# Patient Record
Sex: MALE | Race: WHITE | NOT HISPANIC OR LATINO | URBAN - METROPOLITAN AREA
[De-identification: names, ages, dates, MRNs, and addresses within clinical notes are randomized per-mention and may not be internally consistent; named-entity substitution may affect disease eponyms.]

---

## 2024-09-05 ENCOUNTER — OFFICE VISIT (OUTPATIENT)
Dept: PRIMARY CARE | Facility: CLINIC | Age: 64
End: 2024-09-05
Payer: MEDICARE

## 2024-09-05 VITALS
WEIGHT: 246 LBS | BODY MASS INDEX: 32.6 KG/M2 | SYSTOLIC BLOOD PRESSURE: 132 MMHG | DIASTOLIC BLOOD PRESSURE: 68 MMHG | HEIGHT: 73 IN

## 2024-09-05 DIAGNOSIS — E78.2 MIXED HYPERLIPIDEMIA: ICD-10-CM

## 2024-09-05 DIAGNOSIS — Z12.5 ENCOUNTER FOR PROSTATE CANCER SCREENING: ICD-10-CM

## 2024-09-05 DIAGNOSIS — I25.119 CORONARY ARTERY DISEASE INVOLVING NATIVE CORONARY ARTERY OF NATIVE HEART WITH ANGINA PECTORIS (CMS-HCC): Primary | ICD-10-CM

## 2024-09-05 DIAGNOSIS — I65.29 STENOSIS OF CAROTID ARTERY, UNSPECIFIED LATERALITY: ICD-10-CM

## 2024-09-05 DIAGNOSIS — I10 PRIMARY HYPERTENSION: ICD-10-CM

## 2024-09-05 DIAGNOSIS — E03.9 HYPOTHYROIDISM, UNSPECIFIED TYPE: ICD-10-CM

## 2024-09-05 DIAGNOSIS — R73.03 PRE-DIABETES: ICD-10-CM

## 2024-09-05 PROBLEM — H90.3 SENSORINEURAL HEARING LOSS, BILATERAL: Status: ACTIVE | Noted: 2024-09-05

## 2024-09-05 PROBLEM — Z90.49 HISTORY OF CHOLECYSTECTOMY: Status: ACTIVE | Noted: 2024-09-05

## 2024-09-05 PROBLEM — K57.30 DIVERTICULAR DISEASE OF COLON: Status: ACTIVE | Noted: 2024-09-05

## 2024-09-05 PROBLEM — I87.309 CHRONIC PERIPHERAL VENOUS HYPERTENSION: Status: ACTIVE | Noted: 2021-04-27

## 2024-09-05 PROBLEM — R07.9 CHEST PAIN: Status: ACTIVE | Noted: 2021-04-27

## 2024-09-05 PROBLEM — R27.0 ATAXIA: Status: ACTIVE | Noted: 2021-04-27

## 2024-09-05 PROBLEM — J40 BRONCHITIS: Status: ACTIVE | Noted: 2024-09-05

## 2024-09-05 PROBLEM — R10.9 ABDOMINAL PAIN: Status: ACTIVE | Noted: 2024-09-05

## 2024-09-05 PROBLEM — I25.9 CHRONIC ISCHEMIC HEART DISEASE, UNSPECIFIED: Status: ACTIVE | Noted: 2024-09-05

## 2024-09-05 PROBLEM — R68.89 OTHER GENERAL SYMPTOMS AND SIGNS: Status: ACTIVE | Noted: 2024-09-05

## 2024-09-05 PROBLEM — I25.10 ATHEROSCLEROSIS OF CORONARY ARTERY WITHOUT ANGINA PECTORIS: Status: ACTIVE | Noted: 2024-09-05

## 2024-09-05 PROBLEM — R06.00 DYSPNEA: Status: ACTIVE | Noted: 2021-04-27

## 2024-09-05 PROBLEM — J01.90 ACUTE SINUSITIS: Status: ACTIVE | Noted: 2024-09-05

## 2024-09-05 PROBLEM — I25.2 HISTORY OF MYOCARDIAL INFARCTION: Status: ACTIVE | Noted: 2021-04-27

## 2024-09-05 PROBLEM — I25.10 CORONARY ARTERY DISEASE: Status: ACTIVE | Noted: 2024-09-05

## 2024-09-05 PROBLEM — I11.9 BENIGN HYPERTENSIVE HEART DISEASE: Status: ACTIVE | Noted: 2024-09-05

## 2024-09-05 PROBLEM — D36.9 TUBULAR ADENOMA: Status: ACTIVE | Noted: 2024-09-05

## 2024-09-05 PROBLEM — Z85.53 PERSONAL HISTORY OF MALIGNANT NEOPLASM OF RENAL PELVIS: Status: ACTIVE | Noted: 2024-09-05

## 2024-09-05 PROBLEM — H81.10 BENIGN PAROXYSMAL VERTIGO, UNSPECIFIED EAR: Status: ACTIVE | Noted: 2024-09-05

## 2024-09-05 PROBLEM — F17.201 TOBACCO DEPENDENCE IN REMISSION: Status: ACTIVE | Noted: 2024-09-05

## 2024-09-05 PROBLEM — D48.5 NEOPLASM OF UNCERTAIN BEHAVIOR OF SKIN: Status: ACTIVE | Noted: 2024-09-05

## 2024-09-05 PROCEDURE — 3075F SYST BP GE 130 - 139MM HG: CPT | Performed by: INTERNAL MEDICINE

## 2024-09-05 PROCEDURE — 99204 OFFICE O/P NEW MOD 45 MIN: CPT | Performed by: INTERNAL MEDICINE

## 2024-09-05 PROCEDURE — 3078F DIAST BP <80 MM HG: CPT | Performed by: INTERNAL MEDICINE

## 2024-09-05 PROCEDURE — 1124F ACP DISCUSS-NO DSCNMKR DOCD: CPT | Performed by: INTERNAL MEDICINE

## 2024-09-05 PROCEDURE — 1159F MED LIST DOCD IN RCRD: CPT | Performed by: INTERNAL MEDICINE

## 2024-09-05 PROCEDURE — 3008F BODY MASS INDEX DOCD: CPT | Performed by: INTERNAL MEDICINE

## 2024-09-05 RX ORDER — METOPROLOL SUCCINATE 50 MG/1
50 TABLET, EXTENDED RELEASE ORAL 2 TIMES DAILY
COMMUNITY

## 2024-09-05 RX ORDER — ATORVASTATIN CALCIUM 40 MG/1
1 TABLET, FILM COATED ORAL
COMMUNITY
Start: 2024-08-06

## 2024-09-05 RX ORDER — ASPIRIN 81 MG/1
1 TABLET ORAL DAILY
COMMUNITY
Start: 2011-12-15

## 2024-09-05 RX ORDER — LISINOPRIL 10 MG/1
10 TABLET ORAL DAILY
COMMUNITY
Start: 2024-07-17

## 2024-09-05 RX ORDER — TRIAMTERENE/HYDROCHLOROTHIAZID 37.5-25 MG
1 TABLET ORAL
COMMUNITY

## 2024-09-05 ASSESSMENT — ENCOUNTER SYMPTOMS
DEPRESSION: 0
LOSS OF SENSATION IN FEET: 0
OCCASIONAL FEELINGS OF UNSTEADINESS: 0

## 2024-09-06 NOTE — PROGRESS NOTES
"Subjective   Patient ID: Bijan Ibanez is a 64 y.o. male who presents for New Patient Visit.    This 64-year-old young man today came to my office first time, I welcomed him.  He was our patient a few years ago.  He moved to [place] and he came back and I welcomed him.  I thanked him to come here.  He wants to establish a new physician.  He just got his Medicare and he retired.    I have personally reviewed the patient's Past Medical History, Medications, Allergies, Social History, and Family History in the EMR.    OPERATIONS:  He had a kidney surgery done.  He had a gallbladder surgery.    IMMUNIZATION:  Tetanus within the last 10 years.    Review of Systems   All other systems reviewed and are negative.  The patient had heart attack.  No stroke.  No diabetes.  No cancer.     Objective   /68   Ht 1.854 m (6' 1\")   Wt 112 kg (246 lb)   BMI 32.46 kg/m²     Physical Exam  Vitals reviewed.   HENT:      Right Ear: Tympanic membrane, ear canal and external ear normal.      Left Ear: Tympanic membrane, ear canal and external ear normal.   Eyes:      General: No scleral icterus.     Pupils: Pupils are equal, round, and reactive to light.   Neck:      Vascular: No carotid bruit.   Cardiovascular:      Heart sounds: Normal heart sounds, S1 normal and S2 normal. No murmur heard.     No friction rub.   Pulmonary:      Effort: Pulmonary effort is normal.      Breath sounds: Normal breath sounds and air entry.   Abdominal:      Palpations: There is no hepatomegaly, splenomegaly or mass.   Genitourinary:     Prostate: Normal.   Musculoskeletal:         General: No swelling or deformity. Normal range of motion.      Cervical back: Neck supple.      Right lower leg: No edema.      Left lower leg: No edema.   Lymphadenopathy:      Cervical: No cervical adenopathy.      Upper Body:      Right upper body: No axillary adenopathy.      Left upper body: No axillary adenopathy.      Lower Body: No right inguinal adenopathy. No " left inguinal adenopathy.   Skin:     Comments: Moles and freckles.   Neurological:      Mental Status: He is oriented to person, place, and time.      Cranial Nerves: Cranial nerves 2-12 are intact. No cranial nerve deficit.      Sensory: No sensory deficit.      Motor: Motor function is intact. No weakness.      Gait: Gait is intact.      Deep Tendon Reflexes: Reflexes normal.   Psychiatric:         Mood and Affect: Mood normal. Mood is not anxious or depressed. Affect is not angry.         Behavior: Behavior is not agitated.         Thought Content: Thought content normal.         Judgment: Judgment normal.     LAB WORK:  Laboratory testing discussed.    Assessment/Plan   Problem List Items Addressed This Visit             ICD-10-CM       Cardiac and Vasculature    Carotid artery stenosis I65.29    Relevant Orders    Referral to Cardiology    Coronary artery disease involving native coronary artery of native heart with angina pectoris (CMS-[facility]) - Primary I25.119    Relevant Medications    metoprolol succinate XL (Toprol-XL) 50 mg 24 hr tablet    Hypertension I10    Relevant Orders    CBC    Urinalysis with Reflex Microscopic    HLD (hyperlipidemia) E78.5    Relevant Orders    Comprehensive Metabolic Panel    Lipid Panel       Endocrine/Metabolic    Hypothyroidism E03.9    Relevant Orders    Thyroid Stimulating Hormone     Other Visit Diagnoses         Codes    Encounter for prostate cancer screening     Z12.5    Relevant Orders    Prostate Specific Antigen, Screen    Pre-diabetes     R73.03    Relevant Orders    Hemoglobin A1C        1. Coronary artery disease, stent.  I referred to Dr. Drummond.  2. Skin.  Moles and freckles caught my eyes.  Referred to Skin team.  3. Hypertension, okay.  4. High cholesterol, okay.  5. Thyroid, okay.  6. Prostate health.  Monitor.  7. Complete blood work ordered.  8. I urged him to see me in a week after that and also we will perform welcome to Medicare at same time. He is  agreeable.  I urged him to definitely see a skin doctor.  9. Medication reviewed, it is okay.    Scribe Attestation  By signing my name below, I, Jyoti Finley attest that this documentation has been prepared under the direction and in the presence of Nehal Murphy MD.

## 2024-09-13 ENCOUNTER — APPOINTMENT (OUTPATIENT)
Dept: PRIMARY CARE | Facility: CLINIC | Age: 64
End: 2024-09-13
Payer: MEDICARE

## 2024-10-16 ENCOUNTER — LAB (OUTPATIENT)
Dept: LAB | Facility: LAB | Age: 64
End: 2024-10-16
Payer: MEDICARE

## 2024-10-16 DIAGNOSIS — R73.03 PRE-DIABETES: ICD-10-CM

## 2024-10-16 DIAGNOSIS — E78.2 MIXED HYPERLIPIDEMIA: ICD-10-CM

## 2024-10-16 DIAGNOSIS — Z12.5 ENCOUNTER FOR PROSTATE CANCER SCREENING: ICD-10-CM

## 2024-10-16 DIAGNOSIS — I10 PRIMARY HYPERTENSION: ICD-10-CM

## 2024-10-16 DIAGNOSIS — E03.9 HYPOTHYROIDISM, UNSPECIFIED TYPE: ICD-10-CM

## 2024-10-16 LAB
ALBUMIN SERPL BCP-MCNC: 3.7 G/DL (ref 3.4–5)
ALP SERPL-CCNC: 39 U/L (ref 33–136)
ALT SERPL W P-5'-P-CCNC: 19 U/L (ref 10–52)
ANION GAP SERPL CALC-SCNC: 11 MMOL/L (ref 10–20)
APPEARANCE UR: CLEAR
AST SERPL W P-5'-P-CCNC: 17 U/L (ref 9–39)
BILIRUB SERPL-MCNC: 0.9 MG/DL (ref 0–1.2)
BILIRUB UR STRIP.AUTO-MCNC: NEGATIVE MG/DL
BUN SERPL-MCNC: 24 MG/DL (ref 6–23)
CALCIUM SERPL-MCNC: 9.4 MG/DL (ref 8.6–10.6)
CHLORIDE SERPL-SCNC: 102 MMOL/L (ref 98–107)
CHOLEST SERPL-MCNC: 123 MG/DL (ref 0–199)
CHOLESTEROL/HDL RATIO: 3.1
CO2 SERPL-SCNC: 33 MMOL/L (ref 21–32)
COLOR UR: NORMAL
CREAT SERPL-MCNC: 1.07 MG/DL (ref 0.5–1.3)
EGFRCR SERPLBLD CKD-EPI 2021: 77 ML/MIN/1.73M*2
ERYTHROCYTE [DISTWIDTH] IN BLOOD BY AUTOMATED COUNT: 12.8 % (ref 11.5–14.5)
EST. AVERAGE GLUCOSE BLD GHB EST-MCNC: 105 MG/DL
GLUCOSE SERPL-MCNC: 118 MG/DL (ref 74–99)
GLUCOSE UR STRIP.AUTO-MCNC: NORMAL MG/DL
HBA1C MFR BLD: 5.3 %
HCT VFR BLD AUTO: 47.1 % (ref 41–52)
HDLC SERPL-MCNC: 39.7 MG/DL
HGB BLD-MCNC: 15.1 G/DL (ref 13.5–17.5)
KETONES UR STRIP.AUTO-MCNC: NEGATIVE MG/DL
LDLC SERPL CALC-MCNC: 56 MG/DL
LEUKOCYTE ESTERASE UR QL STRIP.AUTO: NEGATIVE
MCH RBC QN AUTO: 28.2 PG (ref 26–34)
MCHC RBC AUTO-ENTMCNC: 32.1 G/DL (ref 32–36)
MCV RBC AUTO: 88 FL (ref 80–100)
NITRITE UR QL STRIP.AUTO: NEGATIVE
NON HDL CHOLESTEROL: 83 MG/DL (ref 0–149)
NRBC BLD-RTO: 0 /100 WBCS (ref 0–0)
PH UR STRIP.AUTO: 5 [PH]
PLATELET # BLD AUTO: 203 X10*3/UL (ref 150–450)
POTASSIUM SERPL-SCNC: 4.8 MMOL/L (ref 3.5–5.3)
PROT SERPL-MCNC: 6 G/DL (ref 6.4–8.2)
PROT UR STRIP.AUTO-MCNC: NEGATIVE MG/DL
PSA SERPL-MCNC: 3.72 NG/ML
RBC # BLD AUTO: 5.35 X10*6/UL (ref 4.5–5.9)
RBC # UR STRIP.AUTO: NEGATIVE /UL
SODIUM SERPL-SCNC: 141 MMOL/L (ref 136–145)
SP GR UR STRIP.AUTO: 1.02
TRIGL SERPL-MCNC: 139 MG/DL (ref 0–149)
TSH SERPL-ACNC: 1.24 MIU/L (ref 0.44–3.98)
UROBILINOGEN UR STRIP.AUTO-MCNC: NORMAL MG/DL
VLDL: 28 MG/DL (ref 0–40)
WBC # BLD AUTO: 6.5 X10*3/UL (ref 4.4–11.3)

## 2024-10-16 PROCEDURE — 36415 COLL VENOUS BLD VENIPUNCTURE: CPT

## 2024-10-16 PROCEDURE — 81003 URINALYSIS AUTO W/O SCOPE: CPT

## 2024-10-16 PROCEDURE — 84443 ASSAY THYROID STIM HORMONE: CPT

## 2024-10-16 PROCEDURE — 80053 COMPREHEN METABOLIC PANEL: CPT

## 2024-10-16 PROCEDURE — 83036 HEMOGLOBIN GLYCOSYLATED A1C: CPT

## 2024-10-16 PROCEDURE — 85027 COMPLETE CBC AUTOMATED: CPT

## 2024-10-16 PROCEDURE — G0103 PSA SCREENING: HCPCS

## 2024-10-16 PROCEDURE — 80061 LIPID PANEL: CPT

## 2024-10-21 ENCOUNTER — APPOINTMENT (OUTPATIENT)
Dept: PRIMARY CARE | Facility: CLINIC | Age: 64
End: 2024-10-21
Payer: MEDICARE

## 2024-10-21 VITALS
WEIGHT: 245 LBS | BODY MASS INDEX: 32.47 KG/M2 | SYSTOLIC BLOOD PRESSURE: 136 MMHG | HEIGHT: 73 IN | DIASTOLIC BLOOD PRESSURE: 72 MMHG

## 2024-10-21 DIAGNOSIS — Z87.891 DISCONTINUED SMOKING: ICD-10-CM

## 2024-10-21 DIAGNOSIS — E03.9 HYPOTHYROIDISM, UNSPECIFIED TYPE: ICD-10-CM

## 2024-10-21 DIAGNOSIS — Z12.5 PROSTATE CANCER SCREENING: ICD-10-CM

## 2024-10-21 DIAGNOSIS — Z00.00 MEDICARE ANNUAL WELLNESS VISIT, INITIAL: Primary | ICD-10-CM

## 2024-10-21 DIAGNOSIS — Z13.6 SCREENING FOR AAA (ABDOMINAL AORTIC ANEURYSM): ICD-10-CM

## 2024-10-21 DIAGNOSIS — E78.2 MIXED HYPERLIPIDEMIA: ICD-10-CM

## 2024-10-21 DIAGNOSIS — I10 PRIMARY HYPERTENSION: ICD-10-CM

## 2024-10-21 PROCEDURE — G0439 PPPS, SUBSEQ VISIT: HCPCS | Performed by: INTERNAL MEDICINE

## 2024-10-21 PROCEDURE — 99213 OFFICE O/P EST LOW 20 MIN: CPT | Performed by: INTERNAL MEDICINE

## 2024-10-21 PROCEDURE — 93000 ELECTROCARDIOGRAM COMPLETE: CPT | Performed by: INTERNAL MEDICINE

## 2024-10-21 PROCEDURE — 3008F BODY MASS INDEX DOCD: CPT | Performed by: INTERNAL MEDICINE

## 2024-10-21 PROCEDURE — 1123F ACP DISCUSS/DSCN MKR DOCD: CPT | Performed by: INTERNAL MEDICINE

## 2024-10-21 PROCEDURE — 3075F SYST BP GE 130 - 139MM HG: CPT | Performed by: INTERNAL MEDICINE

## 2024-10-21 PROCEDURE — 1160F RVW MEDS BY RX/DR IN RCRD: CPT | Performed by: INTERNAL MEDICINE

## 2024-10-21 PROCEDURE — 1159F MED LIST DOCD IN RCRD: CPT | Performed by: INTERNAL MEDICINE

## 2024-10-21 PROCEDURE — 1157F ADVNC CARE PLAN IN RCRD: CPT | Performed by: INTERNAL MEDICINE

## 2024-10-21 PROCEDURE — 3078F DIAST BP <80 MM HG: CPT | Performed by: INTERNAL MEDICINE

## 2024-10-21 ASSESSMENT — PATIENT HEALTH QUESTIONNAIRE - PHQ9
SUM OF ALL RESPONSES TO PHQ9 QUESTIONS 1 AND 2: 0
2. FEELING DOWN, DEPRESSED OR HOPELESS: NOT AT ALL
1. LITTLE INTEREST OR PLEASURE IN DOING THINGS: NOT AT ALL

## 2024-10-21 ASSESSMENT — ACTIVITIES OF DAILY LIVING (ADL)
GROCERY_SHOPPING: INDEPENDENT
TAKING_MEDICATION: INDEPENDENT
MANAGING_FINANCES: INDEPENDENT
DOING_HOUSEWORK: INDEPENDENT

## 2024-10-21 ASSESSMENT — ENCOUNTER SYMPTOMS
LOSS OF SENSATION IN FEET: 0
DEPRESSION: 0
OCCASIONAL FEELINGS OF UNSTEADINESS: 0

## 2024-10-22 NOTE — PROGRESS NOTES
"Subjective   Patient ID: Bijan Ibanez is a 64 y.o. male who presents for Medicare Annual Wellness Visit Initial.    This gentleman today came here for welcome to Medicare exam and follow-up on blood work and other conditions.  He is retired from [employer] .  He had all his shots.  He is working as a , busy dulce maria.    IMMUNIZATION: In [place] , he had a complete immunization.  He does not want flu shot.  He will think about pneumonia shot.    HEALTH MAINTENANCE: [facility].  He will get me report.    I have personally reviewed the patient's Past Medical History, Medications, Allergies, Social History, and Family History in the EMR.    Review of Systems   All other systems reviewed and are negative.  The patient never had stroke.  No diabetes and no cancer.    Objective   /72   Ht 1.854 m (6' 1\")   Wt 111 kg (245 lb)   BMI 32.32 kg/m²     Physical Exam  Vitals reviewed.   HENT:      Right Ear: Tympanic membrane, ear canal and external ear normal.      Left Ear: Tympanic membrane, ear canal and external ear normal.   Eyes:      General: No scleral icterus.     Pupils: Pupils are equal, round, and reactive to light.   Neck:      Vascular: No carotid bruit.   Cardiovascular:      Heart sounds: Normal heart sounds, S1 normal and S2 normal. No murmur heard.     No friction rub.   Pulmonary:      Effort: Pulmonary effort is normal.      Breath sounds: Normal breath sounds and air entry.   Abdominal:      Palpations: There is no hepatomegaly, splenomegaly or mass.   Genitourinary:     Prostate: Normal.   Musculoskeletal:         General: No swelling or deformity. Normal range of motion.      Cervical back: Neck supple.      Right lower leg: No edema.      Left lower leg: No edema.   Lymphadenopathy:      Cervical: No cervical adenopathy.      Upper Body:      Right upper body: No axillary adenopathy.      Left upper body: No axillary adenopathy.      Lower Body: No right " inguinal adenopathy. No left inguinal adenopathy.   Neurological:      Mental Status: He is oriented to person, place, and time.      Cranial Nerves: Cranial nerves 2-12 are intact. No cranial nerve deficit.      Sensory: No sensory deficit.      Motor: Motor function is intact. No weakness.      Gait: Gait is intact.      Deep Tendon Reflexes: Reflexes normal.   Psychiatric:         Mood and Affect: Mood normal. Mood is not anxious or depressed. Affect is not angry.         Behavior: Behavior is not agitated.         Thought Content: Thought content normal.         Judgment: Judgment normal.     LAB WORK: Laboratory testing discussed.    Assessment/Plan   Problem List Items Addressed This Visit             ICD-10-CM       Cardiac and Vasculature    Hypertension I10    HLD (hyperlipidemia) E78.5    Relevant Orders    Comprehensive Metabolic Panel    Lipid Panel       Endocrine/Metabolic    Hypothyroidism E03.9     Other Visit Diagnoses         Codes    Medicare annual wellness visit, initial    -  Primary Z00.00    Screening for AAA (abdominal aortic aneurysm)     Z13.6    Relevant Orders    Vascular US abdominal aorta anuerysm AAA screening    Discontinued smoking     Z87.891    Prostate cancer screening     Z12.5        1. Welcome to Medicare done.  2. He was smoker before.  AAA screening ordered.  3. EKG done, some inferior lead changes.  He is seeing cardiologist regularly.  4. Hypertension, okay.  5. High cholesterol, stable.  6. Thyroid, okay.  7. Prostate health.  Monitor.  8. Blood work ordered.  9. I offered flu and pneumonia shot.  He will think about it.  10. Follow-up appointment with me with repeat blood work in three months, but always happy to serve anytime sooner should it be necessary.  11. Skin.  Referred to skin physician for yearly checkup.    Scribe Attestation  By signing my name below, IGuerda Scribe attest that this documentation has been prepared under the direction and in the presence  of Nehal Murphy MD.

## 2024-10-24 ENCOUNTER — HOSPITAL ENCOUNTER (OUTPATIENT)
Dept: RADIOLOGY | Facility: CLINIC | Age: 64
Discharge: HOME | End: 2024-10-24
Payer: MEDICARE

## 2024-10-24 DIAGNOSIS — Z13.6 SCREENING FOR AAA (ABDOMINAL AORTIC ANEURYSM): ICD-10-CM

## 2024-10-24 PROCEDURE — 76706 US ABDL AORTA SCREEN AAA: CPT

## 2024-10-24 PROCEDURE — 76706 US ABDL AORTA SCREEN AAA: CPT | Performed by: RADIOLOGY

## 2024-11-08 DIAGNOSIS — E78.2 MIXED HYPERLIPIDEMIA: ICD-10-CM

## 2024-11-08 DIAGNOSIS — I10 PRIMARY HYPERTENSION: ICD-10-CM

## 2024-11-08 RX ORDER — TRIAMTERENE/HYDROCHLOROTHIAZID 37.5-25 MG
1 TABLET ORAL
Qty: 90 TABLET | Refills: 0 | Status: SHIPPED | OUTPATIENT
Start: 2024-11-08

## 2024-11-08 RX ORDER — ATORVASTATIN CALCIUM 40 MG/1
40 TABLET, FILM COATED ORAL
Qty: 90 TABLET | Refills: 0 | Status: SHIPPED | OUTPATIENT
Start: 2024-11-08

## 2024-12-31 ENCOUNTER — OFFICE VISIT (OUTPATIENT)
Dept: URGENT CARE | Age: 64
End: 2024-12-31
Payer: MEDICARE

## 2024-12-31 VITALS
DIASTOLIC BLOOD PRESSURE: 70 MMHG | RESPIRATION RATE: 16 BRPM | OXYGEN SATURATION: 95 % | SYSTOLIC BLOOD PRESSURE: 118 MMHG | HEART RATE: 88 BPM | TEMPERATURE: 98.2 F

## 2024-12-31 DIAGNOSIS — R50.9 FEVER, UNSPECIFIED FEVER CAUSE: Primary | ICD-10-CM

## 2024-12-31 LAB
POC RAPID INFLUENZA A: NEGATIVE
POC RAPID INFLUENZA B: NEGATIVE

## 2024-12-31 ASSESSMENT — PAIN SCALES - GENERAL: PAINLEVEL_OUTOF10: 6

## 2025-01-03 ENCOUNTER — APPOINTMENT (OUTPATIENT)
Dept: CARDIOLOGY | Facility: HOSPITAL | Age: 65
End: 2025-01-03
Payer: MEDICARE

## 2025-01-03 ENCOUNTER — HOSPITAL ENCOUNTER (EMERGENCY)
Facility: HOSPITAL | Age: 65
Discharge: HOME | End: 2025-01-03
Attending: EMERGENCY MEDICINE
Payer: MEDICARE

## 2025-01-03 ENCOUNTER — APPOINTMENT (OUTPATIENT)
Dept: RADIOLOGY | Facility: HOSPITAL | Age: 65
End: 2025-01-03
Payer: MEDICARE

## 2025-01-03 VITALS
HEART RATE: 84 BPM | OXYGEN SATURATION: 94 % | RESPIRATION RATE: 18 BRPM | HEIGHT: 73 IN | DIASTOLIC BLOOD PRESSURE: 81 MMHG | SYSTOLIC BLOOD PRESSURE: 136 MMHG | WEIGHT: 246.03 LBS | TEMPERATURE: 98.2 F | BODY MASS INDEX: 32.61 KG/M2

## 2025-01-03 DIAGNOSIS — R03.0 ELEVATED BLOOD PRESSURE READING: Primary | ICD-10-CM

## 2025-01-03 DIAGNOSIS — R35.0 URINARY FREQUENCY: ICD-10-CM

## 2025-01-03 LAB
APPEARANCE UR: CLEAR
ATRIAL RATE: 100 BPM
BILIRUB UR STRIP.AUTO-MCNC: NEGATIVE MG/DL
COLOR UR: ABNORMAL
FLUAV RNA RESP QL NAA+PROBE: NOT DETECTED
FLUBV RNA RESP QL NAA+PROBE: NOT DETECTED
GLUCOSE UR STRIP.AUTO-MCNC: NORMAL MG/DL
HOLD SPECIMEN: NORMAL
KETONES UR STRIP.AUTO-MCNC: NEGATIVE MG/DL
LEUKOCYTE ESTERASE UR QL STRIP.AUTO: NEGATIVE
MUCOUS THREADS #/AREA URNS AUTO: NORMAL /LPF
NITRITE UR QL STRIP.AUTO: NEGATIVE
P AXIS: 53 DEGREES
P OFFSET: 178 MS
P ONSET: 122 MS
PH UR STRIP.AUTO: 5 [PH]
PR INTERVAL: 178 MS
PROT UR STRIP.AUTO-MCNC: NEGATIVE MG/DL
Q ONSET: 211 MS
QRS COUNT: 16 BEATS
QRS DURATION: 98 MS
QT INTERVAL: 352 MS
QTC CALCULATION(BAZETT): 454 MS
QTC FREDERICIA: 417 MS
R AXIS: 17 DEGREES
RBC # UR STRIP.AUTO: ABNORMAL /UL
RBC #/AREA URNS AUTO: NORMAL /HPF
RSV RNA RESP QL NAA+PROBE: NOT DETECTED
SARS-COV-2 RNA RESP QL NAA+PROBE: NOT DETECTED
SP GR UR STRIP.AUTO: 1.01
T AXIS: 41 DEGREES
T OFFSET: 387 MS
UROBILINOGEN UR STRIP.AUTO-MCNC: NORMAL MG/DL
VENTRICULAR RATE: 100 BPM
WBC #/AREA URNS AUTO: NORMAL /HPF

## 2025-01-03 PROCEDURE — 99285 EMERGENCY DEPT VISIT HI MDM: CPT | Performed by: EMERGENCY MEDICINE

## 2025-01-03 PROCEDURE — 2500000002 HC RX 250 W HCPCS SELF ADMINISTERED DRUGS (ALT 637 FOR MEDICARE OP, ALT 636 FOR OP/ED): Performed by: EMERGENCY MEDICINE

## 2025-01-03 PROCEDURE — 94640 AIRWAY INHALATION TREATMENT: CPT

## 2025-01-03 PROCEDURE — 87637 SARSCOV2&INF A&B&RSV AMP PRB: CPT | Performed by: EMERGENCY MEDICINE

## 2025-01-03 PROCEDURE — 81001 URINALYSIS AUTO W/SCOPE: CPT | Performed by: EMERGENCY MEDICINE

## 2025-01-03 PROCEDURE — 71045 X-RAY EXAM CHEST 1 VIEW: CPT

## 2025-01-03 PROCEDURE — 71045 X-RAY EXAM CHEST 1 VIEW: CPT | Performed by: RADIOLOGY

## 2025-01-03 PROCEDURE — 93005 ELECTROCARDIOGRAM TRACING: CPT

## 2025-01-03 RX ORDER — IPRATROPIUM BROMIDE AND ALBUTEROL SULFATE 2.5; .5 MG/3ML; MG/3ML
3 SOLUTION RESPIRATORY (INHALATION) ONCE
Status: COMPLETED | OUTPATIENT
Start: 2025-01-03 | End: 2025-01-03

## 2025-01-03 RX ADMIN — IPRATROPIUM BROMIDE AND ALBUTEROL SULFATE 3 ML: 2.5; .5 SOLUTION RESPIRATORY (INHALATION) at 01:39

## 2025-01-03 ASSESSMENT — COLUMBIA-SUICIDE SEVERITY RATING SCALE - C-SSRS
6. HAVE YOU EVER DONE ANYTHING, STARTED TO DO ANYTHING, OR PREPARED TO DO ANYTHING TO END YOUR LIFE?: NO
2. HAVE YOU ACTUALLY HAD ANY THOUGHTS OF KILLING YOURSELF?: NO
1. IN THE PAST MONTH, HAVE YOU WISHED YOU WERE DEAD OR WISHED YOU COULD GO TO SLEEP AND NOT WAKE UP?: NO

## 2025-01-03 ASSESSMENT — PAIN DESCRIPTION - DESCRIPTORS: DESCRIPTORS: SHOOTING

## 2025-01-03 ASSESSMENT — PAIN - FUNCTIONAL ASSESSMENT: PAIN_FUNCTIONAL_ASSESSMENT: 0-10

## 2025-01-03 ASSESSMENT — PAIN DESCRIPTION - PAIN TYPE: TYPE: ACUTE PAIN

## 2025-01-03 ASSESSMENT — PAIN DESCRIPTION - LOCATION: LOCATION: GENERALIZED
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
## 2025-01-03 NOTE — PROGRESS NOTES
Chief Complaint   Patient presents with    Fever     Friday started with fever on and off. Bodyaches. Bottom lip feels numb/tingly.       Physical Exam:     GEN: Awake and alert, No acute distress     ENT: bilateral TMs without erythema or effusion. Canals clear. Tonsils Hypopharynx not erythematous or with exudate.    Resp: lungs clear to auscultation bilaterally     GI: abdomen soft, non-tender, non-distended            POC Labs:     Office Visit on 12/31/2024   Component Date Value Ref Range Status    POC Rapid Influenza A 12/31/2024 Negative  Negative Final    POC Rapid Influenza B 12/31/2024 Negative  Negative Final        Encounter Diagnosis   Name Primary?    Fever, unspecified fever cause Yes        Medical Decision Making & Plan:     Unsure of what is causing this fever, especially given the lack of other symptoms.   Ibuprofen + tylenol for fevers  Return to clinic if other symptoms develop  Follow up with PCP for more in depth testing if not resolving over the next 4-5 days      01/03/25 at 2:20 AM - Vianney Rodriguez, DO

## 2025-01-03 NOTE — DISCHARGE INSTRUCTIONS
Thank you for choosing [facility] Emergency Department and allowing me to take care of you today.     If your symptoms are not improving, begin to worsen, new symptoms develop/additional concerns arise, please return to the Emergency Department at any time for further evaluation and  treatment.     Dr. Zana Friend Jr., D.O.     Follow-up with your primary care doctor or any emergency department in the next 2-3 days for re-evaluation and further treatment as deemed necessary

## 2025-01-06 ENCOUNTER — OFFICE VISIT (OUTPATIENT)
Dept: PRIMARY CARE | Facility: CLINIC | Age: 65
End: 2025-01-06
Payer: MEDICARE

## 2025-01-06 VITALS
DIASTOLIC BLOOD PRESSURE: 74 MMHG | WEIGHT: 236 LBS | HEIGHT: 73 IN | SYSTOLIC BLOOD PRESSURE: 134 MMHG | BODY MASS INDEX: 31.28 KG/M2

## 2025-01-06 DIAGNOSIS — J98.8 RESPIRATORY INFECTION: ICD-10-CM

## 2025-01-06 DIAGNOSIS — J45.909 ACUTE ASTHMATIC BRONCHITIS (HHS-HCC): Primary | ICD-10-CM

## 2025-01-06 DIAGNOSIS — C64.9 MALIGNANT NEOPLASM OF KIDNEY, UNSPECIFIED LATERALITY (MULTI): ICD-10-CM

## 2025-01-06 DIAGNOSIS — R05.9 COUGH, UNSPECIFIED TYPE: ICD-10-CM

## 2025-01-06 PROBLEM — R50.9 FEVER, UNSPECIFIED: Status: ACTIVE | Noted: 2025-01-06

## 2025-01-06 PROBLEM — R97.20 ELEVATED PROSTATE SPECIFIC ANTIGEN (PSA): Status: ACTIVE | Noted: 2025-01-06

## 2025-01-06 PROBLEM — F43.23 ADJUSTMENT DISORDER WITH MIXED ANXIETY AND DEPRESSED MOOD: Status: ACTIVE | Noted: 2025-01-06

## 2025-01-06 PROCEDURE — 99213 OFFICE O/P EST LOW 20 MIN: CPT | Performed by: INTERNAL MEDICINE

## 2025-01-06 PROCEDURE — 1158F ADVNC CARE PLAN TLK DOCD: CPT | Performed by: INTERNAL MEDICINE

## 2025-01-06 PROCEDURE — 3008F BODY MASS INDEX DOCD: CPT | Performed by: INTERNAL MEDICINE

## 2025-01-06 PROCEDURE — 1159F MED LIST DOCD IN RCRD: CPT | Performed by: INTERNAL MEDICINE

## 2025-01-06 PROCEDURE — 1123F ACP DISCUSS/DSCN MKR DOCD: CPT | Performed by: INTERNAL MEDICINE

## 2025-01-06 PROCEDURE — 1157F ADVNC CARE PLAN IN RCRD: CPT | Performed by: INTERNAL MEDICINE

## 2025-01-06 PROCEDURE — 3075F SYST BP GE 130 - 139MM HG: CPT | Performed by: INTERNAL MEDICINE

## 2025-01-06 PROCEDURE — 3078F DIAST BP <80 MM HG: CPT | Performed by: INTERNAL MEDICINE

## 2025-01-06 RX ORDER — LORATADINE 10 MG/1
10 TABLET ORAL DAILY
Qty: 30 TABLET | Refills: 0 | Status: ON HOLD | OUTPATIENT
Start: 2025-01-06 | End: 2025-01-12 | Stop reason: WASHOUT

## 2025-01-06 RX ORDER — BENZONATATE 200 MG/1
200 CAPSULE ORAL 3 TIMES DAILY PRN
Qty: 45 CAPSULE | Refills: 0 | Status: ON HOLD | OUTPATIENT
Start: 2025-01-06 | End: 2025-01-12 | Stop reason: WASHOUT

## 2025-01-06 RX ORDER — DEXTROMETHORPHAN HBR AND GUAIFENESIN 5; 100 MG/5ML; MG/5ML
5 LIQUID ORAL 2 TIMES DAILY
Qty: 355 ML | Refills: 0 | Status: ON HOLD | OUTPATIENT
Start: 2025-01-06 | End: 2025-01-12 | Stop reason: WASHOUT

## 2025-01-06 RX ORDER — LEVOFLOXACIN 500 MG/1
500 TABLET, FILM COATED ORAL DAILY
Qty: 10 TABLET | Refills: 0 | Status: ON HOLD | OUTPATIENT
Start: 2025-01-06 | End: 2025-01-12 | Stop reason: WASHOUT

## 2025-01-07 NOTE — PROGRESS NOTES
"Subjective   Patient ID: Bijan Ibanez is a 64 y.o. male who presents for Illness.    This gentleman, Shamar today came here for cough, yellow sputum, sinus congestion, bronchitis, headache going on for last several days.  Over-the-counter medications not helping.  He came for follow-up on various conditions.    I have personally reviewed the patient's Past Medical History, Medications, Allergies, Social History, and Family History in the EMR.    Illness    Review of Systems   All other systems reviewed and are negative.    Objective   /74   Ht 1.854 m (6' 1\")   Wt 107 kg (236 lb)   BMI 31.14 kg/m²     Physical Exam  Vitals reviewed.   HENT:      Nose: Congestion present.      Comments: Postnasal drip.     Mouth/Throat:      Comments: Throat congested.  Cardiovascular:      Heart sounds: Normal heart sounds, S1 normal and S2 normal. No murmur heard.     No friction rub.   Pulmonary:      Effort: Pulmonary effort is normal.      Breath sounds: Wheezing present.   Abdominal:      Palpations: There is no hepatomegaly, splenomegaly or mass.   Musculoskeletal:      Right lower leg: No edema.      Left lower leg: No edema.   Lymphadenopathy:      Lower Body: No right inguinal adenopathy. No left inguinal adenopathy.   Neurological:      Cranial Nerves: Cranial nerves 2-12 are intact.      Sensory: No sensory deficit.      Motor: Motor function is intact.      Deep Tendon Reflexes: Reflexes are normal and symmetric.     CHART REVIEW: The patient was in urgent care, emergency room, multiple tests done.  No improvement.    LAB WORK: Laboratory testing discussed.    Assessment/Plan   Problem List Items Addressed This Visit    None  Visit Diagnoses         Codes    Acute asthmatic bronchitis ([facility])    -  Primary J45.909    Respiratory infection     J98.8    Relevant Medications    levoFLOXacin (Levaquin) 500 mg tablet    loratadine (Claritin) 10 mg tablet    dextromethorphan-guaifenesin (Robitussin Cough-Chest " Roger DM) 5-100 mg/5 mL liquid    benzonatate (Tessalon) 200 mg capsule    Cough, unspecified type     R05.9    Relevant Medications    levoFLOXacin (Levaquin) 500 mg tablet    loratadine (Claritin) 10 mg tablet    dextromethorphan-guaifenesin (Robitussin Cough-Chest Roger DM) 5-100 mg/5 mL liquid    benzonatate (Tessalon) 200 mg capsule        1. Acute asthmatic bronchitis, cough, and congestion, systemic effects.  Levaquin, Claritin, Robitussin, Flonase, albuterol inhaler, Tessalon.  Follow-up in two weeks if not better.  Keep hydrated.  2. Continue to follow.    Scribe Attestation  By signing my name below, IGuerda Scribe attest that this documentation has been prepared under the direction and in the presence of Nehal Murphy MD.     All medical record entries made by the yessiibe were personally dictated by me I have reviewed the chart and agree the record accurately reflects my personal performance of his history physical examination and management     Detail Level: Zone Bactrim Counseling:  I discussed with the patient the risks of sulfa antibiotics including but not limited to GI upset, allergic reaction, drug rash, diarrhea, dizziness, photosensitivity, and yeast infections.  Rarely, more serious reactions can occur including but not limited to aplastic anemia, agranulocytosis, methemoglobinemia, blood dyscrasias, liver or kidney failure, lung infiltrates or desquamative/blistering drug rashes. Doxycycline Pregnancy And Lactation Text: This medication is Pregnancy Category D and not consider safe during pregnancy. It is also excreted in breast milk but is considered safe for shorter treatment courses. Minocycline Counseling: Patient advised regarding possible photosensitivity and discoloration of the teeth, skin, lips, tongue and gums.  Patient instructed to avoid sunlight, if possible.  When exposed to sunlight, patients should wear protective clothing, sunglasses, and sunscreen.  The patient was instructed to call the office immediately if the following severe adverse effects occur:  hearing changes, easy bruising/bleeding, severe headache, or vision changes.  The patient verbalized understanding of the proper use and possible adverse effects of minocycline.  All of the patient's questions and concerns were addressed. Topical Sulfur Applications Pregnancy And Lactation Text: This medication is Pregnancy Category C and has an unknown safety profile during pregnancy. It is unknown if this topical medication is excreted in breast milk. Topical Retinoid counseling:  Patient advised to apply a pea-sized amount only at bedtime and wait 30 minutes after washing their face before applying.  If too drying, patient may add a non-comedogenic moisturizer. The patient verbalized understanding of the proper use and possible adverse effects of retinoids.  All of the patient's questions and concerns were addressed. Tetracycline Pregnancy And Lactation Text: This medication is Pregnancy Category D and not consider safe during pregnancy. It is also excreted in breast milk. Birth Control Pills Counseling: Birth Control Pill Counseling: I discussed with the patient the potential side effects of OCPs including but not limited to increased risk of stroke, heart attack, thrombophlebitis, deep venous thrombosis, hepatic adenomas, breast changes, GI upset, headaches, and depression.  The patient verbalized understanding of the proper use and possible adverse effects of OCPs. All of the patient's questions and concerns were addressed. Erythromycin Pregnancy And Lactation Text: This medication is Pregnancy Category B and is considered safe during pregnancy. It is also excreted in breast milk. Sarecycline Counseling: Patient advised regarding possible photosensitivity and discoloration of the teeth, skin, lips, tongue and gums.  Patient instructed to avoid sunlight, if possible.  When exposed to sunlight, patients should wear protective clothing, sunglasses, and sunscreen.  The patient was instructed to call the office immediately if the following severe adverse effects occur:  hearing changes, easy bruising/bleeding, severe headache, or vision changes.  The patient verbalized understanding of the proper use and possible adverse effects of sarecycline.  All of the patient's questions and concerns were addressed. Tazorac Pregnancy And Lactation Text: This medication is not safe during pregnancy. It is unknown if this medication is excreted in breast milk. Azelaic Acid Counseling: Patient counseled that medicine may cause skin irritation and to avoid applying near the eyes.  In the event of skin irritation, the patient was advised to reduce the amount of the drug applied or use it less frequently.   The patient verbalized understanding of the proper use and possible adverse effects of azelaic acid.  All of the patient's questions and concerns were addressed. Birth Control Pills Pregnancy And Lactation Text: This medication should be avoided if pregnant and for the first 30 days post-partum. Isotretinoin Counseling: Patient should get monthly blood tests, not donate blood, not drive at night if vision affected, not share medication, and not undergo elective surgery for 6 months after tx completed. Side effects reviewed, pt to contact office should one occur. Benzoyl Peroxide Counseling: Patient counseled that medicine may cause skin irritation and bleach clothing.  In the event of skin irritation, the patient was advised to reduce the amount of the drug applied or use it less frequently.   The patient verbalized understanding of the proper use and possible adverse effects of benzoyl peroxide.  All of the patient's questions and concerns were addressed. Spironolactone Pregnancy And Lactation Text: This medication can cause feminization of the male fetus and should be avoided during pregnancy. The active metabolite is also found in breast milk. Azithromycin Counseling:  I discussed with the patient the risks of azithromycin including but not limited to GI upset, allergic reaction, drug rash, diarrhea, and yeast infections. Dapsone Pregnancy And Lactation Text: This medication is Pregnancy Category C and is not considered safe during pregnancy or breast feeding. High Dose Vitamin A Counseling: Side effects reviewed, pt to contact office should one occur. Topical Clindamycin Pregnancy And Lactation Text: This medication is Pregnancy Category B and is considered safe during pregnancy. It is unknown if it is excreted in breast milk. Bactrim Pregnancy And Lactation Text: This medication is Pregnancy Category D and is known to cause fetal risk.  It is also excreted in breast milk. Topical Sulfur Applications Counseling: Topical Sulfur Counseling: Patient counseled that this medication may cause skin irritation or allergic reactions.  In the event of skin irritation, the patient was advised to reduce the amount of the drug applied or use it less frequently.   The patient verbalized understanding of the proper use and possible adverse effects of topical sulfur application.  All of the patient's questions and concerns were addressed. Benzoyl Peroxide Pregnancy And Lactation Text: This medication is Pregnancy Category C. It is unknown if benzoyl peroxide is excreted in breast milk. Tetracycline Counseling: Patient counseled regarding possible photosensitivity and increased risk for sunburn.  Patient instructed to avoid sunlight, if possible.  When exposed to sunlight, patients should wear protective clothing, sunglasses, and sunscreen.  The patient was instructed to call the office immediately if the following severe adverse effects occur:  hearing changes, easy bruising/bleeding, severe headache, or vision changes.  The patient verbalized understanding of the proper use and possible adverse effects of tetracycline.  All of the patient's questions and concerns were addressed. Patient understands to avoid pregnancy while on therapy due to potential birth defects. Azithromycin Pregnancy And Lactation Text: This medication is considered safe during pregnancy and is also secreted in breast milk. Include Pregnancy/Lactation Warning?: No Doxycycline Counseling:  Patient counseled regarding possible photosensitivity and increased risk for sunburn.  Patient instructed to avoid sunlight, if possible.  When exposed to sunlight, patients should wear protective clothing, sunglasses, and sunscreen.  The patient was instructed to call the office immediately if the following severe adverse effects occur:  hearing changes, easy bruising/bleeding, severe headache, or vision changes.  The patient verbalized understanding of the proper use and possible adverse effects of doxycycline.  All of the patient's questions and concerns were addressed. High Dose Vitamin A Pregnancy And Lactation Text: High dose vitamin A therapy is contraindicated during pregnancy and breast feeding. Winlevi Counseling:  I discussed with the patient the risks of topical clascoterone including but not limited to erythema, scaling, itching, and stinging. Patient voiced their understanding. Topical Retinoid Pregnancy And Lactation Text: This medication is Pregnancy Category C. It is unknown if this medication is excreted in breast milk. Erythromycin Counseling:  I discussed with the patient the risks of erythromycin including but not limited to GI upset, allergic reaction, drug rash, diarrhea, increase in liver enzymes, and yeast infections. Aklief counseling:  Patient advised to apply a pea-sized amount only at bedtime and wait 30 minutes after washing their face before applying.  If too drying, patient may add a non-comedogenic moisturizer.  The most commonly reported side effects including irritation, redness, scaling, dryness, stinging, burning, itching, and increased risk of sunburn.  The patient verbalized understanding of the proper use and possible adverse effects of retinoids.  All of the patient's questions and concerns were addressed. Winlevi Pregnancy And Lactation Text: This medication is considered safe during pregnancy and breastfeeding. Dapsone Counseling: I discussed with the patient the risks of dapsone including but not limited to hemolytic anemia, agranulocytosis, rashes, methemoglobinemia, kidney failure, peripheral neuropathy, headaches, GI upset, and liver toxicity.  Patients who start dapsone require monitoring including baseline LFTs and weekly CBCs for the first month, then every month thereafter.  The patient verbalized understanding of the proper use and possible adverse effects of dapsone.  All of the patient's questions and concerns were addressed. Isotretinoin Pregnancy And Lactation Text: This medication is Pregnancy Category X and is considered extremely dangerous during pregnancy. It is unknown if it is excreted in breast milk. Spironolactone Counseling: Patient advised regarding risks of diarrhea, abdominal pain, hyperkalemia, birth defects (for female patients), liver toxicity and renal toxicity. The patient may need blood work to monitor liver and kidney function and potassium levels while on therapy. The patient verbalized understanding of the proper use and possible adverse effects of spironolactone.  All of the patient's questions and concerns were addressed. Aklief Pregnancy And Lactation Text: It is unknown if this medication is safe to use during pregnancy.  It is unknown if this medication is excreted in breast milk.  Breastfeeding women should use the topical cream on the smallest area of the skin for the shortest time needed while breastfeeding.  Do not apply to nipple and areola. Tazorac Counseling:  Patient advised that medication is irritating and drying.  Patient may need to apply sparingly and wash off after an hour before eventually leaving it on overnight.  The patient verbalized understanding of the proper use and possible adverse effects of tazorac.  All of the patient's questions and concerns were addressed. Topical Clindamycin Counseling: Patient counseled that this medication may cause skin irritation or allergic reactions.  In the event of skin irritation, the patient was advised to reduce the amount of the drug applied or use it less frequently.   The patient verbalized understanding of the proper use and possible adverse effects of clindamycin.  All of the patient's questions and concerns were addressed. Azelaic Acid Pregnancy And Lactation Text: This medication is considered safe during pregnancy and breast feeding.

## 2025-01-08 ENCOUNTER — OFFICE VISIT (OUTPATIENT)
Dept: PRIMARY CARE | Facility: CLINIC | Age: 65
End: 2025-01-08
Payer: MEDICARE

## 2025-01-08 ENCOUNTER — LAB (OUTPATIENT)
Dept: LAB | Facility: LAB | Age: 65
End: 2025-01-08
Payer: MEDICARE

## 2025-01-08 VITALS
WEIGHT: 236 LBS | HEIGHT: 73 IN | BODY MASS INDEX: 31.28 KG/M2 | DIASTOLIC BLOOD PRESSURE: 66 MMHG | SYSTOLIC BLOOD PRESSURE: 136 MMHG

## 2025-01-08 DIAGNOSIS — R22.0 MASS OF PREAURICULAR REGION: Primary | ICD-10-CM

## 2025-01-08 DIAGNOSIS — I10 PRIMARY HYPERTENSION: ICD-10-CM

## 2025-01-08 DIAGNOSIS — E78.2 MIXED HYPERLIPIDEMIA: ICD-10-CM

## 2025-01-08 DIAGNOSIS — R22.1 NECK MASS: ICD-10-CM

## 2025-01-08 LAB
ALBUMIN SERPL BCP-MCNC: 3.9 G/DL (ref 3.4–5)
ALP SERPL-CCNC: 97 U/L (ref 33–136)
ALT SERPL W P-5'-P-CCNC: 45 U/L (ref 10–52)
ANION GAP SERPL CALCULATED.3IONS-SCNC: <7 MMOL/L (ref 10–20)
AST SERPL W P-5'-P-CCNC: 54 U/L (ref 9–39)
BILIRUB SERPL-MCNC: 1.7 MG/DL (ref 0–1.2)
BUN SERPL-MCNC: 29 MG/DL (ref 6–23)
CALCIUM SERPL-MCNC: 9.5 MG/DL (ref 8.6–10.3)
CHLORIDE SERPL-SCNC: 89 MMOL/L (ref 98–107)
CHOLEST SERPL-MCNC: 150 MG/DL (ref 0–199)
CHOLEST/HDLC SERPL: 5.7 {RATIO}
CO2 SERPL-SCNC: 37 MMOL/L (ref 21–32)
CREAT SERPL-MCNC: 0.93 MG/DL (ref 0.5–1.3)
EGFRCR SERPLBLD CKD-EPI 2021: >90 ML/MIN/1.73M*2
GLUCOSE SERPL-MCNC: 82 MG/DL (ref 74–99)
HDLC SERPL-MCNC: 26.1 MG/DL
LDLC SERPL CALC-MCNC: 74 MG/DL
NON HDL CHOLESTEROL: 124 MG/DL (ref 0–149)
POTASSIUM SERPL-SCNC: 4.3 MMOL/L (ref 3.5–5.3)
PROT SERPL-MCNC: 6.2 G/DL (ref 6.4–8.2)
SODIUM SERPL-SCNC: 128 MMOL/L (ref 136–145)
TRIGL SERPL-MCNC: 251 MG/DL (ref 0–149)
VLDL: 50 MG/DL (ref 0–40)

## 2025-01-08 PROCEDURE — 1157F ADVNC CARE PLAN IN RCRD: CPT | Performed by: INTERNAL MEDICINE

## 2025-01-08 PROCEDURE — 99214 OFFICE O/P EST MOD 30 MIN: CPT | Performed by: INTERNAL MEDICINE

## 2025-01-08 PROCEDURE — 80053 COMPREHEN METABOLIC PANEL: CPT

## 2025-01-08 PROCEDURE — 3075F SYST BP GE 130 - 139MM HG: CPT | Performed by: INTERNAL MEDICINE

## 2025-01-08 PROCEDURE — 1124F ACP DISCUSS-NO DSCNMKR DOCD: CPT | Performed by: INTERNAL MEDICINE

## 2025-01-08 PROCEDURE — 80061 LIPID PANEL: CPT

## 2025-01-08 PROCEDURE — 1159F MED LIST DOCD IN RCRD: CPT | Performed by: INTERNAL MEDICINE

## 2025-01-08 PROCEDURE — 3078F DIAST BP <80 MM HG: CPT | Performed by: INTERNAL MEDICINE

## 2025-01-08 PROCEDURE — 3008F BODY MASS INDEX DOCD: CPT | Performed by: INTERNAL MEDICINE

## 2025-01-09 ENCOUNTER — HOSPITAL ENCOUNTER (OUTPATIENT)
Dept: RADIOLOGY | Facility: CLINIC | Age: 65
Discharge: HOME | End: 2025-01-09
Payer: MEDICARE

## 2025-01-09 DIAGNOSIS — R22.1 NECK MASS: ICD-10-CM

## 2025-01-09 PROCEDURE — 70491 CT SOFT TISSUE NECK W/DYE: CPT | Performed by: RADIOLOGY

## 2025-01-09 PROCEDURE — 70491 CT SOFT TISSUE NECK W/DYE: CPT

## 2025-01-09 PROCEDURE — 2550000001 HC RX 255 CONTRASTS: Performed by: INTERNAL MEDICINE

## 2025-01-09 RX ADMIN — IOHEXOL 50 ML: 350 INJECTION, SOLUTION INTRAVENOUS at 14:20

## 2025-01-09 NOTE — PROGRESS NOTES
"Subjective   Patient ID: Bijan Ibanez is a 64 y.o. male who presents for Illness and Mass.    This gentleman today came here for multiple medical issues.  Right side of the face for two days family noticed sudden onset of a big swelling, it is getting bigger, painful.  He is already on Levaquin, that is not helping.  Cough, congestion, bronchitis getting better.  He came for follow-up on various conditions.  A lot of headache from same side, not feeling good.    I have personally reviewed the patient's Past Medical History, Medications, Allergies, Social History, and Family History in the EMR.    Illness    Review of Systems   All other systems reviewed and are negative.    Objective   /66   Ht 1.854 m (6' 1\")   Wt 107 kg (236 lb)   BMI 31.14 kg/m²     Physical Exam  Vitals reviewed.   HENT:      Head:      Comments: Right side of the face, in front of the ear and under the eye, there was a big swelling, size of almost a golf ball, red, inflamed, tender.  I examined upper jaw, no signs of any tooth infection.  No sign of any bleed any extra ______.  Tympanic membrane slightly retracted.  Cardiovascular:      Heart sounds: Normal heart sounds, S1 normal and S2 normal. No murmur heard.     No friction rub.   Pulmonary:      Effort: Pulmonary effort is normal.      Breath sounds: Normal breath sounds and air entry.   Abdominal:      Palpations: There is no hepatomegaly, splenomegaly or mass.   Musculoskeletal:      Right lower leg: No edema.      Left lower leg: No edema.   Lymphadenopathy:      Lower Body: No right inguinal adenopathy. No left inguinal adenopathy.   Neurological:      Cranial Nerves: Cranial nerves 2-12 are intact.      Sensory: No sensory deficit.      Motor: Motor function is intact.      Deep Tendon Reflexes: Reflexes are normal and symmetric.     LAB WORK: Laboratory testing discussed.    Assessment/Plan   Problem List Items Addressed This Visit             ICD-10-CM       Cardiac and " Vasculature    Hypertension I10    HLD (hyperlipidemia) E78.5     Other Visit Diagnoses         Codes    Mass of preauricular region    -  Primary R22.0    Neck mass     R22.1    Relevant Orders    CT soft tissue neck w IV contrast        1. Pre-auricular swelling.  I am wondering whether I am dealing with a lymph node, preauricular versus parotid gland tumor, I am not sure, but it is only two-day history.  Facial nerve normal.  I immediately ordered a CT scan.  I am going to stop Levaquin.  He is allergic to penicillin.  Start Clinda and doxycycline.  Local heat.  Monitor.  I will see him immediately after CT scan.  2. Hypertension, okay.  3. Cholesterol, stable.  4. Rest of blood work, okay.  5. ______ , improving.  6. His wife has similar sickness, she is much better.  7. I shall see him back in a couple of days after CT scan.    Scribe Attestation  By signing my name below, I, Jyoti Finley attest that this documentation has been prepared under the direction and in the presence of Nehal Murphy MD.     All medical record entries made by the yessiibjonna were personally dictated by me I have reviewed the chart and agree the record accurately reflects my personal performance of his history physical examination and management

## 2025-01-10 ENCOUNTER — HOSPITAL ENCOUNTER (OUTPATIENT)
Dept: RADIOLOGY | Facility: CLINIC | Age: 65
Discharge: HOME | End: 2025-01-10
Payer: MEDICARE

## 2025-01-10 ENCOUNTER — OFFICE VISIT (OUTPATIENT)
Dept: PRIMARY CARE | Facility: CLINIC | Age: 65
End: 2025-01-10
Payer: MEDICARE

## 2025-01-10 ENCOUNTER — LAB (OUTPATIENT)
Dept: LAB | Facility: LAB | Age: 65
End: 2025-01-10
Payer: MEDICARE

## 2025-01-10 VITALS — DIASTOLIC BLOOD PRESSURE: 76 MMHG | SYSTOLIC BLOOD PRESSURE: 122 MMHG

## 2025-01-10 DIAGNOSIS — M51.9 DISC DISORDER: ICD-10-CM

## 2025-01-10 DIAGNOSIS — M54.9 BACK PAIN, UNSPECIFIED BACK LOCATION, UNSPECIFIED BACK PAIN LATERALITY, UNSPECIFIED CHRONICITY: ICD-10-CM

## 2025-01-10 DIAGNOSIS — M54.10 RADICULOPATHY, UNSPECIFIED SPINAL REGION: ICD-10-CM

## 2025-01-10 DIAGNOSIS — R60.9 EDEMA, UNSPECIFIED TYPE: ICD-10-CM

## 2025-01-10 DIAGNOSIS — E87.1 HYPONATREMIA: Primary | ICD-10-CM

## 2025-01-10 LAB
ALBUMIN SERPL BCP-MCNC: 3.8 G/DL (ref 3.4–5)
ALP SERPL-CCNC: 117 U/L (ref 33–136)
ALT SERPL W P-5'-P-CCNC: 51 U/L (ref 10–52)
ANION GAP SERPL CALC-SCNC: 10 MMOL/L (ref 10–20)
ANION GAP SERPL CALCULATED.3IONS-SCNC: <7 MMOL/L (ref 10–20)
AST SERPL W P-5'-P-CCNC: 69 U/L (ref 9–39)
BASOPHILS # BLD MANUAL: 0 X10*3/UL (ref 0–0.1)
BASOPHILS NFR BLD MANUAL: 0 %
BILIRUB SERPL-MCNC: 1.4 MG/DL (ref 0–1.2)
BUN SERPL-MCNC: 28 MG/DL (ref 6–23)
BUN SERPL-MCNC: 34 MG/DL (ref 6–23)
CALCIUM SERPL-MCNC: 9.2 MG/DL (ref 8.6–10.3)
CALCIUM SERPL-MCNC: 9.6 MG/DL (ref 8.6–10.6)
CHLORIDE SERPL-SCNC: 86 MMOL/L (ref 98–107)
CHLORIDE SERPL-SCNC: 87 MMOL/L (ref 98–107)
CO2 SERPL-SCNC: 32 MMOL/L (ref 21–32)
CO2 SERPL-SCNC: 38 MMOL/L (ref 21–32)
CREAT SERPL-MCNC: 0.91 MG/DL (ref 0.5–1.3)
CREAT SERPL-MCNC: 0.95 MG/DL (ref 0.5–1.3)
EGFRCR SERPLBLD CKD-EPI 2021: 89 ML/MIN/1.73M*2
EGFRCR SERPLBLD CKD-EPI 2021: >90 ML/MIN/1.73M*2
EOSINOPHIL # BLD MANUAL: 0.17 X10*3/UL (ref 0–0.7)
EOSINOPHIL NFR BLD MANUAL: 4.2 %
ERYTHROCYTE [DISTWIDTH] IN BLOOD BY AUTOMATED COUNT: 12.5 % (ref 11.5–14.5)
GLUCOSE SERPL-MCNC: 86 MG/DL (ref 74–99)
GLUCOSE SERPL-MCNC: 97 MG/DL (ref 74–99)
HCT VFR BLD AUTO: 34.6 % (ref 41–52)
HGB BLD-MCNC: 12.9 G/DL (ref 13.5–17.5)
IMM GRANULOCYTES # BLD AUTO: 0.46 X10*3/UL (ref 0–0.7)
IMM GRANULOCYTES NFR BLD AUTO: 11.5 % (ref 0–0.9)
LACTATE SERPL-SCNC: 2.8 MMOL/L (ref 0.4–2)
LYMPHOCYTES # BLD MANUAL: 0.75 X10*3/UL (ref 1.2–4.8)
LYMPHOCYTES NFR BLD MANUAL: 18.7 %
MAGNESIUM SERPL-MCNC: 1.93 MG/DL (ref 1.6–2.4)
MCH RBC QN AUTO: 28 PG (ref 26–34)
MCHC RBC AUTO-ENTMCNC: 37.3 G/DL (ref 32–36)
MCV RBC AUTO: 75 FL (ref 80–100)
METAMYELOCYTES # BLD MANUAL: 0.14 X10*3/UL
METAMYELOCYTES NFR BLD MANUAL: 3.4 %
MONOCYTES # BLD MANUAL: 0.17 X10*3/UL (ref 0.1–1)
MONOCYTES NFR BLD MANUAL: 4.2 %
NEUTROPHILS # BLD MANUAL: 2.51 X10*3/UL (ref 1.2–7.7)
NEUTS BAND # BLD MANUAL: 0.27 X10*3/UL (ref 0–0.7)
NEUTS BAND NFR BLD MANUAL: 6.8 %
NEUTS SEG # BLD MANUAL: 2.24 X10*3/UL (ref 1.2–7)
NEUTS SEG NFR BLD MANUAL: 55.9 %
NRBC BLD-RTO: 0.5 /100 WBCS (ref 0–0)
OVALOCYTES BLD QL SMEAR: ABNORMAL
PLATELET # BLD AUTO: 72 X10*3/UL (ref 150–450)
POTASSIUM SERPL-SCNC: 4 MMOL/L (ref 3.5–5.3)
POTASSIUM SERPL-SCNC: 4 MMOL/L (ref 3.5–5.3)
PROT SERPL-MCNC: 6.7 G/DL (ref 6.4–8.2)
RBC # BLD AUTO: 4.6 X10*6/UL (ref 4.5–5.9)
RBC MORPH BLD: ABNORMAL
SODIUM SERPL-SCNC: 124 MMOL/L (ref 136–145)
SODIUM SERPL-SCNC: 125 MMOL/L (ref 136–145)
TOTAL CELLS COUNTED BLD: 118
VARIANT LYMPHS # BLD MANUAL: 0.27 X10*3/UL (ref 0–0.5)
VARIANT LYMPHS NFR BLD: 6.8 %
WBC # BLD AUTO: 4 X10*3/UL (ref 4.4–11.3)

## 2025-01-10 PROCEDURE — 99285 EMERGENCY DEPT VISIT HI MDM: CPT | Performed by: EMERGENCY MEDICINE

## 2025-01-10 PROCEDURE — 83930 ASSAY OF BLOOD OSMOLALITY: CPT

## 2025-01-10 PROCEDURE — 99215 OFFICE O/P EST HI 40 MIN: CPT | Performed by: INTERNAL MEDICINE

## 2025-01-10 PROCEDURE — 82784 ASSAY IGA/IGD/IGG/IGM EACH: CPT

## 2025-01-10 PROCEDURE — 36415 COLL VENOUS BLD VENIPUNCTURE: CPT | Performed by: EMERGENCY MEDICINE

## 2025-01-10 PROCEDURE — 85027 COMPLETE CBC AUTOMATED: CPT | Performed by: EMERGENCY MEDICINE

## 2025-01-10 PROCEDURE — 83521 IG LIGHT CHAINS FREE EACH: CPT

## 2025-01-10 PROCEDURE — 83010 ASSAY OF HAPTOGLOBIN QUANT: CPT

## 2025-01-10 PROCEDURE — 36415 COLL VENOUS BLD VENIPUNCTURE: CPT

## 2025-01-10 PROCEDURE — 72148 MRI LUMBAR SPINE W/O DYE: CPT

## 2025-01-10 PROCEDURE — 1123F ACP DISCUSS/DSCN MKR DOCD: CPT | Performed by: INTERNAL MEDICINE

## 2025-01-10 PROCEDURE — 3078F DIAST BP <80 MM HG: CPT | Performed by: INTERNAL MEDICINE

## 2025-01-10 PROCEDURE — 86320 SERUM IMMUNOELECTROPHORESIS: CPT | Performed by: STUDENT IN AN ORGANIZED HEALTH CARE EDUCATION/TRAINING PROGRAM

## 2025-01-10 PROCEDURE — 83735 ASSAY OF MAGNESIUM: CPT

## 2025-01-10 PROCEDURE — 84165 PROTEIN E-PHORESIS SERUM: CPT | Performed by: STUDENT IN AN ORGANIZED HEALTH CARE EDUCATION/TRAINING PROGRAM

## 2025-01-10 PROCEDURE — 83605 ASSAY OF LACTIC ACID: CPT | Performed by: EMERGENCY MEDICINE

## 2025-01-10 PROCEDURE — 86334 IMMUNOFIX E-PHORESIS SERUM: CPT

## 2025-01-10 PROCEDURE — 80048 BASIC METABOLIC PNL TOTAL CA: CPT | Performed by: EMERGENCY MEDICINE

## 2025-01-10 PROCEDURE — 83540 ASSAY OF IRON: CPT

## 2025-01-10 PROCEDURE — 85007 BL SMEAR W/DIFF WBC COUNT: CPT | Performed by: EMERGENCY MEDICINE

## 2025-01-10 PROCEDURE — 3074F SYST BP LT 130 MM HG: CPT | Performed by: INTERNAL MEDICINE

## 2025-01-10 PROCEDURE — 1157F ADVNC CARE PLAN IN RCRD: CPT | Performed by: INTERNAL MEDICINE

## 2025-01-10 PROCEDURE — 86140 C-REACTIVE PROTEIN: CPT

## 2025-01-10 PROCEDURE — 93010 ELECTROCARDIOGRAM REPORT: CPT | Performed by: EMERGENCY MEDICINE

## 2025-01-10 PROCEDURE — 84443 ASSAY THYROID STIM HORMONE: CPT

## 2025-01-10 PROCEDURE — 83880 ASSAY OF NATRIURETIC PEPTIDE: CPT

## 2025-01-10 PROCEDURE — 80053 COMPREHEN METABOLIC PANEL: CPT

## 2025-01-10 PROCEDURE — 84165 PROTEIN E-PHORESIS SERUM: CPT

## 2025-01-10 PROCEDURE — 84153 ASSAY OF PSA TOTAL: CPT

## 2025-01-10 PROCEDURE — 84484 ASSAY OF TROPONIN QUANT: CPT

## 2025-01-10 RX ORDER — DOXYCYCLINE 100 MG/1
100 CAPSULE ORAL 2 TIMES DAILY
Qty: 20 CAPSULE | Refills: 0 | Status: ON HOLD | OUTPATIENT
Start: 2025-01-10 | End: 2025-01-12 | Stop reason: WASHOUT

## 2025-01-10 RX ORDER — CLINDAMYCIN HYDROCHLORIDE 300 MG/1
300 CAPSULE ORAL 2 TIMES DAILY
Qty: 20 CAPSULE | Refills: 0 | Status: ON HOLD | OUTPATIENT
Start: 2025-01-10 | End: 2025-01-12 | Stop reason: WASHOUT

## 2025-01-11 ENCOUNTER — APPOINTMENT (OUTPATIENT)
Dept: RADIOLOGY | Facility: HOSPITAL | Age: 65
DRG: 841 | End: 2025-01-11
Payer: MEDICARE

## 2025-01-11 ENCOUNTER — HOSPITAL ENCOUNTER (INPATIENT)
Facility: HOSPITAL | Age: 65
End: 2025-01-11
Attending: EMERGENCY MEDICINE | Admitting: INTERNAL MEDICINE
Payer: MEDICARE

## 2025-01-11 ENCOUNTER — CLINICAL SUPPORT (OUTPATIENT)
Dept: EMERGENCY MEDICINE | Facility: HOSPITAL | Age: 65
DRG: 841 | End: 2025-01-11
Payer: MEDICARE

## 2025-01-11 DIAGNOSIS — E87.1 HYPONATREMIA: Primary | ICD-10-CM

## 2025-01-11 DIAGNOSIS — R50.9 FEVER, UNSPECIFIED: ICD-10-CM

## 2025-01-11 DIAGNOSIS — M54.50 CHRONIC MIDLINE LOW BACK PAIN WITHOUT SCIATICA: Chronic | ICD-10-CM

## 2025-01-11 DIAGNOSIS — G89.29 CHRONIC MIDLINE LOW BACK PAIN WITHOUT SCIATICA: Chronic | ICD-10-CM

## 2025-01-11 LAB
ALBUMIN SERPL BCP-MCNC: 3.3 G/DL (ref 3.4–5)
ALBUMIN SERPL BCP-MCNC: 3.4 G/DL (ref 3.4–5)
ALBUMIN SERPL BCP-MCNC: 3.5 G/DL (ref 3.4–5)
ALP SERPL-CCNC: 98 U/L (ref 33–136)
ALT SERPL W P-5'-P-CCNC: 48 U/L (ref 10–52)
ANION GAP SERPL CALC-SCNC: 8 MMOL/L (ref 10–20)
ANION GAP SERPL CALC-SCNC: 9 MMOL/L (ref 10–20)
ANION GAP SERPL CALC-SCNC: 9 MMOL/L (ref 10–20)
APPEARANCE UR: CLEAR
APTT PPP: 26 SECONDS (ref 27–38)
AST SERPL W P-5'-P-CCNC: 64 U/L (ref 9–39)
ATRIAL RATE: 95 BPM
BASOPHILS # BLD MANUAL: 0 X10*3/UL (ref 0–0.1)
BASOPHILS NFR BLD MANUAL: 0 %
BILIRUB SERPL-MCNC: 1.1 MG/DL (ref 0–1.2)
BILIRUB UR STRIP.AUTO-MCNC: NEGATIVE MG/DL
BNP SERPL-MCNC: 5 PG/ML (ref 0–99)
BUN SERPL-MCNC: 28 MG/DL (ref 6–23)
BUN SERPL-MCNC: 29 MG/DL (ref 6–23)
BUN SERPL-MCNC: 30 MG/DL (ref 6–23)
CALCIUM SERPL-MCNC: 8.6 MG/DL (ref 8.6–10.6)
CALCIUM SERPL-MCNC: 8.8 MG/DL (ref 8.6–10.6)
CALCIUM SERPL-MCNC: 9.1 MG/DL (ref 8.6–10.6)
CARDIAC TROPONIN I PNL SERPL HS: 6 NG/L (ref 0–53)
CHLORIDE SERPL-SCNC: 89 MMOL/L (ref 98–107)
CHLORIDE SERPL-SCNC: 91 MMOL/L (ref 98–107)
CHLORIDE SERPL-SCNC: 92 MMOL/L (ref 98–107)
CHLORIDE UR-SCNC: <15 MMOL/L
CHLORIDE/CREATININE (MMOL/G) IN URINE: NORMAL
CK SERPL-CCNC: 116 U/L (ref 0–325)
CO2 SERPL-SCNC: 29 MMOL/L (ref 21–32)
CO2 SERPL-SCNC: 30 MMOL/L (ref 21–32)
CO2 SERPL-SCNC: 32 MMOL/L (ref 21–32)
COLOR UR: YELLOW
CREAT SERPL-MCNC: 0.82 MG/DL (ref 0.5–1.3)
CREAT SERPL-MCNC: 0.84 MG/DL (ref 0.5–1.3)
CREAT SERPL-MCNC: 0.92 MG/DL (ref 0.5–1.3)
CREAT UR-MCNC: 119.8 MG/DL (ref 20–370)
CRP SERPL-MCNC: 14.67 MG/DL
D DIMER PPP FEU-MCNC: ABNORMAL NG/ML FEU
EGFRCR SERPLBLD CKD-EPI 2021: >90 ML/MIN/1.73M*2
EOSINOPHIL # BLD MANUAL: 0.03 X10*3/UL (ref 0–0.7)
EOSINOPHIL NFR BLD MANUAL: 0.9 %
ERYTHROCYTE [DISTWIDTH] IN BLOOD BY AUTOMATED COUNT: 12.7 % (ref 11.5–14.5)
ERYTHROCYTE [SEDIMENTATION RATE] IN BLOOD BY WESTERGREN METHOD: 19 MM/H (ref 0–20)
FERRITIN SERPL-MCNC: >9000 NG/ML (ref 20–300)
FIBRINOGEN PPP-MCNC: 413 MG/DL (ref 200–400)
FOLATE SERPL-MCNC: 8.8 NG/ML
GLUCOSE SERPL-MCNC: 104 MG/DL (ref 74–99)
GLUCOSE SERPL-MCNC: 104 MG/DL (ref 74–99)
GLUCOSE SERPL-MCNC: 128 MG/DL (ref 74–99)
GLUCOSE UR STRIP.AUTO-MCNC: NORMAL MG/DL
HCT VFR BLD AUTO: 31 % (ref 41–52)
HGB BLD-MCNC: 11.5 G/DL (ref 13.5–17.5)
HGB RETIC QN: 33 PG (ref 28–38)
HOLD SPECIMEN: NORMAL
IMM GRANULOCYTES # BLD AUTO: 0.31 X10*3/UL (ref 0–0.7)
IMM GRANULOCYTES NFR BLD AUTO: 9.7 % (ref 0–0.9)
IMMATURE RETIC FRACTION: 13.4 %
INR PPP: 1 (ref 0.9–1.1)
IRON SATN MFR SERPL: 51 % (ref 25–45)
IRON SERPL-MCNC: 130 UG/DL (ref 35–150)
KETONES UR STRIP.AUTO-MCNC: ABNORMAL MG/DL
LACTATE SERPL-SCNC: 2.8 MMOL/L (ref 0.4–2)
LDH SERPL L TO P-CCNC: 4102 U/L (ref 84–246)
LEUKOCYTE ESTERASE UR QL STRIP.AUTO: NEGATIVE
LYMPHOCYTES # BLD MANUAL: 0.88 X10*3/UL (ref 1.2–4.8)
LYMPHOCYTES NFR BLD MANUAL: 27.6 %
MCH RBC QN AUTO: 28.1 PG (ref 26–34)
MCHC RBC AUTO-ENTMCNC: 37.1 G/DL (ref 32–36)
MCV RBC AUTO: 76 FL (ref 80–100)
MONOCYTES # BLD MANUAL: 0.14 X10*3/UL (ref 0.1–1)
MONOCYTES NFR BLD MANUAL: 4.3 %
MUCOUS THREADS #/AREA URNS AUTO: NORMAL /LPF
MYELOCYTES # BLD MANUAL: 0.14 X10*3/UL
MYELOCYTES NFR BLD MANUAL: 4.3 %
NEUTROPHILS # BLD MANUAL: 1.76 X10*3/UL (ref 1.2–7.7)
NEUTS BAND # BLD MANUAL: 0.19 X10*3/UL (ref 0–0.7)
NEUTS BAND NFR BLD MANUAL: 6 %
NEUTS SEG # BLD MANUAL: 1.57 X10*3/UL (ref 1.2–7)
NEUTS SEG NFR BLD MANUAL: 49.1 %
NITRITE UR QL STRIP.AUTO: NEGATIVE
NRBC BLD-RTO: 0.6 /100 WBCS (ref 0–0)
OSMOLALITY SERPL: 270 MOSM/KG (ref 280–300)
OSMOLALITY UR: 616 MOSM/KG (ref 200–1200)
OVALOCYTES BLD QL SMEAR: ABNORMAL
P AXIS: 61 DEGREES
P OFFSET: 180 MS
P ONSET: 117 MS
PH UR STRIP.AUTO: 5.5 [PH]
PHOSPHATE SERPL-MCNC: 4.1 MG/DL (ref 2.5–4.9)
PHOSPHATE SERPL-MCNC: 4.3 MG/DL (ref 2.5–4.9)
PLATELET # BLD AUTO: 62 X10*3/UL (ref 150–450)
POTASSIUM SERPL-SCNC: 3.9 MMOL/L (ref 3.5–5.3)
POTASSIUM SERPL-SCNC: 3.9 MMOL/L (ref 3.5–5.3)
POTASSIUM SERPL-SCNC: 4.3 MMOL/L (ref 3.5–5.3)
POTASSIUM UR-SCNC: 50 MMOL/L
POTASSIUM/CREAT UR-RTO: 42 MMOL/G CREAT
PR INTERVAL: 184 MS
PROT SERPL-MCNC: 5.8 G/DL (ref 6.4–8.2)
PROT UR STRIP.AUTO-MCNC: NEGATIVE MG/DL
PROTHROMBIN TIME: 11.8 SECONDS (ref 9.8–12.8)
PSA SERPL-MCNC: 9.73 NG/ML
Q ONSET: 209 MS
QRS COUNT: 16 BEATS
QRS DURATION: 94 MS
QT INTERVAL: 374 MS
QTC CALCULATION(BAZETT): 469 MS
QTC FREDERICIA: 435 MS
R AXIS: 40 DEGREES
RBC # BLD AUTO: 4.09 X10*6/UL (ref 4.5–5.9)
RBC # UR STRIP.AUTO: ABNORMAL /UL
RBC #/AREA URNS AUTO: NORMAL /HPF
RBC MORPH BLD: ABNORMAL
RETICS #: 0.04 X10*6/UL (ref 0.02–0.12)
RETICS/RBC NFR AUTO: 1 % (ref 0.5–2)
SODIUM SERPL-SCNC: 123 MMOL/L (ref 136–145)
SODIUM SERPL-SCNC: 127 MMOL/L (ref 136–145)
SODIUM SERPL-SCNC: 127 MMOL/L (ref 136–145)
SODIUM UR-SCNC: <10 MMOL/L
SODIUM/CREAT UR-RTO: NORMAL
SP GR UR STRIP.AUTO: 1.02
T AXIS: 54 DEGREES
T OFFSET: 396 MS
TIBC SERPL-MCNC: 253 UG/DL (ref 240–445)
TOTAL CELLS COUNTED BLD: 116
TRANSFERRIN SERPL-MCNC: 156 MG/DL (ref 200–360)
TRIGL SERPL-MCNC: 267 MG/DL (ref 0–149)
TSH SERPL-ACNC: 1.94 MIU/L (ref 0.44–3.98)
UIBC SERPL-MCNC: 123 UG/DL (ref 110–370)
UROBILINOGEN UR STRIP.AUTO-MCNC: NORMAL MG/DL
VARIANT LYMPHS # BLD MANUAL: 0.25 X10*3/UL (ref 0–0.5)
VARIANT LYMPHS NFR BLD: 7.8 %
VENTRICULAR RATE: 95 BPM
VIT B12 SERPL-MCNC: 422 PG/ML (ref 211–911)
WBC # BLD AUTO: 3.2 X10*3/UL (ref 4.4–11.3)
WBC #/AREA URNS AUTO: NORMAL /HPF

## 2025-01-11 PROCEDURE — 99221 1ST HOSP IP/OBS SF/LOW 40: CPT | Performed by: ORTHOPAEDIC SURGERY

## 2025-01-11 PROCEDURE — 74177 CT ABD & PELVIS W/CONTRAST: CPT

## 2025-01-11 PROCEDURE — 36415 COLL VENOUS BLD VENIPUNCTURE: CPT | Performed by: EMERGENCY MEDICINE

## 2025-01-11 PROCEDURE — 96374 THER/PROPH/DIAG INJ IV PUSH: CPT

## 2025-01-11 PROCEDURE — 84466 ASSAY OF TRANSFERRIN: CPT

## 2025-01-11 PROCEDURE — 84075 ASSAY ALKALINE PHOSPHATASE: CPT

## 2025-01-11 PROCEDURE — 71046 X-RAY EXAM CHEST 2 VIEWS: CPT | Performed by: STUDENT IN AN ORGANIZED HEALTH CARE EDUCATION/TRAINING PROGRAM

## 2025-01-11 PROCEDURE — 2500000004 HC RX 250 GENERAL PHARMACY W/ HCPCS (ALT 636 FOR OP/ED)

## 2025-01-11 PROCEDURE — 82746 ASSAY OF FOLIC ACID SERUM: CPT

## 2025-01-11 PROCEDURE — 82728 ASSAY OF FERRITIN: CPT

## 2025-01-11 PROCEDURE — 87632 RESP VIRUS 6-11 TARGETS: CPT

## 2025-01-11 PROCEDURE — 85379 FIBRIN DEGRADATION QUANT: CPT | Performed by: INTERNAL MEDICINE

## 2025-01-11 PROCEDURE — 71046 X-RAY EXAM CHEST 2 VIEWS: CPT

## 2025-01-11 PROCEDURE — 36415 COLL VENOUS BLD VENIPUNCTURE: CPT

## 2025-01-11 PROCEDURE — 85027 COMPLETE CBC AUTOMATED: CPT

## 2025-01-11 PROCEDURE — 80069 RENAL FUNCTION PANEL: CPT | Mod: CCI

## 2025-01-11 PROCEDURE — 84478 ASSAY OF TRIGLYCERIDES: CPT

## 2025-01-11 PROCEDURE — 85007 BL SMEAR W/DIFF WBC COUNT: CPT

## 2025-01-11 PROCEDURE — 70450 CT HEAD/BRAIN W/O DYE: CPT | Performed by: RADIOLOGY

## 2025-01-11 PROCEDURE — 82607 VITAMIN B-12: CPT

## 2025-01-11 PROCEDURE — 2500000001 HC RX 250 WO HCPCS SELF ADMINISTERED DRUGS (ALT 637 FOR MEDICARE OP)

## 2025-01-11 PROCEDURE — 70450 CT HEAD/BRAIN W/O DYE: CPT

## 2025-01-11 PROCEDURE — 85652 RBC SED RATE AUTOMATED: CPT

## 2025-01-11 PROCEDURE — 2550000001 HC RX 255 CONTRASTS: Performed by: INTERNAL MEDICINE

## 2025-01-11 PROCEDURE — 71275 CT ANGIOGRAPHY CHEST: CPT | Performed by: RADIOLOGY

## 2025-01-11 PROCEDURE — 83615 LACTATE (LD) (LDH) ENZYME: CPT | Performed by: INTERNAL MEDICINE

## 2025-01-11 PROCEDURE — 71275 CT ANGIOGRAPHY CHEST: CPT

## 2025-01-11 PROCEDURE — 85610 PROTHROMBIN TIME: CPT

## 2025-01-11 PROCEDURE — 83605 ASSAY OF LACTIC ACID: CPT | Performed by: EMERGENCY MEDICINE

## 2025-01-11 PROCEDURE — 84550 ASSAY OF BLOOD/URIC ACID: CPT

## 2025-01-11 PROCEDURE — 86747 PARVOVIRUS ANTIBODY: CPT | Performed by: INTERNAL MEDICINE

## 2025-01-11 PROCEDURE — 82436 ASSAY OF URINE CHLORIDE: CPT

## 2025-01-11 PROCEDURE — 87799 DETECT AGENT NOS DNA QUANT: CPT

## 2025-01-11 PROCEDURE — 82550 ASSAY OF CK (CPK): CPT

## 2025-01-11 PROCEDURE — 99223 1ST HOSP IP/OBS HIGH 75: CPT

## 2025-01-11 PROCEDURE — 84100 ASSAY OF PHOSPHORUS: CPT

## 2025-01-11 PROCEDURE — 85045 AUTOMATED RETICULOCYTE COUNT: CPT

## 2025-01-11 PROCEDURE — 93005 ELECTROCARDIOGRAM TRACING: CPT

## 2025-01-11 PROCEDURE — 85384 FIBRINOGEN ACTIVITY: CPT | Performed by: INTERNAL MEDICINE

## 2025-01-11 PROCEDURE — 1100000001 HC PRIVATE ROOM DAILY

## 2025-01-11 PROCEDURE — 81003 URINALYSIS AUTO W/O SCOPE: CPT

## 2025-01-11 PROCEDURE — 96375 TX/PRO/DX INJ NEW DRUG ADDON: CPT

## 2025-01-11 PROCEDURE — 83935 ASSAY OF URINE OSMOLALITY: CPT

## 2025-01-11 RX ORDER — ENOXAPARIN SODIUM 100 MG/ML
40 INJECTION SUBCUTANEOUS EVERY 24 HOURS
Status: DISPENSED | OUTPATIENT
Start: 2025-01-11

## 2025-01-11 RX ORDER — ACETAMINOPHEN 325 MG/1
650 TABLET ORAL EVERY 4 HOURS PRN
Status: DISCONTINUED | OUTPATIENT
Start: 2025-01-11 | End: 2025-01-11

## 2025-01-11 RX ORDER — ASPIRIN 81 MG/1
81 TABLET ORAL DAILY
Status: DISPENSED | OUTPATIENT
Start: 2025-01-11

## 2025-01-11 RX ORDER — METOPROLOL TARTRATE 50 MG/1
50 TABLET ORAL 2 TIMES DAILY
Status: DISPENSED | OUTPATIENT
Start: 2025-01-11

## 2025-01-11 RX ORDER — METOPROLOL SUCCINATE 50 MG/1
50 TABLET, EXTENDED RELEASE ORAL 2 TIMES DAILY
Status: DISCONTINUED | OUTPATIENT
Start: 2025-01-11 | End: 2025-01-11

## 2025-01-11 RX ORDER — POLYETHYLENE GLYCOL 3350 17 G/17G
17 POWDER, FOR SOLUTION ORAL DAILY
Status: DISPENSED | OUTPATIENT
Start: 2025-01-11

## 2025-01-11 RX ORDER — SODIUM CHLORIDE 9 MG/ML
75 INJECTION, SOLUTION INTRAVENOUS CONTINUOUS
Status: ACTIVE | OUTPATIENT
Start: 2025-01-11 | End: 2025-01-12

## 2025-01-11 RX ORDER — CYCLOBENZAPRINE HCL 10 MG
5 TABLET ORAL 3 TIMES DAILY PRN
Status: DISPENSED | OUTPATIENT
Start: 2025-01-11

## 2025-01-11 RX ORDER — ATORVASTATIN CALCIUM 40 MG/1
40 TABLET, FILM COATED ORAL
Status: DISPENSED | OUTPATIENT
Start: 2025-01-11

## 2025-01-11 RX ORDER — ACETAMINOPHEN 160 MG/5ML
650 SOLUTION ORAL EVERY 4 HOURS PRN
Status: DISCONTINUED | OUTPATIENT
Start: 2025-01-11 | End: 2025-01-11

## 2025-01-11 RX ORDER — HYDROMORPHONE HYDROCHLORIDE 1 MG/ML
0.2 INJECTION, SOLUTION INTRAMUSCULAR; INTRAVENOUS; SUBCUTANEOUS
Status: DISCONTINUED | OUTPATIENT
Start: 2025-01-11 | End: 2025-01-11

## 2025-01-11 RX ORDER — ACETAMINOPHEN 325 MG/1
975 TABLET ORAL EVERY 8 HOURS
Status: DISPENSED | OUTPATIENT
Start: 2025-01-11

## 2025-01-11 RX ORDER — LISINOPRIL 10 MG/1
10 TABLET ORAL DAILY
Status: DISPENSED | OUTPATIENT
Start: 2025-01-11

## 2025-01-11 RX ORDER — MORPHINE SULFATE 4 MG/ML
4 INJECTION INTRAVENOUS ONCE
Status: COMPLETED | OUTPATIENT
Start: 2025-01-11 | End: 2025-01-11

## 2025-01-11 RX ORDER — ONDANSETRON HYDROCHLORIDE 2 MG/ML
4 INJECTION, SOLUTION INTRAVENOUS ONCE
Status: COMPLETED | OUTPATIENT
Start: 2025-01-11 | End: 2025-01-11

## 2025-01-11 RX ORDER — POLYETHYLENE GLYCOL 3350 17 G/17G
17 POWDER, FOR SOLUTION ORAL DAILY PRN
Status: DISCONTINUED | OUTPATIENT
Start: 2025-01-11 | End: 2025-01-11

## 2025-01-11 RX ORDER — PROCHLORPERAZINE MALEATE 10 MG
10 TABLET ORAL EVERY 6 HOURS PRN
Status: DISPENSED | OUTPATIENT
Start: 2025-01-11

## 2025-01-11 RX ORDER — ENOXAPARIN SODIUM 100 MG/ML
40 INJECTION SUBCUTANEOUS EVERY 24 HOURS
Status: DISCONTINUED | OUTPATIENT
Start: 2025-01-11 | End: 2025-01-11

## 2025-01-11 RX ORDER — OXYCODONE HYDROCHLORIDE 5 MG/1
2.5 TABLET ORAL EVERY 6 HOURS PRN
Status: DISCONTINUED | OUTPATIENT
Start: 2025-01-11 | End: 2025-01-11

## 2025-01-11 RX ORDER — OXYCODONE HYDROCHLORIDE 5 MG/1
5 TABLET ORAL EVERY 6 HOURS PRN
Status: DISPENSED | OUTPATIENT
Start: 2025-01-11

## 2025-01-11 RX ORDER — TRAMADOL HYDROCHLORIDE 50 MG/1
50 TABLET ORAL EVERY 6 HOURS PRN
Status: DISPENSED | OUTPATIENT
Start: 2025-01-11

## 2025-01-11 RX ORDER — PROCHLORPERAZINE EDISYLATE 5 MG/ML
10 INJECTION INTRAMUSCULAR; INTRAVENOUS EVERY 6 HOURS PRN
Status: DISCONTINUED | OUTPATIENT
Start: 2025-01-11 | End: 2025-01-11

## 2025-01-11 RX ADMIN — SODIUM CHLORIDE 75 ML/HR: 9 INJECTION, SOLUTION INTRAVENOUS at 09:22

## 2025-01-11 RX ADMIN — IOHEXOL 100 ML: 350 INJECTION, SOLUTION INTRAVENOUS at 13:42

## 2025-01-11 RX ADMIN — METOPROLOL TARTRATE 50 MG: 50 TABLET, FILM COATED ORAL at 08:12

## 2025-01-11 RX ADMIN — ACETAMINOPHEN 975 MG: 325 TABLET ORAL at 17:32

## 2025-01-11 RX ADMIN — SODIUM CHLORIDE 1000 ML: 9 INJECTION, SOLUTION INTRAVENOUS at 00:57

## 2025-01-11 RX ADMIN — METOPROLOL TARTRATE 50 MG: 50 TABLET, FILM COATED ORAL at 20:30

## 2025-01-11 RX ADMIN — CYCLOBENZAPRINE 5 MG: 10 TABLET, FILM COATED ORAL at 13:11

## 2025-01-11 RX ADMIN — OXYCODONE HYDROCHLORIDE 2.5 MG: 5 TABLET ORAL at 08:12

## 2025-01-11 RX ADMIN — ENOXAPARIN SODIUM 40 MG: 100 INJECTION SUBCUTANEOUS at 06:37

## 2025-01-11 RX ADMIN — ONDANSETRON 4 MG: 2 INJECTION INTRAMUSCULAR; INTRAVENOUS at 00:57

## 2025-01-11 RX ADMIN — OXYCODONE HYDROCHLORIDE 5 MG: 5 TABLET ORAL at 15:20

## 2025-01-11 RX ADMIN — POLYETHYLENE GLYCOL 3350 17 G: 17 POWDER, FOR SOLUTION ORAL at 20:29

## 2025-01-11 RX ADMIN — MORPHINE SULFATE 4 MG: 4 INJECTION INTRAVENOUS at 00:57

## 2025-01-11 RX ADMIN — LISINOPRIL 10 MG: 10 TABLET ORAL at 08:15

## 2025-01-11 RX ADMIN — ACETAMINOPHEN 975 MG: 325 TABLET ORAL at 09:22

## 2025-01-11 RX ADMIN — ASPIRIN 81 MG: 81 TABLET, COATED ORAL at 08:12

## 2025-01-11 RX ADMIN — ATORVASTATIN CALCIUM 40 MG: 40 TABLET, FILM COATED ORAL at 06:37

## 2025-01-11 ASSESSMENT — PAIN SCALES - GENERAL
PAINLEVEL_OUTOF10: 0 - NO PAIN
PAINLEVEL_OUTOF10: 8
PAINLEVEL_OUTOF10: 4
PAINLEVEL_OUTOF10: 8
PAINLEVEL_OUTOF10: 3
PAINLEVEL_OUTOF10: 7
PAINLEVEL_OUTOF10: 8
PAINLEVEL_OUTOF10: 3
PAINLEVEL_OUTOF10: 4
PAINLEVEL_OUTOF10: 3
PAINLEVEL_OUTOF10: 0 - NO PAIN
PAINLEVEL_OUTOF10: 8

## 2025-01-11 ASSESSMENT — PAIN DESCRIPTION - LOCATION
LOCATION: BACK
LOCATION: BACK

## 2025-01-11 ASSESSMENT — LIFESTYLE VARIABLES
EVER HAD A DRINK FIRST THING IN THE MORNING TO STEADY YOUR NERVES TO GET RID OF A HANGOVER: NO
TOTAL SCORE: 0
EVER FELT BAD OR GUILTY ABOUT YOUR DRINKING: NO
HAVE PEOPLE ANNOYED YOU BY CRITICIZING YOUR DRINKING: NO
HAVE YOU EVER FELT YOU SHOULD CUT DOWN ON YOUR DRINKING: NO

## 2025-01-11 ASSESSMENT — ENCOUNTER SYMPTOMS
VOMITING: 0
BLOOD IN STOOL: 0
CHEST TIGHTNESS: 0
FACIAL SWELLING: 1
PALPITATIONS: 0
LIGHT-HEADEDNESS: 0
FATIGUE: 1
HEADACHES: 0
NAUSEA: 0
HEMATURIA: 0
ABDOMINAL PAIN: 0
DIZZINESS: 0

## 2025-01-11 ASSESSMENT — PAIN - FUNCTIONAL ASSESSMENT
PAIN_FUNCTIONAL_ASSESSMENT: 0-10

## 2025-01-11 ASSESSMENT — PAIN DESCRIPTION - PAIN TYPE: TYPE: ACUTE PAIN

## 2025-01-11 ASSESSMENT — PAIN DESCRIPTION - ORIENTATION: ORIENTATION: LOWER
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
## 2025-01-11 NOTE — ED TRIAGE NOTES
Pt presents to the ED for a doctors consult. Pt states that his doctor told him to come down, pts doctor said he had a tumor on his back today during his appointment.
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
## 2025-01-11 NOTE — HOSPITAL COURSE
Bijan Ibanez is a 64 yr old male with history of CAD (s/p stent 2010) on ASA, Renal cell carcinoma s/p R partial nephrectomy in 2013 @ [facility] ([facility]), HTN, HLD, GERD, BPH and arthritis who reported to the [facility] ED for back pain and abnormal imaging. MRI lumbar spine with a possible mass between T12 and L1. Initial workup revealed, pancytopenia, hyponatremia, and right-sided facial swelling. CT C/A/P 01/11 suggestive of possible local RCC reoccurrence with mets to lung VS lung primary. He underwent EBUS with biopsy and BAL on 1/13. Work up for his left hilar mass included viral, bacterial, and fungal studies, all of which have been negative so far. His hyponatremia worsened with fluid administration so he was placed on a 1.5 L fluid restriction and his triamterene-HCTZ was discontinued which improved his hyponatremia. For his pancytopenia, he received one unit of platelets during his admission and hematology was consulted. Labs sent for HLH were significantly elevated and the patient underwent bone marrow biopsy on 1/16 for further diagnostic work-up for his pancytopenia. The bone marrow biopsy results are still pending. His pancytopenia has been stable without transfusion requirement for 4 days. He will need to follow up with the heme/onc clinic and [facility] for his biopsy results and further treatment. We also tailored his pain medication regimen for his back pain.
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
## 2025-01-11 NOTE — PROGRESS NOTES
Subjective   Patient ID: Bijan Ibanez is a 64 y.o. male who presents for Back pain.    This gentleman, Shamar today walked in my office because we asked him to do so because of low sodium.  His back pain is intractable going in the legs.  His legs are feeling like jello.  He is feeling weak and lethargic.  Difficulty in walking.  Pain is 10/10, throbbing.  He is not feeling good.  He came here for follow-up on various conditions.    I have personally reviewed the patient's Past Medical History, Medications, Allergies, Social History, and Family History in the EMR.    Review of Systems   All other systems reviewed and are negative.    Objective   /76     Physical Exam  Vitals reviewed.   Cardiovascular:      Heart sounds: Normal heart sounds, S1 normal and S2 normal. No murmur heard.     No friction rub.   Pulmonary:      Effort: Pulmonary effort is normal.      Breath sounds: Normal breath sounds and air entry.   Abdominal:      Palpations: There is no hepatomegaly, splenomegaly or mass.   Musculoskeletal:      Right lower leg: No edema.      Left lower leg: No edema.      Comments: BACK:  Movements painful.  Both legs muscle power was less, plantar equivocal in both the legs.   Lymphadenopathy:      Lower Body: No right inguinal adenopathy. No left inguinal adenopathy.   Neurological:      Cranial Nerves: Cranial nerves 2-12 are intact.      Sensory: No sensory deficit.      Motor: Motor function is intact.      Deep Tendon Reflexes: Reflexes are normal and symmetric.     LAB WORK:  Laboratory testing discussed.    Assessment/Plan   Problem List Items Addressed This Visit             ICD-10-CM       Endocrine/Metabolic    Hyponatremia - Primary E87.1     Other Visit Diagnoses         Codes    Back pain, unspecified back location, unspecified back pain laterality, unspecified chronicity     M54.9    Relevant Medications    clindamycin (Cleocin) 300 mg capsule    doxycycline (Vibramycin) 100 mg capsule     Other Relevant Orders    MR lumbar spine wo IV contrast (Completed)    Magnesium (Completed)    Basic metabolic panel (Completed)    Radiculopathy, unspecified spinal region     M54.10    Edema, unspecified type     R60.9    Disc disorder     M51.9        1. Hyponatremia because of diuretic, stop.  Started Norvasc.  We will monitor.  I will do STAT BMP.  2. Back pain with radiculopathy, getting worse.  Immediate MRI ordered.  3. Edema, okay.  4. Pain.  Monitor.  5. After that the patient was sent for STAT blood work and MRI.  6. About 7 in the evening, MRI report came in.  He had a compression as well as hyponatremia was worse.  I contacted the patient’s family, the patient was taken to the ______ [place], I spoke to ER staff.  7. The patient got admitted for hyponatremia, disc compression, and possible emergency surgery.  8. I spoke the patient on phone next day morning, which is today, Saturday.  The patient is admitted, he is okay with the care.  9. Multiple phone calls, talking to the patient, tracking the MRI well over 50 minutes spent, more than half in direct patient care.  I will watch him after surgery.  I will have a close follow-up with him.  10. I will continue to follow.    Bev Attestation  By signing my name below, I, Bev Finley attest that this documentation has been prepared under the direction and in the presence of Nehal Murphy MD.     All medical record entries made by the bev were personally dictated by me I have reviewed the chart and agree the record accurately reflects my personal performance of his history physical examination and management

## 2025-01-11 NOTE — PROGRESS NOTES
Pharmacy Medication History Review    Bijan Ibanez is a 64 y.o. male admitted for Hyponatremia. Pharmacy reviewed the patient's [id number] medications and allergies for accuracy.    Medications ADDED:  Tylenol   Medications CHANGED:  N/A  Medications REMOVED:   N/A     The list below reflects the updated PTA list.   Prior to Admission Medications   Prescriptions Last Dose Informant   acetaminophen (TYLENOL ORAL) Unknown Self   Sig: Take 1-2 tablets by mouth every 4 hours if needed.   aspirin 81 mg EC tablet 1/10/2025 Morning Self   Sig: Take 1 tablet (81 mg) by mouth once daily.   atorvastatin (Lipitor) 40 mg tablet 1/9/2025 Self   Sig: Take 1 tablet (40 mg) by mouth early in the morning..   benzonatate (Tessalon) 200 mg capsule Not Taking Self   Sig: Take 1 capsule (200 mg) by mouth 3 times a day as needed for cough. Do not crush or chew.   Patient not taking: Reported on 1/11/2025   clindamycin (Cleocin) 300 mg capsule Not Taking Self   Sig: Take 1 capsule (300 mg) by mouth 2 times a day for 10 days.   Patient not taking: Reported on 1/11/2025   dextromethorphan-guaifenesin (Robitussin Cough-Chest Roger DM) 5-100 mg/5 mL liquid Not Taking Self   Sig: Take 5 mL by mouth 2 times a day.   Patient not taking: Reported on 1/11/2025   doxycycline (Vibramycin) 100 mg capsule Not Taking Self   Sig: Take 1 capsule (100 mg) by mouth 2 times a day for 10 days. Take with at least 8 ounces (large glass) of water, do not lie down for 30 minutes after   Patient not taking: Reported on 1/11/2025   levoFLOXacin (Levaquin) 500 mg tablet Not Taking Self   Sig: Take 1 tablet (500 mg) by mouth once daily for 10 days.   Patient not taking: Reported on 1/11/2025   lisinopril 10 mg tablet 1/10/2025 Self   Sig: Take 1 tablet (10 mg) by mouth once daily.   loratadine (Claritin) 10 mg tablet Not Taking Self   Sig: Take 1 tablet (10 mg) by mouth once daily.   Patient not taking: Reported on 1/11/2025   metoprolol succinate XL  "(Toprol-XL) 50 mg 24 hr tablet 1/10/2025 Evening Self   Sig: Take 1 tablet (50 mg) by mouth twice a day.   triamterene-hydrochlorothiazid (Maxzide-25) 37.5-25 mg tablet 1/9/2025 Self   Sig: Take 1 tablet by mouth once daily in the morning. Take before meals.      Facility-Administered Medications: None        The list below reflects the updated allergy list. Please review each documented allergy for additional clarification and justification.  Allergies  Reviewed by Ligia Walls on 1/11/2025        Severity Reactions Comments    Penicillins Not Specified Hives             Patient declines M2B at discharge.     Sources:   [facility]  Pharmacy dispense history  Patient interview Good historian     Additional Comments:  N/A      LIGIA WALLS  Pharmacy Technician  01/11/25     Secure Chat preferred   If no response call [id number] or [employer] \"Med Rec\"      "

## 2025-01-11 NOTE — PROGRESS NOTES
Subjective     Bijan Ibanez is a 64 y.o. male on day 0 of admission presenting with Hyponatremia.  Overnight:    This Morning:         Objective   Current Vitals  BP (!) 130/92 (BP Location: Right arm, Patient Position: Lying)   Pulse 100   Temp 37.7 °C (99.9 °F) (Temporal)   Resp 16   Ht 1.829 m (6')   Wt 107 kg (236 lb)   SpO2 96%   BMI 32.01 kg/m²      No intake/output data recorded.    Physical Exam  Vitals reviewed.   Constitutional:       General: He is not in acute distress.     Appearance: He is normal weight. He is not toxic-appearing.   HENT:      Head: Normocephalic and atraumatic.      Jaw: Swelling present.        Right Ear: External ear normal.      Left Ear: External ear normal.      Nose: Nose normal.      Mouth/Throat:      Mouth: Mucous membranes are dry.   Eyes:      General: No scleral icterus.     Extraocular Movements: Extraocular movements intact.      Conjunctiva/sclera: Conjunctivae normal.   Cardiovascular:      Rate and Rhythm: Normal rate and regular rhythm.      Heart sounds: Normal heart sounds.   Pulmonary:      Effort: Pulmonary effort is normal.      Breath sounds: Normal breath sounds. No wheezing, rhonchi or rales.   Abdominal:      General: Abdomen is flat. There is no distension.      Palpations: Abdomen is soft.      Tenderness: There is no abdominal tenderness. There is no guarding.   Musculoskeletal:         General: No swelling. Normal range of motion.      Cervical back: Normal range of motion and neck supple.      Right lower leg: No edema.      Left lower leg: No edema.   Skin:     General: Skin is warm and dry.      Findings: No bruising, lesion or rash.   Neurological:      General: No focal deficit present.      Mental Status: He is alert and oriented to person, place, and time. Mental status is at baseline.      Cranial Nerves: No cranial nerve deficit.      Motor: No weakness.      Gait: Gait normal.   Psychiatric:         Behavior: Behavior normal.          Thought Content: Thought content normal.       Relevant Results  Labs:  CBC: WBC 4.0 , HGB 12.9, PLT 72  BMP: , K 3.9, Cl 89, HCO3 29, BUN 30, CR 0.82, Glu 104  LFTS: AST 64 , ALT 48, ALKPHOS 98 , TBILI 1.1 , DBILI No results found for requested labs within last 365 days.  TROP: 6  BNP: 5  COAGS: PT 11.8 , PTT 26  , INR 1.0  UA:   Results from last 7 days   Lab Units 01/11/25  0322   COLOR U  Yellow   PH U  5.5   SPEC GRAV UR  1.022   PROTEIN U mg/dL NEGATIVE   BLOOD UR  0.03 (TRACE)*   NITRITE U  NEGATIVE   WBC UR /HPF 1-5     ABG:    CALCIUM 8.6 MAG No results found for requested labs within last 365 days. ALB 3.3 LACTATE 2.8 PHOS No results found for requested labs within last 365 days. COVIDNo results found for requested labs within last 365 days.    Micro/culture data:  No results found for the last 120 days.      Imaging:  CT head wo IV contrast  Narrative: STUDY:  CT HEAD WO IV CONTRAST;  1/11/2025 2:59 am      INDICATION:  Signs/Symptoms:eval for metastatic disease.      COMPARISON:  None.      ACCESSION NUMBER(S):  [id number]      ORDERING CLINICIAN:  JALEEL LITTLE      TECHNIQUE:  Noncontrast axial CT images of head were obtained with coronal and  sagittal reconstructed images.      FINDINGS:  BRAIN PARENCHYMA: Rounded near simple fluid density lesion in the  left subinsular white matter measures 1.3 x 1.1 x 0.9 cm (series 209,  image 18 and series 211, image 52). There is no surrounding vasogenic  edema or significant mass effect. No acute intraparenchymal  hemorrhage or parenchymal evidence of acute large territory ischemic  infarct. No mass-effect. Gray-white matter distinction is preserved.      VENTRICLES and EXTRA-AXIAL SPACES:  No acute extra-axial or  intraventricular hemorrhage. No effacement of cerebral sulci.  Ventricles and sulci are age-concordant.      PARANASAL SINUSES/MASTOIDS:  No hemorrhage or air-fluid levels within  the visualized paranasal sinuses. The mastoids are well  aerated.      CALVARIUM/ORBITS:  No skull fracture.  The orbits and globes are  intact to the extent visualized.      EXTRACRANIAL SOFT TISSUES: No discernible abnormality.      Impression: 1. Rounded near simple fluid density lesion in the left subinsular  white matter favored to represent prominent perivascular space as  opposed to malignancy. Nonemergent, MRI brain could better evaluate  if clinically indicated.  2. No acute intracranial abnormality.      I personally reviewed the images/study and I agree with the findings  as stated by Dr. Cam Byrd. This study was interpreted at  [facility], [place], [place].      MACRO:  None.          Dictation workstation:   [id number]  ECG 12 lead  Normal sinus rhythm  Nonspecific ST abnormality  Abnormal ECG  When compared with ECG of 03-JAN-2025 02:17,  Borderline criteria for Inferior infarct are no longer Present    See ED provider note for full interpretation and clinical correlation  Confirmed by Vandana Echeverria ([id number]) on 1/11/2025 3:29:14 AM           MEDS:  Scheduled medications  aspirin, 81 mg, oral, Daily  atorvastatin, 40 mg, oral, Daily  enoxaparin, 40 mg, subcutaneous, q24h  lisinopril, 10 mg, oral, Daily  metoprolol tartrate, 50 mg, oral, BID      Continuous medications     PRN medications  PRN medications: acetaminophen **OR** acetaminophen, HYDROmorphone, oxyCODONE, polyethylene glycol, prochlorperazine **OR** prochlorperazine       Assessment/Plan   Assessment/Plan:   This is a 64 yr old male with history of CAD (s/p stent 2010) on ASA, Renal cell carcinoma s/p R partial nephrectomy in 2013 @ [facility] (saw [facility]rology at [facility] in 2017), HTN, HLD, GERD, BPH and arthritis who reported to the [facility] ED today per recs of his primary physician regarding abnormal imaging. MRI lumbar spine with a possible mass between T12 and L1 measuring  39 x 17 mm that may represent nerve sheath tumor. Recent history of 16 pound unintentional weight loss  and night sweats over the past 2-3 weeks. Initial workup revealing, pancytopenia, hyponatremia, and right-sided facial swelling. New CT C/A/P  suggestive of possible local RCC reoccurrence with mets to lung VS lung primary. Currently pending further work-up with Urology and Pulmonology.        #Back pain, chronic with acute worsening  #Hx of RCC s/p R partial nephrectomy in 2013  #C/f reoccurrence of RCC with mets to lung VS lung primary  - MRI lumbar spine: T12 and L1 levels in the right anterolateral para spinous tissues is a masslike structure that measures 39 x 17 mm greatest axial dimensions, 39 mm cc and may represent a soft tissue mass  - CT head read pending (ordered in ED), negative for concerning findings  - Ortho spine consulted, recommenced:  - Tumor work-up: Consider getting MRI of abdomen per radiology. Consider CT abdomen chest and pelvis to determine primary   Plan:  -Ortho spine consulted, recommenced:  - Tumor work-up: Consider getting MRI of abdomen per radiology. Consider CT abdomen chest and pelvis to determine primary   -CT PE and CT A/P ordered and performed on  revealin.  Ill-defined hypodense infiltrative masslike foci involving the right kidney, largest measuring 3.0 x 2.9 cm in partially effacing the anterior right renal pelvis. There is an additional infiltrative lesion located at the superior aspect of the right kidney which measures 3.7 x 2 point 7 cm and closely abuts the inferior aspect of the right hepatic lobe. Primary differential considerations would include primary renal neoplasm such as renal cell carcinoma versus metastatic disease.  2. Asymmetric nodular masslike thickening of the right adrenal gland measuring 2.6 x 1.8 cm. Findings concerning for metastatic disease.  3. 4.7 x 2.1 cm soft tissue mass in the right anterolateral aspect of the paraspinous tissues at T12-L1, previously characterized on MR of the lumbar spine from 01/10/2025. There are additional  "multiple scattered abdominopelvic lymph nodes, not significantly enlarged per CT size criteria.  4. Please see dedicated CT of the chest from 01/11/2025 for further characterization of multiple scattered pulmonary nodules involving the left lung base and left pleura.  5. Mild splenomegaly.  -Pt states he was told about some \"lung spots\" in the past, upon chart review it appears as though LD CT done at [facility] from 03/14/2024 showed:  -Multiple subcentimeter noncalcified pulmonary nodules, similar to prior. No new or enlarging nodule identified.   -Consulted Pulmonology for consideration of EBUS  -Consulted Urology for evaluation of possible recurrence of RCC and further inpatient evaluation / est outpt care  -Continue pain regimen:   -scheduled tylenol 975 mg Q 8 hrs   -Tramadol 50 mg Q 6 hrs for moderate pain PRN   -Oxycodone 5 mg Q 6 hrs for severe pain PRN   -Flexeril 5 mg TID PRN      #Hyponatremia, hypoosmolality   -Na 124-125 on presentation  - S/p 1L NS bolus, decreased to 123  -Pt reports poor oral intake since feeling ill with UR symptoms around [place]   -Home Lisinopril and triamterene-HCTZ  -Serum osmolality, slightly low at 270  -Urine osmolality, 616  -Urine electrolytes, showing decreased Na output <10  Plan:  -CTM RFP daily  -Started 75 mL/hr NS IVF  -Goal 130-131 in the next 24 hrs   -PM RFP ordered  -Hold home Lisinopril and triamterene-HCTZ       #Pancytopenia   - Initial Hb 12.9, WBC 4.0, PLT 72   -Upon review of recent labs from 3 months ago in [facility] system and [facility], this is an acute change  -Initially c/f acute viral infection affecting all cell lines, which could be contributing, but per above may have new/redcurrant cancer  -Iron studies:   -Ferritin >9,000   -Iron 120   -Transferrin 156  -Retic count normal, but low in setting of acute anemia  -CRP 14.67  Plan:  -CTM with daily CBC w/diff           Chronic disease history    #CAD s/p stent 2010  - (Last Echo Feb 2016) " -->Estimated ejection fraction is 65-69%   - Home medications: ASA 81 mg daily   Plan:  - Continue ASA 81 mg daily      #HTN  - Home medications: metoprolol succinate 50 mg BID, Lisinopril 10 mg daily  Plan:  -Continue metoprolol and holding ACE, per above     #HLD  - Home medications: Atorvastatin 40 mg daily  Plan:  -Continue home medication      #BPH   - No home medications, no acute urinary symptoms at this time   -PSA 4.63 11/2024  Plan:  -CTM   -Repeat PSA pending    Fluids: Replete PRN  Electrolytes: Keep mg >2, phos >3  and K >4  Nutrition:  Adult diet Regular   Antimicrobials: none  DVT PPX:DVT: Lovenox  GI ppx: n/a  Bowel care:Miralax  Catheter:None  Lines:PIV  Oxygen:Room Air    Code Status: Full Code (confirmed on admission)   NOK:  Primary Emergency Contact: Indira Ibanez Home Phone: [phone]     Mallory Snow MD   Internal Medicine, PGY-1

## 2025-01-12 VITALS
OXYGEN SATURATION: 97 % | RESPIRATION RATE: 17 BRPM | HEIGHT: 72 IN | TEMPERATURE: 97.5 F | HEART RATE: 112 BPM | BODY MASS INDEX: 31.97 KG/M2 | DIASTOLIC BLOOD PRESSURE: 75 MMHG | WEIGHT: 236 LBS | SYSTOLIC BLOOD PRESSURE: 116 MMHG

## 2025-01-12 LAB
ABO GROUP (TYPE) IN BLOOD: NORMAL
ALBUMIN SERPL BCP-MCNC: 3 G/DL (ref 3.4–5)
ANION GAP SERPL CALC-SCNC: 9 MMOL/L (ref 10–20)
ANTIBODY SCREEN: NORMAL
BASOPHILS # BLD MANUAL: 0.05 X10*3/UL (ref 0–0.1)
BASOPHILS NFR BLD MANUAL: 1.7 %
BUN SERPL-MCNC: 27 MG/DL (ref 6–23)
BURR CELLS BLD QL SMEAR: ABNORMAL
CALCIUM SERPL-MCNC: 8.5 MG/DL (ref 8.6–10.6)
CHLORIDE SERPL-SCNC: 95 MMOL/L (ref 98–107)
CMV DNA SERPL NAA+PROBE-LOG IU: NORMAL {LOG_IU}/ML
CO2 SERPL-SCNC: 28 MMOL/L (ref 21–32)
CREAT SERPL-MCNC: 0.85 MG/DL (ref 0.5–1.3)
EGFRCR SERPLBLD CKD-EPI 2021: >90 ML/MIN/1.73M*2
EOSINOPHIL # BLD MANUAL: 0.07 X10*3/UL (ref 0–0.7)
EOSINOPHIL NFR BLD MANUAL: 2.6 %
ERYTHROCYTE [DISTWIDTH] IN BLOOD BY AUTOMATED COUNT: 13 % (ref 11.5–14.5)
GLUCOSE SERPL-MCNC: 129 MG/DL (ref 74–99)
HAPTOGLOB SERPL NEPH-MCNC: 374 MG/DL (ref 30–200)
HCT VFR BLD AUTO: 30.5 % (ref 41–52)
HGB BLD-MCNC: 10.6 G/DL (ref 13.5–17.5)
IMM GRANULOCYTES # BLD AUTO: 0.27 X10*3/UL (ref 0–0.7)
IMM GRANULOCYTES NFR BLD AUTO: 9.8 % (ref 0–0.9)
LABORATORY COMMENT REPORT: NOT DETECTED
LYMPHOCYTES # BLD MANUAL: 0.55 X10*3/UL (ref 1.2–4.8)
LYMPHOCYTES NFR BLD MANUAL: 19.8 %
MAGNESIUM SERPL-MCNC: 2.16 MG/DL (ref 1.6–2.4)
MCH RBC QN AUTO: 28.2 PG (ref 26–34)
MCHC RBC AUTO-ENTMCNC: 34.8 G/DL (ref 32–36)
MCV RBC AUTO: 81 FL (ref 80–100)
MONOCYTES # BLD MANUAL: 0.05 X10*3/UL (ref 0.1–1)
MONOCYTES NFR BLD MANUAL: 1.7 %
MYELOCYTES # BLD MANUAL: 0.12 X10*3/UL
MYELOCYTES NFR BLD MANUAL: 4.3 %
NEUTROPHILS # BLD MANUAL: 1.81 X10*3/UL (ref 1.2–7.7)
NEUTS BAND # BLD MANUAL: 0.05 X10*3/UL (ref 0–0.7)
NEUTS BAND NFR BLD MANUAL: 1.7 %
NEUTS SEG # BLD MANUAL: 1.76 X10*3/UL (ref 1.2–7)
NEUTS SEG NFR BLD MANUAL: 63 %
NRBC BLD-RTO: 0.7 /100 WBCS (ref 0–0)
OVALOCYTES BLD QL SMEAR: ABNORMAL
PHOSPHATE SERPL-MCNC: 4 MG/DL (ref 2.5–4.9)
PLATELET # BLD AUTO: 48 X10*3/UL (ref 150–450)
POTASSIUM SERPL-SCNC: 3.9 MMOL/L (ref 3.5–5.3)
PROMYELOCYTES # BLD MANUAL: 0.05 X10*3/UL
PROMYELOCYTES NFR BLD MANUAL: 1.7 %
RBC # BLD AUTO: 3.76 X10*6/UL (ref 4.5–5.9)
RBC MORPH BLD: ABNORMAL
RH FACTOR (ANTIGEN D): NORMAL
SODIUM SERPL-SCNC: 128 MMOL/L (ref 136–145)
TOTAL CELLS COUNTED BLD: 116
URATE SERPL-MCNC: 10.7 MG/DL (ref 4–7.5)
VARIANT LYMPHS # BLD MANUAL: 0.1 X10*3/UL (ref 0–0.5)
VARIANT LYMPHS NFR BLD: 3.5 %
WBC # BLD AUTO: 2.8 X10*3/UL (ref 4.4–11.3)

## 2025-01-12 PROCEDURE — 83735 ASSAY OF MAGNESIUM: CPT

## 2025-01-12 PROCEDURE — 2500000004 HC RX 250 GENERAL PHARMACY W/ HCPCS (ALT 636 FOR OP/ED)

## 2025-01-12 PROCEDURE — 86635 COCCIDIOIDES ANTIBODY: CPT

## 2025-01-12 PROCEDURE — 99223 1ST HOSP IP/OBS HIGH 75: CPT | Performed by: STUDENT IN AN ORGANIZED HEALTH CARE EDUCATION/TRAINING PROGRAM

## 2025-01-12 PROCEDURE — 1100000001 HC PRIVATE ROOM DAILY

## 2025-01-12 PROCEDURE — 83018 HEAVY METAL QUAN EACH NES: CPT

## 2025-01-12 PROCEDURE — 87449 NOS EACH ORGANISM AG IA: CPT

## 2025-01-12 PROCEDURE — 36415 COLL VENOUS BLD VENIPUNCTURE: CPT

## 2025-01-12 PROCEDURE — 2500000001 HC RX 250 WO HCPCS SELF ADMINISTERED DRUGS (ALT 637 FOR MEDICARE OP)

## 2025-01-12 PROCEDURE — 85007 BL SMEAR W/DIFF WBC COUNT: CPT

## 2025-01-12 PROCEDURE — 80069 RENAL FUNCTION PANEL: CPT

## 2025-01-12 PROCEDURE — 86901 BLOOD TYPING SEROLOGIC RH(D): CPT

## 2025-01-12 PROCEDURE — 86612 BLASTOMYCES ANTIBODY: CPT

## 2025-01-12 PROCEDURE — 99233 SBSQ HOSP IP/OBS HIGH 50: CPT

## 2025-01-12 PROCEDURE — 87305 ASPERGILLUS AG IA: CPT

## 2025-01-12 PROCEDURE — 85027 COMPLETE CBC AUTOMATED: CPT

## 2025-01-12 RX ADMIN — OXYCODONE HYDROCHLORIDE 5 MG: 5 TABLET ORAL at 19:35

## 2025-01-12 RX ADMIN — ATORVASTATIN CALCIUM 40 MG: 40 TABLET, FILM COATED ORAL at 05:12

## 2025-01-12 RX ADMIN — METOPROLOL TARTRATE 50 MG: 50 TABLET, FILM COATED ORAL at 08:18

## 2025-01-12 RX ADMIN — POLYETHYLENE GLYCOL 3350 17 G: 17 POWDER, FOR SOLUTION ORAL at 09:22

## 2025-01-12 RX ADMIN — ASPIRIN 81 MG: 81 TABLET, COATED ORAL at 08:18

## 2025-01-12 RX ADMIN — LISINOPRIL 10 MG: 10 TABLET ORAL at 08:18

## 2025-01-12 RX ADMIN — TRAMADOL HYDROCHLORIDE 50 MG: 50 TABLET, COATED ORAL at 08:18

## 2025-01-12 RX ADMIN — ACETAMINOPHEN 975 MG: 325 TABLET ORAL at 08:18

## 2025-01-12 RX ADMIN — ENOXAPARIN SODIUM 40 MG: 100 INJECTION SUBCUTANEOUS at 05:12

## 2025-01-12 RX ADMIN — METOPROLOL TARTRATE 50 MG: 50 TABLET, FILM COATED ORAL at 20:14

## 2025-01-12 RX ADMIN — TRAMADOL HYDROCHLORIDE 50 MG: 50 TABLET, COATED ORAL at 14:31

## 2025-01-12 SDOH — SOCIAL STABILITY: SOCIAL INSECURITY: WITHIN THE LAST YEAR, HAVE YOU BEEN HUMILIATED OR EMOTIONALLY ABUSED IN OTHER WAYS BY YOUR PARTNER OR EX-PARTNER?: NO

## 2025-01-12 SDOH — SOCIAL STABILITY: SOCIAL INSECURITY
WITHIN THE LAST YEAR, HAVE YOU BEEN RAPED OR FORCED TO HAVE ANY KIND OF SEXUAL ACTIVITY BY YOUR PARTNER OR EX-PARTNER?: NO

## 2025-01-12 SDOH — SOCIAL STABILITY: SOCIAL INSECURITY: ARE THERE ANY APPARENT SIGNS OF INJURIES/BEHAVIORS THAT COULD BE RELATED TO ABUSE/NEGLECT?: NO

## 2025-01-12 SDOH — SOCIAL STABILITY: SOCIAL INSECURITY: WERE YOU ABLE TO COMPLETE ALL THE BEHAVIORAL HEALTH SCREENINGS?: YES

## 2025-01-12 SDOH — ECONOMIC STABILITY: FOOD INSECURITY: HOW HARD IS IT FOR YOU TO PAY FOR THE VERY BASICS LIKE FOOD, HOUSING, MEDICAL CARE, AND HEATING?: NOT HARD AT ALL

## 2025-01-12 SDOH — ECONOMIC STABILITY: FOOD INSECURITY: WITHIN THE PAST 12 MONTHS, YOU WORRIED THAT YOUR FOOD WOULD RUN OUT BEFORE YOU GOT THE MONEY TO BUY MORE.: NEVER TRUE

## 2025-01-12 SDOH — SOCIAL STABILITY: SOCIAL INSECURITY: WITHIN THE LAST YEAR, HAVE YOU BEEN AFRAID OF YOUR PARTNER OR EX-PARTNER?: NO

## 2025-01-12 SDOH — ECONOMIC STABILITY: INCOME INSECURITY: IN THE PAST 12 MONTHS HAS THE ELECTRIC, GAS, OIL, OR WATER COMPANY THREATENED TO SHUT OFF SERVICES IN YOUR HOME?: NO

## 2025-01-12 SDOH — ECONOMIC STABILITY: HOUSING INSECURITY: IN THE LAST 12 MONTHS, WAS THERE A TIME WHEN YOU WERE NOT ABLE TO PAY THE MORTGAGE OR RENT ON TIME?: NO

## 2025-01-12 SDOH — ECONOMIC STABILITY: HOUSING INSECURITY: AT ANY TIME IN THE PAST 12 MONTHS, WERE YOU HOMELESS OR LIVING IN A SHELTER (INCLUDING NOW)?: NO

## 2025-01-12 SDOH — ECONOMIC STABILITY: HOUSING INSECURITY: IN THE PAST 12 MONTHS, HOW MANY TIMES HAVE YOU MOVED WHERE YOU WERE LIVING?: 1

## 2025-01-12 SDOH — SOCIAL STABILITY: SOCIAL INSECURITY: ARE YOU OR HAVE YOU BEEN THREATENED OR ABUSED PHYSICALLY, EMOTIONALLY, OR SEXUALLY BY ANYONE?: NO

## 2025-01-12 SDOH — SOCIAL STABILITY: SOCIAL INSECURITY: ABUSE: ADULT

## 2025-01-12 SDOH — SOCIAL STABILITY: SOCIAL INSECURITY: DOES ANYONE TRY TO KEEP YOU FROM HAVING/CONTACTING OTHER FRIENDS OR DOING THINGS OUTSIDE YOUR HOME?: NO

## 2025-01-12 SDOH — ECONOMIC STABILITY: TRANSPORTATION INSECURITY: IN THE PAST 12 MONTHS, HAS LACK OF TRANSPORTATION KEPT YOU FROM MEDICAL APPOINTMENTS OR FROM GETTING MEDICATIONS?: NO

## 2025-01-12 SDOH — SOCIAL STABILITY: SOCIAL INSECURITY
WITHIN THE LAST YEAR, HAVE YOU BEEN KICKED, HIT, SLAPPED, OR OTHERWISE PHYSICALLY HURT BY YOUR PARTNER OR EX-PARTNER?: NO

## 2025-01-12 SDOH — SOCIAL STABILITY: SOCIAL INSECURITY: HAVE YOU HAD THOUGHTS OF HARMING ANYONE ELSE?: NO

## 2025-01-12 SDOH — SOCIAL STABILITY: SOCIAL INSECURITY: DO YOU FEEL UNSAFE GOING BACK TO THE PLACE WHERE YOU ARE LIVING?: NO

## 2025-01-12 SDOH — ECONOMIC STABILITY: FOOD INSECURITY: WITHIN THE PAST 12 MONTHS, THE FOOD YOU BOUGHT JUST DIDN'T LAST AND YOU DIDN'T HAVE MONEY TO GET MORE.: NEVER TRUE

## 2025-01-12 SDOH — SOCIAL STABILITY: SOCIAL INSECURITY: DO YOU FEEL ANYONE HAS EXPLOITED OR TAKEN ADVANTAGE OF YOU FINANCIALLY OR OF YOUR PERSONAL PROPERTY?: NO

## 2025-01-12 SDOH — SOCIAL STABILITY: SOCIAL INSECURITY: HAS ANYONE EVER THREATENED TO HURT YOUR FAMILY OR YOUR PETS?: NO

## 2025-01-12 ASSESSMENT — ACTIVITIES OF DAILY LIVING (ADL)
HEARING - LEFT EAR: FUNCTIONAL
HEARING - RIGHT EAR: FUNCTIONAL
LACK_OF_TRANSPORTATION: NO
DRESSING YOURSELF: INDEPENDENT
LACK_OF_TRANSPORTATION: NO
ADEQUATE_TO_COMPLETE_ADL: YES
WALKS IN HOME: INDEPENDENT
TOILETING: INDEPENDENT
JUDGMENT_ADEQUATE_SAFELY_COMPLETE_DAILY_ACTIVITIES: YES
PATIENT'S MEMORY ADEQUATE TO SAFELY COMPLETE DAILY ACTIVITIES?: YES
BATHING: INDEPENDENT
FEEDING YOURSELF: INDEPENDENT
LACK_OF_TRANSPORTATION: NO
GROOMING: INDEPENDENT

## 2025-01-12 ASSESSMENT — COGNITIVE AND FUNCTIONAL STATUS - GENERAL
DAILY ACTIVITIY SCORE: 24
MOBILITY SCORE: 24
PATIENT BASELINE BEDBOUND: NO
MOBILITY SCORE: 24
DAILY ACTIVITIY SCORE: 24

## 2025-01-12 ASSESSMENT — LIFESTYLE VARIABLES
SKIP TO QUESTIONS 9-10: 1
HOW MANY STANDARD DRINKS CONTAINING ALCOHOL DO YOU HAVE ON A TYPICAL DAY: PATIENT DOES NOT DRINK
AUDIT-C TOTAL SCORE: 0
HOW OFTEN DO YOU HAVE A DRINK CONTAINING ALCOHOL: NEVER
AUDIT-C TOTAL SCORE: 0
HOW OFTEN DO YOU HAVE 6 OR MORE DRINKS ON ONE OCCASION: NEVER

## 2025-01-12 ASSESSMENT — PATIENT HEALTH QUESTIONNAIRE - PHQ9
SUM OF ALL RESPONSES TO PHQ9 QUESTIONS 1 & 2: 0
1. LITTLE INTEREST OR PLEASURE IN DOING THINGS: NOT AT ALL
2. FEELING DOWN, DEPRESSED OR HOPELESS: NOT AT ALL

## 2025-01-12 ASSESSMENT — PAIN - FUNCTIONAL ASSESSMENT
PAIN_FUNCTIONAL_ASSESSMENT: 0-10

## 2025-01-12 ASSESSMENT — PAIN SCALES - GENERAL
PAINLEVEL_OUTOF10: 6
PAINLEVEL_OUTOF10: 2
PAINLEVEL_OUTOF10: 4
PAINLEVEL_OUTOF10: 7
PAINLEVEL_OUTOF10: 4

## 2025-01-12 ASSESSMENT — PAIN DESCRIPTION - ORIENTATION
ORIENTATION: LOWER
ORIENTATION: LOWER

## 2025-01-12 ASSESSMENT — PAIN DESCRIPTION - LOCATION
LOCATION: BACK
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
## 2025-01-12 NOTE — CONSULTS
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 hematology    Thank you for involving pulmonary medicine in the care of Mr. Ibanez.   Communicated with primary team: Ngozi Mayes MD  Pulmonary medicine to follow  Case discussed with staff Dr. Yovany Agustin MD    "

## 2025-01-12 NOTE — CARE PLAN
Patient in today accompanied by his wife complaining of recurrent left-sided medial subconjunctival hemorrhage.  May have happened at least 3 times in the last 3 months.  No associated cough sneezing vomiting heavy lifting etc. no other eye symptoms including change in visual acuity, eye pain or pruritus.  No history of any hematologic problems and he denies any bruising or bleeding elsewhere.    Visit Vitals  /80   Pulse 70   Temp 95.6 °F (35.3 °C) (Tympanic)   Ht 6' (1.829 m)   Wt 78.9 kg (174 lb)   SpO2 98%   BMI 23.60 kg/m²       HEENT reveals the residual of a medial left subconjunctival hemorrhage on the left eye.  Pupils otherwise equally round react light and accommodation extraocular movements intact.  Funduscopic exam was unremarkable although there did appear to be some perhaps superficial dilated conjunctival blood vessels on the left side.  Difficult to know whether this is just resolving subconjunctival hemorrhage or not.    Assessment    1.  Recurrent subconjunctival hemorrhage-reassured patient that it does not appear to be any significant abnormality here although we did ask him to get a CBC to rule out primary hematologic abnormalities.  If this continues to recur he may want to get an opinion from the ophthalmologist but otherwise is completely asymptomatic   The patient's goals for the shift include      The clinical goals for the shift include      Over the shift, the patient did not make progress toward the following goals. Barriers to progression include . Recommendations to address these barriers include   Problem: Fall/Injury  Goal: Not fall by end of shift  Outcome: Progressing  Goal: Be free from injury by end of the shift  Outcome: Progressing  Goal: Verbalize understanding of personal risk factors for fall in the hospital  Outcome: Progressing  Goal: Verbalize understanding of risk factor reduction measures to prevent injury from fall in the home  Outcome: Progressing  Goal: Use assistive devices by end of the shift  Outcome: Progressing  Goal: Pace activities to prevent fatigue by end of the shift  Outcome: Progressing     Problem: Pain  Goal: Takes deep breaths with improved pain control throughout the shift  Outcome: Progressing  Goal: Turns in bed with improved pain control throughout the shift  Outcome: Progressing  Goal: Walks with improved pain control throughout the shift  Outcome: Progressing  Goal: Performs ADL's with improved pain control throughout shift  Outcome: Progressing  Goal: Participates in PT with improved pain control throughout the shift  Outcome: Progressing  Goal: Free from opioid side effects throughout the shift  Outcome: Progressing  Goal: Free from acute confusion related to pain meds throughout the shift  Outcome: Progressing   .

## 2025-01-12 NOTE — PROGRESS NOTES
Subjective     Bijan Ibanez is a 64 y.o. male on day 1 of admission presenting with Hyponatremia.    Overnight: NAEO    This Morning:  Patient resting comfortably this AM, continues to endorse back pain, althought not worse in serveity than yesterday. Denies SOB, CP, abd plain.       Objective   Current Vitals  /81 (BP Location: Right arm, Patient Position: Lying)   Pulse 106   Temp 36.3 °C (97.3 °F) (Temporal)   Resp 16   Ht 1.829 m (6')   Wt 107 kg (236 lb)   SpO2 94%   BMI 32.01 kg/m²      I/O last 3 completed shifts:  In: 1000 (9.3 mL/kg) [I.V.:1000 (9.3 mL/kg)]  Out: - (0 mL/kg)   Weight: 107 kg     Physical Exam  Vitals reviewed.   Constitutional:       General: He is not in acute distress.     Appearance: He is normal weight. He is not toxic-appearing.   HENT:      Head: Normocephalic and atraumatic.      Jaw: Swelling present.        Right Ear: External ear normal.      Left Ear: External ear normal.      Nose: Nose normal.      Mouth/Throat:      Mouth: Mucous membranes are dry.   Eyes:      General: No scleral icterus.     Extraocular Movements: Extraocular movements intact.      Conjunctiva/sclera: Conjunctivae normal.   Cardiovascular:      Rate and Rhythm: Normal rate and regular rhythm.      Heart sounds: Normal heart sounds.   Pulmonary:      Effort: Pulmonary effort is normal.      Breath sounds: Normal breath sounds. No wheezing, rhonchi or rales.   Abdominal:      General: Abdomen is flat. There is no distension.      Palpations: Abdomen is soft.      Tenderness: There is no abdominal tenderness. There is no guarding.   Musculoskeletal:         General: No swelling. Normal range of motion.      Cervical back: Normal range of motion and neck supple.      Right lower leg: No edema.      Left lower leg: No edema.   Skin:     General: Skin is warm and dry.      Findings: No bruising, lesion or rash.   Neurological:      General: No focal deficit present.      Mental Status: He is alert  and oriented to person, place, and time. Mental status is at baseline.      Cranial Nerves: No cranial nerve deficit.      Motor: No weakness.      Gait: Gait normal.   Psychiatric:         Behavior: Behavior normal.         Thought Content: Thought content normal.       Relevant Results  Labs:  CBC: WBC 3.2 , HGB 11.5, PLT 62  BMP: , K 3.9, Cl 92, HCO3 30, BUN 28, CR 0.92, Glu 128  LFTS: AST 64 , ALT 48, ALKPHOS 98 , TBILI 1.1 , DBILI No results found for requested labs within last 365 days.  TROP: 6  BNP: 5  COAGS: PT 11.8 , PTT 26  , INR 1.0  UA:   Results from last 7 days   Lab Units 01/11/25  0322   COLOR U  Yellow   PH U  5.5   SPEC GRAV UR  1.022   PROTEIN U mg/dL NEGATIVE   BLOOD UR  0.03 (TRACE)*   NITRITE U  NEGATIVE   WBC UR /HPF 1-5     ABG:    CALCIUM 8.8 MAG No results found for requested labs within last 365 days. ALB 3.4 LACTATE 2.8 PHOS No results found for requested labs within last 365 days. COVIDNo results found for requested labs within last 365 days.    Micro/culture data:  No results found for the last 120 days.      Imaging:  CT abdomen pelvis w IV contrast  Narrative: Interpreted By:  Mack Fuentes and Ritchie Brandon   STUDY:  CT ABDOMEN PELVIS W IV CONTRAST;  1/11/2025 1:57 pm      INDICATION:  Signs/Symptoms:c/f malignacy.          COMPARISON:  CT abdomen is from 02/08/2016 and 01/12/2015.      ACCESSION NUMBER(S):  [id number]      ORDERING CLINICIAN:  RIDGE ANGUIANO      TECHNIQUE:  CT of the abdomen and pelvis was performed.  Standard contiguous  axial images were obtained at 3 mm slice thickness through the  abdomen and pelvis. Coronal and sagittal reconstructions at 3 mm  slice thickness were performed.  100 ML of Omnipaque 350 was  administered intravenously without immediate complication.      FINDINGS:  LOWER CHEST:  Please see dedicated CT angio of the chest from 01/11/2025 for  further characterization of lower chest/lung findings.      ABDOMEN:      LIVER:  The  liver is normal in size without evidence of focal liver lesions.      BILE DUCTS:  The intrahepatic and extrahepatic ducts are not dilated.      GALLBLADDER:  The gallbladder is surgically absent.      PANCREAS:  The pancreas appears unremarkable without evidence of ductal  dilatation or masses.      SPLEEN:  The spleen is enlarged measuring 18 cm in craniocaudal dimension.  There is no evidence of splenic lesion.      ADRENAL GLANDS:  There is asymmetric nodular masslike thickening of the right adrenal  gland measuring 2.3 x 1.8 cm, best visualized on coronal image 66 of  113, stable as compared to CT dated 02/08/2016.. The left adrenal  gland is unremarkable in appearance.      KIDNEYS AND URETERS:  There is an ill-defined hypodense masslike infiltrative focus  involving the anterior, interpolar region of the right kidney which  measures 3.0 x 2.9 cm (series 501, image 69) which partially effaces  the anterior right renal pelvis. Additionally in the posterosuperior  aspect of the right kidney, there is a additional area of ill-defined  masslike thickening measuring 3.7 x 2.7 cm closely abutting the  inferior aspect of the right hepatic lobe (series 502, image 73-74)..  There are scattered low-density, well-circumscribed lesions involving  the left kidney, favored to represent renal cysts, the largest of  which measures 2.2 cm in the left interpolar region (series 501,  image 71).      PELVIS:      BLADDER:  The urinary bladder appears normal without abnormal wall thickening.      REPRODUCTIVE ORGANS:  The prostate is not enlarged.      BOWEL:  The stomach is unremarkable.   The small and large bowel are normal  in caliber and demonstrate no wall thickening.   The appendix is not  definitely visualized. There is however no pericecal stranding or  fluid.      VESSELS:  There is no aneurysmal dilatation of the abdominal aorta. The IVC  appears normal.      PERITONEUM/RETROPERITONEUM/LYMPH NODES:  There is  redemonstration of a 4.7 x 2.1 cm soft tissue mass in the  right anterolateral paraspinous tissues at T12-L1, previously  characterized on visualized on MR of the lumbar spine from  01/10/2025. This lesion effaces the right violeta of the diaphragm.  Otherwise  multiple scattered, nonenlarged peritoneal lymph nodes.  There is no evidence of free or loculated fluid collections. No  evidence of free intraperitoneal air.      BONES AND ABDOMINAL WALL:  No suspicious osseous lesions are identified. Degenerative discogenic  disease is noted in the lower thoracic and lumbar spine.  The  abdominal wall soft tissues appear normal.      Impression: 1.  Ill-defined hypodense infiltrative masslike foci involving the  right kidney, largest measuring 3.0 x 2.9 cm in partially effacing  the anterior right renal pelvis. There is an additional infiltrative  lesion located at the superior aspect of the right kidney which  measures 3.7 x 2 point 7 cm and closely abuts the inferior aspect of  the right hepatic lobe. Primary differential considerations would  include primary renal neoplasm such as renal cell carcinoma versus  metastatic disease. Recommend PET/CT if clinically indicated.  2. Stable asymmetric nodular masslike thickening of the right adrenal  gland measuring 2.6 x 1.8 cm. Findings concerning for adenoma.  Superimposed metastatic disease is not excluded. Recommend PET/CT if  clinically indicated.  3. 4.7 x 2.1 cm soft tissue mass in the right anterolateral aspect of  the paraspinous tissues at T12-L1, previously characterized on MR of  the lumbar spine from 01/10/2025.  Considerations include a nerve  sheath tumor,abnormal lymph node conglomerate. Recommend PET/CT if  clinically indicated.  4. Please see dedicated CT of the chest from 01/11/2025 for further  characterization of multiple scattered pulmonary nodules involving  the left lung base and left pleura.  5. Mild splenomegaly.      I personally reviewed the  images/study and I agree with the findings  as stated by resident Luis Eduardo Limon. This study was interpreted  at [facility], [place], [place].      MACRO:  None      Signed by: Mack Fuentes 1/11/2025 5:45 PM  Dictation workstation:   [id number]  CT angio chest for pulmonary embolism  Narrative: STUDY:  CT ANGIO CHEST FOR PULMONARY EMBOLISM;  1/11/2025 1:57 pm      INDICATION:  Signs/Symptoms:c/f pe.      COMPARISON:  CT of the chest 02/08/2016.      ACCESSION NUMBER(S):  [id number]      ORDERING CLINICIAN:  RIDGE ANGUIANO      TECHNIQUE:  Helical data acquisition of the chest was obtained after intravenous  administration of 100 ML Omnipaque 350, as per PE protocol. Images  were reformatted in coronal and sagittal planes. Axial and coronal  maximum intensity projection (MIP) images were created and reviewed.      FINDINGS:  POTENTIAL LIMITATIONS OF THE STUDY: None      HEART AND VESSELS:  There are no discrete filling defects within main pulmonary artery  and its branches to suggest acute pulmonary embolism. Main pulmonary  artery and its branches are normal in caliber.      The thoracic aorta normal in course and caliber.There is mild  scattered calcified atherosclerosis present.Although, the study is  not tailored for evaluation of aorta, there is no definite evidence  of acute aortic pathology. Moderate coronary artery calcifications  are seen. Please note,the study is not optimized for evaluation of  coronary arteries.      The cardiac chambers are not enlarged.There are no findings to  suggest right heart strain.      There is no pericardial effusion seen.      MEDIASTINUM AND CHIN, LOWER NECK AND AXILLA:  The visualized thyroid gland is within normal limits.  There is left-sided perihilar soft tissue mass/lymph node which  measures 1.8 x 1.7 cm (series 401, image 161). Mildly prominent left  axillary lymph node measuring 0.9 cm in short axis with preservation  of  the fatty hilum (series 401, image 33). Nonspecific, asymmetric  soft tissue thickening visualized in the upper left axilla, which  could reflect underlying musculature however bulky left axillary  lymph node is not readily excluded. Esophagus appears within normal  limits as seen.      LUNGS AND AIRWAYS:  The trachea and central airways are patent. No endobronchial lesion  is seen. In the left lung base, there are multiple well-circumscribed  pulmonary nodules, largest measuring 1.2 x 1.0 cm (series 402, image  216). Additional subcentimeter nodules are visualized on coronal  image 136 of 162 extending along the posterior left hemithorax, some  of which appear to be subpleural for reference measuring 0.9 x 0.7 cm  (series 402, image 174). There is an additional left upper lobe  pulmonary nodule measuring 0.7 cm (series 402, image 100).      No evidence of focal airspace consolidation, pleural effusion, or  pneumothorax          UPPER ABDOMEN:  Please see dedicated CT abdomen and pelvis from 01/11/2025 for  further characterization of upper abdomen findings.          CHEST WALL AND OSSEOUS STRUCTURES:  Chest wall is within normal limits.  No acute osseous pathology.There are no suspicious osseous lesions.      Impression: 1. No evidence of acute pulmonary embolism.  2. Left-sided perihilar soft tissue mass/lymph node measuring 1.8 x  1.7 cm. Findings favored to represent metastatic disease however  underlying primary lung neoplasm is not definitively excluded.  3. Multiple left-sided pulmonary nodules, the majority located in the  posterior left lower lobe with the largest measuring 1.2 x 1.0 cm as  detailed above. There are additional nodules which exhibit subpleural  distribution and an isolated left upper lobe pulmonary nodule which  measures 0.7 cm. Findings are favored to reflect underlying enlarged  intrapulmonary lymph nodes an underlying metastatic disease not  definitively excluded.      I personally  reviewed the images/study and I agree with the findings  as stated by resident Luis Eduardo Limon. This study was interpreted  at [place], [place].      MACRO:  Critical Finding:  See findings. Notification was initiated on  1/11/2025 at 3:02 pm by  Luis Eduardo Limon.  (**-YCF-**) Instructions:          Dictation workstation:   [id number]  XR chest 2 views  Narrative: Interpreted By:  Gianni Bishop and Ritchie Brandon   STUDY:  XR CHEST 2 VIEWS;  1/11/2025 9:44 am      INDICATION:  Signs/Symptoms:c/f pna.      COMPARISON:      Chest x-ray 01/03/2025. CT of the chest 02/08/2016.      ACCESSION NUMBER(S):  [id number]      ORDERING CLINICIAN:  RIDGE ANGUIANO      FINDINGS:  PA and lateral radiographs of the chest were provided.      CARDIOMEDIASTINAL SILHOUETTE:  Cardiomediastinal silhouette is normal in size and configuration.      LUNGS:  No focal airspace consolidation, pleural effusion, or pneumothorax.      ABDOMEN:  No remarkable upper abdominal findings.      BONES:  No acute osseous changes.      Impression: 1.  No radiographic evidence of acute cardiopulmonary process.      I personally reviewed the images/study and I agree with the findings  as stated by resident Luis Eduardo Limon. This study was interpreted  at [place], [place].      MACRO:  None      Signed by: Gianni Bishop 1/11/2025 10:13 AM  Dictation workstation:   [id number]  CT head wo IV contrast  Narrative: Interpreted By:  Gabriel Matias and Ohs Zachary   STUDY:  CT HEAD WO IV CONTRAST;  1/11/2025 2:59 am      INDICATION:  Signs/Symptoms:eval for metastatic disease.      COMPARISON:  None.      ACCESSION NUMBER(S):  [id number]      ORDERING CLINICIAN:  JALEEL LITTLE      TECHNIQUE:  Noncontrast axial CT images of head were obtained with coronal and  sagittal reconstructed images.      FINDINGS:  BRAIN PARENCHYMA: Rounded near simple fluid  density lesion in the  left subinsular white matter is 1.3 x 1.1 x 0.9 cm (series 209, image  18 and series 211, image 52) with no surrounding vasogenic edema or  significant mass effect.      No acute intraparenchymal hemorrhage or parenchymal evidence of acute  large territory ischemic infarct. No mass-effect. Gray-white matter  distinction is preserved.      VENTRICLES and EXTRA-AXIAL SPACES:  No acute extra-axial or  intraventricular hemorrhage. No effacement of cerebral sulci.  Ventricles and sulci are age-concordant.      PARANASAL SINUSES/MASTOIDS:  No hemorrhage or air-fluid levels within  the visualized paranasal sinuses. The mastoids are well aerated.      CALVARIUM/ORBITS:  No skull fracture.  The orbits and globes are  intact to the extent visualized.      EXTRACRANIAL SOFT TISSUES: No discernible abnormality.      Impression: 1. Rounded near simple fluid density lesion in the left subinsular  white matter favored to represent prominent perivascular space as  opposed to malignancy. Nonemergent, MRI brain could better evaluate  if clinically indicated.  2. No acute intracranial abnormality.      I personally reviewed the images/study and I agree with the findings  as stated by Dr. Cam Byrd. This study was interpreted at  [place], [place].      MACRO:  None.      Signed by: Gabriel Matias 1/11/2025 7:24 AM  Dictation workstation:   [id number]  ECG 12 lead  Normal sinus rhythm  Nonspecific ST abnormality  Abnormal ECG  When compared with ECG of 03-JAN-2025 02:17,  Borderline criteria for Inferior infarct are no longer Present    See ED provider note for full interpretation and clinical correlation  Confirmed by Vandana Echeverria ([id number]) on 1/11/2025 3:29:14 AM           MEDS:  Scheduled medications  acetaminophen, 975 mg, oral, q8h  aspirin, 81 mg, oral, Daily  atorvastatin, 40 mg, oral, Daily  enoxaparin, 40 mg, subcutaneous, q24h  lisinopril, 10 mg, oral,  Daily  metoprolol tartrate, 50 mg, oral, BID  polyethylene glycol, 17 g, oral, Daily      Continuous medications     PRN medications  PRN medications: cyclobenzaprine, oxyCODONE, prochlorperazine **OR** [DISCONTINUED] prochlorperazine, traMADol       Assessment/Plan   Assessment/Plan:   This is a 64 yr old male with history of CAD (s/p stent 2010) on ASA, Renal cell carcinoma s/p R partial nephrectomy in 2013 @ [facility] ([facility]), HTN, HLD, GERD, BPH and arthritis who reported to the [facility] ED today per recs of his primary physician regarding abnormal imaging. MRI lumbar spine with a possible mass between T12 and L1 measuring  39 x 17 mm that may represent nerve sheath tumor. Recent history of 16 pound unintentional weight loss and night sweats over the past 2-3 weeks. Initial workup revealing, pancytopenia, hyponatremia, and right-sided facial swelling. New CT C/A/P 01/11 suggestive of possible local RCC reoccurrence with mets to lung VS lung primary. Currently pending further work-up with Urology and Pulmonology.    Update 1/12:  -Per Pulm: will consider biopsy an axillary lymph node if possible vs  EBUS.    -Consulted Hematology for Pancytopenia   -Concern for possible HLA. H Score 170 (40-54 % Probability of hemophagocytic syndrome)     #Back pain, chronic with acute worsening  #Hx of RCC s/p R partial nephrectomy in 2013  #C/f reoccurrence of RCC with mets to lung VS lung primary  - MRI lumbar spine: T12 and L1 levels in the right anterolateral para spinous tissues is a masslike structure that measures 39 x 17 mm greatest axial dimensions, 39 mm cc and may represent a soft tissue mass  - CT head read pending (ordered in ED), negative for concerning findings  - Ortho spine consulted, recommenced:  - Tumor work-up: Consider getting MRI of abdomen per radiology. Consider CT abdomen chest and pelvis to determine primary   Plan:  -Ortho spine consulted, recommenced:  - Tumor work-up: Consider getting  "MRI of abdomen per radiology. Consider CT abdomen chest and pelvis to determine primary   -CT PE and CT A/P ordered and performed on  revealin.  Ill-defined hypodense infiltrative masslike foci involving the right kidney, largest measuring 3.0 x 2.9 cm in partially effacing the anterior right renal pelvis. There is an additional infiltrative lesion located at the superior aspect of the right kidney which measures 3.7 x 2 point 7 cm and closely abuts the inferior aspect of the right hepatic lobe. Primary differential considerations would include primary renal neoplasm such as renal cell carcinoma versus metastatic disease.  2. Asymmetric nodular masslike thickening of the right adrenal gland measuring 2.6 x 1.8 cm. Findings concerning for metastatic disease.  3. 4.7 x 2.1 cm soft tissue mass in the right anterolateral aspect of the paraspinous tissues at T12-L1, previously characterized on MR of the lumbar spine from 01/10/2025. There are additional multiple scattered abdominopelvic lymph nodes, not significantly enlarged per CT size criteria.  4. Please see dedicated CT of the chest from 2025 for further characterization of multiple scattered pulmonary nodules involving the left lung base and left pleura.  5. Mild splenomegaly.  -Pt states he was told about some \"lung spots\" in the past, upon chart review it appears as though LD CT done at [facility] from 2024 showed:  -Multiple subcentimeter noncalcified pulmonary nodules, similar to prior. No new or enlarging nodule identified.   -Consulted Pulmonology for consideration of EBUS  -Consulted Urology for evaluation of possible recurrence of RCC and further inpatient evaluation / est outpt care  -Continue pain regimen:   -scheduled tylenol 975 mg Q 8 hrs   -Tramadol 50 mg Q 6 hrs for moderate pain PRN   -Oxycodone 5 mg Q 6 hrs for severe pain PRN   -Flexeril 5 mg TID PRN      #Hyponatremia, hypoosmolality   -Na 124-125 on presentation  - " S/p 1L NS bolus, decreased to 123  -Pt reports poor oral intake since feeling ill with UR symptoms around [place]   -Home Lisinopril and triamterene-HCTZ  -Serum osmolality, slightly low at 270  -Urine osmolality, 616  -Urine electrolytes, showing decreased Na output <10  Plan:  -CTM RFP daily  -Started 75 mL/hr NS IVF  -Goal 130-131 in the next 24 hrs   -PM RFP ordered  -Hold home Lisinopril and triamterene-HCTZ       #Pancytopenia   - Initial Hb 12.9, WBC 4.0, PLT 72   -Upon review of recent labs from 3 months [facility] and [facility], this is an acute change  -Initially c/f acute viral infection affecting all cell lines, which could be contributing, but per above may have new/redcurrant cancer  -Iron studies:   -Ferritin >9,000   -Iron 120   -Transferrin 156  -Retic count normal, but low in setting of acute anemia  -CRP 14.67  Plan:  -CTM with daily CBC w/diff           Chronic disease history    #CAD s/p stent 2010  - (Last Echo Feb 2016) -->Estimated ejection fraction is 65-69%   - Home medications: ASA 81 mg daily   Plan:  - Continue ASA 81 mg daily      #HTN  - Home medications: metoprolol succinate 50 mg BID, Lisinopril 10 mg daily  Plan:  -Continue metoprolol and holding ACE, per above     #HLD  - Home medications: Atorvastatin 40 mg daily  Plan:  -Continue home medication      #BPH   - No home medications, no acute urinary symptoms at this time   -PSA 4.63 11/2024  Plan:  -CTM   -Repeat PSA pending    Fluids: Replete PRN  Electrolytes: Keep mg >2, phos >3  and K >4  Nutrition:  Adult diet Regular   Antimicrobials: none  DVT PPX:DVT: Lovenox  GI ppx: n/a  Bowel care:Miralax  Catheter:None  Lines:PIV  Oxygen:Room Air    Code Status: Full Code (confirmed on admission)   NOK:  Primary Emergency Contact: Indira Ibanez, Home Phone: [phone]     Mallory Snow MD   Internal Medicine, PGY-1

## 2025-01-12 NOTE — PROGRESS NOTES
01/12/25 1134   Discharge Planning   Living Arrangements Spouse/significant other   Support Systems Spouse/significant other;Children   Assistance Needed Pending PT/OT evaluations.   Type of Residence Private residence   Who is requesting discharge planning? Patient   Home or Post Acute Services   (Pending PT/OT evaluations.)   Expected Discharge Disposition   (Pending)   Does the patient need discharge transport arranged? No  (Pt's wife will provide.)   Financial Resource Strain   How hard is it for you to pay for the very basics like food, housing, medical care, and heating? Not hard   Housing Stability   In the last 12 months, was there a time when you were not able to pay the mortgage or rent on time? N   At any time in the past 12 months, were you homeless or living in a shelter (including now)? N   Transportation Needs   In the past 12 months, has lack of transportation kept you from medical appointments or from getting medications? no   In the past 12 months, has lack of transportation kept you from meetings, work, or from getting things needed for daily living? No   Intensity of Service   Intensity of Service 0-30 min     Assessment Note:  Spoke with pt's wife Indira via phone, and introduced myself as care coordinator and member of the Care Transitions team for discharge planning.   Pt was independent prior to admission (pt is retired). Pt has [employer] Medicare and [facility] benefit.  Pt was driving to drs appts.  Pt's address, phone number and contact information was verified.  PT and OT evaluations were ordered.  Pt's wife does not have any questions/concerns at this time.     Previous Home Care: None  DME: None  Pharmacy: [facility] [place].  Falls: Denies  PCP:  Dr. Nehal Murphy (last visit was on Friday).    Guillermina Casey MSN, RN-BC  Transitional Care Coordinator (TCC)  [phone]

## 2025-01-13 ENCOUNTER — ANESTHESIA EVENT (OUTPATIENT)
Dept: GASTROENTEROLOGY | Facility: HOSPITAL | Age: 65
End: 2025-01-13
Payer: MEDICARE

## 2025-01-13 ENCOUNTER — APPOINTMENT (OUTPATIENT)
Dept: GASTROENTEROLOGY | Facility: HOSPITAL | Age: 65
DRG: 841 | End: 2025-01-13
Payer: MEDICARE

## 2025-01-13 ENCOUNTER — ANESTHESIA (OUTPATIENT)
Dept: GASTROENTEROLOGY | Facility: HOSPITAL | Age: 65
End: 2025-01-13
Payer: MEDICARE

## 2025-01-13 PROBLEM — G89.29 CHRONIC MIDLINE LOW BACK PAIN WITHOUT SCIATICA: Chronic | Status: ACTIVE | Noted: 2025-01-13

## 2025-01-13 PROBLEM — M54.50 CHRONIC MIDLINE LOW BACK PAIN WITHOUT SCIATICA: Chronic | Status: ACTIVE | Noted: 2025-01-13

## 2025-01-13 PROBLEM — I21.9 MI (MYOCARDIAL INFARCTION) (MULTI): Status: ACTIVE | Noted: 2025-01-13

## 2025-01-13 LAB
ABO GROUP (TYPE) IN BLOOD: NORMAL
ALBUMIN SERPL BCP-MCNC: 3.1 G/DL (ref 3.4–5)
ALP SERPL-CCNC: 111 U/L (ref 33–136)
ALT SERPL W P-5'-P-CCNC: 40 U/L (ref 10–52)
ANION GAP SERPL CALC-SCNC: 10 MMOL/L (ref 10–20)
AST SERPL W P-5'-P-CCNC: 50 U/L (ref 9–39)
BASOPHILS # BLD MANUAL: 0 X10*3/UL (ref 0–0.1)
BASOPHILS NFR BLD MANUAL: 0 %
BILIRUB SERPL-MCNC: 1.4 MG/DL (ref 0–1.2)
BLOOD EXPIRATION DATE: NORMAL
BUN SERPL-MCNC: 21 MG/DL (ref 6–23)
BURR CELLS BLD QL SMEAR: ABNORMAL
CALCIUM SERPL-MCNC: 8.7 MG/DL (ref 8.6–10.6)
CHLORIDE SERPL-SCNC: 93 MMOL/L (ref 98–107)
CLARITY FLD: CLEAR
CO2 SERPL-SCNC: 28 MMOL/L (ref 21–32)
COLOR FLD: COLORLESS
CREAT SERPL-MCNC: 0.76 MG/DL (ref 0.5–1.3)
DISPENSE STATUS: NORMAL
EGFRCR SERPLBLD CKD-EPI 2021: >90 ML/MIN/1.73M*2
EOSINOPHIL # BLD MANUAL: 0 X10*3/UL (ref 0–0.7)
EOSINOPHIL NFR BLD MANUAL: 0 %
ERYTHROCYTE [DISTWIDTH] IN BLOOD BY AUTOMATED COUNT: 13 % (ref 11.5–14.5)
GLUCOSE SERPL-MCNC: 86 MG/DL (ref 74–99)
HCT VFR BLD AUTO: 31.1 % (ref 41–52)
HGB BLD-MCNC: 10.6 G/DL (ref 13.5–17.5)
IMM GRANULOCYTES # BLD AUTO: 0.21 X10*3/UL (ref 0–0.7)
IMM GRANULOCYTES NFR BLD AUTO: 9.1 % (ref 0–0.9)
LYMPHOCYTES # BLD MANUAL: 0.28 X10*3/UL (ref 1.2–4.8)
LYMPHOCYTES NFR BLD MANUAL: 12.1 %
MAGNESIUM SERPL-MCNC: 1.98 MG/DL (ref 1.6–2.4)
MCH RBC QN AUTO: 28 PG (ref 26–34)
MCHC RBC AUTO-ENTMCNC: 34.1 G/DL (ref 32–36)
MCV RBC AUTO: 82 FL (ref 80–100)
METAMYELOCYTES # BLD MANUAL: 0.1 X10*3/UL
METAMYELOCYTES NFR BLD MANUAL: 4.3 %
MONOCYTES # BLD MANUAL: 0.12 X10*3/UL (ref 0.1–1)
MONOCYTES NFR BLD MANUAL: 5.2 %
MYELOCYTES # BLD MANUAL: 0.12 X10*3/UL
MYELOCYTES NFR BLD MANUAL: 5.2 %
NEUTROPHILS # BLD MANUAL: 1.59 X10*3/UL (ref 1.2–7.7)
NEUTS BAND # BLD MANUAL: 0.14 X10*3/UL (ref 0–0.7)
NEUTS BAND NFR BLD MANUAL: 6 %
NEUTS SEG # BLD MANUAL: 1.45 X10*3/UL (ref 1.2–7)
NEUTS SEG NFR BLD MANUAL: 62.9 %
NRBC BLD-RTO: 0 /100 WBCS (ref 0–0)
OVALOCYTES BLD QL SMEAR: ABNORMAL
PATH REVIEW-CBC DIFFERENTIAL: NORMAL
PHOSPHATE SERPL-MCNC: 3.9 MG/DL (ref 2.5–4.9)
PLATELET # BLD AUTO: 60 X10*3/UL (ref 150–450)
POTASSIUM SERPL-SCNC: 4.3 MMOL/L (ref 3.5–5.3)
PRODUCT BLOOD TYPE: 6200
PRODUCT CODE: NORMAL
PROMYELOCYTES # BLD MANUAL: 0.04 X10*3/UL
PROMYELOCYTES NFR BLD MANUAL: 1.7 %
PROT SERPL-MCNC: 5.3 G/DL (ref 6.4–8.2)
PROT SERPL-MCNC: 6.4 G/DL (ref 6.4–8.2)
PROT UR-ACNC: 26 MG/DL (ref 5–25)
RBC # BLD AUTO: 3.79 X10*6/UL (ref 4.5–5.9)
RBC # FLD MANUAL: <1 /UL
RBC MORPH BLD: ABNORMAL
RH FACTOR (ANTIGEN D): NORMAL
SODIUM SERPL-SCNC: 127 MMOL/L (ref 136–145)
TOTAL CELLS COUNTED BLD: 116
TOTAL CELLS COUNTED FLD: <1
TRIGL SERPL-MCNC: 154 MG/DL (ref 0–149)
UNIT ABO: NORMAL
UNIT NUMBER: NORMAL
UNIT RH: NORMAL
UNIT VOLUME: 280
VARIANT LYMPHS # BLD MANUAL: 0.06 X10*3/UL (ref 0–0.5)
VARIANT LYMPHS NFR BLD: 2.6 %
WBC # BLD AUTO: 2.3 X10*3/UL (ref 4.4–11.3)
WBC # FLD MANUAL: <1 /UL

## 2025-01-13 PROCEDURE — 84478 ASSAY OF TRIGLYCERIDES: CPT

## 2025-01-13 PROCEDURE — 7100000002 HC RECOVERY ROOM TIME - EACH INCREMENTAL 1 MINUTE

## 2025-01-13 PROCEDURE — 88104 CYTOPATH FL NONGYN SMEARS: CPT | Performed by: INTERNAL MEDICINE

## 2025-01-13 PROCEDURE — 36415 COLL VENOUS BLD VENIPUNCTURE: CPT

## 2025-01-13 PROCEDURE — 36430 TRANSFUSION BLD/BLD COMPNT: CPT

## 2025-01-13 PROCEDURE — 87632 RESP VIRUS 6-11 TARGETS: CPT | Performed by: INTERNAL MEDICINE

## 2025-01-13 PROCEDURE — 07D78ZX EXTRACTION OF THORAX LYMPHATIC, VIA NATURAL OR ARTIFICIAL OPENING ENDOSCOPIC, DIAGNOSTIC: ICD-10-PCS | Performed by: INTERNAL MEDICINE

## 2025-01-13 PROCEDURE — 83735 ASSAY OF MAGNESIUM: CPT

## 2025-01-13 PROCEDURE — 3700000001 HC GENERAL ANESTHESIA TIME - INITIAL BASE CHARGE

## 2025-01-13 PROCEDURE — 2500000001 HC RX 250 WO HCPCS SELF ADMINISTERED DRUGS (ALT 637 FOR MEDICARE OP)

## 2025-01-13 PROCEDURE — 87070 CULTURE OTHR SPECIMN AEROBIC: CPT | Performed by: INTERNAL MEDICINE

## 2025-01-13 PROCEDURE — 7100000010 HC PHASE TWO TIME - EACH INCREMENTAL 1 MINUTE

## 2025-01-13 PROCEDURE — 2500000004 HC RX 250 GENERAL PHARMACY W/ HCPCS (ALT 636 FOR OP/ED)

## 2025-01-13 PROCEDURE — A31653 PR BRNCHSC EBUS GUIDED SAMPL 3/> NODE STATION/STRUX

## 2025-01-13 PROCEDURE — 87116 MYCOBACTERIA CULTURE: CPT | Performed by: ANESTHESIOLOGY

## 2025-01-13 PROCEDURE — 81261 IGH GENE REARRANGE AMP METH: CPT | Performed by: INTERNAL MEDICINE

## 2025-01-13 PROCEDURE — 83520 IMMUNOASSAY QUANT NOS NONAB: CPT | Performed by: INTERNAL MEDICINE

## 2025-01-13 PROCEDURE — 87799 DETECT AGENT NOS DNA QUANT: CPT | Performed by: INTERNAL MEDICINE

## 2025-01-13 PROCEDURE — 87801 DETECT AGNT MULT DNA AMPLI: CPT | Performed by: INTERNAL MEDICINE

## 2025-01-13 PROCEDURE — 2720000007 HC OR 272 NO HCPCS

## 2025-01-13 PROCEDURE — 88341 IMHCHEM/IMCYTCHM EA ADD ANTB: CPT | Performed by: PATHOLOGY

## 2025-01-13 PROCEDURE — 87533 HHV-6 DNA QUANT: CPT | Performed by: INTERNAL MEDICINE

## 2025-01-13 PROCEDURE — 7100000009 HC PHASE TWO TIME - INITIAL BASE CHARGE

## 2025-01-13 PROCEDURE — 1100000001 HC PRIVATE ROOM DAILY

## 2025-01-13 PROCEDURE — 87385 HISTOPLASMA CAPSUL AG IA: CPT

## 2025-01-13 PROCEDURE — 88185 FLOWCYTOMETRY/TC ADD-ON: CPT | Mod: TC | Performed by: INTERNAL MEDICINE

## 2025-01-13 PROCEDURE — 83520 IMMUNOASSAY QUANT NOS NONAB: CPT

## 2025-01-13 PROCEDURE — 88189 FLOWCYTOMETRY/READ 16 & >: CPT | Performed by: PATHOLOGY

## 2025-01-13 PROCEDURE — 85027 COMPLETE CBC AUTOMATED: CPT

## 2025-01-13 PROCEDURE — A31653 PR BRNCHSC EBUS GUIDED SAMPL 3/> NODE STATION/STRUX: Performed by: ANESTHESIOLOGY

## 2025-01-13 PROCEDURE — 88172 CYTP DX EVAL FNA 1ST EA SITE: CPT | Performed by: PATHOLOGY

## 2025-01-13 PROCEDURE — 7100000001 HC RECOVERY ROOM TIME - INITIAL BASE CHARGE

## 2025-01-13 PROCEDURE — 89050 BODY FLUID CELL COUNT: CPT | Performed by: INTERNAL MEDICINE

## 2025-01-13 PROCEDURE — 3700000002 HC GENERAL ANESTHESIA TIME - EACH INCREMENTAL 1 MINUTE

## 2025-01-13 PROCEDURE — 87449 NOS EACH ORGANISM AG IA: CPT | Performed by: INTERNAL MEDICINE

## 2025-01-13 PROCEDURE — 31652 BRONCH EBUS SAMPLNG 1/2 NODE: CPT | Performed by: INTERNAL MEDICINE

## 2025-01-13 PROCEDURE — 87305 ASPERGILLUS AG IA: CPT | Performed by: INTERNAL MEDICINE

## 2025-01-13 PROCEDURE — 84156 ASSAY OF PROTEIN URINE: CPT

## 2025-01-13 PROCEDURE — 84100 ASSAY OF PHOSPHORUS: CPT

## 2025-01-13 PROCEDURE — 87075 CULTR BACTERIA EXCEPT BLOOD: CPT | Performed by: INTERNAL MEDICINE

## 2025-01-13 PROCEDURE — 87116 MYCOBACTERIA CULTURE: CPT | Performed by: INTERNAL MEDICINE

## 2025-01-13 PROCEDURE — 88173 CYTOPATH EVAL FNA REPORT: CPT | Performed by: PATHOLOGY

## 2025-01-13 PROCEDURE — 85007 BL SMEAR W/DIFF WBC COUNT: CPT

## 2025-01-13 PROCEDURE — 2500000004 HC RX 250 GENERAL PHARMACY W/ HCPCS (ALT 636 FOR OP/ED): Performed by: ANESTHESIOLOGY

## 2025-01-13 PROCEDURE — 88173 CYTOPATH EVAL FNA REPORT: CPT | Mod: TC,MCY | Performed by: INTERNAL MEDICINE

## 2025-01-13 PROCEDURE — P9073 PLATELETS PHERESIS PATH REDU: HCPCS

## 2025-01-13 PROCEDURE — 0B9J8ZX DRAINAGE OF LEFT LOWER LUNG LOBE, VIA NATURAL OR ARTIFICIAL OPENING ENDOSCOPIC, DIAGNOSTIC: ICD-10-PCS | Performed by: INTERNAL MEDICINE

## 2025-01-13 PROCEDURE — 84166 PROTEIN E-PHORESIS/URINE/CSF: CPT

## 2025-01-13 PROCEDURE — 88305 TISSUE EXAM BY PATHOLOGIST: CPT | Performed by: PATHOLOGY

## 2025-01-13 PROCEDURE — 87102 FUNGUS ISOLATION CULTURE: CPT | Performed by: INTERNAL MEDICINE

## 2025-01-13 PROCEDURE — 31624 DX BRONCHOSCOPE/LAVAGE: CPT | Performed by: INTERNAL MEDICINE

## 2025-01-13 PROCEDURE — 99233 SBSQ HOSP IP/OBS HIGH 50: CPT | Performed by: INTERNAL MEDICINE

## 2025-01-13 PROCEDURE — G0452 MOLECULAR PATHOLOGY INTERPR: HCPCS | Performed by: INTERNAL MEDICINE

## 2025-01-13 PROCEDURE — 88363 XM ARCHIVE TISSUE MOLEC ANAL: CPT | Performed by: PATHOLOGY

## 2025-01-13 PROCEDURE — 80053 COMPREHEN METABOLIC PANEL: CPT

## 2025-01-13 PROCEDURE — 87486 CHLMYD PNEUM DNA AMP PROBE: CPT | Performed by: INTERNAL MEDICINE

## 2025-01-13 PROCEDURE — 88342 IMHCHEM/IMCYTCHM 1ST ANTB: CPT | Performed by: PATHOLOGY

## 2025-01-13 RX ORDER — ROCURONIUM BROMIDE 10 MG/ML
INJECTION, SOLUTION INTRAVENOUS AS NEEDED
Status: DISCONTINUED | OUTPATIENT
Start: 2025-01-13 | End: 2025-01-13

## 2025-01-13 RX ORDER — PHENYLEPHRINE HCL IN 0.9% NACL 0.4MG/10ML
SYRINGE (ML) INTRAVENOUS AS NEEDED
Status: DISCONTINUED | OUTPATIENT
Start: 2025-01-13 | End: 2025-01-13

## 2025-01-13 RX ORDER — PROPOFOL 10 MG/ML
INJECTION, EMULSION INTRAVENOUS CONTINUOUS PRN
Status: DISCONTINUED | OUTPATIENT
Start: 2025-01-13 | End: 2025-01-13

## 2025-01-13 RX ORDER — MIDAZOLAM HYDROCHLORIDE 1 MG/ML
INJECTION, SOLUTION INTRAMUSCULAR; INTRAVENOUS AS NEEDED
Status: DISCONTINUED | OUTPATIENT
Start: 2025-01-13 | End: 2025-01-13

## 2025-01-13 RX ORDER — SUCCINYLCHOLINE CHLORIDE 20 MG/ML
INJECTION INTRAMUSCULAR; INTRAVENOUS AS NEEDED
Status: DISCONTINUED | OUTPATIENT
Start: 2025-01-13 | End: 2025-01-13

## 2025-01-13 RX ORDER — FENTANYL CITRATE 50 UG/ML
INJECTION, SOLUTION INTRAMUSCULAR; INTRAVENOUS CONTINUOUS PRN
Status: DISCONTINUED | OUTPATIENT
Start: 2025-01-13 | End: 2025-01-13

## 2025-01-13 RX ORDER — ONDANSETRON HYDROCHLORIDE 2 MG/ML
INJECTION, SOLUTION INTRAVENOUS AS NEEDED
Status: DISCONTINUED | OUTPATIENT
Start: 2025-01-13 | End: 2025-01-13

## 2025-01-13 RX ORDER — LIDOCAINE HCL/PF 100 MG/5ML
SYRINGE (ML) INTRAVENOUS AS NEEDED
Status: DISCONTINUED | OUTPATIENT
Start: 2025-01-13 | End: 2025-01-13

## 2025-01-13 RX ADMIN — ACETAMINOPHEN 975 MG: 325 TABLET ORAL at 09:08

## 2025-01-13 RX ADMIN — ATORVASTATIN CALCIUM 40 MG: 40 TABLET, FILM COATED ORAL at 21:23

## 2025-01-13 RX ADMIN — Medication 80 MCG: at 12:05

## 2025-01-13 RX ADMIN — POLYETHYLENE GLYCOL 3350 17 G: 17 POWDER, FOR SOLUTION ORAL at 09:08

## 2025-01-13 RX ADMIN — HYDROMORPHONE HYDROCHLORIDE 0.4 MG: 1 INJECTION, SOLUTION INTRAMUSCULAR; INTRAVENOUS; SUBCUTANEOUS at 13:40

## 2025-01-13 RX ADMIN — ACETAMINOPHEN 975 MG: 325 TABLET ORAL at 17:11

## 2025-01-13 RX ADMIN — LIDOCAINE HYDROCHLORIDE 60 MG: 20 INJECTION, SOLUTION INTRAVENOUS at 12:05

## 2025-01-13 RX ADMIN — METOPROLOL TARTRATE 50 MG: 50 TABLET, FILM COATED ORAL at 21:23

## 2025-01-13 RX ADMIN — LISINOPRIL 10 MG: 10 TABLET ORAL at 09:08

## 2025-01-13 RX ADMIN — PROPOFOL 30 MG: 10 INJECTION, EMULSION INTRAVENOUS at 12:08

## 2025-01-13 RX ADMIN — METOPROLOL TARTRATE 50 MG: 50 TABLET, FILM COATED ORAL at 09:08

## 2025-01-13 RX ADMIN — SUGAMMADEX 400 MG: 100 INJECTION, SOLUTION INTRAVENOUS at 13:05

## 2025-01-13 RX ADMIN — SODIUM CHLORIDE 500 ML: 9 INJECTION, SOLUTION INTRAVENOUS at 22:57

## 2025-01-13 RX ADMIN — ROCURONIUM 40 MG: 50 INJECTION, SOLUTION INTRAVENOUS at 12:12

## 2025-01-13 RX ADMIN — ACETAMINOPHEN 975 MG: 325 TABLET ORAL at 01:52

## 2025-01-13 RX ADMIN — PROPOFOL 120 MG: 10 INJECTION, EMULSION INTRAVENOUS at 12:06

## 2025-01-13 RX ADMIN — SUCCINYLCHOLINE CHLORIDE 140 MG: 20 INJECTION, SOLUTION INTRAMUSCULAR; INTRAVENOUS at 12:06

## 2025-01-13 RX ADMIN — ASPIRIN 81 MG: 81 TABLET, COATED ORAL at 09:08

## 2025-01-13 RX ADMIN — OXYCODONE HYDROCHLORIDE 5 MG: 5 TABLET ORAL at 21:23

## 2025-01-13 RX ADMIN — MIDAZOLAM 1 MG: 1 INJECTION INTRAMUSCULAR; INTRAVENOUS at 11:58

## 2025-01-13 RX ADMIN — DEXAMETHASONE SODIUM PHOSPHATE 10 MG: 4 INJECTION INTRA-ARTICULAR; INTRALESIONAL; INTRAMUSCULAR; INTRAVENOUS; SOFT TISSUE at 12:12

## 2025-01-13 RX ADMIN — ONDANSETRON 4 MG: 2 INJECTION, SOLUTION INTRAMUSCULAR; INTRAVENOUS at 13:04

## 2025-01-13 RX ADMIN — SODIUM CHLORIDE, SODIUM LACTATE, POTASSIUM CHLORIDE, AND CALCIUM CHLORIDE: 600; 310; 30; 20 INJECTION, SOLUTION INTRAVENOUS at 11:58

## 2025-01-13 RX ADMIN — OXYCODONE HYDROCHLORIDE 5 MG: 5 TABLET ORAL at 09:30

## 2025-01-13 RX ADMIN — PROPOFOL 100 MCG/KG/MIN: 10 INJECTION, EMULSION INTRAVENOUS at 12:05

## 2025-01-13 ASSESSMENT — PAIN - FUNCTIONAL ASSESSMENT
PAIN_FUNCTIONAL_ASSESSMENT: 0-10

## 2025-01-13 ASSESSMENT — COGNITIVE AND FUNCTIONAL STATUS - GENERAL
DAILY ACTIVITIY SCORE: 24
MOBILITY SCORE: 24

## 2025-01-13 ASSESSMENT — PAIN SCALES - GENERAL
PAINLEVEL_OUTOF10: 8
PAINLEVEL_OUTOF10: 5 - MODERATE PAIN
PAINLEVEL_OUTOF10: 6
PAINLEVEL_OUTOF10: 2
PAINLEVEL_OUTOF10: 0 - NO PAIN
PAINLEVEL_OUTOF10: 0 - NO PAIN
PAINLEVEL_OUTOF10: 7
PAINLEVEL_OUTOF10: 7

## 2025-01-13 ASSESSMENT — PAIN DESCRIPTION - ORIENTATION
ORIENTATION: LOWER
ORIENTATION: UPPER;LEFT

## 2025-01-13 ASSESSMENT — PAIN DESCRIPTION - LOCATION
LOCATION: BACK
LOCATION: LEG

## 2025-01-13 NOTE — CARE PLAN
The patient's goals for the shift include      The clinical goals for the shift include patient to rate pain less than 7 by the end of this shift    Over the shift, the patient did not make progress toward the following goals. Barriers to progression include . Recommendations to address these barriers include   Problem: Fall/Injury  Goal: Not fall by end of shift  Outcome: Progressing  Goal: Be free from injury by end of the shift  Outcome: Progressing  Goal: Verbalize understanding of personal risk factors for fall in the hospital  Outcome: Progressing  Goal: Verbalize understanding of risk factor reduction measures to prevent injury from fall in the home  Outcome: Progressing  Goal: Use assistive devices by end of the shift  Outcome: Progressing  Goal: Pace activities to prevent fatigue by end of the shift  Outcome: Progressing     Problem: Pain  Goal: Takes deep breaths with improved pain control throughout the shift  Outcome: Progressing  Goal: Turns in bed with improved pain control throughout the shift  Outcome: Progressing  Goal: Walks with improved pain control throughout the shift  Outcome: Progressing  Goal: Performs ADL's with improved pain control throughout shift  Outcome: Progressing  Goal: Participates in PT with improved pain control throughout the shift  Outcome: Progressing  Goal: Free from opioid side effects throughout the shift  Outcome: Progressing  Goal: Free from acute confusion related to pain meds throughout the shift  Outcome: Progressing   .

## 2025-01-13 NOTE — ANESTHESIA POSTPROCEDURE EVALUATION
Patient: Bijan Ibanez    Procedure Summary       Date: 01/13/25 Room / Location: [facility]    Anesthesia Start: 1158 Anesthesia Stop: 1321    Procedure: BRONCHOSCOPY Diagnosis: Fever, unspecified    Scheduled Providers: Modesto Martines MD Responsible Provider: Scarlett Godinez MD    Anesthesia Type: general ASA Status: 3            Anesthesia Type: general    Vitals Value Taken Time   BP 97/73 01/13/25 1345   Temp 36.5 °C (97.7 °F) 01/13/25 1321   Pulse 92 01/13/25 1345   Resp 20 01/13/25 1345   SpO2 93 % 01/13/25 1345       Anesthesia Post Evaluation    Patient location during evaluation: PACU  Patient participation: complete - patient participated  Level of consciousness: awake  Pain management: adequate  Airway patency: patent  Cardiovascular status: acceptable  Respiratory status: acceptable  Hydration status: acceptable  Postoperative Nausea and Vomiting: none        There were no known notable events for this encounter.

## 2025-01-13 NOTE — ANESTHESIA PREPROCEDURE EVALUATION
Patient: Bijan Ibanez    Procedure Information       Date/Time: 01/13/25 1230    Scheduled providers: Modesto Martines MD    Procedure: BRONCHOSCOPY    Location: [facility]            Relevant Problems   Anesthesia (within normal limits)  No family hx MH      Cardiac   (+) Atherosclerosis of coronary artery without angina pectoris   (+) Chest pain   (+) Coronary artery disease   (+) Coronary artery disease involving native coronary artery of native heart with angina pectoris   (+) Essential hypertension   (+) HLD (hyperlipidemia)   (+) Hypertension   (+) MI (myocardial infarction) (Multi) (PCI 2010)      Pulmonary (within normal limits)      Neuro   (+) Carotid artery stenosis      GI (within normal limits)      /Renal   (+) Hyponatremia   (+) Malignant neoplasm of kidney (Multi)      Liver (within normal limits)      Endocrine (within normal limits)      Hematology (within normal limits)      Musculoskeletal   (+) Chronic midline low back pain without sciatica      HEENT   (+) Acute sinusitis   (+) Sensorineural hearing loss, bilateral      ID (within normal limits)      Skin (within normal limits)      GYN (within normal limits)     64yoM admitted [facility] for hyponatremia w/concern for malignancy. Inpatient Pulmonary Medicine consulted for thoracic lymphadenopathy   Clinical information reviewed:                   NPO Detail:  No data recorded     Physical Exam    Airway  Mallampati: III  TM distance: >3 FB  Neck ROM: full     Cardiovascular - normal exam     Dental   Comments: Teeth intact  Inflamed muscle R jaw limited opening   Pulmonary - normal exam     Abdominal            Anesthesia Plan    History of general anesthesia?: yes  History of complications of general anesthesia?: no    ASA 3     general     intravenous induction   Anesthetic plan and risks discussed with patient.  Use of blood products discussed with patient who consented to blood products.    Plan discussed with  CAA.

## 2025-01-13 NOTE — PROGRESS NOTES
Bijan Ibanez is a 64 y.o. male on day 2 of admission presenting with Hyponatremia.      Subjective   NAEO. Patient resting comfortably in bed this AM. States feeling fatigued and continues to have some lower back pain, rates 7/10. States that back pain is not worse than yesterday, denies radiation of pain. Denies SOB, CP, abdominal pain, n/v.       Objective     Last Recorded Vitals  /71   Pulse 87   Temp 36.1 °C (96.9 °F) (Temporal)   Resp 17   Wt 107 kg (236 lb)   SpO2 95%   Intake/Output last 3 Shifts:    Intake/Output Summary (Last 24 hours) at 1/13/2025 1232  Last data filed at 1/13/2025 1100  Gross per 24 hour   Intake 760 ml   Output 200 ml   Net 560 ml       Admission Weight  Weight: 107 kg (236 lb) (01/10/25 1929)    Daily Weight  01/13/25 : 107 kg (236 lb)    Image Results  CT angio chest for pulmonary embolism  Narrative: Interpreted By:  Sonu Jara and Ritchie Brandon   STUDY:  CT ANGIO CHEST FOR PULMONARY EMBOLISM;  1/11/2025 1:57 pm      INDICATION:  Signs/Symptoms:c/f pe.      COMPARISON:  CT of the chest 02/08/2016.      ACCESSION NUMBER(S):  [id number]      ORDERING CLINICIAN:  RIDGE ANGUIANO      TECHNIQUE:  Helical data acquisition of the chest was obtained after intravenous  administration of 100 ML Omnipaque 350, as per PE protocol. Images  were reformatted in coronal and sagittal planes. Axial and coronal  maximum intensity projection (MIP) images were created and reviewed.      FINDINGS:  POTENTIAL LIMITATIONS OF THE STUDY: None      HEART AND VESSELS:  There are no discrete filling defects within main pulmonary artery  and its branches to suggest acute pulmonary embolism. Main pulmonary  artery and its branches are normal in caliber.      The thoracic aorta normal in course and caliber.There is mild  scattered calcified atherosclerosis present.Although, the study is  not tailored for evaluation of aorta, there is no definite evidence  of acute aortic pathology.  Moderate coronary artery calcifications  are seen. Please note,the study is not optimized for evaluation of  coronary arteries.      The cardiac chambers are not enlarged.There are no findings to  suggest right heart strain.      There is no pericardial effusion seen.      MEDIASTINUM AND CHIN, LOWER NECK AND AXILLA:  The visualized thyroid gland is within normal limits.  There is left-sided perihilar soft tissue mass/lymph node which  measures 1.8 x 1.7 cm (series 401, image 161). Mildly prominent left  axillary lymph node measuring 0.9 cm in short axis with preservation  of the fatty hilum (series 401, image 33). Nonspecific, asymmetric  soft tissue thickening visualized in the upper left axilla, which  could reflect underlying musculature however bulky left axillary  lymph node is not readily excluded. Esophagus appears within normal  limits as seen.      LUNGS AND AIRWAYS:  The trachea and central airways are patent. No endobronchial lesion  is seen. In the left lung base, there are multiple well-circumscribed  pulmonary nodules, largest measuring 1.2 x 1.0 cm (series 402, image  216). Additional subcentimeter nodules are visualized on coronal  image 136 of 162 extending along the posterior left hemithorax, some  of which appear to be subpleural for reference measuring 0.9 x 0.7 cm  (series 402, image 174). There is an additional left upper lobe  pulmonary nodule measuring 0.7 cm (series 402, image 100).      No evidence of focal airspace consolidation, pleural effusion, or  pneumothorax          UPPER ABDOMEN:  Please see dedicated CT abdomen and pelvis from 01/11/2025 for  further characterization of upper abdomen findings.          CHEST WALL AND OSSEOUS STRUCTURES:  Chest wall is within normal limits.  No acute osseous pathology.There are no suspicious osseous lesions.      Impression: 1. No evidence of acute pulmonary embolism.  2. Left-sided perihilar soft tissue mass/lymph node measuring 1.8 x  1.7 cm.  Findings favored to represent metastatic disease however  underlying primary lung neoplasm is not definitively excluded.  3. Multiple left-sided pulmonary nodules, the majority located in the  posterior left lower lobe with the largest measuring 1.2 x 1.0 cm as  detailed above. There are additional nodules which exhibit subpleural  distribution and an isolated left upper lobe pulmonary nodule which  measures 0.7 cm. Findings are favored to reflect underlying enlarged  intrapulmonary lymph nodes an underlying metastatic disease not  definitively excluded.      I personally reviewed the images/study and I agree with the findings  as stated by resident Luis Eduardo Limon. This study was interpreted  at [facility], [place], [place].      MACRO:  Critical Finding:  See findings. Notification was initiated on  1/11/2025 at 3:02 pm by  Luis Eduardo Limon.  (**-YCF-**) Instructions:      Signed by: Sonu Jara 1/12/2025 12:36 PM  Dictation workstation:   [id number]      Physical Exam  Constitutional:       General: He is not in acute distress.     Appearance: He is ill-appearing. He is not toxic-appearing.   HENT:      Head: Normocephalic and atraumatic.   Cardiovascular:      Rate and Rhythm: Normal rate and regular rhythm.      Heart sounds: No murmur heard.     No friction rub. No gallop.   Pulmonary:      Effort: Pulmonary effort is normal.      Breath sounds: Normal breath sounds. No wheezing, rhonchi or rales.   Abdominal:      General: Bowel sounds are normal. There is no distension.      Palpations: Abdomen is soft.      Tenderness: There is no abdominal tenderness. There is no right CVA tenderness or left CVA tenderness.   Musculoskeletal:      Lumbar back: Tenderness (at lower lumbar levels) present.   Skin:     General: Skin is warm and dry.   Neurological:      Mental Status: He is alert and oriented to person, place, and time.         Relevant Results                Assessment/Plan                  Assessment & Plan  Hyponatremia    Chronic midline low back pain without sciatica    Assessment/Plan:   This is a 64 yr old male with history of CAD (s/p stent 2010) on ASA, Renal cell carcinoma s/p R partial nephrectomy in 2013 @ [facility] (saw urology at [facility] in 2017), HTN, HLD, GERD, BPH and arthritis who reported to the [facility] ED today per recs of his primary physician regarding abnormal imaging. MRI lumbar spine with a possible mass between T12 and L1 measuring  39 x 17 mm that may represent nerve sheath tumor. Recent history of 16 pound unintentional weight loss and night sweats over the past 2-3 weeks. Initial workup revealing, pancytopenia, hyponatremia, and right-sided facial swelling. New CT C/A/P 01/11 suggestive of possible local RCC reoccurrence with mets to lung VS lung primary. Currently pending further work-up with Pulmonology.     Update 1/12:  -Per Pulm: will consider biopsy an axillary lymph node if possible vs  EBUS.    -Consulted Hematology for Pancytopenia   -Concern for possible HLA. H Score 170 (40-54 % Probability of hemophagocytic syndrome)    Update 1/13:  -Per Pulm: plan for add-on EBUS-TBNA with BAL today, sending fungal/berylliosis work up  -Per Hematology: Pancytopenia likely 2/2 underlying malignancy via bone marrow involvement vs. HLH, suggest work up for HLH     #Back pain, chronic with acute worsening  #Hx of RCC s/p R partial nephrectomy in 2013  #C/f reoccurrence of RCC with mets to lung VS lung primary  - MRI lumbar spine: T12 and L1 levels in the right anterolateral para spinous tissues is a masslike structure that measures 39 x 17 mm greatest axial dimensions, 39 mm cc and may represent a soft tissue mass  - CT head read pending (ordered in ED), negative for concerning findings  - Ortho spine consulted, recommended:  - Tumor work-up: Consider getting MRI of abdomen per radiology. Consider CT abdomen chest and pelvis to determine primary  "  Plan:  -Ortho spine consulted , recommended:  - Tumor work-up: Consider getting MRI of abdomen per radiology. Consider CT abdomen chest and pelvis to determine primary   -CT PE and CT A/P ordered and performed on  revealin.  Ill-defined hypodense infiltrative masslike foci involving the right kidney, largest measuring 3.0 x 2.9 cm in partially effacing the anterior right renal pelvis. There is an additional infiltrative lesion located at the superior aspect of the right kidney which measures 3.7 x 2 point 7 cm and closely abuts the inferior aspect of the right hepatic lobe. Primary differential considerations would include primary renal neoplasm such as renal cell carcinoma versus metastatic disease.  2. Asymmetric nodular masslike thickening of the right adrenal gland measuring 2.6 x 1.8 cm. Findings concerning for metastatic disease.  3. 4.7 x 2.1 cm soft tissue mass in the right anterolateral aspect of the paraspinous tissues at T12-L1, previously characterized on MR of the lumbar spine from 01/10/2025. There are additional multiple scattered abdominopelvic lymph nodes, not significantly enlarged per CT size criteria.  4. Please see dedicated CT of the chest from 2025 for further characterization of multiple scattered pulmonary nodules involving the left lung base and left pleura.  5. Mild splenomegaly.  -Pt states he was told about some \"lung spots\" in the past, upon chart review it appears as though LD CT done at [facility] from 2024 showed:  -Multiple subcentimeter noncalcified pulmonary nodules, similar to prior. No new or enlarging nodule identified.   -Consulted Pulmonology for consideration of EBUS:   -Plan for add-on EBUS-TBNA and BAL    -Hold prophylactic enoxaparin for procedure   -Serum beryllium screen ordered   -Fungal studies ordered (fungitell, galactomannan, urinary histo, serum blastomyces, serum coccidio)  -Consulted Urology for evaluation of possible " recurrence of RCC and further inpatient evaluation / est outpt care   -recommend med onc consult following tissue diagnosis  -Continue pain regimen and clarify additional potential pain management options with ortho              -scheduled tylenol 975 mg Q 8 hrs              -Tramadol 50 mg Q 6 hrs for moderate pain PRN              -Oxycodone 5 mg Q 6 hrs for severe pain PRN              -Flexeril 5 mg TID PRN        #Hyponatremia, hypoosmolality   -Na 124-125 on presentation  - S/p 1L NS bolus, decreased to 123  -Pt reports poor oral intake since feeling ill with UR symptoms around [place]              -Home Lisinopril and triamterene-HCTZ  -Serum osmolality, slightly low at 270  -Urine osmolality, 616  -Urine electrolytes, showing decreased Na output <10  Plan:  -CTM RFP daily  -Consider IVF administration pending repeat CMP  -Goal 130-131 in the next 24 hrs  -Hold home Lisinopril and triamterene-HCTZ        #Pancytopenia   - Initial Hb 12.9, WBC 4.0, PLT 72              -Upon review of recent labs from 3 months ago in [facility] system and [facility], this is an acute change  -Initially c/f acute viral infection affecting all cell lines, which could be contributing, but per above may have new/redcurrant cancer  -Iron studies:              -Ferritin >9,000              -Iron 120              -Transferrin 156  -Retic count normal, but low in setting of acute anemia  -CRP 14.67  -s/p 1 unit platelets administered 1/13 for platelets <50 (48) prior to procedure  -Concern for HLH, H score 170 (elevated ferritin, pancytopenia, splenomegaly)  Plan:  -CTM with daily CBC w/diff  -Hematology consulted for evaluation of pancytopenia and potential HLH   -Repeat fasting triglyceride level   -SPEP, UPEP, serum free light chains, and quant immunoglobulins ordered   -IL2 receptor and CXCL9 levels ordered           Chronic disease history     #CAD s/p stent 2010  - (Last Echo Feb 2016) -->Estimated ejection fraction is 65-69%   -  Home medications: ASA 81 mg daily   Plan:  - Continue ASA 81 mg daily      #HTN  - Home medications: metoprolol succinate 50 mg BID, Lisinopril 10 mg daily  Plan:  -Continue metoprolol and holding ACE, per above     #HLD  - Home medications: Atorvastatin 40 mg daily  Plan:  -Continue home medication      #BPH   - No home medications, no acute urinary symptoms at this time   -PSA 4.63 11/2024, repeat 9.73 on 1/10  -Per urology, plan to f/u at [facility]  Plan:  -CTM   -Repeat PSA pending     Fluids: Replete PRN  Electrolytes: Keep mg >2, phos >3  and K >4  Nutrition: NPO pending EBUS  Antimicrobials: none  DVT PPX:DVT: Holding lovenox pending EBUS, plan for SCDs post-procedure  GI ppx: n/a  Bowel care:Miralax  Catheter:None  Lines:PIV  Oxygen:Room Air              JIGNA LACEY

## 2025-01-13 NOTE — CARE PLAN
The patient's goals for the shift include  to have his bronchoscopy procedure done.      The clinical goals for the shift include Pt to have adequate control of pain will rate pain less then 7 on a scale of 0-10 on the 3806-5008 shift.    Over the shift, the patient did make progress toward the following goals. Pt did have some pain today. Pt was medicated for his pain see MAR. Pt had his bronchoscopy procedure today as well.     Problem: Pain  Goal: Takes deep breaths with improved pain control throughout the shift  Outcome: Progressing  Goal: Turns in bed with improved pain control throughout the shift  Outcome: Progressing  Goal: Walks with improved pain control throughout the shift  Outcome: Progressing  Goal: Performs ADL's with improved pain control throughout shift  Outcome: Progressing  Goal: Participates in PT with improved pain control throughout the shift  Outcome: Progressing  Goal: Free from opioid side effects throughout the shift  Outcome: Progressing  Goal: Free from acute confusion related to pain meds throughout the shift  Outcome: Progressing
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
## 2025-01-13 NOTE — ANESTHESIA PROCEDURE NOTES
Airway  Date/Time: 1/13/2025 12:08 PM  Urgency: elective    Airway not difficult    Staffing  Performed: [facility] and [facility]   Authorized by: Scarlett Godinez MD    Performed by: MILDRED Chao  Patient location during procedure: OR    Indications and Patient Condition  Indications for airway management: anesthesia and airway protection  Sedation level: deep  Preoxygenated: yes  Patient position: sniffing  MILS not maintained throughout  Mask difficulty assessment: 1 - vent by mask    Final Airway Details  Final airway type: endotracheal airway      Successful airway: ETT  Cuffed: yes   Successful intubation technique: video laryngoscopy  Facilitating devices/methods: intubating stylet  Endotracheal tube insertion site: oral  Blade: [place]  Blade size: #4  ETT size (mm): 8.5  Cormack-Lehane Classification: grade I - full view of glottis  Placement verified by: chest auscultation and capnometry   Measured from: lips  ETT to lips (cm): 23  Number of attempts at approach: 1

## 2025-01-13 NOTE — PROGRESS NOTES
Physical Therapy                 Therapy Communication Note    Patient Name: Bijan Ibanez  MRN: [id number]  Department:   Room: 2021/2021-A  Today's Date: 1/13/2025     Discipline: Physical Therapy    PT Missed Visit: Yes     Missed Visit Reason: Missed Visit Reason:  (@1204 pt currently off the floor at )    Missed Time: Attempt      01/13/25 at 12:04 PM - Adri Sullivan, PT

## 2025-01-13 NOTE — CONSULTS
"Nutrition {Nutrition Note Type:42965}:   Nutrition Assessment         Patient is a 64 y.o. male presenting with ***      Nutrition History:     Food Allergies/Intolerances:  {Food allergies/intolerances:74600}  GI Symptoms: {GI symptoms:42034}  Oral Problems: {Oral issues:89464}       Anthropometrics:  Height: 185.4 cm (6' 1\")   Weight: 107 kg (236 lb)   BMI (Calculated): 31.14             Weight History:       Weight Change %:       Nutrition Focused Physical Exam Findings:  {deferNFPE (Optional):02512}  Subcutaneous Fat Loss:      Muscle Wasting:     Edema:     Physical Findings:       Nutrition Significant Labs:  [facility]}    Nutrition Specific Medications:  ***    I/O:    ;      Dietary Orders (From admission, onward)       Start     Ordered    01/13/25 1414  Adult diet Regular; 1500 mL fluid  Diet effective now        Question Answer Comment   Diet type Regular    Dietary fluid restriction / 24h: 1500 mL fluid        01/13/25 1413    01/12/25 0045  May Participate in Room Service  ( ROOM SERVICE MAY PARTICIPATE)  Once        Question:  .  Answer:  Yes    01/12/25 0044                     Estimated Needs:                           Nutrition Diagnosis                Nutrition Interventions/Recommendations         Nutrition Prescription:           Nutrition Interventions:             Nutrition Education:          Nutrition Monitoring and Evaluation                               "

## 2025-01-13 NOTE — PROGRESS NOTES
Occupational Therapy                 Therapy Communication Note    Patient Name: Bijan Ibanez  MRN: [id number]  Department: [facility]  Room: 2021/2021-A  Today's Date: 1/13/2025     Discipline: Occupational Therapy    OT Missed Visit: Yes     Missed Visit Reason: Missed Visit Reason: Patient in a medical procedure (pt off floor at GI Lab. Will reattempt when medically appropriate.)    Missed Time: Attempt 11:24am

## 2025-01-14 PROBLEM — D61.818 PANCYTOPENIA: Status: ACTIVE | Noted: 2025-01-14

## 2025-01-14 LAB
ALBUMIN MFR UR ELPH: 24 %
ALBUMIN SERPL BCP-MCNC: 3.1 G/DL (ref 3.4–5)
ALP SERPL-CCNC: 123 U/L (ref 33–136)
ALPHA1 GLOB MFR UR ELPH: 21.2 %
ALPHA2 GLOB MFR UR ELPH: 11.8 %
ALT SERPL W P-5'-P-CCNC: 38 U/L (ref 10–52)
ANION GAP SERPL CALC-SCNC: 12 MMOL/L (ref 10–20)
ASPERGILLUS GALACTOMANNAN EIA (NON-BLOOD SPECIMEN): 0.04
ASPERGILLUS GALACTOMANNAN EIA,SERUM: 1.58
AST SERPL W P-5'-P-CCNC: 43 U/L (ref 9–39)
B-GLOBULIN MFR UR ELPH: 20.7 %
BASOPHILS # BLD MANUAL: 0 X10*3/UL (ref 0–0.1)
BASOPHILS NFR BLD MANUAL: 0 %
BILIRUB SERPL-MCNC: 0.8 MG/DL (ref 0–1.2)
BUN SERPL-MCNC: 30 MG/DL (ref 6–23)
CALCIUM SERPL-MCNC: 9.1 MG/DL (ref 8.6–10.6)
CHLORIDE SERPL-SCNC: 98 MMOL/L (ref 98–107)
CO2 SERPL-SCNC: 28 MMOL/L (ref 21–32)
CREAT SERPL-MCNC: 0.87 MG/DL (ref 0.5–1.3)
EBV DNA SPEC NAA+PROBE-LOG#: NORMAL {LOG_COPIES}/ML
EGFRCR SERPLBLD CKD-EPI 2021: >90 ML/MIN/1.73M*2
EOSINOPHIL # BLD MANUAL: 0 X10*3/UL (ref 0–0.7)
EOSINOPHIL NFR BLD MANUAL: 0 %
ERYTHROCYTE [DISTWIDTH] IN BLOOD BY AUTOMATED COUNT: 13.1 % (ref 11.5–14.5)
FUNGITELL BETA-D GLUCAN PCR, QUANTITATIVE (NON-BLOOD SPECIMEN): <45 PG/ML
FUNGITELL BETA-D GLUCAN,SERUM: <31 PG/ML
GAMMA GLOB MFR UR ELPH: 22.3 %
GLUCOSE SERPL-MCNC: 125 MG/DL (ref 74–99)
HCT VFR BLD AUTO: 28.1 % (ref 41–52)
HGB BLD-MCNC: 10 G/DL (ref 13.5–17.5)
IGA SERPL-MCNC: 84 MG/DL (ref 70–400)
IGG SERPL-MCNC: 666 MG/DL (ref 700–1600)
IGM SERPL-MCNC: 86 MG/DL (ref 40–230)
IMM GRANULOCYTES # BLD AUTO: 0.25 X10*3/UL (ref 0–0.7)
IMM GRANULOCYTES NFR BLD AUTO: 8.3 % (ref 0–0.9)
KAPPA LC SERPL-MCNC: 2.57 MG/DL (ref 0.33–1.94)
KAPPA LC/LAMBDA SER: 1.49 {RATIO} (ref 0.26–1.65)
LABORATORY COMMENT REPORT: NOT DETECTED
LAMBDA LC SERPL-MCNC: 1.73 MG/DL (ref 0.57–2.63)
LYMPHOCYTES # BLD MANUAL: 0.48 X10*3/UL (ref 1.2–4.8)
LYMPHOCYTES NFR BLD MANUAL: 16 %
MAGNESIUM SERPL-MCNC: 2.48 MG/DL (ref 1.6–2.4)
MCH RBC QN AUTO: 28.1 PG (ref 26–34)
MCHC RBC AUTO-ENTMCNC: 35.6 G/DL (ref 32–36)
MCV RBC AUTO: 79 FL (ref 80–100)
MONOCYTES # BLD MANUAL: 0.1 X10*3/UL (ref 0.1–1)
MONOCYTES NFR BLD MANUAL: 3.3 %
MYELOCYTES # BLD MANUAL: 0.15 X10*3/UL
MYELOCYTES NFR BLD MANUAL: 5 %
NEUTS SEG # BLD MANUAL: 2.12 X10*3/UL (ref 1.2–7)
NEUTS SEG NFR BLD MANUAL: 70.6 %
NRBC BLD-RTO: 1 /100 WBCS (ref 0–0)
PATH REVIEW-URINE PROTEIN ELECTROPHORESIS: NORMAL
PHOSPHATE SERPL-MCNC: 4 MG/DL (ref 2.5–4.9)
PLATELET # BLD AUTO: 57 X10*3/UL (ref 150–450)
POTASSIUM SERPL-SCNC: 4.6 MMOL/L (ref 3.5–5.3)
PROMYELOCYTES # BLD MANUAL: 0.05 X10*3/UL
PROMYELOCYTES NFR BLD MANUAL: 1.7 %
PROT SERPL-MCNC: 5.4 G/DL (ref 6.4–8.2)
RBC # BLD AUTO: 3.56 X10*6/UL (ref 4.5–5.9)
RBC MORPH BLD: ABNORMAL
SODIUM SERPL-SCNC: 133 MMOL/L (ref 136–145)
TOTAL CELLS COUNTED BLD: 119
URINE ELECTROPHORESIS COMMENT: NORMAL
VARIANT LYMPHS # BLD MANUAL: 0.1 X10*3/UL (ref 0–0.5)
VARIANT LYMPHS NFR BLD: 3.4 %
WBC # BLD AUTO: 3 X10*3/UL (ref 4.4–11.3)

## 2025-01-14 PROCEDURE — 80053 COMPREHEN METABOLIC PANEL: CPT

## 2025-01-14 PROCEDURE — 85007 BL SMEAR W/DIFF WBC COUNT: CPT

## 2025-01-14 PROCEDURE — 99233 SBSQ HOSP IP/OBS HIGH 50: CPT | Performed by: INTERNAL MEDICINE

## 2025-01-14 PROCEDURE — 83735 ASSAY OF MAGNESIUM: CPT

## 2025-01-14 PROCEDURE — 2500000004 HC RX 250 GENERAL PHARMACY W/ HCPCS (ALT 636 FOR OP/ED)

## 2025-01-14 PROCEDURE — 1100000001 HC PRIVATE ROOM DAILY

## 2025-01-14 PROCEDURE — 2500000001 HC RX 250 WO HCPCS SELF ADMINISTERED DRUGS (ALT 637 FOR MEDICARE OP)

## 2025-01-14 PROCEDURE — 36415 COLL VENOUS BLD VENIPUNCTURE: CPT

## 2025-01-14 PROCEDURE — 84100 ASSAY OF PHOSPHORUS: CPT

## 2025-01-14 PROCEDURE — 85027 COMPLETE CBC AUTOMATED: CPT

## 2025-01-14 RX ORDER — LIDOCAINE 560 MG/1
1 PATCH PERCUTANEOUS; TOPICAL; TRANSDERMAL DAILY
Status: DISCONTINUED | OUTPATIENT
Start: 2025-01-14 | End: 2025-01-17 | Stop reason: HOSPADM

## 2025-01-14 RX ADMIN — LISINOPRIL 10 MG: 10 TABLET ORAL at 08:35

## 2025-01-14 RX ADMIN — ACETAMINOPHEN 975 MG: 325 TABLET ORAL at 08:35

## 2025-01-14 RX ADMIN — ATORVASTATIN CALCIUM 40 MG: 40 TABLET, FILM COATED ORAL at 21:15

## 2025-01-14 RX ADMIN — ACETAMINOPHEN 975 MG: 325 TABLET ORAL at 18:25

## 2025-01-14 RX ADMIN — TRAMADOL HYDROCHLORIDE 50 MG: 50 TABLET, COATED ORAL at 12:09

## 2025-01-14 RX ADMIN — METOPROLOL TARTRATE 50 MG: 50 TABLET, FILM COATED ORAL at 08:35

## 2025-01-14 RX ADMIN — ASPIRIN 81 MG: 81 TABLET, COATED ORAL at 08:35

## 2025-01-14 RX ADMIN — METOPROLOL TARTRATE 50 MG: 50 TABLET, FILM COATED ORAL at 21:15

## 2025-01-14 RX ADMIN — POLYETHYLENE GLYCOL 3350 17 G: 17 POWDER, FOR SOLUTION ORAL at 08:35

## 2025-01-14 ASSESSMENT — PAIN SCALES - GENERAL
PAINLEVEL_OUTOF10: 0 - NO PAIN
PAINLEVEL_OUTOF10: 0 - NO PAIN
PAINLEVEL_OUTOF10: 4

## 2025-01-14 ASSESSMENT — PAIN - FUNCTIONAL ASSESSMENT
PAIN_FUNCTIONAL_ASSESSMENT: 0-10
PAIN_FUNCTIONAL_ASSESSMENT: 0-10

## 2025-01-14 NOTE — PROGRESS NOTES
Bijan Ibanez is a 64 y.o. male on day 3 of admission presenting with Hyponatremia.      Subjective   Patient seen an examined at bedside this AM. NAEO. Sitting up in bed, reports improved pain in lower back with current pain regimen, rates as 2/10 this AM. States his only concern is what his home pain medication regimen will be, does not want to take lots of pills. Denies SOB, CP, HA, n/v, abdominal pain.       Objective     Last Recorded Vitals  /74   Pulse 85   Temp 36.3 °C (97.3 °F) (Temporal)   Resp 18   Wt 107 kg (236 lb)   SpO2 93%   Intake/Output last 3 Shifts:    Intake/Output Summary (Last 24 hours) at 1/14/2025 1120  Last data filed at 1/14/2025 0851  Gross per 24 hour   Intake 1480 ml   Output 300 ml   Net 1180 ml       Admission Weight  Weight: 107 kg (236 lb) (01/10/25 1929)    Daily Weight  01/13/25 : 107 kg (236 lb)    Image Results  Bronchoscopy Diagnostic, Navigational, Therapeutic, w BAL, w EBUS  Addendum: Bronchoscopy Operative Report   [facility]     Date of procedure: 01/13/25   Patient: Bijan Ibanez   MRN: [id number]    YOB: 1960   Gender: male   Referring provider/physician: Dr. MARIA ELENA Rodriguez (inpatient procedure,  Pulmonary Medicine Consult)     PRE-PROCEDURE DIAGNOSIS:   Intra-thoracic lymphadenopathy, small pulmonary nodules         PRE-PROCEDURE EVALUATION: A history and physical has been performed,  and patient medication allergies have been reviewed. The patient's  tolerance of previous anesthesia has been reviewed. The risks and benefits  of the procedure and the sedation options and risks were discussed with  the patient or their designee. All questions were answered and informed  consent obtained.      INDICATION: Obtain diagnosis and Staging     BRONCHOSCOPIST:                 Modesto Martines MD   First Assistant: None   Second Assistant: None     PROCEDURE SUMMARY:   Event Event Time    ENDO SCOPE IN TIME   ENDO  SCOPE OUT TIME 01/13/2025 12:11 PM   1/13/2025  1:02 PM                     Fluoroscopy time: Not applicable     POST-PROCEDURE DIAGNOSIS:   Same as pre-operative diagnoses     ANESTHESIA:   TIVA. See separate anesthesia provider documentation. This procedure was  performed using standard monitoring procedures in [facility]'s  [facility].     FINDINGS:   After adequate local and intravenous anesthesia, bronchoscopy was  performed via an endotracheal tube placed by anesthesia. The distal  trachea appeared normal. Inspection of RIGHT bronchial tree to the  segmental level appeared normal. Inspection of LEFT bronchial tree to the  segmental level appeared normal.      PROCEDURES:   The bronchoscope was wedged into the LLL.  160 ml of normal saline was  instilled, 27 ml was recovered. Please see Specimen section for ordered  laboratory tests. Due to poor return (expected for LLL BAL), cytology was  not ordered from the BAL.     After the airway examination was completed, the scope was then replaced by  EBUS scope. Lymph node sizing was performed via endobronchial ultrasound.  Sampling by transbronchial needle    aspiration was also performed using an Olympus ViziShot 22 gauge needle  and sent for routine cytology.      Rapid On-Site Evaluation (JOSE CARLOS) was available           - The 11Ri (inferior interlobar) node was not evaluated. Sampling was  not done as the node was not evaluated.        - The 11Rs (superior interlobar) node was not evaluated. Sampling was  not done as the node was not evaluated.        - The 10R (hilar) node was not evaluated. Sampling was not done as the  node was not evaluated.        - The 4R (lower paratracheal) node was 3.9 mm in size. Sampling was  not done as it was not clinically indicated.        - The 2R (upper paratracheal) node was not visualized. Sampling was  not done as the node was not visualized.        - The 3P (retrotracheal) node was not evaluated. Sampling was  not done  as the node was not evaluated.    -  The 7 (subcarinal) node was 3.4 mm in size. Sampling was not done as it  was not clinically indicated.        - The 2L (upper paratracheal) node was not visualized. Sampling was  not done as the node was not visualized.        - The 4L (lower paratracheal) node was 2.9 mm in size. Sampling was  not done as it was not clinically indicated.        - The 10L (hilar) node was not visualized. Sampling was not done as  the node was not visualized.        - The 11L (interlobar) node was 8.6 mm in size. Sampling was done, 4  samples with the needle were obtained.        - The 12L node was 11.2 mm in size. Sampling was done, 8 samples with  the needle were obtained, including 2 for RPMI (stand by) and one for  cultures.     After diagnostic/therapeutic maneuvers, the airway was examined for  evidence of bleeding. None was noted. The bronchoscope was removed from  the patient's airway and the airway was handed back over to my colleagues  from anesthesiology.      SPECIMENS:    ID Type Source Tests Collected by Time    1 : BAL LEFT LOWER LOBE Respiratory BAL BAL VIRAL PANEL PCR, FUNGITELL  BETA-D GLUCAN PCR QUANT (NON-BLOOD SPECIMEN) Modesto Martines MD  1/13/2025 1209    2 : BAL LEFT LOWER LOBE Fluid BRONCHO-ALVEOLAR LAVAGE OF LEFT LOWER LOBE  AFB CULTURE/SMEAR, FUNGAL CULTURE/SMEAR, STERILE FLUID CULTURE/SMEAR  Modesto Martines MD 1/13/2025 1210    3 : BAL LEFT LOWER LOBE Bronchoalveolar Lavage BRONCHO-ALVEOLAR LAVAGE OF  LEFT LOWER LOBE BODY FLUID CELL COUNT WITH DIFFERENTIAL Modesto Martines MD 1/13/2025 1210    4 : LN 12L FOR CULTURE Tissue LYMPH NODE BIOPSY AFB CULTURE/SMEAR, FUNGAL  CULTURE/SMEAR, TISSUE/WOUND CULTURE/SMEAR Modesto Martines MD 1/13/2025  1301    A :  Non-Gynecologic Cytology LYMPH NODE 11 L PULMONARY FINE NEEDLE  ASPIRATION CYTOLOGY CONSULTATION (NON-GYNECOLOGIC) Modesto Martines MD  1/13/2025 1230    B :  Non-Gynecologic Cytology LYMPH NODE  12 L PULMONARY FINE NEEDLE  ASPIRATION CYTOLOGY CONSULTATION (NON-GYNECOLOGIC) Modesto Martines MD  1/13/2025 1232      RAPID ONSITE EVALUATION (R.O.S.E.):   - Stations 11L and 12L TBNA : lymphoid      COMPLICATIONS: None.        EBL: Minimal, <5 ml        POST PROCEDURE CHEST RADIOGRAPH: Not needed        SUMMARY:   - Normal airway examination.   - A BAL was performed in the LLL, see Specimen section for ordered  laboratory tests.   - A diagnostic and staging EBUS was performed. Stations 11L and 12L were  sampled. Please see above for rapid on-site evaluation results, final  results are pending. Samples from 12L were placed into RPMI (for stand by)  and cultures.         RECOMMENDATIONS:   Follow up with referring physician    Await BAL and cytology results      The patient was transported to the recovery area/PACU in stable  condition.     I, Modesto Martines MD, was personally present throughout this  procedure, including all key and non-key portions.     Date: 01/13/25 Time: 1:23 PM     (Images obtained during this procedure, including ultrasonographic images  if performed, can be found in within the electronic medical record and/or  PACS.)  Narrative: Table formatting from the original result was not included.  Images from the original result were not included.    Bronchoscopy Operative Report  [facility]    Date of procedure: 01/13/25  Patient: Bijan Ibanez  MRN: [id number]   YOB: 1960  Gender: male  Referring provider/physician: Dr. MARIA ELENA Rodriguez (inpatient procedure,   Pulmonary Medicine Consult)    PRE-PROCEDURE DIAGNOSIS:  Intra-thoracic lymphadenopathy, small pulmonary nodules        PRE-PROCEDURE EVALUATION: A history and physical has been performed,   and patient medication allergies have been reviewed. The patient's   tolerance of previous anesthesia has been reviewed. The risks and benefits   of the procedure and the sedation options and risks were  discussed with   the patient or their designee. All questions were answered and informed   consent obtained.     INDICATION: Obtain diagnosis and Staging    BRONCHOSCOPIST:                Modesto Martines MD  First Assistant: None  Second Assistant: None    PROCEDURE SUMMARY:  Event Event Time   ENDO SCOPE IN TIME  ENDO SCOPE OUT TIME 01/13/2025 12:11 PM  1/13/2025  1:02 PM                   Fluoroscopy time: Not applicable    POST-PROCEDURE DIAGNOSIS:  Same as pre-operative diagnoses    ANESTHESIA:  TIVA. See separate anesthesia provider documentation. This procedure was   performed using standard monitoring procedures in [facility]'s   [facility].    FINDINGS:  After adequate local and intravenous anesthesia, bronchoscopy was   performed via an endotracheal tube placed by anesthesia. The distal   trachea appeared normal. Inspection of RIGHT bronchial tree to the   segmental level appeared normal. Inspection of LEFT bronchial tree to the   segmental level appeared normal.     PROCEDURES:  The bronchoscope was wedged into the LLL.  160 ml of normal saline was   instilled, 27 ml was recovered. Please see Specimen section for ordered   laboratory tests. Due to poor return (expected for LLL BAL), cytology was   not ordered from the BAL.    After the airway examination was completed, the scope was then replaced by   EBUS scope. Lymph node sizing was performed via endobronchial ultrasound.   Sampling by transbronchial needle   aspiration was also performed using an Olympus ViziShot 22 gauge needle   and sent for routine cytology.     Rapid On-Site Evaluation (JOSE CARLOS) was available         - The 11Ri (inferior interlobar) node was not evaluated. Sampling was   not done as the node was not evaluated.       - The 11Rs (superior interlobar) node was not evaluated. Sampling was   not done as the node was not evaluated.       - The 10R (hilar) node was not evaluated. Sampling was not done as the   node was  not evaluated.       - The 4R (lower paratracheal) node was  3.9 mm in size. Sampling was   not done as it was not clinically indicated.       - The 2R (upper paratracheal) node was not visualized. Sampling was   not done as the node was not visualized.       - The 3P (retrotracheal) node was not evaluated. Sampling was not done   as the node was not evaluated.   -  The 7 (subcarinal) node was  3.4 mm in size. Sampling was not done as   it was not clinically indicated.       - The 2L (upper paratracheal) node was not visualized. Sampling was   not done as the node was not visualized.       - The 4L (lower paratracheal) node was  2.9 mm in size. Sampling was   not done as it was not clinically indicated.       - The 10L (hilar) node was not visualized. Sampling was not done as   the node was not visualized.       - The 11L (interlobar) node was  8.6 mm in size. Sampling was done, 4   samples with the needle were obtained.       - The 12L node was  11.2 mm in size. Sampling was done, 8 samples with   the needle were obtained, including 2 for RPMI (stand by) and one for   cultures.    After diagnostic/therapeutic maneuvers, the airway was examined for   evidence of bleeding. None was noted. The bronchoscope was removed from   the patient's airway and the airway was handed back over to my colleagues   from anesthesiology.     SPECIMENS:   ID Type Source Tests Collected by Time   1 : BAL LEFT LOWER LOBE Respiratory BAL BAL VIRAL PANEL PCR, FUNGITELL   BETA-D GLUCAN PCR QUANT (NON-BLOOD SPECIMEN) Modesto Martines MD   1/13/2025 1209   2 : BAL LEFT LOWER LOBE Fluid BRONCHO-ALVEOLAR LAVAGE OF LEFT LOWER LOBE   AFB CULTURE/SMEAR, FUNGAL CULTURE/SMEAR, STERILE FLUID CULTURE/SMEAR   Modesto Martines MD 1/13/2025 1210   3 : BAL LEFT LOWER LOBE Bronchoalveolar Lavage BRONCHO-ALVEOLAR LAVAGE OF   LEFT LOWER LOBE BODY FLUID CELL COUNT WITH DIFFERENTIAL Modesto Martines MD 1/13/2025 1210   4 : LN 12L FOR CULTURE Tissue  LYMPH NODE BIOPSY AFB CULTURE/SMEAR, FUNGAL   CULTURE/SMEAR, TISSUE/WOUND CULTURE/SMEAR Modesto Martines MD 1/13/2025   1301   A :  Non-Gynecologic Cytology LYMPH NODE 11 L PULMONARY FINE NEEDLE   ASPIRATION CYTOLOGY CONSULTATION (NON-GYNECOLOGIC) Modesto Martines MD   1/13/2025 1230   B :  Non-Gynecologic Cytology LYMPH NODE 12 L PULMONARY FINE NEEDLE   ASPIRATION CYTOLOGY CONSULTATION (NON-GYNECOLOGIC) Modesto Martines MD   1/13/2025 1232     RAPID ONSITE EVALUATION (R.O.S.E.):  - Stations 11L and 12L TBNA  : lymphoid     COMPLICATIONS: None.       EBL: Minimal, <5 ml       POST PROCEDURE CHEST RADIOGRAPH: Not needed       SUMMARY:  - Normal airway examination.  - A BAL was performed in the LLL, see Specimen section for ordered   laboratory tests.  - A diagnostic and staging EBUS was performed. Stations 11L and 12L were   sampled. Please see above for rapid on-site evaluation results, final   results are pending. Samples from 12L were placed into RPMI (for stand by)   and cultures.        RECOMMENDATIONS:  Follow up with referring physician   Await BAL and cytology results     The patient was transported to the recovery area/PACU in stable condition.    I, Modesto Martines MD, was personally present throughout this   procedure, including all key and non-key portions.    Date: 01/13/25 Time: 1:23 PM    (Images obtained during this procedure, including ultrasonographic images   if performed, can be found in within the electronic medical record and/or   PACS.)      Physical Exam  Constitutional:       General: He is not in acute distress.     Appearance: Normal appearance. He is not ill-appearing.   HENT:      Head: Normocephalic and atraumatic.      Jaw: Swelling (over R jaw/parotid area) present.   Cardiovascular:      Rate and Rhythm: Normal rate and regular rhythm.      Heart sounds: No murmur heard.     No friction rub. No gallop.   Pulmonary:      Effort: Pulmonary effort is normal.      Breath  sounds: Normal breath sounds. No wheezing, rhonchi or rales.   Abdominal:      General: Abdomen is flat. Bowel sounds are normal.      Palpations: Abdomen is soft.      Tenderness: There is no abdominal tenderness.   Musculoskeletal:      Lumbar back: Tenderness (over L4-L5 region, improved from yesterday) present.   Skin:     General: Skin is warm and dry.   Neurological:      Mental Status: He is alert.         Relevant Results               Assessment/Plan                  Assessment & Plan  Hyponatremia    Chronic midline low back pain without sciatica    Assessment/Plan:   This is a 64 yr old male with history of CAD (s/p stent 2010) on ASA, Renal cell carcinoma s/p R partial nephrectomy in 2013 @ [facility] (saw urology at [facility] in 2017), HTN, HLD, GERD, BPH and arthritis who reported to the [facility] ED today per recs of his primary physician regarding abnormal imaging. MRI lumbar spine with a possible mass between T12 and L1 measuring  39 x 17 mm that may represent nerve sheath tumor. Recent history of 16 pound unintentional weight loss and night sweats over the past 2-3 weeks. Initial workup revealing pancytopenia, hyponatremia, and right-sided facial swelling. CT C/A/P 01/11 suggestive of possible local RCC reoccurrence with mets to lung VS lung primary.     Update 1/12:  -Per Pulm: will consider biopsy an axillary lymph node if possible vs  EBUS.    -Consulted Hematology for Pancytopenia   -Concern for possible HLA. H Score 170 (40-54 % Probability of hemophagocytic syndrome)     Update 1/13:  -Per Pulm: plan for add-on EBUS-TBNA with BAL today, sending fungal/berylliosis work up  -Per Hematology: Pancytopenia likely 2/2 underlying malignancy via bone marrow involvement vs. HLH, suggest work up for HLH    Update 1/14:  -s/p EBUS with biopsy/BAL on 1/13, preliminary bedside pathology showed lymphoid tissue.    #Hx of RCC s/p R partial nephrectomy in 2013  #L hilar mass s/p EBUS-TBNA  #R renal mass  #C/f reoccurrence of  "RCC with mets to lung VS lung primary  -Pt with history of RCC s/p resection (2013 [facility]) with clean margins  -CT PE and CT A/P ordered and performed on  revealin.  Ill-defined hypodense infiltrative masslike foci involving the right kidney, largest measuring 3.0 x 2.9 cm in partially effacing the anterior right renal pelvis. There is an additional infiltrative lesion located at the superior aspect of the right kidney which measures 3.7 x 2 point 7 cm and closely abuts the inferior aspect of the right hepatic lobe. Primary differential considerations would include primary renal neoplasm such as renal cell carcinoma versus metastatic disease.  2. Asymmetric nodular masslike thickening of the right adrenal gland measuring 2.6 x 1.8 cm. Findings concerning for metastatic disease.  3. 4.7 x 2.1 cm soft tissue mass in the right anterolateral aspect of the paraspinous tissues at T12-L1, previously characterized on MR of the lumbar spine from 01/10/2025. There are additional multiple scattered abdominopelvic lymph nodes, not significantly enlarged per CT size criteria.  4. Please see dedicated CT of the chest from 2025 for further characterization of multiple scattered pulmonary nodules involving the left lung base and left pleura.  5. Mild splenomegaly.  -Pt states he was told about some \"lung spots\" in the past, upon chart review it appears as though LD CT done at [facility] from 2024 showed:  -Multiple subcentimeter noncalcified pulmonary nodules, similar to prior. No new or enlarging nodule identified.   -s/p EBUS with pulmonology  with biopsy and BAL    Plan:  -Pulmonology consulted, appreciate recommendations:              -s/p EBUS-TBNA and BAL    -Official biopsy and BAL results pending              -Serum beryllium screen pending              -Fungal studies pending (fungitell, galactomannan, urinary histo, serum blastomyces, serum coccidio)  -Consulted Urology for evaluation of " possible recurrence of RCC and further inpatient evaluation / est outpt care              -recommend med onc consult following tissue diagnosis  -Contacted [facility] for full LD CT images  -Referral placed for diagnostic clinic    #Hyponatremia, hypoosmolality, improving  -Na 124-125 on presentation  - S/p 1L NS bolus, decreased to 123  -Pt reports poor oral intake since feeling ill with UR symptoms around [place]              -Home Lisinopril and triamterene-HCTZ  -Serum osmolality, slightly low at 270  -Urine osmolality, 616  -Urine electrolytes, showing decreased Na output <10  -Total input 1.2 L 1/13    Plan:  -CTM RFP daily  -Continue fluid restriction to 1.5L  -Hold home triamterene        #Pancytopenia  - Initial Hb 12.9, WBC 4.0, PLT 72              -Upon review of recent labs from 3 months ago in our system and [facility] last month, this is an acute change  -Initially c/f acute viral infection affecting all cell lines, which could be contributing, but per above may have new/recurrent cancer  -EBV titer negative  -Iron studies:              -Ferritin >9,000              -Iron 120              -Transferrin 156  -Retic count normal, but low in setting of acute anemia  -CRP 14.67  -s/p 1 unit platelets administered 1/13 for platelets <50 (48) prior to procedure  -Concern for HLH, H score 170 (elevated ferritin, pancytopenia, splenomegaly) vs. Bone marrow involvement of potential malignancy  -Repeat triglyceride level 154 (down from 267)    Plan:  -CTM with daily CBC w/diff  -Hematology consulted for evaluation of pancytopenia and potential HLH              -SPEP, UPEP, serum free light chains, and quant immunoglobulins pending              -IL2 receptor and CXCL9 levels pending   -may need bone marrow biopsy while inpatient     #Back pain, chronic with acute worsening  #c/f thoracolumbar soft tissue mass, new  #Degenerative lumbar spondylosis  - MRI lumbar spine: T12 and L1 levels in the right anterolateral  para spinous tissues is a masslike structure that measures 39 x 17 mm greatest axial dimensions, 39 mm cc and may represent a soft tissue mass  - CT head read pending (ordered in ED), negative for concerning findings  - Ortho spine consulted, recommended:  - Tumor work-up: Consider getting MRI of abdomen per radiology. Consider CT abdomen chest and pelvis to determine primary     Plan  -Ortho spine consulted 1/11, recommended:  - Tumor work-up: Consider getting MRI of abdomen per radiology. Consider CT abdomen chest and pelvis to determine primary  -Continue pain regimen and clarify additional potential pain management options with ortho              -scheduled tylenol 975 mg Q 8 hrs              -Tramadol 50 mg Q 6 hrs for moderate pain PRN              -Oxycodone 5 mg Q 6 hrs for severe pain PRN              -Flexeril 5 mg TID PRN   -Lidocaine patch 4%   -Consider gabapentin addition if radicular symptoms develop  -OP PT referral placed     Chronic disease history     #CAD s/p stent 2010  - (Last Echo Feb 2016) -->Estimated ejection fraction is 65-69%   - Home medications: ASA 81 mg daily   Plan:  - Continue ASA 81 mg daily      #HTN  - Home medications: metoprolol succinate 50 mg BID, Lisinopril 10 mg daily  Plan:  -Continue metoprolol and lisinopril  -Continue holding triamterene     #HLD  - Home medications: Atorvastatin 40 mg daily  Plan:  -Continue home medication      #BPH   - No home medications, no acute urinary symptoms at this time   -PSA 4.63 11/2024, repeat 9.73 on 1/10  -Per urology, plan to f/u [facility]  Plan:  -CTM   -F/U outpatient     Fluids: Replete PRN  Electrolytes: Keep mg >2, phos >3  and K >4  Nutrition: Adult regular with fluid restriction (1.5L daily)  Antimicrobials: none  DVT PPX:DVT: SCDs and ambulate as tolerated  GI ppx: n/a  Bowel care:Miralax  Catheter:None  Lines:PIV  Oxygen:Room Air        JIGNA LACEY, MS4

## 2025-01-14 NOTE — SIGNIFICANT EVENT
Pulmonary sign off note:    Status post bronchoscopy with BAL and EBUS on 01/13/2025 as below:    - Normal airway examination.  - A BAL was performed in the LLL, see Specimen section for ordered laboratory tests.  - A diagnostic and staging EBUS was performed. Stations 11L and 12L were sampled. Please see above for rapid on-site evaluation results, final results are pending. Samples from 12L were placed into RPMI (for stand by) and cultures    Rapid on site evaluation of the lymph nodes- lymphoid tissue. BAL and final cytology results to be followed by primary team.     Pulmonary will sign off.     Sherron Zamora  Fellow  Pulmonary and Critical Care

## 2025-01-14 NOTE — NURSING NOTE
Geriatric Nursing rounds summary  Mr Ibanez is a [age] year old admitted cad renal ca pancytopenia  Age friendly  What matters full code  needs biopsy  Mentation cam neg   Mobility ambulating

## 2025-01-14 NOTE — CARE PLAN
The patient's goals for the shift include      The clinical goals for the shift include pain control    Over the shift, the patient did make progress toward the following goals.     Problem: Fall/Injury  Goal: Not fall by end of shift  Outcome: Progressing  Goal: Be free from injury by end of the shift  Outcome: Progressing  Goal: Verbalize understanding of personal risk factors for fall in the hospital  Outcome: Progressing  Goal: Verbalize understanding of risk factor reduction measures to prevent injury from fall in the home  Outcome: Progressing  Goal: Use assistive devices by end of the shift  Outcome: Progressing  Goal: Pace activities to prevent fatigue by end of the shift  Outcome: Progressing     Problem: Pain  Goal: Takes deep breaths with improved pain control throughout the shift  Outcome: Progressing  Goal: Turns in bed with improved pain control throughout the shift  Outcome: Progressing  Goal: Walks with improved pain control throughout the shift  Outcome: Progressing  Goal: Performs ADL's with improved pain control throughout shift  Outcome: Progressing  Goal: Participates in PT with improved pain control throughout the shift  Outcome: Progressing  Goal: Free from opioid side effects throughout the shift  Outcome: Progressing  Goal: Free from acute confusion related to pain meds throughout the shift  Outcome: Progressing

## 2025-01-14 NOTE — CARE PLAN
The patient's goals for the shift include having decreased pain.     The clinical goals for the shift include Pt will report decreased pain by the end of this shift.    Over the shift, the patient did make progress toward the following goals. Pt reported decreased pain after receiving his PRN pain medication. Awaiting results of testing.       Problem: Fall/Injury  Goal: Not fall by end of shift  Outcome: Progressing  Goal: Be free from injury by end of the shift  Outcome: Progressing  Goal: Verbalize understanding of personal risk factors for fall in the hospital  Outcome: Progressing  Goal: Verbalize understanding of risk factor reduction measures to prevent injury from fall in the home  Outcome: Progressing  Goal: Use assistive devices by end of the shift  Outcome: Progressing  Goal: Pace activities to prevent fatigue by end of the shift  Outcome: Progressing     Problem: Pain  Goal: Takes deep breaths with improved pain control throughout the shift  Outcome: Progressing  Goal: Turns in bed with improved pain control throughout the shift  Outcome: Progressing  Goal: Walks with improved pain control throughout the shift  Outcome: Progressing  Goal: Performs ADL's with improved pain control throughout shift  Outcome: Progressing  Goal: Participates in PT with improved pain control throughout the shift  Outcome: Progressing  Goal: Free from opioid side effects throughout the shift  Outcome: Progressing  Goal: Free from acute confusion related to pain meds throughout the shift  Outcome: Progressing

## 2025-01-15 LAB
ADENOVIRUS QPCR, VIRC: NOT DETECTED COPIES/ML
ADENOVIRUS RVP, VIRC: NOT DETECTED
ADENOVIRUS RVP, VIRC: NOT DETECTED
ALBUMIN SERPL BCP-MCNC: 3.1 G/DL (ref 3.4–5)
ALBUMIN: 3.7 G/DL (ref 3.4–5)
ALP SERPL-CCNC: 116 U/L (ref 33–136)
ALPHA 1 GLOBULIN: 0.6 G/DL (ref 0.2–0.6)
ALPHA 2 GLOBULIN: 1.1 G/DL (ref 0.4–1.1)
ALT SERPL W P-5'-P-CCNC: 49 U/L (ref 10–52)
ANION GAP SERPL CALC-SCNC: 12 MMOL/L (ref 10–20)
AST SERPL W P-5'-P-CCNC: 47 U/L (ref 9–39)
BACTERIA SPEC CULT: NORMAL
BASOPHILS # BLD MANUAL: 0.06 X10*3/UL (ref 0–0.1)
BASOPHILS NFR BLD MANUAL: 2.7 %
BETA GLOBULIN: 0.7 G/DL (ref 0.5–1.2)
BILIRUB SERPL-MCNC: 0.8 MG/DL (ref 0–1.2)
BUN SERPL-MCNC: 34 MG/DL (ref 6–23)
CALCIUM SERPL-MCNC: 8.8 MG/DL (ref 8.6–10.6)
CHLAMYDIA.PNEUMONIAE PCR, VIRC: NOT DETECTED
CHLORIDE SERPL-SCNC: 101 MMOL/L (ref 98–107)
CO2 SERPL-SCNC: 28 MMOL/L (ref 21–32)
COCCIDIOIDES AB TITR SER CF: NORMAL {TITER}
CREAT SERPL-MCNC: 0.73 MG/DL (ref 0.5–1.3)
CYTOMEGALOVIRUS DNA PCR, (NON-BLOOD SPECI: NOT DETECTED IU/ML
EGFRCR SERPLBLD CKD-EPI 2021: >90 ML/MIN/1.73M*2
ENTEROVIRUS/RHINOVIRUS RVP, VIRC: NOT DETECTED
ENTEROVIRUS/RHINOVIRUS RVP, VIRC: NOT DETECTED
EOSINOPHIL # BLD MANUAL: 0.04 X10*3/UL (ref 0–0.7)
EOSINOPHIL NFR BLD MANUAL: 1.8 %
ERYTHROCYTE [DISTWIDTH] IN BLOOD BY AUTOMATED COUNT: 13.2 % (ref 11.5–14.5)
GAMMA GLOBULIN: 0.6 G/DL (ref 0.5–1.4)
GLUCOSE SERPL-MCNC: 83 MG/DL (ref 74–99)
GRAM STN SPEC: NORMAL
GRAM STN SPEC: NORMAL
H CAPSUL AG UR QL: NOT DETECTED
HCT VFR BLD AUTO: 29 % (ref 41–52)
HGB BLD-MCNC: 10.2 G/DL (ref 13.5–17.5)
HUMAN BOCAVIRUS RVP, VIRC: NOT DETECTED
HUMAN BOCAVIRUS RVP, VIRC: NOT DETECTED
HUMAN CORONAVIRUS RVP, VIRC: NOT DETECTED
HUMAN CORONAVIRUS RVP, VIRC: NOT DETECTED
HUMAN HERPESVIRUS-6 DNA PCR, QUANTITATIVE (NON-BLOOD SPECIMEN): NOT DETECTED COPIES/ML
IMM GRANULOCYTES # BLD AUTO: 0.24 X10*3/UL (ref 0–0.7)
IMM GRANULOCYTES NFR BLD AUTO: 11.1 % (ref 0–0.9)
IMMUNOFIXATION COMMENT: NORMAL
INFLUENZA A , VIRC: NOT DETECTED
INFLUENZA A , VIRC: NOT DETECTED
INFLUENZA A H1N1-09 , VIRC: NOT DETECTED
INFLUENZA A H1N1-09 , VIRC: NOT DETECTED
INFLUENZA B PCR, VIRC: NOT DETECTED
INFLUENZA B PCR, VIRC: NOT DETECTED
LEGIONELLA PNEUMO PCR, VIRC: NOT DETECTED
LYMPHOCYTES # BLD MANUAL: 0.41 X10*3/UL (ref 1.2–4.8)
LYMPHOCYTES NFR BLD MANUAL: 18.7 %
MAGNESIUM SERPL-MCNC: 2.17 MG/DL (ref 1.6–2.4)
MCH RBC QN AUTO: 28.3 PG (ref 26–34)
MCHC RBC AUTO-ENTMCNC: 35.2 G/DL (ref 32–36)
MCV RBC AUTO: 81 FL (ref 80–100)
METAPNEUMOVIRUS , VIRC: NOT DETECTED
METAPNEUMOVIRUS , VIRC: NOT DETECTED
MONOCYTES # BLD MANUAL: 0.1 X10*3/UL (ref 0.1–1)
MONOCYTES NFR BLD MANUAL: 4.4 %
MYCOPLASMA.PNEUMONIAE PCR, VIRC: NOT DETECTED
NEUTROPHILS # BLD MANUAL: 1.49 X10*3/UL (ref 1.2–7.7)
NEUTS BAND # BLD MANUAL: 0.06 X10*3/UL (ref 0–0.7)
NEUTS BAND NFR BLD MANUAL: 2.7 %
NEUTS SEG # BLD MANUAL: 1.43 X10*3/UL (ref 1.2–7)
NEUTS SEG NFR BLD MANUAL: 65.2 %
NRBC BLD-RTO: 2.3 /100 WBCS (ref 0–0)
OVALOCYTES BLD QL SMEAR: ABNORMAL
PAN.LEGIONELLA PCR, VIRC: NOT DETECTED
PARAINFLUENZA PCR, VIRC: NOT DETECTED
PARAINFLUENZA PCR, VIRC: NOT DETECTED
PATH REVIEW - SERUM IMMUNOFIXATION: NORMAL
PATH REVIEW-SERUM PROTEIN ELECTROPHORESIS: NORMAL
PHOSPHATE SERPL-MCNC: 3.3 MG/DL (ref 2.5–4.9)
PLATELET # BLD AUTO: 64 X10*3/UL (ref 150–450)
PNEUMOCYSTIS PCR,QUANTITATIVE (NON-BLOOD SPECIMEN): NOT DETECTED COPIES/ML
POTASSIUM SERPL-SCNC: 4.5 MMOL/L (ref 3.5–5.3)
PROMYELOCYTES # BLD MANUAL: 0.06 X10*3/UL
PROMYELOCYTES NFR BLD MANUAL: 2.7 %
PROT SERPL-MCNC: 5.1 G/DL (ref 6.4–8.2)
PROTEIN ELECTROPHORESIS COMMENT: NORMAL
RBC # BLD AUTO: 3.6 X10*6/UL (ref 4.5–5.9)
RBC MORPH BLD: ABNORMAL
RSV PCR, RVP, VIRC: NOT DETECTED
RSV PCR, RVP, VIRC: NOT DETECTED
SCAN RESULT: NORMAL
SODIUM SERPL-SCNC: 136 MMOL/L (ref 136–145)
SOL IL2 RECEP SERPL-MCNC: 7724.4 PG/ML (ref 175.3–858.2)
TOTAL CELLS COUNTED BLD: 112
VARIANT LYMPHS # BLD MANUAL: 0.04 X10*3/UL (ref 0–0.5)
VARIANT LYMPHS NFR BLD: 1.8 %
WBC # BLD AUTO: 2.2 X10*3/UL (ref 4.4–11.3)

## 2025-01-15 PROCEDURE — 2500000001 HC RX 250 WO HCPCS SELF ADMINISTERED DRUGS (ALT 637 FOR MEDICARE OP)

## 2025-01-15 PROCEDURE — 85027 COMPLETE CBC AUTOMATED: CPT

## 2025-01-15 PROCEDURE — 2500000004 HC RX 250 GENERAL PHARMACY W/ HCPCS (ALT 636 FOR OP/ED)

## 2025-01-15 PROCEDURE — 99233 SBSQ HOSP IP/OBS HIGH 50: CPT | Performed by: INTERNAL MEDICINE

## 2025-01-15 PROCEDURE — 84075 ASSAY ALKALINE PHOSPHATASE: CPT

## 2025-01-15 PROCEDURE — 2500000005 HC RX 250 GENERAL PHARMACY W/O HCPCS

## 2025-01-15 PROCEDURE — 83735 ASSAY OF MAGNESIUM: CPT

## 2025-01-15 PROCEDURE — 85007 BL SMEAR W/DIFF WBC COUNT: CPT

## 2025-01-15 PROCEDURE — 36415 COLL VENOUS BLD VENIPUNCTURE: CPT

## 2025-01-15 PROCEDURE — 84100 ASSAY OF PHOSPHORUS: CPT

## 2025-01-15 PROCEDURE — 1100000001 HC PRIVATE ROOM DAILY

## 2025-01-15 RX ADMIN — CYCLOBENZAPRINE 5 MG: 10 TABLET, FILM COATED ORAL at 22:03

## 2025-01-15 RX ADMIN — OXYCODONE HYDROCHLORIDE 5 MG: 5 TABLET ORAL at 16:58

## 2025-01-15 RX ADMIN — TRAMADOL HYDROCHLORIDE 50 MG: 50 TABLET, COATED ORAL at 10:34

## 2025-01-15 RX ADMIN — POLYETHYLENE GLYCOL 3350 17 G: 17 POWDER, FOR SOLUTION ORAL at 08:13

## 2025-01-15 RX ADMIN — CYCLOBENZAPRINE 5 MG: 10 TABLET, FILM COATED ORAL at 15:40

## 2025-01-15 RX ADMIN — METOPROLOL TARTRATE 50 MG: 50 TABLET, FILM COATED ORAL at 08:14

## 2025-01-15 RX ADMIN — LISINOPRIL 10 MG: 10 TABLET ORAL at 08:14

## 2025-01-15 RX ADMIN — METOPROLOL TARTRATE 50 MG: 50 TABLET, FILM COATED ORAL at 20:39

## 2025-01-15 RX ADMIN — ATORVASTATIN CALCIUM 40 MG: 40 TABLET, FILM COATED ORAL at 20:39

## 2025-01-15 RX ADMIN — ACETAMINOPHEN 975 MG: 325 TABLET ORAL at 08:14

## 2025-01-15 RX ADMIN — TRAMADOL HYDROCHLORIDE 50 MG: 50 TABLET, COATED ORAL at 20:40

## 2025-01-15 RX ADMIN — ACETAMINOPHEN 975 MG: 325 TABLET ORAL at 16:59

## 2025-01-15 RX ADMIN — LIDOCAINE 4% 1 PATCH: 40 PATCH TOPICAL at 08:13

## 2025-01-15 RX ADMIN — CYCLOBENZAPRINE 5 MG: 10 TABLET, FILM COATED ORAL at 07:19

## 2025-01-15 RX ADMIN — OXYCODONE HYDROCHLORIDE 5 MG: 5 TABLET ORAL at 09:35

## 2025-01-15 RX ADMIN — ASPIRIN 81 MG: 81 TABLET, COATED ORAL at 08:14

## 2025-01-15 RX ADMIN — TRAMADOL HYDROCHLORIDE 50 MG: 50 TABLET, COATED ORAL at 01:36

## 2025-01-15 ASSESSMENT — COGNITIVE AND FUNCTIONAL STATUS - GENERAL
DAILY ACTIVITIY SCORE: 24
MOBILITY SCORE: 24

## 2025-01-15 ASSESSMENT — PAIN DESCRIPTION - ORIENTATION
ORIENTATION: LOWER
ORIENTATION: LOWER

## 2025-01-15 ASSESSMENT — PAIN - FUNCTIONAL ASSESSMENT
PAIN_FUNCTIONAL_ASSESSMENT: 0-10

## 2025-01-15 ASSESSMENT — PAIN SCALES - GENERAL
PAINLEVEL_OUTOF10: 6
PAINLEVEL_OUTOF10: 4
PAINLEVEL_OUTOF10: 8
PAINLEVEL_OUTOF10: 2
PAINLEVEL_OUTOF10: 4
PAINLEVEL_OUTOF10: 8
PAINLEVEL_OUTOF10: 4
PAINLEVEL_OUTOF10: 5 - MODERATE PAIN
PAINLEVEL_OUTOF10: 7
PAINLEVEL_OUTOF10: 7
PAINLEVEL_OUTOF10: 8
PAINLEVEL_OUTOF10: 4

## 2025-01-15 ASSESSMENT — PAIN DESCRIPTION - LOCATION
LOCATION: BACK
LOCATION: BACK

## 2025-01-15 NOTE — PROGRESS NOTES
Bijan Ibanez is a 64 y.o. male on day 4 of admission presenting with Hyponatremia.      Subjective   Patient seen and examined at the bedside this AM. NAEO, reports that he did not get much sleep 2/2 a broken light in his room. Otherwise feeling okay, states that back pain is a bit worse than previous days, however he did not want to use the lidocaine patch. Denies SOB, CP, abdominal pain, n/v.       Objective     Last Recorded Vitals  /75 (BP Location: Left arm, Patient Position: Lying)   Pulse 78   Temp 36.5 °C (97.7 °F) (Tympanic)   Resp 18   Wt 107 kg (236 lb)   SpO2 97%   Intake/Output last 3 Shifts:    Intake/Output Summary (Last 24 hours) at 1/15/2025 1407  Last data filed at 1/15/2025 1335  Gross per 24 hour   Intake 480 ml   Output --   Net 480 ml       Admission Weight  Weight: 107 kg (236 lb) (01/10/25 1929)    Daily Weight  01/13/25 : 107 kg (236 lb)    Image Results  Bronchoscopy Diagnostic, Navigational, Therapeutic, w BAL, w EBUS  Addendum: Bronchoscopy Operative Report   [facility]     Date of procedure: 01/13/25   Patient: Bijan Ibanez   MRN: [id number]    YOB: 1960   Gender: male   Referring provider/physician: Dr. MARIA ELENA Rodriguez (inpatient procedure,  Pulmonary Medicine Consult)     PRE-PROCEDURE DIAGNOSIS:   Intra-thoracic lymphadenopathy, small pulmonary nodules         PRE-PROCEDURE EVALUATION: A history and physical has been performed,  and patient medication allergies have been reviewed. The patient's  tolerance of previous anesthesia has been reviewed. The risks and benefits  of the procedure and the sedation options and risks were discussed with  the patient or their designee. All questions were answered and informed  consent obtained.      INDICATION: Obtain diagnosis and Staging     BRONCHOSCOPIST:                 Modesto Martines MD   First Assistant: None   Second Assistant: None     PROCEDURE SUMMARY:   Event Event Time     ENDO SCOPE IN TIME   ENDO SCOPE OUT TIME 01/13/2025 12:11 PM   1/13/2025  1:02 PM                     Fluoroscopy time: Not applicable     POST-PROCEDURE DIAGNOSIS:   Same as pre-operative diagnoses     ANESTHESIA:   TIVA. See separate anesthesia provider documentation. This procedure was  performed using standard monitoring procedures in [facility]  [facility].     FINDINGS:   After adequate local and intravenous anesthesia, bronchoscopy was  performed via an endotracheal tube placed by anesthesia. The distal  trachea appeared normal. Inspection of RIGHT bronchial tree to the  segmental level appeared normal. Inspection of LEFT bronchial tree to the  segmental level appeared normal.      PROCEDURES:   The bronchoscope was wedged into the LLL.  160 ml of normal saline was  instilled, 27 ml was recovered. Please see Specimen section for ordered  laboratory tests. Due to poor return (expected for LLL BAL), cytology was  not ordered from the BAL.     After the airway examination was completed, the scope was then replaced by  EBUS scope. Lymph node sizing was performed via endobronchial ultrasound.  Sampling by transbronchial needle    aspiration was also performed using an Olympus ViziShot 22 gauge needle  and sent for routine cytology.      Rapid On-Site Evaluation (JOSE CARLOS) was available           - The 11Ri (inferior interlobar) node was not evaluated. Sampling was  not done as the node was not evaluated.        - The 11Rs (superior interlobar) node was not evaluated. Sampling was  not done as the node was not evaluated.        - The 10R (hilar) node was not evaluated. Sampling was not done as the  node was not evaluated.        - The 4R (lower paratracheal) node was 3.9 mm in size. Sampling was  not done as it was not clinically indicated.        - The 2R (upper paratracheal) node was not visualized. Sampling was  not done as the node was not visualized.        - The 3P (retrotracheal) node was  not evaluated. Sampling was not done  as the node was not evaluated.    -  The 7 (subcarinal) node was 3.4 mm in size. Sampling was not done as it  was not clinically indicated.        - The 2L (upper paratracheal) node was not visualized. Sampling was  not done as the node was not visualized.        - The 4L (lower paratracheal) node was 2.9 mm in size. Sampling was  not done as it was not clinically indicated.        - The 10L (hilar) node was not visualized. Sampling was not done as  the node was not visualized.        - The 11L (interlobar) node was 8.6 mm in size. Sampling was done, 4  samples with the needle were obtained.        - The 12L node was 11.2 mm in size. Sampling was done, 8 samples with  the needle were obtained, including 2 for RPMI (stand by) and one for  cultures.     After diagnostic/therapeutic maneuvers, the airway was examined for  evidence of bleeding. None was noted. The bronchoscope was removed from  the patient's airway and the airway was handed back over to my colleagues  from anesthesiology.      SPECIMENS:    ID Type Source Tests Collected by Time    1 : BAL LEFT LOWER LOBE Respiratory BAL BAL VIRAL PANEL PCR, FUNGITELL  BETA-D GLUCAN PCR QUANT (NON-BLOOD SPECIMEN) Modesto Martines MD  1/13/2025 1209    2 : BAL LEFT LOWER LOBE Fluid BRONCHO-ALVEOLAR LAVAGE OF LEFT LOWER LOBE  AFB CULTURE/SMEAR, FUNGAL CULTURE/SMEAR, STERILE FLUID CULTURE/SMEAR  Modesto Martines MD 1/13/2025 1210    3 : BAL LEFT LOWER LOBE Bronchoalveolar Lavage BRONCHO-ALVEOLAR LAVAGE OF  LEFT LOWER LOBE BODY FLUID CELL COUNT WITH DIFFERENTIAL Modesto Martines MD 1/13/2025 1210    4 : LN 12L FOR CULTURE Tissue LYMPH NODE BIOPSY AFB CULTURE/SMEAR, FUNGAL  CULTURE/SMEAR, TISSUE/WOUND CULTURE/SMEAR Modesto Martines MD 1/13/2025  1301    A :  Non-Gynecologic Cytology LYMPH NODE 11 L PULMONARY FINE NEEDLE  ASPIRATION CYTOLOGY CONSULTATION (NON-GYNECOLOGIC) Modesto Martines MD  1/13/2025 1230    B :   Non-Gynecologic Cytology LYMPH NODE 12 L PULMONARY FINE NEEDLE  ASPIRATION CYTOLOGY CONSULTATION (NON-GYNECOLOGIC) Modesto Martines MD  1/13/2025 1232      RAPID ONSITE EVALUATION (R.O.S.E.):   - Stations 11L and 12L TBNA : lymphoid      COMPLICATIONS: None.        EBL: Minimal, <5 ml        POST PROCEDURE CHEST RADIOGRAPH: Not needed        SUMMARY:   - Normal airway examination.   - A BAL was performed in the LLL, see Specimen section for ordered  laboratory tests.   - A diagnostic and staging EBUS was performed. Stations 11L and 12L were  sampled. Please see above for rapid on-site evaluation results, final  results are pending. Samples from 12L were placed into RPMI (for stand by)  and cultures.         RECOMMENDATIONS:   Follow up with referring physician    Await BAL and cytology results      The patient was transported to the recovery area/PACU in stable  condition.     I, Modesto Martines MD, was personally present throughout this  procedure, including all key and non-key portions.     Date: 01/13/25 Time: 1:23 PM     (Images obtained during this procedure, including ultrasonographic images  if performed, can be found in within the electronic medical record and/or  PACS.)  Narrative: Table formatting from the original result was not included.  Images from the original result were not included.    Bronchoscopy Operative Report  [facility]    Date of procedure: 01/13/25  Patient: Bijan Ibanez  MRN: [id number]   YOB: 1960  Gender: male  Referring provider/physician: Dr. MARIA ELENA Rodriguez (inpatient procedure,   Pulmonary Medicine Consult)    PRE-PROCEDURE DIAGNOSIS:  Intra-thoracic lymphadenopathy, small pulmonary nodules        PRE-PROCEDURE EVALUATION: A history and physical has been performed,   and patient medication allergies have been reviewed. The patient's   tolerance of previous anesthesia has been reviewed. The risks and benefits   of the procedure and  the sedation options and risks were discussed with   the patient or their designee. All questions were answered and informed   consent obtained.     INDICATION: Obtain diagnosis and Staging    BRONCHOSCOPIST:                Modesto Martines MD  First Assistant: None  Second Assistant: None    PROCEDURE SUMMARY:  Event Event Time   ENDO SCOPE IN TIME  ENDO SCOPE OUT TIME 01/13/2025 12:11 PM  1/13/2025  1:02 PM                   Fluoroscopy time: Not applicable    POST-PROCEDURE DIAGNOSIS:  Same as pre-operative diagnoses    ANESTHESIA:  TIVA. See separate anesthesia provider documentation. This procedure was   performed using standard monitoring procedures in [facility]   [facility].    FINDINGS:  After adequate local and intravenous anesthesia, bronchoscopy was   performed via an endotracheal tube placed by anesthesia. The distal   trachea appeared normal. Inspection of RIGHT bronchial tree to the   segmental level appeared normal. Inspection of LEFT bronchial tree to the   segmental level appeared normal.     PROCEDURES:  The bronchoscope was wedged into the LLL.  160 ml of normal saline was   instilled, 27 ml was recovered. Please see Specimen section for ordered   laboratory tests. Due to poor return (expected for LLL BAL), cytology was   not ordered from the BAL.    After the airway examination was completed, the scope was then replaced by   EBUS scope. Lymph node sizing was performed via endobronchial ultrasound.   Sampling by transbronchial needle   aspiration was also performed using an Olympus ViziShot 22 gauge needle   and sent for routine cytology.     Rapid On-Site Evaluation (JOSE CARLOS) was available         - The 11Ri (inferior interlobar) node was not evaluated. Sampling was   not done as the node was not evaluated.       - The 11Rs (superior interlobar) node was not evaluated. Sampling was   not done as the node was not evaluated.       - The 10R (hilar) node was not evaluated.  Sampling was not done as the   node was not evaluated.       - The 4R (lower paratracheal) node was  3.9 mm in size. Sampling was   not done as it was not clinically indicated.       - The 2R (upper paratracheal) node was not visualized. Sampling was   not done as the node was not visualized.       - The 3P (retrotracheal) node was not evaluated. Sampling was not done   as the node was not evaluated.   -  The 7 (subcarinal) node was  3.4 mm in size. Sampling was not done as   it was not clinically indicated.       - The 2L (upper paratracheal) node was not visualized. Sampling was   not done as the node was not visualized.       - The 4L (lower paratracheal) node was  2.9 mm in size. Sampling was   not done as it was not clinically indicated.       - The 10L (hilar) node was not visualized. Sampling was not done as   the node was not visualized.       - The 11L (interlobar) node was  8.6 mm in size. Sampling was done, 4   samples with the needle were obtained.       - The 12L node was  11.2 mm in size. Sampling was done, 8 samples with   the needle were obtained, including 2 for RPMI (stand by) and one for   cultures.    After diagnostic/therapeutic maneuvers, the airway was examined for   evidence of bleeding. None was noted. The bronchoscope was removed from   the patient's airway and the airway was handed back over to my colleagues   from anesthesiology.     SPECIMENS:   ID Type Source Tests Collected by Time   1 : BAL LEFT LOWER LOBE Respiratory BAL BAL VIRAL PANEL PCR, FUNGITELL   BETA-D GLUCAN PCR QUANT (NON-BLOOD SPECIMEN) Modesto Martines MD   1/13/2025 1209   2 : BAL LEFT LOWER LOBE Fluid BRONCHO-ALVEOLAR LAVAGE OF LEFT LOWER LOBE   AFB CULTURE/SMEAR, FUNGAL CULTURE/SMEAR, STERILE FLUID CULTURE/SMEAR   Modesto Martines MD 1/13/2025 1210   3 : BAL LEFT LOWER LOBE Bronchoalveolar Lavage BRONCHO-ALVEOLAR LAVAGE OF   LEFT LOWER LOBE BODY FLUID CELL COUNT WITH DIFFERENTIAL Modesto Martines MD  1/13/2025 1210   4 : LN 12L FOR CULTURE Tissue LYMPH NODE BIOPSY AFB CULTURE/SMEAR, FUNGAL   CULTURE/SMEAR, TISSUE/WOUND CULTURE/SMEAR Modesto Martines MD 1/13/2025   1301   A :  Non-Gynecologic Cytology LYMPH NODE 11 L PULMONARY FINE NEEDLE   ASPIRATION CYTOLOGY CONSULTATION (NON-GYNECOLOGIC) Modesto Martines MD   1/13/2025 1230   B :  Non-Gynecologic Cytology LYMPH NODE 12 L PULMONARY FINE NEEDLE   ASPIRATION CYTOLOGY CONSULTATION (NON-GYNECOLOGIC) Modesto Martines MD   1/13/2025 1232     RAPID ONSITE EVALUATION (R.O.S.E.):  - Stations 11L and 12L TBNA  : lymphoid     COMPLICATIONS: None.       EBL: Minimal, <5 ml       POST PROCEDURE CHEST RADIOGRAPH: Not needed       SUMMARY:  - Normal airway examination.  - A BAL was performed in the LLL, see Specimen section for ordered   laboratory tests.  - A diagnostic and staging EBUS was performed. Stations 11L and 12L were   sampled. Please see above for rapid on-site evaluation results, final   results are pending. Samples from 12L were placed into RPMI (for stand by)   and cultures.        RECOMMENDATIONS:  Follow up with referring physician   Await BAL and cytology results     The patient was transported to the recovery area/PACU in stable condition.    I, Modesto Martines MD, was personally present throughout this   procedure, including all key and non-key portions.    Date: 01/13/25 Time: 1:23 PM    (Images obtained during this procedure, including ultrasonographic images   if performed, can be found in within the electronic medical record and/or   PACS.)      Physical Exam  Constitutional:       General: He is not in acute distress.     Appearance: Normal appearance. He is not ill-appearing.   HENT:      Head: Normocephalic and atraumatic.      Jaw: Swelling (over R jaw/parotid region) present.   Cardiovascular:      Rate and Rhythm: Normal rate and regular rhythm.      Heart sounds: No murmur heard.     No friction rub. No gallop.   Pulmonary:       Effort: Pulmonary effort is normal. No respiratory distress.      Breath sounds: Normal breath sounds. No wheezing, rhonchi or rales.   Abdominal:      General: Abdomen is flat. Bowel sounds are normal. There is no distension.      Tenderness: There is no abdominal tenderness.   Musculoskeletal:      Lumbar back: Tenderness (similar to previous days) present.      Right lower leg: Edema (1+) present.      Left lower leg: Edema (1+) present.   Neurological:      Mental Status: He is alert.         Relevant Results               Assessment/Plan      Assessment & Plan  Hyponatremia    Chronic midline low back pain without sciatica    Pancytopenia    This is a 64 yr old male with history of CAD (s/p stent 2010) on ASA, Renal cell carcinoma s/p R partial nephrectomy in 2013 @ [facility] (saw [facility] in 2017), HTN, HLD, GERD, BPH and arthritis who reported to the [facility] ED today per recs of his primary physician regarding abnormal imaging. MRI lumbar spine with a possible mass between T12 and L1 measuring  39 x 17 mm that may represent nerve sheath tumor. Recent history of 16 pound unintentional weight loss and night sweats over the past 2-3 weeks. Initial workup revealing pancytopenia, hyponatremia, and right-sided facial swelling. CT C/A/P 01/11 suggestive of possible local RCC reoccurrence with mets to lung VS lung primary, now s/p EBUS with biopsy.     Update 1/12:  -Per Pulm: will consider biopsy an axillary lymph node if possible vs  EBUS.    -Consulted Hematology for Pancytopenia   -Concern for possible HLA. H Score 170 (40-54 % Probability of hemophagocytic syndrome)     Update 1/13:  -Per Pulm: plan for add-on EBUS-TBNA with BAL today, sending fungal/berylliosis work up  -Per Hematology: Pancytopenia likely 2/2 underlying malignancy via bone marrow involvement vs. HLH, suggest work up for HLH     Update 1/14:  -s/p EBUS with biopsy/BAL on 1/13, preliminary bedside pathology showed lymphoid tissue.    Update  "1/15:  -pancytopenia stable, hematology considering bone marrow biopsy in AM   -Viral/fungal/bacterial BAL studies negative so far     #Hx of RCC s/p R partial nephrectomy in 2013  #L hilar mass s/p EBUS-TBNA  #R renal mass  #C/f reoccurrence of RCC with mets to lung VS lung primary  -Pt with history of RCC s/p resection (2013 [facility]) with clean margins  -CT PE and CT A/P ordered and performed on  revealin.  Ill-defined hypodense infiltrative masslike foci involving the right kidney, largest measuring 3.0 x 2.9 cm in partially effacing the anterior right renal pelvis. There is an additional infiltrative lesion located at the superior aspect of the right kidney which measures 3.7 x 2 point 7 cm and closely abuts the inferior aspect of the right hepatic lobe. Primary differential considerations would include primary renal neoplasm such as renal cell carcinoma versus metastatic disease.  2. Asymmetric nodular masslike thickening of the right adrenal gland measuring 2.6 x 1.8 cm. Findings concerning for metastatic disease.  3. 4.7 x 2.1 cm soft tissue mass in the right anterolateral aspect of the paraspinous tissues at T12-L1, previously characterized on MR of the lumbar spine from 01/10/2025. There are additional multiple scattered abdominopelvic lymph nodes, not significantly enlarged per CT size criteria.  4. Please see dedicated CT of the chest from 2025 for further characterization of multiple scattered pulmonary nodules involving the left lung base and left pleura.  5. Mild splenomegaly.  -Pt states he was told about some \"lung spots\" in the past, upon chart review it appears as though LD CT done at [facility] from 2024 showed:  -Multiple subcentimeter noncalcified pulmonary nodules, similar to prior. No new or enlarging nodule identified.   -s/p EBUS with pulmonology  with biopsy and BAL, pathology pending  -Viral work up, histoplasma, and aspergillus work-up unremarkable   "   Plan:  -Pulmonology consulted, appreciate recommendations:              -s/p EBUS-TBNA and BAL 1/13              -Official biopsy and BAL results pending              -Serum beryllium screen pending              -Fungal cultures and coccidio/blasto antibodies pending  -Consulted Urology for evaluation of possible recurrence of RCC and further inpatient evaluation / est outpt care              -recommend med onc consult following tissue diagnosis  -Contacted [facility] for full LD CT images  -Referral placed for diagnostic clinic  -Heme following for pancytopenia, considering bone marrow biopsy 1/16     #Hyponatremia, hypoosmolality, resolved  -Na 124-125 on presentation  - S/p 1L NS bolus, decreased to 123  -Pt reports poor oral intake since feeling ill with UR symptoms around [place]              -Home Lisinopril and triamterene-HCTZ  -Serum osmolality, slightly low at 270  -Urine osmolality, 616  -Urine electrolytes, showing decreased Na output <10  -Total input 1.2 L 1/13     Plan:  -CTM RFP daily  -Continue fluid restriction to 1.5L  -Hold home triamterene        #Pancytopenia  #c/f HLH  - Initial Hb 12.9, WBC 4.0, PLT 72              -Upon review of recent labs from 3 months ago in [facility] system and [facility], this is an acute change  -Initially c/f acute viral infection affecting all cell lines, which could be contributing, but per above may have new/recurrent cancer  -URI Viral studies, fungal studies negative so far  -Iron studies:              -Ferritin >9,000              -Iron 120              -Transferrin 156  -Retic count normal, but low in setting of acute anemia  -CRP 14.67  -s/p 1 unit platelets administered 1/13 for platelets <50 (48) prior to procedure  -Concern for HLH, H score 170 (elevated ferritin, pancytopenia, splenomegaly) vs. Bone marrow involvement of potential malignancy  -Repeat triglyceride level 154 (down from 267)  -UPEP positive for protein, no monoclonal spikes  -Christina-2  receptor elevated at 7724.4     Plan:  -CTM with daily CBC w/diff  -Hematology consulted for evaluation of pancytopenia and potential HLH              -SPEP, serum free light chains, and quant immunoglobulins pending              -CXCL9 levels pending              -Hematology considering bone marrow biopsy AM 1/16     #Back pain, chronic with acute worsening  #c/f thoracolumbar soft tissue mass, new  #Degenerative lumbar spondylosis  - MRI lumbar spine: T12 and L1 levels in the right anterolateral para spinous tissues is a masslike structure that measures 39 x 17 mm greatest axial dimensions, 39 mm cc and may represent a soft tissue mass  - CT head read pending (ordered in ED), negative for concerning findings  - Ortho spine consulted, recommended:  - Tumor work-up: Consider getting MRI of abdomen per radiology. Consider CT abdomen chest and pelvis to determine primary      Plan  -Ortho spine consulted 1/11, recommended:  - Tumor work-up: Consider getting MRI of abdomen per radiology. Consider CT abdomen chest and pelvis to determine primary  -Continue pain regimen, encourage use of PRN medications              -scheduled tylenol 975 mg Q 8 hrs              -Tramadol 50 mg Q 6 hrs for moderate pain PRN              -Oxycodone 5 mg Q 6 hrs for severe pain PRN              -Flexeril 5 mg TID PRN              -Lidocaine patch 4%              -Consider gabapentin addition if radicular symptoms develop  -OP PT referral placed     Chronic disease history     #CAD s/p stent 2010  - (Last Echo Feb 2016) -->Estimated ejection fraction is 65-69%   - Home medications: ASA 81 mg daily   Plan:  - Continue ASA 81 mg daily      #HTN  - Home medications: metoprolol succinate 50 mg BID, Lisinopril 10 mg daily  Plan:  -Continue metoprolol and lisinopril  -Continue holding triamterene     #HLD  - Home medications: Atorvastatin 40 mg daily  Plan:  -Continue home medication      #BPH   - No home medications, no acute urinary symptoms at  this time   -PSA 4.63 11/2024, repeat 9.73 on 1/10  -Per urology, plan to f/u [facility]  Plan:  -CTM   -F/U outpatient     Fluids: Replete PRN  Electrolytes: Keep mg >2, phos >3  and K >4  Nutrition: Adult regular with fluid restriction (1.5L daily)  Antimicrobials: none  DVT PPX:DVT: SCDs and ambulate as tolerated  GI ppx: n/a  Bowel care:Miralax  Catheter:None  Lines:PIV  Oxygen:Room Air        JIGNA LACEY, MS4

## 2025-01-15 NOTE — CARE PLAN
The patient's goals for the shift include  to have adequate comfort on the 1678-8341 shift.     The clinical goals for the shift include Pt to have adequate control of pain using pain mechanical (repositioning, deep breathing) and pharmacological intervention on the 0700-1900 shift    Over the shift, the patient did not make progress toward the following goals. Pt received pain medications throughout the 0700-1900 shift. Pt used other pain intervention such as repositioning, ambulating and deep breathing.       Problem: Pain  Goal: Takes deep breaths with improved pain control throughout the shift  Outcome: Progressing  Goal: Turns in bed with improved pain control throughout the shift  Outcome: Progressing  Goal: Walks with improved pain control throughout the shift  Outcome: Progressing  Goal: Performs ADL's with improved pain control throughout shift  Outcome: Progressing  Goal: Participates in PT with improved pain control throughout the shift  Outcome: Progressing  Goal: Free from opioid side effects throughout the shift  Outcome: Progressing  Goal: Free from acute confusion related to pain meds throughout the shift  Outcome: Progressing

## 2025-01-16 LAB
ALBUMIN SERPL BCP-MCNC: 3 G/DL (ref 3.4–5)
ANION GAP SERPL CALC-SCNC: 13 MMOL/L (ref 10–20)
B19V IGG SER IA-ACNC: 1.4 IV
B19V IGM SER IA-ACNC: 0.16 IV
BACTERIA FLD CULT: NORMAL
BASOPHILS # BLD MANUAL: 0.03 X10*3/UL (ref 0–0.1)
BASOPHILS NFR BLD MANUAL: 1 %
BUN SERPL-MCNC: 32 MG/DL (ref 6–23)
CALCIUM SERPL-MCNC: 9.2 MG/DL (ref 8.6–10.6)
CELL COUNT (BLOOD): 1.61 X10*3/UL
CELL POPULATIONS: NORMAL
CHLORIDE SERPL-SCNC: 97 MMOL/L (ref 98–107)
CO2 SERPL-SCNC: 29 MMOL/L (ref 21–32)
CREAT SERPL-MCNC: 1 MG/DL (ref 0.5–1.3)
DIAGNOSIS: NORMAL
EGFRCR SERPLBLD CKD-EPI 2021: 84 ML/MIN/1.73M*2
EOSINOPHIL # BLD MANUAL: 0.07 X10*3/UL (ref 0–0.7)
EOSINOPHIL NFR BLD MANUAL: 2 %
ERYTHROCYTE [DISTWIDTH] IN BLOOD BY AUTOMATED COUNT: 13.2 % (ref 11.5–14.5)
FLOW DIFFERENTIAL: NORMAL
FLOW TEST ORDERED: NORMAL
GLUCOSE SERPL-MCNC: 79 MG/DL (ref 74–99)
GRAM STN SPEC: NORMAL
GRAM STN SPEC: NORMAL
HCT VFR BLD AUTO: 32.5 % (ref 41–52)
HGB BLD-MCNC: 11.1 G/DL (ref 13.5–17.5)
IMM GRANULOCYTES # BLD AUTO: 0.33 X10*3/UL (ref 0–0.7)
IMM GRANULOCYTES NFR BLD AUTO: 10.1 % (ref 0–0.9)
LAB TEST METHOD: NORMAL
LYMPHOCYTES # BLD MANUAL: 0.59 X10*3/UL (ref 1.2–4.8)
LYMPHOCYTES NFR BLD MANUAL: 18 %
MAGNESIUM SERPL-MCNC: 2.02 MG/DL (ref 1.6–2.4)
MCH RBC QN AUTO: 27.8 PG (ref 26–34)
MCHC RBC AUTO-ENTMCNC: 34.2 G/DL (ref 32–36)
MCV RBC AUTO: 82 FL (ref 80–100)
MONOCYTES # BLD MANUAL: 0.46 X10*3/UL (ref 0.1–1)
MONOCYTES NFR BLD MANUAL: 14 %
MYELOCYTES # BLD MANUAL: 0.03 X10*3/UL
MYELOCYTES NFR BLD MANUAL: 1 %
NEUTROPHILS # BLD MANUAL: 1.79 X10*3/UL (ref 1.2–7.7)
NEUTS BAND # BLD MANUAL: 0.73 X10*3/UL (ref 0–0.7)
NEUTS BAND NFR BLD MANUAL: 22 %
NEUTS SEG # BLD MANUAL: 1.06 X10*3/UL (ref 1.2–7)
NEUTS SEG NFR BLD MANUAL: 32 %
NRBC BLD-RTO: 3.4 /100 WBCS (ref 0–0)
NUMBER OF CELLS COLLECTED: NORMAL
PATH REPORT.TOTAL CANCER: NORMAL
PHOSPHATE SERPL-MCNC: 5.6 MG/DL (ref 2.5–4.9)
PLATELET # BLD AUTO: 64 X10*3/UL (ref 150–450)
POTASSIUM SERPL-SCNC: 4.7 MMOL/L (ref 3.5–5.3)
PROMYELOCYTES # BLD MANUAL: 0.03 X10*3/UL
PROMYELOCYTES NFR BLD MANUAL: 1 %
RBC # BLD AUTO: 3.99 X10*6/UL (ref 4.5–5.9)
RBC MORPH BLD: ABNORMAL
SCAN RESULT: ABNORMAL
SIGNATURE COMMENT: NORMAL
SODIUM SERPL-SCNC: 134 MMOL/L (ref 136–145)
SPECIMEN VIABILITY: NORMAL
TOTAL CELLS COUNTED BLD: 100
VARIANT LYMPHS # BLD MANUAL: 0.3 X10*3/UL (ref 0–0.5)
VARIANT LYMPHS NFR BLD: 9 %
WBC # BLD AUTO: 3.3 X10*3/UL (ref 4.4–11.3)

## 2025-01-16 PROCEDURE — 2500000001 HC RX 250 WO HCPCS SELF ADMINISTERED DRUGS (ALT 637 FOR MEDICARE OP)

## 2025-01-16 PROCEDURE — 88305 TISSUE EXAM BY PATHOLOGIST: CPT | Mod: TC,SUR | Performed by: STUDENT IN AN ORGANIZED HEALTH CARE EDUCATION/TRAINING PROGRAM

## 2025-01-16 PROCEDURE — 85097 BONE MARROW INTERPRETATION: CPT | Performed by: STUDENT IN AN ORGANIZED HEALTH CARE EDUCATION/TRAINING PROGRAM

## 2025-01-16 PROCEDURE — 36415 COLL VENOUS BLD VENIPUNCTURE: CPT

## 2025-01-16 PROCEDURE — 99232 SBSQ HOSP IP/OBS MODERATE 35: CPT | Performed by: STUDENT IN AN ORGANIZED HEALTH CARE EDUCATION/TRAINING PROGRAM

## 2025-01-16 PROCEDURE — 88185 FLOWCYTOMETRY/TC ADD-ON: CPT | Mod: TC | Performed by: STUDENT IN AN ORGANIZED HEALTH CARE EDUCATION/TRAINING PROGRAM

## 2025-01-16 PROCEDURE — 81450 HL NEO GSAP 5-50DNA/DNA&RNA: CPT | Performed by: STUDENT IN AN ORGANIZED HEALTH CARE EDUCATION/TRAINING PROGRAM

## 2025-01-16 PROCEDURE — 83735 ASSAY OF MAGNESIUM: CPT

## 2025-01-16 PROCEDURE — 80069 RENAL FUNCTION PANEL: CPT

## 2025-01-16 PROCEDURE — 2500000004 HC RX 250 GENERAL PHARMACY W/ HCPCS (ALT 636 FOR OP/ED)

## 2025-01-16 PROCEDURE — 2500000004 HC RX 250 GENERAL PHARMACY W/ HCPCS (ALT 636 FOR OP/ED): Performed by: ANESTHESIOLOGY

## 2025-01-16 PROCEDURE — 1100000001 HC PRIVATE ROOM DAILY

## 2025-01-16 PROCEDURE — 88237 TISSUE CULTURE BONE MARROW: CPT | Performed by: STUDENT IN AN ORGANIZED HEALTH CARE EDUCATION/TRAINING PROGRAM

## 2025-01-16 PROCEDURE — 85007 BL SMEAR W/DIFF WBC COUNT: CPT

## 2025-01-16 PROCEDURE — 88271 CYTOGENETICS DNA PROBE: CPT | Performed by: STUDENT IN AN ORGANIZED HEALTH CARE EDUCATION/TRAINING PROGRAM

## 2025-01-16 PROCEDURE — 079T3ZX DRAINAGE OF BONE MARROW, PERCUTANEOUS APPROACH, DIAGNOSTIC: ICD-10-PCS | Performed by: STUDENT IN AN ORGANIZED HEALTH CARE EDUCATION/TRAINING PROGRAM

## 2025-01-16 PROCEDURE — 85027 COMPLETE CBC AUTOMATED: CPT

## 2025-01-16 PROCEDURE — 36415 COLL VENOUS BLD VENIPUNCTURE: CPT | Performed by: STUDENT IN AN ORGANIZED HEALTH CARE EDUCATION/TRAINING PROGRAM

## 2025-01-16 PROCEDURE — 99233 SBSQ HOSP IP/OBS HIGH 50: CPT | Performed by: INTERNAL MEDICINE

## 2025-01-16 RX ORDER — GABAPENTIN 300 MG/1
300 CAPSULE ORAL NIGHTLY
Status: DISCONTINUED | OUTPATIENT
Start: 2025-01-16 | End: 2025-01-17 | Stop reason: HOSPADM

## 2025-01-16 RX ORDER — CYCLOBENZAPRINE HCL 10 MG
10 TABLET ORAL 3 TIMES DAILY PRN
Status: DISCONTINUED | OUTPATIENT
Start: 2025-01-16 | End: 2025-01-17 | Stop reason: HOSPADM

## 2025-01-16 RX ADMIN — METOPROLOL TARTRATE 50 MG: 50 TABLET, FILM COATED ORAL at 21:25

## 2025-01-16 RX ADMIN — TRAMADOL HYDROCHLORIDE 50 MG: 50 TABLET, COATED ORAL at 07:44

## 2025-01-16 RX ADMIN — ACETAMINOPHEN 975 MG: 325 TABLET ORAL at 07:45

## 2025-01-16 RX ADMIN — ASPIRIN 81 MG: 81 TABLET, COATED ORAL at 07:45

## 2025-01-16 RX ADMIN — ATORVASTATIN CALCIUM 40 MG: 40 TABLET, FILM COATED ORAL at 21:25

## 2025-01-16 RX ADMIN — POLYETHYLENE GLYCOL 3350 17 G: 17 POWDER, FOR SOLUTION ORAL at 07:45

## 2025-01-16 RX ADMIN — HYDROMORPHONE HYDROCHLORIDE 0.4 MG: 1 INJECTION, SOLUTION INTRAMUSCULAR; INTRAVENOUS; SUBCUTANEOUS at 14:30

## 2025-01-16 RX ADMIN — OXYCODONE HYDROCHLORIDE 5 MG: 5 TABLET ORAL at 13:09

## 2025-01-16 RX ADMIN — METOPROLOL TARTRATE 50 MG: 50 TABLET, FILM COATED ORAL at 08:08

## 2025-01-16 RX ADMIN — OXYCODONE HYDROCHLORIDE 5 MG: 5 TABLET ORAL at 05:12

## 2025-01-16 RX ADMIN — ACETAMINOPHEN 975 MG: 325 TABLET ORAL at 17:29

## 2025-01-16 RX ADMIN — LISINOPRIL 10 MG: 10 TABLET ORAL at 08:08

## 2025-01-16 RX ADMIN — GABAPENTIN 300 MG: 300 CAPSULE ORAL at 21:25

## 2025-01-16 RX ADMIN — OXYCODONE HYDROCHLORIDE 5 MG: 5 TABLET ORAL at 21:25

## 2025-01-16 ASSESSMENT — COGNITIVE AND FUNCTIONAL STATUS - GENERAL
DAILY ACTIVITIY SCORE: 24
MOBILITY SCORE: 24

## 2025-01-16 ASSESSMENT — PAIN SCALES - GENERAL
PAINLEVEL_OUTOF10: 7
PAINLEVEL_OUTOF10: 7
PAINLEVEL_OUTOF10: 8
PAINLEVEL_OUTOF10: 2
PAINLEVEL_OUTOF10: 5 - MODERATE PAIN
PAINLEVEL_OUTOF10: 0 - NO PAIN
PAINLEVEL_OUTOF10: 7
PAINLEVEL_OUTOF10: 2
PAINLEVEL_OUTOF10: 8

## 2025-01-16 ASSESSMENT — PAIN DESCRIPTION - ORIENTATION
ORIENTATION: LOWER

## 2025-01-16 ASSESSMENT — PAIN - FUNCTIONAL ASSESSMENT
PAIN_FUNCTIONAL_ASSESSMENT: 0-10

## 2025-01-16 ASSESSMENT — PAIN DESCRIPTION - LOCATION
LOCATION: BACK

## 2025-01-16 NOTE — NURSING NOTE
Geriatric Nursing Rounds summary follow up  Bone marrow today  Age friendly  Mentation cam neg  Mobility up walking  Meds tramadol for pain

## 2025-01-16 NOTE — PROGRESS NOTES
Bijan Ibanez is a 64 y.o. male on day 5 of admission presenting with Hyponatremia.      Subjective   NAEO. Patient seen and examined at the bedside this AM. Reports worsening pain today, especially in thighs, describes as sore. States that current pain regimen isn't helping as much as it has been. Otherwise denies CP, SOB, abdominal pain, dizziness, n/v, dysuria, diarrhea/constipation.       Objective     Last Recorded Vitals  /75   Pulse (!) 114   Temp 36.1 °C (97 °F) (Temporal)   Resp 18   Wt 107 kg (236 lb)   SpO2 94%   Intake/Output last 3 Shifts:    Intake/Output Summary (Last 24 hours) at 1/16/2025 1416  Last data filed at 1/16/2025 0900  Gross per 24 hour   Intake 600 ml   Output 0 ml   Net 600 ml       Admission Weight  Weight: 107 kg (236 lb) (01/10/25 1929)    Daily Weight  01/13/25 : 107 kg (236 lb)    Image Results  Bronchoscopy Diagnostic, Navigational, Therapeutic, w BAL, w EBUS  Addendum: Bronchoscopy Operative Report   [facility]     Date of procedure: 01/13/25   Patient: Bijan Ibanez   MRN: [id number]    YOB: 1960   Gender: male   Referring provider/physician: Dr. MARIA ELENA Rodriguez (inpatient procedure,  Pulmonary Medicine Consult)     PRE-PROCEDURE DIAGNOSIS:   Intra-thoracic lymphadenopathy, small pulmonary nodules         PRE-PROCEDURE EVALUATION: A history and physical has been performed,  and patient medication allergies have been reviewed. The patient's  tolerance of previous anesthesia has been reviewed. The risks and benefits  of the procedure and the sedation options and risks were discussed with  the patient or their designee. All questions were answered and informed  consent obtained.      INDICATION: Obtain diagnosis and Staging     BRONCHOSCOPIST:                 Modesto Martines MD   First Assistant: None   Second Assistant: None     PROCEDURE SUMMARY:   Event Event Time    ENDO SCOPE IN TIME   ENDO SCOPE OUT TIME 01/13/2025  12:11 PM   1/13/2025  1:02 PM                     Fluoroscopy time: Not applicable     POST-PROCEDURE DIAGNOSIS:   Same as pre-operative diagnoses     ANESTHESIA:   TIVA. See separate anesthesia provider documentation. This procedure was  performed using standard monitoring procedures in [facility]  [facility].     FINDINGS:   After adequate local and intravenous anesthesia, bronchoscopy was  performed via an endotracheal tube placed by anesthesia. The distal  trachea appeared normal. Inspection of RIGHT bronchial tree to the  segmental level appeared normal. Inspection of LEFT bronchial tree to the  segmental level appeared normal.      PROCEDURES:   The bronchoscope was wedged into the LLL.  160 ml of normal saline was  instilled, 27 ml was recovered. Please see Specimen section for ordered  laboratory tests. Due to poor return (expected for LLL BAL), cytology was  not ordered from the BAL.     After the airway examination was completed, the scope was then replaced by  EBUS scope. Lymph node sizing was performed via endobronchial ultrasound.  Sampling by transbronchial needle    aspiration was also performed using an Olympus ViziShot 22 gauge needle  and sent for routine cytology.      Rapid On-Site Evaluation (JOSE CARLOS) was available           - The 11Ri (inferior interlobar) node was not evaluated. Sampling was  not done as the node was not evaluated.        - The 11Rs (superior interlobar) node was not evaluated. Sampling was  not done as the node was not evaluated.        - The 10R (hilar) node was not evaluated. Sampling was not done as the  node was not evaluated.        - The 4R (lower paratracheal) node was 3.9 mm in size. Sampling was  not done as it was not clinically indicated.        - The 2R (upper paratracheal) node was not visualized. Sampling was  not done as the node was not visualized.        - The 3P (retrotracheal) node was not evaluated. Sampling was not done  as the node was  not evaluated.    -  The 7 (subcarinal) node was 3.4 mm in size. Sampling was not done as it  was not clinically indicated.        - The 2L (upper paratracheal) node was not visualized. Sampling was  not done as the node was not visualized.        - The 4L (lower paratracheal) node was 2.9 mm in size. Sampling was  not done as it was not clinically indicated.        - The 10L (hilar) node was not visualized. Sampling was not done as  the node was not visualized.        - The 11L (interlobar) node was 8.6 mm in size. Sampling was done, 4  samples with the needle were obtained.        - The 12L node was 11.2 mm in size. Sampling was done, 8 samples with  the needle were obtained, including 2 for RPMI (stand by) and one for  cultures.     After diagnostic/therapeutic maneuvers, the airway was examined for  evidence of bleeding. None was noted. The bronchoscope was removed from  the patient's airway and the airway was handed back over to my colleagues  from anesthesiology.      SPECIMENS:    ID Type Source Tests Collected by Time    1 : BAL LEFT LOWER LOBE Respiratory BAL BAL VIRAL PANEL PCR, FUNGITELL  BETA-D GLUCAN PCR QUANT (NON-BLOOD SPECIMEN) Modesto Martines MD  1/13/2025 1209    2 : BAL LEFT LOWER LOBE Fluid BRONCHO-ALVEOLAR LAVAGE OF LEFT LOWER LOBE  AFB CULTURE/SMEAR, FUNGAL CULTURE/SMEAR, STERILE FLUID CULTURE/SMEAR  Modesto Martines MD 1/13/2025 1210    3 : BAL LEFT LOWER LOBE Bronchoalveolar Lavage BRONCHO-ALVEOLAR LAVAGE OF  LEFT LOWER LOBE BODY FLUID CELL COUNT WITH DIFFERENTIAL Modesto Martines MD 1/13/2025 1210    4 : LN 12L FOR CULTURE Tissue LYMPH NODE BIOPSY AFB CULTURE/SMEAR, FUNGAL  CULTURE/SMEAR, TISSUE/WOUND CULTURE/SMEAR Modesto Martines MD 1/13/2025  1301    A :  Non-Gynecologic Cytology LYMPH NODE 11 L PULMONARY FINE NEEDLE  ASPIRATION CYTOLOGY CONSULTATION (NON-GYNECOLOGIC) Modesto Martines MD  1/13/2025 1230    B :  Non-Gynecologic Cytology LYMPH NODE 12 L PULMONARY FINE  NEEDLE  ASPIRATION CYTOLOGY CONSULTATION (NON-GYNECOLOGIC) Modesto Martines MD  1/13/2025 1232      RAPID ONSITE EVALUATION (R.O.S.E.):   - Stations 11L and 12L TBNA : lymphoid      COMPLICATIONS: None.        EBL: Minimal, <5 ml        POST PROCEDURE CHEST RADIOGRAPH: Not needed        SUMMARY:   - Normal airway examination.   - A BAL was performed in the LLL, see Specimen section for ordered  laboratory tests.   - A diagnostic and staging EBUS was performed. Stations 11L and 12L were  sampled. Please see above for rapid on-site evaluation results, final  results are pending. Samples from 12L were placed into RPMI (for stand by)  and cultures.         RECOMMENDATIONS:   Follow up with referring physician    Await BAL and cytology results      The patient was transported to the recovery area/PACU in stable  condition.     I, Modesto Martines MD, was personally present throughout this  procedure, including all key and non-key portions.     Date: 01/13/25 Time: 1:23 PM     (Images obtained during this procedure, including ultrasonographic images  if performed, can be found in within the electronic medical record and/or  PACS.)  Narrative: Table formatting from the original result was not included.  Images from the original result were not included.    Bronchoscopy Operative Report  [facility]    Date of procedure: 01/13/25  Patient: Bijan Ibanez  MRN: [id number]   YOB: 1960  Gender: male  Referring provider/physician: Dr. MARIA ELENA Rodriguez (inpatient procedure,   Pulmonary Medicine Consult)    PRE-PROCEDURE DIAGNOSIS:  Intra-thoracic lymphadenopathy, small pulmonary nodules        PRE-PROCEDURE EVALUATION: A history and physical has been performed,   and patient medication allergies have been reviewed. The patient's   tolerance of previous anesthesia has been reviewed. The risks and benefits   of the procedure and the sedation options and risks were discussed with    the patient or their designee. All questions were answered and informed   consent obtained.     INDICATION: Obtain diagnosis and Staging    BRONCHOSCOPIST:                Modesto Martines MD  First Assistant: None  Second Assistant: None    PROCEDURE SUMMARY:  Event Event Time   ENDO SCOPE IN TIME  ENDO SCOPE OUT TIME 01/13/2025 12:11 PM  1/13/2025  1:02 PM                   Fluoroscopy time: Not applicable    POST-PROCEDURE DIAGNOSIS:  Same as pre-operative diagnoses    ANESTHESIA:  TIVA. See separate anesthesia provider documentation. This procedure was   performed using standard monitoring procedures in [facility]   [facility].    FINDINGS:  After adequate local and intravenous anesthesia, bronchoscopy was   performed via an endotracheal tube placed by anesthesia. The distal   trachea appeared normal. Inspection of RIGHT bronchial tree to the   segmental level appeared normal. Inspection of LEFT bronchial tree to the   segmental level appeared normal.     PROCEDURES:  The bronchoscope was wedged into the LLL.  160 ml of normal saline was   instilled, 27 ml was recovered. Please see Specimen section for ordered   laboratory tests. Due to poor return (expected for LLL BAL), cytology was   not ordered from the BAL.    After the airway examination was completed, the scope was then replaced by   EBUS scope. Lymph node sizing was performed via endobronchial ultrasound.   Sampling by transbronchial needle   aspiration was also performed using an Olympus ViziShot 22 gauge needle   and sent for routine cytology.     Rapid On-Site Evaluation (JOSE CARLOS) was available         - The 11Ri (inferior interlobar) node was not evaluated. Sampling was   not done as the node was not evaluated.       - The 11Rs (superior interlobar) node was not evaluated. Sampling was   not done as the node was not evaluated.       - The 10R (hilar) node was not evaluated. Sampling was not done as the   node was not evaluated.        - The 4R (lower paratracheal) node was  3.9 mm in size. Sampling was   not done as it was not clinically indicated.       - The 2R (upper paratracheal) node was not visualized. Sampling was   not done as the node was not visualized.       - The 3P (retrotracheal) node was not evaluated. Sampling was not done   as the node was not evaluated.   -  The 7 (subcarinal) node was  3.4 mm in size. Sampling was not done as   it was not clinically indicated.       - The 2L (upper paratracheal) node was not visualized. Sampling was   not done as the node was not visualized.       - The 4L (lower paratracheal) node was  2.9 mm in size. Sampling was   not done as it was not clinically indicated.       - The 10L (hilar) node was not visualized. Sampling was not done as   the node was not visualized.       - The 11L (interlobar) node was  8.6 mm in size. Sampling was done, 4   samples with the needle were obtained.       - The 12L node was  11.2 mm in size. Sampling was done, 8 samples with   the needle were obtained, including 2 for RPMI (stand by) and one for   cultures.    After diagnostic/therapeutic maneuvers, the airway was examined for   evidence of bleeding. None was noted. The bronchoscope was removed from   the patient's airway and the airway was handed back over to my colleagues   from anesthesiology.     SPECIMENS:   ID Type Source Tests Collected by Time   1 : BAL LEFT LOWER LOBE Respiratory BAL BAL VIRAL PANEL PCR, FUNGITELL   BETA-D GLUCAN PCR QUANT (NON-BLOOD SPECIMEN) Modesto Martines MD   1/13/2025 1209   2 : BAL LEFT LOWER LOBE Fluid BRONCHO-ALVEOLAR LAVAGE OF LEFT LOWER LOBE   AFB CULTURE/SMEAR, FUNGAL CULTURE/SMEAR, STERILE FLUID CULTURE/SMEAR   Modesto Martines MD 1/13/2025 1210   3 : BAL LEFT LOWER LOBE Bronchoalveolar Lavage BRONCHO-ALVEOLAR LAVAGE OF   LEFT LOWER LOBE BODY FLUID CELL COUNT WITH DIFFERENTIAL Modesto Martines MD 1/13/2025 1210   4 : LN 12L FOR CULTURE Tissue LYMPH NODE  BIOPSY AFB CULTURE/SMEAR, FUNGAL   CULTURE/SMEAR, TISSUE/WOUND CULTURE/SMEAR Modesto Martines MD 1/13/2025   1301   A :  Non-Gynecologic Cytology LYMPH NODE 11 L PULMONARY FINE NEEDLE   ASPIRATION CYTOLOGY CONSULTATION (NON-GYNECOLOGIC) Modesto Martines MD   1/13/2025 1230   B :  Non-Gynecologic Cytology LYMPH NODE 12 L PULMONARY FINE NEEDLE   ASPIRATION CYTOLOGY CONSULTATION (NON-GYNECOLOGIC) Modesto Martines MD   1/13/2025 1232     RAPID ONSITE EVALUATION (R.O.S.E.):  - Stations 11L and 12L TBNA  : lymphoid     COMPLICATIONS: None.       EBL: Minimal, <5 ml       POST PROCEDURE CHEST RADIOGRAPH: Not needed       SUMMARY:  - Normal airway examination.  - A BAL was performed in the LLL, see Specimen section for ordered   laboratory tests.  - A diagnostic and staging EBUS was performed. Stations 11L and 12L were   sampled. Please see above for rapid on-site evaluation results, final   results are pending. Samples from 12L were placed into RPMI (for stand by)   and cultures.        RECOMMENDATIONS:  Follow up with referring physician   Await BAL and cytology results     The patient was transported to the recovery area/PACU in stable condition.    I, Modesto Martines MD, was personally present throughout this   procedure, including all key and non-key portions.    Date: 01/13/25 Time: 1:23 PM    (Images obtained during this procedure, including ultrasonographic images   if performed, can be found in within the electronic medical record and/or   PACS.)      Physical Exam  Constitutional:       Appearance: Normal appearance. He is not ill-appearing.   HENT:      Head: Normocephalic and atraumatic.   Cardiovascular:      Rate and Rhythm: Regular rhythm. Tachycardia present.      Heart sounds: No murmur heard.     No friction rub. No gallop.   Pulmonary:      Effort: Pulmonary effort is normal. No respiratory distress.      Breath sounds: No wheezing, rhonchi or rales.   Abdominal:      General: Abdomen is  flat. There is no distension.      Palpations: Abdomen is soft.      Tenderness: There is no abdominal tenderness.   Musculoskeletal:      Lumbar back: Tenderness present.      Right lower leg: No edema.      Left lower leg: No edema.   Neurological:      Mental Status: He is alert.         Relevant Results               Assessment/Plan      Assessment & Plan  Hyponatremia    Chronic midline low back pain without sciatica    Pancytopenia    This is a 64 yr old male with history of CAD (s/p stent 2010) on ASA, Renal cell carcinoma s/p R partial nephrectomy in 2013 @ [facility] (saw [facility]rology at [facility] in 2017), HTN, HLD, GERD, BPH and arthritis who reported to the [facility] ED today per recs of his primary physician regarding abnormal imaging. MRI lumbar spine with a possible mass between T12 and L1 measuring  39 x 17 mm that may represent nerve sheath tumor. Recent history of 16 pound unintentional weight loss and night sweats over the past 2-3 weeks. Initial workup revealing pancytopenia, hyponatremia, and right-sided facial swelling. CT C/A/P 01/11 suggestive of possible local RCC reoccurrence with mets to lung VS lung primary, now s/p EBUS with biopsy.     Update 1/12:  -Per Pulm: will consider biopsy an axillary lymph node if possible vs  EBUS.    -Consulted Hematology for Pancytopenia   -Concern for possible HLA. H Score 170 (40-54 % Probability of hemophagocytic syndrome)     Update 1/13:  -Per Pulm: plan for add-on EBUS-TBNA with BAL today, sending fungal/berylliosis work up  -Per Hematology: Pancytopenia likely 2/2 underlying malignancy via bone marrow involvement vs. HLH, suggest work up for HLH     Update 1/14:  -s/p EBUS with biopsy/BAL on 1/13, preliminary bedside pathology showed lymphoid tissue.     Update 1/15:  -pancytopenia stable, hematology considering bone marrow biopsy in AM 1/16  -Viral/fungal/bacterial BAL studies negative so far    Update 1/16:  -s/p bone marrow biopsy 1/16  -CXCL9 elevated, now meets at  "least 5/9 criteria for HLH      #Hx of RCC s/p R partial nephrectomy in 2013  #L hilar mass s/p EBUS-TBNA  #R renal mass  #C/f reoccurrence of RCC with mets to lung VS lung primary  -Pt with history of RCC s/p resection (2013 [facility]) with clean margins  -CT PE and CT A/P ordered and performed on  revealin.  Ill-defined hypodense infiltrative masslike foci involving the right kidney, largest measuring 3.0 x 2.9 cm in partially effacing the anterior right renal pelvis. There is an additional infiltrative lesion located at the superior aspect of the right kidney which measures 3.7 x 2 point 7 cm and closely abuts the inferior aspect of the right hepatic lobe. Primary differential considerations would include primary renal neoplasm such as renal cell carcinoma versus metastatic disease.  2. Asymmetric nodular masslike thickening of the right adrenal gland measuring 2.6 x 1.8 cm. Findings concerning for metastatic disease.  3. 4.7 x 2.1 cm soft tissue mass in the right anterolateral aspect of the paraspinous tissues at T12-L1, previously characterized on MR of the lumbar spine from 01/10/2025. There are additional multiple scattered abdominopelvic lymph nodes, not significantly enlarged per CT size criteria.  4. Please see dedicated CT of the chest from 2025 for further characterization of multiple scattered pulmonary nodules involving the left lung base and left pleura.  5. Mild splenomegaly.  -Pt states he was told about some \"lung spots\" in the past, upon chart review it appears as though LD CT done at [facility] from 2024 showed:  -Multiple subcentimeter noncalcified pulmonary nodules, similar to prior. No new or enlarging nodule identified.   -s/p EBUS with pulmonology  with biopsy and BAL, pathology pending  -Viral work up, histoplasma, and aspergillus BAL unremarkable     Plan:  -Pulmonology consulted, appreciate recommendations:              -s/p EBUS-TBNA and BAL               " -Official biopsy and BAL results pending              -Serum beryllium screen pending              -Fungal cultures pending  -Consulted Urology for evaluation of possible recurrence of RCC and further inpatient evaluation / est outpt care              -recommend med onc consult following tissue diagnosis  -Contacted [facility] for full LD CT images  -Referral placed for diagnostic clinic  -Heme following for pancytopenia, s/p bone marrow biopsy 1/16     #Hyponatremia, hypoosmolality, improved  -Na 124-125 on presentation  - S/p 1L NS bolus, decreased to 123  -Pt reports poor oral intake since feeling ill with UR symptoms around Val              -Home Lisinopril and triamterene-HCTZ  -Serum osmolality, slightly low at 270  -Urine osmolality, 616  -Urine electrolytes, showing decreased Na output <10  -Total input 1.2 L 1/13     Plan:  -CTM RFP daily  -Continue fluid restriction to 1.5L  -Hold home triamterene        #Pancytopenia  #HLH  - Initial Hb 12.9, WBC 4.0, PLT 72              -Upon review of recent labs from 3 months ago in [facility] system and [facility] month, this is an acute change  -Initially c/f acute viral infection affecting all cell lines, which could be contributing, but per above may have new/recurrent cancer  -URI Viral studies, fungal studies negative so far  -Iron studies:              -Ferritin >9,000              -Iron 120              -Transferrin 156  -Retic count normal, but low in setting of acute anemia  -CRP 14.67  -s/p 1 unit platelets administered 1/13 for platelets <50 (48) prior to procedure  -Concern for HLH, H score 170 (elevated ferritin, pancytopenia, splenomegaly) vs. Bone marrow involvement of potential malignancy  -Repeat triglyceride level 154 (down from 267)  -UPEP positive for protein, no monoclonal spikes  -Christina-2 receptor elevated at 7724.4  -CXCL9 elevated to 59,363  -SPEP without monoclonal spike     Plan:  -CTM with daily CBC w/diff  -Hematology consulted for  evaluation of pancytopenia and potential HLH              -serum free light chains, and quant immunoglobulins pending   -s/p bone marrow biopsy 1/16   -pending bone marrow/EBUS biopsy results     #Back pain, chronic with acute worsening  #c/f thoracolumbar soft tissue mass, new  #Degenerative lumbar spondylosis  - MRI lumbar spine: T12 and L1 levels in the right anterolateral para spinous tissues is a masslike structure that measures 39 x 17 mm greatest axial dimensions, 39 mm cc and may represent a soft tissue mass  - CT head read pending (ordered in ED), negative for concerning findings  - Ortho spine consulted, recommended:  - Tumor work-up: Consider getting MRI of abdomen per radiology. Consider CT abdomen chest and pelvis to determine primary   -tachycardia in AM 1/16 likely 2/2 concurrent pain     Plan  -Ortho spine consulted 1/11, recommended:  - Tumor work-up: Consider getting MRI of abdomen per radiology  -Continue pain regimen, encourage use of PRN medications              -scheduled tylenol 975 mg Q 8 hrs              -Tramadol 50 mg Q 6 hrs for moderate pain PRN              -Oxycodone 5 mg Q 6 hrs for severe pain PRN              -Increase Flexeril to 10 mg TID PRN   -Add gabapentin 300 mg PRN in evening, increase as needed/tolerated  -IP/OP PT referral placed  -Continue to monitor tachycardia     Chronic disease history     #CAD s/p stent 2010  - (Last Echo Feb 2016) -->Estimated ejection fraction is 65-69%   - Home medications: ASA 81 mg daily   Plan:  - Continue ASA 81 mg daily      #HTN  - Home medications: metoprolol succinate 50 mg BID, Lisinopril 10 mg daily  Plan:  -Continue metoprolol and lisinopril  -Continue holding triamterene     #HLD  - Home medications: Atorvastatin 40 mg daily  Plan:  -Continue home medication      #BPH   - No home medications, no acute urinary symptoms at this time   -PSA 4.63 11/2024, repeat 9.73 on 1/10  -Per urology, plan to f/u [facility]  Plan:  -CTM   -F/U outpatient      Fluids: Replete PRN  Electrolytes: Keep mg >2, phos >3  and K >4  Nutrition: Adult regular with fluid restriction (1.5L daily)  Antimicrobials: none  DVT PPX:DVT: SCDs and ambulate as tolerated  GI ppx: n/a  Bowel care:Miralax  Catheter:None  Lines:PIV  Oxygen:Room Air        JIGNA LACEY, MS4

## 2025-01-16 NOTE — CARE PLAN
The patient's goals for the shift include      The clinical goals for the shift include pain control    Over the shift, the patient did some make progress toward the following goals. Barriers to progression include . Recommendations to address these barriers remind pt when to seek and other ways to relieve/manage pain.

## 2025-01-17 ENCOUNTER — TELEPHONE (OUTPATIENT)
Dept: HEMATOLOGY/ONCOLOGY | Facility: HOSPITAL | Age: 65
End: 2025-01-17
Payer: MEDICARE

## 2025-01-17 ENCOUNTER — PHARMACY VISIT (OUTPATIENT)
Dept: PHARMACY | Facility: CLINIC | Age: 65
End: 2025-01-17
Payer: COMMERCIAL

## 2025-01-17 VITALS
TEMPERATURE: 97.5 F | BODY MASS INDEX: 31.28 KG/M2 | SYSTOLIC BLOOD PRESSURE: 107 MMHG | WEIGHT: 236 LBS | HEIGHT: 73 IN | HEART RATE: 97 BPM | DIASTOLIC BLOOD PRESSURE: 72 MMHG | OXYGEN SATURATION: 94 % | RESPIRATION RATE: 17 BRPM

## 2025-01-17 LAB
ACID FAST STN SPEC: NORMAL
ALBUMIN SERPL BCP-MCNC: 3 G/DL (ref 3.4–5)
ALP SERPL-CCNC: 124 U/L (ref 33–136)
ALT SERPL W P-5'-P-CCNC: 37 U/L (ref 10–52)
ANION GAP SERPL CALC-SCNC: 15 MMOL/L (ref 10–20)
AST SERPL W P-5'-P-CCNC: 51 U/L (ref 9–39)
B DERMAT AB TITR SER: NEGATIVE {TITER}
BASOPHILS # BLD MANUAL: 0.07 X10*3/UL (ref 0–0.1)
BASOPHILS NFR BLD MANUAL: 2.5 %
BILIRUB SERPL-MCNC: 1.6 MG/DL (ref 0–1.2)
BUN SERPL-MCNC: 31 MG/DL (ref 6–23)
CALCIUM SERPL-MCNC: 9 MG/DL (ref 8.6–10.6)
CHLORIDE SERPL-SCNC: 96 MMOL/L (ref 98–107)
CO2 SERPL-SCNC: 27 MMOL/L (ref 21–32)
CREAT SERPL-MCNC: 0.84 MG/DL (ref 0.5–1.3)
EGFRCR SERPLBLD CKD-EPI 2021: >90 ML/MIN/1.73M*2
EOSINOPHIL # BLD MANUAL: 0 X10*3/UL (ref 0–0.7)
EOSINOPHIL NFR BLD MANUAL: 0 %
ERYTHROCYTE [DISTWIDTH] IN BLOOD BY AUTOMATED COUNT: 13.3 % (ref 11.5–14.5)
FUNGUS SPEC CULT: NORMAL
FUNGUS SPEC CULT: NORMAL
FUNGUS SPEC FUNGUS STN: NORMAL
FUNGUS SPEC FUNGUS STN: NORMAL
GLUCOSE SERPL-MCNC: 81 MG/DL (ref 74–99)
HCT VFR BLD AUTO: 32.1 % (ref 41–52)
HGB BLD-MCNC: 10.6 G/DL (ref 13.5–17.5)
IMM GRANULOCYTES # BLD AUTO: 0.35 X10*3/UL (ref 0–0.7)
IMM GRANULOCYTES NFR BLD AUTO: 13 % (ref 0–0.9)
LAB AP ASR DISCLAIMER: NORMAL
LABORATORY COMMENT REPORT: NORMAL
LABORATORY COMMENT REPORT: NORMAL
LYMPHOCYTES # BLD MANUAL: 0.43 X10*3/UL (ref 1.2–4.8)
LYMPHOCYTES NFR BLD MANUAL: 16 %
MAGNESIUM SERPL-MCNC: 2.06 MG/DL (ref 1.6–2.4)
MCH RBC QN AUTO: 27 PG (ref 26–34)
MCHC RBC AUTO-ENTMCNC: 33 G/DL (ref 32–36)
MCV RBC AUTO: 82 FL (ref 80–100)
METAMYELOCYTES # BLD MANUAL: 0.07 X10*3/UL
METAMYELOCYTES NFR BLD MANUAL: 2.5 %
MONOCYTES # BLD MANUAL: 0.18 X10*3/UL (ref 0.1–1)
MONOCYTES NFR BLD MANUAL: 6.7 %
MYCOBACTERIUM SPEC CULT: NORMAL
MYELOCYTES # BLD MANUAL: 0.09 X10*3/UL
MYELOCYTES NFR BLD MANUAL: 3.4 %
NEUTROPHILS # BLD MANUAL: 1.65 X10*3/UL (ref 1.2–7.7)
NEUTS BAND # BLD MANUAL: 0.34 X10*3/UL (ref 0–0.7)
NEUTS BAND NFR BLD MANUAL: 12.6 %
NEUTS SEG # BLD MANUAL: 1.31 X10*3/UL (ref 1.2–7)
NEUTS SEG NFR BLD MANUAL: 48.7 %
NRBC BLD-RTO: 4.4 /100 WBCS (ref 0–0)
PATH REPORT.FINAL DX SPEC: NORMAL
PATH REPORT.GROSS SPEC: NORMAL
PATH REPORT.INTRAOP OBS SPEC DOC: NORMAL
PATH REPORT.TOTAL CANCER: NORMAL
PHOSPHATE SERPL-MCNC: 6 MG/DL (ref 2.5–4.9)
PLATELET # BLD AUTO: 57 X10*3/UL (ref 150–450)
POTASSIUM SERPL-SCNC: 5 MMOL/L (ref 3.5–5.3)
PROMYELOCYTES # BLD MANUAL: 0.02 X10*3/UL
PROMYELOCYTES NFR BLD MANUAL: 0.9 %
PROT SERPL-MCNC: 5.2 G/DL (ref 6.4–8.2)
RBC # BLD AUTO: 3.92 X10*6/UL (ref 4.5–5.9)
RBC MORPH BLD: ABNORMAL
RESIDENT REVIEW: NORMAL
SCAN RESULT: NORMAL
SODIUM SERPL-SCNC: 133 MMOL/L (ref 136–145)
TOTAL CELLS COUNTED BLD: 119
VARIANT LYMPHS # BLD MANUAL: 0.18 X10*3/UL (ref 0–0.5)
VARIANT LYMPHS NFR BLD: 6.7 %
WBC # BLD AUTO: 2.7 X10*3/UL (ref 4.4–11.3)

## 2025-01-17 PROCEDURE — RXMED WILLOW AMBULATORY MEDICATION CHARGE

## 2025-01-17 PROCEDURE — 2500000001 HC RX 250 WO HCPCS SELF ADMINISTERED DRUGS (ALT 637 FOR MEDICARE OP)

## 2025-01-17 PROCEDURE — 97165 OT EVAL LOW COMPLEX 30 MIN: CPT | Mod: GO

## 2025-01-17 PROCEDURE — 36415 COLL VENOUS BLD VENIPUNCTURE: CPT | Performed by: STUDENT IN AN ORGANIZED HEALTH CARE EDUCATION/TRAINING PROGRAM

## 2025-01-17 PROCEDURE — 2500000004 HC RX 250 GENERAL PHARMACY W/ HCPCS (ALT 636 FOR OP/ED)

## 2025-01-17 PROCEDURE — 97535 SELF CARE MNGMENT TRAINING: CPT | Mod: GO

## 2025-01-17 PROCEDURE — 85007 BL SMEAR W/DIFF WBC COUNT: CPT | Performed by: STUDENT IN AN ORGANIZED HEALTH CARE EDUCATION/TRAINING PROGRAM

## 2025-01-17 PROCEDURE — 85027 COMPLETE CBC AUTOMATED: CPT | Performed by: STUDENT IN AN ORGANIZED HEALTH CARE EDUCATION/TRAINING PROGRAM

## 2025-01-17 PROCEDURE — 84075 ASSAY ALKALINE PHOSPHATASE: CPT

## 2025-01-17 PROCEDURE — 84100 ASSAY OF PHOSPHORUS: CPT

## 2025-01-17 PROCEDURE — 83735 ASSAY OF MAGNESIUM: CPT

## 2025-01-17 PROCEDURE — 99239 HOSP IP/OBS DSCHRG MGMT >30: CPT | Performed by: INTERNAL MEDICINE

## 2025-01-17 RX ORDER — LIDOCAINE 560 MG/1
1 PATCH PERCUTANEOUS; TOPICAL; TRANSDERMAL DAILY
Qty: 5 PATCH | Refills: 0 | Status: ON HOLD | OUTPATIENT
Start: 2025-01-18 | End: 2025-01-28 | Stop reason: ALTCHOICE

## 2025-01-17 RX ORDER — CYCLOBENZAPRINE HCL 10 MG
10 TABLET ORAL 3 TIMES DAILY PRN
Qty: 30 TABLET | Refills: 0 | Status: CANCELLED | OUTPATIENT
Start: 2025-01-17

## 2025-01-17 RX ORDER — OXYCODONE HYDROCHLORIDE 5 MG/1
5 TABLET ORAL EVERY 6 HOURS PRN
Qty: 15 TABLET | Refills: 0 | Status: ON HOLD | OUTPATIENT
Start: 2025-01-17

## 2025-01-17 RX ORDER — GABAPENTIN 300 MG/1
300 CAPSULE ORAL NIGHTLY
Qty: 30 CAPSULE | Refills: 0 | Status: ON HOLD | OUTPATIENT
Start: 2025-01-17

## 2025-01-17 RX ORDER — TIZANIDINE 2 MG/1
2 TABLET ORAL EVERY 6 HOURS PRN
Qty: 30 TABLET | Refills: 0 | Status: SHIPPED | OUTPATIENT
Start: 2025-01-17 | End: 2025-01-26 | Stop reason: HOSPADM

## 2025-01-17 RX ADMIN — POLYETHYLENE GLYCOL 3350 17 G: 17 POWDER, FOR SOLUTION ORAL at 08:54

## 2025-01-17 RX ADMIN — ACETAMINOPHEN 975 MG: 325 TABLET ORAL at 08:54

## 2025-01-17 RX ADMIN — OXYCODONE HYDROCHLORIDE 5 MG: 5 TABLET ORAL at 11:16

## 2025-01-17 RX ADMIN — ASPIRIN 81 MG: 81 TABLET, COATED ORAL at 08:54

## 2025-01-17 RX ADMIN — CYCLOBENZAPRINE 10 MG: 10 TABLET, FILM COATED ORAL at 10:22

## 2025-01-17 RX ADMIN — ACETAMINOPHEN 975 MG: 325 TABLET ORAL at 01:31

## 2025-01-17 RX ADMIN — METOPROLOL TARTRATE 50 MG: 50 TABLET, FILM COATED ORAL at 08:54

## 2025-01-17 RX ADMIN — LISINOPRIL 10 MG: 10 TABLET ORAL at 08:54

## 2025-01-17 RX ADMIN — OXYCODONE HYDROCHLORIDE 5 MG: 5 TABLET ORAL at 05:22

## 2025-01-17 ASSESSMENT — COGNITIVE AND FUNCTIONAL STATUS - GENERAL
HELP NEEDED FOR BATHING: A LITTLE
PERSONAL GROOMING: A LITTLE
DAILY ACTIVITIY SCORE: 24
DAILY ACTIVITIY SCORE: 20
TOILETING: A LITTLE
MOBILITY SCORE: 24
DRESSING REGULAR LOWER BODY CLOTHING: A LITTLE

## 2025-01-17 ASSESSMENT — PAIN SCALES - GENERAL
PAINLEVEL_OUTOF10: 8
PAINLEVEL_OUTOF10: 7
PAINLEVEL_OUTOF10: 3
PAINLEVEL_OUTOF10: 3

## 2025-01-17 ASSESSMENT — PAIN - FUNCTIONAL ASSESSMENT
PAIN_FUNCTIONAL_ASSESSMENT: 0-10

## 2025-01-17 ASSESSMENT — ACTIVITIES OF DAILY LIVING (ADL)
BATHING_ASSISTANCE: STAND BY
HOME_MANAGEMENT_TIME_ENTRY: 9

## 2025-01-17 NOTE — DISCHARGE INSTRUCTIONS
Mr. Ibanez,    You were admitted to [facility] on January 11th with concerns for lower back pain and a potential mass on your spine. During your stay you were found to have low blood counts, a new mass in your left lung, and a new mass in your right kidney. While you were admitted, you had a lung scope and biopsy performed to help diagnose your lung mass and we are still waiting on the official results from the test. You also had a bone marrow biopsy to help diagnose why your blood counts are low and we are still waiting on the results from that test. You were treated with one transfusion of platelets for your low blood count. You were also treated with a muscle relaxer, a nerve pain medication, a lidocaine patch, and two different narcotic medications for your pain. We also treated your electrolyte deficiency by stopping your home triamterene (water pill) and by restricting your fluid intake. You are being discharged in satisfactory condition. You will stop taking your triamterene-hydrochlorothiazide (Maxzide) pill. You will continue taking your home baby aspirin, Lipitor, lisinopril, Toprol, and Tylenol. You will also be sent home with prescriptions for gabapentin (nerve medication), oxycodone, tizanidine (muscle relaxer), and lidocaine patches for your back pain. You will follow up with the hematology doctors in their clinic for your bone marrow biopsy results. You will also go to the [facility] diagnostic clinic to coordinate your care. You will also need to follow up with Dr. Murphy (your primary care physician) after discharge.

## 2025-01-17 NOTE — POST-PROCEDURE NOTE
Bone Marrow Biopsy    The patient was placed in lateral decubitus position. The left posterior superior iliac spine was palpated and marked. The skin overlying the site was marked and prepped in sterile fashion using chlorhexidine and sterile drape. Anesthesia was obtained using 1% lidocaine solution. An incision was made into the dermis and Jamshidi needle was advanced to the bone cortex. Approximately 1cc blood was aspirated to assess for spicules, then additional aspiration samples were collected. The Jamshidi was further advanced into the bone cavity, and a core biopsy obtained. Pressure was maintained to the biopsy site, and no bleeding was noted.

## 2025-01-17 NOTE — CARE PLAN
The patient's goals for the shift include being discharged home today.      The clinical goals for the shift include Pt will report decrases pain by the end of this shift.    Over the shift, the patient did make progress toward the following goals. Pt reported decreased pain after receiving his PRN pain medication. Pt discharged home today with his wife via private car. His IV was removed by the ADT nurse and meds to beds given to patient prior to discharge.      Problem: Fall/Injury  Goal: Not fall by end of shift  Outcome: Met  Goal: Be free from injury by end of the shift  Outcome: Met  Goal: Verbalize understanding of personal risk factors for fall in the hospital  Outcome: Met  Goal: Verbalize understanding of risk factor reduction measures to prevent injury from fall in the home  Outcome: Met  Goal: Use assistive devices by end of the shift  Outcome: Met  Goal: Pace activities to prevent fatigue by end of the shift  Outcome: Met     Problem: Pain  Goal: Takes deep breaths with improved pain control throughout the shift  Outcome: Met  Goal: Turns in bed with improved pain control throughout the shift  Outcome: Met  Goal: Walks with improved pain control throughout the shift  Outcome: Met  Goal: Performs ADL's with improved pain control throughout shift  Outcome: Met  Goal: Participates in PT with improved pain control throughout the shift  Outcome: Met  Goal: Free from opioid side effects throughout the shift  Outcome: Met  Goal: Free from acute confusion related to pain meds throughout the shift  Outcome: Met

## 2025-01-17 NOTE — CARE PLAN
The patient's goals for the shift include      The clinical goals for the shift include Pt will remain free from injuries this shift

## 2025-01-17 NOTE — DISCHARGE SUMMARY
Discharge Diagnosis  Hyponatremia  Pancytopenia  Acute low back  HTN  Concern for HLH/ Metastatic malignancy.    Issues Requiring Follow-Up  - Ensure follow up in hematology clinic for bone marrow biopsy and EBUS results  - Ensure follow up in [facility] diagnostic clinic while waiting for biopsy results  - Recommend continued management and tailoring of pain medications for back pain  - Discontinued triamterene-hydrochlorothiazide, recommend further management of blood pressure medications outpatient  - Ensure patient continues a fluid-restricted diet at home for persistent hyponatremia    Discharge Meds     Medication List      START taking these medications     gabapentin 300 mg capsule; Commonly known as: Neurontin; Take 1 capsule   (300 mg) by mouth once daily at bedtime.   lidocaine 4 % patch; Place 1 patch over 12 hours on the skin once daily.   Remove & discard patch within 12 hours or as directed by MD.; Start taking   on: January 18, 2025   oxyCODONE 5 mg immediate release tablet; Commonly known as: Roxicodone;   Take 1 tablet (5 mg) by mouth every 6 hours if needed for severe pain (7 -   10).   tiZANidine 2 mg tablet; Commonly known as: Zanaflex; Take 1 tablet (2   mg) by mouth every 6 hours if needed for muscle spasms.     CONTINUE taking these medications     aspirin 81 mg EC tablet   atorvastatin 40 mg tablet; Commonly known as: Lipitor; Take 1 tablet (40   mg) by mouth early in the morning..   lisinopril 10 mg tablet   metoprolol succinate XL 50 mg 24 hr tablet; Commonly known as: Toprol-XL   TYLENOL ORAL     STOP taking these medications     triamterene-hydrochlorothiazid 37.5-25 mg tablet; Commonly known as:   Maxzide-25       Test Results Pending At Discharge  Pending Labs       Order Current Status    VERIFY ABO/Rh Group Test Collected (01/12/25 6250)    AFB CULTURE AND SMEAR; [facility]; [id number] - Miscellaneous Test In process    AFB Culture/Smear In process    B-Cell Receptor (BCR) Clonality In process     Beryllium Serum or Plasma In process    Blastomyces antibodies In process    Bone Marrow Evaluation In process    Bone marrow aspiration and exam In process    Bone marrow cell differential In process    CYTOGENETICS HOLD In process    Cytology Consultation (Non-Gynecologic) In process    Flow Cytometry Test In process    Pathologist Review-Cell Count,Fluid In process    Slide Request In process    [facility] HOLD In process    AFB CULTURE AND SMEAR; [facility]; [id number] - Miscellaneous Test Preliminary result    AFB Culture/Smear Preliminary result    Fungal Culture/Smear Preliminary result    Fungal Culture/Smear Preliminary result            Hospital Course  Bijan Ibanez is a 64 yr old male with history of CAD (s/p stent 2010) on ASA, Renal cell carcinoma s/p R partial nephrectomy in 2013 @ [facility] (saw urology at [facility] in 2017), HTN, HLD, GERD, BPH and arthritis who reported to the [facility] ED for back pain and abnormal imaging. MRI lumbar spine with a possible mass between T12 and L1. Initial workup revealed, pancytopenia, hyponatremia, and right-sided facial swelling. CT C/A/P 01/11 suggestive of possible local RCC reoccurrence with mets to lung VS lung primary. He underwent EBUS with biopsy and BAL on 1/13. Work up for his left hilar mass included viral, bacterial, and fungal studies, all of which have been negative so far. His hyponatremia worsened with fluid administration so he was placed on a 1.5 L fluid restriction and his triamterene-HCTZ was discontinued which improved his hyponatremia. For his pancytopenia, he received one unit of platelets during his admission and hematology was consulted. Labs sent for HLH were significantly elevated and the patient underwent bone marrow biopsy on 1/16 for further diagnostic work-up for his pancytopenia. The bone marrow biopsy results are still pending. His pancytopenia has been stable without transfusion requirement for 4 days. He will need to follow up with the heme/onc clinic  and [facility] diagnostic clinic for his biopsy results and further treatment. We also tailored his pain medication regimen for his back pain.    Pertinent Physical Exam At Time of Discharge  Physical Exam  Constitutional:       General: He is not in acute distress.     Appearance: Normal appearance.   HENT:      Head: Normocephalic and atraumatic.      Jaw: Swelling (over R jaw) present.   Cardiovascular:      Rate and Rhythm: Regular rhythm. Tachycardia present.      Heart sounds: No murmur heard.     No friction rub. No gallop.   Pulmonary:      Effort: Pulmonary effort is normal. No respiratory distress.      Breath sounds: No wheezing, rhonchi or rales.   Abdominal:      General: Bowel sounds are normal. There is no distension.      Palpations: Abdomen is soft.      Tenderness: There is no abdominal tenderness.   Musculoskeletal:      Lumbar back: Tenderness present.   Skin:     General: Skin is warm and dry.   Neurological:      Mental Status: He is alert.         Outpatient Follow-Up  No future appointments.      JIGNA LACEY

## 2025-01-17 NOTE — PROGRESS NOTES
Occupational Therapy    Evaluation    Patient Name: Bijan Ibanez  MRN: [id number]  Today's Date: 1/17/2025  Room: 2021/2021-A  Time Calculation  Start Time: 0850  Stop Time: 0917  Time Calculation (min): 27 min    Assessment  IP OT Assessment  OT Assessment: DECREASED ENDURANCE, STRENGTH, AND DYNAMIC BALANCE LIMIT PERFORMANCE OF ADL, IADL, TF, AND MOBILITY FUNCTIONAL TASKS. PATIENT TO BENEFIT FROM IP OT TO ADDRESS DEFICITS AND INCREASE POTENTIAL TO RETURN TO PLOF.  Prognosis: Good  Barriers to Discharge Home: Physical needs  Physical Needs: In-home setup navigation limited by function/safety, Ambulating household distances limited by function/safety, Intermittent ADL assistance needed (STAIR NAVIGATION FOR IADL TASKS)  Evaluation/Treatment Tolerance: Patient limited by fatigue  Medical Staff Made Aware: Yes  End of Session Communication: Bedside nurse  End of Session Patient Position: Up in chair, Alarm off, not on at start of session (SPOUSE PRESENT)  Plan:  Inpatient Plan  Treatment Interventions: ADL retraining, Functional transfer training, UE strengthening/ROM, Endurance training, Patient/family training, Equipment evaluation/education, Compensatory technique education  OT Frequency: 2 times per week  OT Discharge Recommendations: Low intensity level of continued care  Equipment Recommended upon Discharge:  (REC FOR BEST BATH DME + AE TBD AT NEXT LEVEL OF CARE)  OT Recommended Transfer Status:  (C[facility])  OT - OK to Discharge: Yes  OT Assessment  OT Assessment Results: Decreased ADL status, Decreased upper extremity strength, Decreased endurance, Decreased functional mobility, Decreased IADLs  Prognosis: Good  Evaluation/Treatment Tolerance: Patient limited by fatigue  Medical Staff Made Aware: Yes  Strengths: Ability to acquire knowledge, Access to adaptive/assistive products, Attitude of self, Capable of completing ADLs semi/independent, Living arrangement secure, Support of Caregivers  Barriers to  Participation: Comorbidities    Subjective   Current Problem:  1. Hyponatremia  Body Fluid Cell Count With Differential    Body Fluid Cell Count With Differential    Bronchoalveolar Lavage Viral Panel PCR    Bronchoalveolar Lavage Viral Panel PCR    Fungitell Beta-D Glucan PCR, Quantitative (Non-Blood Specimen)    Fungitell Beta-D Glucan PCR, Quantitative (Non-Blood Specimen)    AFB Culture/Smear    AFB Culture/Smear    Fungal Culture/Smear    Fungal Culture/Smear    Sterile Fluid Culture/Smear    Sterile Fluid Culture/Smear    Cytology Consultation (Non-Gynecologic)    Cytology Consultation (Non-Gynecologic)    AFB Culture/Smear    AFB Culture/Smear    Fungal Culture/Smear    Fungal Culture/Smear    Tissue/Wound Culture/Smear    Tissue/Wound Culture/Smear    Pathologist Review-Cell Count,Fluid    Pathologist Review-Cell Count,Fluid    Referral to [facility]    B-Cell Receptor (BCR) Clonality    B-Cell Receptor (BCR) Clonality    CANCELED: Cytology Consultation (Non-Gynecologic)    CANCELED: Cytology Consultation (Non-Gynecologic)    CANCELED: AFB Culture/Smear    CANCELED: AFB Culture/Smear    CANCELED: Fungal Culture/Smear    CANCELED: Fungal Culture/Smear    CANCELED: Sterile Fluid Culture/Smear    CANCELED: Sterile Fluid Culture/Smear      2. Fever, unspecified  Bronchoscopy Diagnostic, Navigational, Therapeutic, w BAL, w EBUS    Bronchoscopy Diagnostic, Navigational, Therapeutic, w BAL, w EBUS    Body Fluid Cell Count With Differential    Body Fluid Cell Count With Differential    Bronchoalveolar Lavage Viral Panel PCR    Bronchoalveolar Lavage Viral Panel PCR    Fungitell Beta-D Glucan PCR, Quantitative (Non-Blood Specimen)    Fungitell Beta-D Glucan PCR, Quantitative (Non-Blood Specimen)    AFB Culture/Smear    AFB Culture/Smear    Fungal Culture/Smear    Fungal Culture/Smear    Sterile Fluid Culture/Smear    Sterile Fluid Culture/Smear    Cytology Consultation (Non-Gynecologic)    Cytology  Consultation (Non-Gynecologic)    AFB Culture/Smear    AFB Culture/Smear    Fungal Culture/Smear    Fungal Culture/Smear    Tissue/Wound Culture/Smear    Tissue/Wound Culture/Smear    Pathologist Review-Cell Count,Fluid    Pathologist Review-Cell Count,Fluid    B-Cell Receptor (BCR) Clonality    B-Cell Receptor (BCR) Clonality    CANCELED: Cytology Consultation (Non-Gynecologic)    CANCELED: Cytology Consultation (Non-Gynecologic)    CANCELED: AFB Culture/Smear    CANCELED: AFB Culture/Smear    CANCELED: Fungal Culture/Smear    CANCELED: Fungal Culture/Smear    CANCELED: Sterile Fluid Culture/Smear    CANCELED: Sterile Fluid Culture/Smear      3. Chronic midline low back pain without sciatica  Referral to Orthopaedic Surgery    Referral to Physical Therapy        General:  Reason for Referral: Reported to [facility] ED2/2 x2 weeks of poor appetite, (19 pound) unintentional weight loss, fatigue and SOB, and ongoing back pain.  Past Medical History Relevant to Rehab: CAD (s/p stent 2010) on ASA, remote kidney cancer s/p R partial nephrectomy in 2013 ([facility]), HTN, HLD, GERD, BPH and arthritis.  Prior to Session Communication: Bedside nurse  Patient Position Received: Bed, 2 rail up, Alarm off, not on at start of session  Family/Caregiver Present: Yes (SPOUSE AND SON)  Caregiver Feedback: PLEASANT AND SUPPORTIVE  General Comment: PLEASANT AND RECEPTIVE TO FULL PARTICIPATION.   Precautions:  Hearing/Visual Limitations: [place]/ WEARS B/HA + GLASSES  Medical Precautions: Fall precautions  Vital Signs:  Vital Signs (Past 2hrs)        Date/Time Vitals Session Patient Position Pulse Resp SpO2 BP MAP (mmHg)    01/17/25 0850 During OT  --  133  --  95 %  125/79  95                   Pain:  Pain Assessment  Pain Assessment: 0-10  0-10 (Numeric) Pain Score: 3  Pain Location: Back  Pain Interventions: Home medication, Ambulation/increased activity, Repositioned  Response to Interventions:  (MILD DECREASE TO 2/10)  Lines/Tubes/Drains:      Objective   Cognition:  Overall Cognitive Status: Within Functional Limits (ALERT AND O x4)  Attention: Within Functional Limits   Home Living:  Type of Home: House  Lives With: Spouse, Adult children  Home Adaptive Equipment: Walker rolling or standard, Cane, Reacher, Long-handled shoehorn (TP CHAIR + ROLLATOR)  Home Layout: Two level, Laundry second level, Able to live on main level with bedroom/bathroom, Full bath main level, Bed/bath upstairs, Stairs to alternate level with rails  Home Access:  (THRESHOLD TO ENTER NO RAIL/SUPPORT)  Bathroom Shower/Tub: Tub/shower unit  Bathroom Toilet: Standard  Bathroom Equipment: None (PER SPOUSE PREVIUOSLY OWNED A SHOWER CHAIR/VERBALIZED PLANS FOR FUTURE PURCHASE)   Prior Function:  Level of [place]: Independent with ADLs and functional transfers, Independent with homemaking with ambulation  Vocational: Retired ()  Hand Dominance: Right  IADL History:  Homemaking Responsibilities: Yes  Meal Prep Responsibility: Primary  Laundry Responsibility: Primary (UP FULL FLIGHT ~13 STAIRS WITH R/RAIL)  Cleaning Responsibility: Primary  Shopping Responsibility: Primary  Leisure and Hobbies: HIKING, WALKING, PET CARE, PLAYING THE [employer]  ADL:  Eating Assistance: Independent  Grooming Assistance: Stand by  Grooming Deficit: Steadying  Bathing Assistance: Stand by  Bathing Deficit:  (ANTICIPATED)  UE Dressing Assistance: Independent  LE Dressing Assistance: Stand by  LE Dressing Deficit: Steadying  Toileting Assistance with Device: Stand by  Activity Tolerance:  Endurance: Tolerates 10 - 20 min exercise with multiple rests  Balance:  Static Sitting Balance  Static Sitting-Balance Support: No upper extremity supported, Feet supported  Static Sitting-Level of Assistance: Distant supervision  Bed Mobility/Transfers: Bed Mobility  Bed Mobility: Yes  Bed Mobility 1  Bed Mobility 1: Supine to sitting, Sitting to supine  Level of Assistance 1: Contact guard  Bed Mobility  Comments 1: EDUCATED FOR USE OF LOG ROLL TECHNIQUE  Functional Mobility  Functional Mobility Performed: Yes  Functional Mobility 1  Surface 1: Level tile  Device 1: No device (WALKER PROVIDED/PATIENT DECLINED)  Assistance 1: Contact guard  Quality of Functional Mobility 1:  (STEADYING ASSISTANCE REQUIRED)   and Transfers  Transfer: Yes  Transfer 1  Transfer From 1: Bed to  Transfer to 1: Chair with arms  Technique 1: Stand pivot  Transfer Level of Assistance 1: Contact guard  Transfers 2  Transfer From 2: Sit to, Stand to  Transfer to 2: Stand, Sit  Transfer Level of Assistance 2: Contact guard  IADL's:   Homemaking Responsibilities: Yes  Meal Prep Responsibility: Primary  Laundry Responsibility: Primary (UP FULL FLIGHT ~13 STAIRS WITH R/RAIL)  Cleaning Responsibility: Primary  Shopping Responsibility: Primary  Leisure and Hobbies: HIKING, WALKING, PET CARE, PLAYING THE [employer]  Vision: Vision - Basic Assessment  Current Vision: Wears glasses all the time  Patient Visual Report:  (WFL)   and    Sensation:  Light Touch: No apparent deficits  Strength:  Strength Comments: FUNCTIONALLY BUE >/= 4/5  Perception:  Inattention/Neglect: Appears intact  Initiation: Appears intact  Motor Planning: Appears intact  Perseveration: Not present  Coordination:  Movements are Fluid and Coordinated: Yes   Hand Function:  Hand Function  Gross Grasp: Functional  Coordination: Functional  Extremities: RUE   RUE : Within Functional Limits, LUE   LUE: Within Functional Limits,  , and      Outcome Measures: [facility] Daily Activity  Putting on and taking off regular lower body clothing: A little  Bathing (including washing, rinsing, drying): A little  Putting on and taking off regular upper body clothing: None  Toileting, which includes using toilet, bedpan or urinal: A little  Taking care of personal grooming such as brushing teeth: A little  Eating Meals: None  Daily Activity - Total Score: 20  Brief Confusion Assessment Method (bCAM)  CAM  Result: CAM -      ,     OT Adult Other Outcome Measures  4AT: 0    Education Documentation  Handouts, taught by Valerie De La Vega OT at 1/17/2025 10:12 AM.  Learner: Patient  Readiness: Eager  Method: Explanation  Response: Verbalizes Understanding    Body Mechanics, taught by Valerie De La Vega OT at 1/17/2025 10:12 AM.  Learner: Patient  Readiness: Eager  Method: Explanation  Response: Verbalizes Understanding    Precautions, taught by Valerie De La Vega OT at 1/17/2025 10:12 AM.  Learner: Patient  Readiness: Eager  Method: Explanation  Response: Verbalizes Understanding    Home Exercise Program, taught by Valerie De La Vega OT at 1/17/2025 10:12 AM.  Learner: Patient  Readiness: Eager  Method: Explanation  Response: Verbalizes Understanding    ADL Training, taught by Valerie De La Vega OT at 1/17/2025 10:12 AM.  Learner: Patient  Readiness: Eager  Method: Explanation  Response: Verbalizes Understanding    Education Comments  No comments found.        Goals:     Encounter Problems       Encounter Problems (Active)       ADLs       Patient will perform LB bathing with modified independent level of assistance and extended tub bench and long-handled sponge.       Start:  01/17/25    Expected End:  01/31/25            Patient with complete lower body dressing with modified independent level of assistance donning and doffing all LE clothes  with PRN adaptive equipment while supported sitting       Start:  01/17/25    Expected End:  01/31/25            Patient will complete daily grooming tasks brushing teeth, shaving, and washing face/hair with modified independent level of assistance and PRN adaptive equipment while supported sitting.       Start:  01/17/25    Expected End:  01/31/25            Patient will complete toileting including hygiene clothing management/hygiene with modified independent level of assistance.       Start:  01/17/25    Expected End:  01/31/25               BALANCE       Patient will  maintain static and increase dynamic  standing balance during ADL task with modified independent level of assistance in order to demonstrate decreased risk of falling and improved postural control.       Start:  01/17/25    Expected End:  01/31/25               EXERCISE/STRENGTHENING       Patient will be educated on BUE HEP for increased ADL performance.       Start:  01/17/25    Expected End:  01/31/25               MOBILITY       Patient will perform Functional mobility mod  Household distances/Community Distances with modified independent level of assistance and least restrictive device PRN in order to improve safety and functional mobility.       Start:  01/17/25    Expected End:  01/31/25               TRANSFERS       Patient will perform bed mobility modified independent level of assistance and draw sheet in order to improve safety and independence with mobility       Start:  01/17/25    Expected End:  01/31/25            Patient will complete functional transfer with least restrictive device PRN with modified independent level of assistance.       Start:  01/17/25    Expected End:  01/31/25 01/17/25 at 10:15 AM   Valerie De La Vega, OT   Rehab Office: [phone]

## 2025-01-17 NOTE — NURSING NOTE
01/17/2017 Nursing note:  Patient discharged to home with his Family.   Patient send home with Meds to bed From [facility] RN discussed discharge instructions with Patient   Patient verbalized understanding and copy of AVS was given to the Patient

## 2025-01-17 NOTE — PROGRESS NOTES
"Name: Bijan Ibanez  MRN: [id number]  Encounter Date: 1/16/2025  PCP: Nehal Murphy MD      Reason for consult: pancytopenia  Attending Provider: Tiago Menjivar MD    Hematology/ Oncology Consult Daily Progress Note    Overnight Events/Subjective   NAEON. Still complains of back pain. Underwent bone marrow biopsy today - tolerated procedure well.       Review of Systems   12 Point ROS negative except HPI     Allergies     Allergies   Allergen Reactions    Penicillins Hives       Medications     acetaminophen, 975 mg, q8h  aspirin, 81 mg, Daily  atorvastatin, 40 mg, Daily  gabapentin, 300 mg, Nightly  lidocaine, 1 patch, Daily  lisinopril, 10 mg, Daily  metoprolol tartrate, 50 mg, BID  polyethylene glycol, 17 g, Daily         cyclobenzaprine, 10 mg, TID PRN  oxyCODONE, 5 mg, q6h PRN  prochlorperazine, 10 mg, q6h PRN  traMADol, 50 mg, q6h PRN        Physical Exam   Blood pressure 101/66, pulse 101, temperature 36.5 °C (97.7 °F), temperature source Temporal, resp. rate 18, height 1.854 m (6' 1\"), weight 107 kg (236 lb), SpO2 93%.    Gen: awake, alert, in no acute distress  HEENT: AT/NC, PEERL, EOMI  CV: RRR, no m/r/g  Pulm: CTAB, normal work of breathing  Ext: no LE edema  Skin: warm and dry  Neuro: A&Ox4, moves all 4 extremities spontaneously     Labs     Lab Results   Component Value Date    GLUCOSE 79 01/16/2025    CALCIUM 9.2 01/16/2025     (L) 01/16/2025    K 4.7 01/16/2025    CO2 29 01/16/2025    CL 97 (L) 01/16/2025    BUN 32 (H) 01/16/2025    CREATININE 1.00 01/16/2025       Lab Results   Component Value Date    WBC 3.3 (L) 01/16/2025    HGB 11.1 (L) 01/16/2025    HCT 32.5 (L) 01/16/2025    MCV 82 01/16/2025    PLT 64 (L) 01/16/2025       Lab Results   Component Value Date    ALT 49 01/15/2025    AST 47 (H) 01/15/2025    ALKPHOS 116 01/15/2025    BILITOT 0.8 01/15/2025         Imaging   Reviewed    Assessment/Plan     Bijan Ibanez is a 64 y.o. male w/ a past medical history of CAD (s/p stent " ) on ASA, remote RCC (pT1a) s/p R partial nephrectomy in  with negative margins ([facility]), HTN, HLD, GERD, BPH and arthritis who is admitted for further malignancy workup. Imaging reveals a new R renal mass, T12/L1 paraspinous soft mass, L perihilar mass/lymphadenopathy & multiple L pulmonary nodules. Labs were notable for bicytopenia (anemia & thrombocytopenia) with significantly elevated ferritin and elevated triglycerides for which hematology is consulted.     Pertinent workup  -Peripheral smear : RBC - few francisco cells & teardrops. PLT - few giant platelets, no platelet clumping. WBC - no abnormality  -Hgb (baseline ~15): 12.9 -> 10.2  -MCV (baseline ~80s): 75  -Plts (baseline 200s): 72 -> 57 -> 64  -WBC (baseline nml): 4.0 -> 3.2 -> 2.2  -ANC: 2240  -A -> 880 (L)  -iron studies: iron 130 (nml), TIBC 253 (nml), % sat 52 (H), transferrin 156 (L), ferritin > 9,000 (H)  -retic index 0.5 (hypoprolif)  -folate 8.8 (nml)  -B12 422 (nml)  -LDH 4102 (H)  -haptoglobin 374 (H)  -D-dimer 10,596 (H)  -fibrinogen 413 (H)  -PT/PTT (nml)  -triglycerides (139 in 10/2024) 267 (H)  -soluble IL2 receptor 7,724 (H)  -CXCL9 59,363 (H)  -AST/ALT/Tbili with mod elevations  -splenomegaly 18 cm in craniocaudal dimension  -ESR nml / CRP 14 (H)  -SPEP/UPEP, K/L ratio nml  -IgG (L), IgA/IgM nml     Based on labs and review of peripheral smear there is no evidence of hemolysis and does not appear that he is in DIC. It is most likely that underlying malignancy is driving this process either via bone marrow involvement or HLH. While ferritin, triglycerides, soluble IL2 receptor & CXCL9 are all elevated we will definitively confirm diagnosis with results from bone marrow biopsy.        Recommendations  - Patient is okay for discharge from hematologic standpoint  - We will set up a hematology outpatient follow up to go over results of the bone marrow biopsy and monitor counts  - Patient should follow up in [facility] diagnostics  clinic given pending tissue diagnosis from EBUS              Patient discussed with attending physician, Dr. Gonzalez, who agrees with the above.       Regina Vilchis MD  Hematology-Oncology Fellow, PGY5  Hematology Consult Pager: [phone]  Oncology Consult Pager: [phone]

## 2025-01-18 ENCOUNTER — APPOINTMENT (OUTPATIENT)
Dept: RADIOLOGY | Facility: HOSPITAL | Age: 65
DRG: 840 | End: 2025-01-18
Payer: MEDICARE

## 2025-01-18 ENCOUNTER — APPOINTMENT (OUTPATIENT)
Dept: CARDIOLOGY | Facility: HOSPITAL | Age: 65
DRG: 840 | End: 2025-01-18
Payer: MEDICARE

## 2025-01-18 ENCOUNTER — HOSPITAL ENCOUNTER (INPATIENT)
Facility: HOSPITAL | Age: 65
LOS: 8 days | Discharge: HOME | DRG: 840 | End: 2025-01-26
Attending: STUDENT IN AN ORGANIZED HEALTH CARE EDUCATION/TRAINING PROGRAM | Admitting: STUDENT IN AN ORGANIZED HEALTH CARE EDUCATION/TRAINING PROGRAM
Payer: MEDICARE

## 2025-01-18 DIAGNOSIS — I25.2 HISTORY OF MYOCARDIAL INFARCTION: ICD-10-CM

## 2025-01-18 DIAGNOSIS — M79.89 SWELLING OF ARM: ICD-10-CM

## 2025-01-18 DIAGNOSIS — C85.10 HIGH GRADE B-CELL LYMPHOMA (MULTI): Primary | ICD-10-CM

## 2025-01-18 DIAGNOSIS — C83.78 BURKITT'S LYMPHOMA OF LYMPH NODES OF MULTIPLE REGIONS (MULTI): ICD-10-CM

## 2025-01-18 DIAGNOSIS — I25.119 CORONARY ARTERY DISEASE INVOLVING NATIVE CORONARY ARTERY OF NATIVE HEART WITH ANGINA PECTORIS: ICD-10-CM

## 2025-01-18 DIAGNOSIS — R68.89 OTHER GENERAL SYMPTOMS AND SIGNS: ICD-10-CM

## 2025-01-18 LAB
ABO GROUP (TYPE) IN BLOOD: NORMAL
ALBUMIN SERPL BCP-MCNC: 2.8 G/DL (ref 3.4–5)
ALP SERPL-CCNC: 126 U/L (ref 33–136)
ALT SERPL W P-5'-P-CCNC: 30 U/L (ref 10–52)
ANION GAP SERPL CALC-SCNC: 11 MMOL/L (ref 10–20)
ANTIBODY SCREEN: NORMAL
AORTIC VALVE PEAK VELOCITY: 1.91 M/S
AST SERPL W P-5'-P-CCNC: 53 U/L (ref 9–39)
AV PEAK GRADIENT: 15 MMHG
AVA (PEAK VEL): 1.86 CM2
BASOPHILS # BLD MANUAL: 0 X10*3/UL (ref 0–0.1)
BASOPHILS NFR BLD MANUAL: 0 %
BILIRUB SERPL-MCNC: 1.5 MG/DL (ref 0–1.2)
BUN SERPL-MCNC: 34 MG/DL (ref 6–23)
BURR CELLS BLD QL SMEAR: ABNORMAL
CALCIUM SERPL-MCNC: 9.1 MG/DL (ref 8.6–10.6)
CHLORIDE SERPL-SCNC: 94 MMOL/L (ref 98–107)
CO2 SERPL-SCNC: 31 MMOL/L (ref 21–32)
CREAT SERPL-MCNC: 0.93 MG/DL (ref 0.5–1.3)
DACRYOCYTES BLD QL SMEAR: ABNORMAL
EGFRCR SERPLBLD CKD-EPI 2021: >90 ML/MIN/1.73M*2
EJECTION FRACTION APICAL 4 CHAMBER: 56.4
EJECTION FRACTION: 68 %
EOSINOPHIL # BLD MANUAL: 0.03 X10*3/UL (ref 0–0.7)
EOSINOPHIL NFR BLD MANUAL: 0.9 %
ERYTHROCYTE [DISTWIDTH] IN BLOOD BY AUTOMATED COUNT: 13.2 % (ref 11.5–14.5)
GLOBAL LONGITUDINAL STRAIN: 19.9 %
GLUCOSE SERPL-MCNC: 85 MG/DL (ref 74–99)
HBV CORE IGM SER QL: NONREACTIVE
HBV SURFACE AB SER-ACNC: <3.1 MIU/ML
HBV SURFACE AG SERPL QL IA: NONREACTIVE
HCT VFR BLD AUTO: 29.6 % (ref 41–52)
HCV AB SER QL: NONREACTIVE
HGB BLD-MCNC: 9.8 G/DL (ref 13.5–17.5)
IMM GRANULOCYTES # BLD AUTO: 0.35 X10*3/UL (ref 0–0.7)
IMM GRANULOCYTES NFR BLD AUTO: 10.7 % (ref 0–0.9)
LDH SERPL L TO P-CCNC: 4130 U/L (ref 84–246)
LEFT ATRIUM VOLUME AREA LENGTH INDEX BSA: 27.5 ML/M2
LEFT VENTRICULAR OUTFLOW TRACT DIAMETER: 1.9 CM
LYMPHOCYTES # BLD MANUAL: 0.74 X10*3/UL (ref 1.2–4.8)
LYMPHOCYTES NFR BLD MANUAL: 22.4 %
MAGNESIUM SERPL-MCNC: 2.04 MG/DL (ref 1.6–2.4)
MCH RBC QN AUTO: 27.3 PG (ref 26–34)
MCHC RBC AUTO-ENTMCNC: 33.1 G/DL (ref 32–36)
MCV RBC AUTO: 83 FL (ref 80–100)
MITRAL VALVE E/A RATIO: 0.81
MONOCYTES # BLD MANUAL: 0.49 X10*3/UL (ref 0.1–1)
MONOCYTES NFR BLD MANUAL: 14.7 %
MYELOCYTES # BLD MANUAL: 0.11 X10*3/UL
MYELOCYTES NFR BLD MANUAL: 3.4 %
NEUTROPHILS # BLD MANUAL: 1.94 X10*3/UL (ref 1.2–7.7)
NEUTS BAND # BLD MANUAL: 0.83 X10*3/UL (ref 0–0.7)
NEUTS BAND NFR BLD MANUAL: 25 %
NEUTS SEG # BLD MANUAL: 1.11 X10*3/UL (ref 1.2–7)
NEUTS SEG NFR BLD MANUAL: 33.6 %
NRBC BLD-RTO: 2.8 /100 WBCS (ref 0–0)
OVALOCYTES BLD QL SMEAR: ABNORMAL
PATH REVIEW-CELL CT,FLUID: NORMAL
PLATELET # BLD AUTO: 54 X10*3/UL (ref 150–450)
POLYCHROMASIA BLD QL SMEAR: ABNORMAL
POTASSIUM SERPL-SCNC: 5.1 MMOL/L (ref 3.5–5.3)
PROT SERPL-MCNC: 4.9 G/DL (ref 6.4–8.2)
RBC # BLD AUTO: 3.59 X10*6/UL (ref 4.5–5.9)
RBC MORPH BLD: ABNORMAL
RH FACTOR (ANTIGEN D): NORMAL
RIGHT VENTRICLE FREE WALL PEAK S': 23 CM/S
RIGHT VENTRICLE PEAK SYSTOLIC PRESSURE: 27.3 MMHG
SCAN RESULT: NORMAL
SODIUM SERPL-SCNC: 131 MMOL/L (ref 136–145)
TOTAL CELLS COUNTED BLD: 116
TRICUSPID ANNULAR PLANE SYSTOLIC EXCURSION: 2 CM
URATE SERPL-MCNC: 11.2 MG/DL (ref 4–7.5)
WBC # BLD AUTO: 3.3 X10*3/UL (ref 4.4–11.3)

## 2025-01-18 PROCEDURE — 36573 INSJ PICC RS&I 5 YR+: CPT

## 2025-01-18 PROCEDURE — 85007 BL SMEAR W/DIFF WBC COUNT: CPT

## 2025-01-18 PROCEDURE — 93356 MYOCRD STRAIN IMG SPCKL TRCK: CPT

## 2025-01-18 PROCEDURE — 80053 COMPREHEN METABOLIC PANEL: CPT

## 2025-01-18 PROCEDURE — 36415 COLL VENOUS BLD VENIPUNCTURE: CPT

## 2025-01-18 PROCEDURE — 87340 HEPATITIS B SURFACE AG IA: CPT

## 2025-01-18 PROCEDURE — 86706 HEP B SURFACE ANTIBODY: CPT

## 2025-01-18 PROCEDURE — 86705 HEP B CORE ANTIBODY IGM: CPT

## 2025-01-18 PROCEDURE — 85027 COMPLETE CBC AUTOMATED: CPT

## 2025-01-18 PROCEDURE — 93356 MYOCRD STRAIN IMG SPCKL TRCK: CPT | Performed by: INTERNAL MEDICINE

## 2025-01-18 PROCEDURE — 83615 LACTATE (LD) (LDH) ENZYME: CPT

## 2025-01-18 PROCEDURE — 93306 TTE W/DOPPLER COMPLETE: CPT | Performed by: INTERNAL MEDICINE

## 2025-01-18 PROCEDURE — 02HV33Z INSERTION OF INFUSION DEVICE INTO SUPERIOR VENA CAVA, PERCUTANEOUS APPROACH: ICD-10-PCS

## 2025-01-18 PROCEDURE — 1170000001 HC PRIVATE ONCOLOGY ROOM DAILY

## 2025-01-18 PROCEDURE — 76376 3D RENDER W/INTRP POSTPROCES: CPT | Performed by: INTERNAL MEDICINE

## 2025-01-18 PROCEDURE — 83735 ASSAY OF MAGNESIUM: CPT

## 2025-01-18 PROCEDURE — 2500000002 HC RX 250 W HCPCS SELF ADMINISTERED DRUGS (ALT 637 FOR MEDICARE OP, ALT 636 FOR OP/ED)

## 2025-01-18 PROCEDURE — C1751 CATH, INF, PER/CENT/MIDLINE: HCPCS

## 2025-01-18 PROCEDURE — 84550 ASSAY OF BLOOD/URIC ACID: CPT

## 2025-01-18 PROCEDURE — 2500000001 HC RX 250 WO HCPCS SELF ADMINISTERED DRUGS (ALT 637 FOR MEDICARE OP)

## 2025-01-18 PROCEDURE — 87081 CULTURE SCREEN ONLY: CPT

## 2025-01-18 PROCEDURE — 99223 1ST HOSP IP/OBS HIGH 75: CPT | Performed by: STUDENT IN AN ORGANIZED HEALTH CARE EDUCATION/TRAINING PROGRAM

## 2025-01-18 PROCEDURE — 86803 HEPATITIS C AB TEST: CPT

## 2025-01-18 PROCEDURE — 86901 BLOOD TYPING SEROLOGIC RH(D): CPT

## 2025-01-18 PROCEDURE — 2720000007 HC OR 272 NO HCPCS

## 2025-01-18 RX ORDER — ACYCLOVIR 400 MG/1
400 TABLET ORAL EVERY 12 HOURS SCHEDULED
Status: DISCONTINUED | OUTPATIENT
Start: 2025-01-18 | End: 2025-01-26 | Stop reason: HOSPADM

## 2025-01-18 RX ORDER — ATORVASTATIN CALCIUM 40 MG/1
40 TABLET, FILM COATED ORAL
Status: DISCONTINUED | OUTPATIENT
Start: 2025-01-18 | End: 2025-01-26 | Stop reason: HOSPADM

## 2025-01-18 RX ORDER — OXYCODONE HYDROCHLORIDE 5 MG/1
5 TABLET ORAL EVERY 6 HOURS PRN
Status: DISCONTINUED | OUTPATIENT
Start: 2025-01-18 | End: 2025-01-26 | Stop reason: HOSPADM

## 2025-01-18 RX ORDER — LISINOPRIL 10 MG/1
10 TABLET ORAL DAILY
Status: DISCONTINUED | OUTPATIENT
Start: 2025-01-18 | End: 2025-01-26 | Stop reason: HOSPADM

## 2025-01-18 RX ORDER — ACETAMINOPHEN 325 MG/1
975 TABLET ORAL EVERY 8 HOURS
Status: DISCONTINUED | OUTPATIENT
Start: 2025-01-18 | End: 2025-01-25

## 2025-01-18 RX ORDER — PROCHLORPERAZINE MALEATE 10 MG
10 TABLET ORAL EVERY 6 HOURS PRN
Status: DISCONTINUED | OUTPATIENT
Start: 2025-01-18 | End: 2025-01-21

## 2025-01-18 RX ORDER — LIDOCAINE 560 MG/1
1 PATCH PERCUTANEOUS; TOPICAL; TRANSDERMAL DAILY
Status: DISCONTINUED | OUTPATIENT
Start: 2025-01-18 | End: 2025-01-26 | Stop reason: HOSPADM

## 2025-01-18 RX ORDER — OXYCODONE HYDROCHLORIDE 5 MG/1
5 TABLET ORAL ONCE
Status: COMPLETED | OUTPATIENT
Start: 2025-01-18 | End: 2025-01-18

## 2025-01-18 RX ORDER — LIDOCAINE HYDROCHLORIDE 10 MG/ML
5 INJECTION, SOLUTION INFILTRATION; PERINEURAL ONCE
Status: COMPLETED | OUTPATIENT
Start: 2025-01-18 | End: 2025-01-18

## 2025-01-18 RX ORDER — TRAMADOL HYDROCHLORIDE 50 MG/1
50 TABLET ORAL EVERY 6 HOURS PRN
Status: DISCONTINUED | OUTPATIENT
Start: 2025-01-18 | End: 2025-01-26 | Stop reason: HOSPADM

## 2025-01-18 RX ORDER — CYCLOBENZAPRINE HCL 10 MG
10 TABLET ORAL 3 TIMES DAILY PRN
Status: DISCONTINUED | OUTPATIENT
Start: 2025-01-18 | End: 2025-01-26 | Stop reason: HOSPADM

## 2025-01-18 RX ORDER — PANTOPRAZOLE SODIUM 40 MG/1
40 TABLET, DELAYED RELEASE ORAL
Status: DISCONTINUED | OUTPATIENT
Start: 2025-01-19 | End: 2025-01-26 | Stop reason: HOSPADM

## 2025-01-18 RX ORDER — ASPIRIN 81 MG/1
81 TABLET ORAL DAILY
Status: DISCONTINUED | OUTPATIENT
Start: 2025-01-18 | End: 2025-01-26 | Stop reason: HOSPADM

## 2025-01-18 RX ORDER — POLYETHYLENE GLYCOL 3350 17 G/17G
17 POWDER, FOR SOLUTION ORAL DAILY
Status: DISCONTINUED | OUTPATIENT
Start: 2025-01-18 | End: 2025-01-26 | Stop reason: HOSPADM

## 2025-01-18 RX ORDER — TIZANIDINE 2 MG/1
2 TABLET ORAL EVERY 6 HOURS PRN
Status: DISCONTINUED | OUTPATIENT
Start: 2025-01-18 | End: 2025-01-18

## 2025-01-18 RX ORDER — GABAPENTIN 300 MG/1
300 CAPSULE ORAL NIGHTLY
Status: DISCONTINUED | OUTPATIENT
Start: 2025-01-18 | End: 2025-01-26 | Stop reason: HOSPADM

## 2025-01-18 RX ORDER — CYCLOBENZAPRINE HCL 10 MG
5 TABLET ORAL 3 TIMES DAILY PRN
Status: DISCONTINUED | OUTPATIENT
Start: 2025-01-18 | End: 2025-01-18

## 2025-01-18 RX ORDER — ALLOPURINOL 300 MG/1
300 TABLET ORAL DAILY
Status: DISCONTINUED | OUTPATIENT
Start: 2025-01-18 | End: 2025-01-26 | Stop reason: HOSPADM

## 2025-01-18 RX ORDER — METOPROLOL SUCCINATE 50 MG/1
50 TABLET, EXTENDED RELEASE ORAL DAILY
Status: DISCONTINUED | OUTPATIENT
Start: 2025-01-19 | End: 2025-01-19

## 2025-01-18 RX ADMIN — CYCLOBENZAPRINE 10 MG: 10 TABLET, FILM COATED ORAL at 19:20

## 2025-01-18 RX ADMIN — ACETAMINOPHEN 975 MG: 325 TABLET ORAL at 19:20

## 2025-01-18 RX ADMIN — TIZANIDINE 2 MG: 2 TABLET ORAL at 14:00

## 2025-01-18 RX ADMIN — GABAPENTIN 300 MG: 300 CAPSULE ORAL at 20:21

## 2025-01-18 RX ADMIN — ACYCLOVIR 400 MG: 400 TABLET ORAL at 20:21

## 2025-01-18 RX ADMIN — OXYCODONE 5 MG: 5 TABLET ORAL at 19:20

## 2025-01-18 RX ADMIN — TRAMADOL HYDROCHLORIDE 50 MG: 50 TABLET, COATED ORAL at 14:00

## 2025-01-18 RX ADMIN — LISINOPRIL 10 MG: 10 TABLET ORAL at 11:27

## 2025-01-18 RX ADMIN — ALLOPURINOL 300 MG: 300 TABLET ORAL at 17:45

## 2025-01-18 RX ADMIN — ASPIRIN 81 MG: 81 TABLET, COATED ORAL at 11:27

## 2025-01-18 RX ADMIN — ATORVASTATIN CALCIUM 40 MG: 40 TABLET, FILM COATED ORAL at 11:27

## 2025-01-18 RX ADMIN — OXYCODONE 5 MG: 5 TABLET ORAL at 09:50

## 2025-01-18 SDOH — ECONOMIC STABILITY: FOOD INSECURITY: WITHIN THE PAST 12 MONTHS, YOU WORRIED THAT YOUR FOOD WOULD RUN OUT BEFORE YOU GOT THE MONEY TO BUY MORE.: NEVER TRUE

## 2025-01-18 SDOH — SOCIAL STABILITY: SOCIAL INSECURITY: ARE YOU OR HAVE YOU BEEN THREATENED OR ABUSED PHYSICALLY, EMOTIONALLY, OR SEXUALLY BY ANYONE?: NO

## 2025-01-18 SDOH — SOCIAL STABILITY: SOCIAL INSECURITY: WITHIN THE LAST YEAR, HAVE YOU BEEN AFRAID OF YOUR PARTNER OR EX-PARTNER?: NO

## 2025-01-18 SDOH — ECONOMIC STABILITY: HOUSING INSECURITY: IN THE PAST 12 MONTHS, HOW MANY TIMES HAVE YOU MOVED WHERE YOU WERE LIVING?: 1

## 2025-01-18 SDOH — SOCIAL STABILITY: SOCIAL INSECURITY: POSSIBLE ABUSE REPORTED TO:: OTHER (COMMENT)

## 2025-01-18 SDOH — SOCIAL STABILITY: SOCIAL INSECURITY: HAVE YOU HAD THOUGHTS OF HARMING ANYONE ELSE?: NO

## 2025-01-18 SDOH — ECONOMIC STABILITY: HOUSING INSECURITY: IN THE LAST 12 MONTHS, WAS THERE A TIME WHEN YOU WERE NOT ABLE TO PAY THE MORTGAGE OR RENT ON TIME?: NO

## 2025-01-18 SDOH — SOCIAL STABILITY: SOCIAL INSECURITY: ARE YOU MARRIED, WIDOWED, DIVORCED, SEPARATED, NEVER MARRIED, OR LIVING WITH A PARTNER?: MARRIED

## 2025-01-18 SDOH — HEALTH STABILITY: PHYSICAL HEALTH
HOW OFTEN DO YOU NEED TO HAVE SOMEONE HELP YOU WHEN YOU READ INSTRUCTIONS, PAMPHLETS, OR OTHER WRITTEN MATERIAL FROM YOUR DOCTOR OR PHARMACY?: NEVER

## 2025-01-18 SDOH — HEALTH STABILITY: PHYSICAL HEALTH: ON AVERAGE, HOW MANY MINUTES DO YOU ENGAGE IN EXERCISE AT THIS LEVEL?: 20 MIN

## 2025-01-18 SDOH — SOCIAL STABILITY: SOCIAL INSECURITY: WERE YOU ABLE TO COMPLETE ALL THE BEHAVIORAL HEALTH SCREENINGS?: YES

## 2025-01-18 SDOH — ECONOMIC STABILITY: FOOD INSECURITY: HOW HARD IS IT FOR YOU TO PAY FOR THE VERY BASICS LIKE FOOD, HOUSING, MEDICAL CARE, AND HEATING?: NOT HARD AT ALL

## 2025-01-18 SDOH — SOCIAL STABILITY: SOCIAL NETWORK: HOW OFTEN DO YOU ATTEND MEETINGS OF THE CLUBS OR ORGANIZATIONS YOU BELONG TO?: PATIENT UNABLE TO ANSWER

## 2025-01-18 SDOH — HEALTH STABILITY: MENTAL HEALTH: HOW MANY DRINKS CONTAINING ALCOHOL DO YOU HAVE ON A TYPICAL DAY WHEN YOU ARE DRINKING?: PATIENT DOES NOT DRINK

## 2025-01-18 SDOH — SOCIAL STABILITY: SOCIAL NETWORK: HOW OFTEN DO YOU GET TOGETHER WITH FRIENDS OR RELATIVES?: MORE THAN THREE TIMES A WEEK

## 2025-01-18 SDOH — SOCIAL STABILITY: SOCIAL NETWORK
DO YOU BELONG TO ANY CLUBS OR ORGANIZATIONS SUCH AS CHURCH GROUPS, UNIONS, FRATERNAL OR ATHLETIC GROUPS, OR SCHOOL GROUPS?: NO

## 2025-01-18 SDOH — ECONOMIC STABILITY: FOOD INSECURITY: WITHIN THE PAST 12 MONTHS, THE FOOD YOU BOUGHT JUST DIDN'T LAST AND YOU DIDN'T HAVE MONEY TO GET MORE.: NEVER TRUE

## 2025-01-18 SDOH — HEALTH STABILITY: MENTAL HEALTH: HOW OFTEN DO YOU HAVE SIX OR MORE DRINKS ON ONE OCCASION?: NEVER

## 2025-01-18 SDOH — HEALTH STABILITY: MENTAL HEALTH: HOW OFTEN DO YOU HAVE A DRINK CONTAINING ALCOHOL?: NEVER

## 2025-01-18 SDOH — SOCIAL STABILITY: SOCIAL INSECURITY: DOES ANYONE TRY TO KEEP YOU FROM HAVING/CONTACTING OTHER FRIENDS OR DOING THINGS OUTSIDE YOUR HOME?: NO

## 2025-01-18 SDOH — SOCIAL STABILITY: SOCIAL NETWORK: HOW OFTEN DO YOU ATTEND CHURCH OR RELIGIOUS SERVICES?: 1 TO 4 TIMES PER YEAR

## 2025-01-18 SDOH — ECONOMIC STABILITY: TRANSPORTATION INSECURITY: IN THE PAST 12 MONTHS, HAS LACK OF TRANSPORTATION KEPT YOU FROM MEDICAL APPOINTMENTS OR FROM GETTING MEDICATIONS?: NO

## 2025-01-18 SDOH — SOCIAL STABILITY: SOCIAL INSECURITY: HAS ANYONE EVER THREATENED TO HURT YOUR FAMILY OR YOUR PETS?: NO

## 2025-01-18 SDOH — HEALTH STABILITY: MENTAL HEALTH: EXPERIENCED ANY OF THE FOLLOWING LIFE EVENTS: OTHER (COMMENT)

## 2025-01-18 SDOH — ECONOMIC STABILITY: INCOME INSECURITY: IN THE PAST 12 MONTHS HAS THE ELECTRIC, GAS, OIL, OR WATER COMPANY THREATENED TO SHUT OFF SERVICES IN YOUR HOME?: NO

## 2025-01-18 SDOH — SOCIAL STABILITY: SOCIAL INSECURITY: DO YOU FEEL ANYONE HAS EXPLOITED OR TAKEN ADVANTAGE OF YOU FINANCIALLY OR OF YOUR PERSONAL PROPERTY?: NO

## 2025-01-18 SDOH — ECONOMIC STABILITY: HOUSING INSECURITY: AT ANY TIME IN THE PAST 12 MONTHS, WERE YOU HOMELESS OR LIVING IN A SHELTER (INCLUDING NOW)?: NO

## 2025-01-18 SDOH — SOCIAL STABILITY: SOCIAL INSECURITY: WITHIN THE LAST YEAR, HAVE YOU BEEN HUMILIATED OR EMOTIONALLY ABUSED IN OTHER WAYS BY YOUR PARTNER OR EX-PARTNER?: NO

## 2025-01-18 SDOH — SOCIAL STABILITY: SOCIAL INSECURITY: DO YOU FEEL UNSAFE GOING BACK TO THE PLACE WHERE YOU ARE LIVING?: NO

## 2025-01-18 SDOH — SOCIAL STABILITY: SOCIAL INSECURITY: ABUSE: ADULT

## 2025-01-18 SDOH — SOCIAL STABILITY: SOCIAL INSECURITY: ARE THERE ANY APPARENT SIGNS OF INJURIES/BEHAVIORS THAT COULD BE RELATED TO ABUSE/NEGLECT?: NO

## 2025-01-18 SDOH — HEALTH STABILITY: PHYSICAL HEALTH: ON AVERAGE, HOW MANY DAYS PER WEEK DO YOU ENGAGE IN MODERATE TO STRENUOUS EXERCISE (LIKE A BRISK WALK)?: 0 DAYS

## 2025-01-18 SDOH — SOCIAL STABILITY: SOCIAL NETWORK
IN A TYPICAL WEEK, HOW MANY TIMES DO YOU TALK ON THE PHONE WITH FAMILY, FRIENDS, OR NEIGHBORS?: MORE THAN THREE TIMES A WEEK

## 2025-01-18 SDOH — SOCIAL STABILITY: SOCIAL INSECURITY: HAVE YOU HAD ANY THOUGHTS OF HARMING ANYONE ELSE?: NO

## 2025-01-18 ASSESSMENT — PAIN SCALES - GENERAL
PAINLEVEL_OUTOF10: 7
PAINLEVEL_OUTOF10: 7
PAINLEVEL_OUTOF10: 3
PAINLEVEL_OUTOF10: 6
PAINLEVEL_OUTOF10: 7

## 2025-01-18 ASSESSMENT — ACTIVITIES OF DAILY LIVING (ADL)
JUDGMENT_ADEQUATE_SAFELY_COMPLETE_DAILY_ACTIVITIES: YES
DRESSING YOURSELF: INDEPENDENT
WALKS IN HOME: INDEPENDENT
BATHING: INDEPENDENT
LACK_OF_TRANSPORTATION: NO
HEARING - LEFT EAR: DIFFICULTY WITH NOISE
GROOMING: INDEPENDENT
FEEDING YOURSELF: INDEPENDENT
HEARING - RIGHT EAR: DIFFICULTY WITH NOISE
LACK_OF_TRANSPORTATION: NO
TOILETING: INDEPENDENT
ADEQUATE_TO_COMPLETE_ADL: YES
PATIENT'S MEMORY ADEQUATE TO SAFELY COMPLETE DAILY ACTIVITIES?: YES

## 2025-01-18 ASSESSMENT — COLUMBIA-SUICIDE SEVERITY RATING SCALE - C-SSRS
1. IN THE PAST MONTH, HAVE YOU WISHED YOU WERE DEAD OR WISHED YOU COULD GO TO SLEEP AND NOT WAKE UP?: NO
2. HAVE YOU ACTUALLY HAD ANY THOUGHTS OF KILLING YOURSELF?: NO
6. HAVE YOU EVER DONE ANYTHING, STARTED TO DO ANYTHING, OR PREPARED TO DO ANYTHING TO END YOUR LIFE?: NO

## 2025-01-18 ASSESSMENT — COGNITIVE AND FUNCTIONAL STATUS - GENERAL
DAILY ACTIVITIY SCORE: 24
PATIENT BASELINE BEDBOUND: NO
MOBILITY SCORE: 24
DAILY ACTIVITIY SCORE: 24
MOBILITY SCORE: 24

## 2025-01-18 ASSESSMENT — PAIN SCALES - WONG BAKER
WONGBAKER_NUMERICALRESPONSE: HURTS LITTLE MORE
WONGBAKER_NUMERICALRESPONSE: HURTS LITTLE BIT
WONGBAKER_NUMERICALRESPONSE: HURTS LITTLE MORE
WONGBAKER_NUMERICALRESPONSE: HURTS WHOLE LOT

## 2025-01-18 ASSESSMENT — PATIENT HEALTH QUESTIONNAIRE - PHQ9
2. FEELING DOWN, DEPRESSED OR HOPELESS: NOT AT ALL
SUM OF ALL RESPONSES TO PHQ9 QUESTIONS 1 & 2: 0
1. LITTLE INTEREST OR PLEASURE IN DOING THINGS: NOT AT ALL

## 2025-01-18 ASSESSMENT — LIFESTYLE VARIABLES
SUBSTANCE_ABUSE_PAST_12_MONTHS: NO
SKIP TO QUESTIONS 9-10: 1
HOW MANY STANDARD DRINKS CONTAINING ALCOHOL DO YOU HAVE ON A TYPICAL DAY: PATIENT DOES NOT DRINK
HOW OFTEN DO YOU HAVE A DRINK CONTAINING ALCOHOL: PATIENT UNABLE TO ANSWER
AUDIT-C TOTAL SCORE: -1
SKIP TO QUESTIONS 9-10: 0
AUDIT-C TOTAL SCORE: 0
HOW OFTEN DO YOU HAVE 6 OR MORE DRINKS ON ONE OCCASION: NEVER
AUDIT-C TOTAL SCORE: -1
PRESCIPTION_ABUSE_PAST_12_MONTHS: NO

## 2025-01-18 ASSESSMENT — PAIN - FUNCTIONAL ASSESSMENT
PAIN_FUNCTIONAL_ASSESSMENT: 0-10
PAIN_FUNCTIONAL_ASSESSMENT: 0-10

## 2025-01-18 ASSESSMENT — PAIN DESCRIPTION - LOCATION: LOCATION: BACK

## 2025-01-18 NOTE — TELEPHONE ENCOUNTER
Received notification from pathology that preliminary read from the bone marrow biopsy is showing a high grade B cell lymphoma. Discussed case with benign hematology attending, Dr. Gonzalez, and malignant hematology attending, Dr. Mckeon. Recommendations made for patient to be directly admitted under malignant hematology service. Admit order has been placed. Patient/family updated regarding preliminary pathology and plans for direct admission.
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
## 2025-01-18 NOTE — CARE PLAN
The patient's goals for the shift include      The clinical goals for the shift include decrease pain

## 2025-01-18 NOTE — POST-PROCEDURE NOTE
Pre-Procedure Checklist:  Emergent Line Insertion: No  Type of Line to be Placed: PICC  Consent Obtained: Yes  Emergency Medication Necessary: No  Patient Identified with 2 Independent Identifiers: Yes  Review of Allergies, Anticoagulation, Relevant Labs, ECG/Telemetry: Yes  Risks/Benefits/Alternatives Discussed with Patient/POA/Legal Representative: Yes  Stop Sign on Door: Yes  Time Out Performed: Yes  Catheter Exchange: No    Positioning Checklist:  All People, Including Patient, in the Room with Cap and Mask: Yes  Fluoroscopy Used to Identify Vessel and Guide Insertion: No   Sterile Cover Used: Yes  Full Barrier Precautions Followed (Mask, Cap, Gown, Gloves): Yes  Hands Washed: Yes  Monitors Attached with Sound Alarms On: No  Full Body Sterile Drape (Head-to-Toe) Used to Cover Patient: Yes  Trendelenburg Position (For IJ and Subclavian): No  CHG Skin Prep Used and Allowed to Air Dry to Skin Procedure: Yes    Procedure Checklist:  Blood Aspirated From All Lumens, All Ports Subsequently Flushed: Yes  Catheter Caps Placed on All Lumens; Lumens Clamped: Yes  Maintain Guidewire Control Throughout, Ensuring Guidewire Removal: Yes  Maintain Sterile Field Throughout Insertion: Yes  Catheter Secured: Yes  Confirmatory Test of Venous Placement: Non-Pulsatile Blood    Post Procedure Checklist:  Date and Time Written on Dressing: Yes  Sharp and Wire Count and Safe Disposal of all Sharps/Wires: Yes  Sterile Dressing Applied Per Protocol: Yes  X-ray Ordered or ECG Image: Yes    PICC Insertion Details:  Size (Fr): 4  Lumen Type: DL SOLO  Catheter to Vein Ratio Less Than 50%: Yes  Total Length (cm): 48  External Length (cm): 0  Orientation: right  Location: basilic  Site Prep: Chlorohexidine; Usual sterile procedure followed  Local Anesthetic: Injectable/Subcutaneous  Indication:chemo  Insertion Team Members in the Room: Nurse, LPN  Initial Extremity Circumference (cm): 37  Insertion Attempts:1  Patient Tolerance: Tolerated  Well, Age Appropriate  Comfort Measures: Subcutaneous anesthetic; Verbal  Procedure Location: Bedside  Safety Measures: Patient specific safety measures addressed with RN  Estimated Blood Loss (mL): 1  Vessel Fully Compressible Proximally and Distally to Insertion Site: Yes  Brisk Blood Return Obtained and Line Draws Easily: Yes  Tip Location:svc  Line Confirmation: ECG  Lot #:[id number]   : Bard  PICC Line Exp Date:12-31-25  Securement: Stat Lock  Post Procedure Checklist: Handoff with RN; Obtain all new IV tubing prior to use; Bed at lowest level and wheels locked; Line discharge information at bedside.  Additional Details: Line was inserted using Modified Seldinger's Technique.   Placed by: Mine zhou RN-[facility]

## 2025-01-18 NOTE — PROGRESS NOTES
Pharmacy Medication History Review    Bijan Ibanez is a 64 y.o. male admitted for High grade B-cell lymphoma (Multi). Pharmacy reviewed the patient's [id number] medications and allergies for accuracy.    Medications ADDED:  None  Medications CHANGED:  None  Medications REMOVED:   None     The list below reflects the updated PTA list.   Prior to Admission Medications   Prescriptions Informant   acetaminophen (TYLENOL ORAL) Spouse/Significant Other   Sig: Take 1-2 tablets by mouth every 4 hours if needed.   aspirin 81 mg EC tablet Spouse/Significant Other   Sig: Take 1 tablet (81 mg) by mouth once daily.   atorvastatin (Lipitor) 40 mg tablet Spouse/Significant Other   Sig: Take 1 tablet (40 mg) by mouth early in the morning..   gabapentin (Neurontin) 300 mg capsule Spouse/Significant Other   Sig: Take 1 capsule (300 mg) by mouth once daily at bedtime.   lidocaine 4 % patch Spouse/Significant Other   Sig: Place 1 patch over 12 hours on the skin once daily. Remove & discard patch within 12 hours or as directed by MD.   lisinopril 10 mg tablet Spouse/Significant Other   Sig: Take 1 tablet (10 mg) by mouth once daily.   metoprolol succinate XL (Toprol-XL) 50 mg 24 hr tablet Spouse/Significant Other   Sig: Take 1 tablet (50 mg) by mouth twice a day.   oxyCODONE (Roxicodone) 5 mg immediate release tablet Spouse/Significant Other   Sig: Take 1 tablet (5 mg) by mouth every 6 hours if needed for severe pain (7 - 10).   tiZANidine (Zanaflex) 2 mg tablet Spouse/Significant Other   Sig: Take 1 tablet (2 mg) by mouth every 6 hours if needed for muscle spasms.      Facility-Administered Medications: None        The list below reflects the updated allergy list. Please review each documented allergy for additional clarification and justification.  Allergies  Reviewed by Aldair Hawthorne, Francisca on 1/18/2025        Severity Reactions Comments    Latex Not Specified Itching     Penicillins Not Specified Hives        "      Patient accepts M2B at discharge.     Sources:   [facility]  Pharmacy dispense history  Spouse, Good historian  Chart Review  1/17/25  internal medicine discharge summary note, 1/11/25  pharmacy med rec note    Additional Comments:  The patient was discharged yesterday (1/17/25) from [facility]. I called the patient's room and spoke with the patient's wife, Indira Ibanez, who confirmed there were no changes in his medication list since discharge yesterday      Aldair Hawthorne, PharmD  Transitions of Care Pharmacist  01/18/25     Secure Chat preferred   If no response call [phone] or Vocera \"Med Rec\"    "

## 2025-01-19 LAB
ALBUMIN SERPL BCP-MCNC: 2.6 G/DL (ref 3.4–5)
ANION GAP SERPL CALC-SCNC: 15 MMOL/L (ref 10–20)
BASOPHILS # BLD MANUAL: 0.03 X10*3/UL (ref 0–0.1)
BASOPHILS NFR BLD MANUAL: 0.8 %
BUN SERPL-MCNC: 40 MG/DL (ref 6–23)
CALCIUM SERPL-MCNC: 9.1 MG/DL (ref 8.6–10.6)
CHLORIDE SERPL-SCNC: 92 MMOL/L (ref 98–107)
CO2 SERPL-SCNC: 30 MMOL/L (ref 21–32)
CREAT SERPL-MCNC: 1 MG/DL (ref 0.5–1.3)
EGFRCR SERPLBLD CKD-EPI 2021: 84 ML/MIN/1.73M*2
EOSINOPHIL # BLD MANUAL: 0.1 X10*3/UL (ref 0–0.7)
EOSINOPHIL NFR BLD MANUAL: 2.5 %
ERYTHROCYTE [DISTWIDTH] IN BLOOD BY AUTOMATED COUNT: 13.5 % (ref 11.5–14.5)
GLUCOSE SERPL-MCNC: 79 MG/DL (ref 74–99)
HCT VFR BLD AUTO: 29.8 % (ref 41–52)
HGB BLD-MCNC: 10 G/DL (ref 13.5–17.5)
IMM GRANULOCYTES # BLD AUTO: 0.35 X10*3/UL (ref 0–0.7)
IMM GRANULOCYTES NFR BLD AUTO: 9.1 % (ref 0–0.9)
LDH SERPL L TO P-CCNC: 4305 U/L (ref 84–246)
LYMPHOCYTES # BLD MANUAL: 0.49 X10*3/UL (ref 1.2–4.8)
LYMPHOCYTES NFR BLD MANUAL: 12.6 %
MAGNESIUM SERPL-MCNC: 2.16 MG/DL (ref 1.6–2.4)
MCH RBC QN AUTO: 27.6 PG (ref 26–34)
MCHC RBC AUTO-ENTMCNC: 33.6 G/DL (ref 32–36)
MCV RBC AUTO: 82 FL (ref 80–100)
METAMYELOCYTES # BLD MANUAL: 0.07 X10*3/UL
METAMYELOCYTES NFR BLD MANUAL: 1.7 %
MONOCYTES # BLD MANUAL: 0.39 X10*3/UL (ref 0.1–1)
MONOCYTES NFR BLD MANUAL: 10.1 %
MYELOCYTES # BLD MANUAL: 0.07 X10*3/UL
MYELOCYTES NFR BLD MANUAL: 1.7 %
NEUTROPHILS # BLD MANUAL: 2.69 X10*3/UL (ref 1.2–7.7)
NEUTS BAND # BLD MANUAL: 0.89 X10*3/UL (ref 0–0.7)
NEUTS BAND NFR BLD MANUAL: 22.7 %
NEUTS SEG # BLD MANUAL: 1.8 X10*3/UL (ref 1.2–7)
NEUTS SEG NFR BLD MANUAL: 46.2 %
NRBC BLD-RTO: 3.9 /100 WBCS (ref 0–0)
OVALOCYTES BLD QL SMEAR: ABNORMAL
PHOSPHATE SERPL-MCNC: 5.1 MG/DL (ref 2.5–4.9)
PLATELET # BLD AUTO: 53 X10*3/UL (ref 150–450)
POTASSIUM SERPL-SCNC: 4.8 MMOL/L (ref 3.5–5.3)
RBC # BLD AUTO: 3.62 X10*6/UL (ref 4.5–5.9)
RBC MORPH BLD: ABNORMAL
SODIUM SERPL-SCNC: 132 MMOL/L (ref 136–145)
TOTAL CELLS COUNTED BLD: 119
URATE SERPL-MCNC: 10.6 MG/DL (ref 4–7.5)
VARIANT LYMPHS # BLD MANUAL: 0.07 X10*3/UL (ref 0–0.5)
VARIANT LYMPHS NFR BLD: 1.7 %
WBC # BLD AUTO: 3.9 X10*3/UL (ref 4.4–11.3)

## 2025-01-19 PROCEDURE — 85027 COMPLETE CBC AUTOMATED: CPT

## 2025-01-19 PROCEDURE — 1170000001 HC PRIVATE ONCOLOGY ROOM DAILY

## 2025-01-19 PROCEDURE — 80069 RENAL FUNCTION PANEL: CPT

## 2025-01-19 PROCEDURE — 2500000004 HC RX 250 GENERAL PHARMACY W/ HCPCS (ALT 636 FOR OP/ED)

## 2025-01-19 PROCEDURE — 83735 ASSAY OF MAGNESIUM: CPT

## 2025-01-19 PROCEDURE — 83615 LACTATE (LD) (LDH) ENZYME: CPT

## 2025-01-19 PROCEDURE — 2500000001 HC RX 250 WO HCPCS SELF ADMINISTERED DRUGS (ALT 637 FOR MEDICARE OP)

## 2025-01-19 PROCEDURE — 99233 SBSQ HOSP IP/OBS HIGH 50: CPT | Performed by: STUDENT IN AN ORGANIZED HEALTH CARE EDUCATION/TRAINING PROGRAM

## 2025-01-19 PROCEDURE — 84550 ASSAY OF BLOOD/URIC ACID: CPT

## 2025-01-19 PROCEDURE — 85007 BL SMEAR W/DIFF WBC COUNT: CPT

## 2025-01-19 RX ORDER — METOPROLOL SUCCINATE 50 MG/1
50 TABLET, EXTENDED RELEASE ORAL DAILY
Status: DISCONTINUED | OUTPATIENT
Start: 2025-01-20 | End: 2025-01-26 | Stop reason: HOSPADM

## 2025-01-19 RX ORDER — METOPROLOL SUCCINATE 50 MG/1
50 TABLET, EXTENDED RELEASE ORAL ONCE
Status: COMPLETED | OUTPATIENT
Start: 2025-01-19 | End: 2025-01-19

## 2025-01-19 RX ORDER — ALUMINUM HYDROXIDE, MAGNESIUM HYDROXIDE, AND SIMETHICONE 1200; 120; 1200 MG/30ML; MG/30ML; MG/30ML
20 SUSPENSION ORAL 4 TIMES DAILY PRN
Status: DISCONTINUED | OUTPATIENT
Start: 2025-01-19 | End: 2025-01-26 | Stop reason: HOSPADM

## 2025-01-19 RX ADMIN — TRAMADOL HYDROCHLORIDE 50 MG: 50 TABLET, COATED ORAL at 09:34

## 2025-01-19 RX ADMIN — ACETAMINOPHEN 975 MG: 325 TABLET ORAL at 04:09

## 2025-01-19 RX ADMIN — CYCLOBENZAPRINE 10 MG: 10 TABLET, FILM COATED ORAL at 12:13

## 2025-01-19 RX ADMIN — ACYCLOVIR 400 MG: 400 TABLET ORAL at 09:29

## 2025-01-19 RX ADMIN — PANTOPRAZOLE SODIUM 40 MG: 40 TABLET, DELAYED RELEASE ORAL at 06:04

## 2025-01-19 RX ADMIN — METOPROLOL SUCCINATE 50 MG: 50 TABLET, EXTENDED RELEASE ORAL at 04:45

## 2025-01-19 RX ADMIN — ACETAMINOPHEN 975 MG: 325 TABLET ORAL at 20:15

## 2025-01-19 RX ADMIN — CYCLOBENZAPRINE 10 MG: 10 TABLET, FILM COATED ORAL at 19:42

## 2025-01-19 RX ADMIN — GABAPENTIN 300 MG: 300 CAPSULE ORAL at 20:15

## 2025-01-19 RX ADMIN — ATORVASTATIN CALCIUM 40 MG: 40 TABLET, FILM COATED ORAL at 06:04

## 2025-01-19 RX ADMIN — ALLOPURINOL 300 MG: 300 TABLET ORAL at 09:29

## 2025-01-19 RX ADMIN — OXYCODONE 5 MG: 5 TABLET ORAL at 19:42

## 2025-01-19 RX ADMIN — ACETAMINOPHEN 975 MG: 325 TABLET ORAL at 12:13

## 2025-01-19 RX ADMIN — ACYCLOVIR 400 MG: 400 TABLET ORAL at 20:15

## 2025-01-19 RX ADMIN — ALUMINUM HYDROXIDE, MAGNESIUM HYDROXIDE, AND SIMETHICONE 20 ML: 200; 200; 20 SUSPENSION ORAL at 17:57

## 2025-01-19 RX ADMIN — POLYETHYLENE GLYCOL 3350 17 G: 17 POWDER, FOR SOLUTION ORAL at 09:29

## 2025-01-19 RX ADMIN — ASPIRIN 81 MG: 81 TABLET, COATED ORAL at 09:29

## 2025-01-19 ASSESSMENT — COGNITIVE AND FUNCTIONAL STATUS - GENERAL
DAILY ACTIVITIY SCORE: 24
MOBILITY SCORE: 24
DAILY ACTIVITIY SCORE: 24
MOBILITY SCORE: 24

## 2025-01-19 ASSESSMENT — PAIN DESCRIPTION - ORIENTATION: ORIENTATION: LOWER

## 2025-01-19 ASSESSMENT — PAIN SCALES - GENERAL
PAINLEVEL_OUTOF10: 6
PAINLEVEL_OUTOF10: 4
PAINLEVEL_OUTOF10: 4

## 2025-01-19 ASSESSMENT — PAIN DESCRIPTION - LOCATION: LOCATION: BACK

## 2025-01-19 ASSESSMENT — PAIN SCALES - WONG BAKER
WONGBAKER_NUMERICALRESPONSE: HURTS LITTLE MORE
WONGBAKER_NUMERICALRESPONSE: HURTS LITTLE BIT

## 2025-01-19 NOTE — PROGRESS NOTES
Discharge Planning Note:    Bijan Ibanez is a 64 y.o. male on day 1 of admission presenting with High grade B-cell lymphoma (Multi).    Called and spoke with patient his wife answered the telephone confirmed all information on the demographics page. Lives in a single family home with no steps inside or outside. No DME used but has all equipment needed at home. No  homecare or oxygen prior to hospital. Agreeable to [facility] for homecare if needed and [facility] if SNF is needed.       Sherry Norwood RN

## 2025-01-19 NOTE — CARE PLAN
Problem: Pain  Goal: LTG - Patient will manage pain with the appropriate technique/Intervention  Outcome: Progressing  Goal: LTG - Patient will demonstrate intervention for managing pain  Outcome: Progressing  Goal: STG - Patient will reduce or eliminate use of analgesics  Outcome: Progressing  Goal: STG - Pain is manageable through therapies  Outcome: Progressing  Goal: STG - Patient will verbalize an acceptable level of pain  Outcome: Progressing  Goal: STG - Patients pain is managed to allow active participation in daily activities  Outcome: Progressing  Goal: STG - Patient will increase activity level  Outcome: Progressing  Goal: STG - Patient verbalizes a reduction in pain level  Outcome: Progressing   The patient's goals for the shift include      The clinical goals for the shift include pt to remain hds

## 2025-01-19 NOTE — PROGRESS NOTES
"Bijan Ibanez is a 64 y.o. male on day 1 of admission presenting with High grade B-cell lymphoma (Multi).    Subjective   Tired. LBP continues, controlled with pain meds. Endorses some OSORIO but denies SOB. Endorses slight constipation, is trying miralax. Wife and family at bedside. Denies CP, abd pain, N/V.    Objective   Physical Exam  Constitutional:       General: He is not in acute distress.  HENT:      Head: Normocephalic and atraumatic.      Comments: Bilateral parotid swelling/mass, R>L, R somewhat tender to palpation     Mouth/Throat:      Mouth: Mucous membranes are moist.   Eyes:      Extraocular Movements: Extraocular movements intact.      Pupils: Pupils are equal, round, and reactive to light.   Cardiovascular:      Rate and Rhythm: Normal rate and regular rhythm.   Pulmonary:      Effort: Pulmonary effort is normal. No respiratory distress.      Breath sounds: Normal breath sounds.   Abdominal:      General: Bowel sounds are normal.      Palpations: Abdomen is soft.      Tenderness: There is no abdominal tenderness.   Musculoskeletal:         General: Normal range of motion.      Right lower leg: No edema.      Left lower leg: No edema.      Comments: Soft tissue mass/swelling right upper back, nontender   Lymphadenopathy:      Cervical: Cervical adenopathy present.      Left cervical: Posterior cervical adenopathy present.   Skin:     General: Skin is warm and dry.   Neurological:      Mental Status: He is alert and oriented to person, place, and time.   Psychiatric:         Mood and Affect: Mood normal.         Behavior: Behavior normal.       Last Recorded Vitals  Blood pressure 127/79, pulse 107, temperature 36 °C (96.8 °F), temperature source Temporal, resp. rate 18, height 1.854 m (6' 1\"), weight 109 kg (239 lb 6.7 oz), SpO2 93%.  Intake/Output last 3 Shifts:  No intake/output data recorded.    This patient has a central line   Reason for the central line remaining today? Parenteral " medication    Assessment/Plan     Bijan Ibanez is a 64 y.o. male with PMH of HTN, HLD, CAD (s/p stent 2010, on ASA), hx renal cell carcinoma (s/p R partial nephrectomy in 2013 @ [facility]), GERD, BPH, arthritis who is admitted for further evaluation and management of newly diagnosed high grade B-cell lymphoma.      ONC:  # remote RCC (pT1a) s/p R partial nephrectomy in 2013 with negative margins ([facility])    # new high grade B-cell lymphoma  - CT neck (1/9/25): fusiform thickening of the right-sided muscles of mastication including masseter, temporalis, and pterygoid muscles. No associated abnormal enhancement or discrete mass identified. Findings most likely to represent asymmetric benign hypertrophy of the muscles of mastication.  - CT Head (1/11/25): Rounded near simple fluid density lesion in the left subinsular white matter favored to represent prominent perivascular space as opposed to malignancy. Nonemergent, MRI brain could better evaluate if clinically indicated. No acute intracranial abnormality.  - CT C/A/P (1/11/25): Left-sided perihilar soft tissue mass/lymph node c/f metastatic disease. Multiple left-sided pulmonary nodules, the majority located in the posterior LLL. There are additional nodules which exhibit subpleural distribution and an isolated JT nodule, favored to reflect underlying enlarged intrapulmonary lymph nodes and underlying metastatic disease not definitively excluded. Ill-defined hypodense infiltrative masslike foci involving the right kidney, and an additional infiltrative lesion located at the superior aspect of the right kidney and closely abuts the inferior aspect of the right hepatic lobe, c/f primary renal neoplasm such as renal cell carcinoma versus metastatic disease. Stable asymmetric nodular masslike thickening of the right adrenal gland c/f adenoma, but superimposed metastatic disease not excluded. Soft tissue mass in the right anterolateral aspect of the paraspinous tissues at  T12-L1, previously characterized on MRI lumbar spine 1/10/25, c/f nerve sheath tumor or abnormal lymph node conglomerate.   - pulm consulted, underwent EBUS with LN biopsy and BAL (1/13/25): majority of small lymphoid cells are positive for CD20 and LCA, with few background CD3 positive T cells. TTF-1, INSM1, S100, synaotpophysin and SOX10 were negative. Cytokeratin AE1/AE3 and Cam5.2 highlight rare benign-appearing epithelial cells. The overall findings raise suspicion for a B-cell lymphoproliferative process.   - BMBx (1/16) prelim read high grade B-cell lymphoma  - plan PET-CT for staging in AM, made NPO at midnight  - plan to start chemo pending final bmbx path report     HEME:  # pancytopenia 2/2 disease   - monitor counts daily  - transfuse to keep hgb>7, plt>10  # c/f HLH  - HLH labs significantly elevated, see heme consult note for details  # VTE Prophylaxis  - Ambulation only with thrombocytopenia      ID:  Allergies: PCN (from childhood, unsure of reaction)  # PPX: acyclovir  - plan Levaquin when neutropenic  - plan zosyn for neutropenic fever  # infectious workup for left hilar mass  - viral, bacterial, and fungal studies all negative so far      FEN/GI:  - Admit weight 109 kg.   # monitor electrolytes daily, replace prn  # regular diet  # hyponatremia, suspect SIADH  - continue 1.5L fluid restriction  - home triamterene-HCTZ was discontinued previous admission  # hx GERD, # PPI ppx  - protonix 40mg daily   # hyperuricemia   - allopurinol 300mg daily     CARDIO/PULM:  - ECHO (1/18/25): LVSF normal, EF 65-70%.   # HTN, HLD, CAD, MI s/p stent 2010  - continue home atorvastatin, metoprolol, baby ASA  - HOLD home lisinopril in setting of low BP's     URO:  # BPH  - no home meds  - Per urology, plan to f[facility]     ORTHO:  # low back pain  # Multilevel degenerative lumbar spondylosis   - MRI lumbar spine (1/10/25): Multilevel degenerative lumbar spondylosis. Nodular structure along the posterior margin of the  left L2-3 neural foramen which may represent extruded migrated disc fragment, facet synovial cyst, nerve root sleeve diverticulum, or other and appears to exert mass effect upon the exiting left L2 nerve root. Correlate clinically for left-sided L2 radiculopathy. At the T12 and L1 levels in the right anterolateral paraspinous tissues is a masslike structure that measures 39 x 17 mm greatest axial dimensions and may represent a soft tissue mass though this is unclear. Considerations include a nerve sheath tumor, abnormal lymph node conglomerate, fluid collection and others. Consider further imaging evaluation MRI of the abdomen without and with contrast. There is some heterogeneity of the marrow of the bilateral sacral ala in the posterior iliac bones. This may represent simple age-related marrow heterogeneity. Insufficiency fracture or fractures not excluded, though considered less likely. If indicated consider further initial imaging with plain films or CT.  - ortho spine consulted, no surgical intervention, patient can follow up outpatient as needed w Dr. Kuhn if any issues arise from a spine perspective.   - continue scheduled tylenol 975 mg Q8 hrs  - continue PRN tramadol 50mg Q6h moderate pain, PRN oxy 5mg Q6h severe pain  - continue home nightly gabapentin, increase as needed/tolerated  - continue flexeril 10mg TID PRN      ACCESS/SUPPORT/DISPO:  - FULL CODE  - ACCESS: Rt DL PICC SOLO (placed 1/18/25)  - PRIMARY ONC: needs assigned     Patient seen, examined, discussed with Dr. Danielle Gilliam PA-C

## 2025-01-19 NOTE — SIGNIFICANT EVENT
Rapid Response Nurse Note: RADAR alert: 6    Pager time:   Arrival time:   Event end time:   Location: [place] [address]  [x] Triage by phone or secure messaging    Rapid response initiated by:  [] Rapid response RN [] Family [] Nursing Supervisor [] Physician   [x] RADAR auto page [] Sepsis auto-page [] RN [] RT   [] NP/PA [] Other:     Primary reason for call:   [] BAT [] New CPAP/BiPAP [] Bleeding [] Change in mental status   [] Chest pain [] Code blue [] FiO2 >/= 50% [] HR </= 40 bpm   [] HR >/= 130 bpm [] Hyperglycemia [] Hypoglycemia [x] RADAR    [] RR </= 8 bpm [] RR >/= 30 bpm [] SBP </= 90 mmHg [] SpO2 < 90%   [] Seizure [] Sepsis [] Shortness of breath  [] Staff concern: see comments     Initial VS and/or RADAR VS: T 36 °C; ; RR 16; BP 96/65; SPO2 95%.    Providers present at bedside (if applicable):     Name of ICU Provider contacted (if applicable):     Interventions:  [x] None [] ABG/VBG [] Assist w/ICU transfer [] BAT paged    [] Bag mask [] Blood [] Cardioversion [] Code Blue   [] Code blue for intubation [] Code status changed [] Chest x-ray [] EKG   [] IV fluid/bolus [] KUB x-ray [] Labs/cultures [] Medication   [] Nebulizer treatment [] NIPPV (CPAP/BiPAP) [] Oxygen [] Oral airway   [] Peripheral IV [] Palliative care consult [] CT/MRI [] Sepsis protocol    [] Suctioned [] Other:     Outcome:  [] Coded and  [] Code blue for intubation [] Coded and transferred to ICU []  on division   [x] Remained on division (no change) [] Remained on division + additional monitoring [] Remained in ED [] Transferred to ED   [] Transferred to ICU [] Transferred to inpatient status [] Transferred for interventions (procedure) [] Transferred to ICU stepdown    [] Transferred to surgery [] Transferred to telemetry [] Sepsis protocol [] STEMI protocol   [] Stroke protocol [x] Bedside nurse instructed to page rapid response for any concerns or acute change in condition/VS     Additional Comments:  Reviewed above RADAR VS with bedside RN.  VS within patient's current trends.  No interventions by rapid response team indicated at this time.  Staff to page rapid response for any concerns or acute change in condition/VS.

## 2025-01-19 NOTE — SIGNIFICANT EVENT
01/18/25 1618   Vital Signs   Temp 36.7 °C (98.1 °F)   Temp Source Temporal   Heart Rate 93   Resp 16   BP 83/56  (RN notified)   MAP (mmHg) 65   BP Location Left arm   BP Method Automatic   Patient Position Lying   Medical Gas Therapy   SpO2 95 %     Miles notified at 1623, patient asymptomatic resting in bed. Patient denied decreased oral intake, Provider held lisinopril at this time

## 2025-01-19 NOTE — CARE PLAN
Problem: Pain  Goal: LTG - Patient will manage pain with the appropriate technique/Intervention  Outcome: Progressing  Goal: LTG - Patient will demonstrate intervention for managing pain  Outcome: Progressing  Goal: STG - Patient will reduce or eliminate use of analgesics  Outcome: Progressing  Goal: STG - Pain is manageable through therapies  Outcome: Progressing  Goal: STG - Patient will verbalize an acceptable level of pain  Outcome: Progressing  Goal: STG - Patients pain is managed to allow active participation in daily activities  Outcome: Progressing  Goal: STG - Patient will increase activity level  Outcome: Progressing  Goal: STG - Patient verbalizes a reduction in pain level  Outcome: Progressing   The patient's goals for the shift include      The clinical goals for the shift include decrease pain

## 2025-01-19 NOTE — SIGNIFICANT EVENT
Rapid Response Nurse Note:  [x] RADAR alert/Score 6    Pager time: [phone]  Arrival time: 420  Event end time: 422  Location: [facility]  [x] Phone triage     Rapid response initiated by:  [] Rapid Response RN [] Family [] Nursing Supervisor [] Physician   [x] RADAR auto-page [] Sepsis auto-page [] RN [] RT   [] NP/PA [] Other:     Primary reason for call:   [] BAT [] New CPAP/BiPAP [] Bleeding [] Change in mental status   [] Chest pain [] Code blue [] FiO2 >/= 50% [] HR </= 40 bpm   [] HR >/= 130 bpm [] Hyperglycemia [] Hypoglycemia [x] RADAR    [] RR </= 8 bpm [] RR >/= 30 bpm [] SBP </= 90 mmHg [] SpO2 < 90%   [] Seizure [] Sepsis [] Staff concern:     Initial VS and/or RADAR VS:  radar vitals below in bold    Vitals:    25 1618 25 0003 25 0408   BP: 83/56 96/65 94/63 106/74   BP Location: Left arm Left arm Left arm    Patient Position: Lying Lying Lying    Pulse: 93 (!) 111 (!) 116 (!) 120   Resp: 16 16 16 18   Temp: 36.7 °C (98.1 °F) 36 °C (96.8 °F) 36.5 °C (97.7 °F) 36 °C (96.8 °F)   TempSrc: Temporal Temporal Temporal Temporal   SpO2: 95% 95% 94% 93%   Weight:       Height:            Interventions:  [x] None [] ABG [] Assist w/ICU transfer [] BAT paged    [] Bag mask [] Blood [] Cardioversion [] Code Blue   [] Code blue for intubation [] Code status changed [] Chest x-ray [] EKG   [] IV fluid/bolus [] KUB x-ray [] Labs/cultures [] Medication   [] Nebulizer treatment [] NIPPV (CPAP/BiPAP) [] Oxygen [] Oral airway   [] Peripheral IV [] Palliative care consult [] CT/MRI [] Sepsis protocol    [] Suctioned [] Other:       Outcome:  [] Coded and  [] Code blue for intubation [] Coded and transferred to ICU []  on division   [x] Remained on division (no change) [] Remained on division + additional monitoring [] Remained in ED [] Transferred to ED   [] Transferred to ICU [] Transferred to inpatient status [] Transferred for interventions (procedure) [] Transferred to ICU  stepdown    [] Transferred to surgery [] Transferred to telemetry [] Sepsis protocol [] STEMI protocol   [] Stroke protocol [x] Bedside nurse instructed to page rapid response for any concerns or acute change in condition/VS     Additional Comments: Spoke to RN regarding vital signs.  No concerns from RN at this time.

## 2025-01-19 NOTE — HOSPITAL COURSE
Bijan Ibanez is a 64 y.o. male with PMH of HTN, HLD, CAD (s/p stent 2010, on ASA), hx renal cell carcinoma (s/p R partial nephrectomy in 2013 @ [facility]), GERD, BPH, arthritis who is admitted for further evaluation and management of newly diagnosed high grade B-cell lymphoma.     Hospital course notable for:  - XXXX      ACCESS: Rt DL PICC SOLO (placed 1/18/25)   PRIMARY ONC: Dr. Mckeon  PPX: acyclovir  FOLLOW UP:   - XXXX

## 2025-01-20 ENCOUNTER — APPOINTMENT (OUTPATIENT)
Dept: RADIOLOGY | Facility: HOSPITAL | Age: 65
DRG: 840 | End: 2025-01-20
Payer: MEDICARE

## 2025-01-20 LAB
ACID FAST STN SPEC: NORMAL
ALBUMIN SERPL BCP-MCNC: 2.8 G/DL (ref 3.4–5)
ALP SERPL-CCNC: 138 U/L (ref 33–136)
ALT SERPL W P-5'-P-CCNC: 26 U/L (ref 10–52)
ANION GAP SERPL CALC-SCNC: 16 MMOL/L (ref 10–20)
ANION GAP SERPL CALC-SCNC: 16 MMOL/L (ref 10–20)
AST SERPL W P-5'-P-CCNC: 51 U/L (ref 9–39)
BASOPHILS # BLD MANUAL: 0.07 X10*3/UL (ref 0–0.1)
BASOPHILS NFR BLD MANUAL: 1.7 %
BILIRUB DIRECT SERPL-MCNC: 0.4 MG/DL (ref 0–0.3)
BILIRUB SERPL-MCNC: 1.5 MG/DL (ref 0–1.2)
BUN SERPL-MCNC: 41 MG/DL (ref 6–23)
BUN SERPL-MCNC: 47 MG/DL (ref 6–23)
BURR CELLS BLD QL SMEAR: ABNORMAL
CALCIUM SERPL-MCNC: 8.9 MG/DL (ref 8.6–10.6)
CALCIUM SERPL-MCNC: 9.1 MG/DL (ref 8.6–10.6)
CHLORIDE SERPL-SCNC: 93 MMOL/L (ref 98–107)
CHLORIDE SERPL-SCNC: 93 MMOL/L (ref 98–107)
CO2 SERPL-SCNC: 28 MMOL/L (ref 21–32)
CO2 SERPL-SCNC: 28 MMOL/L (ref 21–32)
CREAT SERPL-MCNC: 1.31 MG/DL (ref 0.5–1.3)
CREAT SERPL-MCNC: 1.5 MG/DL (ref 0.5–1.3)
DACRYOCYTES BLD QL SMEAR: ABNORMAL
EGFRCR SERPLBLD CKD-EPI 2021: 51 ML/MIN/1.73M*2
EGFRCR SERPLBLD CKD-EPI 2021: 60 ML/MIN/1.73M*2
EOSINOPHIL # BLD MANUAL: 0.18 X10*3/UL (ref 0–0.7)
EOSINOPHIL NFR BLD MANUAL: 4.3 %
ERYTHROCYTE [DISTWIDTH] IN BLOOD BY AUTOMATED COUNT: 13.6 % (ref 11.5–14.5)
FUNGUS SPEC CULT: NORMAL
FUNGUS SPEC CULT: NORMAL
FUNGUS SPEC FUNGUS STN: NORMAL
FUNGUS SPEC FUNGUS STN: NORMAL
GLUCOSE BLD MANUAL STRIP-MCNC: 73 MG/DL (ref 74–99)
GLUCOSE SERPL-MCNC: 123 MG/DL (ref 74–99)
GLUCOSE SERPL-MCNC: 73 MG/DL (ref 74–99)
HCT VFR BLD AUTO: 28.8 % (ref 41–52)
HGB BLD-MCNC: 9.8 G/DL (ref 13.5–17.5)
IMM GRANULOCYTES # BLD AUTO: 0.47 X10*3/UL (ref 0–0.7)
IMM GRANULOCYTES NFR BLD AUTO: 11.3 % (ref 0–0.9)
LDH SERPL L TO P-CCNC: 3575 U/L (ref 84–246)
LDH SERPL L TO P-CCNC: 3934 U/L (ref 84–246)
LYMPHOCYTES # BLD MANUAL: 0.5 X10*3/UL (ref 1.2–4.8)
LYMPHOCYTES NFR BLD MANUAL: 12 %
MAGNESIUM SERPL-MCNC: 2.07 MG/DL (ref 1.6–2.4)
MCH RBC QN AUTO: 27.8 PG (ref 26–34)
MCHC RBC AUTO-ENTMCNC: 34 G/DL (ref 32–36)
MCV RBC AUTO: 82 FL (ref 80–100)
METAMYELOCYTES # BLD MANUAL: 0.11 X10*3/UL
METAMYELOCYTES NFR BLD MANUAL: 2.6 %
MONOCYTES # BLD MANUAL: 0.36 X10*3/UL (ref 0.1–1)
MONOCYTES NFR BLD MANUAL: 8.5 %
MYCOBACTERIUM SPEC CULT: NORMAL
MYELOCYTES # BLD MANUAL: 0.07 X10*3/UL
MYELOCYTES NFR BLD MANUAL: 1.7 %
NEUTROPHILS # BLD MANUAL: 2.76 X10*3/UL (ref 1.2–7.7)
NEUTS BAND # BLD MANUAL: 0.32 X10*3/UL (ref 0–0.7)
NEUTS BAND NFR BLD MANUAL: 7.7 %
NEUTS SEG # BLD MANUAL: 2.44 X10*3/UL (ref 1.2–7)
NEUTS SEG NFR BLD MANUAL: 58.1 %
NRBC BLD-RTO: 2.2 /100 WBCS (ref 0–0)
OVALOCYTES BLD QL SMEAR: ABNORMAL
PHOSPHATE SERPL-MCNC: 4.8 MG/DL (ref 2.5–4.9)
PHOSPHATE SERPL-MCNC: 5.1 MG/DL (ref 2.5–4.9)
PLATELET # BLD AUTO: 43 X10*3/UL (ref 150–450)
POTASSIUM SERPL-SCNC: 4.6 MMOL/L (ref 3.5–5.3)
POTASSIUM SERPL-SCNC: 5 MMOL/L (ref 3.5–5.3)
PROMYELOCYTES # BLD MANUAL: 0.07 X10*3/UL
PROMYELOCYTES NFR BLD MANUAL: 1.7 %
PROT SERPL-MCNC: 4.7 G/DL (ref 6.4–8.2)
RBC # BLD AUTO: 3.52 X10*6/UL (ref 4.5–5.9)
RBC MORPH BLD: ABNORMAL
SODIUM SERPL-SCNC: 132 MMOL/L (ref 136–145)
SODIUM SERPL-SCNC: 132 MMOL/L (ref 136–145)
STAPHYLOCOCCUS SPEC CULT: ABNORMAL
TOTAL CELLS COUNTED BLD: 117
URATE SERPL-MCNC: 7.5 MG/DL (ref 4–7.5)
URATE SERPL-MCNC: 8.4 MG/DL (ref 4–7.5)
VARIANT LYMPHS # BLD MANUAL: 0.07 X10*3/UL (ref 0–0.5)
VARIANT LYMPHS NFR BLD: 1.7 %
WBC # BLD AUTO: 4.2 X10*3/UL (ref 4.4–11.3)

## 2025-01-20 PROCEDURE — 2500000001 HC RX 250 WO HCPCS SELF ADMINISTERED DRUGS (ALT 637 FOR MEDICARE OP)

## 2025-01-20 PROCEDURE — 84550 ASSAY OF BLOOD/URIC ACID: CPT

## 2025-01-20 PROCEDURE — 82040 ASSAY OF SERUM ALBUMIN: CPT

## 2025-01-20 PROCEDURE — 2500000005 HC RX 250 GENERAL PHARMACY W/O HCPCS

## 2025-01-20 PROCEDURE — 83735 ASSAY OF MAGNESIUM: CPT

## 2025-01-20 PROCEDURE — 82947 ASSAY GLUCOSE BLOOD QUANT: CPT

## 2025-01-20 PROCEDURE — 85007 BL SMEAR W/DIFF WBC COUNT: CPT

## 2025-01-20 PROCEDURE — 80069 RENAL FUNCTION PANEL: CPT | Mod: CCI

## 2025-01-20 PROCEDURE — 3430000001 HC RX 343 DIAGNOSTIC RADIOPHARMACEUTICALS: Performed by: STUDENT IN AN ORGANIZED HEALTH CARE EDUCATION/TRAINING PROGRAM

## 2025-01-20 PROCEDURE — 85027 COMPLETE CBC AUTOMATED: CPT

## 2025-01-20 PROCEDURE — 78815 PET IMAGE W/CT SKULL-THIGH: CPT | Mod: PET TUMOR INIT TX STRAT | Performed by: NUCLEAR MEDICINE

## 2025-01-20 PROCEDURE — 2500000004 HC RX 250 GENERAL PHARMACY W/ HCPCS (ALT 636 FOR OP/ED)

## 2025-01-20 PROCEDURE — 83615 LACTATE (LD) (LDH) ENZYME: CPT

## 2025-01-20 PROCEDURE — 82960 TEST FOR G6PD ENZYME: CPT | Performed by: STUDENT IN AN ORGANIZED HEALTH CARE EDUCATION/TRAINING PROGRAM

## 2025-01-20 PROCEDURE — 78815 PET IMAGE W/CT SKULL-THIGH: CPT | Mod: PI

## 2025-01-20 PROCEDURE — A9552 F18 FDG: HCPCS | Performed by: STUDENT IN AN ORGANIZED HEALTH CARE EDUCATION/TRAINING PROGRAM

## 2025-01-20 PROCEDURE — 99233 SBSQ HOSP IP/OBS HIGH 50: CPT | Performed by: STUDENT IN AN ORGANIZED HEALTH CARE EDUCATION/TRAINING PROGRAM

## 2025-01-20 PROCEDURE — 80069 RENAL FUNCTION PANEL: CPT

## 2025-01-20 PROCEDURE — 1170000001 HC PRIVATE ONCOLOGY ROOM DAILY

## 2025-01-20 RX ORDER — SODIUM CHLORIDE, SODIUM LACTATE, POTASSIUM CHLORIDE, CALCIUM CHLORIDE 600; 310; 30; 20 MG/100ML; MG/100ML; MG/100ML; MG/100ML
100 INJECTION, SOLUTION INTRAVENOUS CONTINUOUS
Status: DISCONTINUED | OUTPATIENT
Start: 2025-01-20 | End: 2025-01-20

## 2025-01-20 RX ORDER — SODIUM CHLORIDE 9 MG/ML
50 INJECTION, SOLUTION INTRAVENOUS CONTINUOUS
Status: DISCONTINUED | OUTPATIENT
Start: 2025-01-20 | End: 2025-01-25

## 2025-01-20 RX ORDER — SODIUM CHLORIDE, SODIUM LACTATE, POTASSIUM CHLORIDE, CALCIUM CHLORIDE 600; 310; 30; 20 MG/100ML; MG/100ML; MG/100ML; MG/100ML
100 INJECTION, SOLUTION INTRAVENOUS CONTINUOUS
Status: ACTIVE | OUTPATIENT
Start: 2025-01-20 | End: 2025-01-20

## 2025-01-20 RX ORDER — FLUDEOXYGLUCOSE F 18 200 MCI/ML
9.65 INJECTION, SOLUTION INTRAVENOUS
Status: COMPLETED | OUTPATIENT
Start: 2025-01-20 | End: 2025-01-20

## 2025-01-20 RX ORDER — DEXAMETHASONE 4 MG/1
30 TABLET ORAL ONCE
Status: COMPLETED | OUTPATIENT
Start: 2025-01-20 | End: 2025-01-20

## 2025-01-20 RX ORDER — DEXAMETHASONE 4 MG/1
40 TABLET ORAL DAILY
Status: DISCONTINUED | OUTPATIENT
Start: 2025-01-21 | End: 2025-01-22

## 2025-01-20 RX ORDER — DEXAMETHASONE 4 MG/1
10 TABLET ORAL DAILY
Status: DISCONTINUED | OUTPATIENT
Start: 2025-01-20 | End: 2025-01-20

## 2025-01-20 RX ORDER — MUPIROCIN 20 MG/G
OINTMENT TOPICAL 2 TIMES DAILY
Status: DISPENSED | OUTPATIENT
Start: 2025-01-20 | End: 2025-01-25

## 2025-01-20 RX ADMIN — OXYCODONE 5 MG: 5 TABLET ORAL at 11:11

## 2025-01-20 RX ADMIN — GABAPENTIN 300 MG: 300 CAPSULE ORAL at 20:53

## 2025-01-20 RX ADMIN — POLYETHYLENE GLYCOL 3350 17 G: 17 POWDER, FOR SOLUTION ORAL at 13:39

## 2025-01-20 RX ADMIN — ACYCLOVIR 400 MG: 400 TABLET ORAL at 20:53

## 2025-01-20 RX ADMIN — SODIUM CHLORIDE, POTASSIUM CHLORIDE, SODIUM LACTATE AND CALCIUM CHLORIDE 100 ML/HR: 600; 310; 30; 20 INJECTION, SOLUTION INTRAVENOUS at 10:00

## 2025-01-20 RX ADMIN — DEXAMETHASONE 30 MG: 4 TABLET ORAL at 15:44

## 2025-01-20 RX ADMIN — MUPIROCIN: 20 OINTMENT TOPICAL at 10:43

## 2025-01-20 RX ADMIN — ASPIRIN 81 MG: 81 TABLET, COATED ORAL at 09:02

## 2025-01-20 RX ADMIN — ACETAMINOPHEN 975 MG: 325 TABLET ORAL at 04:01

## 2025-01-20 RX ADMIN — ACETAMINOPHEN 975 MG: 325 TABLET ORAL at 20:53

## 2025-01-20 RX ADMIN — ALLOPURINOL 300 MG: 300 TABLET ORAL at 09:02

## 2025-01-20 RX ADMIN — SODIUM CHLORIDE 100 ML/HR: 9 INJECTION, SOLUTION INTRAVENOUS at 20:53

## 2025-01-20 RX ADMIN — PANTOPRAZOLE SODIUM 40 MG: 40 TABLET, DELAYED RELEASE ORAL at 06:25

## 2025-01-20 RX ADMIN — ATORVASTATIN CALCIUM 40 MG: 40 TABLET, FILM COATED ORAL at 06:25

## 2025-01-20 RX ADMIN — FLUDEOXYGLUCOSE F 18 9.65 MILLICURIE: 200 INJECTION, SOLUTION INTRAVENOUS at 11:50

## 2025-01-20 RX ADMIN — DEXAMETHASONE 10 MG: 4 TABLET ORAL at 13:38

## 2025-01-20 RX ADMIN — MUPIROCIN: 20 OINTMENT TOPICAL at 20:55

## 2025-01-20 RX ADMIN — ACETAMINOPHEN 975 MG: 325 TABLET ORAL at 11:11

## 2025-01-20 RX ADMIN — METOPROLOL SUCCINATE 50 MG: 50 TABLET, EXTENDED RELEASE ORAL at 09:02

## 2025-01-20 RX ADMIN — ACYCLOVIR 400 MG: 400 TABLET ORAL at 09:02

## 2025-01-20 ASSESSMENT — COGNITIVE AND FUNCTIONAL STATUS - GENERAL
MOBILITY SCORE: 24
DAILY ACTIVITIY SCORE: 24

## 2025-01-20 ASSESSMENT — PAIN SCALES - GENERAL
PAINLEVEL_OUTOF10: 6
PAINLEVEL_OUTOF10: 8
PAINLEVEL_OUTOF10: 2

## 2025-01-20 ASSESSMENT — PAIN DESCRIPTION - LOCATION: LOCATION: BACK

## 2025-01-20 NOTE — PROGRESS NOTES
"Bijan Ibanez is a 64 y.o. male on day 2 of admission presenting with High grade B-cell lymphoma (Multi).    Subjective   Tired. LBP continues, jaw/face/chewing pain continues. Reports some bilateral hand swelling. Endorses some OSORIO but denies SOB. Wife, kids, grandkids at bedside. Denies CP, abd pain, N/V.    Objective   Physical Exam  Constitutional:       General: He is not in acute distress.  HENT:      Head: Normocephalic and atraumatic.      Comments: Bilateral parotid swelling/mass, R>L, R somewhat tender to palpation     Mouth/Throat:      Mouth: Mucous membranes are moist.   Eyes:      Extraocular Movements: Extraocular movements intact.      Pupils: Pupils are equal, round, and reactive to light.   Cardiovascular:      Rate and Rhythm: Normal rate and regular rhythm.   Pulmonary:      Effort: Pulmonary effort is normal. No respiratory distress.      Breath sounds: Normal breath sounds.   Abdominal:      General: Bowel sounds are normal.      Palpations: Abdomen is soft.      Tenderness: There is no abdominal tenderness.   Musculoskeletal:         General: Normal range of motion.      Right lower leg: No edema.      Left lower leg: No edema.      Comments: Soft tissue mass/swelling right upper back, nontender   Lymphadenopathy:      Cervical: Cervical adenopathy present.      Left cervical: Posterior cervical adenopathy present.   Skin:     General: Skin is warm and dry.   Neurological:      Mental Status: He is alert and oriented to person, place, and time.   Psychiatric:         Mood and Affect: Mood normal.         Behavior: Behavior normal.       Last Recorded Vitals  Blood pressure 120/81, pulse (!) 123, temperature 36.7 °C (98.1 °F), temperature source Temporal, resp. rate 18, height 1.854 m (6' 1\"), weight 109 kg (239 lb 6.7 oz), SpO2 96%.  Intake/Output last 3 Shifts:  No intake/output data recorded.    This patient has a central line   Reason for the central line remaining today? Parenteral " medication    Assessment/Plan     Bijan Ibanez is a 64 y.o. male with PMH of HTN, HLD, CAD (s/p stent 2010, on ASA), hx renal cell carcinoma (s/p R partial nephrectomy in 2013 @ [facility]), GERD, BPH, arthritis who is admitted for further evaluation and management of newly diagnosed high grade B-cell lymphoma.      ONC:  # remote RCC (pT1a) s/p R partial nephrectomy in 2013 with negative margins ([facility])    # new high grade B-cell lymphoma  - CT neck (1/9/25): fusiform thickening of the right-sided muscles of mastication including masseter, temporalis, and pterygoid muscles. No associated abnormal enhancement or discrete mass identified. Findings most likely to represent asymmetric benign hypertrophy of the muscles of mastication.  - CT Head (1/11/25): Rounded near simple fluid density lesion in the left subinsular white matter favored to represent prominent perivascular space as opposed to malignancy. Nonemergent, MRI brain could better evaluate if clinically indicated. No acute intracranial abnormality.  - CT C/A/P (1/11/25): Left-sided perihilar soft tissue mass/lymph node c/f metastatic disease. Multiple left-sided pulmonary nodules, the majority located in the posterior LLL. There are additional nodules which exhibit subpleural distribution and an isolated JT nodule, favored to reflect underlying enlarged intrapulmonary lymph nodes and underlying metastatic disease not definitively excluded. Ill-defined hypodense infiltrative masslike foci involving the right kidney, and an additional infiltrative lesion located at the superior aspect of the right kidney and closely abuts the inferior aspect of the right hepatic lobe, c/f primary renal neoplasm such as renal cell carcinoma versus metastatic disease. Stable asymmetric nodular masslike thickening of the right adrenal gland c/f adenoma, but superimposed metastatic disease not excluded. Soft tissue mass in the right anterolateral aspect of the paraspinous tissues at  T12-L1, previously characterized on MRI lumbar spine 1/10/25, c/f nerve sheath tumor or abnormal lymph node conglomerate.   - pulm consulted, underwent EBUS with LN biopsy and BAL (1/13/25): majority of small lymphoid cells are positive for CD20 and LCA, with few background CD3 positive T cells. TTF-1, INSM1, S100, synaotpophysin and SOX10 were negative. Cytokeratin AE1/AE3 and Cam5.2 highlight rare benign-appearing epithelial cells. The overall findings raise suspicion for a B-cell lymphoproliferative process.   - BMBx (1/16) prelim read high grade B-cell lymphoma, final path pending  - PET-CT (1/20): Widespread hermann as well as extranodal lymphomatous involvement. Multiple hypermetabolic supra and infra diaphragmatic lymphadenopathy. Intensely hypermetabolic activity within bilateral enlarged submandibular and parotid glands. Hypermetabolic mass-like infiltration within bilateral kidneys (R>L), corresponding to lesion seen on CT 1/11/25. Hypermetabolic hepatic lesions without any underlying CT correlate. Splenomegaly with nonspecific hypermetabolic activity suggestive of extranodal involvement. Multiple hypermetabolic soft tissue mesenteric deposits as well as anterior abdominal wall nodular deposits, with few of the lesions without any underlying CT correlate. Metabolic activity within the stomach, bilateral arms, anterior chest wall and gluteal muscles without underlying CT correlate, concerning for lymphomatous involvement.  - plan to start chemo pending final bmbx path report  - start dexamethasone 40mg daily (1/20-present) while awaiting final path  - plan LP tomorrow 1/21 with ppx IT MTX  - request placed to be added to Tumor Board for Thursday 1/23     HEME:  # pancytopenia 2/2 disease   - monitor counts daily  - transfuse to keep hgb>7, plt>10  # c/f HLH  - HLH labs significantly elevated, see heme consult note for details  # VTE Prophylaxis  - Ambulation only with thrombocytopenia      ID:  Allergies: PCN  (from childhood, unsure of reaction)  # PPX: acyclovir  - plan Levaquin when neutropenic  - plan zosyn for neutropenic fever  # infectious workup for left hilar mass  - viral, bacterial, and fungal studies all negative so far      FEN/GI:  - Admit weight 109 kg. Most current wt: 107 kg (1/20)  # monitor electrolytes daily, replace prn  # regular diet  - consulted RDN (1/20), appreciate recs  - nutritonal supplement drinks with all meals  # hyponatremia  - suspect SIADH per previous admission workup  - continue 1.5L fluid restriction  - home triamterene-HCTZ was discontinued during previous admission  # hx GERD, # PPI ppx  - protonix 40mg daily   # hyperuricemia   - allopurinol 300mg daily  # TLS monitoring/ppx  - TLS labs q8 hours with initiation of dex (1/20-present) given significant tumor burden  - s/p 1L LR run at 100ml/h x 10h (1/20)  - NS @ 100ml/hr (1/20-present)     CARDIO/PULM:  - ECHO (1/18/25): LVSF normal, EF 65-70%.   # HTN, HLD, CAD, MI s/p stent 2010  - continue home atorvastatin, metoprolol, baby ASA (hold ASA for LP 1/21)  - HOLD home lisinopril in setting of low BP's     URO:  # BPH  - no home meds  - Per urology, plan to f[facility]     ORTHO:  # low back pain  # Multilevel degenerative lumbar spondylosis   - MRI lumbar spine (1/10/25): Multilevel degenerative lumbar spondylosis. Nodular structure along the posterior margin of the left L2-3 neural foramen which may represent extruded migrated disc fragment, facet synovial cyst, nerve root sleeve diverticulum, or other and appears to exert mass effect upon the exiting left L2 nerve root. Correlate clinically for left-sided L2 radiculopathy. At the T12 and L1 levels in the right anterolateral paraspinous tissues is a masslike structure that measures 39 x 17 mm greatest axial dimensions and may represent a soft tissue mass though this is unclear. Considerations include a nerve sheath tumor, abnormal lymph node conglomerate, fluid collection and others.  Consider further imaging evaluation MRI of the abdomen without and with contrast. There is some heterogeneity of the marrow of the bilateral sacral ala in the posterior iliac bones. This may represent simple age-related marrow heterogeneity. Insufficiency fracture or fractures not excluded, though considered less likely. If indicated consider further initial imaging with plain films or CT.  - ortho spine consulted, no surgical intervention, patient can follow up outpatient as needed w Dr. Kuhn if any issues arise from a spine perspective.   - continue scheduled tylenol 975 mg Q8 hrs  - continue PRN tramadol 50mg Q6h moderate pain, PRN oxy 5mg Q6h severe pain  - continue home nightly gabapentin, increase as needed/tolerated  - continue flexeril 10mg TID PRN      ACCESS/SUPPORT/DISPO:  - FULL CODE  - ACCESS: Rt DL PICC SOLO (placed 1/18/25)  - PRIMARY ONC: Dr. Mckeon     Patient seen, examined, discussed with Dr. Mike Gilliam PA-C

## 2025-01-21 ENCOUNTER — APPOINTMENT (OUTPATIENT)
Dept: RADIOLOGY | Facility: HOSPITAL | Age: 65
DRG: 840 | End: 2025-01-21
Payer: MEDICARE

## 2025-01-21 LAB
ABO GROUP (TYPE) IN BLOOD: NORMAL
ALBUMIN SERPL BCP-MCNC: 2.6 G/DL (ref 3.4–5)
ALBUMIN SERPL BCP-MCNC: 2.8 G/DL (ref 3.4–5)
ALBUMIN SERPL BCP-MCNC: 2.8 G/DL (ref 3.4–5)
ANION GAP SERPL CALC-SCNC: 12 MMOL/L (ref 10–20)
ANION GAP SERPL CALC-SCNC: 14 MMOL/L (ref 10–20)
ANION GAP SERPL CALC-SCNC: 15 MMOL/L (ref 10–20)
ANTIBODY SCREEN: NORMAL
BASOPHILS # BLD MANUAL: 0.04 X10*3/UL (ref 0–0.1)
BASOPHILS NFR BLD MANUAL: 0.9 %
BUN SERPL-MCNC: 48 MG/DL (ref 6–23)
BUN SERPL-MCNC: 51 MG/DL (ref 6–23)
BUN SERPL-MCNC: 56 MG/DL (ref 6–23)
CALCIUM SERPL-MCNC: 8.5 MG/DL (ref 8.6–10.6)
CALCIUM SERPL-MCNC: 8.8 MG/DL (ref 8.6–10.6)
CALCIUM SERPL-MCNC: 8.8 MG/DL (ref 8.6–10.6)
CELL COUNT (BLOOD): 53.51 X10*3/UL
CELL POPULATIONS: NORMAL
CHLORIDE SERPL-SCNC: 95 MMOL/L (ref 98–107)
CHLORIDE SERPL-SCNC: 95 MMOL/L (ref 98–107)
CHLORIDE SERPL-SCNC: 97 MMOL/L (ref 98–107)
CO2 SERPL-SCNC: 28 MMOL/L (ref 21–32)
CO2 SERPL-SCNC: 29 MMOL/L (ref 21–32)
CO2 SERPL-SCNC: 30 MMOL/L (ref 21–32)
CREAT SERPL-MCNC: 1.07 MG/DL (ref 0.5–1.3)
CREAT SERPL-MCNC: 1.15 MG/DL (ref 0.5–1.3)
CREAT SERPL-MCNC: 1.24 MG/DL (ref 0.5–1.3)
DIAGNOSIS: NORMAL
EGFRCR SERPLBLD CKD-EPI 2021: 65 ML/MIN/1.73M*2
EGFRCR SERPLBLD CKD-EPI 2021: 71 ML/MIN/1.73M*2
EGFRCR SERPLBLD CKD-EPI 2021: 77 ML/MIN/1.73M*2
EOSINOPHIL # BLD MANUAL: 0 X10*3/UL (ref 0–0.7)
EOSINOPHIL NFR BLD MANUAL: 0 %
ERYTHROCYTE [DISTWIDTH] IN BLOOD BY AUTOMATED COUNT: 13.2 % (ref 11.5–14.5)
FLOW DIFFERENTIAL: NORMAL
FLOW TEST ORDERED: NORMAL
G6PD RBC QL: NORMAL
GLUCOSE SERPL-MCNC: 152 MG/DL (ref 74–99)
GLUCOSE SERPL-MCNC: 192 MG/DL (ref 74–99)
GLUCOSE SERPL-MCNC: 207 MG/DL (ref 74–99)
HCT VFR BLD AUTO: 24.9 % (ref 41–52)
HGB BLD-MCNC: 8.5 G/DL (ref 13.5–17.5)
IMM GRANULOCYTES # BLD AUTO: 0.43 X10*3/UL (ref 0–0.7)
IMM GRANULOCYTES NFR BLD AUTO: 10.9 % (ref 0–0.9)
LAB TEST METHOD: NORMAL
LDH SERPL L TO P-CCNC: 3504 U/L (ref 84–246)
LDH SERPL L TO P-CCNC: 3528 U/L (ref 84–246)
LDH SERPL L TO P-CCNC: 3575 U/L (ref 84–246)
LYMPHOCYTES # BLD MANUAL: 0.45 X10*3/UL (ref 1.2–4.8)
LYMPHOCYTES NFR BLD MANUAL: 11.3 %
MAGNESIUM SERPL-MCNC: 2.32 MG/DL (ref 1.6–2.4)
MCH RBC QN AUTO: 27.6 PG (ref 26–34)
MCHC RBC AUTO-ENTMCNC: 34.1 G/DL (ref 32–36)
MCV RBC AUTO: 81 FL (ref 80–100)
METAMYELOCYTES # BLD MANUAL: 0.17 X10*3/UL
METAMYELOCYTES NFR BLD MANUAL: 4.3 %
MONOCYTES # BLD MANUAL: 0.18 X10*3/UL (ref 0.1–1)
MONOCYTES NFR BLD MANUAL: 4.4 %
MYELOCYTES # BLD MANUAL: 0.04 X10*3/UL
MYELOCYTES NFR BLD MANUAL: 0.9 %
NEUTROPHILS # BLD MANUAL: 2.92 X10*3/UL (ref 1.2–7.7)
NEUTS BAND # BLD MANUAL: 0.1 X10*3/UL (ref 0–0.7)
NEUTS BAND NFR BLD MANUAL: 2.6 %
NEUTS SEG # BLD MANUAL: 2.82 X10*3/UL (ref 1.2–7)
NEUTS SEG NFR BLD MANUAL: 70.4 %
NRBC BLD-RTO: 1.3 /100 WBCS (ref 0–0)
NUMBER OF CELLS COLLECTED: NORMAL
OVALOCYTES BLD QL SMEAR: ABNORMAL
PATH REPORT.COMMENTS IMP SPEC: NORMAL
PATH REPORT.FINAL DX SPEC: NORMAL
PATH REPORT.GROSS SPEC: NORMAL
PATH REPORT.MICROSCOPIC SPEC OTHER STN: NORMAL
PATH REPORT.TOTAL CANCER: NORMAL
PATH REPORT.TOTAL CANCER: NORMAL
PHOSPHATE SERPL-MCNC: 4.2 MG/DL (ref 2.5–4.9)
PHOSPHATE SERPL-MCNC: 4.9 MG/DL (ref 2.5–4.9)
PHOSPHATE SERPL-MCNC: 5 MG/DL (ref 2.5–4.9)
PLATELET # BLD AUTO: 36 X10*3/UL (ref 150–450)
POTASSIUM SERPL-SCNC: 4.5 MMOL/L (ref 3.5–5.3)
POTASSIUM SERPL-SCNC: 4.6 MMOL/L (ref 3.5–5.3)
POTASSIUM SERPL-SCNC: 5.1 MMOL/L (ref 3.5–5.3)
PROMYELOCYTES # BLD MANUAL: 0.1 X10*3/UL
PROMYELOCYTES NFR BLD MANUAL: 2.6 %
RBC # BLD AUTO: 3.08 X10*6/UL (ref 4.5–5.9)
RBC MORPH BLD: ABNORMAL
RH FACTOR (ANTIGEN D): NORMAL
SIGNATURE COMMENT: NORMAL
SODIUM SERPL-SCNC: 133 MMOL/L (ref 136–145)
SODIUM SERPL-SCNC: 133 MMOL/L (ref 136–145)
SODIUM SERPL-SCNC: 134 MMOL/L (ref 136–145)
SPECIMEN VIABILITY: NORMAL
TOTAL CELLS COUNTED BLD: 115
URATE SERPL-MCNC: 7.1 MG/DL (ref 4–7.5)
URATE SERPL-MCNC: 7.3 MG/DL (ref 4–7.5)
URATE SERPL-MCNC: 7.7 MG/DL (ref 4–7.5)
VARIANT LYMPHS # BLD MANUAL: 0.1 X10*3/UL (ref 0–0.5)
VARIANT LYMPHS NFR BLD: 2.6 %
WBC # BLD AUTO: 4 X10*3/UL (ref 4.4–11.3)

## 2025-01-21 PROCEDURE — 80069 RENAL FUNCTION PANEL: CPT

## 2025-01-21 PROCEDURE — 2500000001 HC RX 250 WO HCPCS SELF ADMINISTERED DRUGS (ALT 637 FOR MEDICARE OP)

## 2025-01-21 PROCEDURE — 71045 X-RAY EXAM CHEST 1 VIEW: CPT | Performed by: RADIOLOGY

## 2025-01-21 PROCEDURE — 85007 BL SMEAR W/DIFF WBC COUNT: CPT

## 2025-01-21 PROCEDURE — 92610 EVALUATE SWALLOWING FUNCTION: CPT | Mod: GN

## 2025-01-21 PROCEDURE — 70553 MRI BRAIN STEM W/O & W/DYE: CPT

## 2025-01-21 PROCEDURE — 84550 ASSAY OF BLOOD/URIC ACID: CPT

## 2025-01-21 PROCEDURE — 2500000004 HC RX 250 GENERAL PHARMACY W/ HCPCS (ALT 636 FOR OP/ED)

## 2025-01-21 PROCEDURE — 86901 BLOOD TYPING SEROLOGIC RH(D): CPT

## 2025-01-21 PROCEDURE — 83615 LACTATE (LD) (LDH) ENZYME: CPT

## 2025-01-21 PROCEDURE — 99233 SBSQ HOSP IP/OBS HIGH 50: CPT | Performed by: STUDENT IN AN ORGANIZED HEALTH CARE EDUCATION/TRAINING PROGRAM

## 2025-01-21 PROCEDURE — A9575 INJ GADOTERATE MEGLUMI 0.1ML: HCPCS | Performed by: STUDENT IN AN ORGANIZED HEALTH CARE EDUCATION/TRAINING PROGRAM

## 2025-01-21 PROCEDURE — 2500000002 HC RX 250 W HCPCS SELF ADMINISTERED DRUGS (ALT 637 FOR MEDICARE OP, ALT 636 FOR OP/ED): Performed by: STUDENT IN AN ORGANIZED HEALTH CARE EDUCATION/TRAINING PROGRAM

## 2025-01-21 PROCEDURE — 2500000005 HC RX 250 GENERAL PHARMACY W/O HCPCS

## 2025-01-21 PROCEDURE — 1170000001 HC PRIVATE ONCOLOGY ROOM DAILY

## 2025-01-21 PROCEDURE — 83735 ASSAY OF MAGNESIUM: CPT

## 2025-01-21 PROCEDURE — 2550000001 HC RX 255 CONTRASTS: Performed by: STUDENT IN AN ORGANIZED HEALTH CARE EDUCATION/TRAINING PROGRAM

## 2025-01-21 PROCEDURE — 71045 X-RAY EXAM CHEST 1 VIEW: CPT

## 2025-01-21 PROCEDURE — 85027 COMPLETE CBC AUTOMATED: CPT

## 2025-01-21 PROCEDURE — 2500000004 HC RX 250 GENERAL PHARMACY W/ HCPCS (ALT 636 FOR OP/ED): Performed by: STUDENT IN AN ORGANIZED HEALTH CARE EDUCATION/TRAINING PROGRAM

## 2025-01-21 PROCEDURE — 70553 MRI BRAIN STEM W/O & W/DYE: CPT | Performed by: RADIOLOGY

## 2025-01-21 RX ORDER — GADOTERATE MEGLUMINE 376.9 MG/ML
20 INJECTION INTRAVENOUS
Status: COMPLETED | OUTPATIENT
Start: 2025-01-21 | End: 2025-01-21

## 2025-01-21 RX ORDER — ONDANSETRON HYDROCHLORIDE 8 MG/1
16 TABLET, FILM COATED ORAL ONCE
Status: COMPLETED | OUTPATIENT
Start: 2025-01-21 | End: 2025-01-21

## 2025-01-21 RX ORDER — DIPHENHYDRAMINE HCL 50 MG
50 CAPSULE ORAL ONCE
OUTPATIENT
Start: 2025-01-26

## 2025-01-21 RX ORDER — PROCHLORPERAZINE MALEATE 10 MG
10 TABLET ORAL EVERY 6 HOURS PRN
OUTPATIENT
Start: 2025-01-26

## 2025-01-21 RX ORDER — PROCHLORPERAZINE EDISYLATE 5 MG/ML
10 INJECTION INTRAMUSCULAR; INTRAVENOUS EVERY 6 HOURS PRN
Status: DISCONTINUED | OUTPATIENT
Start: 2025-01-21 | End: 2025-01-26 | Stop reason: HOSPADM

## 2025-01-21 RX ORDER — FAMOTIDINE 10 MG/ML
20 INJECTION INTRAVENOUS AS NEEDED
Status: DISCONTINUED | OUTPATIENT
Start: 2025-01-21 | End: 2025-01-26 | Stop reason: HOSPADM

## 2025-01-21 RX ORDER — PROCHLORPERAZINE EDISYLATE 5 MG/ML
10 INJECTION INTRAMUSCULAR; INTRAVENOUS EVERY 6 HOURS PRN
Status: CANCELLED | OUTPATIENT
Start: 2025-01-21

## 2025-01-21 RX ORDER — PROCHLORPERAZINE MALEATE 10 MG
10 TABLET ORAL EVERY 6 HOURS PRN
Status: DISCONTINUED | OUTPATIENT
Start: 2025-01-21 | End: 2025-01-26 | Stop reason: HOSPADM

## 2025-01-21 RX ORDER — PROCHLORPERAZINE MALEATE 10 MG
10 TABLET ORAL EVERY 6 HOURS PRN
Status: CANCELLED | OUTPATIENT
Start: 2025-01-21

## 2025-01-21 RX ORDER — PROCHLORPERAZINE EDISYLATE 5 MG/ML
10 INJECTION INTRAMUSCULAR; INTRAVENOUS EVERY 6 HOURS PRN
OUTPATIENT
Start: 2025-01-26

## 2025-01-21 RX ORDER — ALBUTEROL SULFATE 0.83 MG/ML
3 SOLUTION RESPIRATORY (INHALATION) AS NEEDED
Status: CANCELLED | OUTPATIENT
Start: 2025-01-21

## 2025-01-21 RX ORDER — ALBUTEROL SULFATE 0.83 MG/ML
3 SOLUTION RESPIRATORY (INHALATION) AS NEEDED
Status: COMPLETED | OUTPATIENT
Start: 2025-01-21 | End: 2025-01-26

## 2025-01-21 RX ORDER — ACETAMINOPHEN 325 MG/1
650 TABLET ORAL ONCE
OUTPATIENT
Start: 2025-01-26

## 2025-01-21 RX ORDER — DIPHENHYDRAMINE HYDROCHLORIDE 50 MG/ML
50 INJECTION INTRAMUSCULAR; INTRAVENOUS AS NEEDED
OUTPATIENT
Start: 2025-01-26

## 2025-01-21 RX ORDER — EPINEPHRINE 1 MG/ML
0.3 INJECTION, SOLUTION, CONCENTRATE INTRAVENOUS EVERY 5 MIN PRN
Status: DISCONTINUED | OUTPATIENT
Start: 2025-01-21 | End: 2025-01-26 | Stop reason: HOSPADM

## 2025-01-21 RX ORDER — OLANZAPINE 5 MG/1
5 TABLET ORAL NIGHTLY
Status: DISCONTINUED | OUTPATIENT
Start: 2025-01-21 | End: 2025-01-26 | Stop reason: HOSPADM

## 2025-01-21 RX ORDER — EPINEPHRINE 1 MG/ML
0.3 INJECTION, SOLUTION, CONCENTRATE INTRAVENOUS EVERY 5 MIN PRN
OUTPATIENT
Start: 2025-01-26

## 2025-01-21 RX ORDER — DIPHENHYDRAMINE HYDROCHLORIDE 50 MG/ML
50 INJECTION INTRAMUSCULAR; INTRAVENOUS AS NEEDED
Status: DISCONTINUED | OUTPATIENT
Start: 2025-01-21 | End: 2025-01-26 | Stop reason: HOSPADM

## 2025-01-21 RX ORDER — DIPHENHYDRAMINE HYDROCHLORIDE 50 MG/ML
50 INJECTION INTRAMUSCULAR; INTRAVENOUS AS NEEDED
Status: CANCELLED | OUTPATIENT
Start: 2025-01-21

## 2025-01-21 RX ORDER — EPINEPHRINE 1 MG/ML
0.3 INJECTION, SOLUTION, CONCENTRATE INTRAVENOUS EVERY 5 MIN PRN
Status: CANCELLED | OUTPATIENT
Start: 2025-01-21

## 2025-01-21 RX ORDER — FAMOTIDINE 10 MG/ML
20 INJECTION INTRAVENOUS ONCE AS NEEDED
OUTPATIENT
Start: 2025-01-26

## 2025-01-21 RX ORDER — ALBUTEROL SULFATE 0.83 MG/ML
3 SOLUTION RESPIRATORY (INHALATION) AS NEEDED
OUTPATIENT
Start: 2025-01-26

## 2025-01-21 RX ORDER — GUAIFENESIN 100 MG/5ML
200 SOLUTION ORAL EVERY 4 HOURS PRN
Status: DISCONTINUED | OUTPATIENT
Start: 2025-01-21 | End: 2025-01-26 | Stop reason: HOSPADM

## 2025-01-21 RX ORDER — FAMOTIDINE 10 MG/ML
20 INJECTION INTRAVENOUS ONCE AS NEEDED
Status: CANCELLED | OUTPATIENT
Start: 2025-01-21

## 2025-01-21 RX ADMIN — METOPROLOL SUCCINATE 50 MG: 50 TABLET, EXTENDED RELEASE ORAL at 09:59

## 2025-01-21 RX ADMIN — FOSAPREPITANT 150 MG: 150 INJECTION, POWDER, LYOPHILIZED, FOR SOLUTION INTRAVENOUS at 18:36

## 2025-01-21 RX ADMIN — ACETAMINOPHEN 975 MG: 325 TABLET ORAL at 20:18

## 2025-01-21 RX ADMIN — VINCRISTINE SULFATE 23.5 MG: 1 INJECTION, SOLUTION INTRAVENOUS at 19:25

## 2025-01-21 RX ADMIN — OLANZAPINE 5 MG: 5 TABLET, FILM COATED ORAL at 20:18

## 2025-01-21 RX ADMIN — SODIUM CHLORIDE 100 ML/HR: 9 INJECTION, SOLUTION INTRAVENOUS at 07:30

## 2025-01-21 RX ADMIN — DEXAMETHASONE 40 MG: 4 TABLET ORAL at 09:58

## 2025-01-21 RX ADMIN — ACETAMINOPHEN 975 MG: 325 TABLET ORAL at 03:03

## 2025-01-21 RX ADMIN — ATORVASTATIN CALCIUM 40 MG: 40 TABLET, FILM COATED ORAL at 07:05

## 2025-01-21 RX ADMIN — PANTOPRAZOLE SODIUM 40 MG: 40 TABLET, DELAYED RELEASE ORAL at 07:05

## 2025-01-21 RX ADMIN — ALLOPURINOL 300 MG: 300 TABLET ORAL at 09:59

## 2025-01-21 RX ADMIN — GADOTERATE MEGLUMINE 20 ML: 376.9 INJECTION INTRAVENOUS at 13:47

## 2025-01-21 RX ADMIN — ONDANSETRON HYDROCHLORIDE 16 MG: 8 TABLET, FILM COATED ORAL at 18:36

## 2025-01-21 RX ADMIN — MUPIROCIN: 20 OINTMENT TOPICAL at 20:25

## 2025-01-21 RX ADMIN — ACYCLOVIR 400 MG: 400 TABLET ORAL at 20:18

## 2025-01-21 RX ADMIN — GABAPENTIN 300 MG: 300 CAPSULE ORAL at 20:18

## 2025-01-21 RX ADMIN — ACETAMINOPHEN 975 MG: 325 TABLET ORAL at 13:13

## 2025-01-21 RX ADMIN — PREDNISONE 140 MG: 20 TABLET ORAL at 18:36

## 2025-01-21 RX ADMIN — GUAIFENESIN 200 MG: 200 SOLUTION ORAL at 23:18

## 2025-01-21 RX ADMIN — ACYCLOVIR 400 MG: 400 TABLET ORAL at 09:58

## 2025-01-21 RX ADMIN — LIDOCAINE 4% 1 PATCH: 40 PATCH TOPICAL at 10:07

## 2025-01-21 ASSESSMENT — COGNITIVE AND FUNCTIONAL STATUS - GENERAL
DAILY ACTIVITIY SCORE: 24
MOBILITY SCORE: 24

## 2025-01-21 ASSESSMENT — PAIN SCALES - GENERAL
PAINLEVEL_OUTOF10: 0 - NO PAIN
PAINLEVEL_OUTOF10: 3

## 2025-01-21 ASSESSMENT — PAIN - FUNCTIONAL ASSESSMENT: PAIN_FUNCTIONAL_ASSESSMENT: FLACC (FACE, LEGS, ACTIVITY, CRY, CONSOLABILITY)

## 2025-01-21 ASSESSMENT — PAIN DESCRIPTION - ORIENTATION: ORIENTATION: LOWER

## 2025-01-21 ASSESSMENT — PAIN DESCRIPTION - LOCATION: LOCATION: BACK

## 2025-01-21 NOTE — PROGRESS NOTES
"Bijan Ibanez is a 64 y.o. male on day 3 of admission presenting with High grade B-cell lymphoma (Multi).    Subjective   Patient is bed resting with wife and family is at bed side.   He states his back pain  has improved since starting the steroids.  Facial swelling  is still  present  and he feels it is unchanged but it feels less tense. Spoke with patient  and consented him for IT methotrexate.        Later in after noon patient was consented for EPOCH .    Objective   Physical Exam  Constitutional:       General: He is not in acute distress.  HENT:      Head: Normocephalic and atraumatic.      Comments: Bilateral parotid swelling/mass, R>L, R somewhat tender to palpation     Mouth/Throat:      Mouth: Mucous membranes are moist.   Eyes:      Extraocular Movements: Extraocular movements intact.      Pupils: Pupils are equal, round, and reactive to light.   Cardiovascular:      Rate and Rhythm: Normal rate and regular rhythm.   Pulmonary:      Effort: Pulmonary effort is normal. No respiratory distress.      Breath sounds: Normal breath sounds.   Abdominal:      General: Bowel sounds are normal.      Palpations: Abdomen is soft.      Tenderness: There is no abdominal tenderness.   Musculoskeletal:         General: Normal range of motion.      Right lower leg: No edema.      Left lower leg: No edema.      Comments: Soft tissue mass/swelling right upper back, nontender   Lymphadenopathy:      Cervical: Cervical adenopathy present.      Left cervical: Posterior cervical adenopathy present.   Skin:     General: Skin is warm and dry.   Neurological:      Mental Status: He is alert and oriented to person, place, and time.   Psychiatric:         Mood and Affect: Mood normal.         Behavior: Behavior normal.       Last Recorded Vitals  Blood pressure (!) 153/92, pulse 106, temperature 36.2 °C (97.2 °F), temperature source Temporal, resp. rate 18, height 1.854 m (6' 1\"), weight 108 kg (238 lb 1.6 oz), SpO2 " 95%.  Intake/Output last 3 Shifts:  I/O last 3 completed shifts:  In: 1083.3 (10.1 mL/kg) [I.V.:1083.3 (10.1 mL/kg)]  Out: - (0 mL/kg)   Weight: 107.3 kg     This patient has a central line   Reason for the central line remaining today? Parenteral medication    Assessment/Plan     Bijan Ibanez is a 64 y.o. male with PMH of HTN, HLD, CAD (s/p stent 2010, on ASA), hx renal cell carcinoma (s/p R partial nephrectomy in 2013 @ [facility]), GERD, BPH, arthritis who is admitted for further evaluation and management of newly diagnosed high grade B-cell lymphoma.      ONC:  # remote RCC (pT1a) s/p R partial nephrectomy in 2013 with negative margins ([facility])    # new high grade B-cell lymphoma  - CT neck (1/9/25): fusiform thickening of the right-sided muscles of mastication including masseter, temporalis, and pterygoid muscles. No associated abnormal enhancement or discrete mass identified. Findings most likely to represent asymmetric benign hypertrophy of the muscles of mastication.  - CT Head (1/11/25): Rounded near simple fluid density lesion in the left subinsular white matter favored to represent prominent perivascular space as opposed to malignancy. Nonemergent, MRI brain could better evaluate if clinically indicated. No acute intracranial abnormality.  - CT C/A/P (1/11/25): Left-sided perihilar soft tissue mass/lymph node c/f metastatic disease. Multiple left-sided pulmonary nodules, the majority located in the posterior LLL. There are additional nodules which exhibit subpleural distribution and an isolated JT nodule, favored to reflect underlying enlarged intrapulmonary lymph nodes and underlying metastatic disease not definitively excluded. Ill-defined hypodense infiltrative masslike foci involving the right kidney, and an additional infiltrative lesion located at the superior aspect of the right kidney and closely abuts the inferior aspect of the right hepatic lobe, c/f primary renal neoplasm such as renal cell  carcinoma versus metastatic disease. Stable asymmetric nodular masslike thickening of the right adrenal gland c/f adenoma, but superimposed metastatic disease not excluded. Soft tissue mass in the right anterolateral aspect of the paraspinous tissues at T12-L1, previously characterized on MRI lumbar spine 1/10/25, c/f nerve sheath tumor or abnormal lymph node conglomerate.   - pulm consulted, underwent EBUS with LN biopsy and BAL (1/13/25): majority of small lymphoid cells are positive for CD20 and LCA, with few background CD3 positive T cells. TTF-1, INSM1, S100, synaotpophysin and SOX10 were negative. Cytokeratin AE1/AE3 and Cam5.2 highlight rare benign-appearing epithelial cells. The overall findings raise suspicion for a B-cell lymphoproliferative process.   - BMBx (1/16) prelim read high grade B-cell lymphoma, final path pending  - PET-CT (1/20): Widespread hermann as well as extranodal lymphomatous involvement. Multiple hypermetabolic supra and infra diaphragmatic lymphadenopathy. Intensely hypermetabolic activity within bilateral enlarged submandibular and parotid glands. Hypermetabolic mass-like infiltration within bilateral kidneys (R>L), corresponding to lesion seen on CT 1/11/25. Hypermetabolic hepatic lesions without any underlying CT correlate. Splenomegaly with nonspecific hypermetabolic activity suggestive of extranodal involvement. Multiple hypermetabolic soft tissue mesenteric deposits as well as anterior abdominal wall nodular deposits, with few of the lesions without any underlying CT correlate. Metabolic activity within the stomach, bilateral arms, anterior chest wall and gluteal muscles without underlying CT correlate, concerning for lymphomatous involvement.  - MRI Brain (1/21) no evidence of intracranial lymphoma , scattered foci of abnormal enhancement and diffusion restriction involving the clvarium suspicious for osseous metastatic involvement .  Small focus of encephalomalcia , suspected old  lacunar infarctions.    - BMBX (1/16) High grade B-Cell Lymphoma , awaiting FISH   and molecular studies   - start dexamethasone 40mg daily (1/20-present) while awaiting final path  - plan LP tomorrow 1/22 with ppx IT methotrexate , if unable to obtain at bedside is on tentative schedule  for Thursday in IR   - request placed to be added to Tumor Board for Thursday 1/23   - Consented for DA R-EPOCH  (1/21)     HEME:  # pancytopenia 2/2 disease   - monitor counts daily  - transfuse to keep hgb>7, plt>10  # c/f HLH  - HLH labs significantly elevated, see heme consult note for details  # VTE Prophylaxis  - Ambulation only with thrombocytopenia      ID:  Allergies: PCN (from childhood, unsure of reaction)  # PPX: acyclovir  - plan Levaquin when neutropenic  - plan zosyn for neutropenic fever  # infectious workup for left hilar mass  - viral, bacterial, and fungal studies all negative so far      FEN/GI:  - Admit weight 109 kg. Most current wt: 109kg (1/21)  # monitor electrolytes daily, replace prn  # regular diet  - consulted RDN (1/20), appreciate recs  - nutritonal supplement drinks with all meals  # hyponatremia  - suspect SIADH per previous admission workup  - continue 1.5L fluid restriction  - home triamterene-HCTZ was discontinued during previous admission  # hx GERD, # PPI ppx  - protonix 40mg daily   # hyperuricemia   - allopurinol 300mg daily  # TLS monitoring/ppx  - TLS labs q8 hours with initiation of dex (1/20-present) given significant tumor burden  - s/p 1L LR run at 100ml/h x 10h (1/20)  - NS @ 100ml/hr (1/20-present)     CARDIO/PULM:  - ECHO (1/18/25): LVSF normal, EF 65-70%.   # HTN, HLD, CAD, MI s/p stent 2010  - continue home atorvastatin, metoprolol, baby ASA (hold ASA for LP 1/21)  - HOLD home lisinopril in setting of low BP's     URO:  # BPH  - no home meds  - Per urology, plan [facility]     ORTHO:  # low back pain  # Multilevel degenerative lumbar spondylosis   - MRI lumbar spine (1/10/25):  Multilevel degenerative lumbar spondylosis. Nodular structure along the posterior margin of the left L2-3 neural foramen which may represent extruded migrated disc fragment, facet synovial cyst, nerve root sleeve diverticulum, or other and appears to exert mass effect upon the exiting left L2 nerve root. Correlate clinically for left-sided L2 radiculopathy. At the T12 and L1 levels in the right anterolateral paraspinous tissues is a masslike structure that measures 39 x 17 mm greatest axial dimensions and may represent a soft tissue mass though this is unclear. Considerations include a nerve sheath tumor, abnormal lymph node conglomerate, fluid collection and others. Consider further imaging evaluation MRI of the abdomen without and with contrast. There is some heterogeneity of the marrow of the bilateral sacral ala in the posterior iliac bones. This may represent simple age-related marrow heterogeneity. Insufficiency fracture or fractures not excluded, though considered less likely. If indicated consider further initial imaging with plain films or CT.  - ortho spine consulted, no surgical intervention, patient can follow up outpatient as needed w Dr. Kuhn if any issues arise from a spine perspective.   - continue scheduled tylenol 975 mg Q8 hrs  - continue PRN tramadol 50mg Q6h moderate pain, PRN oxy 5mg Q6h severe pain  - continue home nightly gabapentin, increase as needed/tolerated  - continue flexeril 10mg TID PRN      ACCESS/SUPPORT/DISPO:  - FULL CODE  - ACCESS: Rt DL PICC SOLO (placed 1/18/25)  - PRIMARY ONC: Dr. Mckeon     Patient seen, examined, discussed with Dr. Mike Workman, APRN-CNP

## 2025-01-21 NOTE — SIGNIFICANT EVENT
01/21/25 1606   Prechemo Checklist   Has the patient been in the hospital, ED, or urgent care since last date of service No   Chemo/Immuno Consent Completed and Signed Yes   Protocol/Indications Verified Yes   Confirmed to previous date/time of medication N/A   Compared to previous dose N/A   All medications are dated accurately Yes   Pregnancy Test Negative Not applicable   Parameters Met (!) No   Provider Notified Yes   Provider Name Torey Mckeon, DO   Is Patient Proceeding With Treatment? Yes   BSA/Weight-Height Verified Yes   Dose Calculations Verified (current, total, cumulative) Yes

## 2025-01-21 NOTE — CARE PLAN
The clinical goals for the shift include Patient will remain safe and free from falls/injury throughout the shift.    Over the shift, the patient made progress toward the following goals:    Problem: Pain  Goal: LTG - Patient will manage pain with the appropriate technique/Intervention  Outcome: Progressing  Goal: LTG - Patient will demonstrate intervention for managing pain  Outcome: Progressing  Goal: STG - Patient will reduce or eliminate use of analgesics  Outcome: Progressing  Goal: STG - Pain is manageable through therapies  Outcome: Progressing  Goal: STG - Patient will verbalize an acceptable level of pain  Outcome: Progressing  Goal: STG - Patients pain is managed to allow active participation in daily activities  Outcome: Progressing  Goal: STG - Patient will increase activity level  Outcome: Progressing  Goal: STG - Patient verbalizes a reduction in pain level  Outcome: Progressing

## 2025-01-21 NOTE — PROGRESS NOTES
Speech-Language Pathology  Adult Inpatient Clinical Bedside Swallow Evaluation    Patient Name: Bijan Ibanez  MRN: [id number]  Today's Date: 1/21/2025   Start Time: 1415  Stop Time: 1440  Time Calculation (min): 25 min    History of Present Illness:   Bijan Ibanez is a 64 y.o. male with PMH of HTN, HLD, CAD, MI (s/p stent 2010, on ASA), hx renal cell carcinoma (s/p R partial nephrectomy in 2013 @ [facility]), GERD, BPH, arthritis who is admitted for further evaluation and management of newly diagnosed high grade B-cell lymphoma.      Patient recently admitted 1/11-1/17 after concerning imaging outpatient leading to c/f relapsed RCC vs new other malignancy.      Patient endorses recent history of constitutional symptoms, including night sweats, weight loss, decreased appetite, fatigue, OSORIO. Patient and wife both developed URI symptoms after [facility], however, the wife's symptoms resolved while the patient's did not. On admit, continues to have low back pain, that improves with oxycodone, and is worse with movement. Reports an episode of right flank pain yesterday that was very painful but resolved after pain meds and has not yet recurred. Reporting chin and lip numbness that has been present since before Val. Also reports right parotid swelling, that has sometimes appeared to have improved but will then return to same size, but has not continued to enlarge; sometimes tender to touch, sometimes not. Had a left toothache at some point that resolved and has not recurred. Patient denies saddle anesthesia, loss of lower extremity function or loss of bladder/bowel function. Denies CP, SOB, abd pain, N/V/D/C.     Assessment:   Clinical bedside swallow evaluation completed. Pt seen sitting on bedside with lunch tray, A&O x4 pleasant and participative. family members present in the room. Pt reports difficulty with numbness in his jaw and lips while eating. No reported coughing or choking. Pt has right-sided facial  swelling, otherwise a normal oral motor exam. Pt has strong volitional cough. Pt reports food gets stuck on upper hard palate and around teeth and he uses his tongue to clear it.     Pt given straw sips water, 3 oz water protocol, grilled cheese, tomato soup and crackers. Pt with no overt s/s of aspiration upon timely completion of 3 oz protocol. Pt with slightly prolonged mastication of grilled cheese, able to clear all residues with tongue.     SLP recommends thin liquid and easy to chew diet. See additional safe PO intake guidelines below. SLP will follow up for good diet tolerance. MD made aware.      Recommendations:  Thin liquid and easy to chew diet   Upright for all PO intake  Small bites/sips  Check for pocketing on the right  Check for pocketing on the left  Alternate food and liquids    Goal:   Pt will tolerate least restrictive diet with no overt clinical s/s aspiration 100% of the time.   Start Date: 1/21/25  End Date: 2/21/25  Status: Goal Initiated this date       Plan:  SLP Services Indicated: Yes  Frequency: 2x week  Discussed POC with patient and spouse  SLP - OK to Discharge    Pain:   0-10  0 = No pain.     Inpatient Education:  Extensive education provided to patient and spouse regarding current swallow function, recommendations/results, and POC.      Consultations/Referrals/Coordination of Services:   N/A

## 2025-01-21 NOTE — PROGRESS NOTES
Speech-Language Pathology                 Therapy Communication Note    Patient Name: Bijan Ibanez  MRN: [id number]  Department: [facility]  Room: 3017/3017-A  Today's Date: 1/21/2025     Discipline: Speech Language Pathology    Missed Time: Attempt    Comment: Pt in MRI, will re-attempt as schedule allows
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
## 2025-01-22 LAB
ALBUMIN SERPL BCP-MCNC: 2.5 G/DL (ref 3.4–5)
ALBUMIN SERPL BCP-MCNC: 2.6 G/DL (ref 3.4–5)
ALP SERPL-CCNC: 105 U/L (ref 33–136)
ALT SERPL W P-5'-P-CCNC: 32 U/L (ref 10–52)
ANION GAP SERPL CALC-SCNC: 10 MMOL/L (ref 10–20)
ANION GAP SERPL CALC-SCNC: 11 MMOL/L (ref 10–20)
ANION GAP SERPL CALC-SCNC: 12 MMOL/L (ref 10–20)
APPEARANCE CSF: CLEAR
APPEARANCE CSF: CLEAR
APTT PPP: 21 SECONDS (ref 27–38)
AST SERPL W P-5'-P-CCNC: 47 U/L (ref 9–39)
BASOPHILS # BLD MANUAL: 0 X10*3/UL (ref 0–0.1)
BASOPHILS NFR BLD MANUAL: 0 %
BASOPHILS NFR CSF MANUAL: 0 %
BASOPHILS NFR CSF MANUAL: 0 %
BILIRUB DIRECT SERPL-MCNC: 0.2 MG/DL (ref 0–0.3)
BILIRUB SERPL-MCNC: 0.5 MG/DL (ref 0–1.2)
BLASTS CSF MANUAL: 0 %
BLASTS CSF MANUAL: 0 %
BLOOD EXPIRATION DATE: NORMAL
BLOOD EXPIRATION DATE: NORMAL
BUN SERPL-MCNC: 48 MG/DL (ref 6–23)
BUN SERPL-MCNC: 52 MG/DL (ref 6–23)
BUN SERPL-MCNC: 52 MG/DL (ref 6–23)
CALCIUM SERPL-MCNC: 7.9 MG/DL (ref 8.6–10.6)
CALCIUM SERPL-MCNC: 8.1 MG/DL (ref 8.6–10.6)
CALCIUM SERPL-MCNC: 8.2 MG/DL (ref 8.6–10.6)
CHLORIDE SERPL-SCNC: 100 MMOL/L (ref 98–107)
CHLORIDE SERPL-SCNC: 103 MMOL/L (ref 98–107)
CHLORIDE SERPL-SCNC: 98 MMOL/L (ref 98–107)
CHROM ANALY OVERALL INTERP-IMP: NORMAL
CO2 SERPL-SCNC: 27 MMOL/L (ref 21–32)
CO2 SERPL-SCNC: 28 MMOL/L (ref 21–32)
CO2 SERPL-SCNC: 31 MMOL/L (ref 21–32)
COLOR CSF: ABNORMAL
COLOR CSF: COLORLESS
COLOR SPUN CSF: COLORLESS
CREAT SERPL-MCNC: 0.86 MG/DL (ref 0.5–1.3)
CREAT SERPL-MCNC: 0.93 MG/DL (ref 0.5–1.3)
CREAT SERPL-MCNC: 0.96 MG/DL (ref 0.5–1.3)
DISPENSE STATUS: NORMAL
DISPENSE STATUS: NORMAL
EGFRCR SERPLBLD CKD-EPI 2021: 88 ML/MIN/1.73M*2
EGFRCR SERPLBLD CKD-EPI 2021: >90 ML/MIN/1.73M*2
EGFRCR SERPLBLD CKD-EPI 2021: >90 ML/MIN/1.73M*2
ELECTRONICALLY SIGNED BY CYTOGENETICS: NORMAL
EOSINOPHIL # BLD MANUAL: 0.03 X10*3/UL (ref 0–0.7)
EOSINOPHIL NFR BLD MANUAL: 0.9 %
EOSINOPHIL NFR CSF MANUAL: 0 %
EOSINOPHIL NFR CSF MANUAL: 0 %
ERYTHROCYTE [DISTWIDTH] IN BLOOD BY AUTOMATED COUNT: 13.2 % (ref 11.5–14.5)
GLUCOSE CSF-MCNC: 97 MG/DL (ref 40–70)
GLUCOSE SERPL-MCNC: 140 MG/DL (ref 74–99)
GLUCOSE SERPL-MCNC: 159 MG/DL (ref 74–99)
GLUCOSE SERPL-MCNC: 205 MG/DL (ref 74–99)
HCT VFR BLD AUTO: 24.1 % (ref 41–52)
HGB BLD-MCNC: 7.9 G/DL (ref 13.5–17.5)
IMM GRANULOCYTES # BLD AUTO: 0.24 X10*3/UL (ref 0–0.7)
IMM GRANULOCYTES NFR BLD AUTO: 7.9 % (ref 0–0.9)
IMM GRANULOCYTES NFR CSF: 0 %
IMM GRANULOCYTES NFR CSF: 6 %
INR PPP: 1 (ref 0.9–1.1)
LDH SERPL L TO P-CCNC: 3119 U/L (ref 84–246)
LDH SERPL L TO P-CCNC: 3311 U/L (ref 84–246)
LDH SERPL L TO P-CCNC: 3331 U/L (ref 84–246)
LYMPHOCYTES # BLD MANUAL: 0.45 X10*3/UL (ref 1.2–4.8)
LYMPHOCYTES NFR BLD MANUAL: 14.9 %
LYMPHOCYTES NFR CSF MANUAL: 16 % (ref 28–96)
LYMPHOCYTES NFR CSF MANUAL: 6 % (ref 28–96)
MAGNESIUM SERPL-MCNC: 2.39 MG/DL (ref 1.6–2.4)
MCH RBC QN AUTO: 27.3 PG (ref 26–34)
MCHC RBC AUTO-ENTMCNC: 32.8 G/DL (ref 32–36)
MCV RBC AUTO: 83 FL (ref 80–100)
METAMYELOCYTES # BLD MANUAL: 0.03 X10*3/UL
METAMYELOCYTES NFR BLD MANUAL: 0.9 %
MONOCYTES # BLD MANUAL: 0.08 X10*3/UL (ref 0.1–1)
MONOCYTES NFR BLD MANUAL: 2.6 %
MONOS+MACROS NFR CSF MANUAL: 26 % (ref 16–56)
MONOS+MACROS NFR CSF MANUAL: 78 % (ref 16–56)
MYELOCYTES # BLD MANUAL: 0.05 X10*3/UL
MYELOCYTES NFR BLD MANUAL: 1.7 %
NEUTROPHILS # BLD MANUAL: 2.31 X10*3/UL (ref 1.2–7.7)
NEUTS BAND # BLD MANUAL: 0.26 X10*3/UL (ref 0–0.7)
NEUTS BAND NFR BLD MANUAL: 8.8 %
NEUTS SEG # BLD MANUAL: 2.05 X10*3/UL (ref 1.2–7)
NEUTS SEG NFR BLD MANUAL: 68.4 %
NEUTS SEG NFR CSF MANUAL: 1 % (ref 0–5)
NEUTS SEG NFR CSF MANUAL: 43 % (ref 0–5)
NRBC BLD-RTO: 2 /100 WBCS (ref 0–0)
OTHER CELLS NFR CSF MANUAL: 19 %
OTHER CELLS NFR CSF MANUAL: 5 %
PHOSPHATE SERPL-MCNC: 4.2 MG/DL (ref 2.5–4.9)
PHOSPHATE SERPL-MCNC: 4.8 MG/DL (ref 2.5–4.9)
PHOSPHATE SERPL-MCNC: 4.9 MG/DL (ref 2.5–4.9)
PLASMA CELLS NFR CSF MICRO: 0 %
PLASMA CELLS NFR CSF MICRO: 0 %
PLATELET # BLD AUTO: 26 X10*3/UL (ref 150–450)
PLATELET # BLD AUTO: 40 X10*3/UL (ref 150–450)
POTASSIUM SERPL-SCNC: 3.9 MMOL/L (ref 3.5–5.3)
POTASSIUM SERPL-SCNC: 4.1 MMOL/L (ref 3.5–5.3)
POTASSIUM SERPL-SCNC: 4.7 MMOL/L (ref 3.5–5.3)
PRODUCT BLOOD TYPE: 5100
PRODUCT BLOOD TYPE: 6200
PRODUCT CODE: NORMAL
PRODUCT CODE: NORMAL
PROMYELOCYTES # BLD MANUAL: 0.03 X10*3/UL
PROMYELOCYTES NFR BLD MANUAL: 0.9 %
PROT CSF-MCNC: 41 MG/DL (ref 15–45)
PROT SERPL-MCNC: 4.2 G/DL (ref 6.4–8.2)
PROTHROMBIN TIME: 11.8 SECONDS (ref 9.8–12.8)
RBC # BLD AUTO: 2.89 X10*6/UL (ref 4.5–5.9)
RBC # CSF AUTO: 2000 /UL (ref 0–5)
RBC # CSF AUTO: 4 /UL (ref 0–5)
RBC MORPH BLD: ABNORMAL
SODIUM SERPL-SCNC: 134 MMOL/L (ref 136–145)
SODIUM SERPL-SCNC: 135 MMOL/L (ref 136–145)
SODIUM SERPL-SCNC: 138 MMOL/L (ref 136–145)
TOTAL CELLS COUNTED BLD: 114
TOTAL CELLS COUNTED CSF: 100
TOTAL CELLS COUNTED CSF: 47
TUBE # CSF: ABNORMAL
TUBE # CSF: NORMAL
UNIT ABO: NORMAL
UNIT ABO: NORMAL
UNIT NUMBER: NORMAL
UNIT NUMBER: NORMAL
UNIT RH: NORMAL
UNIT RH: NORMAL
UNIT VOLUME: 250
UNIT VOLUME: 257
URATE SERPL-MCNC: 6.8 MG/DL (ref 4–7.5)
URATE SERPL-MCNC: 7.3 MG/DL (ref 4–7.5)
URATE SERPL-MCNC: 7.3 MG/DL (ref 4–7.5)
VARIANT LYMPHS # BLD MANUAL: 0.03 X10*3/UL (ref 0–0.5)
VARIANT LYMPHS NFR BLD: 0.9 %
WBC # BLD AUTO: 3 X10*3/UL (ref 4.4–11.3)
WBC # CSF AUTO: 2 /UL (ref 1–5)
WBC # CSF AUTO: 2 /UL (ref 1–5)

## 2025-01-22 PROCEDURE — 88185 FLOWCYTOMETRY/TC ADD-ON: CPT | Mod: TC | Performed by: STUDENT IN AN ORGANIZED HEALTH CARE EDUCATION/TRAINING PROGRAM

## 2025-01-22 PROCEDURE — 83615 LACTATE (LD) (LDH) ENZYME: CPT

## 2025-01-22 PROCEDURE — 85007 BL SMEAR W/DIFF WBC COUNT: CPT

## 2025-01-22 PROCEDURE — 84157 ASSAY OF PROTEIN OTHER: CPT | Performed by: NURSE PRACTITIONER

## 2025-01-22 PROCEDURE — 2500000005 HC RX 250 GENERAL PHARMACY W/O HCPCS: Performed by: STUDENT IN AN ORGANIZED HEALTH CARE EDUCATION/TRAINING PROGRAM

## 2025-01-22 PROCEDURE — 85049 AUTOMATED PLATELET COUNT: CPT | Performed by: NURSE PRACTITIONER

## 2025-01-22 PROCEDURE — 2500000004 HC RX 250 GENERAL PHARMACY W/ HCPCS (ALT 636 FOR OP/ED)

## 2025-01-22 PROCEDURE — 83735 ASSAY OF MAGNESIUM: CPT

## 2025-01-22 PROCEDURE — 62270 DX LMBR SPI PNXR: CPT | Performed by: NURSE PRACTITIONER

## 2025-01-22 PROCEDURE — P9073 PLATELETS PHERESIS PATH REDU: HCPCS

## 2025-01-22 PROCEDURE — 84550 ASSAY OF BLOOD/URIC ACID: CPT

## 2025-01-22 PROCEDURE — 97161 PT EVAL LOW COMPLEX 20 MIN: CPT | Mod: GP

## 2025-01-22 PROCEDURE — 36430 TRANSFUSION BLD/BLD COMPNT: CPT

## 2025-01-22 PROCEDURE — 85027 COMPLETE CBC AUTOMATED: CPT

## 2025-01-22 PROCEDURE — 009U3ZX DRAINAGE OF SPINAL CANAL, PERCUTANEOUS APPROACH, DIAGNOSTIC: ICD-10-PCS

## 2025-01-22 PROCEDURE — 2500000001 HC RX 250 WO HCPCS SELF ADMINISTERED DRUGS (ALT 637 FOR MEDICARE OP)

## 2025-01-22 PROCEDURE — 89051 BODY FLUID CELL COUNT: CPT | Performed by: NURSE PRACTITIONER

## 2025-01-22 PROCEDURE — 89051 BODY FLUID CELL COUNT: CPT | Performed by: STUDENT IN AN ORGANIZED HEALTH CARE EDUCATION/TRAINING PROGRAM

## 2025-01-22 PROCEDURE — 2500000002 HC RX 250 W HCPCS SELF ADMINISTERED DRUGS (ALT 637 FOR MEDICARE OP, ALT 636 FOR OP/ED): Performed by: STUDENT IN AN ORGANIZED HEALTH CARE EDUCATION/TRAINING PROGRAM

## 2025-01-22 PROCEDURE — 2500000004 HC RX 250 GENERAL PHARMACY W/ HCPCS (ALT 636 FOR OP/ED): Performed by: STUDENT IN AN ORGANIZED HEALTH CARE EDUCATION/TRAINING PROGRAM

## 2025-01-22 PROCEDURE — 1170000001 HC PRIVATE ONCOLOGY ROOM DAILY

## 2025-01-22 PROCEDURE — 82945 GLUCOSE OTHER FLUID: CPT | Performed by: NURSE PRACTITIONER

## 2025-01-22 PROCEDURE — 82040 ASSAY OF SERUM ALBUMIN: CPT

## 2025-01-22 PROCEDURE — 85610 PROTHROMBIN TIME: CPT | Performed by: NURSE PRACTITIONER

## 2025-01-22 PROCEDURE — 84075 ASSAY ALKALINE PHOSPHATASE: CPT

## 2025-01-22 PROCEDURE — 80069 RENAL FUNCTION PANEL: CPT

## 2025-01-22 RX ORDER — ONDANSETRON HYDROCHLORIDE 8 MG/1
16 TABLET, FILM COATED ORAL ONCE
Status: COMPLETED | OUTPATIENT
Start: 2025-01-22 | End: 2025-01-22

## 2025-01-22 RX ADMIN — ACETAMINOPHEN 975 MG: 325 TABLET ORAL at 20:14

## 2025-01-22 RX ADMIN — METOPROLOL SUCCINATE 50 MG: 50 TABLET, EXTENDED RELEASE ORAL at 08:53

## 2025-01-22 RX ADMIN — GABAPENTIN 300 MG: 300 CAPSULE ORAL at 20:14

## 2025-01-22 RX ADMIN — PREDNISONE 140 MG: 20 TABLET ORAL at 08:54

## 2025-01-22 RX ADMIN — MUPIROCIN: 20 OINTMENT TOPICAL at 08:54

## 2025-01-22 RX ADMIN — DOXORUBICIN HYDROCHLORIDE 0.94 MG: 2 INJECTION, SOLUTION INTRAVENOUS at 20:21

## 2025-01-22 RX ADMIN — OLANZAPINE 5 MG: 5 TABLET, FILM COATED ORAL at 20:14

## 2025-01-22 RX ADMIN — ACETAMINOPHEN 975 MG: 325 TABLET ORAL at 03:31

## 2025-01-22 RX ADMIN — SODIUM CHLORIDE 12 MG: 9 INJECTION INTRAMUSCULAR; INTRAVENOUS; SUBCUTANEOUS at 16:00

## 2025-01-22 RX ADMIN — SODIUM CHLORIDE 100 ML/HR: 9 INJECTION, SOLUTION INTRAVENOUS at 06:27

## 2025-01-22 RX ADMIN — ATORVASTATIN CALCIUM 40 MG: 40 TABLET, FILM COATED ORAL at 06:26

## 2025-01-22 RX ADMIN — ACETAMINOPHEN 975 MG: 325 TABLET ORAL at 12:16

## 2025-01-22 RX ADMIN — ACYCLOVIR 400 MG: 400 TABLET ORAL at 20:14

## 2025-01-22 RX ADMIN — ALLOPURINOL 300 MG: 300 TABLET ORAL at 08:55

## 2025-01-22 RX ADMIN — PANTOPRAZOLE SODIUM 40 MG: 40 TABLET, DELAYED RELEASE ORAL at 06:26

## 2025-01-22 RX ADMIN — ACYCLOVIR 400 MG: 400 TABLET ORAL at 08:54

## 2025-01-22 RX ADMIN — MUPIROCIN: 20 OINTMENT TOPICAL at 20:33

## 2025-01-22 RX ADMIN — SODIUM CHLORIDE 100 ML/HR: 9 INJECTION, SOLUTION INTRAVENOUS at 18:29

## 2025-01-22 RX ADMIN — ONDANSETRON HYDROCHLORIDE 16 MG: 8 TABLET, FILM COATED ORAL at 20:14

## 2025-01-22 RX ADMIN — PREDNISONE 140 MG: 20 TABLET ORAL at 20:14

## 2025-01-22 ASSESSMENT — PAIN - FUNCTIONAL ASSESSMENT
PAIN_FUNCTIONAL_ASSESSMENT: 0-10

## 2025-01-22 ASSESSMENT — COGNITIVE AND FUNCTIONAL STATUS - GENERAL
MOBILITY SCORE: 24
MOBILITY SCORE: 23
DAILY ACTIVITIY SCORE: 24
CLIMB 3 TO 5 STEPS WITH RAILING: A LITTLE

## 2025-01-22 ASSESSMENT — PAIN SCALES - GENERAL
PAINLEVEL_OUTOF10: 0 - NO PAIN

## 2025-01-22 ASSESSMENT — ACTIVITIES OF DAILY LIVING (ADL): ADL_ASSISTANCE: INDEPENDENT

## 2025-01-22 NOTE — NURSING NOTE
Nursing Note  Patient remains afebrile, continue to receive PO antibiotics. Vital signs stable. Denies any nausea, vomiting and diarrhea.PO intake good. Continue to receive IV fluid 0.9 % NaCl at 100 ML/HR. Voids roxanna urine.Patient transfused with 2 units of platelets.Tolerated platelets transfusion well. No signs and symptoms of allergic  reaction present.  Lower spine s/p  LP site  with band aid on dry and intact. No bleeding and no ecchymosis. Ambulating in  room and wilson . Family at bed side during the entire shift. Will continue to monitor patient and will notify MD of acute changes.

## 2025-01-22 NOTE — CARE PLAN
Problem: Safety - Adult  Goal: Free from fall injury  Outcome: Progressing     Problem: Discharge Planning  Goal: Discharge to home or other facility with appropriate resources  Outcome: Progressing     Problem: Chronic Conditions and Co-morbidities  Goal: Patient's chronic conditions and co-morbidity symptoms are monitored and maintained or improved  Outcome: Progressing     Problem: Nutrition  Goal: Less than 5 days NPO/clear liquids  Outcome: Progressing  Goal: Oral intake greater than 50%  Outcome: Progressing  Goal: Oral intake greater 75%  Outcome: Progressing  Goal: Consume prescribed supplement  Outcome: Progressing  Goal: Adequate PO fluid intake  Outcome: Progressing  Goal: Nutrition support goals are met within 48 hrs  Outcome: Progressing  Goal: Nutrition support is meeting 75% of nutrient needs  Outcome: Progressing  Goal: Tube feed tolerance  Outcome: Progressing  Goal: BG  mg/dL  Outcome: Progressing  Goal: Lab values WNL  Outcome: Progressing  Goal: Electrolytes WNL  Outcome: Progressing  Goal: Promote healing  Outcome: Progressing  Goal: Maintain stable weight  Outcome: Progressing  Goal: Reduce weight from edema/fluid  Outcome: Progressing  Goal: Gradual weight gain  Outcome: Progressing  Goal: Improve ostomy output  Outcome: Progressing     Problem: Fall/Injury  Goal: Not fall by end of shift  Outcome: Progressing  Goal: Be free from injury by end of the shift  Outcome: Progressing  Goal: Verbalize understanding of personal risk factors for fall in the hospital  Outcome: Progressing  Goal: Verbalize understanding of risk factor reduction measures to prevent injury from fall in the home  Outcome: Progressing  Goal: Use assistive devices by end of the shift  Outcome: Progressing  Goal: Pace activities to prevent fatigue by end of the shift  Outcome: Progressing   The patient's goals for the shift include      The clinical goals for the shift include Patient will be hemodynamically stable

## 2025-01-22 NOTE — CARE PLAN
Problem: Pain  Goal: STG - Patients pain is managed to allow active participation in daily activities  Outcome: Progressing  Goal: STG - Patient verbalizes a reduction in pain level  Outcome: Progressing     Problem: Fall/Injury  Goal: Not fall by end of shift  Outcome: Progressing   The patient's goals for the shift include      The clinical goals for the shift include patient will be free from falls

## 2025-01-22 NOTE — NURSING NOTE
CHEMO NOTE    Patient receiving dose 1/4 of EPOCH over 24 hours. BR and patency verified before infusion. Chemo infusing through purple lumen of R arm PICC. Patient premedicated with prednisone, zofran, and emend. No complications to report.

## 2025-01-22 NOTE — PROGRESS NOTES
"Bijan Ibanez is a 64 y.o. male on day 4 of admission presenting with High grade B-cell lymphoma (Multi).    Subjective   Patient is doing well. Denies any reaction to the chemotherapy. Denies pain, fevers/chills, nausea/vomiting, constipation, diarrhea, rash, sores, dizziness, headache, muscle aches. Parotid mass seems to be smaller than yesterday.    Objective   Physical Exam  Constitutional:       General: He is not in acute distress.  HENT:      Head: Normocephalic and atraumatic.      Comments: Bilateral parotid swelling/mass, R>L, R somewhat tender to palpation     Mouth/Throat:      Mouth: Mucous membranes are moist.   Eyes:      Extraocular Movements: Extraocular movements intact.      Pupils: Pupils are equal, round, and reactive to light.   Cardiovascular:      Rate and Rhythm: Normal rate and regular rhythm.   Pulmonary:      Effort: Pulmonary effort is normal. No respiratory distress.      Breath sounds: Normal breath sounds.   Abdominal:      General: Bowel sounds are normal.      Palpations: Abdomen is soft.      Tenderness: There is no abdominal tenderness.   Musculoskeletal:         General: Swelling (Bilateral hand edema, R>L) present. Normal range of motion.      Right lower leg: No edema.      Left lower leg: No edema.      Comments: Soft tissue mass/swelling right upper back, nontender   Lymphadenopathy:      Cervical: Cervical adenopathy present.      Left cervical: Posterior cervical adenopathy present.   Skin:     General: Skin is warm and dry.   Neurological:      Mental Status: He is alert and oriented to person, place, and time.   Psychiatric:         Mood and Affect: Mood normal.         Behavior: Behavior normal.       Last Recorded Vitals  Blood pressure 122/75, pulse 94, temperature 36.8 °C (98.2 °F), temperature source Temporal, resp. rate 18, height (S) 1.821 m (5' 11.69\"), weight (S) 109 kg (240 lb 15.4 oz), SpO2 98%.  Intake/Output last 3 Shifts:  I/O last 3 completed " shifts:  In: 3083.3 (28.2 mL/kg) [I.V.:3083.3 (28.2 mL/kg)]  Out: - (0 mL/kg)   Weight: 109.3 kg     This patient has a central line   Reason for the central line remaining today? Parenteral medication    Assessment/Plan     Bijan Ibanez is a 64 y.o. male with PMH of HTN, HLD, CAD (s/p stent 2010, on ASA), hx renal cell carcinoma (s/p R partial nephrectomy in 2013 @ [facility]), GERD, BPH, arthritis who is admitted for further evaluation and management of newly diagnosed high grade B-cell lymphoma, pending FISH and cytogenetic studies. Currently undergoing cycle 1 of R-EPOCH, monitoring for TLS.    Updates 1/22:  - Plan for LP with IT methotrexate, with plt transfusion prior  - Pending upper extremity US due to swelling     ONC:  # remote RCC (pT1a) s/p R partial nephrectomy in 2013 with negative margins ([facility])    # new high grade B-cell lymphoma  - CT neck (1/9/25): fusiform thickening of the right-sided muscles of mastication including masseter, temporalis, and pterygoid muscles. No associated abnormal enhancement or discrete mass identified. Findings most likely to represent asymmetric benign hypertrophy of the muscles of mastication.  - CT Head (1/11/25): Rounded near simple fluid density lesion in the left subinsular white matter favored to represent prominent perivascular space as opposed to malignancy. Nonemergent, MRI brain could better evaluate if clinically indicated. No acute intracranial abnormality.  - CT C/A/P (1/11/25): Left-sided perihilar soft tissue mass/lymph node c/f metastatic disease. Multiple left-sided pulmonary nodules, the majority located in the posterior LLL. There are additional nodules which exhibit subpleural distribution and an isolated JT nodule, favored to reflect underlying enlarged intrapulmonary lymph nodes and underlying metastatic disease not definitively excluded. Ill-defined hypodense infiltrative masslike foci involving the right kidney, and an additional infiltrative lesion  located at the superior aspect of the right kidney and closely abuts the inferior aspect of the right hepatic lobe, c/f primary renal neoplasm such as renal cell carcinoma versus metastatic disease. Stable asymmetric nodular masslike thickening of the right adrenal gland c/f adenoma, but superimposed metastatic disease not excluded. Soft tissue mass in the right anterolateral aspect of the paraspinous tissues at T12-L1, previously characterized on MRI lumbar spine 1/10/25, c/f nerve sheath tumor or abnormal lymph node conglomerate.   - Pulm consulted, underwent EBUS with LN biopsy and BAL (1/13/25): majority of small lymphoid cells are positive for CD20 and LCA, with few background CD3 positive T cells. TTF-1, INSM1, S100, synaotpophysin and SOX10 were negative. Cytokeratin AE1/AE3 and Cam5.2 highlight rare benign-appearing epithelial cells. The overall findings raise suspicion for a B-cell lymphoproliferative process.   - BMBX (1/16) High grade B-Cell Lymphoma, Ki-67 90%, awaiting FISH and cytogenetics  - PET-CT (1/20): Widespread hermann as well as extranodal lymphomatous involvement. Multiple hypermetabolic supra and infra diaphragmatic lymphadenopathy. Intensely hypermetabolic activity within bilateral enlarged submandibular and parotid glands. Hypermetabolic mass-like infiltration within bilateral kidneys (R>L), corresponding to lesion seen on CT 1/11/25. Hypermetabolic hepatic lesions without any underlying CT correlate. Splenomegaly with nonspecific hypermetabolic activity suggestive of extranodal involvement. Multiple hypermetabolic soft tissue mesenteric deposits as well as anterior abdominal wall nodular deposits, with few of the lesions without any underlying CT correlate. Metabolic activity within the stomach, bilateral arms, anterior chest wall and gluteal muscles without underlying CT correlate, concerning for lymphomatous involvement.  - MRI Brain (1/21) no evidence of intracranial lymphoma, scattered  foci of abnormal enhancement and diffusion restriction involving the clvarium suspicious for osseous metastatic involvement. Small focus of encephalomalcia, suspected old lacunar infarctions.    - S/p dexamethasone 40mg daily (1/20-1/21)   - Plan LP 1/22 with ppx IT methotrexate, if unable to obtain at bedside is on tentative schedule for Thursday in IR   - Request placed to be added to Tumor Board for Thursday 1/23   - Consented for DA R-EPOCH (1/21): Started cycle 1 on 1/21 with plans to start Rituxan outpatient    HEME:  # Pancytopenia 2/2 disease   - Monitor counts daily  - Transfuse to keep hgb>7, plt>10  # C/f HLH  - HLH labs significantly elevated, see heme consult note for details  # VTE Prophylaxis  - Ambulation only, d/t thrombocytopenia      ID:  Allergies: PCN (from childhood, unsure of reaction)  # PPX: acyclovir  - Plan Levaquin when neutropenic  - Plan zosyn for neutropenic fever  # Infectious workup for left hilar mass  - Viral, bacterial, and fungal studies all negative so far      FEN/GI:  - Admit weight 109 kg. Most current wt: 109kg (1/21)  # Monitor electrolytes daily, replace PRN  # Regular diet  - Consulted RDN (1/20), appreciate recs  - Nutritional supplement drinks with all meals  - SLP recommends liquids and soft/easy to chew diet  # Hyponatremia  - Suspect SIADH per previous admission workup  - Continue 1.5L fluid restriction  - Home triamterene-HCTZ was discontinued during previous admission  # Hx GERD, # PPI ppx  - Protonix 40mg daily   # Hyperuricemia   - Allopurinol 300mg daily  # TLS monitoring/ppx  - TLS labs q8 hours given significant tumor burden  - NS @ 100ml/hr (1/20-present)     CARDIO/PULM:  - ECHO (1/18/25): LVSF normal, EF 65-70%.   # HTN, HLD, CAD, MI s/p stent 2010  - Continue home atorvastatin, metoprolol, baby ASA (hold ASA for LP 1/21)  - HOLD home lisinopril in setting of low BP's     URO:  # BPH  - No home meds  - Per urology, plan to f[facility]     ORTHO:  # Low back  pain  # Multilevel degenerative lumbar spondylosis   - MRI lumbar spine (1/10/25): Multilevel degenerative lumbar spondylosis. Nodular structure along the posterior margin of the left L2-3 neural foramen which may represent extruded migrated disc fragment, facet synovial cyst, nerve root sleeve diverticulum, or other and appears to exert mass effect upon the exiting left L2 nerve root. Correlate clinically for left-sided L2 radiculopathy. At the T12 and L1 levels in the right anterolateral paraspinous tissues is a masslike structure that measures 39 x 17 mm greatest axial dimensions and may represent a soft tissue mass though this is unclear. Considerations include a nerve sheath tumor, abnormal lymph node conglomerate, fluid collection and others. Consider further imaging evaluation MRI of the abdomen without and with contrast. There is some heterogeneity of the marrow of the bilateral sacral ala in the posterior iliac bones. This may represent simple age-related marrow heterogeneity. Insufficiency fracture or fractures not excluded, though considered less likely. If indicated consider further initial imaging with plain films or CT.  - Ortho spine consulted, no surgical intervention, patient can follow up outpatient as needed w Dr. Kuhn if any issues arise from a spine perspective.   - Continue scheduled tylenol 975 mg Q8 hrs  - Continue PRN tramadol 50mg Q6h moderate pain, PRN oxy 5mg Q6h severe pain  - Continue home nightly gabapentin, increase as needed/tolerated  - Continue flexeril 10mg TID PRN      ACCESS/SUPPORT/DISPO:  - FULL CODE  - ACCESS: Rt DL PICC SOLO (placed 1/18/25)  - PRIMARY ONC: Dr. Mckeon     Patient seen, examined, discussed with Dr. Mike Griffith, MS4

## 2025-01-22 NOTE — NURSING NOTE
Nursing Note (Chemotherapy  Note)  Patient continue to receive dose #1/4 of Doxorubicin 23.5 MG ,Etoposide 118 MG ,Vincristine 0.94 MG in Sodium Chloride 0.9 %  565.59 ML via red lumen of right upper arm PICC line over 24 hours. Medications, doses and diluent  and rate checked and verified with MD order at 0840. Also, positive blood return obtained via syringe aspiration  from red lumen of right upper arm PICC line at 0840. Patient denies any nausea and vomiting. Will continue to monitor patient and chemotherapy infusion.

## 2025-01-22 NOTE — PROCEDURES
Patient  was explained the procedure  and risk and benefits  were also discussed  with patient and his wife who was at bedside.   Patient was set up at the side of the bed  sitting upright position .   He was assisted to lean over bedside table.    Spinal column was palpated  and lumbar space  between  L3 and L4  was marked.    The area was cleansed with  iodine   and sterile dressing was placed .    Area was   anesthetized with  3ml of 1% lidocaine.    A 22g Lumbar needle  was introduced without difficulty   and 10cc of  blood tinged to clear CSF fluid.       Once all fluid  was collected  12mg of Methothrexate  was instilled for   5 minutes.    Inner stylet  was replaced and spinal needle was removed with ease.  Pressure was applied for 10  minutes and  clean band aid  was place over insertion site.          Patient was positioned back into bed  and instructed to lay flat for 60 minutes.

## 2025-01-22 NOTE — PROGRESS NOTES
Physical Therapy    Physical Therapy Evaluation    Patient Name: Bijan Ibanez  MRN: [id number]  Department: [facility]  Room: [address] [place] [address]-  Today's Date: 1/22/2025   Time Calculation  Start Time: 1000  Stop Time: 1013  Time Calculation (min): 13 min    Assessment/Plan   PT Assessment  PT Assessment Results: Decreased strength, Decreased endurance, Decreased mobility, Impaired balance  Rehab Prognosis: Good  Barriers to Discharge Home: No anticipated barriers  Evaluation/Treatment Tolerance: Patient tolerated treatment well  Medical Staff Made Aware: Yes  Strengths: Attitude of self, Ability to acquire knowledge  Barriers to Participation: Comorbidities  End of Session Communication: Bedside nurse  Assessment Comment: Pt is a 64 year old male presenting further evaluation and management of newly diagnosed high grade B-cell lymphoma. Pt displays defecits in strength and endurance leading to functional mobility impairments. Pt is appropriate for PT to prevent further physical complications during hospital stay.  End of Session Patient Position: Bed, 2 rail up (HOB elevated, pt still receiving chemo therapy, family in room)  IP OR SWING BED PT PLAN  Inpatient or Swing Bed: Inpatient  PT Plan  Treatment/Interventions: Bed mobility, Transfer training, Stair training, Gait training, Balance training, Neuromuscular re-education, Strengthening, Endurance training, Range of motion, Therapeutic exercise, Therapeutic activity, Home exercise program, Positioning  PT Plan: Ongoing PT  PT Frequency: 2 times per week  PT Discharge Recommendations: No PT needed after discharge  Equipment Recommended upon Discharge:  (pt has cane, FWW, rollator, transport w/c at home if needed)  PT Recommended Transfer Status: Stand by assist    Subjective   General Visit Information:  General  Reason for Referral: further evaluation and management of newly diagnosed high grade B-cell lymphoma.  Past Medical History Relevant to Rehab: HTN, HLD, CAD, MI  (s/p stent 2010, on ASA), hx renal cell carcinoma (s/p R partial nephrectomy in 2013 @ [facility]), GERD, BPH, arthritis  Family/Caregiver Present: Yes  Caregiver Feedback: wife, daughter, son-in-law, grandchildren present, pleasant and supportive  Prior to Session Communication: Bedside nurse  Patient Position Received: Bed, 2 rail up (HOB, pt recieving chemo therapy)  Preferred Learning Style: verbal, visual  General Comment: pt pleasant and cooperative during session on this date  Home Living:  Home Living  Type of Home: House  Lives With: Spouse, Adult children  Home Adaptive Equipment: Walker rolling or standard, Cane, Reacher, Long-handled shoehorn  Home Layout: Two level, Laundry second level, Able to live on main level with bedroom/bathroom, Full bath main level, Bed/bath upstairs, Stairs to alternate level with rails  Alternate Level Stairs-Rails: Right  Alternate Level Stairs-Number of Steps: 13  Home Access: Stairs to enter without rails  Entrance Stairs-Rails: None  Entrance Stairs-Number of Steps: 1  Bathroom Shower/Tub: Tub/shower unit  Bathroom Toilet: Standard  Bathroom Equipment: Built-in shower seat  Prior Level of Function:  Prior Function Per Pt/Caregiver Report  Level of [place]: Independent with ADLs and functional transfers, Independent with homemaking with ambulation  Receives Help From: Family  ADL Assistance: Independent  Homemaking Assistance: Independent  Ambulatory Assistance: Independent  Vocational: Retired  Leisure: pt enjoys playing [employer] and nature walks with family  Hand Dominance: Right  Prior Function Comments: no hx of falls since 7/24, pt drives  Precautions:  Precautions  Hearing/Visual Limitations: [place]/ WEARS B/HA + GLASSES  Medical Precautions: Fall precautions  Precautions Comment: protective precautions      Date/Time Vitals Session Patient Position Pulse Resp SpO2 BP MAP (mmHg)    01/22/25 1511 --  --  96  18  97 %  114/67  --     01/22/25 1526 --  --  97  18  97 %  120/76   --           Vital Signs Comment: 5 min post pt ambulation     Objective   Pain:  Pain Assessment  Pain Assessment: 0-10  0-10 (Numeric) Pain Score: 0 - No pain  Cognition:  Cognition  Overall Cognitive Status: Within Functional Limits  Insight: Within function limits  Impulsive: Within functional limits    General Assessments:     Activity Tolerance  Endurance: Tolerates less than 10 min exercise, no significant change in vital signs    Sensation  Sensation Comment: N/T in mandible for last 3 weeks       Coordination  Movements are Fluid and Coordinated: Yes    Static Sitting Balance  Static Sitting-Balance Support: Feet supported  Static Sitting-Level of Assistance: Independent  Dynamic Sitting Balance  Dynamic Sitting-Balance Support: Feet supported  Dynamic Sitting-Level of Assistance: Independent    Static Standing Balance  Static Standing-Balance Support: No upper extremity supported  Static Standing-Level of Assistance: Close supervision  Dynamic Standing Balance  Dynamic Standing-Balance Support: No upper extremity supported  Dynamic Standing-Level of Assistance: Close supervision (SBA)  Dynamic Standing-Comments: VCs for fatigue and symptom monitoring  Functional Assessments:       Bed Mobility  Bed Mobility: Yes  Bed Mobility 1  Bed Mobility 1: Supine to sitting, Sitting to supine  Level of Assistance 1: Independent  Bed Mobility Comments 1: HOB elevated    Transfers  Transfer: Yes  Transfer 1  Transfer From 1: Sit to, Stand to  Transfer to 1: Stand, Sit  Transfer Level of Assistance 1: Close supervision (VCs for UE/LE placement)    Ambulation/Gait Training  Ambulation/Gait Training Performed: Yes  Ambulation/Gait Training 1  Surface 1: Level tile  Device 1: No device  Assistance 1: Close supervision (SBA)  Quality of Gait 1: Decreased step length, Narrow base of support  Comments/Distance (ft) 1: 1x160 ft , pt reported mild SOB, VCs for fatigue and symptom monitoring, RN aware    Stairs  Stairs:  No  Extremity/Trunk Assessments:  RLE   RLE : Within Functional Limits (>/= 3/5 observed via function)  LLE   LLE : Within Functional Limits (>/= 3/5 observed via function)  Outcome Measures:  [facility] Basic Mobility  Turning from your back to your side while in a flat bed without using bedrails: None  Moving from lying on your back to sitting on the side of a flat bed without using bedrails: None  Moving to and from bed to chair (including a wheelchair): None  Standing up from a chair using your arms (e.g. wheelchair or bedside chair): None  To walk in hospital room: None  Climbing 3-5 steps with railing: A little  Basic Mobility - Total Score: 23    Encounter Problems       Encounter Problems (Active)       Balance       Patient will independently maintain balance to allow for safe mobility       Start:  01/22/25    Expected End:  02/05/25               Mobility       Patient will independently navigate 4-6 steps with rails/device without LOB       Start:  01/22/25    Expected End:  02/05/25            Patient will independently ambulate >/= 500 ft w/o SOB or LOB. Progress as able and appropriate        Start:  01/22/25    Expected End:  02/05/25               PT Transfers       Patient will independently perform bed mobility       Start:  01/22/25    Expected End:  02/05/25             Patient will independently transfer sit to and from stand       Start:  01/22/25    Expected End:  02/05/25                   Education Documentation  Handouts, taught by CARLINE Brambila at 1/22/2025  3:33 PM.  Learner: Patient  Readiness: Acceptance  Method: Explanation  Response: Verbalizes Understanding  Comment: fatigue and symptom monitoring, spinal precautions for previous LBP    Precautions, taught by CARLINE Brambila at 1/22/2025  3:33 PM.  Learner: Patient  Readiness: Acceptance  Method: Explanation  Response: Verbalizes Understanding  Comment: fatigue and symptom monitoring, spinal precautions for previous LBP    Body  Mechanics, taught by CRALINE Brambila at 1/22/2025  3:33 PM.  Learner: Patient  Readiness: Acceptance  Method: Explanation  Response: Verbalizes Understanding  Comment: fatigue and symptom monitoring, spinal precautions for previous LBP    Home Exercise Program, taught by CARLINE Brambila at 1/22/2025  3:33 PM.  Learner: Patient  Readiness: Acceptance  Method: Explanation  Response: Verbalizes Understanding  Comment: fatigue and symptom monitoring, spinal precautions for previous LBP    Mobility Training, taught by CARLINE Brambila at 1/22/2025  3:33 PM.  Learner: Patient  Readiness: Acceptance  Method: Explanation  Response: Verbalizes Understanding  Comment: fatigue and symptom monitoring, spinal precautions for previous LBP      01/22/25 at 3:34 PM - CARLINE BRAMBILA

## 2025-01-23 ENCOUNTER — TUMOR BOARD CONFERENCE (OUTPATIENT)
Dept: HEMATOLOGY/ONCOLOGY | Facility: HOSPITAL | Age: 65
End: 2025-01-23
Payer: MEDICARE

## 2025-01-23 ENCOUNTER — APPOINTMENT (OUTPATIENT)
Dept: RADIOLOGY | Facility: HOSPITAL | Age: 65
DRG: 840 | End: 2025-01-23
Payer: MEDICARE

## 2025-01-23 LAB
ALBUMIN SERPL BCP-MCNC: 2.5 G/DL (ref 3.4–5)
ANION GAP SERPL CALC-SCNC: 12 MMOL/L (ref 10–20)
BASOPHILS # BLD MANUAL: 0 X10*3/UL (ref 0–0.1)
BASOPHILS NFR BLD MANUAL: 0 %
BERYLLIUM [MASS/VOLUME] IN SERUM OR PLASMA: NORMAL MCG/L
BUN SERPL-MCNC: 45 MG/DL (ref 6–23)
CALCIUM SERPL-MCNC: 8 MG/DL (ref 8.6–10.6)
CELL POPULATIONS: NORMAL
CHLORIDE SERPL-SCNC: 104 MMOL/L (ref 98–107)
CHROM ANALY OVERALL INTERP-IMP: NORMAL
CO2 SERPL-SCNC: 27 MMOL/L (ref 21–32)
CREAT SERPL-MCNC: 0.79 MG/DL (ref 0.5–1.3)
DIAGNOSIS: NORMAL
EGFRCR SERPLBLD CKD-EPI 2021: >90 ML/MIN/1.73M*2
ELECTRONICALLY COSIGNED BY CYTOGENETICS: NORMAL
ELECTRONICALLY SIGNED BY CYTOGENETICS: NORMAL
EOSINOPHIL # BLD MANUAL: 0.04 X10*3/UL (ref 0–0.7)
EOSINOPHIL NFR BLD MANUAL: 1.7 %
ERYTHROCYTE [DISTWIDTH] IN BLOOD BY AUTOMATED COUNT: 12.9 % (ref 11.5–14.5)
FISH ISCN RESULTS: NORMAL
FLOW DIFFERENTIAL: NORMAL
FLOW TEST ORDERED: NORMAL
FLUID CELL COUNT: 2 /UL
GLUCOSE SERPL-MCNC: 122 MG/DL (ref 74–99)
HCT VFR BLD AUTO: 22.6 % (ref 41–52)
HGB BLD-MCNC: 7.6 G/DL (ref 13.5–17.5)
IMM GRANULOCYTES # BLD AUTO: 0.22 X10*3/UL (ref 0–0.7)
IMM GRANULOCYTES NFR BLD AUTO: 8.4 % (ref 0–0.9)
LAB TEST METHOD: NORMAL
LDH SERPL L TO P-CCNC: 3042 U/L (ref 84–246)
LYMPHOCYTES # BLD MANUAL: 0.36 X10*3/UL (ref 1.2–4.8)
LYMPHOCYTES NFR BLD MANUAL: 13.7 %
MAGNESIUM SERPL-MCNC: 2.33 MG/DL (ref 1.6–2.4)
MCH RBC QN AUTO: 27.9 PG (ref 26–34)
MCHC RBC AUTO-ENTMCNC: 33.6 G/DL (ref 32–36)
MCV RBC AUTO: 83 FL (ref 80–100)
METAMYELOCYTES # BLD MANUAL: 0.04 X10*3/UL
METAMYELOCYTES NFR BLD MANUAL: 1.7 %
MONOCYTES # BLD MANUAL: 0.09 X10*3/UL (ref 0.1–1)
MONOCYTES NFR BLD MANUAL: 3.4 %
NEUTROPHILS # BLD MANUAL: 2 X10*3/UL (ref 1.2–7.7)
NEUTS BAND # BLD MANUAL: 0.27 X10*3/UL (ref 0–0.7)
NEUTS BAND NFR BLD MANUAL: 10.2 %
NEUTS SEG # BLD MANUAL: 1.73 X10*3/UL (ref 1.2–7)
NEUTS SEG NFR BLD MANUAL: 66.7 %
NRBC BLD-RTO: 2.3 /100 WBCS (ref 0–0)
NUMBER OF CELLS COLLECTED: NORMAL
OVALOCYTES BLD QL SMEAR: ABNORMAL
PATH REPORT.TOTAL CANCER: NORMAL
PATH REVIEW-CELL CT,CSF: NORMAL
PATH REVIEW-CELL CT,CSF: NORMAL
PHOSPHATE SERPL-MCNC: 4.6 MG/DL (ref 2.5–4.9)
PLATELET # BLD AUTO: 34 X10*3/UL (ref 150–450)
POTASSIUM SERPL-SCNC: 4.7 MMOL/L (ref 3.5–5.3)
RBC # BLD AUTO: 2.72 X10*6/UL (ref 4.5–5.9)
RBC MORPH BLD: ABNORMAL
SIGNATURE COMMENT: NORMAL
SODIUM SERPL-SCNC: 138 MMOL/L (ref 136–145)
SPECIMEN VIABILITY: NORMAL
TOTAL CELLS COUNTED BLD: 117
URATE SERPL-MCNC: 7.1 MG/DL (ref 4–7.5)
VARIANT LYMPHS # BLD MANUAL: 0.07 X10*3/UL (ref 0–0.5)
VARIANT LYMPHS NFR BLD: 2.6 %
WBC # BLD AUTO: 2.6 X10*3/UL (ref 4.4–11.3)

## 2025-01-23 PROCEDURE — 84550 ASSAY OF BLOOD/URIC ACID: CPT

## 2025-01-23 PROCEDURE — 99233 SBSQ HOSP IP/OBS HIGH 50: CPT | Performed by: STUDENT IN AN ORGANIZED HEALTH CARE EDUCATION/TRAINING PROGRAM

## 2025-01-23 PROCEDURE — 83615 LACTATE (LD) (LDH) ENZYME: CPT

## 2025-01-23 PROCEDURE — 2500000004 HC RX 250 GENERAL PHARMACY W/ HCPCS (ALT 636 FOR OP/ED)

## 2025-01-23 PROCEDURE — 2500000004 HC RX 250 GENERAL PHARMACY W/ HCPCS (ALT 636 FOR OP/ED): Performed by: STUDENT IN AN ORGANIZED HEALTH CARE EDUCATION/TRAINING PROGRAM

## 2025-01-23 PROCEDURE — 2500000001 HC RX 250 WO HCPCS SELF ADMINISTERED DRUGS (ALT 637 FOR MEDICARE OP)

## 2025-01-23 PROCEDURE — 1170000001 HC PRIVATE ONCOLOGY ROOM DAILY

## 2025-01-23 PROCEDURE — 80069 RENAL FUNCTION PANEL: CPT

## 2025-01-23 PROCEDURE — 2500000002 HC RX 250 W HCPCS SELF ADMINISTERED DRUGS (ALT 637 FOR MEDICARE OP, ALT 636 FOR OP/ED): Performed by: STUDENT IN AN ORGANIZED HEALTH CARE EDUCATION/TRAINING PROGRAM

## 2025-01-23 PROCEDURE — 2500000001 HC RX 250 WO HCPCS SELF ADMINISTERED DRUGS (ALT 637 FOR MEDICARE OP): Performed by: PHYSICIAN ASSISTANT

## 2025-01-23 PROCEDURE — 85007 BL SMEAR W/DIFF WBC COUNT: CPT

## 2025-01-23 PROCEDURE — 85027 COMPLETE CBC AUTOMATED: CPT

## 2025-01-23 PROCEDURE — 2500000004 HC RX 250 GENERAL PHARMACY W/ HCPCS (ALT 636 FOR OP/ED): Performed by: PHYSICIAN ASSISTANT

## 2025-01-23 PROCEDURE — 83735 ASSAY OF MAGNESIUM: CPT

## 2025-01-23 RX ORDER — BACLOFEN 10 MG/1
10 TABLET ORAL 3 TIMES DAILY
Status: DISCONTINUED | OUTPATIENT
Start: 2025-01-23 | End: 2025-01-26 | Stop reason: HOSPADM

## 2025-01-23 RX ORDER — DOCUSATE SODIUM 100 MG/1
100 CAPSULE, LIQUID FILLED ORAL 2 TIMES DAILY
Status: DISCONTINUED | OUTPATIENT
Start: 2025-01-23 | End: 2025-01-26 | Stop reason: HOSPADM

## 2025-01-23 RX ORDER — ONDANSETRON HYDROCHLORIDE 8 MG/1
16 TABLET, FILM COATED ORAL ONCE
Status: COMPLETED | OUTPATIENT
Start: 2025-01-23 | End: 2025-01-23

## 2025-01-23 RX ORDER — FLUCONAZOLE 200 MG/1
100 TABLET ORAL DAILY
Status: DISCONTINUED | OUTPATIENT
Start: 2025-01-23 | End: 2025-01-23

## 2025-01-23 RX ORDER — NYSTATIN 100000 [USP'U]/ML
5 SUSPENSION ORAL 4 TIMES DAILY
Status: DISCONTINUED | OUTPATIENT
Start: 2025-01-23 | End: 2025-01-26 | Stop reason: HOSPADM

## 2025-01-23 RX ORDER — FUROSEMIDE 10 MG/ML
40 INJECTION INTRAMUSCULAR; INTRAVENOUS ONCE
Status: COMPLETED | OUTPATIENT
Start: 2025-01-23 | End: 2025-01-23

## 2025-01-23 RX ORDER — FLUCONAZOLE 200 MG/1
400 TABLET ORAL DAILY
Status: DISCONTINUED | OUTPATIENT
Start: 2025-01-24 | End: 2025-01-26 | Stop reason: HOSPADM

## 2025-01-23 RX ADMIN — SODIUM CHLORIDE 100 ML/HR: 9 INJECTION, SOLUTION INTRAVENOUS at 03:54

## 2025-01-23 RX ADMIN — OLANZAPINE 5 MG: 5 TABLET, FILM COATED ORAL at 20:27

## 2025-01-23 RX ADMIN — METOPROLOL SUCCINATE 50 MG: 50 TABLET, EXTENDED RELEASE ORAL at 08:40

## 2025-01-23 RX ADMIN — BACLOFEN 10 MG: 10 TABLET ORAL at 21:06

## 2025-01-23 RX ADMIN — FLUCONAZOLE 100 MG: 200 TABLET ORAL at 12:20

## 2025-01-23 RX ADMIN — PREDNISONE 140 MG: 20 TABLET ORAL at 20:26

## 2025-01-23 RX ADMIN — MUPIROCIN: 20 OINTMENT TOPICAL at 08:40

## 2025-01-23 RX ADMIN — ATORVASTATIN CALCIUM 40 MG: 40 TABLET, FILM COATED ORAL at 06:12

## 2025-01-23 RX ADMIN — NYSTATIN 500000 UNITS: 100000 SUSPENSION ORAL at 17:57

## 2025-01-23 RX ADMIN — ACYCLOVIR 400 MG: 400 TABLET ORAL at 08:40

## 2025-01-23 RX ADMIN — POLYETHYLENE GLYCOL 3350 17 G: 17 POWDER, FOR SOLUTION ORAL at 08:40

## 2025-01-23 RX ADMIN — ACETAMINOPHEN 975 MG: 325 TABLET ORAL at 12:14

## 2025-01-23 RX ADMIN — ACETAMINOPHEN 975 MG: 325 TABLET ORAL at 03:54

## 2025-01-23 RX ADMIN — DOCUSATE SODIUM 100 MG: 100 CAPSULE, LIQUID FILLED ORAL at 20:27

## 2025-01-23 RX ADMIN — MUPIROCIN: 20 OINTMENT TOPICAL at 20:31

## 2025-01-23 RX ADMIN — SODIUM CHLORIDE 0.94 MG: 9 INJECTION, SOLUTION INTRAVENOUS at 20:59

## 2025-01-23 RX ADMIN — PREDNISONE 140 MG: 20 TABLET ORAL at 08:40

## 2025-01-23 RX ADMIN — FUROSEMIDE 40 MG: 10 INJECTION, SOLUTION INTRAMUSCULAR; INTRAVENOUS at 14:56

## 2025-01-23 RX ADMIN — ONDANSETRON HYDROCHLORIDE 16 MG: 8 TABLET, FILM COATED ORAL at 20:59

## 2025-01-23 RX ADMIN — ACETAMINOPHEN 975 MG: 325 TABLET ORAL at 20:27

## 2025-01-23 RX ADMIN — GABAPENTIN 300 MG: 300 CAPSULE ORAL at 20:27

## 2025-01-23 RX ADMIN — PANTOPRAZOLE SODIUM 40 MG: 40 TABLET, DELAYED RELEASE ORAL at 06:12

## 2025-01-23 RX ADMIN — NYSTATIN 500000 UNITS: 100000 SUSPENSION ORAL at 20:26

## 2025-01-23 RX ADMIN — DOCUSATE SODIUM 100 MG: 100 CAPSULE, LIQUID FILLED ORAL at 12:19

## 2025-01-23 RX ADMIN — NYSTATIN 500000 UNITS: 100000 SUSPENSION ORAL at 12:15

## 2025-01-23 RX ADMIN — SODIUM CHLORIDE 100 ML/HR: 9 INJECTION, SOLUTION INTRAVENOUS at 14:58

## 2025-01-23 RX ADMIN — ALLOPURINOL 300 MG: 300 TABLET ORAL at 08:40

## 2025-01-23 RX ADMIN — ACYCLOVIR 400 MG: 400 TABLET ORAL at 20:27

## 2025-01-23 ASSESSMENT — PAIN SCALES - GENERAL
PAINLEVEL_OUTOF10: 0 - NO PAIN

## 2025-01-23 ASSESSMENT — COGNITIVE AND FUNCTIONAL STATUS - GENERAL
DAILY ACTIVITIY SCORE: 24
MOBILITY SCORE: 24

## 2025-01-23 ASSESSMENT — PAIN - FUNCTIONAL ASSESSMENT
PAIN_FUNCTIONAL_ASSESSMENT: 0-10
PAIN_FUNCTIONAL_ASSESSMENT: 0-10

## 2025-01-23 NOTE — CARE PLAN
Problem: Safety - Adult  Goal: Free from fall injury  Outcome: Progressing     Problem: Discharge Planning  Goal: Discharge to home or other facility with appropriate resources  Outcome: Progressing     Problem: Chronic Conditions and Co-morbidities  Goal: Patient's chronic conditions and co-morbidity symptoms are monitored and maintained or improved  Outcome: Progressing     Problem: Chronic Conditions and Co-morbidities  Goal: Patient's chronic conditions and co-morbidity symptoms are monitored and maintained or improved  Outcome: Progressing     Problem: Nutrition  Goal: Less than 5 days NPO/clear liquids  Outcome: Progressing  Goal: Oral intake greater than 50%  Outcome: Progressing  Goal: Oral intake greater 75%  Outcome: Progressing  Goal: Consume prescribed supplement  Outcome: Progressing  Goal: Adequate PO fluid intake  Outcome: Progressing  Goal: Nutrition support goals are met within 48 hrs  Outcome: Progressing  Goal: Nutrition support is meeting 75% of nutrient needs  Outcome: Progressing  Goal: Tube feed tolerance  Outcome: Progressing  Goal: BG  mg/dL  Outcome: Progressing  Goal: Lab values WNL  Outcome: Progressing  Goal: Electrolytes WNL  Outcome: Progressing  Goal: Promote healing  Outcome: Progressing  Goal: Maintain stable weight  Outcome: Progressing  Goal: Reduce weight from edema/fluid  Outcome: Progressing  Goal: Gradual weight gain  Outcome: Progressing  Goal: Improve ostomy output  Outcome: Progressing     Problem: Fall/Injury  Goal: Not fall by end of shift  Outcome: Progressing  Goal: Be free from injury by end of the shift  Outcome: Progressing  Goal: Verbalize understanding of personal risk factors for fall in the hospital  Outcome: Progressing  Goal: Verbalize understanding of risk factor reduction measures to prevent injury from fall in the home  Outcome: Progressing  Goal: Use assistive devices by end of the shift  Outcome: Progressing  Goal: Pace activities to prevent  fatigue by end of the shift  Outcome: Progressing   The patient's goals for the shift include      The clinical goals for the shift include Patient will be hemodynamically stable

## 2025-01-23 NOTE — DOCUMENTATION CLARIFICATION NOTE
PATIENT:               DENNIS CAGLE  ACCT #:                  [id number]  MRN:                       [id number]  :                       1960  ADMIT DATE:       2025 12:25 AM  DISCH DATE:        2025 3:45 PM  RESPONDING PROVIDER #:        [id number]          PROVIDER RESPONSE TEXT:    I concur with the pathology report findings and they are clinically significant    CDI QUERY TEXT:    Clarification        Instruction:    Based on your assessment of the patient and the clinical information, please provide the requested documentation by clicking on the appropriate radio button and enter any additional information if prompted.    Question: Please document whether you concur or do not concur with the pathology report findings    When answering this query, please exercise your independent professional judgment. The fact that a question is being asked, does not imply that any particular answer is desired or expected.    The patient's clinical indicators include:  Clinical Information:  64-year-old male with PMH of smoking pipe tobacco, CAD, HTN, malignant neoplasm of kidney presenting due to whole body pain with MRI of his lumbar spine showing tissue like mass structure that may represent tumor versus abnormal lymph node or fluid collection.  Admitted for malignancy workup in setting of poor appetite, unintentional weight loss, fatigue and dyspnea    Clinical Indicators:   Bone marrow biopsy, left iliac crest   Final Diagnosis  -- with involvement by high grade b-cell lymphoma, 70-80% of marrow cellular elements  -- hypercellular bone marrow (90% cellular) with maturing trilineage hematopoiesis  Note: The differential diagnosis includes Burkitt lymphoma and high grade B-cell lymphoma    Treatment:  TBD  Risk Factors:  smoking pipe tobacco, hx malignant neoplasm of kidney  Options provided:  -- I concur with the pathology report findings and they are clinically significant  -- I do not concur with  the pathology report findings  -- Other - I will add my own diagnosis  -- Refer to Clinical Documentation Reviewer    Query created by: Marlene Ramos on 1/23/2025 10:34 AM      Electronically signed by:  RIDGE ANGUIANO MD MPH 1/23/2025 12:11 PM

## 2025-01-23 NOTE — PROGRESS NOTES
Bijan Ibanez is a 64 y.o. male on day 5 of admission presenting with High grade B-cell lymphoma (Multi).    Subjective   Patient is feeling okay today. He has swelling in his arms and legs. The right arm is worse than the left arm. No pain reported. He has numbness on his chin and lower lip which has been there for several days. He has some difficulty swallowing. No CP, SOB. No urinary complaints. No bleeding. He has some loose and hard stools. He has hemorrhoids that are irritated. No f/c/n/v/d. Patient tolerating a diet. No difficulties ambulating. Some mild discomfort in his throat with swallowing.        Objective     Physical Exam  Constitutional:       General: He is not in acute distress.  HENT:      Head: Normocephalic and atraumatic.      Mouth/Throat:      Mouth: Mucous membranes are moist.      Comments: White lesions on hard and soft palate  Eyes:      Extraocular Movements: Extraocular movements intact.      Pupils: Pupils are equal, round, and reactive to light.   Cardiovascular:      Rate and Rhythm: Regular rhythm.      Heart sounds: No murmur heard.     No gallop.   Pulmonary:      Effort: No respiratory distress.      Breath sounds: No wheezing or rhonchi.   Abdominal:      General: Bowel sounds are normal. There is no distension.      Palpations: Abdomen is soft.      Tenderness: There is no abdominal tenderness.   Musculoskeletal:         General: Swelling present. Normal range of motion.      Cervical back: Normal range of motion.      Right lower leg: Edema present.      Left lower leg: Edema present.   Skin:     General: Skin is warm and dry.   Neurological:      Mental Status: He is alert and oriented to person, place, and time.      Motor: No weakness.   Psychiatric:         Mood and Affect: Mood normal.         Behavior: Behavior normal.       Last Recorded Vitals  Blood pressure 132/83, pulse 80, temperature 37.2 °C (99 °F), temperature source Temporal, resp. rate 18, height (S) 1.821  "m (5' 11.69\"), weight 114 kg (250 lb 10.6 oz), SpO2 97%.  Intake/Output last 3 Shifts:  I/O last 3 completed shifts:  In: 3552.6 (32.5 mL/kg) [I.V.:2299 (21 mL/kg); Blood:507; IV Piggyback:746.6]  Out: - (0 mL/kg)   Weight: 109.3 kg     Relevant Results  Scheduled medications  acetaminophen, 975 mg, oral, q8h  acyclovir, 400 mg, oral, q12h SONAL  allopurinol, 300 mg, oral, Daily  [Held by provider] aspirin, 81 mg, oral, Daily  atorvastatin, 40 mg, oral, Daily  docusate sodium, 100 mg, oral, BID  DOXOrubicin (Adriamycin) 23.5 mg, etoposide (Toposar) 118 mg, vinCRIStine (VINCASAR) 0.94 mg in sodium chloride 0.9% 565.59 mL IV, , intravenous, Once  DOXOrubicin (Adriamycin) 23.5 mg, etoposide (Toposar) 118 mg, vinCRIStine (VINCASAR) 0.94 mg in sodium chloride 0.9% 565.59 mL IV, , intravenous, Once  fluconazole, 100 mg, oral, Daily  gabapentin, 300 mg, oral, Nightly  lidocaine, 1 patch, transdermal, Daily  [Held by provider] lisinopril, 10 mg, oral, Daily  metoprolol succinate XL, 50 mg, oral, Daily  mupirocin, , Topical, BID  nystatin, 5 mL, Swish & Swallow, 4x daily  OLANZapine, 5 mg, oral, Nightly  ondansetron, 16 mg, intravenous, Once  pantoprazole, 40 mg, oral, Daily before breakfast  polyethylene glycol, 17 g, oral, Daily  predniSONE, 140 mg, oral, BID      Continuous medications  sodium chloride 0.9%, 100 mL/hr, Last Rate: 100 mL/hr (01/23/25 1458)      PRN medications  PRN medications: albuterol, alteplase, alteplase, alum-mag hydroxide-simeth, cyclobenzaprine, dextrose, diphenhydrAMINE, EPINEPHrine HCl, famotidine, guaiFENesin, methylPREDNISolone sodium succinate (PF), oxyCODONE, prochlorperazine, prochlorperazine, sodium chloride, traMADol, witch hazel    Results for orders placed or performed during the hospital encounter of 01/18/25 (from the past 24 hours)   Renal Function Panel   Result Value Ref Range    Glucose 159 (H) 74 - 99 mg/dL    Sodium 138 136 - 145 mmol/L    Potassium 3.9 3.5 - 5.3 mmol/L    Chloride " 103 98 - 107 mmol/L    Bicarbonate 28 21 - 32 mmol/L    Anion Gap 11 10 - 20 mmol/L    Urea Nitrogen 48 (H) 6 - 23 mg/dL    Creatinine 0.86 0.50 - 1.30 mg/dL    eGFR >90 >60 mL/min/1.73m*2    Calcium 7.9 (L) 8.6 - 10.6 mg/dL    Phosphorus 4.2 2.5 - 4.9 mg/dL    Albumin 2.5 (L) 3.4 - 5.0 g/dL   Uric Acid   Result Value Ref Range    Uric Acid 6.8 4.0 - 7.5 mg/dL   Lactate Dehydrogenase   Result Value Ref Range    LDH 3,119 (H) 84 - 246 U/L   CBC and Auto Differential   Result Value Ref Range    WBC 2.6 (L) 4.4 - 11.3 x10*3/uL    nRBC 2.3 (H) 0.0 - 0.0 /100 WBCs    RBC 2.72 (L) 4.50 - 5.90 x10*6/uL    Hemoglobin 7.6 (L) 13.5 - 17.5 g/dL    Hematocrit 22.6 (L) 41.0 - 52.0 %    MCV 83 80 - 100 fL    MCH 27.9 26.0 - 34.0 pg    MCHC 33.6 32.0 - 36.0 g/dL    RDW 12.9 11.5 - 14.5 %    Platelets 34 (LL) 150 - 450 x10*3/uL    Immature Granulocytes %, Automated 8.4 (H) 0.0 - 0.9 %    Immature Granulocytes Absolute, Automated 0.22 0.00 - 0.70 x10*3/uL   Magnesium   Result Value Ref Range    Magnesium 2.33 1.60 - 2.40 mg/dL   Renal Function Panel   Result Value Ref Range    Glucose 122 (H) 74 - 99 mg/dL    Sodium 138 136 - 145 mmol/L    Potassium 4.7 3.5 - 5.3 mmol/L    Chloride 104 98 - 107 mmol/L    Bicarbonate 27 21 - 32 mmol/L    Anion Gap 12 10 - 20 mmol/L    Urea Nitrogen 45 (H) 6 - 23 mg/dL    Creatinine 0.79 0.50 - 1.30 mg/dL    eGFR >90 >60 mL/min/1.73m*2    Calcium 8.0 (L) 8.6 - 10.6 mg/dL    Phosphorus 4.6 2.5 - 4.9 mg/dL    Albumin 2.5 (L) 3.4 - 5.0 g/dL   Uric Acid   Result Value Ref Range    Uric Acid 7.1 4.0 - 7.5 mg/dL   Lactate Dehydrogenase   Result Value Ref Range    LDH 3,042 (H) 84 - 246 U/L   Manual Differential   Result Value Ref Range    Neutrophils %, Manual 66.7 40.0 - 80.0 %    Bands %, Manual 10.2 0.0 - 5.0 %    Lymphocytes %, Manual 13.7 13.0 - 44.0 %    Monocytes %, Manual 3.4 2.0 - 10.0 %    Eosinophils %, Manual 1.7 0.0 - 6.0 %    Basophils %, Manual 0.0 0.0 - 2.0 %    Atypical Lymphocytes %,  Manual 2.6 0.0 - 2.0 %    Metamyelocytes %, Manual 1.7 0.0 - 0.0 %    Seg Neutrophils Absolute, Manual 1.73 1.20 - 7.00 x10*3/uL    Bands Absolute, Manual 0.27 0.00 - 0.70 x10*3/uL    Lymphocytes Absolute, Manual 0.36 (L) 1.20 - 4.80 x10*3/uL    Monocytes Absolute, Manual 0.09 (L) 0.10 - 1.00 x10*3/uL    Eosinophils Absolute, Manual 0.04 0.00 - 0.70 x10*3/uL    Basophils Absolute, Manual 0.00 0.00 - 0.10 x10*3/uL    Atypical Lymphs Absolute, Manual 0.07 0.00 - 0.50 x10*3/uL    Metamyelocytes Absolute, Manual 0.04 0.00 - 0.00 x10*3/uL    Total Cells Counted 117     Neutrophils Absolute, Manual 2.00 1.20 - 7.70 x10*3/uL    RBC Morphology See Below     Ovalocytes Few                             Assessment/Plan   Assessment & Plan  High grade B-cell lymphoma (Multi)    Bijan Ibanez is a 64 y.o. male with PMH of HTN, HLD, CAD (s/p stent 2010, on ASA), hx renal cell carcinoma (s/p R partial nephrectomy in 2013 @ [facility]), GERD, BPH, arthritis who is admitted for further evaluation and management of newly diagnosed high grade B-cell lymphoma, pending FISH and cytogenetic studies. Currently undergoing cycle 1 of R-EPOCH, monitoring for TLS.     Day 3 of DA R-EPOCH     ONC:  # remote RCC (pT1a) s/p R partial nephrectomy in 2013 with negative margins ([facility])    # new high grade B-cell lymphoma  - CT neck (1/9/25): fusiform thickening of the right-sided muscles of mastication including masseter, temporalis, and pterygoid muscles. No associated abnormal enhancement or discrete mass identified. Findings most likely to represent asymmetric benign hypertrophy of the muscles of mastication.  - CT Head (1/11/25): Rounded near simple fluid density lesion in the left subinsular white matter favored to represent prominent perivascular space as opposed to malignancy. Nonemergent, MRI brain could better evaluate if clinically indicated. No acute intracranial abnormality.  - CT C/A/P (1/11/25): Left-sided perihilar soft tissue mass/lymph  node c/f metastatic disease. Multiple left-sided pulmonary nodules, the majority located in the posterior LLL. There are additional nodules which exhibit subpleural distribution and an isolated JT nodule, favored to reflect underlying enlarged intrapulmonary lymph nodes and underlying metastatic disease not definitively excluded. Ill-defined hypodense infiltrative masslike foci involving the right kidney, and an additional infiltrative lesion located at the superior aspect of the right kidney and closely abuts the inferior aspect of the right hepatic lobe, c/f primary renal neoplasm such as renal cell carcinoma versus metastatic disease. Stable asymmetric nodular masslike thickening of the right adrenal gland c/f adenoma, but superimposed metastatic disease not excluded. Soft tissue mass in the right anterolateral aspect of the paraspinous tissues at T12-L1, previously characterized on MRI lumbar spine 1/10/25, c/f nerve sheath tumor or abnormal lymph node conglomerate.   - Pulm consulted, underwent EBUS with LN biopsy and BAL (1/13/25): majority of small lymphoid cells are positive for CD20 and LCA, with few background CD3 positive T cells. TTF-1, INSM1, S100, synaotpophysin and SOX10 were negative. Cytokeratin AE1/AE3 and Cam5.2 highlight rare benign-appearing epithelial cells. The overall findings raise suspicion for a B-cell lymphoproliferative process.   - BMBX (1/16) High grade B-Cell Lymphoma, Ki-67 90%, awaiting FISH and cytogenetics  - PET-CT (1/20): Widespread hermann as well as extranodal lymphomatous involvement. Multiple hypermetabolic supra and infra diaphragmatic lymphadenopathy. Intensely hypermetabolic activity within bilateral enlarged submandibular and parotid glands. Hypermetabolic mass-like infiltration within bilateral kidneys (R>L), corresponding to lesion seen on CT 1/11/25. Hypermetabolic hepatic lesions without any underlying CT correlate. Splenomegaly with nonspecific hypermetabolic  activity suggestive of extranodal involvement. Multiple hypermetabolic soft tissue mesenteric deposits as well as anterior abdominal wall nodular deposits, with few of the lesions without any underlying CT correlate. Metabolic activity within the stomach, bilateral arms, anterior chest wall and gluteal muscles without underlying CT correlate, concerning for lymphomatous involvement.  - MRI Brain (1/21) no evidence of intracranial lymphoma, scattered foci of abnormal enhancement and diffusion restriction involving the clvarium suspicious for osseous metastatic involvement. Small focus of encephalomalcia, suspected old lacunar infarctions.    - S/p dexamethasone 40mg daily (1/20-1/21)   - LP 1/22 with ppx IT methotrexate. Flow pending  - Request placed to be added to Tumor Board for Thursday 1/23   -decreaesed tumor lysis labs to daily  - Consented for DA R-EPOCH (1/21): Started cycle 1 on 1/21 with plans to start Rituxan outpatient     HEME:  # Pancytopenia 2/2 disease   - Monitor counts daily  - Transfuse to keep hgb>7, plt>10  # C/f HLH  - HLH labs significantly elevated, see heme consult note for details  # VTE Prophylaxis  - Ambulation only, d/t thrombocytopenia      ID:  Allergies: PCN (from childhood, unsure of reaction)  # PPX: acyclovir, fluconazole   - Plan Levaquin when neutropenic  - Plan zosyn for neutropenic fever  # Infectious workup for left hilar mass  - Viral, bacterial, and fungal studies all negative so far   #thrush  -start nystatin 4 times a day  -started fluconazole prophylaxis today.      FEN/GI:  - Admit weight 109 kg. Most current wt: 114 kg (1/23)  -lasix 40mg IV for one time dose 1/23  # Monitor electrolytes daily, replace PRN  # Regular diet  - Consulted RDN (1/20), appreciate recs  - Nutritional supplement drinks with all meals  - SLP recommends liquids and soft/easy to chew diet  # Hyponatremia  - Suspect SIADH per previous admission workup  - previously was on 1.5L fluid restriction  -  Home triamterene-HCTZ was discontinued during previous admission  # Hx GERD, # PPI ppx  - Protonix 40mg daily   # Hyperuricemia   - Allopurinol 300mg daily  # TLS monitoring/ppx  - TLS decreased to daily  - NS @ 100ml/hr (1/20-present)  -tucks prn for hemorrhoids  -colace 100mg BID for hard stools     CARDIO/PULM:  - ECHO (1/18/25): LVSF normal, EF 65-70%.   # HTN, HLD, CAD, MI s/p stent 2010  - Continue home atorvastatin, metoprolol, baby ASA (hold ASA for LP 1/21)  - HOLD home lisinopril in setting of low BP's     URO:  # BPH  - No home meds  - Per urology, plan to f/u at [facility]     ORTHO:  # Low back pain  # Multilevel degenerative lumbar spondylosis   - MRI lumbar spine (1/10/25): Multilevel degenerative lumbar spondylosis. Nodular structure along the posterior margin of the left L2-3 neural foramen which may represent extruded migrated disc fragment, facet synovial cyst, nerve root sleeve diverticulum, or other and appears to exert mass effect upon the exiting left L2 nerve root. Correlate clinically for left-sided L2 radiculopathy. At the T12 and L1 levels in the right anterolateral paraspinous tissues is a masslike structure that measures 39 x 17 mm greatest axial dimensions and may represent a soft tissue mass though this is unclear. Considerations include a nerve sheath tumor, abnormal lymph node conglomerate, fluid collection and others. Consider further imaging evaluation MRI of the abdomen without and with contrast. There is some heterogeneity of the marrow of the bilateral sacral ala in the posterior iliac bones. This may represent simple age-related marrow heterogeneity. Insufficiency fracture or fractures not excluded, though considered less likely. If indicated consider further initial imaging with plain films or CT.  - Ortho spine consulted, no surgical intervention, patient can follow up outpatient as needed w Dr. Kuhn if any issues arise from a spine perspective.   - Continue scheduled tylenol 975 mg  Q8 hrs  - Continue PRN tramadol 50mg Q6h moderate pain, PRN oxy 5mg Q6h severe pain  - Continue home nightly gabapentin, increase as needed/tolerated  - Continue flexeril 10mg TID PRN      ACCESS/SUPPORT/DISPO:  - FULL CODE  - ACCESS: Rt DL PICC SOLO (placed 1/18/25)  - PRIMARY ONC: Dr. Mckeon     Patient seen, examined, discussed with KAYLEN BullockC

## 2025-01-23 NOTE — NURSING NOTE
Patient receiving dose 2/4 Epoch over 24 hours.Blood return and patency verified prior to infusion. Chemo verified with second chemo privileged RN. Epoch infusing through purple port of right arm PICC. Patient premedicated with prednisone and Zofran. Patient infused with no complications.

## 2025-01-24 ENCOUNTER — APPOINTMENT (OUTPATIENT)
Dept: RADIOLOGY | Facility: HOSPITAL | Age: 65
DRG: 840 | End: 2025-01-24
Payer: MEDICARE

## 2025-01-24 LAB
ALBUMIN SERPL BCP-MCNC: 2.4 G/DL (ref 3.4–5)
ALP SERPL-CCNC: 88 U/L (ref 33–136)
ALT SERPL W P-5'-P-CCNC: 43 U/L (ref 10–52)
ANION GAP SERPL CALC-SCNC: 10 MMOL/L (ref 10–20)
AST SERPL W P-5'-P-CCNC: 46 U/L (ref 9–39)
BASOPHILS # BLD MANUAL: 0 X10*3/UL (ref 0–0.1)
BASOPHILS NFR BLD MANUAL: 0 %
BILIRUB DIRECT SERPL-MCNC: 0.2 MG/DL (ref 0–0.3)
BILIRUB SERPL-MCNC: 0.7 MG/DL (ref 0–1.2)
BUN SERPL-MCNC: 47 MG/DL (ref 6–23)
CALCIUM SERPL-MCNC: 7.7 MG/DL (ref 8.6–10.6)
CHLORIDE SERPL-SCNC: 105 MMOL/L (ref 98–107)
CO2 SERPL-SCNC: 29 MMOL/L (ref 21–32)
CREAT SERPL-MCNC: 0.78 MG/DL (ref 0.5–1.3)
EGFRCR SERPLBLD CKD-EPI 2021: >90 ML/MIN/1.73M*2
EOSINOPHIL # BLD MANUAL: 0 X10*3/UL (ref 0–0.7)
EOSINOPHIL NFR BLD MANUAL: 0 %
ERYTHROCYTE [DISTWIDTH] IN BLOOD BY AUTOMATED COUNT: 13.2 % (ref 11.5–14.5)
GLUCOSE SERPL-MCNC: 133 MG/DL (ref 74–99)
HCT VFR BLD AUTO: 21.7 % (ref 41–52)
HGB BLD-MCNC: 7.1 G/DL (ref 13.5–17.5)
IMM GRANULOCYTES # BLD AUTO: 0.1 X10*3/UL (ref 0–0.7)
IMM GRANULOCYTES NFR BLD AUTO: 5 % (ref 0–0.9)
LDH SERPL L TO P-CCNC: 2648 U/L (ref 84–246)
LYMPHOCYTES # BLD MANUAL: 0.05 X10*3/UL (ref 1.2–4.8)
LYMPHOCYTES NFR BLD MANUAL: 2.6 %
MAGNESIUM SERPL-MCNC: 2.37 MG/DL (ref 1.6–2.4)
MCH RBC QN AUTO: 27.7 PG (ref 26–34)
MCHC RBC AUTO-ENTMCNC: 32.7 G/DL (ref 32–36)
MCV RBC AUTO: 85 FL (ref 80–100)
MONOCYTES # BLD MANUAL: 0 X10*3/UL (ref 0.1–1)
MONOCYTES NFR BLD MANUAL: 0 %
NEUTROPHILS # BLD MANUAL: 1.94 X10*3/UL (ref 1.2–7.7)
NEUTS BAND # BLD MANUAL: 0.03 X10*3/UL (ref 0–0.7)
NEUTS BAND NFR BLD MANUAL: 1.7 %
NEUTS SEG # BLD MANUAL: 1.91 X10*3/UL (ref 1.2–7)
NEUTS SEG NFR BLD MANUAL: 95.7 %
NRBC BLD-RTO: 1 /100 WBCS (ref 0–0)
PHOSPHATE SERPL-MCNC: 5.1 MG/DL (ref 2.5–4.9)
PLATELET # BLD AUTO: 27 X10*3/UL (ref 150–450)
POTASSIUM SERPL-SCNC: 4.7 MMOL/L (ref 3.5–5.3)
PROT SERPL-MCNC: 3.8 G/DL (ref 6.4–8.2)
RBC # BLD AUTO: 2.56 X10*6/UL (ref 4.5–5.9)
RBC MORPH BLD: ABNORMAL
SODIUM SERPL-SCNC: 139 MMOL/L (ref 136–145)
TOTAL CELLS COUNTED BLD: 115
URATE SERPL-MCNC: 7.5 MG/DL (ref 4–7.5)
WBC # BLD AUTO: 2 X10*3/UL (ref 4.4–11.3)

## 2025-01-24 PROCEDURE — 93971 EXTREMITY STUDY: CPT

## 2025-01-24 PROCEDURE — 2500000001 HC RX 250 WO HCPCS SELF ADMINISTERED DRUGS (ALT 637 FOR MEDICARE OP)

## 2025-01-24 PROCEDURE — 83735 ASSAY OF MAGNESIUM: CPT

## 2025-01-24 PROCEDURE — 82248 BILIRUBIN DIRECT: CPT

## 2025-01-24 PROCEDURE — 83615 LACTATE (LD) (LDH) ENZYME: CPT | Performed by: PHYSICIAN ASSISTANT

## 2025-01-24 PROCEDURE — 2500000004 HC RX 250 GENERAL PHARMACY W/ HCPCS (ALT 636 FOR OP/ED): Performed by: STUDENT IN AN ORGANIZED HEALTH CARE EDUCATION/TRAINING PROGRAM

## 2025-01-24 PROCEDURE — 2500000004 HC RX 250 GENERAL PHARMACY W/ HCPCS (ALT 636 FOR OP/ED)

## 2025-01-24 PROCEDURE — 99233 SBSQ HOSP IP/OBS HIGH 50: CPT | Performed by: STUDENT IN AN ORGANIZED HEALTH CARE EDUCATION/TRAINING PROGRAM

## 2025-01-24 PROCEDURE — 2500000004 HC RX 250 GENERAL PHARMACY W/ HCPCS (ALT 636 FOR OP/ED): Performed by: PHYSICIAN ASSISTANT

## 2025-01-24 PROCEDURE — 85027 COMPLETE CBC AUTOMATED: CPT

## 2025-01-24 PROCEDURE — 84100 ASSAY OF PHOSPHORUS: CPT | Performed by: PHYSICIAN ASSISTANT

## 2025-01-24 PROCEDURE — RXMED WILLOW AMBULATORY MEDICATION CHARGE

## 2025-01-24 PROCEDURE — 1170000001 HC PRIVATE ONCOLOGY ROOM DAILY

## 2025-01-24 PROCEDURE — 84550 ASSAY OF BLOOD/URIC ACID: CPT | Performed by: PHYSICIAN ASSISTANT

## 2025-01-24 PROCEDURE — 2500000001 HC RX 250 WO HCPCS SELF ADMINISTERED DRUGS (ALT 637 FOR MEDICARE OP): Performed by: PHYSICIAN ASSISTANT

## 2025-01-24 PROCEDURE — 93971 EXTREMITY STUDY: CPT | Performed by: RADIOLOGY

## 2025-01-24 PROCEDURE — 2500000002 HC RX 250 W HCPCS SELF ADMINISTERED DRUGS (ALT 637 FOR MEDICARE OP, ALT 636 FOR OP/ED): Performed by: STUDENT IN AN ORGANIZED HEALTH CARE EDUCATION/TRAINING PROGRAM

## 2025-01-24 PROCEDURE — 85007 BL SMEAR W/DIFF WBC COUNT: CPT

## 2025-01-24 RX ORDER — PROCHLORPERAZINE MALEATE 10 MG
10 TABLET ORAL EVERY 6 HOURS PRN
Qty: 30 TABLET | Refills: 1 | Status: ON HOLD | OUTPATIENT
Start: 2025-01-24 | End: 2025-02-03

## 2025-01-24 RX ORDER — ONDANSETRON HYDROCHLORIDE 8 MG/1
16 TABLET, FILM COATED ORAL ONCE
Status: COMPLETED | OUTPATIENT
Start: 2025-01-24 | End: 2025-01-24

## 2025-01-24 RX ORDER — ALLOPURINOL 300 MG/1
300 TABLET ORAL DAILY
Qty: 30 TABLET | Refills: 0 | Status: ON HOLD | OUTPATIENT
Start: 2025-01-25 | End: 2025-02-25

## 2025-01-24 RX ORDER — FUROSEMIDE 10 MG/ML
40 INJECTION INTRAMUSCULAR; INTRAVENOUS ONCE
Status: COMPLETED | OUTPATIENT
Start: 2025-01-24 | End: 2025-01-24

## 2025-01-24 RX ORDER — BACLOFEN 10 MG/1
10 TABLET ORAL 3 TIMES DAILY
Qty: 90 TABLET | Refills: 1 | Status: ON HOLD | OUTPATIENT
Start: 2025-01-24 | End: 2025-02-25

## 2025-01-24 RX ORDER — FLUCONAZOLE 200 MG/1
400 TABLET ORAL DAILY
Qty: 60 TABLET | Refills: 5 | Status: ON HOLD | OUTPATIENT
Start: 2025-01-25 | End: 2025-02-25

## 2025-01-24 RX ORDER — PANTOPRAZOLE SODIUM 40 MG/1
40 TABLET, DELAYED RELEASE ORAL
Qty: 30 TABLET | Refills: 11 | Status: ON HOLD | OUTPATIENT
Start: 2025-01-25 | End: 2026-01-25

## 2025-01-24 RX ORDER — METOPROLOL SUCCINATE 50 MG/1
50 TABLET, EXTENDED RELEASE ORAL DAILY
Qty: 30 TABLET | Refills: 11 | Status: ON HOLD | OUTPATIENT
Start: 2025-01-25 | End: 2026-01-25

## 2025-01-24 RX ORDER — ACYCLOVIR 400 MG/1
400 TABLET ORAL EVERY 12 HOURS SCHEDULED
Qty: 60 TABLET | Refills: 5 | Status: ON HOLD | OUTPATIENT
Start: 2025-01-24 | End: 2025-02-25

## 2025-01-24 RX ADMIN — ALLOPURINOL 300 MG: 300 TABLET ORAL at 08:28

## 2025-01-24 RX ADMIN — ACETAMINOPHEN 975 MG: 325 TABLET ORAL at 05:08

## 2025-01-24 RX ADMIN — NYSTATIN 500000 UNITS: 100000 SUSPENSION ORAL at 16:39

## 2025-01-24 RX ADMIN — MUPIROCIN: 20 OINTMENT TOPICAL at 20:37

## 2025-01-24 RX ADMIN — NYSTATIN 500000 UNITS: 100000 SUSPENSION ORAL at 12:26

## 2025-01-24 RX ADMIN — OLANZAPINE 5 MG: 5 TABLET, FILM COATED ORAL at 20:33

## 2025-01-24 RX ADMIN — PREDNISONE 140 MG: 20 TABLET ORAL at 20:32

## 2025-01-24 RX ADMIN — BACLOFEN 10 MG: 10 TABLET ORAL at 20:33

## 2025-01-24 RX ADMIN — NYSTATIN 500000 UNITS: 100000 SUSPENSION ORAL at 20:32

## 2025-01-24 RX ADMIN — SODIUM CHLORIDE 0.9 MG: 9 INJECTION, SOLUTION INTRAVENOUS at 20:55

## 2025-01-24 RX ADMIN — POLYETHYLENE GLYCOL 3350 17 G: 17 POWDER, FOR SOLUTION ORAL at 08:29

## 2025-01-24 RX ADMIN — BACLOFEN 10 MG: 10 TABLET ORAL at 14:49

## 2025-01-24 RX ADMIN — ACYCLOVIR 400 MG: 400 TABLET ORAL at 08:28

## 2025-01-24 RX ADMIN — ONDANSETRON HYDROCHLORIDE 16 MG: 8 TABLET, FILM COATED ORAL at 20:33

## 2025-01-24 RX ADMIN — ACETAMINOPHEN 975 MG: 325 TABLET ORAL at 12:26

## 2025-01-24 RX ADMIN — DOCUSATE SODIUM 100 MG: 100 CAPSULE, LIQUID FILLED ORAL at 08:28

## 2025-01-24 RX ADMIN — ACYCLOVIR 400 MG: 400 TABLET ORAL at 20:33

## 2025-01-24 RX ADMIN — ATORVASTATIN CALCIUM 40 MG: 40 TABLET, FILM COATED ORAL at 06:36

## 2025-01-24 RX ADMIN — PANTOPRAZOLE SODIUM 40 MG: 40 TABLET, DELAYED RELEASE ORAL at 06:36

## 2025-01-24 RX ADMIN — GABAPENTIN 300 MG: 300 CAPSULE ORAL at 20:33

## 2025-01-24 RX ADMIN — BACLOFEN 10 MG: 10 TABLET ORAL at 08:28

## 2025-01-24 RX ADMIN — DOCUSATE SODIUM 100 MG: 100 CAPSULE, LIQUID FILLED ORAL at 20:32

## 2025-01-24 RX ADMIN — NYSTATIN 500000 UNITS: 100000 SUSPENSION ORAL at 06:36

## 2025-01-24 RX ADMIN — METOPROLOL SUCCINATE 50 MG: 50 TABLET, EXTENDED RELEASE ORAL at 08:28

## 2025-01-24 RX ADMIN — FUROSEMIDE 40 MG: 10 INJECTION, SOLUTION INTRAVENOUS at 10:45

## 2025-01-24 RX ADMIN — PREDNISONE 140 MG: 20 TABLET ORAL at 08:28

## 2025-01-24 RX ADMIN — ACETAMINOPHEN 975 MG: 325 TABLET ORAL at 20:32

## 2025-01-24 RX ADMIN — FLUCONAZOLE 400 MG: 200 TABLET ORAL at 08:28

## 2025-01-24 RX ADMIN — MUPIROCIN: 20 OINTMENT TOPICAL at 08:29

## 2025-01-24 ASSESSMENT — COGNITIVE AND FUNCTIONAL STATUS - GENERAL
DAILY ACTIVITIY SCORE: 24
MOBILITY SCORE: 24

## 2025-01-24 ASSESSMENT — PAIN SCALES - GENERAL: PAINLEVEL_OUTOF10: 0 - NO PAIN

## 2025-01-24 ASSESSMENT — ACTIVITIES OF DAILY LIVING (ADL): LACK_OF_TRANSPORTATION: NO

## 2025-01-24 NOTE — NURSING NOTE
Dose #3 of 4 EPOCH (doxorubicin 23.5mg, etoposide 118mg, vincristine 0.94mg) in 565.59mL given over 24 hours via R DL PICC.  Dose up at 2059.  Pre-medicated with 16mg PO Zofran.  Patient, dose and rate verified with second RN, Pamella Taveras.  + BBR obtained via syringe aspiration before administration.  Patient reports no N/V/D. NS infusing at 100mL/hr.

## 2025-01-24 NOTE — CARE PLAN
The patient's goals for the shift include    Problem: Safety - Adult  Goal: Free from fall injury  Outcome: Progressing     Problem: Discharge Planning  Goal: Discharge to home or other facility with appropriate resources  Outcome: Progressing     Problem: Chronic Conditions and Co-morbidities  Goal: Patient's chronic conditions and co-morbidity symptoms are monitored and maintained or improved  Outcome: Progressing     Problem: Nutrition  Goal: Less than 5 days NPO/clear liquids  Outcome: Progressing  Goal: Oral intake greater than 50%  Outcome: Progressing  Goal: Oral intake greater 75%  Outcome: Progressing  Goal: Consume prescribed supplement  Outcome: Progressing  Goal: Adequate PO fluid intake  Outcome: Progressing  Goal: Nutrition support goals are met within 48 hrs  Outcome: Progressing  Goal: Nutrition support is meeting 75% of nutrient needs  Outcome: Progressing  Goal: Tube feed tolerance  Outcome: Progressing  Goal: BG  mg/dL  Outcome: Progressing  Goal: Lab values WNL  Outcome: Progressing  Goal: Electrolytes WNL  Outcome: Progressing  Goal: Promote healing  Outcome: Progressing  Goal: Maintain stable weight  Outcome: Progressing  Goal: Reduce weight from edema/fluid  Outcome: Progressing  Goal: Gradual weight gain  Outcome: Progressing  Goal: Improve ostomy output  Outcome: Progressing     Problem: Fall/Injury  Goal: Not fall by end of shift  Outcome: Progressing  Goal: Be free from injury by end of the shift  Outcome: Progressing  Goal: Verbalize understanding of personal risk factors for fall in the hospital  Outcome: Progressing  Goal: Verbalize understanding of risk factor reduction measures to prevent injury from fall in the home  Outcome: Progressing  Goal: Use assistive devices by end of the shift  Outcome: Progressing  Goal: Pace activities to prevent fatigue by end of the shift  Outcome: Progressing       The clinical goals for the shift include pt will remain HDS

## 2025-01-24 NOTE — NURSING NOTE
Nursing Note (Chemotherapy  Note)  Patient continue to receive dose #2/4 of Doxorubicin 23.5 MG ,Etoposide 118 MG ,Vincristine 0.94 MG in Sodium Chloride 0.9 %  565.59 ML via red lumen of right upper arm PICC line over 24 hours. Medications, doses and diluent  and rate checked and verified with MD order at 0840. Also, positive blood return obtained via syringe aspiration  from red lumen of right upper arm PICC line at 0840. Patient denies any nausea and vomiting. Will continue to monitor patient and chemotherapy infusion.

## 2025-01-24 NOTE — PROGRESS NOTES
"Bijan Ibanez is a 64 y.o. male on day 6 of admission presenting with High grade B-cell lymphoma (Multi).    Subjective   Afebrile. No acute issues overnight. Cont to report numbness in his chin as well as swelling in his legs. Denies CP, SOB, abd pain. ROS otherwise unremarkable.        Objective     Physical Exam  Constitutional:       General: He is not in acute distress.  HENT:      Head: Normocephalic and atraumatic.      Mouth/Throat:      Mouth: Mucous membranes are moist.      Comments: White lesions on hard and soft palate  Eyes:      Extraocular Movements: Extraocular movements intact.      Pupils: Pupils are equal, round, and reactive to light.   Cardiovascular:      Rate and Rhythm: Normal rate and regular rhythm.      Heart sounds: No murmur heard.     No gallop.   Pulmonary:      Effort: No respiratory distress.      Breath sounds: No wheezing or rhonchi.   Abdominal:      General: Bowel sounds are normal. There is no distension.      Palpations: Abdomen is soft.      Tenderness: There is no abdominal tenderness.   Musculoskeletal:         General: Swelling present. Normal range of motion.      Cervical back: Normal range of motion.      Right lower leg: Edema present.      Left lower leg: Edema present.   Skin:     General: Skin is warm and dry.   Neurological:      General: No focal deficit present.      Mental Status: He is alert and oriented to person, place, and time.      Motor: No weakness.   Psychiatric:         Mood and Affect: Mood normal.         Behavior: Behavior normal.         Last Recorded Vitals  Blood pressure 120/77, pulse 86, temperature 36.6 °C (97.9 °F), temperature source Temporal, resp. rate 18, height (S) 1.821 m (5' 11.69\"), weight 114 kg (250 lb 10.6 oz), SpO2 98%.  Intake/Output last 3 Shifts:  I/O last 3 completed shifts:  In: 4985.6 (43.8 mL/kg) [I.V.:3790.7 (33.3 mL/kg); IV Piggyback:1195]  Out: 1975 (17.4 mL/kg) [Urine:1975 (0.5 mL/kg/hr)]  Weight: 113.7 kg "     Relevant Results  Scheduled medications  acetaminophen, 975 mg, oral, q8h  acyclovir, 400 mg, oral, q12h SONAL  allopurinol, 300 mg, oral, Daily  [Held by provider] aspirin, 81 mg, oral, Daily  atorvastatin, 40 mg, oral, Daily  baclofen, 10 mg, oral, TID  docusate sodium, 100 mg, oral, BID  DOXOrubicin (Adriamycin) 23.5 mg, etoposide (Toposar) 118 mg, vinCRIStine (VINCASAR) 0.94 mg in sodium chloride 0.9% 565.59 mL IV, , intravenous, Once  DOXOrubicin (Adriamycin) 23.6 mg, etoposide (Toposar) 118 mg, vinCRIStine (Oncovin) 0.9 mg in sodium chloride 0.9% 565.6 mL IV, , intravenous, Once  fluconazole, 400 mg, oral, Daily  gabapentin, 300 mg, oral, Nightly  lidocaine, 1 patch, transdermal, Daily  [Held by provider] lisinopril, 10 mg, oral, Daily  metoprolol succinate XL, 50 mg, oral, Daily  mupirocin, , Topical, BID  nystatin, 5 mL, Swish & Swallow, 4x daily  OLANZapine, 5 mg, oral, Nightly  ondansetron, 16 mg, oral, Once  pantoprazole, 40 mg, oral, Daily before breakfast  polyethylene glycol, 17 g, oral, Daily  predniSONE, 140 mg, oral, BID      Continuous medications  sodium chloride 0.9%, 50 mL/hr, Last Rate: 50 mL/hr (01/24/25 1045)      PRN medications  PRN medications: albuterol, alteplase, alteplase, alum-mag hydroxide-simeth, cyclobenzaprine, dextrose, diphenhydrAMINE, EPINEPHrine HCl, famotidine, guaiFENesin, methylPREDNISolone sodium succinate (PF), oxyCODONE, prochlorperazine, prochlorperazine, sodium chloride, traMADol, witch hazel        Assessment/Plan   Assessment & Plan  High grade B-cell lymphoma (Multi)    Bijan Ibanez is a 64 y.o. male with PMH of HTN, HLD, CAD (s/p stent 2010, on ASA), hx renal cell carcinoma (s/p R partial nephrectomy in 2013 @ [facility]), GERD, BPH, arthritis who is admitted for further evaluation and management of newly diagnosed high grade B-cell lymphoma, pending FISH and cytogenetic studies. Currently undergoing cycle 1 of R-EPOCH, monitoring for TLS.     Day 4 of DA R-EPOCH      ONC:  # remote RCC (pT1a) s/p R partial nephrectomy in 2013 with negative margins ([facility])    # new high grade B-cell lymphoma  - CT neck (1/9/25): fusiform thickening of the right-sided muscles of mastication including masseter, temporalis, and pterygoid muscles. No associated abnormal enhancement or discrete mass identified. Findings most likely to represent asymmetric benign hypertrophy of the muscles of mastication.  - CT Head (1/11/25): Rounded near simple fluid density lesion in the left subinsular white matter favored to represent prominent perivascular space as opposed to malignancy. Nonemergent, MRI brain could better evaluate if clinically indicated. No acute intracranial abnormality.  - CT C/A/P (1/11/25): Left-sided perihilar soft tissue mass/lymph node c/f metastatic disease. Multiple left-sided pulmonary nodules, the majority located in the posterior LLL. There are additional nodules which exhibit subpleural distribution and an isolated JT nodule, favored to reflect underlying enlarged intrapulmonary lymph nodes and underlying metastatic disease not definitively excluded. Ill-defined hypodense infiltrative masslike foci involving the right kidney, and an additional infiltrative lesion located at the superior aspect of the right kidney and closely abuts the inferior aspect of the right hepatic lobe, c/f primary renal neoplasm such as renal cell carcinoma versus metastatic disease. Stable asymmetric nodular masslike thickening of the right adrenal gland c/f adenoma, but superimposed metastatic disease not excluded. Soft tissue mass in the right anterolateral aspect of the paraspinous tissues at T12-L1, previously characterized on MRI lumbar spine 1/10/25, c/f nerve sheath tumor or abnormal lymph node conglomerate.   - Pulm consulted, underwent EBUS with LN biopsy and BAL (1/13/25): majority of small lymphoid cells are positive for CD20 and LCA, with few background CD3 positive T cells. TTF-1, INSM1,  S100, synaotpophysin and SOX10 were negative. Cytokeratin AE1/AE3 and Cam5.2 highlight rare benign-appearing epithelial cells. The overall findings raise suspicion for a B-cell lymphoproliferative process.   - BMBX (1/16) High grade B-Cell Lymphoma, Ki-67 90%--> cytogenetics c/w Burkitt's lymphoma  - PET-CT (1/20): Widespread hermann as well as extranodal lymphomatous involvement. Multiple hypermetabolic supra and infra diaphragmatic lymphadenopathy. Intensely hypermetabolic activity within bilateral enlarged submandibular and parotid glands. Hypermetabolic mass-like infiltration within bilateral kidneys (R>L), corresponding to lesion seen on CT 1/11/25. Hypermetabolic hepatic lesions without any underlying CT correlate. Splenomegaly with nonspecific hypermetabolic activity suggestive of extranodal involvement. Multiple hypermetabolic soft tissue mesenteric deposits as well as anterior abdominal wall nodular deposits, with few of the lesions without any underlying CT correlate. Metabolic activity within the stomach, bilateral arms, anterior chest wall and gluteal muscles without underlying CT correlate, concerning for lymphomatous involvement.  - MRI Brain (1/21) no evidence of intracranial lymphoma, scattered foci of abnormal enhancement and diffusion restriction involving the clvarium suspicious for osseous metastatic involvement. Small focus of encephalomalcia, suspected old lacunar infarctions.    - S/p dexamethasone 40mg daily (1/20-1/21)   - LP 1/22 with ppx IT methotrexate. Flow negative for lymphoma.  - Consented for DA R-EPOCH (1/21): Started cycle 1 on 1/21 with plans to start Rituxan outpatient     HEME:  # Pancytopenia 2/2 disease   - Monitor counts daily  - Transfuse to keep hgb>7, plt>10  # C/f HLH  - HLH labs significantly elevated, see heme consult note for details  # VTE Prophylaxis  - Ambulation only, d/t thrombocytopenia      ID:  Allergies: PCN (from childhood, unsure of reaction)  # PPX:  acyclovir, fluconazole   - Plan Levaquin when neutropenic  - Plan zosyn for neutropenic fever  # Infectious workup for left hilar mass  - Viral, bacterial, and fungal studies all negative so far   #thrush  -start nystatin 4 times a day     FEN/GI:  - Admit weight 109 kg. Most current wt: 114 kg (1/23)  # Monitor electrolytes daily, replace PRN  # Regular diet  - Consulted RDN (1/20), appreciate recs  - Nutritional supplement drinks with all meals  - SLP recommends liquids and soft/easy to chew diet  # Hyponatremia  - Suspect SIADH per previous admission workup  - previously was on 1.5L fluid restriction  - Home triamterene-HCTZ was discontinued during previous admission  # Hx GERD, # PPI ppx  - Protonix 40mg daily   # Hyperuricemia   - Allopurinol 300mg daily  # TLS monitoring/ppx  - TLS decreased to daily  -tucks prn for hemorrhoids  -colace 100mg BID for hard stools  # Fluid overload  - decreased IVF to 50ml/hr  - Lasix prn     CARDIO/PULM:  - ECHO (1/18/25): LVSF normal, EF 65-70%.   # HTN, HLD, CAD, MI s/p stent 2010  - Continue home atorvastatin, metoprolol, baby ASA  - HOLD home lisinopril in setting of low BP's     URO:  # BPH  - No home meds  - Per urology, plan to f[facility]     ORTHO:  # Low back pain  # Multilevel degenerative lumbar spondylosis   - MRI lumbar spine (1/10/25): Multilevel degenerative lumbar spondylosis. Nodular structure along the posterior margin of the left L2-3 neural foramen which may represent extruded migrated disc fragment, facet synovial cyst, nerve root sleeve diverticulum, or other and appears to exert mass effect upon the exiting left L2 nerve root. Correlate clinically for left-sided L2 radiculopathy. At the T12 and L1 levels in the right anterolateral paraspinous tissues is a masslike structure that measures 39 x 17 mm greatest axial dimensions and may represent a soft tissue mass though this is unclear. Considerations include a nerve sheath tumor, abnormal lymph node  conglomerate, fluid collection and others. Consider further imaging evaluation MRI of the abdomen without and with contrast. There is some heterogeneity of the marrow of the bilateral sacral ala in the posterior iliac bones. This may represent simple age-related marrow heterogeneity. Insufficiency fracture or fractures not excluded, though considered less likely. If indicated consider further initial imaging with plain films or CT.  - Ortho spine consulted, no surgical intervention, patient can follow up outpatient as needed w Dr. Kuhn if any issues arise from a spine perspective.   - Continue scheduled tylenol 975 mg Q8 hrs  - Continue PRN tramadol 50mg Q6h moderate pain, PRN oxy 5mg Q6h severe pain  - Continue home nightly gabapentin, increase as needed/tolerated  - Continue flexeril 10mg TID PRN      ACCESS/SUPPORT/DISPO:  - FULL CODE  - ACCESS: Rt DL PICC SOLO (placed 1/18/25)  - PRIMARY ONC: Dr. Mckeon     Patient seen, examined, discussed with Dr. Mike Hawthorne PA-C

## 2025-01-24 NOTE — CARE PLAN
The patient's goals for the shift include      The clinical goals for the shift include Patient will remain HDS thruogh end of shift    Over the shift, the patient did make progress toward the following goals. Barriers to progression include chemotherpay administration. Recommendations to address these barriers include continue monitoring.

## 2025-01-24 NOTE — PROGRESS NOTES
01/24/25 1600   Discharge Planning   Living Arrangements Spouse/significant other   Support Systems Spouse/significant other   Type of Residence Private residence   Number of Stairs to Enter Residence 0   Number of Stairs Within Residence 13   Do you have animals or pets at home? No   Who is requesting discharge planning? Provider   Home or Post Acute Services None   Expected Discharge Disposition Home   Does the patient need discharge transport arranged? No   Financial Resource Strain   How hard is it for you to pay for the very basics like food, housing, medical care, and heating? Not hard   Housing Stability   In the last 12 months, was there a time when you were not able to pay the mortgage or rent on time? N   In the past 12 months, how many times have you moved where you were living? 1   At any time in the past 12 months, were you homeless or living in a shelter (including now)? N   Transportation Needs   In the past 12 months, has lack of transportation kept you from medical appointments or from getting medications? no   In the past 12 months, has lack of transportation kept you from meetings, work, or from getting things needed for daily living? No     Pt with Burkitt's Lymphoma is receiving C1 D4 of DA-EPOCH.  He will be ready for discharge on 1/26/25.  Met with the pt and his wife to provide discharge teaching.  They were given the [facility] discharge packet, chemo bag, PICC shower gloves and tubifast sleeves.  Reviewed the discharge plan and information with them.  The pt has a DL SOLO PICC which will be cared for in the infusion center.  No home care or DME needed.  Pt will follow up with Dr. Mckeon.  Guerda Carranza RN, [facility]

## 2025-01-25 LAB
ABO GROUP (TYPE) IN BLOOD: NORMAL
ALBUMIN SERPL BCP-MCNC: 2.4 G/DL (ref 3.4–5)
ANION GAP SERPL CALC-SCNC: 9 MMOL/L (ref 10–20)
ANTIBODY SCREEN: NORMAL
BASOPHILS # BLD MANUAL: 0 X10*3/UL (ref 0–0.1)
BASOPHILS NFR BLD MANUAL: 0 %
BLOOD EXPIRATION DATE: NORMAL
BUN SERPL-MCNC: 46 MG/DL (ref 6–23)
CALCIUM SERPL-MCNC: 7.6 MG/DL (ref 8.6–10.6)
CHLORIDE SERPL-SCNC: 106 MMOL/L (ref 98–107)
CO2 SERPL-SCNC: 31 MMOL/L (ref 21–32)
CREAT SERPL-MCNC: 0.83 MG/DL (ref 0.5–1.3)
DACRYOCYTES BLD QL SMEAR: ABNORMAL
DISPENSE STATUS: NORMAL
EGFRCR SERPLBLD CKD-EPI 2021: >90 ML/MIN/1.73M*2
EOSINOPHIL # BLD MANUAL: 0 X10*3/UL (ref 0–0.7)
EOSINOPHIL NFR BLD MANUAL: 0 %
ERYTHROCYTE [DISTWIDTH] IN BLOOD BY AUTOMATED COUNT: 13.2 % (ref 11.5–14.5)
GLUCOSE SERPL-MCNC: 159 MG/DL (ref 74–99)
HCT VFR BLD AUTO: 19.7 % (ref 41–52)
HGB BLD-MCNC: 6.6 G/DL (ref 13.5–17.5)
IMM GRANULOCYTES # BLD AUTO: 0.09 X10*3/UL (ref 0–0.7)
IMM GRANULOCYTES NFR BLD AUTO: 6.8 % (ref 0–0.9)
LDH SERPL L TO P-CCNC: 2594 U/L (ref 84–246)
LYMPHOCYTES # BLD MANUAL: 0.06 X10*3/UL (ref 1.2–4.8)
LYMPHOCYTES NFR BLD MANUAL: 4.3 %
MAGNESIUM SERPL-MCNC: 2.62 MG/DL (ref 1.6–2.4)
MCH RBC QN AUTO: 27.2 PG (ref 26–34)
MCHC RBC AUTO-ENTMCNC: 33.5 G/DL (ref 32–36)
MCV RBC AUTO: 81 FL (ref 80–100)
METAMYELOCYTES # BLD MANUAL: 0.01 X10*3/UL
METAMYELOCYTES NFR BLD MANUAL: 0.9 %
MONOCYTES # BLD MANUAL: 0.02 X10*3/UL (ref 0.1–1)
MONOCYTES NFR BLD MANUAL: 1.7 %
NEUTROPHILS # BLD MANUAL: 1.21 X10*3/UL (ref 1.2–7.7)
NEUTS BAND # BLD MANUAL: 0.04 X10*3/UL (ref 0–0.7)
NEUTS BAND NFR BLD MANUAL: 3.4 %
NEUTS SEG # BLD MANUAL: 1.17 X10*3/UL (ref 1.2–7)
NEUTS SEG NFR BLD MANUAL: 89.7 %
NRBC BLD-RTO: 0 /100 WBCS (ref 0–0)
OVALOCYTES BLD QL SMEAR: ABNORMAL
PHOSPHATE SERPL-MCNC: 5.7 MG/DL (ref 2.5–4.9)
PLATELET # BLD AUTO: 21 X10*3/UL (ref 150–450)
POTASSIUM SERPL-SCNC: 4.5 MMOL/L (ref 3.5–5.3)
PRODUCT BLOOD TYPE: 6200
PRODUCT CODE: NORMAL
RBC # BLD AUTO: 2.43 X10*6/UL (ref 4.5–5.9)
RBC MORPH BLD: ABNORMAL
RH FACTOR (ANTIGEN D): NORMAL
SODIUM SERPL-SCNC: 141 MMOL/L (ref 136–145)
TOTAL CELLS COUNTED BLD: 116
UNIT ABO: NORMAL
UNIT NUMBER: NORMAL
UNIT RH: NORMAL
UNIT VOLUME: 350
URATE SERPL-MCNC: 7.5 MG/DL (ref 4–7.5)
WBC # BLD AUTO: 1.3 X10*3/UL (ref 4.4–11.3)
XM INTEP: NORMAL

## 2025-01-25 PROCEDURE — 86923 COMPATIBILITY TEST ELECTRIC: CPT

## 2025-01-25 PROCEDURE — 2500000002 HC RX 250 W HCPCS SELF ADMINISTERED DRUGS (ALT 637 FOR MEDICARE OP, ALT 636 FOR OP/ED): Performed by: STUDENT IN AN ORGANIZED HEALTH CARE EDUCATION/TRAINING PROGRAM

## 2025-01-25 PROCEDURE — 2500000004 HC RX 250 GENERAL PHARMACY W/ HCPCS (ALT 636 FOR OP/ED): Performed by: STUDENT IN AN ORGANIZED HEALTH CARE EDUCATION/TRAINING PROGRAM

## 2025-01-25 PROCEDURE — 85007 BL SMEAR W/DIFF WBC COUNT: CPT

## 2025-01-25 PROCEDURE — 86901 BLOOD TYPING SEROLOGIC RH(D): CPT

## 2025-01-25 PROCEDURE — 2500000001 HC RX 250 WO HCPCS SELF ADMINISTERED DRUGS (ALT 637 FOR MEDICARE OP): Performed by: PHYSICIAN ASSISTANT

## 2025-01-25 PROCEDURE — 80069 RENAL FUNCTION PANEL: CPT | Performed by: PHYSICIAN ASSISTANT

## 2025-01-25 PROCEDURE — 2500000004 HC RX 250 GENERAL PHARMACY W/ HCPCS (ALT 636 FOR OP/ED)

## 2025-01-25 PROCEDURE — P9040 RBC LEUKOREDUCED IRRADIATED: HCPCS

## 2025-01-25 PROCEDURE — 2500000001 HC RX 250 WO HCPCS SELF ADMINISTERED DRUGS (ALT 637 FOR MEDICARE OP)

## 2025-01-25 PROCEDURE — 83735 ASSAY OF MAGNESIUM: CPT

## 2025-01-25 PROCEDURE — 83615 LACTATE (LD) (LDH) ENZYME: CPT | Performed by: PHYSICIAN ASSISTANT

## 2025-01-25 PROCEDURE — 99233 SBSQ HOSP IP/OBS HIGH 50: CPT | Performed by: STUDENT IN AN ORGANIZED HEALTH CARE EDUCATION/TRAINING PROGRAM

## 2025-01-25 PROCEDURE — 85027 COMPLETE CBC AUTOMATED: CPT

## 2025-01-25 PROCEDURE — 84550 ASSAY OF BLOOD/URIC ACID: CPT | Performed by: PHYSICIAN ASSISTANT

## 2025-01-25 PROCEDURE — 36430 TRANSFUSION BLD/BLD COMPNT: CPT

## 2025-01-25 PROCEDURE — 1170000001 HC PRIVATE ONCOLOGY ROOM DAILY

## 2025-01-25 RX ORDER — ONDANSETRON HYDROCHLORIDE 8 MG/1
16 TABLET, FILM COATED ORAL ONCE
Status: COMPLETED | OUTPATIENT
Start: 2025-01-25 | End: 2025-01-25

## 2025-01-25 RX ADMIN — DOCUSATE SODIUM 100 MG: 100 CAPSULE, LIQUID FILLED ORAL at 21:29

## 2025-01-25 RX ADMIN — PANTOPRAZOLE SODIUM 40 MG: 40 TABLET, DELAYED RELEASE ORAL at 06:31

## 2025-01-25 RX ADMIN — BACLOFEN 10 MG: 10 TABLET ORAL at 14:13

## 2025-01-25 RX ADMIN — PREDNISONE 140 MG: 20 TABLET ORAL at 21:29

## 2025-01-25 RX ADMIN — ALLOPURINOL 300 MG: 300 TABLET ORAL at 08:27

## 2025-01-25 RX ADMIN — NYSTATIN 500000 UNITS: 100000 SUSPENSION ORAL at 06:31

## 2025-01-25 RX ADMIN — PREDNISONE 140 MG: 20 TABLET ORAL at 08:26

## 2025-01-25 RX ADMIN — FLUCONAZOLE 400 MG: 200 TABLET ORAL at 08:27

## 2025-01-25 RX ADMIN — ACYCLOVIR 400 MG: 400 TABLET ORAL at 21:29

## 2025-01-25 RX ADMIN — DOCUSATE SODIUM 100 MG: 100 CAPSULE, LIQUID FILLED ORAL at 08:27

## 2025-01-25 RX ADMIN — NYSTATIN 500000 UNITS: 100000 SUSPENSION ORAL at 14:00

## 2025-01-25 RX ADMIN — CYCLOPHOSPHAMIDE 1770 MG: 1 INJECTION, POWDER, FOR SOLUTION INTRAVENOUS; ORAL at 22:15

## 2025-01-25 RX ADMIN — METOPROLOL SUCCINATE 50 MG: 50 TABLET, EXTENDED RELEASE ORAL at 08:27

## 2025-01-25 RX ADMIN — ONDANSETRON HYDROCHLORIDE 16 MG: 8 TABLET, FILM COATED ORAL at 21:29

## 2025-01-25 RX ADMIN — BACLOFEN 10 MG: 10 TABLET ORAL at 08:27

## 2025-01-25 RX ADMIN — BACLOFEN 10 MG: 10 TABLET ORAL at 21:29

## 2025-01-25 RX ADMIN — OLANZAPINE 5 MG: 5 TABLET, FILM COATED ORAL at 21:29

## 2025-01-25 RX ADMIN — ACYCLOVIR 400 MG: 400 TABLET ORAL at 08:27

## 2025-01-25 RX ADMIN — ACETAMINOPHEN 975 MG: 325 TABLET ORAL at 05:04

## 2025-01-25 RX ADMIN — GABAPENTIN 300 MG: 300 CAPSULE ORAL at 21:29

## 2025-01-25 RX ADMIN — ATORVASTATIN CALCIUM 40 MG: 40 TABLET, FILM COATED ORAL at 05:04

## 2025-01-25 RX ADMIN — NYSTATIN 500000 UNITS: 100000 SUSPENSION ORAL at 17:54

## 2025-01-25 RX ADMIN — POLYETHYLENE GLYCOL 3350 17 G: 17 POWDER, FOR SOLUTION ORAL at 08:27

## 2025-01-25 ASSESSMENT — PAIN - FUNCTIONAL ASSESSMENT
PAIN_FUNCTIONAL_ASSESSMENT: 0-10
PAIN_FUNCTIONAL_ASSESSMENT: 0-10

## 2025-01-25 ASSESSMENT — COGNITIVE AND FUNCTIONAL STATUS - GENERAL
MOBILITY SCORE: 24
DAILY ACTIVITIY SCORE: 24
MOBILITY SCORE: 24
DAILY ACTIVITIY SCORE: 24

## 2025-01-25 ASSESSMENT — PAIN SCALES - GENERAL
PAINLEVEL_OUTOF10: 0 - NO PAIN

## 2025-01-25 NOTE — CARE PLAN
Problem: Safety - Adult  Goal: Free from fall injury  Outcome: Progressing     Problem: Discharge Planning  Goal: Discharge to home or other facility with appropriate resources  Outcome: Progressing     Problem: Chronic Conditions and Co-morbidities  Goal: Patient's chronic conditions and co-morbidity symptoms are monitored and maintained or improved  Outcome: Progressing   The patient's goals for the shift include      The clinical goals for the shift include patient will be free from falls

## 2025-01-25 NOTE — PROGRESS NOTES
"Bijan Ibanez is a 64 y.o. male on day 7 of admission presenting with High grade B-cell lymphoma (Multi).    Subjective   No acute events overnight. Afebrile. Reports that he has numbness in his chin, but feels as though there is some tingling now. Improved swelling in his legs. Tolerating chemo well. Denies CP, SOB, Abd pain. ROS otherwise unremarkable.        Objective     Physical Exam  Constitutional:       General: He is not in acute distress.  HENT:      Head: Normocephalic and atraumatic.      Mouth/Throat:      Mouth: Mucous membranes are moist.      Comments: White lesions on hard and soft palate  Eyes:      Extraocular Movements: Extraocular movements intact.      Pupils: Pupils are equal, round, and reactive to light.   Cardiovascular:      Rate and Rhythm: Normal rate and regular rhythm.      Heart sounds: No murmur heard.     No gallop.   Pulmonary:      Effort: No respiratory distress.      Breath sounds: No wheezing or rhonchi.   Abdominal:      General: Bowel sounds are normal. There is no distension.      Palpations: Abdomen is soft.      Tenderness: There is no abdominal tenderness.   Musculoskeletal:         General: Swelling present. Normal range of motion.      Cervical back: Normal range of motion.      Right lower leg: Edema present.      Left lower leg: Edema present.   Skin:     General: Skin is warm and dry.   Neurological:      General: No focal deficit present.      Mental Status: He is alert and oriented to person, place, and time.      Motor: No weakness.   Psychiatric:         Mood and Affect: Mood normal.         Behavior: Behavior normal.         Last Recorded Vitals  Blood pressure (!) 152/91, pulse 75, temperature 36.4 °C (97.5 °F), temperature source Temporal, resp. rate 18, height (S) 1.821 m (5' 11.69\"), weight 115 kg (253 lb 12 oz), SpO2 99%.  Intake/Output last 3 Shifts:  I/O last 3 completed shifts:  In: 1259.4 (11.1 mL/kg) [I.V.:891.7 (7.8 mL/kg); IV " Piggyback:367.8]  Out: - (0 mL/kg)   Weight: 113.7 kg     Relevant Results  Scheduled medications  acyclovir, 400 mg, oral, q12h SONAL  allopurinol, 300 mg, oral, Daily  [Held by provider] aspirin, 81 mg, oral, Daily  atorvastatin, 40 mg, oral, Daily  baclofen, 10 mg, oral, TID  cyclophosphamide, 750 mg/m2 (Treatment Plan Recorded), intravenous, Once  docusate sodium, 100 mg, oral, BID  DOXOrubicin (Adriamycin) 23.6 mg, etoposide (Toposar) 118 mg, vinCRIStine (Oncovin) 0.9 mg in sodium chloride 0.9% 565.6 mL IV, , intravenous, Once  fluconazole, 400 mg, oral, Daily  gabapentin, 300 mg, oral, Nightly  lidocaine, 1 patch, transdermal, Daily  [Held by provider] lisinopril, 10 mg, oral, Daily  metoprolol succinate XL, 50 mg, oral, Daily  nystatin, 5 mL, Swish & Swallow, 4x daily  OLANZapine, 5 mg, oral, Nightly  ondansetron, 16 mg, oral, Once  pantoprazole, 40 mg, oral, Daily before breakfast  polyethylene glycol, 17 g, oral, Daily  predniSONE, 140 mg, oral, BID      Continuous medications       PRN medications  PRN medications: albuterol, alteplase, alteplase, alum-mag hydroxide-simeth, cyclobenzaprine, dextrose, diphenhydrAMINE, EPINEPHrine HCl, famotidine, guaiFENesin, methylPREDNISolone sodium succinate (PF), oxyCODONE, prochlorperazine, prochlorperazine, sodium chloride, traMADol, witch hazel        Assessment/Plan   Assessment & Plan  High grade B-cell lymphoma (Multi)    Bijan Ibanez is a 64 y.o. male with PMH of HTN, HLD, CAD (s/p stent 2010, on ASA), hx renal cell carcinoma (s/p R partial nephrectomy in 2013 @ [facility]), GERD, BPH, arthritis who is admitted for further evaluation and management of newly diagnosed high grade B-cell lymphoma, pending FISH and cytogenetic studies. Currently undergoing cycle 1 of R-EPOCH, monitoring for TLS.     Day 5 of DA R-EPOCH     Update 1/25:   - Tolerating chemo, walking in the halls   - 1u PRBC today   - Plan to discharge tomorrow if counts remain stable and he doesn't require  any more transfusions     ONC:  # remote RCC (pT1a) s/p R partial nephrectomy in 2013 with negative margins ([facility])    # new high grade B-cell lymphoma  - CT neck (1/9/25): fusiform thickening of the right-sided muscles of mastication including masseter, temporalis, and pterygoid muscles. No associated abnormal enhancement or discrete mass identified. Findings most likely to represent asymmetric benign hypertrophy of the muscles of mastication.  - CT Head (1/11/25): Rounded near simple fluid density lesion in the left subinsular white matter favored to represent prominent perivascular space as opposed to malignancy. Nonemergent, MRI brain could better evaluate if clinically indicated. No acute intracranial abnormality.  - CT C/A/P (1/11/25): Left-sided perihilar soft tissue mass/lymph node c/f metastatic disease. Multiple left-sided pulmonary nodules, the majority located in the posterior LLL. There are additional nodules which exhibit subpleural distribution and an isolated JT nodule, favored to reflect underlying enlarged intrapulmonary lymph nodes and underlying metastatic disease not definitively excluded. Ill-defined hypodense infiltrative masslike foci involving the right kidney, and an additional infiltrative lesion located at the superior aspect of the right kidney and closely abuts the inferior aspect of the right hepatic lobe, c/f primary renal neoplasm such as renal cell carcinoma versus metastatic disease. Stable asymmetric nodular masslike thickening of the right adrenal gland c/f adenoma, but superimposed metastatic disease not excluded. Soft tissue mass in the right anterolateral aspect of the paraspinous tissues at T12-L1, previously characterized on MRI lumbar spine 1/10/25, c/f nerve sheath tumor or abnormal lymph node conglomerate.   - Pulm consulted, underwent EBUS with LN biopsy and BAL (1/13/25): majority of small lymphoid cells are positive for CD20 and LCA, with few background CD3 positive T  cells. TTF-1, INSM1, S100, synaotpophysin and SOX10 were negative. Cytokeratin AE1/AE3 and Cam5.2 highlight rare benign-appearing epithelial cells. The overall findings raise suspicion for a B-cell lymphoproliferative process.   - BMBX (1/16) High grade B-Cell Lymphoma, Ki-67 90%--> cytogenetics c/w Burkitt's lymphoma  - PET-CT (1/20): Widespread hermann as well as extranodal lymphomatous involvement. Multiple hypermetabolic supra and infra diaphragmatic lymphadenopathy. Intensely hypermetabolic activity within bilateral enlarged submandibular and parotid glands. Hypermetabolic mass-like infiltration within bilateral kidneys (R>L), corresponding to lesion seen on CT 1/11/25. Hypermetabolic hepatic lesions without any underlying CT correlate. Splenomegaly with nonspecific hypermetabolic activity suggestive of extranodal involvement. Multiple hypermetabolic soft tissue mesenteric deposits as well as anterior abdominal wall nodular deposits, with few of the lesions without any underlying CT correlate. Metabolic activity within the stomach, bilateral arms, anterior chest wall and gluteal muscles without underlying CT correlate, concerning for lymphomatous involvement.  - MRI Brain (1/21) no evidence of intracranial lymphoma, scattered foci of abnormal enhancement and diffusion restriction involving the clvarium suspicious for osseous metastatic involvement. Small focus of encephalomalcia, suspected old lacunar infarctions.    - S/p dexamethasone 40mg daily (1/20-1/21)   - LP 1/22 with ppx IT methotrexate. Flow negative for lymphoma.  - Consented for DA R-EPOCH (1/21): Started cycle 1 on 1/21 with plans to start Rituxan outpatient     HEME:  # Pancytopenia 2/2 disease   - Monitor counts daily  - Transfuse to keep hgb>7, plt>10  # C/f HLH  - HLH labs significantly elevated, see heme consult note for details  # VTE Prophylaxis  - Ambulation only, d/t thrombocytopenia      ID:  Allergies: PCN (from childhood, unsure of  reaction)  # PPX: acyclovir, fluconazole   - Plan Levaquin when neutropenic  - Plan zosyn for neutropenic fever  # Infectious workup for left hilar mass  - Viral, bacterial, and fungal studies all negative so far   #thrush  -start nystatin 4 times a day     FEN/GI:  - Admit weight 109 kg. Most current wt: 115 kg (1/24)  # Monitor electrolytes daily, replace PRN  # Regular diet  - Consulted RDN (1/20), appreciate recs  - Nutritional supplement drinks with all meals  - SLP recommends liquids and soft/easy to chew diet  # Hyponatremia  - Suspect SIADH per previous admission workup  - previously was on 1.5L fluid restriction  - Home triamterene-HCTZ was discontinued during previous admission  # Hx GERD, # PPI ppx  - Protonix 40mg daily   # Hyperuricemia   - Allopurinol 300mg daily  # TLS monitoring/ppx  - TLS decreased to daily  - tucks prn for hemorrhoids  - colace 100mg BID for hard stools  # Fluid overload  - Stopped IVF 1/25  - Lasix prn     CARDIO/PULM:  - ECHO (1/18/25): LVSF normal, EF 65-70%.   # HTN, HLD, CAD, MI s/p stent 2010  - Continue home atorvastatin, metoprolol, baby ASA  - HOLD home lisinopril in setting of low BP's     URO:  # BPH  - No home meds  - Per urology, plan to f[facility]     ORTHO:  # Low back pain  # Multilevel degenerative lumbar spondylosis   - MRI lumbar spine (1/10/25): Multilevel degenerative lumbar spondylosis. Nodular structure along the posterior margin of the left L2-3 neural foramen which may represent extruded migrated disc fragment, facet synovial cyst, nerve root sleeve diverticulum, or other and appears to exert mass effect upon the exiting left L2 nerve root. Correlate clinically for left-sided L2 radiculopathy. At the T12 and L1 levels in the right anterolateral paraspinous tissues is a masslike structure that measures 39 x 17 mm greatest axial dimensions and may represent a soft tissue mass though this is unclear. Considerations include a nerve sheath tumor, abnormal lymph  node conglomerate, fluid collection and others. Consider further imaging evaluation MRI of the abdomen without and with contrast. There is some heterogeneity of the marrow of the bilateral sacral ala in the posterior iliac bones. This may represent simple age-related marrow heterogeneity. Insufficiency fracture or fractures not excluded, though considered less likely. If indicated consider further initial imaging with plain films or CT.  - Ortho spine consulted, no surgical intervention, patient can follow up outpatient as needed w Dr. Kuhn if any issues arise from a spine perspective.   - Continue scheduled tylenol 975 mg Q8 hrs  - Continue PRN tramadol 50mg Q6h moderate pain, PRN oxy 5mg Q6h severe pain  - Continue home nightly gabapentin, increase as needed/tolerated  - Continue flexeril 10mg TID PRN      ACCESS/SUPPORT/DISPO:  - FULL CODE  - ACCESS: Rt DL PICC SOLO (placed 1/18/25)  - PRIMARY ONC: Dr. Mckeon     Patient seen, examined, discussed with Dr. Mike Adame, APRN-CNP

## 2025-01-26 ENCOUNTER — PHARMACY VISIT (OUTPATIENT)
Dept: PHARMACY | Facility: CLINIC | Age: 65
End: 2025-01-26
Payer: MEDICARE

## 2025-01-26 VITALS
DIASTOLIC BLOOD PRESSURE: 81 MMHG | SYSTOLIC BLOOD PRESSURE: 131 MMHG | TEMPERATURE: 97.2 F | HEIGHT: 72 IN | BODY MASS INDEX: 34.4 KG/M2 | HEART RATE: 94 BPM | WEIGHT: 253.97 LBS | OXYGEN SATURATION: 98 % | RESPIRATION RATE: 16 BRPM

## 2025-01-26 LAB
ALBUMIN SERPL BCP-MCNC: 2.3 G/DL (ref 3.4–5)
ANION GAP SERPL CALC-SCNC: 9 MMOL/L (ref 10–20)
BASOPHILS # BLD MANUAL: 0 X10*3/UL (ref 0–0.1)
BASOPHILS NFR BLD MANUAL: 0 %
BLOOD EXPIRATION DATE: NORMAL
BUN SERPL-MCNC: 40 MG/DL (ref 6–23)
CALCIUM SERPL-MCNC: 7.5 MG/DL (ref 8.6–10.6)
CHLORIDE SERPL-SCNC: 107 MMOL/L (ref 98–107)
CO2 SERPL-SCNC: 30 MMOL/L (ref 21–32)
CREAT SERPL-MCNC: 0.65 MG/DL (ref 0.5–1.3)
DISPENSE STATUS: NORMAL
EGFRCR SERPLBLD CKD-EPI 2021: >90 ML/MIN/1.73M*2
EOSINOPHIL # BLD MANUAL: 0 X10*3/UL (ref 0–0.7)
EOSINOPHIL NFR BLD MANUAL: 0 %
ERYTHROCYTE [DISTWIDTH] IN BLOOD BY AUTOMATED COUNT: 13.2 % (ref 11.5–14.5)
GLUCOSE SERPL-MCNC: 171 MG/DL (ref 74–99)
HCT VFR BLD AUTO: 21.1 % (ref 41–52)
HGB BLD-MCNC: 7.1 G/DL (ref 13.5–17.5)
IMM GRANULOCYTES # BLD AUTO: 0.15 X10*3/UL (ref 0–0.7)
IMM GRANULOCYTES NFR BLD AUTO: 15 % (ref 0–0.9)
LDH SERPL L TO P-CCNC: 2235 U/L (ref 84–246)
LYMPHOCYTES # BLD MANUAL: 0.17 X10*3/UL (ref 1.2–4.8)
LYMPHOCYTES NFR BLD MANUAL: 16.8 %
MAGNESIUM SERPL-MCNC: 2.58 MG/DL (ref 1.6–2.4)
MCH RBC QN AUTO: 27.7 PG (ref 26–34)
MCHC RBC AUTO-ENTMCNC: 33.6 G/DL (ref 32–36)
MCV RBC AUTO: 82 FL (ref 80–100)
MONOCYTES # BLD MANUAL: 0 X10*3/UL (ref 0.1–1)
MONOCYTES NFR BLD MANUAL: 0 %
NEUTROPHILS # BLD MANUAL: 0.83 X10*3/UL (ref 1.2–7.7)
NEUTS BAND # BLD MANUAL: 0.08 X10*3/UL (ref 0–0.7)
NEUTS BAND NFR BLD MANUAL: 7.9 %
NEUTS SEG # BLD MANUAL: 0.75 X10*3/UL (ref 1.2–7)
NEUTS SEG NFR BLD MANUAL: 75.3 %
NRBC BLD-RTO: 0 /100 WBCS (ref 0–0)
PHOSPHATE SERPL-MCNC: 5.1 MG/DL (ref 2.5–4.9)
PLATELET # BLD AUTO: 19 X10*3/UL (ref 150–450)
POTASSIUM SERPL-SCNC: 4.4 MMOL/L (ref 3.5–5.3)
PRODUCT BLOOD TYPE: 6200
PRODUCT CODE: NORMAL
RBC # BLD AUTO: 2.56 X10*6/UL (ref 4.5–5.9)
RBC MORPH BLD: ABNORMAL
SODIUM SERPL-SCNC: 142 MMOL/L (ref 136–145)
TOTAL CELLS COUNTED BLD: 89
UNIT ABO: NORMAL
UNIT NUMBER: NORMAL
UNIT RH: NORMAL
UNIT VOLUME: 350
URATE SERPL-MCNC: 7.2 MG/DL (ref 4–7.5)
WBC # BLD AUTO: 1 X10*3/UL (ref 4.4–11.3)
XM INTEP: NORMAL

## 2025-01-26 PROCEDURE — 2500000004 HC RX 250 GENERAL PHARMACY W/ HCPCS (ALT 636 FOR OP/ED)

## 2025-01-26 PROCEDURE — 85027 COMPLETE CBC AUTOMATED: CPT

## 2025-01-26 PROCEDURE — 83615 LACTATE (LD) (LDH) ENZYME: CPT | Performed by: PHYSICIAN ASSISTANT

## 2025-01-26 PROCEDURE — 2500000004 HC RX 250 GENERAL PHARMACY W/ HCPCS (ALT 636 FOR OP/ED): Performed by: STUDENT IN AN ORGANIZED HEALTH CARE EDUCATION/TRAINING PROGRAM

## 2025-01-26 PROCEDURE — 2500000001 HC RX 250 WO HCPCS SELF ADMINISTERED DRUGS (ALT 637 FOR MEDICARE OP)

## 2025-01-26 PROCEDURE — 85007 BL SMEAR W/DIFF WBC COUNT: CPT

## 2025-01-26 PROCEDURE — 36430 TRANSFUSION BLD/BLD COMPNT: CPT

## 2025-01-26 PROCEDURE — 84100 ASSAY OF PHOSPHORUS: CPT | Performed by: PHYSICIAN ASSISTANT

## 2025-01-26 PROCEDURE — P9040 RBC LEUKOREDUCED IRRADIATED: HCPCS

## 2025-01-26 PROCEDURE — 2500000001 HC RX 250 WO HCPCS SELF ADMINISTERED DRUGS (ALT 637 FOR MEDICARE OP): Performed by: PHYSICIAN ASSISTANT

## 2025-01-26 PROCEDURE — 84550 ASSAY OF BLOOD/URIC ACID: CPT | Performed by: PHYSICIAN ASSISTANT

## 2025-01-26 PROCEDURE — 99238 HOSP IP/OBS DSCHRG MGMT 30/<: CPT | Performed by: STUDENT IN AN ORGANIZED HEALTH CARE EDUCATION/TRAINING PROGRAM

## 2025-01-26 PROCEDURE — 83735 ASSAY OF MAGNESIUM: CPT

## 2025-01-26 PROCEDURE — 2500000002 HC RX 250 W HCPCS SELF ADMINISTERED DRUGS (ALT 637 FOR MEDICARE OP, ALT 636 FOR OP/ED): Performed by: STUDENT IN AN ORGANIZED HEALTH CARE EDUCATION/TRAINING PROGRAM

## 2025-01-26 RX ORDER — FUROSEMIDE 20 MG/1
20 TABLET ORAL DAILY PRN
Qty: 30 TABLET | Refills: 0 | Status: ON HOLD | OUTPATIENT
Start: 2025-01-26

## 2025-01-26 RX ORDER — LEVOFLOXACIN 500 MG/1
500 TABLET, FILM COATED ORAL DAILY
Qty: 30 TABLET | Refills: 0 | Status: ON HOLD | OUTPATIENT
Start: 2025-01-26 | End: 2025-02-25

## 2025-01-26 RX ADMIN — ALLOPURINOL 300 MG: 300 TABLET ORAL at 08:29

## 2025-01-26 RX ADMIN — METOPROLOL SUCCINATE 50 MG: 50 TABLET, EXTENDED RELEASE ORAL at 08:29

## 2025-01-26 RX ADMIN — POLYETHYLENE GLYCOL 3350 17 G: 17 POWDER, FOR SOLUTION ORAL at 08:29

## 2025-01-26 RX ADMIN — FLUCONAZOLE 400 MG: 200 TABLET ORAL at 08:29

## 2025-01-26 RX ADMIN — ALBUTEROL SULFATE 3 ML: 2.5 SOLUTION RESPIRATORY (INHALATION) at 13:56

## 2025-01-26 RX ADMIN — DOCUSATE SODIUM 100 MG: 100 CAPSULE, LIQUID FILLED ORAL at 08:29

## 2025-01-26 RX ADMIN — PANTOPRAZOLE SODIUM 40 MG: 40 TABLET, DELAYED RELEASE ORAL at 06:41

## 2025-01-26 RX ADMIN — ACYCLOVIR 400 MG: 400 TABLET ORAL at 08:29

## 2025-01-26 RX ADMIN — PREDNISONE 140 MG: 20 TABLET ORAL at 08:28

## 2025-01-26 RX ADMIN — BACLOFEN 10 MG: 10 TABLET ORAL at 08:28

## 2025-01-26 RX ADMIN — NYSTATIN 500000 UNITS: 100000 SUSPENSION ORAL at 13:59

## 2025-01-26 RX ADMIN — BACLOFEN 10 MG: 10 TABLET ORAL at 14:00

## 2025-01-26 RX ADMIN — ATORVASTATIN CALCIUM 40 MG: 40 TABLET, FILM COATED ORAL at 06:41

## 2025-01-26 ASSESSMENT — PAIN SCALES - GENERAL: PAINLEVEL_OUTOF10: 0 - NO PAIN

## 2025-01-26 NOTE — DISCHARGE SUMMARY
Discharge Diagnosis  High grade B-cell lymphoma (Multi)    Issues Requiring Follow-Up  None    Test Results Pending At Discharge  Pending Labs       Order Current Status    CSF Cell Count Collected (01/22/25 1531)    CSF Cell Count With Differential (Tube #1) Collected (01/22/25 1531)    CSF Differential Collected (01/22/25 1531)    Flow Cytometry Test In process    Flow Cytometry Test In process            Hospital Course  Bijan Ibanez is a 64 y.o. male with PMH of HTN, HLD, CAD (s/p stent 2010, on ASA), hx renal cell carcinoma (s/p R partial nephrectomy in 2013 @ [facility]), GERD, BPH, arthritis who is admitted for further evaluation and management of newly diagnosed high grade B-cell lymphoma.     Hospital course notable for:  - Burkitt Lymphoma - BMBx signed out HGL, FISH with t(8;14), ki67 90%.   - DA-R-EPOCH 1/21  - LP 1/22 with ppx methotrexate, flow negative  - No notable TLS  - Volume overload managed with lasix    - Thrush, fluconazole   - PILAR, resolved with fluids     ACCESS: Rt DL PICC SOLO (placed 1/18/25)   PRIMARY ONC: Dr. Mckeon  PPX: acyclovir  FOLLOW UP:   - 1.28 Count Check/ [facility] (requested)   - 1.31 Count Check   - 2.3   Count Check  - 2.4   Dr Mckeon   - 2.6   Count Check      Pertinent Physical Exam At Time of Discharge  Physical Exam  Constitutional:       General: He is not in acute distress.  HENT:      Head: Normocephalic and atraumatic.      Mouth/Throat:      Mouth: Mucous membranes are moist.      Comments: White lesions on hard and soft palate  Eyes:      Extraocular Movements: Extraocular movements intact.      Pupils: Pupils are equal, round, and reactive to light.   Cardiovascular:      Rate and Rhythm: Normal rate and regular rhythm.      Heart sounds: No murmur heard.     No gallop.   Pulmonary:      Effort: No respiratory distress.      Breath sounds: No wheezing or rhonchi.   Abdominal:      General: Bowel sounds are normal. There is no distension.      Palpations:  Abdomen is soft.      Tenderness: There is no abdominal tenderness.   Musculoskeletal:         General: Swelling present. Normal range of motion.      Cervical back: Normal range of motion.      Right lower leg: Edema present.      Left lower leg: Edema present.   Skin:     General: Skin is warm and dry.   Neurological:      General: No focal deficit present.      Mental Status: He is alert and oriented to person, place, and time.      Motor: No weakness.   Psychiatric:         Mood and Affect: Mood normal.         Behavior: Behavior normal.         Home Medications     Medication List      START taking these medications     acyclovir 400 mg tablet; Commonly known as: Zovirax; Take 1 tablet (400   mg) by mouth every 12 hours.   allopurinol 300 mg tablet; Commonly known as: Zyloprim; Take 1 tablet   (300 mg) by mouth once daily.   baclofen 10 mg tablet; Commonly known as: Lioresal; Take 1 tablet (10   mg) by mouth 3 times a day.   fluconazole 200 mg tablet; Commonly known as: Diflucan; Take 2 tablets   (400 mg) by mouth once daily.   furosemide 20 mg tablet; Commonly known as: Lasix; Take 1 tablet (20 mg)   by mouth once daily as needed (for swelling).   levoFLOXacin 500 mg tablet; Commonly known as: Levaquin; Take 1 tablet   (500 mg) by mouth once daily. Do not start until instructed to by your Dr   pantoprazole 40 mg EC tablet; Commonly known as: ProtoNix; Take 1 tablet   (40 mg) by mouth once daily in the morning. Take before meals. Do not   crush, chew, or split.   prochlorperazine 10 mg tablet; Commonly known as: Compazine; Take 1   tablet (10 mg) by mouth every 6 hours if needed for nausea for up to 7   days.     CHANGE how you take these medications     metoprolol succinate XL 50 mg 24 hr tablet; Commonly known as:   Toprol-XL; Take 1 tablet (50 mg) by mouth once daily. Do not crush or   chew.; What changed: when to take this, additional instructions     CONTINUE taking these medications     atorvastatin 40 mg  tablet; Commonly known as: Lipitor; Take 1 tablet (40   mg) by mouth early in the morning..   gabapentin 300 mg capsule; Commonly known as: Neurontin; Take 1 capsule   (300 mg) by mouth once daily at bedtime.   lidocaine 4 % patch; Place 1 patch over 12 hours on the skin once daily.   Remove & discard patch within 12 hours or as directed by MD.   lisinopril 10 mg tablet   oxyCODONE 5 mg immediate release tablet; Commonly known as: Roxicodone;   Take 1 tablet (5 mg) by mouth every 6 hours if needed for severe pain (7 -   10).     STOP taking these medications     aspirin 81 mg EC tablet   tiZANidine 2 mg tablet; Commonly known as: Zanaflex   TYLENOL ORAL       Outpatient Follow-Up  Future Appointments   Date Time Provider Department Center   1/28/2025  8:30 AM INF 15 [facility]   1/31/2025  9:45 AM INF 24 [facility]   2/3/2025  2:15 PM INF 35 [facility]   2/4/2025  1:40 PM Torey Mckeon DO [id number]   2/6/2025 10:00 AM INF 26 [facility]       Frida Adame, APRN-CNP

## 2025-01-26 NOTE — PROGRESS NOTES
Bijan Ibanez is a 64 y.o. male on day 8 of admission presenting with High grade B-cell lymphoma (Multi).    Patient cleared for discharge, patient to return home with spouse providing discharge transportation.     Orville Mckeon

## 2025-01-27 ENCOUNTER — APPOINTMENT (OUTPATIENT)
Dept: RADIOLOGY | Facility: HOSPITAL | Age: 65
End: 2025-01-27
Payer: MEDICARE

## 2025-01-27 ENCOUNTER — APPOINTMENT (OUTPATIENT)
Dept: RESEARCH | Facility: HOSPITAL | Age: 65
DRG: 840 | End: 2025-01-27
Payer: MEDICARE

## 2025-01-27 ENCOUNTER — HOSPITAL ENCOUNTER (EMERGENCY)
Facility: HOSPITAL | Age: 65
Discharge: LEFT BEFORE DISCHARGE | End: 2025-01-27
Attending: STUDENT IN AN ORGANIZED HEALTH CARE EDUCATION/TRAINING PROGRAM
Payer: MEDICARE

## 2025-01-27 VITALS
SYSTOLIC BLOOD PRESSURE: 138 MMHG | TEMPERATURE: 99.5 F | BODY MASS INDEX: 35.56 KG/M2 | RESPIRATION RATE: 18 BRPM | HEART RATE: 92 BPM | HEIGHT: 71 IN | OXYGEN SATURATION: 97 % | WEIGHT: 253.97 LBS | DIASTOLIC BLOOD PRESSURE: 78 MMHG

## 2025-01-27 DIAGNOSIS — M54.2 ANTERIOR NECK PAIN: ICD-10-CM

## 2025-01-27 DIAGNOSIS — T45.1X5A CHEMOTHERAPY-INDUCED NEUTROPENIA (CMS-HCC): Primary | ICD-10-CM

## 2025-01-27 DIAGNOSIS — D70.1 CHEMOTHERAPY-INDUCED NEUTROPENIA (CMS-HCC): Primary | ICD-10-CM

## 2025-01-27 DIAGNOSIS — R50.9 FEVER, UNSPECIFIED FEVER CAUSE: ICD-10-CM

## 2025-01-27 LAB
ALBUMIN SERPL BCP-MCNC: 2.5 G/DL (ref 3.4–5)
ALP SERPL-CCNC: 66 U/L (ref 33–136)
ALT SERPL W P-5'-P-CCNC: 51 U/L (ref 10–52)
ANION GAP SERPL CALCULATED.3IONS-SCNC: 8 MMOL/L (ref 10–20)
AST SERPL W P-5'-P-CCNC: 24 U/L (ref 9–39)
BASOPHILS # BLD AUTO: 0 X10*3/UL (ref 0–0.1)
BASOPHILS NFR BLD AUTO: 0 %
BILIRUB SERPL-MCNC: 0.9 MG/DL (ref 0–1.2)
BUN SERPL-MCNC: 26 MG/DL (ref 6–23)
CALCIUM SERPL-MCNC: 7.6 MG/DL (ref 8.6–10.3)
CHLORIDE SERPL-SCNC: 103 MMOL/L (ref 98–107)
CO2 SERPL-SCNC: 29 MMOL/L (ref 21–32)
CREAT SERPL-MCNC: 0.63 MG/DL (ref 0.5–1.3)
EGFRCR SERPLBLD CKD-EPI 2021: >90 ML/MIN/1.73M*2
EOSINOPHIL # BLD AUTO: 0 X10*3/UL (ref 0–0.7)
EOSINOPHIL NFR BLD AUTO: 0 %
ERYTHROCYTE [DISTWIDTH] IN BLOOD BY AUTOMATED COUNT: 13.5 % (ref 11.5–14.5)
FLUAV RNA RESP QL NAA+PROBE: NOT DETECTED
FLUBV RNA RESP QL NAA+PROBE: NOT DETECTED
FUNGUS SPEC CULT: NORMAL
FUNGUS SPEC CULT: NORMAL
FUNGUS SPEC FUNGUS STN: NORMAL
FUNGUS SPEC FUNGUS STN: NORMAL
GLUCOSE SERPL-MCNC: 124 MG/DL (ref 74–99)
HCT VFR BLD AUTO: 30.6 % (ref 41–52)
HGB BLD-MCNC: 10.3 G/DL (ref 13.5–17.5)
IMM GRANULOCYTES # BLD AUTO: 0.03 X10*3/UL (ref 0–0.7)
IMM GRANULOCYTES NFR BLD AUTO: 13.6 % (ref 0–0.9)
LYMPHOCYTES # BLD AUTO: 0.13 X10*3/UL (ref 1.2–4.8)
LYMPHOCYTES NFR BLD AUTO: 59.1 %
MAGNESIUM SERPL-MCNC: 2.13 MG/DL (ref 1.6–2.4)
MCH RBC QN AUTO: 27.5 PG (ref 26–34)
MCHC RBC AUTO-ENTMCNC: 33.7 G/DL (ref 32–36)
MCV RBC AUTO: 82 FL (ref 80–100)
MONOCYTES # BLD AUTO: 0 X10*3/UL (ref 0.1–1)
MONOCYTES NFR BLD AUTO: 0 %
NEUTROPHILS # BLD AUTO: 0.06 X10*3/UL (ref 1.2–7.7)
NEUTROPHILS NFR BLD AUTO: 27.3 %
NRBC BLD-RTO: 0 /100 WBCS (ref 0–0)
PLATELET # BLD AUTO: 15 X10*3/UL (ref 150–450)
POTASSIUM SERPL-SCNC: 4.1 MMOL/L (ref 3.5–5.3)
PROT SERPL-MCNC: 4 G/DL (ref 6.4–8.2)
RBC # BLD AUTO: 3.74 X10*6/UL (ref 4.5–5.9)
RSV RNA RESP QL NAA+PROBE: NOT DETECTED
SARS-COV-2 RNA RESP QL NAA+PROBE: NOT DETECTED
SODIUM SERPL-SCNC: 136 MMOL/L (ref 136–145)
WBC # BLD AUTO: 0.2 X10*3/UL (ref 4.4–11.3)

## 2025-01-27 PROCEDURE — 80053 COMPREHEN METABOLIC PANEL: CPT | Performed by: STUDENT IN AN ORGANIZED HEALTH CARE EDUCATION/TRAINING PROGRAM

## 2025-01-27 PROCEDURE — 2550000001 HC RX 255 CONTRASTS: Performed by: STUDENT IN AN ORGANIZED HEALTH CARE EDUCATION/TRAINING PROGRAM

## 2025-01-27 PROCEDURE — 71045 X-RAY EXAM CHEST 1 VIEW: CPT

## 2025-01-27 PROCEDURE — 87631 RESP VIRUS 3-5 TARGETS: CPT | Mod: TRILAB | Performed by: PHYSICIAN ASSISTANT

## 2025-01-27 PROCEDURE — 85025 COMPLETE CBC W/AUTO DIFF WBC: CPT | Performed by: STUDENT IN AN ORGANIZED HEALTH CARE EDUCATION/TRAINING PROGRAM

## 2025-01-27 PROCEDURE — 70491 CT SOFT TISSUE NECK W/DYE: CPT

## 2025-01-27 PROCEDURE — 70491 CT SOFT TISSUE NECK W/DYE: CPT | Performed by: STUDENT IN AN ORGANIZED HEALTH CARE EDUCATION/TRAINING PROGRAM

## 2025-01-27 PROCEDURE — 71045 X-RAY EXAM CHEST 1 VIEW: CPT | Performed by: RADIOLOGY

## 2025-01-27 PROCEDURE — 99285 EMERGENCY DEPT VISIT HI MDM: CPT | Mod: 25 | Performed by: STUDENT IN AN ORGANIZED HEALTH CARE EDUCATION/TRAINING PROGRAM

## 2025-01-27 PROCEDURE — 87637 SARSCOV2&INF A&B&RSV AMP PRB: CPT | Performed by: STUDENT IN AN ORGANIZED HEALTH CARE EDUCATION/TRAINING PROGRAM

## 2025-01-27 PROCEDURE — 83735 ASSAY OF MAGNESIUM: CPT | Performed by: STUDENT IN AN ORGANIZED HEALTH CARE EDUCATION/TRAINING PROGRAM

## 2025-01-27 RX ADMIN — IOHEXOL 75 ML: 350 INJECTION, SOLUTION INTRAVENOUS at 21:09

## 2025-01-27 ASSESSMENT — PAIN DESCRIPTION - PAIN TYPE: TYPE: ACUTE PAIN

## 2025-01-27 ASSESSMENT — PAIN DESCRIPTION - PROGRESSION: CLINICAL_PROGRESSION: NOT CHANGED

## 2025-01-27 ASSESSMENT — PAIN DESCRIPTION - ORIENTATION: ORIENTATION: LEFT

## 2025-01-27 ASSESSMENT — PAIN SCALES - GENERAL: PAINLEVEL_OUTOF10: 9

## 2025-01-27 ASSESSMENT — PAIN - FUNCTIONAL ASSESSMENT: PAIN_FUNCTIONAL_ASSESSMENT: 0-10

## 2025-01-27 ASSESSMENT — COLUMBIA-SUICIDE SEVERITY RATING SCALE - C-SSRS
2. HAVE YOU ACTUALLY HAD ANY THOUGHTS OF KILLING YOURSELF?: NO
1. IN THE PAST MONTH, HAVE YOU WISHED YOU WERE DEAD OR WISHED YOU COULD GO TO SLEEP AND NOT WAKE UP?: NO
6. HAVE YOU EVER DONE ANYTHING, STARTED TO DO ANYTHING, OR PREPARED TO DO ANYTHING TO END YOUR LIFE?: NO

## 2025-01-27 ASSESSMENT — PAIN DESCRIPTION - DESCRIPTORS: DESCRIPTORS: SHARP

## 2025-01-27 ASSESSMENT — PAIN DESCRIPTION - LOCATION: LOCATION: NECK

## 2025-01-27 ASSESSMENT — PAIN DESCRIPTION - ONSET: ONSET: SUDDEN

## 2025-01-27 ASSESSMENT — PAIN DESCRIPTION - FREQUENCY: FREQUENCY: INTERMITTENT

## 2025-01-28 ENCOUNTER — OFFICE VISIT (OUTPATIENT)
Dept: HEMATOLOGY/ONCOLOGY | Facility: HOSPITAL | Age: 65
DRG: 840 | End: 2025-01-28
Payer: MEDICARE

## 2025-01-28 ENCOUNTER — HOSPITAL ENCOUNTER (INPATIENT)
Facility: HOSPITAL | Age: 65
End: 2025-01-28
Attending: INTERNAL MEDICINE | Admitting: STUDENT IN AN ORGANIZED HEALTH CARE EDUCATION/TRAINING PROGRAM
Payer: MEDICARE

## 2025-01-28 VITALS
RESPIRATION RATE: 18 BRPM | SYSTOLIC BLOOD PRESSURE: 102 MMHG | WEIGHT: 252.43 LBS | HEART RATE: 112 BPM | OXYGEN SATURATION: 95 % | TEMPERATURE: 99.3 F | BODY MASS INDEX: 35.34 KG/M2 | DIASTOLIC BLOOD PRESSURE: 65 MMHG | HEIGHT: 71 IN

## 2025-01-28 DIAGNOSIS — R00.0 TACHYCARDIA: ICD-10-CM

## 2025-01-28 DIAGNOSIS — M79.606 PAIN OF LOWER EXTREMITY, UNSPECIFIED LATERALITY: ICD-10-CM

## 2025-01-28 DIAGNOSIS — D70.9 NEUTROPENIC FEVER (CMS-HCC): ICD-10-CM

## 2025-01-28 DIAGNOSIS — B95.62 BACTEREMIA DUE TO METHICILLIN RESISTANT STAPHYLOCOCCUS AUREUS: ICD-10-CM

## 2025-01-28 DIAGNOSIS — D70.9 FEBRILE NEUTROPENIA (CMS-HCC): Primary | ICD-10-CM

## 2025-01-28 DIAGNOSIS — C85.10 HIGH GRADE B-CELL LYMPHOMA (MULTI): ICD-10-CM

## 2025-01-28 DIAGNOSIS — Z79.899 ENCOUNTER FOR MONITORING CARDIOTOXIC DRUG THERAPY: ICD-10-CM

## 2025-01-28 DIAGNOSIS — M79.89 LEG SWELLING: ICD-10-CM

## 2025-01-28 DIAGNOSIS — M79.89 ARM SWELLING: ICD-10-CM

## 2025-01-28 DIAGNOSIS — R50.81 NEUTROPENIC FEVER (CMS-HCC): ICD-10-CM

## 2025-01-28 DIAGNOSIS — R07.89 CHEST PRESSURE: Primary | ICD-10-CM

## 2025-01-28 DIAGNOSIS — Z51.81 ENCOUNTER FOR MONITORING CARDIOTOXIC DRUG THERAPY: ICD-10-CM

## 2025-01-28 DIAGNOSIS — R10.84 GENERALIZED ABDOMINAL PAIN: ICD-10-CM

## 2025-01-28 DIAGNOSIS — C83.78 BURKITT'S LYMPHOMA OF LYMPH NODES OF MULTIPLE REGIONS (MULTI): ICD-10-CM

## 2025-01-28 DIAGNOSIS — I48.91 NEW ONSET A-FIB (MULTI): ICD-10-CM

## 2025-01-28 DIAGNOSIS — R60.0 LOCALIZED EDEMA: ICD-10-CM

## 2025-01-28 DIAGNOSIS — R78.81 BACTEREMIA DUE TO METHICILLIN RESISTANT STAPHYLOCOCCUS AUREUS: ICD-10-CM

## 2025-01-28 DIAGNOSIS — R50.81 FEBRILE NEUTROPENIA (CMS-HCC): Primary | ICD-10-CM

## 2025-01-28 DIAGNOSIS — R50.9 FEVER, UNSPECIFIED: ICD-10-CM

## 2025-01-28 LAB
ALBUMIN SERPL BCP-MCNC: 2.2 G/DL (ref 3.4–5)
ALP SERPL-CCNC: 58 U/L (ref 33–136)
ALT SERPL W P-5'-P-CCNC: 42 U/L (ref 10–52)
ANION GAP SERPL CALC-SCNC: 8 MMOL/L (ref 10–20)
APPEARANCE UR: CLEAR
AST SERPL W P-5'-P-CCNC: 15 U/L (ref 9–39)
ATRIAL RATE: 99 BPM
BASOPHILS # BLD AUTO: 0 X10*3/UL (ref 0–0.1)
BASOPHILS NFR BLD AUTO: 0 %
BCR CLONALITY RESULTS: NORMAL
BILIRUB SERPL-MCNC: 0.9 MG/DL (ref 0–1.2)
BILIRUB UR STRIP.AUTO-MCNC: NEGATIVE MG/DL
BUN SERPL-MCNC: 21 MG/DL (ref 6–23)
CALCIUM SERPL-MCNC: 7.7 MG/DL (ref 8.6–10.6)
CARDIAC TROPONIN I PNL SERPL HS: 5 NG/L (ref 0–53)
CHLORIDE SERPL-SCNC: 101 MMOL/L (ref 98–107)
CO2 SERPL-SCNC: 31 MMOL/L (ref 21–32)
COLOR UR: YELLOW
CREAT SERPL-MCNC: 0.58 MG/DL (ref 0.5–1.3)
EGFRCR SERPLBLD CKD-EPI 2021: >90 ML/MIN/1.73M*2
ELECTRONICALLY SIGNED BY: NORMAL
ELECTRONICALLY SIGNED BY: NORMAL
EOSINOPHIL # BLD AUTO: 0 X10*3/UL (ref 0–0.7)
EOSINOPHIL NFR BLD AUTO: 0 %
ERYTHROCYTE [DISTWIDTH] IN BLOOD BY AUTOMATED COUNT: 13.8 % (ref 11.5–14.5)
GLUCOSE SERPL-MCNC: 109 MG/DL (ref 74–99)
GLUCOSE UR STRIP.AUTO-MCNC: NORMAL MG/DL
HCT VFR BLD AUTO: 23.2 % (ref 41–52)
HGB BLD-MCNC: 7.9 G/DL (ref 13.5–17.5)
HOLD SPECIMEN: NORMAL
IMM GRANULOCYTES # BLD AUTO: 0.02 X10*3/UL (ref 0–0.7)
IMM GRANULOCYTES NFR BLD AUTO: 12.5 % (ref 0–0.9)
KETONES UR STRIP.AUTO-MCNC: NEGATIVE MG/DL
LEUKOCYTE ESTERASE UR QL STRIP.AUTO: NEGATIVE
LYMPHOCYTES # BLD AUTO: 0.11 X10*3/UL (ref 1.2–4.8)
LYMPHOCYTES NFR BLD AUTO: 68.8 %
LYMPHOID NGS RESULTS: NORMAL
MAGNESIUM SERPL-MCNC: 2.01 MG/DL (ref 1.6–2.4)
MCH RBC QN AUTO: 28.1 PG (ref 26–34)
MCHC RBC AUTO-ENTMCNC: 34.1 G/DL (ref 32–36)
MCV RBC AUTO: 83 FL (ref 80–100)
MONOCYTES # BLD AUTO: 0 X10*3/UL (ref 0.1–1)
MONOCYTES NFR BLD AUTO: 0 %
MUCOUS THREADS #/AREA URNS AUTO: NORMAL /LPF
NEUTROPHILS # BLD AUTO: 0.03 X10*3/UL (ref 1.2–7.7)
NEUTROPHILS NFR BLD AUTO: 18.7 %
NITRITE UR QL STRIP.AUTO: NEGATIVE
NRBC BLD-RTO: 0 /100 WBCS (ref 0–0)
P AXIS: 61 DEGREES
P OFFSET: 200 MS
P ONSET: 148 MS
PH UR STRIP.AUTO: 5.5 [PH]
PLATELET # BLD AUTO: 14 X10*3/UL (ref 150–450)
POTASSIUM SERPL-SCNC: 3.8 MMOL/L (ref 3.5–5.3)
PR INTERVAL: 158 MS
PROT SERPL-MCNC: 3.9 G/DL (ref 6.4–8.2)
PROT UR STRIP.AUTO-MCNC: ABNORMAL MG/DL
Q ONSET: 227 MS
QRS COUNT: 16 BEATS
QRS DURATION: 92 MS
QT INTERVAL: 338 MS
QTC CALCULATION(BAZETT): 433 MS
QTC FREDERICIA: 399 MS
R AXIS: 24 DEGREES
RBC # BLD AUTO: 2.81 X10*6/UL (ref 4.5–5.9)
RBC # UR STRIP.AUTO: ABNORMAL /UL
RBC #/AREA URNS AUTO: NORMAL /HPF
SODIUM SERPL-SCNC: 136 MMOL/L (ref 136–145)
SP GR UR STRIP.AUTO: 1.03
T AXIS: 37 DEGREES
T OFFSET: 396 MS
URATE SERPL-MCNC: 4.4 MG/DL (ref 4–7.5)
UROBILINOGEN UR STRIP.AUTO-MCNC: NORMAL MG/DL
VENTRICULAR RATE: 99 BPM
WBC # BLD AUTO: 0.2 X10*3/UL (ref 4.4–11.3)
WBC #/AREA URNS AUTO: NORMAL /HPF

## 2025-01-28 PROCEDURE — 99223 1ST HOSP IP/OBS HIGH 75: CPT | Performed by: PHYSICIAN ASSISTANT

## 2025-01-28 PROCEDURE — 2500000001 HC RX 250 WO HCPCS SELF ADMINISTERED DRUGS (ALT 637 FOR MEDICARE OP): Performed by: NURSE PRACTITIONER

## 2025-01-28 PROCEDURE — 2500000004 HC RX 250 GENERAL PHARMACY W/ HCPCS (ALT 636 FOR OP/ED): Performed by: NURSE PRACTITIONER

## 2025-01-28 PROCEDURE — 81001 URINALYSIS AUTO W/SCOPE: CPT | Performed by: NURSE PRACTITIONER

## 2025-01-28 PROCEDURE — 84484 ASSAY OF TROPONIN QUANT: CPT | Performed by: NURSE PRACTITIONER

## 2025-01-28 PROCEDURE — 99215 OFFICE O/P EST HI 40 MIN: CPT | Mod: 25 | Performed by: NURSE PRACTITIONER

## 2025-01-28 PROCEDURE — 85025 COMPLETE CBC W/AUTO DIFF WBC: CPT | Performed by: NURSE PRACTITIONER

## 2025-01-28 PROCEDURE — 2500000001 HC RX 250 WO HCPCS SELF ADMINISTERED DRUGS (ALT 637 FOR MEDICARE OP): Performed by: PHYSICIAN ASSISTANT

## 2025-01-28 PROCEDURE — 80053 COMPREHEN METABOLIC PANEL: CPT | Performed by: NURSE PRACTITIONER

## 2025-01-28 PROCEDURE — 1111F DSCHRG MED/CURRENT MED MERGE: CPT | Performed by: NURSE PRACTITIONER

## 2025-01-28 PROCEDURE — 2500000004 HC RX 250 GENERAL PHARMACY W/ HCPCS (ALT 636 FOR OP/ED): Performed by: PHYSICIAN ASSISTANT

## 2025-01-28 PROCEDURE — 2500000002 HC RX 250 W HCPCS SELF ADMINISTERED DRUGS (ALT 637 FOR MEDICARE OP, ALT 636 FOR OP/ED): Performed by: PHYSICIAN ASSISTANT

## 2025-01-28 PROCEDURE — 83735 ASSAY OF MAGNESIUM: CPT | Performed by: NURSE PRACTITIONER

## 2025-01-28 PROCEDURE — 2500000004 HC RX 250 GENERAL PHARMACY W/ HCPCS (ALT 636 FOR OP/ED): Mod: JZ,TB | Performed by: STUDENT IN AN ORGANIZED HEALTH CARE EDUCATION/TRAINING PROGRAM

## 2025-01-28 PROCEDURE — 96372 THER/PROPH/DIAG INJ SC/IM: CPT

## 2025-01-28 PROCEDURE — 84550 ASSAY OF BLOOD/URIC ACID: CPT | Performed by: PHYSICIAN ASSISTANT

## 2025-01-28 PROCEDURE — 1123F ACP DISCUSS/DSCN MKR DOCD: CPT | Performed by: NURSE PRACTITIONER

## 2025-01-28 PROCEDURE — 87040 BLOOD CULTURE FOR BACTERIA: CPT | Performed by: NURSE PRACTITIONER

## 2025-01-28 PROCEDURE — 99215 OFFICE O/P EST HI 40 MIN: CPT | Performed by: NURSE PRACTITIONER

## 2025-01-28 PROCEDURE — 96360 HYDRATION IV INFUSION INIT: CPT | Mod: INF

## 2025-01-28 PROCEDURE — 1170000001 HC PRIVATE ONCOLOGY ROOM DAILY

## 2025-01-28 PROCEDURE — 1157F ADVNC CARE PLAN IN RCRD: CPT | Performed by: NURSE PRACTITIONER

## 2025-01-28 PROCEDURE — 93005 ELECTROCARDIOGRAM TRACING: CPT | Performed by: NURSE PRACTITIONER

## 2025-01-28 PROCEDURE — 2500000005 HC RX 250 GENERAL PHARMACY W/O HCPCS: Performed by: PHYSICIAN ASSISTANT

## 2025-01-28 RX ORDER — EPINEPHRINE 0.3 MG/.3ML
0.3 INJECTION SUBCUTANEOUS EVERY 5 MIN PRN
OUTPATIENT
Start: 2025-01-28

## 2025-01-28 RX ORDER — ACETAMINOPHEN 325 MG/1
650 TABLET ORAL ONCE
Status: COMPLETED | OUTPATIENT
Start: 2025-01-28 | End: 2025-01-28

## 2025-01-28 RX ORDER — OXYCODONE HCL 5 MG/5 ML
5 SOLUTION, ORAL ORAL EVERY 4 HOURS PRN
Status: DISCONTINUED | OUTPATIENT
Start: 2025-01-28 | End: 2025-01-31

## 2025-01-28 RX ORDER — SODIUM CHLORIDE 9 MG/ML
50 INJECTION, SOLUTION INTRAVENOUS CONTINUOUS
Status: ACTIVE | OUTPATIENT
Start: 2025-01-28 | End: 2025-01-30

## 2025-01-28 RX ORDER — ALLOPURINOL 300 MG/1
300 TABLET ORAL DAILY
Status: DISCONTINUED | OUTPATIENT
Start: 2025-01-29 | End: 2025-01-31

## 2025-01-28 RX ORDER — OXYCODONE HYDROCHLORIDE 5 MG/1
5 TABLET ORAL EVERY 6 HOURS PRN
Status: DISCONTINUED | OUTPATIENT
Start: 2025-01-28 | End: 2025-01-31

## 2025-01-28 RX ORDER — LIDOCAINE HYDROCHLORIDE 20 MG/ML
1.25 SOLUTION OROPHARYNGEAL AS NEEDED
Status: DISCONTINUED | OUTPATIENT
Start: 2025-01-28 | End: 2025-02-08 | Stop reason: HOSPADM

## 2025-01-28 RX ORDER — FAMOTIDINE 10 MG/ML
20 INJECTION INTRAVENOUS ONCE AS NEEDED
OUTPATIENT
Start: 2025-01-28

## 2025-01-28 RX ORDER — GABAPENTIN 300 MG/1
300 CAPSULE ORAL NIGHTLY
Status: DISCONTINUED | OUTPATIENT
Start: 2025-01-28 | End: 2025-01-31

## 2025-01-28 RX ORDER — DIPHENHYDRAMINE HYDROCHLORIDE 50 MG/ML
50 INJECTION INTRAMUSCULAR; INTRAVENOUS AS NEEDED
OUTPATIENT
Start: 2025-01-28

## 2025-01-28 RX ORDER — ALBUTEROL SULFATE 90 UG/1
2 INHALANT RESPIRATORY (INHALATION) EVERY 6 HOURS PRN
Status: DISCONTINUED | OUTPATIENT
Start: 2025-01-28 | End: 2025-02-08 | Stop reason: HOSPADM

## 2025-01-28 RX ORDER — PANTOPRAZOLE SODIUM 40 MG/1
40 TABLET, DELAYED RELEASE ORAL
Status: DISCONTINUED | OUTPATIENT
Start: 2025-01-29 | End: 2025-01-31

## 2025-01-28 RX ORDER — FLUCONAZOLE 2 MG/ML
400 INJECTION, SOLUTION INTRAVENOUS EVERY 24 HOURS
Status: DISCONTINUED | OUTPATIENT
Start: 2025-01-29 | End: 2025-02-06

## 2025-01-28 RX ORDER — PROCHLORPERAZINE MALEATE 10 MG
10 TABLET ORAL EVERY 6 HOURS PRN
Status: DISCONTINUED | OUTPATIENT
Start: 2025-01-28 | End: 2025-02-08 | Stop reason: HOSPADM

## 2025-01-28 RX ORDER — METOPROLOL SUCCINATE 50 MG/1
50 TABLET, EXTENDED RELEASE ORAL DAILY
Status: DISCONTINUED | OUTPATIENT
Start: 2025-01-29 | End: 2025-02-08 | Stop reason: HOSPADM

## 2025-01-28 RX ORDER — ALBUTEROL SULFATE 0.83 MG/ML
3 SOLUTION RESPIRATORY (INHALATION) AS NEEDED
OUTPATIENT
Start: 2025-01-28

## 2025-01-28 RX ORDER — BACLOFEN 10 MG/1
10 TABLET ORAL 3 TIMES DAILY
Status: DISCONTINUED | OUTPATIENT
Start: 2025-01-28 | End: 2025-02-08 | Stop reason: HOSPADM

## 2025-01-28 RX ORDER — ACYCLOVIR 400 MG/1
400 TABLET ORAL EVERY 12 HOURS SCHEDULED
Status: DISCONTINUED | OUTPATIENT
Start: 2025-01-28 | End: 2025-01-31

## 2025-01-28 RX ORDER — ATORVASTATIN CALCIUM 40 MG/1
40 TABLET, FILM COATED ORAL
Status: DISCONTINUED | OUTPATIENT
Start: 2025-01-29 | End: 2025-02-08 | Stop reason: HOSPADM

## 2025-01-28 RX ORDER — SUCRALFATE 1 G/10ML
1 SUSPENSION ORAL EVERY 6 HOURS PRN
Status: DISCONTINUED | OUTPATIENT
Start: 2025-01-28 | End: 2025-02-08 | Stop reason: HOSPADM

## 2025-01-28 RX ORDER — PANTOPRAZOLE SODIUM 40 MG/10ML
40 INJECTION, POWDER, LYOPHILIZED, FOR SOLUTION INTRAVENOUS DAILY
Status: DISCONTINUED | OUTPATIENT
Start: 2025-01-29 | End: 2025-02-08 | Stop reason: HOSPADM

## 2025-01-28 RX ORDER — FLUCONAZOLE 200 MG/1
400 TABLET ORAL DAILY
Status: DISCONTINUED | OUTPATIENT
Start: 2025-01-29 | End: 2025-01-31

## 2025-01-28 RX ORDER — ACETAMINOPHEN 325 MG/1
650 TABLET ORAL ONCE
Status: CANCELLED | OUTPATIENT
Start: 2025-01-28

## 2025-01-28 RX ORDER — ALBUTEROL SULFATE 90 UG/1
2 INHALANT RESPIRATORY (INHALATION) EVERY 6 HOURS PRN
COMMUNITY

## 2025-01-28 RX ADMIN — SODIUM CHLORIDE 50 ML/HR: 9 INJECTION, SOLUTION INTRAVENOUS at 17:20

## 2025-01-28 RX ADMIN — GABAPENTIN 300 MG: 300 CAPSULE ORAL at 21:01

## 2025-01-28 RX ADMIN — PIPERACILLIN SODIUM AND TAZOBACTAM SODIUM 3.38 G: 3; .375 INJECTION, SOLUTION INTRAVENOUS at 15:12

## 2025-01-28 RX ADMIN — ACETAMINOPHEN 650 MG: 325 TABLET ORAL at 10:59

## 2025-01-28 RX ADMIN — HYDROMORPHONE HYDROCHLORIDE 0.3 MG: 0.5 INJECTION, SOLUTION INTRAMUSCULAR; INTRAVENOUS; SUBCUTANEOUS at 21:33

## 2025-01-28 RX ADMIN — BACLOFEN 10 MG: 10 TABLET ORAL at 15:13

## 2025-01-28 RX ADMIN — ALUMINUM HYDROXIDE, MAGNESIUM HYDROXIDE, DIMETHICONE 10 ML: 200; 20; 200 SUSPENSION ORAL at 20:38

## 2025-01-28 RX ADMIN — BACLOFEN 10 MG: 10 TABLET ORAL at 21:01

## 2025-01-28 RX ADMIN — PEGFILGRASTIM 6 MG: 6 INJECTION SUBCUTANEOUS at 11:40

## 2025-01-28 RX ADMIN — OXYCODONE HYDROCHLORIDE 5 MG: 5 SOLUTION ORAL at 17:03

## 2025-01-28 RX ADMIN — LIDOCAINE HYDROCHLORIDE 1.25 ML: 20 SOLUTION ORAL at 20:36

## 2025-01-28 RX ADMIN — PIPERACILLIN SODIUM AND TAZOBACTAM SODIUM 3.38 G: 3; .375 INJECTION, SOLUTION INTRAVENOUS at 21:01

## 2025-01-28 RX ADMIN — ACYCLOVIR SODIUM 250 MG: 500 INJECTION, SOLUTION INTRAVENOUS at 21:01

## 2025-01-28 RX ADMIN — SUCRALFATE 1 G: 1 SUSPENSION ORAL at 15:12

## 2025-01-28 RX ADMIN — SODIUM CHLORIDE 1000 ML: 9 INJECTION, SOLUTION INTRAVENOUS at 09:48

## 2025-01-28 RX ADMIN — OXYCODONE HYDROCHLORIDE 5 MG: 5 SOLUTION ORAL at 20:36

## 2025-01-28 SDOH — SOCIAL STABILITY: SOCIAL INSECURITY: ABUSE: ADULT

## 2025-01-28 SDOH — SOCIAL STABILITY: SOCIAL INSECURITY: WITHIN THE LAST YEAR, HAVE YOU BEEN AFRAID OF YOUR PARTNER OR EX-PARTNER?: NO

## 2025-01-28 SDOH — SOCIAL STABILITY: SOCIAL INSECURITY: ARE YOU OR HAVE YOU BEEN THREATENED OR ABUSED PHYSICALLY, EMOTIONALLY, OR SEXUALLY BY ANYONE?: NO

## 2025-01-28 SDOH — SOCIAL STABILITY: SOCIAL INSECURITY: WITHIN THE LAST YEAR, HAVE YOU BEEN HUMILIATED OR EMOTIONALLY ABUSED IN OTHER WAYS BY YOUR PARTNER OR EX-PARTNER?: NO

## 2025-01-28 SDOH — SOCIAL STABILITY: SOCIAL INSECURITY: HAVE YOU HAD THOUGHTS OF HARMING ANYONE ELSE?: NO

## 2025-01-28 SDOH — ECONOMIC STABILITY: HOUSING INSECURITY: IN THE LAST 12 MONTHS, WAS THERE A TIME WHEN YOU WERE NOT ABLE TO PAY THE MORTGAGE OR RENT ON TIME?: NO

## 2025-01-28 SDOH — SOCIAL STABILITY: SOCIAL INSECURITY: HAS ANYONE EVER THREATENED TO HURT YOUR FAMILY OR YOUR PETS?: NO

## 2025-01-28 SDOH — ECONOMIC STABILITY: HOUSING INSECURITY: AT ANY TIME IN THE PAST 12 MONTHS, WERE YOU HOMELESS OR LIVING IN A SHELTER (INCLUDING NOW)?: NO

## 2025-01-28 SDOH — SOCIAL STABILITY: SOCIAL INSECURITY: DO YOU FEEL ANYONE HAS EXPLOITED OR TAKEN ADVANTAGE OF YOU FINANCIALLY OR OF YOUR PERSONAL PROPERTY?: NO

## 2025-01-28 SDOH — SOCIAL STABILITY: SOCIAL INSECURITY: DOES ANYONE TRY TO KEEP YOU FROM HAVING/CONTACTING OTHER FRIENDS OR DOING THINGS OUTSIDE YOUR HOME?: NO

## 2025-01-28 SDOH — ECONOMIC STABILITY: FOOD INSECURITY: HOW HARD IS IT FOR YOU TO PAY FOR THE VERY BASICS LIKE FOOD, HOUSING, MEDICAL CARE, AND HEATING?: NOT HARD AT ALL

## 2025-01-28 SDOH — ECONOMIC STABILITY: INCOME INSECURITY: IN THE PAST 12 MONTHS HAS THE ELECTRIC, GAS, OIL, OR WATER COMPANY THREATENED TO SHUT OFF SERVICES IN YOUR HOME?: NO

## 2025-01-28 SDOH — SOCIAL STABILITY: SOCIAL INSECURITY: DO YOU FEEL UNSAFE GOING BACK TO THE PLACE WHERE YOU ARE LIVING?: NO

## 2025-01-28 SDOH — ECONOMIC STABILITY: FOOD INSECURITY: WITHIN THE PAST 12 MONTHS, THE FOOD YOU BOUGHT JUST DIDN'T LAST AND YOU DIDN'T HAVE MONEY TO GET MORE.: NEVER TRUE

## 2025-01-28 SDOH — ECONOMIC STABILITY: HOUSING INSECURITY: IN THE PAST 12 MONTHS, HOW MANY TIMES HAVE YOU MOVED WHERE YOU WERE LIVING?: 0

## 2025-01-28 SDOH — SOCIAL STABILITY: SOCIAL INSECURITY: ARE THERE ANY APPARENT SIGNS OF INJURIES/BEHAVIORS THAT COULD BE RELATED TO ABUSE/NEGLECT?: NO

## 2025-01-28 SDOH — ECONOMIC STABILITY: FOOD INSECURITY: WITHIN THE PAST 12 MONTHS, YOU WORRIED THAT YOUR FOOD WOULD RUN OUT BEFORE YOU GOT THE MONEY TO BUY MORE.: NEVER TRUE

## 2025-01-28 SDOH — SOCIAL STABILITY: SOCIAL INSECURITY: HAVE YOU HAD ANY THOUGHTS OF HARMING ANYONE ELSE?: NO

## 2025-01-28 SDOH — ECONOMIC STABILITY: TRANSPORTATION INSECURITY: IN THE PAST 12 MONTHS, HAS LACK OF TRANSPORTATION KEPT YOU FROM MEDICAL APPOINTMENTS OR FROM GETTING MEDICATIONS?: NO

## 2025-01-28 SDOH — SOCIAL STABILITY: SOCIAL INSECURITY: WERE YOU ABLE TO COMPLETE ALL THE BEHAVIORAL HEALTH SCREENINGS?: YES

## 2025-01-28 ASSESSMENT — ACTIVITIES OF DAILY LIVING (ADL)
HEARING - RIGHT EAR: HEARING AID
BATHING: NEEDS ASSISTANCE
TOILETING: NEEDS ASSISTANCE
JUDGMENT_ADEQUATE_SAFELY_COMPLETE_DAILY_ACTIVITIES: YES
PATIENT'S MEMORY ADEQUATE TO SAFELY COMPLETE DAILY ACTIVITIES?: YES
PATIENT'S MEMORY ADEQUATE TO SAFELY COMPLETE DAILY ACTIVITIES?: YES
FEEDING YOURSELF: NEEDS ASSISTANCE
DRESSING YOURSELF: NEEDS ASSISTANCE
GROOMING: NEEDS ASSISTANCE
HEARING - RIGHT EAR: HEARING AID
DRESSING YOURSELF: NEEDS ASSISTANCE
TOILETING: NEEDS ASSISTANCE
HEARING - LEFT EAR: HEARING AID
LACK_OF_TRANSPORTATION: NO
WALKS IN HOME: NEEDS ASSISTANCE
ADEQUATE_TO_COMPLETE_ADL: YES
HEARING - LEFT EAR: HEARING AID
GROOMING: NEEDS ASSISTANCE
BATHING: NEEDS ASSISTANCE
JUDGMENT_ADEQUATE_SAFELY_COMPLETE_DAILY_ACTIVITIES: YES
FEEDING YOURSELF: INDEPENDENT
WALKS IN HOME: NEEDS ASSISTANCE
LACK_OF_TRANSPORTATION: NO
ADEQUATE_TO_COMPLETE_ADL: YES

## 2025-01-28 ASSESSMENT — ENCOUNTER SYMPTOMS
APPETITE CHANGE: 1
ABDOMINAL PAIN: 0
HEMATURIA: 0
COUGH: 1
SINUS PRESSURE: 0
SORE THROAT: 1
WEAKNESS: 0
LIGHT-HEADEDNESS: 0
FREQUENCY: 0
TREMORS: 0
STRIDOR: 0
CHOKING: 0
ABDOMINAL DISTENTION: 0
SHORTNESS OF BREATH: 1
TROUBLE SWALLOWING: 1
EYE DISCHARGE: 0
ACTIVITY CHANGE: 1
NAUSEA: 0
FATIGUE: 1
CHEST TIGHTNESS: 1
SINUS PAIN: 0
RHINORRHEA: 0
ADENOPATHY: 0
FACIAL SWELLING: 0
FEVER: 1
FATIGUE: 1
APNEA: 0
PALPITATIONS: 0
DIARRHEA: 0
NECK PAIN: 1
WHEEZING: 0
DIFFICULTY URINATING: 0
CHEST TIGHTNESS: 1
DIAPHORESIS: 0
CONSTIPATION: 0
CHILLS: 0
COUGH: 1
FACIAL ASYMMETRY: 0
HEADACHES: 1
BRUISES/BLEEDS EASILY: 0
NUMBNESS: 0
FEVER: 1
VOMITING: 0
SORE THROAT: 1
BLOOD IN STOOL: 0
HEADACHES: 0
DIZZINESS: 1
BACK PAIN: 1
EYE ITCHING: 0
DYSURIA: 0

## 2025-01-28 ASSESSMENT — COGNITIVE AND FUNCTIONAL STATUS - GENERAL
MOBILITY SCORE: 18
DRESSING REGULAR LOWER BODY CLOTHING: A LITTLE
DAILY ACTIVITIY SCORE: 20
HELP NEEDED FOR BATHING: A LITTLE
HELP NEEDED FOR BATHING: A LITTLE
DRESSING REGULAR LOWER BODY CLOTHING: A LITTLE
DRESSING REGULAR UPPER BODY CLOTHING: A LITTLE
CLIMB 3 TO 5 STEPS WITH RAILING: A LITTLE
TOILETING: A LOT
PATIENT BASELINE BEDBOUND: NO
MOVING TO AND FROM BED TO CHAIR: A LITTLE
DRESSING REGULAR UPPER BODY CLOTHING: A LITTLE
PATIENT BASELINE BEDBOUND: NO
MOBILITY SCORE: 23
STANDING UP FROM CHAIR USING ARMS: A LITTLE
MOVING FROM LYING ON BACK TO SITTING ON SIDE OF FLAT BED WITH BEDRAILS: A LITTLE
DAILY ACTIVITIY SCORE: 19
WALKING IN HOSPITAL ROOM: A LITTLE
TOILETING: A LITTLE
TURNING FROM BACK TO SIDE WHILE IN FLAT BAD: A LITTLE
CLIMB 3 TO 5 STEPS WITH RAILING: A LITTLE

## 2025-01-28 ASSESSMENT — COLUMBIA-SUICIDE SEVERITY RATING SCALE - C-SSRS
6. HAVE YOU EVER DONE ANYTHING, STARTED TO DO ANYTHING, OR PREPARED TO DO ANYTHING TO END YOUR LIFE?: NO
1. IN THE PAST MONTH, HAVE YOU WISHED YOU WERE DEAD OR WISHED YOU COULD GO TO SLEEP AND NOT WAKE UP?: NO
2. HAVE YOU ACTUALLY HAD ANY THOUGHTS OF KILLING YOURSELF?: NO

## 2025-01-28 ASSESSMENT — PATIENT HEALTH QUESTIONNAIRE - PHQ9
SUM OF ALL RESPONSES TO PHQ9 QUESTIONS 1 & 2: 2
1. LITTLE INTEREST OR PLEASURE IN DOING THINGS: SEVERAL DAYS
2. FEELING DOWN, DEPRESSED OR HOPELESS: SEVERAL DAYS

## 2025-01-28 ASSESSMENT — LIFESTYLE VARIABLES
PRESCIPTION_ABUSE_PAST_12_MONTHS: NO
HOW MANY STANDARD DRINKS CONTAINING ALCOHOL DO YOU HAVE ON A TYPICAL DAY: PATIENT DOES NOT DRINK
SUBSTANCE_ABUSE_PAST_12_MONTHS: NO
HOW OFTEN DO YOU HAVE A DRINK CONTAINING ALCOHOL: NEVER
SKIP TO QUESTIONS 9-10: 1
AUDIT-C TOTAL SCORE: 0
HOW OFTEN DO YOU HAVE 6 OR MORE DRINKS ON ONE OCCASION: NEVER
AUDIT-C TOTAL SCORE: 0

## 2025-01-28 ASSESSMENT — PAIN SCALES - GENERAL
PAINLEVEL_OUTOF10: 7
PAINLEVEL_OUTOF10: 9
PAINLEVEL_OUTOF10: 8
PAINLEVEL_OUTOF10: 9

## 2025-01-28 ASSESSMENT — PAIN - FUNCTIONAL ASSESSMENT
PAIN_FUNCTIONAL_ASSESSMENT: 0-10

## 2025-01-28 ASSESSMENT — PAIN DESCRIPTION - ORIENTATION: ORIENTATION: INNER

## 2025-01-28 ASSESSMENT — PAIN DESCRIPTION - LOCATION
LOCATION: MOUTH
LOCATION: MOUTH

## 2025-01-28 NOTE — ED PROVIDER NOTES
History of Present Illness     History provided by: Patient  Limitations to History: None  External Records Reviewed with Brief Summary:  Prior hospital notes    HPI:  Bijan Ibanez is a 64 y.o. male who presents with fever and neck pain.  Patient was recently admitted to [facility] and was diagnosed with B-cell lymphoma.  He was then started on chemotherapy and was discharged yesterday.  Per wife and patient, the patient has had significantly abnormal blood counts and is presenting tomorrow for a routine transfusion.  Patient states that after being discharged, he developed left-sided neck discomfort and appears to be coughing up discolored sputum.  Also notes a fever at home with Tmax 100.5.  No current chest pain or difficulty breathing.    Physical Exam   Triage vitals:  T 37.4 °C (99.3 °F)  HR (!) 104  /73  RR 18  O2 (!) 93 % None (Room air)    Physical Exam  Vitals and nursing note reviewed.   Constitutional:       General: He is not in acute distress.     Appearance: He is not ill-appearing.   HENT:      Head: Normocephalic and atraumatic.      Mouth/Throat:      Mouth: Mucous membranes are moist.      Pharynx: Oropharynx is clear.      Comments: Small area of erythema and petechiae noted to soft palate on the left side  Eyes:      Extraocular Movements: Extraocular movements intact.      Conjunctiva/sclera: Conjunctivae normal.      Pupils: Pupils are equal, round, and reactive to light.   Neck:      Comments: Palpable lymphadenopathy with tenderness on palpation over the left side of his neck, no overlying erythema.  Cardiovascular:      Rate and Rhythm: Regular rhythm. Tachycardia present.   Pulmonary:      Effort: Pulmonary effort is normal. No respiratory distress.      Breath sounds: Normal breath sounds.   Abdominal:      General: There is no distension.      Palpations: Abdomen is soft.      Tenderness: There is no abdominal tenderness.   Musculoskeletal:         General: No swelling  or deformity. Normal range of motion.      Cervical back: Normal range of motion and neck supple.   Lymphadenopathy:      Cervical: Cervical adenopathy present.   Skin:     General: Skin is warm and dry.      Capillary Refill: Capillary refill takes less than 2 seconds.   Neurological:      General: No focal deficit present.      Mental Status: He is alert and oriented to person, place, and time. Mental status is at baseline.   Psychiatric:         Mood and Affect: Mood normal.         Behavior: Behavior normal.          Medical Decision Making & ED Course   Medical Decision Makin y.o. male presents with fever.  I have considered the following conditions during my assessment of this patient's condition: pneumonia, uti, pyelonephritis, SIRS, sepsis, septic shock, soft tissue infection, intraabdominal infection, meningitis, encephalitis, pharyngitis, viral syndrome, gastroenteritis.  Patient with known history of lymphoma, recently received chemotherapy.  Patient WBC count noted to be 0.2 with platelets of 15 and absolute neutrophil count of 0.06.  By definition, patient is currently neutropenic but afebrile here in the emergency department.  Renal function is otherwise at baseline.  Viral swabs negative.  CT soft tissue neck obtained, appears to be stable with known enlarged lymph nodes but no new masses.  Chest x-ray clear.  Consultation request placed for hematology oncology but prior to speaking with specialist on-call patient has elected to leave AGAINST MEDICAL ADVICE.  He states that he wants to be seen on an outpatient basis tomorrow and receive his scheduled transfusion.  Patient was strongly advised against leaving as medical advice and advised to be very cautious given his neutropenia.  Patient and wife vocalized understanding and all questions were answered to the best of my abilities.  Patient signed out AGAINST MEDICAL ADVICE.    ----      Social Determinants of Health which Significantly Impact  Care: None identified     EKG Independent Interpretation: EKG not obtained    Independent Result Review and Interpretation: Relevant laboratory and radiographic results were reviewed and independently interpreted by myself.  As necessary, they are commented on in the ED Course.    Chronic conditions affecting the patient's care: As documented above in [facility]    The patient was discussed with the following consultants/services: None    Care Considerations: As documented above in [facility]    ED Course:  ED Course as of 01/27/25 2249 Mon Jan 27, 2025 2208 This patient has the appropriate insight and judgement to their condition, is free from distracting injury or cognitive impairment, and in my medical judgment has the capacity to make their own decisions. They presented with fever, and I am concerned their symptoms may indicate neutropenic fever. The patient vocalized understanding of this.  They are declining further care at this time, and have elected to sign out against medical advice. I am unable to convince the patient to stay in the hospital. I have asked them to return to the hospital as soon as possible to complete their treatment/evaluation and have informed them that they will be welcome to do so.  Prior to signing out against medical advice, the patient had the opportunity to ask questions and these were answered.   [JM]      ED Course User Index  [JM] Catalina Stringer MD         Diagnoses as of 01/27/25 2249   Chemotherapy-induced neutropenia (CMS-HCC)   Fever, unspecified fever cause   Anterior neck pain       Procedures   Procedures    Catalina Stringer MD  Emergency Medicine     Catalina Stringer MD  01/27/25 2249

## 2025-01-28 NOTE — ASSESSMENT & PLAN NOTE
- Recent admissionnewly diagnosed Burkitt Lymphoma, [place] Stage IV, BL-IPI High-Risk (score 4/5); FISH with t(8;14), ki67 90%.  - Presents for C1D6 DA-REPOCH Rituximab and Neulasta. Neulasta given but Rituximab held for now since he is acutely ill. Will need to address when to give Rituximab once infectious work up completed.  - Admit to Inpatient Malignant Hematology for infectious work up,

## 2025-01-28 NOTE — PROGRESS NOTES
Pharmacy Medication History Review    Bijan Ibanez is a 64 y.o. male admitted for Febrile neutropenia (CMS-HCC). Pharmacy reviewed the patient's bcuee-mk-fqninceab medications and allergies for accuracy.    Medications ADDED:  Albuterol inhaler  Medications CHANGED:  none  Medications REMOVED:   none     The list below reflects the updated PTA list.   Prior to Admission Medications   Prescriptions Last Dose Informant   acyclovir (Zovirax) 400 mg tablet 1/28/2025 Spouse/Significant Other   Sig: Take 1 tablet (400 mg) by mouth every 12 hours.   albuterol 90 mcg/actuation inhaler  Spouse/Significant Other   Sig: Inhale 2 puffs every 6 hours if needed for shortness of breath.  No fill history for this inhaler; patient had it with him; reported doctor states he can use it as needed for shortness of breath.   allopurinol (Zyloprim) 300 mg tablet 1/28/2025 Spouse/Significant Other   Sig: Take 1 tablet (300 mg) by mouth once daily.   atorvastatin (Lipitor) 40 mg tablet 1/28/2025 Spouse/Significant Other   Sig: Take 1 tablet (40 mg) by mouth early in the morning..   baclofen (Lioresal) 10 mg tablet 1/28/2025 Spouse/Significant Other   Sig: Take 1 tablet (10 mg) by mouth 3 times a day.   fluconazole (Diflucan) 200 mg tablet 1/28/2025 Spouse/Significant Other   Sig: Take 2 tablets (400 mg) by mouth once daily.   furosemide (Lasix) 20 mg tablet  Spouse/Significant Other   Sig: Take 1 tablet (20 mg) by mouth once daily as needed (for swelling).   gabapentin (Neurontin) 300 mg capsule  Spouse/Significant Other   Sig: Take 1 capsule (300 mg) by mouth once daily at bedtime.   levoFLOXacin (Levaquin) 500 mg tablet 1/28/2025 Spouse/Significant Other   Sig: Take 1 tablet (500 mg) by mouth once daily. Do not start until instructed to by your    lidocaine 4 % patch Past Month at 12:00 PM Spouse/Significant Other   Sig: Place 1 patch over 12 hours on the skin once daily. Remove & discard patch within 12 hours or as directed by MD.  "  lisinopril 10 mg tablet 1/28/2025 Spouse/Significant Other   Sig: Take 1 tablet (10 mg) by mouth once daily.   metoprolol succinate XL (Toprol-XL) 50 mg 24 hr tablet 1/28/2025 Spouse/Significant Other   Sig: Take 1 tablet (50 mg) by mouth once daily. Do not crush or chew.   oxyCODONE (Roxicodone) 5 mg immediate release tablet Past Week Spouse/Significant Other   Sig: Take 1 tablet (5 mg) by mouth every 6 hours if needed for severe pain (7 - 10).   pantoprazole (ProtoNix) 40 mg EC tablet 1/28/2025 Spouse/Significant Other   Sig: Take 1 tablet (40 mg) by mouth once daily in the morning. Take before meals. Do not crush, chew, or split.   prochlorperazine (Compazine) 10 mg tablet Past Week Spouse/Significant Other   Sig: Take 1 tablet (10 mg) by mouth every 6 hours if needed for nausea for up to 7 days.      Facility-Administered Medications: None        The list below reflects the updated allergy list. Please review each documented allergy for additional clarification and justification.  Allergies  Reviewed by Karely Campbell on 1/28/2025        Severity Reactions Comments    Latex Not Specified Itching     Penicillins Not Specified Unknown Patient reports from childhood, unsure of reaction            Patient accepts M2B at discharge.     Sources:   [facility]  Pharmacy dispense history  Spouse Good historian  Chart Review  Office visit 1/28/25, Zohra, López hem/onc . Med Rec 1/18/25     Additional Comments:  none      KARELY CAMPBELL  Pharmacy Technician  01/28/25     Secure Chat preferred   If no response call [phone] or Data [facility] \"Med Rec\"    "
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
## 2025-01-28 NOTE — PROGRESS NOTES
"Pt arrived to infusion for treatment of rituximab. Pt c/o productive cough, dizziness, HA, weakness, overall feeling \"awful\". Elvira العلي NP at chairside. Decision to hold rituxumab, still to receive pegfilgrastim per Elvira and Dr Mckeon. Labs and blood cultures drawn, 1L NS administered. Pt complaining of chest pressure while in infusion. EKG obtained. Pt to be admitted per Dr Mckeon.    Report given to Joelle BHANDARI. Pt transferred to [id number]    "

## 2025-01-28 NOTE — HOSPITAL COURSE
Bijan Ibanez is a 64 y.o. male with a past medical history of hypertension, hyperlipidemia, coronary artery disease (s/p stent 2010, on ASA), renal cell carcinoma (s/p R partial nephrectomy in 2013 @ [facility]), GERD, BPH, arthritis, and Burkitt lymphoma being treated with DA-R-EPOCH (since 1/21/25) who is admitted today (01/28/25) with febrile neutropenia and mucositis.     Hospital course was complicated by MRSA bacteremia, atrial fibrillation, severe mucositis, brachial DVT    With regard to his esophageal mucositis and dysphagia following chemotherapy, a CT neck on 1/27/25 showied no signs of abscess or fluid collection. Supportive care included 2L of NS from 1/28-1/30 to supplement poor PO intake, along with BMX, viscous lidocaine, and sucralfate as needed. On 1/31, Dilaudid PCA was initiated with no basal rate, 0.2 mg boluses every 10 minutes, and continuous pulse oximetry while on PCA; SLP evaluation was also ordered.    The patient developed febrile neutropenia and was found to have MRSA bacteremia, with blood cultures on 1/31/25 growing MRSA in all 4 bottles. Imaging, including CXR on 1/27/25, showed no cardiopulmonary process, and UA on 1/28/25 was not suspicious for infection. The patient was started on piperacillin-tazobactam on 1/28, despite a reported childhood reaction to penicillin, with approval from the Clinical Oncology Specialist PharmD to trial the antibiotic; vancomycin was initiated on 2/1/25. A TTE to rule out vegetations I***, and an ID consult *** 2/3/25 to assess the need for line removal, ***    He also developed atrial fibrillation with RVR on 2/2/25, likely due to infection, electrolyte imbalances, and anemia, with heart rates ranging from 130-150 depending on rest/activity. Home metoprolol was adjusted to 5 mg IV Q6H, and two extra doses of 5 mg were given. The patient’s potassium, magnesium, and hemoglobin levels are being managed, and rate control of 110-130 is acceptable for now, with  ***consideration for an EP consult on 2/3/25 if rate control is not achieved.    The patient was diagnosed with a brachial DVT, with an ultrasound on 1/24 showing a partially occlusive thrombus in the mid to distal brachial and basilic veins of the right upper extremity. A repeat ultrasound was ordered on 1/31, but anticoagulation was withheld due to thrombocytopenia. The patient also experienced leg swelling with pain, and an ultrasound of the legs was ordered on 1/31, with ***.    PROPHYLAXIS: Acyclovir (VZV), fluconazole (fungal), *** (neutropenia), *** (PJP)  ACCESS: [facility]  MD: Mike    Follow-Up:  - ***

## 2025-01-29 ENCOUNTER — APPOINTMENT (OUTPATIENT)
Dept: HEMATOLOGY/ONCOLOGY | Facility: HOSPITAL | Age: 65
End: 2025-01-29
Payer: MEDICARE

## 2025-01-29 LAB
ABO GROUP (TYPE) IN BLOOD: NORMAL
ALBUMIN SERPL BCP-MCNC: 2.1 G/DL (ref 3.4–5)
ALP SERPL-CCNC: 58 U/L (ref 33–136)
ALT SERPL W P-5'-P-CCNC: 36 U/L (ref 10–52)
ANION GAP SERPL CALC-SCNC: 10 MMOL/L (ref 10–20)
ANTIBODY SCREEN: NORMAL
AST SERPL W P-5'-P-CCNC: 14 U/L (ref 9–39)
BASOPHILS # BLD AUTO: 0 X10*3/UL (ref 0–0.1)
BASOPHILS NFR BLD AUTO: 0 %
BILIRUB DIRECT SERPL-MCNC: 0.4 MG/DL (ref 0–0.3)
BILIRUB SERPL-MCNC: 1.3 MG/DL (ref 0–1.2)
BLOOD EXPIRATION DATE: NORMAL
BLOOD EXPIRATION DATE: NORMAL
BUN SERPL-MCNC: 17 MG/DL (ref 6–23)
CALCIUM SERPL-MCNC: 7.3 MG/DL (ref 8.6–10.6)
CHLORIDE SERPL-SCNC: 101 MMOL/L (ref 98–107)
CO2 SERPL-SCNC: 30 MMOL/L (ref 21–32)
CREAT SERPL-MCNC: 0.54 MG/DL (ref 0.5–1.3)
DISPENSE STATUS: NORMAL
DISPENSE STATUS: NORMAL
EGFRCR SERPLBLD CKD-EPI 2021: >90 ML/MIN/1.73M*2
EOSINOPHIL # BLD AUTO: 0 X10*3/UL (ref 0–0.7)
EOSINOPHIL NFR BLD AUTO: 0 %
ERYTHROCYTE [DISTWIDTH] IN BLOOD BY AUTOMATED COUNT: 13.4 % (ref 11.5–14.5)
GLUCOSE SERPL-MCNC: 124 MG/DL (ref 74–99)
HADV DNA SPEC QL NAA+PROBE: NOT DETECTED
HCT VFR BLD AUTO: 21.5 % (ref 41–52)
HGB BLD-MCNC: 7 G/DL (ref 13.5–17.5)
HMPV RNA SPEC QL NAA+PROBE: NOT DETECTED
HPIV1 RNA SPEC QL NAA+PROBE: NOT DETECTED
HPIV2 RNA SPEC QL NAA+PROBE: NOT DETECTED
HPIV3 RNA SPEC QL NAA+PROBE: NOT DETECTED
HPIV4 RNA SPEC QL NAA+PROBE: NOT DETECTED
IMM GRANULOCYTES # BLD AUTO: 0 X10*3/UL (ref 0–0.7)
IMM GRANULOCYTES NFR BLD AUTO: 0 % (ref 0–0.9)
LDH SERPL L TO P-CCNC: 744 U/L (ref 84–246)
LYMPHOCYTES # BLD AUTO: 0.07 X10*3/UL (ref 1.2–4.8)
LYMPHOCYTES NFR BLD AUTO: 70 %
MAGNESIUM SERPL-MCNC: 1.83 MG/DL (ref 1.6–2.4)
MCH RBC QN AUTO: 27.7 PG (ref 26–34)
MCHC RBC AUTO-ENTMCNC: 32.6 G/DL (ref 32–36)
MCV RBC AUTO: 85 FL (ref 80–100)
MONOCYTES # BLD AUTO: 0 X10*3/UL (ref 0.1–1)
MONOCYTES NFR BLD AUTO: 0 %
NEUTROPHILS # BLD AUTO: 0.03 X10*3/UL (ref 1.2–7.7)
NEUTROPHILS NFR BLD AUTO: 30 %
NRBC BLD-RTO: 0 /100 WBCS (ref 0–0)
PHOSPHATE SERPL-MCNC: 3 MG/DL (ref 2.5–4.9)
PLATELET # BLD AUTO: 9 X10*3/UL (ref 150–450)
POTASSIUM SERPL-SCNC: 3.6 MMOL/L (ref 3.5–5.3)
PRODUCT BLOOD TYPE: 6200
PRODUCT BLOOD TYPE: 6200
PRODUCT CODE: NORMAL
PRODUCT CODE: NORMAL
PROT SERPL-MCNC: 3.6 G/DL (ref 6.4–8.2)
RBC # BLD AUTO: 2.53 X10*6/UL (ref 4.5–5.9)
RH FACTOR (ANTIGEN D): NORMAL
RHINOVIRUS RNA UPPER RESP QL NAA+PROBE: NOT DETECTED
SODIUM SERPL-SCNC: 137 MMOL/L (ref 136–145)
UNIT ABO: NORMAL
UNIT ABO: NORMAL
UNIT NUMBER: NORMAL
UNIT NUMBER: NORMAL
UNIT RH: NORMAL
UNIT RH: NORMAL
UNIT VOLUME: 283
UNIT VOLUME: 350
URATE SERPL-MCNC: 2.8 MG/DL (ref 4–7.5)
WBC # BLD AUTO: 0.1 X10*3/UL (ref 4.4–11.3)
XM INTEP: NORMAL

## 2025-01-29 PROCEDURE — 36430 TRANSFUSION BLD/BLD COMPNT: CPT

## 2025-01-29 PROCEDURE — 84550 ASSAY OF BLOOD/URIC ACID: CPT | Performed by: PHYSICIAN ASSISTANT

## 2025-01-29 PROCEDURE — 2500000001 HC RX 250 WO HCPCS SELF ADMINISTERED DRUGS (ALT 637 FOR MEDICARE OP): Performed by: PHYSICIAN ASSISTANT

## 2025-01-29 PROCEDURE — 82435 ASSAY OF BLOOD CHLORIDE: CPT | Performed by: PHYSICIAN ASSISTANT

## 2025-01-29 PROCEDURE — 83615 LACTATE (LD) (LDH) ENZYME: CPT | Performed by: PHYSICIAN ASSISTANT

## 2025-01-29 PROCEDURE — 2500000005 HC RX 250 GENERAL PHARMACY W/O HCPCS: Performed by: PHYSICIAN ASSISTANT

## 2025-01-29 PROCEDURE — 99223 1ST HOSP IP/OBS HIGH 75: CPT | Performed by: INTERNAL MEDICINE

## 2025-01-29 PROCEDURE — 83735 ASSAY OF MAGNESIUM: CPT | Performed by: PHYSICIAN ASSISTANT

## 2025-01-29 PROCEDURE — P9040 RBC LEUKOREDUCED IRRADIATED: HCPCS

## 2025-01-29 PROCEDURE — 86901 BLOOD TYPING SEROLOGIC RH(D): CPT | Performed by: NURSE PRACTITIONER

## 2025-01-29 PROCEDURE — 1170000001 HC PRIVATE ONCOLOGY ROOM DAILY

## 2025-01-29 PROCEDURE — 2500000002 HC RX 250 W HCPCS SELF ADMINISTERED DRUGS (ALT 637 FOR MEDICARE OP, ALT 636 FOR OP/ED): Performed by: PHYSICIAN ASSISTANT

## 2025-01-29 PROCEDURE — 86923 COMPATIBILITY TEST ELECTRIC: CPT

## 2025-01-29 PROCEDURE — 85025 COMPLETE CBC W/AUTO DIFF WBC: CPT | Performed by: PHYSICIAN ASSISTANT

## 2025-01-29 PROCEDURE — 80076 HEPATIC FUNCTION PANEL: CPT | Performed by: PHYSICIAN ASSISTANT

## 2025-01-29 PROCEDURE — 2500000004 HC RX 250 GENERAL PHARMACY W/ HCPCS (ALT 636 FOR OP/ED): Performed by: PHYSICIAN ASSISTANT

## 2025-01-29 PROCEDURE — 93010 ELECTROCARDIOGRAM REPORT: CPT | Performed by: INTERNAL MEDICINE

## 2025-01-29 PROCEDURE — 93005 ELECTROCARDIOGRAM TRACING: CPT

## 2025-01-29 PROCEDURE — 84100 ASSAY OF PHOSPHORUS: CPT | Performed by: PHYSICIAN ASSISTANT

## 2025-01-29 PROCEDURE — P9073 PLATELETS PHERESIS PATH REDU: HCPCS

## 2025-01-29 RX ADMIN — ALUMINUM HYDROXIDE, MAGNESIUM HYDROXIDE, DIMETHICONE 10 ML: 200; 20; 200 SUSPENSION ORAL at 09:35

## 2025-01-29 RX ADMIN — HYDROMORPHONE HYDROCHLORIDE 0.3 MG: 0.5 INJECTION, SOLUTION INTRAMUSCULAR; INTRAVENOUS; SUBCUTANEOUS at 18:04

## 2025-01-29 RX ADMIN — OXYCODONE HYDROCHLORIDE 5 MG: 5 SOLUTION ORAL at 05:58

## 2025-01-29 RX ADMIN — BACLOFEN 10 MG: 10 TABLET ORAL at 21:20

## 2025-01-29 RX ADMIN — PIPERACILLIN SODIUM AND TAZOBACTAM SODIUM 3.38 G: 3; .375 INJECTION, SOLUTION INTRAVENOUS at 21:20

## 2025-01-29 RX ADMIN — OXYCODONE HYDROCHLORIDE 5 MG: 5 SOLUTION ORAL at 16:02

## 2025-01-29 RX ADMIN — BACLOFEN 10 MG: 10 TABLET ORAL at 14:36

## 2025-01-29 RX ADMIN — ATORVASTATIN CALCIUM 40 MG: 40 TABLET, FILM COATED ORAL at 05:58

## 2025-01-29 RX ADMIN — OXYCODONE HYDROCHLORIDE 5 MG: 5 SOLUTION ORAL at 21:20

## 2025-01-29 RX ADMIN — BACLOFEN 10 MG: 10 TABLET ORAL at 09:18

## 2025-01-29 RX ADMIN — PANTOPRAZOLE SODIUM 40 MG: 40 INJECTION, POWDER, FOR SOLUTION INTRAVENOUS at 09:18

## 2025-01-29 RX ADMIN — PIPERACILLIN SODIUM AND TAZOBACTAM SODIUM 3.38 G: 3; .375 INJECTION, SOLUTION INTRAVENOUS at 14:36

## 2025-01-29 RX ADMIN — ACYCLOVIR SODIUM 250 MG: 500 INJECTION, SOLUTION INTRAVENOUS at 09:18

## 2025-01-29 RX ADMIN — METOPROLOL SUCCINATE 50 MG: 50 TABLET, EXTENDED RELEASE ORAL at 09:18

## 2025-01-29 RX ADMIN — HYDROMORPHONE HYDROCHLORIDE 0.3 MG: 0.5 INJECTION, SOLUTION INTRAMUSCULAR; INTRAVENOUS; SUBCUTANEOUS at 22:55

## 2025-01-29 RX ADMIN — HYDROMORPHONE HYDROCHLORIDE 0.3 MG: 0.5 INJECTION, SOLUTION INTRAMUSCULAR; INTRAVENOUS; SUBCUTANEOUS at 12:55

## 2025-01-29 RX ADMIN — SODIUM CHLORIDE 50 ML/HR: 9 INJECTION, SOLUTION INTRAVENOUS at 16:03

## 2025-01-29 RX ADMIN — GABAPENTIN 300 MG: 300 CAPSULE ORAL at 21:20

## 2025-01-29 RX ADMIN — PIPERACILLIN SODIUM AND TAZOBACTAM SODIUM 3.38 G: 3; .375 INJECTION, SOLUTION INTRAVENOUS at 02:15

## 2025-01-29 RX ADMIN — OXYCODONE HYDROCHLORIDE 5 MG: 5 SOLUTION ORAL at 10:45

## 2025-01-29 RX ADMIN — ACYCLOVIR SODIUM 250 MG: 500 INJECTION, SOLUTION INTRAVENOUS at 21:20

## 2025-01-29 RX ADMIN — PIPERACILLIN SODIUM AND TAZOBACTAM SODIUM 3.38 G: 3; .375 INJECTION, SOLUTION INTRAVENOUS at 09:18

## 2025-01-29 RX ADMIN — FLUCONAZOLE 400 MG: 2 INJECTION, SOLUTION INTRAVENOUS at 10:41

## 2025-01-29 ASSESSMENT — COGNITIVE AND FUNCTIONAL STATUS - GENERAL
MOBILITY SCORE: 23
TOILETING: A LITTLE
DRESSING REGULAR LOWER BODY CLOTHING: A LITTLE
HELP NEEDED FOR BATHING: A LITTLE
DRESSING REGULAR LOWER BODY CLOTHING: A LITTLE
DAILY ACTIVITIY SCORE: 21
CLIMB 3 TO 5 STEPS WITH RAILING: A LITTLE
DAILY ACTIVITIY SCORE: 20
MOBILITY SCORE: 23
DRESSING REGULAR UPPER BODY CLOTHING: A LITTLE
CLIMB 3 TO 5 STEPS WITH RAILING: A LITTLE
HELP NEEDED FOR BATHING: A LITTLE
DRESSING REGULAR UPPER BODY CLOTHING: A LITTLE

## 2025-01-29 ASSESSMENT — PAIN - FUNCTIONAL ASSESSMENT
PAIN_FUNCTIONAL_ASSESSMENT: 0-10

## 2025-01-29 ASSESSMENT — PAIN SCALES - GENERAL
PAINLEVEL_OUTOF10: 9
PAINLEVEL_OUTOF10: 6
PAINLEVEL_OUTOF10: 5 - MODERATE PAIN
PAINLEVEL_OUTOF10: 9
PAINLEVEL_OUTOF10: 8
PAINLEVEL_OUTOF10: 1
PAINLEVEL_OUTOF10: 8
PAINLEVEL_OUTOF10: 6

## 2025-01-29 ASSESSMENT — PAIN DESCRIPTION - LOCATION: LOCATION: MOUTH

## 2025-01-29 ASSESSMENT — ACTIVITIES OF DAILY LIVING (ADL): LACK_OF_TRANSPORTATION: NO

## 2025-01-29 NOTE — CARE PLAN
Problem: Pain  Goal: Takes deep breaths with improved pain control throughout the shift  Outcome: Progressing  Goal: Turns in bed with improved pain control throughout the shift  Outcome: Progressing  Goal: Walks with improved pain control throughout the shift  Outcome: Progressing  Goal: Performs ADL's with improved pain control throughout shift  Outcome: Progressing  Goal: Participates in PT with improved pain control throughout the shift  Outcome: Progressing  Goal: Free from opioid side effects throughout the shift  Outcome: Progressing  Goal: Free from acute confusion related to pain meds throughout the shift  Outcome: Progressing     Problem: Pain - Adult  Goal: Verbalizes/displays adequate comfort level or baseline comfort level  Outcome: Progressing     Problem: Safety - Adult  Goal: Free from fall injury  Outcome: Progressing     Problem: Discharge Planning  Goal: Discharge to home or other facility with appropriate resources  Outcome: Progressing     Problem: Chronic Conditions and Co-morbidities  Goal: Patient's chronic conditions and co-morbidity symptoms are monitored and maintained or improved  Outcome: Progressing     Problem: Nutrition  Goal: Nutrient intake appropriate for maintaining nutritional needs  Outcome: Progressing     Problem: Fall/Injury  Goal: Not fall by end of shift  Outcome: Progressing  Goal: Be free from injury by end of the shift  Outcome: Progressing  Goal: Verbalize understanding of personal risk factors for fall in the hospital  Outcome: Progressing  Goal: Verbalize understanding of risk factor reduction measures to prevent injury from fall in the home  Outcome: Progressing  Goal: Use assistive devices by end of the shift  Outcome: Progressing  Goal: Pace activities to prevent fatigue by end of the shift  Outcome: Progressing

## 2025-01-29 NOTE — CARE PLAN
"The clinical goals for the shift include Patient will remain safe and free from falls/injury with improved mucositis pain throughout the night.    Over the shift, the patient made progress toward the clinical goals below. Barriers to progression include inconsistent stories between the patient and his spouse. His spouse stated that she \"does everything\" for him, but the patient appears capable of performing ADLs on his own. Recommendations to address these barriers include encouraging the patient to speak for himself and independently state his preferences without input from the spouse.    Problem: Pain  Goal: Takes deep breaths with improved pain control throughout the shift  Outcome: Progressing  Goal: Turns in bed with improved pain control throughout the shift  Outcome: Progressing  Goal: Walks with improved pain control throughout the shift  Outcome: Progressing  Goal: Performs ADL's with improved pain control throughout shift  Outcome: Progressing  Goal: Participates in PT with improved pain control throughout the shift  Outcome: Progressing  Goal: Free from opioid side effects throughout the shift  Outcome: Progressing  Goal: Free from acute confusion related to pain meds throughout the shift  Outcome: Progressing     Problem: Pain - Adult  Goal: Verbalizes/displays adequate comfort level or baseline comfort level  Outcome: Progressing     Problem: Safety - Adult  Goal: Free from fall injury  Outcome: Progressing     Problem: Discharge Planning  Goal: Discharge to home or other facility with appropriate resources  Outcome: Progressing     Problem: Chronic Conditions and Co-morbidities  Goal: Patient's chronic conditions and co-morbidity symptoms are monitored and maintained or improved  Outcome: Progressing     Problem: Nutrition  Goal: Nutrient intake appropriate for maintaining nutritional needs  Outcome: Progressing     Problem: Fall/Injury  Goal: Not fall by end of shift  Outcome: Progressing  Goal: Be " free from injury by end of the shift  Outcome: Progressing  Goal: Verbalize understanding of personal risk factors for fall in the hospital  Outcome: Progressing  Goal: Verbalize understanding of risk factor reduction measures to prevent injury from fall in the home  Outcome: Progressing  Goal: Use assistive devices by end of the shift  Outcome: Progressing  Goal: Pace activities to prevent fatigue by end of the shift  Outcome: Progressing

## 2025-01-29 NOTE — PROGRESS NOTES
"Bijan Ibanez is a 64 y.o. male on day 1 of admission presenting with Febrile neutropenia (CMS-[facility]).    Subjective   No acute events over night. Afebrile today. Reports that the left side of his neck hurts today. The right side swelling has significantly decreased. He reports that it is difficult and painful to swallow. He reports a productive cough. Denies any CP, SOB, HA, N/V/D/C.        Objective     Physical Exam  Constitutional:       Appearance: Normal appearance.   HENT:      Head: Normocephalic.      Mouth/Throat:      Mouth: Mucous membranes are dry.      Pharynx: Posterior oropharyngeal erythema present.      Comments: Soft palate with redness and ulceration   Eyes:      Extraocular Movements: Extraocular movements intact.      Pupils: Pupils are equal, round, and reactive to light.   Cardiovascular:      Rate and Rhythm: Normal rate and regular rhythm.      Pulses: Normal pulses.   Pulmonary:      Breath sounds: Normal breath sounds.   Abdominal:      General: Bowel sounds are normal.      Palpations: Abdomen is soft.   Musculoskeletal:         General: Swelling present. Normal range of motion.      Cervical back: Normal range of motion.      Right lower leg: Edema present.      Left lower leg: Edema present.   Skin:     General: Skin is warm.   Neurological:      Mental Status: He is oriented to person, place, and time.         Last Recorded Vitals  Blood pressure 123/84, pulse (!) 111, temperature 37.5 °C (99.5 °F), resp. rate 18, height 1.803 m (5' 10.98\"), weight 115 kg (252 lb 6.8 oz), SpO2 97%.  Intake/Output last 3 Shifts:  I/O last 3 completed shifts:  In: 661.7 (5.8 mL/kg) [I.V.:661.7 (5.8 mL/kg)]  Out: - (0 mL/kg)   Weight: 114.5 kg     Relevant Results  Scheduled medications  acyclovir, 250 mg, intravenous, BID  [Held by provider] acyclovir, 400 mg, oral, q12h SONAL  [Held by provider] allopurinol, 300 mg, oral, Daily  atorvastatin, 40 mg, oral, Daily  baclofen, 10 mg, oral, TID  fluconazole, " 400 mg, intravenous, q24h  [Held by provider] fluconazole, 400 mg, oral, Daily  gabapentin, 300 mg, oral, Nightly  metoprolol succinate XL, 50 mg, oral, Daily  [Held by provider] pantoprazole, 40 mg, oral, Daily before breakfast  pantoprazole, 40 mg, intravenous, Daily  piperacillin-tazobactam, 3.375 g, intravenous, q6h      Continuous medications  sodium chloride 0.9%, 50 mL/hr, Last Rate: 50 mL/hr (01/29/25 0634)      PRN medications  PRN medications: albuterol, alteplase, HYDROmorphone, lidocaine, lidocaine-diphenhydraMINE-Maalox 1:1:1, [Held by provider] oxyCODONE, oxyCODONE, prochlorperazine, sucralfate    Results for orders placed or performed during the hospital encounter of 01/28/25 (from the past 24 hours)   CBC and Auto Differential   Result Value Ref Range    WBC 0.1 (LL) 4.4 - 11.3 x10*3/uL    nRBC 0.0 0.0 - 0.0 /100 WBCs    RBC 2.53 (L) 4.50 - 5.90 x10*6/uL    Hemoglobin 7.0 (L) 13.5 - 17.5 g/dL    Hematocrit 21.5 (L) 41.0 - 52.0 %    MCV 85 80 - 100 fL    MCH 27.7 26.0 - 34.0 pg    MCHC 32.6 32.0 - 36.0 g/dL    RDW 13.4 11.5 - 14.5 %    Platelets 9 (LL) 150 - 450 x10*3/uL    Neutrophils % 30.0 40.0 - 80.0 %    Immature Granulocytes %, Automated 0.0 0.0 - 0.9 %    Lymphocytes % 70.0 13.0 - 44.0 %    Monocytes % 0.0 2.0 - 10.0 %    Eosinophils % 0.0 0.0 - 6.0 %    Basophils % 0.0 0.0 - 2.0 %    Neutrophils Absolute 0.03 (L) 1.20 - 7.70 x10*3/uL    Immature Granulocytes Absolute, Automated 0.00 0.00 - 0.70 x10*3/uL    Lymphocytes Absolute 0.07 (L) 1.20 - 4.80 x10*3/uL    Monocytes Absolute 0.00 (L) 0.10 - 1.00 x10*3/uL    Eosinophils Absolute 0.00 0.00 - 0.70 x10*3/uL    Basophils Absolute 0.00 0.00 - 0.10 x10*3/uL   Hepatic Function Panel   Result Value Ref Range    Albumin 2.1 (L) 3.4 - 5.0 g/dL    Bilirubin, Total 1.3 (H) 0.0 - 1.2 mg/dL    Bilirubin, Direct 0.4 (H) 0.0 - 0.3 mg/dL    Alkaline Phosphatase 58 33 - 136 U/L    ALT 36 10 - 52 U/L    AST 14 9 - 39 U/L    Total Protein 3.6 (L) 6.4 - 8.2 g/dL    Magnesium   Result Value Ref Range    Magnesium 1.83 1.60 - 2.40 mg/dL   Uric Acid   Result Value Ref Range    Uric Acid 2.8 (L) 4.0 - 7.5 mg/dL   Lactate dehydrogenase   Result Value Ref Range     (H) 84 - 246 U/L   Phosphorus   Result Value Ref Range    Phosphorus 3.0 2.5 - 4.9 mg/dL   Basic Metabolic Panel   Result Value Ref Range    Glucose 124 (H) 74 - 99 mg/dL    Sodium 137 136 - 145 mmol/L    Potassium 3.6 3.5 - 5.3 mmol/L    Chloride 101 98 - 107 mmol/L    Bicarbonate 30 21 - 32 mmol/L    Anion Gap 10 10 - 20 mmol/L    Urea Nitrogen 17 6 - 23 mg/dL    Creatinine 0.54 0.50 - 1.30 mg/dL    eGFR >90 >60 mL/min/1.73m*2    Calcium 7.3 (L) 8.6 - 10.6 mg/dL     ECG 12 lead (Clinic Performed)    Result Date: 1/28/2025  Normal sinus rhythm Normal ECG When compared with ECG of 11-JAN-2025 01:22, No significant change was found    CT soft tissue neck w IV contrast    Result Date: 1/27/2025  Interpreted By:  Pete Gutierrez, STUDY: CT SOFT TISSUE NECK W IV CONTRAST;  1/27/2025 9:18 pm   INDICATION: Signs/Symptoms:neck pain, difficulty swallowing, hx of lymphoma.     COMPARISON: CT of the neck dated 01/09/2025; PET-CT dated 01/20/2025;   ACCESSION NUMBER(S): [id number]   ORDERING CLINICIAN: KALPANA BALDWIN   TECHNIQUE: Axial CT images of the neck were obtained.  The patient received 75 mL of Omnipaque 350 intravenous contrast agent. The images were reformatted in angled axial, coronal and sagittal planes.   FINDINGS: There is redemonstration of the somewhat asymmetric enlargement of the right-sided muscles of mastication, predominantly involving the right masseter compared to the contralateral left side, somewhat less conspicuous in appearance compared to prior imaging on 01/09/2025.   No new abnormality is identified in the soft tissues of the face and neck. No new fluid collections or soft tissue gas is present.   Although evaluation of the oral cavity is somewhat degraded by beam hardening  artifact from patient's dental hardware, no new abnormality is identified. Temporomandibular joints are intact.   Pharyngeal and hypopharyngeal structures do not demonstrate any acute abnormality.   Atherosclerotic changes are again present at the carotid bifurcations, similar in appearance to prior exam without evidence of new occlusion or high-grade stenosis. Jugular veins do not demonstrate any acute abnormality.   There is redemonstration of the slightly indistinct and thickened appearance of the prevertebral soft tissues, without evidence of new fluid collections. No new soft tissue gas is identified.   Thyroid is unremarkable in appearance.   Included lung apices are clear.   Multilevel degenerative changes are present in the cervical spine without evidence of acute abnormality. No new compression fracture or high-grade stenosis is identified.       Redemonstration of the mild asymmetric enlargement of the right-sided muscles of mastication, predominantly involving the right masseter, likely due to underlying lymphomatous involvement, somewhat less conspicuous in appearance compared to prior exam on 01/09/2025. No new mass or infectious/inflammatory process is identified within the aero digestive tract or the soft tissues of the neck. No new lymphadenopathy is present.     MACRO: None   Signed by: Pete Gutierrez 1/27/2025 9:46 PM Dictation workstation:   [id number]    XR chest 1 view    Result Date: 1/27/2025  Interpreted By:  Linda Ortiz, STUDY: XR CHEST 1 VIEW;  1/27/2025 9:07 pm   INDICATION: Signs/Symptoms:Hx of lymphoma, fevers.   COMPARISON: Chest x-ray 01/21/2025   ACCESSION NUMBER(S): [id number]   ORDERING CLINICIAN: KALPANA BALDWIN   FINDINGS: A right-sided PICC line terminates in the cavoatrial junction.   CARDIOMEDIASTINAL SILHOUETTE: Cardiomediastinal silhouette is normal in size and configuration. Atherosclerotic calcification of the aorta.   LUNGS: No consolidation, pleural  effusion or pneumothorax.   ABDOMEN: No remarkable upper abdominal findings.   BONES: Multilevel degenerative changes of the spine.       No acute cardiopulmonary process.   MACRO: None   Signed by: Linda Ortiz 1/27/2025 9:29 PM Dictation workstation:   [id number]    Vascular US upper extremity venous duplex right    Result Date: 1/24/2025  Interpreted By:  Francisco Betancur and Hofer Lindsay STUDY: VASC US UPPER EXTREMITY VENOUS DUPLEX RIGHT;  1/24/2025 3:45 pm   INDICATION: Signs/Symptoms:swelling to NAEEM.   ,M79.89 Other specified soft tissue disorders   COMPARISON: None.   ACCESSION NUMBER(S): [id number]   ORDERING CLINICIAN: FRANCISCO NAYAK   TECHNIQUE: Vascular ultrasound of the right upper extremity was performed. Evaluation was performed with grayscale, color, and spectral Doppler. When possible, compression views of the evaluated veins was also performed.   FINDINGS: RIGHT UPPER EXTREMITY: Evaluation of the visualized portions of the right internal jugular, innominate, subclavian, axillary, brachial, cephalic, and basilic veins was performed.   Echogenic material within the mid to distal brachial vein with decreased compressibility concerning for thrombus. Echogenic material within the mid and distal portions of the right basilic vein with decreased compressibility concerning for thrombus.     There is normal respiratory variation, normal compressibility, as well as normal color doppler signal in the other visualized vessels.       Findings consistent with partially occlusive thrombus within the mid to distal brachial vein and mid to distal basilic vein within the right upper extremity.   I personally reviewed the images/study and resident's interpretation and I agree with the findings as stated by Roxanna Herndon MD (resident radiologist). This study was analyzed and interpreted at [facility], [place], [place].   MACRO: None   Signed by: Francisco Betancur 1/24/2025 4:45  PM Dictation workstation:   [id number]    XR chest 1 view    Result Date: 1/22/2025  Interpreted By:  Beryl Ko and Kamau Nyokabi STUDY: XR CHEST 1 VIEW;  1/21/2025 11:12 pm   INDICATION: Signs/Symptoms:new onset cough.   COMPARISON: Chest x-ray 01/11/2025, CT chest abdomen pelvis 01/11/2025   ACCESSION NUMBER(S): [id number]   ORDERING CLINICIAN: FRANCISCO NAYAK   FINDINGS:   AP radiograph of the chest. Right upper extremity PICC line catheter tip terminates at the cavoatrial junction.     CARDIOMEDIASTINAL SILHOUETTE: Cardiomediastinal silhouette is normal in size and configuration.   LUNGS: Bilateral lungs appear clear. No pneumothorax, pleural effusion, or focal consolidation.   ABDOMEN: No remarkable upper abdominal findings.   BONES: No acute osseous changes.       1.  No focal consolidation or evidence of acute cardiopulmonary process.   I personally reviewed the images/study and I agree with Dr. Luisito Saldivar findings as stated. This study was interpreted at [place], [place]   MACRO: None   Signed by: Beryl Ko 1/22/2025 10:15 AM Dictation workstation:   [id number]    MR brain w and wo IV contrast    Result Date: 1/21/2025  Interpreted By:  Terrence Gipson, STUDY: MR BRAIN W AND WO IV CONTRAST;  1/21/2025 2:06 pm   INDICATION: Signs/Symptoms:evalute for disease Lymphoma.     COMPARISON: PET-CT January 20, 2025   ACCESSION NUMBER(S): [id number]   ORDERING CLINICIAN: KRISTIAN SIMON   TECHNIQUE: T2, FLAIR, DWI, ADC, gradient echo T2, and T1 weighted images of brain were acquired without intravenous contrast administration. Multi planar T1 weighted imaging was acquired after administration of 20 ML Dotarem gadolinium based intravenous contrast.   FINDINGS: Small focus of encephalomalacia with peripheral gliosis inferior left lentiform nucleus and similar but smaller focus inferior right lentiform nucleus, suspected old lacunar infarctions.   No acute  intracranial hemorrhage. No extra-axial fluid collection. No abnormally restricted diffusion to suggest recent infarction.  Major vascular flow voids are present.   Mild paranasal sinus/ethmoid mucosal inflammatory changes. Bilateral nonspecific mastoid fluid. Orbital contents unremarkable.   Scattered foci of abnormal enhancement and diffusion restriction involving the calvarium suspicious for osseous metastatic involvement.   There is thickening of the bilateral muscles of mastication, right-greater-than-left, as well as the right posterolateral nasopharyngeal structures.       1. No evidence of intracranial lymphoma. 2. Small focus of encephalomalacia with peripheral gliosis inferior left lentiform nucleus and similar but smaller focus inferior right lentiform nucleus, suspected old lacunar infarctions. 3. There is thickening of the bilateral muscles of mastication, right-greater-than-left, as well as the right posterolateral nasopharyngeal structures. These areas demonstrated substantial abnormal uptake on the recent PET-CT and are suspicious for lymphomatous involvement. 4. Scattered foci of abnormal enhancement and diffusion restriction involving the calvarium suspicious for osseous metastatic involvement.   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 64-year-old male with newly diagnosed lymphoma.   COMPARISON: None.   ACCESSION NUMBER(S): [id number]   ORDERING CLINICIAN: NYDIA MAYA   TECHNIQUE: DIVISION OF NUCLEAR MEDICINE POSITRON EMISSION TOMOGRAPHY (PET-CT)   The patient received an intravenous dose of 9.6 mCi of Fluorine-18 fluorodeoxyglucose (FDG).  Positron  emission tomographic (PET) images from mid thigh to skull base were then acquired after a one hour delay. Also acquired was a contemporaneous low dose non-contrast CT scan performed for attenuation correction of PET images and anatomic localization.  The PET and CT images were digitally fused for display.  All images were acquired on a combined PET-CT scanner unit. Some areas of FDG accumulation may be described in standardized uptake value (SUV) units.   CODING: Initial Treatment Strategy (PI)   CALIBRATION: Dose Injection-to-Scan Interval (mins): 53 min Mediastinal bloodpool SUV (normal 1.5-2.5): 1.3 Blood glucose: 73 mg/dL   FINDINGS: NECK: *Intensely hypermetabolic enlarged bilateral submandibular parotid glands with a SUV max 16.8 within right and 13.3 within left submandibular gland. *Multiple hypermetabolic bilateral cervical and supraclavicular lymph nodes. For example: Left level 3 (SUV max 7.3), right level 5 (SUV max 69.5), left level 5 (SUV max 11), right supraclavicular (SUV max 11.2) left supraclavicular (SUV max 2.1).       CHEST: *No focal hypermetabolic lesion is seen in the lung parenchyma. *Hypermetabolic bilateral hilar and right axillary lymph nodes.. Are seen. For example: Right hilar (SUV max 7.1), left hilar (SUV max 9.4), right axillary (SUV max 4.6). *Hypermetabolic soft tissue density with a SUV max 15.3 is seen in right retrocrural region. *Focal hypermetabolic activity with a SUV max 13.9 within the right retropectoral region without any underlying CT correlate.     ABDOMEN AND PELVIS: *Multiple hypermetabolic lesions are seen within the bilateral hepatic lobes without any underlying CT correlate, for example: Segment 7 (SUV max 14.2 ), segment 8 (SUV max 14.2). *Spleen is enlarged measuring 13.8 cm in craniocaudal dimension. There is intensely hypermetabolic perisplenic soft tissue density with a SUV max 8.3 along posterolateral aspect. *Intense hypermetabolic activity seen within the  greater curvature of the stomach in the region of the body with a SUV max 12.4. *Multiple hypermetabolic retroperitoneal, mesenteric, iliac and inguinal lymph nodes.. For example: Hardik hepatis (SUV max 20), perigastric (SUV max 13.4. Right internal iliac (SUV max 17.0), retrocaval (SUV max 10.7). *There is intense hypermetabolic activity within the masslike infiltrative lesion within the right kidney as seen on CT dated 01/11/2025 with a SUV max 15.4. Hypermetabolic activity is also seen along posteroinferior aspect of the left kidney with SUV max 8.1. *Multiple focal hypermetabolic mesenteric deposits are seen, few of the without any underlying CT correlate. For example: Right lower lumbar (SUV max 12.7, left lower lobar (SUV max 8.1).     MUSCULOSKELETAL: *Diffuse intense metabolic activity is seen throughout the axial and appendicular skeleton. For example: Cervical spine (SUV max 9.2), left scapula (SUV max 10.1), left clavicle (SUV max 10.4), bilateral ribs (SUV max 7.2), pelvis (SUV max 8.9 within right iliac bone, sacrum (SUV max 12.1), bilateral acetabulum femoral (SUV max 12.0) sternal (SUV max 7.9). *Diffuse intense hypermetabolic activity is seen within bilateral, chest wall gluteal muscles, without underlying CT correlate. For example: Right gluteal muscle (SUV max 5), right posterior scapular region (SUV max 13.0), right arm (SUV max 13.8). *Multiple focal hypermetabolic lesions are seen within the anterior abdominal wall musculature with a SUV max 12.7 on left hand 11.4 on right         Widespread hermann as well as extranodal lymphomatous involvement. 1. Multiple hypermetabolic supra and infra diaphragmatic lymphadenopathy as described, consistent with lymphomatous involvement. 2. Intensely hypermetabolic activity within bilateral enlarged submandibular and parotid glands as described, concerning for lymphomatous involvement. 3. Hypermetabolic mass like infiltration within bilateral kidneys(right>  left), corresponding to lesion seen on CT dated 01/01/2025, compatible with lymphomatous involvement. 4. Hypermetabolic hepatic lesions without any underlying CT correlate, consistent with lymphomatous involvement. 5. Splenomegaly with nonspecific hypermetabolic activity suggestive of extranodal involvement. 6. Multiple hypermetabolic soft tissue mesenteric deposits as well as anterior abdominal wall nodular deposits, with few of the lesions without any underlying CT correlate, concerning for additional lymphomatous involvement. 7. Metabolic activity within the stomach, bilateral arms, anterior chest wall and gluteal muscles without underlying CT correlate, concerning for lymphomatous involvement.       I personally reviewed the images/study and I agree with the findings as stated by Lisha Hernandez MD.  This study was interpreted at [facility], [place], [place].   Signed by: John Diaz 1/20/2025 3:33 PM Dictation workstation:   [id number]    Onco-Echo Complete (Strain & 3D)    Result Date: 1/18/2025   [facility], [address] [place], [place] [address]                Tel [phone] and Fax [phone] TRANSTHORACIC ECHOCARDIOGRAM REPORT  Patient Name:       DENNIS Callahan Physician:    [id number] Earl Reyes MD Study Date:         1/18/2025           Ordering Provider:    [id number] NYDIA MAYA MRN/PID:            [id number]            Fellow: Accession#:         [id number]        Nurse: Date of Birth/Age:  1960 / 64      Sonographer:          Rosibel Newton RDCS                     years Gender assigned at                     Additional Staff: Birth: Height:             185.42 cm           Admit Date:           1/18/2025 Weight:             108.41 kg           Admission Status:     Inpatient -                                                                Routine BSA / BMI:          2.32 m2 / 31.53     Encounter#:           [id number]                     kg/m2 Blood Pressure:     96/66 mmHg          Department Location:  [facility]                                                               Non Invasive Study Type:    ONCO-ECHO COMPLETE (STRAIN AND 3D) Diagnosis/ICD: Old myocardial infarction-I25.2 Indication:    history of myocardial infarction CPT Code:      Echo Complete w Full Doppler-96321; 3D Rendering w/o independent                workstation-[id number]; Myocardial Strain Imaging-38094 Patient History: Pertinent History: Chest Pain, HTN, Hyperlipidemia and CAD. Ischemic heart                    disease, Peripheral venous hypertension, Obesity, High grade                    B cell lymphoma, Kidney cancer (2013). Study Detail: The following Echo studies were performed: 2D, M-Mode, Doppler,               color flow, 3D and Strain.  PHYSICIAN INTERPRETATION: Left Ventricle: The left ventricular systolic function is normal, with a visually estimated ejection fraction of 65-70%. There are no regional left ventricular wall motion abnormalities. The left ventricular cavity size is normal. Left Ventricular Global Longitudinal Strain - 19.9 %. Spectral Doppler shows a normal pattern of left ventricular diastolic filling. Strain values are normal, which imply normal myocardial function. Left Atrium: The left atrium is normal in size. Right Ventricle: The right ventricle is normal in size. There is normal right ventricular global systolic function. Right Atrium: The right atrium is normal in size. Aortic Valve: The aortic valve is trileaflet. There is no evidence of aortic valve regurgitation. The peak instantaneous gradient of the aortic valve is 15 mmHg. Mitral Valve: The mitral valve is normal in structure. There is no evidence of mitral valve regurgitation. Tricuspid Valve: The tricuspid valve is structurally normal. No evidence of  tricuspid regurgitation. The Doppler estimated RVSP is within normal limits at 27.3 mmHg. Pulmonic Valve: The pulmonic valve is not well visualized. There is physiologic pulmonic valve regurgitation. Pericardium: There is no pericardial effusion noted. There is a pericardial fat pad present. Aorta: The aortic root is normal. The aortic root appears normal in size and measures 3.10 cm. There is upper limits of normal dilatation of the ascending aorta. Systemic Veins: The inferior vena cava appears normal in size, with IVC inspiratory collapse greater than 50%. In comparison to the previous echocardiogram(s): There are no prior studies on this patient for comparison purposes.  ONCO-CARDIOLOGY: Machine: This study was performed on the [employer]. Comments: No previous, 3D may or may not be accurate due to image quality.  Onco-Cardiology Measurements: Current Measurements 2D EF (Biplane)                     64%% 3D EF                               51%% Global Longitudinal Strain (GLS) -19.9%% GLS Tracking Quality: Fair  CONCLUSIONS:  1. The left ventricular systolic function is normal, with a visually estimated ejection fraction of 65-70%.  2. There is normal right ventricular global systolic function.  3. Right ventricular systolic pressure is within normal limits.  4. Normal aortic root.  5. Left Ventricular Global Longitudinal Strain - 19.9 %.  6. Strain values are normal, which imply normal myocardial function. QUANTITATIVE DATA SUMMARY:  2D MEASUREMENTS:          Normal Ranges: Ao Root d:       3.10 cm  (2.0-3.7cm) LVEDV Index:     37 ml/m2  LA VOLUME:                    Normal Ranges: LA Vol A4C:        54.5 ml    (22+/-6mL/m2) LA Vol A2C:        66.9 ml LA Vol BP:         63.9 ml LA Vol Index A4C:  23.5ml/m2 LA Vol Index A2C:  28.8 ml/m2 LA Vol Index BP:   27.5 ml/m2 LA Area A4C:       17.9 cm2 LA Area A2C:       21.0 cm2 LA Major Axis A4C: 5.0 cm LA Major Axis A2C: 5.6 cm  RA VOLUME BY A/L METHOD:            Normal  Ranges: RA Vol A4C:              45.6 ml    (8.3-19.5ml) RA Vol Index A4C:        19.6 ml/m2 RA Area A4C:             17.3 cm2 RA Major Axis A4C:       5.6 cm  AORTA MEASUREMENTS:         Normal Ranges: Asc Ao, d:          3.44 cm (2.1-3.4cm)  LV SYSTOLIC FUNCTION BY 2D PLANIMETRY (MOD):                                        Normal Ranges: EF-A4C View:                      56 % (>=55%) EF-A2C View:                      71 % EF-Biplane:                       64 % EF-Visual:                        68 % LV EF Reported:                   68 % Global Longitudinal Strain (GLS): 20 %  LV DIASTOLIC FUNCTION:             Normal Ranges: MV Peak E:             0.66 m/s    (0.7-1.2 m/s) MV Peak A:             0.82 m/s    (0.42-0.7 m/s) E/A Ratio:             0.81        (1.0-2.2) MV e'                  0.096 m/s   (>8.0) MV lateral e'          0.10 m/s MV medial e'           0.09 m/s MV A Dur:              127.50 msec E/e' Ratio:            6.84        (<8.0) MV DT:                 92 msec     (150-240 msec) PulmV Sys Yousif:         39.36 cm/s PulmV De La Garza Yousif:        23.79 cm/s PulmV S/D Yousif:         1.65 PulmV A Revs Yousif:      40.86 cm/s PulmV A Revs Dur:      97.05 msec  MITRAL VALVE:         Normal Ranges: MV DT:        92 msec (150-240msec)  AORTIC VALVE:            Normal Ranges: AoV Vmax:      1.91 m/s  (<=1.7m/s) AoV Peak P.6 mmHg (<20mmHg) LVOT Max Yousif:  1.25 m/s  (<=1.1m/s) LVOT VTI:      25.31 cm LVOT Diameter: 1.90 cm   (1.8-2.4cm) AoV Area,Vmax: 1.86 cm2  (2.5-4.5cm2)  RIGHT VENTRICLE: RV Basal 4.35 cm RV Mid   3.27 cm RV Major 6.7 cm TAPSE:   20.0 mm RV s'    0.23 m/s  TRICUSPID VALVE/RVSP:          Normal Ranges: Peak TR Velocity:     2.46 m/s RV Syst Pressure:     27 mmHg  (< 30mmHg) IVC Diam:             1.50 cm  PULMONIC VALVE:          Normal Ranges: PV Accel Time:  69 msec  (>120ms) PV Max Yousif:     1.3 m/s  (0.6-0.9m/s) PV Max P.8 mmHg  Pulmonary Veins: PulmV A Revs Dur: 97.05 msec PulmV A Revs  Yousif: 40.86 cm/s PulmV De La Garza Yousif:   23.79 cm/s PulmV S/D Yousif:    1.65 PulmV Sys Yousif:    39.36 cm/s  AORTA: Asc Ao Diam 3.58 cm  [id number] Earl Reyes MD Electronically signed on 1/18/2025 at 3:09:55 PM  ** Final **     Bedside PICC Imaging    Result Date: 1/18/2025  These images are not reportable by radiology and will not be interpreted by  Radiologists.    Bronchoscopy Diagnostic, Navigational, Therapeutic, w BAL, w EBUS    Addendum Date: 1/13/2025    Bronchoscopy Operative Report [facility] Date of procedure: 01/13/25 Patient: Bijan Ibanez MRN: [id number] YOB: 1960 Gender: male Referring provider/physician: Dr. MARIA ELENA Rodriguez (inpatient procedure, Pulmonary Medicine Consult) PRE-PROCEDURE DIAGNOSIS: Intra-thoracic lymphadenopathy, small pulmonary nodules    PRE-PROCEDURE EVALUATION: A history and physical has been performed, and patient medication allergies have been reviewed. The patient's tolerance of previous anesthesia has been reviewed. The risks and benefits of the procedure and the sedation options and risks were discussed with the patient or their designee. All questions were answered and informed consent obtained. INDICATION: Obtain diagnosis and Staging BRONCHOSCOPIST:              Modesto Martines MD First Assistant: None Second Assistant: None PROCEDURE SUMMARY: Event Event Time ENDO SCOPE IN TIME ENDO SCOPE OUT TIME 01/13/2025 12:11 PM 1/13/2025  1:02 PM             Fluoroscopy time: Not applicable POST-PROCEDURE DIAGNOSIS: Same as pre-operative diagnoses ANESTHESIA: TIVA. See separate anesthesia provider documentation. This procedure was performed using standard monitoring procedures in [facility]. FINDINGS: After adequate local and intravenous anesthesia, bronchoscopy was performed via an endotracheal tube placed by anesthesia. The distal trachea appeared normal. Inspection of RIGHT bronchial tree to the segmental level  appeared normal. Inspection of LEFT bronchial tree to the segmental level appeared normal. PROCEDURES: The bronchoscope was wedged into the LLL.  160 ml of normal saline was instilled, 27 ml was recovered. Please see Specimen section for ordered laboratory tests. Due to poor return (expected for LLL BAL), cytology was not ordered from the BAL. After the airway examination was completed, the scope was then replaced by EBUS scope. Lymph node sizing was performed via endobronchial ultrasound. Sampling by transbronchial needle aspiration was also performed using an Olympus CibiemiShot 22 gauge needle and sent for routine cytology. Rapid On-Site Evaluation (JOSE CARLOS) was available      - The 11Ri (inferior interlobar) node was not evaluated. Sampling was not done as the node was not evaluated.     - The 11Rs (superior interlobar) node was not evaluated. Sampling was not done as the node was not evaluated.     - The 10R (hilar) node was not evaluated. Sampling was not done as the node was not evaluated.     - The 4R (lower paratracheal) node was 3.9 mm in size. Sampling was not done as it was not clinically indicated.     - The 2R (upper paratracheal) node was not visualized. Sampling was not done as the node was not visualized.     - The 3P (retrotracheal) node was not evaluated. Sampling was not done as the node was not evaluated. -  The 7 (subcarinal) node was 3.4 mm in size. Sampling was not done as it was not clinically indicated.     - The 2L (upper paratracheal) node was not visualized. Sampling was not done as the node was not visualized.     - The 4L (lower paratracheal) node was 2.9 mm in size. Sampling was not done as it was not clinically indicated.     - The 10L (hilar) node was not visualized. Sampling was not done as the node was not visualized.     - The 11L (interlobar) node was 8.6 mm in size. Sampling was done, 4 samples with the needle were obtained.     - The 12L node was 11.2 mm in size. Sampling was done,  8 samples with the needle were obtained, including 2 for RPMI (stand by) and one for cultures. After diagnostic/therapeutic maneuvers, the airway was examined for evidence of bleeding. None was noted. The bronchoscope was removed from the patient's airway and the airway was handed back over to my colleagues from anesthesiology. SPECIMENS: ID Type Source Tests Collected by Time 1 : BAL LEFT LOWER LOBE Respiratory BAL BAL VIRAL PANEL PCR, FUNGITELL BETA-D GLUCAN PCR QUANT (NON-BLOOD SPECIMEN) Modesto Martines MD 1/13/2025 1209 2 : BAL LEFT LOWER LOBE Fluid BRONCHO-ALVEOLAR LAVAGE OF LEFT LOWER LOBE AFB CULTURE/SMEAR, FUNGAL CULTURE/SMEAR, STERILE FLUID CULTURE/SMEAR Modesto Martines MD 1/13/2025 1210 3 : BAL LEFT LOWER LOBE Bronchoalveolar Lavage BRONCHO-ALVEOLAR LAVAGE OF LEFT LOWER LOBE BODY FLUID CELL COUNT WITH DIFFERENTIAL Modesto Martines MD 1/13/2025 1210 4 : LN 12L FOR CULTURE Tissue LYMPH NODE BIOPSY AFB CULTURE/SMEAR, FUNGAL CULTURE/SMEAR, TISSUE/WOUND CULTURE/SMEAR Modesto Martines MD 1/13/2025 1301 A :  Non-Gynecologic Cytology LYMPH NODE 11 L PULMONARY FINE NEEDLE ASPIRATION CYTOLOGY CONSULTATION (NON-GYNECOLOGIC) Modesto Martines MD 1/13/2025 1230 B :  Non-Gynecologic Cytology LYMPH NODE 12 L PULMONARY FINE NEEDLE ASPIRATION CYTOLOGY CONSULTATION (NON-GYNECOLOGIC) Modesto Martines MD 1/13/2025 1232 RAPID ONSITE EVALUATION (R.O.S.E.): - Stations 11L and 12L TBNA : lymphoid COMPLICATIONS: None.    EBL: Minimal, <5 ml    POST PROCEDURE CHEST RADIOGRAPH: Not needed    SUMMARY: - Normal airway examination. - A BAL was performed in the LLL, see Specimen section for ordered laboratory tests. - A diagnostic and staging EBUS was performed. Stations 11L and 12L were sampled. Please see above for rapid on-site evaluation results, final results are pending. Samples from 12L were placed into RPMI (for stand by) and cultures.    RECOMMENDATIONS: Follow up with referring physician Await BAL and  cytology results The patient was transported to the recovery area/PACU in stable condition. I, Modesto Martines MD, was personally present throughout this procedure, including all key and non-key portions. Date: 01/13/25 Time: 1:23 PM (Images obtained during this procedure, including ultrasonographic images if performed, can be found in within the electronic medical record and/or PACS.)     Result Date: 1/13/2025  Table formatting from the original result was not included. Images from the original result were not included. Bronchoscopy Operative Report [facility] Date of procedure: 01/13/25 Patient: Bijan Ibanez MRN: [id number] YOB: 1960 Gender: male Referring provider/physician: Dr. MARIA ELENA Rodriguez (inpatient procedure, Pulmonary Medicine Consult) PRE-PROCEDURE DIAGNOSIS: Intra-thoracic lymphadenopathy, small pulmonary nodules    PRE-PROCEDURE EVALUATION: A history and physical has been performed, and patient medication allergies have been reviewed. The patient's tolerance of previous anesthesia has been reviewed. The risks and benefits of the procedure and the sedation options and risks were discussed with the patient or their designee. All questions were answered and informed consent obtained. INDICATION: Obtain diagnosis and Staging BRONCHOSCOPIST:              Modesto Martines MD First Assistant: None Second Assistant: None PROCEDURE SUMMARY: Event Event Time ENDO SCOPE IN TIME ENDO SCOPE OUT TIME 01/13/2025 12:11 PM 1/13/2025  1:02 PM             Fluoroscopy time: Not applicable POST-PROCEDURE DIAGNOSIS: Same as pre-operative diagnoses ANESTHESIA: TIVA. See separate anesthesia provider documentation. This procedure was performed using standard monitoring procedures in [facility]. FINDINGS: After adequate local and intravenous anesthesia, bronchoscopy was performed via an endotracheal tube placed by anesthesia. The distal trachea  appeared normal. Inspection of RIGHT bronchial tree to the segmental level appeared normal. Inspection of LEFT bronchial tree to the segmental level appeared normal. PROCEDURES: The bronchoscope was wedged into the LLL.  160 ml of normal saline was instilled, 27 ml was recovered. Please see Specimen section for ordered laboratory tests. Due to poor return (expected for LLL BAL), cytology was not ordered from the BAL. After the airway examination was completed, the scope was then replaced by EBUS scope. Lymph node sizing was performed via endobronchial ultrasound. Sampling by transbronchial needle aspiration was also performed using an Olympus ViziShot 22 gauge needle and sent for routine cytology. Rapid On-Site Evaluation (JOSE CARLOS) was available      - The 11Ri (inferior interlobar) node was not evaluated. Sampling was not done as the node was not evaluated.     - The 11Rs (superior interlobar) node was not evaluated. Sampling was not done as the node was not evaluated.     - The 10R (hilar) node was not evaluated. Sampling was not done as the node was not evaluated.     - The 4R (lower paratracheal) node was  3.9 mm in size. Sampling was not done as it was not clinically indicated.     - The 2R (upper paratracheal) node was not visualized. Sampling was not done as the node was not visualized.     - The 3P (retrotracheal) node was not evaluated. Sampling was not done as the node was not evaluated. -  The 7 (subcarinal) node was  3.4 mm in size. Sampling was not done as it was not clinically indicated.     - The 2L (upper paratracheal) node was not visualized. Sampling was not done as the node was not visualized.     - The 4L (lower paratracheal) node was  2.9 mm in size. Sampling was not done as it was not clinically indicated.     - The 10L (hilar) node was not visualized. Sampling was not done as the node was not visualized.     - The 11L (interlobar) node was  8.6 mm in size. Sampling was done, 4 samples with the  needle were obtained.     - The 12L node was  11.2 mm in size. Sampling was done, 8 samples with the needle were obtained, including 2 for RPMI (stand by) and one for cultures. After diagnostic/therapeutic maneuvers, the airway was examined for evidence of bleeding. None was noted. The bronchoscope was removed from the patient's airway and the airway was handed back over to my colleagues from anesthesiology. SPECIMENS: ID Type Source Tests Collected by Time 1 : BAL LEFT LOWER LOBE Respiratory BAL BAL VIRAL PANEL PCR, FUNGITELL BETA-D GLUCAN PCR QUANT (NON-BLOOD SPECIMEN) Modesto Martines MD 1/13/2025 1209 2 : BAL LEFT LOWER LOBE Fluid BRONCHO-ALVEOLAR LAVAGE OF LEFT LOWER LOBE AFB CULTURE/SMEAR, FUNGAL CULTURE/SMEAR, STERILE FLUID CULTURE/SMEAR Modesto Martines MD 1/13/2025 1210 3 : BAL LEFT LOWER LOBE Bronchoalveolar Lavage BRONCHO-ALVEOLAR LAVAGE OF LEFT LOWER LOBE BODY FLUID CELL COUNT WITH DIFFERENTIAL Modesto Martines MD 1/13/2025 1210 4 : LN 12L FOR CULTURE Tissue LYMPH NODE BIOPSY AFB CULTURE/SMEAR, FUNGAL CULTURE/SMEAR, TISSUE/WOUND CULTURE/SMEAR Modesto Martines MD 1/13/2025 1301 A :  Non-Gynecologic Cytology LYMPH NODE 11 L PULMONARY FINE NEEDLE ASPIRATION CYTOLOGY CONSULTATION (NON-GYNECOLOGIC) Modesto Martines MD 1/13/2025 1230 B :  Non-Gynecologic Cytology LYMPH NODE 12 L PULMONARY FINE NEEDLE ASPIRATION CYTOLOGY CONSULTATION (NON-GYNECOLOGIC) Modesto Martines MD 1/13/2025 1232 RAPID ONSITE EVALUATION (R.O.S.E.): - Stations 11L and 12L TBNA  : lymphoid COMPLICATIONS: None.    EBL: Minimal, <5 ml    POST PROCEDURE CHEST RADIOGRAPH: Not needed    SUMMARY: - Normal airway examination. - A BAL was performed in the LLL, see Specimen section for ordered laboratory tests. - A diagnostic and staging EBUS was performed. Stations 11L and 12L were sampled. Please see above for rapid on-site evaluation results, final results are pending. Samples from 12L were placed into RPMI (for stand by) and  cultures.    RECOMMENDATIONS: Follow up with referring physician Await BAL and cytology results The patient was transported to the recovery area/PACU in stable condition. Modesto BATEMAN MD, was personally present throughout this procedure, including all key and non-key portions. Date: 01/13/25 Time: 1:23 PM (Images obtained during this procedure, including ultrasonographic images if performed, can be found in within the electronic medical record and/or PACS.)    CT angio chest for pulmonary embolism    Result Date: 1/12/2025  Interpreted By:  Sonu Jara and Ritchie Brandon STUDY: CT ANGIO CHEST FOR PULMONARY EMBOLISM;  1/11/2025 1:57 pm   INDICATION: Signs/Symptoms:c/f pe.   COMPARISON: CT of the chest 02/08/2016.   ACCESSION NUMBER(S): [id number]   ORDERING CLINICIAN: RIDGE ANGUIANO   TECHNIQUE: Helical data acquisition of the chest was obtained after intravenous administration of 100 ML Omnipaque 350, as per PE protocol. Images were reformatted in coronal and sagittal planes. Axial and coronal maximum intensity projection (MIP) images were created and reviewed.   FINDINGS: POTENTIAL LIMITATIONS OF THE STUDY: None   HEART AND VESSELS: There are no discrete filling defects within main pulmonary artery and its branches to suggest acute pulmonary embolism. Main pulmonary artery and its branches are normal in caliber.   The thoracic aorta normal in course and caliber.There is mild scattered calcified atherosclerosis present.Although, the study is not tailored for evaluation of aorta, there is no definite evidence of acute aortic pathology. Moderate coronary artery calcifications are seen. Please note,the study is not optimized for evaluation of coronary arteries.   The cardiac chambers are not enlarged.There are no findings to suggest right heart strain.   There is no pericardial effusion seen.   MEDIASTINUM AND CHIN, LOWER NECK AND AXILLA: The visualized thyroid gland is within normal limits.  There is left-sided perihilar soft tissue mass/lymph node which measures 1.8 x 1.7 cm (series 401, image 161). Mildly prominent left axillary lymph node measuring 0.9 cm in short axis with preservation of the fatty hilum (series 401, image 33). Nonspecific, asymmetric soft tissue thickening visualized in the upper left axilla, which could reflect underlying musculature however bulky left axillary lymph node is not readily excluded. Esophagus appears within normal limits as seen.   LUNGS AND AIRWAYS: The trachea and central airways are patent. No endobronchial lesion is seen. In the left lung base, there are multiple well-circumscribed pulmonary nodules, largest measuring 1.2 x 1.0 cm (series 402, image 216). Additional subcentimeter nodules are visualized on coronal image 136 of 162 extending along the posterior left hemithorax, some of which appear to be subpleural for reference measuring 0.9 x 0.7 cm (series 402, image 174). There is an additional left upper lobe pulmonary nodule measuring 0.7 cm (series 402, image 100).   No evidence of focal airspace consolidation, pleural effusion, or pneumothorax     UPPER ABDOMEN: Please see dedicated CT abdomen and pelvis from 01/11/2025 for further characterization of upper abdomen findings.     CHEST WALL AND OSSEOUS STRUCTURES: Chest wall is within normal limits. No acute osseous pathology.There are no suspicious osseous lesions.       1. No evidence of acute pulmonary embolism. 2. Left-sided perihilar soft tissue mass/lymph node measuring 1.8 x 1.7 cm. Findings favored to represent metastatic disease however underlying primary lung neoplasm is not definitively excluded. 3. Multiple left-sided pulmonary nodules, the majority located in the posterior left lower lobe with the largest measuring 1.2 x 1.0 cm as detailed above. There are additional nodules which exhibit subpleural distribution and an isolated left upper lobe pulmonary nodule which measures 0.7 cm. Findings are  favored to reflect underlying enlarged intrapulmonary lymph nodes an underlying metastatic disease not definitively excluded.   I personally reviewed the images/study and I agree with the findings as stated by resident Luis Eduardo Limon. This study was interpreted at [facility], [place], [place].   MACRO: Critical Finding:  See findings. Notification was initiated on 1/11/2025 at 3:02 pm by  Luis Eduardo Limon.  (**-YCF-**) Instructions:   Signed by: Sonu Jara 1/12/2025 12:36 PM Dictation workstation:   [id number]    CT abdomen pelvis w IV contrast    Result Date: 1/11/2025  Interpreted By:  Mack Fuentes  and Braxton Hoff STUDY: CT ABDOMEN PELVIS W IV CONTRAST;  1/11/2025 1:57 pm   INDICATION: Signs/Symptoms:c/f malignacy.     COMPARISON: CT abdomen is from 02/08/2016 and 01/12/2015.   ACCESSION NUMBER(S): [id number]   ORDERING CLINICIAN: RIDGE ANGUIANO   TECHNIQUE: CT of the abdomen and pelvis was performed.  Standard contiguous axial images were obtained at 3 mm slice thickness through the abdomen and pelvis. Coronal and sagittal reconstructions at 3 mm slice thickness were performed.  100 ML of Omnipaque 350 was administered intravenously without immediate complication.   FINDINGS: LOWER CHEST: Please see dedicated CT angio of the chest from 01/11/2025 for further characterization of lower chest/lung findings.   ABDOMEN:   LIVER: The liver is normal in size without evidence of focal liver lesions.   BILE DUCTS: The intrahepatic and extrahepatic ducts are not dilated.   GALLBLADDER: The gallbladder is surgically absent.   PANCREAS: The pancreas appears unremarkable without evidence of ductal dilatation or masses.   SPLEEN: The spleen is enlarged measuring 18 cm in craniocaudal dimension. There is no evidence of splenic lesion.   ADRENAL GLANDS: There is asymmetric nodular masslike thickening of the right adrenal gland measuring 2.3 x 1.8 cm, best visualized on  coronal image 66 of 113, stable as compared to CT dated 02/08/2016.. The left adrenal gland is unremarkable in appearance.   KIDNEYS AND URETERS: There is an ill-defined hypodense masslike infiltrative focus involving the anterior, interpolar region of the right kidney which measures 3.0 x 2.9 cm (series 501, image 69) which partially effaces the anterior right renal pelvis. Additionally in the posterosuperior aspect of the right kidney, there is a additional area of ill-defined masslike thickening measuring 3.7 x 2.7 cm closely abutting the inferior aspect of the right hepatic lobe (series 502, image 73-74).. There are scattered low-density, well-circumscribed lesions involving the left kidney, favored to represent renal cysts, the largest of which measures 2.2 cm in the left interpolar region (series 501, image 71).   PELVIS:   BLADDER: The urinary bladder appears normal without abnormal wall thickening.   REPRODUCTIVE ORGANS: The prostate is not enlarged.   BOWEL: The stomach is unremarkable.   The small and large bowel are normal in caliber and demonstrate no wall thickening.   The appendix is not definitely visualized. There is however no pericecal stranding or fluid.   VESSELS: There is no aneurysmal dilatation of the abdominal aorta. The IVC appears normal.   PERITONEUM/RETROPERITONEUM/LYMPH NODES: There is redemonstration of a 4.7 x 2.1 cm soft tissue mass in the right anterolateral paraspinous tissues at T12-L1, previously characterized on visualized on MR of the lumbar spine from 01/10/2025. This lesion effaces the right violeta of the diaphragm. Otherwise  multiple scattered, nonenlarged peritoneal lymph nodes. There is no evidence of free or loculated fluid collections. No evidence of free intraperitoneal air.   BONES AND ABDOMINAL WALL: No suspicious osseous lesions are identified. Degenerative discogenic disease is noted in the lower thoracic and lumbar spine.  The abdominal wall soft tissues appear  normal.       1.  Ill-defined hypodense infiltrative masslike foci involving the right kidney, largest measuring 3.0 x 2.9 cm in partially effacing the anterior right renal pelvis. There is an additional infiltrative lesion located at the superior aspect of the right kidney which measures 3.7 x 2 point 7 cm and closely abuts the inferior aspect of the right hepatic lobe. Primary differential considerations would include primary renal neoplasm such as renal cell carcinoma versus metastatic disease. Recommend PET/CT if clinically indicated. 2. Stable asymmetric nodular masslike thickening of the right adrenal gland measuring 2.6 x 1.8 cm. Findings concerning for adenoma. Superimposed metastatic disease is not excluded. Recommend PET/CT if clinically indicated. 3. 4.7 x 2.1 cm soft tissue mass in the right anterolateral aspect of the paraspinous tissues at T12-L1, previously characterized on MR of the lumbar spine from 01/10/2025.  Considerations include a nerve sheath tumor,abnormal lymph node conglomerate. Recommend PET/CT if clinically indicated. 4. Please see dedicated CT of the chest from 01/11/2025 for further characterization of multiple scattered pulmonary nodules involving the left lung base and left pleura. 5. Mild splenomegaly.   I personally reviewed the images/study and I agree with the findings as stated by resident Luis Eduardo Limon. This study was interpreted at [facility], [place], [place].   MACRO: None   Signed by: Mack Fuentes 1/11/2025 5:45 PM Dictation workstation:   [id number]    XR chest 2 views    Result Date: 1/11/2025  Interpreted By:  Gianni Bishop and Ritchie Brandon STUDY: XR CHEST 2 VIEWS;  1/11/2025 9:44 am   INDICATION: Signs/Symptoms:c/f pna.   COMPARISON:   Chest x-ray 01/03/2025. CT of the chest 02/08/2016.   ACCESSION NUMBER(S): [id number]   ORDERING CLINICIAN: RIDGE ANGUIANO   FINDINGS: PA and lateral radiographs of the chest were  provided.   CARDIOMEDIASTINAL SILHOUETTE: Cardiomediastinal silhouette is normal in size and configuration.   LUNGS: No focal airspace consolidation, pleural effusion, or pneumothorax.   ABDOMEN: No remarkable upper abdominal findings.   BONES: No acute osseous changes.       1.  No radiographic evidence of acute cardiopulmonary process.   I personally reviewed the images/study and I agree with the findings as stated by resident Luis Eduardo Limon. This study was interpreted at [facility], [place], [place].   MACRO: None   Signed by: Gianni Bishop 1/11/2025 10:13 AM Dictation workstation:   [id number]    CT head wo IV contrast    Result Date: 1/11/2025  Interpreted By:  Gabriel Matias and Ohs Zachary STUDY: CT HEAD WO IV CONTRAST;  1/11/2025 2:59 am   INDICATION: Signs/Symptoms:eval for metastatic disease.   COMPARISON: None.   ACCESSION NUMBER(S): [id number]   ORDERING CLINICIAN: JALEEL LITTLE   TECHNIQUE: Noncontrast axial CT images of head were obtained with coronal and sagittal reconstructed images.   FINDINGS: BRAIN PARENCHYMA: Rounded near simple fluid density lesion in the left subinsular white matter is 1.3 x 1.1 x 0.9 cm (series 209, image 18 and series 211, image 52) with no surrounding vasogenic edema or significant mass effect.   No acute intraparenchymal hemorrhage or parenchymal evidence of acute large territory ischemic infarct. No mass-effect. Gray-white matter distinction is preserved.   VENTRICLES and EXTRA-AXIAL SPACES:  No acute extra-axial or intraventricular hemorrhage. No effacement of cerebral sulci. Ventricles and sulci are age-concordant.   PARANASAL SINUSES/MASTOIDS:  No hemorrhage or air-fluid levels within the visualized paranasal sinuses. The mastoids are well aerated.   CALVARIUM/ORBITS:  No skull fracture.  The orbits and globes are intact to the extent visualized.   EXTRACRANIAL SOFT TISSUES: No discernible abnormality.       1. Rounded near  simple fluid density lesion in the left subinsular white matter favored to represent prominent perivascular space as opposed to malignancy. Nonemergent, MRI brain could better evaluate if clinically indicated. 2. No acute intracranial abnormality.   I personally reviewed the images/study and I agree with the findings as stated by Dr. Cam Byrd. This study was interpreted at [place], [place].   MACRO: None.   Signed by: Gabriel Matias 1/11/2025 7:24 AM Dictation workstation:   [id number]    ECG 12 lead    Result Date: 1/11/2025  Normal sinus rhythm Nonspecific ST abnormality Abnormal ECG When compared with ECG of 03-JAN-2025 02:17, Borderline criteria for Inferior infarct are no longer Present See ED provider note for full interpretation and clinical correlation Confirmed by Vandana Echeverria ([id number]) on 1/11/2025 3:29:14 AM    MR lumbar spine wo IV contrast    Result Date: 1/10/2025  Interpreted By:  Terrence Gipson, STUDY: MRI of the lumbar spine without IV contrast;  1/10/2025 1:00 pm   INDICATION: Signs/Symptoms:back pain.   ,M54.9 Dorsalgia, unspecified   COMPARISON: None.   ACCESSION NUMBER(S): [id number]   ORDERING CLINICIAN: PATRICE SCHNEIDER   TECHNIQUE: Sagittal STIR, T1- and T2-weighted as well as axial T1- and T2- weighted MRI images of the lumbar spine were acquired using a spondylolysis protocol.  No contrast was administered.   FINDINGS: No significant scoliosis. No significant spondylolisthesis.   No compression deformity no destructive osseous lesion.   The lower thoracic cord appears unremarkable. Normal conus termination.   Moderately severe loss of disc height L5-S1 with predominant Modic type 2 endplate degenerative changes at that level. Disc desiccated to varying degrees throughout the lumbar spine.   There is some heterogeneity of the marrow of the bilateral sacral ala in the posterior iliac bones. This may represent simple age-related marrow  heterogeneity. Insufficiency fracture or fractures not excluded though considered less likely.   At the T12 and L1 levels in the right anterolateral paraspinous tissues is a masslike structure that measures 39 x 17 mm greatest axial dimensions, 39 mm cc and may represent a soft tissue mass though this is unclear. Considerations include a nerve sheath tumor, abnormal lymph node conglomerate, fluid collection and others.   LEVELS: L5-S1: Disc osteophytic bulge lateralized to both sides. Moderate facet arthrosis. No significant central canal or lateral recess stenosis. Mild-to-moderate bilateral foraminal stenosis. L4-L5: Mild disc bulge mildly lateralized into both sides. Moderate facet arthrosis. Mild central canal stenosis. No significant lateral recess stenosis. Mild-to-moderate bilateral foraminal stenosis. L3-L4: Mild disc bulge minimally lateralized into both sides. Moderate facet arthrosis. No significant central canal or lateral recess stenosis. Mild bilateral foraminal stenosis. L2-L3: Minimal disc bulge. Mild-to-moderate facet arthrosis. No significant central canal stenosis. There is nodular structure along the posterior margin of the left neural foramen which may represent extruded migrated disc fragment, facet synovial cyst, nerve root sleeve diverticulum, or other and appears to exert mass effect upon the exiting left L2 nerve root. L1-L2: Mild degenerative changes. No significant stenosis.         Multilevel degenerative lumbar spondylosis as described.  There is nodular structure along the posterior margin of the left L2-3 neural foramen which may represent extruded migrated disc fragment, facet synovial cyst, nerve root sleeve diverticulum, or other and appears to exert mass effect upon the exiting left L2 nerve root. Correlate clinically for left-sided L2 radiculopathy.   At the T12 and L1 levels in the right anterolateral paraspinous tissues is a masslike structure that measures 39 x 17 mm greatest  axial dimensions, 39 mm cc and may represent a soft tissue mass though this is unclear. Considerations include a nerve sheath tumor, abnormal lymph node conglomerate, fluid collection and others. Consider further imaging evaluation MRI of the abdomen without and with contrast.   There is some heterogeneity of the marrow of the bilateral sacral ala in the posterior iliac bones. This may represent simple age-related marrow heterogeneity. Insufficiency fracture or fractures not excluded, though considered less likely. If indicated consider further initial imaging with plain films or CT.   MACRO: None   Signed by: Terrence Gipson 1/10/2025 5:31 PM Dictation workstation:   [id number]    CT soft tissue neck w IV contrast    Result Date: 1/9/2025  Interpreted By:  Terrence Gipson, STUDY: CT SOFT TISSUE NECK W IV CONTRAST;  1/9/2025 2:32 pm   INDICATION: Signs/Symptoms:neck mass.   ,R22.1 Localized swelling, mass and lump, neck   COMPARISON: None.   ACCESSION NUMBER(S): [id number]   ORDERING CLINICIAN: PATRICE SCHNEIDER   TECHNIQUE: Axial CT images of the neck were obtained.  The patient received 50 ML of Omnipaque 350 intravenous contrast agent. The images were reformatted in angled axial, coronal and sagittal planes.   FINDINGS: Fusiform thickening of the right masseter muscle. There is some induration of the adjacent parotid gland there is also asymmetric thickening of the right temporalis and pterygoid musculature. No associated abnormal enhancement. No associated bony remodeling or destruction. No evident odontogenic inflammation.   An 11 mm low-density subcutaneous nodule posterior left neck, suspected sebaceous cyst.   Partially visualized 10 mm low-density focus inferior left basal ganglia possible age-indeterminate lacunar infarction versus dilated perivascular space.   Normal-sized thyroid without suspicious mass.   Paranasal sinuses and mastoid air cells are well aerated.   No destructive osseous lesions or  acute osseous abnormalities.   No flow-limiting stenosis throughout the arterial structures of the neck.       Fusiform thickening of the right-sided muscles of mastication including masseter, temporalis, and pterygoid muscles. No associated abnormal enhancement or discrete mass identified. Findings most likely to represent asymmetric benign hypertrophy of the muscles of mastication.   Partially visualized 10 mm low-density focus inferior left basal ganglia possible age-indeterminate lacunar infarction versus dilated perivascular space.   MACRO: None   Signed by: Terrence Gipson 1/9/2025 5:00 PM Dictation workstation:   [id number]    ECG 12 lead    Result Date: 1/3/2025  Normal sinus rhythm Possible Inferior infarct , age undetermined Abnormal ECG No previous ECGs available Confirmed by Francisco Vazquez ([id number]) on 1/3/2025 11:32:10 AM    XR chest 1 view    Result Date: 1/3/2025  Interpreted By:  Caitie Chanel, STUDY: XR CHEST 1 VIEW;  1/3/2025 1:44 am   INDICATION: Signs/Symptoms:cough.     COMPARISON: 03/19/2015   ACCESSION NUMBER(S): [id number]   ORDERING CLINICIAN: ANTWON MAI   FINDINGS: AP radiograph of the chest was provided.       CARDIOMEDIASTINAL SILHOUETTE: Cardiomediastinal silhouette is normal in size and configuration.   LUNGS: Bilateral interstitial prominence noted. No focal consolidation, pleural effusion or sizable pneumothorax seen.   ABDOMEN: No remarkable upper abdominal findings.   BONES: No acute osseous changes.       1.  No definite focal consolidation or pleural effusion.       MACRO: None   Signed by: Caitie Chanel 1/3/2025 2:10 AM Dictation workstation:   [id number]              Assessment/Plan   Assessment & Plan  Febrile neutropenia (CMS-HCC)    Burkitt's lymphoma of lymph nodes of multiple regions (Multi)      Bijan Ibanez is a 64 y.o. male with a past medical history of hypertension, hyperlipidemia, coronary artery disease (s/p stent 2010, on ASA), renal cell carcinoma  (s/p R partial nephrectomy in 2013 @ [facility]), GERD, BPH, arthritis, and Burkitt lymphoma being treated with DA-R-EPOCH (since 1/21/25) who is admitted today (01/28/25) with febrile neutropenia and mucositis.      Active issues (01/29/25):  #Febrile neutropenia. #Productive cough. Continue piperacillin-tazobactam. Infectious workup thus far negative   #Esophageal mucositis.  S/p EPOCH. Medications > IV. Supportive care.      ONC  #Burkitt Lymphoma  :: Workup and treatment as per Onc History  :: Received C1 DA-R-EPOCH (1/21/25), supported with pegfilgrastim 1/28/25  :: CT neck (1/27/25): Mild asymmetric enlargement of the right muscles of mastication, predominantly the masseter, likely due to lymphomatous involvement, less conspicuous compared to 1/9/2025, with no new mass or infectious/inflammatory process in the neck or aerodigestive tract  - Due for C2 ~ 2/11/25  # TLS monitoring and prophylaxis  :: Uric acid 4.4 mg/dL (1/28/25) on admit, 2.8 today (1/29/25)  - HOLD allopurinol with severe mucositis, will monitor UA levels daily     HEME  #Pancytopenia  - Likely related to chemotherapy vs disease   # VTE Prophylaxis  - SCDs and ambulation only with thrombocytopenia     ID  #Febrile neutropenia  :: Cultures and serology  = Blood cultures (1/28/25): NGTD  = UA (1/28/25): not suspicious for infection  = Influenza A/B, COVID, RSV (1/27/25): negative  = Parainfluenza, Metapneumovirus, Rhinovirus, Adenovirus PCR (1/27/25): negative   = Hold off on sputum culture (1/28/25) -- cough seems mostly from inability to clear oral secretions  :: Imaging  = CXR (1/27/25): No cardiopulmonary process  :: Antibiotics  - Piperacillin-tazobactam 3.375 g IV Q6H (1/28- )  - Patient reports unknown reaction as a child to penicillin -- discussed with Clinical Oncology Specialist PharmD, Ok to trial piperacillin-tazobactam, remove allergy if no reaction, tolerating well so far   # Prophylaxis  - VZV: Continue acyclovir 400 mg PO BID  -  Candidiasis: Continue flucaonzole 400 mg PO daily   - PJP: None at this time     FEN/RENAL  - Admission weight 115 kg (01/28/25)  F 50 mL/hr x 40 hours  E PRN  N Low-pathogen       GI  # Esophageal mucositis  - Chemotherapy  :: CT neck (1/27/25) as above: no signs of abscess or fluid collection  - NS @ 50 mL/hr x 40 hours (2L) to supplement poor PO intake  - Supportive care: BMX, viscous lidocaine, sucralfate PRN  - Oxycodone 5 mg PO PRN Q4H, hydromorphone 0.3 IV PRN Q4H     CARDIO/PULM  #Hypertension  #Hyperlipidemia  #CAD  #MI s/p stent 2010  - Continue home atorvastatin, metoprolol  - HOLD home aspirin 81 mg daily with thrombocytopenia  - HOLD home lisinopril in setting of low BP's  #Functional monitoring  - ECHO (1/18/25): LVSF normal, EF 65-70%.      ACCESS/SUPPORT/DISPO  - FULL CODE  - MD: Mike  - ACCESS: KRISHAN PICC  - PT/RD/AI      Pt seen, examined and discussed with MD Frida Diop, APRN-CNP

## 2025-01-29 NOTE — CONSULTS
"Nutrition Initial Assessment:   Nutrition Assessment    --->Reason for Assessment: Provider consult order    Patient is a 64 y.o. male with Burkitt's Lymphoma, admitted d/t fevers and mucositis.     Nutrition History:  This writer met with pt this afternoon, was sitting up on edge of bed at time of visit with him.    Tells appetite has been decreased from his baseline since ~12/2024 (when he found out he had Lymphoma). Has had issues with mucositis and swollen LN/parotid gland now for the last couple of weeks. Mucositis has worsened over the last several days. PO has been even more difficult than normal d/t pain with swallowing and chewing. Tries to eat a few times/day, though focuses on softer foods. Able to eat some oatmeal for breakfast meal today then had some soup for lunch meal.    Denied any issues with n/v/d during visit with him this afternoon.    --Vitamin/Herbal Supplement Use: none  --Food Allergy: none       Anthropometrics:  Height: 180.3 cm (5' 10.98\")   Weight: 115 kg (252 lb 6.8 oz)   BMI (Calculated): 35.22  IBW/kg (Dietitian Calculated): 78.2 kg  Percent of IBW: 147 %       Weight History:   --Pt thinks he has possibly been losing some weight recently, though tells he is having issues with \"swollen legs\" so his weight is currently up d/t fluids.    Wt Readings per review of EMR:   01/28/25 115 kg (252 lb 6.8 oz) (current wt)   01/28/25 115 kg (252 lb 6.8 oz)   01/27/25 115 kg (253 lb 15.5 oz)   01/26/25 115 kg (253 lb 15.5 oz)   01/13/25 107 kg (236 lb)   01/10/25 107 kg (236 lb)   01/08/25 107 kg (236 lb)   01/06/25 107 kg (236 lb)   01/03/25 112 kg (246 lb 0.5 oz)   10/21/24 111 kg (245 lb)--->3.6% wt loss from this date to 01/13/25 (not significant)   09/05/24 112 kg (246 lb)     Nutrition Focused Physical Exam Findings:  Subcutaneous Fat Loss:   Orbital Fat Pads: Mild-Moderate (slight dark circles and slight hollowing)  Buccal Fat Pads: Mild-Moderate (flat cheeks, minimal bounce)  Triceps: " Mild-Moderate (less than ample fat tissue)  Ribs: Defer  Muscle Wasting:  Temporalis: Mild-Moderate (slight depression)  Pectoralis (Clavicular Region): Mild-Moderate (some protrusion of clavicle)  Deltoid/Trapezius: Mild-Moderate (slight protrusion of acromion process)  Interosseous: Well nourished (muscle bulges)  Trapezius/Infraspinatus/Supraspinatus (Scapular Region): Mild-Moderate (slight protrusion of scapula)  Quadriceps: Defer  Gastrocnemius: Defer  Edema:  Edema: +1 trace  Edema Location: BLE  Physical Findings:  Hair: Negative  Eyes: Negative  Nails: Negative  Skin: Negative    Nutrition Significant Labs:  CBC Trend:   Results from last 7 days   Lab Units 01/29/25 0606 01/28/25  0916 01/27/25 2025 01/26/25  0512   WBC AUTO x10*3/uL 0.1* 0.2* 0.2* 1.0*   RBC AUTO x10*6/uL 2.53* 2.81* 3.74* 2.56*   HEMOGLOBIN g/dL 7.0* 7.9* 10.3* 7.1*   HEMATOCRIT % 21.5* 23.2* 30.6* 21.1*   MCV fL 85 83 82 82   PLATELETS AUTO x10*3/uL 9* 14* 15* 19*   BMP Trend:   Results from last 7 days   Lab Units 01/29/25 0606 01/28/25 0916 01/27/25 2025 01/26/25  0512   GLUCOSE mg/dL 124* 109* 124* 171*   CALCIUM mg/dL 7.3* 7.7* 7.6* 7.5*   SODIUM mmol/L 137 136 136 142   POTASSIUM mmol/L 3.6 3.8 4.1 4.4   CO2 mmol/L 30 31 29 30   CHLORIDE mmol/L 101 101 103 107   BUN mg/dL 17 21 26* 40*   CREATININE mg/dL 0.54 0.58 0.63 0.65   A1C:  Lab Results   Component Value Date    HGBA1C 5.3 10/16/2024   Liver Function Trend:   Results from last 7 days   Lab Units 01/29/25  0606 01/28/25  0916 01/27/25 2025 01/24/25  0512   ALK PHOS U/L 58 58 66 88   AST U/L 14 15 24 46*   ALT U/L 36 42 51 43   BILIRUBIN TOTAL mg/dL 1.3* 0.9 0.9 0.7   Renal Lab Trend:   Results from last 7 days   Lab Units 01/29/25  0606 01/28/25  0916 01/27/25 2025 01/26/25  0512   POTASSIUM mmol/L 3.6 3.8 4.1 4.4   PHOSPHORUS mg/dL 3.0  --   --  5.1*   SODIUM mmol/L 137 136 136 142   MAGNESIUM mg/dL 1.83 2.01 2.13 2.58*   EGFR mL/min/1.73m*2 >90 >90 >90 >90   BUN mg/dL  17 21 26* 40*   CREATININE mg/dL 0.54 0.58 0.63 0.65   Vit B12:   Lab Results   Component Value Date    [id number] 422 01/11/2025   Folate:   Lab Results   Component Value Date    FOLATE 8.8 01/11/2025      Medications:  Scheduled medications  acyclovir, 250 mg, intravenous, BID  [Held by provider] acyclovir, 400 mg, oral, q12h SONAL  [Held by provider] allopurinol, 300 mg, oral, Daily  atorvastatin, 40 mg, oral, Daily  baclofen, 10 mg, oral, TID  fluconazole, 400 mg, intravenous, q24h  [Held by provider] fluconazole, 400 mg, oral, Daily  gabapentin, 300 mg, oral, Nightly  metoprolol succinate XL, 50 mg, oral, Daily  [Held by provider] pantoprazole, 40 mg, oral, Daily before breakfast  pantoprazole, 40 mg, intravenous, Daily  piperacillin-tazobactam, 3.375 g, intravenous, q6h    Continuous medications  sodium chloride 0.9%, 50 mL/hr, Last Rate: 50 mL/hr (01/29/25 1603)    PRN medications  PRN medications: albuterol, alteplase, HYDROmorphone, lidocaine, lidocaine-diphenhydraMINE-Maalox 1:1:1, [Held by provider] oxyCODONE, oxyCODONE, prochlorperazine, sucralfate    I/O:   --no documented BM yet, since admit    Dietary Orders (From admission, onward)       Start     Ordered    01/29/25 1638  Oral nutritional supplements  Until discontinued        Question Answer Comment   Deliver with All meals    Select supplement: Ensure Plus        01/29/25 1637    01/28/25 1410  Adult diet Low microbial  Diet effective now        Question:  Diet type  Answer:  Low microbial    01/28/25 1409    01/28/25 1357  May Participate in Room Service  ( ROOM SERVICE MAY PARTICIPATE)  Once        Question:  .  Answer:  Yes    01/28/25 1356                Estimated Needs:   Total Energy Estimated Needs in 24 hours (kCal): 5676-9193  Method for Estimating Needs: 78 (IBW) x ~28-30  Total Protein Estimated Needs in 24 Hours (g): 95+  Method for Estimating 24 Hour Protein Needs: 78 (IBW) x ~1.2g/kg+  Total Fluid Estimated Needs in 24 Hours (mL):  1ml/kcal or per MD/team        Nutrition Diagnosis   Malnutrition Diagnosis  Patient has Malnutrition Diagnosis: Yes  Diagnosis Status: New  Malnutrition Diagnosis: Moderate malnutrition related to chronic disease or condition (acute-on-chronic)  Related to: CA, worsening issues with mucositis  As Evidenced by: pt estimated to be consuming <75% estimated energy needs x >1 month with further decline in PO x the last several days, areas of mild/moderate muscle and fat losses noted during nutrition exam    Additional Assessment Information: Muscle and fat losses likely a combination of age and disease-related losses, though considering decreased PO, do feel pt presenting with malnutrition at this time.     Considering malnutrition, offered to provide oral nutrition supplements and pt agreeable to Ensure Plus.       Nutrition Interventions/Recommendations   Nutrition prescription for oral nutrition    Nutrition Recommendations:  1. Continue Low Microbial Diet, only as tolerated. Pt able to choose foods he feels he can tolerate better than others d/t issues with oral pain from mucositis.  --RDN placed order for Ensure Plus TID for added nutrition.    2. Check 25(OH)-D level.    3. Daily, standing weights.    Nutrition Interventions/Goals:   Interventions: Meals and snacks, Medical food supplement  Meals and Snacks: General healthful diet  Goal: Low Microbial Diet  Medical Food Supplement: Commercial beverage medical food supplement therapy  Goal: Ensure Plus TID (350 kcals, 13g pro each)    Education: Encouraged ongoing PO, as tolerated with use of softer foods + oral nutrition supplements. Pt agreeable. Denied any questions for RDN at this time.        Nutrition Monitoring and Evaluation   Food/Nutrient Related History Monitoring  Monitoring and Evaluation Plan: Intake / amount of food  Intake / Amount of food: Consumes at least 50% or more of meals/snacks/supplements    Anthropometric Measurements  Monitoring and  Evaluation Plan: Body weight  Body Weight: Body weight - Weight reduction from fluids, as needed    Biochemical Data, Medical Tests and Procedures  Monitoring and Evaluation Plan: Electrolyte/renal panel, Glucose/endocrine profile  Electrolyte and Renal Panel: Electrolytes within normal limits  Glucose/Endocrine Profile: Glucose within normal limits ( mg/dL)    Goal Status: New goal(s) identified          Time Spent (min): 45 minutes

## 2025-01-29 NOTE — PROGRESS NOTES
01/29/25 1800   Discharge Planning   Living Arrangements Spouse/significant other   Support Systems Spouse/significant other;Children   Assistance Needed none   Type of Residence Private residence   Number of Stairs to Enter Residence 1   Number of Stairs Within Residence 13   Who is requesting discharge planning? Patient   Home or Post Acute Services None   Expected Discharge Disposition Home   Does the patient need discharge transport arranged? No   Financial Resource Strain   How hard is it for you to pay for the very basics like food, housing, medical care, and heating? Not hard   Housing Stability   In the last 12 months, was there a time when you were not able to pay the mortgage or rent on time? N   In the past 12 months, how many times have you moved where you were living? 0   At any time in the past 12 months, were you homeless or living in a shelter (including now)? N   Transportation Needs   In the past 12 months, has lack of transportation kept you from medical appointments or from getting medications? no   In the past 12 months, has lack of transportation kept you from meetings, work, or from getting things needed for daily living? No     LSW met with pt at bedside to complete assessment. Pt admitted for neutropenic fever and mucositis s/p C1 EPOCH. Pt follows with Dr. Mckeon for dx Burkitt's Lymphoma and has a SOLO PICC. Pt plans to return home with wife, no discharge needs anticipated. Pt declines any DME/HHC use or need at this time. Provider completed and faxed in requested FMLA paerwork for pt today. LSW to cont to follow.

## 2025-01-30 LAB
ALBUMIN SERPL BCP-MCNC: 2 G/DL (ref 3.4–5)
ANION GAP SERPL CALC-SCNC: 10 MMOL/L (ref 10–20)
ATRIAL RATE: 117 BPM
BASOPHILS # BLD AUTO: 0 X10*3/UL (ref 0–0.1)
BASOPHILS NFR BLD AUTO: 0 %
BLOOD EXPIRATION DATE: NORMAL
BLOOD EXPIRATION DATE: NORMAL
BUN SERPL-MCNC: 16 MG/DL (ref 6–23)
CALCIUM SERPL-MCNC: 7.1 MG/DL (ref 8.6–10.6)
CHLORIDE SERPL-SCNC: 101 MMOL/L (ref 98–107)
CO2 SERPL-SCNC: 29 MMOL/L (ref 21–32)
CREAT SERPL-MCNC: 0.49 MG/DL (ref 0.5–1.3)
DISPENSE STATUS: NORMAL
DISPENSE STATUS: NORMAL
EGFRCR SERPLBLD CKD-EPI 2021: >90 ML/MIN/1.73M*2
EOSINOPHIL # BLD AUTO: 0 X10*3/UL (ref 0–0.7)
EOSINOPHIL NFR BLD AUTO: 0 %
ERYTHROCYTE [DISTWIDTH] IN BLOOD BY AUTOMATED COUNT: 13.4 % (ref 11.5–14.5)
GLUCOSE SERPL-MCNC: 128 MG/DL (ref 74–99)
HCT VFR BLD AUTO: 21.6 % (ref 41–52)
HGB BLD-MCNC: 6.9 G/DL (ref 13.5–17.5)
IMM GRANULOCYTES # BLD AUTO: 0 X10*3/UL (ref 0–0.7)
IMM GRANULOCYTES NFR BLD AUTO: 0 % (ref 0–0.9)
LDH SERPL L TO P-CCNC: 589 U/L (ref 84–246)
LYMPHOCYTES # BLD AUTO: 0.09 X10*3/UL (ref 1.2–4.8)
LYMPHOCYTES NFR BLD AUTO: 90 %
MAGNESIUM SERPL-MCNC: 1.76 MG/DL (ref 1.6–2.4)
MCH RBC QN AUTO: 27.3 PG (ref 26–34)
MCHC RBC AUTO-ENTMCNC: 31.9 G/DL (ref 32–36)
MCV RBC AUTO: 85 FL (ref 80–100)
MONOCYTES # BLD AUTO: 0.01 X10*3/UL (ref 0.1–1)
MONOCYTES NFR BLD AUTO: 10 %
NEUTROPHILS # BLD AUTO: 0 X10*3/UL (ref 1.2–7.7)
NEUTROPHILS NFR BLD AUTO: 0 %
NRBC BLD-RTO: 0 /100 WBCS (ref 0–0)
P AXIS: 93 DEGREES
P OFFSET: 184 MS
P ONSET: 126 MS
PHOSPHATE SERPL-MCNC: 3 MG/DL (ref 2.5–4.9)
PLATELET # BLD AUTO: 6 X10*3/UL (ref 150–450)
POTASSIUM SERPL-SCNC: 3.5 MMOL/L (ref 3.5–5.3)
PR INTERVAL: 184 MS
PRODUCT BLOOD TYPE: 600
PRODUCT BLOOD TYPE: 6200
PRODUCT CODE: NORMAL
PRODUCT CODE: NORMAL
Q ONSET: 218 MS
QRS COUNT: 19 BEATS
QRS DURATION: 92 MS
QT INTERVAL: 302 MS
QTC CALCULATION(BAZETT): 421 MS
QTC FREDERICIA: 377 MS
R AXIS: 15 DEGREES
RBC # BLD AUTO: 2.53 X10*6/UL (ref 4.5–5.9)
SODIUM SERPL-SCNC: 136 MMOL/L (ref 136–145)
T AXIS: 92 DEGREES
T OFFSET: 369 MS
UNIT ABO: NORMAL
UNIT ABO: NORMAL
UNIT NUMBER: NORMAL
UNIT NUMBER: NORMAL
UNIT RH: NORMAL
UNIT RH: NORMAL
UNIT VOLUME: 290
UNIT VOLUME: 350
URATE SERPL-MCNC: 2 MG/DL (ref 4–7.5)
VENTRICULAR RATE: 117 BPM
WBC # BLD AUTO: 0.1 X10*3/UL (ref 4.4–11.3)
XM INTEP: NORMAL

## 2025-01-30 PROCEDURE — 2500000001 HC RX 250 WO HCPCS SELF ADMINISTERED DRUGS (ALT 637 FOR MEDICARE OP): Performed by: PHYSICIAN ASSISTANT

## 2025-01-30 PROCEDURE — 2500000005 HC RX 250 GENERAL PHARMACY W/O HCPCS: Performed by: PHYSICIAN ASSISTANT

## 2025-01-30 PROCEDURE — 84550 ASSAY OF BLOOD/URIC ACID: CPT | Performed by: PHYSICIAN ASSISTANT

## 2025-01-30 PROCEDURE — 83735 ASSAY OF MAGNESIUM: CPT | Performed by: PHYSICIAN ASSISTANT

## 2025-01-30 PROCEDURE — 36430 TRANSFUSION BLD/BLD COMPNT: CPT

## 2025-01-30 PROCEDURE — P9037 PLATE PHERES LEUKOREDU IRRAD: HCPCS

## 2025-01-30 PROCEDURE — 2500000002 HC RX 250 W HCPCS SELF ADMINISTERED DRUGS (ALT 637 FOR MEDICARE OP, ALT 636 FOR OP/ED): Performed by: PHYSICIAN ASSISTANT

## 2025-01-30 PROCEDURE — 1170000001 HC PRIVATE ONCOLOGY ROOM DAILY

## 2025-01-30 PROCEDURE — 85025 COMPLETE CBC W/AUTO DIFF WBC: CPT | Performed by: PHYSICIAN ASSISTANT

## 2025-01-30 PROCEDURE — 80069 RENAL FUNCTION PANEL: CPT | Performed by: PHYSICIAN ASSISTANT

## 2025-01-30 PROCEDURE — P9040 RBC LEUKOREDUCED IRRADIATED: HCPCS

## 2025-01-30 PROCEDURE — 2500000004 HC RX 250 GENERAL PHARMACY W/ HCPCS (ALT 636 FOR OP/ED): Performed by: NURSE PRACTITIONER

## 2025-01-30 PROCEDURE — 99232 SBSQ HOSP IP/OBS MODERATE 35: CPT | Performed by: INTERNAL MEDICINE

## 2025-01-30 PROCEDURE — 2500000004 HC RX 250 GENERAL PHARMACY W/ HCPCS (ALT 636 FOR OP/ED): Performed by: PHYSICIAN ASSISTANT

## 2025-01-30 PROCEDURE — 83615 LACTATE (LD) (LDH) ENZYME: CPT | Performed by: PHYSICIAN ASSISTANT

## 2025-01-30 RX ORDER — MAGNESIUM SULFATE HEPTAHYDRATE 40 MG/ML
2 INJECTION, SOLUTION INTRAVENOUS ONCE
Status: COMPLETED | OUTPATIENT
Start: 2025-01-30 | End: 2025-01-30

## 2025-01-30 RX ORDER — POTASSIUM CHLORIDE 14.9 MG/ML
20 INJECTION INTRAVENOUS ONCE
Status: COMPLETED | OUTPATIENT
Start: 2025-01-30 | End: 2025-01-30

## 2025-01-30 RX ORDER — FUROSEMIDE 10 MG/ML
20 INJECTION INTRAMUSCULAR; INTRAVENOUS ONCE
Status: COMPLETED | OUTPATIENT
Start: 2025-01-30 | End: 2025-01-30

## 2025-01-30 RX ADMIN — HYDROMORPHONE HYDROCHLORIDE 0.3 MG: 0.5 INJECTION, SOLUTION INTRAMUSCULAR; INTRAVENOUS; SUBCUTANEOUS at 08:55

## 2025-01-30 RX ADMIN — MAGNESIUM SULFATE HEPTAHYDRATE 2 G: 40 INJECTION, SOLUTION INTRAVENOUS at 08:57

## 2025-01-30 RX ADMIN — METOPROLOL SUCCINATE 50 MG: 50 TABLET, EXTENDED RELEASE ORAL at 08:37

## 2025-01-30 RX ADMIN — FUROSEMIDE 20 MG: 10 INJECTION, SOLUTION INTRAVENOUS at 13:54

## 2025-01-30 RX ADMIN — GABAPENTIN 300 MG: 300 CAPSULE ORAL at 20:11

## 2025-01-30 RX ADMIN — PIPERACILLIN SODIUM AND TAZOBACTAM SODIUM 3.38 G: 3; .375 INJECTION, SOLUTION INTRAVENOUS at 02:21

## 2025-01-30 RX ADMIN — OXYCODONE HYDROCHLORIDE 5 MG: 5 SOLUTION ORAL at 00:08

## 2025-01-30 RX ADMIN — ACYCLOVIR SODIUM 250 MG: 500 INJECTION, SOLUTION INTRAVENOUS at 21:11

## 2025-01-30 RX ADMIN — ATORVASTATIN CALCIUM 40 MG: 40 TABLET, FILM COATED ORAL at 05:53

## 2025-01-30 RX ADMIN — PIPERACILLIN SODIUM AND TAZOBACTAM SODIUM 3.38 G: 3; .375 INJECTION, SOLUTION INTRAVENOUS at 20:11

## 2025-01-30 RX ADMIN — HYDROMORPHONE HYDROCHLORIDE 0.3 MG: 0.5 INJECTION, SOLUTION INTRAMUSCULAR; INTRAVENOUS; SUBCUTANEOUS at 13:52

## 2025-01-30 RX ADMIN — HYDROMORPHONE HYDROCHLORIDE 0.3 MG: 0.5 INJECTION, SOLUTION INTRAMUSCULAR; INTRAVENOUS; SUBCUTANEOUS at 18:17

## 2025-01-30 RX ADMIN — PIPERACILLIN SODIUM AND TAZOBACTAM SODIUM 3.38 G: 3; .375 INJECTION, SOLUTION INTRAVENOUS at 14:01

## 2025-01-30 RX ADMIN — ALUMINUM HYDROXIDE, MAGNESIUM HYDROXIDE, DIMETHICONE 10 ML: 200; 20; 200 SUSPENSION ORAL at 00:08

## 2025-01-30 RX ADMIN — ALUMINUM HYDROXIDE, MAGNESIUM HYDROXIDE, DIMETHICONE 10 ML: 200; 20; 200 SUSPENSION ORAL at 13:54

## 2025-01-30 RX ADMIN — PANTOPRAZOLE SODIUM 40 MG: 40 INJECTION, POWDER, FOR SOLUTION INTRAVENOUS at 08:36

## 2025-01-30 RX ADMIN — PIPERACILLIN SODIUM AND TAZOBACTAM SODIUM 3.38 G: 3; .375 INJECTION, SOLUTION INTRAVENOUS at 08:36

## 2025-01-30 RX ADMIN — POTASSIUM CHLORIDE 20 MEQ: 14.9 INJECTION, SOLUTION INTRAVENOUS at 10:10

## 2025-01-30 RX ADMIN — OXYCODONE HYDROCHLORIDE 5 MG: 5 SOLUTION ORAL at 05:53

## 2025-01-30 RX ADMIN — FLUCONAZOLE 400 MG: 2 INJECTION, SOLUTION INTRAVENOUS at 11:39

## 2025-01-30 RX ADMIN — ACYCLOVIR SODIUM 250 MG: 500 INJECTION, SOLUTION INTRAVENOUS at 08:36

## 2025-01-30 RX ADMIN — HYDROMORPHONE HYDROCHLORIDE 0.3 MG: 0.5 INJECTION, SOLUTION INTRAMUSCULAR; INTRAVENOUS; SUBCUTANEOUS at 23:20

## 2025-01-30 RX ADMIN — BACLOFEN 10 MG: 10 TABLET ORAL at 08:37

## 2025-01-30 ASSESSMENT — PAIN SCALES - GENERAL
PAINLEVEL_OUTOF10: 6
PAINLEVEL_OUTOF10: 10 - WORST POSSIBLE PAIN
PAINLEVEL_OUTOF10: 10 - WORST POSSIBLE PAIN
PAINLEVEL_OUTOF10: 8
PAINLEVEL_OUTOF10: 10 - WORST POSSIBLE PAIN
PAINLEVEL_OUTOF10: 5 - MODERATE PAIN
PAINLEVEL_OUTOF10: 7

## 2025-01-30 ASSESSMENT — COGNITIVE AND FUNCTIONAL STATUS - GENERAL
MOBILITY SCORE: 23
CLIMB 3 TO 5 STEPS WITH RAILING: A LITTLE
DAILY ACTIVITIY SCORE: 24

## 2025-01-30 ASSESSMENT — PAIN DESCRIPTION - ORIENTATION
ORIENTATION: INNER
ORIENTATION: INNER

## 2025-01-30 ASSESSMENT — PAIN DESCRIPTION - LOCATION
LOCATION: MOUTH
LOCATION: MOUTH

## 2025-01-30 ASSESSMENT — PAIN - FUNCTIONAL ASSESSMENT
PAIN_FUNCTIONAL_ASSESSMENT: 0-10

## 2025-01-30 NOTE — CARE PLAN
RN Note: Please call wife Indira during team rounds at [phone].    The clinical goals for the shift include Patient will remain safe and free from falls/injury with well-controlled mucositis pain throughout the night.    Over the shift, the patient made progress toward the following goals:    Problem: Pain  Goal: Takes deep breaths with improved pain control throughout the shift  Outcome: Progressing  Goal: Turns in bed with improved pain control throughout the shift  Outcome: Progressing  Goal: Walks with improved pain control throughout the shift  Outcome: Progressing  Goal: Performs ADL's with improved pain control throughout shift  Outcome: Progressing  Goal: Participates in PT with improved pain control throughout the shift  Outcome: Progressing  Goal: Free from opioid side effects throughout the shift  Outcome: Progressing  Goal: Free from acute confusion related to pain meds throughout the shift  Outcome: Progressing     Problem: Pain - Adult  Goal: Verbalizes/displays adequate comfort level or baseline comfort level  Outcome: Progressing     Problem: Safety - Adult  Goal: Free from fall injury  Outcome: Progressing     Problem: Discharge Planning  Goal: Discharge to home or other facility with appropriate resources  Outcome: Progressing     Problem: Chronic Conditions and Co-morbidities  Goal: Patient's chronic conditions and co-morbidity symptoms are monitored and maintained or improved  Outcome: Progressing     Problem: Nutrition  Goal: Nutrient intake appropriate for maintaining nutritional needs  Outcome: Progressing     Problem: Fall/Injury  Goal: Not fall by end of shift  Outcome: Progressing  Goal: Be free from injury by end of the shift  Outcome: Progressing  Goal: Verbalize understanding of personal risk factors for fall in the hospital  Outcome: Progressing  Goal: Verbalize understanding of risk factor reduction measures to prevent injury from fall in the home  Outcome: Progressing  Goal: Use  assistive devices by end of the shift  Outcome: Progressing  Goal: Pace activities to prevent fatigue by end of the shift  Outcome: Progressing

## 2025-01-30 NOTE — PROGRESS NOTES
"Bijan Ibanez is a 64 y.o. male on day 2 of admission presenting with Febrile neutropenia (CMS-HCC).    Subjective   No acute events over night. Remains afebrile. Today, he reports that his neck pain is better but it really hurts to swallow. He reports that it feels like glass when he tries to swallow. He is still drinking. He reports that the IV pain meds are helping. He tried to eat oatmeal this morning and it was a struggle. He reports that he feels very swollen today. He denies HA, SOB, CP, Abd pain.          Objective     Physical Exam  Constitutional:       Appearance: Normal appearance.   HENT:      Head: Normocephalic.      Mouth/Throat:      Mouth: Mucous membranes are dry.      Pharynx: Posterior oropharyngeal erythema present.      Comments: Soft palate with redness and ulceration   Eyes:      Extraocular Movements: Extraocular movements intact.      Pupils: Pupils are equal, round, and reactive to light.   Cardiovascular:      Rate and Rhythm: Normal rate and regular rhythm.      Pulses: Normal pulses.   Pulmonary:      Breath sounds: Normal breath sounds.   Abdominal:      General: Bowel sounds are normal.      Palpations: Abdomen is soft.   Musculoskeletal:         General: Swelling present. Normal range of motion.      Cervical back: Normal range of motion.      Right lower leg: Edema present.      Left lower leg: Edema present.   Skin:     General: Skin is warm.   Neurological:      Mental Status: He is oriented to person, place, and time.         Last Recorded Vitals  Blood pressure 128/86, pulse 107, temperature 37.1 °C (98.8 °F), temperature source Temporal, resp. rate 18, height 1.803 m (5' 10.98\"), weight 115 kg (252 lb 6.8 oz), SpO2 93%.  Intake/Output last 3 Shifts:  I/O last 3 completed shifts:  In: 2870.4 (25.1 mL/kg) [I.V.:1770.8 (15.5 mL/kg); Blood:1099.6]  Out: - (0 mL/kg)   Weight: 114.5 kg     Relevant Results  Scheduled medications  acyclovir, 250 mg, intravenous, BID  [Held by " provider] acyclovir, 400 mg, oral, q12h SONAL  [Held by provider] allopurinol, 300 mg, oral, Daily  atorvastatin, 40 mg, oral, Daily  baclofen, 10 mg, oral, TID  fluconazole, 400 mg, intravenous, q24h  [Held by provider] fluconazole, 400 mg, oral, Daily  gabapentin, 300 mg, oral, Nightly  magnesium sulfate, 2 g, intravenous, Once  metoprolol succinate XL, 50 mg, oral, Daily  [Held by provider] pantoprazole, 40 mg, oral, Daily before breakfast  pantoprazole, 40 mg, intravenous, Daily  piperacillin-tazobactam, 3.375 g, intravenous, q6h  potassium chloride, 20 mEq, intravenous, Once      Continuous medications  sodium chloride 0.9%, 50 mL/hr, Last Rate: 50 mL/hr (01/30/25 0445)      PRN medications  PRN medications: albuterol, alteplase, HYDROmorphone, lidocaine, lidocaine-diphenhydraMINE-Maalox 1:1:1, [Held by provider] oxyCODONE, oxyCODONE, prochlorperazine, sucralfate    Results for orders placed or performed during the hospital encounter of 01/28/25 (from the past 24 hours)   Electrocardiogram, 12-lead PRN ACS symptoms   Result Value Ref Range    Ventricular Rate 117 BPM    Atrial Rate 117 BPM    ME Interval 184 ms    QRS Duration 92 ms    QT Interval 302 ms    QTC Calculation(Bazett) 421 ms    P Axis 93 degrees    R Axis 15 degrees    T Axis 92 degrees    QRS Count 19 beats    Q Onset 218 ms    P Onset 126 ms    P Offset 184 ms    T Offset 369 ms    QTC Fredericia 377 ms   Prepare RBC: 1 Units, Irradiated, Leukocytes Reduced (CMV reduced risk)   Result Value Ref Range    PRODUCT CODE U2193Q29     Unit Number [id number]     Unit ABO A     Unit RH POS     XM INTEP COMP     Dispense Status TR     Blood Expiration Date 2/20/2025 11:59:00 PM EST     PRODUCT BLOOD TYPE 6200     UNIT VOLUME 350    Type and screen   Result Value Ref Range    ABO TYPE A     Rh TYPE POS     ANTIBODY SCREEN NEG    CBC and Auto Differential   Result Value Ref Range    WBC 0.1 (LL) 4.4 - 11.3 x10*3/uL    nRBC 0.0 0.0 - 0.0 /100 WBCs    RBC  2.53 (L) 4.50 - 5.90 x10*6/uL    Hemoglobin 6.9 (L) 13.5 - 17.5 g/dL    Hematocrit 21.6 (L) 41.0 - 52.0 %    MCV 85 80 - 100 fL    MCH 27.3 26.0 - 34.0 pg    MCHC 31.9 (L) 32.0 - 36.0 g/dL    RDW 13.4 11.5 - 14.5 %    Platelets 6 (LL) 150 - 450 x10*3/uL    Neutrophils % 0.0 40.0 - 80.0 %    Immature Granulocytes %, Automated 0.0 0.0 - 0.9 %    Lymphocytes % 90.0 13.0 - 44.0 %    Monocytes % 10.0 2.0 - 10.0 %    Eosinophils % 0.0 0.0 - 6.0 %    Basophils % 0.0 0.0 - 2.0 %    Neutrophils Absolute 0.00 (L) 1.20 - 7.70 x10*3/uL    Immature Granulocytes Absolute, Automated 0.00 0.00 - 0.70 x10*3/uL    Lymphocytes Absolute 0.09 (L) 1.20 - 4.80 x10*3/uL    Monocytes Absolute 0.01 (L) 0.10 - 1.00 x10*3/uL    Eosinophils Absolute 0.00 0.00 - 0.70 x10*3/uL    Basophils Absolute 0.00 0.00 - 0.10 x10*3/uL   Magnesium   Result Value Ref Range    Magnesium 1.76 1.60 - 2.40 mg/dL   Renal Function Panel   Result Value Ref Range    Glucose 128 (H) 74 - 99 mg/dL    Sodium 136 136 - 145 mmol/L    Potassium 3.5 3.5 - 5.3 mmol/L    Chloride 101 98 - 107 mmol/L    Bicarbonate 29 21 - 32 mmol/L    Anion Gap 10 10 - 20 mmol/L    Urea Nitrogen 16 6 - 23 mg/dL    Creatinine 0.49 (L) 0.50 - 1.30 mg/dL    eGFR >90 >60 mL/min/1.73m*2    Calcium 7.1 (L) 8.6 - 10.6 mg/dL    Phosphorus 3.0 2.5 - 4.9 mg/dL    Albumin 2.0 (L) 3.4 - 5.0 g/dL   Uric Acid   Result Value Ref Range    Uric Acid 2.0 (L) 4.0 - 7.5 mg/dL   Lactate dehydrogenase   Result Value Ref Range     (H) 84 - 246 U/L     ECG 12 lead (Clinic Performed)    Result Date: 1/28/2025  Normal sinus rhythm Normal ECG When compared with ECG of 11-JAN-2025 01:22, No significant change was found    CT soft tissue neck w IV contrast    Result Date: 1/27/2025  Interpreted By:  Pete Gutierrez, STUDY: CT SOFT TISSUE NECK W IV CONTRAST;  1/27/2025 9:18 pm   INDICATION: Signs/Symptoms:neck pain, difficulty swallowing, hx of lymphoma.     COMPARISON: CT of the neck dated 01/09/2025; PET-CT  dated 01/20/2025;   ACCESSION NUMBER(S): [id number]   ORDERING CLINICIAN: KALPANA BALDWIN   TECHNIQUE: Axial CT images of the neck were obtained.  The patient received 75 mL of Omnipaque 350 intravenous contrast agent. The images were reformatted in angled axial, coronal and sagittal planes.   FINDINGS: There is redemonstration of the somewhat asymmetric enlargement of the right-sided muscles of mastication, predominantly involving the right masseter compared to the contralateral left side, somewhat less conspicuous in appearance compared to prior imaging on 01/09/2025.   No new abnormality is identified in the soft tissues of the face and neck. No new fluid collections or soft tissue gas is present.   Although evaluation of the oral cavity is somewhat degraded by beam hardening artifact from patient's dental hardware, no new abnormality is identified. Temporomandibular joints are intact.   Pharyngeal and hypopharyngeal structures do not demonstrate any acute abnormality.   Atherosclerotic changes are again present at the carotid bifurcations, similar in appearance to prior exam without evidence of new occlusion or high-grade stenosis. Jugular veins do not demonstrate any acute abnormality.   There is redemonstration of the slightly indistinct and thickened appearance of the prevertebral soft tissues, without evidence of new fluid collections. No new soft tissue gas is identified.   Thyroid is unremarkable in appearance.   Included lung apices are clear.   Multilevel degenerative changes are present in the cervical spine without evidence of acute abnormality. No new compression fracture or high-grade stenosis is identified.       Redemonstration of the mild asymmetric enlargement of the right-sided muscles of mastication, predominantly involving the right masseter, likely due to underlying lymphomatous involvement, somewhat less conspicuous in appearance compared to prior exam on 01/09/2025. No new mass or  infectious/inflammatory process is identified within the aero digestive tract or the soft tissues of the neck. No new lymphadenopathy is present.     MACRO: None   Signed by: Pete Gutierrez 1/27/2025 9:46 PM Dictation workstation:   [id number]    XR chest 1 view    Result Date: 1/27/2025  Interpreted By:  Linda Ortiz, STUDY: XR CHEST 1 VIEW;  1/27/2025 9:07 pm   INDICATION: Signs/Symptoms:Hx of lymphoma, fevers.   COMPARISON: Chest x-ray 01/21/2025   ACCESSION NUMBER(S): [id number]   ORDERING CLINICIAN: KALPANA BALDWIN   FINDINGS: A right-sided PICC line terminates in the cavoatrial junction.   CARDIOMEDIASTINAL SILHOUETTE: Cardiomediastinal silhouette is normal in size and configuration. Atherosclerotic calcification of the aorta.   LUNGS: No consolidation, pleural effusion or pneumothorax.   ABDOMEN: No remarkable upper abdominal findings.   BONES: Multilevel degenerative changes of the spine.       No acute cardiopulmonary process.   MACRO: None   Signed by: Linda Ortiz 1/27/2025 9:29 PM Dictation workstation:   [id number]    Vascular US upper extremity venous duplex right    Result Date: 1/24/2025  Interpreted By:  Francisco Betancur and Hofer Lindsay STUDY: VASC US UPPER EXTREMITY VENOUS DUPLEX RIGHT;  1/24/2025 3:45 pm   INDICATION: Signs/Symptoms:swelling to NAEEM.   ,M79.89 Other specified soft tissue disorders   COMPARISON: None.   ACCESSION NUMBER(S): [id number]   ORDERING CLINICIAN: FRANCISCO NAYAK   TECHNIQUE: Vascular ultrasound of the right upper extremity was performed. Evaluation was performed with grayscale, color, and spectral Doppler. When possible, compression views of the evaluated veins was also performed.   FINDINGS: RIGHT UPPER EXTREMITY: Evaluation of the visualized portions of the right internal jugular, innominate, subclavian, axillary, brachial, cephalic, and basilic veins was performed.   Echogenic material within the mid to distal brachial vein with decreased  compressibility concerning for thrombus. Echogenic material within the mid and distal portions of the right basilic vein with decreased compressibility concerning for thrombus.     There is normal respiratory variation, normal compressibility, as well as normal color doppler signal in the other visualized vessels.       Findings consistent with partially occlusive thrombus within the mid to distal brachial vein and mid to distal basilic vein within the right upper extremity.   I personally reviewed the images/study and resident's interpretation and I agree with the findings as stated by Roxanna Herndon MD (resident radiologist). This study was analyzed and interpreted at [place], [place].   MACRO: None   Signed by: Francisco Betancur 1/24/2025 4:45 PM Dictation workstation:   [id number]    XR chest 1 view    Result Date: 1/22/2025  Interpreted By:  Beryl Ko and Kamau Nyokabi STUDY: XR CHEST 1 VIEW;  1/21/2025 11:12 pm   INDICATION: Signs/Symptoms:new onset cough.   COMPARISON: Chest x-ray 01/11/2025, CT chest abdomen pelvis 01/11/2025   ACCESSION NUMBER(S): [id number]   ORDERING CLINICIAN: FRANCISCO NAYAK   FINDINGS:   AP radiograph of the chest. Right upper extremity PICC line catheter tip terminates at the cavoatrial junction.     CARDIOMEDIASTINAL SILHOUETTE: Cardiomediastinal silhouette is normal in size and configuration.   LUNGS: Bilateral lungs appear clear. No pneumothorax, pleural effusion, or focal consolidation.   ABDOMEN: No remarkable upper abdominal findings.   BONES: No acute osseous changes.       1.  No focal consolidation or evidence of acute cardiopulmonary process.   I personally reviewed the images/study and I agree with Dr. Luisito Saldivar findings as stated. This study was interpreted at [place], [place]   MACRO: None   Signed by: Beryl Ko 1/22/2025 10:15 AM Dictation workstation:    [id number]    MR brain w and wo IV contrast    Result Date: 1/21/2025  Interpreted By:  Terrence Gipson, STUDY: MR BRAIN W AND WO IV CONTRAST;  1/21/2025 2:06 pm   INDICATION: Signs/Symptoms:evalute for disease Lymphoma.     COMPARISON: PET-CT January 20, 2025   ACCESSION NUMBER(S): [id number]   ORDERING CLINICIAN: KRISTIAN SIMON   TECHNIQUE: T2, FLAIR, DWI, ADC, gradient echo T2, and T1 weighted images of brain were acquired without intravenous contrast administration. Multi planar T1 weighted imaging was acquired after administration of 20 ML Dotarem gadolinium based intravenous contrast.   FINDINGS: Small focus of encephalomalacia with peripheral gliosis inferior left lentiform nucleus and similar but smaller focus inferior right lentiform nucleus, suspected old lacunar infarctions.   No acute intracranial hemorrhage. No extra-axial fluid collection. No abnormally restricted diffusion to suggest recent infarction.  Major vascular flow voids are present.   Mild paranasal sinus/ethmoid mucosal inflammatory changes. Bilateral nonspecific mastoid fluid. Orbital contents unremarkable.   Scattered foci of abnormal enhancement and diffusion restriction involving the calvarium suspicious for osseous metastatic involvement.   There is thickening of the bilateral muscles of mastication, right-greater-than-left, as well as the right posterolateral nasopharyngeal structures.       1. No evidence of intracranial lymphoma. 2. Small focus of encephalomalacia with peripheral gliosis inferior left lentiform nucleus and similar but smaller focus inferior right lentiform nucleus, suspected old lacunar infarctions. 3. There is thickening of the bilateral muscles of mastication, right-greater-than-left, as well as the right posterolateral nasopharyngeal structures. These areas demonstrated substantial abnormal uptake on the recent PET-CT and are suspicious for lymphomatous involvement. 4. Scattered foci of abnormal enhancement and  diffusion restriction involving the calvarium suspicious for osseous metastatic involvement.   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 64-year-old male with newly diagnosed lymphoma.   COMPARISON: None.   ACCESSION NUMBER(S): [id number]   ORDERING CLINICIAN: NYDIA MAYA   TECHNIQUE: DIVISION OF NUCLEAR MEDICINE POSITRON EMISSION TOMOGRAPHY (PET-CT)   The patient received an intravenous dose of 9.6 mCi of Fluorine-18 fluorodeoxyglucose (FDG).  Positron emission tomographic (PET) images from mid thigh to skull base were then acquired after a one hour delay. Also acquired was a contemporaneous low dose non-contrast CT scan performed for attenuation correction of PET images and anatomic localization.  The PET and CT images were digitally fused for display.  All images were acquired on a combined PET-CT scanner unit. Some areas of FDG accumulation may be described in standardized uptake value (SUV) units.   CODING: Initial Treatment Strategy (PI)   CALIBRATION: Dose Injection-to-Scan Interval (mins): 53 min Mediastinal bloodpool SUV (normal 1.5-2.5): 1.3 Blood glucose: 73 mg/dL   FINDINGS: NECK: *Intensely hypermetabolic enlarged bilateral submandibular parotid glands with a SUV max 16.8 within right and 13.3 within left submandibular gland. *Multiple hypermetabolic bilateral cervical and supraclavicular lymph nodes. For example: Left level 3 (SUV max 7.3), right level 5 (SUV max 69.5), left level 5 (SUV max 11), right supraclavicular (SUV max 11.2) left supraclavicular (SUV max 2.1).       CHEST: *No focal hypermetabolic lesion is seen in the lung parenchyma. *Hypermetabolic  bilateral hilar and right axillary lymph nodes.. Are seen. For example: Right hilar (SUV max 7.1), left hilar (SUV max 9.4), right axillary (SUV max 4.6). *Hypermetabolic soft tissue density with a SUV max 15.3 is seen in right retrocrural region. *Focal hypermetabolic activity with a SUV max 13.9 within the right retropectoral region without any underlying CT correlate.     ABDOMEN AND PELVIS: *Multiple hypermetabolic lesions are seen within the bilateral hepatic lobes without any underlying CT correlate, for example: Segment 7 (SUV max 14.2 ), segment 8 (SUV max 14.2). *Spleen is enlarged measuring 13.8 cm in craniocaudal dimension. There is intensely hypermetabolic perisplenic soft tissue density with a SUV max 8.3 along posterolateral aspect. *Intense hypermetabolic activity seen within the greater curvature of the stomach in the region of the body with a SUV max 12.4. *Multiple hypermetabolic retroperitoneal, mesenteric, iliac and inguinal lymph nodes.. For example: Hardik hepatis (SUV max 20), perigastric (SUV max 13.4. Right internal iliac (SUV max 17.0), retrocaval (SUV max 10.7). *There is intense hypermetabolic activity within the masslike infiltrative lesion within the right kidney as seen on CT dated 01/11/2025 with a SUV max 15.4. Hypermetabolic activity is also seen along posteroinferior aspect of the left kidney with SUV max 8.1. *Multiple focal hypermetabolic mesenteric deposits are seen, few of the without any underlying CT correlate. For example: Right lower lumbar (SUV max 12.7, left lower lobar (SUV max 8.1).     MUSCULOSKELETAL: *Diffuse intense metabolic activity is seen throughout the axial and appendicular skeleton. For example: Cervical spine (SUV max 9.2), left scapula (SUV max 10.1), left clavicle (SUV max 10.4), bilateral ribs (SUV max 7.2), pelvis (SUV max 8.9 within right iliac bone, sacrum (SUV max 12.1), bilateral acetabulum femoral (SUV max 12.0) sternal (SUV max 7.9). *Diffuse  intense hypermetabolic activity is seen within bilateral, chest wall gluteal muscles, without underlying CT correlate. For example: Right gluteal muscle (SUV max 5), right posterior scapular region (SUV max 13.0), right arm (SUV max 13.8). *Multiple focal hypermetabolic lesions are seen within the anterior abdominal wall musculature with a SUV max 12.7 on left hand 11.4 on right         Widespread hermann as well as extranodal lymphomatous involvement. 1. Multiple hypermetabolic supra and infra diaphragmatic lymphadenopathy as described, consistent with lymphomatous involvement. 2. Intensely hypermetabolic activity within bilateral enlarged submandibular and parotid glands as described, concerning for lymphomatous involvement. 3. Hypermetabolic mass like infiltration within bilateral kidneys(right> left), corresponding to lesion seen on CT dated 01/01/2025, compatible with lymphomatous involvement. 4. Hypermetabolic hepatic lesions without any underlying CT correlate, consistent with lymphomatous involvement. 5. Splenomegaly with nonspecific hypermetabolic activity suggestive of extranodal involvement. 6. Multiple hypermetabolic soft tissue mesenteric deposits as well as anterior abdominal wall nodular deposits, with few of the lesions without any underlying CT correlate, concerning for additional lymphomatous involvement. 7. Metabolic activity within the stomach, bilateral arms, anterior chest wall and gluteal muscles without underlying CT correlate, concerning for lymphomatous involvement.       I personally reviewed the images/study and I agree with the findings as stated by Lisha Hernandez MD.  This study was interpreted at [facility], [place], [place].   Signed by: John Diaz 1/20/2025 3:33 PM Dictation workstation:   [id number]    Onco-Echo Complete (Strain & 3D)    Result Date: 1/18/2025   [facility], [address] [place] [address]                 Tel [phone] and Fax [phone] TRANSTHORACIC ECHOCARDIOGRAM REPORT  Patient Name:       DENNIS CAGLE    Reading Physician:    [id number] Earl Reyes MD Study Date:         1/18/2025           Ordering Provider:    [id number] NYDIA MAYA MRN/PID:            [id number]            Fellow: Accession#:         [id number]        Nurse: Date of Birth/Age:  1960 / 64      Sonographer:          Rosibel Newton RDCS                     years Gender assigned at                     Additional Staff: Birth: Height:             185.42 cm           Admit Date:           1/18/2025 Weight:             108.41 kg           Admission Status:     Inpatient -                                                               Routine BSA / BMI:          2.32 m2 / 31.53     Encounter#:           [id number]                     kg/m2 Blood Pressure:     96/66 mmHg          Department Location:  [facility]                                                               Non Invasive Study Type:    ONCO-ECHO COMPLETE (STRAIN AND 3D) Diagnosis/ICD: Old myocardial infarction-I25.2 Indication:    history of myocardial infarction CPT Code:      Echo Complete w Full Doppler-53948; 3D Rendering w/o independent                workstation-[id number]; Myocardial Strain Imaging-21779 Patient History: Pertinent History: Chest Pain, HTN, Hyperlipidemia and CAD. Ischemic heart                    disease, Peripheral venous hypertension, Obesity, High grade                    B cell lymphoma, Kidney cancer (2013). Study Detail: The following Echo studies were performed: 2D, M-Mode, Doppler,               color flow, 3D and Strain.  PHYSICIAN INTERPRETATION: Left Ventricle: The left ventricular systolic function is normal, with a visually estimated ejection fraction of 65-70%. There are no regional left ventricular wall motion  abnormalities. The left ventricular cavity size is normal. Left Ventricular Global Longitudinal Strain - 19.9 %. Spectral Doppler shows a normal pattern of left ventricular diastolic filling. Strain values are normal, which imply normal myocardial function. Left Atrium: The left atrium is normal in size. Right Ventricle: The right ventricle is normal in size. There is normal right ventricular global systolic function. Right Atrium: The right atrium is normal in size. Aortic Valve: The aortic valve is trileaflet. There is no evidence of aortic valve regurgitation. The peak instantaneous gradient of the aortic valve is 15 mmHg. Mitral Valve: The mitral valve is normal in structure. There is no evidence of mitral valve regurgitation. Tricuspid Valve: The tricuspid valve is structurally normal. No evidence of tricuspid regurgitation. The Doppler estimated RVSP is within normal limits at 27.3 mmHg. Pulmonic Valve: The pulmonic valve is not well visualized. There is physiologic pulmonic valve regurgitation. Pericardium: There is no pericardial effusion noted. There is a pericardial fat pad present. Aorta: The aortic root is normal. The aortic root appears normal in size and measures 3.10 cm. There is upper limits of normal dilatation of the ascending aorta. Systemic Veins: The inferior vena cava appears normal in size, with IVC inspiratory collapse greater than 50%. In comparison to the previous echocardiogram(s): There are no prior studies on this patient for comparison purposes.  ONCO-CARDIOLOGY: Machine: This study was performed on the [employer]. Comments: No previous, 3D may or may not be accurate due to image quality.  Onco-Cardiology Measurements: Current Measurements 2D EF (Biplane)                     64%% 3D EF                               51%% Global Longitudinal Strain (GLS) -19.9%% GLS Tracking Quality: Fair  CONCLUSIONS:  1. The left ventricular systolic function is normal, with a visually estimated ejection  fraction of 65-70%.  2. There is normal right ventricular global systolic function.  3. Right ventricular systolic pressure is within normal limits.  4. Normal aortic root.  5. Left Ventricular Global Longitudinal Strain - 19.9 %.  6. Strain values are normal, which imply normal myocardial function. QUANTITATIVE DATA SUMMARY:  2D MEASUREMENTS:          Normal Ranges: Ao Root d:       3.10 cm  (2.0-3.7cm) LVEDV Index:     37 ml/m2  LA VOLUME:                    Normal Ranges: LA Vol A4C:        54.5 ml    (22+/-6mL/m2) LA Vol A2C:        66.9 ml LA Vol BP:         63.9 ml LA Vol Index A4C:  23.5ml/m2 LA Vol Index A2C:  28.8 ml/m2 LA Vol Index BP:   27.5 ml/m2 LA Area A4C:       17.9 cm2 LA Area A2C:       21.0 cm2 LA Major Axis A4C: 5.0 cm LA Major Axis A2C: 5.6 cm  RA VOLUME BY A/L METHOD:            Normal Ranges: RA Vol A4C:              45.6 ml    (8.3-19.5ml) RA Vol Index A4C:        19.6 ml/m2 RA Area A4C:             17.3 cm2 RA Major Axis A4C:       5.6 cm  AORTA MEASUREMENTS:         Normal Ranges: Asc Ao, d:          3.44 cm (2.1-3.4cm)  LV SYSTOLIC FUNCTION BY 2D PLANIMETRY (MOD):                                        Normal Ranges: EF-A4C View:                      56 % (>=55%) EF-A2C View:                      71 % EF-Biplane:                       64 % EF-Visual:                        68 % LV EF Reported:                   68 % Global Longitudinal Strain (GLS): 20 %  LV DIASTOLIC FUNCTION:             Normal Ranges: MV Peak E:             0.66 m/s    (0.7-1.2 m/s) MV Peak A:             0.82 m/s    (0.42-0.7 m/s) E/A Ratio:             0.81        (1.0-2.2) MV e'                  0.096 m/s   (>8.0) MV lateral e'          0.10 m/s MV medial e'           0.09 m/s MV A Dur:              127.50 msec E/e' Ratio:            6.84        (<8.0) MV DT:                 92 msec     (150-240 msec) PulmV Sys Yousif:         39.36 cm/s PulmV De La Garza Yousif:        23.79 cm/s PulmV S/D Yousif:         1.65 PulmV A Revs Yousif:       40.86 cm/s PulmV A Revs Dur:      97.05 msec  MITRAL VALVE:         Normal Ranges: MV DT:        92 msec (150-240msec)  AORTIC VALVE:            Normal Ranges: AoV Vmax:      1.91 m/s  (<=1.7m/s) AoV Peak P.6 mmHg (<20mmHg) LVOT Max Yousif:  1.25 m/s  (<=1.1m/s) LVOT VTI:      25.31 cm LVOT Diameter: 1.90 cm   (1.8-2.4cm) AoV Area,Vmax: 1.86 cm2  (2.5-4.5cm2)  RIGHT VENTRICLE: RV Basal 4.35 cm RV Mid   3.27 cm RV Major 6.7 cm TAPSE:   20.0 mm RV s'    0.23 m/s  TRICUSPID VALVE/RVSP:          Normal Ranges: Peak TR Velocity:     2.46 m/s RV Syst Pressure:     27 mmHg  (< 30mmHg) IVC Diam:             1.50 cm  PULMONIC VALVE:          Normal Ranges: PV Accel Time:  69 msec  (>120ms) PV Max Yousif:     1.3 m/s  (0.6-0.9m/s) PV Max P.8 mmHg  Pulmonary Veins: PulmV A Revs Dur: 97.05 msec PulmV A Revs Yousif: 40.86 cm/s PulmV De La Garza Yousif:   23.79 cm/s PulmV S/D Yousif:    1.65 PulmV Sys Yousif:    39.36 cm/s  AORTA: Asc Ao Diam 3.58 cm  [id number] Earl Reyes MD Electronically signed on 2025 at 3:09:55 PM  ** Final **     Bedside PICC Imaging    Result Date: 2025  These images are not reportable by radiology and will not be interpreted by  Radiologists.    Bronchoscopy Diagnostic, Navigational, Therapeutic, w BAL, w EBUS    Addendum Date: 2025    Bronchoscopy Operative Report [facility] Date of procedure: 25 Patient: Bijan Ibanez MRN: [id number] YOB: 1960 Gender: male Referring provider/physician: Dr. MARIA ELENA Rodriguez (inpatient procedure, Pulmonary Medicine Consult) PRE-PROCEDURE DIAGNOSIS: Intra-thoracic lymphadenopathy, small pulmonary nodules    PRE-PROCEDURE EVALUATION: A history and physical has been performed, and patient medication allergies have been reviewed. The patient's tolerance of previous anesthesia has been reviewed. The risks and benefits of the procedure and the sedation options and risks were discussed with the patient or their designee.  All questions were answered and informed consent obtained. INDICATION: Obtain diagnosis and Staging BRONCHOSCOPIST:              Modesto Martines MD First Assistant: None Second Assistant: None PROCEDURE SUMMARY: Event Event Time ENDO SCOPE IN TIME ENDO SCOPE OUT TIME 01/13/2025 12:11 PM 1/13/2025  1:02 PM             Fluoroscopy time: Not applicable POST-PROCEDURE DIAGNOSIS: Same as pre-operative diagnoses ANESTHESIA: TIVA. See separate anesthesia provider documentation. This procedure was performed using standard monitoring procedures in [facility]. FINDINGS: After adequate local and intravenous anesthesia, bronchoscopy was performed via an endotracheal tube placed by anesthesia. The distal trachea appeared normal. Inspection of RIGHT bronchial tree to the segmental level appeared normal. Inspection of LEFT bronchial tree to the segmental level appeared normal. PROCEDURES: The bronchoscope was wedged into the LLL.  160 ml of normal saline was instilled, 27 ml was recovered. Please see Specimen section for ordered laboratory tests. Due to poor return (expected for LLL BAL), cytology was not ordered from the BAL. After the airway examination was completed, the scope was then replaced by EBUS scope. Lymph node sizing was performed via endobronchial ultrasound. Sampling by transbronchial needle aspiration was also performed using an Olympus ViziShot 22 gauge needle and sent for routine cytology. Rapid On-Site Evaluation (OJSE CARLOS) was available      - The 11Ri (inferior interlobar) node was not evaluated. Sampling was not done as the node was not evaluated.     - The 11Rs (superior interlobar) node was not evaluated. Sampling was not done as the node was not evaluated.     - The 10R (hilar) node was not evaluated. Sampling was not done as the node was not evaluated.     - The 4R (lower paratracheal) node was 3.9 mm in size. Sampling was not done as it was not clinically indicated.     -  The 2R (upper paratracheal) node was not visualized. Sampling was not done as the node was not visualized.     - The 3P (retrotracheal) node was not evaluated. Sampling was not done as the node was not evaluated. -  The 7 (subcarinal) node was 3.4 mm in size. Sampling was not done as it was not clinically indicated.     - The 2L (upper paratracheal) node was not visualized. Sampling was not done as the node was not visualized.     - The 4L (lower paratracheal) node was 2.9 mm in size. Sampling was not done as it was not clinically indicated.     - The 10L (hilar) node was not visualized. Sampling was not done as the node was not visualized.     - The 11L (interlobar) node was 8.6 mm in size. Sampling was done, 4 samples with the needle were obtained.     - The 12L node was 11.2 mm in size. Sampling was done, 8 samples with the needle were obtained, including 2 for RPMI (stand by) and one for cultures. After diagnostic/therapeutic maneuvers, the airway was examined for evidence of bleeding. None was noted. The bronchoscope was removed from the patient's airway and the airway was handed back over to my colleagues from anesthesiology. SPECIMENS: ID Type Source Tests Collected by Time 1 : BAL LEFT LOWER LOBE Respiratory BAL BAL VIRAL PANEL PCR, FUNGITELL BETA-D GLUCAN PCR QUANT (NON-BLOOD SPECIMEN) Modesto Martines MD 1/13/2025 1209 2 : BAL LEFT LOWER LOBE Fluid BRONCHO-ALVEOLAR LAVAGE OF LEFT LOWER LOBE AFB CULTURE/SMEAR, FUNGAL CULTURE/SMEAR, STERILE FLUID CULTURE/SMEAR Modesto Martines MD 1/13/2025 1210 3 : BAL LEFT LOWER LOBE Bronchoalveolar Lavage BRONCHO-ALVEOLAR LAVAGE OF LEFT LOWER LOBE BODY FLUID CELL COUNT WITH DIFFERENTIAL Modesto Martines MD 1/13/2025 1210 4 : LN 12L FOR CULTURE Tissue LYMPH NODE BIOPSY AFB CULTURE/SMEAR, FUNGAL CULTURE/SMEAR, TISSUE/WOUND CULTURE/SMEAR Modetso Martines MD 1/13/2025 1301 A :  Non-Gynecologic Cytology LYMPH NODE 11 L PULMONARY FINE NEEDLE ASPIRATION CYTOLOGY  CONSULTATION (NON-GYNECOLOGIC) Modesto Martines MD 1/13/2025 1230 B :  Non-Gynecologic Cytology LYMPH NODE 12 L PULMONARY FINE NEEDLE ASPIRATION CYTOLOGY CONSULTATION (NON-GYNECOLOGIC) Modesto Martines MD 1/13/2025 1232 RAPID ONSITE EVALUATION (R.O.S.E.): - Stations 11L and 12L TBNA : lymphoid COMPLICATIONS: None.    EBL: Minimal, <5 ml    POST PROCEDURE CHEST RADIOGRAPH: Not needed    SUMMARY: - Normal airway examination. - A BAL was performed in the LLL, see Specimen section for ordered laboratory tests. - A diagnostic and staging EBUS was performed. Stations 11L and 12L were sampled. Please see above for rapid on-site evaluation results, final results are pending. Samples from 12L were placed into RPMI (for stand by) and cultures.    RECOMMENDATIONS: Follow up with referring physician Await BAL and cytology results The patient was transported to the recovery area/PACU in stable condition. I, Modesto Martines MD, was personally present throughout this procedure, including all key and non-key portions. Date: 01/13/25 Time: 1:23 PM (Images obtained during this procedure, including ultrasonographic images if performed, can be found in within the electronic medical record and/or PACS.)     Result Date: 1/13/2025  Table formatting from the original result was not included. Images from the original result were not included. Bronchoscopy Operative Report [facility] Date of procedure: 01/13/25 Patient: Bijan Ibanez MRN: [id number] YOB: 1960 Gender: male Referring provider/physician: Dr. MARIA ELENA Rodriguez (inpatient procedure, Pulmonary Medicine Consult) PRE-PROCEDURE DIAGNOSIS: Intra-thoracic lymphadenopathy, small pulmonary nodules    PRE-PROCEDURE EVALUATION: A history and physical has been performed, and patient medication allergies have been reviewed. The patient's tolerance of previous anesthesia has been reviewed. The risks and benefits of the procedure and the  sedation options and risks were discussed with the patient or their designee. All questions were answered and informed consent obtained. INDICATION: Obtain diagnosis and Staging BRONCHOSCOPIST:              Modesto Martines MD First Assistant: None Second Assistant: None PROCEDURE SUMMARY: Event Event Time ENDO SCOPE IN TIME ENDO SCOPE OUT TIME 01/13/2025 12:11 PM 1/13/2025  1:02 PM             Fluoroscopy time: Not applicable POST-PROCEDURE DIAGNOSIS: Same as pre-operative diagnoses ANESTHESIA: TIVA. See separate anesthesia provider documentation. This procedure was performed using standard monitoring procedures in [facility]. FINDINGS: After adequate local and intravenous anesthesia, bronchoscopy was performed via an endotracheal tube placed by anesthesia. The distal trachea appeared normal. Inspection of RIGHT bronchial tree to the segmental level appeared normal. Inspection of LEFT bronchial tree to the segmental level appeared normal. PROCEDURES: The bronchoscope was wedged into the LLL.  160 ml of normal saline was instilled, 27 ml was recovered. Please see Specimen section for ordered laboratory tests. Due to poor return (expected for LLL BAL), cytology was not ordered from the BAL. After the airway examination was completed, the scope was then replaced by EBUS scope. Lymph node sizing was performed via endobronchial ultrasound. Sampling by transbronchial needle aspiration was also performed using an Olympus ViziShot 22 gauge needle and sent for routine cytology. Rapid On-Site Evaluation (JOSE CARLOS) was available      - The 11Ri (inferior interlobar) node was not evaluated. Sampling was not done as the node was not evaluated.     - The 11Rs (superior interlobar) node was not evaluated. Sampling was not done as the node was not evaluated.     - The 10R (hilar) node was not evaluated. Sampling was not done as the node was not evaluated.     - The 4R (lower paratracheal) node was   3.9 mm in size. Sampling was not done as it was not clinically indicated.     - The 2R (upper paratracheal) node was not visualized. Sampling was not done as the node was not visualized.     - The 3P (retrotracheal) node was not evaluated. Sampling was not done as the node was not evaluated. -  The 7 (subcarinal) node was  3.4 mm in size. Sampling was not done as it was not clinically indicated.     - The 2L (upper paratracheal) node was not visualized. Sampling was not done as the node was not visualized.     - The 4L (lower paratracheal) node was  2.9 mm in size. Sampling was not done as it was not clinically indicated.     - The 10L (hilar) node was not visualized. Sampling was not done as the node was not visualized.     - The 11L (interlobar) node was  8.6 mm in size. Sampling was done, 4 samples with the needle were obtained.     - The 12L node was  11.2 mm in size. Sampling was done, 8 samples with the needle were obtained, including 2 for RPMI (stand by) and one for cultures. After diagnostic/therapeutic maneuvers, the airway was examined for evidence of bleeding. None was noted. The bronchoscope was removed from the patient's airway and the airway was handed back over to my colleagues from anesthesiology. SPECIMENS: ID Type Source Tests Collected by Time 1 : BAL LEFT LOWER LOBE Respiratory BAL BAL VIRAL PANEL PCR, FUNGITELL BETA-D GLUCAN PCR QUANT (NON-BLOOD SPECIMEN) Modesto Martines MD 1/13/2025 1209 2 : BAL LEFT LOWER LOBE Fluid BRONCHO-ALVEOLAR LAVAGE OF LEFT LOWER LOBE AFB CULTURE/SMEAR, FUNGAL CULTURE/SMEAR, STERILE FLUID CULTURE/SMEAR Modesto Martines MD 1/13/2025 1210 3 : BAL LEFT LOWER LOBE Bronchoalveolar Lavage BRONCHO-ALVEOLAR LAVAGE OF LEFT LOWER LOBE BODY FLUID CELL COUNT WITH DIFFERENTIAL Modesto Martines MD 1/13/2025 1210 4 : LN 12L FOR CULTURE Tissue LYMPH NODE BIOPSY AFB CULTURE/SMEAR, FUNGAL CULTURE/SMEAR, TISSUE/WOUND CULTURE/SMEAR Modesto Martines MD 1/13/2025 1301 A :   Non-Gynecologic Cytology LYMPH NODE 11 L PULMONARY FINE NEEDLE ASPIRATION CYTOLOGY CONSULTATION (NON-GYNECOLOGIC) Modesto Martines MD 1/13/2025 1230 B :  Non-Gynecologic Cytology LYMPH NODE 12 L PULMONARY FINE NEEDLE ASPIRATION CYTOLOGY CONSULTATION (NON-GYNECOLOGIC) Modesto Martines MD 1/13/2025 1232 RAPID ONSITE EVALUATION (R.O.S.E.): - Stations 11L and 12L TBNA  : lymphoid COMPLICATIONS: None.    EBL: Minimal, <5 ml    POST PROCEDURE CHEST RADIOGRAPH: Not needed    SUMMARY: - Normal airway examination. - A BAL was performed in the LLL, see Specimen section for ordered laboratory tests. - A diagnostic and staging EBUS was performed. Stations 11L and 12L were sampled. Please see above for rapid on-site evaluation results, final results are pending. Samples from 12L were placed into RPMI (for stand by) and cultures.    RECOMMENDATIONS: Follow up with referring physician Await BAL and cytology results The patient was transported to the recovery area/PACU in stable condition. I, Modesto Martines MD, was personally present throughout this procedure, including all key and non-key portions. Date: 01/13/25 Time: 1:23 PM (Images obtained during this procedure, including ultrasonographic images if performed, can be found in within the electronic medical record and/or PACS.)    CT angio chest for pulmonary embolism    Result Date: 1/12/2025  Interpreted By:  Sonu Jara and Ritchie Brandon STUDY: CT ANGIO CHEST FOR PULMONARY EMBOLISM;  1/11/2025 1:57 pm   INDICATION: Signs/Symptoms:c/f pe.   COMPARISON: CT of the chest 02/08/2016.   ACCESSION NUMBER(S): [id number]   ORDERING CLINICIAN: RIDGE ANGUIANO   TECHNIQUE: Helical data acquisition of the chest was obtained after intravenous administration of 100 ML Omnipaque 350, as per PE protocol. Images were reformatted in coronal and sagittal planes. Axial and coronal maximum intensity projection (MIP) images were created and reviewed.   FINDINGS: POTENTIAL  LIMITATIONS OF THE STUDY: None   HEART AND VESSELS: There are no discrete filling defects within main pulmonary artery and its branches to suggest acute pulmonary embolism. Main pulmonary artery and its branches are normal in caliber.   The thoracic aorta normal in course and caliber.There is mild scattered calcified atherosclerosis present.Although, the study is not tailored for evaluation of aorta, there is no definite evidence of acute aortic pathology. Moderate coronary artery calcifications are seen. Please note,the study is not optimized for evaluation of coronary arteries.   The cardiac chambers are not enlarged.There are no findings to suggest right heart strain.   There is no pericardial effusion seen.   MEDIASTINUM AND CHIN, LOWER NECK AND AXILLA: The visualized thyroid gland is within normal limits. There is left-sided perihilar soft tissue mass/lymph node which measures 1.8 x 1.7 cm (series 401, image 161). Mildly prominent left axillary lymph node measuring 0.9 cm in short axis with preservation of the fatty hilum (series 401, image 33). Nonspecific, asymmetric soft tissue thickening visualized in the upper left axilla, which could reflect underlying musculature however bulky left axillary lymph node is not readily excluded. Esophagus appears within normal limits as seen.   LUNGS AND AIRWAYS: The trachea and central airways are patent. No endobronchial lesion is seen. In the left lung base, there are multiple well-circumscribed pulmonary nodules, largest measuring 1.2 x 1.0 cm (series 402, image 216). Additional subcentimeter nodules are visualized on coronal image 136 of 162 extending along the posterior left hemithorax, some of which appear to be subpleural for reference measuring 0.9 x 0.7 cm (series 402, image 174). There is an additional left upper lobe pulmonary nodule measuring 0.7 cm (series 402, image 100).   No evidence of focal airspace consolidation, pleural effusion, or pneumothorax      UPPER ABDOMEN: Please see dedicated CT abdomen and pelvis from 01/11/2025 for further characterization of upper abdomen findings.     CHEST WALL AND OSSEOUS STRUCTURES: Chest wall is within normal limits. No acute osseous pathology.There are no suspicious osseous lesions.       1. No evidence of acute pulmonary embolism. 2. Left-sided perihilar soft tissue mass/lymph node measuring 1.8 x 1.7 cm. Findings favored to represent metastatic disease however underlying primary lung neoplasm is not definitively excluded. 3. Multiple left-sided pulmonary nodules, the majority located in the posterior left lower lobe with the largest measuring 1.2 x 1.0 cm as detailed above. There are additional nodules which exhibit subpleural distribution and an isolated left upper lobe pulmonary nodule which measures 0.7 cm. Findings are favored to reflect underlying enlarged intrapulmonary lymph nodes an underlying metastatic disease not definitively excluded.   I personally reviewed the images/study and I agree with the findings as stated by resident Luis Eduardo Limon. This study was interpreted at [place], [place].   MACRO: Critical Finding:  See findings. Notification was initiated on 1/11/2025 at 3:02 pm by  Luis Eduardo Limon.  (**-YCF-**) Instructions:   Signed by: Sonu Jara 1/12/2025 12:36 PM Dictation workstation:   [id number]    CT abdomen pelvis w IV contrast    Result Date: 1/11/2025  Interpreted By:  Mack Fuentes and Ritchie Brandon STUDY: CT ABDOMEN PELVIS W IV CONTRAST;  1/11/2025 1:57 pm   INDICATION: Signs/Symptoms:c/f malignacy.     COMPARISON: CT abdomen is from 02/08/2016 and 01/12/2015.   ACCESSION NUMBER(S): [id number]   ORDERING CLINICIAN: RIDGE ANGUIANO   TECHNIQUE: CT of the abdomen and pelvis was performed.  Standard contiguous axial images were obtained at 3 mm slice thickness through the abdomen and pelvis. Coronal and sagittal reconstructions at 3 mm  slice thickness were performed.  100 ML of Omnipaque 350 was administered intravenously without immediate complication.   FINDINGS: LOWER CHEST: Please see dedicated CT angio of the chest from 01/11/2025 for further characterization of lower chest/lung findings.   ABDOMEN:   LIVER: The liver is normal in size without evidence of focal liver lesions.   BILE DUCTS: The intrahepatic and extrahepatic ducts are not dilated.   GALLBLADDER: The gallbladder is surgically absent.   PANCREAS: The pancreas appears unremarkable without evidence of ductal dilatation or masses.   SPLEEN: The spleen is enlarged measuring 18 cm in craniocaudal dimension. There is no evidence of splenic lesion.   ADRENAL GLANDS: There is asymmetric nodular masslike thickening of the right adrenal gland measuring 2.3 x 1.8 cm, best visualized on coronal image 66 of 113, stable as compared to CT dated 02/08/2016.. The left adrenal gland is unremarkable in appearance.   KIDNEYS AND URETERS: There is an ill-defined hypodense masslike infiltrative focus involving the anterior, interpolar region of the right kidney which measures 3.0 x 2.9 cm (series 501, image 69) which partially effaces the anterior right renal pelvis. Additionally in the posterosuperior aspect of the right kidney, there is a additional area of ill-defined masslike thickening measuring 3.7 x 2.7 cm closely abutting the inferior aspect of the right hepatic lobe (series 502, image 73-74).. There are scattered low-density, well-circumscribed lesions involving the left kidney, favored to represent renal cysts, the largest of which measures 2.2 cm in the left interpolar region (series 501, image 71).   PELVIS:   BLADDER: The urinary bladder appears normal without abnormal wall thickening.   REPRODUCTIVE ORGANS: The prostate is not enlarged.   BOWEL: The stomach is unremarkable.   The small and large bowel are normal in caliber and demonstrate no wall thickening.   The appendix is not  definitely visualized. There is however no pericecal stranding or fluid.   VESSELS: There is no aneurysmal dilatation of the abdominal aorta. The IVC appears normal.   PERITONEUM/RETROPERITONEUM/LYMPH NODES: There is redemonstration of a 4.7 x 2.1 cm soft tissue mass in the right anterolateral paraspinous tissues at T12-L1, previously characterized on visualized on MR of the lumbar spine from 01/10/2025. This lesion effaces the right violeta of the diaphragm. Otherwise  multiple scattered, nonenlarged peritoneal lymph nodes. There is no evidence of free or loculated fluid collections. No evidence of free intraperitoneal air.   BONES AND ABDOMINAL WALL: No suspicious osseous lesions are identified. Degenerative discogenic disease is noted in the lower thoracic and lumbar spine.  The abdominal wall soft tissues appear normal.       1.  Ill-defined hypodense infiltrative masslike foci involving the right kidney, largest measuring 3.0 x 2.9 cm in partially effacing the anterior right renal pelvis. There is an additional infiltrative lesion located at the superior aspect of the right kidney which measures 3.7 x 2 point 7 cm and closely abuts the inferior aspect of the right hepatic lobe. Primary differential considerations would include primary renal neoplasm such as renal cell carcinoma versus metastatic disease. Recommend PET/CT if clinically indicated. 2. Stable asymmetric nodular masslike thickening of the right adrenal gland measuring 2.6 x 1.8 cm. Findings concerning for adenoma. Superimposed metastatic disease is not excluded. Recommend PET/CT if clinically indicated. 3. 4.7 x 2.1 cm soft tissue mass in the right anterolateral aspect of the paraspinous tissues at T12-L1, previously characterized on MR of the lumbar spine from 01/10/2025.  Considerations include a nerve sheath tumor,abnormal lymph node conglomerate. Recommend PET/CT if clinically indicated. 4. Please see dedicated CT of the chest from 01/11/2025 for  further characterization of multiple scattered pulmonary nodules involving the left lung base and left pleura. 5. Mild splenomegaly.   I personally reviewed the images/study and I agree with the findings as stated by resident Luis Eduardo Limon. This study was interpreted at [place], [place].   MACRO: None   Signed by: Mack Fuentes 1/11/2025 5:45 PM Dictation workstation:   [id number]    XR chest 2 views    Result Date: 1/11/2025  Interpreted By:  Gianni Bishop and Ritchie Brandon STUDY: XR CHEST 2 VIEWS;  1/11/2025 9:44 am   INDICATION: Signs/Symptoms:c/f pna.   COMPARISON:   Chest x-ray 01/03/2025. CT of the chest 02/08/2016.   ACCESSION NUMBER(S): [id number]   ORDERING CLINICIAN: RIDGE ANGUIANO   FINDINGS: PA and lateral radiographs of the chest were provided.   CARDIOMEDIASTINAL SILHOUETTE: Cardiomediastinal silhouette is normal in size and configuration.   LUNGS: No focal airspace consolidation, pleural effusion, or pneumothorax.   ABDOMEN: No remarkable upper abdominal findings.   BONES: No acute osseous changes.       1.  No radiographic evidence of acute cardiopulmonary process.   I personally reviewed the images/study and I agree with the findings as stated by resident Luis Eduardo Limon. This study was interpreted at [place], [place].   MACRO: None   Signed by: Gianni Bishop 1/11/2025 10:13 AM Dictation workstation:   [id number]    CT head wo IV contrast    Result Date: 1/11/2025  Interpreted By:  Gabriel Matias and Ohs Zachary STUDY: CT HEAD WO IV CONTRAST;  1/11/2025 2:59 am   INDICATION: Signs/Symptoms:eval for metastatic disease.   COMPARISON: None.   ACCESSION NUMBER(S): [id number]   ORDERING CLINICIAN: JALEEL LITTLE   TECHNIQUE: Noncontrast axial CT images of head were obtained with coronal and sagittal reconstructed images.   FINDINGS: BRAIN PARENCHYMA: Rounded near simple fluid density lesion  in the left subinsular white matter is 1.3 x 1.1 x 0.9 cm (series 209, image 18 and series 211, image 52) with no surrounding vasogenic edema or significant mass effect.   No acute intraparenchymal hemorrhage or parenchymal evidence of acute large territory ischemic infarct. No mass-effect. Gray-white matter distinction is preserved.   VENTRICLES and EXTRA-AXIAL SPACES:  No acute extra-axial or intraventricular hemorrhage. No effacement of cerebral sulci. Ventricles and sulci are age-concordant.   PARANASAL SINUSES/MASTOIDS:  No hemorrhage or air-fluid levels within the visualized paranasal sinuses. The mastoids are well aerated.   CALVARIUM/ORBITS:  No skull fracture.  The orbits and globes are intact to the extent visualized.   EXTRACRANIAL SOFT TISSUES: No discernible abnormality.       1. Rounded near simple fluid density lesion in the left subinsular white matter favored to represent prominent perivascular space as opposed to malignancy. Nonemergent, MRI brain could better evaluate if clinically indicated. 2. No acute intracranial abnormality.   I personally reviewed the images/study and I agree with the findings as stated by Dr. Cam Byrd. This study was interpreted at [place], [place].   MACRO: None.   Signed by: Gabriel Matias 1/11/2025 7:24 AM Dictation workstation:   [id number]    ECG 12 lead    Result Date: 1/11/2025  Normal sinus rhythm Nonspecific ST abnormality Abnormal ECG When compared with ECG of 03-JAN-2025 02:17, Borderline criteria for Inferior infarct are no longer Present See ED provider note for full interpretation and clinical correlation Confirmed by Vandana Echeverria ([id number]) on 1/11/2025 3:29:14 AM    MR lumbar spine wo IV contrast    Result Date: 1/10/2025  Interpreted By:  Terrence Gipson, STUDY: MRI of the lumbar spine without IV contrast;  1/10/2025 1:00 pm   INDICATION: Signs/Symptoms:back pain.   ,M54.9 Dorsalgia, unspecified   COMPARISON:  None.   ACCESSION NUMBER(S): [id number]   ORDERING CLINICIAN: PATRICE SCHNEIDER   TECHNIQUE: Sagittal STIR, T1- and T2-weighted as well as axial T1- and T2- weighted MRI images of the lumbar spine were acquired using a spondylolysis protocol.  No contrast was administered.   FINDINGS: No significant scoliosis. No significant spondylolisthesis.   No compression deformity no destructive osseous lesion.   The lower thoracic cord appears unremarkable. Normal conus termination.   Moderately severe loss of disc height L5-S1 with predominant Modic type 2 endplate degenerative changes at that level. Disc desiccated to varying degrees throughout the lumbar spine.   There is some heterogeneity of the marrow of the bilateral sacral ala in the posterior iliac bones. This may represent simple age-related marrow heterogeneity. Insufficiency fracture or fractures not excluded though considered less likely.   At the T12 and L1 levels in the right anterolateral paraspinous tissues is a masslike structure that measures 39 x 17 mm greatest axial dimensions, 39 mm cc and may represent a soft tissue mass though this is unclear. Considerations include a nerve sheath tumor, abnormal lymph node conglomerate, fluid collection and others.   LEVELS: L5-S1: Disc osteophytic bulge lateralized to both sides. Moderate facet arthrosis. No significant central canal or lateral recess stenosis. Mild-to-moderate bilateral foraminal stenosis. L4-L5: Mild disc bulge mildly lateralized into both sides. Moderate facet arthrosis. Mild central canal stenosis. No significant lateral recess stenosis. Mild-to-moderate bilateral foraminal stenosis. L3-L4: Mild disc bulge minimally lateralized into both sides. Moderate facet arthrosis. No significant central canal or lateral recess stenosis. Mild bilateral foraminal stenosis. L2-L3: Minimal disc bulge. Mild-to-moderate facet arthrosis. No significant central canal stenosis. There is nodular structure along the  posterior margin of the left neural foramen which may represent extruded migrated disc fragment, facet synovial cyst, nerve root sleeve diverticulum, or other and appears to exert mass effect upon the exiting left L2 nerve root. L1-L2: Mild degenerative changes. No significant stenosis.         Multilevel degenerative lumbar spondylosis as described.  There is nodular structure along the posterior margin of the left L2-3 neural foramen which may represent extruded migrated disc fragment, facet synovial cyst, nerve root sleeve diverticulum, or other and appears to exert mass effect upon the exiting left L2 nerve root. Correlate clinically for left-sided L2 radiculopathy.   At the T12 and L1 levels in the right anterolateral paraspinous tissues is a masslike structure that measures 39 x 17 mm greatest axial dimensions, 39 mm cc and may represent a soft tissue mass though this is unclear. Considerations include a nerve sheath tumor, abnormal lymph node conglomerate, fluid collection and others. Consider further imaging evaluation MRI of the abdomen without and with contrast.   There is some heterogeneity of the marrow of the bilateral sacral ala in the posterior iliac bones. This may represent simple age-related marrow heterogeneity. Insufficiency fracture or fractures not excluded, though considered less likely. If indicated consider further initial imaging with plain films or CT.   MACRO: None   Signed by: Terrence Gipson 1/10/2025 5:31 PM Dictation workstation:   [id number]    CT soft tissue neck w IV contrast    Result Date: 1/9/2025  Interpreted By:  Terrence Gipson, STUDY: CT SOFT TISSUE NECK W IV CONTRAST;  1/9/2025 2:32 pm   INDICATION: Signs/Symptoms:neck mass.   ,R22.1 Localized swelling, mass and lump, neck   COMPARISON: None.   ACCESSION NUMBER(S): [id number]   ORDERING CLINICIAN: PATRICE SCHNEIDER   TECHNIQUE: Axial CT images of the neck were obtained.  The patient received 50 ML of Omnipaque 350  intravenous contrast agent. The images were reformatted in angled axial, coronal and sagittal planes.   FINDINGS: Fusiform thickening of the right masseter muscle. There is some induration of the adjacent parotid gland there is also asymmetric thickening of the right temporalis and pterygoid musculature. No associated abnormal enhancement. No associated bony remodeling or destruction. No evident odontogenic inflammation.   An 11 mm low-density subcutaneous nodule posterior left neck, suspected sebaceous cyst.   Partially visualized 10 mm low-density focus inferior left basal ganglia possible age-indeterminate lacunar infarction versus dilated perivascular space.   Normal-sized thyroid without suspicious mass.   Paranasal sinuses and mastoid air cells are well aerated.   No destructive osseous lesions or acute osseous abnormalities.   No flow-limiting stenosis throughout the arterial structures of the neck.       Fusiform thickening of the right-sided muscles of mastication including masseter, temporalis, and pterygoid muscles. No associated abnormal enhancement or discrete mass identified. Findings most likely to represent asymmetric benign hypertrophy of the muscles of mastication.   Partially visualized 10 mm low-density focus inferior left basal ganglia possible age-indeterminate lacunar infarction versus dilated perivascular space.   MACRO: None   Signed by: Terrence Gipson 1/9/2025 5:00 PM Dictation workstation:   [id number]    ECG 12 lead    Result Date: 1/3/2025  Normal sinus rhythm Possible Inferior infarct , age undetermined Abnormal ECG No previous ECGs available Confirmed by Francisco Vazquez ([id number]) on 1/3/2025 11:32:10 AM    XR chest 1 view    Result Date: 1/3/2025  Interpreted By:  Caitie Chanel, STUDY: XR CHEST 1 VIEW;  1/3/2025 1:44 am   INDICATION: Signs/Symptoms:cough.     COMPARISON: 03/19/2015   ACCESSION NUMBER(S): [id number]   ORDERING CLINICIAN: ANTWON MAI   FINDINGS: AP radiograph of  the chest was provided.       CARDIOMEDIASTINAL SILHOUETTE: Cardiomediastinal silhouette is normal in size and configuration.   LUNGS: Bilateral interstitial prominence noted. No focal consolidation, pleural effusion or sizable pneumothorax seen.   ABDOMEN: No remarkable upper abdominal findings.   BONES: No acute osseous changes.       1.  No definite focal consolidation or pleural effusion.       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 male with a past medical history of hypertension, hyperlipidemia, coronary artery disease (s/p stent 2010, on ASA), renal cell carcinoma (s/p R partial nephrectomy in 2013 @ [facility]), GERD, BPH, arthritis, and Burkitt lymphoma being treated with DA-R-EPOCH (since 1/21/25) who is admitted today (01/28/25) with febrile neutropenia and mucositis.      Active issues (01/30/25):  #Febrile neutropenia. #Productive cough. Continue piperacillin-tazobactam. Infectious workup thus far negative   #Esophageal mucositis.  S/p EPOCH. Medications > IV. Supportive care.      ONC  #Burkitt Lymphoma  :: Workup and treatment as per Onc History  :: Received C1 DA-R-EPOCH (1/21/25), supported with pegfilgrastim 1/28/25  :: CT neck (1/27/25): Mild asymmetric enlargement of the right muscles of mastication, predominantly the masseter, likely due to lymphomatous involvement, less conspicuous compared to 1/9/2025, with no new mass or infectious/inflammatory process in the neck or aerodigestive tract  - Due for C2 ~ 2/11/25  # TLS monitoring and prophylaxis  :: Uric acid 4.4 mg/dL (1/28/25) on admit, 2.0 today (1/30/25)  - HOLD allopurinol with severe mucositis, will monitor UA levels daily     HEME  #Pancytopenia  - Likely related to chemotherapy vs disease   - PRBC 1/30, Plt  1/30  # VTE Prophylaxis  - SCDs and ambulation only with thrombocytopenia     ID  #Febrile neutropenia  :: Cultures and serology  = Blood cultures (1/28/25): NGTD  = UA (1/28/25): not suspicious for infection  = Influenza A/B, COVID, RSV (1/27/25): negative  = Parainfluenza, Metapneumovirus, Rhinovirus, Adenovirus PCR (1/27/25): negative   = Hold off on sputum culture (1/28/25) -- cough seems mostly from inability to clear oral secretions  :: Imaging  = CXR (1/27/25): No cardiopulmonary process  :: Antibiotics  - Piperacillin-tazobactam 3.375 g IV Q6H (1/28- )  - Patient reports unknown reaction as a child to penicillin -- discussed with Clinical Oncology Specialist Francisca, Ok to trial piperacillin-tazobactam, remove allergy if no reaction, tolerating well so far   # Prophylaxis  - VZV: Continue acyclovir 400 mg PO BID  - Candidiasis: Continue flucaonzole 400 mg PO daily   - PJP: None at this time     FEN/RENAL  - Admission weight 115 kg (01/28/25)  F PO  E PRN  N Low-pathogen    #hypomagnesia  - 2g Mg 1/30  #hypokalemia  - 20meq KCL 1/30   #fluid overload   - Lasix 20mg X 1 1/30    GI  # Esophageal mucositis  - Chemotherapy  :: CT neck (1/27/25) as above: no signs of abscess or fluid collection  - NS @ 50 mL/hr x 40 hours (2L) to supplement poor PO intake  - Supportive care: BMX, viscous lidocaine, sucralfate PRN  - Oxycodone 5 mg PO PRN Q4H, hydromorphone 0.3 IV PRN Q4H     CARDIO/PULM  #Hypertension  #Hyperlipidemia  #CAD  #MI s/p stent 2010  - Continue home atorvastatin, metoprolol  - HOLD home aspirin 81 mg daily with thrombocytopenia  - HOLD home lisinopril in setting of low BP's  #Functional monitoring  - ECHO (1/18/25): LVSF normal, EF 65-70%.      ACCESS/SUPPORT/DISPO  - FULL CODE  - MICHAEL Mckeon  - ACCESS: KRISHAN PICC  - PT/RD/AI      Pt seen, examined and discussed with MD Frida Diop APRN-CNP

## 2025-01-31 ENCOUNTER — APPOINTMENT (OUTPATIENT)
Dept: HEMATOLOGY/ONCOLOGY | Facility: HOSPITAL | Age: 65
End: 2025-01-31
Payer: MEDICARE

## 2025-01-31 LAB
ALBUMIN SERPL BCP-MCNC: 2.2 G/DL (ref 3.4–5)
ALP SERPL-CCNC: 88 U/L (ref 33–136)
ALT SERPL W P-5'-P-CCNC: 46 U/L (ref 10–52)
ANION GAP SERPL CALC-SCNC: 11 MMOL/L (ref 10–20)
APPEARANCE UR: CLEAR
AST SERPL W P-5'-P-CCNC: 19 U/L (ref 9–39)
BASOPHILS # BLD AUTO: 0 X10*3/UL (ref 0–0.1)
BASOPHILS NFR BLD AUTO: 0 %
BILIRUB DIRECT SERPL-MCNC: 0.6 MG/DL (ref 0–0.3)
BILIRUB SERPL-MCNC: 1.5 MG/DL (ref 0–1.2)
BILIRUB UR STRIP.AUTO-MCNC: NEGATIVE MG/DL
BLOOD EXPIRATION DATE: NORMAL
BUN SERPL-MCNC: 14 MG/DL (ref 6–23)
BURR CELLS BLD QL SMEAR: NORMAL
CALCIUM SERPL-MCNC: 6.9 MG/DL (ref 8.6–10.6)
CHLORIDE SERPL-SCNC: 99 MMOL/L (ref 98–107)
CO2 SERPL-SCNC: 28 MMOL/L (ref 21–32)
COLOR UR: YELLOW
CREAT SERPL-MCNC: 0.46 MG/DL (ref 0.5–1.3)
DISPENSE STATUS: NORMAL
EGFRCR SERPLBLD CKD-EPI 2021: >90 ML/MIN/1.73M*2
EOSINOPHIL # BLD AUTO: 0 X10*3/UL (ref 0–0.7)
EOSINOPHIL NFR BLD AUTO: 0 %
ERYTHROCYTE [DISTWIDTH] IN BLOOD BY AUTOMATED COUNT: 13.2 % (ref 11.5–14.5)
GLUCOSE SERPL-MCNC: 112 MG/DL (ref 74–99)
GLUCOSE UR STRIP.AUTO-MCNC: NORMAL MG/DL
HCT VFR BLD AUTO: 21.6 % (ref 41–52)
HGB BLD-MCNC: 7.3 G/DL (ref 13.5–17.5)
IMM GRANULOCYTES # BLD AUTO: 0 X10*3/UL (ref 0–0.7)
IMM GRANULOCYTES NFR BLD AUTO: 0 % (ref 0–0.9)
KETONES UR STRIP.AUTO-MCNC: ABNORMAL MG/DL
LDH SERPL L TO P-CCNC: 471 U/L (ref 84–246)
LEUKOCYTE ESTERASE UR QL STRIP.AUTO: NEGATIVE
LYMPHOCYTES # BLD AUTO: 0.09 X10*3/UL (ref 1.2–4.8)
LYMPHOCYTES NFR BLD AUTO: 90 %
MAGNESIUM SERPL-MCNC: 1.88 MG/DL (ref 1.6–2.4)
MCH RBC QN AUTO: 28 PG (ref 26–34)
MCHC RBC AUTO-ENTMCNC: 33.8 G/DL (ref 32–36)
MCV RBC AUTO: 83 FL (ref 80–100)
MONOCYTES # BLD AUTO: 0.01 X10*3/UL (ref 0.1–1)
MONOCYTES NFR BLD AUTO: 10 %
MUCOUS THREADS #/AREA URNS AUTO: ABNORMAL /LPF
NEUTROPHILS # BLD AUTO: 0 X10*3/UL (ref 1.2–7.7)
NEUTROPHILS NFR BLD AUTO: 0 %
NITRITE UR QL STRIP.AUTO: NEGATIVE
NRBC BLD-RTO: 0 /100 WBCS (ref 0–0)
PH UR STRIP.AUTO: 7 [PH]
PHOSPHATE SERPL-MCNC: 2.3 MG/DL (ref 2.5–4.9)
PLATELET # BLD AUTO: 10 X10*3/UL (ref 150–450)
POTASSIUM SERPL-SCNC: 3.1 MMOL/L (ref 3.5–5.3)
PRODUCT BLOOD TYPE: 6200
PRODUCT CODE: NORMAL
PROT SERPL-MCNC: 3.7 G/DL (ref 6.4–8.2)
PROT UR STRIP.AUTO-MCNC: ABNORMAL MG/DL
RBC # BLD AUTO: 2.61 X10*6/UL (ref 4.5–5.9)
RBC # UR STRIP.AUTO: ABNORMAL /UL
RBC #/AREA URNS AUTO: ABNORMAL /HPF
RBC MORPH BLD: NORMAL
SODIUM SERPL-SCNC: 135 MMOL/L (ref 136–145)
SP GR UR STRIP.AUTO: 1.02
UNIT ABO: NORMAL
UNIT NUMBER: NORMAL
UNIT RH: NORMAL
UNIT VOLUME: 275
URATE SERPL-MCNC: 1.6 MG/DL (ref 4–7.5)
UROBILINOGEN UR STRIP.AUTO-MCNC: NORMAL MG/DL
WBC # BLD AUTO: 0.1 X10*3/UL (ref 4.4–11.3)
WBC #/AREA URNS AUTO: ABNORMAL /HPF

## 2025-01-31 PROCEDURE — 1170000001 HC PRIVATE ONCOLOGY ROOM DAILY

## 2025-01-31 PROCEDURE — 87077 CULTURE AEROBIC IDENTIFY: CPT | Performed by: PHYSICIAN ASSISTANT

## 2025-01-31 PROCEDURE — 2500000004 HC RX 250 GENERAL PHARMACY W/ HCPCS (ALT 636 FOR OP/ED): Performed by: PHYSICIAN ASSISTANT

## 2025-01-31 PROCEDURE — 84550 ASSAY OF BLOOD/URIC ACID: CPT | Performed by: PHYSICIAN ASSISTANT

## 2025-01-31 PROCEDURE — 82248 BILIRUBIN DIRECT: CPT | Performed by: PHYSICIAN ASSISTANT

## 2025-01-31 PROCEDURE — 99232 SBSQ HOSP IP/OBS MODERATE 35: CPT | Performed by: INTERNAL MEDICINE

## 2025-01-31 PROCEDURE — 85025 COMPLETE CBC W/AUTO DIFF WBC: CPT | Performed by: PHYSICIAN ASSISTANT

## 2025-01-31 PROCEDURE — P9073 PLATELETS PHERESIS PATH REDU: HCPCS

## 2025-01-31 PROCEDURE — 83735 ASSAY OF MAGNESIUM: CPT | Performed by: PHYSICIAN ASSISTANT

## 2025-01-31 PROCEDURE — 81001 URINALYSIS AUTO W/SCOPE: CPT | Performed by: PHYSICIAN ASSISTANT

## 2025-01-31 PROCEDURE — 84100 ASSAY OF PHOSPHORUS: CPT | Performed by: PHYSICIAN ASSISTANT

## 2025-01-31 PROCEDURE — 36415 COLL VENOUS BLD VENIPUNCTURE: CPT | Performed by: PHYSICIAN ASSISTANT

## 2025-01-31 PROCEDURE — 2500000001 HC RX 250 WO HCPCS SELF ADMINISTERED DRUGS (ALT 637 FOR MEDICARE OP): Performed by: PHYSICIAN ASSISTANT

## 2025-01-31 PROCEDURE — 87086 URINE CULTURE/COLONY COUNT: CPT | Performed by: PHYSICIAN ASSISTANT

## 2025-01-31 PROCEDURE — 36430 TRANSFUSION BLD/BLD COMPNT: CPT

## 2025-01-31 PROCEDURE — 83615 LACTATE (LD) (LDH) ENZYME: CPT | Performed by: PHYSICIAN ASSISTANT

## 2025-01-31 PROCEDURE — 2500000005 HC RX 250 GENERAL PHARMACY W/O HCPCS: Performed by: PHYSICIAN ASSISTANT

## 2025-01-31 RX ORDER — HYDROMORPHONE HCL/0.9% NACL/PF 15 MG/30ML
PATIENT CONTROLLED ANALGESIA SYRINGE INTRAVENOUS CONTINUOUS
Status: DISCONTINUED | OUTPATIENT
Start: 2025-01-31 | End: 2025-02-02

## 2025-01-31 RX ORDER — NALOXONE HYDROCHLORIDE 0.4 MG/ML
0.2 INJECTION, SOLUTION INTRAMUSCULAR; INTRAVENOUS; SUBCUTANEOUS AS NEEDED
Status: DISCONTINUED | OUTPATIENT
Start: 2025-01-31 | End: 2025-02-08 | Stop reason: HOSPADM

## 2025-01-31 RX ORDER — POTASSIUM CHLORIDE 29.8 MG/ML
40 INJECTION INTRAVENOUS ONCE
Status: COMPLETED | OUTPATIENT
Start: 2025-01-31 | End: 2025-01-31

## 2025-01-31 RX ADMIN — FLUCONAZOLE 400 MG: 2 INJECTION, SOLUTION INTRAVENOUS at 08:52

## 2025-01-31 RX ADMIN — PIPERACILLIN SODIUM AND TAZOBACTAM SODIUM 3.38 G: 3; .375 INJECTION, SOLUTION INTRAVENOUS at 20:21

## 2025-01-31 RX ADMIN — HYDROMORPHONE HYDROCHLORIDE 0.3 MG: 0.5 INJECTION, SOLUTION INTRAMUSCULAR; INTRAVENOUS; SUBCUTANEOUS at 03:56

## 2025-01-31 RX ADMIN — HYDROMORPHONE HYDROCHLORIDE 0.3 MG: 0.5 INJECTION, SOLUTION INTRAMUSCULAR; INTRAVENOUS; SUBCUTANEOUS at 08:53

## 2025-01-31 RX ADMIN — LIDOCAINE HYDROCHLORIDE 1.25 ML: 20 SOLUTION ORAL at 06:15

## 2025-01-31 RX ADMIN — PIPERACILLIN SODIUM AND TAZOBACTAM SODIUM 3.38 G: 3; .375 INJECTION, SOLUTION INTRAVENOUS at 01:41

## 2025-01-31 RX ADMIN — PIPERACILLIN SODIUM AND TAZOBACTAM SODIUM 3.38 G: 3; .375 INJECTION, SOLUTION INTRAVENOUS at 08:52

## 2025-01-31 RX ADMIN — PIPERACILLIN SODIUM AND TAZOBACTAM SODIUM 3.38 G: 3; .375 INJECTION, SOLUTION INTRAVENOUS at 14:11

## 2025-01-31 RX ADMIN — METOPROLOL SUCCINATE 50 MG: 50 TABLET, EXTENDED RELEASE ORAL at 08:56

## 2025-01-31 RX ADMIN — Medication: at 14:07

## 2025-01-31 RX ADMIN — ACYCLOVIR SODIUM 250 MG: 500 INJECTION, SOLUTION INTRAVENOUS at 08:52

## 2025-01-31 RX ADMIN — ACYCLOVIR SODIUM 400 MG: 50 INJECTION, SOLUTION INTRAVENOUS at 20:28

## 2025-01-31 RX ADMIN — POTASSIUM CHLORIDE 40 MEQ: 29.8 INJECTION, SOLUTION INTRAVENOUS at 14:07

## 2025-01-31 RX ADMIN — PANTOPRAZOLE SODIUM 40 MG: 40 INJECTION, POWDER, FOR SOLUTION INTRAVENOUS at 08:53

## 2025-01-31 ASSESSMENT — COGNITIVE AND FUNCTIONAL STATUS - GENERAL
MOBILITY SCORE: 24
DAILY ACTIVITIY SCORE: 24

## 2025-01-31 ASSESSMENT — PAIN DESCRIPTION - ORIENTATION
ORIENTATION: INNER
ORIENTATION: INNER

## 2025-01-31 ASSESSMENT — PAIN SCALES - GENERAL
PAINLEVEL_OUTOF10: 6
PAINLEVEL_OUTOF10: 10 - WORST POSSIBLE PAIN
PAINLEVEL_OUTOF10: 0 - NO PAIN
PAINLEVEL_OUTOF10: 10 - WORST POSSIBLE PAIN

## 2025-01-31 ASSESSMENT — PAIN DESCRIPTION - LOCATION
LOCATION: MOUTH
LOCATION: THROAT
LOCATION: MOUTH

## 2025-01-31 ASSESSMENT — PAIN - FUNCTIONAL ASSESSMENT: PAIN_FUNCTIONAL_ASSESSMENT: 0-10

## 2025-01-31 NOTE — PROGRESS NOTES
"Bijan Ibanez is a 64 y.o. male on day 3 of admission presenting with Febrile neutropenia (CMS-[facility]).    Subjective   Patient with increased mouth and throat pain. He is unable to swallow even water this morning. Some left leg pain that started. No f/c/n/v/d. No bowel movements last 2 days. No abdominal pain. Increased mucous. Some blood in the urine and and with sputum. No other bleeding reported. Still with leg swelling. Patient with fever this afternoon.        Objective     Physical Exam  Constitutional:       General: He is not in acute distress.  HENT:      Head: Normocephalic and atraumatic.      Mouth/Throat:      Mouth: Mucous membranes are moist.      Pharynx: Posterior oropharyngeal erythema present.      Comments: Ulcers, erythema noted on soft palate.   Eyes:      Extraocular Movements: Extraocular movements intact.      Pupils: Pupils are equal, round, and reactive to light.   Cardiovascular:      Rate and Rhythm: Regular rhythm.      Heart sounds: No murmur heard.     No gallop.   Pulmonary:      Effort: No respiratory distress.      Breath sounds: No wheezing or rhonchi.   Abdominal:      General: Bowel sounds are normal.      Palpations: Abdomen is soft.      Tenderness: There is no abdominal tenderness.   Musculoskeletal:         General: Swelling present.      Cervical back: Normal range of motion.      Right lower leg: Edema present.      Left lower leg: Edema present.      Comments: Bilat UE swelling. NESBITT   Skin:     General: Skin is warm and dry.   Neurological:      Mental Status: He is alert and oriented to person, place, and time.      Motor: No weakness.   Psychiatric:         Mood and Affect: Mood normal.         Behavior: Behavior normal.         Last Recorded Vitals  Blood pressure 132/85, pulse 109, temperature 37.1 °C (98.8 °F), temperature source Temporal, resp. rate 18, height 1.803 m (5' 10.98\"), weight 115 kg (252 lb 6.8 oz), SpO2 95%.  Intake/Output last 3 Shifts:  I/O last 3 " completed shifts:  In: 2459.2 (21.5 mL/kg) [I.V.:1159.2 (10.1 mL/kg); Blood:1300]  Out: - (0 mL/kg)   Weight: 114.5 kg     Relevant Results  Scheduled medications  acyclovir, 250 mg, intravenous, BID  [Held by provider] acyclovir, 400 mg, oral, q12h SONAL  [Held by provider] allopurinol, 300 mg, oral, Daily  [Held by provider] atorvastatin, 40 mg, oral, Daily  [Held by provider] baclofen, 10 mg, oral, TID  fluconazole, 400 mg, intravenous, q24h  [Held by provider] fluconazole, 400 mg, oral, Daily  gabapentin, 300 mg, oral, Nightly  metoprolol succinate XL, 50 mg, oral, Daily  [Held by provider] pantoprazole, 40 mg, oral, Daily before breakfast  pantoprazole, 40 mg, intravenous, Daily  piperacillin-tazobactam, 3.375 g, intravenous, q6h      Continuous medications     PRN medications  PRN medications: albuterol, alteplase, HYDROmorphone, lidocaine, lidocaine-diphenhydraMINE-Maalox 1:1:1, [Held by provider] oxyCODONE, oxyCODONE, prochlorperazine, sucralfate    Results for orders placed or performed during the hospital encounter of 01/28/25 (from the past 24 hours)   CBC and Auto Differential   Result Value Ref Range    WBC 0.1 (LL) 4.4 - 11.3 x10*3/uL    nRBC 0.0 0.0 - 0.0 /100 WBCs    RBC 2.61 (L) 4.50 - 5.90 x10*6/uL    Hemoglobin 7.3 (L) 13.5 - 17.5 g/dL    Hematocrit 21.6 (L) 41.0 - 52.0 %    MCV 83 80 - 100 fL    MCH 28.0 26.0 - 34.0 pg    MCHC 33.8 32.0 - 36.0 g/dL    RDW 13.2 11.5 - 14.5 %    Platelets 10 (LL) 150 - 450 x10*3/uL    Neutrophils % 0.0 40.0 - 80.0 %    Immature Granulocytes %, Automated 0.0 0.0 - 0.9 %    Lymphocytes % 90.0 13.0 - 44.0 %    Monocytes % 10.0 2.0 - 10.0 %    Eosinophils % 0.0 0.0 - 6.0 %    Basophils % 0.0 0.0 - 2.0 %    Neutrophils Absolute 0.00 (L) 1.20 - 7.70 x10*3/uL    Immature Granulocytes Absolute, Automated 0.00 0.00 - 0.70 x10*3/uL    Lymphocytes Absolute 0.09 (L) 1.20 - 4.80 x10*3/uL    Monocytes Absolute 0.01 (L) 0.10 - 1.00 x10*3/uL    Eosinophils Absolute 0.00 0.00 - 0.70  x10*3/uL    Basophils Absolute 0.00 0.00 - 0.10 x10*3/uL   Hepatic Function Panel   Result Value Ref Range    Albumin 2.2 (L) 3.4 - 5.0 g/dL    Bilirubin, Total 1.5 (H) 0.0 - 1.2 mg/dL    Bilirubin, Direct 0.6 (H) 0.0 - 0.3 mg/dL    Alkaline Phosphatase 88 33 - 136 U/L    ALT 46 10 - 52 U/L    AST 19 9 - 39 U/L    Total Protein 3.7 (L) 6.4 - 8.2 g/dL   Magnesium   Result Value Ref Range    Magnesium 1.88 1.60 - 2.40 mg/dL   Uric Acid   Result Value Ref Range    Uric Acid 1.6 (L) 4.0 - 7.5 mg/dL   Lactate dehydrogenase   Result Value Ref Range     (H) 84 - 246 U/L   Phosphorus   Result Value Ref Range    Phosphorus 2.3 (L) 2.5 - 4.9 mg/dL   Basic Metabolic Panel   Result Value Ref Range    Glucose 112 (H) 74 - 99 mg/dL    Sodium 135 (L) 136 - 145 mmol/L    Potassium 3.1 (L) 3.5 - 5.3 mmol/L    Chloride 99 98 - 107 mmol/L    Bicarbonate 28 21 - 32 mmol/L    Anion Gap 11 10 - 20 mmol/L    Urea Nitrogen 14 6 - 23 mg/dL    Creatinine 0.46 (L) 0.50 - 1.30 mg/dL    eGFR >90 >60 mL/min/1.73m*2    Calcium 6.9 (L) 8.6 - 10.6 mg/dL   Morphology   Result Value Ref Range    RBC Morphology See Below     [place] Cells Few              This patient has a central line   Reason for the central line remaining today? Parenteral medication            Malnutrition Diagnosis Status: New  Malnutrition Diagnosis: Moderate malnutrition related to chronic disease or condition (acute-on-chronic)  Related to: CA, worsening issues with mucositis  As Evidenced by: pt estimated to be consuming <75% estimated energy needs x >1 month with further decline in PO x the last several days, areas of mild/moderate muscle and fat losses noted during nutrition exam  I agree with the dietitian's malnutrition diagnosis.      Assessment/Plan   Assessment & Plan  Febrile neutropenia (CMS-HCC)    Burkitt's lymphoma of lymph nodes of multiple regions (Multi)    Bijan Ibanez is a 64 y.o. male with a past medical history of hypertension, hyperlipidemia,  coronary artery disease (s/p stent 2010, on ASA), renal cell carcinoma (s/p R partial nephrectomy in 2013 @ [facility]), GERD, BPH, arthritis, and Burkitt lymphoma being treated with DA-R-EPOCH (since 1/21/25) who is admitted today (01/28/25) with febrile neutropenia and mucositis.      Day 11 C1 DA-R-EPOCH       ONC  #Burkitt Lymphoma  :: Workup and treatment as per Onc History  :: Received C1 DA-R-EPOCH (1/21/25), supported with pegfilgrastim 1/28/25  :: CT neck (1/27/25): Mild asymmetric enlargement of the right muscles of mastication, predominantly the masseter, likely due to lymphomatous involvement, less conspicuous compared to 1/9/2025, with no new mass or infectious/inflammatory process in the neck or aerodigestive tract  - Due for C2 ~ 2/11/25  # TLS monitoring and prophylaxis  :: Uric acid 4.4 mg/dL (1/28/25) on admit, 1.6 today (1/30/25)  - HOLD allopurinol with severe mucositis, will monitor UA levels daily     HEME  #Pancytopenia  - Likely related to chemotherapy vs disease   - transfuse for hgb<7 and plts<20 due to severe mucositis  # VTE Prophylaxis  - SCDs and ambulation only with thrombocytopenia     ID  #Febrile neutropenia  :: Cultures and serology  -Fever 38.0 (1/31)- Blood cultures, UA/UC  = Blood cultures (1/28/25): NGTD  = UA (1/28/25): not suspicious for infection  = Influenza A/B, COVID, RSV (1/27/25): negative  = Parainfluenza, Metapneumovirus, Rhinovirus, Adenovirus PCR (1/27/25): negative   = Hold off on sputum culture (1/28/25) -- cough seems mostly from inability to clear oral secretions  :: Imaging  = CXR (1/27/25): No cardiopulmonary process  :: Antibiotics  - Piperacillin-tazobactam 3.375 g IV Q6H (1/28- )  - Patient reports unknown reaction as a child to penicillin -- discussed with Clinical Oncology Specialist PharmD, Ok to trial piperacillin-tazobactam, remove allergy if no reaction, tolerating well so far   # Prophylaxis  - VZV: switch acyclovir to treatment dose due to concern for HSV  in the mouth  - Candidiasis: Continue flucaonzole 400 mg  daily   - PJP: None at this time  #mouth ulcer  -likely mucositis but will send HSV swab and increased acyclovir to 5mg/kg IV every 8 hours     FEN/RENAL  - Admission weight 115 kg (01/28/25). Current wt 111 kg(1/31)  F PO  E PRN  N Low-pathogen    #hypomagnesia  -Monitor and replace as needed  #hypokalemia  - 40meq KCL 1/30   #fluid overload   - Lasix 20mg X 1 1/30     GI  # Esophageal mucositis  - Chemotherapy  :: CT neck (1/27/25) as above: no signs of abscess or fluid collection  - NS @ 50 mL/hr x 40 hours (2L) to supplement poor PO intake(1/28-1/30)  - Supportive care: BMX, viscous lidocaine, sucralfate PRN  - Stop oxycodone, gabapentin, and prn IV Dilaudid. Start Dilaudid PCA no basal, bolus 0.2 mg every 10 minutes with one hour dose limit of 1.2 mg on 1/31  -continuous pulse oximetry while on PCA     CARDIO/PULM  #Hypertension  #Hyperlipidemia  #CAD  #MI s/p stent 2010  - Continue home  metoprolol succinate   -hold atorvastatin due to mucositis   - HOLD home aspirin 81 mg daily with thrombocytopenia  - HOLD home lisinopril in setting of low BP's  #Functional monitoring  - ECHO (1/18/25): LVSF normal, EF 65-70%.     VASCULAR  -US arms 1/24-Findings consistent with partially occlusive thrombus within the mid  to distal brachial vein and mid to distal basilic vein within the  right upper extremity.  -repeat US arms ordered 1/31. Unable to anticoagulate due to thrombocytopenia  -Leg swelling with pain. US legs ordered 1/31     ACCESS/SUPPORT/DISPO  - FULL CODE  - MD: Dr. Mckeon  - ACCESS: KRISHAN PICC  - PT/RD/AI        Pt seen, examined and discussed with MD Angle Diop PA-C

## 2025-01-31 NOTE — CARE PLAN
The patient's goals for the shift include      The clinical goals for the shift include patient will remain HDS throughout shift    Problem: Pain  Goal: Takes deep breaths with improved pain control throughout the shift  Outcome: Progressing  Goal: Turns in bed with improved pain control throughout the shift  Outcome: Progressing  Goal: Walks with improved pain control throughout the shift  Outcome: Progressing  Goal: Performs ADL's with improved pain control throughout shift  Outcome: Progressing  Goal: Participates in PT with improved pain control throughout the shift  Outcome: Progressing  Goal: Free from opioid side effects throughout the shift  Outcome: Progressing  Goal: Free from acute confusion related to pain meds throughout the shift  Outcome: Progressing     Problem: Pain - Adult  Goal: Verbalizes/displays adequate comfort level or baseline comfort level  Outcome: Progressing     Problem: Nutrition  Goal: Nutrient intake appropriate for maintaining nutritional needs  Outcome: Progressing

## 2025-01-31 NOTE — CARE PLAN
The patient's goals for the shift include      The clinical goals for the shift include pt will remain safe and HDS throughout shift    Over the shift, the patient did  make progress toward the following goals. Barriers to progression include continued pain. Recommendations to address these barriers include begin patient on prescribed PCA pump.

## 2025-01-31 NOTE — PROGRESS NOTES
Physical Therapy                 Therapy Communication Note    Patient Name: Bijan Ibanez  MRN: [id number]  Department: [facility] 3  Room: [address] [place], [place] [address]  Today's Date: 1/31/2025     Discipline: Physical Therapy    PT Missed Visit: Yes     Missed Visit Reason: Missed Visit Reason: Patient refused (Pt declined due to pain. Will re-attempt as able and appropriate)    Missed Time: Attempt

## 2025-02-01 ENCOUNTER — APPOINTMENT (OUTPATIENT)
Dept: HEMATOLOGY/ONCOLOGY | Facility: HOSPITAL | Age: 65
End: 2025-02-01
Payer: MEDICARE

## 2025-02-01 ENCOUNTER — APPOINTMENT (OUTPATIENT)
Dept: RADIOLOGY | Facility: HOSPITAL | Age: 65
End: 2025-02-01
Payer: MEDICARE

## 2025-02-01 PROBLEM — R78.81 GRAM-POSITIVE COCCI BACTEREMIA: Status: ACTIVE | Noted: 2025-02-01

## 2025-02-01 LAB
ALBUMIN SERPL BCP-MCNC: 2.1 G/DL (ref 3.4–5)
ANION GAP SERPL CALC-SCNC: 11 MMOL/L (ref 10–20)
BACTERIA BLD CULT: NORMAL
BACTERIA BLD CULT: NORMAL
BACTERIA UR CULT: NO GROWTH
BASOPHILS # BLD AUTO: 0 X10*3/UL (ref 0–0.1)
BASOPHILS NFR BLD AUTO: 0 %
BLOOD EXPIRATION DATE: NORMAL
BLOOD EXPIRATION DATE: NORMAL
BUN SERPL-MCNC: 14 MG/DL (ref 6–23)
CALCIUM SERPL-MCNC: 7.1 MG/DL (ref 8.6–10.6)
CHLORIDE SERPL-SCNC: 99 MMOL/L (ref 98–107)
CO2 SERPL-SCNC: 28 MMOL/L (ref 21–32)
CREAT SERPL-MCNC: 0.44 MG/DL (ref 0.5–1.3)
DISPENSE STATUS: NORMAL
DISPENSE STATUS: NORMAL
EGFRCR SERPLBLD CKD-EPI 2021: >90 ML/MIN/1.73M*2
EOSINOPHIL # BLD AUTO: 0 X10*3/UL (ref 0–0.7)
EOSINOPHIL NFR BLD AUTO: 0 %
ERYTHROCYTE [DISTWIDTH] IN BLOOD BY AUTOMATED COUNT: 13.2 % (ref 11.5–14.5)
GLUCOSE SERPL-MCNC: 123 MG/DL (ref 74–99)
HCT VFR BLD AUTO: 20.4 % (ref 41–52)
HGB BLD-MCNC: 6.8 G/DL (ref 13.5–17.5)
IMM GRANULOCYTES # BLD AUTO: 0.01 X10*3/UL (ref 0–0.7)
IMM GRANULOCYTES NFR BLD AUTO: 11.1 % (ref 0–0.9)
LDH SERPL L TO P-CCNC: 388 U/L (ref 84–246)
LYMPHOCYTES # BLD AUTO: 0.05 X10*3/UL (ref 1.2–4.8)
LYMPHOCYTES NFR BLD AUTO: 55.6 %
MAGNESIUM SERPL-MCNC: 1.77 MG/DL (ref 1.6–2.4)
MCH RBC QN AUTO: 28 PG (ref 26–34)
MCHC RBC AUTO-ENTMCNC: 33.3 G/DL (ref 32–36)
MCV RBC AUTO: 84 FL (ref 80–100)
MONOCYTES # BLD AUTO: 0.03 X10*3/UL (ref 0.1–1)
MONOCYTES NFR BLD AUTO: 33.3 %
NEUTROPHILS # BLD AUTO: 0 X10*3/UL (ref 1.2–7.7)
NEUTROPHILS NFR BLD AUTO: 0 %
NRBC BLD-RTO: 0 /100 WBCS (ref 0–0)
PHOSPHATE SERPL-MCNC: 2 MG/DL (ref 2.5–4.9)
PLATELET # BLD AUTO: 11 X10*3/UL (ref 150–450)
POTASSIUM SERPL-SCNC: 3.4 MMOL/L (ref 3.5–5.3)
PRODUCT BLOOD TYPE: 6200
PRODUCT BLOOD TYPE: 7300
PRODUCT CODE: NORMAL
PRODUCT CODE: NORMAL
RBC # BLD AUTO: 2.43 X10*6/UL (ref 4.5–5.9)
SODIUM SERPL-SCNC: 135 MMOL/L (ref 136–145)
UNIT ABO: NORMAL
UNIT ABO: NORMAL
UNIT NUMBER: NORMAL
UNIT NUMBER: NORMAL
UNIT RH: NORMAL
UNIT RH: NORMAL
UNIT VOLUME: 278
UNIT VOLUME: 350
URATE SERPL-MCNC: 1.5 MG/DL (ref 4–7.5)
WBC # BLD AUTO: 0.1 X10*3/UL (ref 4.4–11.3)
XM INTEP: NORMAL

## 2025-02-01 PROCEDURE — 83615 LACTATE (LD) (LDH) ENZYME: CPT | Performed by: PHYSICIAN ASSISTANT

## 2025-02-01 PROCEDURE — 74018 RADEX ABDOMEN 1 VIEW: CPT

## 2025-02-01 PROCEDURE — 84550 ASSAY OF BLOOD/URIC ACID: CPT | Performed by: PHYSICIAN ASSISTANT

## 2025-02-01 PROCEDURE — 80069 RENAL FUNCTION PANEL: CPT | Performed by: PHYSICIAN ASSISTANT

## 2025-02-01 PROCEDURE — 2500000004 HC RX 250 GENERAL PHARMACY W/ HCPCS (ALT 636 FOR OP/ED): Performed by: PHYSICIAN ASSISTANT

## 2025-02-01 PROCEDURE — P9040 RBC LEUKOREDUCED IRRADIATED: HCPCS

## 2025-02-01 PROCEDURE — 1170000001 HC PRIVATE ONCOLOGY ROOM DAILY

## 2025-02-01 PROCEDURE — 93005 ELECTROCARDIOGRAM TRACING: CPT

## 2025-02-01 PROCEDURE — 2500000004 HC RX 250 GENERAL PHARMACY W/ HCPCS (ALT 636 FOR OP/ED)

## 2025-02-01 PROCEDURE — 83735 ASSAY OF MAGNESIUM: CPT | Performed by: PHYSICIAN ASSISTANT

## 2025-02-01 PROCEDURE — 2500000001 HC RX 250 WO HCPCS SELF ADMINISTERED DRUGS (ALT 637 FOR MEDICARE OP): Performed by: PHYSICIAN ASSISTANT

## 2025-02-01 PROCEDURE — P9073 PLATELETS PHERESIS PATH REDU: HCPCS

## 2025-02-01 PROCEDURE — 36430 TRANSFUSION BLD/BLD COMPNT: CPT

## 2025-02-01 PROCEDURE — 74018 RADEX ABDOMEN 1 VIEW: CPT | Performed by: STUDENT IN AN ORGANIZED HEALTH CARE EDUCATION/TRAINING PROGRAM

## 2025-02-01 PROCEDURE — 2500000001 HC RX 250 WO HCPCS SELF ADMINISTERED DRUGS (ALT 637 FOR MEDICARE OP)

## 2025-02-01 PROCEDURE — 2500000005 HC RX 250 GENERAL PHARMACY W/O HCPCS

## 2025-02-01 PROCEDURE — 99232 SBSQ HOSP IP/OBS MODERATE 35: CPT | Performed by: INTERNAL MEDICINE

## 2025-02-01 PROCEDURE — 85025 COMPLETE CBC W/AUTO DIFF WBC: CPT | Performed by: PHYSICIAN ASSISTANT

## 2025-02-01 PROCEDURE — 93010 ELECTROCARDIOGRAM REPORT: CPT | Performed by: INTERNAL MEDICINE

## 2025-02-01 RX ORDER — MAGNESIUM SULFATE HEPTAHYDRATE 40 MG/ML
2 INJECTION, SOLUTION INTRAVENOUS ONCE
Status: COMPLETED | OUTPATIENT
Start: 2025-02-01 | End: 2025-02-01

## 2025-02-01 RX ORDER — SENNOSIDES 8.8 MG/5ML
10 LIQUID ORAL NIGHTLY
Status: DISCONTINUED | OUTPATIENT
Start: 2025-02-01 | End: 2025-02-02

## 2025-02-01 RX ORDER — VANCOMYCIN HYDROCHLORIDE 1 G/20ML
INJECTION, POWDER, LYOPHILIZED, FOR SOLUTION INTRAVENOUS DAILY PRN
Status: DISCONTINUED | OUTPATIENT
Start: 2025-02-01 | End: 2025-02-08 | Stop reason: HOSPADM

## 2025-02-01 RX ORDER — AMOXICILLIN 250 MG
2 CAPSULE ORAL NIGHTLY
Status: DISCONTINUED | OUTPATIENT
Start: 2025-02-01 | End: 2025-02-01

## 2025-02-01 RX ORDER — POLYETHYLENE GLYCOL 3350 17 G/17G
17 POWDER, FOR SOLUTION ORAL DAILY
Status: DISCONTINUED | OUTPATIENT
Start: 2025-02-01 | End: 2025-02-02

## 2025-02-01 RX ORDER — DOCUSATE SODIUM 50 MG/5ML
100 LIQUID ORAL NIGHTLY
Status: DISCONTINUED | OUTPATIENT
Start: 2025-02-01 | End: 2025-02-03

## 2025-02-01 RX ORDER — POTASSIUM CHLORIDE 29.8 MG/ML
40 INJECTION INTRAVENOUS ONCE
Status: COMPLETED | OUTPATIENT
Start: 2025-02-01 | End: 2025-02-01

## 2025-02-01 RX ADMIN — Medication: at 13:25

## 2025-02-01 RX ADMIN — ACYCLOVIR SODIUM 400 MG: 50 INJECTION, SOLUTION INTRAVENOUS at 20:47

## 2025-02-01 RX ADMIN — PANTOPRAZOLE SODIUM 40 MG: 40 INJECTION, POWDER, FOR SOLUTION INTRAVENOUS at 09:14

## 2025-02-01 RX ADMIN — MAGNESIUM SULFATE HEPTAHYDRATE 2 G: 40 INJECTION, SOLUTION INTRAVENOUS at 16:33

## 2025-02-01 RX ADMIN — PIPERACILLIN SODIUM AND TAZOBACTAM SODIUM 3.38 G: 3; .375 INJECTION, SOLUTION INTRAVENOUS at 20:47

## 2025-02-01 RX ADMIN — PIPERACILLIN SODIUM AND TAZOBACTAM SODIUM 3.38 G: 3; .375 INJECTION, SOLUTION INTRAVENOUS at 15:32

## 2025-02-01 RX ADMIN — POLYETHYLENE GLYCOL 3350 17 G: 17 POWDER, FOR SOLUTION ORAL at 12:57

## 2025-02-01 RX ADMIN — VANCOMYCIN HYDROCHLORIDE 1500 MG: 5 INJECTION, POWDER, LYOPHILIZED, FOR SOLUTION INTRAVENOUS at 10:34

## 2025-02-01 RX ADMIN — POTASSIUM CHLORIDE 40 MEQ: 29.8 INJECTION, SOLUTION INTRAVENOUS at 16:32

## 2025-02-01 RX ADMIN — ACYCLOVIR SODIUM 400 MG: 50 INJECTION, SOLUTION INTRAVENOUS at 12:56

## 2025-02-01 RX ADMIN — PIPERACILLIN SODIUM AND TAZOBACTAM SODIUM 3.38 G: 3; .375 INJECTION, SOLUTION INTRAVENOUS at 09:14

## 2025-02-01 RX ADMIN — PIPERACILLIN SODIUM AND TAZOBACTAM SODIUM 3.38 G: 3; .375 INJECTION, SOLUTION INTRAVENOUS at 02:11

## 2025-02-01 RX ADMIN — DOCUSATE SODIUM LIQUID 100 MG: 100 LIQUID ORAL at 22:08

## 2025-02-01 RX ADMIN — METOPROLOL SUCCINATE 50 MG: 50 TABLET, EXTENDED RELEASE ORAL at 09:14

## 2025-02-01 RX ADMIN — FLUCONAZOLE 400 MG: 2 INJECTION, SOLUTION INTRAVENOUS at 09:14

## 2025-02-01 RX ADMIN — VANCOMYCIN HYDROCHLORIDE 1500 MG: 5 INJECTION, POWDER, LYOPHILIZED, FOR SOLUTION INTRAVENOUS at 22:08

## 2025-02-01 RX ADMIN — ACYCLOVIR SODIUM 400 MG: 50 INJECTION, SOLUTION INTRAVENOUS at 03:30

## 2025-02-01 ASSESSMENT — COGNITIVE AND FUNCTIONAL STATUS - GENERAL
TOILETING: A LITTLE
DAILY ACTIVITIY SCORE: 19
DRESSING REGULAR UPPER BODY CLOTHING: A LITTLE
DRESSING REGULAR LOWER BODY CLOTHING: A LITTLE
CLIMB 3 TO 5 STEPS WITH RAILING: A LOT
DAILY ACTIVITIY SCORE: 19
PERSONAL GROOMING: A LITTLE
DRESSING REGULAR LOWER BODY CLOTHING: A LITTLE
MOBILITY SCORE: 21
WALKING IN HOSPITAL ROOM: A LITTLE
PERSONAL GROOMING: A LITTLE
MOBILITY SCORE: 21
CLIMB 3 TO 5 STEPS WITH RAILING: A LOT
DRESSING REGULAR UPPER BODY CLOTHING: A LITTLE
HELP NEEDED FOR BATHING: A LITTLE
WALKING IN HOSPITAL ROOM: A LITTLE
HELP NEEDED FOR BATHING: A LITTLE
TOILETING: A LITTLE

## 2025-02-01 ASSESSMENT — PAIN - FUNCTIONAL ASSESSMENT: PAIN_FUNCTIONAL_ASSESSMENT: 0-10

## 2025-02-01 ASSESSMENT — PAIN SCALES - GENERAL
PAINLEVEL_OUTOF10: 7
PAINLEVEL_OUTOF10: 8

## 2025-02-01 NOTE — SIGNIFICANT EVENT
Rapid Response Nurse Note: RADAR alert: 6    Pager time: [phone]  Arrival time: 930  Event end time:   Location: [facility] 3021  [] Triage by phone or secure messaging    Rapid response initiated by:  [] Rapid response RN [] Family [] Nursing Supervisor [] Physician   [x] RADAR auto page [] Sepsis auto-page [] RN [] RT   [] NP/PA [] Other:     Primary reason for call:   [] BAT [] New CPAP/BiPAP [] Bleeding [] Change in mental status   [] Chest pain [] Code blue [] FiO2 >/= 50% [] HR </= 40 bpm   [] HR >/= 130 bpm [] Hyperglycemia [] Hypoglycemia [x] RADAR    [] RR </= 8 bpm [] RR >/= 30 bpm [] SBP </= 90 mmHg [] SpO2 < 90%   [] Seizure [] Sepsis [] Shortness of breath  [] Staff concern: see comments     Initial VS and/or RADAR VS: T 37.2 °C; ; RR 20; BP 99/69; SPO2 94%.    Providers present at bedside (if applicable): Rapid Response RN, Primary RN    Name of ICU Provider contacted (if applicable):     Interventions:  [x] None [] ABG/VBG [] Assist w/ICU transfer [] BAT paged    [] Bag mask [] Blood [] Cardioversion [] Code Blue   [] Code blue for intubation [] Code status changed [] Chest x-ray [] EKG   [] IV fluid/bolus [] KUB x-ray [] Labs/cultures [] Medication   [] Nebulizer treatment [] NIPPV (CPAP/BiPAP) [] Oxygen [] Oral airway   [] Peripheral IV [] Palliative care consult [] CT/MRI [] Sepsis protocol    [] Suctioned [] Other:     Outcome:  [] Coded and  [] Code blue for intubation [] Coded and transferred to ICU []  on division   [x] Remained on division (no change) [] Remained on division + additional monitoring [] Remained in ED [] Transferred to ED   [] Transferred to ICU [] Transferred to inpatient status [] Transferred for interventions (procedure) [] Transferred to ICU stepdown    [] Transferred to surgery [] Transferred to telemetry [] Sepsis protocol [] STEMI protocol   [] Stroke protocol [x] Bedside nurse instructed to page rapid response for any concerns or acute change in  condition/VS     Additional Comments:     Radar auto-page received for a radar score of 6 with the above listed vital signs.  Vital signs were confirmed and reviewed with primary RN.  Upon examination he is sitting at side of bed eating breakfast and in no distress.  Per report, plan has been to diurese over the past few days.  pRBCs and platelets ordered.  Vancomycin and Zosyn ordered for positive blood cultures.  EKG showed Sinus Tach with no ST changes.  Plan to reevaluate VS following blood product administration.  RN to contact Rapid Response with any future concerns or signs of clinical decompensation.

## 2025-02-01 NOTE — CARE PLAN
The patient's goals for the shift include    Problem: Pain  Goal: Takes deep breaths with improved pain control throughout the shift  Outcome: Progressing  Goal: Turns in bed with improved pain control throughout the shift  Outcome: Progressing  Goal: Walks with improved pain control throughout the shift  Outcome: Progressing  Goal: Performs ADL's with improved pain control throughout shift  Outcome: Progressing  Goal: Participates in PT with improved pain control throughout the shift  Outcome: Progressing  Goal: Free from opioid side effects throughout the shift  Outcome: Progressing  Goal: Free from acute confusion related to pain meds throughout the shift  Outcome: Progressing     Problem: Pain - Adult  Goal: Verbalizes/displays adequate comfort level or baseline comfort level  Outcome: Progressing     Problem: Safety - Adult  Goal: Free from fall injury  Outcome: Progressing     Problem: Discharge Planning  Goal: Discharge to home or other facility with appropriate resources  Outcome: Progressing     Problem: Chronic Conditions and Co-morbidities  Goal: Patient's chronic conditions and co-morbidity symptoms are monitored and maintained or improved  Outcome: Progressing     Problem: Nutrition  Goal: Nutrient intake appropriate for maintaining nutritional needs  Outcome: Progressing     Problem: Fall/Injury  Goal: Not fall by end of shift  Outcome: Progressing  Goal: Be free from injury by end of the shift  Outcome: Progressing  Goal: Verbalize understanding of personal risk factors for fall in the hospital  Outcome: Progressing  Goal: Verbalize understanding of risk factor reduction measures to prevent injury from fall in the home  Outcome: Progressing  Goal: Use assistive devices by end of the shift  Outcome: Progressing  Goal: Pace activities to prevent fatigue by end of the shift  Outcome: Progressing       The clinical goals for the shift include pt will have pain controlled through shift

## 2025-02-01 NOTE — CONSULTS
Vancomycin Dosing by Pharmacy- INITIAL    Bijan Ibanez is a 64 y.o. year old male who Pharmacy has been consulted for vancomycin dosing for bacteremia (Gram positive cocci in clusters). Based on the patient's indication and renal status this patient will be dosed based on a goal AUC of 500-600.     Renal function is currently stable.    Visit Vitals  /72 (BP Location: Left arm, Patient Position: Lying)   Pulse (!) 115   Temp 36.8 °C (98.2 °F) (Temporal)   Resp 20        Lab Results   Component Value Date    CREATININE 0.44 (L) 2025    CREATININE 0.46 (L) 2025    CREATININE 0.49 (L) 2025    CREATININE 0.54 2025        Patient weight is as follows:   Vitals:    25 0900   Weight: 111 kg (245 lb 9.5 oz)       Cultures:  No results found for the encounter in last 14 days.        I/O last 3 completed shifts:  In: 516.3 (4.6 mL/kg) [I.V.:50 (0.4 mL/kg); Blood:366.3; IV Piggyback:100]  Out: 300 (2.7 mL/kg) [Urine:300 (0.1 mL/kg/hr)]  Weight: 111.4 kg   I/O during current shift:  No intake/output data recorded.    Temp (24hrs), Av.4 °C (99.3 °F), Min:36.5 °C (97.7 °F), Max:38 °C (100.4 °F)         Assessment/Plan     Patient will not be given a loading dose.  Will initiate vancomycin maintenance,  1500 mg every 12 hours.    This dosing regimen is predicted by [employer] to result in the following pharmacokinetic parameters:  Regimen: 1500 mg IV every 12 hours.  Start time: 07:58 on 2025  Exposure target: AUC24 (range)400-600 mg/L.hr   [id number]: 449 mg/L.hr  AUC24,ss: 544 mg/L.hr  Probability of AUC24 > 400: 81 %  Ctrough,ss: 17.1 mg/L  Probability of Ctrough,ss > 20: 37 %    Follow-up level will be ordered on  with AM labs, unless clinically indicated sooner.  Will continue to monitor renal function daily while on vancomycin and order serum creatinine at least every 48 hours if not already ordered.  Follow for continued vancomycin needs, clinical response, and  signs/symptoms of toxicity.       Zeb Flynn, PharmD, BCPS

## 2025-02-01 NOTE — PROGRESS NOTES
Bijan Ibanez is a 64 y.o. male on day 4 of admission presenting with Febrile neutropenia (CMS-[facility]).    Subjective     Afebrile since 1/31 @14:58, no overnight events.     This morning (02/01/25), he has persistent tachycardia, with HR in the 140s. EKG was acquired, showing sinus tachycardia. Blood cultures (1/28/25) resulted positive for S. Aureus and he was started on vancomycin.     Noted to have quiet bowel sounds and no recent BM. Denies abdominal pain. X-ray of the abdomen showed prominent but nondilated small bowel loops and gas-filled nondilated colon suggest a nonobstructive bowel gas pattern, with no gross evidence of pneumoperitoneum.     He notes that he is having increasing difficulty swallowing his secretions.        Objective     Physical Exam  Constitutional:       General: He is not in acute distress.  HENT:      Head: Normocephalic and atraumatic.      Mouth/Throat:      Mouth: Mucous membranes are moist.      Pharynx: Posterior oropharyngeal erythema present.      Comments: Ulcers, erythema noted on soft palate.   Eyes:      Extraocular Movements: Extraocular movements intact.      Pupils: Pupils are equal, round, and reactive to light.   Cardiovascular:      Rate and Rhythm: Regular rhythm. Tachycardia present.      Heart sounds: No murmur heard.     No gallop.   Pulmonary:      Effort: No respiratory distress.      Breath sounds: No wheezing or rhonchi.   Abdominal:      General: Bowel sounds are normal.      Palpations: Abdomen is soft.      Tenderness: There is no abdominal tenderness.   Musculoskeletal:         General: Swelling present.      Cervical back: Normal range of motion.      Right lower leg: Edema present.      Left lower leg: Edema present.   Skin:     General: Skin is warm and dry.   Neurological:      Mental Status: He is alert and oriented to person, place, and time.      Motor: No weakness.   Psychiatric:         Mood and Affect: Mood normal.         Behavior: Behavior  normal.         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 male with a past medical history of hypertension, hyperlipidemia, coronary artery disease (s/p stent 2010, on ASA), renal cell carcinoma (s/p R partial nephrectomy in 2013 @ [facility]), GERD, BPH, arthritis, and Burkitt lymphoma being treated with DA-R-EPOCH (since 1/21/25) who is admitted today (01/28/25) with febrile neutropenia and mucositis.      Day 12 (02/01/25) C1 DA-R-EPOCH    ONC  #Burkitt Lymphoma  :: Workup and treatment as per Onc History  :: Received C1 DA-R-EPOCH (1/21/25), supported with pegfilgrastim 1/28/25  :: CT neck (1/27/25): Mild asymmetric enlargement of the right muscles of mastication, predominantly the masseter, likely due to lymphomatous involvement, less conspicuous compared to 1/9/2025, with no new mass or infectious/inflammatory process in the neck or aerodigestive tract  - Due for C2 ~ 2/11/25  # TLS monitoring and prophylaxis  :: Uric acid 4.4 mg/dL (1/28/25) on admit, 1.6 today (1/30/25)  - HOLD allopurinol with severe mucositis, will monitor UA levels daily     HEME  #Pancytopenia  - Likely related to chemotherapy vs disease   - transfuse for hgb<7 and plts<20 due to severe mucositis  # VTE Prophylaxis  - SCDs and ambulation only with thrombocytopenia     ID  #Febrile neutropenia  #Gram-Positive Bacteremia  :: Cultures and serology  -Fever 38.0 (1/31)- Blood cultures, UA/UC  = Blood cultures (1/28/25): NGTD  = UA (1/28/25): not suspicious for infection  = Influenza A/B, COVID, RSV (1/27/25): negative  = Parainfluenza, Metapneumovirus, Rhinovirus, Adenovirus PCR (1/27/25): negative   = Hold off on sputum culture (1/28/25) -- cough seems mostly from inability to clear oral secretions  :: Imaging  = CXR (1/27/25): No cardiopulmonary process  :: Antibiotics  -  Piperacillin-tazobactam 3.375 g IV Q6H (1/28- )  - Patient reports unknown reaction as a child to penicillin -- discussed with Clinical Oncology Specialist Feliberto Espinosa to trial piperacillin-tazobactam, remove allergy if no reaction, tolerating well so far   - Vancomcyin (2/1/25- )  # Prophylaxis  - VZV: switch acyclovir to treatment dose due to concern for HSV in the mouth  - Candidiasis: Continue flucaonzole 400 mg  daily   - PJP: None at this time  #Mouth ulcer  - Likely mucositis but HSV swab pending  - Continue and increased acyclovir to 5mg/kg IV every 8 hours     FEN/RENAL  - Admission weight 115 kg (01/28/25)  Most recent weight 111 kg (245 lb 9.5 oz) (1/31/2025  9:00 AM)   F PO  E PRN  N Low-pathogen    #hypomagnesia  - MgSo4 2 g IV x 1 (02/01/25)  #hypokalemia  - 40meq KCL (02/01/25)    GI  # Esophageal mucositis  - Chemotherapy  :: CT neck (1/27/25) as above: no signs of abscess or fluid collection  - NS @ 50 mL/hr x 40 hours (2L) to supplement poor PO intake(1/28-1/30)  - Supportive care: BMX, viscous lidocaine, sucralfate PRN  - Stop oxycodone, gabapentin, and prn IV Dilaudid. Start Dilaudid PCA no basal, bolus 0.2 mg every 10 minutes with one hour dose limit of 1.2 mg on 1/31  -continuous pulse oximetry while on PCA     CARDIO/PULM  #Hypertension  #Hyperlipidemia  #CAD  #MI s/p stent 2010  - Continue home  metoprolol succinate   -hold atorvastatin due to mucositis   - HOLD home aspirin 81 mg daily with thrombocytopenia  - HOLD home lisinopril in setting of low BP's  #Functional monitoring  - ECHO (1/18/25): LVSF normal, EF 65-70%.     VASCULAR  -US arms 1/24-Findings consistent with partially occlusive thrombus within the mid  to distal brachial vein and mid to distal basilic vein within the  right upper extremity.  -repeat US arms ordered 1/31. Unable to anticoagulate due to thrombocytopenia  -Leg swelling with pain. US legs ordered 1/31 pending     ACCESS/SUPPORT/DISPO  - FULL CODE  - MD:   Mike  - ACCESS: KRISHAN PICC  - PT/RD/SW        Pt seen, examined and discussed with MD Antoni Diop PA-C  Malignant Hematology (Lymphoma/Myeloma/Autologous SCT) Team

## 2025-02-02 ENCOUNTER — APPOINTMENT (OUTPATIENT)
Dept: RADIOLOGY | Facility: HOSPITAL | Age: 65
End: 2025-02-02
Payer: MEDICARE

## 2025-02-02 ENCOUNTER — APPOINTMENT (OUTPATIENT)
Dept: HEMATOLOGY/ONCOLOGY | Facility: HOSPITAL | Age: 65
End: 2025-02-02
Payer: MEDICARE

## 2025-02-02 VITALS
HEIGHT: 71 IN | WEIGHT: 245.59 LBS | BODY MASS INDEX: 34.38 KG/M2 | DIASTOLIC BLOOD PRESSURE: 66 MMHG | SYSTOLIC BLOOD PRESSURE: 96 MMHG | TEMPERATURE: 98.6 F | OXYGEN SATURATION: 96 % | RESPIRATION RATE: 20 BRPM | HEART RATE: 122 BPM

## 2025-02-02 PROBLEM — I48.91 NEW ONSET A-FIB (MULTI): Status: ACTIVE | Noted: 2025-02-02

## 2025-02-02 PROBLEM — B95.62 BACTEREMIA DUE TO METHICILLIN RESISTANT STAPHYLOCOCCUS AUREUS: Status: ACTIVE | Noted: 2025-02-01

## 2025-02-02 PROBLEM — R00.0 TACHYCARDIA: Status: ACTIVE | Noted: 2025-02-02

## 2025-02-02 LAB
ABO GROUP (TYPE) IN BLOOD: NORMAL
ALBUMIN SERPL BCP-MCNC: 2 G/DL (ref 3.4–5)
ALBUMIN SERPL BCP-MCNC: 2.1 G/DL (ref 3.4–5)
ANION GAP SERPL CALC-SCNC: 12 MMOL/L (ref 10–20)
ANION GAP SERPL CALC-SCNC: 13 MMOL/L (ref 10–20)
ANTIBODY SCREEN: NORMAL
BACTERIA BLD AEROBE CULT: ABNORMAL
BACTERIA BLD AEROBE CULT: ABNORMAL
BACTERIA BLD CULT: ABNORMAL
BACTERIA BLD CULT: ABNORMAL
BASOPHILS # BLD AUTO: 0.01 X10*3/UL (ref 0–0.1)
BASOPHILS NFR BLD AUTO: 5.6 %
BLOOD EXPIRATION DATE: NORMAL
BUN SERPL-MCNC: 13 MG/DL (ref 6–23)
BUN SERPL-MCNC: 13 MG/DL (ref 6–23)
C DIF TOX TCDA+TCDB STL QL NAA+PROBE: NOT DETECTED
CALCIUM SERPL-MCNC: 7 MG/DL (ref 8.6–10.6)
CALCIUM SERPL-MCNC: 7.1 MG/DL (ref 8.6–10.6)
CHLORIDE SERPL-SCNC: 100 MMOL/L (ref 98–107)
CHLORIDE SERPL-SCNC: 101 MMOL/L (ref 98–107)
CO2 SERPL-SCNC: 26 MMOL/L (ref 21–32)
CO2 SERPL-SCNC: 26 MMOL/L (ref 21–32)
CREAT SERPL-MCNC: 0.56 MG/DL (ref 0.5–1.3)
CREAT SERPL-MCNC: 0.56 MG/DL (ref 0.5–1.3)
DISPENSE STATUS: NORMAL
EGFRCR SERPLBLD CKD-EPI 2021: >90 ML/MIN/1.73M*2
EGFRCR SERPLBLD CKD-EPI 2021: >90 ML/MIN/1.73M*2
EOSINOPHIL # BLD AUTO: 0 X10*3/UL (ref 0–0.7)
EOSINOPHIL NFR BLD AUTO: 0 %
ERYTHROCYTE [DISTWIDTH] IN BLOOD BY AUTOMATED COUNT: 13.2 % (ref 11.5–14.5)
ERYTHROCYTE [DISTWIDTH] IN BLOOD BY AUTOMATED COUNT: 13.4 % (ref 11.5–14.5)
GLUCOSE SERPL-MCNC: 114 MG/DL (ref 74–99)
GLUCOSE SERPL-MCNC: 96 MG/DL (ref 74–99)
GRAM STN SPEC: ABNORMAL
HCT VFR BLD AUTO: 21.4 % (ref 41–52)
HCT VFR BLD AUTO: 26.6 % (ref 41–52)
HGB BLD-MCNC: 6.9 G/DL (ref 13.5–17.5)
HGB BLD-MCNC: 8.9 G/DL (ref 13.5–17.5)
HSV1 DNA SKIN QL NAA+PROBE: NOT DETECTED
HSV2 DNA SKIN QL NAA+PROBE: NOT DETECTED
IMM GRANULOCYTES # BLD AUTO: 0 X10*3/UL (ref 0–0.7)
IMM GRANULOCYTES NFR BLD AUTO: 0 % (ref 0–0.9)
LDH SERPL L TO P-CCNC: 309 U/L (ref 84–246)
LYMPHOCYTES # BLD AUTO: 0.08 X10*3/UL (ref 1.2–4.8)
LYMPHOCYTES NFR BLD AUTO: 44.4 %
MAGNESIUM SERPL-MCNC: 1.94 MG/DL (ref 1.6–2.4)
MAGNESIUM SERPL-MCNC: 2.15 MG/DL (ref 1.6–2.4)
MCH RBC QN AUTO: 28.6 PG (ref 26–34)
MCH RBC QN AUTO: 29.1 PG (ref 26–34)
MCHC RBC AUTO-ENTMCNC: 32.2 G/DL (ref 32–36)
MCHC RBC AUTO-ENTMCNC: 33.5 G/DL (ref 32–36)
MCV RBC AUTO: 87 FL (ref 80–100)
MCV RBC AUTO: 89 FL (ref 80–100)
MONOCYTES # BLD AUTO: 0.05 X10*3/UL (ref 0.1–1)
MONOCYTES NFR BLD AUTO: 27.8 %
NEUTROPHILS # BLD AUTO: 0.04 X10*3/UL (ref 1.2–7.7)
NEUTROPHILS NFR BLD AUTO: 22.2 %
NRBC BLD-RTO: 0 /100 WBCS (ref 0–0)
NRBC BLD-RTO: 12.5 /100 WBCS (ref 0–0)
PHOSPHATE SERPL-MCNC: 1.9 MG/DL (ref 2.5–4.9)
PHOSPHATE SERPL-MCNC: 2.1 MG/DL (ref 2.5–4.9)
PLATELET # BLD AUTO: 14 X10*3/UL (ref 150–450)
PLATELET # BLD AUTO: 17 X10*3/UL (ref 150–450)
POTASSIUM SERPL-SCNC: 3.1 MMOL/L (ref 3.5–5.3)
POTASSIUM SERPL-SCNC: 3.3 MMOL/L (ref 3.5–5.3)
PRODUCT BLOOD TYPE: 6200
PRODUCT BLOOD TYPE: 6200
PRODUCT BLOOD TYPE: 7300
PRODUCT CODE: NORMAL
RBC # BLD AUTO: 2.41 X10*6/UL (ref 4.5–5.9)
RBC # BLD AUTO: 3.06 X10*6/UL (ref 4.5–5.9)
RH FACTOR (ANTIGEN D): NORMAL
SODIUM SERPL-SCNC: 136 MMOL/L (ref 136–145)
SODIUM SERPL-SCNC: 136 MMOL/L (ref 136–145)
UNIT ABO: NORMAL
UNIT NUMBER: NORMAL
UNIT RH: NORMAL
UNIT VOLUME: 266
UNIT VOLUME: 345
UNIT VOLUME: 350
URATE SERPL-MCNC: 1.5 MG/DL (ref 4–7.5)
VANCOMYCIN SERPL-MCNC: 8.7 UG/ML (ref 5–20)
WBC # BLD AUTO: 0.2 X10*3/UL (ref 4.4–11.3)
WBC # BLD AUTO: 0.4 X10*3/UL (ref 4.4–11.3)
XM INTEP: NORMAL

## 2025-02-02 PROCEDURE — 80202 ASSAY OF VANCOMYCIN: CPT

## 2025-02-02 PROCEDURE — 83735 ASSAY OF MAGNESIUM: CPT | Performed by: PHYSICIAN ASSISTANT

## 2025-02-02 PROCEDURE — 2500000004 HC RX 250 GENERAL PHARMACY W/ HCPCS (ALT 636 FOR OP/ED)

## 2025-02-02 PROCEDURE — 83615 LACTATE (LD) (LDH) ENZYME: CPT | Performed by: HOME HEALTH AIDE

## 2025-02-02 PROCEDURE — 87529 HSV DNA AMP PROBE: CPT | Performed by: PHYSICIAN ASSISTANT

## 2025-02-02 PROCEDURE — 84550 ASSAY OF BLOOD/URIC ACID: CPT | Performed by: PHYSICIAN ASSISTANT

## 2025-02-02 PROCEDURE — 2500000005 HC RX 250 GENERAL PHARMACY W/O HCPCS

## 2025-02-02 PROCEDURE — 2500000001 HC RX 250 WO HCPCS SELF ADMINISTERED DRUGS (ALT 637 FOR MEDICARE OP)

## 2025-02-02 PROCEDURE — 2500000004 HC RX 250 GENERAL PHARMACY W/ HCPCS (ALT 636 FOR OP/ED): Performed by: PHYSICIAN ASSISTANT

## 2025-02-02 PROCEDURE — 85027 COMPLETE CBC AUTOMATED: CPT | Performed by: PHYSICIAN ASSISTANT

## 2025-02-02 PROCEDURE — 86923 COMPATIBILITY TEST ELECTRIC: CPT

## 2025-02-02 PROCEDURE — 2550000001 HC RX 255 CONTRASTS: Performed by: INTERNAL MEDICINE

## 2025-02-02 PROCEDURE — 80069 RENAL FUNCTION PANEL: CPT | Performed by: PHYSICIAN ASSISTANT

## 2025-02-02 PROCEDURE — P9040 RBC LEUKOREDUCED IRRADIATED: HCPCS

## 2025-02-02 PROCEDURE — 71045 X-RAY EXAM CHEST 1 VIEW: CPT | Performed by: STUDENT IN AN ORGANIZED HEALTH CARE EDUCATION/TRAINING PROGRAM

## 2025-02-02 PROCEDURE — 99232 SBSQ HOSP IP/OBS MODERATE 35: CPT | Performed by: INTERNAL MEDICINE

## 2025-02-02 PROCEDURE — 93005 ELECTROCARDIOGRAM TRACING: CPT

## 2025-02-02 PROCEDURE — 71275 CT ANGIOGRAPHY CHEST: CPT

## 2025-02-02 PROCEDURE — 83615 LACTATE (LD) (LDH) ENZYME: CPT | Performed by: PHYSICIAN ASSISTANT

## 2025-02-02 PROCEDURE — 1200000003 HC ONCOLOGY  ROOM WITH TELEMETRY DAILY

## 2025-02-02 PROCEDURE — 36430 TRANSFUSION BLD/BLD COMPNT: CPT

## 2025-02-02 PROCEDURE — 86901 BLOOD TYPING SEROLOGIC RH(D): CPT | Performed by: PHYSICIAN ASSISTANT

## 2025-02-02 PROCEDURE — 83010 ASSAY OF HAPTOGLOBIN QUANT: CPT | Performed by: HOME HEALTH AIDE

## 2025-02-02 PROCEDURE — 87493 C DIFF AMPLIFIED PROBE: CPT | Performed by: PHYSICIAN ASSISTANT

## 2025-02-02 PROCEDURE — 71275 CT ANGIOGRAPHY CHEST: CPT | Performed by: RADIOLOGY

## 2025-02-02 PROCEDURE — P9073 PLATELETS PHERESIS PATH REDU: HCPCS

## 2025-02-02 PROCEDURE — 92610 EVALUATE SWALLOWING FUNCTION: CPT | Mod: GN | Performed by: SPEECH-LANGUAGE PATHOLOGIST

## 2025-02-02 PROCEDURE — 71045 X-RAY EXAM CHEST 1 VIEW: CPT

## 2025-02-02 PROCEDURE — 93010 ELECTROCARDIOGRAM REPORT: CPT | Performed by: INTERNAL MEDICINE

## 2025-02-02 PROCEDURE — 85025 COMPLETE CBC W/AUTO DIFF WBC: CPT | Performed by: PHYSICIAN ASSISTANT

## 2025-02-02 RX ORDER — METOPROLOL TARTRATE 1 MG/ML
5 INJECTION, SOLUTION INTRAVENOUS ONCE
Status: COMPLETED | OUTPATIENT
Start: 2025-02-02 | End: 2025-02-02

## 2025-02-02 RX ORDER — ACETAMINOPHEN 650 MG/1
650 SUPPOSITORY RECTAL ONCE
Status: COMPLETED | OUTPATIENT
Start: 2025-02-02 | End: 2025-02-02

## 2025-02-02 RX ORDER — HYDROMORPHONE HCL/0.9% NACL/PF 15 MG/30ML
PATIENT CONTROLLED ANALGESIA SYRINGE INTRAVENOUS CONTINUOUS
Status: DISCONTINUED | OUTPATIENT
Start: 2025-02-02 | End: 2025-02-02

## 2025-02-02 RX ORDER — LOPERAMIDE HYDROCHLORIDE 2 MG/1
2 CAPSULE ORAL 4 TIMES DAILY PRN
Status: DISCONTINUED | OUTPATIENT
Start: 2025-02-02 | End: 2025-02-08 | Stop reason: HOSPADM

## 2025-02-02 RX ORDER — ACETAMINOPHEN 325 MG/1
650 TABLET ORAL ONCE
Status: DISCONTINUED | OUTPATIENT
Start: 2025-02-02 | End: 2025-02-02

## 2025-02-02 RX ORDER — MAGNESIUM SULFATE HEPTAHYDRATE 40 MG/ML
2 INJECTION, SOLUTION INTRAVENOUS ONCE
Status: COMPLETED | OUTPATIENT
Start: 2025-02-02 | End: 2025-02-02

## 2025-02-02 RX ORDER — POTASSIUM CHLORIDE 29.8 MG/ML
40 INJECTION INTRAVENOUS ONCE
Status: COMPLETED | OUTPATIENT
Start: 2025-02-02 | End: 2025-02-02

## 2025-02-02 RX ORDER — METOPROLOL TARTRATE 25 MG/1
25 TABLET, FILM COATED ORAL 3 TIMES DAILY
Status: DISCONTINUED | OUTPATIENT
Start: 2025-02-02 | End: 2025-02-02

## 2025-02-02 RX ORDER — METOPROLOL TARTRATE 1 MG/ML
5 INJECTION, SOLUTION INTRAVENOUS EVERY 6 HOURS
Status: DISCONTINUED | OUTPATIENT
Start: 2025-02-02 | End: 2025-02-04

## 2025-02-02 RX ORDER — HYDROMORPHONE HCL/0.9% NACL/PF 15 MG/30ML
PATIENT CONTROLLED ANALGESIA SYRINGE INTRAVENOUS CONTINUOUS
Status: DISCONTINUED | OUTPATIENT
Start: 2025-02-02 | End: 2025-02-06

## 2025-02-02 RX ADMIN — ACETAMINOPHEN 650 MG: 650 SUPPOSITORY RECTAL at 03:39

## 2025-02-02 RX ADMIN — SODIUM CHLORIDE 500 ML: 9 INJECTION, SOLUTION INTRAVENOUS at 00:28

## 2025-02-02 RX ADMIN — PANTOPRAZOLE SODIUM 40 MG: 40 INJECTION, POWDER, FOR SOLUTION INTRAVENOUS at 08:39

## 2025-02-02 RX ADMIN — FLUCONAZOLE 400 MG: 2 INJECTION, SOLUTION INTRAVENOUS at 08:38

## 2025-02-02 RX ADMIN — VANCOMYCIN HYDROCHLORIDE 1500 MG: 5 INJECTION, POWDER, LYOPHILIZED, FOR SOLUTION INTRAVENOUS at 22:04

## 2025-02-02 RX ADMIN — ACYCLOVIR SODIUM 400 MG: 50 INJECTION, SOLUTION INTRAVENOUS at 22:00

## 2025-02-02 RX ADMIN — ACYCLOVIR SODIUM 400 MG: 50 INJECTION, SOLUTION INTRAVENOUS at 04:54

## 2025-02-02 RX ADMIN — IOHEXOL 80 ML: 350 INJECTION, SOLUTION INTRAVENOUS at 23:49

## 2025-02-02 RX ADMIN — ACYCLOVIR SODIUM 400 MG: 50 INJECTION, SOLUTION INTRAVENOUS at 14:08

## 2025-02-02 RX ADMIN — POTASSIUM CHLORIDE 40 MEQ: 29.8 INJECTION, SOLUTION INTRAVENOUS at 20:32

## 2025-02-02 RX ADMIN — METOPROLOL TARTRATE 5 MG: 1 INJECTION, SOLUTION INTRAVENOUS at 14:25

## 2025-02-02 RX ADMIN — Medication: at 00:31

## 2025-02-02 RX ADMIN — PIPERACILLIN SODIUM AND TAZOBACTAM SODIUM 3.38 G: 3; .375 INJECTION, SOLUTION INTRAVENOUS at 02:38

## 2025-02-02 RX ADMIN — VANCOMYCIN HYDROCHLORIDE 1500 MG: 5 INJECTION, POWDER, LYOPHILIZED, FOR SOLUTION INTRAVENOUS at 08:39

## 2025-02-02 RX ADMIN — MAGNESIUM SULFATE HEPTAHYDRATE 2 G: 40 INJECTION, SOLUTION INTRAVENOUS at 08:39

## 2025-02-02 RX ADMIN — METOPROLOL TARTRATE 5 MG: 1 INJECTION, SOLUTION INTRAVENOUS at 08:39

## 2025-02-02 RX ADMIN — PIPERACILLIN SODIUM AND TAZOBACTAM SODIUM 3.38 G: 3; .375 INJECTION, SOLUTION INTRAVENOUS at 20:26

## 2025-02-02 RX ADMIN — PIPERACILLIN SODIUM AND TAZOBACTAM SODIUM 3.38 G: 3; .375 INJECTION, SOLUTION INTRAVENOUS at 14:08

## 2025-02-02 RX ADMIN — Medication: at 17:27

## 2025-02-02 RX ADMIN — METOPROLOL TARTRATE 5 MG: 1 INJECTION, SOLUTION INTRAVENOUS at 22:04

## 2025-02-02 RX ADMIN — PIPERACILLIN SODIUM AND TAZOBACTAM SODIUM 3.38 G: 3; .375 INJECTION, SOLUTION INTRAVENOUS at 08:38

## 2025-02-02 RX ADMIN — METOPROLOL TARTRATE 5 MG: 1 INJECTION, SOLUTION INTRAVENOUS at 10:24

## 2025-02-02 RX ADMIN — METOPROLOL TARTRATE 5 MG: 1 INJECTION, SOLUTION INTRAVENOUS at 16:46

## 2025-02-02 RX ADMIN — POTASSIUM CHLORIDE 40 MEQ: 29.8 INJECTION, SOLUTION INTRAVENOUS at 08:39

## 2025-02-02 ASSESSMENT — COGNITIVE AND FUNCTIONAL STATUS - GENERAL
MOBILITY SCORE: 22
DRESSING REGULAR LOWER BODY CLOTHING: A LITTLE
MOBILITY SCORE: 22
DRESSING REGULAR UPPER BODY CLOTHING: A LITTLE
WALKING IN HOSPITAL ROOM: A LITTLE
DRESSING REGULAR LOWER BODY CLOTHING: A LITTLE
TOILETING: A LITTLE
HELP NEEDED FOR BATHING: A LITTLE
DAILY ACTIVITIY SCORE: 19
CLIMB 3 TO 5 STEPS WITH RAILING: A LITTLE
PERSONAL GROOMING: A LITTLE
HELP NEEDED FOR BATHING: A LITTLE
DRESSING REGULAR UPPER BODY CLOTHING: A LITTLE
WALKING IN HOSPITAL ROOM: A LITTLE
CLIMB 3 TO 5 STEPS WITH RAILING: A LITTLE
TOILETING: A LITTLE
DAILY ACTIVITIY SCORE: 19
PERSONAL GROOMING: A LITTLE

## 2025-02-02 ASSESSMENT — PAIN - FUNCTIONAL ASSESSMENT
PAIN_FUNCTIONAL_ASSESSMENT: 0-10

## 2025-02-02 ASSESSMENT — PAIN SCALES - GENERAL
PAINLEVEL_OUTOF10: 10 - WORST POSSIBLE PAIN
PAINLEVEL_OUTOF10: 8
PAINLEVEL_OUTOF10: 10 - WORST POSSIBLE PAIN
PAINLEVEL_OUTOF10: 8
PAINLEVEL_OUTOF10: 10 - WORST POSSIBLE PAIN
PAINLEVEL_OUTOF10: 8

## 2025-02-02 NOTE — CARE PLAN
The patient's goals for the shift include      The clinical goals for the shift include Patient will remain HDS through end of shift    Over the shift, the patient did make progress toward the following goals. Barriers to progression include continued mucositis.  Patient continues on PCA for pain.  Started on additional antibiotics for positive cultures.  Patient tachycardic possibly needing more fluids.  Patient c/o difficulty swallowing and liquids coming out of nostrils with coughing when trying to swallow.  SLP ordered for patient.  Patient advised to eat only ice chips and no thin liquids.  Unable to eat solid foods d/t pain.

## 2025-02-02 NOTE — CARE PLAN
The patient's goals for the shift include      Problem: Pain  Goal: Takes deep breaths with improved pain control throughout the shift  Outcome: Progressing  Goal: Turns in bed with improved pain control throughout the shift  Outcome: Progressing  Goal: Walks with improved pain control throughout the shift  Outcome: Progressing  Goal: Performs ADL's with improved pain control throughout shift  Outcome: Progressing  Goal: Participates in PT with improved pain control throughout the shift  Outcome: Progressing  Goal: Free from opioid side effects throughout the shift  Outcome: Progressing  Goal: Free from acute confusion related to pain meds throughout the shift  Outcome: Progressing     Problem: Safety - Adult  Goal: Free from fall injury  Outcome: Progressing     Problem: Discharge Planning  Goal: Discharge to home or other facility with appropriate resources  Outcome: Progressing     Problem: Chronic Conditions and Co-morbidities  Goal: Patient's chronic conditions and co-morbidity symptoms are monitored and maintained or improved  Outcome: Progressing     Problem: Nutrition  Goal: Nutrient intake appropriate for maintaining nutritional needs  Outcome: Progressing     The clinical goals for the shift include Pt. will report pain improvement this shift

## 2025-02-02 NOTE — SIGNIFICANT EVENT
Rapid Response Nurse Note: RON score of 6    Pager time: [phone]  Arrival time: 1037  Event end time: 104  Location: [facility] 3021  [x] Triage by secure messaging    Rapid response initiated by:  [] Rapid response RN [] Family [] Nursing Supervisor [] Physician   [x] RADAR auto page [] Sepsis auto-page [] RN [] RT   [] NP/PA [] Other:     Primary reason for call:   [] BAT [] New CPAP/BiPAP [] Bleeding [] Change in mental status   [] Chest pain [] Code blue [] FiO2 >/= 50% [] HR </= 40 bpm   [] HR >/= 130 bpm [] Hyperglycemia [] Hypoglycemia [x] RADAR    [] RR </= 8 bpm [] RR >/= 30 bpm [] SBP </= 90 mmHg [] SpO2 < 90%   [] Seizure [] Sepsis [] Shortness of breath  [] Staff concern: see comments     Interventions:  [x] None [] ABG/VBG [] Assist w/ICU transfer [] BAT paged    [] Bag mask [] Blood [] Cardioversion [] Code Blue   [] Code blue for intubation [] Code status changed [] Chest x-ray [] EKG   [] IV fluid/bolus [] KUB x-ray [] Labs/cultures [] Medication   [] Nebulizer treatment [] NIPPV (CPAP/BiPAP) [] Oxygen [] Oral airway   [] Peripheral IV [] Palliative care consult [] CT/MRI [] Sepsis protocol    [] Suctioned [] Other:     Outcome:  [] Coded and  [] Code blue for intubation [] Coded and transferred to ICU []  on division   [x] Remained on division (no change) [] Remained on division + additional monitoring [] Remained in ED [] Transferred to ED   [] Transferred to ICU [] Transferred to inpatient status [] Transferred for interventions (procedure) [] Transferred to ICU stepdown    [] Transferred to surgery [] Transferred to telemetry [] Sepsis protocol [] STEMI protocol   [] Stroke protocol       Additional Comments:      Notified nurse of RON dumont received: 36.4 131 22 106/73 96.  Patient was given metoprolol per nurse and is awaiting blood.  No assistance needed at this time per nurse; encouraged to call a rapid response as needed if patient status changes.

## 2025-02-02 NOTE — CARE PLAN
The patient's goals for the shift include      Problem: Pain  Goal: Takes deep breaths with improved pain control throughout the shift  Outcome: Progressing  Goal: Turns in bed with improved pain control throughout the shift  Outcome: Progressing  Goal: Walks with improved pain control throughout the shift  Outcome: Progressing  Goal: Performs ADL's with improved pain control throughout shift  Outcome: Progressing  Goal: Participates in PT with improved pain control throughout the shift  Outcome: Progressing  Goal: Free from opioid side effects throughout the shift  Outcome: Progressing  Goal: Free from acute confusion related to pain meds throughout the shift  Outcome: Progressing     Problem: Pain - Adult  Goal: Verbalizes/displays adequate comfort level or baseline comfort level  Outcome: Progressing     Problem: Safety - Adult  Goal: Free from fall injury  Outcome: Progressing     Problem: Discharge Planning  Goal: Discharge to home or other facility with appropriate resources  Outcome: Progressing     Problem: Chronic Conditions and Co-morbidities  Goal: Patient's chronic conditions and co-morbidity symptoms are monitored and maintained or improved  Outcome: Progressing     Problem: Nutrition  Goal: Nutrient intake appropriate for maintaining nutritional needs  Outcome: Progressing     Problem: Fall/Injury  Goal: Not fall by end of shift  Outcome: Progressing  Goal: Be free from injury by end of the shift  Outcome: Progressing  Goal: Verbalize understanding of personal risk factors for fall in the hospital  Outcome: Progressing  Goal: Verbalize understanding of risk factor reduction measures to prevent injury from fall in the home  Outcome: Progressing  Goal: Use assistive devices by end of the shift  Outcome: Progressing  Goal: Pace activities to prevent fatigue by end of the shift  Outcome: Progressing     The clinical goals for the shift include Patient will remain HDS through end of shift

## 2025-02-02 NOTE — PROGRESS NOTES
Bijan Ibanez is a 64 y.o. male on day 5 of admission presenting with Febrile neutropenia (CMS-[facility]).    Subjective     Overnight febrile to 38.3 and remained tachycardic in 120s, received 500 mL NS bolus.     This morning (02/02/25), he has persistent tachycardia, with HR in the 140s. EKG was acquired, showing now atrial fibrillation. Given that he does not appear to be compensating for sepsis at this time, given 5 mg metoprolol IV x 1 and optimized hemoglobin, volume, and electrolytes.     Of note, blood cultures (1/28/25) resulted positive for S. Aureus and he continues on vancomycin.     Objective     Physical Exam  Constitutional:       General: He is not in acute distress.  HENT:      Head: Normocephalic and atraumatic.      Mouth/Throat:      Mouth: Mucous membranes are moist.      Pharynx: Posterior oropharyngeal erythema present.      Comments: Ulcers, erythema noted on soft palate.   Eyes:      Extraocular Movements: Extraocular movements intact.      Pupils: Pupils are equal, round, and reactive to light.   Cardiovascular:      Rate and Rhythm: Regular rhythm. Tachycardia present.      Heart sounds: No murmur heard.     No gallop.   Pulmonary:      Effort: No respiratory distress.      Breath sounds: No wheezing or rhonchi.   Abdominal:      General: Bowel sounds are normal.      Palpations: Abdomen is soft.      Tenderness: There is no abdominal tenderness.   Musculoskeletal:         General: Swelling present.      Cervical back: Normal range of motion.      Right lower leg: Edema present.      Left lower leg: Edema present.   Skin:     General: Skin is warm and dry.   Neurological:      Mental Status: He is alert and oriented to person, place, and time.      Motor: No weakness.   Psychiatric:         Mood and Affect: Mood normal.         Behavior: Behavior normal.         Assessment/Plan   Assessment & Plan  Febrile neutropenia (CMS-HCC)    Burkitt's lymphoma of lymph nodes of multiple regions  (Multi)    Bacteremia due to methicillin resistant Staphylococcus aureus    New onset a-fib (Multi)    Bijan Ibanez is a 64 y.o. male with a past medical history of hypertension, hyperlipidemia, coronary artery disease (s/p stent 2010, on ASA), renal cell carcinoma (s/p R partial nephrectomy in 2013 @ [facility]), GERD, BPH, arthritis, and Burkitt lymphoma being treated with DA-R-EPOCH (since 1/21/25) who is admitted today (01/28/25) with febrile neutropenia and mucositis.      Day 13 (02/02/25) C1 DA-R-EPOCH    Active issues today (02/02/25):  #MRSA bacteremia. Continue vancomycin, TTE, possible ID consult re: line removal.   #Atrial fibrillation. Metoprolol IV Q6, medical optimization. Consider EP.   #Musositis. Continue PCA.     ONC  #Burkitt Lymphoma  :: Workup and treatment as per Onc History  :: Received C1 DA-R-EPOCH (1/21/25), supported with pegfilgrastim 1/28/25  :: CT neck (1/27/25): Mild asymmetric enlargement of the right muscles of mastication, predominantly the masseter, likely due to lymphomatous involvement, less conspicuous compared to 1/9/2025, with no new mass or infectious/inflammatory process in the neck or aerodigestive tract  - Due for C2 ~ 2/11/25  # TLS monitoring and prophylaxis  :: Uric acid 4.4 mg/dL (1/28/25) on admit, 1.6 today (1/30/25)  - HOLD allopurinol with severe mucositis, will monitor UA levels daily     HEME  #Pancytopenia  %Due to chemotherapy  - transfuse for hgb<7 and plts<20 due to severe mucositis  - ANC up to 40/uL today (02/02/25)   # VTE Prophylaxis  - SCDs and ambulation only with thrombocytopenia     ID  #Febrile neutropenia  #MRSA Bacteremia  :: Cultures and serology  = Blood cultures (1/31/25): MRSA in 4/4 bottles  = UA (1/28/25): not suspicious for infection  = Influenza A/B, COVID, RSV (1/27/25): negative  = Parainfluenza, Metapneumovirus, Rhinovirus, Adenovirus PCR (1/27/25): negative   = Hold off on sputum culture (1/28/25) -- cough seems mostly from inability to  clear oral secretions  :: Imaging  = CXR (1/27/25): No cardiopulmonary process  = TTE to rule out vegetations: pending  :: Antibiotics  - Piperacillin-tazobactam 3.375 g IV Q6H (1/28- )  - Patient reports unknown reaction as a child to penicillin -- discussed with Clinical Oncology Specialist Feliberto Espinosa to trial piperacillin-tazobactam, remove allergy if no reaction, tolerating well so far   - Vancomcyin (2/1/25- )  - Consider ID consult 2/3/25 re: need for line removal  # Prophylaxis  - VZV: switch acyclovir to treatment dose due to concern for HSV in the mouth  - Candidiasis: Continue flucaonzole 400 mg  daily   - PJP: None at this time  #Mouth ulcer  - Likely mucositis   - HSV swab (2/2/25) pending  - Continue and increased acyclovir to 5mg/kg IV every 8 hours -- can de-escalate if PCR negative     FEN/RENAL  - Admission weight 115 kg (01/28/25)  Most recent weight 111 kg (245 lb 9.5 oz) (1/31/2025  9:00 AM)   F PO + blood products and intermittent meds  E PRN  N Low-pathogen    #Hypomagnesia  #Hypokalemia  - MgSo4 2 g IV x 1 for AM level of 1.94 mg/dL (02/02/25)  - 40meq KCL for AM level of 3.1 mMol/L(02/02/25)    GI  # Esophageal mucositis  #Dysphagia  - Chemotherapy  :: CT neck (1/27/25) as above: no signs of abscess or fluid collection  - NS @ 50 mL/hr x 40 hours (2L) to supplement poor PO intake(1/28-1/30)  - Supportive care: BMX, viscous lidocaine, sucralfate PRN  - -Dilaudid PCA no basal, bolus 0.2 mg every 10 minutes with one hour dose limit of 1.2 mg on 1/31  -continuous pulse oximetry while on PCA  - SLP evaluation ordered     CARDIO/PULM  #Atrial Fibrillation with RVR  %Due to infection, electrolyte derangements, anemia  :: HR in 130-150 today (02/02/25) depending on rest/activity  - Moved home metoprolol to 5 mg IV Q6H  - Also received 2 extra doses of metoprolol 5 mg  - Keep K > 4 mMol/L, Mg > 2 mg/dL, Hgb > 7-8 g/dL  - Okay to tolerate rate 110-130 for now -- can consider EP consult for possible  digoxin loading, etc. On 2/3/25 if rate control not achieved  #Hypertension  #Hyperlipidemia  #CAD  #MI s/p stent 2010  - Holding home metoprolol as above   -hold atorvastatin due to mucositis   - HOLD home aspirin 81 mg daily with thrombocytopenia  - HOLD home lisinopril in setting of low BP's  #Functional monitoring  - ECHO (1/18/25): LVSF normal, EF 65-70%.     VASCULAR  -US arms 1/24-Findings consistent with partially occlusive thrombus within the mid  to distal brachial vein and mid to distal basilic vein within the  right upper extremity.  -repeat US arms ordered 1/31. Unable to anticoagulate due to thrombocytopenia  -Leg swelling with pain. US legs ordered 1/31 pending     ACCESS/SUPPORT/DISPO  - FULL CODE  - MD: Dr. Mckeon  - ACCESS: [facility]  - PT/RD/SW      Patient seen, examined and discussed with MD Antoni Diop, PA-C  Malignant Hematology (Lymphoma/Myeloma/Autologous SCT) Team
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
## 2025-02-02 NOTE — PROGRESS NOTES
Vancomycin Dosing by Pharmacy- FOLLOW UP    Bijan Ibanez is a 64 y.o. year old male who Pharmacy has been consulted for vancomycin dosing for line infections. Based on the patient's indication and renal status this patient is being dosed based on a goal AUC of 500-600.     Renal function is currently stable.    Current vancomycin dose: 1500 mg given every 12 hours    Estimated vancomycin AUC on current dose: 435 mg/L.hr     Visit Vitals  /74 (BP Location: Left arm, Patient Position: Sitting)   Pulse (!) 145 Comment: RN notified   Temp 37.7 °C (99.9 °F) (Temporal)   Resp 20        Lab Results   Component Value Date    CREATININE 0.56 2025    CREATININE 0.44 (L) 2025    CREATININE 0.46 (L) 2025    CREATININE 0.49 (L) 2025        Patient weight is as follows:   Vitals:    25 0900   Weight: 111 kg (245 lb 9.5 oz)       Cultures:  Susceptibility data for the encounter in last 14 days.  Collected Specimen Info Organism   25 Blood culture from Peripheral Venipuncture Staphylococcus aureus   25 Blood culture from Central Line/Catheter (Specify below) Staphylococcus aureus        I/O last 3 completed shifts:  In: 2674.2 (24 mL/kg) [I.V.:29.2 (0.3 mL/kg); Blood:886.7; IV Piggyback:1758.3]  Out: 150 (1.3 mL/kg) [Urine:150 (0 mL/kg/hr)]  Weight: 111.4 kg   I/O during current shift:  No intake/output data recorded.    Temp (24hrs), Av.6 °C (99.7 °F), Min:37.2 °C (99 °F), Max:38.3 °C (100.9 °F)      Assessment/Plan    Slightly below goal AUC. Will continue current regimen due to limited options with less toxicity risk.     This dosing regimen is predicted by [employer] to result in the following pharmacokinetic parameters:  Exposure target: AUC24 (range)500-600 mg/L.hr   [id number]: 435 mg/L.hr  AUC24,ss: 491 mg/L.hr  Probability of AUC24 > 400: 78 %  Ctrough,ss: 17.3 mg/L  Probability of Ctrough,ss > 20: 34 %    The next level will be obtained on 2 at AM labs. May be  obtained sooner if clinically indicated.   Will continue to monitor renal function daily while on vancomycin and order serum creatinine at least every 48 hours if not already ordered.  Follow for continued vancomycin needs, clinical response, and signs/symptoms of toxicity.       Jessica Licona, PharmD

## 2025-02-03 ENCOUNTER — APPOINTMENT (OUTPATIENT)
Dept: HEMATOLOGY/ONCOLOGY | Facility: HOSPITAL | Age: 65
End: 2025-02-03
Payer: MEDICARE

## 2025-02-03 ENCOUNTER — APPOINTMENT (OUTPATIENT)
Dept: VASCULAR MEDICINE | Facility: HOSPITAL | Age: 65
End: 2025-02-03
Payer: MEDICARE

## 2025-02-03 ENCOUNTER — APPOINTMENT (OUTPATIENT)
Dept: CARDIOLOGY | Facility: HOSPITAL | Age: 65
End: 2025-02-03
Payer: MEDICARE

## 2025-02-03 ENCOUNTER — APPOINTMENT (OUTPATIENT)
Dept: RADIOLOGY | Facility: HOSPITAL | Age: 65
End: 2025-02-03
Payer: MEDICARE

## 2025-02-03 LAB
ALBUMIN SERPL BCP-MCNC: 2.1 G/DL (ref 3.4–5)
ALP SERPL-CCNC: 71 U/L (ref 33–136)
ALT SERPL W P-5'-P-CCNC: 36 U/L (ref 10–52)
ANION GAP BLDV CALCULATED.4IONS-SCNC: 10 MMOL/L (ref 10–25)
ANION GAP SERPL CALC-SCNC: 13 MMOL/L (ref 10–20)
AST SERPL W P-5'-P-CCNC: 11 U/L (ref 9–39)
ATRIAL RATE: 120 BPM
ATRIAL RATE: 99 BPM
BACTERIA BLD AEROBE CULT: ABNORMAL
BACTERIA BLD AEROBE CULT: ABNORMAL
BACTERIA BLD CULT: ABNORMAL
BACTERIA BLD CULT: ABNORMAL
BASE EXCESS BLDV CALC-SCNC: -0.7 MMOL/L (ref -2–3)
BASOPHILS # BLD MANUAL: 0 X10*3/UL (ref 0–0.1)
BASOPHILS NFR BLD MANUAL: 0 %
BILIRUB DIRECT SERPL-MCNC: 0.9 MG/DL (ref 0–0.3)
BILIRUB SERPL-MCNC: 2.1 MG/DL (ref 0–1.2)
BLOOD EXPIRATION DATE: NORMAL
BODY TEMPERATURE: 37 DEGREES CELSIUS
BUN SERPL-MCNC: 15 MG/DL (ref 6–23)
CA-I BLDV-SCNC: 1.12 MMOL/L (ref 1.1–1.33)
CALCIUM SERPL-MCNC: 7.1 MG/DL (ref 8.6–10.6)
CHLORIDE BLDV-SCNC: 102 MMOL/L (ref 98–107)
CHLORIDE SERPL-SCNC: 102 MMOL/L (ref 98–107)
CHROM ANALY OVERALL INTERP-IMP: NORMAL
CO2 SERPL-SCNC: 26 MMOL/L (ref 21–32)
CREAT SERPL-MCNC: 0.69 MG/DL (ref 0.5–1.3)
DISPENSE STATUS: NORMAL
EGFRCR SERPLBLD CKD-EPI 2021: >90 ML/MIN/1.73M*2
EJECTION FRACTION APICAL 4 CHAMBER: 59.4
EJECTION FRACTION: 63 %
ELECTRONICALLY COSIGNED BY CYTOGENETICS: NORMAL
ELECTRONICALLY SIGNED BY CYTOGENETICS: NORMAL
EOSINOPHIL # BLD MANUAL: 0.01 X10*3/UL (ref 0–0.7)
EOSINOPHIL NFR BLD MANUAL: 1.6 %
ERYTHROCYTE [DISTWIDTH] IN BLOOD BY AUTOMATED COUNT: 13.5 % (ref 11.5–14.5)
FISH ISCN RESULTS: NORMAL
FUNGUS SPEC CULT: NORMAL
FUNGUS SPEC CULT: NORMAL
FUNGUS SPEC FUNGUS STN: NORMAL
FUNGUS SPEC FUNGUS STN: NORMAL
GLUCOSE BLDV-MCNC: 75 MG/DL (ref 74–99)
GLUCOSE SERPL-MCNC: 96 MG/DL (ref 74–99)
GRAM STN SPEC: ABNORMAL
HAPTOGLOB SERPL NEPH-MCNC: 364 MG/DL (ref 30–200)
HCO3 BLDV-SCNC: 24.2 MMOL/L (ref 22–26)
HCT VFR BLD AUTO: 23.9 % (ref 41–52)
HCT VFR BLD EST: ABNORMAL %
HGB BLD-MCNC: 8.1 G/DL (ref 13.5–17.5)
HGB BLDV-MCNC: ABNORMAL G/DL
IMM GRANULOCYTES # BLD AUTO: 0.09 X10*3/UL (ref 0–0.7)
IMM GRANULOCYTES NFR BLD AUTO: 12.9 % (ref 0–0.9)
INHALED O2 CONCENTRATION: 21 %
LACTATE BLDV-SCNC: 1 MMOL/L (ref 0.4–2)
LDH SERPL L TO P-CCNC: 318 U/L (ref 84–246)
LDH SERPL L TO P-CCNC: 323 U/L (ref 84–246)
LEFT ATRIUM VOLUME AREA LENGTH INDEX BSA: 43 ML/M2
LEFT VENTRICLE INTERNAL DIMENSION DIASTOLE: 5.1 CM (ref 3.5–6)
LYMPHOCYTES # BLD MANUAL: 0.17 X10*3/UL (ref 1.2–4.8)
LYMPHOCYTES NFR BLD MANUAL: 24.6 %
MAGNESIUM SERPL-MCNC: 2.06 MG/DL (ref 1.6–2.4)
MCH RBC QN AUTO: 29.5 PG (ref 26–34)
MCHC RBC AUTO-ENTMCNC: 33.9 G/DL (ref 32–36)
MCV RBC AUTO: 87 FL (ref 80–100)
METAMYELOCYTES # BLD MANUAL: 0.01 X10*3/UL
METAMYELOCYTES NFR BLD MANUAL: 1.5 %
MONOCYTES # BLD MANUAL: 0.08 X10*3/UL (ref 0.1–1)
MONOCYTES NFR BLD MANUAL: 10.8 %
NEUTROPHILS # BLD MANUAL: 0.34 X10*3/UL (ref 1.2–7.7)
NEUTS BAND # BLD MANUAL: 0.02 X10*3/UL (ref 0–0.7)
NEUTS BAND NFR BLD MANUAL: 3.1 %
NEUTS SEG # BLD MANUAL: 0.32 X10*3/UL (ref 1.2–7)
NEUTS SEG NFR BLD MANUAL: 46.2 %
NRBC BLD-RTO: 7.1 /100 WBCS (ref 0–0)
OXYHGB MFR BLDV: ABNORMAL %
P AXIS: 61 DEGREES
P OFFSET: 200 MS
P ONSET: 148 MS
PCO2 BLDV: 40 MM HG (ref 41–51)
PH BLDV: 7.39 PH (ref 7.33–7.43)
PHOSPHATE SERPL-MCNC: 2.1 MG/DL (ref 2.5–4.9)
PLASMA CELLS # BLD MANUAL: 0.01 X10*3/UL
PLASMA CELLS NFR BLD MANUAL: 1.5 %
PLATELET # BLD AUTO: 23 X10*3/UL (ref 150–450)
PO2 BLDV: 34 MM HG (ref 35–45)
POTASSIUM BLDV-SCNC: 3.3 MMOL/L (ref 3.5–5.3)
POTASSIUM SERPL-SCNC: 3.3 MMOL/L (ref 3.5–5.3)
PR INTERVAL: 158 MS
PRODUCT BLOOD TYPE: 6200
PRODUCT CODE: NORMAL
PROMYELOCYTES # BLD MANUAL: 0.01 X10*3/UL
PROMYELOCYTES NFR BLD MANUAL: 1.5 %
PROT SERPL-MCNC: 3.6 G/DL (ref 6.4–8.2)
Q ONSET: 225 MS
Q ONSET: 227 MS
QRS COUNT: 16 BEATS
QRS COUNT: 20 BEATS
QRS DURATION: 92 MS
QRS DURATION: 92 MS
QT INTERVAL: 332 MS
QT INTERVAL: 338 MS
QTC CALCULATION(BAZETT): 433 MS
QTC CALCULATION(BAZETT): 465 MS
QTC FREDERICIA: 399 MS
QTC FREDERICIA: 416 MS
R AXIS: 10 DEGREES
R AXIS: 24 DEGREES
RBC # BLD AUTO: 2.75 X10*6/UL (ref 4.5–5.9)
RBC MORPH BLD: ABNORMAL
RIGHT VENTRICLE FREE WALL PEAK S': 22 CM/S
SAO2 % BLDV: ABNORMAL %
SODIUM BLDV-SCNC: 133 MMOL/L (ref 136–145)
SODIUM SERPL-SCNC: 138 MMOL/L (ref 136–145)
T AXIS: 22 DEGREES
T AXIS: 37 DEGREES
T OFFSET: 391 MS
T OFFSET: 396 MS
TOTAL CELLS COUNTED BLD: 65
TRICUSPID ANNULAR PLANE SYSTOLIC EXCURSION: 2.1 CM
UNIT ABO: NORMAL
UNIT NUMBER: NORMAL
UNIT RH: NORMAL
UNIT VOLUME: 350
URATE SERPL-MCNC: 1.7 MG/DL (ref 4–7.5)
VARIANT LYMPHS # BLD MANUAL: 0.06 X10*3/UL (ref 0–0.5)
VARIANT LYMPHS NFR BLD: 9.2 %
VENTRICULAR RATE: 118 BPM
VENTRICULAR RATE: 99 BPM
WBC # BLD AUTO: 0.7 X10*3/UL (ref 4.4–11.3)
XM INTEP: NORMAL

## 2025-02-03 PROCEDURE — 2500000005 HC RX 250 GENERAL PHARMACY W/O HCPCS

## 2025-02-03 PROCEDURE — C1751 CATH, INF, PER/CENT/MIDLINE: HCPCS

## 2025-02-03 PROCEDURE — 84550 ASSAY OF BLOOD/URIC ACID: CPT | Performed by: PHYSICIAN ASSISTANT

## 2025-02-03 PROCEDURE — 93010 ELECTROCARDIOGRAM REPORT: CPT | Performed by: INTERNAL MEDICINE

## 2025-02-03 PROCEDURE — 87529 HSV DNA AMP PROBE: CPT | Performed by: HOME HEALTH AIDE

## 2025-02-03 PROCEDURE — 83615 LACTATE (LD) (LDH) ENZYME: CPT | Performed by: PHYSICIAN ASSISTANT

## 2025-02-03 PROCEDURE — 93321 DOPPLER ECHO F-UP/LMTD STD: CPT

## 2025-02-03 PROCEDURE — 2720000007 HC OR 272 NO HCPCS

## 2025-02-03 PROCEDURE — 93321 DOPPLER ECHO F-UP/LMTD STD: CPT | Performed by: INTERNAL MEDICINE

## 2025-02-03 PROCEDURE — 99222 1ST HOSP IP/OBS MODERATE 55: CPT | Performed by: INTERNAL MEDICINE

## 2025-02-03 PROCEDURE — 93970 EXTREMITY STUDY: CPT | Performed by: SURGERY

## 2025-02-03 PROCEDURE — 84132 ASSAY OF SERUM POTASSIUM: CPT | Performed by: PHYSICIAN ASSISTANT

## 2025-02-03 PROCEDURE — 93970 EXTREMITY STUDY: CPT

## 2025-02-03 PROCEDURE — 2500000004 HC RX 250 GENERAL PHARMACY W/ HCPCS (ALT 636 FOR OP/ED)

## 2025-02-03 PROCEDURE — 2500000004 HC RX 250 GENERAL PHARMACY W/ HCPCS (ALT 636 FOR OP/ED): Performed by: PHYSICIAN ASSISTANT

## 2025-02-03 PROCEDURE — 85007 BL SMEAR W/DIFF WBC COUNT: CPT | Performed by: PHYSICIAN ASSISTANT

## 2025-02-03 PROCEDURE — 1200000003 HC ONCOLOGY  ROOM WITH TELEMETRY DAILY

## 2025-02-03 PROCEDURE — 93005 ELECTROCARDIOGRAM TRACING: CPT

## 2025-02-03 PROCEDURE — C1894 INTRO/SHEATH, NON-LASER: HCPCS

## 2025-02-03 PROCEDURE — 99233 SBSQ HOSP IP/OBS HIGH 50: CPT | Performed by: INTERNAL MEDICINE

## 2025-02-03 PROCEDURE — 76376 3D RENDER W/INTRP POSTPROCES: CPT | Performed by: INTERNAL MEDICINE

## 2025-02-03 PROCEDURE — 2500000004 HC RX 250 GENERAL PHARMACY W/ HCPCS (ALT 636 FOR OP/ED): Performed by: HOME HEALTH AIDE

## 2025-02-03 PROCEDURE — 93308 TTE F-UP OR LMTD: CPT | Performed by: INTERNAL MEDICINE

## 2025-02-03 PROCEDURE — 93356 MYOCRD STRAIN IMG SPCKL TRCK: CPT | Performed by: INTERNAL MEDICINE

## 2025-02-03 PROCEDURE — 36430 TRANSFUSION BLD/BLD COMPNT: CPT

## 2025-02-03 PROCEDURE — P9040 RBC LEUKOREDUCED IRRADIATED: HCPCS

## 2025-02-03 PROCEDURE — 2780000003 HC OR 278 NO HCPCS

## 2025-02-03 PROCEDURE — 82248 BILIRUBIN DIRECT: CPT | Performed by: PHYSICIAN ASSISTANT

## 2025-02-03 PROCEDURE — 99223 1ST HOSP IP/OBS HIGH 75: CPT | Performed by: STUDENT IN AN ORGANIZED HEALTH CARE EDUCATION/TRAINING PROGRAM

## 2025-02-03 PROCEDURE — 93325 DOPPLER ECHO COLOR FLOW MAPG: CPT | Performed by: INTERNAL MEDICINE

## 2025-02-03 PROCEDURE — 36410 VNPNXR 3YR/> PHY/QHP DX/THER: CPT

## 2025-02-03 PROCEDURE — 84100 ASSAY OF PHOSPHORUS: CPT | Performed by: PHYSICIAN ASSISTANT

## 2025-02-03 PROCEDURE — 83735 ASSAY OF MAGNESIUM: CPT | Performed by: PHYSICIAN ASSISTANT

## 2025-02-03 PROCEDURE — 85027 COMPLETE CBC AUTOMATED: CPT | Performed by: PHYSICIAN ASSISTANT

## 2025-02-03 PROCEDURE — 84132 ASSAY OF SERUM POTASSIUM: CPT

## 2025-02-03 RX ORDER — POTASSIUM CHLORIDE 29.8 MG/ML
40 INJECTION INTRAVENOUS ONCE
Status: COMPLETED | OUTPATIENT
Start: 2025-02-03 | End: 2025-02-03

## 2025-02-03 RX ORDER — LIDOCAINE HYDROCHLORIDE 10 MG/ML
5 INJECTION, SOLUTION INFILTRATION; PERINEURAL ONCE
Status: DISCONTINUED | OUTPATIENT
Start: 2025-02-03 | End: 2025-02-08 | Stop reason: HOSPADM

## 2025-02-03 RX ORDER — METOPROLOL TARTRATE 1 MG/ML
5 INJECTION, SOLUTION INTRAVENOUS ONCE
Status: COMPLETED | OUTPATIENT
Start: 2025-02-03 | End: 2025-02-03

## 2025-02-03 RX ADMIN — Medication: at 11:04

## 2025-02-03 RX ADMIN — METOPROLOL TARTRATE 5 MG: 1 INJECTION, SOLUTION INTRAVENOUS at 12:40

## 2025-02-03 RX ADMIN — METOPROLOL TARTRATE 5 MG: 1 INJECTION, SOLUTION INTRAVENOUS at 17:33

## 2025-02-03 RX ADMIN — POTASSIUM PHOSPHATE, MONOBASIC POTASSIUM PHOSPHATE, DIBASIC 15 MMOL: 224; 236 INJECTION, SOLUTION, CONCENTRATE INTRAVENOUS at 11:32

## 2025-02-03 RX ADMIN — METOPROLOL TARTRATE 5 MG: 1 INJECTION, SOLUTION INTRAVENOUS at 02:50

## 2025-02-03 RX ADMIN — PANTOPRAZOLE SODIUM 40 MG: 40 INJECTION, POWDER, FOR SOLUTION INTRAVENOUS at 09:14

## 2025-02-03 RX ADMIN — PIPERACILLIN SODIUM AND TAZOBACTAM SODIUM 3.38 G: 3; .375 INJECTION, SOLUTION INTRAVENOUS at 23:55

## 2025-02-03 RX ADMIN — VANCOMYCIN HYDROCHLORIDE 1500 MG: 5 INJECTION, POWDER, LYOPHILIZED, FOR SOLUTION INTRAVENOUS at 12:41

## 2025-02-03 RX ADMIN — PIPERACILLIN SODIUM AND TAZOBACTAM SODIUM 3.38 G: 3; .375 INJECTION, SOLUTION INTRAVENOUS at 11:20

## 2025-02-03 RX ADMIN — PIPERACILLIN SODIUM AND TAZOBACTAM SODIUM 3.38 G: 3; .375 INJECTION, SOLUTION INTRAVENOUS at 17:34

## 2025-02-03 RX ADMIN — PIPERACILLIN SODIUM AND TAZOBACTAM SODIUM 3.38 G: 3; .375 INJECTION, SOLUTION INTRAVENOUS at 01:53

## 2025-02-03 RX ADMIN — VANCOMYCIN HYDROCHLORIDE 1500 MG: 5 INJECTION, POWDER, LYOPHILIZED, FOR SOLUTION INTRAVENOUS at 21:14

## 2025-02-03 RX ADMIN — POTASSIUM CHLORIDE 40 MEQ: 29.8 INJECTION, SOLUTION INTRAVENOUS at 05:57

## 2025-02-03 RX ADMIN — ACYCLOVIR SODIUM 400 MG: 50 INJECTION, SOLUTION INTRAVENOUS at 05:57

## 2025-02-03 RX ADMIN — ACYCLOVIR SODIUM 376.5 MG: 50 INJECTION, SOLUTION INTRAVENOUS at 21:14

## 2025-02-03 RX ADMIN — METOPROLOL TARTRATE 5 MG: 1 INJECTION, SOLUTION INTRAVENOUS at 06:46

## 2025-02-03 RX ADMIN — METOPROLOL TARTRATE 5 MG: 1 INJECTION, SOLUTION INTRAVENOUS at 21:31

## 2025-02-03 RX ADMIN — FLUCONAZOLE 400 MG: 2 INJECTION, SOLUTION INTRAVENOUS at 09:14

## 2025-02-03 ASSESSMENT — COGNITIVE AND FUNCTIONAL STATUS - GENERAL
DAILY ACTIVITIY SCORE: 20
WALKING IN HOSPITAL ROOM: A LITTLE
MOBILITY SCORE: 22
DRESSING REGULAR LOWER BODY CLOTHING: A LITTLE
CLIMB 3 TO 5 STEPS WITH RAILING: A LITTLE
DRESSING REGULAR UPPER BODY CLOTHING: A LITTLE
PERSONAL GROOMING: A LITTLE
TOILETING: A LITTLE
MOBILITY SCORE: 22
DRESSING REGULAR UPPER BODY CLOTHING: A LITTLE
HELP NEEDED FOR BATHING: A LITTLE
CLIMB 3 TO 5 STEPS WITH RAILING: A LITTLE
DAILY ACTIVITIY SCORE: 19
TOILETING: A LITTLE
WALKING IN HOSPITAL ROOM: A LITTLE
TOILETING: A LITTLE
WALKING IN HOSPITAL ROOM: A LITTLE
HELP NEEDED FOR BATHING: A LITTLE
HELP NEEDED FOR BATHING: A LITTLE
DAILY ACTIVITIY SCORE: 19
PERSONAL GROOMING: A LITTLE
DRESSING REGULAR LOWER BODY CLOTHING: A LITTLE
DRESSING REGULAR UPPER BODY CLOTHING: A LITTLE
CLIMB 3 TO 5 STEPS WITH RAILING: A LITTLE
MOBILITY SCORE: 22
DRESSING REGULAR LOWER BODY CLOTHING: A LITTLE

## 2025-02-03 ASSESSMENT — ENCOUNTER SYMPTOMS
CHILLS: 1
TROUBLE SWALLOWING: 1
RESPIRATORY NEGATIVE: 1
SORE THROAT: 1
FEVER: 1
GASTROINTESTINAL NEGATIVE: 1
CARDIOVASCULAR NEGATIVE: 1

## 2025-02-03 ASSESSMENT — PAIN - FUNCTIONAL ASSESSMENT
PAIN_FUNCTIONAL_ASSESSMENT: 0-10

## 2025-02-03 ASSESSMENT — PAIN SCALES - GENERAL
PAINLEVEL_OUTOF10: 5 - MODERATE PAIN
PAINLEVEL_OUTOF10: 0 - NO PAIN
PAINLEVEL_OUTOF10: 0 - NO PAIN
PAINLEVEL_OUTOF10: 4
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
## 2025-02-03 NOTE — PROGRESS NOTES
"Bijan Ibanez is a 64 y.o. male on day 6 of admission presenting with Febrile neutropenia (CMS-HCC).    Subjective   Patient with a fib RVR sustaining 130-140s. EP consulted, recs pending. Patient currently on IV metoprolol q6h.     Patient notes severe mucositis pain in mouth and throat, slightly improved from yesterday, though still utilizing dilaudid PCA. Notes slight improvement in lower extremity edema. 4 bowel movements in last 24 hours, \"milkshake\" consistency. Denies HA, dizziness, CP, palpitations, SOB, N/V/C. All other ROS otherwise negative.     Objective     Physical Exam  Constitutional:       General: He is not in acute distress.     Appearance: Normal appearance.   HENT:      Head: Normocephalic and atraumatic.      Nose: Nose normal.      Mouth/Throat:      Mouth: Mucous membranes are moist.      Pharynx: Posterior oropharyngeal erythema present.      Comments: Ulcers, erythema noted on soft palate.   Eyes:      General: No scleral icterus.     Extraocular Movements: Extraocular movements intact.      Pupils: Pupils are equal, round, and reactive to light.   Cardiovascular:      Rate and Rhythm: Tachycardia present. Rhythm irregular.      Heart sounds: No murmur heard.     No gallop.   Pulmonary:      Effort: Pulmonary effort is normal. No respiratory distress.      Breath sounds: Rales (bibasilar) present. No wheezing or rhonchi.   Abdominal:      General: Bowel sounds are normal. There is no distension.      Palpations: Abdomen is soft. There is no mass.      Tenderness: There is no abdominal tenderness. There is no guarding.   Musculoskeletal:         General: Swelling present.      Cervical back: Normal range of motion.      Right lower leg: Edema present.      Left lower leg: Edema present.   Skin:     General: Skin is warm and dry.      Findings: No bruising, erythema or rash.   Neurological:      General: No focal deficit present.      Mental Status: He is alert and oriented to person, " place, and time.      Cranial Nerves: No cranial nerve deficit.      Sensory: No sensory deficit.      Motor: No weakness.   Psychiatric:         Mood and Affect: Mood normal.         Behavior: Behavior normal.      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 male with a past medical history of hypertension, hyperlipidemia, coronary artery disease (s/p stent 2010, on ASA), renal cell carcinoma (s/p R partial nephrectomy in 2013 @ [facility]), GERD, BPH, arthritis, and Burkitt lymphoma being treated with DA-R-EPOCH (since 1/21/25) who is admitted today (01/28/25) with febrile neutropenia and mucositis.      Day 14 (02/03/25) C1 DA-R-EPOCH    Active issues today (02/03/25):  #MRSA bacteremia. Continue vancomycin, TTE, echo without vegetations. ID consulted. Picc removed  #Atrial fibrillation. Metoprolol IV Q6, medical optimization. EP consulted.   #Musositis. Continue PCA.   #PICC associated DVT. Vascular consulted, ok to remove. Plan AC once platelets >50.     ONC  #Burkitt Lymphoma  :: Workup and treatment as per Onc History  :: Received C1 DA-R-EPOCH (1/21/25), supported with pegfilgrastim 1/28/25  :: CT neck (1/27/25): Mild asymmetric enlargement of the right muscles of mastication, predominantly the masseter, likely due to lymphomatous involvement, less conspicuous compared to 1/9/2025, with no new mass or infectious/inflammatory process in the neck or aerodigestive tract  - Due for C2 ~ 2/11/25; will likely be delayed dt complications with cycle 1  # TLS monitoring and prophylaxis  :: Uric acid 4.4 mg/dL (1/28/25) on admit, 1.6 today (1/30/25)  - HOLD allopurinol with severe mucositis, will monitor UA levels daily     HEME  #Pancytopenia  %Due to chemotherapy  - transfuse for  hgb<7 and plts<20 due to severe mucositis  - ANC up to 320/uL today (02/03/25)   #RUE PICC associated DVT   -RUE US (1/24)) superficial thrombus in distal brachial vein and mid to distal basilic vein   -Repeat US RUE (2/3) DVT RUE mid subclavian, distal subclavian, axillary veins   -Vascular medicine consulted, ok to remove picc  -Plan AC once platelets >35     ID  #Febrile neutropenia  #MRSA Bacteremia (source line vs PNA)  :: Cultures and serology  = Blood cultures (1/31/25): MRSA in 4/4 bottles  = UA (1/28/25): not suspicious for infection  = Influenza A/B, COVID, RSV (1/27/25): negative  = Parainfluenza, Metapneumovirus, Rhinovirus, Adenovirus PCR (1/27/25): negative   = Hold off on sputum culture (1/28/25) -- cough seems mostly from inability to clear oral secretions  :: Imaging  = CXR (1/27/25): No cardiopulmonary process  = TTE to rule out vegetations: negative  :: Antibiotics  - Piperacillin-tazobactam 3.375 g IV Q6H (1/28- )  - Patient reports unknown reaction as a child to penicillin -- discussed with Clinical Oncology Specialist Francisca, Ok to trial piperacillin-tazobactam, remove allergy if no reaction, tolerating well so far   - Vancomcyin (2/1/25- )  - ID consulted   -Plan picc removal 2/3  -surveillance blood cx (2/4) pending   # Prophylaxis  - VZV: switch acyclovir to treatment dose due to concern for HSV in the mouth  - Candidiasis: Continue flucaonzole 400 mg  daily   - PJP: None at this time  #Mouth ulcer  - Likely mucositis   - HSV swab (2/2/25) neg; repeat pending   - Continue and increased acyclovir to 5mg/kg IV every 8 hours; continue q8h per ID (oral HSV swab low sensitivity per ID note)   #PNA  -CT chest (2/2) new bilateral lower lobe consolidations   -repeat fungal markers pending   -zosyn/vanco as above      FEN/RENAL  - Admission weight 115 kg (01/28/25)  Most recent weight 111 kg (245 lb 9.5 oz) (1/31/2025  9:00 AM)   F PO + blood products and intermittent meds  E PRN  N Low-pathogen     #Hyperbilirubinemia   -Hapto, LDH unremarkable   -Consider RUQ US if uptrending     GI  # Esophageal mucositis  #Dysphagia  - Chemotherapy  :: CT neck (1/27/25) as above: no signs of abscess or fluid collection  - NS @ 50 mL/hr x 40 hours (2L) to supplement poor PO intake(1/28-1/30)  - Supportive care: BMX, viscous lidocaine, sucralfate PRN  - -Dilaudid PCA no basal, bolus 0.2 mg every 10 minutes with one hour dose limit of 1.2 mg on 1/31  -continuous pulse oximetry while on PCA     CARDIO/PULM  #Atrial Fibrillation with RVR  %Due to infection, electrolyte derangements, anemia  :: HR in 130-150 today (02/03/25) depending on rest/activity  - Moved home metoprolol to 5 mg IV Q6H  - Keep K > 4 mMol/L, Mg > 2 mg/dL, Hgb > 7-8 g/dL  - Okay to tolerate rate 110-130 for now  -EP consulted, pending recs  -Tele   #Hypertension  #Hyperlipidemia  #CAD  #MI s/p stent 2010  - Holding home metoprolol as above   -hold atorvastatin due to mucositis   - HOLD home aspirin 81 mg daily with thrombocytopenia  - HOLD home lisinopril in setting of low BP's  #Functional monitoring  - ECHO (1/18/25): LVSF normal, EF 65-70%.      ACCESS/SUPPORT/DISPO  - FULL CODE  - MD: Dr. Mckeon  - ACCESS: KRISHAN PICC  - PT/RD/SW      Patient seen, examined and discussed with MD Aixa Diop PA-C  Malignant Hematology (Lymphoma/Myeloma/Autologous SCT) Team

## 2025-02-03 NOTE — SIGNIFICANT EVENT
Rapid Response Nurse Note: RADAR alert: 6    Pager time: [phone]  Arrival time: 257  Event end time: 310  Location:   [x] Triage by phone or secure messaging    Rapid response initiated by:  [] Rapid response RN [] Family [] Nursing Supervisor [] Physician   [x] RADAR auto page [] Sepsis auto-page [] RN [] RT   [] NP/PA [] Other:     Primary reason for call:   [] BAT [] New CPAP/BiPAP [] Bleeding [] Change in mental status   [] Chest pain [] Code blue [] FiO2 >/= 50% [] HR </= 40 bpm   [] HR >/= 130 bpm [] Hyperglycemia [] Hypoglycemia [x] RADAR    [] RR </= 8 bpm [] RR >/= 30 bpm [] SBP </= 90 mmHg [] SpO2 < 90%   [] Seizure [] Sepsis [] Shortness of breath  [] Staff concern: see comments     Initial VS and/or RADAR VS: T null °C; ; RR null; /65; SPO2 93%.    Providers present at bedside (if applicable): NA    Name of ICU Provider contacted (if applicable): NA    Interventions:  [x] None [] ABG/VBG [] Assist w/ICU transfer [] BAT paged    [] Bag mask [] Blood [] Cardioversion [] Code Blue   [] Code blue for intubation [] Code status changed [] Chest x-ray [] EKG   [] IV fluid/bolus [] KUB x-ray [] Labs/cultures [] Medication   [] Nebulizer treatment [] NIPPV (CPAP/BiPAP) [] Oxygen [] Oral airway   [] Peripheral IV [] Palliative care consult [] CT/MRI [] Sepsis protocol    [] Suctioned [] Other:     Outcome:  [] Coded and  [] Code blue for intubation [] Coded and transferred to ICU []  on division   [x] Remained on division (no change) [] Remained on division + additional monitoring [] Remained in ED [] Transferred to ED   [] Transferred to ICU [] Transferred to inpatient status [] Transferred for interventions (procedure) [] Transferred to ICU stepdown    [] Transferred to surgery [] Transferred to telemetry [] Sepsis protocol [] STEMI protocol   [] Stroke protocol [x] Bedside nurse instructed to page rapid response for any concerns or acute change in condition/VS     Additional  Comments:   Reviewed above RADAR VS with bedside RN via phone.  Pt continues to be tachycardic. Awaiting results of CT/PE. No acute change in condition.  Pt given IV metoprolol dose early per CNP.  No interventions by rapid response team indicated at this time.  Staff to page rapid response for any concerns or acute change in condition or VS.

## 2025-02-03 NOTE — PROGRESS NOTES
Physical Therapy                 Therapy Communication Note    Patient Name: Bijan Ibanez  MRN: [id number]  Department: [facility]  Room: [facility]-A  Today's Date: 2/3/2025     Discipline: Physical Therapy    PT Missed Visit: Yes     Missed Visit Reason: Missed Visit Reason: Patient placed on medical hold (PA-C states to hold therapy at this time. Will re-attempt as able and appropriate)    Missed Time: Attempt
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
## 2025-02-03 NOTE — SIGNIFICANT EVENT
Rapid Response Nurse Note: Called to Bedside while Rounding    Pager time:   Arrival time:   Event end time:   Location:   [] Triage by phone or secure messaging    Rapid response initiated by:  [x] Rapid response RN [] Family [] Nursing Supervisor [] Physician   [] RADAR auto page [] Sepsis auto-page [] RN [] RT   [] NP/PA [] Other:     Primary reason for call:   [] BAT [] New CPAP/BiPAP [] Bleeding [] Change in mental status   [] Chest pain [] Code blue [] FiO2 >/= 50% [] HR </= 40 bpm   [x] HR >/= 130 bpm [] Hyperglycemia [] Hypoglycemia [] RADAR    [] RR </= 8 bpm [] RR >/= 30 bpm [] SBP </= 90 mmHg [] SpO2 < 90%   [] Seizure [] Sepsis [] Shortness of breath  [] Staff concern: see comments     Initial VS and/or RADAR VS: T 37 °C; -160s; RR 20; BP 96/66; SPO2 96%.    Providers present at bedside (if applicable): Jovanna BARRERA    Name of ICU Provider contacted (if applicable): NA    Interventions:  [] None [] ABG/VBG [] Assist w/ICU transfer [] BAT paged    [] Bag mask [] Blood [] Cardioversion [] Code Blue   [] Code blue for intubation [] Code status changed [] Chest x-ray [] EKG   [] IV fluid/bolus [] KUB x-ray [] Labs/cultures [] Medication   [] Nebulizer treatment [] NIPPV (CPAP/BiPAP) [] Oxygen [] Oral airway   [] Peripheral IV [] Palliative care consult [] CT/MRI [] Sepsis protocol    [] Suctioned [x] Other: CNP ordered CT/PE     Outcome:  [] Coded and  [] Code blue for intubation [] Coded and transferred to ICU []  on division   [x] Remained on division (no change) [] Remained on division + additional monitoring [] Remained in ED [] Transferred to ED   [] Transferred to ICU [] Transferred to inpatient status [] Transferred for interventions (procedure) [] Transferred to ICU stepdown    [] Transferred to surgery [] Transferred to telemetry [] Sepsis protocol [] STEMI protocol   [] Stroke protocol [x] Bedside nurse instructed to page rapid response for any concerns or acute  change in condition/VS     Additional Comments:   Rapid Response nurse at bedside for tachycardia while rounding. Per bedside RN, pt HR ranged from 170-200 when he ambulated to bathroom. Upon arrival, pt -150. After receiving IV metoprolol, HR improved to 120s. +2-3 BLE edema & R arm edema. IV bolus not given per CNP. CNP ordered CT/PE to assess continued tachycardia. No interventions by rapid response team indicated at this time.  Staff to page rapid response for any concerns or acute change in condition/VS.    0025 Full venous panel sent. Lactate 1. No additional orders at current time. No interventions by rapid response team indicated at this time.  Staff to page rapid response for any concerns or acute change in condition/VS.

## 2025-02-03 NOTE — CARE PLAN
The patient's goals for the shift include      The clinical goals for the shift include Remain HDS      Problem: Pain  Goal: Takes deep breaths with improved pain control throughout the shift  Outcome: Progressing  Goal: Turns in bed with improved pain control throughout the shift  Outcome: Progressing  Goal: Walks with improved pain control throughout the shift  Outcome: Progressing  Goal: Performs ADL's with improved pain control throughout shift  Outcome: Progressing  Goal: Participates in PT with improved pain control throughout the shift  Outcome: Progressing  Goal: Free from opioid side effects throughout the shift  Outcome: Progressing  Goal: Free from acute confusion related to pain meds throughout the shift  Outcome: Progressing     Problem: Safety - Adult  Goal: Free from fall injury  Outcome: Progressing     Problem: Nutrition  Goal: Nutrient intake appropriate for maintaining nutritional needs  Outcome: Progressing     Problem: Chronic Conditions and Co-morbidities  Goal: Patient's chronic conditions and co-morbidity symptoms are monitored and maintained or improved  Outcome: Progressing

## 2025-02-04 ENCOUNTER — APPOINTMENT (OUTPATIENT)
Dept: RADIOLOGY | Facility: HOSPITAL | Age: 65
End: 2025-02-04
Payer: MEDICARE

## 2025-02-04 LAB
ALBUMIN SERPL BCP-MCNC: 2.2 G/DL (ref 3.4–5)
ANION GAP SERPL CALC-SCNC: 12 MMOL/L (ref 10–20)
ATRIAL RATE: 131 BPM
ATRIAL RATE: 147 BPM
BASOPHILS # BLD MANUAL: 0 X10*3/UL (ref 0–0.1)
BASOPHILS NFR BLD MANUAL: 0 %
BLOOD EXPIRATION DATE: NORMAL
BUN SERPL-MCNC: 15 MG/DL (ref 6–23)
BURR CELLS BLD QL SMEAR: ABNORMAL
CALCIUM SERPL-MCNC: 7.5 MG/DL (ref 8.6–10.6)
CHLORIDE SERPL-SCNC: 103 MMOL/L (ref 98–107)
CO2 SERPL-SCNC: 28 MMOL/L (ref 21–32)
CREAT SERPL-MCNC: 0.79 MG/DL (ref 0.5–1.3)
DISPENSE STATUS: NORMAL
EGFRCR SERPLBLD CKD-EPI 2021: >90 ML/MIN/1.73M*2
EOSINOPHIL # BLD MANUAL: 0.03 X10*3/UL (ref 0–0.7)
EOSINOPHIL NFR BLD MANUAL: 0.8 %
ERYTHROCYTE [DISTWIDTH] IN BLOOD BY AUTOMATED COUNT: 13.9 % (ref 11.5–14.5)
GLUCOSE SERPL-MCNC: 107 MG/DL (ref 74–99)
HCT VFR BLD AUTO: 28.9 % (ref 41–52)
HGB BLD-MCNC: 9.7 G/DL (ref 13.5–17.5)
HSV1 DNA SKIN QL NAA+PROBE: NOT DETECTED
HSV2 DNA SKIN QL NAA+PROBE: NOT DETECTED
IMM GRANULOCYTES # BLD AUTO: 0.86 X10*3/UL (ref 0–0.7)
IMM GRANULOCYTES NFR BLD AUTO: 21.3 % (ref 0–0.9)
LDH SERPL L TO P-CCNC: 392 U/L (ref 84–246)
LYMPHOCYTES # BLD MANUAL: 0.43 X10*3/UL (ref 1.2–4.8)
LYMPHOCYTES NFR BLD MANUAL: 10.8 %
MAGNESIUM SERPL-MCNC: 1.96 MG/DL (ref 1.6–2.4)
MCH RBC QN AUTO: 29.2 PG (ref 26–34)
MCHC RBC AUTO-ENTMCNC: 33.6 G/DL (ref 32–36)
MCV RBC AUTO: 87 FL (ref 80–100)
METAMYELOCYTES # BLD MANUAL: 0.22 X10*3/UL
METAMYELOCYTES NFR BLD MANUAL: 5.4 %
MONOCYTES # BLD MANUAL: 0.25 X10*3/UL (ref 0.1–1)
MONOCYTES NFR BLD MANUAL: 6.2 %
MYELOCYTES # BLD MANUAL: 0.06 X10*3/UL
MYELOCYTES NFR BLD MANUAL: 1.5 %
NEUTROPHILS # BLD MANUAL: 2.77 X10*3/UL (ref 1.2–7.7)
NEUTS BAND # BLD MANUAL: 0.89 X10*3/UL (ref 0–0.7)
NEUTS BAND NFR BLD MANUAL: 22.3 %
NEUTS SEG # BLD MANUAL: 1.88 X10*3/UL (ref 1.2–7)
NEUTS SEG NFR BLD MANUAL: 46.9 %
NRBC BLD-RTO: 2 /100 WBCS (ref 0–0)
P AXIS: 66 DEGREES
P OFFSET: 188 MS
P ONSET: 143 MS
PHOSPHATE SERPL-MCNC: 2.3 MG/DL (ref 2.5–4.9)
PLATELET # BLD AUTO: 16 X10*3/UL (ref 150–450)
POTASSIUM SERPL-SCNC: 3.4 MMOL/L (ref 3.5–5.3)
PR INTERVAL: 152 MS
PRODUCT BLOOD TYPE: 6200
PRODUCT CODE: NORMAL
PROMYELOCYTES # BLD MANUAL: 0.09 X10*3/UL
PROMYELOCYTES NFR BLD MANUAL: 2.3 %
Q ONSET: 214 MS
Q ONSET: 219 MS
QRS COUNT: 21 BEATS
QRS COUNT: 23 BEATS
QRS DURATION: 88 MS
QRS DURATION: 90 MS
QT INTERVAL: 282 MS
QT INTERVAL: 310 MS
QTC CALCULATION(BAZETT): 416 MS
QTC CALCULATION(BAZETT): 481 MS
QTC FREDERICIA: 366 MS
QTC FREDERICIA: 415 MS
R AXIS: 10 DEGREES
R AXIS: 22 DEGREES
RBC # BLD AUTO: 3.32 X10*6/UL (ref 4.5–5.9)
RBC MORPH BLD: ABNORMAL
SODIUM SERPL-SCNC: 140 MMOL/L (ref 136–145)
T AXIS: 49 DEGREES
T AXIS: 74 DEGREES
T OFFSET: 360 MS
T OFFSET: 369 MS
TOTAL CELLS COUNTED BLD: 130
UNIT ABO: NORMAL
UNIT NUMBER: NORMAL
UNIT RH: NORMAL
UNIT VOLUME: 267
URATE SERPL-MCNC: 2.3 MG/DL (ref 4–7.5)
VANCOMYCIN TROUGH SERPL-MCNC: 21.5 UG/ML (ref 5–20)
VARIANT LYMPHS # BLD MANUAL: 0.15 X10*3/UL (ref 0–0.5)
VARIANT LYMPHS NFR BLD: 3.8 %
VENTRICULAR RATE: 131 BPM
VENTRICULAR RATE: 145 BPM
WBC # BLD AUTO: 4 X10*3/UL (ref 4.4–11.3)

## 2025-02-04 PROCEDURE — 84550 ASSAY OF BLOOD/URIC ACID: CPT | Performed by: HOME HEALTH AIDE

## 2025-02-04 PROCEDURE — 93010 ELECTROCARDIOGRAM REPORT: CPT | Performed by: INTERNAL MEDICINE

## 2025-02-04 PROCEDURE — 85027 COMPLETE CBC AUTOMATED: CPT | Performed by: HOME HEALTH AIDE

## 2025-02-04 PROCEDURE — 83615 LACTATE (LD) (LDH) ENZYME: CPT | Performed by: HOME HEALTH AIDE

## 2025-02-04 PROCEDURE — 2500000004 HC RX 250 GENERAL PHARMACY W/ HCPCS (ALT 636 FOR OP/ED): Performed by: HOME HEALTH AIDE

## 2025-02-04 PROCEDURE — 87040 BLOOD CULTURE FOR BACTERIA: CPT | Performed by: HOME HEALTH AIDE

## 2025-02-04 PROCEDURE — 2500000004 HC RX 250 GENERAL PHARMACY W/ HCPCS (ALT 636 FOR OP/ED): Performed by: PHYSICIAN ASSISTANT

## 2025-02-04 PROCEDURE — 99232 SBSQ HOSP IP/OBS MODERATE 35: CPT | Performed by: NURSE PRACTITIONER

## 2025-02-04 PROCEDURE — 1200000003 HC ONCOLOGY  ROOM WITH TELEMETRY DAILY

## 2025-02-04 PROCEDURE — 2500000004 HC RX 250 GENERAL PHARMACY W/ HCPCS (ALT 636 FOR OP/ED)

## 2025-02-04 PROCEDURE — 2500000001 HC RX 250 WO HCPCS SELF ADMINISTERED DRUGS (ALT 637 FOR MEDICARE OP): Performed by: HOME HEALTH AIDE

## 2025-02-04 PROCEDURE — 36430 TRANSFUSION BLD/BLD COMPNT: CPT

## 2025-02-04 PROCEDURE — 87449 NOS EACH ORGANISM AG IA: CPT | Performed by: HOME HEALTH AIDE

## 2025-02-04 PROCEDURE — 71275 CT ANGIOGRAPHY CHEST: CPT

## 2025-02-04 PROCEDURE — 36415 COLL VENOUS BLD VENIPUNCTURE: CPT | Performed by: HOME HEALTH AIDE

## 2025-02-04 PROCEDURE — 2500000005 HC RX 250 GENERAL PHARMACY W/O HCPCS: Performed by: HOME HEALTH AIDE

## 2025-02-04 PROCEDURE — P9073 PLATELETS PHERESIS PATH REDU: HCPCS

## 2025-02-04 PROCEDURE — 80202 ASSAY OF VANCOMYCIN: CPT | Performed by: HOME HEALTH AIDE

## 2025-02-04 PROCEDURE — 99233 SBSQ HOSP IP/OBS HIGH 50: CPT | Performed by: INTERNAL MEDICINE

## 2025-02-04 PROCEDURE — 84100 ASSAY OF PHOSPHORUS: CPT | Performed by: HOME HEALTH AIDE

## 2025-02-04 PROCEDURE — 87305 ASPERGILLUS AG IA: CPT | Performed by: HOME HEALTH AIDE

## 2025-02-04 PROCEDURE — 2550000001 HC RX 255 CONTRASTS: Performed by: INTERNAL MEDICINE

## 2025-02-04 PROCEDURE — 83735 ASSAY OF MAGNESIUM: CPT | Performed by: HOME HEALTH AIDE

## 2025-02-04 PROCEDURE — 85007 BL SMEAR W/DIFF WBC COUNT: CPT | Performed by: HOME HEALTH AIDE

## 2025-02-04 PROCEDURE — 99232 SBSQ HOSP IP/OBS MODERATE 35: CPT | Performed by: INTERNAL MEDICINE

## 2025-02-04 PROCEDURE — 2500000005 HC RX 250 GENERAL PHARMACY W/O HCPCS

## 2025-02-04 RX ORDER — METOPROLOL TARTRATE 1 MG/ML
5 INJECTION, SOLUTION INTRAVENOUS ONCE
Status: COMPLETED | OUTPATIENT
Start: 2025-02-04 | End: 2025-02-04

## 2025-02-04 RX ORDER — METOPROLOL TARTRATE 25 MG/1
12.5 TABLET, FILM COATED ORAL EVERY 6 HOURS
Status: DISCONTINUED | OUTPATIENT
Start: 2025-02-04 | End: 2025-02-05

## 2025-02-04 RX ORDER — DIGOXIN 0.25 MG/ML
500 INJECTION INTRAMUSCULAR; INTRAVENOUS ONCE
Status: COMPLETED | OUTPATIENT
Start: 2025-02-04 | End: 2025-02-04

## 2025-02-04 RX ORDER — POTASSIUM CHLORIDE 14.9 MG/ML
20 INJECTION INTRAVENOUS ONCE
Status: COMPLETED | OUTPATIENT
Start: 2025-02-04 | End: 2025-02-04

## 2025-02-04 RX ADMIN — METOPROLOL TARTRATE 12.5 MG: 25 TABLET, FILM COATED ORAL at 14:48

## 2025-02-04 RX ADMIN — ACYCLOVIR SODIUM 376.5 MG: 50 INJECTION, SOLUTION INTRAVENOUS at 03:55

## 2025-02-04 RX ADMIN — Medication: at 13:06

## 2025-02-04 RX ADMIN — PIPERACILLIN SODIUM AND TAZOBACTAM SODIUM 3.38 G: 3; .375 INJECTION, SOLUTION INTRAVENOUS at 16:58

## 2025-02-04 RX ADMIN — PANTOPRAZOLE SODIUM 40 MG: 40 INJECTION, POWDER, FOR SOLUTION INTRAVENOUS at 08:24

## 2025-02-04 RX ADMIN — IOHEXOL 90 ML: 350 INJECTION, SOLUTION INTRAVENOUS at 05:34

## 2025-02-04 RX ADMIN — METOPROLOL TARTRATE 12.5 MG: 25 TABLET, FILM COATED ORAL at 20:42

## 2025-02-04 RX ADMIN — FLUCONAZOLE 400 MG: 2 INJECTION, SOLUTION INTRAVENOUS at 08:25

## 2025-02-04 RX ADMIN — POTASSIUM CHLORIDE 20 MEQ: 14.9 INJECTION, SOLUTION INTRAVENOUS at 16:58

## 2025-02-04 RX ADMIN — ACYCLOVIR SODIUM 376.5 MG: 50 INJECTION, SOLUTION INTRAVENOUS at 20:42

## 2025-02-04 RX ADMIN — POTASSIUM PHOSPHATE, MONOBASIC AND POTASSIUM PHOSPHATE, DIBASIC 15 MMOL: 224; 236 INJECTION, SOLUTION, CONCENTRATE INTRAVENOUS at 18:38

## 2025-02-04 RX ADMIN — PIPERACILLIN SODIUM AND TAZOBACTAM SODIUM 3.38 G: 3; .375 INJECTION, SOLUTION INTRAVENOUS at 10:42

## 2025-02-04 RX ADMIN — METOPROLOL TARTRATE 5 MG: 1 INJECTION, SOLUTION INTRAVENOUS at 07:37

## 2025-02-04 RX ADMIN — METOPROLOL TARTRATE 5 MG: 1 INJECTION, SOLUTION INTRAVENOUS at 03:55

## 2025-02-04 RX ADMIN — METOPROLOL TARTRATE 12.5 MG: 25 TABLET, FILM COATED ORAL at 08:00

## 2025-02-04 RX ADMIN — PIPERACILLIN SODIUM AND TAZOBACTAM SODIUM 3.38 G: 3; .375 INJECTION, SOLUTION INTRAVENOUS at 06:00

## 2025-02-04 RX ADMIN — DIGOXIN 500 MCG: 0.25 INJECTION INTRAMUSCULAR; INTRAVENOUS at 08:00

## 2025-02-04 RX ADMIN — VANCOMYCIN HYDROCHLORIDE 1500 MG: 5 INJECTION, POWDER, LYOPHILIZED, FOR SOLUTION INTRAVENOUS at 08:24

## 2025-02-04 RX ADMIN — ACYCLOVIR SODIUM 376.5 MG: 50 INJECTION, SOLUTION INTRAVENOUS at 12:46

## 2025-02-04 RX ADMIN — VANCOMYCIN HYDROCHLORIDE 1500 MG: 5 INJECTION, POWDER, LYOPHILIZED, FOR SOLUTION INTRAVENOUS at 20:42

## 2025-02-04 ASSESSMENT — COGNITIVE AND FUNCTIONAL STATUS - GENERAL
TOILETING: A LITTLE
CLIMB 3 TO 5 STEPS WITH RAILING: A LITTLE
WALKING IN HOSPITAL ROOM: A LITTLE
MOBILITY SCORE: 22
DRESSING REGULAR LOWER BODY CLOTHING: A LITTLE
HELP NEEDED FOR BATHING: A LITTLE
DAILY ACTIVITIY SCORE: 20
DRESSING REGULAR UPPER BODY CLOTHING: A LITTLE

## 2025-02-04 ASSESSMENT — PAIN SCALES - GENERAL
PAINLEVEL_OUTOF10: 4
PAINLEVEL_OUTOF10: 0 - NO PAIN

## 2025-02-04 ASSESSMENT — PAIN - FUNCTIONAL ASSESSMENT
PAIN_FUNCTIONAL_ASSESSMENT: 0-10
PAIN_FUNCTIONAL_ASSESSMENT: 0-10

## 2025-02-04 NOTE — PROGRESS NOTES
Vancomycin Dosing by Pharmacy- FOLLOW UP    Bijan Ibanez is a 64 y.o. year old male who Pharmacy has been consulted for vancomycin dosing for line infections. Based on the patient's indication and renal status this patient is being dosed based on a goal AUC of 500-600.     Renal function is currently stable.    Current vancomycin dose: 1500 mg given every 12 hours    Most recent random level: 23.5 mcg/mL    Visit Vitals  /80 (BP Location: Left arm, Patient Position: Lying)   Pulse (!) 132   Temp 35.8 °C (96.4 °F) (Temporal)   Resp 18        Lab Results   Component Value Date    CREATININE 0.79 2025    CREATININE 0.69 2025    CREATININE 0.56 2025    CREATININE 0.56 2025        Patient weight is as follows:   Vitals:    25 0900   Weight: 111 kg (245 lb 9.5 oz)       Cultures:  Susceptibility data for the encounter in last 14 days.  Collected Specimen Info Organism Clindamycin Erythromycin Oxacillin Tetracycline Trimethoprim/Sulfamethoxazole Vancomycin   25 Blood culture from Peripheral Venipuncture Staphylococcus aureus         25 Blood culture from Central Line/Catheter (Specify below) Methicillin Resistant Staphylococcus aureus (MRSA)  R  R  R  R  S  S        I/O last 3 completed shifts:  In: 2785 (25 mL/kg) [P.O.:130; Blood:824; IV Piggyback:1831]  Out: 750 (6.7 mL/kg) [Urine:750 (0.2 mL/kg/hr)]  Weight: 111.4 kg   I/O during current shift:  No intake/output data recorded.    Temp (24hrs), Av.3 °C (97.3 °F), Min:35.7 °C (96.3 °F), Max:37.2 °C (99 °F)      Assessment/Plan    Within goal AUC range. Continue current vancomycin regimen.    This dosing regimen is predicted by [employer] to result in the following pharmacokinetic parameters:  Loading dose: N/A  Regimen: 1500 mg IV every 12 hours.  Start time: 20:24 on 2025  Exposure target: AUC24 (range)500-600 mg/L.hr   [id number]: 520 mg/L.hr  AUC24,ss: 536 mg/L.hr  Probability of AUC24 > 400: 94 %    The next  level will be obtained on 2/6 at 0500. May be obtained sooner if clinically indicated.   Will continue to monitor renal function daily while on vancomycin and order serum creatinine at least every 48 hours if not already ordered.  Follow for continued vancomycin needs, clinical response, and signs/symptoms of toxicity.       KIMBER RODRIGUEZ, PharmD

## 2025-02-04 NOTE — NURSING NOTE
RN educated patient about use of bedside commode due to rapid increase of heart rate with ambulation. Patient stated that per a rapid nurse that he did not have to use the bedside commode, he and his wife at the bedside also almost fell out of it the last time he used it. RN reinforced education and risks. RN offered a larger bedside commode and the patient declined. Risks were reinforced once more.

## 2025-02-04 NOTE — PROGRESS NOTES
"Bijan Ibanez is a 64 y.o. male on day 7 of admission.   Medical history significant for Burkitt's lymphoma treated with DA-R-EPOCH since 1/21/25, as well as AF with RVR,  BPH, CAD (s/p stent 2010), GERD, HLD, HTN, renal cell cancer s/p right nephrectomy 2013.  Admitted for febrile neutropenia and mucositis.   Vascular Medicine Service following for right axillo-subclavian DVT found on US 2/2/25, thought to be provoked by PICC line placed 1/18/25.   Unable to anticoagulate at this time due to thrombocytopenia attributed to recent chemotherapy.     Subjective   Patient reports that the right arm PICC line was removed earlier today.  Denies CP, SOB or bleeding.      Objective   Physical Exam  Sitting at side of bed in NAD; spouse at bedside  Respirations full and regular with breath sounds CTA; on RA  Heart sounds are irregular, with telemetry monitor showing atrial fibrillation with rate ranging from 118-135.  Abdomen soft and nontender to palpation  Extremities are warm to touch with palpable bilateral radial and DP pulses; right arm with Mepilex dressing in place, mild edema of the right hand and forearm; +1-2 edema of BLE.  Patient is awake and alert, participates in conversation.     Last Recorded Vitals  Blood pressure 117/82, pulse (!) 112, temperature 35.7 °C (96.3 °F), temperature source Temporal, resp. rate 18, height 1.803 m (5' 10.98\"), weight 111 kg (245 lb 9.5 oz), SpO2 94%.  Intake/Output last 3 Shifts:  I/O last 3 completed shifts:  In: 2785 (25 mL/kg) [P.O.:130; Blood:824; IV Piggyback:1831]  Out: 750 (6.7 mL/kg) [Urine:750 (0.2 mL/kg/hr)]  Weight: 111.4 kg     Relevant Results  Scheduled medications  acyclovir, 5 mg/kg (Ideal), intravenous, q8h  [Held by provider] atorvastatin, 40 mg, oral, Daily  [Held by provider] baclofen, 10 mg, oral, TID  fluconazole, 400 mg, intravenous, q24h  lidocaine, 5 mL, infiltration, Once  [Held by provider] metoprolol succinate XL, 50 mg, oral, Daily  metoprolol " tartrate, 12.5 mg, oral, q6h  pantoprazole, 40 mg, intravenous, Daily  piperacillin-tazobactam, 3.375 g, intravenous, q6h  sodium chloride, 500 mL, intravenous, Once  vancomycin, 1,500 mg, intravenous, q12h      Results from last 7 days   Lab Units 02/03/25  0153 02/02/25  1426 02/02/25  0549   WBC AUTO x10*3/uL 0.7* 0.4* 0.2*   HEMOGLOBIN g/dL 8.1* 8.9* 6.9*   HEMATOCRIT % 23.9* 26.6* 21.4*   PLATELETS AUTO x10*3/uL 23* 17* 14*       Results from last 7 days   Lab Units 02/03/25  0153 02/02/25  1426 02/02/25  0549   SODIUM mmol/L 138 136 136   POTASSIUM mmol/L 3.3* 3.3* 3.1*   CHLORIDE mmol/L 102 101 100   CO2 mmol/L 26 26 26   BUN mg/dL 15 13 13   CREATININE mg/dL 0.69 0.56 0.56   GLUCOSE mg/dL 96 96 114*   CALCIUM mg/dL 7.1* 7.1* 7.0*   Estimated Creatinine Clearance: 125 mL/min (by C-G formula based on SCr of 0.69 mg/dL).      [facility] US UPPER EXTREMITY VENOUS DUPLEX BILATERAL   Study Date: 2/3/2025   CONCLUSIONS:  Right Upper Venous: There is acute non-occlusive superficial venous thrombosis visualized in the basilic, acute non-occlusive deep vein thrombosis visualized in the mid subclavian, acute non-occlusive deep vein thrombosis visualized in the distal subclavian and acute non-occlusive deep vein thrombosis visualized in the axillary veins. Additional Findings; IV Line.  Left Upper Venous: No evidence of acute deep vein thrombus visualized in the left upper extremity.    [facility] US LOWER EXTREMITY VENOUS DUPLEX BILATERAL   Study Date: 2/3/2025   CONCLUSIONS:  Right Lower Venous: No evidence of acute deep vein thrombus visualized in the right lower extremity.  Left Lower Venous: No evidence of acute deep vein thrombus visualized in the left lower extremity.             Assessment/Plan   Assessment & Plan  Febrile neutropenia (CMS-HCC)    Burkitt's lymphoma of lymph nodes of multiple regions (Multi)    Bacteremia due to methicillin resistant Staphylococcus aureus    New onset a-fib (Multi)    64 year old male with  medical history as described above, admitted for febrile neutropenia and mucositis.   Vascular Medicine Service following for right axillo-subclavian DVT found on US 2/2/25, thought to be provoked by PICC line placed 1/18/25.   Unable to anticoagulate at this time due to thrombocytopenia attributed to recent chemotherapy.   Vascular Medicine Service following for finding of RUE DVT of the axillary and subclavian veins, as well as SVT of the right basilic vein.   Right arm PICC line was removed earlier today for source control of MRSA, with plan for placement of new line in the left arm.   Discussion with patient and spouse: when platelets improve to greater than 30K we can consider starting anticoagulation therapy; will need a minimum of 3 months of anticoagulation therapy for this provoked right arm DVT.      Recommendations:  ~CONTINUE to HOLD anticoagulation therapy until platelets are greater than 30K.  ~MONITOR for bleeding.  ~MAY apply Tubi  OR an ace wrap for gentle compression of the right arm, elevate the right arm for swelling, and apply warm compress for comfort.   ~I will coordinate outpatient follow up with the Vascular Medicine Service.    Vascular Medicine Service will follow at a distance, and plan to see patient on Thursday 2/6/25; if you need us sooner, contact us on pager [phone].     Plan of care discussed with Dr. Shannon Landaverde  Plan of care discussed via EPIC chat with Aixa Robert PA-C from the Malignant Hematology Team       JUDY Kapoor-CNP  Vascular Medicine Service   Pager [phone]

## 2025-02-04 NOTE — PROGRESS NOTES
Bijan Ibanez is a 64 y.o. male on day 7 of admission presenting with Febrile neutropenia (CMS-HCC).    Subjective   Overnight and this morning, patient in a fib RVR, HR 180s. IV metoprolol administered with decrease in VR. Patient agreeable to take oral formulation of metoprolol. IV digoxin x1 dose, 12.5mg oral metoprolol q6h initiated. Patient asymptomatic during a fib RVR.     Patient states he otherwise feels well, noting improvement in swelling of RUE and bilateral LE. Notes the mucositis pain is still severe, though improving. Denies HA, dizziness, CP, SOB, N/V/D/C. All other ROS otherwise negative.     Objective     Physical Exam  Constitutional:       General: He is not in acute distress.     Appearance: Normal appearance.   HENT:      Head: Normocephalic and atraumatic.      Nose: Nose normal.      Mouth/Throat:      Mouth: Mucous membranes are moist.      Pharynx: Posterior oropharyngeal erythema present.      Comments: Ulcers, erythema noted on soft palate.   Eyes:      General: No scleral icterus.     Extraocular Movements: Extraocular movements intact.      Pupils: Pupils are equal, round, and reactive to light.   Cardiovascular:      Rate and Rhythm: Tachycardia present. Rhythm irregular.      Heart sounds: No murmur heard.     No gallop.   Pulmonary:      Effort: Pulmonary effort is normal. No respiratory distress.      Breath sounds: Rales (bibasilar) present. No wheezing or rhonchi.   Abdominal:      General: Bowel sounds are normal. There is no distension.      Palpations: Abdomen is soft. There is no mass.      Tenderness: There is no abdominal tenderness. There is no guarding.   Musculoskeletal:         General: Swelling (RUE) present.      Cervical back: Normal range of motion.      Right lower leg: Edema (2+) present.      Left lower leg: Edema (2+) present.   Skin:     General: Skin is warm and dry.      Findings: No bruising, erythema or rash.   Neurological:      General: No focal deficit  present.      Mental Status: He is alert and oriented to person, place, and time.      Cranial Nerves: No cranial nerve deficit.      Sensory: No sensory deficit.      Motor: No weakness.   Psychiatric:         Mood and Affect: Mood normal.         Behavior: Behavior normal.      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 male with a past medical history of hypertension, hyperlipidemia, coronary artery disease (s/p stent 2010, on ASA), renal cell carcinoma (s/p R partial nephrectomy in 2013 @ [facility]), GERD, BPH, arthritis, and Burkitt lymphoma being treated with DA-R-EPOCH (since 1/21/25) who is admitted today (01/28/25) with febrile neutropenia and mucositis.      Day 15 (02/04/25) C1 DA-R-EPOCH    Active issues today (02/04/25):  #MRSA bacteremia. Continue vancomycin, TTE, echo without vegetations. ID consulted. Picc removed  #Atrial fibrillation. EP consulted, on 12.5mg po metoprolol q6h  #Musositis. Continue PCA. On IV acyclovir q8h for HSV coverage; HSV PCR pending, though low sensitivity per ID  #PICC associated DVT. Vascular consulted, ok to remove. Plan heparin gtt once plts>50    ONC  #Burkitt Lymphoma  :: Workup and treatment as per Onc History  :: Received C1 DA-R-EPOCH (1/21/25), supported with pegfilgrastim 1/28/25  :: CT neck (1/27/25): Mild asymmetric enlargement of the right muscles of mastication, predominantly the masseter, likely due to lymphomatous involvement, less conspicuous compared to 1/9/2025, with no new mass or infectious/inflammatory process in the neck or aerodigestive tract  - Due for C2 ~ 2/11/25; will likely be delayed dt complications with cycle 1  # TLS monitoring and prophylaxis  :: Uric acid 4.4 mg/dL (1/28/25) on admit, 1.6 today  (1/30/25)  - HOLD allopurinol with severe mucositis, will monitor UA levels daily     HEME  #Pancytopenia, improving  %Due to chemotherapy  - transfuse for hgb<7 and plts<20 due to severe mucositis  #RUE PICC associated DVT   -RUE US (1/24)) superficial thrombus in distal brachial vein and mid to distal basilic vein   -Repeat US RUE (2/3) DVT RUE mid subclavian, distal subclavian, axillary veins   -Vascular medicine consulted, ok to remove picc (removed 2/3)   -Plan heparin gtt once platelets >50     ID  #Febrile neutropenia  #MRSA Bacteremia (source line vs PNA)  :: Cultures and serology  = Blood cultures (1/31/25): MRSA in 4/4 bottles  = UA (1/28/25): not suspicious for infection  = Influenza A/B, COVID, RSV (1/27/25): negative  = Parainfluenza, Metapneumovirus, Rhinovirus, Adenovirus PCR (1/27/25): negative   = Hold off on sputum culture (1/28/25) -- cough seems mostly from inability to clear oral secretions  :: Imaging  = CXR (1/27/25): No cardiopulmonary process  = TTE to rule out vegetations: negative  :: Antibiotics  - Vancomcyin (2/1/25- )  - ID consulted   -picc removed 2/3  -surveillance blood cx (2/4) pending   #Mouth ulcer  - Likely mucositis   - HSV swab (2/2/25) neg; repeat pending   - Continue and increased acyclovir to 5mg/kg IV every 8 hours; continue q8h per ID (oral HSV swab low sensitivity per ID note)   #PNA  -CT chest (2/2) new bilateral lower lobe consolidations   -Aspergillus elevated (1/12); repeat (1/13) negative; fungitell negative  -repeat fungal markers pending   - Piperacillin-tazobactam 3.375 g IV Q6H (1/28- )  - Patient reports unknown reaction as a child to penicillin -- discussed with Clinical Oncology Specialist PharmANDRA, Ok to trial piperacillin-tazobactam, remove allergy if no reaction, tolerating well so far   # Prophylaxis  - VZV: switch acyclovir to treatment dose due to concern for HSV in the mouth  - Candidiasis: Continue fluconazole 400 mg  daily   - PJP: None at this time      FEN/RENAL  - Admission weight 115 kg (01/28/25)  Most recent weight 111 kg (245 lb 9.5 oz) (1/31/2025  9:00 AM)   F PO + blood products and intermittent meds  E PRN  N Low-pathogen    #Hyperbilirubinemia   -Hapto, LDH unremarkable   -Consider RUQ US if uptrending     GI  # Esophageal mucositis  #Dysphagia  %Chemotherapy  :: CT neck (1/27/25) as above: no signs of abscess or fluid collection  -s/p NS @ 50 mL/hr x 40 hours (2L) to supplement poor PO intake(1/28-1/30)  - Supportive care: BMX, viscous lidocaine, sucralfate PRN  - -Dilaudid PCA no basal, bolus 0.2 mg every 10 minutes with one hour dose limit of 1.2 mg on 1/31  -continuous pulse oximetry while on PCA     CARDIO/PULM  #Atrial Fibrillation with RVR  %Due to infection, electrolyte derangements, anemia  :: HR maximum 180s today (02/04/25) depending on rest/activity  - Transitioned IV metoprolol to 12.5mg po metoprolol q6h per EP recs  - Keep K > 4 mMol/L, Mg > 2 mg/dL, Hgb > 7-8 g/dL  - Okay to tolerate rate 110-130 for now  -EP consulted, appreciate recs  -Tele   #Hypertension  #Hyperlipidemia  #CAD  #MI s/p stent 2010  -hold atorvastatin due to mucositis   - HOLD home aspirin 81 mg daily with thrombocytopenia  - HOLD home lisinopril in setting of low BP's  #Functional monitoring  - ECHO (1/18/25): LVSF normal, EF 65-70%.      ACCESS/SUPPORT/DISPO  - FULL CODE  - MD: Dr. Mckeon  - ACCESS: PIV, midline (placed 2/4)   - PT/RD/SW      Patient seen, examined and discussed with MD Aixa Diop PA-C  Malignant Hematology (Lymphoma/Myeloma/Autologous SCT) Team

## 2025-02-04 NOTE — PROGRESS NOTES
Bijan Ibanez is a 64 y.o. male on day 7 of admission presenting with Febrile neutropenia (CMS-[facility]).    Subjective   Interval History: Patient states he is feeling better.  Patient would like to ambulate more but is being told not to ambulate because of his increased heart rate.  PICC line removed yesterday.      Objective   Range of Vitals (last 24 hours)  Heart Rate:  [105-147]   Temp:  [35.7 °C (96.3 °F)-37.2 °C (99 °F)]   Resp:  [18-20]   BP: (106-153)/(75-89)   SpO2:  [94 %-96 %]   Daily Weight  01/31/25 : 111 kg (245 lb 9.5 oz)    Body mass index is 34.27 kg/m².    Physical Exam  GENERAL APPEARANCE: Awake and alert and not in any distress  HEENT: Whitish drainage noted over posterior pharynx  CARDIAC: Irregular  LUNGS: Clear  ABDOMEN: Soft and nontender      Antibiotics  levoFLOXacin - 500 mg  piperacillin-tazobactam - 3.375 gram/50 mL  vancomycin  vancomycin (Vancocin) - 1500 mg/500 mL    Relevant Results  Labs  Results from last 72 hours   Lab Units 02/03/25 0153 02/02/25 1426 02/02/25  0549   WBC AUTO x10*3/uL 0.7* 0.4* 0.2*   HEMOGLOBIN g/dL 8.1* 8.9* 6.9*   HEMATOCRIT % 23.9* 26.6* 21.4*   PLATELETS AUTO x10*3/uL 23* 17* 14*   NEUTROS PCT AUTO %  --   --  22.2   LYMPHO PCT MAN % 24.6  --   --    LYMPHS PCT AUTO %  --   --  44.4   MONO PCT MAN % 10.8  --   --    MONOS PCT AUTO %  --   --  27.8   EOSINO PCT MAN % 1.6  --   --    EOS PCT AUTO %  --   --  0.0     Results from last 72 hours   Lab Units 02/03/25 0153 02/02/25 1426 02/02/25  0549   SODIUM mmol/L 138 136 136   POTASSIUM mmol/L 3.3* 3.3* 3.1*   CHLORIDE mmol/L 102 101 100   CO2 mmol/L 26 26 26   BUN mg/dL 15 13 13   CREATININE mg/dL 0.69 0.56 0.56   GLUCOSE mg/dL 96 96 114*   CALCIUM mg/dL 7.1* 7.1* 7.0*   ANION GAP mmol/L 13 12 13   EGFR mL/min/1.73m*2 >90 >90 >90   PHOSPHORUS mg/dL 2.1* 2.1* 1.9*     Results from last 72 hours   Lab Units 02/03/25  0153 02/02/25  1426 02/02/25  0549   ALK PHOS U/L 71  --   --    BILIRUBIN TOTAL mg/dL 2.1*   --   --    BILIRUBIN DIRECT mg/dL 0.9*  --   --    PROTEIN TOTAL g/dL 3.6*  --   --    ALT U/L 36  --   --    AST U/L 11  --   --    ALBUMIN g/dL 2.1* 2.1* 2.0*     Estimated Creatinine Clearance: 125 mL/min (by C-G formula based on SCr of 0.69 mg/dL).  C-Reactive Protein   Date Value Ref Range Status   01/10/2025 14.67 (H) <1.00 mg/dL Final     Microbiology  Susceptibility data from last 14 days.  Collected Specimen Info Organism Clindamycin Erythromycin Oxacillin Tetracycline Trimethoprim/Sulfamethoxazole Vancomycin   01/31/25 Blood culture from Peripheral Venipuncture Staphylococcus aureus         01/31/25 Blood culture from Central Line/Catheter (Specify below) Methicillin Resistant Staphylococcus aureus (MRSA)  R  R  R  R  S  S       Imaging              Assessment/Plan   64-year-old male patient with history of hypertension, hyperlipidemia, CAD status post stent, RCC status post right partial nephrectomy (2013), BPH and recent hospital admission (January 2025) and diagnosis of high-grade Burkitt's lymphoma and readmitted the following day for cycle 1 DA-R-EPOCH (1/21/25) admitted from infusion center for fever and not feeling well.     Patient was admitted in third week of January.  Patient underwent bronchoscopy for pulmonary nodules seen on CT chest.  Infectious workup was negative.  Patient also underwent serologic fungal testing and serum Aspergillus galactomannan was positive at 1.5 and serum Fungitell was negative.  Patient was discharged pending bone marrow biopsy results.     Patient was called the following day and admitted directly to start chemotherapy.  Patient was discharged in stable condition.  Patient presented to emergency room for throat pain but did not stay for hospital admission.  Following day patient presented to infusion center from where he is admitted.     Since admission patient is started on Zosyn for neutropenic fever.  Vancomycin was added when blood cultures grew GPC which later  on identified as MRSA.  Patient's main complaint is significant difficulty with swallowing even liquids.  Patient is started on IV acyclovir and HSV swab was negative.  This morning patient states he feels better and able to swallow some oatmeal.     Problems  Large ulcer over posterior pharynx        Differential includes mucositis, HSV etc.  HSV swab was negative but has low sensitivity for oral swab.  Would repeat HSV and empirically treat with acyclovir.     2.  MRSA bacteremia likely from PICC line or pneumonia       PICC line was placed recently 10 days ago so less likely but cannot rule it out.  CT chest also shows right lower lobe infiltrate concerning for MRSA or aspiration pneumonia.     3.  Elevated serum Aspergillus galactomannan with negative Fungitell      Patient does not has any significant risk factors like smoking history, prolonged neutropenia etc.  Patient was not on chemotherapy when Aspergillus galactomannan was positive.  Other serologic fungal workup for histoplasma and cocci has been negative.  However CT chest shows some nodular lesions.     2/3/25  Agree with vancomycin IV.  Anticipate a long course as patient also has DVT over right arm.  Please discuss with vascular medicine regarding risks and benefits of removal of PICC line.  From ID standpoint it is ideal to remove the PICC line.  Continue Zosyn 3.375 g IV every 6 hours for possible aspiration pneumonia/mucositis.  Continue acyclovir 5 mg/kg IV every 8 hours and based on clinical improvement it can be transitioned to oral formulation at treatment dose.  Please repeat serum Aspergillus galactomannan and serum Fungitell and also oral swab of ulcer for HSV PCR.  Continue anti-infective prophylaxis with fluconazole for now.    Plan  Patient is improving clinically.  Continue acyclovir 5 mg/kg IV every 8 hours and based on clinical improvement it can be transitioned to oral formulation at treatment dose.  Continue Zosyn 3.375 g IV every 6  hours while patient is neutropenic.  Continue vancomycin IV.  Anticipate 4 weeks of antibiotic therapy in view of possible infected thrombus.  Follow-up on pending labs.  Will follow.         Ismael Walsh MD

## 2025-02-04 NOTE — CARE PLAN
The clinical goals for the shift include Patient will remain safe and free from falls/injury with well-controlled pain and monitored heart rate throughout the night.    Over the shift, the patient made progress toward the following goals:    Problem: Pain  Goal: Takes deep breaths with improved pain control throughout the shift  Outcome: Progressing  Goal: Turns in bed with improved pain control throughout the shift  Outcome: Progressing  Goal: Walks with improved pain control throughout the shift  Outcome: Progressing  Goal: Performs ADL's with improved pain control throughout shift  Outcome: Progressing  Goal: Participates in PT with improved pain control throughout the shift  Outcome: Progressing  Goal: Free from opioid side effects throughout the shift  Outcome: Progressing  Goal: Free from acute confusion related to pain meds throughout the shift  Outcome: Progressing     Problem: Pain - Adult  Goal: Verbalizes/displays adequate comfort level or baseline comfort level  Outcome: Progressing     Problem: Safety - Adult  Goal: Free from fall injury  Outcome: Progressing     Problem: Discharge Planning  Goal: Discharge to home or other facility with appropriate resources  Outcome: Progressing     Problem: Chronic Conditions and Co-morbidities  Goal: Patient's chronic conditions and co-morbidity symptoms are monitored and maintained or improved  Outcome: Progressing     Problem: Nutrition  Goal: Nutrient intake appropriate for maintaining nutritional needs  Outcome: Progressing     Problem: Fall/Injury  Goal: Not fall by end of shift  Outcome: Progressing  Goal: Be free from injury by end of the shift  Outcome: Progressing  Goal: Verbalize understanding of personal risk factors for fall in the hospital  Outcome: Progressing  Goal: Verbalize understanding of risk factor reduction measures to prevent injury from fall in the home  Outcome: Progressing  Goal: Use assistive devices by end of the shift  Outcome:  Progressing  Goal: Pace activities to prevent fatigue by end of the shift  Outcome: Progressing

## 2025-02-04 NOTE — PROGRESS NOTES
Physical Therapy                 Therapy Communication Note    Patient Name: Bijan Ibanez  MRN: [id number]  Department: [facility]  [facility]  Today's Date: 2/4/2025     Discipline: Physical Therapy          Missed Visit Reason: Missed Visit Reason: Patient placed on medical hold (Pt in afib RVR. RN stated to hold therapy at this time. Will re-attempt as able and appropriate)    Missed Time: Attempt

## 2025-02-04 NOTE — POST-PROCEDURE NOTE
Midline    Placed:2/4/20252/4/[id number].Earliest Known Present:2/4/20252/4/2025.Hand Hygiene Completed:YesYes.Catheter Time Out Checklist Completed:YesYes.Size (Fr):33.Lumen Type:Single lumenSingle lumen.Catheter to Vein Ratio Less Than 45%:YesYes.Total Length (cm):1515.External Length (cm):00.Orientation:LeftLeft.Location:Basilic veinBasilic vein.Site Prep:ChlorhexidineChlorhexidine .Local Anesthetic:InjectableInjectable.Indication:Chemo; Parenteral medicationsChemo; Parenteral medications.Insertion Team Members In The Room:NurseNurse. The comment is ALYX Roberts RN, BRANDON Reece LPN.Initial Extremity Circumference (cm):3030.Placed by:ELIDA Joseph RN.Insertion attempts:11.Patient Tolerance:Tolerated wellTolerated well.Comfort Measures:Subcutaneous anestheticSubcutaneous anesthetic.Procedure Location:BedsideBedside. The comment is Timeout with THONY Servin.Safety Measures:Patient specific safety measures addressed with RNPatient specific safety measures addressed with RN.Estimated Blood Loss (mL):00.Vessel Fully Compressible Proximally and Distally to Insertion Site:YesYes.Brisk Blood Return Obtained and Line Draws Easily:YesYes.Catheter Tip Location:Peripheral/midlinePeripheral/midline. The comment is Axillary Vein.Line Confirmation:Blood return; Non-pulsatile blood flowBlood return; Non-pulsatile blood flow.Lot #:[id number].:BDBD.Expiration Date:12/31/202512/31/2025.Securement Method:Stat lock; Transparent dressingStat lock; Transparent dressing.Post Procedure Checklist:Handoff with RN; Bed at lowest level.

## 2025-02-05 LAB
ALBUMIN SERPL BCP-MCNC: 2.2 G/DL (ref 3.4–5)
ALP SERPL-CCNC: 68 U/L (ref 33–136)
ALT SERPL W P-5'-P-CCNC: 28 U/L (ref 10–52)
ANION GAP SERPL CALC-SCNC: 12 MMOL/L (ref 10–20)
AST SERPL W P-5'-P-CCNC: 11 U/L (ref 9–39)
BASOPHILS # BLD MANUAL: 0 X10*3/UL (ref 0–0.1)
BASOPHILS NFR BLD MANUAL: 0 %
BILIRUB DIRECT SERPL-MCNC: 0.4 MG/DL (ref 0–0.3)
BILIRUB SERPL-MCNC: 1 MG/DL (ref 0–1.2)
BUN SERPL-MCNC: 13 MG/DL (ref 6–23)
CALCIUM SERPL-MCNC: 7.4 MG/DL (ref 8.6–10.6)
CHLORIDE SERPL-SCNC: 106 MMOL/L (ref 98–107)
CO2 SERPL-SCNC: 28 MMOL/L (ref 21–32)
CREAT SERPL-MCNC: 0.73 MG/DL (ref 0.5–1.3)
EGFRCR SERPLBLD CKD-EPI 2021: >90 ML/MIN/1.73M*2
EOSINOPHIL # BLD MANUAL: 0 X10*3/UL (ref 0–0.7)
EOSINOPHIL NFR BLD MANUAL: 0 %
ERYTHROCYTE [DISTWIDTH] IN BLOOD BY AUTOMATED COUNT: 14.4 % (ref 11.5–14.5)
GLUCOSE SERPL-MCNC: 110 MG/DL (ref 74–99)
HCT VFR BLD AUTO: 28.1 % (ref 41–52)
HGB BLD-MCNC: 9.1 G/DL (ref 13.5–17.5)
IMM GRANULOCYTES # BLD AUTO: 1.14 X10*3/UL (ref 0–0.7)
IMM GRANULOCYTES NFR BLD AUTO: 21.5 % (ref 0–0.9)
LDH SERPL L TO P-CCNC: 403 U/L (ref 84–246)
LYMPHOCYTES # BLD MANUAL: 0.51 X10*3/UL (ref 1.2–4.8)
LYMPHOCYTES NFR BLD MANUAL: 9.6 %
MAGNESIUM SERPL-MCNC: 1.94 MG/DL (ref 1.6–2.4)
MCH RBC QN AUTO: 28.9 PG (ref 26–34)
MCHC RBC AUTO-ENTMCNC: 32.4 G/DL (ref 32–36)
MCV RBC AUTO: 89 FL (ref 80–100)
MONOCYTES # BLD MANUAL: 0.6 X10*3/UL (ref 0.1–1)
MONOCYTES NFR BLD MANUAL: 11.3 %
NEUTROPHILS # BLD MANUAL: 3.78 X10*3/UL (ref 1.2–7.7)
NEUTS BAND # BLD MANUAL: 0.41 X10*3/UL (ref 0–0.7)
NEUTS BAND NFR BLD MANUAL: 7.8 %
NEUTS SEG # BLD MANUAL: 3.37 X10*3/UL (ref 1.2–7)
NEUTS SEG NFR BLD MANUAL: 63.5 %
NRBC BLD-RTO: 1.7 /100 WBCS (ref 0–0)
PHOSPHATE SERPL-MCNC: 3.4 MG/DL (ref 2.5–4.9)
PLATELET # BLD AUTO: 20 X10*3/UL (ref 150–450)
POTASSIUM SERPL-SCNC: 3.3 MMOL/L (ref 3.5–5.3)
PROMYELOCYTES # BLD MANUAL: 0.37 X10*3/UL
PROMYELOCYTES NFR BLD MANUAL: 6.9 %
PROT SERPL-MCNC: 3.8 G/DL (ref 6.4–8.2)
RBC # BLD AUTO: 3.15 X10*6/UL (ref 4.5–5.9)
RBC MORPH BLD: ABNORMAL
SODIUM SERPL-SCNC: 143 MMOL/L (ref 136–145)
TOTAL CELLS COUNTED BLD: 115
URATE SERPL-MCNC: 2.3 MG/DL (ref 4–7.5)
VARIANT LYMPHS # BLD MANUAL: 0.05 X10*3/UL (ref 0–0.5)
VARIANT LYMPHS NFR BLD: 0.9 %
WBC # BLD AUTO: 5.3 X10*3/UL (ref 4.4–11.3)

## 2025-02-05 PROCEDURE — 2500000004 HC RX 250 GENERAL PHARMACY W/ HCPCS (ALT 636 FOR OP/ED): Performed by: HOME HEALTH AIDE

## 2025-02-05 PROCEDURE — 2500000001 HC RX 250 WO HCPCS SELF ADMINISTERED DRUGS (ALT 637 FOR MEDICARE OP)

## 2025-02-05 PROCEDURE — 85027 COMPLETE CBC AUTOMATED: CPT | Performed by: PHYSICIAN ASSISTANT

## 2025-02-05 PROCEDURE — 1200000003 HC ONCOLOGY  ROOM WITH TELEMETRY DAILY

## 2025-02-05 PROCEDURE — 2500000004 HC RX 250 GENERAL PHARMACY W/ HCPCS (ALT 636 FOR OP/ED)

## 2025-02-05 PROCEDURE — 99232 SBSQ HOSP IP/OBS MODERATE 35: CPT | Performed by: INTERNAL MEDICINE

## 2025-02-05 PROCEDURE — 85007 BL SMEAR W/DIFF WBC COUNT: CPT | Performed by: PHYSICIAN ASSISTANT

## 2025-02-05 PROCEDURE — 84550 ASSAY OF BLOOD/URIC ACID: CPT | Performed by: PHYSICIAN ASSISTANT

## 2025-02-05 PROCEDURE — 84100 ASSAY OF PHOSPHORUS: CPT | Performed by: PHYSICIAN ASSISTANT

## 2025-02-05 PROCEDURE — 83615 LACTATE (LD) (LDH) ENZYME: CPT | Performed by: PHYSICIAN ASSISTANT

## 2025-02-05 PROCEDURE — 2500000005 HC RX 250 GENERAL PHARMACY W/O HCPCS

## 2025-02-05 PROCEDURE — 83735 ASSAY OF MAGNESIUM: CPT | Performed by: PHYSICIAN ASSISTANT

## 2025-02-05 PROCEDURE — 2500000004 HC RX 250 GENERAL PHARMACY W/ HCPCS (ALT 636 FOR OP/ED): Performed by: PHYSICIAN ASSISTANT

## 2025-02-05 PROCEDURE — 84075 ASSAY ALKALINE PHOSPHATASE: CPT | Performed by: PHYSICIAN ASSISTANT

## 2025-02-05 PROCEDURE — 99233 SBSQ HOSP IP/OBS HIGH 50: CPT | Performed by: INTERNAL MEDICINE

## 2025-02-05 PROCEDURE — 2500000001 HC RX 250 WO HCPCS SELF ADMINISTERED DRUGS (ALT 637 FOR MEDICARE OP): Performed by: HOME HEALTH AIDE

## 2025-02-05 PROCEDURE — 80048 BASIC METABOLIC PNL TOTAL CA: CPT | Performed by: PHYSICIAN ASSISTANT

## 2025-02-05 RX ORDER — POTASSIUM CHLORIDE 14.9 MG/ML
20 INJECTION INTRAVENOUS
Status: COMPLETED | OUTPATIENT
Start: 2025-02-05 | End: 2025-02-05

## 2025-02-05 RX ORDER — METOPROLOL TARTRATE 25 MG/1
12.5 TABLET, FILM COATED ORAL ONCE
Status: COMPLETED | OUTPATIENT
Start: 2025-02-05 | End: 2025-02-05

## 2025-02-05 RX ORDER — FUROSEMIDE 10 MG/ML
40 INJECTION INTRAMUSCULAR; INTRAVENOUS ONCE
Status: COMPLETED | OUTPATIENT
Start: 2025-02-05 | End: 2025-02-05

## 2025-02-05 RX ORDER — METOPROLOL TARTRATE 25 MG/1
25 TABLET, FILM COATED ORAL EVERY 6 HOURS
Status: DISCONTINUED | OUTPATIENT
Start: 2025-02-05 | End: 2025-02-08 | Stop reason: HOSPADM

## 2025-02-05 RX ORDER — ACYCLOVIR 200 MG/5ML
400 SUSPENSION ORAL EVERY 8 HOURS
Status: DISCONTINUED | OUTPATIENT
Start: 2025-02-05 | End: 2025-02-08 | Stop reason: HOSPADM

## 2025-02-05 RX ADMIN — ACYCLOVIR SODIUM 376.5 MG: 50 INJECTION, SOLUTION INTRAVENOUS at 05:19

## 2025-02-05 RX ADMIN — PIPERACILLIN SODIUM AND TAZOBACTAM SODIUM 3.38 G: 3; .375 INJECTION, SOLUTION INTRAVENOUS at 13:46

## 2025-02-05 RX ADMIN — METOPROLOL TARTRATE 25 MG: 25 TABLET, FILM COATED ORAL at 20:20

## 2025-02-05 RX ADMIN — PIPERACILLIN SODIUM AND TAZOBACTAM SODIUM 3.38 G: 3; .375 INJECTION, SOLUTION INTRAVENOUS at 20:21

## 2025-02-05 RX ADMIN — FLUCONAZOLE 400 MG: 2 INJECTION, SOLUTION INTRAVENOUS at 09:28

## 2025-02-05 RX ADMIN — VANCOMYCIN HYDROCHLORIDE 1500 MG: 5 INJECTION, POWDER, LYOPHILIZED, FOR SOLUTION INTRAVENOUS at 21:38

## 2025-02-05 RX ADMIN — PIPERACILLIN SODIUM AND TAZOBACTAM SODIUM 3.38 G: 3; .375 INJECTION, SOLUTION INTRAVENOUS at 01:32

## 2025-02-05 RX ADMIN — METOPROLOL TARTRATE 25 MG: 25 TABLET, FILM COATED ORAL at 13:44

## 2025-02-05 RX ADMIN — ACYCLOVIR SODIUM 376.5 MG: 50 INJECTION, SOLUTION INTRAVENOUS at 11:51

## 2025-02-05 RX ADMIN — METOPROLOL TARTRATE 12.5 MG: 25 TABLET, FILM COATED ORAL at 01:32

## 2025-02-05 RX ADMIN — FUROSEMIDE 40 MG: 10 INJECTION, SOLUTION INTRAVENOUS at 11:52

## 2025-02-05 RX ADMIN — METOPROLOL TARTRATE 12.5 MG: 25 TABLET, FILM COATED ORAL at 08:30

## 2025-02-05 RX ADMIN — Medication: at 18:21

## 2025-02-05 RX ADMIN — METOPROLOL TARTRATE 12.5 MG: 25 TABLET, FILM COATED ORAL at 09:55

## 2025-02-05 RX ADMIN — PIPERACILLIN SODIUM AND TAZOBACTAM SODIUM 3.38 G: 3; .375 INJECTION, SOLUTION INTRAVENOUS at 08:29

## 2025-02-05 RX ADMIN — POTASSIUM CHLORIDE 20 MEQ: 14.9 INJECTION, SOLUTION INTRAVENOUS at 11:00

## 2025-02-05 RX ADMIN — POTASSIUM CHLORIDE 20 MEQ: 14.9 INJECTION, SOLUTION INTRAVENOUS at 08:30

## 2025-02-05 RX ADMIN — VANCOMYCIN HYDROCHLORIDE 1500 MG: 5 INJECTION, POWDER, LYOPHILIZED, FOR SOLUTION INTRAVENOUS at 09:28

## 2025-02-05 RX ADMIN — PANTOPRAZOLE SODIUM 40 MG: 40 INJECTION, POWDER, FOR SOLUTION INTRAVENOUS at 08:30

## 2025-02-05 ASSESSMENT — COGNITIVE AND FUNCTIONAL STATUS - GENERAL
MOBILITY SCORE: 22
TOILETING: A LITTLE
WALKING IN HOSPITAL ROOM: A LITTLE
DRESSING REGULAR LOWER BODY CLOTHING: A LITTLE
HELP NEEDED FOR BATHING: A LITTLE
DAILY ACTIVITIY SCORE: 20
CLIMB 3 TO 5 STEPS WITH RAILING: A LITTLE
DRESSING REGULAR UPPER BODY CLOTHING: A LITTLE

## 2025-02-05 ASSESSMENT — PAIN - FUNCTIONAL ASSESSMENT: PAIN_FUNCTIONAL_ASSESSMENT: 0-10

## 2025-02-05 ASSESSMENT — PAIN SCALES - GENERAL
PAINLEVEL_OUTOF10: 0 - NO PAIN
PAINLEVEL_OUTOF10: 0 - NO PAIN

## 2025-02-05 NOTE — PROGRESS NOTES
Bijan Ibanez is a 64 y.o. male on day 8 of admission presenting with Febrile neutropenia (CMS-HCC).    Subjective   Interval History: Patient is tolerating oral intake.  Throat pain is improving.    Objective   Range of Vitals (last 24 hours)  Heart Rate:  [109-155]   Temp:  [35.5 °C (95.9 °F)-37.1 °C (98.8 °F)]   Resp:  [18-20]   BP: (116-143)/(72-94)   Weight:  [115 kg (254 lb 6.6 oz)]   SpO2:  [94 %-96 %]   Daily Weight  02/05/25 : 115 kg (254 lb 6.6 oz)    Body mass index is 35.5 kg/m².    Physical Exam  GENERAL APPEARANCE: Awake and alert and not in any distress  HEENT: Superficial ulcerations noted over posterior pharynx and some whitish exudate  CARDIAC: Irregular  LUNGS: Clear  ABDOMEN: Soft and nontender  EX: 3+ edema    Antibiotics  levoFLOXacin - 500 mg  piperacillin-tazobactam - 3.375 gram/50 mL  vancomycin  vancomycin (Vancocin) - 1500 mg/500 mL    Relevant Results  Labs  Results from last 72 hours   Lab Units 02/05/25  0519 02/04/25  1444 02/03/25  0153   WBC AUTO x10*3/uL 5.3 4.0* 0.7*   HEMOGLOBIN g/dL 9.1* 9.7* 8.1*   HEMATOCRIT % 28.1* 28.9* 23.9*   PLATELETS AUTO x10*3/uL 20* 16* 23*   LYMPHO PCT MAN % 9.6 10.8 24.6   MONO PCT MAN % 11.3 6.2 10.8   EOSINO PCT MAN % 0.0 0.8 1.6     Results from last 72 hours   Lab Units 02/05/25  0519 02/04/25  1444 02/03/25  0153   SODIUM mmol/L 143 140 138   POTASSIUM mmol/L 3.3* 3.4* 3.3*   CHLORIDE mmol/L 106 103 102   CO2 mmol/L 28 28 26   BUN mg/dL 13 15 15   CREATININE mg/dL 0.73 0.79 0.69   GLUCOSE mg/dL 110* 107* 96   CALCIUM mg/dL 7.4* 7.5* 7.1*   ANION GAP mmol/L 12 12 13   EGFR mL/min/1.73m*2 >90 >90 >90   PHOSPHORUS mg/dL 3.4 2.3* 2.1*     Results from last 72 hours   Lab Units 02/05/25  0519 02/04/25  1444 02/03/25  0153   ALK PHOS U/L 68  --  71   BILIRUBIN TOTAL mg/dL 1.0  --  2.1*   BILIRUBIN DIRECT mg/dL 0.4*  --  0.9*   PROTEIN TOTAL g/dL 3.8*  --  3.6*   ALT U/L 28  --  36   AST U/L 11  --  11   ALBUMIN g/dL 2.2* 2.2* 2.1*     Estimated  Creatinine Clearance: 125 mL/min (by C-G formula based on SCr of 0.73 mg/dL).  C-Reactive Protein   Date Value Ref Range Status   01/10/2025 14.67 (H) <1.00 mg/dL Final     Microbiology  Susceptibility data from last 14 days.  Collected Specimen Info Organism Clindamycin Erythromycin Oxacillin Tetracycline Trimethoprim/Sulfamethoxazole Vancomycin   01/31/25 Blood culture from Peripheral Venipuncture Staphylococcus aureus         01/31/25 Blood culture from Central Line/Catheter (Specify below) Methicillin Resistant Staphylococcus aureus (MRSA)  R  R  R  R  S  S       Imaging              Assessment/Plan   64-year-old male patient with history of hypertension, hyperlipidemia, CAD status post stent, RCC status post right partial nephrectomy (2013), BPH and recent hospital admission (January 2025) and diagnosis of high-grade Burkitt's lymphoma and readmitted the following day for cycle 1 DA-R-EPOCH (1/21/25) admitted from infusion center for fever and not feeling well.     Patient was admitted in third week of January.  Patient underwent bronchoscopy for pulmonary nodules seen on CT chest.  Infectious workup was negative.  Patient also underwent serologic fungal testing and serum Aspergillus galactomannan was positive at 1.5 and serum Fungitell was negative.  Patient was discharged pending bone marrow biopsy results.     Patient was called the following day and admitted directly to start chemotherapy.  Patient was discharged in stable condition.  Patient presented to emergency room for throat pain but did not stay for hospital admission.  Following day patient presented to infusion center from where he is admitted.     Since admission patient is started on Zosyn for neutropenic fever.  Vancomycin was added when blood cultures grew GPC which later on identified as MRSA.  Patient's main complaint is significant difficulty with swallowing even liquids.  Patient is started on IV acyclovir and HSV swab was negative.  This  morning patient states he feels better and able to swallow some oatmeal.     Problems  Large ulcer over posterior pharynx        Differential includes mucositis, HSV etc.  HSV swab was negative but has low sensitivity for oral swab.  Would repeat HSV and empirically treat with acyclovir.     2.  MRSA bacteremia likely from PICC line or pneumonia       PICC line was placed recently 10 days ago so less likely but cannot rule it out.  CT chest also shows right lower lobe infiltrate concerning for MRSA or aspiration pneumonia.     3.  Elevated serum Aspergillus galactomannan with negative Fungitell      Patient does not has any significant risk factors like smoking history, prolonged neutropenia etc.  Patient was not on chemotherapy when Aspergillus galactomannan was positive.  Other serologic fungal workup for histoplasma and cocci has been negative.  However CT chest shows some nodular lesions.     2/3/25  Agree with vancomycin IV.  Anticipate a long course as patient also has DVT over right arm.  Please discuss with vascular medicine regarding risks and benefits of removal of PICC line.  From ID standpoint it is ideal to remove the PICC line.  Continue Zosyn 3.375 g IV every 6 hours for possible aspiration pneumonia/mucositis.  Continue acyclovir 5 mg/kg IV every 8 hours and based on clinical improvement it can be transitioned to oral formulation at treatment dose.  Please repeat serum Aspergillus galactomannan and serum Fungitell and also oral swab of ulcer for HSV PCR.  Continue anti-infective prophylaxis with fluconazole for now.     2/4/25  Patient is improving clinically.  Continue acyclovir 5 mg/kg IV every 8 hours and based on clinical improvement it can be transitioned to oral formulation at treatment dose.  Continue Zosyn 3.375 g IV every 6 hours while patient is neutropenic.  Continue vancomycin IV.  Anticipate 4 weeks of antibiotic therapy in view of possible infected thrombus.  Follow-up on pending  labs.    Plan  Repeat blood cultures from February 4 are no growth and repeat oral swab is negative for HSV.  Neutropenia resolved.  Recommend to stop IV acyclovir and start acyclovir (liquid formulation) 400 mg p.o. every 8 hours for 2 weeks followed by acyclovir 400 mg twice daily as prophylactic therapy.  Zosyn can be discontinued after last dose today ID standpoint.  Continue vancomycin IV for 4 weeks (until March 3).  Patient is on high dose of vancomycin and is at risk for renal failure.  I discussed risks from PICC line and antibiotic therapy including skin rash, C. difficile infection, renal failure etc.  Please insert PICC line for long-term IV antibiotics when blood cultures from February 4 are negative for 48 hours.  Please arrange home health care for PICC line care and weekly labs including CBC with differential, BMP and vancomycin trough and please fax it to [phone].  Patient will be scheduled a follow-up appointment with me [facility] [id number] [facility] [facility]  Patient is advised to call ID office ([phone]) if he has any questions or concerns or adverse effects from antibiotic including skin rash, diarrhea or fever.  Please follow-up on pending serum Aspergillus galactomannan and Fungitell.  Will follow peripherally.      Ismael Walsh MD

## 2025-02-05 NOTE — PROGRESS NOTES
Bijan Ibanez is a 64 y.o. male on day 8 of admission presenting with Febrile neutropenia (CMS-HCC).    Subjective   Again had episodes of Afib with RVR overnight with HR up to 180s with activity. Endorses feeling heart race during this. Was started on PO 12.5mg metoprolol, will increase this to 25mg q6h. Reports improvement in mucositis, although still with some difficulty. Ongoing swelling in BLE.  Denies HA, dizziness, CP, SOB, N/V/D/C. All other ROS otherwise negative.     Objective     Physical Exam  Constitutional:       General: He is not in acute distress.     Appearance: Normal appearance.   HENT:      Head: Normocephalic and atraumatic.      Nose: Nose normal.      Mouth/Throat:      Mouth: Mucous membranes are moist.      Pharynx: Posterior oropharyngeal erythema present.      Comments: Ulcers, erythema noted on soft palate.   Eyes:      General: No scleral icterus.     Extraocular Movements: Extraocular movements intact.      Pupils: Pupils are equal, round, and reactive to light.   Cardiovascular:      Rate and Rhythm: Tachycardia present. Rhythm irregular.      Heart sounds: No murmur heard.     No gallop.   Pulmonary:      Effort: Pulmonary effort is normal. No respiratory distress.      Breath sounds: Rales (bibasilar) present. No wheezing or rhonchi.   Abdominal:      General: Bowel sounds are normal. There is no distension.      Palpations: Abdomen is soft. There is no mass.      Tenderness: There is no abdominal tenderness. There is no guarding.   Musculoskeletal:         General: Swelling (RUE) present.      Cervical back: Normal range of motion.      Right lower leg: Edema (2+) present.      Left lower leg: Edema (2+) present.   Skin:     General: Skin is warm and dry.      Findings: No bruising, erythema or rash.   Neurological:      General: No focal deficit present.      Mental Status: He is alert and oriented to person, place, and time.      Cranial Nerves: No cranial nerve deficit.       Sensory: No sensory deficit.      Motor: No weakness.   Psychiatric:         Mood and Affect: Mood normal.         Behavior: Behavior normal.      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 male with a past medical history of hypertension, hyperlipidemia, coronary artery disease (s/p stent 2010, on ASA), renal cell carcinoma (s/p R partial nephrectomy in 2013 @ [facility]), GERD, BPH, arthritis, and Burkitt lymphoma being treated with DA-R-EPOCH (since 1/21/25) who is admitted today (01/28/25) with febrile neutropenia and mucositis.      Day 16 (02/05/25) C1 DA-R-EPOCH    Active issues today (02/05/25):  #MRSA bacteremia. Continue vancomycin, TTE, echo without vegetations. ID consulted. Picc removed, now has midline  #Atrial fibrillation. EP consulted, on 12.5mg po metoprolol q6h increased to 25mg q6h on 2/5  #Musositis. Continue PCA. On IV acyclovir q8h for HSV coverage; HSV PCR pending, though low sensitivity per ID  #PICC associated DVT. Vascular consulted, line removed. Plan heparin gtt once plts>50    ONC  #Burkitt Lymphoma  :: Workup and treatment as per Onc History  :: Received C1 DA-R-EPOCH (1/21/25), supported with pegfilgrastim 1/28/25  :: CT neck (1/27/25): Mild asymmetric enlargement of the right muscles of mastication, predominantly the masseter, likely due to lymphomatous involvement, less conspicuous compared to 1/9/2025, with no new mass or infectious/inflammatory process in the neck or aerodigestive tract  - Due for C2 ~ 2/11/25; will likely be delayed dt complications with cycle 1  # TLS monitoring and prophylaxis  :: Uric acid 4.4 mg/dL (1/28/25) on admit, 1.6 today (1/30/25)  - HOLD allopurinol with severe mucositis, will monitor UA levels daily      HEME  #Pancytopenia, improving  %Due to chemotherapy  - transfuse for hgb<7 and plts<20 due to severe mucositis  #RUE PICC associated DVT   -RUE US (1/24)) superficial thrombus in distal brachial vein and mid to distal basilic vein   -Repeat US RUE (2/3) DVT RUE mid subclavian, distal subclavian, axillary veins   -Vascular medicine consulted, ok to remove picc (removed 2/3)   -Plan heparin gtt once platelets >50     ID  #Febrile neutropenia  #MRSA Bacteremia (source line vs PNA)  :: Cultures and serology  = Blood cultures (1/31/25): MRSA in 4/4 bottles  = UA (1/28/25): not suspicious for infection  = Influenza A/B, COVID, RSV (1/27/25): negative  = Parainfluenza, Metapneumovirus, Rhinovirus, Adenovirus PCR (1/27/25): negative   = Hold off on sputum culture (1/28/25) -- cough seems mostly from inability to clear oral secretions  :: Imaging  = CXR (1/27/25): No cardiopulmonary process  = TTE to rule out vegetations: negative  :: Antibiotics  - Vancomcyin (2/1/25- )  - ID consulted   - picc removed 2/3  -s urveillance blood cx (2/4) pending   #Mouth ulcer  - Likely mucositis   - HSV swab (2/2/25) neg; repeat pending   - Continue and increased acyclovir to 5mg/kg IV every 8 hours; continue q8h per ID (oral HSV swab low sensitivity per ID note)   #PNA  -CT chest (2/2) new bilateral lower lobe consolidations   -Aspergillus elevated (1/12); repeat (1/13) negative; fungitell negative  -repeat fungal markers pending   - Piperacillin-tazobactam 3.375 g IV Q6H (1/28- )  - Patient reports unknown reaction as a child to penicillin -- discussed with Clinical Oncology Specialist PharmANDRA, Ok to trial piperacillin-tazobactam, remove allergy if no reaction, tolerating well so far   # Prophylaxis  - VZV: switch acyclovir to treatment dose due to concern for HSV in the mouth  - Candidiasis: Continue fluconazole 400 mg  daily   - PJP: None at this time     FEN/RENAL  - Admission weight 115 kg (01/28/25)  Most recent weight 115 kg (254  lb 6.6 oz) (2/5/2025  8:07 AM)   F PO + blood products and intermittent meds  E PRN  N Low-pathogen    #Hyperbilirubinemia   -Hapto, LDH unremarkable   -Consider RUQ US if uptrending     GI  # Esophageal mucositis  #Dysphagia  %Chemotherapy  :: CT neck (1/27/25) as above: no signs of abscess or fluid collection  -s/p NS @ 50 mL/hr x 40 hours (2L) to supplement poor PO intake(1/28-1/30)  - Supportive care: BMX, viscous lidocaine, sucralfate PRN  - -Dilaudid PCA no basal, bolus 0.2 mg every 10 minutes with one hour dose limit of 1.2 mg on 1/31  -continuous pulse oximetry while on PCA     CARDIO/PULM  #Atrial Fibrillation with RVR  %Due to infection, electrolyte derangements, anemia  :: HR maximum 180s today (02/05/25) depending on rest/activity  - Transitioned IV metoprolol to 12.5mg po metoprolol q6h per EP recs  - Keep K > 4 mMol/L, Mg > 2 mg/dL, Hgb > 7-8 g/dL  - Okay to tolerate rate 110-130 for now  -EP consulted, appreciate recs  -Tele   #Hypertension  #Hyperlipidemia  #CAD  #MI s/p stent 2010  -hold atorvastatin due to mucositis   - HOLD home aspirin 81 mg daily with thrombocytopenia  - HOLD home lisinopril in setting of low BP's  #Functional monitoring  - ECHO (1/18/25): LVSF normal, EF 65-70%.      ACCESS/SUPPORT/DISPO  - FULL CODE  - MD: Dr. Mckeon  - ACCESS: PIV, midline (placed 2/4)   - PT/RD/SW      Patient seen, examined and discussed with MD Terrence Diop, APRN-CNP  Malignant Hematology (Lymphoma/Myeloma/Autologous SCT) Team

## 2025-02-05 NOTE — PROGRESS NOTES
Physical Therapy                 Therapy Communication Note    Patient Name: Bijan Ibanez  MRN: [id number]  Department: [facility]  Room: [address] [place], [place] [address]  Today's Date: 2/5/2025     Discipline: Physical Therapy    PT Missed Visit: Yes     Missed Visit Reason: Missed Visit Reason: Patient placed on medical hold (Pt in Afib RVR this date. Will re-attempt as able and appropriate)    Missed Time: Attempt

## 2025-02-05 NOTE — PROGRESS NOTES
"Nutrition Follow Up Assessment:   Nutrition Assessment         Patient is a 64 y.o. male  on day 8 of admission presenting with Febrile neutropenia.    2/01 - blood culture positive for S. Aureus, started on vancomycin    2/02 - SLP unable to provide diet recs d/t pt not able to attempt sips/large volumes of thin liquids from discomfort. SLP recs potential EN if oral intake remains tenuous    2/05 - Per B&M NP, mucositis improvement although still having difficulty.     Nutrition History:  Food and Nutrient History: Pt reports his intake been slowly improving and was able to eat chicken soup yesterday and oatmeal this morning. Pt endorsed wanting to be able to order oatmeal throughout the day. Pt's wife states she brought instant oatmeal for him but cannot make it without hot water. Instructed pt's wife on where she can find hot weter/microwave. Pt reports still having difficulty swallowing. Pt reports finding the Ensure Plus would hurt to swallow d/t the thick, milky consistency. Pt amenable to try Ensure Clear.       Anthropometrics:  Height: 180.3 cm (5' 10.98\")   Weight: 115 kg (254 lb 6.6 oz)   BMI (Calculated): 35.5  IBW/kg (Dietitian Calculated): 78.2 kg  Percent of IBW: 147 %       Weight History:   Date/Time Weight   02/05/25 0807 115 kg (254 lb 6.6 oz)   01/31/25 0900 111 kg (245 lb 9.5 oz)     Weight Change %:  Weight History / % Weight Change: Per chart, pt with weight gain throughout admission likely d/t worsening edema  Significant Weight Gain: Fluid related    Nutrition Focused Physical Exam Findings:  Pt with mild-moderate wasting noted on initial assessment  Edema:  Edema: +2 mild  Edema Location: RUE, BLE  Physical Findings:  Skin: Negative  Mouth Findings: Mucositis    Nutrition Significant Labs:  CBC Trend:   Results from last 7 days   Lab Units 02/05/25  0519 02/04/25  1444 02/03/25  0153 02/02/25  1426   WBC AUTO x10*3/uL 5.3 4.0* 0.7* 0.4*   RBC AUTO x10*6/uL 3.15* 3.32* 2.75* 3.06* " "  HEMOGLOBIN g/dL 9.1* 9.7* 8.1* 8.9*   HEMATOCRIT % 28.1* 28.9* 23.9* 26.6*   MCV fL 89 87 87 87   PLATELETS AUTO x10*3/uL 20* 16* 23* 17*    , BMP Trend:   Results from last 7 days   Lab Units 02/05/25  0519 02/04/25  1444 02/03/25  0153 02/02/25  1426   GLUCOSE mg/dL 110* 107* 96 96   CALCIUM mg/dL 7.4* 7.5* 7.1* 7.1*   SODIUM mmol/L 143 140 138 136   POTASSIUM mmol/L 3.3* 3.4* 3.3* 3.3*   CO2 mmol/L 28 28 26 26   CHLORIDE mmol/L 106 103 102 101   BUN mg/dL 13 15 15 13   CREATININE mg/dL 0.73 0.79 0.69 0.56    , BG POCT trend:    , Liver Function Trend:   Results from last 7 days   Lab Units 02/05/25 0519 02/03/25  0153 01/31/25  0400   ALK PHOS U/L 68 71 88   AST U/L 11 11 19   ALT U/L 28 36 46   BILIRUBIN TOTAL mg/dL 1.0 2.1* 1.5*    , Renal Lab Trend:   Results from last 7 days   Lab Units 02/05/25 0519 02/04/25  1444 02/03/25  0153 02/02/25  1426   POTASSIUM mmol/L 3.3* 3.4* 3.3* 3.3*   PHOSPHORUS mg/dL 3.4 2.3* 2.1* 2.1*   SODIUM mmol/L 143 140 138 136   MAGNESIUM mg/dL 1.94 1.96 2.06 2.15   EGFR mL/min/1.73m*2 >90 >90 >90 >90   BUN mg/dL 13 15 15 13   CREATININE mg/dL 0.73 0.79 0.69 0.56    , Vit D: No results found for: \"VITD25\" , Vit B12:   Lab Results   Component Value Date    DNZFVLBQ46 422 01/11/2025    , Iron Panel:   Lab Results   Component Value Date    IRON 130 01/10/2025    TIBC 253 01/10/2025    FERRITIN >9,000 (H) 01/11/2025    , Folate:   Lab Results   Component Value Date    FOLATE 8.8 01/11/2025        Nutrition Specific Medications:  Scheduled medications  acyclovir, 400 mg, oral, q8h  [Held by provider] atorvastatin, 40 mg, oral, Daily  [Held by provider] baclofen, 10 mg, oral, TID  fluconazole, 400 mg, intravenous, q24h  lidocaine, 5 mL, infiltration, Once  [Held by provider] metoprolol succinate XL, 50 mg, oral, Daily  metoprolol tartrate, 25 mg, oral, q6h  pantoprazole, 40 mg, intravenous, Daily  piperacillin-tazobactam, 3.375 g, intravenous, q6h  sodium chloride, 500 mL, intravenous, " Once  vancomycin, 1,500 mg, intravenous, q12h      Continuous medications  HYDROmorphone,       PRN medications  PRN medications: albuterol, alteplase, lidocaine, lidocaine-diphenhydraMINE-Maalox 1:1:1, loperamide, naloxone, prochlorperazine, sucralfate, vancomycin      I/O:   Last BM Date: 02/04/25; Stool Appearance: Loose (02/05/25 0928)    Dietary Orders (From admission, onward)       Start     Ordered    02/05/25 1431  Oral nutritional supplements  Until discontinued        Question Answer Comment   Deliver with All meals    Select supplement: Ensure Clear        02/05/25 1430    01/28/25 1410  Adult diet Low microbial  Diet effective now        Question:  Diet type  Answer:  Low microbial    01/28/25 1409    01/28/25 1357  May Participate in Room Service  ( ROOM SERVICE MAY PARTICIPATE)  Once        Question:  .  Answer:  Yes    01/28/25 1356                     Estimated Needs:   Total Energy Estimated Needs in 24 hours (kCal):  (7440-6209)  Method for Estimating Needs: 78 (IBW) x ~28-30  Total Protein Estimated Needs in 24 Hours (g):  (95+)  Method for Estimating 24 Hour Protein Needs: 78 (IBW) x ~1.2g/kg+  Total Fluid Estimated Needs in 24 Hours (mL):  (1ml/kcal or per MD/team)           Nutrition Diagnosis   Malnutrition Diagnosis  Patient has Malnutrition Diagnosis: Yes  Diagnosis Status: Active  Malnutrition Diagnosis: Moderate malnutrition related to chronic disease or condition (acute-on-chronic)  Related to: CA, worsening issues with mucositis  As Evidenced by: pt estimated to be consuming <75% estimated energy needs x >1 month with further decline in PO x the last several days, areas of mild/moderate muscle and fat losses noted during nutrition exam  Additional Assessment Information: Muscle and fat losses likely a combination of age and disease-related losses, though considering decreased PO, do feel pt presenting with malnutrition at this time.             Nutrition Interventions/Recommendations    Nutrition prescription for oral nutrition    Nutrition Recommendations:  If pt's oral intake does not further improve, recommend nutrition support. Re-consult for recs.   Discontinued Ensure Plus.   Ordered Ensure Clear (240 kcal, 8 g pro) TID.   Please obtain vit D levels iso malnutrition and ongoing poor intake.    Nutrition Interventions/Goals:   Interventions: Medical food supplement  Medical Food Supplement: Commercial beverage medical food supplement therapy  Goal: At ;east 1/day      Education Documentation  No documentation found.            Nutrition Monitoring and Evaluation   Food/Nutrient Related History Monitoring  Monitoring and Evaluation Plan: Estimated Energy Intake  Estimated Energy Intake: Energy intake 50 -75% of estimated energy needs  Intake / Amount of food: Consumes at least 50% or more of meals/snacks/supplements    Anthropometric Measurements  Monitoring and Evaluation Plan: Body weight  Body Weight: Body weight - Maintain stable weight    Biochemical Data, Medical Tests and Procedures  Monitoring and Evaluation Plan: Electrolyte/renal panel, Glucose/endocrine profile, Vitamin profile  Criteria: WNL         Goal Status: Goal(s) not achieved    Time Spent (min): 30 minutes

## 2025-02-06 ENCOUNTER — APPOINTMENT (OUTPATIENT)
Dept: HEMATOLOGY/ONCOLOGY | Facility: HOSPITAL | Age: 65
End: 2025-02-06
Payer: MEDICARE

## 2025-02-06 ENCOUNTER — HOME HEALTH ADMISSION (OUTPATIENT)
Dept: HOME HEALTH SERVICES | Facility: HOME HEALTH | Age: 65
End: 2025-02-06
Payer: MEDICARE

## 2025-02-06 LAB
ALBUMIN SERPL BCP-MCNC: 2.5 G/DL (ref 3.4–5)
ANION GAP SERPL CALC-SCNC: 12 MMOL/L (ref 10–20)
ASPERGILLUS GALACTOMANNAN EIA,SERUM: 0.04
BASOPHILS # BLD MANUAL: 0.16 X10*3/UL (ref 0–0.1)
BASOPHILS NFR BLD MANUAL: 1.6 %
BUN SERPL-MCNC: 11 MG/DL (ref 6–23)
CALCIUM SERPL-MCNC: 7.8 MG/DL (ref 8.6–10.6)
CHLORIDE SERPL-SCNC: 104 MMOL/L (ref 98–107)
CO2 SERPL-SCNC: 31 MMOL/L (ref 21–32)
CREAT SERPL-MCNC: 0.87 MG/DL (ref 0.5–1.3)
EGFRCR SERPLBLD CKD-EPI 2021: >90 ML/MIN/1.73M*2
EOSINOPHIL # BLD MANUAL: 0 X10*3/UL (ref 0–0.7)
EOSINOPHIL NFR BLD MANUAL: 0 %
ERYTHROCYTE [DISTWIDTH] IN BLOOD BY AUTOMATED COUNT: 14.6 % (ref 11.5–14.5)
FUNGITELL BETA-D GLUCAN,SERUM: <31 PG/ML
GLUCOSE SERPL-MCNC: 102 MG/DL (ref 74–99)
HCT VFR BLD AUTO: 28.6 % (ref 41–52)
HGB BLD-MCNC: 9.3 G/DL (ref 13.5–17.5)
IMM GRANULOCYTES # BLD AUTO: 2.15 X10*3/UL (ref 0–0.7)
IMM GRANULOCYTES NFR BLD AUTO: 22.3 % (ref 0–0.9)
LDH SERPL L TO P-CCNC: 490 U/L (ref 84–246)
LYMPHOCYTES # BLD MANUAL: 0.49 X10*3/UL (ref 1.2–4.8)
LYMPHOCYTES NFR BLD MANUAL: 5 %
MAGNESIUM SERPL-MCNC: 1.88 MG/DL (ref 1.6–2.4)
MCH RBC QN AUTO: 29.2 PG (ref 26–34)
MCHC RBC AUTO-ENTMCNC: 32.5 G/DL (ref 32–36)
MCV RBC AUTO: 90 FL (ref 80–100)
METAMYELOCYTES # BLD MANUAL: 0.32 X10*3/UL
METAMYELOCYTES NFR BLD MANUAL: 3.3 %
MONOCYTES # BLD MANUAL: 0.56 X10*3/UL (ref 0.1–1)
MONOCYTES NFR BLD MANUAL: 5.8 %
MYELOCYTES # BLD MANUAL: 0.32 X10*3/UL
MYELOCYTES NFR BLD MANUAL: 3.3 %
NEUTROPHILS # BLD MANUAL: 7.78 X10*3/UL (ref 1.2–7.7)
NEUTS BAND # BLD MANUAL: 1.2 X10*3/UL (ref 0–0.7)
NEUTS BAND NFR BLD MANUAL: 12.4 %
NEUTS SEG # BLD MANUAL: 6.58 X10*3/UL (ref 1.2–7)
NEUTS SEG NFR BLD MANUAL: 67.8 %
NRBC BLD-RTO: 1.6 /100 WBCS (ref 0–0)
PHOSPHATE SERPL-MCNC: 3.8 MG/DL (ref 2.5–4.9)
PLATELET # BLD AUTO: 30 X10*3/UL (ref 150–450)
POTASSIUM SERPL-SCNC: 3.7 MMOL/L (ref 3.5–5.3)
RBC # BLD AUTO: 3.19 X10*6/UL (ref 4.5–5.9)
RBC MORPH BLD: ABNORMAL
SODIUM SERPL-SCNC: 143 MMOL/L (ref 136–145)
TOTAL CELLS COUNTED BLD: 121
URATE SERPL-MCNC: 2.8 MG/DL (ref 4–7.5)
VANCOMYCIN SERPL-MCNC: 24.6 UG/ML (ref 5–20)
VARIANT LYMPHS # BLD MANUAL: 0.08 X10*3/UL (ref 0–0.5)
VARIANT LYMPHS NFR BLD: 0.8 %
WBC # BLD AUTO: 9.7 X10*3/UL (ref 4.4–11.3)

## 2025-02-06 PROCEDURE — 2500000002 HC RX 250 W HCPCS SELF ADMINISTERED DRUGS (ALT 637 FOR MEDICARE OP, ALT 636 FOR OP/ED): Performed by: PHYSICIAN ASSISTANT

## 2025-02-06 PROCEDURE — 97110 THERAPEUTIC EXERCISES: CPT | Mod: GP

## 2025-02-06 PROCEDURE — 99232 SBSQ HOSP IP/OBS MODERATE 35: CPT | Performed by: NURSE PRACTITIONER

## 2025-02-06 PROCEDURE — 2500000005 HC RX 250 GENERAL PHARMACY W/O HCPCS

## 2025-02-06 PROCEDURE — 93005 ELECTROCARDIOGRAM TRACING: CPT

## 2025-02-06 PROCEDURE — 83735 ASSAY OF MAGNESIUM: CPT | Performed by: PHYSICIAN ASSISTANT

## 2025-02-06 PROCEDURE — 2500000001 HC RX 250 WO HCPCS SELF ADMINISTERED DRUGS (ALT 637 FOR MEDICARE OP)

## 2025-02-06 PROCEDURE — 2500000004 HC RX 250 GENERAL PHARMACY W/ HCPCS (ALT 636 FOR OP/ED): Performed by: PHYSICIAN ASSISTANT

## 2025-02-06 PROCEDURE — 83615 LACTATE (LD) (LDH) ENZYME: CPT | Performed by: PHYSICIAN ASSISTANT

## 2025-02-06 PROCEDURE — 84550 ASSAY OF BLOOD/URIC ACID: CPT | Performed by: PHYSICIAN ASSISTANT

## 2025-02-06 PROCEDURE — 1200000003 HC ONCOLOGY  ROOM WITH TELEMETRY DAILY

## 2025-02-06 PROCEDURE — 2500000004 HC RX 250 GENERAL PHARMACY W/ HCPCS (ALT 636 FOR OP/ED)

## 2025-02-06 PROCEDURE — 85007 BL SMEAR W/DIFF WBC COUNT: CPT | Performed by: PHYSICIAN ASSISTANT

## 2025-02-06 PROCEDURE — 80069 RENAL FUNCTION PANEL: CPT | Performed by: PHYSICIAN ASSISTANT

## 2025-02-06 PROCEDURE — 80202 ASSAY OF VANCOMYCIN: CPT | Performed by: PHYSICIAN ASSISTANT

## 2025-02-06 PROCEDURE — 97161 PT EVAL LOW COMPLEX 20 MIN: CPT | Mod: GP

## 2025-02-06 PROCEDURE — 85027 COMPLETE CBC AUTOMATED: CPT | Performed by: PHYSICIAN ASSISTANT

## 2025-02-06 PROCEDURE — 99233 SBSQ HOSP IP/OBS HIGH 50: CPT | Performed by: STUDENT IN AN ORGANIZED HEALTH CARE EDUCATION/TRAINING PROGRAM

## 2025-02-06 RX ORDER — FUROSEMIDE 10 MG/ML
40 INJECTION INTRAMUSCULAR; INTRAVENOUS ONCE
Status: COMPLETED | OUTPATIENT
Start: 2025-02-06 | End: 2025-02-06

## 2025-02-06 RX ORDER — OXYCODONE HYDROCHLORIDE 5 MG/1
5 TABLET ORAL EVERY 4 HOURS PRN
Status: DISCONTINUED | OUTPATIENT
Start: 2025-02-06 | End: 2025-02-08 | Stop reason: HOSPADM

## 2025-02-06 RX ADMIN — METOPROLOL TARTRATE 25 MG: 25 TABLET, FILM COATED ORAL at 21:03

## 2025-02-06 RX ADMIN — METOPROLOL TARTRATE 25 MG: 25 TABLET, FILM COATED ORAL at 14:19

## 2025-02-06 RX ADMIN — METOPROLOL TARTRATE 25 MG: 25 TABLET, FILM COATED ORAL at 08:08

## 2025-02-06 RX ADMIN — VANCOMYCIN HYDROCHLORIDE 1500 MG: 5 INJECTION, POWDER, LYOPHILIZED, FOR SOLUTION INTRAVENOUS at 21:03

## 2025-02-06 RX ADMIN — OXYCODONE 5 MG: 5 TABLET ORAL at 21:02

## 2025-02-06 RX ADMIN — OXYCODONE 5 MG: 5 TABLET ORAL at 16:01

## 2025-02-06 RX ADMIN — FUROSEMIDE 40 MG: 10 INJECTION, SOLUTION INTRAVENOUS at 09:39

## 2025-02-06 RX ADMIN — PIPERACILLIN SODIUM AND TAZOBACTAM SODIUM 3.38 G: 3; .375 INJECTION, SOLUTION INTRAVENOUS at 01:18

## 2025-02-06 RX ADMIN — SUCRALFATE 1 G: 1 SUSPENSION ORAL at 21:02

## 2025-02-06 RX ADMIN — VANCOMYCIN HYDROCHLORIDE 1500 MG: 5 INJECTION, POWDER, LYOPHILIZED, FOR SOLUTION INTRAVENOUS at 09:39

## 2025-02-06 RX ADMIN — METOPROLOL TARTRATE 25 MG: 25 TABLET, FILM COATED ORAL at 01:18

## 2025-02-06 RX ADMIN — PANTOPRAZOLE SODIUM 40 MG: 40 INJECTION, POWDER, FOR SOLUTION INTRAVENOUS at 08:09

## 2025-02-06 ASSESSMENT — COGNITIVE AND FUNCTIONAL STATUS - GENERAL
DAILY ACTIVITIY SCORE: 20
STANDING UP FROM CHAIR USING ARMS: A LITTLE
WALKING IN HOSPITAL ROOM: A LITTLE
TOILETING: A LITTLE
MOBILITY SCORE: 22
HELP NEEDED FOR BATHING: A LITTLE
MOBILITY SCORE: 19
DAILY ACTIVITIY SCORE: 20
MOVING TO AND FROM BED TO CHAIR: A LITTLE
WALKING IN HOSPITAL ROOM: A LITTLE
TOILETING: A LITTLE
WALKING IN HOSPITAL ROOM: A LITTLE
DRESSING REGULAR UPPER BODY CLOTHING: A LITTLE
MOBILITY SCORE: 22
MOBILITY SCORE: 22
DRESSING REGULAR UPPER BODY CLOTHING: A LITTLE
DRESSING REGULAR LOWER BODY CLOTHING: A LITTLE
CLIMB 3 TO 5 STEPS WITH RAILING: A LITTLE
TOILETING: A LITTLE
CLIMB 3 TO 5 STEPS WITH RAILING: A LITTLE
DRESSING REGULAR LOWER BODY CLOTHING: A LITTLE
DRESSING REGULAR UPPER BODY CLOTHING: A LITTLE
CLIMB 3 TO 5 STEPS WITH RAILING: A LITTLE
HELP NEEDED FOR BATHING: A LITTLE
WALKING IN HOSPITAL ROOM: A LITTLE
HELP NEEDED FOR BATHING: A LITTLE
DAILY ACTIVITIY SCORE: 20
CLIMB 3 TO 5 STEPS WITH RAILING: A LOT
DRESSING REGULAR LOWER BODY CLOTHING: A LITTLE

## 2025-02-06 ASSESSMENT — PAIN SCALES - GENERAL
PAINLEVEL_OUTOF10: 0 - NO PAIN
PAINLEVEL_OUTOF10: 0-NO PAIN
PAINLEVEL_OUTOF10: 9
PAINLEVEL_OUTOF10: 0 - NO PAIN
PAINLEVEL_OUTOF10: 4
PAINLEVEL_OUTOF10: 7
PAINLEVEL_OUTOF10: 4
PAINLEVEL_OUTOF10: 5 - MODERATE PAIN

## 2025-02-06 ASSESSMENT — PAIN - FUNCTIONAL ASSESSMENT
PAIN_FUNCTIONAL_ASSESSMENT: 0-10
PAIN_FUNCTIONAL_ASSESSMENT: 0-10

## 2025-02-06 ASSESSMENT — PAIN SCALES - WONG BAKER: WONGBAKER_NUMERICALRESPONSE: HURTS LITTLE BIT

## 2025-02-06 ASSESSMENT — ACTIVITIES OF DAILY LIVING (ADL): ADL_ASSISTANCE: NEEDS ASSISTANCE

## 2025-02-06 ASSESSMENT — PAIN DESCRIPTION - ORIENTATION: ORIENTATION: INNER

## 2025-02-06 ASSESSMENT — PAIN DESCRIPTION - LOCATION: LOCATION: MOUTH

## 2025-02-06 NOTE — PROGRESS NOTES
Vancomycin Dosing by Pharmacy- FOLLOW UP    Bijan Ibanez is a 64 y.o. year old male who Pharmacy has been consulted for vancomycin dosing for line infections. Based on the patient's indication and renal status this patient is being dosed based on a goal AUC of 500-600.     Renal function: patient's scr has been trending up (on  scr 0.44 mg/dL - now 0.87 mg/dL)     Current vancomycin dose: 1500 mg given every 12 hours    Most recent random level: 24.6 mcg/mL    Visit Vitals  /79 (BP Location: Right arm, Patient Position: Lying)   Pulse 82   Temp 36.8 °C (98.2 °F) (Temporal)   Resp 18        Lab Results   Component Value Date    CREATININE 0.87 2025    CREATININE 0.73 2025    CREATININE 0.79 2025    CREATININE 0.69 2025        Patient weight is as follows:   Vitals:    25 0845   Weight: 115 kg (252 lb 13.9 oz)       Cultures:  Susceptibility data for the encounter in last 14 days.  Collected Specimen Info Organism Clindamycin Erythromycin Oxacillin Tetracycline Trimethoprim/Sulfamethoxazole Vancomycin   25 Blood culture from Peripheral Venipuncture Staphylococcus aureus         25 Blood culture from Central Line/Catheter (Specify below) Methicillin Resistant Staphylococcus aureus (MRSA)  R  R  R  R  S  S        I/O last 3 completed shifts:  In: 400 (3.5 mL/kg) [P.O.:400]  Out: 400 (3.5 mL/kg) [Urine:400 (0.1 mL/kg/hr)]  Weight: 115.4 kg   I/O during current shift:  I/O this shift:  In: 236 [P.O.:236]  Out: -     Temp (24hrs), Av.5 °C (97.7 °F), Min:36 °C (96.8 °F), Max:36.8 °C (98.2 °F)      Assessment/Plan    Within goal AUC range. Continue current vancomycin regimen.    This dosing regimen is predicted by [employer] to result in the following pharmacokinetic parameters:    Loading dose: N/A  Regimen: 1500 mg IV every 12 hours.  Start time: 21:39 on 2025  Exposure target: AUC24 (range)500-600 mg/L.hr   GFS45-12: 580 mg/L.hr  AUC24,ss: 592  mg/L.hr  Probability of AUC24 > 400: 100 %  Ctrough,ss: 20.7 mg/L  Probability of Ctrough,ss > 20: 56 %      The next level will be obtained on 2/7 at AM labs. May be obtained sooner if clinically indicated.   Will continue to monitor renal function daily while on vancomycin and order serum creatinine at least every 48 hours if not already ordered.  Follow for continued vancomycin needs, clinical response, and signs/symptoms of toxicity.       KRISTIAN VALDEZ

## 2025-02-06 NOTE — PROGRESS NOTES
Physical Therapy    Physical Therapy Evaluation & Treatment    Patient Name: Bijan Ibanez  MRN: [id number]  Department: [facility]  Room: [facility]3021-A  Today's Date: 2/6/2025   Time Calculation  Start Time: 1158  Stop Time: 1220  Time Calculation (min): 22 min    Assessment/Plan   PT Assessment  PT Assessment Results: Decreased strength, Decreased endurance, Impaired balance, Decreased mobility  Rehab Prognosis: Good  Barriers to Discharge Home: No anticipated barriers  Evaluation/Treatment Tolerance: Patient tolerated treatment well  Medical Staff Made Aware: Yes (RN aware)  Strengths: Ability to acquire knowledge, Premorbid level of function, Support of Caregivers  Barriers to Participation: Comorbidities  End of Session Communication: Bedside nurse  Assessment Comment: Pt is a 64 year old male with PMHx of hypertension, hyperlipidemia, coronary artery disease (s/p stent 2010, on ASA), renal cell carcinoma (s/p R partial nephrectomy in 2013 @ [facility]), GERD, BPH, arthritis, and Burkitt lymphoma presenting with febrile neutropenia and mucositis.Pt demonstrates deficits in strength, endurance, and mobility, ambulating ~30 feet CGA no device and ~10 feet CGA with FWW on this date. Pt can benefit from ongoing PT to address deficits and improve function and mobility.  End of Session Patient Position: Bed, 3 rail up, Alarm off, not on at start of session   IP OR SWING BED PT PLAN  Inpatient or Swing Bed: Inpatient  PT Plan  Treatment/Interventions: Bed mobility, Transfer training, Gait training, Stair training, Neuromuscular re-education, Balance training, Strengthening, Endurance training, Range of motion, Therapeutic exercise, Therapeutic activity, Home exercise program, Positioning, Postural re-education  PT Plan: Ongoing PT  PT Frequency: 3 times per week  PT Discharge Recommendations: Low intensity level of continued care  PT Recommended Transfer Status: Contact guard    Subjective     General Visit  Information:  General  Reason for Referral: febrile neutropenia and mucositis.  Past Medical History Relevant to Rehab: hypertension, hyperlipidemia, coronary artery disease (s/p stent 2010, on ASA), renal cell carcinoma (s/p R partial nephrectomy in 2013 @ [facility]), GERD, BPH, arthritis, and Burkitt lymphoma  Family/Caregiver Present: No  Patient Position Received: Alarm off, not on at start of session, Bed, 3 rail up  Preferred Learning Style: kinesthetic, visual, verbal  General Comment: pt is pleasant, cooperative, and willing to participate in therapy.  Home Living:  Home Living  Type of Home: House  Lives With: Spouse, Grandchildren (wife Susie and granddaughter (and dog))  Home Adaptive Equipment: Cane, Walker rolling or standard  Home Layout: Two level, Able to live on main level with bedroom/bathroom, Laundry second level  Home Access: Stairs to enter with rails  Entrance Stairs-Rails: Both  Entrance Stairs-Number of Steps: 1  Bathroom Shower/Tub: Tub/shower unit  Bathroom Toilet: Standard  Bathroom Equipment: Grab bars in shower, Shower chair without back  Home Living Comments: pt has been staying on main level. laundry upstairs but wife and granddaughter do it  Prior Level of Function:  Prior Function Per Pt/Caregiver Report  Level of [place]: Needs assistance with ADLs, Needs assistance with homemaking, Needs assistance with functional transfers (wife helps)  Receives Help From: Family  ADL Assistance: Needs assistance (wife assists with dressing and bathing)  Homemaking Assistance: Needs assistance (wife and granddaughter assist with cooking, cleaning, and laundry)  Ambulatory Assistance: Needs assistance  Transfers: Stand by  Gait: Stand by  Vocational: Retired ()  Leisure: hiking, fishing, playing [employer]  Prior Function Comments: - falls + driving  Precautions:  Precautions  Medical Precautions: Fall precautions  Precautions Comment: protective     Date/Time Vitals Session Patient Position  Pulse Resp SpO2 BP MAP (mmHg)    02/06/25 1419 --  --  92  20  99 %  147/91  --           Vital Signs Comment: HR ranged  throughout session    Objective   Pain:  Pain Assessment  Pain Assessment: 0-10  0-10 (Numeric) Pain Score: 0 - No pain  Cognition:  Cognition  Overall Cognitive Status: Within Functional Limits  Orientation Level: Oriented X4  Insight: Within function limits  Impulsive: Within functional limits    General Assessments:  Activity Tolerance  Endurance: Tolerates 10 - 20 min exercise with multiple rests    Strength  Strength Comments: WFL observed via function  Postural Control  Postural Control: Within Functional Limits    Static Sitting Balance  Static Sitting-Balance Support: Feet supported, No upper extremity supported  Static Sitting-Level of Assistance: Distant supervision  Static Sitting-Comment/Number of Minutes: ~5 minutes  Dynamic Sitting Balance  Dynamic Sitting-Balance Support: Bilateral upper extremity supported, Feet supported  Dynamic Sitting-Level of Assistance: Distant supervision    Static Standing Balance  Static Standing-Balance Support: No upper extremity supported  Static Standing-Level of Assistance: Contact guard  Static Standing-Comment/Number of Minutes: good control of COM over INNA. VCs for upright posture  Dynamic Standing Balance  Dynamic Standing-Balance Support: No upper extremity supported  Dynamic Standing-Level of Assistance: Contact guard  Functional Assessments:  Bed Mobility  Bed Mobility: Yes  Bed Mobility 1  Bed Mobility 1: Supine to sitting, Sitting to supine  Level of Assistance 1: Independent  Bed Mobility Comments 1: 1 trial    Transfers  Transfer: Yes  Transfer 1  Transfer From 1: Sit to, Stand to  Transfer to 1: Sit, Stand  Technique 1: Sit to stand, Stand to sit  Transfer Level of Assistance 1: Contact guard  Trials/Comments 1: ~3 trials. VCs for hand placement.    Ambulation/Gait Training  Ambulation/Gait Training Performed: Yes  Ambulation/Gait  Training 1  Surface 1: Level tile  Device 1: No device  Assistance 1: Contact guard  Quality of Gait 1: Narrow base of support, Inconsistent stride length, Decreased step length  Comments/Distance (ft) 1: ~30 feet. 10 feet to and from bathroom. 15 feet to door and back. 10 feet to the bathroom.  Ambulation/Gait Training 2  Surface 2: Level tile  Device 2: Rolling walker  Assistance 2: Contact guard  Quality of Gait 2: Narrow base of support, Decreased step length, Inconsistent stride length  Comments/Distance (ft) 2: ~10 feet back to bed from bathroom.    Stairs  Stairs: No  Extremity/Trunk Assessments:  RLE   RLE : Within Functional Limits  LLE   LLE : Within Functional Limits  Treatments:  Therapeutic Exercise  Therapeutic Exercise Performed: Yes  Therapeutic Exercise Activity 1: ankle pumps 1x20  Therapeutic Exercise Activity 2: LAQ 1x10  Therapeutic Exercise Activity 3: seated marches 1x10  Therapeutic Exercise Activity 4: seated pillow squeeze abductions 1x10    Bed Mobility  Bed Mobility: Yes  Bed Mobility 1  Bed Mobility 1: Supine to sitting, Sitting to supine  Level of Assistance 1: Independent  Bed Mobility Comments 1: 1 trial    Ambulation/Gait Training  Ambulation/Gait Training Performed: Yes  Ambulation/Gait Training 1  Surface 1: Level tile  Device 1: No device  Assistance 1: Contact guard  Quality of Gait 1: Narrow base of support, Inconsistent stride length, Decreased step length  Comments/Distance (ft) 1: ~30 feet. 10 feet to and from bathroom. 15 feet to door and back. 10 feet to the bathroom.  Ambulation/Gait Training 2  Surface 2: Level tile  Device 2: Rolling walker  Assistance 2: Contact guard  Quality of Gait 2: Narrow base of support, Decreased step length, Inconsistent stride length  Comments/Distance (ft) 2: ~10 feet back to bed from bathroom.  Transfers  Transfer: Yes  Transfer 1  Transfer From 1: Sit to, Stand to  Transfer to 1: Sit, Stand  Technique 1: Sit to stand, Stand to sit  Transfer  Level of Assistance 1: Contact guard  Trials/Comments 1: ~3 trials. VCs for hand placement.    Stairs  Stairs: No  Outcome Measures:  [facility] Basic Mobility  Turning from your back to your side while in a flat bed without using bedrails: None  Moving from lying on your back to sitting on the side of a flat bed without using bedrails: None  Moving to and from bed to chair (including a wheelchair): A little  Standing up from a chair using your arms (e.g. wheelchair or bedside chair): A little  To walk in hospital room: A little  Climbing 3-5 steps with railing: A lot  Basic Mobility - Total Score: 19    Encounter Problems       Encounter Problems (Active)       Mobility       LTG - Patient will navigate 4-6 steps with rails/device with CGA. (Progressing)       Start:  02/06/25    Expected End:  02/20/25            STG - Patient will ambulate ~100 feet with FWW and CGA. (Progressing)       Start:  02/06/25    Expected End:  02/20/25            Pt will ambulate >/= 100 ft with FWW and CGA and no signs of fatigue or SOB.   (Progressing)       Start:  02/06/25    Expected End:  02/20/25               PT Transfers       LTG - Patient will transfer from one surface to another with SBA and FWW as needed (Progressing)       Start:  02/06/25    Expected End:  02/20/25            STG - Patient will perform bed mobility independently. (Progressing)       Start:  02/06/25    Expected End:  02/20/25               Pain - Adult              Education Documentation  Handouts, taught by CARLINE Hernandez at 2/6/2025  2:13 PM.  Learner: Patient  Readiness: Acceptance  Method: Explanation  Response: Verbalizes Understanding  Comment: Mobility, transfers, ambulation, and use of FWW. Pt was educated on therapeutic exercises and given a handout of supine and seated exercises to perform on his own.    Precautions, taught by CARLINE Hernandez at 2/6/2025  2:13 PM.  Learner: Patient  Readiness: Acceptance  Method: Explanation  Response:  Verbalizes Understanding  Comment: Mobility, transfers, ambulation, and use of FWW. Pt was educated on therapeutic exercises and given a handout of supine and seated exercises to perform on his own.    Body Mechanics, taught by CARLINE Acharya at 2/6/2025  2:13 PM.  Learner: Patient  Readiness: Acceptance  Method: Explanation  Response: Verbalizes Understanding  Comment: Mobility, transfers, ambulation, and use of FWW. Pt was educated on therapeutic exercises and given a handout of supine and seated exercises to perform on his own.    Home Exercise Program, taught by CARLINE Acharya at 2/6/2025  2:13 PM.  Learner: Patient  Readiness: Acceptance  Method: Explanation  Response: Verbalizes Understanding  Comment: Mobility, transfers, ambulation, and use of FWW. Pt was educated on therapeutic exercises and given a handout of supine and seated exercises to perform on his own.    Mobility Training, taught by CARLINE Acharya at 2/6/2025  2:13 PM.  Learner: Patient  Readiness: Acceptance  Method: Explanation  Response: Verbalizes Understanding  Comment: Mobility, transfers, ambulation, and use of FWW. Pt was educated on therapeutic exercises and given a handout of supine and seated exercises to perform on his own.      02/06/25 at 3:43 PM - CARLINE ACHARYA

## 2025-02-06 NOTE — PROGRESS NOTES
"Bijan Ibanez is a 64 y.o. male on day 9 of admission.   Medical history significant for Burkitt's lymphoma treated with DA-R-EPOCH since 1/21/25, as well as AF with RVR,  BPH, CAD (s/p stent 2010), GERD, HLD, HTN, renal cell cancer s/p right nephrectomy 2013.  Admitted for febrile neutropenia and mucositis.   Vascular Medicine Service following for right axillo-subclavian DVT found on US 2/2/25, thought to be provoked by PICC line placed 1/18/25.   Unable to anticoagulate at this time due to thrombocytopenia attributed to recent chemotherapy.     Subjective   Patient reports that he is feeling well today.  Denies CP, SOB or bleeding.      Objective   Physical Exam  Sitting at side of bed in NAD  Respirations full and regular with breath sounds CTA; on RA  Heart sounds are irregular, with telemetry monitor showing atrial fibrillation with rate ranging from .   Abdomen soft and nontender to palpation  Extremities are warm to touch with palpable bilateral radial and DP pulses; interval improvement of swelling of the right hand and forearm; +1 edema of BLE; PICC line left arm.  Patient is awake and alert, participates in conversation.     Last Recorded Vitals  Blood pressure 140/86, pulse 93, temperature 36 °C (96.8 °F), temperature source Temporal, resp. rate 20, height 1.803 m (5' 10.98\"), weight 115 kg (252 lb 13.9 oz), SpO2 95%.  Intake/Output last 3 Shifts:  I/O last 3 completed shifts:  In: 400 (3.5 mL/kg) [P.O.:400]  Out: 400 (3.5 mL/kg) [Urine:400 (0.1 mL/kg/hr)]  Weight: 115.4 kg     Relevant Results  Scheduled medications  acyclovir, 400 mg, oral, q8h  [Held by provider] atorvastatin, 40 mg, oral, Daily  [Held by provider] baclofen, 10 mg, oral, TID  lidocaine, 5 mL, infiltration, Once  [Held by provider] metoprolol succinate XL, 50 mg, oral, Daily  metoprolol tartrate, 25 mg, oral, q6h  pantoprazole, 40 mg, intravenous, Daily  sodium chloride, 500 mL, intravenous, Once  vancomycin, 1,500 mg, " intravenous, q12h      Results from last 7 days   Lab Units 02/06/25  0352 02/05/25  0519 02/04/25  1444   WBC AUTO x10*3/uL 9.7 5.3 4.0*   HEMOGLOBIN g/dL 9.3* 9.1* 9.7*   HEMATOCRIT % 28.6* 28.1* 28.9*   PLATELETS AUTO x10*3/uL 30* 20* 16*       Results from last 7 days   Lab Units 02/06/25  0352 02/05/25  0519 02/04/25  1444   SODIUM mmol/L 143 143 140   POTASSIUM mmol/L 3.7 3.3* 3.4*   CHLORIDE mmol/L 104 106 103   CO2 mmol/L 31 28 28   BUN mg/dL 11 13 15   CREATININE mg/dL 0.87 0.73 0.79   GLUCOSE mg/dL 102* 110* 107*   CALCIUM mg/dL 7.8* 7.4* 7.5*   Estimated Creatinine Clearance: 109.2 mL/min (by C-G formula based on SCr of 0.87 mg/dL).                 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   Vascular Medicine Service following for right axillo-subclavian DVT found on US 2/2/25, thought to be provoked by PICC line placed 1/18/25.   Vascular Medicine Service following for finding of RUE DVT of the axillary and subclavian veins, as well as SVT of the right basilic vein.   Unable to anticoagulate at this time due to thrombocytopenia attributed to recent chemotherapy.   PICC line placed in left arm 2/4/25.   Review of labs today show stable hemoglobin 9.3 grams, increased platelets to 30 K without evidence of bleeding, stable serum creatinine 0.87.    Discussion with patient and spouse: platelets are improving, and are 30K today; would like to see platelet counts greater than 35K so that anticoagulation therapy could be initiated; will need a minimum of 3 months of anticoagulation therapy for this provoked right arm DVT.        Recommendations:  ~CONTINUE to HOLD anticoagulation therapy until platelets are greater than  35K.  ~MONITOR for bleeding.  ~When platelets are greater than 35K, can initiate either Heparin infusion or weight based Lovenox to assess for tolerance (ie thrombocytopenia, bleeding).  When patient is ready for discharge, the Primary Team can determine anticoagulant of choice for the next 3 months of therapy. Based on current labs and creatinine clearance, patient would be eligible for all forms of anticoagulation: DOAC, Lovenox etc.  ~Continue with elevation of the right arm as needed for swelling, and apply warm compress as needed to the right arm for comfort.   ~I will coordinate outpatient follow up with the Vascular Medicine Service.    Vascular Medicine Service will sign off; please contact us for questions on pager [phone].         Plan of care discussed with Dr. Shannon Landaverde  Plan of care discussed via EPIC chat with Mr. Terrence CYR from the Malignant Hematology Team     GER Kapoor  Vascular Medicine Service   Pager [phone]

## 2025-02-06 NOTE — CARE PLAN
Problem: Pain  Goal: Takes deep breaths with improved pain control throughout the shift  Outcome: Progressing  Goal: Turns in bed with improved pain control throughout the shift  Outcome: Progressing  Goal: Walks with improved pain control throughout the shift  Outcome: Progressing  Goal: Performs ADL's with improved pain control throughout shift  Outcome: Progressing  Goal: Participates in PT with improved pain control throughout the shift  Outcome: Progressing  Goal: Free from opioid side effects throughout the shift  Outcome: Progressing  Goal: Free from acute confusion related to pain meds throughout the shift  Outcome: Progressing     Problem: Pain - Adult  Goal: Verbalizes/displays adequate comfort level or baseline comfort level  Outcome: Progressing     Problem: Safety - Adult  Goal: Free from fall injury  Outcome: Progressing     Problem: Discharge Planning  Goal: Discharge to home or other facility with appropriate resources  Outcome: Progressing     Problem: Chronic Conditions and Co-morbidities  Goal: Patient's chronic conditions and co-morbidity symptoms are monitored and maintained or improved  Outcome: Progressing     Problem: Nutrition  Goal: Nutrient intake appropriate for maintaining nutritional needs  Outcome: Progressing     Problem: Fall/Injury  Goal: Not fall by end of shift  Outcome: Progressing  Goal: Be free from injury by end of the shift  Outcome: Progressing  Goal: Verbalize understanding of personal risk factors for fall in the hospital  Outcome: Progressing  Goal: Verbalize understanding of risk factor reduction measures to prevent injury from fall in the home  Outcome: Progressing  Goal: Use assistive devices by end of the shift  Outcome: Progressing  Goal: Pace activities to prevent fatigue by end of the shift  Outcome: Progressing   The patient's goals for the shift include      The clinical goals for the shift include pt will remain HDs throughout shift

## 2025-02-06 NOTE — PROGRESS NOTES
Bijan Ibanez is a 64 y.o. male on day 9 of admission presenting with Febrile neutropenia (CMS-HCC).    Subjective   Afebrile, no acute issues overnight. No episodes of afib. Endorses feeling well this morning, mucositis improving. Ongoing swelling in BLE. Denies HA, dizziness, CP, SOB, N/V/D/C. All other ROS otherwise negative.     Objective     Physical Exam  Constitutional:       General: He is not in acute distress.     Appearance: Normal appearance.   HENT:      Head: Normocephalic and atraumatic.      Nose: Nose normal.      Mouth/Throat:      Mouth: Mucous membranes are moist.      Pharynx: Posterior oropharyngeal erythema present.      Comments: Ulcers, erythema noted on soft palate.   Eyes:      General: No scleral icterus.     Extraocular Movements: Extraocular movements intact.      Pupils: Pupils are equal, round, and reactive to light.   Cardiovascular:      Rate and Rhythm: Tachycardia present. Rhythm irregular.      Heart sounds: No murmur heard.     No gallop.   Pulmonary:      Effort: Pulmonary effort is normal. No respiratory distress.      Breath sounds: Rales (bibasilar) present. No wheezing or rhonchi.   Abdominal:      General: Bowel sounds are normal. There is no distension.      Palpations: Abdomen is soft. There is no mass.      Tenderness: There is no abdominal tenderness. There is no guarding.   Musculoskeletal:         General: Swelling (RUE) present.      Cervical back: Normal range of motion.      Right lower leg: Edema (2+) present.      Left lower leg: Edema (2+) present.   Skin:     General: Skin is warm and dry.      Findings: No bruising, erythema or rash.   Neurological:      General: No focal deficit present.      Mental Status: He is alert and oriented to person, place, and time.      Cranial Nerves: No cranial nerve deficit.      Sensory: No sensory deficit.      Motor: No weakness.   Psychiatric:         Mood and Affect: Mood normal.         Behavior: Behavior normal.       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 male with a past medical history of hypertension, hyperlipidemia, coronary artery disease (s/p stent 2010, on ASA), renal cell carcinoma (s/p R partial nephrectomy in 2013 @ [facility]), GERD, BPH, arthritis, and Burkitt lymphoma being treated with DA-R-EPOCH (since 1/21/25) who is admitted today (01/28/25) with febrile neutropenia and mucositis.      Day 17 (02/06/25) C1 DA-R-EPOCH    Active issues today (02/06/25):  #MRSA bacteremia. Continue vancomycin, TTE, echo without vegetations. ID consulted. Picc removed, now has midline, pip-tazo stopped  #Atrial fibrillation. EP consulted, on 12.5mg po metoprolol q6h increased to 25mg q6h on 2/5  #Musositis. Stop PCA and start oxy 5mg q4h.   #PICC associated DVT. Vascular consulted, line removed. Plan AC therapy when plt >30k per vasc med  #BLE edema; s/p 40mg lasix 2/5, another dose of 40mg today 2/6    ONC  #Burkitt Lymphoma  :: Workup and treatment as per Onc History  :: Received C1 DA-R-EPOCH (1/21/25), supported with pegfilgrastim 1/28/25  :: CT neck (1/27/25): Mild asymmetric enlargement of the right muscles of mastication, predominantly the masseter, likely due to lymphomatous involvement, less conspicuous compared to 1/9/2025, with no new mass or infectious/inflammatory process in the neck or aerodigestive tract  - Due for C2 ~ 2/11/25; will likely be delayed dt complications with cycle 1  # TLS monitoring and prophylaxis  :: Uric acid 4.4 mg/dL (1/28/25) on admit, 1.6 today (1/30/25)  - HOLD allopurinol with severe mucositis, will monitor UA levels daily     HEME  #Pancytopenia, improving  %Due to chemotherapy  - transfuse for hgb<7 and plts<20 due to severe mucositis  #RUE PICC  associated DVT   -RUE US (1/24)) superficial thrombus in distal brachial vein and mid to distal basilic vein   -Repeat US RUE (2/3) DVT RUE mid subclavian, distal subclavian, axillary veins   -Vascular medicine consulted, ok to remove picc (removed 2/3)   -Plan AC when plt >30k     ID  #Febrile neutropenia  #MRSA Bacteremia (source line vs PNA)  :: Cultures and serology  = Blood cultures (1/31/25): MRSA in 4/4 bottles  = UA (1/28/25): not suspicious for infection  = Influenza A/B, COVID, RSV (1/27/25): negative  = Parainfluenza, Metapneumovirus, Rhinovirus, Adenovirus PCR (1/27/25): negative   = Hold off on sputum culture (1/28/25) -- cough seems mostly from inability to clear oral secretions  :: Imaging  = CXR (1/27/25): No cardiopulmonary process  = TTE to rule out vegetations: negative  :: Antibiotics  - Vancomcyin (2/1/25- )  - ID consulted   - picc removed 2/3  -surveillance blood cx (2/4) pending   #Mouth ulcer  - Likely mucositis   - HSV swab (2/2/25) neg; repeat pending   - Continue and increased acyclovir to 5mg/kg IV every 8 hours; continue q8h per ID (oral HSV swab low sensitivity per ID note)   #PNA  -CT chest (2/2) new bilateral lower lobe consolidations   -Aspergillus elevated (1/12); repeat (1/13) negative; fungitell negative  -repeat fungal markers negative  - S/p Piperacillin-tazobactam 3.375 g IV Q6H (1/28-2/6)  - Patient reports unknown reaction as a child to penicillin -- discussed with Clinical Oncology Specialist PharmD, Ok to trial piperacillin-tazobactam, remove allergy if no reaction, tolerating well so far   # Prophylaxis  - VZV: switch acyclovir to treatment dose due to concern for HSV in the mouth  - Candidiasis: Continue fluconazole 400 mg  daily   - PJP: None at this time     FEN/RENAL  - Admission weight 115 kg (01/28/25)  Most recent weight 115 kg (252 lb 13.9 oz) (2/6/2025  8:45 AM)   F PO + blood products and intermittent meds  E PRN  N Low-pathogen    #Hyperbilirubinemia    -Hapto, LDH unremarkable   -Consider RUQ US if uptrending     GI  # Esophageal mucositis  #Dysphagia  %Chemotherapy  :: CT neck (1/27/25) as above: no signs of abscess or fluid collection  -s/p NS @ 50 mL/hr x 40 hours (2L) to supplement poor PO intake(1/28-1/30)  - Supportive care: BMX, viscous lidocaine, sucralfate PRN  - -Dilaudid PCA no basal, bolus 0.2 mg every 10 minutes with one hour dose limit of 1.2 mg on 1/31     CARDIO/PULM  #Atrial Fibrillation with RVR  %Due to infection, electrolyte derangements, anemia  :: HR maximum 180s today (02/06/25) depending on rest/activity  - Transitioned IV metoprolol to 25mg po metoprolol q6h per EP recs  - Keep K > 4 mMol/L, Mg > 2 mg/dL, Hgb > 7-8 g/dL  - Okay to tolerate rate 110-130 for now  - EP consulted, appreciate recs  - Tele   #Hypertension  #Hyperlipidemia  #CAD  #MI s/p stent 2010  - hold atorvastatin due to mucositis   - HOLD home aspirin 81 mg daily with thrombocytopenia  - HOLD home lisinopril in setting of low BP's  #Functional monitoring  - ECHO (1/18/25): LVSF normal, EF 65-70%.      ACCESS/SUPPORT/DISPO  - FULL CODE  - MD: Dr. Mckeon  - ACCESS: PIV, midline (placed 2/4)   - PT/RD/SW      Patient seen, examined and discussed with JUDY Zarate-CNP  Malignant Hematology (Lymphoma/Myeloma/Autologous SCT) Team

## 2025-02-06 NOTE — CARE PLAN
The patient's goals for the shift include      The clinical goals for the shift include Patient will remain HDS throughout entire shift      Problem: Pain  Goal: Takes deep breaths with improved pain control throughout the shift  Outcome: Progressing  Goal: Turns in bed with improved pain control throughout the shift  Outcome: Progressing  Goal: Walks with improved pain control throughout the shift  Outcome: Progressing  Goal: Performs ADL's with improved pain control throughout shift  Outcome: Progressing  Goal: Participates in PT with improved pain control throughout the shift  Outcome: Progressing  Goal: Free from opioid side effects throughout the shift  Outcome: Progressing  Goal: Free from acute confusion related to pain meds throughout the shift  Outcome: Progressing     Problem: Nutrition  Goal: Nutrient intake appropriate for maintaining nutritional needs  Outcome: Progressing     Problem: Fall/Injury  Goal: Not fall by end of shift  Outcome: Progressing  Goal: Be free from injury by end of the shift  Outcome: Progressing  Goal: Verbalize understanding of personal risk factors for fall in the hospital  Outcome: Progressing  Goal: Verbalize understanding of risk factor reduction measures to prevent injury from fall in the home  Outcome: Progressing  Goal: Use assistive devices by end of the shift  Outcome: Progressing  Goal: Pace activities to prevent fatigue by end of the shift  Outcome: Progressing

## 2025-02-07 ENCOUNTER — HOME INFUSION (OUTPATIENT)
Dept: INFUSION THERAPY | Age: 65
End: 2025-02-07
Payer: MEDICARE

## 2025-02-07 LAB
ALBUMIN SERPL BCP-MCNC: 2.2 G/DL (ref 3.4–5)
ALP SERPL-CCNC: 61 U/L (ref 33–136)
ALT SERPL W P-5'-P-CCNC: 19 U/L (ref 10–52)
ANION GAP SERPL CALC-SCNC: 10 MMOL/L
AST SERPL W P-5'-P-CCNC: 11 U/L (ref 9–39)
ATRIAL RATE: 110 BPM
ATRIAL RATE: 115 BPM
ATRIAL RATE: 159 BPM
BASOPHILS # BLD MANUAL: 0 X10*3/UL (ref 0–0.1)
BASOPHILS NFR BLD MANUAL: 0 %
BILIRUB DIRECT SERPL-MCNC: 0.2 MG/DL (ref 0–0.3)
BILIRUB SERPL-MCNC: 0.8 MG/DL (ref 0–1.2)
BUN SERPL-MCNC: 8 MG/DL (ref 6–23)
CALCIUM SERPL-MCNC: 7.5 MG/DL (ref 8.6–10.6)
CHLORIDE SERPL-SCNC: 104 MMOL/L (ref 98–107)
CO2 SERPL-SCNC: 30 MMOL/L (ref 21–32)
CREAT SERPL-MCNC: 0.77 MG/DL (ref 0.5–1.3)
EGFRCR SERPLBLD CKD-EPI 2021: >90 ML/MIN/1.73M*2
EOSINOPHIL # BLD MANUAL: 0 X10*3/UL (ref 0–0.7)
EOSINOPHIL NFR BLD MANUAL: 0 %
ERYTHROCYTE [DISTWIDTH] IN BLOOD BY AUTOMATED COUNT: 14.6 % (ref 11.5–14.5)
GLUCOSE SERPL-MCNC: 94 MG/DL (ref 74–99)
HCT VFR BLD AUTO: 25.3 % (ref 41–52)
HGB BLD-MCNC: 7.9 G/DL (ref 13.5–17.5)
IMM GRANULOCYTES # BLD AUTO: 1.77 X10*3/UL (ref 0–0.7)
IMM GRANULOCYTES NFR BLD AUTO: 22.2 % (ref 0–0.9)
LDH SERPL L TO P-CCNC: 458 U/L (ref 84–246)
LYMPHOCYTES # BLD MANUAL: 0.14 X10*3/UL (ref 1.2–4.8)
LYMPHOCYTES NFR BLD MANUAL: 1.7 %
MAGNESIUM SERPL-MCNC: 1.82 MG/DL (ref 1.6–2.4)
MCH RBC QN AUTO: 28.6 PG (ref 26–34)
MCHC RBC AUTO-ENTMCNC: 31.2 G/DL (ref 32–36)
MCV RBC AUTO: 92 FL (ref 80–100)
METAMYELOCYTES # BLD MANUAL: 0.14 X10*3/UL
METAMYELOCYTES NFR BLD MANUAL: 1.7 %
MONOCYTES # BLD MANUAL: 0.26 X10*3/UL (ref 0.1–1)
MONOCYTES NFR BLD MANUAL: 3.3 %
MYELOCYTES # BLD MANUAL: 0.34 X10*3/UL
MYELOCYTES NFR BLD MANUAL: 4.2 %
NEUTROPHILS # BLD MANUAL: 6.99 X10*3/UL (ref 1.2–7.7)
NEUTS BAND # BLD MANUAL: 0.26 X10*3/UL (ref 0–0.7)
NEUTS BAND NFR BLD MANUAL: 3.3 %
NEUTS SEG # BLD MANUAL: 6.73 X10*3/UL (ref 1.2–7)
NEUTS SEG NFR BLD MANUAL: 84.1 %
NRBC BLD-RTO: 1.6 /100 WBCS (ref 0–0)
OVALOCYTES BLD QL SMEAR: ABNORMAL
PHOSPHATE SERPL-MCNC: 3.6 MG/DL (ref 2.5–4.9)
PLATELET # BLD AUTO: 39 X10*3/UL (ref 150–450)
POTASSIUM SERPL-SCNC: 2.8 MMOL/L (ref 3.5–5.3)
PROMYELOCYTES # BLD MANUAL: 0.14 X10*3/UL
PROMYELOCYTES NFR BLD MANUAL: 1.7 %
PROT SERPL-MCNC: 3.9 G/DL (ref 6.4–8.2)
Q ONSET: 210 MS
Q ONSET: 225 MS
Q ONSET: 229 MS
QRS COUNT: 24 BEATS
QRS COUNT: 24 BEATS
QRS COUNT: 26 BEATS
QRS DURATION: 72 MS
QRS DURATION: 92 MS
QRS DURATION: 92 MS
QT INTERVAL: 262 MS
QT INTERVAL: 296 MS
QT INTERVAL: 310 MS
QTC CALCULATION(BAZETT): 419 MS
QTC CALCULATION(BAZETT): 469 MS
QTC CALCULATION(BAZETT): 485 MS
QTC FREDERICIA: 358 MS
QTC FREDERICIA: 402 MS
QTC FREDERICIA: 417 MS
R AXIS: 20 DEGREES
R AXIS: 7 DEGREES
R AXIS: 8 DEGREES
RBC # BLD AUTO: 2.76 X10*6/UL (ref 4.5–5.9)
RBC MORPH BLD: ABNORMAL
SODIUM SERPL-SCNC: 141 MMOL/L (ref 136–145)
T AXIS: 15 DEGREES
T AXIS: 35 DEGREES
T AXIS: 57 DEGREES
T OFFSET: 341 MS
T OFFSET: 377 MS
T OFFSET: 380 MS
TOTAL CELLS COUNTED BLD: 120
URATE SERPL-MCNC: 3.3 MG/DL (ref 4–7.5)
VANCOMYCIN SERPL-MCNC: 24.9 UG/ML (ref 5–20)
VENTRICULAR RATE: 147 BPM
VENTRICULAR RATE: 151 BPM
VENTRICULAR RATE: 154 BPM
WBC # BLD AUTO: 8 X10*3/UL (ref 4.4–11.3)

## 2025-02-07 PROCEDURE — 2500000001 HC RX 250 WO HCPCS SELF ADMINISTERED DRUGS (ALT 637 FOR MEDICARE OP): Performed by: PHYSICIAN ASSISTANT

## 2025-02-07 PROCEDURE — 83615 LACTATE (LD) (LDH) ENZYME: CPT | Performed by: PHYSICIAN ASSISTANT

## 2025-02-07 PROCEDURE — 1200000003 HC ONCOLOGY  ROOM WITH TELEMETRY DAILY

## 2025-02-07 PROCEDURE — 2500000005 HC RX 250 GENERAL PHARMACY W/O HCPCS

## 2025-02-07 PROCEDURE — 2500000002 HC RX 250 W HCPCS SELF ADMINISTERED DRUGS (ALT 637 FOR MEDICARE OP, ALT 636 FOR OP/ED)

## 2025-02-07 PROCEDURE — 83735 ASSAY OF MAGNESIUM: CPT | Performed by: PHYSICIAN ASSISTANT

## 2025-02-07 PROCEDURE — 2500000005 HC RX 250 GENERAL PHARMACY W/O HCPCS: Performed by: PHYSICIAN ASSISTANT

## 2025-02-07 PROCEDURE — RXMED WILLOW AMBULATORY MEDICATION CHARGE

## 2025-02-07 PROCEDURE — 99232 SBSQ HOSP IP/OBS MODERATE 35: CPT | Performed by: INTERNAL MEDICINE

## 2025-02-07 PROCEDURE — 2500000004 HC RX 250 GENERAL PHARMACY W/ HCPCS (ALT 636 FOR OP/ED)

## 2025-02-07 PROCEDURE — 2500000004 HC RX 250 GENERAL PHARMACY W/ HCPCS (ALT 636 FOR OP/ED): Performed by: PHYSICIAN ASSISTANT

## 2025-02-07 PROCEDURE — 84100 ASSAY OF PHOSPHORUS: CPT | Performed by: PHYSICIAN ASSISTANT

## 2025-02-07 PROCEDURE — 85007 BL SMEAR W/DIFF WBC COUNT: CPT | Performed by: PHYSICIAN ASSISTANT

## 2025-02-07 PROCEDURE — 85027 COMPLETE CBC AUTOMATED: CPT | Performed by: PHYSICIAN ASSISTANT

## 2025-02-07 PROCEDURE — 2500000001 HC RX 250 WO HCPCS SELF ADMINISTERED DRUGS (ALT 637 FOR MEDICARE OP)

## 2025-02-07 PROCEDURE — 2500000002 HC RX 250 W HCPCS SELF ADMINISTERED DRUGS (ALT 637 FOR MEDICARE OP, ALT 636 FOR OP/ED): Performed by: PHYSICIAN ASSISTANT

## 2025-02-07 PROCEDURE — 80202 ASSAY OF VANCOMYCIN: CPT

## 2025-02-07 PROCEDURE — 82248 BILIRUBIN DIRECT: CPT | Performed by: PHYSICIAN ASSISTANT

## 2025-02-07 PROCEDURE — 84550 ASSAY OF BLOOD/URIC ACID: CPT | Performed by: PHYSICIAN ASSISTANT

## 2025-02-07 RX ORDER — VANCOMYCIN HYDROCHLORIDE 1 G/20ML
1500 INJECTION, POWDER, LYOPHILIZED, FOR SOLUTION INTRAVENOUS EVERY 12 HOURS
Qty: 84 G | Refills: 0 | Status: ON HOLD
Start: 2025-02-07 | End: 2025-02-18

## 2025-02-07 RX ORDER — POTASSIUM CHLORIDE 14.9 MG/ML
20 INJECTION INTRAVENOUS
Status: COMPLETED | OUTPATIENT
Start: 2025-02-07 | End: 2025-02-07

## 2025-02-07 RX ORDER — FLUCONAZOLE 200 MG/1
400 TABLET ORAL DAILY
Status: DISCONTINUED | OUTPATIENT
Start: 2025-02-07 | End: 2025-02-08 | Stop reason: HOSPADM

## 2025-02-07 RX ORDER — METOPROLOL SUCCINATE 100 MG/1
100 TABLET, EXTENDED RELEASE ORAL DAILY
Qty: 30 TABLET | Refills: 11 | Status: SHIPPED | OUTPATIENT
Start: 2025-02-07 | End: 2026-02-07

## 2025-02-07 RX ORDER — FUROSEMIDE 10 MG/ML
40 INJECTION INTRAMUSCULAR; INTRAVENOUS ONCE
Status: COMPLETED | OUTPATIENT
Start: 2025-02-07 | End: 2025-02-07

## 2025-02-07 RX ORDER — OXYCODONE HYDROCHLORIDE 5 MG/1
5 TABLET ORAL EVERY 4 HOURS PRN
Qty: 15 TABLET | Refills: 0 | Status: ON HOLD | OUTPATIENT
Start: 2025-02-07 | End: 2025-02-18

## 2025-02-07 RX ORDER — POTASSIUM CHLORIDE 20 MEQ/1
40 TABLET, EXTENDED RELEASE ORAL ONCE
Status: COMPLETED | OUTPATIENT
Start: 2025-02-07 | End: 2025-02-07

## 2025-02-07 RX ADMIN — METOPROLOL TARTRATE 25 MG: 25 TABLET, FILM COATED ORAL at 08:09

## 2025-02-07 RX ADMIN — LIDOCAINE HYDROCHLORIDE 1.25 ML: 20 SOLUTION ORAL at 00:47

## 2025-02-07 RX ADMIN — VANCOMYCIN HYDROCHLORIDE 1500 MG: 5 INJECTION, POWDER, LYOPHILIZED, FOR SOLUTION INTRAVENOUS at 20:07

## 2025-02-07 RX ADMIN — METOPROLOL TARTRATE 25 MG: 25 TABLET, FILM COATED ORAL at 02:47

## 2025-02-07 RX ADMIN — APIXABAN 10 MG: 5 TABLET, FILM COATED ORAL at 08:09

## 2025-02-07 RX ADMIN — ACYCLOVIR 400 MG: 200 SUSPENSION ORAL at 20:07

## 2025-02-07 RX ADMIN — METOPROLOL TARTRATE 25 MG: 25 TABLET, FILM COATED ORAL at 14:58

## 2025-02-07 RX ADMIN — APIXABAN 5 MG: 5 TABLET, FILM COATED ORAL at 20:07

## 2025-02-07 RX ADMIN — PANTOPRAZOLE SODIUM 40 MG: 40 INJECTION, POWDER, FOR SOLUTION INTRAVENOUS at 08:09

## 2025-02-07 RX ADMIN — ALUMINUM HYDROXIDE, MAGNESIUM HYDROXIDE, DIMETHICONE 10 ML: 200; 20; 200 SUSPENSION ORAL at 22:40

## 2025-02-07 RX ADMIN — ALUMINUM HYDROXIDE, MAGNESIUM HYDROXIDE, DIMETHICONE 10 ML: 200; 20; 200 SUSPENSION ORAL at 08:14

## 2025-02-07 RX ADMIN — POTASSIUM CHLORIDE 20 MEQ: 14.9 INJECTION, SOLUTION INTRAVENOUS at 09:49

## 2025-02-07 RX ADMIN — FUROSEMIDE 40 MG: 10 INJECTION, SOLUTION INTRAVENOUS at 13:01

## 2025-02-07 RX ADMIN — METOPROLOL TARTRATE 25 MG: 25 TABLET, FILM COATED ORAL at 20:07

## 2025-02-07 RX ADMIN — OXYCODONE 5 MG: 5 TABLET ORAL at 05:19

## 2025-02-07 RX ADMIN — OXYCODONE 5 MG: 5 TABLET ORAL at 18:35

## 2025-02-07 RX ADMIN — POTASSIUM CHLORIDE 40 MEQ: 1500 TABLET, EXTENDED RELEASE ORAL at 08:09

## 2025-02-07 RX ADMIN — POTASSIUM CHLORIDE 20 MEQ: 14.9 INJECTION, SOLUTION INTRAVENOUS at 08:14

## 2025-02-07 RX ADMIN — OXYCODONE 5 MG: 5 TABLET ORAL at 13:01

## 2025-02-07 RX ADMIN — OXYCODONE 5 MG: 5 TABLET ORAL at 22:39

## 2025-02-07 RX ADMIN — FLUCONAZOLE 400 MG: 200 TABLET ORAL at 15:58

## 2025-02-07 RX ADMIN — VANCOMYCIN HYDROCHLORIDE 1500 MG: 5 INJECTION, POWDER, LYOPHILIZED, FOR SOLUTION INTRAVENOUS at 09:49

## 2025-02-07 ASSESSMENT — COGNITIVE AND FUNCTIONAL STATUS - GENERAL
DRESSING REGULAR UPPER BODY CLOTHING: A LITTLE
DRESSING REGULAR UPPER BODY CLOTHING: A LITTLE
DAILY ACTIVITIY SCORE: 21
DAILY ACTIVITIY SCORE: 21
DRESSING REGULAR LOWER BODY CLOTHING: A LITTLE
MOBILITY SCORE: 22
WALKING IN HOSPITAL ROOM: A LITTLE
HELP NEEDED FOR BATHING: A LITTLE
MOBILITY SCORE: 22
DRESSING REGULAR LOWER BODY CLOTHING: A LITTLE
CLIMB 3 TO 5 STEPS WITH RAILING: A LITTLE
CLIMB 3 TO 5 STEPS WITH RAILING: A LITTLE
WALKING IN HOSPITAL ROOM: A LITTLE
HELP NEEDED FOR BATHING: A LITTLE

## 2025-02-07 ASSESSMENT — PAIN SCALES - GENERAL
PAINLEVEL_OUTOF10: 7
PAINLEVEL_OUTOF10: 6
PAINLEVEL_OUTOF10: 7
PAINLEVEL_OUTOF10: 7
PAINLEVEL_OUTOF10: 9
PAINLEVEL_OUTOF10: 3

## 2025-02-07 ASSESSMENT — PAIN DESCRIPTION - LOCATION: LOCATION: MOUTH

## 2025-02-07 ASSESSMENT — ENCOUNTER SYMPTOMS
COUGH: 1
FEVER: 1

## 2025-02-07 ASSESSMENT — PAIN SCALES - WONG BAKER: WONGBAKER_NUMERICALRESPONSE: HURTS WHOLE LOT

## 2025-02-07 ASSESSMENT — PAIN - FUNCTIONAL ASSESSMENT
PAIN_FUNCTIONAL_ASSESSMENT: 0-10
PAIN_FUNCTIONAL_ASSESSMENT: 0-10

## 2025-02-07 ASSESSMENT — ACTIVITIES OF DAILY LIVING (ADL): LACK_OF_TRANSPORTATION: NO

## 2025-02-07 NOTE — PROGRESS NOTES
Vancomycin Dosing by Pharmacy- FOLLOW UP    Bijan Ibanez is a 64 y.o. year old male who Pharmacy has been consulted for vancomycin dosing for line infections. Based on the patient's indication and renal status this patient is being dosed based on a goal AUC of 500-600.     Renal function is currently stable.    Current vancomycin dose: 1500 mg given every 12 hours    Most recent random level: 24.9 mcg/mL    Visit Vitals  /80 (BP Location: Right arm, Patient Position: Lying)   Pulse 100   Temp 36.6 °C (97.9 °F) (Temporal)   Resp 18        Lab Results   Component Value Date    CREATININE 0.77 2025    CREATININE 0.87 2025    CREATININE 0.73 2025    CREATININE 0.79 2025        Patient weight is as follows:   Vitals:    25 0845   Weight: 115 kg (252 lb 13.9 oz)       Cultures:  Susceptibility data for the encounter in last 14 days.  Collected Specimen Info Organism Clindamycin Erythromycin Oxacillin Tetracycline Trimethoprim/Sulfamethoxazole Vancomycin   25 Blood culture from Peripheral Venipuncture Staphylococcus aureus         25 Blood culture from Central Line/Catheter (Specify below) Methicillin Resistant Staphylococcus aureus (MRSA)  R  R  R  R  S  S        I/O last 3 completed shifts:  In: 636 (5.5 mL/kg) [P.O.:636]  Out: 1240 (10.8 mL/kg) [Urine:1240 (0.3 mL/kg/hr)]  Weight: 114.7 kg   I/O during current shift:  I/O this shift:  In: -   Out: 475 [Urine:475]    Temp (24hrs), Av.6 °C (97.8 °F), Min:36 °C (96.8 °F), Max:37 °C (98.6 °F)      Assessment/Plan    Within goal AUC range. Continue current vancomycin regimen.    This dosing regimen is predicted by [employer] to result in the following pharmacokinetic parameters:    Loading dose: N/A  Regimen: 1500 mg IV every 12 hours.  Start time: 09:03 on 2025  Exposure target: AUC24 (range)500-600 mg/L.hr   [id number]: 575 mg/L.hr  AUC24,ss: 574 mg/L.hr  Probability of AUC24 > 400: 100 %  Ctrough,ss: 20  mg/L  Probability of Ctrough,ss > 20: 50 %      The next level will be obtained on 2/9 at AM labs. May be obtained sooner if clinically indicated.   Will continue to monitor renal function daily while on vancomycin and order serum creatinine at least every 48 hours if not already ordered.  Follow for continued vancomycin needs, clinical response, and signs/symptoms of toxicity.       KRISTIAN VALDEZ

## 2025-02-07 NOTE — CARE PLAN
Problem: Pain  Goal: Takes deep breaths with improved pain control throughout the shift  Outcome: Progressing  Goal: Turns in bed with improved pain control throughout the shift  Outcome: Progressing  Goal: Walks with improved pain control throughout the shift  Outcome: Progressing  Goal: Performs ADL's with improved pain control throughout shift  Outcome: Progressing  Goal: Participates in PT with improved pain control throughout the shift  Outcome: Progressing  Goal: Free from opioid side effects throughout the shift  Outcome: Progressing  Goal: Free from acute confusion related to pain meds throughout the shift  Outcome: Progressing     Problem: Pain - Adult  Goal: Verbalizes/displays adequate comfort level or baseline comfort level  Outcome: Progressing     Problem: Safety - Adult  Goal: Free from fall injury  Outcome: Progressing     Problem: Discharge Planning  Goal: Discharge to home or other facility with appropriate resources  Outcome: Progressing     Problem: Chronic Conditions and Co-morbidities  Goal: Patient's chronic conditions and co-morbidity symptoms are monitored and maintained or improved  Outcome: Progressing     Problem: Nutrition  Goal: Nutrient intake appropriate for maintaining nutritional needs  Outcome: Progressing     Problem: Fall/Injury  Goal: Not fall by end of shift  Outcome: Progressing  Goal: Be free from injury by end of the shift  Outcome: Progressing  Goal: Verbalize understanding of personal risk factors for fall in the hospital  Outcome: Progressing  Goal: Verbalize understanding of risk factor reduction measures to prevent injury from fall in the home  Outcome: Progressing  Goal: Use assistive devices by end of the shift  Outcome: Progressing  Goal: Pace activities to prevent fatigue by end of the shift  Outcome: Progressing   The patient's goals for the shift include      The clinical goals for the shift include Pt will remain HDS and VSS throughout shift

## 2025-02-07 NOTE — SIGNIFICANT EVENT
Repeat Aspergillus galactomannan and Fungitell are negative.  Initial positive serum Aspergillus galactomannan a month ago is likely false positive.  Continue anti-infective prophylaxis with fluconazole per malignant hematology protocol.

## 2025-02-07 NOTE — CARE PLAN
The patient's goals for the shift include      The clinical goals for the shift include pt will remain HDs throughout shift      Problem: Pain  Goal: Takes deep breaths with improved pain control throughout the shift  Outcome: Progressing  Goal: Turns in bed with improved pain control throughout the shift  Outcome: Progressing  Goal: Walks with improved pain control throughout the shift  Outcome: Progressing  Goal: Performs ADL's with improved pain control throughout shift  Outcome: Progressing  Goal: Participates in PT with improved pain control throughout the shift  Outcome: Progressing  Goal: Free from opioid side effects throughout the shift  Outcome: Progressing  Goal: Free from acute confusion related to pain meds throughout the shift  Outcome: Progressing     Problem: Pain - Adult  Goal: Verbalizes/displays adequate comfort level or baseline comfort level  Outcome: Progressing     Problem: Safety - Adult  Goal: Free from fall injury  Outcome: Progressing     Problem: Fall/Injury  Goal: Not fall by end of shift  Outcome: Progressing  Goal: Be free from injury by end of the shift  Outcome: Progressing  Goal: Verbalize understanding of personal risk factors for fall in the hospital  Outcome: Progressing  Goal: Verbalize understanding of risk factor reduction measures to prevent injury from fall in the home  Outcome: Progressing  Goal: Use assistive devices by end of the shift  Outcome: Progressing  Goal: Pace activities to prevent fatigue by end of the shift  Outcome: Progressing     Problem: Nutrition  Goal: Nutrient intake appropriate for maintaining nutritional needs  Outcome: Progressing

## 2025-02-07 NOTE — PROGRESS NOTES
02/07/25 1500   Discharge Planning   Living Arrangements Spouse/significant other   Support Systems Spouse/significant other;Children   Type of Residence Private residence   Number of Stairs to Enter Residence 1   Number of Stairs Within Residence 13   Who is requesting discharge planning? Patient   Home or Post Acute Services In home services   Type of Home Care Services Home nursing visits;Home PT   Expected Discharge Disposition Home H   Does the patient need discharge transport arranged? No   Financial Resource Strain   How hard is it for you to pay for the very basics like food, housing, medical care, and heating? Not hard   Housing Stability   In the last 12 months, was there a time when you were not able to pay the mortgage or rent on time? N   In the past 12 months, how many times have you moved where you were living? 0   At any time in the past 12 months, were you homeless or living in a shelter (including now)? N   Transportation Needs   In the past 12 months, has lack of transportation kept you from medical appointments or from getting medications? no   In the past 12 months, has lack of transportation kept you from meetings, work, or from getting things needed for daily living? No     Pt with Burkitt's Lymphoma is D18 s/p DA-R-EPOCH and is ready for discharge.  The pt will need to complete a course of IV Vanco at home via his midline.  This will be provided through [facility].  Insurance check completed and pt agreeable to the cost.  Start of care will be in the AM on 2/9/25 so pt can discharge after receiving his PM dose on 2/8/25.  The plan was discussed with the pt and he is agreeable.  The pt would also like to have PT at home so that has been added to the HCO.  Guerda Carranza RN, [facility]

## 2025-02-07 NOTE — PROGRESS NOTES
Bijan Ibanez is a 64 y.o. male on day 10 of admission presenting with Febrile neutropenia (CMS-HCC).    Subjective   Afebrile, no acute issues overnight. No episodes of afib. Endorses feeling well this morning, mucositis improving. Ongoing swelling in BLE. Denies HA, dizziness, CP, SOB, N/V/D/C. All other ROS otherwise negative.     Objective     Physical Exam  Constitutional:       General: He is not in acute distress.     Appearance: Normal appearance.   HENT:      Head: Normocephalic and atraumatic.      Nose: Nose normal.      Mouth/Throat:      Mouth: Mucous membranes are moist.      Pharynx: Posterior oropharyngeal erythema present.      Comments: Ulcers, erythema noted on soft palate.   Eyes:      General: No scleral icterus.     Extraocular Movements: Extraocular movements intact.      Pupils: Pupils are equal, round, and reactive to light.   Cardiovascular:      Rate and Rhythm: Tachycardia present. Rhythm irregular.      Heart sounds: No murmur heard.     No gallop.   Pulmonary:      Effort: Pulmonary effort is normal. No respiratory distress.      Breath sounds: Rales (bibasilar) present. No wheezing or rhonchi.   Abdominal:      General: Bowel sounds are normal. There is no distension.      Palpations: Abdomen is soft. There is no mass.      Tenderness: There is no abdominal tenderness. There is no guarding.   Musculoskeletal:         General: Swelling (RUE) present.      Cervical back: Normal range of motion.      Right lower leg: Edema (2+) present.      Left lower leg: Edema (2+) present.   Skin:     General: Skin is warm and dry.      Findings: No bruising, erythema or rash.   Neurological:      General: No focal deficit present.      Mental Status: He is alert and oriented to person, place, and time.      Cranial Nerves: No cranial nerve deficit.      Sensory: No sensory deficit.      Motor: No weakness.   Psychiatric:         Mood and Affect: Mood normal.         Behavior: Behavior normal.       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 male with a past medical history of hypertension, hyperlipidemia, coronary artery disease (s/p stent 2010, on ASA), renal cell carcinoma (s/p R partial nephrectomy in 2013 @ [facility]), GERD, BPH, arthritis, and Burkitt lymphoma being treated with DA-R-EPOCH (since 1/21/25) who is admitted today (01/28/25) with febrile neutropenia and mucositis.      Day 18 (02/07/25) C1 DA-R-EPOCH    Active issues today (02/07/25):  #MRSA bacteremia. Continue vancomycin, TTE, echo without vegetations. ID consulted. Picc removed, now has midline, pip-tazo stopped  #Atrial fibrillation. EP consulted, on 12.5mg po metoprolol q6h increased to 25mg q6h on 2/5  #Musositis. Stop PCA and start oxy 5mg q4h.   #PICC associated DVT. Vascular consulted, line removed. Plan AC therapy when plt >30k per vasc med  #BLE edema; s/p 40mg lasix 2/5, another dose of 40mg today 2/6  # DISPO: plan for Saturday evening DC after PM vanco dose    ONC  #Burkitt Lymphoma  :: Workup and treatment as per Onc History  :: Received C1 DA-R-EPOCH (1/21/25), supported with pegfilgrastim 1/28/25  :: CT neck (1/27/25): Mild asymmetric enlargement of the right muscles of mastication, predominantly the masseter, likely due to lymphomatous involvement, less conspicuous compared to 1/9/2025, with no new mass or infectious/inflammatory process in the neck or aerodigestive tract  - Due for C2 ~ 2/11/25; will likely be delayed dt complications with cycle 1  # TLS monitoring and prophylaxis  :: Uric acid 4.4 mg/dL (1/28/25) on admit, 1.6 today (1/30/25)  - HOLD allopurinol with severe mucositis, will monitor UA levels daily     HEME  #Pancytopenia, improving  %Due to chemotherapy  - transfuse for  hgb<7 and plts<20 due to severe mucositis  #RUE PICC associated DVT   -RUE US (1/24)) superficial thrombus in distal brachial vein and mid to distal basilic vein   -Repeat US RUE (2/3) DVT RUE mid subclavian, distal subclavian, axillary veins   -Vascular medicine consulted, ok to remove picc (removed 2/3)   -Plan AC when plt >30k     ID  #Febrile neutropenia  #MRSA Bacteremia (source line vs PNA)  :: Cultures and serology  = Blood cultures (1/31/25): MRSA in 4/4 bottles  = UA (1/28/25): not suspicious for infection  = Influenza A/B, COVID, RSV (1/27/25): negative  = Parainfluenza, Metapneumovirus, Rhinovirus, Adenovirus PCR (1/27/25): negative   = Hold off on sputum culture (1/28/25) -- cough seems mostly from inability to clear oral secretions  :: Imaging  = CXR (1/27/25): No cardiopulmonary process  = TTE to rule out vegetations: negative  :: Antibiotics  - Vancomcyin (2/1/25- )  - ID consulted   - picc removed 2/3  -surveillance blood cx (2/4) pending   #Mouth ulcer  - Likely mucositis   - HSV swab (2/2/25) neg; repeat pending   - Continue and increased acyclovir to 5mg/kg IV every 8 hours; continue q8h per ID (oral HSV swab low sensitivity per ID note)   #PNA  -CT chest (2/2) new bilateral lower lobe consolidations   -Aspergillus elevated (1/12); repeat (1/13) negative; fungitell negative  -repeat fungal markers negative  - S/p Piperacillin-tazobactam 3.375 g IV Q6H (1/28-2/6)  - Patient reports unknown reaction as a child to penicillin -- discussed with Clinical Oncology Specialist PharmD, Ok to trial piperacillin-tazobactam, remove allergy if no reaction, tolerating well so far   # Prophylaxis  - VZV: switch acyclovir to treatment dose due to concern for HSV in the mouth  - Candidiasis: Continue fluconazole 400 mg  daily   - PJP: None at this time     FEN/RENAL  - Admission weight 115 kg (01/28/25)  Most recent weight 115 kg (252 lb 13.9 oz) (2/6/2025  8:45 AM)   F PO + blood products and intermittent meds   E PRN  N Low-pathogen    #Hyperbilirubinemia   -Hapto, LDH unremarkable   -Consider RUQ US if uptrending     GI  # Esophageal mucositis  #Dysphagia  %Chemotherapy  :: CT neck (1/27/25) as above: no signs of abscess or fluid collection  -s/p NS @ 50 mL/hr x 40 hours (2L) to supplement poor PO intake(1/28-1/30)  - Supportive care: BMX, viscous lidocaine, sucralfate PRN  - -Dilaudid PCA no basal, bolus 0.2 mg every 10 minutes with one hour dose limit of 1.2 mg on 1/31     CARDIO/PULM  #Atrial Fibrillation with RVR  %Due to infection, electrolyte derangements, anemia  :: HR maximum 180s today (02/07/25) depending on rest/activity  - Transitioned IV metoprolol to 25mg po metoprolol q6h per EP recs  - Keep K > 4 mMol/L, Mg > 2 mg/dL, Hgb > 7-8 g/dL  - Okay to tolerate rate 110-130 for now  - EP consulted, appreciate recs  - Tele   #Hypertension  #Hyperlipidemia  #CAD  #MI s/p stent 2010  - hold atorvastatin due to mucositis   - HOLD home aspirin 81 mg daily with thrombocytopenia  - HOLD home lisinopril in setting of low BP's  #Functional monitoring  - ECHO (1/18/25): LVSF normal, EF 65-70%.      ACCESS/SUPPORT/DISPO  - FULL CODE  - MD: Dr. Mckeon  - ACCESS: PIV, midline (placed 2/4)   - PT/RD/SW      Patient seen, examined and discussed with Dr. Veloz ([facility] [place].    JUDY Abebe-CNP  Malignant Hematology (Lymphoma/Myeloma/Autologous SCT) Team

## 2025-02-07 NOTE — PROGRESS NOTES
REVIEWED PT INFO AS CORRECT.   DX...  MRSA BACTEREMIA  REVIEWED ALLERGIES... LATEX, PCN  PMH...HTN, HLD, CAD BURKITTS LYMPHOMA  NO MEDICATION INTERACTIONS.  REVIEWED RELEVANT BASELINE VALUES  LAB ARE ... WEEKLY CBC/D, BMP, VANCO TROUGH TO HARESH GUERRERO  PT HAS …..  MIDLINE ...  FLUSH PER [facility] PROTOCOL.  CONTINUE MED THRU TENTATIVE STOP DATE …. 3/8/25  MED PLACED … GRAVITY  CARE PLAN DONE TODAY    PATIENT IS A 65YO MALE [facility].  F/U WITH DR. MAHONEY.  REVIEW OF HOMEGOING MEDS AND NO INTERACTIONS OR DUPLICATIONS NOTED.  GOAL OF THERAPY IS TO RESOLVE INFECTION WHILE MONITORING FOR SIDE EFFECTS AS LISTED IN THE CP.  SPOKE WITH WIFE TO ARRANGE FOR DELIVERY TONIGHT.  CONFIRMED MENTOR ADDRESS TO BE CORRECT.  SHE SAID GRANDDAUGHTER WOULD BE HOME FOR DELIVERY.  PROCESSED FILL FOR 14 VANCO FOR DOS 2/9 THRU 2/15.  NF FOR 2/15 FOR STRAIGHT DEL. AND CHECK LABS. DSL

## 2025-02-08 ENCOUNTER — DOCUMENTATION (OUTPATIENT)
Dept: HOME HEALTH SERVICES | Facility: HOME HEALTH | Age: 65
End: 2025-02-08
Payer: MEDICARE

## 2025-02-08 ENCOUNTER — PHARMACY VISIT (OUTPATIENT)
Dept: PHARMACY | Facility: CLINIC | Age: 65
End: 2025-02-08
Payer: MEDICARE

## 2025-02-08 VITALS
OXYGEN SATURATION: 96 % | RESPIRATION RATE: 16 BRPM | TEMPERATURE: 97.2 F | WEIGHT: 252.87 LBS | DIASTOLIC BLOOD PRESSURE: 94 MMHG | HEIGHT: 71 IN | SYSTOLIC BLOOD PRESSURE: 148 MMHG | HEART RATE: 85 BPM | BODY MASS INDEX: 35.4 KG/M2

## 2025-02-08 DIAGNOSIS — R50.9 FEVER, UNSPECIFIED: ICD-10-CM

## 2025-02-08 LAB
ALBUMIN SERPL BCP-MCNC: 2.4 G/DL (ref 3.4–5)
ANION GAP SERPL CALC-SCNC: 14 MMOL/L (ref 10–20)
BACTERIA BLD CULT: NORMAL
BACTERIA BLD CULT: NORMAL
BASOPHILS # BLD MANUAL: 0 X10*3/UL (ref 0–0.1)
BASOPHILS NFR BLD MANUAL: 0 %
BUN SERPL-MCNC: 7 MG/DL (ref 6–23)
CALCIUM SERPL-MCNC: 7.5 MG/DL (ref 8.6–10.6)
CHLORIDE SERPL-SCNC: 103 MMOL/L (ref 98–107)
CO2 SERPL-SCNC: 28 MMOL/L (ref 21–32)
CREAT SERPL-MCNC: 0.95 MG/DL (ref 0.5–1.3)
EGFRCR SERPLBLD CKD-EPI 2021: 89 ML/MIN/1.73M*2
EOSINOPHIL # BLD MANUAL: 0 X10*3/UL (ref 0–0.7)
EOSINOPHIL NFR BLD MANUAL: 0 %
ERYTHROCYTE [DISTWIDTH] IN BLOOD BY AUTOMATED COUNT: 14.6 % (ref 11.5–14.5)
GLUCOSE SERPL-MCNC: 101 MG/DL (ref 74–99)
HCT VFR BLD AUTO: 26 % (ref 41–52)
HGB BLD-MCNC: 8.3 G/DL (ref 13.5–17.5)
IMM GRANULOCYTES # BLD AUTO: 2.14 X10*3/UL (ref 0–0.7)
IMM GRANULOCYTES NFR BLD AUTO: 18.9 % (ref 0–0.9)
LDH SERPL L TO P-CCNC: 501 U/L (ref 84–246)
LYMPHOCYTES # BLD MANUAL: 0.68 X10*3/UL (ref 1.2–4.8)
LYMPHOCYTES NFR BLD MANUAL: 6 %
MAGNESIUM SERPL-MCNC: 1.9 MG/DL (ref 1.6–2.4)
MCH RBC QN AUTO: 29.3 PG (ref 26–34)
MCHC RBC AUTO-ENTMCNC: 31.9 G/DL (ref 32–36)
MCV RBC AUTO: 92 FL (ref 80–100)
METAMYELOCYTES # BLD MANUAL: 0.45 X10*3/UL
METAMYELOCYTES NFR BLD MANUAL: 4 %
MONOCYTES # BLD MANUAL: 1.58 X10*3/UL (ref 0.1–1)
MONOCYTES NFR BLD MANUAL: 14 %
MYELOCYTES # BLD MANUAL: 0.11 X10*3/UL
MYELOCYTES NFR BLD MANUAL: 1 %
NEUTROPHILS # BLD MANUAL: 7.91 X10*3/UL (ref 1.2–7.7)
NEUTS BAND # BLD MANUAL: 0.23 X10*3/UL (ref 0–0.7)
NEUTS BAND NFR BLD MANUAL: 2 %
NEUTS SEG # BLD MANUAL: 7.68 X10*3/UL (ref 1.2–7)
NEUTS SEG NFR BLD MANUAL: 68 %
NRBC BLD-RTO: 2.7 /100 WBCS (ref 0–0)
PHOSPHATE SERPL-MCNC: 3.5 MG/DL (ref 2.5–4.9)
PLATELET # BLD AUTO: 58 X10*3/UL (ref 150–450)
POTASSIUM SERPL-SCNC: 3.1 MMOL/L (ref 3.5–5.3)
PROMYELOCYTES # BLD MANUAL: 0.34 X10*3/UL
PROMYELOCYTES NFR BLD MANUAL: 3 %
RBC # BLD AUTO: 2.83 X10*6/UL (ref 4.5–5.9)
RBC MORPH BLD: ABNORMAL
SODIUM SERPL-SCNC: 142 MMOL/L (ref 136–145)
TOTAL CELLS COUNTED BLD: 100
TOXIC GRANULES BLD QL SMEAR: PRESENT
URATE SERPL-MCNC: 3.9 MG/DL (ref 4–7.5)
VARIANT LYMPHS # BLD MANUAL: 0.23 X10*3/UL (ref 0–0.5)
VARIANT LYMPHS NFR BLD: 2 %
WBC # BLD AUTO: 11.3 X10*3/UL (ref 4.4–11.3)

## 2025-02-08 PROCEDURE — 80069 RENAL FUNCTION PANEL: CPT | Performed by: PHYSICIAN ASSISTANT

## 2025-02-08 PROCEDURE — 2500000005 HC RX 250 GENERAL PHARMACY W/O HCPCS

## 2025-02-08 PROCEDURE — 2500000004 HC RX 250 GENERAL PHARMACY W/ HCPCS (ALT 636 FOR OP/ED)

## 2025-02-08 PROCEDURE — 85027 COMPLETE CBC AUTOMATED: CPT | Performed by: PHYSICIAN ASSISTANT

## 2025-02-08 PROCEDURE — 2500000001 HC RX 250 WO HCPCS SELF ADMINISTERED DRUGS (ALT 637 FOR MEDICARE OP)

## 2025-02-08 PROCEDURE — 83735 ASSAY OF MAGNESIUM: CPT | Performed by: PHYSICIAN ASSISTANT

## 2025-02-08 PROCEDURE — 2500000004 HC RX 250 GENERAL PHARMACY W/ HCPCS (ALT 636 FOR OP/ED): Performed by: PHYSICIAN ASSISTANT

## 2025-02-08 PROCEDURE — 83615 LACTATE (LD) (LDH) ENZYME: CPT | Performed by: PHYSICIAN ASSISTANT

## 2025-02-08 PROCEDURE — 84550 ASSAY OF BLOOD/URIC ACID: CPT | Performed by: PHYSICIAN ASSISTANT

## 2025-02-08 PROCEDURE — 99239 HOSP IP/OBS DSCHRG MGMT >30: CPT | Performed by: INTERNAL MEDICINE

## 2025-02-08 PROCEDURE — 85007 BL SMEAR W/DIFF WBC COUNT: CPT | Performed by: PHYSICIAN ASSISTANT

## 2025-02-08 RX ORDER — POTASSIUM CHLORIDE 14.9 MG/ML
20 INJECTION INTRAVENOUS
Status: DISPENSED | OUTPATIENT
Start: 2025-02-08 | End: 2025-02-08

## 2025-02-08 RX ADMIN — PANTOPRAZOLE SODIUM 40 MG: 40 INJECTION, POWDER, FOR SOLUTION INTRAVENOUS at 08:54

## 2025-02-08 RX ADMIN — FLUCONAZOLE 400 MG: 200 TABLET ORAL at 08:55

## 2025-02-08 RX ADMIN — VANCOMYCIN HYDROCHLORIDE 1250 MG: 5 INJECTION, POWDER, LYOPHILIZED, FOR SOLUTION INTRAVENOUS at 16:19

## 2025-02-08 RX ADMIN — METOPROLOL TARTRATE 25 MG: 25 TABLET, FILM COATED ORAL at 01:15

## 2025-02-08 RX ADMIN — OXYCODONE 5 MG: 5 TABLET ORAL at 11:03

## 2025-02-08 RX ADMIN — VANCOMYCIN HYDROCHLORIDE 1500 MG: 5 INJECTION, POWDER, LYOPHILIZED, FOR SOLUTION INTRAVENOUS at 07:51

## 2025-02-08 RX ADMIN — POTASSIUM CHLORIDE 20 MEQ: 14.9 INJECTION, SOLUTION INTRAVENOUS at 08:54

## 2025-02-08 RX ADMIN — METOPROLOL TARTRATE 25 MG: 25 TABLET, FILM COATED ORAL at 14:23

## 2025-02-08 RX ADMIN — METOPROLOL TARTRATE 25 MG: 25 TABLET, FILM COATED ORAL at 08:55

## 2025-02-08 RX ADMIN — ACYCLOVIR 400 MG: 200 SUSPENSION ORAL at 12:37

## 2025-02-08 RX ADMIN — APIXABAN 5 MG: 5 TABLET, FILM COATED ORAL at 08:55

## 2025-02-08 RX ADMIN — OXYCODONE 5 MG: 5 TABLET ORAL at 04:35

## 2025-02-08 ASSESSMENT — PAIN SCALES - GENERAL
PAINLEVEL_OUTOF10: 7
PAINLEVEL_OUTOF10: 3
PAINLEVEL_OUTOF10: 7

## 2025-02-08 ASSESSMENT — ACTIVITIES OF DAILY LIVING (ADL): LACK_OF_TRANSPORTATION: NO

## 2025-02-08 ASSESSMENT — PAIN DESCRIPTION - ORIENTATION: ORIENTATION: INNER

## 2025-02-08 ASSESSMENT — PAIN - FUNCTIONAL ASSESSMENT
PAIN_FUNCTIONAL_ASSESSMENT: 0-10
PAIN_FUNCTIONAL_ASSESSMENT: 0-10

## 2025-02-08 ASSESSMENT — PAIN DESCRIPTION - LOCATION: LOCATION: MOUTH

## 2025-02-08 NOTE — DISCHARGE SUMMARY
Discharge Diagnosis  Febrile neutropenia (CMS-HCC)    Issues Requiring Follow-Up  Follow up weekly CMPs, CBCs, vancomycin troughs   Cardiology and vascular medicine follow up pending   Monitor s/s bleeding and labs closely for evidence of bleeding while on apixaban     Test Results Pending At Discharge  Pending Labs       Order Current Status    Aspergillus galactomannan antigen Collected (02/04/25 0848)    CBC and Auto Differential Collected (02/04/25 0848)    Fungitell Beta-D Glucan Serum Collected (02/04/25 0848)            Hospital Course  Bijan Ibanez is a 64 y.o. male with a past medical history of hypertension, hyperlipidemia, coronary artery disease (s/p stent 2010, on ASA), renal cell carcinoma (s/p R partial nephrectomy in 2013 @ [facility]), GERD, BPH, arthritis, and Burkitt lymphoma being treated with DA-R-EPOCH (since 1/21/25) who is admitted (01/28/25) with febrile neutropenia and mucositis.     Hospital course was complicated by MRSA bacteremia, atrial fibrillation, severe mucositis, PICC associated DVT.    Hospital course:   MRSA bacteremia (1/31) 2/2 infected thrombus vs pneumonia treated with IV vancomycin. TTE negative vegetations. ID consulted, will complete 4 weeks IV vancomycin, last dose planned 3/7/25. Weekly vancomycin troughs ordered through [facility]. PICC removed and midline placed with home care coordinated.   PNA seen on CT chest (2/2) revealing bilateral lower lobe consolidations. Aspergillus elevated 1/12, then negative on repeat. This elevation was likely a false positive. Per ID, c/w  prophylactic fluconazole. Completed zosyn (1/28-2/6), vancomycin as above.   RUE PICC associated DVT managed with vascular medicine guidance. PICC removed (2/3) given benefits of infection control outweighed embolization. Removal without subsequent complications. Initially, anticoagulation held due to thrombocytopenia, though with platelet recovery, apixaban (without loading dose) initiated. No evidence of  bleeding at time of discharge. Please monitor counts closely outpatient.   Mucositis 2/2 chemotherapy: CT neck on 1/27/25 showied no signs of abscess or fluid collection. Supportive care with IVF boluses to supplement poor PO intake, along with BMX, viscous lidocaine, and sucralfate as needed. On 1/31, Dilaudid PCA was initiated with no basal rate, 0.2 mg boluses every 10 minutes, and continuous pulse oximetry while on PCA. Pain and mucositis significantly improved at time of discharge. Will discharge with oral oxycodone.   Atrial fibrillation with RVR likely due to infection, electrolyte imbalances, and anemia, with heart rates ranging from 130-180 depending on rest/activity. IV metoprolol q6h administered with need for extra doses for appropriate rate control. EP was consult on 2/3/25 as rate control is not achieved (140s) and initiation of 12.5mg PO metop q6h was started. The afib persisted and it was increased to 25mg q6h with good control. He was discharged on 100mg toprol XL for coverage. Cardiology referral requested.  Hyperbilirubinemia, unclear etiology. Hemolysis workup negative. Bilirubin wnl at time of discharge.     PROPHYLAXIS: Acyclovir (VZV), fluconazole (fungal), Bactrim (PJP)  ACCESS: ELODIA Lora MD: Mike    Follow-Up:  - 2/11/25 Count check at 10:30am in [facility] (1st floor full)  -2/11/25 with Dr. Mckeon at 1:40pm   -Referral to cardiology requested  -Vascular medicine to coordinate [facility] med follow up     Pertinent Physical Exam At Time of Discharge  Constitutional:       General: He is not in acute distress.     Appearance: Normal appearance.   HENT:      Head: Normocephalic and atraumatic.      Nose: Nose normal.      Mouth/Throat:      Mouth: Mucous membranes are moist.      Pharynx: Posterior oropharyngeal erythema present.   Eyes:      General: No scleral icterus. +left eye stye on upper eyelid     Extraocular Movements: Extraocular movements intact.      Pupils: Pupils are equal, round,  and reactive to light.   Cardiovascular:      Rate and Rhythm: RRR, no MRG, +pulses   Pulmonary:      Effort: Pulmonary effort is normal. No respiratory distress.      Breath sounds: No wheezing, rales or rhonchi. Decreased peripheral breath sounds   Abdominal:      General: Bowel sounds are normal. There is no distension.      Palpations: Abdomen is soft. There is no mass.      Tenderness: There is no abdominal tenderness. There is no guarding.   Musculoskeletal:         General: Swelling (RUE) improving.      Cervical back: Normal range of motion.      Right lower leg: Edema (3+) present.      Left lower leg: Edema (3+) present.   Skin:     General: Skin is warm and dry.      Findings: No bruising, erythema or rash.   Neurological:      General: No focal deficit present.      Mental Status: He is alert and oriented to person, place, and time.      Cranial Nerves: No cranial nerve deficit.      Sensory: No sensory deficit.      Motor: No weakness.   Psychiatric:         Mood and Affect: Mood normal.         Behavior: Behavior normal.      Comments: Fluent speech      Home Medications     Medication List      START taking these medications     Eliquis 5 mg tablet; Generic drug: apixaban; Take 1 tablet (5 mg) by   mouth 2 times a day.   vancomycin 1,000 mg vial for injection; Commonly known as: Vancocin;   Infuse 1.5 g into a venous catheter every 12 hours for 28 days.     CHANGE how you take these medications     metoprolol succinate  mg 24 hr tablet; Commonly known as:   Toprol-XL; Take 1 tablet (100 mg) by mouth once daily. Do not crush or   chew.; What changed: medication strength, how much to take   oxyCODONE 5 mg immediate release tablet; Commonly known as: Roxicodone;   Take 1 tablet (5 mg) by mouth every 4 hours if needed for severe pain (7 -   10) for up to 7 days.; What changed: when to take this     CONTINUE taking these medications     acyclovir 400 mg tablet; Commonly known as: Zovirax; Take 1  tablet (400   mg) by mouth every 12 hours.   albuterol 90 mcg/actuation inhaler   allopurinol 300 mg tablet; Commonly known as: Zyloprim; Take 1 tablet   (300 mg) by mouth once daily.   atorvastatin 40 mg tablet; Commonly known as: Lipitor; Take 1 tablet (40   mg) by mouth early in the morning..   baclofen 10 mg tablet; Commonly known as: Lioresal; Take 1 tablet (10   mg) by mouth 3 times a day.   fluconazole 200 mg tablet; Commonly known as: Diflucan; Take 2 tablets   (400 mg) by mouth once daily.   furosemide 20 mg tablet; Commonly known as: Lasix; Take 1 tablet (20 mg)   by mouth once daily as needed (for swelling).   gabapentin 300 mg capsule; Commonly known as: Neurontin; Take 1 capsule   (300 mg) by mouth once daily at bedtime.   lisinopril 10 mg tablet   pantoprazole 40 mg EC tablet; Commonly known as: ProtoNix; Take 1 tablet   (40 mg) by mouth once daily in the morning. Take before meals. Do not   crush, chew, or split.     STOP taking these medications     levoFLOXacin 500 mg tablet; Commonly known as: Levaquin   prochlorperazine 10 mg tablet; Commonly known as: Compazine       Outpatient Follow-Up  Future Appointments   Date Time Provider Department Center   2/9/2025 To Be Determined Ce Ramon RN [facility]   2/10/2025 To Be Determined Petey Alaniz, PT [facility]   2/11/2025 To Be Determined Antoni Dietrich OT [facility]   2/11/2025  1:40 PM Torey Mckeon DO [id number]   2/28/2025 11:40 AM Ismael Walsh MD [id number]       Aixa Robert PA-C

## 2025-02-08 NOTE — PROGRESS NOTES
Vancomycin Dosing by Pharmacy- FOLLOW UP    Bijan Ibanez is a 64 y.o. year old male who Pharmacy has been consulted for vancomycin dosing for line infections. Based on the patient's indication and renal status this patient is being dosed based on a goal AUC of 500-600.     Renal function is currently stable.    Current vancomycin dose: 1500 mg given every 12 hours    Estimated vancomycin AUC on current dose: 648 mg/L.hr     Visit Vitals  /88 (BP Location: Right arm, Patient Position: Lying)   Pulse 83   Temp 36.1 °C (97 °F) (Temporal)   Resp 18        Lab Results   Component Value Date    CREATININE 0.95 2025    CREATININE 0.77 2025    CREATININE 0.87 2025    CREATININE 0.73 2025        Patient weight is as follows:   Vitals:    25 0845   Weight: 115 kg (252 lb 13.9 oz)       Cultures:  Susceptibility data for the encounter in last 14 days.  Collected Specimen Info Organism Clindamycin Erythromycin Oxacillin Tetracycline Trimethoprim/Sulfamethoxazole Vancomycin   25 Blood culture from Peripheral Venipuncture Staphylococcus aureus         25 Blood culture from Central Line/Catheter (Specify below) Methicillin Resistant Staphylococcus aureus (MRSA)  R  R  R  R  S  S        I/O last 3 completed shifts:  In: 200 (1.7 mL/kg) [IV Piggyback:200]  Out: 2765 (24.1 mL/kg) [Urine:2765 (0.7 mL/kg/hr)]  Weight: 114.7 kg   I/O during current shift:  No intake/output data recorded.    Temp (24hrs), Av.6 °C (97.8 °F), Min:36.1 °C (97 °F), Max:37 °C (98.6 °F)      Assessment/Plan    Above goal AUC. Orders placed for new vancomcyin regimen of 1250 every 12 hours to begin at 2000.    This dosing regimen is predicted by [employer] to result in the following pharmacokinetic parameters:  Regimen: 1250 mg IV every 12 hours.  Start time: 19:51 on 2025  Exposure target: AUC24 (range)500-600 mg/L.hr   QIF51-30: 580 mg/L.hr  AUC24,ss: 557 mg/L.hr  Probability of AUC24 > 400: 100  %  Ctrough,ss: 19.8 mg/L  Probability of Ctrough,ss > 20: 48 %      The next level will be obtained on 02/09/25 at 0500. May be obtained sooner if clinically indicated.   Will continue to monitor renal function daily while on vancomycin and order serum creatinine at least every 48 hours if not already ordered.  Follow for continued vancomycin needs, clinical response, and signs/symptoms of toxicity.       Ronny Ariza, RPh

## 2025-02-08 NOTE — HH CARE COORDINATION
Home Care received a Referral for Infusion, Nursing, Physical Therapy, and Occupational Therapy. We have processed the referral for a Start of Care on 2/9/25., AT TIME OF FINAL BEING PROCESSED.. [facility] WERE NOT IN EPIC.     If you have any questions or concerns, please feel free to contact us at [phone]. Follow the prompts, enter your five digit zip code, and you will be directed to your care team on EAST 1.

## 2025-02-08 NOTE — PROGRESS NOTES
02/08/25 1200   Discharge Planning   Living Arrangements Spouse/significant other   Support Systems Spouse/significant other;Children   Type of Residence Private residence   Number of Stairs to Enter Residence 1   Number of Stairs Within Residence 13   Who is requesting discharge planning? Patient   Home or Post Acute Services In home services   Type of Home Care Services Home nursing visits;Home PT   Expected Discharge Disposition Home H   Financial Resource Strain   How hard is it for you to pay for the very basics like food, housing, medical care, and heating? Not hard   Housing Stability   In the last 12 months, was there a time when you were not able to pay the mortgage or rent on time? N   In the past 12 months, how many times have you moved where you were living? 0   At any time in the past 12 months, were you homeless or living in a shelter (including now)? N   Transportation Needs   In the past 12 months, has lack of transportation kept you from medical appointments or from getting medications? no   In the past 12 months, has lack of transportation kept you from meetings, work, or from getting things needed for daily living? No     02/08/2025: Patient to discharge home today with [facility] for IV abx therapy. SOC will be on 02/09/2025. Shey BHANDARI TCC

## 2025-02-09 ENCOUNTER — HOME CARE VISIT (OUTPATIENT)
Dept: HOME HEALTH SERVICES | Facility: HOME HEALTH | Age: 65
End: 2025-02-09
Payer: MEDICARE

## 2025-02-09 VITALS
RESPIRATION RATE: 20 BRPM | DIASTOLIC BLOOD PRESSURE: 94 MMHG | SYSTOLIC BLOOD PRESSURE: 140 MMHG | OXYGEN SATURATION: 97 % | HEART RATE: 92 BPM | TEMPERATURE: 98.1 F

## 2025-02-09 PROCEDURE — 99602 HOME NFS VISIT EACH ADDL HR: CPT | Mod: HHH

## 2025-02-09 PROCEDURE — G0299 HHS/HOSPICE OF RN EA 15 MIN: HCPCS | Mod: HHH

## 2025-02-09 PROCEDURE — 169592 NO-PAY CLAIM PROCEDURE

## 2025-02-09 RX ADMIN — SODIUM CHLORIDE 10 ML: 9 INJECTION, SOLUTION INTRAVENOUS at 10:39

## 2025-02-09 RX ADMIN — PANTOPRAZOLE SODIUM 40 MG: 40 TABLET, DELAYED RELEASE ORAL at 10:39

## 2025-02-09 ASSESSMENT — ENCOUNTER SYMPTOMS
DRY SKIN: 1
SHORTNESS OF BREATH: 1
LOWER EXTREMITY EDEMA: 1
LAST BOWEL MOVEMENT: 67244
SUBJECTIVE PAIN PROGRESSION: GRADUALLY IMPROVING
STOOL FREQUENCY: LESS THAN DAILY
PAIN: 1
MUSCLE WEAKNESS: 1
LOSS OF SENSATION IN FEET: 0
PERSON REPORTING PAIN: PATIENT
FATIGUE: 1
SORE THROAT: 1
PAIN LOCATION: THROAT
LOWEST PAIN SEVERITY IN PAST 24 HOURS: 0/10
DEPRESSION: 0
CHANGE IN APPETITE: UNCHANGED
PAIN LOCATION - PAIN SEVERITY: 6/10
HIGHEST PAIN SEVERITY IN PAST 24 HOURS: 10/10
OCCASIONAL FEELINGS OF UNSTEADINESS: 1
PAIN SEVERITY GOAL: 0/10
BOWEL PATTERN NORMAL: 1
APPETITE LEVEL: GOOD
TINGLING: 1

## 2025-02-09 ASSESSMENT — ACTIVITIES OF DAILY LIVING (ADL)
AMBULATION ASSISTANCE: STAND BY ASSIST
ENTERING_EXITING_HOME: MODERATE ASSIST
CURRENT_FUNCTION: STAND BY ASSIST
OASIS_M1830: 03

## 2025-02-09 ASSESSMENT — LIFESTYLE VARIABLES: SMOKING_STATUS: 0

## 2025-02-10 ENCOUNTER — HOME CARE VISIT (OUTPATIENT)
Dept: HOME HEALTH SERVICES | Facility: HOME HEALTH | Age: 65
End: 2025-02-10
Payer: MEDICARE

## 2025-02-10 VITALS
DIASTOLIC BLOOD PRESSURE: 78 MMHG | SYSTOLIC BLOOD PRESSURE: 129 MMHG | OXYGEN SATURATION: 97 % | TEMPERATURE: 97.6 F | HEART RATE: 81 BPM | RESPIRATION RATE: 18 BRPM

## 2025-02-10 PROCEDURE — G0151 HHCP-SERV OF PT,EA 15 MIN: HCPCS | Mod: HHH

## 2025-02-10 SDOH — HEALTH STABILITY: PHYSICAL HEALTH: PHYSICAL EXERCISE: 10

## 2025-02-10 SDOH — HEALTH STABILITY: PHYSICAL HEALTH: EXERCISE ACTIVITY: APS, MARCHING, LAQ, HIP ABD/ADD

## 2025-02-10 SDOH — HEALTH STABILITY: PHYSICAL HEALTH: PHYSICAL EXERCISE: SITTING

## 2025-02-10 SDOH — HEALTH STABILITY: PHYSICAL HEALTH: EXERCISE ACTIVITIES SETS: 1

## 2025-02-10 SDOH — HEALTH STABILITY: PHYSICAL HEALTH

## 2025-02-10 ASSESSMENT — ACTIVITIES OF DAILY LIVING (ADL)
AMBULATION ASSISTANCE ON FLAT SURFACES: 1
AMBULATION_DISTANCE/DURATION_TOLERATED: 125 FT

## 2025-02-10 ASSESSMENT — ENCOUNTER SYMPTOMS
OCCASIONAL FEELINGS OF UNSTEADINESS: 0
DENIES PAIN: 1

## 2025-02-11 ENCOUNTER — HOME CARE VISIT (OUTPATIENT)
Dept: HOME HEALTH SERVICES | Facility: HOME HEALTH | Age: 65
End: 2025-02-11
Payer: MEDICARE

## 2025-02-11 ENCOUNTER — OFFICE VISIT (OUTPATIENT)
Dept: HEMATOLOGY/ONCOLOGY | Facility: HOSPITAL | Age: 65
End: 2025-02-11
Payer: MEDICARE

## 2025-02-11 ENCOUNTER — APPOINTMENT (OUTPATIENT)
Dept: OTHER | Facility: HOSPITAL | Age: 65
End: 2025-02-11
Payer: MEDICARE

## 2025-02-11 ENCOUNTER — LAB (OUTPATIENT)
Dept: LAB | Facility: HOSPITAL | Age: 65
End: 2025-02-11
Payer: MEDICARE

## 2025-02-11 VITALS
OXYGEN SATURATION: 96 % | DIASTOLIC BLOOD PRESSURE: 70 MMHG | TEMPERATURE: 97.2 F | SYSTOLIC BLOOD PRESSURE: 133 MMHG | RESPIRATION RATE: 16 BRPM | HEART RATE: 86 BPM

## 2025-02-11 DIAGNOSIS — D70.9 FEBRILE NEUTROPENIA (CMS-HCC): ICD-10-CM

## 2025-02-11 DIAGNOSIS — R50.9 FEVER, UNSPECIFIED: ICD-10-CM

## 2025-02-11 DIAGNOSIS — C83.78 BURKITT'S LYMPHOMA OF LYMPH NODES OF MULTIPLE REGIONS (MULTI): ICD-10-CM

## 2025-02-11 DIAGNOSIS — R50.81 FEBRILE NEUTROPENIA (CMS-HCC): ICD-10-CM

## 2025-02-11 PROBLEM — C83.30 DIFFUSE LARGE B CELL LYMPHOMA: Status: ACTIVE | Noted: 2025-02-11

## 2025-02-11 LAB
ALBUMIN SERPL BCP-MCNC: 2.8 G/DL (ref 3.4–5)
ALP SERPL-CCNC: 68 U/L (ref 33–136)
ALT SERPL W P-5'-P-CCNC: 17 U/L (ref 10–52)
ANION GAP SERPL CALC-SCNC: 11 MMOL/L (ref 10–20)
AST SERPL W P-5'-P-CCNC: 18 U/L (ref 9–39)
BASOPHILS # BLD MANUAL: 0 X10*3/UL (ref 0–0.1)
BASOPHILS NFR BLD MANUAL: 0 %
BILIRUB SERPL-MCNC: 0.8 MG/DL (ref 0–1.2)
BUN SERPL-MCNC: 14 MG/DL (ref 6–23)
CALCIUM SERPL-MCNC: 7.8 MG/DL (ref 8.6–10.3)
CHLORIDE SERPL-SCNC: 102 MMOL/L (ref 98–107)
CO2 SERPL-SCNC: 30 MMOL/L (ref 21–32)
CREAT SERPL-MCNC: 0.91 MG/DL (ref 0.5–1.3)
EGFRCR SERPLBLD CKD-EPI 2021: >90 ML/MIN/1.73M*2
EOSINOPHIL # BLD MANUAL: 0 X10*3/UL (ref 0–0.7)
EOSINOPHIL NFR BLD MANUAL: 0 %
ERYTHROCYTE [DISTWIDTH] IN BLOOD BY AUTOMATED COUNT: 15.2 % (ref 11.5–14.5)
GLUCOSE SERPL-MCNC: 106 MG/DL (ref 74–99)
HCT VFR BLD AUTO: 26 % (ref 41–52)
HGB BLD-MCNC: 8.6 G/DL (ref 13.5–17.5)
HYPOCHROMIA BLD QL SMEAR: ABNORMAL
IMM GRANULOCYTES # BLD AUTO: 1.74 X10*3/UL (ref 0–0.7)
IMM GRANULOCYTES NFR BLD AUTO: 15.3 % (ref 0–0.9)
LDH SERPL L TO P-CCNC: 412 U/L (ref 84–246)
LYMPHOCYTES # BLD MANUAL: 0.68 X10*3/UL (ref 1.2–4.8)
LYMPHOCYTES NFR BLD MANUAL: 6 %
MAGNESIUM SERPL-MCNC: 1.68 MG/DL (ref 1.6–2.4)
MCH RBC QN AUTO: 29.6 PG (ref 26–34)
MCHC RBC AUTO-ENTMCNC: 33.1 G/DL (ref 32–36)
MCV RBC AUTO: 89 FL (ref 80–100)
METAMYELOCYTES # BLD MANUAL: 1.03 X10*3/UL
METAMYELOCYTES NFR BLD MANUAL: 9 %
MONOCYTES # BLD MANUAL: 0.8 X10*3/UL (ref 0.1–1)
MONOCYTES NFR BLD MANUAL: 7 %
MYELOCYTES # BLD MANUAL: 0.23 X10*3/UL
MYELOCYTES NFR BLD MANUAL: 2 %
NEUTROPHILS # BLD MANUAL: 8.44 X10*3/UL (ref 1.2–7.7)
NEUTS BAND # BLD MANUAL: 1.6 X10*3/UL (ref 0–0.7)
NEUTS BAND NFR BLD MANUAL: 14 %
NEUTS SEG # BLD MANUAL: 6.84 X10*3/UL (ref 1.2–7)
NEUTS SEG NFR BLD MANUAL: 60 %
NRBC BLD-RTO: 1.6 /100 WBCS (ref 0–0)
OVALOCYTES BLD QL SMEAR: ABNORMAL
PLATELET # BLD AUTO: 127 X10*3/UL (ref 150–450)
POLYCHROMASIA BLD QL SMEAR: ABNORMAL
POTASSIUM SERPL-SCNC: 3.5 MMOL/L (ref 3.5–5.3)
PROMYELOCYTES # BLD MANUAL: 0.11 X10*3/UL
PROMYELOCYTES NFR BLD MANUAL: 1 %
PROT SERPL-MCNC: 5.2 G/DL (ref 6.4–8.2)
RBC # BLD AUTO: 2.91 X10*6/UL (ref 4.5–5.9)
RBC MORPH BLD: ABNORMAL
SODIUM SERPL-SCNC: 139 MMOL/L (ref 136–145)
TOTAL CELLS COUNTED BLD: 100
URATE SERPL-MCNC: 2.9 MG/DL (ref 4–7.5)
VANCOMYCIN SERPL-MCNC: 35.6 UG/ML (ref 5–20)
VARIANT LYMPHS # BLD MANUAL: 0.11 X10*3/UL (ref 0–0.5)
VARIANT LYMPHS NFR BLD: 1 %
WBC # BLD AUTO: 11.4 X10*3/UL (ref 4.4–11.3)

## 2025-02-11 PROCEDURE — 3075F SYST BP GE 130 - 139MM HG: CPT | Performed by: STUDENT IN AN ORGANIZED HEALTH CARE EDUCATION/TRAINING PROGRAM

## 2025-02-11 PROCEDURE — 80053 COMPREHEN METABOLIC PANEL: CPT

## 2025-02-11 PROCEDURE — 85027 COMPLETE CBC AUTOMATED: CPT

## 2025-02-11 PROCEDURE — 1123F ACP DISCUSS/DSCN MKR DOCD: CPT | Performed by: STUDENT IN AN ORGANIZED HEALTH CARE EDUCATION/TRAINING PROGRAM

## 2025-02-11 PROCEDURE — 83615 LACTATE (LD) (LDH) ENZYME: CPT

## 2025-02-11 PROCEDURE — 85007 BL SMEAR W/DIFF WBC COUNT: CPT

## 2025-02-11 PROCEDURE — 99215 OFFICE O/P EST HI 40 MIN: CPT | Performed by: STUDENT IN AN ORGANIZED HEALTH CARE EDUCATION/TRAINING PROGRAM

## 2025-02-11 PROCEDURE — 1111F DSCHRG MED/CURRENT MED MERGE: CPT | Performed by: STUDENT IN AN ORGANIZED HEALTH CARE EDUCATION/TRAINING PROGRAM

## 2025-02-11 PROCEDURE — 83735 ASSAY OF MAGNESIUM: CPT

## 2025-02-11 PROCEDURE — G2211 COMPLEX E/M VISIT ADD ON: HCPCS | Performed by: STUDENT IN AN ORGANIZED HEALTH CARE EDUCATION/TRAINING PROGRAM

## 2025-02-11 PROCEDURE — 1126F AMNT PAIN NOTED NONE PRSNT: CPT | Performed by: STUDENT IN AN ORGANIZED HEALTH CARE EDUCATION/TRAINING PROGRAM

## 2025-02-11 PROCEDURE — 80202 ASSAY OF VANCOMYCIN: CPT

## 2025-02-11 PROCEDURE — 84550 ASSAY OF BLOOD/URIC ACID: CPT

## 2025-02-11 PROCEDURE — 3078F DIAST BP <80 MM HG: CPT | Performed by: STUDENT IN AN ORGANIZED HEALTH CARE EDUCATION/TRAINING PROGRAM

## 2025-02-11 PROCEDURE — 1157F ADVNC CARE PLAN IN RCRD: CPT | Performed by: STUDENT IN AN ORGANIZED HEALTH CARE EDUCATION/TRAINING PROGRAM

## 2025-02-11 ASSESSMENT — PAIN SCALES - GENERAL: PAINLEVEL_OUTOF10: 0-NO PAIN

## 2025-02-11 NOTE — PROGRESS NOTES
seen in clinic for 1st time. with Dr. Mckeon. patient stated he was given chemo rx bag?with info on EPOCH--RITUXAN AND NEULASTA?  asked family to bring info  in so I can review with him. .black folder given to help organize his plan.  Along with tel  number and dr's card.    ADMIT 2/14/2025 FOR CYCLE 2--info given on how to call for a bed-order placed   Plan Rituxan  and Neulasta 2/19/25  NEED PET SCAN AROUND 2/24/25,  F/UP DR MCKEON 3/4/2025    PATIENT receiving home antibiotics   for a total 4 weeks    home nurse is planned for tomorrow. Reminded patient and family to call  about any fevers,drainage ,or redness at site. Dresg needs changed q 7 days   Reminded to  drink fluids  and not to take nsaids or tylenol if has a fever greater  than 100.4F needs to call or go to ER ..Gabi Morillo RN

## 2025-02-12 ENCOUNTER — HOME CARE VISIT (OUTPATIENT)
Dept: HOME HEALTH SERVICES | Facility: HOME HEALTH | Age: 65
End: 2025-02-12
Payer: MEDICARE

## 2025-02-12 ENCOUNTER — TELEPHONE (OUTPATIENT)
Dept: INFECTIOUS DISEASES | Facility: HOSPITAL | Age: 65
End: 2025-02-12
Payer: MEDICARE

## 2025-02-12 VITALS
DIASTOLIC BLOOD PRESSURE: 82 MMHG | OXYGEN SATURATION: 97 % | RESPIRATION RATE: 18 BRPM | TEMPERATURE: 98.2 F | SYSTOLIC BLOOD PRESSURE: 137 MMHG | HEART RATE: 79 BPM

## 2025-02-12 DIAGNOSIS — C83.78 BURKITT'S LYMPHOMA OF LYMPH NODES OF MULTIPLE REGIONS (MULTI): Primary | ICD-10-CM

## 2025-02-12 PROCEDURE — G0299 HHS/HOSPICE OF RN EA 15 MIN: HCPCS | Mod: HHH

## 2025-02-12 RX ORDER — PREDNISONE 20 MG/1
140 TABLET ORAL 2 TIMES DAILY
Status: CANCELLED | OUTPATIENT
Start: 2025-02-14

## 2025-02-12 RX ORDER — PROCHLORPERAZINE MALEATE 10 MG
10 TABLET ORAL EVERY 6 HOURS PRN
Status: CANCELLED | OUTPATIENT
Start: 2025-02-14

## 2025-02-12 RX ORDER — DIPHENHYDRAMINE HCL 50 MG
50 CAPSULE ORAL ONCE
Status: CANCELLED | OUTPATIENT
Start: 2025-02-20

## 2025-02-12 RX ORDER — PROCHLORPERAZINE EDISYLATE 5 MG/ML
10 INJECTION INTRAMUSCULAR; INTRAVENOUS EVERY 6 HOURS PRN
Status: CANCELLED | OUTPATIENT
Start: 2025-02-14

## 2025-02-12 RX ORDER — FAMOTIDINE 10 MG/ML
20 INJECTION, SOLUTION INTRAVENOUS ONCE AS NEEDED
Status: CANCELLED | OUTPATIENT
Start: 2025-02-20

## 2025-02-12 RX ORDER — OLANZAPINE 5 MG/1
5 TABLET ORAL NIGHTLY
Status: CANCELLED | OUTPATIENT
Start: 2025-02-14

## 2025-02-12 RX ORDER — DIPHENHYDRAMINE HYDROCHLORIDE 50 MG/ML
50 INJECTION INTRAMUSCULAR; INTRAVENOUS AS NEEDED
Status: CANCELLED | OUTPATIENT
Start: 2025-02-20

## 2025-02-12 RX ORDER — EPINEPHRINE 1 MG/ML
0.3 INJECTION INTRAMUSCULAR; INTRAVENOUS; SUBCUTANEOUS EVERY 5 MIN PRN
Status: CANCELLED | OUTPATIENT
Start: 2025-02-14

## 2025-02-12 RX ORDER — FAMOTIDINE 10 MG/ML
20 INJECTION, SOLUTION INTRAVENOUS AS NEEDED
Status: CANCELLED | OUTPATIENT
Start: 2025-02-14

## 2025-02-12 RX ORDER — ACETAMINOPHEN 325 MG/1
650 TABLET ORAL ONCE
Status: CANCELLED | OUTPATIENT
Start: 2025-02-20

## 2025-02-12 RX ORDER — EPINEPHRINE 0.3 MG/.3ML
0.3 INJECTION SUBCUTANEOUS EVERY 5 MIN PRN
Status: CANCELLED | OUTPATIENT
Start: 2025-02-20

## 2025-02-12 RX ORDER — PROCHLORPERAZINE EDISYLATE 5 MG/ML
10 INJECTION INTRAMUSCULAR; INTRAVENOUS EVERY 6 HOURS PRN
Status: CANCELLED | OUTPATIENT
Start: 2025-02-20

## 2025-02-12 RX ORDER — ALBUTEROL SULFATE 0.83 MG/ML
3 SOLUTION RESPIRATORY (INHALATION) AS NEEDED
Status: CANCELLED | OUTPATIENT
Start: 2025-02-14

## 2025-02-12 RX ORDER — ALBUTEROL SULFATE 0.83 MG/ML
3 SOLUTION RESPIRATORY (INHALATION) AS NEEDED
Status: CANCELLED | OUTPATIENT
Start: 2025-02-20

## 2025-02-12 RX ORDER — PROCHLORPERAZINE MALEATE 10 MG
10 TABLET ORAL EVERY 6 HOURS PRN
Status: CANCELLED | OUTPATIENT
Start: 2025-02-20

## 2025-02-12 RX ORDER — DIPHENHYDRAMINE HYDROCHLORIDE 50 MG/ML
50 INJECTION INTRAMUSCULAR; INTRAVENOUS AS NEEDED
Status: CANCELLED | OUTPATIENT
Start: 2025-02-14

## 2025-02-12 ASSESSMENT — ENCOUNTER SYMPTOMS
SORE THROAT: 1
PERSON REPORTING PAIN: PATIENT
DENIES PAIN: 1
CHANGE IN APPETITE: UNCHANGED
LAST BOWEL MOVEMENT: 67248
APPETITE LEVEL: FAIR

## 2025-02-12 NOTE — PROGRESS NOTES
Patient ID: Bijan Ibanez is a 64 y.o. male.  Referring Physician: Terrence Veras, APRN-CNP  [address] [place] [address]  Primary Care Provider: Nehal Murphy MD     Diagnosis: Burkitt's Lymphoma  Date of Diagnosis: 1/16/25  Stage at Diagnosis: IV  Risk category: high risk (marrow involvement)    Prior Treatments:    Current Treaments:  DA-R-EPOCH C1D1 1/21/25    Assessment/Plan    Bijan Ibanez is a 64 y.o. male with PMH of HTN, HLD, CAD, MI (s/p stent 2010, on ASA), hx renal cell carcinoma (s/p R partial nephrectomy in 2013 @ [facility]), GERD, BPH, arthritis who is admitted for further evaluation and management of newly diagnosed Burkitt's lymphoma    # Burkitt's Lymphoma - Originally signed out as high grade lymphoma but after burkitt's rearrangement (t8;14) came back positive. High risk based on marrow involvement, though no CNS involvement  - previously reviewed diagnosis and prognosis with patient  - initially give DA-R-EPOCH when was signed out as high grade lymphoma, given the significant toxicity he had with DA R EPOCH, will not escalate to CODOX-IVAC or hyper-CVAD given no CNS involvement  - given G3/4 mucositis, will reduce etoposide by 25%, keep EPOCH at Dose level 0    # CNS prophylaxis  - given dose of IT methotrexate in hospital, will plan to give additional doses starting with C4    # MRSA bacteremia  - currently on IV Vanc for 4 weeks until 3/3, discussed with ID, felt that while we could not fully rule out that there wasn't septic thrombus on his PICC line, the most likely was MRSA pneumonia and bacteremia from this thus we mutually agreed to resume chemotherapy after 2 total weeks of IV Vanco (2/14) but will still complete 4 weeks        No problem-specific Assessment & Plan notes found for this encounter.    ____________________________________________________________________________________________________________________    HISTORY  Per HPI from inpatient:  Bijan Ibanez is a 64  y.o. male with PMH of HTN, HLD, CAD, MI (s/p stent 2010, on ASA), hx renal cell carcinoma (s/p R partial nephrectomy in 2013 @ [facility]), GERD, BPH, arthritis who is admitted for further evaluation and management of newly diagnosed high grade B-cell lymphoma.      Patient recently admitted 1/11-1/17 after concerning imaging outpatient leading to c/f relapsed RCC vs new other malignancy.      Patient endorses recent history of constitutional symptoms, including night sweats, weight loss, decreased appetite, fatigue, OSORIO. Patient and wife both developed URI symptoms after [place], however, the wife's symptoms resolved while the patient's did not. On admit, continues to have low back pain, that improves with oxycodone, and is worse with movement. Reports an episode of right flank pain yesterday that was very painful but resolved after pain meds and has not yet recurred. Reporting chin and lip numbness that has been present since before [place]. Also reports right parotid swelling, that has sometimes appeared to have improved but will then return to same size, but has not continued to enlarge; sometimes tender to touch, sometimes not. Had a left toothache at some point that resolved and has not recurred. Patient denies saddle anesthesia, loss of lower extremity function or loss of bladder/bowel function. Denies CP, SOB, abd pain, N/V/D/C.     Interval Hx  Since discharge doing much better. Eating better. Moving around a bit more though having some fatigue. No significant N/V, no diarrhea, no further fevers    Bijan Ibanez  reports that he has quit smoking. His smoking use included cigarettes and pipe. He has never used smokeless tobacco.  He  reports no history of alcohol use.  He  reports no history of drug use.  Family History   Problem Relation Name Age of Onset    Coronary artery disease Mother      Alzheimer's disease Mother      Coronary artery disease Father      Skin cancer Father      Alzheimer's disease  Father      Alzheimer's disease Mother's Brother      Alzheimer's disease Father's Brother      Stomach cancer Maternal Grandfather      Other cancer Paternal Grandfather          prostate or colon cancer    Heart disease Other       Oncology History   Burkitt's lymphoma of lymph nodes of multiple regions (Multi)   1/13/2025 Initial Diagnosis    Diagnosis: Burkitt Lymphoma, [place] Stage IV, BL-IPI High-Risk (score 4/5).    BL-IPI Calculation:  Age >60 years: Yes (64 years old).  Performance status (ECOG >=2): Presumed based on clinical presentation requiring hospitalization.  Serum LDH >ULN: 4102 U/L (1/11/25)  [place] Stage III/IV: Yes (stage IV with extranodal involvement including kidneys, liver, spleen, and musculature).  CNS involvement: No (MRI and LP negative).  Total Score: 4/5 (High-Risk).    Presenting Symptoms: Asymmetric swelling of the right-sided muscles of mastication; imaging revealed incidental findings of perihilar mass and renal lesions.    Labs at Diagnosis: WBC 4.0, platelets 72; LDH 4102 U/L (1/11/25)    Pathology:  EBUS with lymph node biopsy (1/13/25): Predominantly CD20-positive small lymphoid cells, raising suspicion for a B-cell lymphoproliferative process.  Bone marrow biopsy (1/16/25): 70-80% involvement by high-grade B-cell lymphoma in a hypercellular marrow (90% cellular) with maturing trilineage hematopoiesis. FISH confirmed t(8;14)/IGH::MYC rearrangement, diagnostic of Burkitt lymphoma.     Imaging:  CT Neck (1/9/25): Fusiform thickening of right masticatory muscles, likely benign hypertrophy.  CT C/A/P (1/11/25): Left perihilar mass, pulmonary nodules, right renal lesions, and right adrenal gland thickening concerning for metastatic disease; T12-L1 soft tissue mass.  PET-CT (1/20): Widespread hermann and extranodal hypermetabolic involvement, including kidneys, liver, spleen, abdominal wall, and musculature, suggestive of extensive lymphomatous disease.  MRI Brain (1/21): No  intracranial lymphoma; suspected osseous involvement of calvarium.    Treatment:  Dexamethasone 40 mg daily (1/20-1/21).  Prophylactic IT methotrexate via LP (1/22), flow negative for lymphoma.     1/21/2025 -  Chemotherapy    (INPT) Dose-Adjusted R-EPOCH (Etoposide / DOXOrubicin / VinCRIStine / Cyclophosphamide / PredniSONE) + RiTUXimab, 21 Day Cycles          Performance Status:  ECOG Score: 2- Ambulatory and  capable of all selfcare; unable to carry out work activities.  Up and about > 50% of waking hrs.   Karnofsky Score: 70 - Cares for self; unable to carry on normal activity or do normal work     Objective   BSA: There is no height or weight on file to calculate BSA.  /70 (BP Location: Right arm, Patient Position: Sitting, BP Cuff Size: Adult)   Pulse 86   Temp 36.2 °C (97.2 °F) (Temporal)   Resp 16   SpO2 96%   Physical Exam  GEN: Aox3, NAD  HEENT EOMI PERRLA sclera anicteric  CV RRR w/o m/r/g  Resp CTAB w/o w/r/r  GI Soft NTND+BS  Ext no LE edema  LN: some resdual small cervical adenopathy, no axillary or inguinal adenopathy       Path:      Component    FINAL DIAGNOSIS   A, B & C. BONE MARROW CLOT WITH ASPIRATE AND CORE WITH TOUCH PREP, LEFT ILIAC CREST:       -- WITH INVOLVEMENT BY HIGH GRADE B-CELL LYMPHOMA, 70-80% OF MARROW CELLULAR ELEMENTS  -- HYPERCELLULAR BONE MARROW (90% CELLULAR) WITH MATURING TRILINEAGE HEMATOPOIESIS   -- SEE NOTE     NOTE: The differential diagnosis includes Burkitt lymphoma and high grade B-cell lymphoma with MYC and Bcl2 and/or Bcl-6 rearrangements (double / triple hit lymphoma). FISH studies and lymphoid NGS panel are pending for further characterization. Clinical and molecular results correlation is suggested         Component    ISCN    Results:    FISH POSITIVE for t(8;14)/ IGH::MYC rearrangement.  nuc  kieran(D8Z2x2,MYCx3,IGHx3)(MYC con IGHx2)[31/250]/(D8Z2x2,MYCx2,IGHx2)(MYC con IGHx1)[8/250]       Imaging:  PET 1/20/25  IMPRESSION:  Widespread hermann as well as  extranodal lymphomatous involvement.  1. Multiple hypermetabolic supra and infra diaphragmatic  lymphadenopathy as described, consistent with lymphomatous  involvement.  2. Intensely hypermetabolic activity within bilateral enlarged  submandibular and parotid glands as described, concerning for  lymphomatous involvement.  3. Hypermetabolic mass like infiltration within bilateral  kidneys(right> left), corresponding to lesion seen on CT dated  01/01/2025, compatible with lymphomatous involvement.  4. Hypermetabolic hepatic lesions without any underlying CT  correlate, consistent with lymphomatous involvement.  5. Splenomegaly with nonspecific hypermetabolic activity suggestive  of extranodal involvement.  6. Multiple hypermetabolic soft tissue mesenteric deposits as well as  anterior abdominal wall nodular deposits, with few of the lesions  without any underlying CT correlate, concerning for additional  lymphomatous involvement.  7. Metabolic activity within the stomach, bilateral arms, anterior  chest wall and gluteal muscles without underlying CT correlate,  concerning for lymphomatous involvement.  Torey Mckeon, DO

## 2025-02-14 ENCOUNTER — HOSPITAL ENCOUNTER (INPATIENT)
Facility: HOSPITAL | Age: 65
DRG: 847 | End: 2025-02-14
Attending: STUDENT IN AN ORGANIZED HEALTH CARE EDUCATION/TRAINING PROGRAM
Payer: MEDICARE

## 2025-02-14 ENCOUNTER — HOME CARE VISIT (OUTPATIENT)
Dept: HOME HEALTH SERVICES | Facility: HOME HEALTH | Age: 65
End: 2025-02-14
Payer: MEDICARE

## 2025-02-14 DIAGNOSIS — R78.81 BACTEREMIA DUE TO METHICILLIN RESISTANT STAPHYLOCOCCUS AUREUS: ICD-10-CM

## 2025-02-14 DIAGNOSIS — C83.78 BURKITT'S LYMPHOMA OF LYMPH NODES OF MULTIPLE REGIONS (MULTI): ICD-10-CM

## 2025-02-14 DIAGNOSIS — I48.91 NEW ONSET A-FIB (MULTI): ICD-10-CM

## 2025-02-14 DIAGNOSIS — K12.30 MUCOSITIS: ICD-10-CM

## 2025-02-14 DIAGNOSIS — C83.30 DIFFUSE LARGE B-CELL LYMPHOMA, UNSPECIFIED BODY REGION (MULTI): ICD-10-CM

## 2025-02-14 DIAGNOSIS — R10.84 GENERALIZED ABDOMINAL PAIN: ICD-10-CM

## 2025-02-14 DIAGNOSIS — Z51.11 ENCOUNTER FOR ANTINEOPLASTIC CHEMOTHERAPY: Primary | ICD-10-CM

## 2025-02-14 DIAGNOSIS — B95.62 BACTEREMIA DUE TO METHICILLIN RESISTANT STAPHYLOCOCCUS AUREUS: ICD-10-CM

## 2025-02-14 LAB
ALBUMIN SERPL BCP-MCNC: 2.8 G/DL (ref 3.4–5)
ALP SERPL-CCNC: 54 U/L (ref 33–136)
ALT SERPL W P-5'-P-CCNC: 33 U/L (ref 10–52)
ANION GAP SERPL CALC-SCNC: 11 MMOL/L (ref 10–20)
AST SERPL W P-5'-P-CCNC: 27 U/L (ref 9–39)
BASOPHILS # BLD MANUAL: 0.07 X10*3/UL (ref 0–0.1)
BASOPHILS NFR BLD MANUAL: 1 %
BILIRUB SERPL-MCNC: 0.8 MG/DL (ref 0–1.2)
BUN SERPL-MCNC: 18 MG/DL (ref 6–23)
CALCIUM SERPL-MCNC: 8.1 MG/DL (ref 8.6–10.6)
CHLORIDE SERPL-SCNC: 103 MMOL/L (ref 98–107)
CO2 SERPL-SCNC: 28 MMOL/L (ref 21–32)
CREAT SERPL-MCNC: 0.75 MG/DL (ref 0.5–1.3)
DACRYOCYTES BLD QL SMEAR: ABNORMAL
EGFRCR SERPLBLD CKD-EPI 2021: >90 ML/MIN/1.73M*2
EOSINOPHIL # BLD MANUAL: 0 X10*3/UL (ref 0–0.7)
EOSINOPHIL NFR BLD MANUAL: 0 %
ERYTHROCYTE [DISTWIDTH] IN BLOOD BY AUTOMATED COUNT: 15.2 % (ref 11.5–14.5)
GLUCOSE SERPL-MCNC: 96 MG/DL (ref 74–99)
HCT VFR BLD AUTO: 22.9 % (ref 41–52)
HGB BLD-MCNC: 7.3 G/DL (ref 13.5–17.5)
IMM GRANULOCYTES # BLD AUTO: 0.62 X10*3/UL (ref 0–0.7)
IMM GRANULOCYTES NFR BLD AUTO: 9.5 % (ref 0–0.9)
LDH SERPL L TO P-CCNC: 300 U/L (ref 84–246)
LYMPHOCYTES # BLD MANUAL: 0.26 X10*3/UL (ref 1.2–4.8)
LYMPHOCYTES NFR BLD MANUAL: 4 %
MAGNESIUM SERPL-MCNC: 1.62 MG/DL (ref 1.6–2.4)
MCH RBC QN AUTO: 29 PG (ref 26–34)
MCHC RBC AUTO-ENTMCNC: 31.9 G/DL (ref 32–36)
MCV RBC AUTO: 91 FL (ref 80–100)
METAMYELOCYTES # BLD MANUAL: 0.13 X10*3/UL
METAMYELOCYTES NFR BLD MANUAL: 2 %
MONOCYTES # BLD MANUAL: 0 X10*3/UL (ref 0.1–1)
MONOCYTES NFR BLD MANUAL: 0 %
MYELOCYTES # BLD MANUAL: 0.13 X10*3/UL
MYELOCYTES NFR BLD MANUAL: 2 %
NEUTROPHILS # BLD MANUAL: 5.79 X10*3/UL (ref 1.2–7.7)
NEUTS BAND # BLD MANUAL: 0.52 X10*3/UL (ref 0–0.7)
NEUTS BAND NFR BLD MANUAL: 8 %
NEUTS SEG # BLD MANUAL: 5.27 X10*3/UL (ref 1.2–7)
NEUTS SEG NFR BLD MANUAL: 81 %
NRBC BLD-RTO: 1.5 /100 WBCS (ref 0–0)
OVALOCYTES BLD QL SMEAR: ABNORMAL
PLATELET # BLD AUTO: 165 X10*3/UL (ref 150–450)
POTASSIUM SERPL-SCNC: 3.3 MMOL/L (ref 3.5–5.3)
PROMYELOCYTES # BLD MANUAL: 0.13 X10*3/UL
PROMYELOCYTES NFR BLD MANUAL: 2 %
PROT SERPL-MCNC: 5.3 G/DL (ref 6.4–8.2)
RBC # BLD AUTO: 2.52 X10*6/UL (ref 4.5–5.9)
RBC MORPH BLD: ABNORMAL
SODIUM SERPL-SCNC: 139 MMOL/L (ref 136–145)
TOTAL CELLS COUNTED BLD: 100
URATE SERPL-MCNC: 2.4 MG/DL (ref 4–7.5)
WBC # BLD AUTO: 6.5 X10*3/UL (ref 4.4–11.3)

## 2025-02-14 PROCEDURE — 99223 1ST HOSP IP/OBS HIGH 75: CPT

## 2025-02-14 PROCEDURE — 2500000001 HC RX 250 WO HCPCS SELF ADMINISTERED DRUGS (ALT 637 FOR MEDICARE OP)

## 2025-02-14 PROCEDURE — 3E04305 INTRODUCTION OF OTHER ANTINEOPLASTIC INTO CENTRAL VEIN, PERCUTANEOUS APPROACH: ICD-10-PCS | Performed by: STUDENT IN AN ORGANIZED HEALTH CARE EDUCATION/TRAINING PROGRAM

## 2025-02-14 PROCEDURE — 1170000001 HC PRIVATE ONCOLOGY ROOM DAILY

## 2025-02-14 PROCEDURE — 83615 LACTATE (LD) (LDH) ENZYME: CPT

## 2025-02-14 PROCEDURE — 2500000004 HC RX 250 GENERAL PHARMACY W/ HCPCS (ALT 636 FOR OP/ED)

## 2025-02-14 PROCEDURE — 84550 ASSAY OF BLOOD/URIC ACID: CPT

## 2025-02-14 PROCEDURE — 83735 ASSAY OF MAGNESIUM: CPT

## 2025-02-14 PROCEDURE — 85027 COMPLETE CBC AUTOMATED: CPT

## 2025-02-14 PROCEDURE — 85007 BL SMEAR W/DIFF WBC COUNT: CPT

## 2025-02-14 PROCEDURE — 2500000002 HC RX 250 W HCPCS SELF ADMINISTERED DRUGS (ALT 637 FOR MEDICARE OP, ALT 636 FOR OP/ED): Performed by: STUDENT IN AN ORGANIZED HEALTH CARE EDUCATION/TRAINING PROGRAM

## 2025-02-14 PROCEDURE — 2500000004 HC RX 250 GENERAL PHARMACY W/ HCPCS (ALT 636 FOR OP/ED): Performed by: STUDENT IN AN ORGANIZED HEALTH CARE EDUCATION/TRAINING PROGRAM

## 2025-02-14 PROCEDURE — 84075 ASSAY ALKALINE PHOSPHATASE: CPT

## 2025-02-14 RX ORDER — EPINEPHRINE 1 MG/ML
0.3 INJECTION, SOLUTION, CONCENTRATE INTRAVENOUS EVERY 5 MIN PRN
Status: DISCONTINUED | OUTPATIENT
Start: 2025-02-14 | End: 2025-02-19 | Stop reason: HOSPADM

## 2025-02-14 RX ORDER — GABAPENTIN 300 MG/1
300 CAPSULE ORAL NIGHTLY
Status: DISCONTINUED | OUTPATIENT
Start: 2025-02-14 | End: 2025-02-19 | Stop reason: HOSPADM

## 2025-02-14 RX ORDER — ACYCLOVIR 400 MG/1
400 TABLET ORAL EVERY 12 HOURS SCHEDULED
Status: DISCONTINUED | OUTPATIENT
Start: 2025-02-14 | End: 2025-02-19 | Stop reason: HOSPADM

## 2025-02-14 RX ORDER — PROCHLORPERAZINE EDISYLATE 5 MG/ML
10 INJECTION INTRAMUSCULAR; INTRAVENOUS EVERY 6 HOURS PRN
Status: DISCONTINUED | OUTPATIENT
Start: 2025-02-14 | End: 2025-02-19 | Stop reason: HOSPADM

## 2025-02-14 RX ORDER — ALBUTEROL SULFATE 0.83 MG/ML
3 SOLUTION RESPIRATORY (INHALATION) AS NEEDED
Status: DISCONTINUED | OUTPATIENT
Start: 2025-02-14 | End: 2025-02-19 | Stop reason: HOSPADM

## 2025-02-14 RX ORDER — ALLOPURINOL 300 MG/1
300 TABLET ORAL DAILY
Status: DISCONTINUED | OUTPATIENT
Start: 2025-02-15 | End: 2025-02-19 | Stop reason: HOSPADM

## 2025-02-14 RX ORDER — METOPROLOL SUCCINATE 50 MG/1
100 TABLET, EXTENDED RELEASE ORAL DAILY
Status: DISCONTINUED | OUTPATIENT
Start: 2025-02-14 | End: 2025-02-19 | Stop reason: HOSPADM

## 2025-02-14 RX ORDER — LISINOPRIL 10 MG/1
10 TABLET ORAL DAILY
Status: DISCONTINUED | OUTPATIENT
Start: 2025-02-14 | End: 2025-02-19 | Stop reason: HOSPADM

## 2025-02-14 RX ORDER — ATORVASTATIN CALCIUM 40 MG/1
40 TABLET, FILM COATED ORAL
Status: DISCONTINUED | OUTPATIENT
Start: 2025-02-15 | End: 2025-02-19 | Stop reason: HOSPADM

## 2025-02-14 RX ORDER — OXYCODONE HYDROCHLORIDE 5 MG/1
5 TABLET ORAL EVERY 4 HOURS PRN
Status: DISCONTINUED | OUTPATIENT
Start: 2025-02-14 | End: 2025-02-19 | Stop reason: HOSPADM

## 2025-02-14 RX ORDER — BACLOFEN 10 MG/1
10 TABLET ORAL 3 TIMES DAILY
Status: DISCONTINUED | OUTPATIENT
Start: 2025-02-14 | End: 2025-02-16

## 2025-02-14 RX ORDER — FUROSEMIDE 20 MG/1
20 TABLET ORAL DAILY PRN
Status: DISCONTINUED | OUTPATIENT
Start: 2025-02-14 | End: 2025-02-19 | Stop reason: HOSPADM

## 2025-02-14 RX ORDER — FLUCONAZOLE 200 MG/1
400 TABLET ORAL DAILY
Status: DISCONTINUED | OUTPATIENT
Start: 2025-02-14 | End: 2025-02-19 | Stop reason: HOSPADM

## 2025-02-14 RX ORDER — DIPHENHYDRAMINE HYDROCHLORIDE 50 MG/ML
50 INJECTION INTRAMUSCULAR; INTRAVENOUS AS NEEDED
Status: DISCONTINUED | OUTPATIENT
Start: 2025-02-14 | End: 2025-02-19 | Stop reason: HOSPADM

## 2025-02-14 RX ORDER — LIDOCAINE HYDROCHLORIDE 20 MG/ML
1.25 SOLUTION OROPHARYNGEAL AS NEEDED
Status: DISCONTINUED | OUTPATIENT
Start: 2025-02-14 | End: 2025-02-14

## 2025-02-14 RX ORDER — FAMOTIDINE 10 MG/ML
20 INJECTION, SOLUTION INTRAVENOUS AS NEEDED
Status: DISCONTINUED | OUTPATIENT
Start: 2025-02-14 | End: 2025-02-19 | Stop reason: HOSPADM

## 2025-02-14 RX ORDER — ONDANSETRON HYDROCHLORIDE 8 MG/1
16 TABLET, FILM COATED ORAL ONCE
Status: COMPLETED | OUTPATIENT
Start: 2025-02-14 | End: 2025-02-14

## 2025-02-14 RX ORDER — POTASSIUM CHLORIDE 20 MEQ/1
40 TABLET, EXTENDED RELEASE ORAL ONCE
Status: DISCONTINUED | OUTPATIENT
Start: 2025-02-14 | End: 2025-02-14

## 2025-02-14 RX ORDER — ALBUTEROL SULFATE 90 UG/1
2 INHALANT RESPIRATORY (INHALATION) EVERY 6 HOURS PRN
Status: DISCONTINUED | OUTPATIENT
Start: 2025-02-14 | End: 2025-02-19 | Stop reason: HOSPADM

## 2025-02-14 RX ORDER — VANCOMYCIN HYDROCHLORIDE 1 G/20ML
1500 INJECTION, POWDER, LYOPHILIZED, FOR SOLUTION INTRAVENOUS EVERY 12 HOURS
Status: DISCONTINUED | OUTPATIENT
Start: 2025-02-14 | End: 2025-02-14

## 2025-02-14 RX ORDER — MAGNESIUM SULFATE HEPTAHYDRATE 40 MG/ML
2 INJECTION, SOLUTION INTRAVENOUS ONCE
Status: DISCONTINUED | OUTPATIENT
Start: 2025-02-14 | End: 2025-02-14

## 2025-02-14 RX ORDER — VANCOMYCIN HYDROCHLORIDE 1 G/20ML
INJECTION, POWDER, LYOPHILIZED, FOR SOLUTION INTRAVENOUS DAILY PRN
Status: DISCONTINUED | OUTPATIENT
Start: 2025-02-14 | End: 2025-02-19 | Stop reason: HOSPADM

## 2025-02-14 RX ORDER — OLANZAPINE 5 MG/1
5 TABLET ORAL NIGHTLY
Status: DISCONTINUED | OUTPATIENT
Start: 2025-02-14 | End: 2025-02-19 | Stop reason: HOSPADM

## 2025-02-14 RX ORDER — PROCHLORPERAZINE MALEATE 10 MG
10 TABLET ORAL EVERY 6 HOURS PRN
Status: DISCONTINUED | OUTPATIENT
Start: 2025-02-14 | End: 2025-02-19 | Stop reason: HOSPADM

## 2025-02-14 RX ORDER — POTASSIUM CHLORIDE 1.5 G/1.58G
40 POWDER, FOR SOLUTION ORAL ONCE
Status: COMPLETED | OUTPATIENT
Start: 2025-02-14 | End: 2025-02-14

## 2025-02-14 RX ORDER — PANTOPRAZOLE SODIUM 40 MG/1
40 TABLET, DELAYED RELEASE ORAL
Status: DISCONTINUED | OUTPATIENT
Start: 2025-02-15 | End: 2025-02-19 | Stop reason: HOSPADM

## 2025-02-14 RX ORDER — VANCOMYCIN HYDROCHLORIDE 1 G/200ML
1000 INJECTION, SOLUTION INTRAVENOUS EVERY 24 HOURS
Status: DISCONTINUED | OUTPATIENT
Start: 2025-02-14 | End: 2025-02-16 | Stop reason: DRUGHIGH

## 2025-02-14 RX ADMIN — GABAPENTIN 300 MG: 300 CAPSULE ORAL at 20:55

## 2025-02-14 RX ADMIN — VANCOMYCIN HYDROCHLORIDE 1000 MG: 1 INJECTION, SOLUTION INTRAVENOUS at 21:17

## 2025-02-14 RX ADMIN — SODIUM CHLORIDE 23.5 MG: 9 INJECTION, SOLUTION INTRAVENOUS at 21:07

## 2025-02-14 RX ADMIN — POTASSIUM CHLORIDE 40 MEQ: 1.5 POWDER, FOR SOLUTION ORAL at 20:54

## 2025-02-14 RX ADMIN — OLANZAPINE 5 MG: 5 TABLET, FILM COATED ORAL at 20:54

## 2025-02-14 RX ADMIN — PREDNISONE 140 MG: 20 TABLET ORAL at 20:33

## 2025-02-14 RX ADMIN — ONDANSETRON HYDROCHLORIDE 16 MG: 8 TABLET, FILM COATED ORAL at 20:34

## 2025-02-14 RX ADMIN — APIXABAN 5 MG: 5 TABLET, FILM COATED ORAL at 21:00

## 2025-02-14 RX ADMIN — ACYCLOVIR 400 MG: 400 TABLET ORAL at 20:54

## 2025-02-14 RX ADMIN — FOSAPREPITANT 150 MG: 150 INJECTION, POWDER, LYOPHILIZED, FOR SOLUTION INTRAVENOUS at 20:33

## 2025-02-14 SDOH — SOCIAL STABILITY: SOCIAL INSECURITY: HAVE YOU HAD THOUGHTS OF HARMING ANYONE ELSE?: NO

## 2025-02-14 SDOH — SOCIAL STABILITY: SOCIAL INSECURITY: HAS ANYONE EVER THREATENED TO HURT YOUR FAMILY OR YOUR PETS?: NO

## 2025-02-14 SDOH — SOCIAL STABILITY: SOCIAL INSECURITY: WITHIN THE LAST YEAR, HAVE YOU BEEN HUMILIATED OR EMOTIONALLY ABUSED IN OTHER WAYS BY YOUR PARTNER OR EX-PARTNER?: NO

## 2025-02-14 SDOH — ECONOMIC STABILITY: HOUSING INSECURITY: IN THE LAST 12 MONTHS, WAS THERE A TIME WHEN YOU WERE NOT ABLE TO PAY THE MORTGAGE OR RENT ON TIME?: NO

## 2025-02-14 SDOH — ECONOMIC STABILITY: FOOD INSECURITY: HOW HARD IS IT FOR YOU TO PAY FOR THE VERY BASICS LIKE FOOD, HOUSING, MEDICAL CARE, AND HEATING?: NOT HARD AT ALL

## 2025-02-14 SDOH — SOCIAL STABILITY: SOCIAL INSECURITY: WITHIN THE LAST YEAR, HAVE YOU BEEN AFRAID OF YOUR PARTNER OR EX-PARTNER?: NO

## 2025-02-14 SDOH — SOCIAL STABILITY: SOCIAL INSECURITY: ARE THERE ANY APPARENT SIGNS OF INJURIES/BEHAVIORS THAT COULD BE RELATED TO ABUSE/NEGLECT?: NO

## 2025-02-14 SDOH — ECONOMIC STABILITY: FOOD INSECURITY: WITHIN THE PAST 12 MONTHS, YOU WORRIED THAT YOUR FOOD WOULD RUN OUT BEFORE YOU GOT THE MONEY TO BUY MORE.: NEVER TRUE

## 2025-02-14 SDOH — SOCIAL STABILITY: SOCIAL INSECURITY: WERE YOU ABLE TO COMPLETE ALL THE BEHAVIORAL HEALTH SCREENINGS?: YES

## 2025-02-14 SDOH — SOCIAL STABILITY: SOCIAL INSECURITY: HAVE YOU HAD ANY THOUGHTS OF HARMING ANYONE ELSE?: NO

## 2025-02-14 SDOH — HEALTH STABILITY: PHYSICAL HEALTH: ON AVERAGE, HOW MANY MINUTES DO YOU ENGAGE IN EXERCISE AT THIS LEVEL?: 0 MIN

## 2025-02-14 SDOH — ECONOMIC STABILITY: FOOD INSECURITY: WITHIN THE PAST 12 MONTHS, THE FOOD YOU BOUGHT JUST DIDN'T LAST AND YOU DIDN'T HAVE MONEY TO GET MORE.: NEVER TRUE

## 2025-02-14 SDOH — SOCIAL STABILITY: SOCIAL INSECURITY: ABUSE: ADULT

## 2025-02-14 SDOH — ECONOMIC STABILITY: HOUSING INSECURITY: AT ANY TIME IN THE PAST 12 MONTHS, WERE YOU HOMELESS OR LIVING IN A SHELTER (INCLUDING NOW)?: NO

## 2025-02-14 SDOH — ECONOMIC STABILITY: INCOME INSECURITY: IN THE PAST 12 MONTHS HAS THE ELECTRIC, GAS, OIL, OR WATER COMPANY THREATENED TO SHUT OFF SERVICES IN YOUR HOME?: NO

## 2025-02-14 SDOH — HEALTH STABILITY: PHYSICAL HEALTH: ON AVERAGE, HOW MANY DAYS PER WEEK DO YOU ENGAGE IN MODERATE TO STRENUOUS EXERCISE (LIKE A BRISK WALK)?: 0 DAYS

## 2025-02-14 SDOH — ECONOMIC STABILITY: TRANSPORTATION INSECURITY: IN THE PAST 12 MONTHS, HAS LACK OF TRANSPORTATION KEPT YOU FROM MEDICAL APPOINTMENTS OR FROM GETTING MEDICATIONS?: NO

## 2025-02-14 SDOH — SOCIAL STABILITY: SOCIAL INSECURITY: DO YOU FEEL UNSAFE GOING BACK TO THE PLACE WHERE YOU ARE LIVING?: NO

## 2025-02-14 SDOH — SOCIAL STABILITY: SOCIAL INSECURITY: ARE YOU OR HAVE YOU BEEN THREATENED OR ABUSED PHYSICALLY, EMOTIONALLY, OR SEXUALLY BY ANYONE?: NO

## 2025-02-14 SDOH — SOCIAL STABILITY: SOCIAL INSECURITY: DOES ANYONE TRY TO KEEP YOU FROM HAVING/CONTACTING OTHER FRIENDS OR DOING THINGS OUTSIDE YOUR HOME?: NO

## 2025-02-14 SDOH — ECONOMIC STABILITY: HOUSING INSECURITY: IN THE PAST 12 MONTHS, HOW MANY TIMES HAVE YOU MOVED WHERE YOU WERE LIVING?: 0

## 2025-02-14 SDOH — SOCIAL STABILITY: SOCIAL INSECURITY: DO YOU FEEL ANYONE HAS EXPLOITED OR TAKEN ADVANTAGE OF YOU FINANCIALLY OR OF YOUR PERSONAL PROPERTY?: NO

## 2025-02-14 ASSESSMENT — LIFESTYLE VARIABLES
HOW OFTEN DO YOU HAVE A DRINK CONTAINING ALCOHOL: NEVER
SKIP TO QUESTIONS 9-10: 1
HOW OFTEN DO YOU HAVE 6 OR MORE DRINKS ON ONE OCCASION: NEVER
AUDIT-C TOTAL SCORE: 0
HOW MANY STANDARD DRINKS CONTAINING ALCOHOL DO YOU HAVE ON A TYPICAL DAY: PATIENT DOES NOT DRINK
AUDIT-C TOTAL SCORE: 0

## 2025-02-14 ASSESSMENT — ACTIVITIES OF DAILY LIVING (ADL)
HEARING - LEFT EAR: HEARING AID
PATIENT'S MEMORY ADEQUATE TO SAFELY COMPLETE DAILY ACTIVITIES?: YES
JUDGMENT_ADEQUATE_SAFELY_COMPLETE_DAILY_ACTIVITIES: YES
HEARING - RIGHT EAR: HEARING AID
ADEQUATE_TO_COMPLETE_ADL: YES
GROOMING: INDEPENDENT
TOILETING: INDEPENDENT
WALKS IN HOME: INDEPENDENT
LACK_OF_TRANSPORTATION: NO
FEEDING YOURSELF: INDEPENDENT
DRESSING YOURSELF: INDEPENDENT
BATHING: INDEPENDENT

## 2025-02-14 ASSESSMENT — PAIN - FUNCTIONAL ASSESSMENT: PAIN_FUNCTIONAL_ASSESSMENT: 0-10

## 2025-02-14 ASSESSMENT — COGNITIVE AND FUNCTIONAL STATUS - GENERAL
MOBILITY SCORE: 22
PATIENT BASELINE BEDBOUND: NO
WALKING IN HOSPITAL ROOM: A LITTLE
CLIMB 3 TO 5 STEPS WITH RAILING: A LITTLE
DAILY ACTIVITIY SCORE: 24

## 2025-02-14 ASSESSMENT — ENCOUNTER SYMPTOMS
ENDOCRINE NEGATIVE: 1
HEMATOLOGIC/LYMPHATIC NEGATIVE: 1
PALPITATIONS: 0
ACTIVITY CHANGE: 0
ALLERGIC/IMMUNOLOGIC NEGATIVE: 1
WHEEZING: 0
APPETITE CHANGE: 0
CHILLS: 0
CONSTITUTIONAL NEGATIVE: 1
ABDOMINAL PAIN: 0
FATIGUE: 0
VOMITING: 0
BACK PAIN: 1
DIARRHEA: 0
COUGH: 0
PSYCHIATRIC NEGATIVE: 1
LIGHT-HEADEDNESS: 0
HEADACHES: 0
TROUBLE SWALLOWING: 0
RHINORRHEA: 0
FEVER: 0
SHORTNESS OF BREATH: 0
NEUROLOGICAL NEGATIVE: 1
CARDIOVASCULAR NEGATIVE: 1
CHEST TIGHTNESS: 0
EYES NEGATIVE: 1
GASTROINTESTINAL NEGATIVE: 1
SORE THROAT: 0
CONSTIPATION: 0
DIZZINESS: 0
RESPIRATORY NEGATIVE: 1
NAUSEA: 0

## 2025-02-14 ASSESSMENT — PATIENT HEALTH QUESTIONNAIRE - PHQ9
1. LITTLE INTEREST OR PLEASURE IN DOING THINGS: NOT AT ALL
2. FEELING DOWN, DEPRESSED OR HOPELESS: NOT AT ALL
SUM OF ALL RESPONSES TO PHQ9 QUESTIONS 1 & 2: 0

## 2025-02-14 ASSESSMENT — COLUMBIA-SUICIDE SEVERITY RATING SCALE - C-SSRS
1. IN THE PAST MONTH, HAVE YOU WISHED YOU WERE DEAD OR WISHED YOU COULD GO TO SLEEP AND NOT WAKE UP?: NO
6. HAVE YOU EVER DONE ANYTHING, STARTED TO DO ANYTHING, OR PREPARED TO DO ANYTHING TO END YOUR LIFE?: NO
2. HAVE YOU ACTUALLY HAD ANY THOUGHTS OF KILLING YOURSELF?: NO

## 2025-02-14 ASSESSMENT — PAIN SCALES - GENERAL
PAINLEVEL_OUTOF10: 0 - NO PAIN
PAINLEVEL_OUTOF10: 0 - NO PAIN

## 2025-02-14 NOTE — SIGNIFICANT EVENT
02/14/25 1841   Prechemo Checklist   Has the patient been in the hospital, ED, or urgent care since last date of service Yes   Chemo/Immuno Consent Completed and Signed Yes   Protocol/Indications Verified Yes   Confirmed to previous date/time of medication Yes   Compared to previous dose Yes   All medications are dated accurately Yes   Pregnancy Test Negative Not applicable   Parameters Met Yes   Provider Name Eleanor Hamilton   BSA/Weight-Height Verified Yes   Dose Calculations Verified (current, total, cumulative) Yes

## 2025-02-14 NOTE — CONSULTS
Vancomycin Dosing by Pharmacy- INITIAL    Bijan Ibanez is a 64 y.o. year old male who Pharmacy has been consulted for vancomycin dosing for line infections. Based on the patient's indication and renal status this patient will be dosed based on a goal AUC of 400-600.     Renal function is currently stable.    Visit Vitals  /84 (BP Location: Right arm, Patient Position: Sitting)   Pulse 83   Temp 36.4 °C (97.5 °F) (Temporal)   Resp 16        Lab Results   Component Value Date    CREATININE 0.91 2025    CREATININE 0.95 2025    CREATININE 0.77 2025    CREATININE 0.87 2025        Patient weight is as follows:   Vitals:    25 1706   Weight: (S) 98.3 kg (216 lb 11.4 oz)       Cultures:  No results found for the encounter in last 14 days.        No intake/output data recorded.  I/O during current shift:  No intake/output data recorded.    Temp (24hrs), Av.4 °C (97.5 °F), Min:36.4 °C (97.5 °F), Max:36.4 °C (97.5 °F)         Assessment/Plan     Patient will not be given a loading dose.  Will initiate vancomycin maintenance,  1000 mg every 24 hours.    This dosing regimen is predicted by [employer] to result in the following pharmacokinetic parameters:    Loading dose: N/A  Regimen: 1000 mg IV every 24 hours.  Start time: 18:11 on 2025  Exposure target: AUC24 (range)400-600 mg/L.hr   [id number]: 563 mg/L.hr  AUC24,ss: 570 mg/L.hr  Probability of AUC24 > 400: 82 %  Ctrough,ss: 22.4 mg/L  Probability of Ctrough,ss > 20: 61 %      Follow-up level will be ordered on 02/15 at 1800 unless clinically indicated sooner.  Will continue to monitor renal function daily while on vancomycin and order serum creatinine at least every 48 hours if not already ordered.  Follow for continued vancomycin needs, clinical response, and signs/symptoms of toxicity.       JOIE MESSINA

## 2025-02-15 LAB
ABO GROUP (TYPE) IN BLOOD: NORMAL
ALBUMIN SERPL BCP-MCNC: 3 G/DL (ref 3.4–5)
ALP SERPL-CCNC: 59 U/L (ref 33–136)
ALT SERPL W P-5'-P-CCNC: 34 U/L (ref 10–52)
ANION GAP SERPL CALC-SCNC: 11 MMOL/L (ref 10–20)
ANTIBODY SCREEN: NORMAL
APTT PPP: 34 SECONDS (ref 27–38)
AST SERPL W P-5'-P-CCNC: 19 U/L (ref 9–39)
BASOPHILS # BLD AUTO: 0.03 X10*3/UL (ref 0–0.1)
BASOPHILS NFR BLD AUTO: 0.4 %
BILIRUB SERPL-MCNC: 0.6 MG/DL (ref 0–1.2)
BUN SERPL-MCNC: 17 MG/DL (ref 6–23)
CALCIUM SERPL-MCNC: 8.8 MG/DL (ref 8.6–10.6)
CHLORIDE SERPL-SCNC: 105 MMOL/L (ref 98–107)
CO2 SERPL-SCNC: 31 MMOL/L (ref 21–32)
CREAT SERPL-MCNC: 0.82 MG/DL (ref 0.5–1.3)
EGFRCR SERPLBLD CKD-EPI 2021: >90 ML/MIN/1.73M*2
EOSINOPHIL # BLD AUTO: 0.02 X10*3/UL (ref 0–0.7)
EOSINOPHIL NFR BLD AUTO: 0.3 %
ERYTHROCYTE [DISTWIDTH] IN BLOOD BY AUTOMATED COUNT: 15.2 % (ref 11.5–14.5)
FIBRINOGEN PPP-MCNC: 396 MG/DL (ref 200–400)
GLUCOSE SERPL-MCNC: 166 MG/DL (ref 74–99)
HCT VFR BLD AUTO: 24.4 % (ref 41–52)
HGB BLD-MCNC: 7.5 G/DL (ref 13.5–17.5)
IMM GRANULOCYTES # BLD AUTO: 0.63 X10*3/UL (ref 0–0.7)
IMM GRANULOCYTES NFR BLD AUTO: 9 % (ref 0–0.9)
INR PPP: 1.3 (ref 0.9–1.1)
LDH SERPL L TO P-CCNC: 273 U/L (ref 84–246)
LYMPHOCYTES # BLD AUTO: 0.31 X10*3/UL (ref 1.2–4.8)
LYMPHOCYTES NFR BLD AUTO: 4.4 %
MCH RBC QN AUTO: 28 PG (ref 26–34)
MCHC RBC AUTO-ENTMCNC: 30.7 G/DL (ref 32–36)
MCV RBC AUTO: 91 FL (ref 80–100)
MONOCYTES # BLD AUTO: 0.18 X10*3/UL (ref 0.1–1)
MONOCYTES NFR BLD AUTO: 2.6 %
NEUTROPHILS # BLD AUTO: 5.8 X10*3/UL (ref 1.2–7.7)
NEUTROPHILS NFR BLD AUTO: 83.3 %
NRBC BLD-RTO: 0.6 /100 WBCS (ref 0–0)
PLATELET # BLD AUTO: 156 X10*3/UL (ref 150–450)
POTASSIUM SERPL-SCNC: 4.1 MMOL/L (ref 3.5–5.3)
PROT SERPL-MCNC: 5.5 G/DL (ref 6.4–8.2)
PROTHROMBIN TIME: 14.5 SECONDS (ref 9.8–12.8)
RBC # BLD AUTO: 2.68 X10*6/UL (ref 4.5–5.9)
RH FACTOR (ANTIGEN D): NORMAL
SODIUM SERPL-SCNC: 143 MMOL/L (ref 136–145)
URATE SERPL-MCNC: 2.8 MG/DL (ref 4–7.5)
VANCOMYCIN SERPL-MCNC: 29.4 UG/ML (ref 5–20)
WBC # BLD AUTO: 7 X10*3/UL (ref 4.4–11.3)

## 2025-02-15 PROCEDURE — 1170000001 HC PRIVATE ONCOLOGY ROOM DAILY

## 2025-02-15 PROCEDURE — 99233 SBSQ HOSP IP/OBS HIGH 50: CPT | Performed by: INTERNAL MEDICINE

## 2025-02-15 PROCEDURE — 85025 COMPLETE CBC W/AUTO DIFF WBC: CPT

## 2025-02-15 PROCEDURE — 85384 FIBRINOGEN ACTIVITY: CPT

## 2025-02-15 PROCEDURE — 85610 PROTHROMBIN TIME: CPT

## 2025-02-15 PROCEDURE — 2500000002 HC RX 250 W HCPCS SELF ADMINISTERED DRUGS (ALT 637 FOR MEDICARE OP, ALT 636 FOR OP/ED): Performed by: STUDENT IN AN ORGANIZED HEALTH CARE EDUCATION/TRAINING PROGRAM

## 2025-02-15 PROCEDURE — 83615 LACTATE (LD) (LDH) ENZYME: CPT

## 2025-02-15 PROCEDURE — 2500000004 HC RX 250 GENERAL PHARMACY W/ HCPCS (ALT 636 FOR OP/ED): Performed by: STUDENT IN AN ORGANIZED HEALTH CARE EDUCATION/TRAINING PROGRAM

## 2025-02-15 PROCEDURE — 84550 ASSAY OF BLOOD/URIC ACID: CPT

## 2025-02-15 PROCEDURE — 86923 COMPATIBILITY TEST ELECTRIC: CPT

## 2025-02-15 PROCEDURE — 84075 ASSAY ALKALINE PHOSPHATASE: CPT

## 2025-02-15 PROCEDURE — 80202 ASSAY OF VANCOMYCIN: CPT

## 2025-02-15 PROCEDURE — 2500000004 HC RX 250 GENERAL PHARMACY W/ HCPCS (ALT 636 FOR OP/ED)

## 2025-02-15 PROCEDURE — 2500000001 HC RX 250 WO HCPCS SELF ADMINISTERED DRUGS (ALT 637 FOR MEDICARE OP)

## 2025-02-15 PROCEDURE — 36416 COLLJ CAPILLARY BLOOD SPEC: CPT

## 2025-02-15 PROCEDURE — 86901 BLOOD TYPING SEROLOGIC RH(D): CPT

## 2025-02-15 RX ORDER — POTASSIUM CHLORIDE 20 MEQ/1
20 TABLET, EXTENDED RELEASE ORAL ONCE
Status: DISCONTINUED | OUTPATIENT
Start: 2025-02-15 | End: 2025-02-15

## 2025-02-15 RX ORDER — ONDANSETRON HYDROCHLORIDE 8 MG/1
16 TABLET, FILM COATED ORAL ONCE
Status: COMPLETED | OUTPATIENT
Start: 2025-02-15 | End: 2025-02-15

## 2025-02-15 RX ADMIN — BACLOFEN 10 MG: 10 TABLET ORAL at 14:49

## 2025-02-15 RX ADMIN — VANCOMYCIN HYDROCHLORIDE 1000 MG: 1 INJECTION, SOLUTION INTRAVENOUS at 18:58

## 2025-02-15 RX ADMIN — OLANZAPINE 5 MG: 5 TABLET, FILM COATED ORAL at 22:09

## 2025-02-15 RX ADMIN — APIXABAN 5 MG: 5 TABLET, FILM COATED ORAL at 08:17

## 2025-02-15 RX ADMIN — ACYCLOVIR 400 MG: 400 TABLET ORAL at 08:17

## 2025-02-15 RX ADMIN — LISINOPRIL 10 MG: 10 TABLET ORAL at 17:01

## 2025-02-15 RX ADMIN — BACLOFEN 10 MG: 10 TABLET ORAL at 22:09

## 2025-02-15 RX ADMIN — ALLOPURINOL 300 MG: 300 TABLET ORAL at 08:18

## 2025-02-15 RX ADMIN — METOPROLOL SUCCINATE 100 MG: 50 TABLET, EXTENDED RELEASE ORAL at 17:01

## 2025-02-15 RX ADMIN — ATORVASTATIN CALCIUM 40 MG: 40 TABLET, FILM COATED ORAL at 06:27

## 2025-02-15 RX ADMIN — ONDANSETRON HYDROCHLORIDE 16 MG: 8 TABLET, FILM COATED ORAL at 22:09

## 2025-02-15 RX ADMIN — ACYCLOVIR 400 MG: 400 TABLET ORAL at 22:09

## 2025-02-15 RX ADMIN — GABAPENTIN 300 MG: 300 CAPSULE ORAL at 22:09

## 2025-02-15 RX ADMIN — FLUCONAZOLE 400 MG: 200 TABLET ORAL at 17:01

## 2025-02-15 RX ADMIN — APIXABAN 5 MG: 5 TABLET, FILM COATED ORAL at 22:09

## 2025-02-15 RX ADMIN — BACLOFEN 10 MG: 10 TABLET ORAL at 08:17

## 2025-02-15 RX ADMIN — SODIUM CHLORIDE 23.5 MG: 9 INJECTION, SOLUTION INTRAVENOUS at 22:19

## 2025-02-15 RX ADMIN — PREDNISONE 140 MG: 20 TABLET ORAL at 08:17

## 2025-02-15 RX ADMIN — PANTOPRAZOLE SODIUM 40 MG: 40 TABLET, DELAYED RELEASE ORAL at 06:27

## 2025-02-15 RX ADMIN — PREDNISONE 140 MG: 20 TABLET ORAL at 22:09

## 2025-02-15 ASSESSMENT — COGNITIVE AND FUNCTIONAL STATUS - GENERAL
DAILY ACTIVITIY SCORE: 24
MOBILITY SCORE: 22
CLIMB 3 TO 5 STEPS WITH RAILING: A LITTLE
WALKING IN HOSPITAL ROOM: A LITTLE

## 2025-02-15 ASSESSMENT — PAIN - FUNCTIONAL ASSESSMENT: PAIN_FUNCTIONAL_ASSESSMENT: 0-10

## 2025-02-15 ASSESSMENT — ACTIVITIES OF DAILY LIVING (ADL): LACK_OF_TRANSPORTATION: NO

## 2025-02-15 ASSESSMENT — PAIN SCALES - GENERAL
PAINLEVEL_OUTOF10: 0 - NO PAIN
PAINLEVEL_OUTOF10: 0 - NO PAIN

## 2025-02-15 NOTE — PROGRESS NOTES
Pharmacy Medication History Review    Bijan Ibanez is a 64 y.o. male admitted for Diffuse large B cell lymphoma. Pharmacy reviewed the patient's [id number] medications and allergies for accuracy.    Medications CHANGES:  None    The list below reflects the updated PTA list.   Prior to Admission Medications   Prescriptions Informant   0.9 % sodium chloride (sodium chloride 0.9%) solution Spouse/Significant Other, Self   Sig: Infuse 10 mL into a venous catheter 2 times a day. 10ml prior and after antibiotic, 20ml after blood draw, 10ml for maintenance   acyclovir (Zovirax) 400 mg tablet Spouse/Significant Other, Self   Sig: Take 1 tablet (400 mg) by mouth every 12 hours.   albuterol 90 mcg/actuation inhaler Spouse/Significant Other, Self   Sig: Inhale 2 puffs every 6 hours if needed for shortness of breath.   allopurinol (Zyloprim) 300 mg tablet Spouse/Significant Other, Self   Sig: Take 1 tablet (300 mg) by mouth once daily.   apixaban (Eliquis) 5 mg tablet Spouse/Significant Other, Self   Sig: Take 1 tablet (5 mg) by mouth 2 times a day.   atorvastatin (Lipitor) 40 mg tablet Spouse/Significant Other, Self   Sig: Take 1 tablet (40 mg) by mouth early in the morning..   baclofen (Lioresal) 10 mg tablet Spouse/Significant Other, Self   Sig: Take 1 tablet (10 mg) by mouth 3 times a day.   Patient not taking: Reported on 2/15/2025   fluconazole (Diflucan) 200 mg tablet Spouse/Significant Other, Self   Sig: Take 2 tablets (400 mg) by mouth once daily.   furosemide (Lasix) 20 mg tablet Spouse/Significant Other, Self   Sig: Take 1 tablet (20 mg) by mouth once daily as needed (for swelling).   gabapentin (Neurontin) 300 mg capsule Spouse/Significant Other, Self   Sig: Take 1 capsule (300 mg) by mouth once daily at bedtime.   heparin, porcine, PF, (Hep Flush-10, PF,) 10 unit/mL solution Spouse/Significant Other, Self   Si mL by intravenous push route 2 times a day. flush with hep flush after last ns flush,  "daily for maintenance  Indications: prevent clot from blocking an intravenous catheter   lisinopril 10 mg tablet Spouse/Significant Other, Self   Sig: Take 1 tablet (10 mg) by mouth once daily.   metoprolol succinate XL (Toprol-XL) 100 mg 24 hr tablet Spouse/Significant Other, Self   Sig: Take 1 tablet (100 mg) by mouth once daily. Do not crush or chew.   oxyCODONE (Roxicodone) 5 mg immediate release tablet    Sig: Take 1 tablet (5 mg) by mouth every 4 hours if needed for severe pain (7 - 10) for up to 7 days.   pantoprazole (ProtoNix) 40 mg EC tablet Spouse/Significant Other, Self   Sig: Take 1 tablet (40 mg) by mouth once daily in the morning. Take before meals. Do not crush, chew, or split.   vancomycin (Vancocin) 1,000 mg vial for injection Spouse/Significant Other, Self   Sig: Infuse 1.5 g into a venous catheter every 12 hours for 28 days.      Facility-Administered Medications: None        The list below reflects the updated allergy list. Please review each documented allergy for additional clarification and justification.  Allergies  Reviewed by GER Abebe on 2/14/2025        Severity Reactions Comments    Latex Not Specified Itching     Penicillins Not Specified Unknown Patient reports from childhood, unsure of reaction              Sources:   [facility]  Pharmacy dispense history  Patient interview  Spouse, Good historian  Chart Review    Additional Comments:  Confirmed home dose of metop as taking 100 mg succinate daily, patient recently prescribed succinate but had also gotten 50 mg BID tartrate from a different provider       Davina Mckay, Francisca  Transitions of Care Pharmacist  02/15/25     Secure Chat preferred   If no response call [id number] or "[email or web address]" \"Med Rec\"    "

## 2025-02-15 NOTE — PROGRESS NOTES
"Bijan Ibanez is a 64 y.o. male on day 1 of admission presenting with Diffuse large B cell lymphoma.    Subjective   Pt seen at bedside this AM.        Objective     Physical Exam  HENT:      Head: Normocephalic.      Right Ear: External ear normal.      Left Ear: External ear normal.      Nose: Nose normal.   Eyes:      Extraocular Movements: Extraocular movements intact.      Conjunctiva/sclera: Conjunctivae normal.      Pupils: Pupils are equal, round, and reactive to light.   Cardiovascular:      Rate and Rhythm: Normal rate and regular rhythm.   Pulmonary:      Effort: Pulmonary effort is normal.      Breath sounds: Normal breath sounds.   Abdominal:      General: Bowel sounds are normal.      Palpations: Abdomen is soft.   Musculoskeletal:      Cervical back: Normal range of motion.      Right lower leg: Edema present.      Left lower leg: Edema present.   Skin:     General: Skin is warm and dry.   Neurological:      General: No focal deficit present.      Mental Status: He is alert and oriented to person, place, and time. Mental status is at baseline.   Psychiatric:         Mood and Affect: Mood normal.         Behavior: Behavior normal.         Thought Content: Thought content normal.         Last Recorded Vitals  Blood pressure 139/90, pulse 80, temperature 36.4 °C (97.5 °F), resp. rate 16, height (S) 1.832 m (6' 0.13\"), weight (S) 98.3 kg (216 lb 11.4 oz), SpO2 95%.  Intake/Output last 3 Shifts:  No intake/output data recorded.    Relevant Results     .Scheduled medications  acyclovir, 400 mg, oral, q12h SONAL  allopurinol, 300 mg, oral, Daily  apixaban, 5 mg, oral, BID  atorvastatin, 40 mg, oral, Daily  baclofen, 10 mg, oral, TID  DOXOrubicin (Adriamycin) 23.5 mg, etoposide (Toposar) 88 mg, vinCRIStine (VINCASAR) 0.94 mg in sodium chloride 0.9% 564.09 mL IV, , intravenous, Once  DOXOrubicin (Adriamycin) 23.5 mg, etoposide (Toposar) 88 mg, vinCRIStine (VINCASAR) 0.94 mg in sodium chloride 0.9% 564.09 mL " IV, , intravenous, Once  fluconazole, 400 mg, oral, Daily  gabapentin, 300 mg, oral, Nightly  lisinopril, 10 mg, oral, Daily  metoprolol succinate XL, 100 mg, oral, Daily  OLANZapine, 5 mg, oral, Nightly  ondansetron, 16 mg, oral, Once  pantoprazole, 40 mg, oral, Daily before breakfast  predniSONE, 140 mg, oral, BID  vancomycin, 1,000 mg, intravenous, q24h      Continuous medications     PRN medications  PRN medications: albuterol, albuterol, alteplase, dextrose, diphenhydrAMINE, EPINEPHrine HCl, famotidine, furosemide, lidocaine-diphenhydraMINE-Maalox 1:1:1, methylPREDNISolone sodium succinate (PF), oxyCODONE, prochlorperazine, prochlorperazine, sodium chloride, vancomycin    .  Results for orders placed or performed during the hospital encounter of 02/14/25 (from the past 24 hours)   CBC and Auto Differential   Result Value Ref Range    WBC 6.5 4.4 - 11.3 x10*3/uL    nRBC 1.5 (H) 0.0 - 0.0 /100 WBCs    RBC 2.52 (L) 4.50 - 5.90 x10*6/uL    Hemoglobin 7.3 (L) 13.5 - 17.5 g/dL    Hematocrit 22.9 (L) 41.0 - 52.0 %    MCV 91 80 - 100 fL    MCH 29.0 26.0 - 34.0 pg    MCHC 31.9 (L) 32.0 - 36.0 g/dL    RDW 15.2 (H) 11.5 - 14.5 %    Platelets 165 150 - 450 x10*3/uL    Immature Granulocytes %, Automated 9.5 (H) 0.0 - 0.9 %    Immature Granulocytes Absolute, Automated 0.62 0.00 - 0.70 x10*3/uL   Comprehensive Metabolic Panel   Result Value Ref Range    Glucose 96 74 - 99 mg/dL    Sodium 139 136 - 145 mmol/L    Potassium 3.3 (L) 3.5 - 5.3 mmol/L    Chloride 103 98 - 107 mmol/L    Bicarbonate 28 21 - 32 mmol/L    Anion Gap 11 10 - 20 mmol/L    Urea Nitrogen 18 6 - 23 mg/dL    Creatinine 0.75 0.50 - 1.30 mg/dL    eGFR >90 >60 mL/min/1.73m*2    Calcium 8.1 (L) 8.6 - 10.6 mg/dL    Albumin 2.8 (L) 3.4 - 5.0 g/dL    Alkaline Phosphatase 54 33 - 136 U/L    Total Protein 5.3 (L) 6.4 - 8.2 g/dL    AST 27 9 - 39 U/L    Bilirubin, Total 0.8 0.0 - 1.2 mg/dL    ALT 33 10 - 52 U/L   Lactate Dehydrogenase   Result Value Ref Range      (H) 84 - 246 U/L   Uric Acid   Result Value Ref Range    Uric Acid 2.4 (L) 4.0 - 7.5 mg/dL   Magnesium   Result Value Ref Range    Magnesium 1.62 1.60 - 2.40 mg/dL   Manual Differential   Result Value Ref Range    Neutrophils %, Manual 81.0 40.0 - 80.0 %    Bands %, Manual 8.0 0.0 - 5.0 %    Lymphocytes %, Manual 4.0 13.0 - 44.0 %    Monocytes %, Manual 0.0 2.0 - 10.0 %    Eosinophils %, Manual 0.0 0.0 - 6.0 %    Basophils %, Manual 1.0 0.0 - 2.0 %    Metamyelocytes %, Manual 2.0 0.0 - 0.0 %    Myelocytes %, Manual 2.0 0.0 - 0.0 %    Promyelocytes %, Manual 2.0 0.0 - 0.0 %    Seg Neutrophils Absolute, Manual 5.27 1.20 - 7.00 x10*3/uL    Bands Absolute, Manual 0.52 0.00 - 0.70 x10*3/uL    Lymphocytes Absolute, Manual 0.26 (L) 1.20 - 4.80 x10*3/uL    Monocytes Absolute, Manual 0.00 (L) 0.10 - 1.00 x10*3/uL    Eosinophils Absolute, Manual 0.00 0.00 - 0.70 x10*3/uL    Basophils Absolute, Manual 0.07 0.00 - 0.10 x10*3/uL    Metamyelocytes Absolute, Manual 0.13 0.00 - 0.00 x10*3/uL    Myelocytes Absolute, Manual 0.13 0.00 - 0.00 x10*3/uL    Promyelocytes Absolute, Manual 0.13 0.00 - 0.00 x10*3/uL    Total Cells Counted 100     Neutrophils Absolute, Manual 5.79 1.20 - 7.70 x10*3/uL    RBC Morphology See Below     Ovalocytes Few     Teardrop Cells Few    CBC and Auto Differential   Result Value Ref Range    WBC 7.0 4.4 - 11.3 x10*3/uL    nRBC 0.6 (H) 0.0 - 0.0 /100 WBCs    RBC 2.68 (L) 4.50 - 5.90 x10*6/uL    Hemoglobin 7.5 (L) 13.5 - 17.5 g/dL    Hematocrit 24.4 (L) 41.0 - 52.0 %    MCV 91 80 - 100 fL    MCH 28.0 26.0 - 34.0 pg    MCHC 30.7 (L) 32.0 - 36.0 g/dL    RDW 15.2 (H) 11.5 - 14.5 %    Platelets 156 150 - 450 x10*3/uL    Neutrophils %      Immature Granulocytes %, Automated      Lymphocytes %      Monocytes %      Eosinophils %      Basophils %      Neutrophils Absolute      Lymphocytes Absolute      Monocytes Absolute      Eosinophils Absolute      Basophils Absolute     Comprehensive Metabolic Panel   Result  Value Ref Range    Glucose 166 (H) 74 - 99 mg/dL    Sodium 143 136 - 145 mmol/L    Potassium 4.1 3.5 - 5.3 mmol/L    Chloride 105 98 - 107 mmol/L    Bicarbonate 31 21 - 32 mmol/L    Anion Gap 11 10 - 20 mmol/L    Urea Nitrogen 17 6 - 23 mg/dL    Creatinine 0.82 0.50 - 1.30 mg/dL    eGFR >90 >60 mL/min/1.73m*2    Calcium 8.8 8.6 - 10.6 mg/dL    Albumin 3.0 (L) 3.4 - 5.0 g/dL    Alkaline Phosphatase 59 33 - 136 U/L    Total Protein 5.5 (L) 6.4 - 8.2 g/dL    AST 19 9 - 39 U/L    Bilirubin, Total 0.6 0.0 - 1.2 mg/dL    ALT 34 10 - 52 U/L   Lactate Dehydrogenase   Result Value Ref Range     (H) 84 - 246 U/L   Uric Acid   Result Value Ref Range    Uric Acid 2.8 (L) 4.0 - 7.5 mg/dL   Coagulation Screen   Result Value Ref Range    Protime 14.5 (H) 9.8 - 12.8 seconds    INR 1.3 (H) 0.9 - 1.1    aPTT 34 27 - 38 seconds   Fibrinogen   Result Value Ref Range    Fibrinogen 396 200 - 400 mg/dL                    Assessment/Plan   Assessment & Plan  Diffuse large B cell lymphoma    Encounter for antineoplastic chemotherapy    Bijan Ibanez is a 64 y.o. male PMH of newly dx Burkitt's lymphoma, HTN, HLD, CAD, MI (s/p stent 2010, on ASA), hx renal cell carcinoma (s/p R partial nephrectomy in 2013 @ [facility]), GERD, BPH, arthritis, afib RVR (On eliquis w/ close monitoring d/t thrombocytopenia), and recent MRSA bacteremia (currently on x4 week Vancomycin until 3/3/25, agreed upon by ID and primary oncologist to receive chemo treatment while on IV atbx) who is admitted for C2 EPOCH. Doing well and tolerating chemo thus far, labs and VSS. DC home pending completion of chemo.       CHEMO:   # C2 EPOCH  - Premeds: Zofran, Zyprexa, compazine, Emend  - Doxorubicin 23.6mg on Day 1, 2, 3  - Etoposide 88mg on Day 1, 2, 3  - Vincristine 0.9mg on Day 1, 2, 3  - Cyclophosphamide 1,770mg once on Day 4  - Prednisone 140mg BID x5 days  - PRN Reaction meds: epinephrine, albuterol, Benadryl, pepcid, solu-medrol    - Plan for Neulasta and ritux  2/19      ONC:  # Burkitt’s lymphoma    - Primary oncologist: Dr. 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 High risk based on marrow involvement, though no CNS involvement  - S/p C1 DA-R-EPOCH when signed out as high grade lymphoma; given the significant toxicity he had with DA R EPOCH, will not escalate to CODOX-IVAC or hyper-CVAD given no CNS involvement  - Given G3/4 mucositis, etoposide reduced by 25%, keep EPOCH at Dose level 0  - 2/11 Per most recent onc note, oncology along with ID felt they could resume chemotherapy after total 2 weeks of IV Vanco, but will still complete full duration of 4 weeks of the antibiotic     HEME:  # Anemia  - Likely 2/2 disease and tx, no signs of active bleeding on admit  - Monitor counts daily, fibrinogen, coags  - No evidence of DIC or hypercoag stat  - Transfuse to keep hgb > 7, plt > 50 (on AC)  # Prophy:  - Cont home Eliquis 5mg BID - plan to hold when plt < 50  - 2/15 plt 156     ID:  Allergies: Latex, PCN  # MRSA bacteremia  - Follows with Dr. Rivera     - Has outpt appt next week. If still inpt, can consider consulting ID for him to be seen inpt  - 1/31 Bc x2 + staphyloccous aureus and MRSA, 2/4 Bc x2 negative growth  - PICC line removed 2/3  - 2/3 TTE w/o evidence of vegetation  - Aspergillus galactomannan and fungitell results still pending 2/4  - ID consulted in prior admission, rec Vanc x4 week until 3/3/24  - Cont home Vanc (last planned dose 3/7/25)  # Prophylaxis  - VZV: Continue Acyclovir 400mg PO BID  - Candidiasis: Continue Fluconazole 400mg PO daily   - PJP: None at this time     CARDIO:  # Afib RVR on prior admission  - Likely 2/2 infection, electrolyte derangements, and anemia  - Keep K > 4, Mg > 2, Hgb > 7  - Cont home Eliquis until plt < 50  # Hx of MI  # Hx of HTN/HLD/CAD  -2/3/25 Seen by EP as difficulty achieving rate control  -2/3 TTE EF 60-65%, normal RVSP, L atrial  size mod dilated  - Cont home Metoprolol XL 100mg PO daily  - Cont home Atorvastatin 40mg PO daily  - Cont home Lisinopril 10mg PO daily      FEN/GI:  Admit weight: 98.3kg  - Uric acid on admit: 2.4 (2/14)  - Cont home Allopurinol 300mg PO daily  - Cont home Lasix 20mg PRN swelling  # Hypokalemia  - K on admit: 3.3 (2/14), S/p 40 PO K+ --> 4.1   - Monitor levels daily, replete electrolytes PRN  # Mucositis  - Pt had severe mucositis last admission (States he was eating well at home, but endorses mild mouth pain upon admit)  - Cont home BMX PRN, Oxy 5mg PO Q4h PRN for pain  - Consider supportive onc consult if continues to worsen  # Prophy:  - Cont home Pantoprazole 40mg PO daily     MSK:  # Hx Back Pain  - Cont Gabapentin 300mg PO nightly     DISPO:  - Full code, confirmed on admit  - DC home pending completion of chemo  - Access: PIV, Midline  - NOK: Indira, [phone], - updated bedside 2/15   - FUV 2/19 Ritux and Neulasta, 2/26 Cards, 2/28 ID     I spent > 60 minutes in the professional and overall care of this patient.    Krei Hamilton, APRN-CNP       pt alert and accepted po trials

## 2025-02-15 NOTE — CARE PLAN
Problem: Pain - Adult  Goal: Verbalizes/displays adequate comfort level or baseline comfort level  Outcome: Progressing     Problem: Safety - Adult  Goal: Free from fall injury  Outcome: Progressing     Problem: Discharge Planning  Goal: Discharge to home or other facility with appropriate resources  Outcome: Progressing     Problem: Chronic Conditions and Co-morbidities  Goal: Patient's chronic conditions and co-morbidity symptoms are monitored and maintained or improved  Outcome: Progressing     Problem: Nutrition  Goal: Nutrient intake appropriate for maintaining nutritional needs  Outcome: Progressing   The patient's goals for the shift include      The clinical goals for the shift include patient will be free from falls

## 2025-02-15 NOTE — PROGRESS NOTES
02/15/25 1500   Discharge Planning   Living Arrangements Spouse/significant other   Support Systems Spouse/significant other;Children;Sabianism/ruben community   Assistance Needed IV ABX   Type of Residence Private residence   Number of Stairs to Enter Residence 1   Number of Stairs Within Residence 13   Do you have animals or pets at home? No   Who is requesting discharge planning? Patient   Home or Post Acute Services In home services   Type of Home Care Services Home nursing visits   Expected Discharge Disposition Home Health  ([facility] IV Vanc-[facility] 2/19)   Does the patient need discharge transport arranged? No   Financial Resource Strain   How hard is it for you to pay for the very basics like food, housing, medical care, and heating? Not hard   Housing Stability   In the last 12 months, was there a time when you were not able to pay the mortgage or rent on time? N   In the past 12 months, how many times have you moved where you were living? 0   At any time in the past 12 months, were you homeless or living in a shelter (including now)? N   Transportation Needs   In the past 12 months, has lack of transportation kept you from medical appointments or from getting medications? no   In the past 12 months, has lack of transportation kept you from meetings, work, or from getting things needed for daily living? No   Patient Choice   Patient / Family choosing to utilize agency / facility established prior to hospitalization Yes     LSW met with pt at bedside to complete assessment. Pt admitted for C2 EPOCH, plan to DC after completion on 2/18. Pt follows with Dr. Mckeon for dx Burkitt's Lymphoma and has a midline. Pt will resume line care for midline and IV Vanc through 3/3 [facility], asked NP to place orders. Pt plans to return home with wife. Pt declines any DME/HHC use/need at this time, reports PT came out after last discharge but he didn't need it. LSW to cont to follow.

## 2025-02-15 NOTE — CARE PLAN
The patient's goals for the shift include      The clinical goals for the shift include Patient will remain HDS through end of shift    Over the shift, the patient did  make progress toward the following goals. Barriers to progression include chemo administration. Recommendations to address these barriers include monitor through shift.
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
## 2025-02-16 VITALS
RESPIRATION RATE: 16 BRPM | TEMPERATURE: 97.9 F | SYSTOLIC BLOOD PRESSURE: 156 MMHG | HEIGHT: 72 IN | WEIGHT: 216.71 LBS | OXYGEN SATURATION: 96 % | HEART RATE: 69 BPM | DIASTOLIC BLOOD PRESSURE: 91 MMHG | BODY MASS INDEX: 29.35 KG/M2

## 2025-02-16 LAB
ALBUMIN SERPL BCP-MCNC: 2.8 G/DL (ref 3.4–5)
ALP SERPL-CCNC: 54 U/L (ref 33–136)
ALT SERPL W P-5'-P-CCNC: 52 U/L (ref 10–52)
ANION GAP SERPL CALC-SCNC: 13 MMOL/L (ref 10–20)
APTT PPP: 29 SECONDS (ref 27–38)
AST SERPL W P-5'-P-CCNC: 34 U/L (ref 9–39)
BASOPHILS # BLD AUTO: 0.02 X10*3/UL (ref 0–0.1)
BASOPHILS NFR BLD AUTO: 0.3 %
BILIRUB SERPL-MCNC: 0.5 MG/DL (ref 0–1.2)
BLOOD EXPIRATION DATE: NORMAL
BUN SERPL-MCNC: 24 MG/DL (ref 6–23)
CALCIUM SERPL-MCNC: 7.8 MG/DL (ref 8.6–10.6)
CHLORIDE SERPL-SCNC: 106 MMOL/L (ref 98–107)
CO2 SERPL-SCNC: 26 MMOL/L (ref 21–32)
CREAT SERPL-MCNC: 0.77 MG/DL (ref 0.5–1.3)
DISPENSE STATUS: NORMAL
EGFRCR SERPLBLD CKD-EPI 2021: >90 ML/MIN/1.73M*2
EOSINOPHIL # BLD AUTO: 0.09 X10*3/UL (ref 0–0.7)
EOSINOPHIL NFR BLD AUTO: 1.6 %
ERYTHROCYTE [DISTWIDTH] IN BLOOD BY AUTOMATED COUNT: 15 % (ref 11.5–14.5)
FIBRINOGEN PPP-MCNC: 260 MG/DL (ref 200–400)
GLUCOSE SERPL-MCNC: 159 MG/DL (ref 74–99)
HCT VFR BLD AUTO: 22.3 % (ref 41–52)
HGB BLD-MCNC: 6.7 G/DL (ref 13.5–17.5)
IMM GRANULOCYTES # BLD AUTO: 0.29 X10*3/UL (ref 0–0.7)
IMM GRANULOCYTES NFR BLD AUTO: 5 % (ref 0–0.9)
INR PPP: 1.3 (ref 0.9–1.1)
LDH SERPL L TO P-CCNC: 259 U/L (ref 84–246)
LYMPHOCYTES # BLD AUTO: 0.21 X10*3/UL (ref 1.2–4.8)
LYMPHOCYTES NFR BLD AUTO: 3.6 %
MAGNESIUM SERPL-MCNC: 1.69 MG/DL (ref 1.6–2.4)
MCH RBC QN AUTO: 27.9 PG (ref 26–34)
MCHC RBC AUTO-ENTMCNC: 30 G/DL (ref 32–36)
MCV RBC AUTO: 93 FL (ref 80–100)
MONOCYTES # BLD AUTO: 0.17 X10*3/UL (ref 0.1–1)
MONOCYTES NFR BLD AUTO: 2.9 %
NEUTROPHILS # BLD AUTO: 5.02 X10*3/UL (ref 1.2–7.7)
NEUTROPHILS NFR BLD AUTO: 86.6 %
NRBC BLD-RTO: 0.5 /100 WBCS (ref 0–0)
PLATELET # BLD AUTO: 154 X10*3/UL (ref 150–450)
POTASSIUM SERPL-SCNC: 3.4 MMOL/L (ref 3.5–5.3)
PRODUCT BLOOD TYPE: 6200
PRODUCT CODE: NORMAL
PROT SERPL-MCNC: 4.8 G/DL (ref 6.4–8.2)
PROTHROMBIN TIME: 15 SECONDS (ref 9.8–12.8)
RBC # BLD AUTO: 2.4 X10*6/UL (ref 4.5–5.9)
SODIUM SERPL-SCNC: 142 MMOL/L (ref 136–145)
UNIT ABO: NORMAL
UNIT NUMBER: NORMAL
UNIT RH: NORMAL
UNIT VOLUME: 350
URATE SERPL-MCNC: 2.8 MG/DL (ref 4–7.5)
VANCOMYCIN SERPL-MCNC: 15.4 UG/ML (ref 5–20)
WBC # BLD AUTO: 5.8 X10*3/UL (ref 4.4–11.3)
XM INTEP: NORMAL

## 2025-02-16 PROCEDURE — 2500000005 HC RX 250 GENERAL PHARMACY W/O HCPCS

## 2025-02-16 PROCEDURE — 99233 SBSQ HOSP IP/OBS HIGH 50: CPT

## 2025-02-16 PROCEDURE — 85610 PROTHROMBIN TIME: CPT

## 2025-02-16 PROCEDURE — 83735 ASSAY OF MAGNESIUM: CPT

## 2025-02-16 PROCEDURE — 80202 ASSAY OF VANCOMYCIN: CPT

## 2025-02-16 PROCEDURE — 83615 LACTATE (LD) (LDH) ENZYME: CPT

## 2025-02-16 PROCEDURE — 84550 ASSAY OF BLOOD/URIC ACID: CPT

## 2025-02-16 PROCEDURE — 2500000001 HC RX 250 WO HCPCS SELF ADMINISTERED DRUGS (ALT 637 FOR MEDICARE OP)

## 2025-02-16 PROCEDURE — 36430 TRANSFUSION BLD/BLD COMPNT: CPT

## 2025-02-16 PROCEDURE — 2500000002 HC RX 250 W HCPCS SELF ADMINISTERED DRUGS (ALT 637 FOR MEDICARE OP, ALT 636 FOR OP/ED): Performed by: STUDENT IN AN ORGANIZED HEALTH CARE EDUCATION/TRAINING PROGRAM

## 2025-02-16 PROCEDURE — 85025 COMPLETE CBC W/AUTO DIFF WBC: CPT

## 2025-02-16 PROCEDURE — 80053 COMPREHEN METABOLIC PANEL: CPT

## 2025-02-16 PROCEDURE — 85384 FIBRINOGEN ACTIVITY: CPT

## 2025-02-16 PROCEDURE — 2500000002 HC RX 250 W HCPCS SELF ADMINISTERED DRUGS (ALT 637 FOR MEDICARE OP, ALT 636 FOR OP/ED)

## 2025-02-16 PROCEDURE — 1170000001 HC PRIVATE ONCOLOGY ROOM DAILY

## 2025-02-16 PROCEDURE — 2500000004 HC RX 250 GENERAL PHARMACY W/ HCPCS (ALT 636 FOR OP/ED): Performed by: STUDENT IN AN ORGANIZED HEALTH CARE EDUCATION/TRAINING PROGRAM

## 2025-02-16 PROCEDURE — P9040 RBC LEUKOREDUCED IRRADIATED: HCPCS

## 2025-02-16 PROCEDURE — 2500000004 HC RX 250 GENERAL PHARMACY W/ HCPCS (ALT 636 FOR OP/ED)

## 2025-02-16 RX ORDER — POTASSIUM CHLORIDE 20 MEQ/1
40 TABLET, EXTENDED RELEASE ORAL ONCE
Status: COMPLETED | OUTPATIENT
Start: 2025-02-16 | End: 2025-02-16

## 2025-02-16 RX ORDER — ONDANSETRON HYDROCHLORIDE 8 MG/1
16 TABLET, FILM COATED ORAL ONCE
Status: COMPLETED | OUTPATIENT
Start: 2025-02-16 | End: 2025-02-16

## 2025-02-16 RX ADMIN — APIXABAN 5 MG: 5 TABLET, FILM COATED ORAL at 09:05

## 2025-02-16 RX ADMIN — ALLOPURINOL 300 MG: 300 TABLET ORAL at 09:04

## 2025-02-16 RX ADMIN — PREDNISONE 140 MG: 20 TABLET ORAL at 20:59

## 2025-02-16 RX ADMIN — FLUCONAZOLE 400 MG: 200 TABLET ORAL at 18:19

## 2025-02-16 RX ADMIN — OLANZAPINE 5 MG: 5 TABLET, FILM COATED ORAL at 20:59

## 2025-02-16 RX ADMIN — ONDANSETRON HYDROCHLORIDE 16 MG: 8 TABLET, FILM COATED ORAL at 21:32

## 2025-02-16 RX ADMIN — METOPROLOL SUCCINATE 100 MG: 50 TABLET, EXTENDED RELEASE ORAL at 11:32

## 2025-02-16 RX ADMIN — ACYCLOVIR 400 MG: 400 TABLET ORAL at 20:59

## 2025-02-16 RX ADMIN — POTASSIUM CHLORIDE 40 MEQ: 1500 TABLET, EXTENDED RELEASE ORAL at 09:04

## 2025-02-16 RX ADMIN — APIXABAN 5 MG: 5 TABLET, FILM COATED ORAL at 20:59

## 2025-02-16 RX ADMIN — VANCOMYCIN HYDROCHLORIDE 1250 MG: 5 INJECTION, POWDER, LYOPHILIZED, FOR SOLUTION INTRAVENOUS at 18:19

## 2025-02-16 RX ADMIN — LISINOPRIL 10 MG: 10 TABLET ORAL at 11:32

## 2025-02-16 RX ADMIN — PANTOPRAZOLE SODIUM 40 MG: 40 TABLET, DELAYED RELEASE ORAL at 05:40

## 2025-02-16 RX ADMIN — SODIUM CHLORIDE 0.94 MG: 9 INJECTION, SOLUTION INTRAVENOUS at 21:26

## 2025-02-16 RX ADMIN — ACYCLOVIR 400 MG: 400 TABLET ORAL at 09:04

## 2025-02-16 RX ADMIN — ATORVASTATIN CALCIUM 40 MG: 40 TABLET, FILM COATED ORAL at 05:40

## 2025-02-16 RX ADMIN — GABAPENTIN 300 MG: 300 CAPSULE ORAL at 20:59

## 2025-02-16 RX ADMIN — PREDNISONE 140 MG: 20 TABLET ORAL at 09:04

## 2025-02-16 ASSESSMENT — COGNITIVE AND FUNCTIONAL STATUS - GENERAL
WALKING IN HOSPITAL ROOM: A LITTLE
DAILY ACTIVITIY SCORE: 24
WALKING IN HOSPITAL ROOM: A LITTLE
MOBILITY SCORE: 22
CLIMB 3 TO 5 STEPS WITH RAILING: A LITTLE
DAILY ACTIVITIY SCORE: 24
CLIMB 3 TO 5 STEPS WITH RAILING: A LITTLE
MOBILITY SCORE: 22

## 2025-02-16 ASSESSMENT — PAIN SCALES - GENERAL
PAINLEVEL_OUTOF10: 0 - NO PAIN

## 2025-02-16 ASSESSMENT — PAIN - FUNCTIONAL ASSESSMENT
PAIN_FUNCTIONAL_ASSESSMENT: 0-10

## 2025-02-16 NOTE — PROGRESS NOTES
Vancomycin Dosing by Pharmacy- FOLLOW UP    Bijan Ibanez is a 64 y.o. year old male who Pharmacy has been consulted for vancomycin dosing for line infections- patient receiving vancomycin through 3/3/25 per ID for MRSA bacteremia. Based on the patient's indication and renal status this patient is being dosed based on a goal AUC of 500-600.     Renal function is currently stable.    Current vancomycin dose: 1000 mg given every 24 hours    Estimated vancomycin AUC on current dose: 457 mg/L.hr     Visit Vitals  /80 (BP Location: Right arm, Patient Position: Lying)   Pulse 80   Temp 36.8 °C (98.2 °F) (Temporal)   Resp 16        Lab Results   Component Value Date    CREATININE 0.77 2025    CREATININE 0.82 02/15/2025    CREATININE 0.75 2025    CREATININE 0.91 2025        Patient weight is as follows:   Vitals:    25 1706   Weight: (S) 98.3 kg (216 lb 11.4 oz)       Cultures:  No results found for the encounter in last 14 days.       I/O last 3 completed shifts:  In: 495.5 (5 mL/kg) [IV Piggyback:495.5]  Out: - (0 mL/kg)   Weight: 98.3 kg   I/O during current shift:  No intake/output data recorded.    Temp (24hrs), Av.6 °C (97.8 °F), Min:36.3 °C (97.3 °F), Max:36.8 °C (98.2 °F)      Assessment/Plan    Below goal AUC. Orders placed for new vancomcyin regimen of 1250mg every 24 hours to begin at 1800.     This dosing regimen is predicted by [employer] to result in the following pharmacokinetic parameters:  [id number]: 477 mg/L.hr  AUC24,ss: 565 mg/L.hr  Probability of AUC24 > 400: 82 %  Ctrough,ss: 20.2 mg/L  Probability of Ctrough,ss > 20: 51 %    The next level will be obtained on  with mid am labs. May be obtained sooner if clinically indicated.   Will continue to monitor renal function daily while on vancomycin and order serum creatinine at least every 48 hours if not already ordered.  Follow for continued vancomycin needs, clinical response, and signs/symptoms of toxicity.        [place], PharmD

## 2025-02-16 NOTE — PROGRESS NOTES
"Bijan Ibanez is a 64 y.o. male on day 2 of admission presenting with Diffuse large B cell lymphoma.    Subjective   Patient seen at bedside. States that he is feeling overall well. Denies any N/V/D/C, fever/chills, headache dizziness or vision changes        Objective     Physical Exam  HENT:      Head: Normocephalic.      Right Ear: Tympanic membrane normal.      Nose: Nose normal.      Mouth/Throat:      Mouth: Mucous membranes are moist.   Eyes:      Pupils: Pupils are equal, round, and reactive to light.   Cardiovascular:      Rate and Rhythm: Normal rate.      Pulses: Normal pulses.   Pulmonary:      Effort: Pulmonary effort is normal.   Abdominal:      Palpations: Abdomen is soft.   Musculoskeletal:         General: Normal range of motion.      Cervical back: Normal range of motion.   Skin:     General: Skin is warm.      Capillary Refill: Capillary refill takes less than 2 seconds.   Neurological:      General: No focal deficit present.      Mental Status: He is alert.   Psychiatric:         Mood and Affect: Mood normal.         Behavior: Behavior normal.         Last Recorded Vitals  Blood pressure 122/75, pulse 82, temperature 36.7 °C (98.1 °F), temperature source Temporal, resp. rate 16, height (S) 1.832 m (6' 0.13\"), weight (S) 98.3 kg (216 lb 11.4 oz), SpO2 95%.  Intake/Output last 3 Shifts:  I/O last 3 completed shifts:  In: 495.5 (5 mL/kg) [IV Piggyback:495.5]  Out: - (0 mL/kg)   Weight: 98.3 kg     Relevant Results              Assessment/Plan   Assessment & Plan  Diffuse large B cell lymphoma    Encounter for antineoplastic chemotherapy    Bijan Ibanez is a 64 y.o. male PMH of newly dx Burkitt's lymphoma, HTN, HLD, CAD, MI (s/p stent 2010, on ASA), hx renal cell carcinoma (s/p R partial nephrectomy in 2013 @ [facility]), GERD, BPH, arthritis, afib RVR (On eliquis w/ close monitoring d/t thrombocytopenia), and recent MRSA bacteremia (currently on x4 week Vancomycin until 3/3/25, agreed upon by ID and " primary oncologist to receive chemo treatment while on IV atbx) who is admitted for C2 EPOCH. Doing well and tolerating chemo thus far, labs and VSS. DC home pending completion of chemo.      Updates 2/16  - S/p 1u PRBC   - Continue chemo      CHEMO:   # C2 EPOCH  - Premeds: Zofran, Zyprexa, compazine, Emend  - Doxorubicin 23.6mg on Day 1, 2, 3  - Etoposide 88mg on Day 1, 2, 3  - Vincristine 0.9mg on Day 1, 2, 3  - Cyclophosphamide 1,770mg once on Day 4  - Prednisone 140mg BID x5 days  - PRN Reaction meds: epinephrine, albuterol, Benadryl, pepcid, solu-medrol    - Plan for Neulasta and ritux 2/19      ONC:  # Burkitt’s lymphoma    - Primary oncologist: Dr. 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 High risk based on marrow involvement, though no CNS involvement  - S/p C1 DA-R-EPOCH when signed out as high grade lymphoma; given the significant toxicity he had with DA R EPOCH, will not escalate to CODOX-IVAC or hyper-CVAD given no CNS involvement  - Given G3/4 mucositis, etoposide reduced by 25%, keep EPOCH at Dose level 0  - 2/11 Per most recent onc note, oncology along with ID felt they could resume chemotherapy after total 2 weeks of IV Vanco, but will still complete full duration of 4 weeks of the antibiotic     HEME:  # Anemia  - Likely 2/2 disease and tx, no signs of active bleeding on admit  - Monitor counts daily, fibrinogen, coags  - No evidence of DIC or hypercoag stat  - Transfuse to keep hgb > 7, plt > 50 (on AC)  - 2/16 1u PRBC   # Prophy:  - Cont home Eliquis 5mg BID - plan to hold when plt < 50  - 2/15 plt 156     ID:  Allergies: Latex, PCN  # MRSA bacteremia  - Follows with Dr. Rivera     - Has outpt appt next week. If still inpt, can consider consulting ID for him to be seen inpt  - 1/31 Bc x2 + staphyloccous aureus and MRSA, 2/4 Bc x2 negative growth  - PICC line removed 2/3  - 2/3 TTE w/o evidence of vegetation  -  Aspergillus galactomannan and fungitell results still pending 2/4  - ID consulted in prior admission, rec Vanc x4 week until 3/3/24  - Cont home Vanc (last planned dose 3/7/25)  # Prophylaxis  - VZV: Continue Acyclovir 400mg PO BID  - Candidiasis: Continue Fluconazole 400mg PO daily   - PJP: None at this time     CARDIO:  # Afib RVR on prior admission  - Likely 2/2 infection, electrolyte derangements, and anemia  - Keep K > 4, Mg > 2, Hgb > 7  - Cont home Eliquis until plt < 50  # Hx of MI  # Hx of HTN/HLD/CAD  -2/3/25 Seen by EP as difficulty achieving rate control  -2/3 TTE EF 60-65%, normal RVSP, L atrial size mod dilated  - Cont home Metoprolol XL 100mg PO daily  - Cont home Atorvastatin 40mg PO daily  - Cont home Lisinopril 10mg PO daily      FEN/GI:  Admit weight: 98.3kg  - Uric acid on admit: 2.4 (2/14)  - Cont home Allopurinol 300mg PO daily  - Cont home Lasix 20mg PRN swelling  # Hypokalemia  - K on admit: 3.3 (2/14), S/p 40 PO K+ --> 4.1   - Monitor levels daily, replete electrolytes PRN  # Mucositis  - Pt had severe mucositis last admission (States he was eating well at home, but endorses mild mouth pain upon admit)  - Cont home BMX PRN, Oxy 5mg PO Q4h PRN for pain  - Consider supportive onc consult if continues to worsen  # Prophy:  - Cont home Pantoprazole 40mg PO daily     MSK:  # Hx Back Pain  - Cont Gabapentin 300mg PO nightly     DISPO:  - Full code, confirmed on admit  - DC home pending completion of chemo  - Access: PIV, Midline  - NOK: Indira, [phone], - updated bedside 2/15   - FUV 2/19 Ritux and Neulasta, 2/26 Cards, 2/28 ID       I spent 60 minutes in the professional and overall care of this patient.      Kylie Humphrey, APRN-CNP  Patient discussed with Dr. Nugent

## 2025-02-16 NOTE — NURSING NOTE
Patient with EPOCH chemotherapy running through a left upper arm midline.  Positive brisk blood return noted at shift change.  Rate checked and signed off.

## 2025-02-16 NOTE — CARE PLAN
The clinical goals for the shift include Patient will remain free of falls and VSS during this shift    Over the shift, the patient did make progress toward the following goals. Barriers to progression include chemotherapy administration. Recommendations to address these barriers include monitor patient throughout shift.      Problem: Pain - Adult  Goal: Verbalizes/displays adequate comfort level or baseline comfort level  Outcome: Progressing     Problem: Safety - Adult  Goal: Free from fall injury  Outcome: Progressing     Problem: Discharge Planning  Goal: Discharge to home or other facility with appropriate resources  Outcome: Progressing     Problem: Chronic Conditions and Co-morbidities  Goal: Patient's chronic conditions and co-morbidity symptoms are monitored and maintained or improved  Outcome: Progressing     Problem: Nutrition  Goal: Nutrient intake appropriate for maintaining nutritional needs  Outcome: Progressing    ANJALI RODRÍGUEZ RN

## 2025-02-17 LAB
ALBUMIN SERPL BCP-MCNC: 2.7 G/DL (ref 3.4–5)
ALP SERPL-CCNC: 50 U/L (ref 33–136)
ALT SERPL W P-5'-P-CCNC: 65 U/L (ref 10–52)
ANION GAP SERPL CALC-SCNC: 14 MMOL/L (ref 10–20)
APTT PPP: 27 SECONDS (ref 27–38)
AST SERPL W P-5'-P-CCNC: 24 U/L (ref 9–39)
BASOPHILS # BLD MANUAL: 0 X10*3/UL (ref 0–0.1)
BASOPHILS NFR BLD MANUAL: 0 %
BILIRUB SERPL-MCNC: 0.7 MG/DL (ref 0–1.2)
BUN SERPL-MCNC: 28 MG/DL (ref 6–23)
CALCIUM SERPL-MCNC: 7.5 MG/DL (ref 8.6–10.6)
CHLORIDE SERPL-SCNC: 107 MMOL/L (ref 98–107)
CO2 SERPL-SCNC: 26 MMOL/L (ref 21–32)
CREAT SERPL-MCNC: 0.74 MG/DL (ref 0.5–1.3)
EGFRCR SERPLBLD CKD-EPI 2021: >90 ML/MIN/1.73M*2
EOSINOPHIL # BLD MANUAL: 0 X10*3/UL (ref 0–0.7)
EOSINOPHIL NFR BLD MANUAL: 0 %
ERYTHROCYTE [DISTWIDTH] IN BLOOD BY AUTOMATED COUNT: 14.7 % (ref 11.5–14.5)
FIBRINOGEN PPP-MCNC: 212 MG/DL (ref 200–400)
GLUCOSE SERPL-MCNC: 157 MG/DL (ref 74–99)
HCT VFR BLD AUTO: 23.8 % (ref 41–52)
HGB BLD-MCNC: 8.2 G/DL (ref 13.5–17.5)
IMM GRANULOCYTES # BLD AUTO: 0.41 X10*3/UL (ref 0–0.7)
IMM GRANULOCYTES NFR BLD AUTO: 7.4 % (ref 0–0.9)
INR PPP: 1.4 (ref 0.9–1.1)
LDH SERPL L TO P-CCNC: 258 U/L (ref 84–246)
LYMPHOCYTES # BLD MANUAL: 0.11 X10*3/UL (ref 1.2–4.8)
LYMPHOCYTES NFR BLD MANUAL: 2 %
MAGNESIUM SERPL-MCNC: 1.77 MG/DL (ref 1.6–2.4)
MCH RBC QN AUTO: 29.1 PG (ref 26–34)
MCHC RBC AUTO-ENTMCNC: 34.5 G/DL (ref 32–36)
MCV RBC AUTO: 84 FL (ref 80–100)
METAMYELOCYTES # BLD MANUAL: 0.06 X10*3/UL
METAMYELOCYTES NFR BLD MANUAL: 1 %
MONOCYTES # BLD MANUAL: 0.06 X10*3/UL (ref 0.1–1)
MONOCYTES NFR BLD MANUAL: 1 %
MYELOCYTES # BLD MANUAL: 0.06 X10*3/UL
MYELOCYTES NFR BLD MANUAL: 1 %
NEUTROPHILS # BLD MANUAL: 5.32 X10*3/UL (ref 1.2–7.7)
NEUTS BAND # BLD MANUAL: 0.17 X10*3/UL (ref 0–0.7)
NEUTS BAND NFR BLD MANUAL: 3 %
NEUTS SEG # BLD MANUAL: 5.15 X10*3/UL (ref 1.2–7)
NEUTS SEG NFR BLD MANUAL: 92 %
NRBC BLD-RTO: 0.4 /100 WBCS (ref 0–0)
PLATELET # BLD AUTO: 161 X10*3/UL (ref 150–450)
POTASSIUM SERPL-SCNC: 3.6 MMOL/L (ref 3.5–5.3)
PROT SERPL-MCNC: 4.7 G/DL (ref 6.4–8.2)
PROTHROMBIN TIME: 15.6 SECONDS (ref 9.8–12.8)
RBC # BLD AUTO: 2.82 X10*6/UL (ref 4.5–5.9)
RBC MORPH BLD: ABNORMAL
SODIUM SERPL-SCNC: 143 MMOL/L (ref 136–145)
TOTAL CELLS COUNTED BLD: 100
URATE SERPL-MCNC: 3.2 MG/DL (ref 4–7.5)
VANCOMYCIN SERPL-MCNC: 11 UG/ML (ref 5–20)
WBC # BLD AUTO: 5.6 X10*3/UL (ref 4.4–11.3)

## 2025-02-17 PROCEDURE — 99233 SBSQ HOSP IP/OBS HIGH 50: CPT | Performed by: PHYSICIAN ASSISTANT

## 2025-02-17 PROCEDURE — 80202 ASSAY OF VANCOMYCIN: CPT

## 2025-02-17 PROCEDURE — 85610 PROTHROMBIN TIME: CPT

## 2025-02-17 PROCEDURE — 83735 ASSAY OF MAGNESIUM: CPT

## 2025-02-17 PROCEDURE — 2500000001 HC RX 250 WO HCPCS SELF ADMINISTERED DRUGS (ALT 637 FOR MEDICARE OP)

## 2025-02-17 PROCEDURE — 2500000004 HC RX 250 GENERAL PHARMACY W/ HCPCS (ALT 636 FOR OP/ED)

## 2025-02-17 PROCEDURE — 84550 ASSAY OF BLOOD/URIC ACID: CPT

## 2025-02-17 PROCEDURE — 2500000004 HC RX 250 GENERAL PHARMACY W/ HCPCS (ALT 636 FOR OP/ED): Performed by: STUDENT IN AN ORGANIZED HEALTH CARE EDUCATION/TRAINING PROGRAM

## 2025-02-17 PROCEDURE — 85007 BL SMEAR W/DIFF WBC COUNT: CPT

## 2025-02-17 PROCEDURE — 83615 LACTATE (LD) (LDH) ENZYME: CPT

## 2025-02-17 PROCEDURE — 80053 COMPREHEN METABOLIC PANEL: CPT

## 2025-02-17 PROCEDURE — 85384 FIBRINOGEN ACTIVITY: CPT

## 2025-02-17 PROCEDURE — 1170000001 HC PRIVATE ONCOLOGY ROOM DAILY

## 2025-02-17 PROCEDURE — 85027 COMPLETE CBC AUTOMATED: CPT

## 2025-02-17 PROCEDURE — 2500000002 HC RX 250 W HCPCS SELF ADMINISTERED DRUGS (ALT 637 FOR MEDICARE OP, ALT 636 FOR OP/ED): Performed by: STUDENT IN AN ORGANIZED HEALTH CARE EDUCATION/TRAINING PROGRAM

## 2025-02-17 RX ORDER — ONDANSETRON HYDROCHLORIDE 8 MG/1
16 TABLET, FILM COATED ORAL ONCE
Status: COMPLETED | OUTPATIENT
Start: 2025-02-17 | End: 2025-02-17

## 2025-02-17 RX ORDER — VANCOMYCIN HYDROCHLORIDE 750 MG/150ML
750 INJECTION, SOLUTION INTRAVENOUS EVERY 12 HOURS
Status: DISCONTINUED | OUTPATIENT
Start: 2025-02-17 | End: 2025-02-18

## 2025-02-17 RX ORDER — BACLOFEN 10 MG/1
10 TABLET ORAL 3 TIMES DAILY PRN
Status: DISCONTINUED | OUTPATIENT
Start: 2025-02-17 | End: 2025-02-19 | Stop reason: HOSPADM

## 2025-02-17 RX ADMIN — OLANZAPINE 5 MG: 5 TABLET, FILM COATED ORAL at 20:02

## 2025-02-17 RX ADMIN — ALLOPURINOL 300 MG: 300 TABLET ORAL at 09:54

## 2025-02-17 RX ADMIN — PREDNISONE 140 MG: 20 TABLET ORAL at 20:03

## 2025-02-17 RX ADMIN — METOPROLOL SUCCINATE 100 MG: 50 TABLET, EXTENDED RELEASE ORAL at 09:54

## 2025-02-17 RX ADMIN — ATORVASTATIN CALCIUM 40 MG: 40 TABLET, FILM COATED ORAL at 05:34

## 2025-02-17 RX ADMIN — GABAPENTIN 300 MG: 300 CAPSULE ORAL at 20:02

## 2025-02-17 RX ADMIN — PANTOPRAZOLE SODIUM 40 MG: 40 TABLET, DELAYED RELEASE ORAL at 05:34

## 2025-02-17 RX ADMIN — PREDNISONE 140 MG: 20 TABLET ORAL at 09:54

## 2025-02-17 RX ADMIN — ONDANSETRON HYDROCHLORIDE 16 MG: 8 TABLET, FILM COATED ORAL at 21:10

## 2025-02-17 RX ADMIN — VINCRISTINE SULFATE 88 MG: 1 INJECTION, SOLUTION INTRAVENOUS at 19:51

## 2025-02-17 RX ADMIN — LISINOPRIL 10 MG: 10 TABLET ORAL at 09:54

## 2025-02-17 RX ADMIN — VANCOMYCIN HYDROCHLORIDE 750 MG: 750 INJECTION, SOLUTION INTRAVENOUS at 17:39

## 2025-02-17 RX ADMIN — BACLOFEN 10 MG: 10 TABLET ORAL at 23:28

## 2025-02-17 RX ADMIN — APIXABAN 5 MG: 5 TABLET, FILM COATED ORAL at 20:03

## 2025-02-17 RX ADMIN — APIXABAN 5 MG: 5 TABLET, FILM COATED ORAL at 09:54

## 2025-02-17 RX ADMIN — FLUCONAZOLE 400 MG: 200 TABLET ORAL at 20:02

## 2025-02-17 RX ADMIN — ACYCLOVIR 400 MG: 400 TABLET ORAL at 20:02

## 2025-02-17 RX ADMIN — ACYCLOVIR 400 MG: 400 TABLET ORAL at 09:54

## 2025-02-17 ASSESSMENT — COGNITIVE AND FUNCTIONAL STATUS - GENERAL
CLIMB 3 TO 5 STEPS WITH RAILING: A LITTLE
DAILY ACTIVITIY SCORE: 24
WALKING IN HOSPITAL ROOM: A LITTLE
MOBILITY SCORE: 22

## 2025-02-17 ASSESSMENT — PAIN SCALES - GENERAL
PAINLEVEL_OUTOF10: 0 - NO PAIN
PAINLEVEL_OUTOF10: 0 - NO PAIN

## 2025-02-17 ASSESSMENT — PAIN - FUNCTIONAL ASSESSMENT: PAIN_FUNCTIONAL_ASSESSMENT: 0-10

## 2025-02-17 NOTE — PROGRESS NOTES
"Bijan Ibanez is a 64 y.o. male on day 3 of admission presenting with Diffuse large B cell lymphoma.    Subjective   Seen this morning at bedside, doing well with chemo, no leg edema. We discussed switching his Neulasta and Rituxan day to 2/20 since he will still be in the hospital.  Denies any N/V/D/C, fever/chills, headache dizziness or vision changes       Objective     Physical Exam  HENT:      Head: Normocephalic.      Right Ear: Tympanic membrane normal.      Nose: Nose normal.      Mouth/Throat:      Mouth: Mucous membranes are moist.   Eyes:      Pupils: Pupils are equal, round, and reactive to light.   Cardiovascular:      Rate and Rhythm: Normal rate.      Pulses: Normal pulses.   Pulmonary:      Effort: Pulmonary effort is normal.   Abdominal:      Palpations: Abdomen is soft.   Musculoskeletal:         General: Normal range of motion.      Cervical back: Normal range of motion.   Skin:     General: Skin is warm.      Capillary Refill: Capillary refill takes less than 2 seconds.   Neurological:      General: No focal deficit present.      Mental Status: He is alert.   Psychiatric:         Mood and Affect: Mood normal.         Behavior: Behavior normal.         Last Recorded Vitals  Blood pressure 146/87, pulse 71, temperature 36.5 °C (97.7 °F), temperature source Temporal, resp. rate 16, height (S) 1.832 m (6' 0.13\"), weight (S) 98.3 kg (216 lb 11.4 oz), SpO2 96%.  Intake/Output last 3 Shifts:  I/O last 3 completed shifts:  In: 600 (6.1 mL/kg) [Blood:350; IV Piggyback:250]  Out: - (0 mL/kg)   Weight: 98.3 kg     Relevant Results  Vascular US upper extremity venous duplex right  Result Date: 1/24/2025  Findings consistent with partially occlusive thrombus within the mid to distal brachial vein and mid to distal basilic vein within the right upper extremity.   I personally reviewed the images/study and resident's interpretation and I agree with the findings as stated by Roxanna Herndon MD (resident " radiologist). This study was analyzed and interpreted at [place], [place].   MACRO: None   Signed by: Francisco Betancur 1/24/2025 4:45 PM Dictation workstation:   [id number]    1.  No focal consolidation or evidence of acute cardiopulmonary process.   I personally reviewed the images/study and I agree with Dr. Luisito Saldivar findings as stated. This study was interpreted at [facility], [place], [place]   MACRO: None   Signed by: Beryl Ko 1/22/2025 10:15 AM Dictation workstation:   [id number]    MR brain w and wo IV contrast  Result Date: 1/21/2025  1. No evidence of intracranial lymphoma. 2. Small focus of encephalomalacia with peripheral gliosis inferior left lentiform nucleus and similar but smaller focus inferior right lentiform nucleus, suspected old lacunar infarctions. 3. There is thickening of the bilateral muscles of mastication, right-greater-than-left, as well as the right posterolateral nasopharyngeal structures. These areas demonstrated substantial abnormal uptake on the recent PET-CT and are suspicious for lymphomatous involvement. 4. Scattered foci of abnormal enhancement and diffusion restriction involving the calvarium suspicious for osseous metastatic involvement.   MACRO: None   Signed by: Terrence Gipson 1/21/2025 2:32 PM Dictation workstation:   [id number]    NM PET CT lymphoma diagnosis  Result Date: 1/20/2025  Widespread hermann as well as extranodal lymphomatous involvement. 1. Multiple hypermetabolic supra and infra diaphragmatic lymphadenopathy as described, consistent with lymphomatous involvement. 2. Intensely hypermetabolic activity within bilateral enlarged submandibular and parotid glands as described, concerning for lymphomatous involvement. 3. Hypermetabolic mass like infiltration within bilateral kidneys(right> left), corresponding to lesion seen on CT dated 01/01/2025, compatible with lymphomatous  involvement. 4. Hypermetabolic hepatic lesions without any underlying CT correlate, consistent with lymphomatous involvement. 5. Splenomegaly with nonspecific hypermetabolic activity suggestive of extranodal involvement. 6. Multiple hypermetabolic soft tissue mesenteric deposits as well as anterior abdominal wall nodular deposits, with few of the lesions without any underlying CT correlate, concerning for additional lymphomatous involvement. 7. Metabolic activity within the stomach, bilateral arms, anterior chest wall and gluteal muscles without underlying CT correlate, concerning for lymphomatous involvement.       I personally reviewed the images/study and I agree with the findings as stated by Lisha Hernandez MD.  This study was interpreted at [facility], [place], [place].   Signed by: John Diaz 1/20/2025 3:33 PM Dictation workstation:   [id number]    Onco-Echo Complete (Strain & 3D)  Result Date: 1/18/2025  CONCLUSIONS:  1. The left ventricular systolic function is normal, with a visually estimated ejection fraction of 65-70%.  2. There is normal right ventricular global systolic function.  3. Right ventricular systolic pressure is within normal limits.  4. Normal aortic root.  5. Left Ventricular Global Longitudinal Strain - 19.9 %.  6. Strain values are normal, which imply normal myocardial function.       Assessment/Plan   Assessment & Plan  Diffuse large B cell lymphoma    Encounter for antineoplastic chemotherapy    Bijan Ibanez is a 64 y.o. male PMH of newly dx Burkitt's lymphoma, HTN, HLD, CAD, MI (s/p stent 2010, on ASA), hx renal cell carcinoma (s/p R partial nephrectomy in 2013 @ [facility]), GERD, BPH, arthritis, afib RVR (On eliquis w/ close monitoring d/t thrombocytopenia), and recent MRSA bacteremia (currently on x4 week Vancomycin until 3/3/25, agreed upon by ID and primary oncologist to receive chemo treatment while on IV atbx) who is admitted for C2 EPOCH.  Doing well and tolerating chemo thus far, labs and VSS. DC home pending completion of chemo.      Updates 2/17:   - S/p 1u PRBC yesterday, Hgb 8.2 today   - Doing well with chemo   - Switch Neulasta and Rituxan to 2/20 since he will be dc on 2/19 after chemo completion     #  CHEMO:   - C2 EPOCH  - Premeds: Zofran, Zyprexa, compazine, Emend  - Doxorubicin 23.6mg on Day 1, 2, 3  - Etoposide 88mg on Day 1, 2, 3  - Vincristine 0.9mg on Day 1, 2, 3  - Cyclophosphamide 1,770mg once on Day 4  - Prednisone 140mg BID x5 days  - PRN Reaction meds: epinephrine, albuterol, Benadryl, pepcid, solu-medrol    - Plan for Neulasta and ritux 2/20      # ONC:  - Burkitt’s lymphoma    - Primary oncologist: Dr. 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 High risk based on marrow involvement, though no CNS involvement  - S/p C1 DA-R-EPOCH when signed out as high grade lymphoma; given the significant toxicity he had with DA R EPOCH, will not escalate to CODOX-IVAC or hyper-CVAD given no CNS involvement  - Given G3/4 mucositis, etoposide reduced by 25%, keep EPOCH at Dose level 0  - 2/11 Per most recent onc note, oncology along with ID felt they could resume chemotherapy after total 2 weeks of IV Vanco, but will still complete full duration of 4 weeks of the antibiotic     # HEME:  - Anemia  - Likely 2/2 disease and tx, no signs of active bleeding on admit  - Monitor counts daily, fibrinogen, coags  - No evidence of DIC or hypercoag stat  - Transfuse to keep hgb > 7, plt > 50 (on AC)  - 2/16 1u PRBC, Hgb 8.2 (2/17)   # Prophy:  - Cont home Eliquis 5mg BID - plan to hold when plt < 50  - 2/15 plt 156     # ID:  - Allergies: Latex, PCN  - MRSA bacteremia  - Follows with Dr. Rivera     - Has outpt appt 2/28  - 1/31 Bc x2 + staphyloccous aureus and MRSA, 2/4 Bc x2 negative growth  - PICC line removed 2/3  - 2/3 TTE w/o evidence of vegetation  - Aspergillus  galactomannan and fungitell results still pending 2/4  - ID consulted in prior admission, rec Vanc x4 week until 3/3/24  - Cont home Vanc (last planned dose 3/7/25)  # Prophylaxis  - VZV: Continue Acyclovir 400mg PO BID  - Candidiasis: Continue Fluconazole 400mg PO daily   - PJP: None at this time     # CARDIO:  - Afib RVR on prior admission  - Likely 2/2 infection, electrolyte derangements, and anemia  - Keep K > 4, Mg > 2, Hgb > 7  - Cont home Eliquis until plt < 50  # Hx of MI  # Hx of HTN/HLD/CAD  -2/3/25 Seen by EP as difficulty achieving rate control  -2/3 TTE EF 60-65%, normal RVSP, L atrial size mod dilated  - Cont home Metoprolol XL 100mg PO daily  - Cont home Atorvastatin 40mg PO daily  - Cont home Lisinopril 10mg PO daily      # FEN/GI:  - Admit weight: 98.3kg  - Uric acid on admit: 2.4 (2/14)  - Cont home Allopurinol 300mg PO daily  - Cont home Lasix 20mg PRN swelling  # Hypokalemia  - K on admit: 3.3 (2/14), S/p 40 PO K+ --> 4.1   - Monitor levels daily, replete electrolytes PRN  # Mucositis  - Pt had severe mucositis last admission (States he was eating well at home, but endorses mild mouth pain upon admit)  - Cont home BMX PRN, Oxy 5mg PO Q4h PRN for pain  - Consider supportive onc consult if continues to worsen  # Prophy:  - Cont home Pantoprazole 40mg PO daily     # MSK:  - Hx Back Pain  - Cont Gabapentin 300mg PO nightly     # DISPO:  - Full code, confirmed on admit  - DC home pending completion of chemo likely 2/19, plan Rituxan and Neulasta on 2/20 with count check and count check on 2/24.   - Access: PIV, Midline  - NOK: Indira, [phone], - updated bedside 2/17   - FUV 2/19 Ritux and Neulasta, 2/26 Cards, 2/28 ID       I spent 60 minutes in the professional and overall care of this patient.      Mac Carter PA-C  Patient discussed with Dr. Nugent

## 2025-02-17 NOTE — PROGRESS NOTES
Spiritual Care Visit  Spiritual Care Request    Reason for Visit:  Routine Visit: Introduction  Continue Visiting: Yes     Request Received From:  Referral From: Nurse    Focus of Care:  Visited With: Patient and family together       Refer to :  Referral To:        Spiritual Care Assessment    Care Provided:  Intended Effects: Build relationship of care and support, Demonstrate caring and concern, Establish rapport and connectedness, Kristen affirmation  Methods: Collaborate with care team member, Encourage self reflection, Encourage sharing of feelings, Offer support  Interventions: Acknowledge current situation, Active listening, Discuss coping mechanisms with someone, Identify supportive relationship(s), [place]    Sense of Community and or Yazdanism Affiliation:  [facility]      Spiritual Care Annotation    Annotation:   introduced self and role to patient Bijan Ibanez and his spouse. Patient reflected on his experience since diagnosis and the difficult couple of weeks he's had following his first treatment. Patient shared that he has tough days but finds strength and comfort in kristen and the significant support from loved ones. Patient shared they have a large family and many friends who are supportive and praying for them. Spouse expressed her gratitude for the team who have also helped them to feel at ease.     provided care through reflective listening, validation of feelings, supportive conversation, and prayer. Patient and spouse were appreciative of visit and did not have any further needs at this time. Spiritual Care remains available as needed/requested.    Rev. Bianka Brooks MDiv, [facility]

## 2025-02-17 NOTE — CARE PLAN
The clinical goals for the shift include Patient will remain fall free throughout this shift.    Over the shift, the patient did not make progress toward the following goals. Barriers to progression include potential chemotherapy side effects. Recommendations to address. these barriers include monitoring patient and fall precautions.     Problem: Pain - Adult  Goal: Verbalizes/displays adequate comfort level or baseline comfort level  Outcome: Progressing     Problem: Safety - Adult  Goal: Free from fall injury  Outcome: Progressing     Problem: Discharge Planning  Goal: Discharge to home or other facility with appropriate resources  Outcome: Progressing     Problem: Chronic Conditions and Co-morbidities  Goal: Patient's chronic conditions and co-morbidity symptoms are monitored and maintained or improved  Outcome: Progressing     Problem: Nutrition  Goal: Nutrient intake appropriate for maintaining nutritional needs  Outcome: Progressing    ANJALI RODRÍGUEZ RN

## 2025-02-17 NOTE — DOCUMENTATION CLARIFICATION NOTE
"    PATIENT:               DENNIS CAGLE  ACCT #:                  [id number]  MRN:                       [id number]  :                       1960  ADMIT DATE:       2025 1:20 PM  DISCH DATE:        2025 5:50 PM  RESPONDING PROVIDER #:        [id number]          PROVIDER RESPONSE TEXT:    Aspiration Pneumonia ruled out after workup    CDI QUERY TEXT:    Clarification    Instruction:    Based on your assessment of the patient and the clinical information, please provide the requested documentation by clicking on the appropriate radio button and enter any additional information if prompted.    Question: Please further clarify the diagnosis of Aspiration Pneumonia as    When answering this query, please exercise your independent professional judgment. The fact that a question is being asked, does not imply that any particular answer is desired or expected.    The patient's clinical indicators include:  Clinical Information: 64 y.o. male w/ Burkitt lymphoma admitted for neutropenia and mucositis.    Clinical Indicators:  -Swallow study noted \"Appreciated swallow initiation w/ all challenges. Mastication intact but effortful/cautious. Endorsed mild pain with swallow but did not exhibit coughing. Pt refused further trials due to difficulty w/ even small sips/bites.\"  -CT chest () revealing bilateral lower lobe consolidations.    -PN 2/3- noted \"Patient's main complaint is significant difficulty with swallowing even liquids.\"    -RN noted \"Patient c/o difficulty swallowing and liquids coming out of nostrils with coughing when trying to swallow. SLP ordered for patient. Patient advised to eat only ice chips and no thin liquids.\"    -ID consult on 2/3 noted \"Continue Zosyn 3.375 g IV every 6 hours for possible aspiration pneumonia/mucositis.\"    -DS noted \"MRSA bacteremia () 2/2 infected thrombus vs pneumonia treated with IV vancomycin.\"    Treatment: Zosyn 3.375g IV q6h (-), Vancomycin 1500mg IV " q 12h (2/1-2/8)    Risk Factors: MRSA bacteremia, lymphoma, mucositis, dysphagia  Options provided:  -- Possible Aspiration Pneumonia ruled in for this admission  -- Aspiration Pneumonia ruled out after workup  -- Other - I will add my own diagnosis  -- Refer to Clinical Documentation Reviewer    Query created by: Viji Thakkar on 2/12/2025 11:13 AM      Electronically signed by:  ERLIN KIM PA-C 2/17/2025 2:30 PM

## 2025-02-17 NOTE — PROGRESS NOTES
Vancomycin Dosing by Pharmacy- FOLLOW UP    Bijan Ibanez is a 64 y.o. year old male who Pharmacy has been consulted for vancomycin dosing for line infections. Based on the patient's indication and renal status this patient is being dosed based on a goal AUC of 500-600.     Renal function is currently stable.    Current vancomycin dose: 1250 mg given every 24 hours    Most recent random level: 11 mcg/mL    Visit Vitals  /87 (BP Location: Right arm, Patient Position: Sitting)   Pulse 71   Temp 36.5 °C (97.7 °F) (Temporal)   Resp 16        Lab Results   Component Value Date    CREATININE 0.74 2025    CREATININE 0.77 2025    CREATININE 0.82 02/15/2025    CREATININE 0.75 2025        Patient weight is as follows:   Vitals:    25 1706   Weight: (S) 98.3 kg (216 lb 11.4 oz)       Cultures:  No results found for the encounter in last 14 days.       I/O last 3 completed shifts:  In: 600 (6.1 mL/kg) [Blood:350; IV Piggyback:250]  Out: - (0 mL/kg)   Weight: 98.3 kg   I/O during current shift:  No intake/output data recorded.    Temp (24hrs), Av.6 °C (97.9 °F), Min:36.3 °C (97.3 °F), Max:36.8 °C (98.2 °F)      Assessment/Plan    Below goal AUC. Orders placed for new vancomcyin regimen of 750 mg every 12 hours to begin at 1715.     This dosing regimen is predicted by [employer] to result in the following pharmacokinetic parameters:  Loading dose: N/A  Regimen: 750 mg IV every 12 hours.  Start time: 18:19 on 2025  Exposure target: AUC24 (range)500-600 mg/L.hr   [id number]: 432 mg/L.hr  AUC24,ss: 548 mg/L.hr  Probability of AUC24 > 400: 80 %    The next level will be obtained on  at 1000. May be obtained sooner if clinically indicated.   Will continue to monitor renal function daily while on vancomycin and order serum creatinine at least every 48 hours if not already ordered.  Follow for continued vancomycin needs, clinical response, and signs/symptoms of toxicityMarisol LOPEZ.  JENNIFER, PharmD

## 2025-02-18 PROBLEM — Z51.11 ENCOUNTER FOR ANTINEOPLASTIC CHEMOTHERAPY: Status: RESOLVED | Noted: 2025-02-14 | Resolved: 2025-02-18

## 2025-02-18 LAB
ALBUMIN SERPL BCP-MCNC: 2.8 G/DL (ref 3.4–5)
ALP SERPL-CCNC: 49 U/L (ref 33–136)
ALT SERPL W P-5'-P-CCNC: 71 U/L (ref 10–52)
ANION GAP SERPL CALC-SCNC: 13 MMOL/L (ref 10–20)
AST SERPL W P-5'-P-CCNC: 28 U/L (ref 9–39)
BASOPHILS # BLD MANUAL: 0 X10*3/UL (ref 0–0.1)
BASOPHILS NFR BLD MANUAL: 0 %
BILIRUB SERPL-MCNC: 0.5 MG/DL (ref 0–1.2)
BUN SERPL-MCNC: 30 MG/DL (ref 6–23)
CALCIUM SERPL-MCNC: 7.4 MG/DL (ref 8.6–10.6)
CHLORIDE SERPL-SCNC: 106 MMOL/L (ref 98–107)
CO2 SERPL-SCNC: 26 MMOL/L (ref 21–32)
CREAT SERPL-MCNC: 0.65 MG/DL (ref 0.5–1.3)
EGFRCR SERPLBLD CKD-EPI 2021: >90 ML/MIN/1.73M*2
EOSINOPHIL # BLD MANUAL: 0 X10*3/UL (ref 0–0.7)
EOSINOPHIL NFR BLD MANUAL: 0 %
ERYTHROCYTE [DISTWIDTH] IN BLOOD BY AUTOMATED COUNT: 14.6 % (ref 11.5–14.5)
GLUCOSE SERPL-MCNC: 164 MG/DL (ref 74–99)
HCT VFR BLD AUTO: 24.6 % (ref 41–52)
HGB BLD-MCNC: 8.1 G/DL (ref 13.5–17.5)
IMM GRANULOCYTES # BLD AUTO: 0.22 X10*3/UL (ref 0–0.7)
IMM GRANULOCYTES NFR BLD AUTO: 5.8 % (ref 0–0.9)
LDH SERPL L TO P-CCNC: 255 U/L (ref 84–246)
LYMPHOCYTES # BLD MANUAL: 0.04 X10*3/UL (ref 1.2–4.8)
LYMPHOCYTES NFR BLD MANUAL: 1 %
MAGNESIUM SERPL-MCNC: 1.77 MG/DL (ref 1.6–2.4)
MCH RBC QN AUTO: 29 PG (ref 26–34)
MCHC RBC AUTO-ENTMCNC: 32.9 G/DL (ref 32–36)
MCV RBC AUTO: 88 FL (ref 80–100)
MONOCYTES # BLD MANUAL: 0.15 X10*3/UL (ref 0.1–1)
MONOCYTES NFR BLD MANUAL: 4 %
NEUTS SEG # BLD MANUAL: 3.61 X10*3/UL (ref 1.2–7)
NEUTS SEG NFR BLD MANUAL: 95 %
NRBC BLD-RTO: 0 /100 WBCS (ref 0–0)
PLATELET # BLD AUTO: 174 X10*3/UL (ref 150–450)
POTASSIUM SERPL-SCNC: 3.3 MMOL/L (ref 3.5–5.3)
PROT SERPL-MCNC: 4.5 G/DL (ref 6.4–8.2)
RBC # BLD AUTO: 2.79 X10*6/UL (ref 4.5–5.9)
RBC MORPH BLD: ABNORMAL
SODIUM SERPL-SCNC: 142 MMOL/L (ref 136–145)
TOTAL CELLS COUNTED BLD: 100
VANCOMYCIN SERPL-MCNC: 12.1 UG/ML (ref 5–20)
WBC # BLD AUTO: 3.8 X10*3/UL (ref 4.4–11.3)

## 2025-02-18 PROCEDURE — 2500000001 HC RX 250 WO HCPCS SELF ADMINISTERED DRUGS (ALT 637 FOR MEDICARE OP)

## 2025-02-18 PROCEDURE — 83735 ASSAY OF MAGNESIUM: CPT

## 2025-02-18 PROCEDURE — RXMED WILLOW AMBULATORY MEDICATION CHARGE

## 2025-02-18 PROCEDURE — 83615 LACTATE (LD) (LDH) ENZYME: CPT

## 2025-02-18 PROCEDURE — 80053 COMPREHEN METABOLIC PANEL: CPT

## 2025-02-18 PROCEDURE — 2500000004 HC RX 250 GENERAL PHARMACY W/ HCPCS (ALT 636 FOR OP/ED): Performed by: STUDENT IN AN ORGANIZED HEALTH CARE EDUCATION/TRAINING PROGRAM

## 2025-02-18 PROCEDURE — 80202 ASSAY OF VANCOMYCIN: CPT

## 2025-02-18 PROCEDURE — 85027 COMPLETE CBC AUTOMATED: CPT

## 2025-02-18 PROCEDURE — 85007 BL SMEAR W/DIFF WBC COUNT: CPT

## 2025-02-18 PROCEDURE — 2500000002 HC RX 250 W HCPCS SELF ADMINISTERED DRUGS (ALT 637 FOR MEDICARE OP, ALT 636 FOR OP/ED): Performed by: STUDENT IN AN ORGANIZED HEALTH CARE EDUCATION/TRAINING PROGRAM

## 2025-02-18 PROCEDURE — 1170000001 HC PRIVATE ONCOLOGY ROOM DAILY

## 2025-02-18 PROCEDURE — 2500000004 HC RX 250 GENERAL PHARMACY W/ HCPCS (ALT 636 FOR OP/ED)

## 2025-02-18 PROCEDURE — 99233 SBSQ HOSP IP/OBS HIGH 50: CPT | Performed by: PHYSICIAN ASSISTANT

## 2025-02-18 RX ORDER — VANCOMYCIN HYDROCHLORIDE 1 G/20ML
750 INJECTION, POWDER, LYOPHILIZED, FOR SOLUTION INTRAVENOUS EVERY 12 HOURS
Qty: 45 G | Refills: 0 | Status: SHIPPED
Start: 2025-02-18 | End: 2025-02-19

## 2025-02-18 RX ORDER — VANCOMYCIN HYDROCHLORIDE 1 G/200ML
1000 INJECTION, SOLUTION INTRAVENOUS EVERY 12 HOURS
Status: DISCONTINUED | OUTPATIENT
Start: 2025-02-18 | End: 2025-02-19 | Stop reason: HOSPADM

## 2025-02-18 RX ORDER — ONDANSETRON HYDROCHLORIDE 8 MG/1
16 TABLET, FILM COATED ORAL ONCE
Status: COMPLETED | OUTPATIENT
Start: 2025-02-18 | End: 2025-02-18

## 2025-02-18 RX ORDER — POTASSIUM CHLORIDE 1.5 G/1.58G
40 POWDER, FOR SOLUTION ORAL ONCE
Status: COMPLETED | OUTPATIENT
Start: 2025-02-18 | End: 2025-02-18

## 2025-02-18 RX ORDER — POTASSIUM CHLORIDE 20 MEQ/1
40 TABLET, EXTENDED RELEASE ORAL ONCE
Status: DISCONTINUED | OUTPATIENT
Start: 2025-02-18 | End: 2025-02-19 | Stop reason: HOSPADM

## 2025-02-18 RX ORDER — ALLOPURINOL 300 MG/1
300 TABLET ORAL DAILY
Qty: 30 TABLET | Refills: 0 | Status: SHIPPED | OUTPATIENT
Start: 2025-02-18 | End: 2025-03-04 | Stop reason: ALTCHOICE

## 2025-02-18 RX ORDER — OXYCODONE HYDROCHLORIDE 5 MG/1
5 TABLET ORAL EVERY 6 HOURS PRN
Qty: 56 TABLET | Refills: 0 | Status: ON HOLD | OUTPATIENT
Start: 2025-02-18 | End: 2025-03-05

## 2025-02-18 RX ADMIN — CYCLOPHOSPHAMIDE 1770 MG: 1 INJECTION, POWDER, FOR SOLUTION INTRAVENOUS; ORAL at 20:47

## 2025-02-18 RX ADMIN — ALLOPURINOL 300 MG: 300 TABLET ORAL at 09:58

## 2025-02-18 RX ADMIN — METOPROLOL SUCCINATE 100 MG: 50 TABLET, EXTENDED RELEASE ORAL at 09:58

## 2025-02-18 RX ADMIN — VANCOMYCIN HYDROCHLORIDE 750 MG: 750 INJECTION, SOLUTION INTRAVENOUS at 04:23

## 2025-02-18 RX ADMIN — GABAPENTIN 300 MG: 300 CAPSULE ORAL at 20:02

## 2025-02-18 RX ADMIN — POTASSIUM CHLORIDE 40 MEQ: 1.5 POWDER, FOR SOLUTION ORAL at 09:58

## 2025-02-18 RX ADMIN — OLANZAPINE 5 MG: 5 TABLET, FILM COATED ORAL at 20:02

## 2025-02-18 RX ADMIN — APIXABAN 5 MG: 5 TABLET, FILM COATED ORAL at 09:58

## 2025-02-18 RX ADMIN — PREDNISONE 140 MG: 20 TABLET ORAL at 09:58

## 2025-02-18 RX ADMIN — PANTOPRAZOLE SODIUM 40 MG: 40 TABLET, DELAYED RELEASE ORAL at 06:31

## 2025-02-18 RX ADMIN — FLUCONAZOLE 400 MG: 200 TABLET ORAL at 20:01

## 2025-02-18 RX ADMIN — LISINOPRIL 10 MG: 10 TABLET ORAL at 09:58

## 2025-02-18 RX ADMIN — ATORVASTATIN CALCIUM 40 MG: 40 TABLET, FILM COATED ORAL at 06:31

## 2025-02-18 RX ADMIN — APIXABAN 5 MG: 5 TABLET, FILM COATED ORAL at 20:01

## 2025-02-18 RX ADMIN — ONDANSETRON HYDROCHLORIDE 16 MG: 8 TABLET, FILM COATED ORAL at 20:01

## 2025-02-18 RX ADMIN — VANCOMYCIN HYDROCHLORIDE 1000 MG: 1 INJECTION, SOLUTION INTRAVENOUS at 17:06

## 2025-02-18 RX ADMIN — ACYCLOVIR 400 MG: 400 TABLET ORAL at 09:58

## 2025-02-18 RX ADMIN — ACYCLOVIR 400 MG: 400 TABLET ORAL at 20:01

## 2025-02-18 RX ADMIN — PREDNISONE 140 MG: 20 TABLET ORAL at 20:01

## 2025-02-18 ASSESSMENT — PAIN SCALES - GENERAL
PAINLEVEL_OUTOF10: 0 - NO PAIN
PAINLEVEL_OUTOF10: 0 - NO PAIN

## 2025-02-18 ASSESSMENT — COGNITIVE AND FUNCTIONAL STATUS - GENERAL
WALKING IN HOSPITAL ROOM: A LITTLE
DAILY ACTIVITIY SCORE: 24
DAILY ACTIVITIY SCORE: 24
MOBILITY SCORE: 23
CLIMB 3 TO 5 STEPS WITH RAILING: A LITTLE
CLIMB 3 TO 5 STEPS WITH RAILING: A LITTLE
MOBILITY SCORE: 22

## 2025-02-18 ASSESSMENT — ACTIVITIES OF DAILY LIVING (ADL): LACK_OF_TRANSPORTATION: NO

## 2025-02-18 ASSESSMENT — PAIN - FUNCTIONAL ASSESSMENT
PAIN_FUNCTIONAL_ASSESSMENT: 0-10
PAIN_FUNCTIONAL_ASSESSMENT: 0-10

## 2025-02-18 NOTE — PROGRESS NOTES
"Bijan Ibanez is a 64 y.o. male on day 4 of admission presenting with Diffuse large B cell lymphoma.    Subjective   Seen this morning at bedside, doing well with chemo, no leg edema. We discussed switching his Neulasta and Rituxan day to 2/20 since he will still be in the hospital.  We also discussed ID ppx and discharge tomorrow after chemo.  Denies any N/V/D/C, fever/chills, headache dizziness or vision changes       Objective     Physical Exam  HENT:      Head: Normocephalic.      Right Ear: Tympanic membrane normal.      Nose: Nose normal.      Mouth/Throat:      Mouth: Mucous membranes are moist.   Eyes:      Pupils: Pupils are equal, round, and reactive to light.   Cardiovascular:      Rate and Rhythm: Normal rate.      Pulses: Normal pulses.   Pulmonary:      Effort: Pulmonary effort is normal.   Abdominal:      Palpations: Abdomen is soft.   Musculoskeletal:         General: Normal range of motion.      Cervical back: Normal range of motion.   Skin:     General: Skin is warm.      Capillary Refill: Capillary refill takes less than 2 seconds.   Neurological:      General: No focal deficit present.      Mental Status: He is alert.   Psychiatric:         Mood and Affect: Mood normal.         Behavior: Behavior normal.         Last Recorded Vitals  Blood pressure 145/84, pulse 63, temperature 36.4 °C (97.5 °F), temperature source Temporal, resp. rate 16, height (S) 1.832 m (6' 0.13\"), weight (S) 98.3 kg (216 lb 11.4 oz), SpO2 98%.  Intake/Output last 3 Shifts:  I/O last 3 completed shifts:  In: 600 (6.1 mL/kg) [Blood:350; IV Piggyback:250]  Out: - (0 mL/kg)   Weight: 98.3 kg     Relevant Results  Vascular US upper extremity venous duplex right  Result Date: 1/24/2025  Findings consistent with partially occlusive thrombus within the mid to distal brachial vein and mid to distal basilic vein within the right upper extremity.   I personally reviewed the images/study and resident's interpretation and I agree " with the findings as stated by Roxanna Herndon MD (resident radiologist). This study was analyzed and interpreted at [place], [place].   MACRO: None   Signed by: Francisco Betancur 1/24/2025 4:45 PM Dictation workstation:   [id number]    1.  No focal consolidation or evidence of acute cardiopulmonary process.   I personally reviewed the images/study and I agree with Dr. Luisito Saldivar findings as stated. This study was interpreted at [place], [place]   MACRO: None   Signed by: Beryl Ko 1/22/2025 10:15 AM Dictation workstation:   [id number]    MR brain w and wo IV contrast  Result Date: 1/21/2025  1. No evidence of intracranial lymphoma. 2. Small focus of encephalomalacia with peripheral gliosis inferior left lentiform nucleus and similar but smaller focus inferior right lentiform nucleus, suspected old lacunar infarctions. 3. There is thickening of the bilateral muscles of mastication, right-greater-than-left, as well as the right posterolateral nasopharyngeal structures. These areas demonstrated substantial abnormal uptake on the recent PET-CT and are suspicious for lymphomatous involvement. 4. Scattered foci of abnormal enhancement and diffusion restriction involving the calvarium suspicious for osseous metastatic involvement.   MACRO: None   Signed by: Terrence Gipson 1/21/2025 2:32 PM Dictation workstation:   [id number]    NM PET CT lymphoma diagnosis  Result Date: 1/20/2025  Widespread hermann as well as extranodal lymphomatous involvement. 1. Multiple hypermetabolic supra and infra diaphragmatic lymphadenopathy as described, consistent with lymphomatous involvement. 2. Intensely hypermetabolic activity within bilateral enlarged submandibular and parotid glands as described, concerning for lymphomatous involvement. 3. Hypermetabolic mass like infiltration within bilateral kidneys(right> left), corresponding to lesion  seen on CT dated 01/01/2025, compatible with lymphomatous involvement. 4. Hypermetabolic hepatic lesions without any underlying CT correlate, consistent with lymphomatous involvement. 5. Splenomegaly with nonspecific hypermetabolic activity suggestive of extranodal involvement. 6. Multiple hypermetabolic soft tissue mesenteric deposits as well as anterior abdominal wall nodular deposits, with few of the lesions without any underlying CT correlate, concerning for additional lymphomatous involvement. 7. Metabolic activity within the stomach, bilateral arms, anterior chest wall and gluteal muscles without underlying CT correlate, concerning for lymphomatous involvement.       I personally reviewed the images/study and I agree with the findings as stated by Lisha Hernandez MD.  This study was interpreted at [facility], [place], [place].   Signed by: John Diaz 1/20/2025 3:33 PM Dictation workstation:   [id number]    Onco-Echo Complete (Strain & 3D)  Result Date: 1/18/2025  CONCLUSIONS:  1. The left ventricular systolic function is normal, with a visually estimated ejection fraction of 65-70%.  2. There is normal right ventricular global systolic function.  3. Right ventricular systolic pressure is within normal limits.  4. Normal aortic root.  5. Left Ventricular Global Longitudinal Strain - 19.9 %.  6. Strain values are normal, which imply normal myocardial function.       Assessment/Plan   Assessment & Plan  Diffuse large B cell lymphoma    Encounter for antineoplastic chemotherapy    Bijan Ibanez is a 64 y.o. male PMH of newly dx Burkitt's lymphoma, HTN, HLD, CAD, MI (s/p stent 2010, on ASA), hx renal cell carcinoma (s/p R partial nephrectomy in 2013 @ [facility]), GERD, BPH, arthritis, afib RVR (On eliquis w/ close monitoring d/t thrombocytopenia), and recent MRSA bacteremia (currently on x4 week Vancomycin until 3/3/25, agreed upon by ID and primary oncologist to receive chemo  treatment while on IV atbx) who is admitted for C2 EPOCH. Doing well and tolerating chemo thus far, labs and VSS. DC home pending completion of chemo.      Updates 2/18:   - Doing well with chemo   - Switch Neulasta and Rituxan to 2/20 since he will be dc on 2/19 after chemo completion     #  CHEMO:   - C2 EPOCH  - Premeds: Zofran, Zyprexa, compazine, Emend  - Doxorubicin 23.6mg on Day 1, 2, 3  - Etoposide 88mg on Day 1, 2, 3  - Vincristine 0.9mg on Day 1, 2, 3  - Cyclophosphamide 1,770mg once on Day 4  - Prednisone 140mg BID x5 days  - PRN Reaction meds: epinephrine, albuterol, Benadryl, pepcid, solu-medrol    - Plan for Neulasta and ritux 2/20      # ONC:  - Burkitt’s lymphoma    - Primary oncologist: Dr. 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 High risk based on marrow involvement, though no CNS involvement  - S/p C1 DA-R-EPOCH when signed out as high grade lymphoma; given the significant toxicity he had with DA R EPOCH, will not escalate to CODOX-IVAC or hyper-CVAD given no CNS involvement  - Given G3/4 mucositis, etoposide reduced by 25%, keep EPOCH at Dose level 0  - 2/11 Per most recent onc note, oncology along with ID felt they could resume chemotherapy after total 2 weeks of IV Vanco, but will still complete full duration of 4 weeks of the antibiotic     # HEME:  - Anemia  - Likely 2/2 disease and tx, no signs of active bleeding on admit  - Monitor counts daily, fibrinogen, coags  - No evidence of DIC or hypercoag stat  - Transfuse to keep hgb > 7, plt > 50 (on AC)  - 2/16 1u PRBC, Hgb 8.2 (2/17)   # Prophy:  - Cont home Eliquis 5mg BID - plan to hold when plt < 50  - 2/15 plt 156     # ID:  - Allergies: Latex, PCN  - MRSA bacteremia  - Follows with Dr. Rivera     - Has outpt appt 2/28  - 1/31 Bc x2 + staphyloccous aureus and MRSA, 2/4 Bc x2 negative growth  - PICC line removed 2/3  - 2/3 TTE w/o evidence of vegetation  -  Aspergillus galactomannan and fungitell results still pending 2/4  - ID consulted in prior admission, rec Vanc x4 week until 3/3/24  - Cont home Vanc (last planned dose 3/7/25)  # Prophylaxis  - VZV: Continue Acyclovir 400mg PO BID  - Candidiasis: Continue Fluconazole 400mg PO daily   - PJP: None at this time     # CARDIO:  - Afib RVR on prior admission  - Likely 2/2 infection, electrolyte derangements, and anemia  - Keep K > 4, Mg > 2, Hgb > 7  - Cont home Eliquis until plt < 50  # Hx of MI  # Hx of HTN/HLD/CAD  -2/3/25 Seen by EP as difficulty achieving rate control  -2/3 TTE EF 60-65%, normal RVSP, L atrial size mod dilated  - Cont home Metoprolol XL 100mg PO daily  - Cont home Atorvastatin 40mg PO daily  - Cont home Lisinopril 10mg PO daily      # FEN/GI:  - Admit weight: 98.3kg  - Uric acid on admit: 2.4 (2/14)  - Cont home Allopurinol 300mg PO daily  - Cont home Lasix 20mg PRN swelling  # Hypokalemia  - K on admit: 3.3 (2/14), S/p 40 PO K+ --> 4.1   - Monitor levels daily, replete electrolytes PRN  # Mucositis  - Pt had severe mucositis last admission (States he was eating well at home, but endorses mild mouth pain upon admit)  - Cont home BMX PRN, Oxy 5mg PO Q4h PRN for pain  - Consider supportive onc consult if continues to worsen  # Prophy:  - Cont home Pantoprazole 40mg PO daily     # MSK:  - Hx Back Pain  - Cont Gabapentin 300mg PO nightly     # DISPO:  - Full code, confirmed on admit  - DC home pending completion of chemo likely 2/19, plan Rituxan and Neulasta on 2/20 with count check and yris heme visit. Count check on 2/24. Resumed home care for vancomycin.   - Access: PIV, Midline  - NOK: Indira, [phone], - updated bedside 2/17   - FUV  2/26 Cards, 2/28 ID       I spent 60 minutes in the professional and overall care of this patient.      Mac Carter PA-C

## 2025-02-18 NOTE — PROGRESS NOTES
25 1500   Discharge Planning   Living Arrangements Spouse/significant other   Support Systems Spouse/significant other;Children;[employer] community   Type of Residence Private residence   Number of Stairs to Enter Residence 1   Number of Stairs Within Residence 13   Do you have animals or pets at home? No   Who is requesting discharge planning? Patient   Home or Post Acute Services In home services   Type of Home Care Services Home nursing visits   Expected Discharge Disposition Home Health  ([facility] Vanc[facility] )   Does the patient need discharge transport arranged? No   Financial Resource Strain   How hard is it for you to pay for the very basics like food, housing, medical care, and heating? Not hard   Housing Stability   In the last 12 months, was there a time when you were not able to pay the mortgage or rent on time? N   In the past 12 months, how many times have you moved where you were living? 0   At any time in the past 12 months, were you homeless or living in a shelter (including now)? N   Transportation Needs   In the past 12 months, has lack of transportation kept you from medical appointments or from getting medications? no   In the past 12 months, has lack of transportation kept you from meetings, work, or from getting things needed for daily living? No     TCC met with the pt and his wife to confirm the discharge plan.  The pt has 3 doses if IV Vanco at home but they are  so will need a delivery tomorrow.  The team has placed the HC order and script sent to [facility] pharmacy.  Requested delivery of IV Vanco from [facility] to the pt's home in time for the evening dose tomorrow. Pt still has a functioning midline catheter in place to infuse the IV Vanco through and the pt's wife is independent with administering the Vanco at home.  IMM form provided to the pt and the chart copy was signed.  Guerda Carranza RN, TCC

## 2025-02-18 NOTE — PROGRESS NOTES
Vancomycin Dosing by Pharmacy- FOLLOW UP    Bijan Ibanez is a 64 y.o. year old male who Pharmacy has been consulted for vancomycin dosing for other bacteremia . Based on the patient's indication and renal status this patient is being dosed based on a goal AUC of 500-600.     Renal function is currently stable.    Current vancomycin dose: 750 mg given every 12 hours    Estimated vancomycin AUC on current dose: 450 mg/L.hr     Visit Vitals  /85 (BP Location: Right arm, Patient Position: Sitting)   Pulse 61   Temp 36.9 °C (98.4 °F) (Temporal)   Resp 16        Lab Results   Component Value Date    CREATININE 0.65 2025    CREATININE 0.74 2025    CREATININE 0.77 2025    CREATININE 0.82 02/15/2025        Patient weight is as follows:   Vitals:    25 1706   Weight: (S) 98.3 kg (216 lb 11.4 oz)       Cultures:  No results found for the encounter in last 14 days.       I/O last 3 completed shifts:  In: 600 (6.1 mL/kg) [Blood:350; IV Piggyback:250]  Out: - (0 mL/kg)   Weight: 98.3 kg   I/O during current shift:  No intake/output data recorded.    Temp (24hrs), Av.7 °C (98 °F), Min:36 °C (96.8 °F), Max:37.2 °C (99 °F)      Assessment/Plan    Below goal AUC. Orders placed for new vancomcyin regimen of 1000mg every 12 hours to begin at 1700.     This dosing regimen is predicted by [employer] to result in the following pharmacokinetic parameters:  Loading dose: N/A  Regimen: 1000 mg IV every 12 hours.  Start time: 04:23 on 2025  Exposure target: AUC24 (range)500-600 mg/L.hr   [id number]: 578 mg/L.hr  AUC24,ss: 598 mg/L.hr  Probability of AUC24 > 400: 85 %  Ctrough,ss: 17.1 mg/L  Probability of Ctrough,ss > 20: 39 %      The next level will be obtained on  at 1000. May be obtained sooner if clinically indicated.   Will continue to monitor renal function daily while on vancomycin and order serum creatinine at least every 48 hours if not already ordered.  Follow for continued vancomycin  needs, clinical response, and signs/symptoms of toxicity.       JOIE MESSINA

## 2025-02-18 NOTE — CARE PLAN
The clinical goals for the shift include Patient will remain free of falls throughout this shift.    Over the shift, the patient did make progress toward the following goals. Barriers to progression include chemotherapy infusing continuously. Recommendations to address these barriers include moderate fall risk interventions.    Problem: Pain - Adult  Goal: Verbalizes/displays adequate comfort level or baseline comfort level  Outcome: Progressing     Problem: Safety - Adult  Goal: Free from fall injury  Outcome: Progressing     Problem: Discharge Planning  Goal: Discharge to home or other facility with appropriate resources  Outcome: Progressing     Problem: Chronic Conditions and Co-morbidities  Goal: Patient's chronic conditions and co-morbidity symptoms are monitored and maintained or improved  Outcome: Progressing     Problem: Nutrition  Goal: Nutrient intake appropriate for maintaining nutritional needs  Outcome: Progressing    ANJALI RODRÍGUEZ RN

## 2025-02-19 ENCOUNTER — APPOINTMENT (OUTPATIENT)
Dept: HEMATOLOGY/ONCOLOGY | Facility: HOSPITAL | Age: 65
DRG: 847 | End: 2025-02-19
Payer: MEDICARE

## 2025-02-19 ENCOUNTER — APPOINTMENT (OUTPATIENT)
Dept: HEMATOLOGY/ONCOLOGY | Facility: HOSPITAL | Age: 65
End: 2025-02-19
Payer: MEDICARE

## 2025-02-19 ENCOUNTER — DOCUMENTATION (OUTPATIENT)
Dept: HOME HEALTH SERVICES | Facility: HOME HEALTH | Age: 65
End: 2025-02-19
Payer: MEDICARE

## 2025-02-19 ENCOUNTER — HOME INFUSION (OUTPATIENT)
Dept: INFUSION THERAPY | Age: 65
End: 2025-02-19
Payer: MEDICARE

## 2025-02-19 ENCOUNTER — PHARMACY VISIT (OUTPATIENT)
Dept: PHARMACY | Facility: CLINIC | Age: 65
End: 2025-02-19
Payer: COMMERCIAL

## 2025-02-19 VITALS
TEMPERATURE: 97.5 F | RESPIRATION RATE: 18 BRPM | DIASTOLIC BLOOD PRESSURE: 95 MMHG | OXYGEN SATURATION: 96 % | WEIGHT: 222.44 LBS | HEART RATE: 62 BPM | BODY MASS INDEX: 30.13 KG/M2 | HEIGHT: 72 IN | SYSTOLIC BLOOD PRESSURE: 148 MMHG

## 2025-02-19 DIAGNOSIS — B95.62 BACTEREMIA DUE TO METHICILLIN RESISTANT STAPHYLOCOCCUS AUREUS: Primary | ICD-10-CM

## 2025-02-19 DIAGNOSIS — R78.81 BACTEREMIA DUE TO METHICILLIN RESISTANT STAPHYLOCOCCUS AUREUS: Primary | ICD-10-CM

## 2025-02-19 LAB
ALBUMIN SERPL BCP-MCNC: 2.7 G/DL (ref 3.4–5)
ALP SERPL-CCNC: 46 U/L (ref 33–136)
ALT SERPL W P-5'-P-CCNC: 76 U/L (ref 10–52)
ANION GAP SERPL CALC-SCNC: 13 MMOL/L (ref 10–20)
AST SERPL W P-5'-P-CCNC: 22 U/L (ref 9–39)
BASOPHILS # BLD MANUAL: 0 X10*3/UL (ref 0–0.1)
BASOPHILS NFR BLD MANUAL: 0 %
BILIRUB SERPL-MCNC: 0.5 MG/DL (ref 0–1.2)
BUN SERPL-MCNC: 27 MG/DL (ref 6–23)
CALCIUM SERPL-MCNC: 7.3 MG/DL (ref 8.6–10.6)
CHLORIDE SERPL-SCNC: 104 MMOL/L (ref 98–107)
CO2 SERPL-SCNC: 27 MMOL/L (ref 21–32)
CREAT SERPL-MCNC: 0.72 MG/DL (ref 0.5–1.3)
DACRYOCYTES BLD QL SMEAR: ABNORMAL
EGFRCR SERPLBLD CKD-EPI 2021: >90 ML/MIN/1.73M*2
EOSINOPHIL # BLD MANUAL: 0 X10*3/UL (ref 0–0.7)
EOSINOPHIL NFR BLD MANUAL: 0 %
ERYTHROCYTE [DISTWIDTH] IN BLOOD BY AUTOMATED COUNT: 14.5 % (ref 11.5–14.5)
GLUCOSE SERPL-MCNC: 175 MG/DL (ref 74–99)
HCT VFR BLD AUTO: 25.7 % (ref 41–52)
HGB BLD-MCNC: 8.1 G/DL (ref 13.5–17.5)
IMM GRANULOCYTES # BLD AUTO: 0.16 X10*3/UL (ref 0–0.7)
IMM GRANULOCYTES NFR BLD AUTO: 5.3 % (ref 0–0.9)
LDH SERPL L TO P-CCNC: 226 U/L (ref 84–246)
LYMPHOCYTES # BLD MANUAL: 0.03 X10*3/UL (ref 1.2–4.8)
LYMPHOCYTES NFR BLD MANUAL: 1 %
MAGNESIUM SERPL-MCNC: 1.9 MG/DL (ref 1.6–2.4)
MCH RBC QN AUTO: 28.6 PG (ref 26–34)
MCHC RBC AUTO-ENTMCNC: 31.5 G/DL (ref 32–36)
MCV RBC AUTO: 91 FL (ref 80–100)
MONOCYTES # BLD MANUAL: 0 X10*3/UL (ref 0.1–1)
MONOCYTES NFR BLD MANUAL: 0 %
MYELOCYTES # BLD MANUAL: 0.03 X10*3/UL
MYELOCYTES NFR BLD MANUAL: 1 %
NEUTROPHILS # BLD MANUAL: 2.94 X10*3/UL (ref 1.2–7.7)
NEUTS BAND # BLD MANUAL: 0.06 X10*3/UL (ref 0–0.7)
NEUTS BAND NFR BLD MANUAL: 2 %
NEUTS SEG # BLD MANUAL: 2.88 X10*3/UL (ref 1.2–7)
NEUTS SEG NFR BLD MANUAL: 96 %
NRBC BLD-RTO: 0 /100 WBCS (ref 0–0)
PLATELET # BLD AUTO: 166 X10*3/UL (ref 150–450)
POTASSIUM SERPL-SCNC: 3.6 MMOL/L (ref 3.5–5.3)
PROT SERPL-MCNC: 4.5 G/DL (ref 6.4–8.2)
RBC # BLD AUTO: 2.83 X10*6/UL (ref 4.5–5.9)
RBC MORPH BLD: ABNORMAL
SODIUM SERPL-SCNC: 140 MMOL/L (ref 136–145)
TOTAL CELLS COUNTED BLD: 100
VANCOMYCIN SERPL-MCNC: 14.1 UG/ML (ref 5–20)
WBC # BLD AUTO: 3 X10*3/UL (ref 4.4–11.3)

## 2025-02-19 PROCEDURE — 2500000004 HC RX 250 GENERAL PHARMACY W/ HCPCS (ALT 636 FOR OP/ED): Mod: JZ,TB | Performed by: STUDENT IN AN ORGANIZED HEALTH CARE EDUCATION/TRAINING PROGRAM

## 2025-02-19 PROCEDURE — 83615 LACTATE (LD) (LDH) ENZYME: CPT | Performed by: PHYSICIAN ASSISTANT

## 2025-02-19 PROCEDURE — RXMED WILLOW AMBULATORY MEDICATION CHARGE

## 2025-02-19 PROCEDURE — 2500000001 HC RX 250 WO HCPCS SELF ADMINISTERED DRUGS (ALT 637 FOR MEDICARE OP): Performed by: STUDENT IN AN ORGANIZED HEALTH CARE EDUCATION/TRAINING PROGRAM

## 2025-02-19 PROCEDURE — 85025 COMPLETE CBC W/AUTO DIFF WBC: CPT | Performed by: PHYSICIAN ASSISTANT

## 2025-02-19 PROCEDURE — 85007 BL SMEAR W/DIFF WBC COUNT: CPT | Performed by: PHYSICIAN ASSISTANT

## 2025-02-19 PROCEDURE — 2500000004 HC RX 250 GENERAL PHARMACY W/ HCPCS (ALT 636 FOR OP/ED): Performed by: STUDENT IN AN ORGANIZED HEALTH CARE EDUCATION/TRAINING PROGRAM

## 2025-02-19 PROCEDURE — 80053 COMPREHEN METABOLIC PANEL: CPT | Performed by: PHYSICIAN ASSISTANT

## 2025-02-19 PROCEDURE — 80202 ASSAY OF VANCOMYCIN: CPT | Performed by: INTERNAL MEDICINE

## 2025-02-19 PROCEDURE — 83735 ASSAY OF MAGNESIUM: CPT

## 2025-02-19 PROCEDURE — 80202 ASSAY OF VANCOMYCIN: CPT

## 2025-02-19 PROCEDURE — 2500000004 HC RX 250 GENERAL PHARMACY W/ HCPCS (ALT 636 FOR OP/ED)

## 2025-02-19 PROCEDURE — 85027 COMPLETE CBC AUTOMATED: CPT | Performed by: PHYSICIAN ASSISTANT

## 2025-02-19 PROCEDURE — 2500000001 HC RX 250 WO HCPCS SELF ADMINISTERED DRUGS (ALT 637 FOR MEDICARE OP)

## 2025-02-19 PROCEDURE — 84075 ASSAY ALKALINE PHOSPHATASE: CPT | Performed by: PHYSICIAN ASSISTANT

## 2025-02-19 PROCEDURE — 99239 HOSP IP/OBS DSCHRG MGMT >30: CPT | Performed by: PHYSICIAN ASSISTANT

## 2025-02-19 RX ORDER — LEVOFLOXACIN 750 MG/1
750 TABLET ORAL DAILY
Qty: 14 TABLET | Refills: 0 | Status: SHIPPED | OUTPATIENT
Start: 2025-02-19 | End: 2025-03-04 | Stop reason: SDUPTHER

## 2025-02-19 RX ORDER — VANCOMYCIN HYDROCHLORIDE 1 G/20ML
1000 INJECTION, POWDER, LYOPHILIZED, FOR SOLUTION INTRAVENOUS EVERY 12 HOURS
Qty: 60 G | Refills: 0 | Status: SHIPPED
Start: 2025-02-19 | End: 2025-02-28

## 2025-02-19 RX ADMIN — PREDNISONE 140 MG: 20 TABLET ORAL at 09:44

## 2025-02-19 RX ADMIN — VANCOMYCIN HYDROCHLORIDE 1000 MG: 1 INJECTION, SOLUTION INTRAVENOUS at 04:29

## 2025-02-19 RX ADMIN — PANTOPRAZOLE SODIUM 40 MG: 40 TABLET, DELAYED RELEASE ORAL at 05:36

## 2025-02-19 RX ADMIN — ACYCLOVIR 400 MG: 400 TABLET ORAL at 09:45

## 2025-02-19 RX ADMIN — APIXABAN 5 MG: 5 TABLET, FILM COATED ORAL at 09:44

## 2025-02-19 RX ADMIN — LISINOPRIL 10 MG: 10 TABLET ORAL at 09:45

## 2025-02-19 RX ADMIN — METOPROLOL SUCCINATE 100 MG: 50 TABLET, EXTENDED RELEASE ORAL at 09:44

## 2025-02-19 RX ADMIN — ALLOPURINOL 300 MG: 300 TABLET ORAL at 09:44

## 2025-02-19 RX ADMIN — ATORVASTATIN CALCIUM 40 MG: 40 TABLET, FILM COATED ORAL at 05:36

## 2025-02-19 ASSESSMENT — PAIN SCALES - GENERAL: PAINLEVEL_OUTOF10: 0 - NO PAIN

## 2025-02-19 ASSESSMENT — PAIN - FUNCTIONAL ASSESSMENT: PAIN_FUNCTIONAL_ASSESSMENT: 0-10

## 2025-02-19 NOTE — CARE PLAN
The patient's goals for the shift include      Problem: Pain - Adult  Goal: Verbalizes/displays adequate comfort level or baseline comfort level  Outcome: Progressing     Problem: Safety - Adult  Goal: Free from fall injury  Outcome: Progressing     Problem: Discharge Planning  Goal: Discharge to home or other facility with appropriate resources  Outcome: Progressing     Problem: Chronic Conditions and Co-morbidities  Goal: Patient's chronic conditions and co-morbidity symptoms are monitored and maintained or improved  Outcome: Progressing     Problem: Nutrition  Goal: Nutrient intake appropriate for maintaining nutritional needs  Outcome: Progressing     The clinical goals for the shift include Remain HDS
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
## 2025-02-19 NOTE — NURSING NOTE
Dose # 1 of Cyclophosphamide chemotherapy 1770 mg in 355.5mL given over 30 minutes via Left arm midline catheter.  Dose up at 2047 and down at 2130.  Pre-medicated with ondansetron and prednisone.  Patient, dose and rate verified with second RN, December Dessify.  + BBR obtained via syringe aspiration before and after administration.  Patient with no side effects.

## 2025-02-19 NOTE — PROGRESS NOTES
Vancomycin Dosing by Pharmacy- FOLLOW UP    Bijan Ibanez is a 64 y.o. year old male who Pharmacy has been consulted for vancomycin dosing for line infections. Based on the patient's indication and renal status this patient is being dosed based on a goal AUC of 500-600.     Renal function is currently stable.    Current vancomycin dose: 1000 mg given every 12 hours    Most recent random level: 14.1 mcg/mL    Visit Vitals  BP (!) 148/95 (BP Location: Right arm, Patient Position: Lying)   Pulse 62   Temp 36.4 °C (97.5 °F) (Temporal)   Resp 18        Lab Results   Component Value Date    CREATININE 0.72 2025    CREATININE 0.65 2025    CREATININE 0.74 2025    CREATININE 0.77 2025        Patient weight is as follows:   Vitals:    25 0818   Weight: 101 kg (222 lb 7.1 oz)       Cultures:  No results found for the encounter in last 14 days.       I/O last 3 completed shifts:  In: 555.5 (5.7 mL/kg) [IV Piggyback:555.5]  Out: - (0 mL/kg)   Weight: 98.3 kg   I/O during current shift:  No intake/output data recorded.    Temp (24hrs), Av.7 °C (98.1 °F), Min:36.4 °C (97.5 °F), Max:37 °C (98.6 °F)      Assessment/Plan    Within goal AUC range. Continue current vancomycin regimen.    This dosing regimen is predicted by [employer] to result in the following pharmacokinetic parameters:    Loading dose: N/A  Regimen: 1000 mg IV every 12 hours.  Start time: 16:29 on 2025  Exposure target: AUC24 (range)500-600 mg/L.hr   SWY04-02: 415 mg/L.hr  AUC24,ss: 525 mg/L.hr  Probability of AUC24 > 400: 77 %  Ctrough,ss: 20.8 mg/L  Probability of Ctrough,ss > 20: 54 %      The next level will be obtained on  at AM labs. May be obtained sooner if clinically indicated.   Will continue to monitor renal function daily while on vancomycin and order serum creatinine at least every 48 hours if not already ordered.  Follow for continued vancomycin needs, clinical response, and signs/symptoms of toxicity.        KRISTIAN VALDEZ

## 2025-02-19 NOTE — DISCHARGE SUMMARY
Discharge Diagnosis  Diffuse large B cell lymphoma    Issues Requiring Follow-Up  None    Test Results Pending At Discharge  Pending Labs       Order Current Status    Vancomycin Collected (02/19/25 1019)        Hospital Course  Bijan Ibanez is a 64 y.o. male PMH of newly dx Burkitt's lymphoma, HTN, HLD, CAD, MI (s/p stent 2010, on ASA), hx renal cell carcinoma (s/p R partial nephrectomy in 2013 @ [facility]), GERD, BPH, arthritis, afib RVR (On eliquis w/ close monitoring d/t thrombocytopenia), and recent MRSA bacteremia (currently on x4 week Vancomycin until 3/3/25, agreed upon by ID and primary oncologist to receive chemo treatment while on IV atbx) who is admitted for C2 EPOCH. Did well with chemo. Neulasta and Rituxan changed to 2/20 because chemo finished 2/19. On discharged he is resume home care of Vancomycin. Plan to continue ID ppx with Fluconazole, Acyclovir and Levaquin. FUV with mal heme on 2/20 and Dr. Mckeon on 3/4. Count check for 2/20 and 2/24.   On the day of discharge, the patient reported feeling well and pain was controlled. Vitals and labs were stable.   Attending has reviewed all labs and vitals, and discussed and agreed with the discharge plan prior to patient discharge.  Patient discharged in stable condition. > 30 minutes spent on discharge planning.    Pertinent Physical Exam At Time of Discharge  Physical Exam  General: alert, awake, and well-developed  Skin: warm, dry, intact  Head/Neck: NCAT, supple  Eyes: EOMI, no scleral icterus  Mouth: OMM, no erythema or discharge   Lungs: CTA, no adventitious breath sounds  Cardiovascular: RRR,no m/r/g, no edema  Abdomen: +BS, soft, non-tender, non-distended  Neuro: normal movement, normal speech   Psych: normal affect and mood    Home Medications     Medication List      START taking these medications     diphenhydramine/Maalox/lidocaine (Magic Mouthwash) - Compounded -   Outpatient; Swish and spit 10 mL by mouth every 6 hours if needed.    levoFLOXacin 750 mg tablet; Commonly known as: Levaquin; Take 1 tablet   (750 mg) by mouth once daily for 14 days.     CHANGE how you take these medications     oxyCODONE 5 mg immediate release tablet; Commonly known as: Roxicodone;   Take 1 tablet (5 mg) by mouth every 6 hours if needed for severe pain (7 -   10) for up to 14 days.; What changed: when to take this   vancomycin 1,000 mg vial for injection; Commonly known as: Vancocin;   Infuse 1 g into a venous catheter every 12 hours.; What changed: how much   to take     CONTINUE taking these medications     acyclovir 400 mg tablet; Commonly known as: Zovirax; Take 1 tablet (400   mg) by mouth every 12 hours.   albuterol 90 mcg/actuation inhaler   allopurinol 300 mg tablet; Commonly known as: Zyloprim; Take 1 tablet   (300 mg) by mouth once daily.   atorvastatin 40 mg tablet; Commonly known as: Lipitor; Take 1 tablet (40   mg) by mouth early in the morning..   Eliquis 5 mg tablet; Generic drug: apixaban; Take 1 tablet (5 mg) by   mouth 2 times a day.   fluconazole 200 mg tablet; Commonly known as: Diflucan; Take 2 tablets   (400 mg) by mouth once daily.   furosemide 20 mg tablet; Commonly known as: Lasix; Take 1 tablet (20 mg)   by mouth once daily as needed (for swelling).   gabapentin 300 mg capsule; Commonly known as: Neurontin; Take 1 capsule   (300 mg) by mouth once daily at bedtime.   Hep Flush-10 (PF) 10 unit/mL solution; Generic drug: heparin, porcine   (PF)   lisinopril 10 mg tablet   metoprolol succinate  mg 24 hr tablet; Commonly known as:   Toprol-XL; Take 1 tablet (100 mg) by mouth once daily. Do not crush or   chew.   pantoprazole 40 mg EC tablet; Commonly known as: ProtoNix; Take 1 tablet   (40 mg) by mouth once daily in the morning. Take before meals. Do not   crush, chew, or split.   sodium chloride 0.9% solution     ASK your doctor about these medications     baclofen 10 mg tablet; Commonly known as: Lioresal; Take 1 tablet (10   mg) by  mouth 3 times a day.       Outpatient Follow-Up  Future Appointments   Date Time Provider Department Center   2/20/2025  7:[facility]   2/20/2025  8:00 AM [facility]   2/24/2025 11:15 AM YENNIFER MENTOR ADMIN ROOM PET [facility]   2/24/2025 12:15 PM YENNIFER MENTOR PET [facility]   2/26/2025  1:00 PM Lucero Jackson MD [id number] [facility]   2/28/2025 11:40 AM Ismael Walsh MD [id number] [facility]   3/4/2025  1:00 PM Torey Mckeon DO [id number] [facility]       Mac Carter PA-C

## 2025-02-19 NOTE — HH CARE COORDINATION
Home Care received a Referral to Resume Care for Infusion and Nursing. We have processed the referral for a Resumption of Care on 24-48 HOURS.     If you have any questions or concerns, please feel free to contact us at [phone]. Follow the prompts, enter your five digit zip code, and you will be directed to your care team on EAST 1.

## 2025-02-19 NOTE — CARE PLAN
The patient's goals for the shift include      The clinical goals for the shift include Remain HDS    Problem: Pain - Adult  Goal: Verbalizes/displays adequate comfort level or baseline comfort level  Outcome: Met     Problem: Safety - Adult  Goal: Free from fall injury  Outcome: Met     Problem: Discharge Planning  Goal: Discharge to home or other facility with appropriate resources  Outcome: Met     Problem: Chronic Conditions and Co-morbidities  Goal: Patient's chronic conditions and co-morbidity symptoms are monitored and maintained or improved  Outcome: Met     Problem: Nutrition  Goal: Nutrient intake appropriate for maintaining nutritional needs  Outcome: Met

## 2025-02-19 NOTE — PROGRESS NOTES
02/19/25 1300   Discharge Planning   Living Arrangements Spouse/significant other   Support Systems Spouse/significant other   Assistance Needed IV ABX   Type of Residence Private residence   Home or Post Acute Services In home services   Type of Home Care Services Home nursing visits   Expected Discharge Disposition [facility]  ([facility])   Does the patient need discharge transport arranged? No     Pt medically ready for discharge. [facility] confirmed delivery of medication/supplies from PM dose today. IMM signed.

## 2025-02-20 ENCOUNTER — OFFICE VISIT (OUTPATIENT)
Dept: HEMATOLOGY/ONCOLOGY | Facility: HOSPITAL | Age: 65
End: 2025-02-20
Payer: MEDICARE

## 2025-02-20 ENCOUNTER — INFUSION (OUTPATIENT)
Dept: HEMATOLOGY/ONCOLOGY | Facility: HOSPITAL | Age: 65
End: 2025-02-20
Payer: MEDICARE

## 2025-02-20 ENCOUNTER — NUTRITION (OUTPATIENT)
Dept: HEMATOLOGY/ONCOLOGY | Facility: HOSPITAL | Age: 65
End: 2025-02-20

## 2025-02-20 VITALS — HEIGHT: 72 IN | BODY MASS INDEX: 29.86 KG/M2 | WEIGHT: 220.46 LBS

## 2025-02-20 VITALS
RESPIRATION RATE: 18 BRPM | WEIGHT: 220.46 LBS | DIASTOLIC BLOOD PRESSURE: 70 MMHG | SYSTOLIC BLOOD PRESSURE: 141 MMHG | BODY MASS INDEX: 29.8 KG/M2 | HEART RATE: 70 BPM | TEMPERATURE: 97.5 F | OXYGEN SATURATION: 100 %

## 2025-02-20 DIAGNOSIS — C83.78: ICD-10-CM

## 2025-02-20 DIAGNOSIS — C83.78 BURKITT'S LYMPHOMA OF LYMPH NODES OF MULTIPLE REGIONS (MULTI): ICD-10-CM

## 2025-02-20 DIAGNOSIS — B95.62 MRSA BACTEREMIA: ICD-10-CM

## 2025-02-20 DIAGNOSIS — E87.6 HYPOKALEMIA: ICD-10-CM

## 2025-02-20 DIAGNOSIS — C83.30 DIFFUSE LARGE B-CELL LYMPHOMA, UNSPECIFIED BODY REGION (MULTI): Primary | ICD-10-CM

## 2025-02-20 DIAGNOSIS — C83.30 DIFFUSE LARGE B-CELL LYMPHOMA, UNSPECIFIED BODY REGION (MULTI): ICD-10-CM

## 2025-02-20 DIAGNOSIS — R78.81 MRSA BACTEREMIA: ICD-10-CM

## 2025-02-20 LAB
ALBUMIN SERPL BCP-MCNC: 2.8 G/DL (ref 3.4–5)
ALP SERPL-CCNC: 49 U/L (ref 33–136)
ALT SERPL W P-5'-P-CCNC: 62 U/L (ref 10–52)
ANION GAP SERPL CALC-SCNC: 9 MMOL/L (ref 10–20)
AST SERPL W P-5'-P-CCNC: 18 U/L (ref 9–39)
BASOPHILS # BLD AUTO: 0.01 X10*3/UL (ref 0–0.1)
BASOPHILS NFR BLD AUTO: 0.2 %
BILIRUB SERPL-MCNC: 0.5 MG/DL (ref 0–1.2)
BUN SERPL-MCNC: 28 MG/DL (ref 6–23)
CALCIUM SERPL-MCNC: 7.6 MG/DL (ref 8.6–10.6)
CHLORIDE SERPL-SCNC: 103 MMOL/L (ref 98–107)
CO2 SERPL-SCNC: 32 MMOL/L (ref 21–32)
CREAT SERPL-MCNC: 0.67 MG/DL (ref 0.5–1.3)
DACRYOCYTES BLD QL SMEAR: NORMAL
EGFRCR SERPLBLD CKD-EPI 2021: >90 ML/MIN/1.73M*2
EOSINOPHIL # BLD AUTO: 0 X10*3/UL (ref 0–0.7)
EOSINOPHIL NFR BLD AUTO: 0 %
ERYTHROCYTE [DISTWIDTH] IN BLOOD BY AUTOMATED COUNT: 14.5 % (ref 11.5–14.5)
GLUCOSE SERPL-MCNC: 132 MG/DL (ref 74–99)
HCT VFR BLD AUTO: 26.6 % (ref 41–52)
HGB BLD-MCNC: 8.8 G/DL (ref 13.5–17.5)
IMM GRANULOCYTES # BLD AUTO: 0.15 X10*3/UL (ref 0–0.7)
IMM GRANULOCYTES NFR BLD AUTO: 2.5 % (ref 0–0.9)
LDH SERPL L TO P-CCNC: 210 U/L (ref 84–246)
LYMPHOCYTES # BLD AUTO: 0.15 X10*3/UL (ref 1.2–4.8)
LYMPHOCYTES NFR BLD AUTO: 2.5 %
MAGNESIUM SERPL-MCNC: 2.08 MG/DL (ref 1.6–2.4)
MCH RBC QN AUTO: 28.9 PG (ref 26–34)
MCHC RBC AUTO-ENTMCNC: 33.1 G/DL (ref 32–36)
MCV RBC AUTO: 87 FL (ref 80–100)
MONOCYTES # BLD AUTO: 0.03 X10*3/UL (ref 0.1–1)
MONOCYTES NFR BLD AUTO: 0.5 %
NEUTROPHILS # BLD AUTO: 5.55 X10*3/UL (ref 1.2–7.7)
NEUTROPHILS NFR BLD AUTO: 94.3 %
NRBC BLD-RTO: 0 /100 WBCS (ref 0–0)
OVALOCYTES BLD QL SMEAR: NORMAL
PLATELET # BLD AUTO: 157 X10*3/UL (ref 150–450)
POTASSIUM SERPL-SCNC: 3 MMOL/L (ref 3.5–5.3)
PROT SERPL-MCNC: 4.8 G/DL (ref 6.4–8.2)
RBC # BLD AUTO: 3.05 X10*6/UL (ref 4.5–5.9)
RBC MORPH BLD: NORMAL
SODIUM SERPL-SCNC: 141 MMOL/L (ref 136–145)
VANCOMYCIN SERPL-MCNC: 7.6 UG/ML (ref 5–20)
WBC # BLD AUTO: 5.9 X10*3/UL (ref 4.4–11.3)

## 2025-02-20 PROCEDURE — 1111F DSCHRG MED/CURRENT MED MERGE: CPT

## 2025-02-20 PROCEDURE — 2500000004 HC RX 250 GENERAL PHARMACY W/ HCPCS (ALT 636 FOR OP/ED): Mod: JZ,TB | Performed by: STUDENT IN AN ORGANIZED HEALTH CARE EDUCATION/TRAINING PROGRAM

## 2025-02-20 PROCEDURE — 83615 LACTATE (LD) (LDH) ENZYME: CPT | Performed by: PHYSICIAN ASSISTANT

## 2025-02-20 PROCEDURE — 80202 ASSAY OF VANCOMYCIN: CPT | Performed by: INTERNAL MEDICINE

## 2025-02-20 PROCEDURE — 2500000001 HC RX 250 WO HCPCS SELF ADMINISTERED DRUGS (ALT 637 FOR MEDICARE OP): Performed by: STUDENT IN AN ORGANIZED HEALTH CARE EDUCATION/TRAINING PROGRAM

## 2025-02-20 PROCEDURE — 99214 OFFICE O/P EST MOD 30 MIN: CPT

## 2025-02-20 PROCEDURE — 1157F ADVNC CARE PLAN IN RCRD: CPT

## 2025-02-20 PROCEDURE — 1123F ACP DISCUSS/DSCN MKR DOCD: CPT

## 2025-02-20 PROCEDURE — 2500000004 HC RX 250 GENERAL PHARMACY W/ HCPCS (ALT 636 FOR OP/ED): Performed by: STUDENT IN AN ORGANIZED HEALTH CARE EDUCATION/TRAINING PROGRAM

## 2025-02-20 PROCEDURE — 2500000002 HC RX 250 W HCPCS SELF ADMINISTERED DRUGS (ALT 637 FOR MEDICARE OP, ALT 636 FOR OP/ED)

## 2025-02-20 PROCEDURE — 80053 COMPREHEN METABOLIC PANEL: CPT | Performed by: PHYSICIAN ASSISTANT

## 2025-02-20 PROCEDURE — 96413 CHEMO IV INFUSION 1 HR: CPT

## 2025-02-20 PROCEDURE — 96415 CHEMO IV INFUSION ADDL HR: CPT

## 2025-02-20 PROCEDURE — 96372 THER/PROPH/DIAG INJ SC/IM: CPT

## 2025-02-20 PROCEDURE — 1160F RVW MEDS BY RX/DR IN RCRD: CPT

## 2025-02-20 PROCEDURE — 1036F TOBACCO NON-USER: CPT

## 2025-02-20 PROCEDURE — 99214 OFFICE O/P EST MOD 30 MIN: CPT | Mod: 25

## 2025-02-20 PROCEDURE — 85025 COMPLETE CBC W/AUTO DIFF WBC: CPT | Performed by: PHYSICIAN ASSISTANT

## 2025-02-20 PROCEDURE — 1159F MED LIST DOCD IN RCRD: CPT

## 2025-02-20 RX ORDER — POTASSIUM CHLORIDE 750 MG/1
40 TABLET, FILM COATED, EXTENDED RELEASE ORAL ONCE
Status: CANCELLED | OUTPATIENT
Start: 2025-02-20 | End: 2025-02-20

## 2025-02-20 RX ORDER — PROCHLORPERAZINE MALEATE 10 MG
10 TABLET ORAL EVERY 6 HOURS PRN
Status: DISCONTINUED | OUTPATIENT
Start: 2025-02-20 | End: 2025-02-20 | Stop reason: HOSPADM

## 2025-02-20 RX ORDER — POTASSIUM CHLORIDE 20 MEQ/1
20 TABLET, EXTENDED RELEASE ORAL DAILY
Qty: 7 TABLET | Refills: 0 | Status: SHIPPED | OUTPATIENT
Start: 2025-02-20 | End: 2025-02-27

## 2025-02-20 RX ORDER — FAMOTIDINE 10 MG/ML
20 INJECTION, SOLUTION INTRAVENOUS ONCE AS NEEDED
Status: DISCONTINUED | OUTPATIENT
Start: 2025-02-20 | End: 2025-02-20 | Stop reason: HOSPADM

## 2025-02-20 RX ORDER — DIPHENHYDRAMINE HCL 50 MG
50 CAPSULE ORAL ONCE
Status: COMPLETED | OUTPATIENT
Start: 2025-02-20 | End: 2025-02-20

## 2025-02-20 RX ORDER — HEPARIN SODIUM,PORCINE/PF 10 UNIT/ML
50 SYRINGE (ML) INTRAVENOUS AS NEEDED
OUTPATIENT
Start: 2025-02-20

## 2025-02-20 RX ORDER — POTASSIUM CHLORIDE 750 MG/1
40 TABLET, FILM COATED, EXTENDED RELEASE ORAL ONCE
Status: COMPLETED | OUTPATIENT
Start: 2025-02-20 | End: 2025-02-20

## 2025-02-20 RX ORDER — HEPARIN SODIUM,PORCINE/PF 10 UNIT/ML
50 SYRINGE (ML) INTRAVENOUS AS NEEDED
Status: CANCELLED | OUTPATIENT
Start: 2025-02-20

## 2025-02-20 RX ORDER — EPINEPHRINE 0.3 MG/.3ML
0.3 INJECTION SUBCUTANEOUS EVERY 5 MIN PRN
Status: DISCONTINUED | OUTPATIENT
Start: 2025-02-20 | End: 2025-02-20 | Stop reason: HOSPADM

## 2025-02-20 RX ORDER — DIPHENHYDRAMINE HYDROCHLORIDE 50 MG/ML
50 INJECTION INTRAMUSCULAR; INTRAVENOUS AS NEEDED
Status: DISCONTINUED | OUTPATIENT
Start: 2025-02-20 | End: 2025-02-20 | Stop reason: HOSPADM

## 2025-02-20 RX ORDER — ACETAMINOPHEN 325 MG/1
650 TABLET ORAL ONCE
Status: COMPLETED | OUTPATIENT
Start: 2025-02-20 | End: 2025-02-20

## 2025-02-20 RX ORDER — HEPARIN SODIUM 1000 [USP'U]/ML
2000 INJECTION, SOLUTION INTRAVENOUS; SUBCUTANEOUS AS NEEDED
Status: CANCELLED | OUTPATIENT
Start: 2025-02-20

## 2025-02-20 RX ORDER — ALBUTEROL SULFATE 0.83 MG/ML
3 SOLUTION RESPIRATORY (INHALATION) AS NEEDED
Status: DISCONTINUED | OUTPATIENT
Start: 2025-02-20 | End: 2025-02-20 | Stop reason: HOSPADM

## 2025-02-20 RX ORDER — PROCHLORPERAZINE EDISYLATE 5 MG/ML
10 INJECTION INTRAMUSCULAR; INTRAVENOUS EVERY 6 HOURS PRN
Status: DISCONTINUED | OUTPATIENT
Start: 2025-02-20 | End: 2025-02-20 | Stop reason: HOSPADM

## 2025-02-20 RX ORDER — HEPARIN SODIUM 1000 [USP'U]/ML
2000 INJECTION, SOLUTION INTRAVENOUS; SUBCUTANEOUS AS NEEDED
OUTPATIENT
Start: 2025-02-20

## 2025-02-20 RX ORDER — HEPARIN 100 UNIT/ML
500 SYRINGE INTRAVENOUS AS NEEDED
OUTPATIENT
Start: 2025-02-20

## 2025-02-20 RX ORDER — HEPARIN 100 UNIT/ML
500 SYRINGE INTRAVENOUS AS NEEDED
Status: CANCELLED | OUTPATIENT
Start: 2025-02-20

## 2025-02-20 RX ADMIN — PEGFILGRASTIM 6 MG: 6 INJECTION SUBCUTANEOUS at 08:11

## 2025-02-20 RX ADMIN — DIPHENHYDRAMINE HYDROCHLORIDE 50 MG: 50 CAPSULE ORAL at 08:11

## 2025-02-20 RX ADMIN — ACETAMINOPHEN 650 MG: 325 TABLET ORAL at 08:11

## 2025-02-20 RX ADMIN — POTASSIUM CHLORIDE 40 MEQ: 750 TABLET, FILM COATED, EXTENDED RELEASE ORAL at 13:39

## 2025-02-20 RX ADMIN — RITUXIMAB 900 MG: 10 INJECTION, SOLUTION INTRAVENOUS at 08:57

## 2025-02-20 ASSESSMENT — ENCOUNTER SYMPTOMS
NAUSEA: 0
DIARRHEA: 0
EYES NEGATIVE: 1
LIGHT-HEADEDNESS: 0
FEVER: 0
CONSTIPATION: 0
CHEST TIGHTNESS: 0
HEADACHES: 0
DIAPHORESIS: 0
FATIGUE: 1
SORE THROAT: 0
HEMATOLOGIC/LYMPHATIC NEGATIVE: 1
COUGH: 0
DIZZINESS: 0
VOMITING: 0
APPETITE CHANGE: 0
PALPITATIONS: 0
SHORTNESS OF BREATH: 0
GASTROINTESTINAL NEGATIVE: 1
NUMBNESS: 1
MUSCULOSKELETAL NEGATIVE: 1
CHILLS: 0
RESPIRATORY NEGATIVE: 1
PSYCHIATRIC NEGATIVE: 1
LEG SWELLING: 1

## 2025-02-20 ASSESSMENT — PAIN SCALES - GENERAL: PAINLEVEL_OUTOF10: 0-NO PAIN

## 2025-02-20 NOTE — PROGRESS NOTES
NUTRITION ASSESSMENT NOTE    Reason for Visit:  Bijan Ibanez is a 64 y.o. male with newly dx'd Burkitt's lymphoma (Stage IV).    Currently being treated with dose-adjusted R-EPOCH    Pt seen today in infusion.  Here for first dose Rituxan.     Patient Active Problem List   Diagnosis    Abdominal pain    Acute sinusitis    Chest pain    Ataxia    Benign hypertensive heart disease    Benign paroxysmal vertigo, unspecified ear    Bronchitis    Carotid artery stenosis    Chronic ischemic heart disease, unspecified    Chronic peripheral venous hypertension    Atherosclerosis of coronary artery without angina pectoris    Coronary artery disease involving native coronary artery of native heart with angina pectoris    Coronary artery disease    Diverticular disease of colon    Dyspnea    Essential hypertension    Hypertension    History of cholecystectomy    History of myocardial infarction    HLD (hyperlipidemia)    Malignant neoplasm of kidney (Multi)    Sensorineural hearing loss, bilateral    Neoplasm of uncertain behavior of skin    Obesity, Class I, BMI 30-34.9    Other general symptoms and signs    Personal history of malignant neoplasm of renal pelvis    Tobacco dependence in remission    Tubular adenoma    Adjustment disorder with mixed anxiety and depressed mood    Elevated prostate specific antigen (PSA)    Fever, unspecified    Hyponatremia    MI (myocardial infarction) (Multi)    Chronic midline low back pain without sciatica    Pancytopenia    Burkitt's lymphoma of lymph nodes of multiple regions (Multi)    Febrile neutropenia (CMS-HCC)    Bacteremia due to methicillin resistant Staphylococcus aureus    New onset a-fib (Multi)    Diffuse large B cell lymphoma       Nutrition Significant Labs:  Lab Results   Component Value Date/Time    GLUCOSE 132 (H) 02/20/2025 0758     02/20/2025 0758    K 3.0 (L) 02/20/2025 0758     02/20/2025 0758    CO2 32 02/20/2025 0758    ANIONGAP 9 (L) 02/20/2025 0758  "   BUN 28 (H) 02/20/2025 0758    CREATININE 0.67 02/20/2025 0758    EGFR >90 02/20/2025 0758    CALCIUM 7.6 (L) 02/20/2025 0758    ALBUMIN 2.8 (L) 02/20/2025 0758    ALKPHOS 49 02/20/2025 0758    PROT 4.8 (L) 02/20/2025 0758    AST 18 02/20/2025 0758    BILITOT 0.5 02/20/2025 0758    ALT 62 (H) 02/20/2025 0758    MG 2.08 02/19/2025 1019    PHOS 3.5 02/08/2025 0431     Lab Results   Component Value Date    WBC 5.9 02/20/2025    HGB 8.8 (L) 02/20/2025    HCT 26.6 (L) 02/20/2025    MCV 87 02/20/2025     02/20/2025       No results found for: \"VITD25\"      Anthropometrics:  Height: 183.2 cm (6' 0.13\")   Weight: 100 kg (220 lb 7.4 oz)   BMI (Calculated): 29.8    IBW/kg (Dietitian Calculated): 80.9 kg   Percent of IBW: 124 %          Weight History:     *Pt with     Daily Weight  02/20/25 : 100 kg (220 lb 7.4 oz)  02/20/25 : 100 kg (220 lb 7.4 oz)  02/19/25 : 101 kg (222 lb 7.1 oz)  02/06/25 : 115 kg (252 lb 13.9 oz)  01/28/25 : 115 kg (252 lb 6.8 oz)  01/27/25 : 115 kg (253 lb 15.5 oz)  01/26/25 : 115 kg (253 lb 15.5 oz)  01/13/25 : 107 kg (236 lb)  01/10/25 : 107 kg (236 lb)  01/08/25 : 107 kg (236 lb)    Weight Change %:       Nutrition History:    Had throat infection from chemo and couldn't eat anything but ice chips  Just discharged yesterday; consistency of hospital food was not there  Throat better; does mouth rinses and has a pain pill   No sores in mouth  Dry mouth   This past week with chemo did well  Yesterday had robert blevinses and shakes; ate everything but took a while   Appetite now better   Drinking mainly water and tea; milk ([employer] milk)  Ensure Plus at home; when wasn't eating would drink 2.  No nausea  Having regular BM's   Energy levels low    Edema in legs much   Swelling in bilat ankles     Had oatmeal for breakfast           Food & Nutrition History:    Food Allergies:    Food Intolerances:    Vitamin/mineral intake:       Herbal supplements:    Medication and Complementary/Alternative " Medicine Use:    Dentition:    Sleep Habits:      Diet Recall:  Meal 1:    Snack 1:    Meal 2:    Snack 2:    Meal 3:    Snack 3:    Food Variety:    Oral Nutrition Supplement Use:          Fluid Intake:    Energy Intake:      Food Preparation:  Cooking:    Grocery Shopping:    Dining Out:      Medications:  Current Outpatient Medications   Medication Instructions    0.9 % sodium chloride (sodium chloride 0.9%) solution 10 mL, intravenous, 2 times daily, 10ml prior and after antibiotic, 20ml after blood draw, 10ml for maintenance    acyclovir (ZOVIRAX) 400 mg, oral, Every 12 hours scheduled    albuterol 90 mcg/actuation inhaler 2 puffs, Every 6 hours PRN    allopurinol (ZYLOPRIM) 300 mg, oral, Daily    atorvastatin (LIPITOR) 40 mg, oral, Daily (0630)    baclofen (LIORESAL) 10 mg, oral, 3 times daily    diphenhydramine/Maalox/lidocaine (Magic Mouthwash) - Compounded - Outpatient Swish and spit 10 mL by mouth every 6 hours if needed.    Eliquis 5 mg, oral, 2 times daily    fluconazole (DIFLUCAN) 400 mg, oral, Daily    furosemide (LASIX) 20 mg, oral, Daily PRN    gabapentin (NEURONTIN) 300 mg, oral, Nightly    heparin, porcine, PF, (Hep Flush-10, PF,) 10 unit/mL solution 5 mL, intravenous push, 2 times daily, flush with hep flush after last ns flush, daily for maintenance    levoFLOXacin (LEVAQUIN) 750 mg, oral, Daily    lisinopril 10 mg, Daily    metoprolol succinate XL (TOPROL-XL) 100 mg, oral, Daily, Do not crush or chew.    oxyCODONE (ROXICODONE) 5 mg, oral, Every 6 hours PRN    pantoprazole (PROTONIX) 40 mg, oral, Daily before breakfast, Do not crush, chew, or split.    vancomycin (VANCOCIN) 1 g, intravenous, Every 12 hours       Nutrition Focused Physical Exam Findings:    Subcutaneous Fat Loss:        Muscle Wasting:       Physical Findings:          Estimated Needs:                           Nutrition Diagnosis                Nutrition Interventions/Recommendations   Nutrition Prescription:        Nutrition  Interventions:   Food and Nutrient Delivery:       Coordination of Care:       Nutrition Education:   Nutrition Education Content:                      Nutrition Monitoring and Evaluation                            Follow Up:

## 2025-02-20 NOTE — PROGRESS NOTES
Rituximab 1st dose rates  20ml/hr 10ml  40ml/hr 20ml  60ml/hr 30ml  80ml/hr 40ml  100ml/hr 50ml  119ml/hr 60ml  138ml/hr 69ml  159ml/hr remainder of bag (78ml)

## 2025-02-20 NOTE — PROGRESS NOTES
Patient ID: Bijan Iabnez is a 64 y.o. male.  Referring Physician: Mac Carter PA-C  [id number] [place] [address]  Primary Care Provider: Nehal Murphy MD    Date of Service:  2/20/2025    SUBJECTIVE:  Bijan Ibanez presents today for follow up and chemotherapy. Doing well overall just moving more slowly. Does have numbness to bilateral fingertips and swelling in lower extremities. No B symptoms or current s/s of infection.     Recently discharged after getting Cycle 2 Day 1-5 inpatient. Stable upon discharge.       Review of Systems   Constitutional:  Positive for fatigue. Negative for appetite change, chills, diaphoresis and fever.   HENT:  Negative.  Negative for sore throat.    Eyes: Negative.    Respiratory: Negative.  Negative for chest tightness, cough and shortness of breath.    Cardiovascular:  Positive for leg swelling. Negative for chest pain and palpitations.   Gastrointestinal: Negative.  Negative for constipation, diarrhea, nausea and vomiting.   Genitourinary: Negative.     Musculoskeletal: Negative.    Skin: Negative.    Neurological:  Positive for numbness (Bilateral fingers). Negative for dizziness, headaches and light-headedness.   Hematological: Negative.    Psychiatric/Behavioral: Negative.           Oncology History   Burkitt's lymphoma of lymph nodes of multiple regions (Multi)   1/13/2025 Initial Diagnosis    Diagnosis: Burkitt Lymphoma, [place] Stage IV, BL-IPI High-Risk (score 4/5).    BL-IPI Calculation:  Age >60 years: Yes (64 years old).  Performance status (ECOG >=2): Presumed based on clinical presentation requiring hospitalization.  Serum LDH >ULN: 4102 U/L (1/11/25)  [place] Stage III/IV: Yes (stage IV with extranodal involvement including kidneys, liver, spleen, and musculature).  CNS involvement: No (MRI and LP negative).  Total Score: 4/5 (High-Risk).    Presenting Symptoms: Asymmetric swelling of the right-sided muscles of mastication; imaging  revealed incidental findings of perihilar mass and renal lesions.    Labs at Diagnosis: WBC 4.0, platelets 72; LDH 4102 U/L (1/11/25)    Pathology:  EBUS with lymph node biopsy (1/13/25): Predominantly CD20-positive small lymphoid cells, raising suspicion for a B-cell lymphoproliferative process.  Bone marrow biopsy (1/16/25): 70-80% involvement by high-grade B-cell lymphoma in a hypercellular marrow (90% cellular) with maturing trilineage hematopoiesis. FISH confirmed t(8;14)/IGH::MYC rearrangement, diagnostic of Burkitt lymphoma.     Imaging:  CT Neck (1/9/25): Fusiform thickening of right masticatory muscles, likely benign hypertrophy.  CT C/A/P (1/11/25): Left perihilar mass, pulmonary nodules, right renal lesions, and right adrenal gland thickening concerning for metastatic disease; T12-L1 soft tissue mass.  PET-CT (1/20): Widespread hermann and extranodal hypermetabolic involvement, including kidneys, liver, spleen, abdominal wall, and musculature, suggestive of extensive lymphomatous disease.  MRI Brain (1/21): No intracranial lymphoma; suspected osseous involvement of calvarium.    Treatment:  Dexamethasone 40 mg daily (1/20-1/21).  Prophylactic IT methotrexate via LP (1/22), flow negative for lymphoma.     1/21/2025 -  Chemotherapy    (INPT) Dose-Adjusted R-EPOCH (Etoposide / DOXOrubicin / VinCRIStine / Cyclophosphamide / PredniSONE) + RiTUXimab, 21 Day Cycles           OBJECTIVE:  KPS: Karnofsky Score: 80 - Normal activity with effort; some signs or symptoms of disease   VS:  There were no vitals taken for this visit.  BSA: There is no height or weight on file to calculate BSA.    Physical Exam  Vitals reviewed.   Constitutional:       Appearance: Normal appearance.   HENT:      Head: Normocephalic and atraumatic.      Nose: Nose normal. No congestion or rhinorrhea.      Mouth/Throat:      Mouth: Mucous membranes are moist.      Pharynx: Oropharynx is clear.   Eyes:      Conjunctiva/sclera: Conjunctivae  normal.      Pupils: Pupils are equal, round, and reactive to light.   Cardiovascular:      Rate and Rhythm: Normal rate and regular rhythm.      Pulses: Normal pulses.      Heart sounds: Normal heart sounds.   Pulmonary:      Effort: Pulmonary effort is normal.      Breath sounds: Normal breath sounds.   Abdominal:      General: Bowel sounds are normal.      Palpations: Abdomen is soft.   Musculoskeletal:         General: Normal range of motion.      Cervical back: Normal range of motion and neck supple.      Right lower leg: Edema present.      Left lower leg: Edema present.      Comments: +2   Lymphadenopathy:      Cervical: No cervical adenopathy.   Skin:     General: Skin is warm and dry.      Capillary Refill: Capillary refill takes less than 2 seconds.      Findings: No bruising, erythema or rash.   Neurological:      General: No focal deficit present.      Mental Status: He is alert and oriented to person, place, and time. Mental status is at baseline.   Psychiatric:         Mood and Affect: Mood normal.         Behavior: Behavior normal.         Thought Content: Thought content normal.         Judgment: Judgment normal.         Assessment & Plan  Burkitt lymphoma of lymph nodes of multiple sites (Multi)  - Recent admission newly diagnosed Burkitt Lymphoma, [place] Stage IV, BL-IPI High-Risk (score 4/5); FISH with t(8;14), ki67 90%.  - Presents for C2D6 DA-REPOCH Rituximab and Neulasta. Cycle 2 Day 1-5 done inpatient  -Count checks twice weekly  -Taking Acyclovir, Levofloxacin and Fluconazole prophylaxis   -FUV 3/4 with Dr. Mckeon  Hypokalemia  -K+ 3.0 today  -40 mEq PO order while in infusion  -Script sent for 20 mEq daily for 7 days  MRSA bacteremia  -currently on IV Vanc for 4 weeks until 3/3, discussed with ID, felt that while we could not fully rule out that there wasn't septic thrombus on his PICC line, the most likely was MRSA pneumonia and bacteremia from this thus we mutually agreed to resume  chemotherapy after 2 total weeks of IV Vanco (2/14) but will still complete 4 weeks     Current Outpatient Medications   Medication Instructions    0.9 % sodium chloride (sodium chloride 0.9%) solution 10 mL, intravenous, 2 times daily, 10ml prior and after antibiotic, 20ml after blood draw, 10ml for maintenance    acyclovir (ZOVIRAX) 400 mg, oral, Every 12 hours scheduled    albuterol 90 mcg/actuation inhaler 2 puffs, Every 6 hours PRN    allopurinol (ZYLOPRIM) 300 mg, oral, Daily    atorvastatin (LIPITOR) 40 mg, oral, Daily (0630)    baclofen (LIORESAL) 10 mg, oral, 3 times daily    diphenhydramine/Maalox/lidocaine (Magic Mouthwash) - Compounded - Outpatient Swish and spit 10 mL by mouth every 6 hours if needed.    Eliquis 5 mg, oral, 2 times daily    fluconazole (DIFLUCAN) 400 mg, oral, Daily    furosemide (LASIX) 20 mg, oral, Daily PRN    gabapentin (NEURONTIN) 300 mg, oral, Nightly    heparin, porcine, PF, (Hep Flush-10, PF,) 10 unit/mL solution 5 mL, intravenous push, 2 times daily, flush with hep flush after last ns flush, daily for maintenance    levoFLOXacin (LEVAQUIN) 750 mg, oral, Daily    lisinopril 10 mg, Daily    metoprolol succinate XL (TOPROL-XL) 100 mg, oral, Daily, Do not crush or chew.    oxyCODONE (ROXICODONE) 5 mg, oral, Every 6 hours PRN    pantoprazole (PROTONIX) 40 mg, oral, Daily before breakfast, Do not crush, chew, or split.    potassium chloride CR (Klor-Con M20) 20 mEq ER tablet 20 mEq, oral, Daily, Do not crush or chew.    vancomycin (VANCOCIN) 1 g, intravenous, Every 12 hours        Laboratory:  The pertinent laboratory results were reviewed and discussed with the patient.    Lab Results   Component Value Date    WBC 5.9 02/20/2025    HCT 26.6 (L) 02/20/2025    HGB 8.8 (L) 02/20/2025     02/20/2025    K 3.0 (L) 02/20/2025    CALCIUM 7.6 (L) 02/20/2025     02/20/2025    MG 2.08 02/19/2025    BILITOT 0.5 02/20/2025    ALT 62 (H) 02/20/2025    AST 18 02/20/2025    BUN 28 (H)  02/20/2025    CREATININE 0.67 02/20/2025    PHOS 3.5 02/08/2025      Note: for a comprehensive list of the patient's lab results, access the Results Review activity.    RTC:   Requested 2/24 and 2/27 count checks  3/4 Dr. Mike Workman, APRN-CNP

## 2025-02-20 NOTE — PROGRESS NOTES
Pt arrived to infusion for first dose rituximab following EPOCH as well as neulasta.  Pt was also seen by Catalina Workman CNP.  Pt had no new complaints other then an increase in fatigue and some continued difficulty with swallowing.  Pt tolerated his first dose of rituximab.  Pt's CMP resulted with a low potassium.  Pt was given 40mEq PO here in infusion and was instructed to  his prescription and to start his home prescription tomorrow.  Pt was also provided with a hand-out regarding a potassium rich diet.  Follow-ups were scheduled, pt uses mychart and is aware to be on the look-out for his count checks.  Pt was discharged home in stable condition.

## 2025-02-21 DIAGNOSIS — B95.62 BACTEREMIA DUE TO METHICILLIN RESISTANT STAPHYLOCOCCUS AUREUS: ICD-10-CM

## 2025-02-21 DIAGNOSIS — R50.9 FEVER, UNSPECIFIED: ICD-10-CM

## 2025-02-21 DIAGNOSIS — R78.81 BACTEREMIA DUE TO METHICILLIN RESISTANT STAPHYLOCOCCUS AUREUS: ICD-10-CM

## 2025-02-21 LAB
BAND RESOLUTION: 0 BANDS
CHROM ANALY OVERALL INTERP-IMP: NORMAL
CHROMOSOME ANALYSIS CELLS ANALYZED: 0 CELLS
CHROMOSOME ANALYSIS CELLS IMAGED: 0 CELLS
CHROMOSOME ANALYSIS HYPERMODAL CELL COUNT: 0 CELLS
CHROMOSOME ANALYSIS HYPOMODAL CELL COUNT: 0 CELLS
CHROMOSOME ANALYSIS MODAL CHROMOSOME NO: 0 CHROMOSOMES
CHROMOSOME ANALYSIS STAINING METHOD: NORMAL
ELECTRONICALLY SIGNED BY CYTOGENETICS: NORMAL
KARYOTYP MAR: 0 CELLS
SPECIMEN CONDITION: NORMAL
TOTAL CELLS COUNTED MAR: 0 CELLS

## 2025-02-23 ENCOUNTER — HOME CARE VISIT (OUTPATIENT)
Dept: HOME HEALTH SERVICES | Facility: HOME HEALTH | Age: 65
End: 2025-02-23
Payer: MEDICARE

## 2025-02-23 ENCOUNTER — LAB REQUISITION (OUTPATIENT)
Dept: LAB | Facility: HOSPITAL | Age: 65
End: 2025-02-23
Payer: MEDICARE

## 2025-02-23 VITALS
RESPIRATION RATE: 18 BRPM | OXYGEN SATURATION: 96 % | DIASTOLIC BLOOD PRESSURE: 60 MMHG | SYSTOLIC BLOOD PRESSURE: 110 MMHG | TEMPERATURE: 98.3 F | HEART RATE: 72 BPM

## 2025-02-23 DIAGNOSIS — D70.9 NEUTROPENIA, UNSPECIFIED (CMS-HCC): ICD-10-CM

## 2025-02-23 LAB
ALBUMIN SERPL BCP-MCNC: 2.9 G/DL (ref 3.4–5)
ALP SERPL-CCNC: 52 U/L (ref 33–136)
ALT SERPL W P-5'-P-CCNC: 27 U/L (ref 10–52)
ANION GAP SERPL CALCULATED.3IONS-SCNC: 10 MMOL/L (ref 10–20)
AST SERPL W P-5'-P-CCNC: 8 U/L (ref 9–39)
BASOPHILS # BLD AUTO: 0.01 X10*3/UL (ref 0–0.1)
BASOPHILS NFR BLD AUTO: 0.3 %
BILIRUB SERPL-MCNC: 0.7 MG/DL (ref 0–1.2)
BUN SERPL-MCNC: 20 MG/DL (ref 6–23)
CALCIUM SERPL-MCNC: 8.1 MG/DL (ref 8.6–10.3)
CHLORIDE SERPL-SCNC: 101 MMOL/L (ref 98–107)
CO2 SERPL-SCNC: 29 MMOL/L (ref 21–32)
CREAT SERPL-MCNC: 0.64 MG/DL (ref 0.5–1.3)
DACRYOCYTES BLD QL SMEAR: NORMAL
EGFRCR SERPLBLD CKD-EPI 2021: >90 ML/MIN/1.73M*2
EOSINOPHIL # BLD AUTO: 0.03 X10*3/UL (ref 0–0.7)
EOSINOPHIL NFR BLD AUTO: 1 %
ERYTHROCYTE [DISTWIDTH] IN BLOOD BY AUTOMATED COUNT: 14.6 % (ref 11.5–14.5)
GLUCOSE SERPL-MCNC: 112 MG/DL (ref 74–99)
HCT VFR BLD AUTO: 24.7 % (ref 41–52)
HGB BLD-MCNC: 7.9 G/DL (ref 13.5–17.5)
HOLD SPECIMEN: NORMAL
IMM GRANULOCYTES # BLD AUTO: 0.45 X10*3/UL (ref 0–0.7)
IMM GRANULOCYTES NFR BLD AUTO: 14.6 % (ref 0–0.9)
LDH SERPL L TO P-CCNC: 156 U/L (ref 84–246)
LYMPHOCYTES # BLD AUTO: 0.17 X10*3/UL (ref 1.2–4.8)
LYMPHOCYTES NFR BLD AUTO: 5.5 %
MCH RBC QN AUTO: 28.5 PG (ref 26–34)
MCHC RBC AUTO-ENTMCNC: 32 G/DL (ref 32–36)
MCV RBC AUTO: 89 FL (ref 80–100)
MONOCYTES # BLD AUTO: 0.01 X10*3/UL (ref 0.1–1)
MONOCYTES NFR BLD AUTO: 0.3 %
NEUTROPHILS # BLD AUTO: 2.42 X10*3/UL (ref 1.2–7.7)
NEUTROPHILS NFR BLD AUTO: 78.3 %
NRBC BLD-RTO: 0 /100 WBCS (ref 0–0)
OVALOCYTES BLD QL SMEAR: NORMAL
PLATELET # BLD AUTO: 54 X10*3/UL (ref 150–450)
POTASSIUM SERPL-SCNC: 4 MMOL/L (ref 3.5–5.3)
PROT SERPL-MCNC: 4.3 G/DL (ref 6.4–8.2)
RBC # BLD AUTO: 2.77 X10*6/UL (ref 4.5–5.9)
RBC MORPH BLD: NORMAL
SODIUM SERPL-SCNC: 136 MMOL/L (ref 136–145)
VANCOMYCIN TROUGH SERPL-MCNC: 9.6 UG/ML (ref 5–20)
WBC # BLD AUTO: 3.1 X10*3/UL (ref 4.4–11.3)

## 2025-02-23 PROCEDURE — 80053 COMPREHEN METABOLIC PANEL: CPT

## 2025-02-23 PROCEDURE — 85025 COMPLETE CBC W/AUTO DIFF WBC: CPT

## 2025-02-23 PROCEDURE — 83615 LACTATE (LD) (LDH) ENZYME: CPT

## 2025-02-23 PROCEDURE — 80202 ASSAY OF VANCOMYCIN: CPT

## 2025-02-23 PROCEDURE — G0299 HHS/HOSPICE OF RN EA 15 MIN: HCPCS | Mod: HHH

## 2025-02-24 ENCOUNTER — INFUSION (OUTPATIENT)
Dept: HEMATOLOGY/ONCOLOGY | Facility: HOSPITAL | Age: 65
End: 2025-02-24
Payer: MEDICARE

## 2025-02-24 ENCOUNTER — HOSPITAL ENCOUNTER (OUTPATIENT)
Dept: RADIOLOGY | Facility: CLINIC | Age: 65
Discharge: HOME | End: 2025-02-24
Payer: MEDICARE

## 2025-02-24 VITALS
DIASTOLIC BLOOD PRESSURE: 68 MMHG | OXYGEN SATURATION: 100 % | TEMPERATURE: 98.2 F | RESPIRATION RATE: 18 BRPM | BODY MASS INDEX: 27.95 KG/M2 | WEIGHT: 206.79 LBS | SYSTOLIC BLOOD PRESSURE: 107 MMHG | HEART RATE: 105 BPM

## 2025-02-24 DIAGNOSIS — C83.78 BURKITT'S LYMPHOMA OF LYMPH NODES OF MULTIPLE REGIONS (MULTI): ICD-10-CM

## 2025-02-24 DIAGNOSIS — C83.30 DIFFUSE LARGE B-CELL LYMPHOMA, UNSPECIFIED BODY REGION (MULTI): ICD-10-CM

## 2025-02-24 LAB
ABO GROUP (TYPE) IN BLOOD: NORMAL
ALBUMIN SERPL BCP-MCNC: 3.1 G/DL (ref 3.4–5)
ALP SERPL-CCNC: 56 U/L (ref 33–136)
ALT SERPL W P-5'-P-CCNC: 24 U/L (ref 10–52)
ANION GAP SERPL CALC-SCNC: 11 MMOL/L (ref 10–20)
ANTIBODY SCREEN: NORMAL
AST SERPL W P-5'-P-CCNC: 9 U/L (ref 9–39)
BASOPHILS # BLD AUTO: 0 X10*3/UL (ref 0–0.1)
BASOPHILS NFR BLD AUTO: 0 %
BILIRUB SERPL-MCNC: 1.1 MG/DL (ref 0–1.2)
BUN SERPL-MCNC: 18 MG/DL (ref 6–23)
CALCIUM SERPL-MCNC: 8.2 MG/DL (ref 8.6–10.3)
CHLORIDE SERPL-SCNC: 101 MMOL/L (ref 98–107)
CO2 SERPL-SCNC: 29 MMOL/L (ref 21–32)
CREAT SERPL-MCNC: 0.63 MG/DL (ref 0.5–1.3)
EGFRCR SERPLBLD CKD-EPI 2021: >90 ML/MIN/1.73M*2
EOSINOPHIL # BLD AUTO: 0.03 X10*3/UL (ref 0–0.7)
EOSINOPHIL NFR BLD AUTO: 7.5 %
ERYTHROCYTE [DISTWIDTH] IN BLOOD BY AUTOMATED COUNT: 14.6 % (ref 11.5–14.5)
GLUCOSE SERPL-MCNC: 126 MG/DL (ref 74–99)
HCT VFR BLD AUTO: 24.6 % (ref 41–52)
HGB BLD-MCNC: 8.1 G/DL (ref 13.5–17.5)
IMM GRANULOCYTES # BLD AUTO: 0.05 X10*3/UL (ref 0–0.7)
IMM GRANULOCYTES NFR BLD AUTO: 12.5 % (ref 0–0.9)
LDH SERPL L TO P-CCNC: 144 U/L (ref 84–246)
LYMPHOCYTES # BLD AUTO: 0.17 X10*3/UL (ref 1.2–4.8)
LYMPHOCYTES NFR BLD AUTO: 42.5 %
MCH RBC QN AUTO: 28.8 PG (ref 26–34)
MCHC RBC AUTO-ENTMCNC: 32.9 G/DL (ref 32–36)
MCV RBC AUTO: 88 FL (ref 80–100)
MONOCYTES # BLD AUTO: 0.03 X10*3/UL (ref 0.1–1)
MONOCYTES NFR BLD AUTO: 7.5 %
NEUTROPHILS # BLD AUTO: 0.12 X10*3/UL (ref 1.2–7.7)
NEUTROPHILS NFR BLD AUTO: 30 %
NRBC BLD-RTO: 0 /100 WBCS (ref 0–0)
PLATELET # BLD AUTO: 46 X10*3/UL (ref 150–450)
POTASSIUM SERPL-SCNC: 3.6 MMOL/L (ref 3.5–5.3)
PROT SERPL-MCNC: 4.9 G/DL (ref 6.4–8.2)
RBC # BLD AUTO: 2.81 X10*6/UL (ref 4.5–5.9)
RH FACTOR (ANTIGEN D): NORMAL
SODIUM SERPL-SCNC: 137 MMOL/L (ref 136–145)
WBC # BLD AUTO: 0.4 X10*3/UL (ref 4.4–11.3)

## 2025-02-24 PROCEDURE — 86900 BLOOD TYPING SEROLOGIC ABO: CPT

## 2025-02-24 PROCEDURE — 85025 COMPLETE CBC W/AUTO DIFF WBC: CPT

## 2025-02-24 PROCEDURE — 3430000001 HC RX 343 DIAGNOSTIC RADIOPHARMACEUTICALS: Performed by: STUDENT IN AN ORGANIZED HEALTH CARE EDUCATION/TRAINING PROGRAM

## 2025-02-24 PROCEDURE — 83615 LACTATE (LD) (LDH) ENZYME: CPT

## 2025-02-24 PROCEDURE — 84075 ASSAY ALKALINE PHOSPHATASE: CPT

## 2025-02-24 PROCEDURE — 78815 PET IMAGE W/CT SKULL-THIGH: CPT | Mod: PS

## 2025-02-24 PROCEDURE — 78815 PET IMAGE W/CT SKULL-THIGH: CPT | Performed by: NUCLEAR MEDICINE

## 2025-02-24 PROCEDURE — A9552 F18 FDG: HCPCS | Performed by: STUDENT IN AN ORGANIZED HEALTH CARE EDUCATION/TRAINING PROGRAM

## 2025-02-24 PROCEDURE — 36591 DRAW BLOOD OFF VENOUS DEVICE: CPT

## 2025-02-24 RX ORDER — ALBUTEROL SULFATE 0.83 MG/ML
3 SOLUTION RESPIRATORY (INHALATION) AS NEEDED
OUTPATIENT
Start: 2025-02-24

## 2025-02-24 RX ORDER — FLUDEOXYGLUCOSE F 18 200 MCI/ML
12.6 INJECTION, SOLUTION INTRAVENOUS
Status: COMPLETED | OUTPATIENT
Start: 2025-02-24 | End: 2025-02-24

## 2025-02-24 RX ORDER — EPINEPHRINE 0.3 MG/.3ML
0.3 INJECTION SUBCUTANEOUS EVERY 5 MIN PRN
OUTPATIENT
Start: 2025-02-24

## 2025-02-24 RX ORDER — FAMOTIDINE 10 MG/ML
20 INJECTION, SOLUTION INTRAVENOUS ONCE AS NEEDED
OUTPATIENT
Start: 2025-02-24

## 2025-02-24 RX ORDER — DIPHENHYDRAMINE HYDROCHLORIDE 50 MG/ML
50 INJECTION INTRAMUSCULAR; INTRAVENOUS AS NEEDED
OUTPATIENT
Start: 2025-02-24

## 2025-02-24 RX ADMIN — FLUDEOXYGLUCOSE F 18 12.6 MILLICURIE: 200 INJECTION, SOLUTION INTRAVENOUS at 10:45

## 2025-02-24 ASSESSMENT — ENCOUNTER SYMPTOMS
PERSON REPORTING PAIN: PATIENT
PAIN LOCATION: BACK
PAIN LOCATION - PAIN SEVERITY: 4/10
PAIN: 1

## 2025-02-24 ASSESSMENT — PAIN SCALES - GENERAL: PAINLEVEL_OUTOF10: 0-NO PAIN

## 2025-02-24 ASSESSMENT — ACTIVITIES OF DAILY LIVING (ADL): ENTERING_EXITING_HOME: ONE PERSON

## 2025-02-24 NOTE — PROGRESS NOTES
Patient seen for count check, no required transfusions today. Labs drawn from Midline and flushed. Dressing C/D/I. Labs and schedule reviewed with patient. Discharged in stable condition via wheelchair with son.

## 2025-02-25 ENCOUNTER — HOME INFUSION (OUTPATIENT)
Dept: INFUSION THERAPY | Age: 65
End: 2025-02-25
Payer: MEDICARE

## 2025-02-25 PROBLEM — C83.70: Status: ACTIVE | Noted: 2025-02-25

## 2025-02-25 NOTE — PROGRESS NOTES
Reviewed chart and Bijan Ibanez   is a  64 y.o. male that was recently discharged from the hospital with a diagnosis of bacteremia…Allergies and PMH listed above ...Patient is ordered vancomycin 1000mg IV q12h...Weekly labs ordered and Dr Walsh is following.    MD appointment is scheduled for 02/28/25.     Labs show vancomycin trough improved to 9.6 with dose change. WBC dropped to 0.4. but patient was evaluated and no need for transusion on 02/24/25. [facility]     Spoke with spouse and infusions going well with inventory correct.. Agreeable to delivery on Wednesday anytime with all supplies and flushes. Spouse agreed to sending therapy through 03/03/25.  Counseled patient spouse on medication and all questions asked..      Processed fill of 10 bags vancomycin for 02/25/25 mix and OVN delivery...Fill is an 5 day supply and covers 02/27 through  03/03/25.      Follow up 02/28/25 with Dr Walsh regarding plan of care... Patient has midline

## 2025-02-26 ENCOUNTER — APPOINTMENT (OUTPATIENT)
Dept: CARDIOLOGY | Facility: HOSPITAL | Age: 65
End: 2025-02-26
Payer: MEDICARE

## 2025-02-27 ENCOUNTER — INFUSION (OUTPATIENT)
Dept: HEMATOLOGY/ONCOLOGY | Facility: HOSPITAL | Age: 65
End: 2025-02-27
Payer: MEDICARE

## 2025-02-27 VITALS
DIASTOLIC BLOOD PRESSURE: 70 MMHG | WEIGHT: 203.71 LBS | BODY MASS INDEX: 28.52 KG/M2 | SYSTOLIC BLOOD PRESSURE: 110 MMHG | HEART RATE: 124 BPM | RESPIRATION RATE: 18 BRPM | HEIGHT: 71 IN | TEMPERATURE: 97.9 F | OXYGEN SATURATION: 100 %

## 2025-02-27 DIAGNOSIS — C83.30 DIFFUSE LARGE B-CELL LYMPHOMA, UNSPECIFIED BODY REGION (MULTI): ICD-10-CM

## 2025-02-27 DIAGNOSIS — C83.78 BURKITT'S LYMPHOMA OF LYMPH NODES OF MULTIPLE REGIONS (MULTI): ICD-10-CM

## 2025-02-27 LAB
ALBUMIN SERPL BCP-MCNC: 3.1 G/DL (ref 3.4–5)
ALP SERPL-CCNC: 49 U/L (ref 33–136)
ALT SERPL W P-5'-P-CCNC: 22 U/L (ref 10–52)
ANION GAP SERPL CALC-SCNC: 12 MMOL/L (ref 10–20)
AST SERPL W P-5'-P-CCNC: 10 U/L (ref 9–39)
BASOPHILS # BLD MANUAL: 0 X10*3/UL (ref 0–0.1)
BASOPHILS NFR BLD MANUAL: 0 %
BILIRUB SERPL-MCNC: 0.7 MG/DL (ref 0–1.2)
BUN SERPL-MCNC: 16 MG/DL (ref 6–23)
CALCIUM SERPL-MCNC: 8.1 MG/DL (ref 8.6–10.3)
CHLORIDE SERPL-SCNC: 105 MMOL/L (ref 98–107)
CO2 SERPL-SCNC: 26 MMOL/L (ref 21–32)
CREAT SERPL-MCNC: 0.73 MG/DL (ref 0.5–1.3)
DACRYOCYTES BLD QL SMEAR: ABNORMAL
DOHLE BOD BLD QL SMEAR: PRESENT
EGFRCR SERPLBLD CKD-EPI 2021: >90 ML/MIN/1.73M*2
EOSINOPHIL # BLD MANUAL: 0.06 X10*3/UL (ref 0–0.7)
EOSINOPHIL NFR BLD MANUAL: 3 %
ERYTHROCYTE [DISTWIDTH] IN BLOOD BY AUTOMATED COUNT: 15.8 % (ref 11.5–14.5)
GLUCOSE SERPL-MCNC: 103 MG/DL (ref 74–99)
HCT VFR BLD AUTO: 23.3 % (ref 41–52)
HGB BLD-MCNC: 7.5 G/DL (ref 13.5–17.5)
IMM GRANULOCYTES # BLD AUTO: 0.11 X10*3/UL (ref 0–0.7)
IMM GRANULOCYTES NFR BLD AUTO: 5.5 % (ref 0–0.9)
LDH SERPL L TO P-CCNC: 141 U/L (ref 84–246)
LYMPHOCYTES # BLD MANUAL: 0.46 X10*3/UL (ref 1.2–4.8)
LYMPHOCYTES NFR BLD MANUAL: 23 %
MCH RBC QN AUTO: 28.7 PG (ref 26–34)
MCHC RBC AUTO-ENTMCNC: 32.2 G/DL (ref 32–36)
MCV RBC AUTO: 89 FL (ref 80–100)
MONOCYTES # BLD MANUAL: 0.28 X10*3/UL (ref 0.1–1)
MONOCYTES NFR BLD MANUAL: 14 %
MYELOCYTES # BLD MANUAL: 0.04 X10*3/UL
MYELOCYTES NFR BLD MANUAL: 2 %
NEUTROPHILS # BLD MANUAL: 1.14 X10*3/UL (ref 1.2–7.7)
NEUTS BAND # BLD MANUAL: 0.18 X10*3/UL (ref 0–0.7)
NEUTS BAND NFR BLD MANUAL: 9 %
NEUTS SEG # BLD MANUAL: 0.96 X10*3/UL (ref 1.2–7)
NEUTS SEG NFR BLD MANUAL: 48 %
NRBC BLD-RTO: 5.5 /100 WBCS (ref 0–0)
OVALOCYTES BLD QL SMEAR: ABNORMAL
PLATELET # BLD AUTO: 69 X10*3/UL (ref 150–450)
POLYCHROMASIA BLD QL SMEAR: ABNORMAL
POTASSIUM SERPL-SCNC: 3.6 MMOL/L (ref 3.5–5.3)
PROT SERPL-MCNC: 4.8 G/DL (ref 6.4–8.2)
RBC # BLD AUTO: 2.61 X10*6/UL (ref 4.5–5.9)
RBC MORPH BLD: ABNORMAL
SODIUM SERPL-SCNC: 139 MMOL/L (ref 136–145)
TOTAL CELLS COUNTED BLD: 100
VARIANT LYMPHS # BLD MANUAL: 0.02 X10*3/UL (ref 0–0.5)
VARIANT LYMPHS NFR BLD: 1 %
WBC # BLD AUTO: 2 X10*3/UL (ref 4.4–11.3)

## 2025-02-27 PROCEDURE — 85007 BL SMEAR W/DIFF WBC COUNT: CPT

## 2025-02-27 PROCEDURE — 83615 LACTATE (LD) (LDH) ENZYME: CPT

## 2025-02-27 PROCEDURE — 80053 COMPREHEN METABOLIC PANEL: CPT

## 2025-02-27 PROCEDURE — 2500000004 HC RX 250 GENERAL PHARMACY W/ HCPCS (ALT 636 FOR OP/ED)

## 2025-02-27 PROCEDURE — 2500000004 HC RX 250 GENERAL PHARMACY W/ HCPCS (ALT 636 FOR OP/ED): Performed by: STUDENT IN AN ORGANIZED HEALTH CARE EDUCATION/TRAINING PROGRAM

## 2025-02-27 PROCEDURE — 85027 COMPLETE CBC AUTOMATED: CPT

## 2025-02-27 PROCEDURE — 96360 HYDRATION IV INFUSION INIT: CPT | Mod: INF

## 2025-02-27 RX ORDER — SODIUM CHLORIDE 9 MG/ML
999 INJECTION, SOLUTION INTRAVENOUS CONTINUOUS
Status: CANCELLED | OUTPATIENT
Start: 2025-02-27

## 2025-02-27 RX ORDER — EPINEPHRINE 0.3 MG/.3ML
0.3 INJECTION SUBCUTANEOUS EVERY 5 MIN PRN
OUTPATIENT
Start: 2025-02-27

## 2025-02-27 RX ORDER — HEPARIN SODIUM,PORCINE/PF 10 UNIT/ML
50 SYRINGE (ML) INTRAVENOUS AS NEEDED
OUTPATIENT
Start: 2025-02-27

## 2025-02-27 RX ORDER — HEPARIN SODIUM,PORCINE/PF 10 UNIT/ML
50 SYRINGE (ML) INTRAVENOUS AS NEEDED
Status: DISCONTINUED | OUTPATIENT
Start: 2025-02-27 | End: 2025-02-27 | Stop reason: HOSPADM

## 2025-02-27 RX ORDER — DIPHENHYDRAMINE HYDROCHLORIDE 50 MG/ML
50 INJECTION INTRAMUSCULAR; INTRAVENOUS AS NEEDED
OUTPATIENT
Start: 2025-02-27

## 2025-02-27 RX ORDER — FAMOTIDINE 10 MG/ML
20 INJECTION, SOLUTION INTRAVENOUS ONCE AS NEEDED
Status: CANCELLED | OUTPATIENT
Start: 2025-02-27

## 2025-02-27 RX ORDER — ALBUTEROL SULFATE 0.83 MG/ML
3 SOLUTION RESPIRATORY (INHALATION) AS NEEDED
OUTPATIENT
Start: 2025-02-27

## 2025-02-27 RX ORDER — SODIUM CHLORIDE 9 MG/ML
999 INJECTION, SOLUTION INTRAVENOUS ONCE
Status: COMPLETED | OUTPATIENT
Start: 2025-02-27 | End: 2025-02-27

## 2025-02-27 RX ORDER — EPINEPHRINE 0.3 MG/.3ML
0.3 INJECTION SUBCUTANEOUS EVERY 5 MIN PRN
Status: CANCELLED | OUTPATIENT
Start: 2025-02-27

## 2025-02-27 RX ORDER — FAMOTIDINE 10 MG/ML
20 INJECTION, SOLUTION INTRAVENOUS ONCE AS NEEDED
OUTPATIENT
Start: 2025-02-27

## 2025-02-27 RX ORDER — DIPHENHYDRAMINE HYDROCHLORIDE 50 MG/ML
50 INJECTION INTRAMUSCULAR; INTRAVENOUS AS NEEDED
Status: CANCELLED | OUTPATIENT
Start: 2025-02-27

## 2025-02-27 RX ORDER — HEPARIN 100 UNIT/ML
500 SYRINGE INTRAVENOUS AS NEEDED
OUTPATIENT
Start: 2025-02-27

## 2025-02-27 RX ORDER — ALBUTEROL SULFATE 0.83 MG/ML
3 SOLUTION RESPIRATORY (INHALATION) AS NEEDED
Status: CANCELLED | OUTPATIENT
Start: 2025-02-27

## 2025-02-27 RX ORDER — HEPARIN SODIUM 1000 [USP'U]/ML
2000 INJECTION, SOLUTION INTRAVENOUS; SUBCUTANEOUS AS NEEDED
OUTPATIENT
Start: 2025-02-27

## 2025-02-27 RX ADMIN — SODIUM CHLORIDE 999 ML/HR: 9 INJECTION, SOLUTION INTRAVENOUS at 16:07

## 2025-02-27 RX ADMIN — HEPARIN, PORCINE (PF) 10 UNIT/ML INTRAVENOUS SYRINGE 50 UNITS: at 17:08

## 2025-02-27 ASSESSMENT — ENCOUNTER SYMPTOMS
LOWER EXTREMITY EDEMA: 1
APPETITE LEVEL: FAIR
FATIGUES EASILY: 1
MUSCLE WEAKNESS: 1
CHANGE IN APPETITE: UNCHANGED

## 2025-02-27 ASSESSMENT — ACTIVITIES OF DAILY LIVING (ADL): OASIS_M1830: 05

## 2025-02-27 NOTE — PROGRESS NOTES
Pt arrived to infusion for a count check.  Pt c/o food feeling stuck while eating and that his heart races when he stands up.  Orthostatics were obtained which showed he was positive for orthos.  Dr Mckeon was made aware and he ordered 1 liter of IVFs.  Labs were stable. Pt tolerated the fluids.  Pt made aware of next weeks appts. And was discharged home in stable condition.

## 2025-02-28 ENCOUNTER — HOME INFUSION (OUTPATIENT)
Dept: INFUSION THERAPY | Age: 65
End: 2025-02-28
Payer: MEDICARE

## 2025-02-28 ENCOUNTER — OFFICE VISIT (OUTPATIENT)
Dept: INFECTIOUS DISEASES | Facility: HOSPITAL | Age: 65
End: 2025-02-28
Payer: MEDICARE

## 2025-02-28 VITALS
BODY MASS INDEX: 28.75 KG/M2 | OXYGEN SATURATION: 100 % | TEMPERATURE: 97.3 F | DIASTOLIC BLOOD PRESSURE: 73 MMHG | HEART RATE: 106 BPM | SYSTOLIC BLOOD PRESSURE: 110 MMHG | RESPIRATION RATE: 16 BRPM | WEIGHT: 205.8 LBS

## 2025-02-28 DIAGNOSIS — B95.62 BACTEREMIA DUE TO METHICILLIN RESISTANT STAPHYLOCOCCUS AUREUS: Primary | ICD-10-CM

## 2025-02-28 DIAGNOSIS — R10.84 GENERALIZED ABDOMINAL PAIN: ICD-10-CM

## 2025-02-28 DIAGNOSIS — R78.81 BACTEREMIA DUE TO METHICILLIN RESISTANT STAPHYLOCOCCUS AUREUS: Primary | ICD-10-CM

## 2025-02-28 DIAGNOSIS — I48.91 NEW ONSET A-FIB (MULTI): ICD-10-CM

## 2025-02-28 DIAGNOSIS — R50.9 FEVER, UNSPECIFIED: ICD-10-CM

## 2025-02-28 DIAGNOSIS — B95.62 BACTEREMIA DUE TO METHICILLIN RESISTANT STAPHYLOCOCCUS AUREUS: ICD-10-CM

## 2025-02-28 DIAGNOSIS — R78.81 BACTEREMIA DUE TO METHICILLIN RESISTANT STAPHYLOCOCCUS AUREUS: ICD-10-CM

## 2025-02-28 PROCEDURE — 1123F ACP DISCUSS/DSCN MKR DOCD: CPT | Performed by: INTERNAL MEDICINE

## 2025-02-28 PROCEDURE — 1126F AMNT PAIN NOTED NONE PRSNT: CPT | Performed by: INTERNAL MEDICINE

## 2025-02-28 PROCEDURE — 99214 OFFICE O/P EST MOD 30 MIN: CPT | Performed by: INTERNAL MEDICINE

## 2025-02-28 PROCEDURE — 1111F DSCHRG MED/CURRENT MED MERGE: CPT | Performed by: INTERNAL MEDICINE

## 2025-02-28 PROCEDURE — 3074F SYST BP LT 130 MM HG: CPT | Performed by: INTERNAL MEDICINE

## 2025-02-28 PROCEDURE — 3078F DIAST BP <80 MM HG: CPT | Performed by: INTERNAL MEDICINE

## 2025-02-28 PROCEDURE — 1157F ADVNC CARE PLAN IN RCRD: CPT | Performed by: INTERNAL MEDICINE

## 2025-02-28 RX ORDER — VANCOMYCIN HYDROCHLORIDE 1 G/20ML
1000 INJECTION, POWDER, LYOPHILIZED, FOR SOLUTION INTRAVENOUS EVERY 12 HOURS
Qty: 6 G | Refills: 0 | Status: SHIPPED
Start: 2025-02-28 | End: 2025-03-03

## 2025-02-28 ASSESSMENT — PAIN SCALES - GENERAL: PAINLEVEL_OUTOF10: 0-NO PAIN

## 2025-02-28 NOTE — PROGRESS NOTES
Reviewed chart and patient ordered IV antibiotics through 03/03/25    Received orders from Dr Walsh and Dr Mckeon via communication method: Phone  Stop antibiotic as ordered after Monday's doses. HC RN to remove line. No Further Pharmacy needs.    Order entered into EPIC. Gold copy to Insurance Coordinators. Routed to RN Team as FYI    Discharge patient from [facility] after 03/03/25   06-Nov-2020 07:59

## 2025-02-28 NOTE — LETTER
03/03/25    Nehal Murphy MD  [address]   [facility] [facility], [address]  [place] [address]      Dear Dr. Nehal Murphy MD,    I am writing to confirm that your patient, Bijan Ibanez, received care in my office on 03/03/25. I have enclosed a summary of the care provided to Bijan for your reference.    Please contact me with any questions you may have regarding the visit.    Sincerely,         Ismael Walsh MD  [address]  [address]  [facility] [address]  [phone]    CC: No Recipients

## 2025-03-03 ENCOUNTER — HOME CARE VISIT (OUTPATIENT)
Dept: HOME HEALTH SERVICES | Facility: HOME HEALTH | Age: 65
End: 2025-03-03
Payer: MEDICARE

## 2025-03-03 ENCOUNTER — LAB REQUISITION (OUTPATIENT)
Dept: LAB | Facility: HOSPITAL | Age: 65
End: 2025-03-03
Payer: MEDICARE

## 2025-03-03 VITALS
SYSTOLIC BLOOD PRESSURE: 118 MMHG | WEIGHT: 205 LBS | RESPIRATION RATE: 18 BRPM | DIASTOLIC BLOOD PRESSURE: 78 MMHG | OXYGEN SATURATION: 96 % | BODY MASS INDEX: 28.64 KG/M2 | TEMPERATURE: 98.6 F | HEART RATE: 86 BPM

## 2025-03-03 DIAGNOSIS — R78.81 BACTEREMIA: ICD-10-CM

## 2025-03-03 DIAGNOSIS — B95.62 METHICILLIN RESISTANT STAPHYLOCOCCUS AUREUS INFECTION AS THE CAUSE OF DISEASES CLASSIFIED ELSEWHERE: ICD-10-CM

## 2025-03-03 LAB
ANION GAP SERPL CALCULATED.3IONS-SCNC: 12 MMOL/L (ref 10–20)
BASOPHILS # BLD MANUAL: 0 X10*3/UL (ref 0–0.1)
BASOPHILS NFR BLD MANUAL: 0 %
BUN SERPL-MCNC: 14 MG/DL (ref 6–23)
CALCIUM SERPL-MCNC: 8.2 MG/DL (ref 8.6–10.3)
CHLORIDE SERPL-SCNC: 104 MMOL/L (ref 98–107)
CO2 SERPL-SCNC: 27 MMOL/L (ref 21–32)
CREAT SERPL-MCNC: 0.74 MG/DL (ref 0.5–1.3)
DACRYOCYTES BLD QL SMEAR: ABNORMAL
EGFRCR SERPLBLD CKD-EPI 2021: >90 ML/MIN/1.73M*2
EOSINOPHIL # BLD MANUAL: 0 X10*3/UL (ref 0–0.7)
EOSINOPHIL NFR BLD MANUAL: 0 %
ERYTHROCYTE [DISTWIDTH] IN BLOOD BY AUTOMATED COUNT: 16.6 % (ref 11.5–14.5)
GLUCOSE SERPL-MCNC: 115 MG/DL (ref 74–99)
HCT VFR BLD AUTO: 23.2 % (ref 41–52)
HGB BLD-MCNC: 7.2 G/DL (ref 13.5–17.5)
IMM GRANULOCYTES # BLD AUTO: 0.53 X10*3/UL (ref 0–0.7)
IMM GRANULOCYTES NFR BLD AUTO: 9.3 % (ref 0–0.9)
LYMPHOCYTES # BLD MANUAL: 0.29 X10*3/UL (ref 1.2–4.8)
LYMPHOCYTES NFR BLD MANUAL: 5 %
MCH RBC QN AUTO: 28.2 PG (ref 26–34)
MCHC RBC AUTO-ENTMCNC: 31 G/DL (ref 32–36)
MCV RBC AUTO: 91 FL (ref 80–100)
MONOCYTES # BLD MANUAL: 0.4 X10*3/UL (ref 0.1–1)
MONOCYTES NFR BLD MANUAL: 7 %
MYELOCYTES # BLD MANUAL: 0.23 X10*3/UL
MYELOCYTES NFR BLD MANUAL: 4 %
NEUTROPHILS # BLD MANUAL: 4.73 X10*3/UL (ref 1.2–7.7)
NEUTS BAND # BLD MANUAL: 0.74 X10*3/UL (ref 0–0.7)
NEUTS BAND NFR BLD MANUAL: 13 %
NEUTS SEG # BLD MANUAL: 3.99 X10*3/UL (ref 1.2–7)
NEUTS SEG NFR BLD MANUAL: 70 %
NRBC BLD-RTO: 0.7 /100 WBCS (ref 0–0)
PLATELET # BLD AUTO: 129 X10*3/UL (ref 150–450)
POLYCHROMASIA BLD QL SMEAR: ABNORMAL
POTASSIUM SERPL-SCNC: 3.4 MMOL/L (ref 3.5–5.3)
RBC # BLD AUTO: 2.55 X10*6/UL (ref 4.5–5.9)
RBC MORPH BLD: ABNORMAL
SCHISTOCYTES BLD QL SMEAR: ABNORMAL
SODIUM SERPL-SCNC: 140 MMOL/L (ref 136–145)
TOTAL CELLS COUNTED BLD: 100
VARIANT LYMPHS # BLD MANUAL: 0.06 X10*3/UL (ref 0–0.5)
VARIANT LYMPHS NFR BLD: 1 %
WBC # BLD AUTO: 5.7 X10*3/UL (ref 4.4–11.3)

## 2025-03-03 PROCEDURE — 85007 BL SMEAR W/DIFF WBC COUNT: CPT

## 2025-03-03 PROCEDURE — G0299 HHS/HOSPICE OF RN EA 15 MIN: HCPCS | Mod: HHH

## 2025-03-03 PROCEDURE — 85027 COMPLETE CBC AUTOMATED: CPT

## 2025-03-03 PROCEDURE — 82248 BILIRUBIN DIRECT: CPT

## 2025-03-03 PROCEDURE — 80053 COMPREHEN METABOLIC PANEL: CPT

## 2025-03-03 RX ADMIN — VANCOMYCIN HYDROCHLORIDE 1 G: 1 INJECTION, POWDER, LYOPHILIZED, FOR SOLUTION INTRAVENOUS at 09:45

## 2025-03-03 RX ADMIN — Medication 10 ML: at 09:15

## 2025-03-03 RX ADMIN — Medication 5 ML: at 09:15

## 2025-03-03 ASSESSMENT — ENCOUNTER SYMPTOMS
BOWEL PATTERN NORMAL: 1
LAST BOWEL MOVEMENT: 67266
PERSON REPORTING PAIN: PATIENT
DRY SKIN: 1
DENIES PAIN: 1
APPETITE LEVEL: FAIR
CHANGE IN APPETITE: INCREASED
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
## 2025-03-04 ENCOUNTER — APPOINTMENT (OUTPATIENT)
Dept: HEMATOLOGY/ONCOLOGY | Facility: HOSPITAL | Age: 65
DRG: 846 | End: 2025-03-04
Payer: MEDICARE

## 2025-03-04 ENCOUNTER — OFFICE VISIT (OUTPATIENT)
Dept: HEMATOLOGY/ONCOLOGY | Facility: HOSPITAL | Age: 65
End: 2025-03-04
Payer: MEDICARE

## 2025-03-04 VITALS
WEIGHT: 201.7 LBS | HEART RATE: 98 BPM | DIASTOLIC BLOOD PRESSURE: 77 MMHG | RESPIRATION RATE: 20 BRPM | OXYGEN SATURATION: 100 % | TEMPERATURE: 97.3 F | SYSTOLIC BLOOD PRESSURE: 123 MMHG | BODY MASS INDEX: 28.18 KG/M2

## 2025-03-04 DIAGNOSIS — I48.91 NEW ONSET A-FIB (MULTI): ICD-10-CM

## 2025-03-04 DIAGNOSIS — C83.30 DIFFUSE LARGE B-CELL LYMPHOMA, UNSPECIFIED BODY REGION (MULTI): ICD-10-CM

## 2025-03-04 DIAGNOSIS — B95.62 BACTEREMIA DUE TO METHICILLIN RESISTANT STAPHYLOCOCCUS AUREUS: ICD-10-CM

## 2025-03-04 DIAGNOSIS — C83.78 BURKITT'S LYMPHOMA OF LYMPH NODES OF MULTIPLE REGIONS (MULTI): Primary | ICD-10-CM

## 2025-03-04 DIAGNOSIS — E87.6 HYPOKALEMIA: ICD-10-CM

## 2025-03-04 DIAGNOSIS — R10.84 GENERALIZED ABDOMINAL PAIN: ICD-10-CM

## 2025-03-04 DIAGNOSIS — R78.81 BACTEREMIA DUE TO METHICILLIN RESISTANT STAPHYLOCOCCUS AUREUS: ICD-10-CM

## 2025-03-04 LAB
ALBUMIN SERPL BCP-MCNC: 3.1 G/DL (ref 3.4–5)
ALP SERPL-CCNC: 51 U/L (ref 33–136)
ALT SERPL W P-5'-P-CCNC: 14 U/L (ref 10–52)
AST SERPL W P-5'-P-CCNC: 9 U/L (ref 9–39)
BILIRUB DIRECT SERPL-MCNC: 0.1 MG/DL (ref 0–0.3)
BILIRUB SERPL-MCNC: 0.6 MG/DL (ref 0–1.2)
PROT SERPL-MCNC: 4.7 G/DL (ref 6.4–8.2)

## 2025-03-04 PROCEDURE — 1157F ADVNC CARE PLAN IN RCRD: CPT | Performed by: STUDENT IN AN ORGANIZED HEALTH CARE EDUCATION/TRAINING PROGRAM

## 2025-03-04 PROCEDURE — 1123F ACP DISCUSS/DSCN MKR DOCD: CPT | Performed by: STUDENT IN AN ORGANIZED HEALTH CARE EDUCATION/TRAINING PROGRAM

## 2025-03-04 PROCEDURE — 99215 OFFICE O/P EST HI 40 MIN: CPT | Performed by: STUDENT IN AN ORGANIZED HEALTH CARE EDUCATION/TRAINING PROGRAM

## 2025-03-04 PROCEDURE — 3078F DIAST BP <80 MM HG: CPT | Performed by: STUDENT IN AN ORGANIZED HEALTH CARE EDUCATION/TRAINING PROGRAM

## 2025-03-04 PROCEDURE — 1036F TOBACCO NON-USER: CPT | Performed by: STUDENT IN AN ORGANIZED HEALTH CARE EDUCATION/TRAINING PROGRAM

## 2025-03-04 PROCEDURE — 1126F AMNT PAIN NOTED NONE PRSNT: CPT | Performed by: STUDENT IN AN ORGANIZED HEALTH CARE EDUCATION/TRAINING PROGRAM

## 2025-03-04 PROCEDURE — 1111F DSCHRG MED/CURRENT MED MERGE: CPT | Performed by: STUDENT IN AN ORGANIZED HEALTH CARE EDUCATION/TRAINING PROGRAM

## 2025-03-04 PROCEDURE — 3074F SYST BP LT 130 MM HG: CPT | Performed by: STUDENT IN AN ORGANIZED HEALTH CARE EDUCATION/TRAINING PROGRAM

## 2025-03-04 PROCEDURE — G2211 COMPLEX E/M VISIT ADD ON: HCPCS | Performed by: STUDENT IN AN ORGANIZED HEALTH CARE EDUCATION/TRAINING PROGRAM

## 2025-03-04 RX ORDER — PROCHLORPERAZINE MALEATE 10 MG
10 TABLET ORAL EVERY 6 HOURS PRN
OUTPATIENT
Start: 2025-03-12

## 2025-03-04 RX ORDER — METOPROLOL SUCCINATE 100 MG/1
100 TABLET, EXTENDED RELEASE ORAL DAILY
Qty: 30 TABLET | Refills: 11 | Status: ON HOLD | OUTPATIENT
Start: 2025-03-04 | End: 2026-03-04

## 2025-03-04 RX ORDER — EPINEPHRINE 1 MG/ML
0.3 INJECTION INTRAMUSCULAR; INTRAVENOUS; SUBCUTANEOUS EVERY 5 MIN PRN
Status: CANCELLED | OUTPATIENT
Start: 2025-03-07

## 2025-03-04 RX ORDER — ALBUTEROL SULFATE 0.83 MG/ML
3 SOLUTION RESPIRATORY (INHALATION) AS NEEDED
Status: CANCELLED | OUTPATIENT
Start: 2025-03-07

## 2025-03-04 RX ORDER — FLUCONAZOLE 200 MG/1
400 TABLET ORAL DAILY
Qty: 60 TABLET | Refills: 1 | Status: ON HOLD | OUTPATIENT
Start: 2025-03-04 | End: 2025-04-03

## 2025-03-04 RX ORDER — PREDNISONE 20 MG/1
140 TABLET ORAL 2 TIMES DAILY
Status: CANCELLED | OUTPATIENT
Start: 2025-03-07

## 2025-03-04 RX ORDER — LEVOFLOXACIN 750 MG/1
750 TABLET ORAL DAILY
Qty: 14 TABLET | Refills: 0 | Status: ON HOLD | OUTPATIENT
Start: 2025-03-04 | End: 2025-03-18

## 2025-03-04 RX ORDER — FUROSEMIDE 20 MG/1
20 TABLET ORAL DAILY PRN
Qty: 30 TABLET | Refills: 0 | Status: ON HOLD | OUTPATIENT
Start: 2025-03-04

## 2025-03-04 RX ORDER — EPINEPHRINE 0.3 MG/.3ML
0.3 INJECTION SUBCUTANEOUS EVERY 5 MIN PRN
OUTPATIENT
Start: 2025-03-12

## 2025-03-04 RX ORDER — LEVOFLOXACIN 750 MG/1
750 TABLET ORAL DAILY
Qty: 14 TABLET | Refills: 0 | Status: SHIPPED | OUTPATIENT
Start: 2025-03-04 | End: 2025-03-04 | Stop reason: SDUPTHER

## 2025-03-04 RX ORDER — DIPHENHYDRAMINE HYDROCHLORIDE 50 MG/ML
50 INJECTION INTRAMUSCULAR; INTRAVENOUS AS NEEDED
OUTPATIENT
Start: 2025-03-12

## 2025-03-04 RX ORDER — LISINOPRIL 10 MG/1
10 TABLET ORAL DAILY
Qty: 30 TABLET | Refills: 1 | Status: ON HOLD | OUTPATIENT
Start: 2025-03-04 | End: 2025-05-03

## 2025-03-04 RX ORDER — PROCHLORPERAZINE MALEATE 10 MG
10 TABLET ORAL EVERY 6 HOURS PRN
Status: CANCELLED | OUTPATIENT
Start: 2025-03-07

## 2025-03-04 RX ORDER — PROCHLORPERAZINE EDISYLATE 5 MG/ML
10 INJECTION INTRAMUSCULAR; INTRAVENOUS EVERY 6 HOURS PRN
OUTPATIENT
Start: 2025-03-12

## 2025-03-04 RX ORDER — DIPHENHYDRAMINE HCL 50 MG
50 CAPSULE ORAL ONCE
OUTPATIENT
Start: 2025-03-12

## 2025-03-04 RX ORDER — OLANZAPINE 5 MG/1
5 TABLET ORAL NIGHTLY
Status: CANCELLED | OUTPATIENT
Start: 2025-03-07

## 2025-03-04 RX ORDER — FAMOTIDINE 10 MG/ML
20 INJECTION, SOLUTION INTRAVENOUS ONCE AS NEEDED
OUTPATIENT
Start: 2025-03-12

## 2025-03-04 RX ORDER — DIPHENHYDRAMINE HYDROCHLORIDE 50 MG/ML
50 INJECTION INTRAMUSCULAR; INTRAVENOUS AS NEEDED
Status: CANCELLED | OUTPATIENT
Start: 2025-03-07

## 2025-03-04 RX ORDER — PROCHLORPERAZINE EDISYLATE 5 MG/ML
10 INJECTION INTRAMUSCULAR; INTRAVENOUS EVERY 6 HOURS PRN
Status: CANCELLED | OUTPATIENT
Start: 2025-03-07

## 2025-03-04 RX ORDER — ACETAMINOPHEN 325 MG/1
650 TABLET ORAL ONCE
OUTPATIENT
Start: 2025-03-12

## 2025-03-04 RX ORDER — FAMOTIDINE 10 MG/ML
20 INJECTION, SOLUTION INTRAVENOUS AS NEEDED
Status: CANCELLED | OUTPATIENT
Start: 2025-03-07

## 2025-03-04 RX ORDER — ALBUTEROL SULFATE 0.83 MG/ML
3 SOLUTION RESPIRATORY (INHALATION) AS NEEDED
OUTPATIENT
Start: 2025-03-12

## 2025-03-04 ASSESSMENT — PAIN SCALES - GENERAL: PAINLEVEL_OUTOF10: 0-NO PAIN

## 2025-03-04 NOTE — PROGRESS NOTES
PATIENT SEEN IN CLINIC    3/4/2025  . I CALLED PATIENT TO REVIEW:   RXFLUCANAZOLE 200MG X8=353MA 1X ADAY  #60 WITH 1 REFILL SENT TO [facility]    RX for eliquis 5 mg  q 12 hrs #60 with 11 refills  also sent to [facility].     Reviewed plan WITH PATIENT . PLAN TO BE ADMITTED  FOR EPOCH on 3/7/2025 CYCLE 3. Tell number to call  given  in am if they  don't hear from any one  Patients midline d/c'd per Dr. Mckeon. Will need a picc line inserted. Patient prefers to get in  hospital. Will hold eliquis  on 3/6 and 3/7. Instructed to bring with him to hospital and ask to restart after picc is inserted    PLAN RITUXAN ON 3/12 AS OUTPATIENT-will see mal heme on this day also    F/up plan 3/25 in clinic with Dr. Mckeon and next admission will be 3/28.Gabi Morillo RN

## 2025-03-07 ENCOUNTER — HOME CARE VISIT (OUTPATIENT)
Dept: HOME HEALTH SERVICES | Facility: HOME HEALTH | Age: 65
End: 2025-03-07
Payer: MEDICARE

## 2025-03-07 ENCOUNTER — HOSPITAL ENCOUNTER (INPATIENT)
Facility: HOSPITAL | Age: 65
End: 2025-03-07
Attending: HOSPITALIST
Payer: MEDICARE

## 2025-03-07 ENCOUNTER — APPOINTMENT (OUTPATIENT)
Dept: RADIOLOGY | Facility: HOSPITAL | Age: 65
DRG: 846 | End: 2025-03-07
Payer: MEDICARE

## 2025-03-07 DIAGNOSIS — Z51.11 ENCOUNTER FOR ANTINEOPLASTIC CHEMOTHERAPY: Primary | ICD-10-CM

## 2025-03-07 DIAGNOSIS — G89.29 CHRONIC MIDLINE LOW BACK PAIN WITHOUT SCIATICA: Chronic | ICD-10-CM

## 2025-03-07 DIAGNOSIS — M54.50 CHRONIC MIDLINE LOW BACK PAIN WITHOUT SCIATICA: Chronic | ICD-10-CM

## 2025-03-07 DIAGNOSIS — C83.78 BURKITT'S LYMPHOMA OF LYMPH NODES OF MULTIPLE REGIONS (MULTI): ICD-10-CM

## 2025-03-07 DIAGNOSIS — R78.81 BACTEREMIA DUE TO METHICILLIN RESISTANT STAPHYLOCOCCUS AUREUS: ICD-10-CM

## 2025-03-07 DIAGNOSIS — B95.62 BACTEREMIA DUE TO METHICILLIN RESISTANT STAPHYLOCOCCUS AUREUS: ICD-10-CM

## 2025-03-07 DIAGNOSIS — E78.2 MIXED HYPERLIPIDEMIA: ICD-10-CM

## 2025-03-07 LAB
ABO GROUP (TYPE) IN BLOOD: NORMAL
ALBUMIN SERPL BCP-MCNC: 3.6 G/DL (ref 3.4–5)
ALP SERPL-CCNC: 51 U/L (ref 33–136)
ALT SERPL W P-5'-P-CCNC: 12 U/L (ref 10–52)
ANION GAP SERPL CALC-SCNC: 14 MMOL/L (ref 10–20)
ANTIBODY SCREEN: NORMAL
AST SERPL W P-5'-P-CCNC: 9 U/L (ref 9–39)
BASOPHILS # BLD AUTO: 0.03 X10*3/UL (ref 0–0.1)
BASOPHILS NFR BLD AUTO: 0.4 %
BILIRUB SERPL-MCNC: 0.8 MG/DL (ref 0–1.2)
BUN SERPL-MCNC: 14 MG/DL (ref 6–23)
CALCIUM SERPL-MCNC: 8.9 MG/DL (ref 8.6–10.6)
CHLORIDE SERPL-SCNC: 101 MMOL/L (ref 98–107)
CO2 SERPL-SCNC: 28 MMOL/L (ref 21–32)
CREAT SERPL-MCNC: 0.84 MG/DL (ref 0.5–1.3)
EGFRCR SERPLBLD CKD-EPI 2021: >90 ML/MIN/1.73M*2
EOSINOPHIL # BLD AUTO: 0 X10*3/UL (ref 0–0.7)
EOSINOPHIL NFR BLD AUTO: 0 %
ERYTHROCYTE [DISTWIDTH] IN BLOOD BY AUTOMATED COUNT: 17.7 % (ref 11.5–14.5)
GLUCOSE SERPL-MCNC: 90 MG/DL (ref 74–99)
HCT VFR BLD AUTO: 25.2 % (ref 41–52)
HGB BLD-MCNC: 7.9 G/DL (ref 13.5–17.5)
IMM GRANULOCYTES # BLD AUTO: 0.32 X10*3/UL (ref 0–0.7)
IMM GRANULOCYTES NFR BLD AUTO: 4.7 % (ref 0–0.9)
LDH SERPL L TO P-CCNC: 183 U/L (ref 84–246)
LYMPHOCYTES # BLD AUTO: 0.41 X10*3/UL (ref 1.2–4.8)
LYMPHOCYTES NFR BLD AUTO: 6 %
MCH RBC QN AUTO: 28.9 PG (ref 26–34)
MCHC RBC AUTO-ENTMCNC: 31.3 G/DL (ref 32–36)
MCV RBC AUTO: 92 FL (ref 80–100)
MONOCYTES # BLD AUTO: 0.6 X10*3/UL (ref 0.1–1)
MONOCYTES NFR BLD AUTO: 8.8 %
NEUTROPHILS # BLD AUTO: 5.47 X10*3/UL (ref 1.2–7.7)
NEUTROPHILS NFR BLD AUTO: 80.1 %
NRBC BLD-RTO: 1.2 /100 WBCS (ref 0–0)
PLATELET # BLD AUTO: 191 X10*3/UL (ref 150–450)
POTASSIUM SERPL-SCNC: 3.9 MMOL/L (ref 3.5–5.3)
PROT SERPL-MCNC: 5.2 G/DL (ref 6.4–8.2)
RBC # BLD AUTO: 2.73 X10*6/UL (ref 4.5–5.9)
RH FACTOR (ANTIGEN D): NORMAL
SODIUM SERPL-SCNC: 139 MMOL/L (ref 136–145)
URATE SERPL-MCNC: 4 MG/DL (ref 4–7.5)
WBC # BLD AUTO: 6.8 X10*3/UL (ref 4.4–11.3)

## 2025-03-07 PROCEDURE — 80053 COMPREHEN METABOLIC PANEL: CPT

## 2025-03-07 PROCEDURE — 3E04305 INTRODUCTION OF OTHER ANTINEOPLASTIC INTO CENTRAL VEIN, PERCUTANEOUS APPROACH: ICD-10-PCS | Performed by: HOSPITALIST

## 2025-03-07 PROCEDURE — 83615 LACTATE (LD) (LDH) ENZYME: CPT

## 2025-03-07 PROCEDURE — 36573 INSJ PICC RS&I 5 YR+: CPT

## 2025-03-07 PROCEDURE — C1751 CATH, INF, PER/CENT/MIDLINE: HCPCS

## 2025-03-07 PROCEDURE — 85025 COMPLETE CBC W/AUTO DIFF WBC: CPT

## 2025-03-07 PROCEDURE — 86923 COMPATIBILITY TEST ELECTRIC: CPT

## 2025-03-07 PROCEDURE — 84550 ASSAY OF BLOOD/URIC ACID: CPT

## 2025-03-07 PROCEDURE — 99223 1ST HOSP IP/OBS HIGH 75: CPT

## 2025-03-07 PROCEDURE — 2500000004 HC RX 250 GENERAL PHARMACY W/ HCPCS (ALT 636 FOR OP/ED): Performed by: STUDENT IN AN ORGANIZED HEALTH CARE EDUCATION/TRAINING PROGRAM

## 2025-03-07 PROCEDURE — 02HV33Z INSERTION OF INFUSION DEVICE INTO SUPERIOR VENA CAVA, PERCUTANEOUS APPROACH: ICD-10-PCS | Performed by: HOSPITALIST

## 2025-03-07 PROCEDURE — 86901 BLOOD TYPING SEROLOGIC RH(D): CPT

## 2025-03-07 PROCEDURE — 2720000007 HC OR 272 NO HCPCS

## 2025-03-07 PROCEDURE — 1170000001 HC PRIVATE ONCOLOGY ROOM DAILY

## 2025-03-07 PROCEDURE — 87081 CULTURE SCREEN ONLY: CPT

## 2025-03-07 PROCEDURE — 2500000001 HC RX 250 WO HCPCS SELF ADMINISTERED DRUGS (ALT 637 FOR MEDICARE OP)

## 2025-03-07 PROCEDURE — 2500000002 HC RX 250 W HCPCS SELF ADMINISTERED DRUGS (ALT 637 FOR MEDICARE OP, ALT 636 FOR OP/ED): Performed by: STUDENT IN AN ORGANIZED HEALTH CARE EDUCATION/TRAINING PROGRAM

## 2025-03-07 RX ORDER — OXYCODONE HYDROCHLORIDE 5 MG/1
5 TABLET ORAL EVERY 6 HOURS PRN
Status: ACTIVE | OUTPATIENT
Start: 2025-03-07

## 2025-03-07 RX ORDER — LIDOCAINE HYDROCHLORIDE 10 MG/ML
5 INJECTION, SOLUTION INFILTRATION; PERINEURAL ONCE
Status: ACTIVE | OUTPATIENT
Start: 2025-03-07

## 2025-03-07 RX ORDER — FAMOTIDINE 10 MG/ML
20 INJECTION, SOLUTION INTRAVENOUS AS NEEDED
Status: ACTIVE | OUTPATIENT
Start: 2025-03-07

## 2025-03-07 RX ORDER — PROCHLORPERAZINE MALEATE 10 MG
10 TABLET ORAL EVERY 6 HOURS PRN
Status: ACTIVE | OUTPATIENT
Start: 2025-03-07

## 2025-03-07 RX ORDER — DIPHENHYDRAMINE HYDROCHLORIDE 50 MG/ML
50 INJECTION INTRAMUSCULAR; INTRAVENOUS AS NEEDED
Status: ACTIVE | OUTPATIENT
Start: 2025-03-07

## 2025-03-07 RX ORDER — ALBUTEROL SULFATE 0.83 MG/ML
3 SOLUTION RESPIRATORY (INHALATION) AS NEEDED
Status: ACTIVE | OUTPATIENT
Start: 2025-03-07

## 2025-03-07 RX ORDER — PROCHLORPERAZINE EDISYLATE 5 MG/ML
10 INJECTION INTRAMUSCULAR; INTRAVENOUS EVERY 6 HOURS PRN
Status: ACTIVE | OUTPATIENT
Start: 2025-03-07

## 2025-03-07 RX ORDER — LISINOPRIL 10 MG/1
10 TABLET ORAL DAILY
Status: DISPENSED | OUTPATIENT
Start: 2025-03-07

## 2025-03-07 RX ORDER — EPINEPHRINE 1 MG/ML
0.3 INJECTION, SOLUTION, CONCENTRATE INTRAVENOUS EVERY 5 MIN PRN
Status: ACTIVE | OUTPATIENT
Start: 2025-03-07

## 2025-03-07 RX ORDER — ALBUTEROL SULFATE 90 UG/1
2 INHALANT RESPIRATORY (INHALATION) EVERY 6 HOURS PRN
Status: DISPENSED | OUTPATIENT
Start: 2025-03-07

## 2025-03-07 RX ORDER — POLYETHYLENE GLYCOL 3350 17 G/17G
17 POWDER, FOR SOLUTION ORAL DAILY
Status: DISPENSED | OUTPATIENT
Start: 2025-03-07

## 2025-03-07 RX ORDER — ONDANSETRON HYDROCHLORIDE 8 MG/1
16 TABLET, FILM COATED ORAL ONCE
Status: COMPLETED | OUTPATIENT
Start: 2025-03-07 | End: 2025-03-07

## 2025-03-07 RX ORDER — FUROSEMIDE 20 MG/1
20 TABLET ORAL DAILY PRN
Status: ACTIVE | OUTPATIENT
Start: 2025-03-07

## 2025-03-07 RX ORDER — PANTOPRAZOLE SODIUM 40 MG/1
40 TABLET, DELAYED RELEASE ORAL
Status: DISPENSED | OUTPATIENT
Start: 2025-03-08

## 2025-03-07 RX ORDER — GABAPENTIN 300 MG/1
300 CAPSULE ORAL NIGHTLY
Status: DISPENSED | OUTPATIENT
Start: 2025-03-07

## 2025-03-07 RX ORDER — ATORVASTATIN CALCIUM 40 MG/1
40 TABLET, FILM COATED ORAL
Status: DISPENSED | OUTPATIENT
Start: 2025-03-07

## 2025-03-07 RX ORDER — METOPROLOL SUCCINATE 50 MG/1
100 TABLET, EXTENDED RELEASE ORAL DAILY
Status: DISPENSED | OUTPATIENT
Start: 2025-03-07

## 2025-03-07 RX ORDER — FLUCONAZOLE 200 MG/1
400 TABLET ORAL DAILY
Status: DISPENSED | OUTPATIENT
Start: 2025-03-07

## 2025-03-07 RX ORDER — OLANZAPINE 5 MG/1
5 TABLET ORAL NIGHTLY
Status: DISPENSED | OUTPATIENT
Start: 2025-03-07

## 2025-03-07 RX ADMIN — ONDANSETRON HYDROCHLORIDE 16 MG: 8 TABLET, FILM COATED ORAL at 16:11

## 2025-03-07 RX ADMIN — APIXABAN 5 MG: 5 TABLET, FILM COATED ORAL at 14:40

## 2025-03-07 RX ADMIN — OLANZAPINE 5 MG: 5 TABLET, FILM COATED ORAL at 20:15

## 2025-03-07 RX ADMIN — VINCRISTINE SULFATE 28.5 MG: 1 INJECTION, SOLUTION INTRAVENOUS at 16:58

## 2025-03-07 RX ADMIN — FOSAPREPITANT 150 MG: 150 INJECTION, POWDER, LYOPHILIZED, FOR SOLUTION INTRAVENOUS at 16:35

## 2025-03-07 RX ADMIN — APIXABAN 5 MG: 5 TABLET, FILM COATED ORAL at 20:15

## 2025-03-07 RX ADMIN — PREDNISONE 140 MG: 50 TABLET ORAL at 16:11

## 2025-03-07 RX ADMIN — GABAPENTIN 300 MG: 300 CAPSULE ORAL at 20:15

## 2025-03-07 SDOH — ECONOMIC STABILITY: FOOD INSECURITY: WITHIN THE PAST 12 MONTHS, YOU WORRIED THAT YOUR FOOD WOULD RUN OUT BEFORE YOU GOT THE MONEY TO BUY MORE.: NEVER TRUE

## 2025-03-07 SDOH — SOCIAL STABILITY: SOCIAL INSECURITY: WITHIN THE LAST YEAR, HAVE YOU BEEN HUMILIATED OR EMOTIONALLY ABUSED IN OTHER WAYS BY YOUR PARTNER OR EX-PARTNER?: NO

## 2025-03-07 SDOH — SOCIAL STABILITY: SOCIAL INSECURITY: ARE THERE ANY APPARENT SIGNS OF INJURIES/BEHAVIORS THAT COULD BE RELATED TO ABUSE/NEGLECT?: NO

## 2025-03-07 SDOH — ECONOMIC STABILITY: INCOME INSECURITY: IN THE PAST 12 MONTHS HAS THE ELECTRIC, GAS, OIL, OR WATER COMPANY THREATENED TO SHUT OFF SERVICES IN YOUR HOME?: NO

## 2025-03-07 SDOH — SOCIAL STABILITY: SOCIAL INSECURITY: DO YOU FEEL ANYONE HAS EXPLOITED OR TAKEN ADVANTAGE OF YOU FINANCIALLY OR OF YOUR PERSONAL PROPERTY?: NO

## 2025-03-07 SDOH — ECONOMIC STABILITY: HOUSING INSECURITY: IN THE PAST 12 MONTHS, HOW MANY TIMES HAVE YOU MOVED WHERE YOU WERE LIVING?: 0

## 2025-03-07 SDOH — SOCIAL STABILITY: SOCIAL INSECURITY: HAVE YOU HAD ANY THOUGHTS OF HARMING ANYONE ELSE?: NO

## 2025-03-07 SDOH — SOCIAL STABILITY: SOCIAL INSECURITY: ARE YOU OR HAVE YOU BEEN THREATENED OR ABUSED PHYSICALLY, EMOTIONALLY, OR SEXUALLY BY ANYONE?: NO

## 2025-03-07 SDOH — ECONOMIC STABILITY: HOUSING INSECURITY: IN THE LAST 12 MONTHS, WAS THERE A TIME WHEN YOU WERE NOT ABLE TO PAY THE MORTGAGE OR RENT ON TIME?: NO

## 2025-03-07 SDOH — SOCIAL STABILITY: SOCIAL INSECURITY: DO YOU FEEL UNSAFE GOING BACK TO THE PLACE WHERE YOU ARE LIVING?: NO

## 2025-03-07 SDOH — SOCIAL STABILITY: SOCIAL INSECURITY: ABUSE: ADULT

## 2025-03-07 SDOH — SOCIAL STABILITY: SOCIAL INSECURITY: WITHIN THE LAST YEAR, HAVE YOU BEEN AFRAID OF YOUR PARTNER OR EX-PARTNER?: NO

## 2025-03-07 SDOH — ECONOMIC STABILITY: HOUSING INSECURITY: AT ANY TIME IN THE PAST 12 MONTHS, WERE YOU HOMELESS OR LIVING IN A SHELTER (INCLUDING NOW)?: NO

## 2025-03-07 SDOH — ECONOMIC STABILITY: FOOD INSECURITY: WITHIN THE PAST 12 MONTHS, THE FOOD YOU BOUGHT JUST DIDN'T LAST AND YOU DIDN'T HAVE MONEY TO GET MORE.: NEVER TRUE

## 2025-03-07 SDOH — SOCIAL STABILITY: SOCIAL INSECURITY: HAS ANYONE EVER THREATENED TO HURT YOUR FAMILY OR YOUR PETS?: NO

## 2025-03-07 SDOH — SOCIAL STABILITY: SOCIAL INSECURITY: DOES ANYONE TRY TO KEEP YOU FROM HAVING/CONTACTING OTHER FRIENDS OR DOING THINGS OUTSIDE YOUR HOME?: NO

## 2025-03-07 SDOH — ECONOMIC STABILITY: FOOD INSECURITY: HOW HARD IS IT FOR YOU TO PAY FOR THE VERY BASICS LIKE FOOD, HOUSING, MEDICAL CARE, AND HEATING?: NOT HARD AT ALL

## 2025-03-07 SDOH — SOCIAL STABILITY: SOCIAL INSECURITY: HAVE YOU HAD THOUGHTS OF HARMING ANYONE ELSE?: NO

## 2025-03-07 SDOH — SOCIAL STABILITY: SOCIAL INSECURITY: WERE YOU ABLE TO COMPLETE ALL THE BEHAVIORAL HEALTH SCREENINGS?: YES

## 2025-03-07 SDOH — ECONOMIC STABILITY: TRANSPORTATION INSECURITY: IN THE PAST 12 MONTHS, HAS LACK OF TRANSPORTATION KEPT YOU FROM MEDICAL APPOINTMENTS OR FROM GETTING MEDICATIONS?: NO

## 2025-03-07 ASSESSMENT — LIFESTYLE VARIABLES
HOW OFTEN DO YOU HAVE A DRINK CONTAINING ALCOHOL: NEVER
AUDIT-C TOTAL SCORE: 0
HOW MANY STANDARD DRINKS CONTAINING ALCOHOL DO YOU HAVE ON A TYPICAL DAY: PATIENT DOES NOT DRINK
SKIP TO QUESTIONS 9-10: 1
AUDIT-C TOTAL SCORE: 0
HOW OFTEN DO YOU HAVE 6 OR MORE DRINKS ON ONE OCCASION: NEVER

## 2025-03-07 ASSESSMENT — COGNITIVE AND FUNCTIONAL STATUS - GENERAL
PATIENT BASELINE BEDBOUND: NO
MOBILITY SCORE: 24
DAILY ACTIVITIY SCORE: 24
MOBILITY SCORE: 24
DAILY ACTIVITIY SCORE: 24

## 2025-03-07 ASSESSMENT — ACTIVITIES OF DAILY LIVING (ADL)
PATIENT'S MEMORY ADEQUATE TO SAFELY COMPLETE DAILY ACTIVITIES?: YES
HEARING - LEFT EAR: DIFFICULTY WITH NOISE
DRESSING YOURSELF: INDEPENDENT
HEARING - RIGHT EAR: DIFFICULTY WITH NOISE
LACK_OF_TRANSPORTATION: NO
GROOMING: INDEPENDENT
JUDGMENT_ADEQUATE_SAFELY_COMPLETE_DAILY_ACTIVITIES: YES
WALKS IN HOME: INDEPENDENT
BATHING: INDEPENDENT
FEEDING YOURSELF: INDEPENDENT
LACK_OF_TRANSPORTATION: NO
ADEQUATE_TO_COMPLETE_ADL: YES
TOILETING: INDEPENDENT

## 2025-03-07 ASSESSMENT — COLUMBIA-SUICIDE SEVERITY RATING SCALE - C-SSRS
2. HAVE YOU ACTUALLY HAD ANY THOUGHTS OF KILLING YOURSELF?: NO
6. HAVE YOU EVER DONE ANYTHING, STARTED TO DO ANYTHING, OR PREPARED TO DO ANYTHING TO END YOUR LIFE?: NO
1. IN THE PAST MONTH, HAVE YOU WISHED YOU WERE DEAD OR WISHED YOU COULD GO TO SLEEP AND NOT WAKE UP?: NO

## 2025-03-07 ASSESSMENT — PAIN SCALES - GENERAL: PAINLEVEL_OUTOF10: 0 - NO PAIN

## 2025-03-07 ASSESSMENT — PAIN - FUNCTIONAL ASSESSMENT: PAIN_FUNCTIONAL_ASSESSMENT: 0-10
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
## 2025-03-07 NOTE — POST-PROCEDURE NOTE
Pre-Procedure Checklist:  Emergent Line Insertion: No  Type of Line to be Placed: PICC (SOLO)  Consent Obtained: Yes  Emergency Medication Necessary: No  Patient Identified with 2 Independent Identifiers: Yes  Review of Allergies, Anticoagulation, Relevant Labs, ECG/Telemetry: Yes  Risks/Benefits/Alternatives Discussed with Patient/POA/Legal Representative: Yes  Stop Sign on Door: Yes  Time Out Performed: Yes  Catheter Exchange: No    Positioning Checklist:  All People, Including Patient, in the Room with Cap and Mask: Yes  Fluoroscopy Used to Identify Vessel and Guide Insertion: No   Sterile Cover Used: Yes  Full Barrier Precautions Followed (Mask, Cap, Gown, Gloves): Yes  Hands Washed: Yes  Monitors Attached with Sound Alarms On: No  Full Body Sterile Drape (Head-to-Toe) Used to Cover Patient: Yes  Trendelenburg Position (For IJ and Subclavian): No  CHG Skin Prep Used and Allowed to Air Dry to Skin Procedure: Yes    Procedure Checklist:  Blood Aspirated From All Lumens, All Ports Subsequently Flushed: Yes  Catheter Caps Placed on All Lumens; Lumens Clamped: Yes  Maintain Guidewire Control Throughout, Ensuring Guidewire Removal: Yes  Maintain Sterile Field Throughout Insertion: Yes  Catheter Secured: Yes  Confirmatory Test of Venous Placement: Non-Pulsatile Blood    Post Procedure Checklist:  Date and Time Written on Dressing: Yes  Sharp and Wire Count and Safe Disposal of all Sharps/Wires: Yes  Sterile Dressing Applied Per Protocol: Yes  X-ray Ordered or ECG Image: Yes    PICC Insertion Details:  Size (Fr): 4  Lumen Type: double lumen SOLO  Catheter to Vein Ratio Less Than 50%: Yes  Total Length (cm): 47  External Length (cm): 0   Orientation: left upper arm  Location: basilic vein  Site Prep: Chlorohexidine; Usual sterile procedure followed  Local Anesthetic: Injectable/Subcutaneous  Indication: IV chemo  Insertion Team Members in the Room: Nurse, LPN  Initial Extremity Circumference (cm): 35   Insertion  Attempts: 1  Patient Tolerance: Tolerated Well, Age Appropriate  Comfort Measures: Subcutaneous anesthetic; Verbal  Procedure Location: Bedside  Safety Measures: Patient specific safety measures addressed with RN  Estimated Blood Loss (mL): 0.5   Vessel Fully Compressible Proximally and Distally to Insertion Site: Yes  Brisk Blood Return Obtained and Line Draws Easily: Yes  Tip Location: SVC  Line Confirmation: ECG  Lot #: [id number]  : BD  PICC Line Exp Date: 09/30/2025  Securement: Stat Lock  Post Procedure Checklist: Handoff with RN; Obtain all new IV tubing prior to use; Bed at lowest level and wheels locked; Line discharge information at bedside.  Additional Details: Line was inserted using Modified Seldinger's Technique.     Duplex from 01/2025 shows right axillary and right subclavian DVT and no DVT on left side. OLIVIA PICC placed.   Placed by: Yanna Jiménez RN

## 2025-03-07 NOTE — CARE PLAN
Problem: Pain - Adult  Goal: Verbalizes/displays adequate comfort level or baseline comfort level  Outcome: Progressing     Problem: Safety - Adult  Goal: Free from fall injury  Outcome: Progressing     Problem: Discharge Planning  Goal: Discharge to home or other facility with appropriate resources  Outcome: Progressing     Problem: Chronic Conditions and Co-morbidities  Goal: Patient's chronic conditions and co-morbidity symptoms are monitored and maintained or improved  Outcome: Progressing     Problem: Nutrition  Goal: Nutrient intake appropriate for maintaining nutritional needs  Outcome: Progressing   The patient's goals for the shift include      Problem: Skin  Goal: Decreased wound size/increased tissue granulation at next dressing change  Outcome: Progressing  Goal: Participates in plan/prevention/treatment measures  Outcome: Progressing  Goal: Prevent/manage excess moisture  Outcome: Progressing  Goal: Prevent/minimize sheer/friction injuries  Outcome: Progressing  Goal: Promote/optimize nutrition  Outcome: Progressing  Goal: Promote skin healing  Outcome: Progressing

## 2025-03-08 LAB
ALBUMIN SERPL BCP-MCNC: 3.2 G/DL (ref 3.4–5)
ALP SERPL-CCNC: 46 U/L (ref 33–136)
ALT SERPL W P-5'-P-CCNC: 10 U/L (ref 10–52)
ANION GAP SERPL CALC-SCNC: 14 MMOL/L (ref 10–20)
AST SERPL W P-5'-P-CCNC: 8 U/L (ref 9–39)
BASOPHILS # BLD AUTO: 0.01 X10*3/UL (ref 0–0.1)
BASOPHILS NFR BLD AUTO: 0.2 %
BILIRUB SERPL-MCNC: 0.5 MG/DL (ref 0–1.2)
BLOOD EXPIRATION DATE: NORMAL
BUN SERPL-MCNC: 14 MG/DL (ref 6–23)
CALCIUM SERPL-MCNC: 8.4 MG/DL (ref 8.6–10.6)
CHLORIDE SERPL-SCNC: 104 MMOL/L (ref 98–107)
CO2 SERPL-SCNC: 25 MMOL/L (ref 21–32)
CREAT SERPL-MCNC: 0.75 MG/DL (ref 0.5–1.3)
DISPENSE STATUS: NORMAL
EGFRCR SERPLBLD CKD-EPI 2021: >90 ML/MIN/1.73M*2
EOSINOPHIL # BLD AUTO: 0 X10*3/UL (ref 0–0.7)
EOSINOPHIL NFR BLD AUTO: 0 %
ERYTHROCYTE [DISTWIDTH] IN BLOOD BY AUTOMATED COUNT: 17.2 % (ref 11.5–14.5)
GLUCOSE SERPL-MCNC: 172 MG/DL (ref 74–99)
HCT VFR BLD AUTO: 22.1 % (ref 41–52)
HGB BLD-MCNC: 6.9 G/DL (ref 13.5–17.5)
IMM GRANULOCYTES # BLD AUTO: 0.19 X10*3/UL (ref 0–0.7)
IMM GRANULOCYTES NFR BLD AUTO: 4.2 % (ref 0–0.9)
LYMPHOCYTES # BLD AUTO: 0.26 X10*3/UL (ref 1.2–4.8)
LYMPHOCYTES NFR BLD AUTO: 5.7 %
MCH RBC QN AUTO: 28.6 PG (ref 26–34)
MCHC RBC AUTO-ENTMCNC: 31.2 G/DL (ref 32–36)
MCV RBC AUTO: 92 FL (ref 80–100)
MONOCYTES # BLD AUTO: 0.08 X10*3/UL (ref 0.1–1)
MONOCYTES NFR BLD AUTO: 1.8 %
NEUTROPHILS # BLD AUTO: 4.01 X10*3/UL (ref 1.2–7.7)
NEUTROPHILS NFR BLD AUTO: 88.1 %
NRBC BLD-RTO: 0.4 /100 WBCS (ref 0–0)
PLATELET # BLD AUTO: 151 X10*3/UL (ref 150–450)
POTASSIUM SERPL-SCNC: 4.1 MMOL/L (ref 3.5–5.3)
PRODUCT BLOOD TYPE: 6200
PRODUCT CODE: NORMAL
PROT SERPL-MCNC: 4.7 G/DL (ref 6.4–8.2)
RBC # BLD AUTO: 2.41 X10*6/UL (ref 4.5–5.9)
SODIUM SERPL-SCNC: 139 MMOL/L (ref 136–145)
UNIT ABO: NORMAL
UNIT NUMBER: NORMAL
UNIT RH: NORMAL
UNIT VOLUME: 281
WBC # BLD AUTO: 4.6 X10*3/UL (ref 4.4–11.3)
XM INTEP: NORMAL

## 2025-03-08 PROCEDURE — 2500000002 HC RX 250 W HCPCS SELF ADMINISTERED DRUGS (ALT 637 FOR MEDICARE OP, ALT 636 FOR OP/ED): Performed by: STUDENT IN AN ORGANIZED HEALTH CARE EDUCATION/TRAINING PROGRAM

## 2025-03-08 PROCEDURE — P9040 RBC LEUKOREDUCED IRRADIATED: HCPCS

## 2025-03-08 PROCEDURE — 84075 ASSAY ALKALINE PHOSPHATASE: CPT

## 2025-03-08 PROCEDURE — 36430 TRANSFUSION BLD/BLD COMPNT: CPT

## 2025-03-08 PROCEDURE — 2500000004 HC RX 250 GENERAL PHARMACY W/ HCPCS (ALT 636 FOR OP/ED)

## 2025-03-08 PROCEDURE — 99233 SBSQ HOSP IP/OBS HIGH 50: CPT | Performed by: HOSPITALIST

## 2025-03-08 PROCEDURE — 2500000001 HC RX 250 WO HCPCS SELF ADMINISTERED DRUGS (ALT 637 FOR MEDICARE OP)

## 2025-03-08 PROCEDURE — 2500000004 HC RX 250 GENERAL PHARMACY W/ HCPCS (ALT 636 FOR OP/ED): Performed by: STUDENT IN AN ORGANIZED HEALTH CARE EDUCATION/TRAINING PROGRAM

## 2025-03-08 PROCEDURE — 1170000001 HC PRIVATE ONCOLOGY ROOM DAILY

## 2025-03-08 PROCEDURE — 85025 COMPLETE CBC W/AUTO DIFF WBC: CPT

## 2025-03-08 RX ORDER — ONDANSETRON HYDROCHLORIDE 8 MG/1
16 TABLET, FILM COATED ORAL ONCE
Status: COMPLETED | OUTPATIENT
Start: 2025-03-08 | End: 2025-03-08

## 2025-03-08 RX ADMIN — PANTOPRAZOLE SODIUM 40 MG: 40 TABLET, DELAYED RELEASE ORAL at 06:09

## 2025-03-08 RX ADMIN — ONDANSETRON HYDROCHLORIDE 16 MG: 8 TABLET, FILM COATED ORAL at 15:51

## 2025-03-08 RX ADMIN — PREDNISONE 140 MG: 50 TABLET ORAL at 08:46

## 2025-03-08 RX ADMIN — OLANZAPINE 5 MG: 5 TABLET, FILM COATED ORAL at 20:19

## 2025-03-08 RX ADMIN — PREDNISONE 140 MG: 50 TABLET ORAL at 20:19

## 2025-03-08 RX ADMIN — POLYETHYLENE GLYCOL 3350 17 G: 17 POWDER, FOR SOLUTION ORAL at 08:46

## 2025-03-08 RX ADMIN — METOPROLOL SUCCINATE 100 MG: 50 TABLET, EXTENDED RELEASE ORAL at 08:46

## 2025-03-08 RX ADMIN — DOXORUBICIN HYDROCHLORIDE 28 MG: 2 INJECTION, SOLUTION INTRAVENOUS at 16:23

## 2025-03-08 RX ADMIN — APIXABAN 5 MG: 5 TABLET, FILM COATED ORAL at 20:19

## 2025-03-08 RX ADMIN — APIXABAN 5 MG: 5 TABLET, FILM COATED ORAL at 08:46

## 2025-03-08 RX ADMIN — FLUCONAZOLE 400 MG: 200 TABLET ORAL at 08:46

## 2025-03-08 RX ADMIN — LISINOPRIL 10 MG: 10 TABLET ORAL at 08:46

## 2025-03-08 RX ADMIN — ATORVASTATIN CALCIUM 40 MG: 40 TABLET, FILM COATED ORAL at 06:09

## 2025-03-08 RX ADMIN — GABAPENTIN 300 MG: 300 CAPSULE ORAL at 20:19

## 2025-03-08 ASSESSMENT — COGNITIVE AND FUNCTIONAL STATUS - GENERAL
MOBILITY SCORE: 24
DAILY ACTIVITIY SCORE: 24
MOBILITY SCORE: 24
DAILY ACTIVITIY SCORE: 24

## 2025-03-08 ASSESSMENT — PAIN SCALES - GENERAL: PAINLEVEL_OUTOF10: 0 - NO PAIN

## 2025-03-08 NOTE — NURSING NOTE
Dose # 2 of doxorubicin 28.5 mg, Etoposide 106 mg, Vincristine 0.94 chemotherapy mg in 567.49 mL given over 24 hours via left double PICC catheter.  Dose up at 1623 and down at 1622.  Pre-medicated with ondansetron.  Patient, dose and rate verified with second RN, Domo Rizo.  + BBR obtained via syringe aspiration before and after administration.  Patient with no side effects.

## 2025-03-08 NOTE — PROGRESS NOTES
"Bijan Ibanez is a 64 y.o. male on day 1 of admission presenting with Burkitt's lymphoma (Multi).    Subjective   Seen at bedside this morning while up eating breakfast. Chemotherapy infusing. No new concerns this morning. He is aware he will get a unit of blood today for hgb 6.9. Denies pain, N/V, C/D. Ambulating okay in room and halls. Eating and drinking okay. Discussed conversation with Dr. Mckeon yesterday regarding stopping allopurinol and acyclovir, patient and wife understood and is aware Levaquin will start after chemotherapy completes. No other concerns at this time. LBM 3/8.     Objective     Physical Exam  Vitals reviewed.   Constitutional:       Appearance: Normal appearance.   HENT:      Head: Normocephalic and atraumatic.      Nose: Nose normal.      Mouth/Throat:      Mouth: Mucous membranes are moist.      Pharynx: Oropharynx is clear.   Eyes:      Extraocular Movements: Extraocular movements intact.      Pupils: Pupils are equal, round, and reactive to light.   Cardiovascular:      Rate and Rhythm: Normal rate and regular rhythm.      Pulses: Normal pulses.      Heart sounds: Normal heart sounds.   Pulmonary:      Effort: Pulmonary effort is normal.      Breath sounds: Normal breath sounds.   Abdominal:      General: Bowel sounds are normal.      Palpations: Abdomen is soft.   Musculoskeletal:         General: Normal range of motion.   Skin:     General: Skin is warm.   Neurological:      General: No focal deficit present.      Mental Status: He is alert and oriented to person, place, and time. Mental status is at baseline.   Psychiatric:         Mood and Affect: Mood normal.         Behavior: Behavior normal.         Last Recorded Vitals  Blood pressure 112/82, pulse 84, temperature 36.5 °C (97.7 °F), temperature source Temporal, resp. rate 18, height (S) 1.816 m (5' 11.5\"), weight (S) 91.6 kg (201 lb 15.1 oz), SpO2 99%.  Intake/Output last 3 Shifts:  No intake/output data recorded.    Relevant " Results  Scheduled medications  apixaban, 5 mg, oral, BID  atorvastatin, 40 mg, oral, Daily  DOXOrubicin (Adriamycin) 28 mg, etoposide (Toposar) 106 mg, vinCRIStine (VINCASAR) 0.94 mg in sodium chloride 0.9% 567.24 mL IV, , intravenous, Once  DOXOrubicin (Adriamycin) 28.5 mg, etoposide (Toposar) 106 mg, vinCRIStine (VINCASAR) 0.94 mg in sodium chloride 0.9% 567.49 mL IV, , intravenous, Once  fluconazole, 400 mg, oral, Daily  gabapentin, 300 mg, oral, Nightly  lidocaine, 5 mL, infiltration, Once  lisinopril, 10 mg, oral, Daily  metoprolol succinate XL, 100 mg, oral, Daily  OLANZapine, 5 mg, oral, Nightly  ondansetron, 16 mg, oral, Once  pantoprazole, 40 mg, oral, Daily before breakfast  polyethylene glycol, 17 g, oral, Daily  predniSONE, 140 mg, oral, BID      Continuous medications     PRN medications  PRN medications: albuterol, albuterol, alteplase, dextrose, diphenhydrAMINE, EPINEPHrine HCl, famotidine, furosemide, lidocaine-diphenhydrAMINE-Maalox 1:1:1, methylPREDNISolone sodium succinate (PF), oxyCODONE, prochlorperazine, prochlorperazine, sodium chloride     This patient has a central line   Reason for the central line remaining today? Parenteral medication      Assessment/Plan   Assessment & Plan  Burkitt's lymphoma (Multi)    Encounter for antineoplastic chemotherapy    Bijan Ibanez is a 64 y.o. male with PMH of newly dx Burkitt's lymphoma, HTN, HLD, CAD, MI (s/p stent 2010, on ASA), hx renal cell carcinoma (s/p R partial nephrectomy in 2013 @ [facility]), GERD, BPH, arthritis, RUE PICC DVT (On eliquis w/ close monitoring d/t counts), afib RVR, and recent MRSA bacteremia (s/p 4 week Vancomycin end date 3/3) who is admitted for C3 EPOCH. Received 1 unit pRBCs on 3/8 for hgb 6.9. Discharge pending completion of chemotherapy, most likely 3/11.      # CHEMO: C3 EPOCH  - Premeds: Zofran, Zyprexa, Compazine, Emend  - Doxorubicin 23.6mg on Day 1, 2, 3  - Etoposide 88mg on Day 1, 2, 3  - Vincristine 0.9mg on Day 1, 2,  3  - Cyclophosphamide 1,770mg once on Day 4  - Prednisone 140mg BID x5 days  - PRN Reaction meds: epinephrine, albuterol, Benadryl, Pepcid, solumedrol    - Plan for Neulasta and ritux outpt on 3/12   - Per recent onc note on 3/4/25 plan to place PICC line on admission prior to chemo start and will stay in on DOD     ONC:  # Burkitt's lymphoma    - Primary oncologist: Dr. Torey Mckeon  - Originally signed out as high-grade lymphoma but after burkitt's rearrangement (t8;14) came back positive. High risk based on marrow involvement, though no CNS involvement  - S/p C1 DA-R-EPOCH when signed out as high-grade lymphoma; given the significant toxicity he had with DA R EPOCH, will not escalate to CODOX-IVAC or hyper-CVAD given no CNS involvement  - Given G3/4 mucositis, etoposide reduced by 25%, keep EPOCH at Dose level 0  - 2/11 oncology along with ID felt they could resume chemotherapy after total 2 weeks of IV Vanco, but will still complete full duration of 4 weeks of the antibiotic (ended on 3/3)   - PET (2/24/25) s/p C2: There is near complete interval resolution of previously seen hypermetabolic hermann and extranodal disease involving bilateral submandibular, parotid gland, hepatic lesions, spleen, mesenteric deposits, bilateral kidney stomach bilateral, bilateral arms, anterior chest wall, and gluteal muscle supra and infra diaphragmatic lymphadenopathy. Diffuse metabolic activity within the bone marrow. ayo 2 - c/w CR       HEME:  # Anemia  - Likely 2/2 disease and tx, no signs of active bleeding on admit  - Monitor counts daily, fibrinogen, coags  - No evidence of DIC or hypercoag stat  - Transfuse to keep hgb > 7, plt > 50 (on AC)  - 3/8: Received 1 unit pRBCs for hgb 6.9  # Prophy:  - Cont home Eliquis 5mg BID - plan to hold when plt < 50     ID:  Allergies: Latex, PCN  # Recent MRSA bacteremia  - 1/31 Bc x2 + staphyloccous aureus and MRSA, 2/4 Bc x2 negative growth  - Follows with Dr. Rivera, last seen  on 2/28; s/p vanc ended on 3/3/25 for MRSA bacteremia   - PICC line removed 2/3  - 2/3 TTE w/o evidence of vegetation  - Aspergillus galactomannan and fungitell results still pending from 2/4  - Per Dr. Kwaku johnson to replace PICC line prior to chemo start on 3/7   # Prophylaxis  - Levofloxacin at discharge   - VZV: Stopped acyclovir at recent outpt appointment with Dr. Mckeon on 3/4 (confirmed this with him on admission 3/7, not to resume on dc)   - Candidiasis: Continue Fluconazole 400mg PO daily   - PJP: None at this time      CARDIO:  # Hx of Afib RVR on prior admission  - Likely 2/2 infection, electrolyte derangements, and anemia  - Keep K > 4, Mg > 2, Hgb > 7  - Cont home Eliquis until plt < 50  # Hx of MI  # Hx of HTN/HLD/CAD  - 2/3/25 Seen by EP as difficulty achieving rate control  - 2/3 TTE EF 60-65%, normal RVSP, L atrial size mod dilated  - Cont home Metoprolol XL 100mg PO daily  - Cont home Atorvastatin 40mg PO daily  - Cont home Lisinopril 10mg PO daily      FEN/GI:  Admit weight: 90.9kg  - Uric acid on admit: 4.0 (3/7)  - Allopurinol stopped at recent outpt appointment with Dr. Mckeon on 3/4   - Cont home Lasix 20mg PRN swelling  # Mucositis  - Pt had severe mucositis last admission  - States he was eating well at home, no mouth pain or lesions present on admission   - Cont BMX PRN  - Cont Oxy 5mg PO Q4h PRN for pain  - Consider supportive onc consult if continues to worsen  # Prophy:  - Cont home Pantoprazole 40mg PO daily      MSK:  # Hx Back Pain  - Cont Gabapentin 300mg PO nightly     DISPO:  - Full code, confirmed on admit  - Primary onc: Dr. Mckeon  - DC home pending completion of chemo; most likely 3/11   - Access: DL PICC   - FUV infusion 3/12, cardiology 3/13, 3/25 kwaku BATEMAN spent 60 minutes in the professional and overall care of this patient.    Assessment and plan as above discussed with attending physician Dr. Rell Bravo, APRN-CNP

## 2025-03-08 NOTE — CARE PLAN
The patient's goals for the shift include      The clinical goals for the shift include Patient will remain HDS through end of shift    Over the shift, the patient did make progress toward the following goals. Barriers to progression include chemotherapy administration.  Recommendations to address these barriers include continue monitoring.

## 2025-03-09 VITALS
TEMPERATURE: 97.9 F | DIASTOLIC BLOOD PRESSURE: 84 MMHG | BODY MASS INDEX: 28.27 KG/M2 | HEART RATE: 85 BPM | OXYGEN SATURATION: 98 % | WEIGHT: 201.94 LBS | SYSTOLIC BLOOD PRESSURE: 142 MMHG | RESPIRATION RATE: 18 BRPM | HEIGHT: 71 IN

## 2025-03-09 LAB
ALBUMIN SERPL BCP-MCNC: 3 G/DL (ref 3.4–5)
ALP SERPL-CCNC: 40 U/L (ref 33–136)
ALT SERPL W P-5'-P-CCNC: 11 U/L (ref 10–52)
ANION GAP SERPL CALC-SCNC: 12 MMOL/L (ref 10–20)
AST SERPL W P-5'-P-CCNC: 9 U/L (ref 9–39)
BASOPHILS # BLD AUTO: 0.01 X10*3/UL (ref 0–0.1)
BASOPHILS NFR BLD AUTO: 0.2 %
BILIRUB SERPL-MCNC: 0.5 MG/DL (ref 0–1.2)
BUN SERPL-MCNC: 20 MG/DL (ref 6–23)
CALCIUM SERPL-MCNC: 8.1 MG/DL (ref 8.6–10.6)
CHLORIDE SERPL-SCNC: 106 MMOL/L (ref 98–107)
CO2 SERPL-SCNC: 26 MMOL/L (ref 21–32)
CREAT SERPL-MCNC: 0.68 MG/DL (ref 0.5–1.3)
EGFRCR SERPLBLD CKD-EPI 2021: >90 ML/MIN/1.73M*2
EOSINOPHIL # BLD AUTO: 0.01 X10*3/UL (ref 0–0.7)
EOSINOPHIL NFR BLD AUTO: 0.2 %
ERYTHROCYTE [DISTWIDTH] IN BLOOD BY AUTOMATED COUNT: 18.4 % (ref 11.5–14.5)
GLUCOSE SERPL-MCNC: 181 MG/DL (ref 74–99)
HCT VFR BLD AUTO: 22.9 % (ref 41–52)
HGB BLD-MCNC: 7.4 G/DL (ref 13.5–17.5)
IMM GRANULOCYTES # BLD AUTO: 0.16 X10*3/UL (ref 0–0.7)
IMM GRANULOCYTES NFR BLD AUTO: 3 % (ref 0–0.9)
LYMPHOCYTES # BLD AUTO: 0.16 X10*3/UL (ref 1.2–4.8)
LYMPHOCYTES NFR BLD AUTO: 3 %
MCH RBC QN AUTO: 28.6 PG (ref 26–34)
MCHC RBC AUTO-ENTMCNC: 32.3 G/DL (ref 32–36)
MCV RBC AUTO: 88 FL (ref 80–100)
MONOCYTES # BLD AUTO: 0.14 X10*3/UL (ref 0.1–1)
MONOCYTES NFR BLD AUTO: 2.7 %
NEUTROPHILS # BLD AUTO: 4.8 X10*3/UL (ref 1.2–7.7)
NEUTROPHILS NFR BLD AUTO: 90.9 %
NRBC BLD-RTO: 0.4 /100 WBCS (ref 0–0)
PLATELET # BLD AUTO: 144 X10*3/UL (ref 150–450)
POTASSIUM SERPL-SCNC: 3.8 MMOL/L (ref 3.5–5.3)
PROT SERPL-MCNC: 4.4 G/DL (ref 6.4–8.2)
RBC # BLD AUTO: 2.59 X10*6/UL (ref 4.5–5.9)
SODIUM SERPL-SCNC: 140 MMOL/L (ref 136–145)
STAPHYLOCOCCUS SPEC CULT: ABNORMAL
URATE SERPL-MCNC: 4.9 MG/DL (ref 4–7.5)
WBC # BLD AUTO: 5.3 X10*3/UL (ref 4.4–11.3)

## 2025-03-09 PROCEDURE — 2500000001 HC RX 250 WO HCPCS SELF ADMINISTERED DRUGS (ALT 637 FOR MEDICARE OP): Performed by: NURSE PRACTITIONER

## 2025-03-09 PROCEDURE — 2500000002 HC RX 250 W HCPCS SELF ADMINISTERED DRUGS (ALT 637 FOR MEDICARE OP, ALT 636 FOR OP/ED): Performed by: STUDENT IN AN ORGANIZED HEALTH CARE EDUCATION/TRAINING PROGRAM

## 2025-03-09 PROCEDURE — 85025 COMPLETE CBC W/AUTO DIFF WBC: CPT

## 2025-03-09 PROCEDURE — 99233 SBSQ HOSP IP/OBS HIGH 50: CPT | Performed by: NURSE PRACTITIONER

## 2025-03-09 PROCEDURE — 2500000004 HC RX 250 GENERAL PHARMACY W/ HCPCS (ALT 636 FOR OP/ED): Performed by: STUDENT IN AN ORGANIZED HEALTH CARE EDUCATION/TRAINING PROGRAM

## 2025-03-09 PROCEDURE — 2500000004 HC RX 250 GENERAL PHARMACY W/ HCPCS (ALT 636 FOR OP/ED)

## 2025-03-09 PROCEDURE — 84550 ASSAY OF BLOOD/URIC ACID: CPT

## 2025-03-09 PROCEDURE — 1170000001 HC PRIVATE ONCOLOGY ROOM DAILY

## 2025-03-09 PROCEDURE — 2500000002 HC RX 250 W HCPCS SELF ADMINISTERED DRUGS (ALT 637 FOR MEDICARE OP, ALT 636 FOR OP/ED): Performed by: NURSE PRACTITIONER

## 2025-03-09 PROCEDURE — 84075 ASSAY ALKALINE PHOSPHATASE: CPT

## 2025-03-09 PROCEDURE — 2500000001 HC RX 250 WO HCPCS SELF ADMINISTERED DRUGS (ALT 637 FOR MEDICARE OP)

## 2025-03-09 RX ORDER — AMOXICILLIN 250 MG
2 CAPSULE ORAL 2 TIMES DAILY
Status: DISPENSED | OUTPATIENT
Start: 2025-03-09

## 2025-03-09 RX ORDER — ONDANSETRON HYDROCHLORIDE 8 MG/1
16 TABLET, FILM COATED ORAL ONCE
Status: COMPLETED | OUTPATIENT
Start: 2025-03-09 | End: 2025-03-09

## 2025-03-09 RX ORDER — POTASSIUM CHLORIDE 20 MEQ/1
20 TABLET, EXTENDED RELEASE ORAL ONCE
Status: COMPLETED | OUTPATIENT
Start: 2025-03-09 | End: 2025-03-09

## 2025-03-09 RX ADMIN — POTASSIUM CHLORIDE 20 MEQ: 1500 TABLET, EXTENDED RELEASE ORAL at 08:49

## 2025-03-09 RX ADMIN — PREDNISONE 140 MG: 50 TABLET ORAL at 20:26

## 2025-03-09 RX ADMIN — LISINOPRIL 10 MG: 10 TABLET ORAL at 08:50

## 2025-03-09 RX ADMIN — APIXABAN 5 MG: 5 TABLET, FILM COATED ORAL at 20:26

## 2025-03-09 RX ADMIN — ATORVASTATIN CALCIUM 40 MG: 40 TABLET, FILM COATED ORAL at 06:43

## 2025-03-09 RX ADMIN — APIXABAN 5 MG: 5 TABLET, FILM COATED ORAL at 08:49

## 2025-03-09 RX ADMIN — PANTOPRAZOLE SODIUM 40 MG: 40 TABLET, DELAYED RELEASE ORAL at 06:43

## 2025-03-09 RX ADMIN — SENNOSIDES AND DOCUSATE SODIUM 2 TABLET: 50; 8.6 TABLET ORAL at 20:26

## 2025-03-09 RX ADMIN — ONDANSETRON HYDROCHLORIDE 16 MG: 8 TABLET, FILM COATED ORAL at 15:53

## 2025-03-09 RX ADMIN — DOXORUBICIN HYDROCHLORIDE 28 MG: 2 INJECTION, SOLUTION INTRAVENOUS at 17:24

## 2025-03-09 RX ADMIN — POLYETHYLENE GLYCOL 3350 17 G: 17 POWDER, FOR SOLUTION ORAL at 08:50

## 2025-03-09 RX ADMIN — SENNOSIDES AND DOCUSATE SODIUM 2 TABLET: 50; 8.6 TABLET ORAL at 09:44

## 2025-03-09 RX ADMIN — METOPROLOL SUCCINATE 100 MG: 50 TABLET, EXTENDED RELEASE ORAL at 08:49

## 2025-03-09 RX ADMIN — PREDNISONE 140 MG: 50 TABLET ORAL at 08:49

## 2025-03-09 RX ADMIN — OLANZAPINE 5 MG: 5 TABLET, FILM COATED ORAL at 20:26

## 2025-03-09 RX ADMIN — GABAPENTIN 300 MG: 300 CAPSULE ORAL at 20:26

## 2025-03-09 RX ADMIN — FLUCONAZOLE 400 MG: 200 TABLET ORAL at 08:49

## 2025-03-09 ASSESSMENT — PAIN SCALES - GENERAL: PAINLEVEL_OUTOF10: 0 - NO PAIN

## 2025-03-09 ASSESSMENT — ACTIVITIES OF DAILY LIVING (ADL): LACK_OF_TRANSPORTATION: NO

## 2025-03-09 NOTE — PROGRESS NOTES
"Bijan Ibanez is a 64 y.o. male on day 2 of admission presenting with Burkitt's lymphoma (Multi).    Subjective   Seen this AM at the bedside. Pt states he feels that his stools are firm, discussed adding stool softener. Discussed improved Hgb today after receiving blood yesterday. He otherwise denies any acute concerns. He denies any fevers/chills, SOB, or CP.    Objective     Physical Exam  Vitals reviewed.   Constitutional:       Appearance: Normal appearance.   HENT:      Head: Normocephalic and atraumatic.      Nose: Nose normal.      Mouth/Throat:      Mouth: Mucous membranes are moist.      Pharynx: Oropharynx is clear.   Eyes:      Extraocular Movements: Extraocular movements intact.      Pupils: Pupils are equal, round, and reactive to light.   Cardiovascular:      Rate and Rhythm: Normal rate and regular rhythm.      Pulses: Normal pulses.      Heart sounds: Normal heart sounds.   Pulmonary:      Effort: Pulmonary effort is normal.      Breath sounds: Normal breath sounds.   Abdominal:      General: Bowel sounds are normal.      Palpations: Abdomen is soft.   Musculoskeletal:         General: Normal range of motion.   Skin:     General: Skin is warm.   Neurological:      General: No focal deficit present.      Mental Status: He is alert and oriented to person, place, and time. Mental status is at baseline.   Psychiatric:         Mood and Affect: Mood normal.         Behavior: Behavior normal.       Last Recorded Vitals  Blood pressure 132/87, pulse 79, temperature 36.1 °C (97 °F), temperature source Temporal, resp. rate 18, height (S) 1.816 m (5' 11.5\"), weight (S) 91.6 kg (201 lb 15.1 oz), SpO2 97%.  Intake/Output last 3 Shifts:  I/O last 3 completed shifts:  In: 867.6 (9.5 mL/kg) [Blood:265; IV Piggyback:602.6]  Out: - (0 mL/kg)   Weight: 91.6 kg     Relevant Results    Results for orders placed or performed during the hospital encounter of 03/07/25 (from the past 24 hours)   CBC and Auto Differential "   Result Value Ref Range    WBC 5.3 4.4 - 11.3 x10*3/uL    nRBC 0.4 (H) 0.0 - 0.0 /100 WBCs    RBC 2.59 (L) 4.50 - 5.90 x10*6/uL    Hemoglobin 7.4 (L) 13.5 - 17.5 g/dL    Hematocrit 22.9 (L) 41.0 - 52.0 %    MCV 88 80 - 100 fL    MCH 28.6 26.0 - 34.0 pg    MCHC 32.3 32.0 - 36.0 g/dL    RDW 18.4 (H) 11.5 - 14.5 %    Platelets 144 (L) 150 - 450 x10*3/uL    Neutrophils % 90.9 40.0 - 80.0 %    Immature Granulocytes %, Automated 3.0 (H) 0.0 - 0.9 %    Lymphocytes % 3.0 13.0 - 44.0 %    Monocytes % 2.7 2.0 - 10.0 %    Eosinophils % 0.2 0.0 - 6.0 %    Basophils % 0.2 0.0 - 2.0 %    Neutrophils Absolute 4.80 1.20 - 7.70 x10*3/uL    Immature Granulocytes Absolute, Automated 0.16 0.00 - 0.70 x10*3/uL    Lymphocytes Absolute 0.16 (L) 1.20 - 4.80 x10*3/uL    Monocytes Absolute 0.14 0.10 - 1.00 x10*3/uL    Eosinophils Absolute 0.01 0.00 - 0.70 x10*3/uL    Basophils Absolute 0.01 0.00 - 0.10 x10*3/uL   Comprehensive metabolic panel   Result Value Ref Range    Glucose 181 (H) 74 - 99 mg/dL    Sodium 140 136 - 145 mmol/L    Potassium 3.8 3.5 - 5.3 mmol/L    Chloride 106 98 - 107 mmol/L    Bicarbonate 26 21 - 32 mmol/L    Anion Gap 12 10 - 20 mmol/L    Urea Nitrogen 20 6 - 23 mg/dL    Creatinine 0.68 0.50 - 1.30 mg/dL    eGFR >90 >60 mL/min/1.73m*2    Calcium 8.1 (L) 8.6 - 10.6 mg/dL    Albumin 3.0 (L) 3.4 - 5.0 g/dL    Alkaline Phosphatase 40 33 - 136 U/L    Total Protein 4.4 (L) 6.4 - 8.2 g/dL    AST 9 9 - 39 U/L    Bilirubin, Total 0.5 0.0 - 1.2 mg/dL    ALT 11 10 - 52 U/L   Uric Acid   Result Value Ref Range    Uric Acid 4.9 4.0 - 7.5 mg/dL       Scheduled medications  apixaban, 5 mg, oral, BID  atorvastatin, 40 mg, oral, Daily  DOXOrubicin (Adriamycin) 28 mg, etoposide (Toposar) 106 mg, vinCRIStine (VINCASAR) 0.94 mg in sodium chloride 0.9% 567.24 mL IV, , intravenous, Once  DOXOrubicin (Adriamycin) 28 mg, etoposide (Toposar) 106 mg, vinCRIStine (VINCASAR) 0.94 mg in sodium chloride 0.9% 567.24 mL IV, , intravenous,  Once  fluconazole, 400 mg, oral, Daily  gabapentin, 300 mg, oral, Nightly  lidocaine, 5 mL, infiltration, Once  lisinopril, 10 mg, oral, Daily  metoprolol succinate XL, 100 mg, oral, Daily  OLANZapine, 5 mg, oral, Nightly  ondansetron, 16 mg, oral, Once  pantoprazole, 40 mg, oral, Daily before breakfast  polyethylene glycol, 17 g, oral, Daily  predniSONE, 140 mg, oral, BID  sennosides-docusate sodium, 2 tablet, oral, BID      Continuous medications     PRN medications  PRN medications: albuterol, albuterol, alteplase, dextrose, diphenhydrAMINE, EPINEPHrine HCl, famotidine, furosemide, lidocaine-diphenhydrAMINE-Maalox 1:1:1, methylPREDNISolone sodium succinate (PF), oxyCODONE, prochlorperazine, prochlorperazine, sodium chloride     This patient has a central line   Reason for the central line remaining today? Parenteral medication      Assessment/Plan   Assessment & Plan  Burkitt's lymphoma (Multi)    Encounter for antineoplastic chemotherapy  Bijan Ibanez is a 64 y.o. male with PMH of newly dx Burkitt's lymphoma, HTN, HLD, CAD, MI (s/p stent 2010, on ASA), hx renal cell carcinoma (s/p R partial nephrectomy in 2013 @ [facility]), GERD, BPH, arthritis, RUE PICC DVT (On eliquis w/ close monitoring d/t counts), afib RVR, and recent MRSA bacteremia (s/p 4 week Vancomycin end date 3/3) who is admitted for C3 EPOCH. Received 1 unit pRBCs on 3/8 for hgb 6.9 with improvement in Hgb. Discharge pending completion of chemotherapy, most likely 3/11.     Updates 3/9:  - No major updates  - Added stool softener for reported hard stools  - Hgb increased appropriately today after receiving blood yesterday     # CHEMO: C3 EPOCH  - Premeds: Zofran, Zyprexa, Compazine, Emend  - Doxorubicin 23.6mg on Day 1, 2, 3  - Etoposide 88mg on Day 1, 2, 3  - Vincristine 0.9mg on Day 1, 2, 3  - Cyclophosphamide 1,770mg once on Day 4  - Prednisone 140mg BID x5 days  - PRN Reaction meds: epinephrine, albuterol, Benadryl, Pepcid, solumedrol    - Plan for  Neulasta and ritux outpt on 3/12   - Per recent onc note on 3/4/25 plan to place PICC line on admission prior to chemo start and will stay in on DOD     ONC:  # Burkitt's lymphoma    - Primary oncologist: Dr. Torey Mckeon  - Originally signed out as high-grade lymphoma but after burkitt's rearrangement (t8;14) came back positive. High risk based on marrow involvement, though no CNS involvement  - S/p C1 DA-R-EPOCH when signed out as high-grade lymphoma; given the significant toxicity he had with DA R EPOCH, will not escalate to CODOX-IVAC or hyper-CVAD given no CNS involvement  - Given G3/4 mucositis, etoposide reduced by 25%, keep EPOCH at Dose level 0  - 2/11 oncology along with ID felt they could resume chemotherapy after total 2 weeks of IV Vanco, but will still complete full duration of 4 weeks of the antibiotic (ended on 3/3)   - PET (2/24/25) s/p C2: There is near complete interval resolution of previously seen hypermetabolic hermann and extranodal disease involving bilateral submandibular, parotid gland, hepatic lesions, spleen, mesenteric deposits, bilateral kidney stomach bilateral, bilateral arms, anterior chest wall, and gluteal muscle supra and infra diaphragmatic lymphadenopathy. Diffuse metabolic activity within the bone marrow. deauville 2 - c/w CR       HEME:  # Anemia  - Likely 2/2 disease and tx, no signs of active bleeding on admit  - Monitor counts daily, fibrinogen, coags  - No evidence of DIC or hypercoag stat  - Transfuse to keep hgb > 7, plt > 50 (on AC)  - 3/8: Received 1 unit pRBCs for hgb 6.9  # Prophy:  - Cont home Eliquis 5mg BID - plan to hold when plt < 50     ID:  Allergies: Latex, PCN  # Recent MRSA bacteremia  - 1/31 Bc x2 + staphyloccous aureus and MRSA, 2/4 Bc x2 negative growth  - Follows with Dr. Rivera, last seen on 2/28; s/p vanc ended on 3/3/25 for MRSA bacteremia   - PICC line removed 2/3  - 2/3 TTE w/o evidence of vegetation  - Aspergillus galactomannan and fungitell 2/4  negative  - Per Dr. Mike johnson to replace PICC line prior to chemo start on 3/7   # Prophylaxis  - Levofloxacin at discharge   - VZV: Stopped acyclovir at recent outpt appointment with Dr. Mckeon on 3/4 (confirmed this with him on admission 3/7, not to resume on dc)   - Candidiasis: Continue Fluconazole 400mg PO daily   - PJP: None at this time      CARDIO:  # Hx of Afib RVR on prior admission  - Likely 2/2 infection, electrolyte derangements, and anemia  - Keep K > 4, Mg > 2, Hgb > 7  - Cont home Eliquis until plt < 50  # Hx of MI  # Hx of HTN/HLD/CAD  - 2/3/25 Seen by EP as difficulty achieving rate control  - 2/3 TTE EF 60-65%, normal RVSP, L atrial size mod dilated  - Cont home Metoprolol XL 100mg PO daily  - Cont home Atorvastatin 40mg PO daily  - Cont home Lisinopril 10mg PO daily      FEN/GI:  Admit weight: 90.9kg  - Uric acid on admit: 4.0 (3/7)  - Allopurinol stopped at recent outpt appointment with Dr. Mckeon on 3/4   - Cont home Lasix 20mg PRN swelling  # Mucositis  - Pt had severe mucositis last admission  - States he was eating well at home, no mouth pain or lesions present on admission   - Cont BMX PRN  - Cont Oxy 5mg PO Q6h PRN for pain  - Consider supportive onc consult if continues to worsen  # Prophy:  - Cont home Pantoprazole 40mg PO daily      MSK:  # Hx Back Pain  - Cont Gabapentin 300mg PO nightly     DISPO:  - Full code, confirmed on admit  - Primary onc: Dr. Mckeon  - DC home pending completion of chemo; most likely 3/11   - Access: DL PICC   - FUV Infusion 3/12, Cardiology 3/13, Dr. Mckeon 3/25    I spent >60 minutes in the professional and overall care of this patient.    Assessment and plan as above discussed with attending physician Dr. Amari Vaughan, APRN-CNP

## 2025-03-09 NOTE — CARE PLAN
The patient's goals for the shift include      The clinical goals for the shift include Patient will remain HDS through end of shift    Over the shift, the patient did  make progress toward the following goals. Barriers to progression include chemotherapy administration. Recommendations to address these barriers include evaluate patient for nausea or vomiting.

## 2025-03-09 NOTE — PROGRESS NOTES
03/09/25 1515   Discharge Planning   Living Arrangements Spouse/significant other   Support Systems Spouse/significant other   Assistance Needed independent   Type of Residence Private residence   Number of Stairs to Enter Residence 2   Number of Stairs Within Residence 6   Do you have animals or pets at home? No   Who is requesting discharge planning? Provider   Home or Post Acute Services In home services   Type of Home Care Services Other (Comment)  (Patient active with  Home care)   Expected Discharge Disposition Home   Does the patient need discharge transport arranged? No   Financial Resource Strain   How hard is it for you to pay for the very basics like food, housing, medical care, and heating? Not very   Housing Stability   In the last 12 months, was there a time when you were not able to pay the mortgage or rent on time? N   In the past 12 months, how many times have you moved where you were living? 0   At any time in the past 12 months, were you homeless or living in a shelter (including now)? N   Transportation Needs   In the past 12 months, has lack of transportation kept you from medical appointments or from getting medications? no   In the past 12 months, has lack of transportation kept you from meetings, work, or from getting things needed for daily living? No   Patient Choice   Patient / Family choosing to utilize agency / facility established prior to hospitalization No   Stroke Family Assessment   Stroke Family Assessment Needed No     [facility] admission assessment completed. Explained role of [facility] - verbalized understanding.      Demographics/Insurance: Address, phone number, and insurance verified in chart.  Living Environment: Lives with wife  Primary Support Person: Wife  PCP: Dr. Murphy  Recent Falls: Denies  Assistive Devices/DME: has cane and walker at home   Home Care Agency:  home care  Transportation at Discharge: Wife   Pharmacy: [facility]   Concerns about discharge: none  Patient A&OX3  home with wife, patient independent with ADL's is currently active with  Home Nurse for IV ATB and PICC line care. Patient wife is teachable caregiver. Once medically ready family would like to resume with [facility]. Wife states they have no needs at this time. Will follow for home going needs. Ginette Woody RN [facility]

## 2025-03-09 NOTE — ASSESSMENT & PLAN NOTE
Bijan Ibanez is a 64 y.o. male with PMH of newly dx Burkitt's lymphoma, HTN, HLD, CAD, MI (s/p stent 2010, on ASA), hx renal cell carcinoma (s/p R partial nephrectomy in 2013 @ [facility]), GERD, BPH, arthritis, RUE PICC DVT (On eliquis w/ close monitoring d/t counts), afib RVR, and recent MRSA bacteremia (s/p 4 week Vancomycin end date 3/3) who is admitted for C3 EPOCH. Received 1 unit pRBCs on 3/8 for hgb 6.9 with improvement in Hgb. Discharge pending completion of chemotherapy, most likely 3/11.     Updates 3/9:  - No major updates  - Added stool softener for reported hard stools  - Hgb increased appropriately today after receiving blood yesterday     # CHEMO: C3 EPOCH  - Premeds: Zofran, Zyprexa, Compazine, Emend  - Doxorubicin 23.6mg on Day 1, 2, 3  - Etoposide 88mg on Day 1, 2, 3  - Vincristine 0.9mg on Day 1, 2, 3  - Cyclophosphamide 1,770mg once on Day 4  - Prednisone 140mg BID x5 days  - PRN Reaction meds: epinephrine, albuterol, Benadryl, Pepcid, solumedrol    - Plan for Neulasta and ritux outpt on 3/12   - Per recent onc note on 3/4/25 plan to place PICC line on admission prior to chemo start and will stay in on [facility]     ONC:  # Burkitt's lymphoma    - Primary oncologist: Dr. Torey Mckeon  - Originally signed out as high-grade lymphoma but after burkitt's rearrangement (t8;14) came back positive. High risk based on marrow involvement, though no CNS involvement  - S/p C1 DA-R-EPOCH when signed out as high-grade lymphoma; given the significant toxicity he had with DA R EPOCH, will not escalate to CODOX-IVAC or hyper-CVAD given no CNS involvement  - Given G3/4 mucositis, etoposide reduced by 25%, keep EPOCH at Dose level 0  - 2/11 oncology along with ID felt they could resume chemotherapy after total 2 weeks of IV Vanco, but will still complete full duration of 4 weeks of the antibiotic (ended on 3/3)   - PET (2/24/25) s/p C2: There is near complete interval resolution of previously seen hypermetabolic hermann and  extranodal disease involving bilateral submandibular, parotid gland, hepatic lesions, spleen, mesenteric deposits, bilateral kidney stomach bilateral, bilateral arms, anterior chest wall, and gluteal muscle supra and infra diaphragmatic lymphadenopathy. Diffuse metabolic activity within the bone marrow. [place] 2 - c/w CR       HEME:  # Anemia  - Likely 2/2 disease and tx, no signs of active bleeding on admit  - Monitor counts daily, fibrinogen, coags  - No evidence of DIC or hypercoag stat  - Transfuse to keep hgb > 7, plt > 50 (on AC)  - 3/8: Received 1 unit pRBCs for hgb 6.9  # Prophy:  - Cont home Eliquis 5mg BID - plan to hold when plt < 50     ID:  Allergies: Latex, PCN  # Recent MRSA bacteremia  - 1/31 Bc x2 + staphyloccous aureus and MRSA, 2/4 Bc x2 negative growth  - Follows with Dr. Rivera, last seen on 2/28; s/p vanc ended on 3/3/25 for MRSA bacteremia   - PICC line removed 2/3  - 2/3 TTE w/o evidence of vegetation  - Aspergillus galactomannan and fungitell 2/4 negative  - Per Dr. Mike johnson to replace PICC line prior to chemo start on 3/7   # Prophylaxis  - Levofloxacin at discharge   - VZV: Stopped acyclovir at recent outpt appointment with Dr. Mckeon on 3/4 (confirmed this with him on admission 3/7, not to resume on dc)   - Candidiasis: Continue Fluconazole 400mg PO daily   - PJP: None at this time      CARDIO:  # Hx of Afib RVR on prior admission  - Likely 2/2 infection, electrolyte derangements, and anemia  - Keep K > 4, Mg > 2, Hgb > 7  - Cont home Eliquis until plt < 50  # Hx of MI  # Hx of HTN/HLD/CAD  - 2/3/25 Seen by EP as difficulty achieving rate control  - 2/3 TTE EF 60-65%, normal RVSP, L atrial size mod dilated  - Cont home Metoprolol XL 100mg PO daily  - Cont home Atorvastatin 40mg PO daily  - Cont home Lisinopril 10mg PO daily      FEN/GI:  Admit weight: 90.9kg  - Uric acid on admit: 4.0 (3/7)  - Allopurinol stopped at recent outpt appointment with Dr. Mckeon on 3/4   - Cont home  Lasix 20mg PRN swelling  # Mucositis  - Pt had severe mucositis last admission  - States he was eating well at home, no mouth pain or lesions present on admission   - Cont BMX PRN  - Cont Oxy 5mg PO Q6h PRN for pain  - Consider supportive onc consult if continues to worsen  # Prophy:  - Cont home Pantoprazole 40mg PO daily      MSK:  # Hx Back Pain  - Cont Gabapentin 300mg PO nightly     DISPO:  - Full code, confirmed on admit  - Primary onc: Dr. Mckeon  - SD home pending completion of chemo; most likely 3/11   - Access: DL PICC   - FUV Infusion 3/12, Cardiology 3/13, Dr. Mckeon 3/25

## 2025-03-09 NOTE — NURSING NOTE
Dose # 3 of Doxorubicin 28 mg, Etoposide 106 mg, and Vincristine 0.94 mg  chemotherapy in 567.24 mL given over 24 hours via left double PICC catheter.  Dose up at 17:24 and down at 17:23.  Pre-medicated with ondansetron.  Patient, dose and rate verified with second RN, Domo Rizo.  + BBR obtained via syringe aspiration before and after administration.  Patient with no side effects.     7

## 2025-03-10 ENCOUNTER — HOME CARE VISIT (OUTPATIENT)
Dept: HOME HEALTH SERVICES | Facility: HOME HEALTH | Age: 65
End: 2025-03-10
Payer: MEDICARE

## 2025-03-10 LAB
ALBUMIN SERPL BCP-MCNC: 3 G/DL (ref 3.4–5)
ALP SERPL-CCNC: 40 U/L (ref 33–136)
ALT SERPL W P-5'-P-CCNC: 17 U/L (ref 10–52)
ANION GAP SERPL CALC-SCNC: 13 MMOL/L (ref 10–20)
AST SERPL W P-5'-P-CCNC: 11 U/L (ref 9–39)
BASOPHILS # BLD AUTO: 0.01 X10*3/UL (ref 0–0.1)
BASOPHILS NFR BLD AUTO: 0.3 %
BILIRUB SERPL-MCNC: 0.5 MG/DL (ref 0–1.2)
BUN SERPL-MCNC: 25 MG/DL (ref 6–23)
CALCIUM SERPL-MCNC: 7.9 MG/DL (ref 8.6–10.6)
CHLORIDE SERPL-SCNC: 107 MMOL/L (ref 98–107)
CO2 SERPL-SCNC: 25 MMOL/L (ref 21–32)
CREAT SERPL-MCNC: 0.64 MG/DL (ref 0.5–1.3)
EGFRCR SERPLBLD CKD-EPI 2021: >90 ML/MIN/1.73M*2
EOSINOPHIL # BLD AUTO: 0 X10*3/UL (ref 0–0.7)
EOSINOPHIL NFR BLD AUTO: 0 %
ERYTHROCYTE [DISTWIDTH] IN BLOOD BY AUTOMATED COUNT: 18.3 % (ref 11.5–14.5)
GLUCOSE SERPL-MCNC: 182 MG/DL (ref 74–99)
HCT VFR BLD AUTO: 23 % (ref 41–52)
HGB BLD-MCNC: 7.3 G/DL (ref 13.5–17.5)
IMM GRANULOCYTES # BLD AUTO: 0.14 X10*3/UL (ref 0–0.7)
IMM GRANULOCYTES NFR BLD AUTO: 3.8 % (ref 0–0.9)
LYMPHOCYTES # BLD AUTO: 0.08 X10*3/UL (ref 1.2–4.8)
LYMPHOCYTES NFR BLD AUTO: 2.2 %
MAGNESIUM SERPL-MCNC: 2 MG/DL (ref 1.6–2.4)
MCH RBC QN AUTO: 28.4 PG (ref 26–34)
MCHC RBC AUTO-ENTMCNC: 31.7 G/DL (ref 32–36)
MCV RBC AUTO: 90 FL (ref 80–100)
MONOCYTES # BLD AUTO: 0.13 X10*3/UL (ref 0.1–1)
MONOCYTES NFR BLD AUTO: 3.6 %
NEUTROPHILS # BLD AUTO: 3.3 X10*3/UL (ref 1.2–7.7)
NEUTROPHILS NFR BLD AUTO: 90.1 %
NRBC BLD-RTO: 0.8 /100 WBCS (ref 0–0)
PLATELET # BLD AUTO: 134 X10*3/UL (ref 150–450)
POTASSIUM SERPL-SCNC: 3.7 MMOL/L (ref 3.5–5.3)
PROT SERPL-MCNC: 4.4 G/DL (ref 6.4–8.2)
RBC # BLD AUTO: 2.57 X10*6/UL (ref 4.5–5.9)
SODIUM SERPL-SCNC: 141 MMOL/L (ref 136–145)
URATE SERPL-MCNC: 5.8 MG/DL (ref 4–7.5)
WBC # BLD AUTO: 3.7 X10*3/UL (ref 4.4–11.3)

## 2025-03-10 PROCEDURE — 2500000001 HC RX 250 WO HCPCS SELF ADMINISTERED DRUGS (ALT 637 FOR MEDICARE OP)

## 2025-03-10 PROCEDURE — 2500000004 HC RX 250 GENERAL PHARMACY W/ HCPCS (ALT 636 FOR OP/ED): Performed by: STUDENT IN AN ORGANIZED HEALTH CARE EDUCATION/TRAINING PROGRAM

## 2025-03-10 PROCEDURE — 84075 ASSAY ALKALINE PHOSPHATASE: CPT

## 2025-03-10 PROCEDURE — 2500000004 HC RX 250 GENERAL PHARMACY W/ HCPCS (ALT 636 FOR OP/ED)

## 2025-03-10 PROCEDURE — 83735 ASSAY OF MAGNESIUM: CPT | Performed by: NURSE PRACTITIONER

## 2025-03-10 PROCEDURE — 2500000002 HC RX 250 W HCPCS SELF ADMINISTERED DRUGS (ALT 637 FOR MEDICARE OP, ALT 636 FOR OP/ED): Performed by: NURSE PRACTITIONER

## 2025-03-10 PROCEDURE — 1170000001 HC PRIVATE ONCOLOGY ROOM DAILY

## 2025-03-10 PROCEDURE — 85025 COMPLETE CBC W/AUTO DIFF WBC: CPT

## 2025-03-10 PROCEDURE — 99233 SBSQ HOSP IP/OBS HIGH 50: CPT | Performed by: NURSE PRACTITIONER

## 2025-03-10 PROCEDURE — 84550 ASSAY OF BLOOD/URIC ACID: CPT

## 2025-03-10 PROCEDURE — RXMED WILLOW AMBULATORY MEDICATION CHARGE

## 2025-03-10 PROCEDURE — 2500000002 HC RX 250 W HCPCS SELF ADMINISTERED DRUGS (ALT 637 FOR MEDICARE OP, ALT 636 FOR OP/ED): Performed by: STUDENT IN AN ORGANIZED HEALTH CARE EDUCATION/TRAINING PROGRAM

## 2025-03-10 PROCEDURE — 2500000001 HC RX 250 WO HCPCS SELF ADMINISTERED DRUGS (ALT 637 FOR MEDICARE OP): Performed by: NURSE PRACTITIONER

## 2025-03-10 RX ORDER — ONDANSETRON HYDROCHLORIDE 8 MG/1
16 TABLET, FILM COATED ORAL ONCE
Status: COMPLETED | OUTPATIENT
Start: 2025-03-10 | End: 2025-03-10

## 2025-03-10 RX ORDER — GABAPENTIN 300 MG/1
300 CAPSULE ORAL NIGHTLY
Qty: 30 CAPSULE | Refills: 0 | Status: SHIPPED | OUTPATIENT
Start: 2025-03-10

## 2025-03-10 RX ORDER — ATORVASTATIN CALCIUM 40 MG/1
40 TABLET, FILM COATED ORAL
Qty: 90 TABLET | Refills: 0 | Status: SHIPPED | OUTPATIENT
Start: 2025-03-10

## 2025-03-10 RX ORDER — POTASSIUM CHLORIDE 750 MG/1
40 TABLET, FILM COATED, EXTENDED RELEASE ORAL ONCE
Status: COMPLETED | OUTPATIENT
Start: 2025-03-10 | End: 2025-03-10

## 2025-03-10 RX ADMIN — LISINOPRIL 10 MG: 10 TABLET ORAL at 08:03

## 2025-03-10 RX ADMIN — OLANZAPINE 5 MG: 5 TABLET, FILM COATED ORAL at 20:40

## 2025-03-10 RX ADMIN — POLYETHYLENE GLYCOL 3350 17 G: 17 POWDER, FOR SOLUTION ORAL at 08:03

## 2025-03-10 RX ADMIN — SENNOSIDES AND DOCUSATE SODIUM 2 TABLET: 50; 8.6 TABLET ORAL at 08:03

## 2025-03-10 RX ADMIN — ATORVASTATIN CALCIUM 40 MG: 40 TABLET, FILM COATED ORAL at 06:25

## 2025-03-10 RX ADMIN — FLUCONAZOLE 400 MG: 200 TABLET ORAL at 08:03

## 2025-03-10 RX ADMIN — PREDNISONE 140 MG: 50 TABLET ORAL at 08:03

## 2025-03-10 RX ADMIN — PANTOPRAZOLE SODIUM 40 MG: 40 TABLET, DELAYED RELEASE ORAL at 06:25

## 2025-03-10 RX ADMIN — METOPROLOL SUCCINATE 100 MG: 50 TABLET, EXTENDED RELEASE ORAL at 08:03

## 2025-03-10 RX ADMIN — GABAPENTIN 300 MG: 300 CAPSULE ORAL at 20:40

## 2025-03-10 RX ADMIN — ONDANSETRON HYDROCHLORIDE 16 MG: 8 TABLET, FILM COATED ORAL at 17:22

## 2025-03-10 RX ADMIN — APIXABAN 5 MG: 5 TABLET, FILM COATED ORAL at 20:40

## 2025-03-10 RX ADMIN — POTASSIUM CHLORIDE 40 MEQ: 750 TABLET, FILM COATED, EXTENDED RELEASE ORAL at 11:07

## 2025-03-10 RX ADMIN — PREDNISONE 140 MG: 50 TABLET ORAL at 20:40

## 2025-03-10 RX ADMIN — APIXABAN 5 MG: 5 TABLET, FILM COATED ORAL at 08:03

## 2025-03-10 RX ADMIN — DOXORUBICIN HYDROCHLORIDE 567.49 MG: 2 INJECTION, SOLUTION INTRAVENOUS at 17:24

## 2025-03-10 ASSESSMENT — PAIN SCALES - GENERAL
PAINLEVEL_OUTOF10: 0 - NO PAIN
PAINLEVEL_OUTOF10: 0 - NO PAIN

## 2025-03-10 ASSESSMENT — COGNITIVE AND FUNCTIONAL STATUS - GENERAL
MOBILITY SCORE: 24
DAILY ACTIVITIY SCORE: 24
DAILY ACTIVITIY SCORE: 24
MOBILITY SCORE: 24

## 2025-03-10 ASSESSMENT — PAIN SCALES - WONG BAKER: WONGBAKER_NUMERICALRESPONSE: NO HURT

## 2025-03-10 NOTE — NURSING NOTE
Dose # 4 of Doxorubicin 28 mg, Etoposide 106 mg, and Vincristine 0.94 mg  chemotherapy in 567.24 mL given over 24 hours via left double PICC catheter.  Dose up at 17:24 and down at 17:23.  Pre-medicated with ondansetron.  Patient, dose and rate verified with second RN, Penny Lamb.  + BBR obtained via syringe aspiration before and after administration.  Patient with no side effects.

## 2025-03-10 NOTE — DOCUMENTATION CLARIFICATION NOTE
PATIENT:               DENNIS CAGLE  ACCT #:                  [id number]  MRN:                       [id number]  :                       1960  ADMIT DATE:       3/7/2025 10:55 AM  DISCH DATE:  RESPONDING PROVIDER #:        [id number]          PROVIDER RESPONSE TEXT:    Pancytopenia due to chemotherapy and neoplasm    CDI QUERY TEXT:    Clarification    Instruction:    Based on your assessment of the patient and the clinical information, please provide the requested documentation by clicking on the appropriate radio button and enter any additional information if prompted.    Question: Is there a diagnosis indicative of the above lab values    When answering this query, please exercise your independent professional judgment. The fact that a question is being asked, does not imply that any particular answer is desired or expected.    The patient's clinical indicators include:  Clinical Information: 64 y.o. male admitted for C3 EPOCH for Burkitt Lymphoma.    Clinical Indicators:  -WBC:  3/9:   5.3      Hgb:  7.4           Platelets:  144  3/10: 3.7               7.3                            134    Treatment: monitoring of labs, PRBC's transfused    Risk Factors: Burkitt lymphoma, chemotherapy  Options provided:  -- Pancytopenia due to chemotherapy and neoplasm  -- Other - I will add my own diagnosis  -- Refer to Clinical Documentation Reviewer    Query created by: Viji Thakkar on 3/10/2025 10:50 AM      Electronically signed by:  DANN CYR 3/10/2025 11:26 AM

## 2025-03-10 NOTE — ASSESSMENT & PLAN NOTE
Bijan Ibanez is a 64 y.o. male with PMH of newly dx Burkitt's lymphoma, HTN, HLD, CAD, MI (s/p stent 2010, on ASA), hx renal cell carcinoma (s/p R partial nephrectomy in 2013 @ [facility]), GERD, BPH, arthritis, RUE PICC DVT (On eliquis w/ close monitoring d/t counts), afib RVR, and recent MRSA bacteremia (s/p 4 week Vancomycin end date 3/3) who is admitted for C3 EPOCH. Received 1 unit pRBCs on 3/8 for hgb 6.9 with improvement in Hgb. Discharge pending completion of chemotherapy, most likely 3/11.     Updates 3/10:  - No major updates  - DC tomorrow 3/11 after chemo completes     # CHEMO: C3 EPOCH  - Premeds: Zofran, Zyprexa, Compazine, Emend  - Doxorubicin 23.6mg on Day 1, 2, 3  - Etoposide 88mg on Day 1, 2, 3  - Vincristine 0.9mg on Day 1, 2, 3  - Cyclophosphamide 1,770mg once on Day 4  - Prednisone 140mg BID x5 days  - PRN Reaction meds: epinephrine, albuterol, Benadryl, Pepcid, solumedrol    - Plan for Neulasta and ritux outpt on 3/12   - Per recent onc note on 3/4/25 plan to place PICC line on admission prior to chemo start and will stay in on [facility]     ONC:  # Burkitt's lymphoma    - Primary oncologist: Dr. Torey Mckeon  - Originally signed out as high-grade lymphoma but after burkitt's rearrangement (t8;14) came back positive. High risk based on marrow involvement, though no CNS involvement  - S/p C1 DA-R-EPOCH when signed out as high-grade lymphoma; given the significant toxicity he had with DA R EPOCH, will not escalate to CODOX-IVAC or hyper-CVAD given no CNS involvement  - Given G3/4 mucositis, etoposide reduced by 25%, keep EPOCH at Dose level 0  - 2/11 oncology along with ID felt they could resume chemotherapy after total 2 weeks of IV Vanco, but will still complete full duration of 4 weeks of the antibiotic (ended on 3/3)   - PET (2/24/25) s/p C2: There is near complete interval resolution of previously seen hypermetabolic hermann and extranodal disease involving bilateral submandibular, parotid gland,  hepatic lesions, spleen, mesenteric deposits, bilateral kidney stomach bilateral, bilateral arms, anterior chest wall, and gluteal muscle supra and infra diaphragmatic lymphadenopathy. Diffuse metabolic activity within the bone marrow. [place] 2 - c/w CR       HEME:  # Anemia  - Likely 2/2 disease and tx, no signs of active bleeding on admit  - Monitor counts daily, fibrinogen, coags  - No evidence of DIC or hypercoag stat  - Transfuse to keep hgb > 7, plt > 50 (on AC)  - 3/8: Received 1 unit pRBCs for hgb 6.9  # Prophy:  - Cont home Eliquis 5mg BID - plan to hold when plt < 50     ID:  Allergies: Latex, PCN  # Recent MRSA bacteremia  - 1/31 Bc x2 + staphyloccous aureus and MRSA, 2/4 Bc x2 negative growth  - Follows with Dr. Rivera, last seen on 2/28; s/p vanc ended on 3/3/25 for MRSA bacteremia   - PICC line removed 2/3  - 2/3 TTE w/o evidence of vegetation  - Aspergillus galactomannan and fungitell 2/4 negative  - Per Dr. Mike johnson to replace PICC line prior to chemo start on 3/7   # Prophylaxis  - Levofloxacin at discharge   - VZV: Stopped acyclovir at recent outpt appointment with Dr. Mckeon on 3/4 (confirmed this with him on admission 3/7, not to resume on dc)   - Candidiasis: Continue Fluconazole 400mg PO daily   - PJP: None at this time      CARDIO:  # Hx of Afib RVR on prior admission  - Likely 2/2 infection, electrolyte derangements, and anemia  - Keep K > 4, Mg > 2, Hgb > 7  - s/p 40mEq KCL 3/10 for K 3.7  - Cont home Eliquis until plt < 50  # Hx of MI  # Hx of HTN/HLD/CAD  - 2/3/25 Seen by EP as difficulty achieving rate control  - 2/3 TTE EF 60-65%, normal RVSP, L atrial size mod dilated  - Cont home Metoprolol XL 100mg PO daily  - Cont home Atorvastatin 40mg PO daily  - Cont home Lisinopril 10mg PO daily      FEN/GI:  Admit weight: 90.9kg, 91.6kg (3/8)  - Uric acid on admit: 4.0 (3/7)  - Allopurinol stopped at recent outpt appointment with Dr. Mckeon on 3/4   - Cont home Lasix 20mg PRN  swelling  # Mucositis  - Pt had severe mucositis last admission  - States he was eating well at home, no mouth pain or lesions present on admission   - Cont BMX PRN  - Cont Oxy 5mg PO Q6h PRN for pain  - Consider supportive onc consult if continues to worsen  # Prophy:  - Cont home Pantoprazole 40mg PO daily      MSK:  # Hx Back Pain  - Cont Gabapentin 300mg PO nightly     DISPO:  - Full code, confirmed on admit  - Primary onc: Dr. Mckeon  - Discharge pending completion of chemotherapy, most likely 3/11  - Access: DL PICC   - FUV Infusion 3/12, Cardiology 3/13, Dr. Mckeon 3/25

## 2025-03-10 NOTE — CARE PLAN
The patient's goals for the shift include      The clinical goals for the shift include patient will remain HDS through end of shift    Over the shift, the patient did make progress toward the following goals. Barriers to progression include chemotherapy administration. Recommendations to address these barriers include monitor for side effects.

## 2025-03-10 NOTE — PROGRESS NOTES
"Bijan Ibanez is a 64 y.o. male on day 3 of admission presenting with Burkitt's lymphoma (Multi).    Subjective   Seen this AM at the bedside. Pt states he is overall feeling well. Eating/drinking well. Has been ambulating the halls with his wife. He denies any acute concerns. He denies any fevers/chills, SOB, or CP.    Objective     Physical Exam  Vitals reviewed.   Constitutional:       Appearance: Normal appearance.   HENT:      Head: Normocephalic and atraumatic.      Nose: Nose normal.      Mouth/Throat:      Mouth: Mucous membranes are moist.      Pharynx: Oropharynx is clear.   Eyes:      Extraocular Movements: Extraocular movements intact.      Pupils: Pupils are equal, round, and reactive to light.   Cardiovascular:      Rate and Rhythm: Normal rate and regular rhythm.      Pulses: Normal pulses.      Heart sounds: Normal heart sounds.   Pulmonary:      Effort: Pulmonary effort is normal.      Breath sounds: Normal breath sounds.   Abdominal:      General: Bowel sounds are normal.      Palpations: Abdomen is soft.   Musculoskeletal:         General: Normal range of motion.   Skin:     General: Skin is warm.   Neurological:      General: No focal deficit present.      Mental Status: He is alert and oriented to person, place, and time. Mental status is at baseline.   Psychiatric:         Mood and Affect: Mood normal.         Behavior: Behavior normal.     Last Recorded Vitals  Blood pressure 123/81, pulse 65, temperature 36.4 °C (97.5 °F), temperature source Temporal, resp. rate 18, height (S) 1.816 m (5' 11.5\"), weight (S) 91.6 kg (201 lb 15.1 oz), SpO2 96%.  Intake/Output last 3 Shifts:  I/O last 3 completed shifts:  In: 566.4 (6.2 mL/kg) [IV Piggyback:566.4]  Out: - (0 mL/kg)   Weight: 91.6 kg     Relevant Results    Results for orders placed or performed during the hospital encounter of 03/07/25 (from the past 24 hours)   CBC and Auto Differential   Result Value Ref Range    WBC 3.7 (L) 4.4 - 11.3 " x10*3/uL    nRBC 0.8 (H) 0.0 - 0.0 /100 WBCs    RBC 2.57 (L) 4.50 - 5.90 x10*6/uL    Hemoglobin 7.3 (L) 13.5 - 17.5 g/dL    Hematocrit 23.0 (L) 41.0 - 52.0 %    MCV 90 80 - 100 fL    MCH 28.4 26.0 - 34.0 pg    MCHC 31.7 (L) 32.0 - 36.0 g/dL    RDW 18.3 (H) 11.5 - 14.5 %    Platelets 134 (L) 150 - 450 x10*3/uL    Neutrophils % 90.1 40.0 - 80.0 %    Immature Granulocytes %, Automated 3.8 (H) 0.0 - 0.9 %    Lymphocytes % 2.2 13.0 - 44.0 %    Monocytes % 3.6 2.0 - 10.0 %    Eosinophils % 0.0 0.0 - 6.0 %    Basophils % 0.3 0.0 - 2.0 %    Neutrophils Absolute 3.30 1.20 - 7.70 x10*3/uL    Immature Granulocytes Absolute, Automated 0.14 0.00 - 0.70 x10*3/uL    Lymphocytes Absolute 0.08 (L) 1.20 - 4.80 x10*3/uL    Monocytes Absolute 0.13 0.10 - 1.00 x10*3/uL    Eosinophils Absolute 0.00 0.00 - 0.70 x10*3/uL    Basophils Absolute 0.01 0.00 - 0.10 x10*3/uL   Comprehensive metabolic panel   Result Value Ref Range    Glucose 182 (H) 74 - 99 mg/dL    Sodium 141 136 - 145 mmol/L    Potassium 3.7 3.5 - 5.3 mmol/L    Chloride 107 98 - 107 mmol/L    Bicarbonate 25 21 - 32 mmol/L    Anion Gap 13 10 - 20 mmol/L    Urea Nitrogen 25 (H) 6 - 23 mg/dL    Creatinine 0.64 0.50 - 1.30 mg/dL    eGFR >90 >60 mL/min/1.73m*2    Calcium 7.9 (L) 8.6 - 10.6 mg/dL    Albumin 3.0 (L) 3.4 - 5.0 g/dL    Alkaline Phosphatase 40 33 - 136 U/L    Total Protein 4.4 (L) 6.4 - 8.2 g/dL    AST 11 9 - 39 U/L    Bilirubin, Total 0.5 0.0 - 1.2 mg/dL    ALT 17 10 - 52 U/L   Uric Acid   Result Value Ref Range    Uric Acid 5.8 4.0 - 7.5 mg/dL       Scheduled medications  apixaban, 5 mg, oral, BID  atorvastatin, 40 mg, oral, Daily  DOXOrubicin (Adriamycin) 28 mg, etoposide (Toposar) 106 mg, vinCRIStine (VINCASAR) 0.94 mg in sodium chloride 0.9% 567.24 mL IV, , intravenous, Once  DOXOrubicin (Adriamycin) 28.5 mg, etoposide (Toposar) 106 mg, vinCRIStine (VINCASAR) 0.94 mg in sodium chloride 0.9% 567.49 mL IV, , intravenous, Once  fluconazole, 400 mg, oral,  Daily  gabapentin, 300 mg, oral, Nightly  lidocaine, 5 mL, infiltration, Once  lisinopril, 10 mg, oral, Daily  metoprolol succinate XL, 100 mg, oral, Daily  OLANZapine, 5 mg, oral, Nightly  ondansetron, 16 mg, oral, Once  pantoprazole, 40 mg, oral, Daily before breakfast  polyethylene glycol, 17 g, oral, Daily  predniSONE, 140 mg, oral, BID  sennosides-docusate sodium, 2 tablet, oral, BID      Continuous medications     PRN medications  PRN medications: albuterol, albuterol, alteplase, dextrose, diphenhydrAMINE, EPINEPHrine HCl, famotidine, furosemide, lidocaine-diphenhydrAMINE-Maalox 1:1:1, methylPREDNISolone sodium succinate (PF), oxyCODONE, prochlorperazine, prochlorperazine, sodium chloride     This patient has a central line   Reason for the central line remaining today? Parenteral medication      Assessment/Plan   Assessment & Plan  Burkitt's lymphoma (Multi)    Encounter for antineoplastic chemotherapy  Bijan Ibanez is a 64 y.o. male with PMH of newly dx Burkitt's lymphoma, HTN, HLD, CAD, MI (s/p stent 2010, on ASA), hx renal cell carcinoma (s/p R partial nephrectomy in 2013 @ [facility]), GERD, BPH, arthritis, RUE PICC DVT (On eliquis w/ close monitoring d/t counts), afib RVR, and recent MRSA bacteremia (s/p 4 week Vancomycin end date 3/3) who is admitted for C3 EPOCH. Received 1 unit pRBCs on 3/8 for hgb 6.9 with improvement in Hgb. Discharge pending completion of chemotherapy, most likely 3/11.     Updates 3/10:  - No major updates  - DC tomorrow 3/11 after chemo completes     # CHEMO: C3 EPOCH  - Premeds: Zofran, Zyprexa, Compazine, Emend  - Doxorubicin 23.6mg on Day 1, 2, 3  - Etoposide 88mg on Day 1, 2, 3  - Vincristine 0.9mg on Day 1, 2, 3  - Cyclophosphamide 1,770mg once on Day 4  - Prednisone 140mg BID x5 days  - PRN Reaction meds: epinephrine, albuterol, Benadryl, Pepcid, solumedrol    - Plan for Neulasta and ritux outpt on 3/12   - Per recent onc note on 3/4/25 plan to place PICC line on admission  prior to chemo start and will stay in on [facility]     ONC:  # Burkitt's lymphoma    - Primary oncologist: Dr. Torey Mckeon  - Originally signed out as high-grade lymphoma but after burkitt's rearrangement (t8;14) came back positive. High risk based on marrow involvement, though no CNS involvement  - S/p C1 DA-R-EPOCH when signed out as high-grade lymphoma; given the significant toxicity he had with DA R EPOCH, will not escalate to CODOX-IVAC or hyper-CVAD given no CNS involvement  - Given G3/4 mucositis, etoposide reduced by 25%, keep EPOCH at Dose level 0  - 2/11 oncology along with ID felt they could resume chemotherapy after total 2 weeks of IV Vanco, but will still complete full duration of 4 weeks of the antibiotic (ended on 3/3)   - PET (2/24/25) s/p C2: There is near complete interval resolution of previously seen hypermetabolic hermann and extranodal disease involving bilateral submandibular, parotid gland, hepatic lesions, spleen, mesenteric deposits, bilateral kidney stomach bilateral, bilateral arms, anterior chest wall, and gluteal muscle supra and infra diaphragmatic lymphadenopathy. Diffuse metabolic activity within the bone marrow. [place] 2 - c/w CR       HEME:  # Anemia  - Likely 2/2 disease and tx, no signs of active bleeding on admit  - Monitor counts daily, fibrinogen, coags  - No evidence of DIC or hypercoag stat  - Transfuse to keep hgb > 7, plt > 50 (on AC)  - 3/8: Received 1 unit pRBCs for hgb 6.9  # Prophy:  - Cont home Eliquis 5mg BID - plan to hold when plt < 50     ID:  Allergies: Latex, PCN  # Recent MRSA bacteremia  - 1/31 Bc x2 + staphyloccous aureus and MRSA, 2/4 Bc x2 negative growth  - Follows with Dr. Rivera, last seen on 2/28; s/p vanc ended on 3/3/25 for MRSA bacteremia   - PICC line removed 2/3  - 2/3 TTE w/o evidence of vegetation  - Aspergillus galactomannan and fungitell 2/4 negative  - Per Dr. Mike johnson to replace PICC line prior to chemo start on 3/7   # Prophylaxis  -  Levofloxacin at discharge   - VZV: Stopped acyclovir at recent outpt appointment with Dr. Mckeon on 3/4 (confirmed this with him on admission 3/7, not to resume on dc)   - Candidiasis: Continue Fluconazole 400mg PO daily   - PJP: None at this time      CARDIO:  # Hx of Afib RVR on prior admission  - Likely 2/2 infection, electrolyte derangements, and anemia  - Keep K > 4, Mg > 2, Hgb > 7  - s/p 40mEq KCL 3/10 for K 3.7  - Cont home Eliquis until plt < 50  # Hx of MI  # Hx of HTN/HLD/CAD  - 2/3/25 Seen by EP as difficulty achieving rate control  - 2/3 TTE EF 60-65%, normal RVSP, L atrial size mod dilated  - Cont home Metoprolol XL 100mg PO daily  - Cont home Atorvastatin 40mg PO daily  - Cont home Lisinopril 10mg PO daily      FEN/GI:  Admit weight: 90.9kg, 91.6kg (3/8)  - Uric acid on admit: 4.0 (3/7)  - Allopurinol stopped at recent outpt appointment with Dr. Mckeon on 3/4   - Cont home Lasix 20mg PRN swelling  # Mucositis  - Pt had severe mucositis last admission  - States he was eating well at home, no mouth pain or lesions present on admission   - Cont BMX PRN  - Cont Oxy 5mg PO Q6h PRN for pain  - Consider supportive onc consult if continues to worsen  # Prophy:  - Cont home Pantoprazole 40mg PO daily      MSK:  # Hx Back Pain  - Cont Gabapentin 300mg PO nightly     DISPO:  - Full code, confirmed on admit  - Primary onc: Dr. Mckeon  - Discharge pending completion of chemotherapy, most likely 3/11  - Access: DL PICC   - FUV Infusion 3/12, Cardiology 3/13, Dr. Mckeon 3/25    I spent >60 minutes in the professional and overall care of this patient.    Assessment and plan as above discussed with attending physician Dr. Amari Vaughan, APRN-CNP

## 2025-03-10 NOTE — CONSULTS
"Nutrition Initial Assessment:   Nutrition Assessment    --->Reason for Assessment: Admission nursing screening    Patient is a 64 y.o. male with newly diagnosed Burkitt's Lymphoma, admitted for C3 EPOCH.    Nutrition History:  This writer met with pt and family at bedside, pt sitting up in bed preparing to eat lunch meal of turkey and sides.    Tells appetite recently has been very good. \"He's eating a lot.\" Eats 3 meals/day + snacks + desert at night. Also tries to drink ~2 Ensure or Boost supplements/day.     Does have days, especially after chemo, where he does not eat a whole lot, \"because I don't feel well.\" This typically last for ~2-3 days, \"then I start feeling better and eating more.\"    Since admit, appetite has been good. Is ordering and consuming 3 meals/day with protein sources. Denied any issues with n/v/d/c. No trouble chewing/swallowing.    --Vitamin/Herbal Supplement Use: none  --Food Allergy: none       Anthropometrics:  Height: (S) 181.6 cm (5' 11.5\")   Weight: (S) 91.6 kg (201 lb 15.1 oz)   BMI (Calculated): 27.78  IBW/kg (Dietitian Calculated): 78.2 kg  Percent of IBW: 117 %       Weight History:   --pt does have a h/o weight losses, though does feel his weight has been stabilizing, reports UBW recently is ~200 lb    Wt Readings per review of EMR:   03/08/25 91.6 kg (201 lb 15.1 oz) (current wt)   03/04/25 91.5 kg (201 lb 11.2 oz)   03/03/25 93 kg (205 lb)   02/28/25 93.4 kg (205 lb 12.8 oz)   02/27/25 92.4 kg (203 lb 11.3 oz)   02/24/25 93.8 kg (206 lb 12.7 oz)   02/20/25 100 kg (220 lb 7.4 oz)   02/20/25 100 kg (220 lb 7.4 oz)   02/19/25 101 kg (222 lb 7.1 oz)--->9% wt loss from this date to present (significant)   02/06/25 115 kg (252 lb 13.9 oz)--?--fluids?   01/28/25 115 kg (252 lb 6.8 oz)---?--fluids?   01/27/25 115 kg (253 lb 15.5 oz)---?--fluids?   01/26/25 115 kg (253 lb 15.5 oz)---?--fluids?   01/13/25 107 kg (236 lb)   01/10/25 107 kg (236 lb)   01/08/25 107 kg (236 lb)   01/06/25 107 " kg (236 lb)--->14% wt loss from this date to present (significant)   01/03/25 112 kg (246 lb 0.5 oz)   10/21/24 111 kg (245 lb)   09/05/24 112 kg (246 lb)--->18% wt loss from this date to present (significant)     Nutrition Focused Physical Exam Findings:  Subcutaneous Fat Loss:   Defer Subcutaneous Fat Loss Assessment: Defer all  Defer All Reason: pt eating--visually noted with s/s of muscle and fat losses to face, shoulders, arms  Muscle Wasting:  Defer Muscle Wasting Assessment: Defer all  Defer All Reason: pt eating--visually noted wtih s/s of muscle/fat losses to face, shoulders, arms  Edema:  Edema: none  Physical Findings:  Skin: Negative    Nutrition Significant Labs:  CBC Trend:   Results from last 7 days   Lab Units 03/10/25  0518 03/09/25  0500 03/08/25  0427 03/07/25  1118   WBC AUTO x10*3/uL 3.7* 5.3 4.6 6.8   RBC AUTO x10*6/uL 2.57* 2.59* 2.41* 2.73*   HEMOGLOBIN g/dL 7.3* 7.4* 6.9* 7.9*   HEMATOCRIT % 23.0* 22.9* 22.1* 25.2*   MCV fL 90 88 92 92   PLATELETS AUTO x10*3/uL 134* 144* 151 191   BMP Trend:   Results from last 7 days   Lab Units 03/10/25  0518 03/09/25  0500 03/08/25  0427 03/07/25  1118   GLUCOSE mg/dL 182* 181* 172* 90   CALCIUM mg/dL 7.9* 8.1* 8.4* 8.9   SODIUM mmol/L 141 140 139 139   POTASSIUM mmol/L 3.7 3.8 4.1 3.9   CO2 mmol/L 25 26 25 28   CHLORIDE mmol/L 107 106 104 101   BUN mg/dL 25* 20 14 14   CREATININE mg/dL 0.64 0.68 0.75 0.84   A1C:  Lab Results   Component Value Date    HGBA1C 5.3 10/16/2024   Liver Function Trend:   Results from last 7 days   Lab Units 03/10/25  0518 03/09/25  0500 03/08/25  0427 03/07/25  1118   ALK PHOS U/L 40 40 46 51   AST U/L 11 9 8* 9   ALT U/L 17 11 10 12   BILIRUBIN TOTAL mg/dL 0.5 0.5 0.5 0.8   Renal Lab Trend:   Results from last 7 days   Lab Units 03/10/25  0518 03/09/25  0500 03/08/25  0427 03/07/25  1118   POTASSIUM mmol/L 3.7 3.8 4.1 3.9   SODIUM mmol/L 141 140 139 139   MAGNESIUM mg/dL 2.00  --   --   --    EGFR mL/min/1.73m*2 >90 >90 >90  >90   BUN mg/dL 25* 20 14 14   CREATININE mg/dL 0.64 0.68 0.75 0.84   Vit B12:   Lab Results   Component Value Date    [id number] 422 01/11/2025   Folate:   Lab Results   Component Value Date    FOLATE 8.8 01/11/2025      Medications:  Scheduled medications  apixaban, 5 mg, oral, BID  atorvastatin, 40 mg, oral, Daily  DOXOrubicin (Adriamycin) 28 mg, etoposide (Toposar) 106 mg, vinCRIStine (VINCASAR) 0.94 mg in sodium chloride 0.9% 567.24 mL IV, , intravenous, Once  DOXOrubicin (Adriamycin) 28.5 mg, etoposide (Toposar) 106 mg, vinCRIStine (VINCASAR) 0.94 mg in sodium chloride 0.9% 567.49 mL IV, , intravenous, Once  fluconazole, 400 mg, oral, Daily  gabapentin, 300 mg, oral, Nightly  lidocaine, 5 mL, infiltration, Once  lisinopril, 10 mg, oral, Daily  metoprolol succinate XL, 100 mg, oral, Daily  OLANZapine, 5 mg, oral, Nightly  ondansetron, 16 mg, oral, Once  pantoprazole, 40 mg, oral, Daily before breakfast  polyethylene glycol, 17 g, oral, Daily  predniSONE, 140 mg, oral, BID  sennosides-docusate sodium, 2 tablet, oral, BID    PRN medications  PRN medications: albuterol, albuterol, alteplase, dextrose, diphenhydrAMINE, EPINEPHrine HCl, famotidine, furosemide, lidocaine-diphenhydrAMINE-Maalox 1:1:1, methylPREDNISolone sodium succinate (PF), oxyCODONE, prochlorperazine, prochlorperazine, sodium chloride    I/O:   Last BM Date: 03/08/25    Dietary Orders (From admission, onward)       Start     Ordered    03/10/25 1242  Oral nutritional supplements  Until discontinued        Comments: strawberry   Question Answer Comment   Deliver with Breakfast    Deliver with Dinner    Select supplement: Ensure Plus        03/10/25 1242    03/07/25 1241  May Participate in Room Service  ( ROOM SERVICE MAY PARTICIPATE)  Once        Question:  .  Answer:  Yes    03/07/25 1240    03/07/25 1109  Adult diet Regular  Diet effective now        Comments: Heat deli ham and deli turkey to steaming.   Question:  Diet type  Answer:  Regular     03/07/25 1126                Estimated Needs:   Total Energy Estimated Needs in 24 hours (kCal): ~8806-9570+  Method for Estimating Needs: 78 (IBW) x ~28-30+  Total Protein Estimated Needs in 24 Hours (g): 95+  Method for Estimating 24 Hour Protein Needs: 78 (IBW) x ~1.2g/kg+  Total Fluid Estimated Needs in 24 Hours (mL): 1ml/kcal or per MD/team        Nutrition Diagnosis   Malnutrition Diagnosis  Patient has Malnutrition Diagnosis: Yes  Diagnosis Status: New  Malnutrition Diagnosis: Moderate malnutrition related to chronic disease or condition  Related to: CA  As Evidenced by: pt visually with s/s of muscle and fat losses, 18% wt loss x ~6 mos/9% wt loss x ~1 month    Additional Assessment Information: Low threshold for further decline in overall nutritional status, pending ongoing PO and wt status.    Nutrition Diagnosis  Patient has Nutrition Diagnosis: Yes  Diagnosis Status (1): New  Nutrition Diagnosis 1: Increased nutrient needs  Related to (1): increased metabolic demand  As Evidenced by (1): pt with CA, undergoing treatment    Additional Assessment Information: Considering malnutrition + hypermetabolic state, do feel pt would benefit from use of oral nutrition supplements in-house; discussed with him and agreeable to Ensure Plus.       Nutrition Interventions/Recommendations   Nutrition prescription for oral nutrition    Nutrition Recommendations:  1. Continue Regular Diet, only as tolerated  --RDN placed order for Ensure Plus BID for added nutrition    2. Check baseline 25(OH)-D level    3. Daily weights (standing preferred, if feasible)    Nutrition Interventions/Goals:   Interventions: Meals and snacks, Medical food supplement  Meals and Snacks: General healthful diet  Goal: Regular Diet  Medical Food Supplement: Commercial beverage medical food supplement therapy  Goal: Ensure Plus BID (350 kcals, 13g pro each)    Education:  Education Documentation  No documentation found.    Pt denied any questions  for RDN at this time.       Nutrition Monitoring and Evaluation   Food/Nutrient Related History Monitoring  Monitoring and Evaluation Plan: Intake / amount of food  Intake / Amount of food: Consumes at least 75% or more of meals/snacks/supplements    Anthropometric Measurements  Monitoring and Evaluation Plan: Body weight  Body Weight: Body weight - Maintain stable weight    Biochemical Data, Medical Tests and Procedures  Monitoring and Evaluation Plan: Electrolyte/renal panel, Glucose/endocrine profile  Electrolyte and Renal Panel: Electrolytes within normal limits  Glucose/Endocrine Profile: Glucose within normal limits ( mg/dL)      Goal Status: New goal(s) identified          Time Spent (min): 45 minutes

## 2025-03-11 ENCOUNTER — PHARMACY VISIT (OUTPATIENT)
Dept: PHARMACY | Facility: CLINIC | Age: 65
End: 2025-03-11
Payer: MEDICARE

## 2025-03-11 VITALS
WEIGHT: 210.98 LBS | BODY MASS INDEX: 29.54 KG/M2 | DIASTOLIC BLOOD PRESSURE: 83 MMHG | HEIGHT: 71 IN | SYSTOLIC BLOOD PRESSURE: 130 MMHG | TEMPERATURE: 97.7 F | HEART RATE: 68 BPM | RESPIRATION RATE: 18 BRPM | OXYGEN SATURATION: 97 %

## 2025-03-11 LAB
ALBUMIN SERPL BCP-MCNC: 3 G/DL (ref 3.4–5)
ALP SERPL-CCNC: 45 U/L (ref 33–136)
ALT SERPL W P-5'-P-CCNC: 20 U/L (ref 10–52)
ANION GAP SERPL CALC-SCNC: 15 MMOL/L (ref 10–20)
AST SERPL W P-5'-P-CCNC: 11 U/L (ref 9–39)
BASOPHILS # BLD MANUAL: 0 X10*3/UL (ref 0–0.1)
BASOPHILS NFR BLD MANUAL: 0 %
BILIRUB SERPL-MCNC: 0.4 MG/DL (ref 0–1.2)
BUN SERPL-MCNC: 27 MG/DL (ref 6–23)
CALCIUM SERPL-MCNC: 8.1 MG/DL (ref 8.6–10.6)
CHLORIDE SERPL-SCNC: 106 MMOL/L (ref 98–107)
CO2 SERPL-SCNC: 24 MMOL/L (ref 21–32)
CREAT SERPL-MCNC: 0.62 MG/DL (ref 0.5–1.3)
DACRYOCYTES BLD QL SMEAR: ABNORMAL
EGFRCR SERPLBLD CKD-EPI 2021: >90 ML/MIN/1.73M*2
ELECTRONICALLY SIGNED BY CYTOGENETICS: NORMAL
EOSINOPHIL # BLD MANUAL: 0 X10*3/UL (ref 0–0.7)
EOSINOPHIL NFR BLD MANUAL: 0 %
ERYTHROCYTE [DISTWIDTH] IN BLOOD BY AUTOMATED COUNT: 17.7 % (ref 11.5–14.5)
GLUCOSE SERPL-MCNC: 199 MG/DL (ref 74–99)
HCT VFR BLD AUTO: 25.4 % (ref 41–52)
HGB BLD-MCNC: 7.9 G/DL (ref 13.5–17.5)
IMM GRANULOCYTES # BLD AUTO: 0.18 X10*3/UL (ref 0–0.7)
IMM GRANULOCYTES NFR BLD AUTO: 5.1 % (ref 0–0.9)
LYMPHOCYTES # BLD MANUAL: 0.06 X10*3/UL (ref 1.2–4.8)
LYMPHOCYTES NFR BLD MANUAL: 1.7 %
MAGNESIUM SERPL-MCNC: 2.13 MG/DL (ref 1.6–2.4)
MCH RBC QN AUTO: 28.6 PG (ref 26–34)
MCHC RBC AUTO-ENTMCNC: 31.1 G/DL (ref 32–36)
MCV RBC AUTO: 92 FL (ref 80–100)
MICROARRAY PLATFORM: NORMAL
MONOCYTES # BLD MANUAL: 0 X10*3/UL (ref 0.1–1)
MONOCYTES NFR BLD MANUAL: 0 %
NEUTS SEG # BLD MANUAL: 3.44 X10*3/UL (ref 1.2–7)
NEUTS SEG NFR BLD MANUAL: 98.3 %
NRBC BLD-RTO: 0 /100 WBCS (ref 0–0)
OVALOCYTES BLD QL SMEAR: ABNORMAL
PLATELET # BLD AUTO: 152 X10*3/UL (ref 150–450)
POTASSIUM SERPL-SCNC: 3.9 MMOL/L (ref 3.5–5.3)
PROT SERPL-MCNC: 4.5 G/DL (ref 6.4–8.2)
RBC # BLD AUTO: 2.76 X10*6/UL (ref 4.5–5.9)
RBC MORPH BLD: ABNORMAL
SODIUM SERPL-SCNC: 141 MMOL/L (ref 136–145)
TOTAL CELLS COUNTED BLD: 115
URATE SERPL-MCNC: 5.5 MG/DL (ref 4–7.5)
WBC # BLD AUTO: 3.5 X10*3/UL (ref 4.4–11.3)

## 2025-03-11 PROCEDURE — 2500000001 HC RX 250 WO HCPCS SELF ADMINISTERED DRUGS (ALT 637 FOR MEDICARE OP)

## 2025-03-11 PROCEDURE — 99239 HOSP IP/OBS DSCHRG MGMT >30: CPT

## 2025-03-11 PROCEDURE — 84075 ASSAY ALKALINE PHOSPHATASE: CPT

## 2025-03-11 PROCEDURE — 83735 ASSAY OF MAGNESIUM: CPT | Performed by: NURSE PRACTITIONER

## 2025-03-11 PROCEDURE — 85007 BL SMEAR W/DIFF WBC COUNT: CPT

## 2025-03-11 PROCEDURE — 2500000004 HC RX 250 GENERAL PHARMACY W/ HCPCS (ALT 636 FOR OP/ED): Mod: TB | Performed by: STUDENT IN AN ORGANIZED HEALTH CARE EDUCATION/TRAINING PROGRAM

## 2025-03-11 PROCEDURE — 2500000004 HC RX 250 GENERAL PHARMACY W/ HCPCS (ALT 636 FOR OP/ED)

## 2025-03-11 PROCEDURE — 85027 COMPLETE CBC AUTOMATED: CPT

## 2025-03-11 PROCEDURE — RXMED WILLOW AMBULATORY MEDICATION CHARGE

## 2025-03-11 PROCEDURE — 84550 ASSAY OF BLOOD/URIC ACID: CPT

## 2025-03-11 RX ORDER — ONDANSETRON HYDROCHLORIDE 8 MG/1
16 TABLET, FILM COATED ORAL ONCE
Status: COMPLETED | OUTPATIENT
Start: 2025-03-11 | End: 2025-03-11

## 2025-03-11 RX ADMIN — FLUCONAZOLE 400 MG: 200 TABLET ORAL at 08:09

## 2025-03-11 RX ADMIN — PREDNISONE 140 MG: 50 TABLET ORAL at 08:10

## 2025-03-11 RX ADMIN — LISINOPRIL 10 MG: 10 TABLET ORAL at 08:10

## 2025-03-11 RX ADMIN — APIXABAN 5 MG: 5 TABLET, FILM COATED ORAL at 08:10

## 2025-03-11 RX ADMIN — ONDANSETRON HYDROCHLORIDE 16 MG: 8 TABLET, FILM COATED ORAL at 17:58

## 2025-03-11 RX ADMIN — PANTOPRAZOLE SODIUM 40 MG: 40 TABLET, DELAYED RELEASE ORAL at 06:31

## 2025-03-11 RX ADMIN — CYCLOPHOSPHAMIDE 2124 MG: 1 INJECTION, POWDER, FOR SOLUTION INTRAVENOUS; ORAL at 18:12

## 2025-03-11 RX ADMIN — ATORVASTATIN CALCIUM 40 MG: 40 TABLET, FILM COATED ORAL at 06:31

## 2025-03-11 RX ADMIN — METOPROLOL SUCCINATE 100 MG: 50 TABLET, EXTENDED RELEASE ORAL at 08:10

## 2025-03-11 ASSESSMENT — COGNITIVE AND FUNCTIONAL STATUS - GENERAL
MOBILITY SCORE: 24
DAILY ACTIVITIY SCORE: 24

## 2025-03-11 ASSESSMENT — PAIN - FUNCTIONAL ASSESSMENT: PAIN_FUNCTIONAL_ASSESSMENT: 0-10

## 2025-03-11 ASSESSMENT — PAIN SCALES - GENERAL: PAINLEVEL_OUTOF10: 0 - NO PAIN

## 2025-03-11 NOTE — CARE PLAN
Problem: Pain - Adult  Goal: Verbalizes/displays adequate comfort level or baseline comfort level  Outcome: Progressing     Problem: Safety - Adult  Goal: Free from fall injury  Outcome: Progressing     Problem: Discharge Planning  Goal: Discharge to home or other facility with appropriate resources  Outcome: Progressing     Problem: Chronic Conditions and Co-morbidities  Goal: Patient's chronic conditions and co-morbidity symptoms are monitored and maintained or improved  Outcome: Progressing     Problem: Nutrition  Goal: Nutrient intake appropriate for maintaining nutritional needs  Outcome: Progressing     Problem: Skin  Goal: Decreased wound size/increased tissue granulation at next dressing change  Outcome: Progressing  Goal: Participates in plan/prevention/treatment measures  Outcome: Progressing  Goal: Prevent/manage excess moisture  Outcome: Progressing  Goal: Prevent/minimize sheer/friction injuries  Outcome: Progressing  Goal: Promote/optimize nutrition  Outcome: Progressing  Goal: Promote skin healing  Outcome: Progressing     Problem: Pain  Goal: Takes deep breaths with improved pain control throughout the shift  Outcome: Progressing  Goal: Turns in bed with improved pain control throughout the shift  Outcome: Progressing  Goal: Walks with improved pain control throughout the shift  Outcome: Progressing  Goal: Performs ADL's with improved pain control throughout shift  Outcome: Progressing  Goal: Participates in PT with improved pain control throughout the shift  Outcome: Progressing  Goal: Free from opioid side effects throughout the shift  Outcome: Progressing  Goal: Free from acute confusion related to pain meds throughout the shift  Outcome: Progressing    The clinical goals for the shift include pt will remain free of injury throughout shift

## 2025-03-11 NOTE — DISCHARGE SUMMARY
Discharge Diagnosis  Burkitt's lymphoma (Multi)    Issues Requiring Follow-Up  Burkitt's Hospital Course  Bijan Ibanez is a 64 y.o. male with PMH of newly dx Burkitt's lymphoma, HTN, HLD, CAD, MI (s/p stent 2010, on ASA), hx renal cell carcinoma (s/p R partial nephrectomy in 2013 @ [facility]), GERD, BPH, arthritis, RUE PICC DVT (On eliquis w/ close monitoring d/t counts), afib RVR, and recent MRSA bacteremia (s/p 4 week Vancomycin end date 3/3) who is admitted for C3 EPOCH. Received 1 unit pRBCs on 3/8 for hgb 6.9 with improvement in Hgb. Pt tolerated chemotherapy well this admit.    On day of discharge, pt vitals and condition stable. Neulasta and Ritux infusion scheduled for 3/12. Pt also had FUV scheduled with Cardiology on 3/13, and FUV with Dr. Mckeon on 3/25. Plan to continue Levaquin and Fluconazole on discharge; no prescription sent, pt states that he has medication at home. Acyclovir and Allopurinol not continued per Dr. Mckeon at discharge. Prescriptions sent for Gabapentin and Atorvastatin via [facility], and refill of Protonix also sent via [facility]. Attending has reviewed all labs and vitals, and discussed and agreed with the discharge plan prior to patient discharge. Patient discharged in stable condition.     Pertinent Physical Exam At Time of Discharge  Physical Exam  Vitals reviewed.   Constitutional:       Appearance: Normal appearance.   HENT:      Head: Normocephalic and atraumatic.      Nose: Nose normal.      Mouth/Throat:      Mouth: Mucous membranes are moist.      Pharynx: Oropharynx is clear.   Eyes:      Extraocular Movements: Extraocular movements intact.      Pupils: Pupils are equal, round, and reactive to light.   Cardiovascular:      Rate and Rhythm: Normal rate and regular rhythm.      Pulses: Normal pulses.      Heart sounds: Normal heart sounds.   Pulmonary:      Effort: Pulmonary effort is normal.      Breath sounds: Normal breath sounds.   Abdominal:      General: Bowel  sounds are normal.      Palpations: Abdomen is soft.   Musculoskeletal:         General: Normal range of motion.   Skin:     General: Skin is warm.      Coloration: Skin is pale.   Neurological:      General: No focal deficit present.      Mental Status: He is alert and oriented to person, place, and time. Mental status is at baseline.   Psychiatric:         Mood and Affect: Mood normal.         Behavior: Behavior normal.       Home Medications     Medication List      CONTINUE taking these medications     albuterol 90 mcg/actuation inhaler   apixaban 5 mg tablet; Commonly known as: Eliquis; Take 1 tablet (5 mg)   by mouth 2 times a day.   atorvastatin 40 mg tablet; Commonly known as: Lipitor; Take 1 tablet (40   mg) by mouth early in the morning..   diphenhydramine/Maalox/lidocaine (Magic Mouthwash) - Compounded -   Outpatient; Swish and spit 10 mL by mouth every 6 hours if needed.   fluconazole 200 mg tablet; Commonly known as: Diflucan; Take 2 tablets   (400 mg) by mouth once daily.   furosemide 20 mg tablet; Commonly known as: Lasix; Take 1 tablet (20 mg)   by mouth once daily as needed (for swelling).   gabapentin 300 mg capsule; Commonly known as: Neurontin; Take 1 capsule   (300 mg) by mouth once daily at bedtime.   heparin flush 10 unit/mL injection   levoFLOXacin 750 mg tablet; Commonly known as: Levaquin; Take 1 tablet   (750 mg) by mouth once daily for 14 days.   lisinopril 10 mg tablet; Take 1 tablet (10 mg) by mouth once daily.   metoprolol succinate  mg 24 hr tablet; Commonly known as:   Toprol-XL; Take 1 tablet (100 mg) by mouth once daily. Do not crush or   chew.   pantoprazole 40 mg EC tablet; Commonly known as: ProtoNix; Take 1 tablet   (40 mg) by mouth once daily in the morning. Take before meals. Do not   crush, chew, or split.   sodium chloride 0.9% flush     STOP taking these medications     baclofen 10 mg tablet; Commonly known as: Lioresal   oxyCODONE 5 mg immediate release tablet;  Commonly known as: Roxicodone   vancomycin 1,000 mg vial for injection; Commonly known as: Vancocin       Outpatient Follow-Up  Future Appointments   Date Time Provider Department Center   3/12/2025 10:00 AM INF 25 [facility]   3/12/2025 10:00 AM [facility]   3/13/2025  2:45 PM Ata Conti DO [id number] [facility]   3/25/2025  1:20 PM Torey Mckeon DO [id number] [facility]     I spent 30 minutes on discharge planning.     Amalia Quach, APRN-CNP

## 2025-03-11 NOTE — CARE PLAN
Problem: Pain - Adult  Goal: Verbalizes/displays adequate comfort level or baseline comfort level  Outcome: Progressing     Problem: Safety - Adult  Goal: Free from fall injury  Outcome: Progressing     Problem: Discharge Planning  Goal: Discharge to home or other facility with appropriate resources  Outcome: Progressing     Problem: Chronic Conditions and Co-morbidities  Goal: Patient's chronic conditions and co-morbidity symptoms are monitored and maintained or improved  Outcome: Progressing     Problem: Nutrition  Goal: Nutrient intake appropriate for maintaining nutritional needs  Outcome: Progressing     Problem: Skin  Goal: Decreased wound size/increased tissue granulation at next dressing change  Outcome: Progressing  Goal: Participates in plan/prevention/treatment measures  Outcome: Progressing  Goal: Prevent/manage excess moisture  Outcome: Progressing  Goal: Prevent/minimize sheer/friction injuries  Outcome: Progressing  Goal: Promote/optimize nutrition  Outcome: Progressing  Goal: Promote skin healing  Outcome: Progressing     Problem: Pain  Goal: Takes deep breaths with improved pain control throughout the shift  Outcome: Progressing  Goal: Turns in bed with improved pain control throughout the shift  Outcome: Progressing  Goal: Walks with improved pain control throughout the shift  Outcome: Progressing  Goal: Performs ADL's with improved pain control throughout shift  Outcome: Progressing  Goal: Participates in PT with improved pain control throughout the shift  Outcome: Progressing  Goal: Free from opioid side effects throughout the shift  Outcome: Progressing  Goal: Free from acute confusion related to pain meds throughout the shift  Outcome: Progressing   The patient's goals for the shift include      The clinical goals for the shift include pt to hds  \

## 2025-03-12 ENCOUNTER — OFFICE VISIT (OUTPATIENT)
Dept: HEMATOLOGY/ONCOLOGY | Facility: HOSPITAL | Age: 65
End: 2025-03-12
Payer: MEDICARE

## 2025-03-12 ENCOUNTER — INFUSION (OUTPATIENT)
Dept: HEMATOLOGY/ONCOLOGY | Facility: HOSPITAL | Age: 65
End: 2025-03-12
Payer: MEDICARE

## 2025-03-12 VITALS
RESPIRATION RATE: 18 BRPM | WEIGHT: 214.7 LBS | BODY MASS INDEX: 29.53 KG/M2 | OXYGEN SATURATION: 100 % | SYSTOLIC BLOOD PRESSURE: 128 MMHG | DIASTOLIC BLOOD PRESSURE: 70 MMHG | HEART RATE: 73 BPM | TEMPERATURE: 97.2 F

## 2025-03-12 VITALS
SYSTOLIC BLOOD PRESSURE: 117 MMHG | OXYGEN SATURATION: 99 % | RESPIRATION RATE: 18 BRPM | HEART RATE: 69 BPM | TEMPERATURE: 97.2 F | DIASTOLIC BLOOD PRESSURE: 72 MMHG

## 2025-03-12 DIAGNOSIS — D70.9 FEBRILE NEUTROPENIA (CMS-HCC): ICD-10-CM

## 2025-03-12 DIAGNOSIS — C83.78 BURKITT'S LYMPHOMA OF LYMPH NODES OF MULTIPLE REGIONS (MULTI): ICD-10-CM

## 2025-03-12 DIAGNOSIS — R50.81 FEBRILE NEUTROPENIA (CMS-HCC): ICD-10-CM

## 2025-03-12 DIAGNOSIS — C83.30 DIFFUSE LARGE B-CELL LYMPHOMA, UNSPECIFIED BODY REGION (MULTI): ICD-10-CM

## 2025-03-12 LAB
ALBUMIN SERPL BCP-MCNC: 3.2 G/DL (ref 3.4–5)
ALP SERPL-CCNC: 38 U/L (ref 33–136)
ALT SERPL W P-5'-P-CCNC: 33 U/L (ref 10–52)
ANION GAP SERPL CALC-SCNC: 9 MMOL/L (ref 10–20)
AST SERPL W P-5'-P-CCNC: 15 U/L (ref 9–39)
BASOPHILS # BLD AUTO: 0 X10*3/UL (ref 0–0.1)
BASOPHILS NFR BLD AUTO: 0 %
BILIRUB SERPL-MCNC: 0.7 MG/DL (ref 0–1.2)
BUN SERPL-MCNC: 26 MG/DL (ref 6–23)
CALCIUM SERPL-MCNC: 8.4 MG/DL (ref 8.6–10.3)
CHLORIDE SERPL-SCNC: 104 MMOL/L (ref 98–107)
CO2 SERPL-SCNC: 30 MMOL/L (ref 21–32)
CREAT SERPL-MCNC: 0.58 MG/DL (ref 0.5–1.3)
EGFRCR SERPLBLD CKD-EPI 2021: >90 ML/MIN/1.73M*2
EOSINOPHIL # BLD AUTO: 0 X10*3/UL (ref 0–0.7)
EOSINOPHIL NFR BLD AUTO: 0 %
ERYTHROCYTE [DISTWIDTH] IN BLOOD BY AUTOMATED COUNT: 17.2 % (ref 11.5–14.5)
GLUCOSE SERPL-MCNC: 92 MG/DL (ref 74–99)
HCT VFR BLD AUTO: 24.9 % (ref 41–52)
HGB BLD-MCNC: 8.2 G/DL (ref 13.5–17.5)
IMM GRANULOCYTES # BLD AUTO: 0.09 X10*3/UL (ref 0–0.7)
IMM GRANULOCYTES NFR BLD AUTO: 1.5 % (ref 0–0.9)
LDH SERPL L TO P-CCNC: 184 U/L (ref 84–246)
LYMPHOCYTES # BLD AUTO: 0.15 X10*3/UL (ref 1.2–4.8)
LYMPHOCYTES NFR BLD AUTO: 2.5 %
MCH RBC QN AUTO: 28.9 PG (ref 26–34)
MCHC RBC AUTO-ENTMCNC: 32.9 G/DL (ref 32–36)
MCV RBC AUTO: 88 FL (ref 80–100)
MONOCYTES # BLD AUTO: 0.07 X10*3/UL (ref 0.1–1)
MONOCYTES NFR BLD AUTO: 1.2 %
NEUTROPHILS # BLD AUTO: 5.77 X10*3/UL (ref 1.2–7.7)
NEUTROPHILS NFR BLD AUTO: 94.8 %
NRBC BLD-RTO: 0 /100 WBCS (ref 0–0)
PLATELET # BLD AUTO: 133 X10*3/UL (ref 150–450)
POTASSIUM SERPL-SCNC: 3.3 MMOL/L (ref 3.5–5.3)
PROT SERPL-MCNC: 4.5 G/DL (ref 6.4–8.2)
RBC # BLD AUTO: 2.84 X10*6/UL (ref 4.5–5.9)
SODIUM SERPL-SCNC: 140 MMOL/L (ref 136–145)
VANCOMYCIN SERPL-MCNC: <2 UG/ML (ref 5–20)
WBC # BLD AUTO: 6.1 X10*3/UL (ref 4.4–11.3)

## 2025-03-12 PROCEDURE — 1157F ADVNC CARE PLAN IN RCRD: CPT | Performed by: NURSE PRACTITIONER

## 2025-03-12 PROCEDURE — 99215 OFFICE O/P EST HI 40 MIN: CPT | Performed by: NURSE PRACTITIONER

## 2025-03-12 PROCEDURE — 85025 COMPLETE CBC W/AUTO DIFF WBC: CPT

## 2025-03-12 PROCEDURE — 83615 LACTATE (LD) (LDH) ENZYME: CPT

## 2025-03-12 PROCEDURE — 3078F DIAST BP <80 MM HG: CPT | Performed by: NURSE PRACTITIONER

## 2025-03-12 PROCEDURE — 1111F DSCHRG MED/CURRENT MED MERGE: CPT | Performed by: NURSE PRACTITIONER

## 2025-03-12 PROCEDURE — 96372 THER/PROPH/DIAG INJ SC/IM: CPT

## 2025-03-12 PROCEDURE — 80202 ASSAY OF VANCOMYCIN: CPT

## 2025-03-12 PROCEDURE — 96413 CHEMO IV INFUSION 1 HR: CPT

## 2025-03-12 PROCEDURE — 3074F SYST BP LT 130 MM HG: CPT | Performed by: NURSE PRACTITIONER

## 2025-03-12 PROCEDURE — 2500000004 HC RX 250 GENERAL PHARMACY W/ HCPCS (ALT 636 FOR OP/ED): Mod: JZ,TB | Performed by: STUDENT IN AN ORGANIZED HEALTH CARE EDUCATION/TRAINING PROGRAM

## 2025-03-12 PROCEDURE — 2500000004 HC RX 250 GENERAL PHARMACY W/ HCPCS (ALT 636 FOR OP/ED): Performed by: STUDENT IN AN ORGANIZED HEALTH CARE EDUCATION/TRAINING PROGRAM

## 2025-03-12 PROCEDURE — 2500000001 HC RX 250 WO HCPCS SELF ADMINISTERED DRUGS (ALT 637 FOR MEDICARE OP): Performed by: STUDENT IN AN ORGANIZED HEALTH CARE EDUCATION/TRAINING PROGRAM

## 2025-03-12 PROCEDURE — 96415 CHEMO IV INFUSION ADDL HR: CPT

## 2025-03-12 PROCEDURE — 2500000002 HC RX 250 W HCPCS SELF ADMINISTERED DRUGS (ALT 637 FOR MEDICARE OP, ALT 636 FOR OP/ED): Performed by: NURSE PRACTITIONER

## 2025-03-12 PROCEDURE — 1123F ACP DISCUSS/DSCN MKR DOCD: CPT | Performed by: NURSE PRACTITIONER

## 2025-03-12 PROCEDURE — 80053 COMPREHEN METABOLIC PANEL: CPT

## 2025-03-12 RX ORDER — PROCHLORPERAZINE MALEATE 10 MG
10 TABLET ORAL EVERY 6 HOURS PRN
Status: DISCONTINUED | OUTPATIENT
Start: 2025-03-12 | End: 2025-03-12 | Stop reason: HOSPADM

## 2025-03-12 RX ORDER — ALBUTEROL SULFATE 0.83 MG/ML
3 SOLUTION RESPIRATORY (INHALATION) AS NEEDED
Status: DISCONTINUED | OUTPATIENT
Start: 2025-03-12 | End: 2025-03-12 | Stop reason: HOSPADM

## 2025-03-12 RX ORDER — POTASSIUM CHLORIDE 750 MG/1
40 TABLET, FILM COATED, EXTENDED RELEASE ORAL ONCE
Status: COMPLETED | OUTPATIENT
Start: 2025-03-12 | End: 2025-03-12

## 2025-03-12 RX ORDER — POTASSIUM CHLORIDE 750 MG/1
40 TABLET, FILM COATED, EXTENDED RELEASE ORAL ONCE
Status: CANCELLED | OUTPATIENT
Start: 2025-03-12 | End: 2025-03-12

## 2025-03-12 RX ORDER — PROCHLORPERAZINE EDISYLATE 5 MG/ML
10 INJECTION INTRAMUSCULAR; INTRAVENOUS EVERY 6 HOURS PRN
Status: DISCONTINUED | OUTPATIENT
Start: 2025-03-12 | End: 2025-03-12 | Stop reason: HOSPADM

## 2025-03-12 RX ORDER — ACETAMINOPHEN 325 MG/1
650 TABLET ORAL ONCE
Status: COMPLETED | OUTPATIENT
Start: 2025-03-12 | End: 2025-03-12

## 2025-03-12 RX ORDER — FAMOTIDINE 10 MG/ML
20 INJECTION, SOLUTION INTRAVENOUS ONCE AS NEEDED
Status: DISCONTINUED | OUTPATIENT
Start: 2025-03-12 | End: 2025-03-12 | Stop reason: HOSPADM

## 2025-03-12 RX ORDER — EPINEPHRINE 0.3 MG/.3ML
0.3 INJECTION SUBCUTANEOUS EVERY 5 MIN PRN
Status: DISCONTINUED | OUTPATIENT
Start: 2025-03-12 | End: 2025-03-12 | Stop reason: HOSPADM

## 2025-03-12 RX ORDER — HEPARIN 100 UNIT/ML
500 SYRINGE INTRAVENOUS AS NEEDED
OUTPATIENT
Start: 2025-03-12

## 2025-03-12 RX ORDER — DIPHENHYDRAMINE HYDROCHLORIDE 50 MG/ML
50 INJECTION INTRAMUSCULAR; INTRAVENOUS AS NEEDED
Status: DISCONTINUED | OUTPATIENT
Start: 2025-03-12 | End: 2025-03-12 | Stop reason: HOSPADM

## 2025-03-12 RX ORDER — HEPARIN SODIUM 1000 [USP'U]/ML
2000 INJECTION, SOLUTION INTRAVENOUS; SUBCUTANEOUS AS NEEDED
OUTPATIENT
Start: 2025-03-12

## 2025-03-12 RX ORDER — HEPARIN SODIUM,PORCINE/PF 10 UNIT/ML
50 SYRINGE (ML) INTRAVENOUS AS NEEDED
OUTPATIENT
Start: 2025-03-12

## 2025-03-12 RX ORDER — DIPHENHYDRAMINE HCL 50 MG
50 CAPSULE ORAL ONCE
Status: COMPLETED | OUTPATIENT
Start: 2025-03-12 | End: 2025-03-12

## 2025-03-12 RX ORDER — HEPARIN SODIUM,PORCINE/PF 10 UNIT/ML
50 SYRINGE (ML) INTRAVENOUS AS NEEDED
Status: DISCONTINUED | OUTPATIENT
Start: 2025-03-12 | End: 2025-03-12 | Stop reason: HOSPADM

## 2025-03-12 RX ADMIN — ACETAMINOPHEN 650 MG: 325 TABLET ORAL at 10:15

## 2025-03-12 RX ADMIN — PEGFILGRASTIM 6 MG: 6 INJECTION SUBCUTANEOUS at 11:53

## 2025-03-12 RX ADMIN — DIPHENHYDRAMINE HYDROCHLORIDE 50 MG: 50 CAPSULE ORAL at 10:15

## 2025-03-12 RX ADMIN — RITUXIMAB 900 MG: 10 INJECTION, SOLUTION INTRAVENOUS at 11:02

## 2025-03-12 RX ADMIN — POTASSIUM CHLORIDE 40 MEQ: 750 TABLET, FILM COATED, EXTENDED RELEASE ORAL at 13:36

## 2025-03-12 NOTE — PROGRESS NOTES
Pt here for Rituxan & neulasta. Pt tolerated Rituxan well at standard rates. PICC dressing changed per protocol. Pt was also given PO potassium. Pt left infusion ambulatory in NAD.

## 2025-03-12 NOTE — PROGRESS NOTES
Rituximab rates verified with CHERYL Radford Rn    Rates as follows:  39.6 ml/hr for 30 mins  79 ml/hr for 30 mins  119 ml/he for 30 mins  159 ml/hr for remainder

## 2025-03-12 NOTE — PROGRESS NOTES
Patient ID: Bijan Ibanez is a 64 y.o. male.  [place]erring Physician[place]  [address] [place] [address]  Primary Care Provider: Nehal Murphy MD     Diagnosis: Burkitt's Lymphoma  Date of Diagnosis: 1/16/25  Stage at Diagnosis: IV  Risk category: high risk (marrow involvement)    Prior Treatments:    Current Treaments:  DA-R-EPOCH C1D1 1/21/25    Assessment/Plan    iBjan Ibanez is a 64 y.o. male with PMH of HTN, HLD, CAD, MI (s/p stent 2010, on ASA), hx renal cell carcinoma (s/p R partial nephrectomy in 2013 @ [facility]), GERD, BPH, arthritis who is following us for management of his newly diagnosed Burkitt's lymphoma.     # Burkitt's Lymphoma - Originally signed out as high grade lymphoma but after burkitt's rearrangement (t8;14) came back positive. High risk based on marrow involvement, though no CNS involvement  - previously reviewed diagnosis and prognosis with patient  - initially give DA-R-EPOCH when was signed out as high grade lymphoma, given the significant toxicity he had with DA R EPOCH, will not escalate to CODOX-IVAC or hyper-CVAD given no CNS involvement  - PET post 2C Chemo shows deauville 2 - c/w CR  - cont w/ EPOCH, increased to dose level 2 but kept etoposide at 25% DR of dose level 2  - Tolerated C3 of EPOCH well so far, receiving pegfilgrastim and rituximab today (3/12/2025).   - Now off allopurinol and acyclovir per Dr. Mckeon.     # CNS prophylaxis  - given dose of IT methotrexate in hospital, will plan to give additional doses starting with C4    #Infectious Prophylaxis  - Levofloxacin while neutropenic (started at discharge)  - Discontinued fluconazole     # G1 CIPN  - monitor    # A fib  - on anticoagulation with apixaban, rate controlled with metoprolol. Hold AC if platelets <50k.     # MRSA bacteremia  - s/p 4 weeks of IV Vanco, EOT 3/3/25    #Hypokalemia  - Repleted 40 mEq Kcl for K 3.3     #Access: Double Lumen PICC placed 3/7/2025     RTC  Twice weekly count  checks (requested & pending)  3/25 Dr. Mckeon    No problem-specific Assessment & Plan notes found for this encounter.    ____________________________________________________________________________________________________________________    HISTORY  Per HPI from inpatient:  Bijan Ibanez is a 64 y.o. male with PMH of HTN, HLD, CAD, MI (s/p stent 2010, on ASA), hx renal cell carcinoma (s/p R partial nephrectomy in 2013 @ [facility]), GERD, BPH, arthritis who is being followed for management of newly diagnosed high grade B-cell lymphoma.      Patient recently admitted 1/11-1/17 after concerning imaging outpatient leading to c/f relapsed RCC vs new other malignancy.      Patient endorses recent history of constitutional symptoms, including night sweats, weight loss, decreased appetite, fatigue, OSORIO. Patient and wife both developed URI symptoms after Val, however, the wife's symptoms resolved while the patient's did not. On admit, continues to have low back pain, that improves with oxycodone, and is worse with movement. Reports an episode of right flank pain yesterday that was very painful but resolved after pain meds and has not yet recurred. Reporting chin and lip numbness that has been present since before Val. Also reports right parotid swelling, that has sometimes appeared to have improved but will then return to same size, but has not continued to enlarge; sometimes tender to touch, sometimes not. Had a left toothache at some point that resolved and has not recurred. Patient denies saddle anesthesia, loss of lower extremity function or loss of bladder/bowel function. Denies CP, SOB, abd pain, N/V/D/C.     Interval Hx  Recently admitted for C3 EPOCH 3/7 through 3/11/25. He has overall done very well. Here today for follow up, pegfilgrastim, and rituximab. He did require a unit of pRBCs during his admission. Denied N/V/F/C/SOB/CP?abd pain. No diarrhea, mucositis. Mild neuropathy    Bijan Ibanez  reports  that he has quit smoking. His smoking use included cigarettes and pipe. He has never used smokeless tobacco.  He  reports no history of alcohol use.  He  reports no history of drug use.  Family History   Problem Relation Name Age of Onset   • Coronary artery disease Mother     • Alzheimer's disease Mother     • Coronary artery disease Father     • Skin cancer Father     • Alzheimer's disease Father     • Alzheimer's disease Mother's Brother     • Alzheimer's disease Father's Brother     • Stomach cancer Maternal Grandfather     • Other cancer Paternal Grandfather          prostate or colon cancer   • Heart disease Other       Oncology History   Burkitt's lymphoma of lymph nodes of multiple regions (Multi)   1/13/2025 Initial Diagnosis    Diagnosis: Burkitt Lymphoma, [place] Stage IV, BL-IPI High-Risk (score 4/5).    BL-IPI Calculation:  Age >60 years: Yes (64 years old).  Performance status (ECOG >=2): Presumed based on clinical presentation requiring hospitalization.  Serum LDH >ULN: 4102 U/L (1/11/25)  [place] Stage III/IV: Yes (stage IV with extranodal involvement including kidneys, liver, spleen, and musculature).  CNS involvement: No (MRI and LP negative).  Total Score: 4/5 (High-Risk).    Presenting Symptoms: Asymmetric swelling of the right-sided muscles of mastication; imaging revealed incidental findings of perihilar mass and renal lesions.    Labs at Diagnosis: WBC 4.0, platelets 72; LDH 4102 U/L (1/11/25)    Pathology:  EBUS with lymph node biopsy (1/13/25): Predominantly CD20-positive small lymphoid cells, raising suspicion for a B-cell lymphoproliferative process.  Bone marrow biopsy (1/16/25): 70-80% involvement by high-grade B-cell lymphoma in a hypercellular marrow (90% cellular) with maturing trilineage hematopoiesis. FISH confirmed t(8;14)/IGH::MYC rearrangement, diagnostic of Burkitt lymphoma.     Imaging:  CT Neck (1/9/25): Fusiform thickening of right masticatory muscles, likely benign  hypertrophy.  CT C/A/P (1/11/25): Left perihilar mass, pulmonary nodules, right renal lesions, and right adrenal gland thickening concerning for metastatic disease; T12-L1 soft tissue mass.  PET-CT (1/20): Widespread hermann and extranodal hypermetabolic involvement, including kidneys, liver, spleen, abdominal wall, and musculature, suggestive of extensive lymphomatous disease.  MRI Brain (1/21): No intracranial lymphoma; suspected osseous involvement of calvarium.    Treatment:  Dexamethasone 40 mg daily (1/20-1/21).  Prophylactic IT methotrexate via LP (1/22), flow negative for lymphoma.     1/21/2025 -  Chemotherapy    (INPT) Dose-Adjusted R-EPOCH (Etoposide / DOXOrubicin / VinCRIStine / Cyclophosphamide / PredniSONE) + RiTUXimab, 21 Day Cycles          Performance Status:  ECOG Score: 2- Ambulatory and  capable of all selfcare; unable to carry out work activities.  Up and about > 50% of waking hrs.   Karnofsky Score: 70 - Cares for self; unable to carry on normal activity or do normal work     Objective   BSA: There is no height or weight on file to calculate BSA.  There were no vitals taken for this visit.  Physical Exam  GEN: Aox3, NAD  HEENT EOMI PERRLA sclera anicteric  CV RRR w/o m/r/g  Resp CTAB w/o w/r/r  GI Soft NTND+BS  Ext no LE edema  LN: no adenopathy palpated       Path:      Component    FINAL DIAGNOSIS   A, B & C. BONE MARROW CLOT WITH ASPIRATE AND CORE WITH TOUCH PREP, LEFT ILIAC CREST:       -- WITH INVOLVEMENT BY HIGH GRADE B-CELL LYMPHOMA, 70-80% OF MARROW CELLULAR ELEMENTS  -- HYPERCELLULAR BONE MARROW (90% CELLULAR) WITH MATURING TRILINEAGE HEMATOPOIESIS   -- SEE NOTE     NOTE: The differential diagnosis includes Burkitt lymphoma and high grade B-cell lymphoma with MYC and Bcl2 and/or Bcl-6 rearrangements (double / triple hit lymphoma). FISH studies and lymphoid NGS panel are pending for further characterization. Clinical and molecular results correlation is suggested         Component    ISCN     Results:    FISH POSITIVE for t(8;14)/ IGH::MYC rearrangement.  nuc  kieran(D8Z2x2,MYCx3,IGHx3)(MYC con IGHx2)[31/250]/(D8Z2x2,MYCx2,IGHx2)(MYC con IGHx1)[8/250]       Imaging:  PET 2/24/25  IMPRESSION:  1. There is near complete interval resolution of previously seen  hypermetabolic hermann and extranodal disease involving bilateral  submandibular, parotid gland, hepatic lesions, spleen, mesenteric  deposits, bilateral kidney stomach bilateral, bilateral arms,  anterior chest wall, and gluteal muscle supra and infra diaphragmatic  lymphadenopathy.  2. Diffuse metabolic activity within the bone marrow, limiting the  evaluation a focal metabolic osseous metastatic disease. Likely post  treatment changes. Lymphomatous involvement can not be completely  excluded. Deauville score 2.  3. Splenomegaly with metabolic activity less than liver.  4. Mild decrease in size and metabolic activity within left lower  lobe opacity, likely inflammatory.  5. Subcentimeter nodular opacities within right lung base, likely  inflammatory/infective.        PET 1/20/25  IMPRESSION:  Widespread hermann as well as extranodal lymphomatous involvement.  1. Multiple hypermetabolic supra and infra diaphragmatic  lymphadenopathy as described, consistent with lymphomatous  involvement.  2. Intensely hypermetabolic activity within bilateral enlarged  submandibular and parotid glands as described, concerning for  lymphomatous involvement.  3. Hypermetabolic mass like infiltration within bilateral  kidneys(right> left), corresponding to lesion seen on CT dated  01/01/2025, compatible with lymphomatous involvement.  4. Hypermetabolic hepatic lesions without any underlying CT  correlate, consistent with lymphomatous involvement.  5. Splenomegaly with nonspecific hypermetabolic activity suggestive  of extranodal involvement.  6. Multiple hypermetabolic soft tissue mesenteric deposits as well as  anterior abdominal wall nodular deposits, with few of the  lesions  without any underlying CT correlate, concerning for additional  lymphomatous involvement.  7. Metabolic activity within the stomach, bilateral arms, anterior  chest wall and gluteal muscles without underlying CT correlate,  concerning for lymphomatous involvement.  [facility]

## 2025-03-13 ENCOUNTER — OFFICE VISIT (OUTPATIENT)
Dept: CARDIOLOGY | Facility: CLINIC | Age: 65
End: 2025-03-13
Payer: MEDICARE

## 2025-03-13 VITALS
SYSTOLIC BLOOD PRESSURE: 98 MMHG | RESPIRATION RATE: 20 BRPM | DIASTOLIC BLOOD PRESSURE: 56 MMHG | OXYGEN SATURATION: 98 % | HEART RATE: 106 BPM

## 2025-03-13 DIAGNOSIS — I48.0 PAROXYSMAL ATRIAL FIBRILLATION (MULTI): Primary | ICD-10-CM

## 2025-03-13 DIAGNOSIS — I48.91 NEW ONSET A-FIB (MULTI): ICD-10-CM

## 2025-03-13 LAB
ACID FAST STN SPEC: NORMAL
MYCOBACTERIUM SPEC CULT: NORMAL
SCAN RESULT: NORMAL

## 2025-03-13 PROCEDURE — 99214 OFFICE O/P EST MOD 30 MIN: CPT | Performed by: INTERNAL MEDICINE

## 2025-03-13 PROCEDURE — 1159F MED LIST DOCD IN RCRD: CPT | Performed by: INTERNAL MEDICINE

## 2025-03-13 PROCEDURE — 1111F DSCHRG MED/CURRENT MED MERGE: CPT | Performed by: INTERNAL MEDICINE

## 2025-03-13 PROCEDURE — 1036F TOBACCO NON-USER: CPT | Performed by: INTERNAL MEDICINE

## 2025-03-13 PROCEDURE — RXMED WILLOW AMBULATORY MEDICATION CHARGE

## 2025-03-13 PROCEDURE — 1126F AMNT PAIN NOTED NONE PRSNT: CPT | Performed by: INTERNAL MEDICINE

## 2025-03-13 PROCEDURE — 1123F ACP DISCUSS/DSCN MKR DOCD: CPT | Performed by: INTERNAL MEDICINE

## 2025-03-13 PROCEDURE — 3078F DIAST BP <80 MM HG: CPT | Performed by: INTERNAL MEDICINE

## 2025-03-13 PROCEDURE — 1157F ADVNC CARE PLAN IN RCRD: CPT | Performed by: INTERNAL MEDICINE

## 2025-03-13 PROCEDURE — 3074F SYST BP LT 130 MM HG: CPT | Performed by: INTERNAL MEDICINE

## 2025-03-13 RX ORDER — POTASSIUM CHLORIDE 20 MEQ/1
20 TABLET, EXTENDED RELEASE ORAL DAILY
Qty: 30 TABLET | Refills: 11 | Status: SHIPPED | OUTPATIENT
Start: 2025-03-13 | End: 2026-03-13

## 2025-03-13 ASSESSMENT — LIFESTYLE VARIABLES
HOW OFTEN DO YOU HAVE A DRINK CONTAINING ALCOHOL: NEVER
HOW OFTEN DO YOU HAVE SIX OR MORE DRINKS ON ONE OCCASION: NEVER
AUDIT TOTAL SCORE: 0
HAS A RELATIVE, FRIEND, DOCTOR, OR ANOTHER HEALTH PROFESSIONAL EXPRESSED CONCERN ABOUT YOUR DRINKING OR SUGGESTED YOU CUT DOWN: NO
SKIP TO QUESTIONS 9-10: 1
HAVE YOU OR SOMEONE ELSE BEEN INJURED AS A RESULT OF YOUR DRINKING: NO
HOW MANY STANDARD DRINKS CONTAINING ALCOHOL DO YOU HAVE ON A TYPICAL DAY: PATIENT DOES NOT DRINK
AUDIT-C TOTAL SCORE: 0

## 2025-03-13 ASSESSMENT — ENCOUNTER SYMPTOMS
LOSS OF SENSATION IN FEET: 0
OCCASIONAL FEELINGS OF UNSTEADINESS: 0
DEPRESSION: 0

## 2025-03-13 ASSESSMENT — PATIENT HEALTH QUESTIONNAIRE - PHQ9
1. LITTLE INTEREST OR PLEASURE IN DOING THINGS: NOT AT ALL
SUM OF ALL RESPONSES TO PHQ9 QUESTIONS 1 AND 2: 0
2. FEELING DOWN, DEPRESSED OR HOPELESS: NOT AT ALL

## 2025-03-13 ASSESSMENT — PAIN SCALES - GENERAL: PAINLEVEL_OUTOF10: 0-NO PAIN

## 2025-03-13 NOTE — ASSESSMENT & PLAN NOTE
Continue on high-dose metoprolol succinate and potassium supplementation with furosemide on an as-needed basis.  Continue on his Eliquis therapy for cardioembolic risk reduction.    Echocardiography in 3 months to confirm stable left ventricular systolic function and follow-up with me in 3 months

## 2025-03-13 NOTE — PROGRESS NOTES
Subjective      Chief Complaint   Patient presents with    [facility]     New Pt. Referred via Oncology 2nd to tachycardia        Here for cardiology evaluation with a history of known coronary artery disease and prior coronary stenting in  but he is angina free with a stable functional capacity.  Previous myocardial infarction in ..    He has remained angina free but he has started this chemotherapy for Burkitt's lymphoma in his recurrent atrial fibrillation during his chemo therapy.  He has been running intermittent low potassium levels as he uses furosemide on an as-needed basis but he did not have potassium supplementation    Oncology notes with the discharge summary from 2025 note the followin y.o. male with PMH of newly dx Burkitt's lymphoma, HTN, HLD, CAD, MI (s/p stent , on ASA), hx renal cell carcinoma (s/p R partial nephrectomy in  @ [facility]), GERD, BPH, arthritis, RUE PICC DVT (On eliquis w/ close monitoring d/t counts), afib RVR, and recent MRSA bacteremia (s/p 4 week Vancomycin end date 3/3) who is admitted for C3 EPOCH. Received 1 unit pRBCs on 3/8 for hgb 6.9 with improvement in Hgb. Pt tolerated chemotherapy well this admit.         Review of Systems   All other systems reviewed and are negative.       Objective   Physical Exam  Constitutional:       Appearance: Normal appearance.   HENT:      Head: Normocephalic and atraumatic.   Eyes:      Pupils: Pupils are equal, round, and reactive to light.   Cardiovascular:      Rate and Rhythm: Normal rate and regular rhythm.      Pulses: Normal pulses.      Heart sounds: Normal heart sounds.   Pulmonary:      Effort: Pulmonary effort is normal.      Breath sounds: Normal breath sounds.   Abdominal:      General: Abdomen is flat. Bowel sounds are normal.      Palpations: Abdomen is soft.   Musculoskeletal:         General: Normal range of motion.      Cervical back: Normal range of motion.   Skin:     General: Skin is warm and dry.    Neurological:      General: No focal deficit present.   Psychiatric:         Mood and Affect: Mood normal.         Judgment: Judgment normal.          Lab Review:   Not applicable    Coronary artery disease involving native coronary artery of native heart with angina pectoris (CMS-HCC)  He had successful coronary stenting in 2010 and remains angina free with a stable functional capacity.  Remains angina free on aspirin therapy and high-dose statin therapy    Essential hypertension  Good blood pressure control on his present regimen    Paroxysmal atrial fibrillation (Multi)  Continue on high-dose metoprolol succinate and potassium supplementation with furosemide on an as-needed basis.  Continue on his Eliquis therapy for cardioembolic risk reduction.    Echocardiography in 3 months to confirm stable left ventricular systolic function and follow-up with me in 3 months

## 2025-03-13 NOTE — ASSESSMENT & PLAN NOTE
He had successful coronary stenting in 2010 and remains angina free with a stable functional capacity.  Remains angina free on aspirin therapy and high-dose statin therapy

## 2025-03-14 ENCOUNTER — INFUSION (OUTPATIENT)
Dept: HEMATOLOGY/ONCOLOGY | Facility: HOSPITAL | Age: 65
End: 2025-03-14
Payer: MEDICARE

## 2025-03-14 VITALS
RESPIRATION RATE: 18 BRPM | HEART RATE: 89 BPM | TEMPERATURE: 98.2 F | DIASTOLIC BLOOD PRESSURE: 53 MMHG | SYSTOLIC BLOOD PRESSURE: 108 MMHG

## 2025-03-14 DIAGNOSIS — R78.81 BACTEREMIA DUE TO METHICILLIN RESISTANT STAPHYLOCOCCUS AUREUS: ICD-10-CM

## 2025-03-14 DIAGNOSIS — C83.30 DIFFUSE LARGE B-CELL LYMPHOMA, UNSPECIFIED BODY REGION (MULTI): ICD-10-CM

## 2025-03-14 DIAGNOSIS — C83.78 BURKITT'S LYMPHOMA OF LYMPH NODES OF MULTIPLE REGIONS (MULTI): ICD-10-CM

## 2025-03-14 DIAGNOSIS — B95.62 BACTEREMIA DUE TO METHICILLIN RESISTANT STAPHYLOCOCCUS AUREUS: ICD-10-CM

## 2025-03-14 LAB
ACID FAST STN SPEC: NORMAL
ALBUMIN SERPL BCP-MCNC: 3 G/DL (ref 3.4–5)
ALP SERPL-CCNC: 52 U/L (ref 33–136)
ALT SERPL W P-5'-P-CCNC: 21 U/L (ref 10–52)
ANION GAP SERPL CALC-SCNC: 10 MMOL/L (ref 10–20)
AST SERPL W P-5'-P-CCNC: 8 U/L (ref 9–39)
BASO STIPL BLD QL SMEAR: PRESENT
BASOPHILS # BLD MANUAL: 0 X10*3/UL (ref 0–0.1)
BASOPHILS NFR BLD MANUAL: 0 %
BILIRUB SERPL-MCNC: 0.8 MG/DL (ref 0–1.2)
BUN SERPL-MCNC: 19 MG/DL (ref 6–23)
CALCIUM SERPL-MCNC: 8.3 MG/DL (ref 8.6–10.3)
CHLORIDE SERPL-SCNC: 102 MMOL/L (ref 98–107)
CO2 SERPL-SCNC: 32 MMOL/L (ref 21–32)
CREAT SERPL-MCNC: 0.55 MG/DL (ref 0.5–1.3)
DACRYOCYTES BLD QL SMEAR: ABNORMAL
EGFRCR SERPLBLD CKD-EPI 2021: >90 ML/MIN/1.73M*2
EOSINOPHIL # BLD MANUAL: 0 X10*3/UL (ref 0–0.7)
EOSINOPHIL NFR BLD MANUAL: 0 %
ERYTHROCYTE [DISTWIDTH] IN BLOOD BY AUTOMATED COUNT: 17.1 % (ref 11.5–14.5)
FLUAV RNA RESP QL NAA+PROBE: NOT DETECTED
FLUBV RNA RESP QL NAA+PROBE: NOT DETECTED
GLUCOSE SERPL-MCNC: 124 MG/DL (ref 74–99)
HADV DNA SPEC QL NAA+PROBE: NOT DETECTED
HCT VFR BLD AUTO: 23.7 % (ref 41–52)
HGB BLD-MCNC: 7.6 G/DL (ref 13.5–17.5)
HMPV RNA SPEC QL NAA+PROBE: NOT DETECTED
IMM GRANULOCYTES # BLD AUTO: 1.33 X10*3/UL (ref 0–0.7)
IMM GRANULOCYTES NFR BLD AUTO: 10.9 % (ref 0–0.9)
LDH SERPL L TO P-CCNC: 154 U/L (ref 84–246)
LYMPHOCYTES # BLD MANUAL: 0.12 X10*3/UL (ref 1.2–4.8)
LYMPHOCYTES NFR BLD MANUAL: 1 %
MAGNESIUM SERPL-MCNC: 1.99 MG/DL (ref 1.6–2.4)
MCH RBC QN AUTO: 28.5 PG (ref 26–34)
MCHC RBC AUTO-ENTMCNC: 32.1 G/DL (ref 32–36)
MCV RBC AUTO: 89 FL (ref 80–100)
MONOCYTES # BLD MANUAL: 0 X10*3/UL (ref 0.1–1)
MONOCYTES NFR BLD MANUAL: 0 %
MYCOBACTERIUM SPEC CULT: NORMAL
NEUTROPHILS # BLD MANUAL: 12.08 X10*3/UL (ref 1.2–7.7)
NEUTS BAND # BLD MANUAL: 0.98 X10*3/UL (ref 0–0.7)
NEUTS BAND NFR BLD MANUAL: 8 %
NEUTS SEG # BLD MANUAL: 11.1 X10*3/UL (ref 1.2–7)
NEUTS SEG NFR BLD MANUAL: 91 %
NRBC BLD-RTO: 0 /100 WBCS (ref 0–0)
OVALOCYTES BLD QL SMEAR: ABNORMAL
PLATELET # BLD AUTO: 74 X10*3/UL (ref 150–450)
POTASSIUM SERPL-SCNC: 3.8 MMOL/L (ref 3.5–5.3)
PROT SERPL-MCNC: 4.3 G/DL (ref 6.4–8.2)
RBC # BLD AUTO: 2.67 X10*6/UL (ref 4.5–5.9)
RBC MORPH BLD: ABNORMAL
RHINOVIRUS RNA UPPER RESP QL NAA+PROBE: NOT DETECTED
RSV RNA RESP QL NAA+PROBE: NOT DETECTED
SARS-COV-2 RNA RESP QL NAA+PROBE: NOT DETECTED
SCAN RESULT: NORMAL
SCHISTOCYTES BLD QL SMEAR: ABNORMAL
SODIUM SERPL-SCNC: 140 MMOL/L (ref 136–145)
TOTAL CELLS COUNTED BLD: 100
WBC # BLD AUTO: 12.2 X10*3/UL (ref 4.4–11.3)

## 2025-03-14 PROCEDURE — 87581 M.PNEUMON DNA AMP PROBE: CPT

## 2025-03-14 PROCEDURE — 87636 SARSCOV2 & INF A&B AMP PRB: CPT

## 2025-03-14 PROCEDURE — 87634 RSV DNA/RNA AMP PROBE: CPT

## 2025-03-14 PROCEDURE — 83615 LACTATE (LD) (LDH) ENZYME: CPT

## 2025-03-14 PROCEDURE — 85007 BL SMEAR W/DIFF WBC COUNT: CPT

## 2025-03-14 PROCEDURE — 83735 ASSAY OF MAGNESIUM: CPT

## 2025-03-14 PROCEDURE — 96361 HYDRATE IV INFUSION ADD-ON: CPT | Mod: INF

## 2025-03-14 PROCEDURE — 2500000004 HC RX 250 GENERAL PHARMACY W/ HCPCS (ALT 636 FOR OP/ED): Performed by: NURSE PRACTITIONER

## 2025-03-14 PROCEDURE — 84075 ASSAY ALKALINE PHOSPHATASE: CPT

## 2025-03-14 PROCEDURE — 85027 COMPLETE CBC AUTOMATED: CPT

## 2025-03-14 PROCEDURE — 87631 RESP VIRUS 3-5 TARGETS: CPT

## 2025-03-14 RX ADMIN — SODIUM CHLORIDE 1000 ML: 9 INJECTION, SOLUTION INTRAVENOUS at 08:18

## 2025-03-15 LAB
CHLAMYDIA.PNEUMONIAE PCR, VIRC: NOT DETECTED
MYCOPLASMA.PNEUMONIAE PCR, VIRC: NOT DETECTED

## 2025-03-17 ENCOUNTER — NURSE TRIAGE (OUTPATIENT)
Dept: HEMATOLOGY/ONCOLOGY | Facility: HOSPITAL | Age: 65
End: 2025-03-17
Payer: MEDICARE

## 2025-03-17 NOTE — PATIENT COMMUNICATION
I called and left V/M for Bijan to call the nurse line back so we can discuss symptoms. I instructed him to call [phone], option 5 then option 2 to speak to a nurse.

## 2025-03-17 NOTE — TELEPHONE ENCOUNTER
I called Bijan back to let him know Dr. Mckeon's response since I see he didn't read the my chart message. I told him at his appointment tomorrow in infusion Dr. Mckeon mentioned they can lower the dose of the chemo and Thank you for letting him know. Bijan said nothing has changed with sore throat or any additional symptoms from my chart message and said thank you about the dosage change.

## 2025-03-18 ENCOUNTER — PHARMACY VISIT (OUTPATIENT)
Dept: PHARMACY | Facility: CLINIC | Age: 65
End: 2025-03-18
Payer: MEDICARE

## 2025-03-18 ENCOUNTER — OFFICE VISIT (OUTPATIENT)
Dept: HEMATOLOGY/ONCOLOGY | Facility: HOSPITAL | Age: 65
End: 2025-03-18
Payer: MEDICARE

## 2025-03-18 ENCOUNTER — INFUSION (OUTPATIENT)
Dept: HEMATOLOGY/ONCOLOGY | Facility: HOSPITAL | Age: 65
End: 2025-03-18
Payer: MEDICARE

## 2025-03-18 VITALS
HEART RATE: 90 BPM | OXYGEN SATURATION: 98 % | TEMPERATURE: 98.8 F | WEIGHT: 202.16 LBS | SYSTOLIC BLOOD PRESSURE: 105 MMHG | BODY MASS INDEX: 27.81 KG/M2 | DIASTOLIC BLOOD PRESSURE: 63 MMHG | RESPIRATION RATE: 16 BRPM

## 2025-03-18 DIAGNOSIS — C83.30 DIFFUSE LARGE B-CELL LYMPHOMA, UNSPECIFIED BODY REGION (MULTI): ICD-10-CM

## 2025-03-18 DIAGNOSIS — C83.78 BURKITT'S LYMPHOMA OF LYMPH NODES OF MULTIPLE REGIONS (MULTI): ICD-10-CM

## 2025-03-18 DIAGNOSIS — C83.78 BURKITT'S LYMPHOMA OF LYMPH NODES OF MULTIPLE REGIONS (MULTI): Primary | ICD-10-CM

## 2025-03-18 DIAGNOSIS — C83.30 DIFFUSE LARGE B-CELL LYMPHOMA, UNSPECIFIED BODY REGION (MULTI): Primary | ICD-10-CM

## 2025-03-18 LAB
ABO GROUP (TYPE) IN BLOOD: NORMAL
ALBUMIN SERPL BCP-MCNC: 3 G/DL (ref 3.4–5)
ALP SERPL-CCNC: 46 U/L (ref 33–136)
ALT SERPL W P-5'-P-CCNC: 10 U/L (ref 10–52)
ANION GAP SERPL CALC-SCNC: 11 MMOL/L (ref 10–20)
ANTIBODY SCREEN: NORMAL
AST SERPL W P-5'-P-CCNC: 5 U/L (ref 9–39)
BASOPHILS # BLD AUTO: 0 X10*3/UL (ref 0–0.1)
BASOPHILS NFR BLD AUTO: 0 %
BILIRUB SERPL-MCNC: 1 MG/DL (ref 0–1.2)
BLOOD EXPIRATION DATE: NORMAL
BLOOD EXPIRATION DATE: NORMAL
BUN SERPL-MCNC: 14 MG/DL (ref 6–23)
CALCIUM SERPL-MCNC: 8.1 MG/DL (ref 8.6–10.3)
CHLORIDE SERPL-SCNC: 102 MMOL/L (ref 98–107)
CO2 SERPL-SCNC: 26 MMOL/L (ref 21–32)
CREAT SERPL-MCNC: 0.53 MG/DL (ref 0.5–1.3)
DISPENSE STATUS: NORMAL
DISPENSE STATUS: NORMAL
EGFRCR SERPLBLD CKD-EPI 2021: >90 ML/MIN/1.73M*2
EOSINOPHIL # BLD AUTO: 0.01 X10*3/UL (ref 0–0.7)
EOSINOPHIL NFR BLD AUTO: 11.1 %
ERYTHROCYTE [DISTWIDTH] IN BLOOD BY AUTOMATED COUNT: 15.9 % (ref 11.5–14.5)
GLUCOSE SERPL-MCNC: 123 MG/DL (ref 74–99)
HCT VFR BLD AUTO: 15.9 % (ref 41–52)
HGB BLD-MCNC: 5.4 G/DL (ref 13.5–17.5)
IMM GRANULOCYTES # BLD AUTO: 0 X10*3/UL (ref 0–0.7)
IMM GRANULOCYTES NFR BLD AUTO: 0 % (ref 0–0.9)
LDH SERPL L TO P-CCNC: 106 U/L (ref 84–246)
LYMPHOCYTES # BLD AUTO: 0.05 X10*3/UL (ref 1.2–4.8)
LYMPHOCYTES NFR BLD AUTO: 55.6 %
MAGNESIUM SERPL-MCNC: 1.63 MG/DL (ref 1.6–2.4)
MCH RBC QN AUTO: 28.6 PG (ref 26–34)
MCHC RBC AUTO-ENTMCNC: 34 G/DL (ref 32–36)
MCV RBC AUTO: 84 FL (ref 80–100)
MONOCYTES # BLD AUTO: 0.02 X10*3/UL (ref 0.1–1)
MONOCYTES NFR BLD AUTO: 22.2 %
NEUTROPHILS # BLD AUTO: 0.01 X10*3/UL (ref 1.2–7.7)
NEUTROPHILS NFR BLD AUTO: 11.1 %
NRBC BLD-RTO: 20 /100 WBCS (ref 0–0)
PLATELET # BLD AUTO: 18 X10*3/UL (ref 150–450)
POTASSIUM SERPL-SCNC: 3.4 MMOL/L (ref 3.5–5.3)
PRODUCT BLOOD TYPE: 6200
PRODUCT BLOOD TYPE: 6200
PRODUCT CODE: NORMAL
PRODUCT CODE: NORMAL
PROT SERPL-MCNC: 4.5 G/DL (ref 6.4–8.2)
RBC # BLD AUTO: 1.89 X10*6/UL (ref 4.5–5.9)
RH FACTOR (ANTIGEN D): NORMAL
SODIUM SERPL-SCNC: 136 MMOL/L (ref 136–145)
UNIT ABO: NORMAL
UNIT ABO: NORMAL
UNIT NUMBER: NORMAL
UNIT NUMBER: NORMAL
UNIT RH: NORMAL
UNIT RH: NORMAL
UNIT VOLUME: 350
UNIT VOLUME: 350
WBC # BLD AUTO: 0.1 X10*3/UL (ref 4.4–11.3)
XM INTEP: NORMAL
XM INTEP: NORMAL

## 2025-03-18 PROCEDURE — 86850 RBC ANTIBODY SCREEN: CPT

## 2025-03-18 PROCEDURE — 2500000001 HC RX 250 WO HCPCS SELF ADMINISTERED DRUGS (ALT 637 FOR MEDICARE OP): Performed by: NURSE PRACTITIONER

## 2025-03-18 PROCEDURE — P9040 RBC LEUKOREDUCED IRRADIATED: HCPCS

## 2025-03-18 PROCEDURE — 96360 HYDRATION IV INFUSION INIT: CPT | Mod: INF

## 2025-03-18 PROCEDURE — 85025 COMPLETE CBC W/AUTO DIFF WBC: CPT

## 2025-03-18 PROCEDURE — 86923 COMPATIBILITY TEST ELECTRIC: CPT

## 2025-03-18 PROCEDURE — 83735 ASSAY OF MAGNESIUM: CPT

## 2025-03-18 PROCEDURE — 80053 COMPREHEN METABOLIC PANEL: CPT

## 2025-03-18 PROCEDURE — 99215 OFFICE O/P EST HI 40 MIN: CPT | Performed by: NURSE PRACTITIONER

## 2025-03-18 PROCEDURE — 36430 TRANSFUSION BLD/BLD COMPNT: CPT

## 2025-03-18 PROCEDURE — 1123F ACP DISCUSS/DSCN MKR DOCD: CPT | Performed by: NURSE PRACTITIONER

## 2025-03-18 PROCEDURE — RXMED WILLOW AMBULATORY MEDICATION CHARGE

## 2025-03-18 PROCEDURE — 2500000004 HC RX 250 GENERAL PHARMACY W/ HCPCS (ALT 636 FOR OP/ED): Performed by: NURSE PRACTITIONER

## 2025-03-18 PROCEDURE — 1157F ADVNC CARE PLAN IN RCRD: CPT | Performed by: NURSE PRACTITIONER

## 2025-03-18 PROCEDURE — 1111F DSCHRG MED/CURRENT MED MERGE: CPT | Performed by: NURSE PRACTITIONER

## 2025-03-18 PROCEDURE — 83615 LACTATE (LD) (LDH) ENZYME: CPT

## 2025-03-18 RX ORDER — SUCRALFATE 1 G/10ML
1 SUSPENSION ORAL
Qty: 1200 ML | Refills: 0 | Status: ON HOLD | OUTPATIENT
Start: 2025-03-18 | End: 2025-04-17

## 2025-03-18 RX ORDER — FAMOTIDINE 10 MG/ML
20 INJECTION, SOLUTION INTRAVENOUS ONCE AS NEEDED
Status: CANCELLED | OUTPATIENT
Start: 2025-03-18

## 2025-03-18 RX ORDER — EPINEPHRINE 0.3 MG/.3ML
0.3 INJECTION SUBCUTANEOUS EVERY 5 MIN PRN
Status: CANCELLED | OUTPATIENT
Start: 2025-03-18

## 2025-03-18 RX ORDER — ALBUTEROL SULFATE 0.83 MG/ML
3 SOLUTION RESPIRATORY (INHALATION) AS NEEDED
OUTPATIENT
Start: 2025-03-18

## 2025-03-18 RX ORDER — DIPHENHYDRAMINE HYDROCHLORIDE 50 MG/ML
50 INJECTION, SOLUTION INTRAMUSCULAR; INTRAVENOUS AS NEEDED
OUTPATIENT
Start: 2025-03-18

## 2025-03-18 RX ORDER — HEPARIN SODIUM,PORCINE/PF 10 UNIT/ML
50 SYRINGE (ML) INTRAVENOUS AS NEEDED
OUTPATIENT
Start: 2025-03-18

## 2025-03-18 RX ORDER — ALBUTEROL SULFATE 0.83 MG/ML
3 SOLUTION RESPIRATORY (INHALATION) AS NEEDED
Status: CANCELLED | OUTPATIENT
Start: 2025-03-18

## 2025-03-18 RX ORDER — EPINEPHRINE 0.3 MG/.3ML
0.3 INJECTION SUBCUTANEOUS EVERY 5 MIN PRN
OUTPATIENT
Start: 2025-03-18

## 2025-03-18 RX ORDER — OXYCODONE HYDROCHLORIDE 5 MG/1
5 TABLET ORAL EVERY 4 HOURS PRN
Status: CANCELLED | OUTPATIENT
Start: 2025-03-18

## 2025-03-18 RX ORDER — OXYCODONE HYDROCHLORIDE 5 MG/1
5 TABLET ORAL EVERY 4 HOURS PRN
Status: DISPENSED | OUTPATIENT
Start: 2025-03-18

## 2025-03-18 RX ORDER — HEPARIN 100 UNIT/ML
500 SYRINGE INTRAVENOUS AS NEEDED
OUTPATIENT
Start: 2025-03-18

## 2025-03-18 RX ORDER — HEPARIN SODIUM 1000 [USP'U]/ML
2000 INJECTION, SOLUTION INTRAVENOUS; SUBCUTANEOUS AS NEEDED
OUTPATIENT
Start: 2025-03-18

## 2025-03-18 RX ORDER — FAMOTIDINE 10 MG/ML
20 INJECTION, SOLUTION INTRAVENOUS ONCE AS NEEDED
OUTPATIENT
Start: 2025-03-18

## 2025-03-18 RX ORDER — DIPHENHYDRAMINE HYDROCHLORIDE 50 MG/ML
50 INJECTION, SOLUTION INTRAMUSCULAR; INTRAVENOUS AS NEEDED
Status: CANCELLED | OUTPATIENT
Start: 2025-03-18

## 2025-03-18 RX ADMIN — SODIUM CHLORIDE 500 ML: 9 INJECTION, SOLUTION INTRAVENOUS at 09:38

## 2025-03-18 RX ADMIN — OXYCODONE HYDROCHLORIDE 5 MG: 5 TABLET ORAL at 13:16

## 2025-03-18 ASSESSMENT — PAIN SCALES - GENERAL: PAINLEVEL_OUTOF10: 6

## 2025-03-18 NOTE — PROGRESS NOTES
Patient presents today for 2x weekly count check. Hgb 5.4 and plt 18. Received 2 units of PRBcs without incidence and tolerated well. 500ML NS bolus given for hypotension. Patient reports feeling increased fatigue and dizziness when standing. Elvira العلي CNP at bedside for fuv with patient and wife. Heartburn medication and PO potassium picked up from pharmacy prior to discharge. Patient off unit in stable condition via wheelchair accompanied by his wife and is aware of upcoming scheduled appts.

## 2025-03-19 ENCOUNTER — APPOINTMENT (OUTPATIENT)
Dept: HEMATOLOGY/ONCOLOGY | Facility: HOSPITAL | Age: 65
End: 2025-03-19
Payer: MEDICARE

## 2025-03-20 ENCOUNTER — NURSE TRIAGE (OUTPATIENT)
Dept: ADMISSION | Facility: HOSPITAL | Age: 65
End: 2025-03-20
Payer: MEDICARE

## 2025-03-20 ENCOUNTER — INFUSION (OUTPATIENT)
Dept: HEMATOLOGY/ONCOLOGY | Facility: CLINIC | Age: 65
End: 2025-03-20
Payer: MEDICARE

## 2025-03-20 VITALS
TEMPERATURE: 96.8 F | HEART RATE: 92 BPM | DIASTOLIC BLOOD PRESSURE: 62 MMHG | WEIGHT: 201.06 LBS | BODY MASS INDEX: 27.65 KG/M2 | OXYGEN SATURATION: 98 % | RESPIRATION RATE: 18 BRPM | SYSTOLIC BLOOD PRESSURE: 93 MMHG

## 2025-03-20 DIAGNOSIS — C83.78 BURKITT'S LYMPHOMA OF LYMPH NODES OF MULTIPLE REGIONS (MULTI): ICD-10-CM

## 2025-03-20 PROCEDURE — 2500000004 HC RX 250 GENERAL PHARMACY W/ HCPCS (ALT 636 FOR OP/ED): Performed by: STUDENT IN AN ORGANIZED HEALTH CARE EDUCATION/TRAINING PROGRAM

## 2025-03-20 PROCEDURE — 96360 HYDRATION IV INFUSION INIT: CPT | Mod: INF

## 2025-03-20 RX ORDER — EPINEPHRINE 0.3 MG/.3ML
0.3 INJECTION SUBCUTANEOUS EVERY 5 MIN PRN
OUTPATIENT
Start: 2025-03-20

## 2025-03-20 RX ORDER — DIPHENHYDRAMINE HYDROCHLORIDE 50 MG/ML
50 INJECTION, SOLUTION INTRAMUSCULAR; INTRAVENOUS AS NEEDED
OUTPATIENT
Start: 2025-03-20

## 2025-03-20 RX ORDER — FAMOTIDINE 10 MG/ML
20 INJECTION, SOLUTION INTRAVENOUS ONCE AS NEEDED
OUTPATIENT
Start: 2025-03-20

## 2025-03-20 RX ORDER — ALBUTEROL SULFATE 0.83 MG/ML
3 SOLUTION RESPIRATORY (INHALATION) AS NEEDED
OUTPATIENT
Start: 2025-03-20

## 2025-03-20 RX ADMIN — SODIUM CHLORIDE 1000 ML: 9 INJECTION, SOLUTION INTRAVENOUS at 14:22

## 2025-03-20 ASSESSMENT — PAIN SCALES - GENERAL: PAINLEVEL_OUTOF10: 0-NO PAIN

## 2025-03-20 NOTE — SIGNIFICANT EVENT
Dressing changed due to bleeding at insertion site. Bleeding was stopped by the time the dressing was changed. Biopatch saturated.  New dressing clean and dry at discharge.

## 2025-03-20 NOTE — TELEPHONE ENCOUNTER
"I called Bijan back about his my chart message. He said on 3/18 in infusion he saw Elvira العلي and she prescribed Sucralfate for his sore throat and it isn't helping. He said today it is 2/10 pain which is the best it has been. He has a hard time swallowing liquids, eating foods unless soft foods. He is drinking less than normal due to sore throat. He feels the fluids and food want to get \"stuck\" when he is swallowing. He is urinating every few hours and darker urine. He said this morning he had some dizziness when sitting to standing. /63 HR 79. This was taken on the phone. He said going from sitting to standing he is now getting dizzy which is a new symptom. He said he also had some diarrhea once this morning. Two B/M's in last 24hours and the other B/M was formed. Light brown and no different odor than usual. Takes Colace daily. Scheduled tomorrow in infusion for IV, Electrolytes. Messaged team and secured chat team.   "

## 2025-03-20 NOTE — TELEPHONE ENCOUNTER
Additional Information  • Are there daily activities that you're having trouble with such as getting dressed or making meals?     Feels like laying around more than usual.  • Commented on: Do you/patient have any of these signs of a stroke?     Denies all of these issues. /63. HR 79 taken on phone. He has grand daughter and wife home with him.  • Commented on: How long does dizziness last?     A few seconds when going sitting to standing.  • Commented on: When did these problems start?     Started this morning.  • Commented on: How long have they been going on?     Started this morning.  • Commented on: What helps these problems?     Sitting down.  • Commented on: What makes these problems worse?     Denies all  of these symptoms    Protocols used: Dizziness

## 2025-03-20 NOTE — TELEPHONE ENCOUNTER
Per additional phone encounter today, pt is scheduled to come to [place] infusion this afternoon for IVF and PICC line dressing change.

## 2025-03-20 NOTE — TELEPHONE ENCOUNTER
Spouse called reporting bleeding around pt's PICC line site which started this morning.  The gauze pad is saturated and dressing is still intact.  He denies bleeding elsewhere.  Pt was dizzy with standing earlier today, ok at present.  No headache.  Pt takes eliquis BID.    Secure chat sent to team.      Additional Information   Are you taking any blood thinners?     Eliquis BID   Commented on: Where are you having bleeding?     PICC line site   Commented on: When did the bleeding start?     Started this morning   Commented on: Can you give me an estimate of how much blood you've lost?     Circular gauze pad is saturated but not blood leaking from the dressing   Commented on: Have you taken any new drugs?     Levofloxacin and carafate   Commented on: What else do you want to tell me about this problem?     No fevers, chest pain or difficulty breathing  Was dizzy with standing earlier-ok right now  Fatigued  Platelets 18 earlier this week, received 2 units PRBC's    Protocols used: Bleeding

## 2025-03-20 NOTE — TELEPHONE ENCOUNTER
I called Bijan back to confirm if he can do 2:30 today in [place] and he can make the appointment today. Do you need me to put infusion order in with room or did you add to your schedule? Let me know. Thanks.

## 2025-03-21 ENCOUNTER — NUTRITION (OUTPATIENT)
Dept: HEMATOLOGY/ONCOLOGY | Facility: HOSPITAL | Age: 65
End: 2025-03-21
Payer: MEDICARE

## 2025-03-21 ENCOUNTER — INFUSION (OUTPATIENT)
Dept: HEMATOLOGY/ONCOLOGY | Facility: HOSPITAL | Age: 65
End: 2025-03-21
Payer: MEDICARE

## 2025-03-21 VITALS
WEIGHT: 203 LBS | OXYGEN SATURATION: 99 % | RESPIRATION RATE: 18 BRPM | DIASTOLIC BLOOD PRESSURE: 75 MMHG | TEMPERATURE: 97.9 F | BODY MASS INDEX: 27.92 KG/M2 | HEART RATE: 87 BPM | SYSTOLIC BLOOD PRESSURE: 110 MMHG

## 2025-03-21 VITALS — WEIGHT: 203.04 LBS | HEIGHT: 71 IN | BODY MASS INDEX: 28.43 KG/M2

## 2025-03-21 DIAGNOSIS — C83.30 DIFFUSE LARGE B-CELL LYMPHOMA, UNSPECIFIED BODY REGION (MULTI): ICD-10-CM

## 2025-03-21 DIAGNOSIS — C83.78 BURKITT'S LYMPHOMA OF LYMPH NODES OF MULTIPLE REGIONS (MULTI): Primary | ICD-10-CM

## 2025-03-21 DIAGNOSIS — C83.78 BURKITT'S LYMPHOMA OF LYMPH NODES OF MULTIPLE REGIONS (MULTI): ICD-10-CM

## 2025-03-21 LAB
ABO GROUP (TYPE) IN BLOOD: NORMAL
ALBUMIN SERPL BCP-MCNC: 3.1 G/DL (ref 3.4–5)
ALP SERPL-CCNC: 43 U/L (ref 33–136)
ALT SERPL W P-5'-P-CCNC: 13 U/L (ref 10–52)
ANION GAP SERPL CALC-SCNC: 10 MMOL/L (ref 10–20)
ANTIBODY SCREEN: NORMAL
AST SERPL W P-5'-P-CCNC: 8 U/L (ref 9–39)
BASOPHILS # BLD MANUAL: 0.02 X10*3/UL (ref 0–0.1)
BASOPHILS NFR BLD MANUAL: 1 %
BILIRUB SERPL-MCNC: 1 MG/DL (ref 0–1.2)
BUN SERPL-MCNC: 16 MG/DL (ref 6–23)
CALCIUM SERPL-MCNC: 8.1 MG/DL (ref 8.6–10.3)
CHLORIDE SERPL-SCNC: 103 MMOL/L (ref 98–107)
CO2 SERPL-SCNC: 27 MMOL/L (ref 21–32)
CREAT SERPL-MCNC: 0.55 MG/DL (ref 0.5–1.3)
DACRYOCYTES BLD QL SMEAR: ABNORMAL
EGFRCR SERPLBLD CKD-EPI 2021: >90 ML/MIN/1.73M*2
EOSINOPHIL # BLD MANUAL: 0.02 X10*3/UL (ref 0–0.7)
EOSINOPHIL NFR BLD MANUAL: 1 %
ERYTHROCYTE [DISTWIDTH] IN BLOOD BY AUTOMATED COUNT: 17.3 % (ref 11.5–14.5)
GLUCOSE SERPL-MCNC: 100 MG/DL (ref 74–99)
HCT VFR BLD AUTO: 22 % (ref 41–52)
HGB BLD-MCNC: 7.2 G/DL (ref 13.5–17.5)
HYPOCHROMIA BLD QL SMEAR: ABNORMAL
IMM GRANULOCYTES # BLD AUTO: 0.1 X10*3/UL (ref 0–0.7)
IMM GRANULOCYTES NFR BLD AUTO: 6.6 % (ref 0–0.9)
LDH SERPL L TO P-CCNC: 142 U/L (ref 84–246)
LYMPHOCYTES # BLD MANUAL: 0.27 X10*3/UL (ref 1.2–4.8)
LYMPHOCYTES NFR BLD MANUAL: 18 %
MAGNESIUM SERPL-MCNC: 1.5 MG/DL (ref 1.6–2.4)
MCH RBC QN AUTO: 27.7 PG (ref 26–34)
MCHC RBC AUTO-ENTMCNC: 32.7 G/DL (ref 32–36)
MCV RBC AUTO: 85 FL (ref 80–100)
METAMYELOCYTES # BLD MANUAL: 0.05 X10*3/UL
METAMYELOCYTES NFR BLD MANUAL: 3 %
MONOCYTES # BLD MANUAL: 0.23 X10*3/UL (ref 0.1–1)
MONOCYTES NFR BLD MANUAL: 15 %
MYELOCYTES # BLD MANUAL: 0.02 X10*3/UL
MYELOCYTES NFR BLD MANUAL: 1 %
NEUTROPHILS # BLD MANUAL: 0.92 X10*3/UL (ref 1.2–7.7)
NEUTS BAND # BLD MANUAL: 0.11 X10*3/UL (ref 0–0.7)
NEUTS BAND NFR BLD MANUAL: 7 %
NEUTS SEG # BLD MANUAL: 0.81 X10*3/UL (ref 1.2–7)
NEUTS SEG NFR BLD MANUAL: 54 %
NRBC BLD-RTO: 3.9 /100 WBCS (ref 0–0)
OVALOCYTES BLD QL SMEAR: ABNORMAL
PLATELET # BLD AUTO: 75 X10*3/UL (ref 150–450)
POLYCHROMASIA BLD QL SMEAR: ABNORMAL
POTASSIUM SERPL-SCNC: 3.3 MMOL/L (ref 3.5–5.3)
PROT SERPL-MCNC: 4.8 G/DL (ref 6.4–8.2)
RBC # BLD AUTO: 2.6 X10*6/UL (ref 4.5–5.9)
RBC MORPH BLD: ABNORMAL
RH FACTOR (ANTIGEN D): NORMAL
SODIUM SERPL-SCNC: 137 MMOL/L (ref 136–145)
TOTAL CELLS COUNTED BLD: 100
WBC # BLD AUTO: 1.5 X10*3/UL (ref 4.4–11.3)

## 2025-03-21 PROCEDURE — 84075 ASSAY ALKALINE PHOSPHATASE: CPT

## 2025-03-21 PROCEDURE — 96365 THER/PROPH/DIAG IV INF INIT: CPT | Mod: INF

## 2025-03-21 PROCEDURE — 83615 LACTATE (LD) (LDH) ENZYME: CPT

## 2025-03-21 PROCEDURE — 86900 BLOOD TYPING SEROLOGIC ABO: CPT

## 2025-03-21 PROCEDURE — 83735 ASSAY OF MAGNESIUM: CPT

## 2025-03-21 PROCEDURE — 2500000002 HC RX 250 W HCPCS SELF ADMINISTERED DRUGS (ALT 637 FOR MEDICARE OP, ALT 636 FOR OP/ED): Performed by: STUDENT IN AN ORGANIZED HEALTH CARE EDUCATION/TRAINING PROGRAM

## 2025-03-21 PROCEDURE — 85007 BL SMEAR W/DIFF WBC COUNT: CPT

## 2025-03-21 PROCEDURE — 85027 COMPLETE CBC AUTOMATED: CPT

## 2025-03-21 PROCEDURE — 2500000004 HC RX 250 GENERAL PHARMACY W/ HCPCS (ALT 636 FOR OP/ED): Performed by: STUDENT IN AN ORGANIZED HEALTH CARE EDUCATION/TRAINING PROGRAM

## 2025-03-21 RX ORDER — MAGNESIUM SULFATE HEPTAHYDRATE 40 MG/ML
2 INJECTION, SOLUTION INTRAVENOUS ONCE
Status: CANCELLED | OUTPATIENT
Start: 2025-03-21 | End: 2025-03-21

## 2025-03-21 RX ORDER — MAGNESIUM SULFATE HEPTAHYDRATE 40 MG/ML
2 INJECTION, SOLUTION INTRAVENOUS ONCE
Status: COMPLETED | OUTPATIENT
Start: 2025-03-21 | End: 2025-03-21

## 2025-03-21 RX ORDER — DIPHENHYDRAMINE HYDROCHLORIDE 50 MG/ML
50 INJECTION, SOLUTION INTRAMUSCULAR; INTRAVENOUS AS NEEDED
OUTPATIENT
Start: 2025-03-21

## 2025-03-21 RX ORDER — ALBUTEROL SULFATE 0.83 MG/ML
3 SOLUTION RESPIRATORY (INHALATION) AS NEEDED
OUTPATIENT
Start: 2025-03-21

## 2025-03-21 RX ORDER — EPINEPHRINE 0.3 MG/.3ML
0.3 INJECTION SUBCUTANEOUS EVERY 5 MIN PRN
OUTPATIENT
Start: 2025-03-21

## 2025-03-21 RX ORDER — FAMOTIDINE 10 MG/ML
20 INJECTION, SOLUTION INTRAVENOUS ONCE AS NEEDED
OUTPATIENT
Start: 2025-03-21

## 2025-03-21 RX ORDER — POTASSIUM CHLORIDE 20 MEQ/1
20 TABLET, EXTENDED RELEASE ORAL ONCE
Status: CANCELLED | OUTPATIENT
Start: 2025-03-21 | End: 2025-03-21

## 2025-03-21 RX ORDER — POTASSIUM CHLORIDE 750 MG/1
20 TABLET, FILM COATED, EXTENDED RELEASE ORAL ONCE
Status: COMPLETED | OUTPATIENT
Start: 2025-03-21 | End: 2025-03-21

## 2025-03-21 RX ADMIN — POTASSIUM CHLORIDE 20 MEQ: 750 TABLET, EXTENDED RELEASE ORAL at 14:07

## 2025-03-21 RX ADMIN — MAGNESIUM SULFATE IN WATER 2 G: 40 INJECTION, SOLUTION INTRAVENOUS at 14:02

## 2025-03-21 ASSESSMENT — PAIN SCALES - GENERAL: PAINLEVEL_OUTOF10: 0-NO PAIN

## 2025-03-21 NOTE — PROGRESS NOTES
Pt here for count check, IV magnesium and PO potassium given per orders. No transfusion needs today. Discharged in stable condition, no further needs at this time. Has schedule for follow up.

## 2025-03-21 NOTE — PROGRESS NOTES
NUTRITION FOLLOW UP NOTE    Reason for Visit:  Bijan Ibanez is a 64 y.o. male with newly dx'd Burkitt's lymphoma (Stage IV).    Currently being treated with dose-adjusted R-EPOCH    Pt seen today in infusion.  For admit on 3/28 for C4 EPOCH.    Patient Active Problem List   Diagnosis    Abdominal pain    Acute sinusitis    Chest pain    Ataxia    Benign hypertensive heart disease    Benign paroxysmal vertigo, unspecified ear    Bronchitis    Carotid artery stenosis    Chronic ischemic heart disease, unspecified    Chronic peripheral venous hypertension    Atherosclerosis of coronary artery without angina pectoris    Coronary artery disease involving native coronary artery of native heart with angina pectoris    Coronary artery disease    Diverticular disease of colon    Dyspnea    Essential hypertension    Hypertension    History of cholecystectomy    History of myocardial infarction    HLD (hyperlipidemia)    Malignant neoplasm of kidney (Multi)    Sensorineural hearing loss, bilateral    Neoplasm of uncertain behavior of skin    Obesity, Class I, BMI 30-34.9    Other general symptoms and signs    Personal history of malignant neoplasm of renal pelvis    Tobacco dependence in remission    Tubular adenoma    Adjustment disorder with mixed anxiety and depressed mood    Elevated prostate specific antigen (PSA)    Fever, unspecified    Hyponatremia    MI (myocardial infarction) (Multi)    Chronic midline low back pain without sciatica    Pancytopenia    Burkitt's lymphoma of lymph nodes of multiple regions (Multi)    Febrile neutropenia (CMS-HCC)    Bacteremia due to methicillin resistant Staphylococcus aureus    Paroxysmal atrial fibrillation (Multi)    Diffuse large B cell lymphoma    Burkitt's lymphoma (Multi)    Encounter for antineoplastic chemotherapy       Nutrition Significant Labs:  Lab Results   Component Value Date/Time    GLUCOSE 100 (H) 03/21/2025 1255     03/21/2025 1255    K 3.3 (L) 03/21/2025  "1255     03/21/2025 1255    CO2 27 03/21/2025 1255    ANIONGAP 10 03/21/2025 1255    BUN 16 03/21/2025 1255    CREATININE 0.55 03/21/2025 1255    EGFR >90 03/21/2025 1255    CALCIUM 8.1 (L) 03/21/2025 1255    ALBUMIN 3.1 (L) 03/21/2025 1255    ALKPHOS 43 03/21/2025 1255    PROT 4.8 (L) 03/21/2025 1255    AST 8 (L) 03/21/2025 1255    BILITOT 1.0 03/21/2025 1255    ALT 13 03/21/2025 1255    MG 1.50 (L) 03/21/2025 1255    PHOS 3.5 02/08/2025 0431     Lab Results   Component Value Date    WBC 1.5 (L) 03/21/2025    HGB 7.2 (L) 03/21/2025    HCT 22.0 (L) 03/21/2025    MCV 85 03/21/2025    PLT 75 (L) 03/21/2025       No results found for: \"VITD25\"      Anthropometrics:  Height: 181.6 cm (5' 11.5\")   Weight: 92.1 kg (203 lb 0.7 oz)   BMI (Calculated): 27.93    IBW/kg (Dietitian Calculated): 78.2 kg   Percent of IBW: 118 %        Weight History:    Wt down ~5 kg x 2 weeks; overall, pt with 22 kg (20%) wt loss x 6 weeks     Wt Readings from Last 20 Encounters:   03/21/25 92.1 kg (203 lb 0.7 oz)   03/21/25 92.1 kg (203 lb)   03/20/25 91.2 kg (201 lb 1 oz)   03/18/25 91.7 kg (202 lb 2.6 oz)   03/12/25 97.4 kg (214 lb 11.2 oz)   03/11/25 95.7 kg (210 lb 15.7 oz)   03/04/25 91.5 kg (201 lb 11.2 oz)   03/03/25 93 kg (205 lb)   02/28/25 93.4 kg (205 lb 12.8 oz)   02/27/25 92.4 kg (203 lb 11.3 oz)   02/24/25 93.8 kg (206 lb 12.7 oz)   02/20/25 100 kg (220 lb 7.4 oz)   02/20/25 100 kg (220 lb 7.4 oz)   02/19/25 101 kg (222 lb 7.1 oz)   02/06/25 115 kg (252 lb 13.9 oz)   01/28/25 115 kg (252 lb 6.8 oz)   01/27/25 115 kg (253 lb 15.5 oz)   01/26/25 115 kg (253 lb 15.5 oz)   01/13/25 107 kg (236 lb)   01/10/25 107 kg (236 lb)           Food and Nutrition History:    Pt reports that the last 2 weeks following chemo have been \"rough.\"  Has had persistent sore throat and difficulties swallowing since chemo although it has improved slightly in past 2 days; taking pain meds PRN.   Appetite and intakes have been down for the past 2+ " weeks; not hungry and no interest in eating.   Drinking Stawberry Boost at least once daily.   Drinking only 1 bottle of water/day but additionally has few glasses of milk every day as well hot chocolate and supplement drink.    No open sores in mouth.  Pt did try Carafate but didn't help and, actually, upset his stomach.   Very intermittent nausea; not problematic.  Had several days w/o BM so took stool softener and now having softly formed BM's.  Increased gas recently.   Slight swelling in bilat ankles  Energy very low; limited endurance and stamina.      Intakes:  Mainly oatmeal, jello, ice cream, soups, mashed potatoes and softer foods  Tried hot dog yesterday but was harder to get down.     Ensure Original provides 240 kcals and 9 gm protein      Medications:  Current Outpatient Medications   Medication Instructions    albuterol 90 mcg/actuation inhaler 2 puffs, Every 6 hours PRN    apixaban (ELIQUIS) 5 mg, oral, 2 times daily    atorvastatin (LIPITOR) 40 mg, oral, Daily (0630)    diphenhydramine/Maalox/lidocaine (Magic Mouthwash) - Compounded - Outpatient Swish and spit 10 mL by mouth every 6 hours if needed.    furosemide (LASIX) 20 mg, oral, Daily PRN    gabapentin (NEURONTIN) 300 mg, oral, Nightly    heparin flush 10 unit/mL injection 5 mL, Daily    lisinopril 10 mg, oral, Daily    metoprolol succinate XL (TOPROL-XL) 100 mg, oral, Daily, Do not crush or chew.    pantoprazole (PROTONIX) 40 mg, oral, Daily before breakfast, Do not crush, chew, or split.    potassium chloride CR (Klor-Con M20) 20 mEq ER tablet 20 mEq, oral, Daily, Do not crush or chew.    sodium chloride 0.9% flush 10 mL, Daily    sucralfate (CARAFATE) 1 g, oral, 4 times daily with meals and nightly       Nutrition Focused Physical Exam Findings:    Subcutaneous Fat Loss:   Orbital Fat Pads: Mild-Moderate (slight dark circles and slight hollowing)  Buccal Fat Pads: Mild-Moderate (flat cheeks, minimal bounce)    Muscle Wasting:  Temporalis:  Mild-Moderate (slight depression)  Pectoralis (Clavicular Region): Mild-Moderate (some protrusion of clavicle)  Deltoid/Trapezius: Mild-Moderate (slight protrusion of acromion process)  Interosseous: Mild-Moderate (slightly depressed area between thumb and forefinger)  Trapezius/Infraspinatus/Supraspinatus (Scapular Region): Mild-Moderate (slight protrusion of scapula)  Quadriceps: Mild-Moderate (mild depression on inner and outer thigh)    Physical Findings:   Bilat ankle edema       Estimated Needs:       Total Energy Estimated Needs in 24 hours (kCal):  (6751-0901)  Energy Estimated Needs per kg Body Weight in 24 hours (kCal/kg):  (25-30)  Total Protein Estimated Needs in 24 Hours (g):  (110+)  Protein Estimated Needs per kg Body Weight in 24 Hours (g/kg):  (1.2+)  Total Fluid Estimated Needs in 24 Hours (mL):  (2700)  Total Fluid Estimated Needs in 24 hours (mL/kg): 30 mL/kg             Nutrition Diagnosis   Malnutrition Diagnosis  Patient has Malnutrition Diagnosis: Yes  Diagnosis Status: New  Malnutrition Diagnosis: Moderate malnutrition related to chronic disease or condition  As Evidenced by: decreased appetite/intakes due to chemo related side effects with significant wt loss over the past several months    Nutrition Diagnosis  Patient has Nutrition Diagnosis: Yes  Diagnosis Status (1): New  Nutrition Diagnosis 1: Increased nutrient needs  Related to (1): disease state/chemo  As Evidenced by (1): increased nutrient requirements necessary to promote repletion     Pt remains moderately malnourished.  Wt, intakes and appetite have declined in setting of sore throat/odynophagia following chemo.  Would continue to benefit from daily use of supplements.    Nutrition Interventions/Recommendations   Nutrition Prescription:    High protein, High Calorie    Nutrition Interventions/Education:    Have reviewed importance of protein intakes and sources of protein that are easiest to eat  Suggested using Ensure  Plus/Complete (vs Ensure Original) and increasing to 2x/day in setting of recent wt loss and decreased PO; have encouraged pt to make into milkshakes for additional calories.  Discussed ways to maximize nutrient density of the foods he is able to eat.  Suggested salt water gargle/swallow just before eating to see if helps with throat pain  Discussed need for increased fluid intakes; prefer calorie containing beverages to water at this time.      Coordination of Care:  Mal [facility]           Nutrition Monitoring and Evaluation   Food and Nutrient Intake  Monitoring and Evaluation Plan: Energy intake, Fluid intake, Meal/snack pattern, Protein intake  Criteria: Consume PO in amounts needed to prevent further wt loss  Criteria: Goal:  80+ oz daily; prefer calorie containing beverages  Criteria: Smaller, more frequent meals and snacks vs 3 meals per day  Criteria: Include protein source at all meals and snacks  Additional Plan: ONS BID       Will continue to f/up and monitor.

## 2025-03-21 NOTE — PROGRESS NOTES
Patient ID: Bijan Ibanez is a 64 y.o. male.  Referring Physician: No referring provider defined for this encounter.  Primary Care Provider: Nehal Murphy MD     Diagnosis: Burkitt's Lymphoma  Date of Diagnosis: 1/16/25  Stage at Diagnosis: IV  Risk category: high risk (marrow involvement)    Prior Treatments:    Current Treaments:  DA-R-EPOCH C1D1 1/21/25    Assessment/Plan    Bijan Ibanez is a 64 y.o. male with PMH of HTN, HLD, CAD, MI (s/p stent 2010, on ASA), hx renal cell carcinoma (s/p R partial nephrectomy in 2013 @ [facility]), GERD, BPH, arthritis who is following us for management of his newly diagnosed Burkitt's lymphoma.     # Burkitt's Lymphoma - Originally signed out as high grade lymphoma but after burkitt's rearrangement (t8;14) came back positive. High risk based on marrow involvement, though no CNS involvement  - previously reviewed diagnosis and prognosis with patient  - initially give DA-R-EPOCH when was signed out as high grade lymphoma, given the significant toxicity he had with DA R EPOCH, will not escalate to CODOX-IVAC or hyper-CVAD given no CNS involvement  - PET post 2C Chemo shows deauville 2 - c/w CR  - cont w/ EPOCH, increased to dose level 2 but kept etoposide at 25% DR of dose level 2  - Tolerated C3 of EPOCH well so far, receiving pegfilgrastim and rituximab today (3/12/2025).   - Now off allopurinol and acyclovir per Dr. Mckeon.     # Dizziness (3/18 sick visit)  - Likely d/t anemia - Hgb 5.4  - Transfused 2 units PRBCs & 500 mL NS Bolus with improvement in his symptoms    # CNS prophylaxis  - given dose of IT methotrexate in hospital, will plan to give additional doses starting with C4    #Infectious Prophylaxis  - Levofloxacin while neutropenic (started at discharge)  - Discontinued fluconazole     # G1 CIPN  - monitor    # A fib  - on anticoagulation with apixaban, rate controlled with metoprolol. Hold AC if platelets <50k.     # MRSA bacteremia  - s/p 4 weeks of IV Vanco, EOT  3/3/25    #Hypokalemia  - Replete intermittently as required    #Access: Double Lumen PICC placed 3/7/2025     RTC  Twice weekly count checks  3/25 Dr. Mckeon    ____________________________________________________________________________________________________________________    HISTORY  Per HPI from inpatient:  Bijan Ibanez is a 64 y.o. male with PMH of HTN, HLD, CAD, MI (s/p stent 2010, on ASA), hx renal cell carcinoma (s/p R partial nephrectomy in 2013 @ [facility]), GERD, BPH, arthritis who is being followed for management of newly diagnosed high grade B-cell lymphoma.      Patient recently admitted 1/11-1/17 after concerning imaging outpatient leading to c/f relapsed RCC vs new other malignancy.      Patient endorses recent history of constitutional symptoms, including night sweats, weight loss, decreased appetite, fatigue, OSORIO. Patient and wife both developed URI symptoms after [place], however, the wife's symptoms resolved while the patient's did not. On admit, continues to have low back pain, that improves with oxycodone, and is worse with movement. Reports an episode of right flank pain yesterday that was very painful but resolved after pain meds and has not yet recurred. Reporting chin and lip numbness that has been present since before Val. Also reports right parotid swelling, that has sometimes appeared to have improved but will then return to same size, but has not continued to enlarge; sometimes tender to touch, sometimes not. Had a left toothache at some point that resolved and has not recurred. Patient denies saddle anesthesia, loss of lower extremity function or loss of bladder/bowel function. Denies CP, SOB, abd pain, N/V/D/C.     Interval Hx  Recently admitted for C3 EPOCH 3/7 through 3/11/25. He has overall done very well. Presents for count Here today for count check. [facility] for dizziness, hypotension, tachycardia. Hgb 5.4 without any evidence of  bleeding. No fevers or chills     Bijan Ibanez  reports that he has quit smoking. His smoking use included cigarettes and pipe. He has never used smokeless tobacco.  He  reports no history of alcohol use.  He  reports no history of drug use.  Family History   Problem Relation Name Age of Onset    Coronary artery disease Mother      Alzheimer's disease Mother      Coronary artery disease Father      Skin cancer Father      Alzheimer's disease Father      Alzheimer's disease Mother's Brother      Alzheimer's disease Father's Brother      Stomach cancer Maternal Grandfather      Other cancer Paternal Grandfather          prostate or colon cancer    Heart disease Other       Oncology History   Burkitt's lymphoma of lymph nodes of multiple regions (Multi)   1/13/2025 Initial Diagnosis    Diagnosis: Burkitt Lymphoma, Edmond Stage IV, BL-IPI High-Risk (score 4/5).    BL-IPI Calculation:  Age >60 years: Yes (64 years old).  Performance status (ECOG >=2): Presumed based on clinical presentation requiring hospitalization.  Serum LDH >ULN: 4102 U/L (1/11/25)  Edmond Stage III/IV: Yes (stage IV with extranodal involvement including kidneys, liver, spleen, and musculature).  CNS involvement: No (MRI and LP negative).  Total Score: 4/5 (High-Risk).    Presenting Symptoms: Asymmetric swelling of the right-sided muscles of mastication; imaging revealed incidental findings of perihilar mass and renal lesions.    Labs at Diagnosis: WBC 4.0, platelets 72; LDH 4102 U/L (1/11/25)    Pathology:  EBUS with lymph node biopsy (1/13/25): Predominantly CD20-positive small lymphoid cells, raising suspicion for a B-cell lymphoproliferative process.  Bone marrow biopsy (1/16/25): 70-80% involvement by high-grade B-cell lymphoma in a hypercellular marrow (90% cellular) with maturing trilineage hematopoiesis. FISH confirmed t(8;14)/IGH::MYC rearrangement, diagnostic of Burkitt lymphoma.     Imaging:  CT Neck (1/9/25): Fusiform thickening  of right masticatory muscles, likely benign hypertrophy.  CT C/A/P (1/11/25): Left perihilar mass, pulmonary nodules, right renal lesions, and right adrenal gland thickening concerning for metastatic disease; T12-L1 soft tissue mass.  PET-CT (1/20): Widespread hermann and extranodal hypermetabolic involvement, including kidneys, liver, spleen, abdominal wall, and musculature, suggestive of extensive lymphomatous disease.  MRI Brain (1/21): No intracranial lymphoma; suspected osseous involvement of calvarium.    Treatment:  Dexamethasone 40 mg daily (1/20-1/21).  Prophylactic IT methotrexate via LP (1/22), flow negative for lymphoma.     1/21/2025 -  Chemotherapy    (INPT) Dose-Adjusted R-EPOCH (Etoposide / DOXOrubicin / VinCRIStine / Cyclophosphamide / PredniSONE) + RiTUXimab, 21 Day Cycles          Performance Status:  ECOG Score: 2- Ambulatory and  capable of all selfcare; unable to carry out work activities.  Up and about > 50% of waking hrs.   Karnofsky Score: 70 - Cares for self; unable to carry on normal activity or do normal work     Objective   BSA: There is no height or weight on file to calculate BSA.  There were no vitals taken for this visit.  Physical Exam  Constitutional:       Appearance: He is ill-appearing.   HENT:      Head: Normocephalic.      Nose: Nose normal.      Mouth/Throat:      Mouth: Mucous membranes are moist.      Pharynx: Oropharynx is clear.   Eyes:      Extraocular Movements: Extraocular movements intact.      Conjunctiva/sclera: Conjunctivae normal.      Pupils: Pupils are equal, round, and reactive to light.   Cardiovascular:      Rate and Rhythm: Normal rate and regular rhythm.      Pulses: Normal pulses.      Heart sounds: Normal heart sounds.   Pulmonary:      Effort: Pulmonary effort is normal.      Breath sounds: Normal breath sounds.   Abdominal:      General: Abdomen is flat. Bowel sounds are normal.      Palpations: Abdomen is soft.   Musculoskeletal:         General: Normal  range of motion.      Cervical back: Normal range of motion.   Skin:     General: Skin is warm and dry.      Capillary Refill: Capillary refill takes less than 2 seconds.   Neurological:      General: No focal deficit present.      Mental Status: He is alert and oriented to person, place, and time. Mental status is at baseline.   Psychiatric:         Mood and Affect: Mood normal.        Path:      Component    FINAL DIAGNOSIS   A, B & C. BONE MARROW CLOT WITH ASPIRATE AND CORE WITH TOUCH PREP, LEFT ILIAC CREST:       -- WITH INVOLVEMENT BY HIGH GRADE B-CELL LYMPHOMA, 70-80% OF MARROW CELLULAR ELEMENTS  -- HYPERCELLULAR BONE MARROW (90% CELLULAR) WITH MATURING TRILINEAGE HEMATOPOIESIS   -- SEE NOTE     NOTE: The differential diagnosis includes Burkitt lymphoma and high grade B-cell lymphoma with MYC and Bcl2 and/or Bcl-6 rearrangements (double / triple hit lymphoma). FISH studies and lymphoid NGS panel are pending for further characterization. Clinical and molecular results correlation is suggested         Component    ISCN    Results:    FISH POSITIVE for t(8;14)/ IGH::MYC rearrangement.  nuc  kieran(D8Z2x2,MYCx3,IGHx3)(MYC con IGHx2)[31/250]/(D8Z2x2,MYCx2,IGHx2)(MYC con IGHx1)[8/250]       Imaging:  PET 2/24/25  IMPRESSION:  1. There is near complete interval resolution of previously seen  hypermetabolic hermann and extranodal disease involving bilateral  submandibular, parotid gland, hepatic lesions, spleen, mesenteric  deposits, bilateral kidney stomach bilateral, bilateral arms,  anterior chest wall, and gluteal muscle supra and infra diaphragmatic  lymphadenopathy.  2. Diffuse metabolic activity within the bone marrow, limiting the  evaluation a focal metabolic osseous metastatic disease. Likely post  treatment changes. Lymphomatous involvement can not be completely  excluded. Deauville score 2.  3. Splenomegaly with metabolic activity less than liver.  4. Mild decrease in size and metabolic activity within left  lower  lobe opacity, likely inflammatory.  5. Subcentimeter nodular opacities within right lung base, likely  inflammatory/infective.        PET 1/20/25  IMPRESSION:  Widespread hermann as well as extranodal lymphomatous involvement.  1. Multiple hypermetabolic supra and infra diaphragmatic  lymphadenopathy as described, consistent with lymphomatous  involvement.  2. Intensely hypermetabolic activity within bilateral enlarged  submandibular and parotid glands as described, concerning for  lymphomatous involvement.  3. Hypermetabolic mass like infiltration within bilateral  kidneys(right> left), corresponding to lesion seen on CT dated  01/01/2025, compatible with lymphomatous involvement.  4. Hypermetabolic hepatic lesions without any underlying CT  correlate, consistent with lymphomatous involvement.  5. Splenomegaly with nonspecific hypermetabolic activity suggestive  of extranodal involvement.  6. Multiple hypermetabolic soft tissue mesenteric deposits as well as  anterior abdominal wall nodular deposits, with few of the lesions  without any underlying CT correlate, concerning for additional  lymphomatous involvement.  7. Metabolic activity within the stomach, bilateral arms, anterior  chest wall and gluteal muscles without underlying CT correlate,  concerning for lymphomatous involvement.    Elvira العلي, APRN-CNP  [facility]

## 2025-03-25 ENCOUNTER — HOSPITAL ENCOUNTER (OUTPATIENT)
Dept: RADIOLOGY | Facility: HOSPITAL | Age: 65
Discharge: HOME | End: 2025-03-25
Payer: MEDICARE

## 2025-03-25 ENCOUNTER — INFUSION (OUTPATIENT)
Dept: OTHER | Facility: HOSPITAL | Age: 65
End: 2025-03-25
Payer: MEDICARE

## 2025-03-25 ENCOUNTER — OFFICE VISIT (OUTPATIENT)
Dept: HEMATOLOGY/ONCOLOGY | Facility: HOSPITAL | Age: 65
End: 2025-03-25
Payer: MEDICARE

## 2025-03-25 VITALS
WEIGHT: 201.94 LBS | SYSTOLIC BLOOD PRESSURE: 102 MMHG | OXYGEN SATURATION: 100 % | DIASTOLIC BLOOD PRESSURE: 68 MMHG | RESPIRATION RATE: 18 BRPM | HEART RATE: 77 BPM | BODY MASS INDEX: 27.78 KG/M2 | TEMPERATURE: 98.1 F

## 2025-03-25 DIAGNOSIS — C83.30 DIFFUSE LARGE B-CELL LYMPHOMA, UNSPECIFIED BODY REGION (MULTI): ICD-10-CM

## 2025-03-25 DIAGNOSIS — C83.78 BURKITT'S LYMPHOMA OF LYMPH NODES OF MULTIPLE REGIONS (MULTI): Primary | ICD-10-CM

## 2025-03-25 DIAGNOSIS — C83.78 BURKITT'S LYMPHOMA OF LYMPH NODES OF MULTIPLE REGIONS (MULTI): ICD-10-CM

## 2025-03-25 PROBLEM — C83.74: Status: ACTIVE | Noted: 2025-03-25

## 2025-03-25 LAB
ALBUMIN SERPL BCP-MCNC: 3.3 G/DL (ref 3.4–5)
ALP SERPL-CCNC: 45 U/L (ref 33–136)
ALT SERPL W P-5'-P-CCNC: 11 U/L (ref 10–52)
ANION GAP SERPL CALC-SCNC: 11 MMOL/L (ref 10–20)
AST SERPL W P-5'-P-CCNC: 9 U/L (ref 9–39)
BASOPHILS # BLD MANUAL: 0 X10*3/UL (ref 0–0.1)
BASOPHILS NFR BLD MANUAL: 0 %
BILIRUB SERPL-MCNC: 0.9 MG/DL (ref 0–1.2)
BUN SERPL-MCNC: 15 MG/DL (ref 6–23)
CALCIUM SERPL-MCNC: 8.8 MG/DL (ref 8.6–10.3)
CHLORIDE SERPL-SCNC: 100 MMOL/L (ref 98–107)
CO2 SERPL-SCNC: 30 MMOL/L (ref 21–32)
CREAT SERPL-MCNC: 0.58 MG/DL (ref 0.5–1.3)
DACRYOCYTES BLD QL SMEAR: ABNORMAL
EGFRCR SERPLBLD CKD-EPI 2021: >90 ML/MIN/1.73M*2
EOSINOPHIL # BLD MANUAL: 0 X10*3/UL (ref 0–0.7)
EOSINOPHIL NFR BLD MANUAL: 0 %
ERYTHROCYTE [DISTWIDTH] IN BLOOD BY AUTOMATED COUNT: 17.8 % (ref 11.5–14.5)
GLUCOSE SERPL-MCNC: 105 MG/DL (ref 74–99)
HCT VFR BLD AUTO: 25.1 % (ref 41–52)
HGB BLD-MCNC: 8.1 G/DL (ref 13.5–17.5)
IMM GRANULOCYTES # BLD AUTO: 0.34 X10*3/UL (ref 0–0.7)
IMM GRANULOCYTES NFR BLD AUTO: 6.6 % (ref 0–0.9)
LDH SERPL L TO P-CCNC: 177 U/L (ref 84–246)
LYMPHOCYTES # BLD MANUAL: 0.41 X10*3/UL (ref 1.2–4.8)
LYMPHOCYTES NFR BLD MANUAL: 8 %
MAGNESIUM SERPL-MCNC: 1.73 MG/DL (ref 1.6–2.4)
MCH RBC QN AUTO: 27.7 PG (ref 26–34)
MCHC RBC AUTO-ENTMCNC: 32.3 G/DL (ref 32–36)
MCV RBC AUTO: 86 FL (ref 80–100)
METAMYELOCYTES # BLD MANUAL: 0.1 X10*3/UL
METAMYELOCYTES NFR BLD MANUAL: 2 %
MONOCYTES # BLD MANUAL: 0.51 X10*3/UL (ref 0.1–1)
MONOCYTES NFR BLD MANUAL: 10 %
MYELOCYTES # BLD MANUAL: 0.05 X10*3/UL
MYELOCYTES NFR BLD MANUAL: 1 %
NEUTROPHILS # BLD MANUAL: 4.03 X10*3/UL (ref 1.2–7.7)
NEUTS BAND # BLD MANUAL: 0.46 X10*3/UL (ref 0–0.7)
NEUTS BAND NFR BLD MANUAL: 9 %
NEUTS SEG # BLD MANUAL: 3.57 X10*3/UL (ref 1.2–7)
NEUTS SEG NFR BLD MANUAL: 70 %
NRBC BLD-RTO: 1.2 /100 WBCS (ref 0–0)
OVALOCYTES BLD QL SMEAR: ABNORMAL
PLATELET # BLD AUTO: 192 X10*3/UL (ref 150–450)
POLYCHROMASIA BLD QL SMEAR: ABNORMAL
POTASSIUM SERPL-SCNC: 4.3 MMOL/L (ref 3.5–5.3)
PROT SERPL-MCNC: 4.9 G/DL (ref 6.4–8.2)
RBC # BLD AUTO: 2.92 X10*6/UL (ref 4.5–5.9)
RBC MORPH BLD: ABNORMAL
SCHISTOCYTES BLD QL SMEAR: ABNORMAL
SODIUM SERPL-SCNC: 137 MMOL/L (ref 136–145)
TOTAL CELLS COUNTED BLD: 100
WBC # BLD AUTO: 5.1 X10*3/UL (ref 4.4–11.3)

## 2025-03-25 PROCEDURE — 36591 DRAW BLOOD OFF VENOUS DEVICE: CPT

## 2025-03-25 PROCEDURE — 1123F ACP DISCUSS/DSCN MKR DOCD: CPT | Performed by: STUDENT IN AN ORGANIZED HEALTH CARE EDUCATION/TRAINING PROGRAM

## 2025-03-25 PROCEDURE — 85027 COMPLETE CBC AUTOMATED: CPT

## 2025-03-25 PROCEDURE — 85007 BL SMEAR W/DIFF WBC COUNT: CPT

## 2025-03-25 PROCEDURE — 99215 OFFICE O/P EST HI 40 MIN: CPT | Performed by: STUDENT IN AN ORGANIZED HEALTH CARE EDUCATION/TRAINING PROGRAM

## 2025-03-25 PROCEDURE — 71111 X-RAY EXAM RIBS/CHEST4/> VWS: CPT

## 2025-03-25 PROCEDURE — 80053 COMPREHEN METABOLIC PANEL: CPT

## 2025-03-25 PROCEDURE — 83615 LACTATE (LD) (LDH) ENZYME: CPT

## 2025-03-25 PROCEDURE — 1157F ADVNC CARE PLAN IN RCRD: CPT | Performed by: STUDENT IN AN ORGANIZED HEALTH CARE EDUCATION/TRAINING PROGRAM

## 2025-03-25 PROCEDURE — 83735 ASSAY OF MAGNESIUM: CPT

## 2025-03-25 PROCEDURE — 1111F DSCHRG MED/CURRENT MED MERGE: CPT | Performed by: STUDENT IN AN ORGANIZED HEALTH CARE EDUCATION/TRAINING PROGRAM

## 2025-03-25 PROCEDURE — G2211 COMPLEX E/M VISIT ADD ON: HCPCS | Performed by: STUDENT IN AN ORGANIZED HEALTH CARE EDUCATION/TRAINING PROGRAM

## 2025-03-25 RX ORDER — ALBUTEROL SULFATE 0.83 MG/ML
3 SOLUTION RESPIRATORY (INHALATION) AS NEEDED
Status: CANCELLED | OUTPATIENT
Start: 2025-03-28

## 2025-03-25 RX ORDER — PROCHLORPERAZINE EDISYLATE 5 MG/ML
10 INJECTION INTRAMUSCULAR; INTRAVENOUS EVERY 6 HOURS PRN
Status: CANCELLED | OUTPATIENT
Start: 2025-03-28

## 2025-03-25 RX ORDER — PREDNISONE 20 MG/1
140 TABLET ORAL 2 TIMES DAILY
Status: CANCELLED | OUTPATIENT
Start: 2025-03-28

## 2025-03-25 RX ORDER — DIPHENHYDRAMINE HYDROCHLORIDE 50 MG/ML
50 INJECTION, SOLUTION INTRAMUSCULAR; INTRAVENOUS AS NEEDED
Status: CANCELLED | OUTPATIENT
Start: 2025-03-28

## 2025-03-25 RX ORDER — FAMOTIDINE 10 MG/ML
20 INJECTION, SOLUTION INTRAVENOUS AS NEEDED
Status: CANCELLED | OUTPATIENT
Start: 2025-03-28

## 2025-03-25 RX ORDER — DIPHENHYDRAMINE HYDROCHLORIDE 50 MG/ML
50 INJECTION, SOLUTION INTRAMUSCULAR; INTRAVENOUS AS NEEDED
OUTPATIENT
Start: 2025-04-04

## 2025-03-25 RX ORDER — EPINEPHRINE 0.3 MG/.3ML
0.3 INJECTION SUBCUTANEOUS EVERY 5 MIN PRN
OUTPATIENT
Start: 2025-04-04

## 2025-03-25 RX ORDER — FAMOTIDINE 10 MG/ML
20 INJECTION, SOLUTION INTRAVENOUS ONCE AS NEEDED
OUTPATIENT
Start: 2025-04-04

## 2025-03-25 RX ORDER — PROCHLORPERAZINE MALEATE 10 MG
10 TABLET ORAL EVERY 6 HOURS PRN
OUTPATIENT
Start: 2025-04-04

## 2025-03-25 RX ORDER — ALBUTEROL SULFATE 0.83 MG/ML
3 SOLUTION RESPIRATORY (INHALATION) AS NEEDED
OUTPATIENT
Start: 2025-04-04

## 2025-03-25 RX ORDER — DIPHENHYDRAMINE HCL 50 MG
50 CAPSULE ORAL ONCE
OUTPATIENT
Start: 2025-04-04

## 2025-03-25 RX ORDER — OLANZAPINE 5 MG/1
5 TABLET ORAL NIGHTLY
Status: CANCELLED | OUTPATIENT
Start: 2025-03-28

## 2025-03-25 RX ORDER — PROCHLORPERAZINE EDISYLATE 5 MG/ML
10 INJECTION INTRAMUSCULAR; INTRAVENOUS EVERY 6 HOURS PRN
OUTPATIENT
Start: 2025-04-04

## 2025-03-25 RX ORDER — ACETAMINOPHEN 325 MG/1
650 TABLET ORAL ONCE
OUTPATIENT
Start: 2025-04-04

## 2025-03-25 RX ORDER — PROCHLORPERAZINE MALEATE 10 MG
10 TABLET ORAL EVERY 6 HOURS PRN
Status: CANCELLED | OUTPATIENT
Start: 2025-03-28

## 2025-03-25 RX ORDER — OXYCODONE HYDROCHLORIDE 5 MG/1
5 TABLET ORAL EVERY 6 HOURS PRN
Qty: 30 TABLET | Refills: 0 | Status: ON HOLD | OUTPATIENT
Start: 2025-03-25 | End: 2025-04-24

## 2025-03-25 RX ORDER — EPINEPHRINE 1 MG/ML
0.3 INJECTION INTRAMUSCULAR; INTRAVENOUS; SUBCUTANEOUS EVERY 5 MIN PRN
Status: CANCELLED | OUTPATIENT
Start: 2025-03-28

## 2025-03-25 ASSESSMENT — PAIN SCALES - GENERAL: PAINLEVEL_OUTOF10: 0-NO PAIN

## 2025-03-25 NOTE — PROGRESS NOTES
Patient ID: Bijan Ibanez is a 64 y.o. male.  [place]  [id number] [place] [address]  Primary Care Provider: Nehal Murphy MD     Diagnosis: Burkitt's Lymphoma  Date of Diagnosis: 1/16/25  Stage at Diagnosis: IV  Risk category: high risk (marrow involvement)    Prior Treatments:    Current Treaments:  DA-R-EPOCH C1D1 1/21/25    Assessment/Plan    Bijan Iabnez is a 64 y.o. male with PMH of HTN, HLD, CAD, MI (s/p stent 2010, on ASA), hx renal cell carcinoma (s/p R partial nephrectomy in 2013 @ [facility]), GERD, BPH, arthritis who is admitted for further evaluation and management of newly diagnosed Burkitt's lymphoma    # Burkitt's Lymphoma - Originally signed out as high grade lymphoma but after burkitt's rearrangement (t8;14) came back positive. High risk based on marrow involvement, though no CNS involvement  - previously reviewed diagnosis and prognosis with patient  - initially give DA-R-EPOCH when was signed out as high grade lymphoma, given the significant toxicity he had with DA R EPOCH, will not escalate to CODOX-IVAC or hyper-CVAD given no CNS involvement  - PET post 2C Chemo shows deauville 2 - c/w CR  - cont w/ EPOCH, increased to dose level 2 but unfortunately had G3 mucositis again, will go down on the etoposide back to prior dose    # CNS prophylaxis  - given dose of IT methotrexate in hospital, will plan to give additional doses starting with C4, will give D1 and D5 while in-house split between methotrexate and cytarabine    # G1 CIPN  - monitor    # MRSA bacteremia  - s/p 4 weeks of IV Vanco, EOT 3/3/25    # Rib pain  - check rib xray       No problem-specific Assessment & Plan notes found for this encounter.    ____________________________________________________________________________________________________________________    HISTORY  Per HPI from inpatient:  Bijan Ibanez is a 64 y.o. male with PMH of HTN, HLD, CAD, MI (s/p stent 2010, on ASA), hx  renal cell carcinoma (s/p R partial nephrectomy in 2013 @ [facility]), GERD, BPH, arthritis who is admitted for further evaluation and management of newly diagnosed high grade B-cell lymphoma.      Patient recently admitted 1/11-1/17 after concerning imaging outpatient leading to c/f relapsed RCC vs new other malignancy.      Patient endorses recent history of constitutional symptoms, including night sweats, weight loss, decreased appetite, fatigue, OSORIO. Patient and wife both developed URI symptoms after [place], however, the wife's symptoms resolved while the patient's did not. On admit, continues to have low back pain, that improves with oxycodone, and is worse with movement. Reports an episode of right flank pain yesterday that was very painful but resolved after pain meds and has not yet recurred. Reporting chin and lip numbness that has been present since before Val. Also reports right parotid swelling, that has sometimes appeared to have improved but will then return to same size, but has not continued to enlarge; sometimes tender to touch, sometimes not. Had a left toothache at some point that resolved and has not recurred. Patient denies saddle anesthesia, loss of lower extremity function or loss of bladder/bowel function. Denies CP, SOB, abd pain, N/V/D/C.     Interval Hx  Reports having had significant mouth pain. Decreased PO intake, now improved. Also noted rib pain since being discharged from hospital. Position changes bring it on as well as coughing etc. Feels like its in the bone. No change to mild neuropathy. No significant N/V/F/C/SOB/CP/abd pain    Bijan LOPEZ Shamar  reports that he has quit smoking. His smoking use included cigarettes and pipe. He has never used smokeless tobacco.  He  reports no history of alcohol use.  He  reports no history of drug use.  Family History   Problem Relation Name Age of Onset    Coronary artery disease Mother      Alzheimer's disease Mother      Coronary artery  disease Father      Skin cancer Father      Alzheimer's disease Father      Alzheimer's disease Mother's Brother      Alzheimer's disease Father's Brother      Stomach cancer Maternal Grandfather      Other cancer Paternal Grandfather          prostate or colon cancer    Heart disease Other       Oncology History   Burkitt's lymphoma of lymph nodes of multiple regions (Multi)   1/13/2025 Initial Diagnosis    Diagnosis: Burkitt Lymphoma, [place] Stage IV, BL-IPI High-Risk (score 4/5).    BL-IPI Calculation:  Age >60 years: Yes (64 years old).  Performance status (ECOG >=2): Presumed based on clinical presentation requiring hospitalization.  Serum LDH >ULN: 4102 U/L (1/11/25)  Peoa Stage III/IV: Yes (stage IV with extranodal involvement including kidneys, liver, spleen, and musculature).  CNS involvement: No (MRI and LP negative).  Total Score: 4/5 (High-Risk).    Presenting Symptoms: Asymmetric swelling of the right-sided muscles of mastication; imaging revealed incidental findings of perihilar mass and renal lesions.    Labs at Diagnosis: WBC 4.0, platelets 72; LDH 4102 U/L (1/11/25)    Pathology:  EBUS with lymph node biopsy (1/13/25): Predominantly CD20-positive small lymphoid cells, raising suspicion for a B-cell lymphoproliferative process.  Bone marrow biopsy (1/16/25): 70-80% involvement by high-grade B-cell lymphoma in a hypercellular marrow (90% cellular) with maturing trilineage hematopoiesis. FISH confirmed t(8;14)/IGH::MYC rearrangement, diagnostic of Burkitt lymphoma.     Imaging:  CT Neck (1/9/25): Fusiform thickening of right masticatory muscles, likely benign hypertrophy.  CT C/A/P (1/11/25): Left perihilar mass, pulmonary nodules, right renal lesions, and right adrenal gland thickening concerning for metastatic disease; T12-L1 soft tissue mass.  PET-CT (1/20): Widespread hermann and extranodal hypermetabolic involvement, including kidneys, liver, spleen, abdominal wall, and musculature,  suggestive of extensive lymphomatous disease.  MRI Brain (1/21): No intracranial lymphoma; suspected osseous involvement of calvarium.    Treatment:  Dexamethasone 40 mg daily (1/20-1/21).  Prophylactic IT methotrexate via LP (1/22), flow negative for lymphoma.     1/21/2025 -  Chemotherapy    (INPT) Dose-Adjusted R-EPOCH (Etoposide / DOXOrubicin / VinCRIStine / Cyclophosphamide / PredniSONE) + RiTUXimab, 21 Day Cycles          Performance Status:  ECOG Score: 2- Ambulatory and  capable of all selfcare; unable to carry out work activities.  Up and about > 50% of waking hrs.   Karnofsky Score: 70 - Cares for self; unable to carry on normal activity or do normal work     Objective   BSA: There is no height or weight on file to calculate BSA.  There were no vitals taken for this visit.  Physical Exam  GEN: Aox3, NAD  HEENT EOMI PERRLA sclera anicteric  CV RRR w/o m/r/g, R sided rib pain around 10-11th rib, TTP  Resp CTAB w/o w/r/r  GI Soft NTND+BS  Ext no LE edema  LN: no adenopathy palpated       Path:      Component    FINAL DIAGNOSIS   A, B & C. BONE MARROW CLOT WITH ASPIRATE AND CORE WITH TOUCH PREP, LEFT ILIAC CREST:       -- WITH INVOLVEMENT BY HIGH GRADE B-CELL LYMPHOMA, 70-80% OF MARROW CELLULAR ELEMENTS  -- HYPERCELLULAR BONE MARROW (90% CELLULAR) WITH MATURING TRILINEAGE HEMATOPOIESIS   -- SEE NOTE     NOTE: The differential diagnosis includes Burkitt lymphoma and high grade B-cell lymphoma with MYC and Bcl2 and/or Bcl-6 rearrangements (double / triple hit lymphoma). FISH studies and lymphoid NGS panel are pending for further characterization. Clinical and molecular results correlation is suggested         Component    ISCN    Results:    FISH POSITIVE for t(8;14)/ IGH::MYC rearrangement.  nuc  kieran(D8Z2x2,MYCx3,IGHx3)(MYC con IGHx2)[31/250]/(D8Z2x2,MYCx2,IGHx2)(MYC con IGHx1)[8/250]       Imaging:  PET 2/24/25  IMPRESSION:  1. There is near complete interval resolution of previously seen  hypermetabolic hermann  and extranodal disease involving bilateral  submandibular, parotid gland, hepatic lesions, spleen, mesenteric  deposits, bilateral kidney stomach bilateral, bilateral arms,  anterior chest wall, and gluteal muscle supra and infra diaphragmatic  lymphadenopathy.  2. Diffuse metabolic activity within the bone marrow, limiting the  evaluation a focal metabolic osseous metastatic disease. Likely post  treatment changes. Lymphomatous involvement can not be completely  excluded. Deauville score 2.  3. Splenomegaly with metabolic activity less than liver.  4. Mild decrease in size and metabolic activity within left lower  lobe opacity, likely inflammatory.  5. Subcentimeter nodular opacities within right lung base, likely  inflammatory/infective.        PET 1/20/25  IMPRESSION:  Widespread hermann as well as extranodal lymphomatous involvement.  1. Multiple hypermetabolic supra and infra diaphragmatic  lymphadenopathy as described, consistent with lymphomatous  involvement.  2. Intensely hypermetabolic activity within bilateral enlarged  submandibular and parotid glands as described, concerning for  lymphomatous involvement.  3. Hypermetabolic mass like infiltration within bilateral  kidneys(right> left), corresponding to lesion seen on CT dated  01/01/2025, compatible with lymphomatous involvement.  4. Hypermetabolic hepatic lesions without any underlying CT  correlate, consistent with lymphomatous involvement.  5. Splenomegaly with nonspecific hypermetabolic activity suggestive  of extranodal involvement.  6. Multiple hypermetabolic soft tissue mesenteric deposits as well as  anterior abdominal wall nodular deposits, with few of the lesions  without any underlying CT correlate, concerning for additional  lymphomatous involvement.  7. Metabolic activity within the stomach, bilateral arms, anterior  chest wall and gluteal muscles without underlying CT correlate,  concerning for lymphomatous involvement.      Torey Mckeon,  DO

## 2025-03-25 NOTE — PROGRESS NOTES
Pt arrived to SCT unit in wheelchair accompanied by wife. Pt has complaints of right sided rib or kidney pain, per patient. Vitals obtained, blood drawn via OLIVIA PICC solo and sent to lab. Dr. Mckeon came to see patient. Pt had a chest xray obtained. Lab results were stable and required no interventions. PICC dressing and caps changed. Pt left off unit in wheelchair with wife and all belongings.

## 2025-03-28 ENCOUNTER — TELEPHONE (OUTPATIENT)
Dept: ADMISSION | Facility: HOSPITAL | Age: 65
End: 2025-03-28
Payer: MEDICARE

## 2025-03-28 NOTE — TELEPHONE ENCOUNTER
I called Bijan back and let him know what Christi had said from Bed Assignment. I told them he will receive a call when a bed is ready. He said Thank you and no further questions.

## 2025-03-28 NOTE — TELEPHONE ENCOUNTER
I called Christi in Bed Assignment and she said she is waiting for more discharges and once she has more information she will call Bijan about his bed assignment.

## 2025-03-28 NOTE — TELEPHONE ENCOUNTER
Bijan called and said he usually hears by now to come in for his preadmission for his five day chemo. He hasn't heard anything as of now.  I will call Bed Assignment for Bijan and update him after the call.

## 2025-03-29 ENCOUNTER — HOSPITAL ENCOUNTER (INPATIENT)
Facility: HOSPITAL | Age: 65
End: 2025-03-29
Attending: HOSPITALIST
Payer: MEDICARE

## 2025-03-29 DIAGNOSIS — R50.9 FEVER, UNSPECIFIED: ICD-10-CM

## 2025-03-29 DIAGNOSIS — B37.81 ESOPHAGEAL CANDIDIASIS (MULTI): ICD-10-CM

## 2025-03-29 DIAGNOSIS — C83.78 BURKITT'S LYMPHOMA OF LYMPH NODES OF MULTIPLE REGIONS (MULTI): Primary | ICD-10-CM

## 2025-03-29 DIAGNOSIS — R10.84 GENERALIZED ABDOMINAL PAIN: ICD-10-CM

## 2025-03-29 DIAGNOSIS — C83.74: ICD-10-CM

## 2025-03-29 DIAGNOSIS — C83.70 BURKITT LYMPHOMA, UNSPECIFIED BODY REGION (MULTI): ICD-10-CM

## 2025-03-29 DIAGNOSIS — C85.10 HIGH GRADE B-CELL LYMPHOMA (MULTI): ICD-10-CM

## 2025-03-29 DIAGNOSIS — I48.91 NEW ONSET A-FIB (MULTI): ICD-10-CM

## 2025-03-29 DIAGNOSIS — R78.81 BACTEREMIA DUE TO METHICILLIN RESISTANT STAPHYLOCOCCUS AUREUS: ICD-10-CM

## 2025-03-29 DIAGNOSIS — B95.62 BACTEREMIA DUE TO METHICILLIN RESISTANT STAPHYLOCOCCUS AUREUS: ICD-10-CM

## 2025-03-29 DIAGNOSIS — C83.30 DIFFUSE LARGE B-CELL LYMPHOMA, UNSPECIFIED BODY REGION (MULTI): ICD-10-CM

## 2025-03-29 DIAGNOSIS — R13.10 DYSPHAGIA, UNSPECIFIED TYPE: ICD-10-CM

## 2025-03-29 DIAGNOSIS — Z51.11 ENCOUNTER FOR ANTINEOPLASTIC CHEMOTHERAPY: ICD-10-CM

## 2025-03-29 LAB
ALBUMIN SERPL BCP-MCNC: 3.1 G/DL (ref 3.4–5)
ALP SERPL-CCNC: 43 U/L (ref 33–136)
ALT SERPL W P-5'-P-CCNC: 9 U/L (ref 10–52)
ANION GAP SERPL CALC-SCNC: 10 MMOL/L (ref 10–20)
APTT PPP: 28 SECONDS (ref 26–36)
AST SERPL W P-5'-P-CCNC: 9 U/L (ref 9–39)
BASOPHILS # BLD AUTO: 0.02 X10*3/UL (ref 0–0.1)
BASOPHILS NFR BLD AUTO: 0.5 %
BILIRUB SERPL-MCNC: 0.7 MG/DL (ref 0–1.2)
BUN SERPL-MCNC: 18 MG/DL (ref 6–23)
CALCIUM SERPL-MCNC: 8.6 MG/DL (ref 8.6–10.6)
CHLORIDE SERPL-SCNC: 102 MMOL/L (ref 98–107)
CO2 SERPL-SCNC: 29 MMOL/L (ref 21–32)
CREAT SERPL-MCNC: 0.53 MG/DL (ref 0.5–1.3)
EGFRCR SERPLBLD CKD-EPI 2021: >90 ML/MIN/1.73M*2
EOSINOPHIL # BLD AUTO: 0.01 X10*3/UL (ref 0–0.7)
EOSINOPHIL NFR BLD AUTO: 0.2 %
ERYTHROCYTE [DISTWIDTH] IN BLOOD BY AUTOMATED COUNT: 18.9 % (ref 11.5–14.5)
FIBRINOGEN PPP-MCNC: 342 MG/DL (ref 200–400)
GLUCOSE SERPL-MCNC: 100 MG/DL (ref 74–99)
HCT VFR BLD AUTO: 24.4 % (ref 41–52)
HGB BLD-MCNC: 7.6 G/DL (ref 13.5–17.5)
IMM GRANULOCYTES # BLD AUTO: 0.06 X10*3/UL (ref 0–0.7)
IMM GRANULOCYTES NFR BLD AUTO: 1.4 % (ref 0–0.9)
INR PPP: 1.2 (ref 0.9–1.1)
LDH SERPL L TO P-CCNC: 158 U/L (ref 84–246)
LYMPHOCYTES # BLD AUTO: 0.75 X10*3/UL (ref 1.2–4.8)
LYMPHOCYTES NFR BLD AUTO: 17.5 %
MAGNESIUM SERPL-MCNC: 1.97 MG/DL (ref 1.6–2.4)
MCH RBC QN AUTO: 27.8 PG (ref 26–34)
MCHC RBC AUTO-ENTMCNC: 31.1 G/DL (ref 32–36)
MCV RBC AUTO: 89 FL (ref 80–100)
MONOCYTES # BLD AUTO: 0.52 X10*3/UL (ref 0.1–1)
MONOCYTES NFR BLD AUTO: 12.1 %
NEUTROPHILS # BLD AUTO: 2.93 X10*3/UL (ref 1.2–7.7)
NEUTROPHILS NFR BLD AUTO: 68.3 %
NRBC BLD-RTO: 1.2 /100 WBCS (ref 0–0)
PHOSPHATE SERPL-MCNC: 4.6 MG/DL (ref 2.5–4.9)
PLATELET # BLD AUTO: 210 X10*3/UL (ref 150–450)
POTASSIUM SERPL-SCNC: 4.3 MMOL/L (ref 3.5–5.3)
PROT SERPL-MCNC: 4.7 G/DL (ref 6.4–8.2)
PROTHROMBIN TIME: 13.2 SECONDS (ref 9.8–12.4)
RBC # BLD AUTO: 2.73 X10*6/UL (ref 4.5–5.9)
SODIUM SERPL-SCNC: 137 MMOL/L (ref 136–145)
URATE SERPL-MCNC: 5 MG/DL (ref 4–7.5)
WBC # BLD AUTO: 4.3 X10*3/UL (ref 4.4–11.3)

## 2025-03-29 PROCEDURE — 84550 ASSAY OF BLOOD/URIC ACID: CPT

## 2025-03-29 PROCEDURE — 1200000003 HC ONCOLOGY  ROOM WITH TELEMETRY DAILY

## 2025-03-29 PROCEDURE — 83615 LACTATE (LD) (LDH) ENZYME: CPT

## 2025-03-29 PROCEDURE — 2500000001 HC RX 250 WO HCPCS SELF ADMINISTERED DRUGS (ALT 637 FOR MEDICARE OP)

## 2025-03-29 PROCEDURE — 3E04305 INTRODUCTION OF OTHER ANTINEOPLASTIC INTO CENTRAL VEIN, PERCUTANEOUS APPROACH: ICD-10-PCS | Performed by: STUDENT IN AN ORGANIZED HEALTH CARE EDUCATION/TRAINING PROGRAM

## 2025-03-29 PROCEDURE — 80053 COMPREHEN METABOLIC PANEL: CPT

## 2025-03-29 PROCEDURE — 85610 PROTHROMBIN TIME: CPT

## 2025-03-29 PROCEDURE — 99223 1ST HOSP IP/OBS HIGH 75: CPT

## 2025-03-29 PROCEDURE — 85025 COMPLETE CBC W/AUTO DIFF WBC: CPT

## 2025-03-29 PROCEDURE — 83735 ASSAY OF MAGNESIUM: CPT

## 2025-03-29 PROCEDURE — 85384 FIBRINOGEN ACTIVITY: CPT

## 2025-03-29 PROCEDURE — 84100 ASSAY OF PHOSPHORUS: CPT

## 2025-03-29 RX ORDER — METOPROLOL SUCCINATE 50 MG/1
100 TABLET, EXTENDED RELEASE ORAL DAILY
Status: DISCONTINUED | OUTPATIENT
Start: 2025-03-30 | End: 2025-04-03 | Stop reason: HOSPADM

## 2025-03-29 RX ORDER — POLYETHYLENE GLYCOL 3350 17 G/17G
17 POWDER, FOR SOLUTION ORAL DAILY
Status: DISCONTINUED | OUTPATIENT
Start: 2025-03-29 | End: 2025-04-03 | Stop reason: HOSPADM

## 2025-03-29 RX ORDER — OXYCODONE HYDROCHLORIDE 5 MG/1
5 TABLET ORAL EVERY 4 HOURS PRN
Status: DISCONTINUED | OUTPATIENT
Start: 2025-03-29 | End: 2025-04-02

## 2025-03-29 RX ORDER — ATORVASTATIN CALCIUM 40 MG/1
40 TABLET, FILM COATED ORAL
Status: DISCONTINUED | OUTPATIENT
Start: 2025-03-30 | End: 2025-04-03 | Stop reason: HOSPADM

## 2025-03-29 RX ORDER — SUCRALFATE 1 G/10ML
1 SUSPENSION ORAL
Status: DISCONTINUED | OUTPATIENT
Start: 2025-03-29 | End: 2025-03-29

## 2025-03-29 RX ORDER — OXYCODONE HYDROCHLORIDE 5 MG/1
5 TABLET ORAL EVERY 6 HOURS PRN
Status: DISCONTINUED | OUTPATIENT
Start: 2025-03-29 | End: 2025-04-02

## 2025-03-29 RX ORDER — LISINOPRIL 10 MG/1
10 TABLET ORAL DAILY
Status: DISCONTINUED | OUTPATIENT
Start: 2025-03-29 | End: 2025-04-03 | Stop reason: HOSPADM

## 2025-03-29 RX ORDER — ALBUTEROL SULFATE 90 UG/1
2 INHALANT RESPIRATORY (INHALATION) EVERY 6 HOURS PRN
Status: DISCONTINUED | OUTPATIENT
Start: 2025-03-29 | End: 2025-04-03 | Stop reason: HOSPADM

## 2025-03-29 RX ORDER — POTASSIUM CHLORIDE 750 MG/1
20 TABLET, FILM COATED, EXTENDED RELEASE ORAL DAILY
Status: DISCONTINUED | OUTPATIENT
Start: 2025-03-29 | End: 2025-04-03 | Stop reason: HOSPADM

## 2025-03-29 RX ORDER — GABAPENTIN 300 MG/1
300 CAPSULE ORAL NIGHTLY
Status: DISCONTINUED | OUTPATIENT
Start: 2025-03-29 | End: 2025-04-03 | Stop reason: HOSPADM

## 2025-03-29 RX ORDER — SODIUM CHLORIDE 0.9 % (FLUSH) 0.9 %
10 SYRINGE (ML) INJECTION DAILY
Status: DISCONTINUED | OUTPATIENT
Start: 2025-03-29 | End: 2025-04-03 | Stop reason: HOSPADM

## 2025-03-29 RX ORDER — BISACODYL 5 MG
5 TABLET, DELAYED RELEASE (ENTERIC COATED) ORAL 2 TIMES DAILY
Status: DISCONTINUED | OUTPATIENT
Start: 2025-03-29 | End: 2025-04-03 | Stop reason: HOSPADM

## 2025-03-29 RX ORDER — FUROSEMIDE 20 MG/1
20 TABLET ORAL DAILY PRN
Status: DISCONTINUED | OUTPATIENT
Start: 2025-03-29 | End: 2025-04-03 | Stop reason: HOSPADM

## 2025-03-29 RX ORDER — PANTOPRAZOLE SODIUM 40 MG/1
40 TABLET, DELAYED RELEASE ORAL
Status: DISCONTINUED | OUTPATIENT
Start: 2025-03-30 | End: 2025-04-03 | Stop reason: HOSPADM

## 2025-03-29 RX ADMIN — APIXABAN 5 MG: 5 TABLET, FILM COATED ORAL at 22:16

## 2025-03-29 RX ADMIN — GABAPENTIN 300 MG: 300 CAPSULE ORAL at 22:16

## 2025-03-29 RX ADMIN — Medication 10 ML: at 22:32

## 2025-03-29 SDOH — SOCIAL STABILITY: SOCIAL INSECURITY: HAVE YOU HAD THOUGHTS OF HARMING ANYONE ELSE?: NO

## 2025-03-29 SDOH — SOCIAL STABILITY: SOCIAL INSECURITY: ARE YOU OR HAVE YOU BEEN THREATENED OR ABUSED PHYSICALLY, EMOTIONALLY, OR SEXUALLY BY ANYONE?: NO

## 2025-03-29 SDOH — HEALTH STABILITY: MENTAL HEALTH: HOW OFTEN DO YOU HAVE SIX OR MORE DRINKS ON ONE OCCASION?: NEVER

## 2025-03-29 SDOH — SOCIAL STABILITY: SOCIAL INSECURITY: WERE YOU ABLE TO COMPLETE ALL THE BEHAVIORAL HEALTH SCREENINGS?: YES

## 2025-03-29 SDOH — SOCIAL STABILITY: SOCIAL INSECURITY: WITHIN THE LAST YEAR, HAVE YOU BEEN HUMILIATED OR EMOTIONALLY ABUSED IN OTHER WAYS BY YOUR PARTNER OR EX-PARTNER?: NO

## 2025-03-29 SDOH — SOCIAL STABILITY: SOCIAL INSECURITY: WITHIN THE LAST YEAR, HAVE YOU BEEN AFRAID OF YOUR PARTNER OR EX-PARTNER?: NO

## 2025-03-29 SDOH — HEALTH STABILITY: MENTAL HEALTH: HOW MANY DRINKS CONTAINING ALCOHOL DO YOU HAVE ON A TYPICAL DAY WHEN YOU ARE DRINKING?: PATIENT DOES NOT DRINK

## 2025-03-29 SDOH — ECONOMIC STABILITY: FOOD INSECURITY: WITHIN THE PAST 12 MONTHS, THE FOOD YOU BOUGHT JUST DIDN'T LAST AND YOU DIDN'T HAVE MONEY TO GET MORE.: NEVER TRUE

## 2025-03-29 SDOH — SOCIAL STABILITY: SOCIAL INSECURITY: HAS ANYONE EVER THREATENED TO HURT YOUR FAMILY OR YOUR PETS?: NO

## 2025-03-29 SDOH — ECONOMIC STABILITY: HOUSING INSECURITY: AT ANY TIME IN THE PAST 12 MONTHS, WERE YOU HOMELESS OR LIVING IN A SHELTER (INCLUDING NOW)?: NO

## 2025-03-29 SDOH — HEALTH STABILITY: PHYSICAL HEALTH: ON AVERAGE, HOW MANY DAYS PER WEEK DO YOU ENGAGE IN MODERATE TO STRENUOUS EXERCISE (LIKE A BRISK WALK)?: 0 DAYS

## 2025-03-29 SDOH — ECONOMIC STABILITY: INCOME INSECURITY: IN THE PAST 12 MONTHS HAS THE ELECTRIC, GAS, OIL, OR WATER COMPANY THREATENED TO SHUT OFF SERVICES IN YOUR HOME?: NO

## 2025-03-29 SDOH — ECONOMIC STABILITY: HOUSING INSECURITY: IN THE LAST 12 MONTHS, WAS THERE A TIME WHEN YOU WERE NOT ABLE TO PAY THE MORTGAGE OR RENT ON TIME?: NO

## 2025-03-29 SDOH — SOCIAL STABILITY: SOCIAL INSECURITY: ARE THERE ANY APPARENT SIGNS OF INJURIES/BEHAVIORS THAT COULD BE RELATED TO ABUSE/NEGLECT?: NO

## 2025-03-29 SDOH — ECONOMIC STABILITY: FOOD INSECURITY: HOW HARD IS IT FOR YOU TO PAY FOR THE VERY BASICS LIKE FOOD, HOUSING, MEDICAL CARE, AND HEATING?: NOT VERY HARD

## 2025-03-29 SDOH — HEALTH STABILITY: MENTAL HEALTH: HOW OFTEN DO YOU HAVE A DRINK CONTAINING ALCOHOL?: NEVER

## 2025-03-29 SDOH — ECONOMIC STABILITY: FOOD INSECURITY: WITHIN THE PAST 12 MONTHS, YOU WORRIED THAT YOUR FOOD WOULD RUN OUT BEFORE YOU GOT THE MONEY TO BUY MORE.: NEVER TRUE

## 2025-03-29 SDOH — SOCIAL STABILITY: SOCIAL INSECURITY: DO YOU FEEL UNSAFE GOING BACK TO THE PLACE WHERE YOU ARE LIVING?: NO

## 2025-03-29 SDOH — SOCIAL STABILITY: SOCIAL INSECURITY: ABUSE: ADULT

## 2025-03-29 SDOH — SOCIAL STABILITY: SOCIAL INSECURITY: DO YOU FEEL ANYONE HAS EXPLOITED OR TAKEN ADVANTAGE OF YOU FINANCIALLY OR OF YOUR PERSONAL PROPERTY?: NO

## 2025-03-29 SDOH — HEALTH STABILITY: PHYSICAL HEALTH: ON AVERAGE, HOW MANY MINUTES DO YOU ENGAGE IN EXERCISE AT THIS LEVEL?: 0 MIN

## 2025-03-29 SDOH — ECONOMIC STABILITY: TRANSPORTATION INSECURITY: IN THE PAST 12 MONTHS, HAS LACK OF TRANSPORTATION KEPT YOU FROM MEDICAL APPOINTMENTS OR FROM GETTING MEDICATIONS?: NO

## 2025-03-29 SDOH — SOCIAL STABILITY: SOCIAL INSECURITY: HAVE YOU HAD ANY THOUGHTS OF HARMING ANYONE ELSE?: NO

## 2025-03-29 SDOH — SOCIAL STABILITY: SOCIAL INSECURITY: DOES ANYONE TRY TO KEEP YOU FROM HAVING/CONTACTING OTHER FRIENDS OR DOING THINGS OUTSIDE YOUR HOME?: NO

## 2025-03-29 SDOH — ECONOMIC STABILITY: HOUSING INSECURITY: IN THE PAST 12 MONTHS, HOW MANY TIMES HAVE YOU MOVED WHERE YOU WERE LIVING?: 0

## 2025-03-29 ASSESSMENT — PAIN SCALES - GENERAL
PAINLEVEL_OUTOF10: 5 - MODERATE PAIN
PAINLEVEL_OUTOF10: 5 - MODERATE PAIN

## 2025-03-29 ASSESSMENT — COGNITIVE AND FUNCTIONAL STATUS - GENERAL
DAILY ACTIVITIY SCORE: 24
MOBILITY SCORE: 24
PATIENT BASELINE BEDBOUND: NO

## 2025-03-29 ASSESSMENT — ENCOUNTER SYMPTOMS
DIARRHEA: 0
ENDOCRINE NEGATIVE: 1
BACK PAIN: 1
CHILLS: 0
EYES NEGATIVE: 1
ABDOMINAL PAIN: 0
TROUBLE SWALLOWING: 0
FEVER: 0
RHINORRHEA: 0
SORE THROAT: 0
VOMITING: 0
CHEST TIGHTNESS: 0
APPETITE CHANGE: 0
ALLERGIC/IMMUNOLOGIC NEGATIVE: 1
PSYCHIATRIC NEGATIVE: 1
MYALGIAS: 1
CARDIOVASCULAR NEGATIVE: 1
ARTHRALGIAS: 1
RESPIRATORY NEGATIVE: 1
HEMATOLOGIC/LYMPHATIC NEGATIVE: 1
SHORTNESS OF BREATH: 0
DIZZINESS: 0
COUGH: 0
LIGHT-HEADEDNESS: 0
NAUSEA: 0
PALPITATIONS: 0
WEAKNESS: 1
WHEEZING: 0
FATIGUE: 0
CONSTIPATION: 0
GASTROINTESTINAL NEGATIVE: 1
CONSTITUTIONAL NEGATIVE: 1
HEADACHES: 0

## 2025-03-29 ASSESSMENT — ACTIVITIES OF DAILY LIVING (ADL)
HEARING - LEFT EAR: DIFFICULTY WITH NOISE
JUDGMENT_ADEQUATE_SAFELY_COMPLETE_DAILY_ACTIVITIES: YES
ASSISTIVE_DEVICE: EYEGLASSES
WALKS IN HOME: INDEPENDENT
LACK_OF_TRANSPORTATION: NO
FEEDING YOURSELF: INDEPENDENT
HEARING - RIGHT EAR: DIFFICULTY WITH NOISE
BATHING: INDEPENDENT
GROOMING: INDEPENDENT
DRESSING YOURSELF: INDEPENDENT
TOILETING: INDEPENDENT
ADEQUATE_TO_COMPLETE_ADL: YES
PATIENT'S MEMORY ADEQUATE TO SAFELY COMPLETE DAILY ACTIVITIES?: YES

## 2025-03-29 ASSESSMENT — LIFESTYLE VARIABLES
HOW MANY STANDARD DRINKS CONTAINING ALCOHOL DO YOU HAVE ON A TYPICAL DAY: PATIENT DOES NOT DRINK
HOW OFTEN DO YOU HAVE 6 OR MORE DRINKS ON ONE OCCASION: NEVER
AUDIT-C TOTAL SCORE: 0
SKIP TO QUESTIONS 9-10: 1
AUDIT-C TOTAL SCORE: 0
SKIP TO QUESTIONS 9-10: 1
AUDIT-C TOTAL SCORE: 0
HOW OFTEN DO YOU HAVE A DRINK CONTAINING ALCOHOL: NEVER

## 2025-03-29 ASSESSMENT — PATIENT HEALTH QUESTIONNAIRE - PHQ9
1. LITTLE INTEREST OR PLEASURE IN DOING THINGS: NOT AT ALL
SUM OF ALL RESPONSES TO PHQ9 QUESTIONS 1 & 2: 0
2. FEELING DOWN, DEPRESSED OR HOPELESS: NOT AT ALL
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
## 2025-03-30 VITALS
DIASTOLIC BLOOD PRESSURE: 77 MMHG | BODY MASS INDEX: 27.29 KG/M2 | HEART RATE: 84 BPM | WEIGHT: 201.5 LBS | SYSTOLIC BLOOD PRESSURE: 123 MMHG | TEMPERATURE: 97.2 F | RESPIRATION RATE: 16 BRPM | HEIGHT: 72 IN | OXYGEN SATURATION: 98 %

## 2025-03-30 LAB
ABO GROUP (TYPE) IN BLOOD: NORMAL
ALBUMIN SERPL BCP-MCNC: 2.9 G/DL (ref 3.4–5)
ALP SERPL-CCNC: 39 U/L (ref 33–136)
ALT SERPL W P-5'-P-CCNC: 9 U/L (ref 10–52)
ANION GAP SERPL CALC-SCNC: 15 MMOL/L (ref 10–20)
ANTIBODY SCREEN: NORMAL
APTT PPP: 34 SECONDS (ref 26–36)
AST SERPL W P-5'-P-CCNC: 9 U/L (ref 9–39)
BASOPHILS # BLD AUTO: 0.01 X10*3/UL (ref 0–0.1)
BASOPHILS NFR BLD AUTO: 0.3 %
BILIRUB SERPL-MCNC: 0.7 MG/DL (ref 0–1.2)
BLOOD EXPIRATION DATE: NORMAL
BUN SERPL-MCNC: 16 MG/DL (ref 6–23)
CALCIUM SERPL-MCNC: 8.6 MG/DL (ref 8.6–10.6)
CHLORIDE SERPL-SCNC: 101 MMOL/L (ref 98–107)
CO2 SERPL-SCNC: 28 MMOL/L (ref 21–32)
CREAT SERPL-MCNC: 0.56 MG/DL (ref 0.5–1.3)
DISPENSE STATUS: NORMAL
EGFRCR SERPLBLD CKD-EPI 2021: >90 ML/MIN/1.73M*2
EOSINOPHIL # BLD AUTO: 0 X10*3/UL (ref 0–0.7)
EOSINOPHIL NFR BLD AUTO: 0 %
ERYTHROCYTE [DISTWIDTH] IN BLOOD BY AUTOMATED COUNT: 19.1 % (ref 11.5–14.5)
FIBRINOGEN PPP-MCNC: 303 MG/DL (ref 200–400)
GLUCOSE SERPL-MCNC: 97 MG/DL (ref 74–99)
HCT VFR BLD AUTO: 23.2 % (ref 41–52)
HGB BLD-MCNC: 7 G/DL (ref 13.5–17.5)
IMM GRANULOCYTES # BLD AUTO: 0.04 X10*3/UL (ref 0–0.7)
IMM GRANULOCYTES NFR BLD AUTO: 1.3 % (ref 0–0.9)
INR PPP: 1.3 (ref 0.9–1.1)
LDH SERPL L TO P-CCNC: 146 U/L (ref 84–246)
LYMPHOCYTES # BLD AUTO: 0.71 X10*3/UL (ref 1.2–4.8)
LYMPHOCYTES NFR BLD AUTO: 23.6 %
MAGNESIUM SERPL-MCNC: 1.94 MG/DL (ref 1.6–2.4)
MCH RBC QN AUTO: 27.5 PG (ref 26–34)
MCHC RBC AUTO-ENTMCNC: 30.2 G/DL (ref 32–36)
MCV RBC AUTO: 91 FL (ref 80–100)
MONOCYTES # BLD AUTO: 0.46 X10*3/UL (ref 0.1–1)
MONOCYTES NFR BLD AUTO: 15.3 %
NEUTROPHILS # BLD AUTO: 1.79 X10*3/UL (ref 1.2–7.7)
NEUTROPHILS NFR BLD AUTO: 59.5 %
NRBC BLD-RTO: 2.3 /100 WBCS (ref 0–0)
PHOSPHATE SERPL-MCNC: 4.8 MG/DL (ref 2.5–4.9)
PLATELET # BLD AUTO: 187 X10*3/UL (ref 150–450)
POTASSIUM SERPL-SCNC: 4.1 MMOL/L (ref 3.5–5.3)
PRODUCT BLOOD TYPE: 6200
PRODUCT CODE: NORMAL
PROT SERPL-MCNC: 4.3 G/DL (ref 6.4–8.2)
PROTHROMBIN TIME: 14.5 SECONDS (ref 9.8–12.4)
RBC # BLD AUTO: 2.55 X10*6/UL (ref 4.5–5.9)
RH FACTOR (ANTIGEN D): NORMAL
SODIUM SERPL-SCNC: 140 MMOL/L (ref 136–145)
UNIT ABO: NORMAL
UNIT NUMBER: NORMAL
UNIT RH: NORMAL
UNIT VOLUME: 311
WBC # BLD AUTO: 3 X10*3/UL (ref 4.4–11.3)
XM INTEP: NORMAL

## 2025-03-30 PROCEDURE — 83735 ASSAY OF MAGNESIUM: CPT

## 2025-03-30 PROCEDURE — 84075 ASSAY ALKALINE PHOSPHATASE: CPT

## 2025-03-30 PROCEDURE — 2500000004 HC RX 250 GENERAL PHARMACY W/ HCPCS (ALT 636 FOR OP/ED): Performed by: STUDENT IN AN ORGANIZED HEALTH CARE EDUCATION/TRAINING PROGRAM

## 2025-03-30 PROCEDURE — 85384 FIBRINOGEN ACTIVITY: CPT

## 2025-03-30 PROCEDURE — 85025 COMPLETE CBC W/AUTO DIFF WBC: CPT

## 2025-03-30 PROCEDURE — 36430 TRANSFUSION BLD/BLD COMPNT: CPT

## 2025-03-30 PROCEDURE — 2500000001 HC RX 250 WO HCPCS SELF ADMINISTERED DRUGS (ALT 637 FOR MEDICARE OP)

## 2025-03-30 PROCEDURE — P9040 RBC LEUKOREDUCED IRRADIATED: HCPCS

## 2025-03-30 PROCEDURE — 99233 SBSQ HOSP IP/OBS HIGH 50: CPT | Performed by: HOSPITALIST

## 2025-03-30 PROCEDURE — 86900 BLOOD TYPING SEROLOGIC ABO: CPT

## 2025-03-30 PROCEDURE — 85730 THROMBOPLASTIN TIME PARTIAL: CPT

## 2025-03-30 PROCEDURE — 83615 LACTATE (LD) (LDH) ENZYME: CPT

## 2025-03-30 PROCEDURE — 1200000003 HC ONCOLOGY  ROOM WITH TELEMETRY DAILY

## 2025-03-30 PROCEDURE — 86923 COMPATIBILITY TEST ELECTRIC: CPT

## 2025-03-30 PROCEDURE — 84100 ASSAY OF PHOSPHORUS: CPT

## 2025-03-30 PROCEDURE — 2500000002 HC RX 250 W HCPCS SELF ADMINISTERED DRUGS (ALT 637 FOR MEDICARE OP, ALT 636 FOR OP/ED)

## 2025-03-30 PROCEDURE — 2500000002 HC RX 250 W HCPCS SELF ADMINISTERED DRUGS (ALT 637 FOR MEDICARE OP, ALT 636 FOR OP/ED): Performed by: STUDENT IN AN ORGANIZED HEALTH CARE EDUCATION/TRAINING PROGRAM

## 2025-03-30 RX ORDER — DIPHENHYDRAMINE HYDROCHLORIDE 50 MG/ML
50 INJECTION, SOLUTION INTRAMUSCULAR; INTRAVENOUS AS NEEDED
Status: DISCONTINUED | OUTPATIENT
Start: 2025-03-30 | End: 2025-04-03 | Stop reason: HOSPADM

## 2025-03-30 RX ORDER — OLANZAPINE 5 MG/1
5 TABLET ORAL NIGHTLY
Status: DISCONTINUED | OUTPATIENT
Start: 2025-03-30 | End: 2025-04-03 | Stop reason: HOSPADM

## 2025-03-30 RX ORDER — FAMOTIDINE 10 MG/ML
20 INJECTION, SOLUTION INTRAVENOUS AS NEEDED
Status: DISCONTINUED | OUTPATIENT
Start: 2025-03-30 | End: 2025-04-03 | Stop reason: HOSPADM

## 2025-03-30 RX ORDER — ALBUTEROL SULFATE 0.83 MG/ML
3 SOLUTION RESPIRATORY (INHALATION) AS NEEDED
Status: DISCONTINUED | OUTPATIENT
Start: 2025-03-30 | End: 2025-04-03 | Stop reason: HOSPADM

## 2025-03-30 RX ORDER — EPINEPHRINE 1 MG/ML
0.3 INJECTION, SOLUTION, CONCENTRATE INTRAVENOUS EVERY 5 MIN PRN
Status: DISCONTINUED | OUTPATIENT
Start: 2025-03-30 | End: 2025-04-03 | Stop reason: HOSPADM

## 2025-03-30 RX ORDER — PROCHLORPERAZINE EDISYLATE 5 MG/ML
10 INJECTION INTRAMUSCULAR; INTRAVENOUS EVERY 6 HOURS PRN
Status: DISCONTINUED | OUTPATIENT
Start: 2025-03-30 | End: 2025-04-03 | Stop reason: HOSPADM

## 2025-03-30 RX ORDER — PROCHLORPERAZINE MALEATE 10 MG
10 TABLET ORAL EVERY 6 HOURS PRN
Status: DISCONTINUED | OUTPATIENT
Start: 2025-03-30 | End: 2025-04-03 | Stop reason: HOSPADM

## 2025-03-30 RX ADMIN — DOXORUBICIN HYDROCHLORIDE 28.5 MG: 2 INJECTION, SOLUTION INTRAVENOUS at 12:18

## 2025-03-30 RX ADMIN — PREDNISONE 140 MG: 50 TABLET ORAL at 11:29

## 2025-03-30 RX ADMIN — OLANZAPINE 5 MG: 5 TABLET, FILM COATED ORAL at 20:35

## 2025-03-30 RX ADMIN — Medication 10 ML: at 09:45

## 2025-03-30 RX ADMIN — BISACODYL 5 MG: 5 TABLET, COATED ORAL at 20:35

## 2025-03-30 RX ADMIN — POTASSIUM CHLORIDE 20 MEQ: 750 TABLET, FILM COATED, EXTENDED RELEASE ORAL at 09:42

## 2025-03-30 RX ADMIN — GABAPENTIN 300 MG: 300 CAPSULE ORAL at 20:34

## 2025-03-30 RX ADMIN — LISINOPRIL 10 MG: 10 TABLET ORAL at 09:42

## 2025-03-30 RX ADMIN — PREDNISONE 140 MG: 50 TABLET ORAL at 20:34

## 2025-03-30 RX ADMIN — BISACODYL 5 MG: 5 TABLET, COATED ORAL at 09:42

## 2025-03-30 RX ADMIN — APIXABAN 5 MG: 5 TABLET, FILM COATED ORAL at 20:35

## 2025-03-30 RX ADMIN — FOSAPREPITANT 150 MG: 150 INJECTION, POWDER, LYOPHILIZED, FOR SOLUTION INTRAVENOUS at 11:29

## 2025-03-30 RX ADMIN — METOPROLOL SUCCINATE 100 MG: 50 TABLET, EXTENDED RELEASE ORAL at 09:42

## 2025-03-30 RX ADMIN — OXYCODONE HYDROCHLORIDE 5 MG: 5 TABLET ORAL at 08:53

## 2025-03-30 RX ADMIN — ATORVASTATIN CALCIUM 40 MG: 40 TABLET, FILM COATED ORAL at 06:52

## 2025-03-30 RX ADMIN — APIXABAN 5 MG: 5 TABLET, FILM COATED ORAL at 09:42

## 2025-03-30 RX ADMIN — PANTOPRAZOLE SODIUM 40 MG: 40 TABLET, DELAYED RELEASE ORAL at 06:52

## 2025-03-30 RX ADMIN — OXYCODONE HYDROCHLORIDE 5 MG: 5 TABLET ORAL at 20:34

## 2025-03-30 SDOH — ECONOMIC STABILITY: FOOD INSECURITY: HOW HARD IS IT FOR YOU TO PAY FOR THE VERY BASICS LIKE FOOD, HOUSING, MEDICAL CARE, AND HEATING?: NOT VERY HARD

## 2025-03-30 SDOH — ECONOMIC STABILITY: TRANSPORTATION INSECURITY: IN THE PAST 12 MONTHS, HAS LACK OF TRANSPORTATION KEPT YOU FROM MEDICAL APPOINTMENTS OR FROM GETTING MEDICATIONS?: NO

## 2025-03-30 SDOH — ECONOMIC STABILITY: INCOME INSECURITY: IN THE PAST 12 MONTHS HAS THE ELECTRIC, GAS, OIL, OR WATER COMPANY THREATENED TO SHUT OFF SERVICES IN YOUR HOME?: YES

## 2025-03-30 SDOH — ECONOMIC STABILITY: HOUSING INSECURITY: IN THE PAST 12 MONTHS, HOW MANY TIMES HAVE YOU MOVED WHERE YOU WERE LIVING?: 0

## 2025-03-30 SDOH — ECONOMIC STABILITY: HOUSING INSECURITY: IN THE LAST 12 MONTHS, WAS THERE A TIME WHEN YOU WERE NOT ABLE TO PAY THE MORTGAGE OR RENT ON TIME?: NO

## 2025-03-30 SDOH — ECONOMIC STABILITY: HOUSING INSECURITY: AT ANY TIME IN THE PAST 12 MONTHS, WERE YOU HOMELESS OR LIVING IN A SHELTER (INCLUDING NOW)?: NO

## 2025-03-30 SDOH — SOCIAL STABILITY: SOCIAL INSECURITY: WITHIN THE LAST YEAR, HAVE YOU BEEN AFRAID OF YOUR PARTNER OR EX-PARTNER?: NO

## 2025-03-30 SDOH — ECONOMIC STABILITY: FOOD INSECURITY: WITHIN THE PAST 12 MONTHS, YOU WORRIED THAT YOUR FOOD WOULD RUN OUT BEFORE YOU GOT THE MONEY TO BUY MORE.: NEVER TRUE

## 2025-03-30 SDOH — ECONOMIC STABILITY: FOOD INSECURITY: WITHIN THE PAST 12 MONTHS, THE FOOD YOU BOUGHT JUST DIDN'T LAST AND YOU DIDN'T HAVE MONEY TO GET MORE.: NEVER TRUE

## 2025-03-30 SDOH — SOCIAL STABILITY: SOCIAL INSECURITY: WITHIN THE LAST YEAR, HAVE YOU BEEN HUMILIATED OR EMOTIONALLY ABUSED IN OTHER WAYS BY YOUR PARTNER OR EX-PARTNER?: NO

## 2025-03-30 ASSESSMENT — COGNITIVE AND FUNCTIONAL STATUS - GENERAL
DAILY ACTIVITIY SCORE: 24
MOBILITY SCORE: 24
MOBILITY SCORE: 24
DAILY ACTIVITIY SCORE: 24
DAILY ACTIVITIY SCORE: 24
MOBILITY SCORE: 24

## 2025-03-30 ASSESSMENT — PAIN - FUNCTIONAL ASSESSMENT
PAIN_FUNCTIONAL_ASSESSMENT: 0-10
PAIN_FUNCTIONAL_ASSESSMENT: 0-10

## 2025-03-30 ASSESSMENT — ACTIVITIES OF DAILY LIVING (ADL)
LACK_OF_TRANSPORTATION: NO
LACK_OF_TRANSPORTATION: NO

## 2025-03-30 ASSESSMENT — PAIN SCALES - WONG BAKER: WONGBAKER_NUMERICALRESPONSE: HURTS WHOLE LOT

## 2025-03-30 ASSESSMENT — PAIN SCALES - GENERAL
PAINLEVEL_OUTOF10: 2
PAINLEVEL_OUTOF10: 8
PAINLEVEL_OUTOF10: 8

## 2025-03-30 NOTE — PROGRESS NOTES
Pharmacy Medication History Review    Bijan Ibanez is a 64 y.o. male admitted for Burkitt's lymphoma (Multi). Pharmacy reviewed the patient's [id number] medications and allergies for accuracy.    Medications ADDED:  N/A  Medications CHANGED:  N/A  Medications REMOVED:   N/A     The list below reflects the updated PTA list.   Prior to Admission Medications   Prescriptions Last Dose Informant   albuterol 90 mcg/actuation inhaler  Spouse/Significant Other   Sig: Inhale 2 puffs every 6 hours if needed for shortness of breath.  No fill history but wife confirms patient takes as needed   apixaban (Eliquis) 5 mg tablet  Spouse/Significant Other   Sig: Take 1 tablet (5 mg) by mouth 2 times a day.   atorvastatin (Lipitor) 40 mg tablet  Spouse/Significant Other   Sig: Take 1 tablet (40 mg) by mouth early in the morning..   diphenhydramine/Maalox/lidocaine (Magic Mouthwash) - Compounded - Outpatient  Spouse/Significant Other   Sig: Swish and spit 10 mL by mouth every 6 hours if needed.   furosemide (Lasix) 20 mg tablet  Spouse/Significant Other   Sig: Take 1 tablet (20 mg) by mouth once daily as needed (for swelling).   gabapentin (Neurontin) 300 mg capsule  Spouse/Significant Other   Sig: Take 1 capsule (300 mg) by mouth once daily at bedtime.   heparin flush 10 unit/mL injection  Spouse/Significant Other   Sig: Infuse 5 mL (50 Units) into a venous catheter once daily.   lisinopril 10 mg tablet  Spouse/Significant Other   Sig: Take 1 tablet (10 mg) by mouth once daily.   metoprolol succinate XL (Toprol-XL) 100 mg 24 hr tablet  Spouse/Significant Other   Sig: Take 1 tablet (100 mg) by mouth once daily. Do not crush or chew.   oxyCODONE (Roxicodone) 5 mg immediate release tablet  Spouse/Significant Other   Sig: Take 1 tablet (5 mg) by mouth every 6 hours if needed for moderate pain (4 - 6).   pantoprazole (ProtoNix) 40 mg EC tablet  Spouse/Significant Other   Sig: Take 1 tablet (40 mg) by mouth once daily in the  "morning. Take before meals. Do not crush, chew, or split.   potassium chloride CR (Klor-Con M20) 20 mEq ER tablet  Spouse/Significant Other   Sig: Take 1 tablet (20 mEq) by mouth once daily. Do not crush or chew.   sodium chloride 0.9% flush  Spouse/Significant Other   Sig: Infuse 10 mL into a venous catheter once daily. Per hospitals  Flush with 20ml after obtaining labs from line.   sucralfate (Carafate) 100 mg/mL suspension Not Taking Spouse/Significant Other   Sig: Take 10 mL (1 g) by mouth 4 times a day with meals.   Patient not taking: Reported on 3/30/2025      Facility-Administered Medications Last Administration Doses Remaining   oxyCODONE (Roxicodone) immediate release tablet 5 mg 3/18/2025  1:16 PM            The list below reflects the updated allergy list. Please review each documented allergy for additional clarification and justification.  Allergies  Reviewed by Kathy Victoria on 3/30/2025        Severity Reactions Comments    Latex Not Specified Itching     Penicillins Not Specified Unknown Patient reports from childhood, unsure of reaction            Patient accepts M2B at discharge.     Sources:   [facility]  Pharmacy dispense history  Spouse Indira Ibanez/Wife Good historian     Additional Comments:  N/A      KATHY VICTORIA  Pharmacy Technician  03/30/25     Secure Chat preferred   If no response call [phone] or [employer] \"Med Rec\"   "

## 2025-03-30 NOTE — CARE PLAN
"The patient's goals for the shift include      The clinical goals for the shift include Pt will remain safe throughout the shift    Over the shift, the patient remained safe. Admitted for cycle 4 chemotherapy. Team aware orders were not released overnight due to a notification that does (while within 10%) were \"outside of the threshold\". Team notified by the charge nurse overnight. Pt describes a period of rapid weight loss recently and an inability to eat, which has since resolved. Pt to receive PRBCs today, type and screen sent. Continue to monitor safety. Pt admitted for chemo.  "

## 2025-03-30 NOTE — PROGRESS NOTES
03/30/25 1045   Discharge Planning   Living Arrangements Spouse/significant other   Support Systems Spouse/significant other   Assistance Needed none   Type of Residence Private residence   Number of Stairs to Enter Residence 1   Number of Stairs Within Residence 12  (patient does not navigate)   Do you have animals or pets at home? No   Who is requesting discharge planning? Other (Comment)  (TCC assessment)   Home or Post Acute Services None   Expected Discharge Disposition Home   Does the patient need discharge transport arranged? No   Financial Resource Strain   How hard is it for you to pay for the very basics like food, housing, medical care, and heating? Not very   Housing Stability   In the last 12 months, was there a time when you were not able to pay the mortgage or rent on time? N   In the past 12 months, how many times have you moved where you were living? 0   At any time in the past 12 months, were you homeless or living in a shelter (including now)? N   Transportation Needs   In the past 12 months, has lack of transportation kept you from medical appointments or from getting medications? no   In the past 12 months, has lack of transportation kept you from meetings, work, or from getting things needed for daily living? No   Intensity of Service   Intensity of Service 0-30 min     Transitional Care Coordinator Note: Met with patient to discuss discharge planning s/p admission.  Patient lives home with spouse( Indira as listed) and granddaughter.  Independent in all ADL's. Required assist devices for ambulation.  Patient was active home care or home care needs.  Demographics and contact information confirmed.  Will continue to monitor patient for all home going needs.  nEedelia Morales RN TCC via Epic.    Falls- none  Equipment -cane/walker   HC -[facility] for [facility] Atbx since completed   Picc/Port- PICC  SW- none  O2/Cpap- none  Diabetes- none   Dialysis- none  PCP- Dr. Murphy  Pharm- [facility]  Transport at discharge- spouse  Therapy Recommendations-none at this time

## 2025-03-30 NOTE — PROGRESS NOTES
"Bijan Ibanez is a 64 y.o. male on day 1 of admission presenting with Burkitt's lymphoma (Multi).    Subjective   Bijan is doing fine this morning, wife at bedside. He states he has been doing fine since his last admission. Having some back pain that is usually relieved with his home pain meds. Discussed bump on neck, states he has had it for a long time but is currently not bothering him. Denies chest pain, SOB, N/V/D/C, fever, chills. We discussed starting chemo today. ROS: A complete review of systems was performed and is negative except for as mentioned above in the HPI     Objective     Physical Exam  HENT:      Nose: Nose normal.      Mouth/Throat:      Mouth: Mucous membranes are moist.   Eyes:      Pupils: Pupils are equal, round, and reactive to light.   Cardiovascular:      Rate and Rhythm: Normal rate.   Pulmonary:      Effort: Pulmonary effort is normal.   Abdominal:      General: Abdomen is flat.   Musculoskeletal:      Cervical back: Normal range of motion.      Right lower leg: Edema present.      Left lower leg: Edema present.      Comments: Back pain, myalgia   Neurological:      Mental Status: He is alert.         Last Recorded Vitals  Blood pressure 104/67, pulse 77, temperature 36.7 °C (98.1 °F), temperature source Temporal, resp. rate 16, height 1.835 m (6' 0.24\"), weight 91.4 kg (201 lb 8 oz), SpO2 95%.  Intake/Output last 3 Shifts:  No intake/output data recorded.    Relevant Results  No results found.    This patient has a central line   Reason for the central line remaining today? 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 male with PMH of newly dx Burkitt's lymphoma, HTN, HLD, CAD, MI (s/p stent 2010, on ASA), hx renal cell carcinoma (s/p R partial nephrectomy in 2013 @ [facility]), GERD, BPH, arthritis, RUE PICC DVT (on Eliquis w/ close monitoring " d/t counts), afib RVR, and recent MRSA bacteremia (s/p 4 week Vancomycin end date 3/3) who is admitted for C4 EPOCH. 3/30 plan to start chemo today and plan 1u pRBC today for hgb 7.0. DC pending chemo completion.     Update 3/30:  - plan to start chemo today  - plan for IT methotrexate 3/31 and IT cytarabine 4/1  - plan 1u pRBC today for hgb 7.0     ONC:   # Burkitt's lymphoma     - Primary oncologist: Dr. Torey Mckeon - aware of admission  - Originally signed out as high-grade lymphoma but after burkitt's rearrangement (t8;14) came back positive. High risk based on marrow involvement, though no CNS involvement   - S/p C1 DA-R-EPOCH when signed out as high-grade lymphoma; given the significant toxicity he had with DA R EPOCH, will not escalate to CODOX-IVAC or hyper-CVAD given no CNS involvement   - Given G3/4 mucositis, etoposide reduced by 25%, keep EPOCH at Dose level 0   - 2/11 oncology along with ID felt they could resume chemotherapy after total 2 weeks of IV Vanco, but will still complete full duration of 4 weeks of the antibiotic (ended on 3/3)    - PET (2/24/25) s/p C2: There is near complete interval resolution of previously seen hypermetabolic hermann and extranodal disease involving bilateral submandibular, parotid gland, hepatic lesions, spleen, mesenteric deposits, bilateral kidney stomach bilateral, bilateral arms, anterior chest wall, and gluteal muscle supra and infra diaphragmatic lymphadenopathy. Diffuse metabolic activity within the bone marrow. Camilla 2 - c/w CR    - 3/29 admitted for cycle 4 EPOCH     # CHEMO: C4 EPOCH   - Premeds: Zofran, Zyprexa, Compazine, Emend   - Doxorubicin 28mg on Day 1, 2, 3, 4   - Etoposide 88mg on Day 1, 2, 3. 4   - Vincristine 0.9mg on Day 1, 2, 3. 4   - IT MTX 12mg on Day 4 (4/2)  - Cyclophosphamide 2,124mg once on Day 5   - IT Cytarabine 70mg on Day 5 (4/3)  - Prednisone 140mg BID x5 days   - PRN Reaction meds: Epinephrine, Albuterol, Benadryl, Pepcid, Solumedrol      - Plan for Neulasta and Ritux on Day 6         HEME:   # Anemia   - Likely 2/2 disease and tx, no signs of active bleeding on admit   - Monitor counts daily, fibrinogen, coags   - No evidence of DIC or hypercoag state   - Transfuse to keep hgb > 7, plt > 50 (on AC)   - 3/30 hgb 7.0, T&S sent and plan 1u pRBC today    # Prophy:   - Cont home Eliquis 5mg BID - plan to hold when plt < 50        ID:   Allergies: Latex, PCN   # Recent MRSA bacteremia   - Follows with Dr. Rivera, last seen on 2/28; s/p vanc ended on 3/3/25 for MRSA bacteremia    - 1/31 Bc x2 + staphyloccous aureus and MRSA, 2/4 Bc x2 negative growth   - PICC line removed 2/3   - 2/3 TTE w/o evidence of vegetation   - Aspergillus galactomannan and fungitell results negative from 2/4   - New PICC line placed 3/7/25   # Prophylaxis   - Plan Levofloxacin when neutropenic    - VZV: Stopped acyclovir at recent outpt appointment with Dr. Mckeon on 3/4, however acyclovir is listed as a discharge medication. Will confirm with Dr. Mckeon Monday 3/31   - Candidiasis: Stopped Fluconazole at recent outpt appt with Elvira العلي 3/12   - PJP: None at this time        CARDIO:   # Afib RVR on prior admission   - Likely 2/2 infection, electrolyte derangements, and anemia   - Keep K > 4, Mg > 2, Hgb > 7   - Seen by cardio outpt on 3/13/25, plan outpt echo in 3 months  - Cont home Eliquis until plt < 50   - Continue home Metoprolol XL 100mg PO daily   - Cont home Potassium 20mEq PO daily   - Cont home Furosemide 20mg daily PRN for swelling   - Tele ordered on admit  # Hx of MI   # Hx of HTN/HLD/CAD   - 2/3/25 Seen by EP as difficulty achieving rate control   - 2/3 TTE EF 60-65%, normal RVSP, L atrial size mod dilated   - Cont home Atorvastatin 40mg PO daily   - Cont home Lisinopril 10mg PO daily       PULM:  - RLL decreased lung sounds on auscultation  - Denies SOB or cough recently  - Recent imaging 3/25 showed no signs of pneumothorax, air space disease or  effusion  - CTM for signs of worsening, can consider new imaging if so      FEN/GI:   Admit weight: 95.3kg   - Uric acid on admit: 5.9 (3/29)  - Allopurinol stopped at recent outpt appointment with Dr. Mckeon on 3/4    # Mucositis   - Pt had severe mucositis last admission   - States he was eating well at home, no mouth pain or lesions present on admission    - Cont home BMX PRN   - Pt states home Sucralfate 1g AC/HS did not work and made him sick  - Cont Oxy 5mg PO Q4h PRN for moderate pain, Q6h PRN for mild pain   - Consider supportive onc consult if continues to worsen   # Prophy:   - Cont home Pantoprazole 40mg PO daily    - Miralax, Dulcolax daily      MSK:   # Hx Back Pain   - Cont Gabapentin 300mg PO nightly   - Cont home Oxy PRN  # R-Sided Rib Pain  - Rib xrays 3/25: No evidence of rib fracture     DERM:  - Loop-sized nodule on L side of back of neck  - Pt states he has had it for years and it has been drained/cut out twice  - Pt denies pain or discomfort, but he often picks at it because he knows it is there  - Has since returned after last being removed, would like it assessed this admission  - Can consider derm consult if bothersome to pt, currently pt has no complaints      DISPO:   - Full code, confirmed on admit   - Primary onc: Dr. Mckeon   - [place] home pending completion of chemo   - Access: PICC   - FUV Infusion 4/2 (will need to be rescheduled); Cardio 6/24, Mal heme requested     I spent >60 minutes in the professional and overall care of this patient.    Assessment and plan as above discussed with attending physician Dr. Rell Pack, PA-C

## 2025-03-31 LAB
ALBUMIN SERPL BCP-MCNC: 3.2 G/DL (ref 3.4–5)
ALP SERPL-CCNC: 40 U/L (ref 33–136)
ALT SERPL W P-5'-P-CCNC: 12 U/L (ref 10–52)
ANION GAP SERPL CALC-SCNC: 14 MMOL/L (ref 10–20)
APTT PPP: 31 SECONDS (ref 26–36)
AST SERPL W P-5'-P-CCNC: 9 U/L (ref 9–39)
BASOPHILS # BLD AUTO: 0.01 X10*3/UL (ref 0–0.1)
BASOPHILS NFR BLD AUTO: 0.3 %
BILIRUB SERPL-MCNC: 0.8 MG/DL (ref 0–1.2)
BLOOD EXPIRATION DATE: NORMAL
BUN SERPL-MCNC: 20 MG/DL (ref 6–23)
CALCIUM SERPL-MCNC: 8.6 MG/DL (ref 8.6–10.6)
CHLORIDE SERPL-SCNC: 102 MMOL/L (ref 98–107)
CO2 SERPL-SCNC: 25 MMOL/L (ref 21–32)
CREAT SERPL-MCNC: 0.52 MG/DL (ref 0.5–1.3)
DISPENSE STATUS: NORMAL
EGFRCR SERPLBLD CKD-EPI 2021: >90 ML/MIN/1.73M*2
EOSINOPHIL # BLD AUTO: 0 X10*3/UL (ref 0–0.7)
EOSINOPHIL NFR BLD AUTO: 0 %
ERYTHROCYTE [DISTWIDTH] IN BLOOD BY AUTOMATED COUNT: 18.2 % (ref 11.5–14.5)
FIBRINOGEN PPP-MCNC: 353 MG/DL (ref 200–400)
GLUCOSE SERPL-MCNC: 155 MG/DL (ref 74–99)
HCT VFR BLD AUTO: 26.3 % (ref 41–52)
HGB BLD-MCNC: 8.2 G/DL (ref 13.5–17.5)
IMM GRANULOCYTES # BLD AUTO: 0.05 X10*3/UL (ref 0–0.7)
IMM GRANULOCYTES NFR BLD AUTO: 1.4 % (ref 0–0.9)
INR PPP: 1.2 (ref 0.9–1.1)
LDH SERPL L TO P-CCNC: 150 U/L (ref 84–246)
LYMPHOCYTES # BLD AUTO: 0.59 X10*3/UL (ref 1.2–4.8)
LYMPHOCYTES NFR BLD AUTO: 16.1 %
MAGNESIUM SERPL-MCNC: 2.03 MG/DL (ref 1.6–2.4)
MCH RBC QN AUTO: 27.6 PG (ref 26–34)
MCHC RBC AUTO-ENTMCNC: 31.2 G/DL (ref 32–36)
MCV RBC AUTO: 89 FL (ref 80–100)
MONOCYTES # BLD AUTO: 0.06 X10*3/UL (ref 0.1–1)
MONOCYTES NFR BLD AUTO: 1.6 %
NEUTROPHILS # BLD AUTO: 2.96 X10*3/UL (ref 1.2–7.7)
NEUTROPHILS NFR BLD AUTO: 80.6 %
NRBC BLD-RTO: 0 /100 WBCS (ref 0–0)
PHOSPHATE SERPL-MCNC: 4.3 MG/DL (ref 2.5–4.9)
PLATELET # BLD AUTO: 191 X10*3/UL (ref 150–450)
POTASSIUM SERPL-SCNC: 4.4 MMOL/L (ref 3.5–5.3)
PRODUCT BLOOD TYPE: 6200
PRODUCT CODE: NORMAL
PROT SERPL-MCNC: 4.7 G/DL (ref 6.4–8.2)
PROTHROMBIN TIME: 13.6 SECONDS (ref 9.8–12.4)
RBC # BLD AUTO: 2.97 X10*6/UL (ref 4.5–5.9)
SODIUM SERPL-SCNC: 137 MMOL/L (ref 136–145)
UNIT ABO: NORMAL
UNIT NUMBER: NORMAL
UNIT RH: NORMAL
UNIT VOLUME: 311
WBC # BLD AUTO: 3.7 X10*3/UL (ref 4.4–11.3)
XM INTEP: NORMAL

## 2025-03-31 PROCEDURE — 84100 ASSAY OF PHOSPHORUS: CPT

## 2025-03-31 PROCEDURE — 85384 FIBRINOGEN ACTIVITY: CPT

## 2025-03-31 PROCEDURE — 2500000002 HC RX 250 W HCPCS SELF ADMINISTERED DRUGS (ALT 637 FOR MEDICARE OP, ALT 636 FOR OP/ED)

## 2025-03-31 PROCEDURE — 2500000002 HC RX 250 W HCPCS SELF ADMINISTERED DRUGS (ALT 637 FOR MEDICARE OP, ALT 636 FOR OP/ED): Performed by: STUDENT IN AN ORGANIZED HEALTH CARE EDUCATION/TRAINING PROGRAM

## 2025-03-31 PROCEDURE — 2500000004 HC RX 250 GENERAL PHARMACY W/ HCPCS (ALT 636 FOR OP/ED)

## 2025-03-31 PROCEDURE — 83735 ASSAY OF MAGNESIUM: CPT

## 2025-03-31 PROCEDURE — 99233 SBSQ HOSP IP/OBS HIGH 50: CPT

## 2025-03-31 PROCEDURE — 85025 COMPLETE CBC W/AUTO DIFF WBC: CPT

## 2025-03-31 PROCEDURE — 2500000001 HC RX 250 WO HCPCS SELF ADMINISTERED DRUGS (ALT 637 FOR MEDICARE OP)

## 2025-03-31 PROCEDURE — 2500000004 HC RX 250 GENERAL PHARMACY W/ HCPCS (ALT 636 FOR OP/ED): Performed by: STUDENT IN AN ORGANIZED HEALTH CARE EDUCATION/TRAINING PROGRAM

## 2025-03-31 PROCEDURE — 85610 PROTHROMBIN TIME: CPT

## 2025-03-31 PROCEDURE — 1170000001 HC PRIVATE ONCOLOGY ROOM DAILY

## 2025-03-31 PROCEDURE — 83615 LACTATE (LD) (LDH) ENZYME: CPT

## 2025-03-31 PROCEDURE — 84075 ASSAY ALKALINE PHOSPHATASE: CPT

## 2025-03-31 RX ORDER — ONDANSETRON HYDROCHLORIDE 8 MG/1
16 TABLET, FILM COATED ORAL ONCE
Status: COMPLETED | OUTPATIENT
Start: 2025-03-31 | End: 2025-03-31

## 2025-03-31 RX ADMIN — METOPROLOL SUCCINATE 100 MG: 50 TABLET, EXTENDED RELEASE ORAL at 08:35

## 2025-03-31 RX ADMIN — GABAPENTIN 300 MG: 300 CAPSULE ORAL at 20:37

## 2025-03-31 RX ADMIN — APIXABAN 5 MG: 5 TABLET, FILM COATED ORAL at 08:35

## 2025-03-31 RX ADMIN — PREDNISONE 140 MG: 50 TABLET ORAL at 08:35

## 2025-03-31 RX ADMIN — OXYCODONE HYDROCHLORIDE 5 MG: 5 TABLET ORAL at 22:17

## 2025-03-31 RX ADMIN — Medication 10 ML: at 08:36

## 2025-03-31 RX ADMIN — OLANZAPINE 5 MG: 5 TABLET, FILM COATED ORAL at 20:37

## 2025-03-31 RX ADMIN — POTASSIUM CHLORIDE 20 MEQ: 750 TABLET, FILM COATED, EXTENDED RELEASE ORAL at 08:35

## 2025-03-31 RX ADMIN — PREDNISONE 140 MG: 50 TABLET ORAL at 20:37

## 2025-03-31 RX ADMIN — BISACODYL 5 MG: 5 TABLET, COATED ORAL at 20:37

## 2025-03-31 RX ADMIN — BISACODYL 5 MG: 5 TABLET, COATED ORAL at 08:35

## 2025-03-31 RX ADMIN — LISINOPRIL 10 MG: 10 TABLET ORAL at 08:35

## 2025-03-31 RX ADMIN — ATORVASTATIN CALCIUM 40 MG: 40 TABLET, FILM COATED ORAL at 05:59

## 2025-03-31 RX ADMIN — ONDANSETRON HYDROCHLORIDE 16 MG: 8 TABLET, FILM COATED ORAL at 12:41

## 2025-03-31 RX ADMIN — DOXORUBICIN HYDROCHLORIDE 28 MG: 2 INJECTION, SOLUTION INTRAVENOUS at 12:35

## 2025-03-31 RX ADMIN — POLYETHYLENE GLYCOL 3350 17 G: 17 POWDER, FOR SOLUTION ORAL at 20:36

## 2025-03-31 RX ADMIN — PANTOPRAZOLE SODIUM 40 MG: 40 TABLET, DELAYED RELEASE ORAL at 05:59

## 2025-03-31 RX ADMIN — APIXABAN 5 MG: 5 TABLET, FILM COATED ORAL at 20:37

## 2025-03-31 ASSESSMENT — COGNITIVE AND FUNCTIONAL STATUS - GENERAL
MOBILITY SCORE: 24
MOBILITY SCORE: 24
DAILY ACTIVITIY SCORE: 24
DAILY ACTIVITIY SCORE: 24

## 2025-03-31 ASSESSMENT — ACTIVITIES OF DAILY LIVING (ADL): LACK_OF_TRANSPORTATION: NO

## 2025-03-31 ASSESSMENT — PAIN SCALES - WONG BAKER: WONGBAKER_NUMERICALRESPONSE: NO HURT

## 2025-03-31 ASSESSMENT — PAIN SCALES - GENERAL
PAINLEVEL_OUTOF10: 0 - NO PAIN
PAINLEVEL_OUTOF10: 7
PAINLEVEL_OUTOF10: 2
PAINLEVEL_OUTOF10: 0 - NO PAIN

## 2025-03-31 ASSESSMENT — PAIN - FUNCTIONAL ASSESSMENT: PAIN_FUNCTIONAL_ASSESSMENT: 0-10

## 2025-03-31 ASSESSMENT — PAIN DESCRIPTION - LOCATION: LOCATION: ABDOMEN

## 2025-03-31 ASSESSMENT — PAIN DESCRIPTION - ORIENTATION: ORIENTATION: RIGHT

## 2025-03-31 NOTE — PROGRESS NOTES
03/31/25 1400   Discharge Planning   Living Arrangements Spouse/significant other   Support Systems Spouse/significant other   Assistance Needed none   Type of Residence Private residence   Number of Stairs to Enter Residence 1   Number of Stairs Within Residence 12  (patient does not navigate)   Do you have animals or pets at home? No   Who is requesting discharge planning? Other (Comment)   Home or Post Acute Services None   Expected Discharge Disposition Home   Does the patient need discharge transport arranged? No   Financial Resource Strain   How hard is it for you to pay for the very basics like food, housing, medical care, and heating? Not very   Housing Stability   In the last 12 months, was there a time when you were not able to pay the mortgage or rent on time? N   In the past 12 months, how many times have you moved where you were living? 0   At any time in the past 12 months, were you homeless or living in a shelter (including now)? N   Transportation Needs   In the past 12 months, has lack of transportation kept you from medical appointments or from getting medications? no   In the past 12 months, has lack of transportation kept you from meetings, work, or from getting things needed for daily living? No     Pt with Burkitt's Lymphoma was admitted for C4 of EPOCH.  Met with pt to discuss his home going plan.  He will be returning to home with his wife and is still independent- no home care or DME needed at this time.  He has a DL SOLO PICC which is cared for in the out infusion center.  MD is Dr. Torey Mckeon.  Will continue to follow for any home going needs that may arise.  Guerda Carranza RN, TCC

## 2025-03-31 NOTE — PROGRESS NOTES
"Bijan Ibanez is a 64 y.o. male on day 2 of admission presenting with Burkitt's lymphoma (Multi).    Subjective   Patient seen at bedside. States that he is feeling well overall. Denies any new SOB, CP, heart palpitations, fever/chills, N/V/D/C. States that his mucositis has improved since his last admission. States that he is eating well and ambulating well         Objective     Physical Exam  HENT:      Head: Normocephalic.      Right Ear: Tympanic membrane normal.      Nose: Nose normal.      Mouth/Throat:      Mouth: Mucous membranes are moist.   Eyes:      Pupils: Pupils are equal, round, and reactive to light.   Cardiovascular:      Rate and Rhythm: Normal rate.   Pulmonary:      Effort: Pulmonary effort is normal.   Abdominal:      Palpations: Abdomen is soft.   Musculoskeletal:         General: Normal range of motion.      Cervical back: Normal range of motion.   Skin:     General: Skin is warm.      Capillary Refill: Capillary refill takes less than 2 seconds.   Neurological:      General: No focal deficit present.      Mental Status: He is alert.   Psychiatric:         Mood and Affect: Mood normal.         Behavior: Behavior normal.         Last Recorded Vitals  Blood pressure 126/88, pulse 101, temperature 36 °C (96.8 °F), temperature source Temporal, resp. rate 16, height 1.835 m (6' 0.24\"), weight 92.5 kg (203 lb 14.8 oz), SpO2 98%.  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 male with PMH of newly dx Burkitt's lymphoma, HTN, HLD, CAD, MI (s/p stent 2010, on ASA), hx renal cell carcinoma (s/p R partial nephrectomy in 2013 @ [facility]), GERD, BPH, arthritis, RUE PICC DVT (on Eliquis w/ close monitoring d/t " counts), afib RVR, and recent MRSA bacteremia (s/p 4 week Vancomycin end date 3/3) who is admitted for C4 EPOCH. 3/30 started chemotherapy and received 1u pRBC for hgb 7.0. DC pending chemo completion likely 4/3     Update 3/30:  - Continue EPOCH   - plan for IT methotrexate 4/2 and IT cytarabine 4/3     ONC:   # Burkitt's lymphoma     - Primary oncologist: Dr. Torey Mckeon - aware of admission  - Originally signed out as high-grade lymphoma but after burkitt's rearrangement (t8;14) came back positive. High risk based on marrow involvement, though no CNS involvement   - S/p C1 DA-R-EPOCH when signed out as high-grade lymphoma; given the significant toxicity he had with DA R EPOCH, will not escalate to CODOX-IVAC or hyper-CVAD given no CNS involvement   - Given G3/4 mucositis, etoposide reduced by 25%, keep EPOCH at Dose level 0   - 2/11 oncology along with ID felt they could resume chemotherapy after total 2 weeks of IV Vanco, but will still complete full duration of 4 weeks of the antibiotic (ended on 3/3)    - PET (2/24/25) s/p C2: There is near complete interval resolution of previously seen hypermetabolic hermann and extranodal disease involving bilateral submandibular, parotid gland, hepatic lesions, spleen, mesenteric deposits, bilateral kidney stomach bilateral, bilateral arms, anterior chest wall, and gluteal muscle supra and infra diaphragmatic lymphadenopathy. Diffuse metabolic activity within the bone marrow. Camilla 2 - c/w CR    - 3/29 admitted for cycle 4 EPOCH     # CHEMO: C4 EPOCH   - Premeds: Zofran, Zyprexa, Compazine, Emend   - Doxorubicin 28mg on Day 1, 2, 3, 4   - Etoposide 88mg on Day 1, 2, 3. 4   - Vincristine 0.9mg on Day 1, 2, 3. 4   - IT MTX 12mg on Day 4 (4/2)  - Cyclophosphamide 2,124mg once on Day 5   - IT Cytarabine 70mg on Day 5 (4/3)  - Prednisone 140mg BID x5 days   - PRN Reaction meds: Epinephrine, Albuterol, Benadryl, Pepcid, Solumedrol     - Plan for Neulasta and Ritux on Day 6          HEME:   # Anemia   - Likely 2/2 disease and tx, no signs of active bleeding on admit   - Monitor counts daily, fibrinogen, coags   - No evidence of DIC or hypercoag state   - Transfuse to keep hgb > 7, plt > 50 (on AC)   - 3/30 hgb 7.0, T&S sent and received 1u pRBC      # Prophy:   - Cont home Eliquis 5mg BID - plan to hold when plt < 50        ID:   Allergies: Latex, PCN   # Recent MRSA bacteremia   - Follows with Dr. Rivera, last seen on 2/28; s/p vanc ended on 3/3/25 for MRSA bacteremia    - 1/31 Bc x2 + staphyloccous aureus and MRSA, 2/4 Bc x2 negative growth   - PICC line removed 2/3   - 2/3 TTE w/o evidence of vegetation   - Aspergillus galactomannan and fungitell results negative from 2/4   - New PICC line placed 3/7/25   # Prophylaxis   - Plan Levofloxacin when neutropenic    - VZV: Stopped acyclovir at recent outpt appointment with Dr. Mckeon on 3/4  - Candidiasis: Stopped Fluconazole at recent outpt appt with Elvira العلي 3/12   - PJP: None at this time        CARDIO:   # Afib RVR on prior admission   - Likely 2/2 infection, electrolyte derangements, and anemia   - Keep K > 4, Mg > 2, Hgb > 7   - Seen by cardio outpt on 3/13/25, plan outpt echo in 3 months  - Cont home Eliquis until plt < 50   - Continue home Metoprolol XL 100mg PO daily   - Cont home Potassium 20mEq PO daily   - Cont home Furosemide 20mg daily PRN for swelling   - Tele ordered on admit  # Hx of MI   # Hx of HTN/HLD/CAD   - 2/3/25 Seen by EP as difficulty achieving rate control   - 2/3 TTE EF 60-65%, normal RVSP, L atrial size mod dilated   - Cont home Atorvastatin 40mg PO daily   - Cont home Lisinopril 10mg PO daily       PULM:  - RLL decreased lung sounds on auscultation  - Denies SOB or cough recently  - Recent imaging 3/25 showed no signs of pneumothorax, air space disease or effusion  - CTM for signs of worsening, can consider new imaging if so      FEN/GI:   Admit weight: 95.3kg   - Uric acid on admit: 5.9 (3/29)  -  Allopurinol stopped at recent outpt appointment with Dr. Mckeon on 3/4    # Mucositis   - Pt had severe mucositis last admission   - States he was eating well at home, no mouth pain or lesions present on admission    - Cont home BMX PRN   - Pt states home Sucralfate 1g AC/HS did not work and made him sick  - Cont Oxy 5mg PO Q4h PRN for moderate pain, Q6h PRN for mild pain   - Consider supportive onc consult if continues to worsen   # Prophy:   - Cont home Pantoprazole 40mg PO daily    - Miralax, Dulcolax daily      MSK:   # Hx Back Pain   - Cont Gabapentin 300mg PO nightly   - Cont home Oxy PRN  # R-Sided Rib Pain  - Rib xrays 3/25: No evidence of rib fracture     DERM:  - [place]-sized nodule on L side of back of neck  - Pt states he has had it for years and it has been drained/cut out twice  - Pt denies pain or discomfort, but he often picks at it because he knows it is there  - Has since returned after last being removed, would like it assessed this admission  - Can consider derm consult if bothersome to pt, currently pt has no complaints      DISPO:   - Full code, confirmed on admit   - Primary onc: Dr. Mckeon   - [place] home pending completion of chemo   - Access: PICC   - FUV Infusion 4/2 (will need to be rescheduled); Cardio 6/24, Mal heme requested        I spent 60 minutes in the professional and overall care of this patient.      Kylie Humphrey, APRN-CNP  Patient discussed with Dr. Zacarias

## 2025-03-31 NOTE — CARE PLAN
The patient's goals for the shift include      The clinical goals for the shift include patient will remain free from falls      Problem: Pain - Adult  Goal: Verbalizes/displays adequate comfort level or baseline comfort level  Outcome: Progressing     Problem: Safety - Adult  Goal: Free from fall injury  Outcome: Progressing     Problem: Discharge Planning  Goal: Discharge to home or other facility with appropriate resources  Outcome: Progressing     Problem: Chronic Conditions and Co-morbidities  Goal: Patient's chronic conditions and co-morbidity symptoms are monitored and maintained or improved  Outcome: Progressing     Problem: Nutrition  Goal: Nutrient intake appropriate for maintaining nutritional needs  Outcome: Progressing

## 2025-04-01 ENCOUNTER — APPOINTMENT (OUTPATIENT)
Dept: RADIOLOGY | Facility: HOSPITAL | Age: 65
End: 2025-04-01
Payer: MEDICARE

## 2025-04-01 LAB
ALBUMIN SERPL BCP-MCNC: 3.3 G/DL (ref 3.4–5)
ALP SERPL-CCNC: 40 U/L (ref 33–136)
ALT SERPL W P-5'-P-CCNC: 17 U/L (ref 10–52)
ANION GAP SERPL CALC-SCNC: 14 MMOL/L (ref 10–20)
APTT PPP: 28 SECONDS (ref 26–36)
AST SERPL W P-5'-P-CCNC: 11 U/L (ref 9–39)
BASOPHILS # BLD AUTO: 0.01 X10*3/UL (ref 0–0.1)
BASOPHILS NFR BLD AUTO: 0.2 %
BILIRUB SERPL-MCNC: 0.6 MG/DL (ref 0–1.2)
BUN SERPL-MCNC: 26 MG/DL (ref 6–23)
CALCIUM SERPL-MCNC: 8.8 MG/DL (ref 8.6–10.6)
CHLORIDE SERPL-SCNC: 106 MMOL/L (ref 98–107)
CO2 SERPL-SCNC: 25 MMOL/L (ref 21–32)
CREAT SERPL-MCNC: 0.64 MG/DL (ref 0.5–1.3)
EGFRCR SERPLBLD CKD-EPI 2021: >90 ML/MIN/1.73M*2
EOSINOPHIL # BLD AUTO: 0 X10*3/UL (ref 0–0.7)
EOSINOPHIL NFR BLD AUTO: 0 %
ERYTHROCYTE [DISTWIDTH] IN BLOOD BY AUTOMATED COUNT: 18.7 % (ref 11.5–14.5)
FIBRINOGEN PPP-MCNC: 231 MG/DL (ref 200–400)
GLUCOSE SERPL-MCNC: 151 MG/DL (ref 74–99)
HCT VFR BLD AUTO: 26.7 % (ref 41–52)
HGB BLD-MCNC: 8.5 G/DL (ref 13.5–17.5)
IMM GRANULOCYTES # BLD AUTO: 0.07 X10*3/UL (ref 0–0.7)
IMM GRANULOCYTES NFR BLD AUTO: 1.2 % (ref 0–0.9)
INR PPP: 1.2 (ref 0.9–1.1)
LDH SERPL L TO P-CCNC: 139 U/L (ref 84–246)
LYMPHOCYTES # BLD AUTO: 0.34 X10*3/UL (ref 1.2–4.8)
LYMPHOCYTES NFR BLD AUTO: 6 %
MAGNESIUM SERPL-MCNC: 2.22 MG/DL (ref 1.6–2.4)
MCH RBC QN AUTO: 28.3 PG (ref 26–34)
MCHC RBC AUTO-ENTMCNC: 31.8 G/DL (ref 32–36)
MCV RBC AUTO: 89 FL (ref 80–100)
MONOCYTES # BLD AUTO: 0.18 X10*3/UL (ref 0.1–1)
MONOCYTES NFR BLD AUTO: 3.2 %
NEUTROPHILS # BLD AUTO: 5.03 X10*3/UL (ref 1.2–7.7)
NEUTROPHILS NFR BLD AUTO: 89.4 %
NRBC BLD-RTO: 0.7 /100 WBCS (ref 0–0)
PHOSPHATE SERPL-MCNC: 4.1 MG/DL (ref 2.5–4.9)
PLATELET # BLD AUTO: 193 X10*3/UL (ref 150–450)
POTASSIUM SERPL-SCNC: 4.3 MMOL/L (ref 3.5–5.3)
PROT SERPL-MCNC: 4.7 G/DL (ref 6.4–8.2)
PROTHROMBIN TIME: 13.4 SECONDS (ref 9.8–12.4)
RBC # BLD AUTO: 3 X10*6/UL (ref 4.5–5.9)
SODIUM SERPL-SCNC: 141 MMOL/L (ref 136–145)
WBC # BLD AUTO: 5.6 X10*3/UL (ref 4.4–11.3)

## 2025-04-01 PROCEDURE — 2500000002 HC RX 250 W HCPCS SELF ADMINISTERED DRUGS (ALT 637 FOR MEDICARE OP, ALT 636 FOR OP/ED)

## 2025-04-01 PROCEDURE — 2500000004 HC RX 250 GENERAL PHARMACY W/ HCPCS (ALT 636 FOR OP/ED)

## 2025-04-01 PROCEDURE — 83615 LACTATE (LD) (LDH) ENZYME: CPT

## 2025-04-01 PROCEDURE — 97161 PT EVAL LOW COMPLEX 20 MIN: CPT | Mod: GP

## 2025-04-01 PROCEDURE — 84100 ASSAY OF PHOSPHORUS: CPT

## 2025-04-01 PROCEDURE — 2500000004 HC RX 250 GENERAL PHARMACY W/ HCPCS (ALT 636 FOR OP/ED): Performed by: STUDENT IN AN ORGANIZED HEALTH CARE EDUCATION/TRAINING PROGRAM

## 2025-04-01 PROCEDURE — 99233 SBSQ HOSP IP/OBS HIGH 50: CPT

## 2025-04-01 PROCEDURE — 2500000001 HC RX 250 WO HCPCS SELF ADMINISTERED DRUGS (ALT 637 FOR MEDICARE OP)

## 2025-04-01 PROCEDURE — 1170000001 HC PRIVATE ONCOLOGY ROOM DAILY

## 2025-04-01 PROCEDURE — 85025 COMPLETE CBC W/AUTO DIFF WBC: CPT

## 2025-04-01 PROCEDURE — 85384 FIBRINOGEN ACTIVITY: CPT

## 2025-04-01 PROCEDURE — 83735 ASSAY OF MAGNESIUM: CPT

## 2025-04-01 PROCEDURE — 2500000002 HC RX 250 W HCPCS SELF ADMINISTERED DRUGS (ALT 637 FOR MEDICARE OP, ALT 636 FOR OP/ED): Performed by: STUDENT IN AN ORGANIZED HEALTH CARE EDUCATION/TRAINING PROGRAM

## 2025-04-01 PROCEDURE — 84075 ASSAY ALKALINE PHOSPHATASE: CPT

## 2025-04-01 PROCEDURE — 85730 THROMBOPLASTIN TIME PARTIAL: CPT

## 2025-04-01 RX ORDER — ONDANSETRON HYDROCHLORIDE 8 MG/1
16 TABLET, FILM COATED ORAL ONCE
Status: COMPLETED | OUTPATIENT
Start: 2025-04-01 | End: 2025-04-01

## 2025-04-01 RX ORDER — LORAZEPAM 2 MG/ML
1 INJECTION INTRAMUSCULAR ONCE
Status: CANCELLED | OUTPATIENT
Start: 2025-04-02

## 2025-04-01 RX ORDER — MORPHINE SULFATE 2 MG/ML
1 INJECTION, SOLUTION INTRAMUSCULAR; INTRAVENOUS ONCE
Status: CANCELLED | OUTPATIENT
Start: 2025-04-02

## 2025-04-01 RX ORDER — SODIUM CHLORIDE 9 MG/ML
5 INJECTION, SOLUTION INTRAVENOUS CONTINUOUS
Status: CANCELLED | OUTPATIENT
Start: 2025-04-01 | End: 2025-04-02

## 2025-04-01 RX ADMIN — METOPROLOL SUCCINATE 100 MG: 50 TABLET, EXTENDED RELEASE ORAL at 08:10

## 2025-04-01 RX ADMIN — LISINOPRIL 10 MG: 10 TABLET ORAL at 08:10

## 2025-04-01 RX ADMIN — ATORVASTATIN CALCIUM 40 MG: 40 TABLET, FILM COATED ORAL at 05:46

## 2025-04-01 RX ADMIN — APIXABAN 5 MG: 5 TABLET, FILM COATED ORAL at 20:47

## 2025-04-01 RX ADMIN — DOXORUBICIN HYDROCHLORIDE 0.9 MG: 2 INJECTION, SOLUTION INTRAVENOUS at 12:59

## 2025-04-01 RX ADMIN — BISACODYL 5 MG: 5 TABLET, COATED ORAL at 20:47

## 2025-04-01 RX ADMIN — OXYCODONE HYDROCHLORIDE 5 MG: 5 TABLET ORAL at 05:46

## 2025-04-01 RX ADMIN — POLYETHYLENE GLYCOL 3350 17 G: 17 POWDER, FOR SOLUTION ORAL at 08:10

## 2025-04-01 RX ADMIN — POTASSIUM CHLORIDE 20 MEQ: 750 TABLET, FILM COATED, EXTENDED RELEASE ORAL at 08:10

## 2025-04-01 RX ADMIN — PREDNISONE 140 MG: 50 TABLET ORAL at 08:10

## 2025-04-01 RX ADMIN — Medication 10 ML: at 08:23

## 2025-04-01 RX ADMIN — SODIUM CHLORIDE 1000 ML: 9 INJECTION, SOLUTION INTRAVENOUS at 08:19

## 2025-04-01 RX ADMIN — BISACODYL 5 MG: 5 TABLET, COATED ORAL at 08:10

## 2025-04-01 RX ADMIN — ONDANSETRON HYDROCHLORIDE 16 MG: 8 TABLET, FILM COATED ORAL at 12:24

## 2025-04-01 RX ADMIN — GABAPENTIN 300 MG: 300 CAPSULE ORAL at 20:47

## 2025-04-01 RX ADMIN — APIXABAN 5 MG: 5 TABLET, FILM COATED ORAL at 08:10

## 2025-04-01 RX ADMIN — PREDNISONE 140 MG: 50 TABLET ORAL at 20:47

## 2025-04-01 RX ADMIN — PANTOPRAZOLE SODIUM 40 MG: 40 TABLET, DELAYED RELEASE ORAL at 05:53

## 2025-04-01 RX ADMIN — OLANZAPINE 5 MG: 5 TABLET, FILM COATED ORAL at 20:47

## 2025-04-01 ASSESSMENT — PAIN - FUNCTIONAL ASSESSMENT
PAIN_FUNCTIONAL_ASSESSMENT: 0-10

## 2025-04-01 ASSESSMENT — PAIN SCALES - GENERAL
PAINLEVEL_OUTOF10: 7
PAINLEVEL_OUTOF10: 0 - NO PAIN

## 2025-04-01 ASSESSMENT — COGNITIVE AND FUNCTIONAL STATUS - GENERAL
MOBILITY SCORE: 24
MOBILITY SCORE: 24
STANDING UP FROM CHAIR USING ARMS: A LITTLE
DAILY ACTIVITIY SCORE: 24
CLIMB 3 TO 5 STEPS WITH RAILING: A LITTLE
MOBILITY SCORE: 20
MOVING TO AND FROM BED TO CHAIR: A LITTLE
WALKING IN HOSPITAL ROOM: A LITTLE
DAILY ACTIVITIY SCORE: 24

## 2025-04-01 ASSESSMENT — PAIN DESCRIPTION - LOCATION: LOCATION: ABDOMEN

## 2025-04-01 ASSESSMENT — ACTIVITIES OF DAILY LIVING (ADL): ADL_ASSISTANCE: NEEDS ASSISTANCE

## 2025-04-01 NOTE — PROGRESS NOTES
Physical Therapy    Physical Therapy Evaluation    Patient Name: Bijan Ibanez  MRN: [id number]  Department: [facility]  Room: [address] [place] [address]-A  Today's Date: 4/1/2025   Time Calculation  Start Time: 1504  Stop Time: 1515  Time Calculation (min): 11 min    Assessment/Plan   PT Assessment  PT Assessment Results: Decreased strength, Decreased endurance, Decreased mobility, Impaired balance  Rehab Prognosis: Good  Barriers to Discharge Home: No anticipated barriers  Evaluation/Treatment Tolerance: Patient tolerated treatment well  Medical Staff Made Aware: Yes  Strengths: Attitude of self  Barriers to Participation: Comorbidities  End of Session Communication: Bedside nurse  Assessment Comment: pt admitted for chemo. pt grossly sup>sba for all mobility. pt benefits from continued PT while IP. no PT needs at DC  End of Session Patient Position: Up in chair  IP OR SWING BED PT PLAN  Inpatient or Swing Bed: Inpatient  PT Plan  Treatment/Interventions: Bed mobility, Transfer training, Gait training, Stair training, Balance training, Strengthening, Endurance training, Range of motion, Therapeutic exercise, Therapeutic activity, Home exercise program, Positioning  PT Plan: Ongoing PT  PT Frequency: 3 times per week  PT Discharge Recommendations: No PT needed after discharge  PT Recommended Transfer Status: Stand by assist  PT - OK to Discharge: Yes    Subjective   General Visit Information:  General  Reason for Referral: chemotherapy and received 1u pRBC for hgb 7.0. DC pending chemo completion likely 4/3  Past Medical History Relevant to Rehab: Burkitt's lymphoma, HTN, HLD, CAD, MI (s/p stent 2010, on ASA), hx renal cell carcinoma (s/p R partial nephrectomy in 2013 @ [facility]), GERD, BPH, arthritis, RUE PICC DVT (on Eliquis w/ close monitoring d/t counts), afib RVR, and recent MRSA bacteremia  Family/Caregiver Present: Yes  Caregiver Feedback: pts wife present  Prior to Session Communication: Bedside nurse  Patient Position Received: Up  in chair, Alarm off, not on at start of session  General Comment: pt seated in chair, alert and agreeable  Home Living:  Home Living  Type of Home: House  Lives With: Spouse, Adult children  Home Adaptive Equipment: Walker rolling or standard, Cane, Reacher, Long-handled shoehorn  Home Layout: Two level, Laundry second level, Able to live on main level with bedroom/bathroom, Full bath main level, Bed/bath upstairs, Stairs to alternate level with rails  Alternate Level Stairs-Rails: Right  Alternate Level Stairs-Number of Steps: 12  Home Access: Stairs to enter without rails  Entrance Stairs-Rails: None  Entrance Stairs-Number of Steps: 1  Bathroom Shower/Tub: Tub/shower unit  Bathroom Toilet: Standard  Bathroom Equipment: Built-in shower seat  Prior Level of Function:  Prior Function Per Pt/Caregiver Report  Receives Help From: Family  ADL Assistance: Needs assistance  Homemaking Assistance: Needs assistance  Ambulatory Assistance: Independent  Vocational: Retired  Leisure: hiking, fishing, playing [employer]  Hand Dominance: Right  Prior Function Comments: x1 fall  Precautions:  Precautions  Medical Precautions: Fall precautions  Precautions Comment: protective precautions             Objective   Pain:  Pain Assessment  Pain Assessment: 0-10  0-10 (Numeric) Pain Score: 0 - No pain  Cognition:  Cognition  Overall Cognitive Status: Within Functional Limits  Orientation Level: Oriented X4    General Assessments:                  Activity Tolerance  Endurance: Tolerates 10 - 20 min exercise with multiple rests    Sensation  Light Touch: No apparent deficits            Perception  Inattention/Neglect: Appears intact      Coordination  Movements are Fluid and Coordinated: Yes    Postural Control  Postural Control: Within Functional Limits    Static Sitting Balance  Static Sitting-Balance Support: No upper extremity supported, Feet supported  Static Sitting-Level of Assistance: Independent  Dynamic Sitting Balance  Dynamic  Sitting-Balance Support: Feet supported  Dynamic Sitting-Level of Assistance: Independent  Dynamic Sitting-Balance: Trunk control activities    Static Standing Balance  Static Standing-Balance Support: No upper extremity supported  Static Standing-Level of Assistance: Close supervision  Dynamic Standing Balance  Dynamic Standing-Balance Support: No upper extremity supported  Dynamic Standing-Level of Assistance: Close supervision  Functional Assessments:  Bed Mobility  Bed Mobility: No    Transfers  Transfer: Yes  Transfer 1  Transfer From 1: Sit to, Stand to  Transfer to 1: Sit, Stand  Technique 1: Sit to stand, Stand to sit  Transfer Device 1:  (none)  Transfer Level of Assistance 1: Close supervision  Trials/Comments 1: x1    Ambulation/Gait Training  Ambulation/Gait Training Performed: Yes  Ambulation/Gait Training 1  Surface 1: Level tile  Device 1: No device  Assistance 1: Close supervision  Quality of Gait 1: Decreased step length, Narrow base of support  Comments/Distance (ft) 1: few steps forward/back; pt seen ambulating hallway earlier with wife    Stairs  Stairs: No  Extremity/Trunk Assessments:  RLE   RLE : Within Functional Limits  LLE   LLE : Within Functional Limits  Outcome Measures:  [facility] Basic Mobility  Turning from your back to your side while in a flat bed without using bedrails: None  Moving from lying on your back to sitting on the side of a flat bed without using bedrails: None  Moving to and from bed to chair (including a wheelchair): A little  Standing up from a chair using your arms (e.g. wheelchair or bedside chair): A little  To walk in hospital room: A little  Climbing 3-5 steps with railing: A little  Basic Mobility - Total Score: 20    Encounter Problems       Encounter Problems (Active)       Balance       pt will achieve >/=25/28 on Tinetti Assessment.        Start:  04/01/25    Expected End:  04/15/25               Mobility       STG - Patient will ambulate >/= 600 ft independently         Start:  04/01/25    Expected End:  04/15/25            STG - Patient will ascend and descend a flight of stairs  independently        Start:  04/01/25    Expected End:  04/15/25               PT Transfers       STG - Transfer from bed to chair  independently        Start:  04/01/25    Expected End:  04/15/25            STG - Patient will perform bed mobility  independently        Start:  04/01/25    Expected End:  04/15/25            STG - Patient will transfer sit to and from stand  independently        Start:  04/01/25    Expected End:  04/15/25               Pain - Adult              Education Documentation  Body Mechanics, taught by Anna Mooney PT at 4/1/2025  3:28 PM.  Learner: Patient  Readiness: Acceptance  Method: Explanation  Response: Verbalizes Understanding    Mobility Training, taught by Anna Mooney PT at 4/1/2025  3:28 PM.  Learner: Patient  Readiness: Acceptance  Method: Explanation  Response: Verbalizes Understanding    Education Comments  Edcuated pt on HEP including hamstring curls with GTB., resisted ankle pumps, hip ABD with GTB       04/01/25 at 3:30 PM - Anna Mooney, PT

## 2025-04-01 NOTE — NURSING NOTE
Dose # 3 of Doxorubicin 28 mg, Etoposide 88mg, and Vincristine 0.9 mg chemotherapy  in 566.3 mL given over 25 hours via left double PICC line catheter.  Dose up at 1259 and down at 1255.  Pre-medicated with ondansetron.  Patient, dose and rate verified with second RN, Viji Siddiqui RN.  + BBR obtained via syringe aspiration before and after administration.  Patient with no side effects.

## 2025-04-01 NOTE — CARE PLAN
The patient's goals for the shift include      The clinical goals for the shift include Patient will remain HDS through end of shift    Over the shift, the patient did make progress toward the following goals. Barriers to progression include chemotherapy. Recommendations to address these barriers include continue  monitoring.

## 2025-04-01 NOTE — SIGNIFICANT EVENT
04/01/25 0101   Prechemo Checklist   Has the patient been in the hospital, ED, or urgent care since last date of service Yes   Chemo/Immuno Consent Completed and Signed Yes   Protocol/Indications Verified Yes   Confirmed to previous date/time of medication Yes   Compared to previous dose Yes   All medications are dated accurately Yes   Pregnancy Test Negative Not applicable   Parameters Met Yes   Provider Notified Yes   Provider Name Dr Zacarias   Is Patient Proceeding With Treatment? Yes   BSA/Weight-Height Verified Yes   Dose Calculations Verified (current, total, cumulative) Yes

## 2025-04-01 NOTE — PROGRESS NOTES
"Bijan Ibanez is a 64 y.o. male on day 3 of admission presenting with Burkitt's lymphoma (Multi).    Subjective   Patient seen at bedside. States that he is feeling well today denies any issues from chemo. Denies any N/V/D/C, headache dizziness, vision changes, fever/chills. Discussed plan for IT methotrexate tomorrow and IT Cybarabine on Thursday. Patient agreeable to plan        Objective     Physical Exam  HENT:      Head: Normocephalic.      Right Ear: Tympanic membrane normal.      Nose: Nose normal.      Mouth/Throat:      Mouth: Mucous membranes are moist.   Eyes:      Pupils: Pupils are equal, round, and reactive to light.   Cardiovascular:      Rate and Rhythm: Normal rate.   Pulmonary:      Effort: Pulmonary effort is normal.   Abdominal:      Palpations: Abdomen is soft.   Musculoskeletal:         General: Normal range of motion.      Cervical back: Normal range of motion.   Skin:     General: Skin is warm.      Capillary Refill: Capillary refill takes less than 2 seconds.   Neurological:      General: No focal deficit present.      Mental Status: He is alert.   Psychiatric:         Mood and Affect: Mood normal.         Behavior: Behavior normal.         Last Recorded Vitals  Blood pressure 121/79, pulse 84, temperature 36.4 °C (97.5 °F), temperature source Temporal, resp. rate 19, height 1.835 m (6' 0.24\"), weight 92.5 kg (203 lb 14.8 oz), SpO2 95%.  Intake/Output last 3 Shifts:  I/O last 3 completed shifts:  In: 515.2 (5.6 mL/kg) [P.O.:100; Blood:300; IV Piggyback:115.2]  Out: - (0 mL/kg)   Weight: 92.5 kg     Relevant Results               This patient has a central line   Reason for the central line remaining today? 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 male with PMH of newly dx Burkitt's lymphoma, HTN, HLD, CAD, MI (s/p stent 2010, on " ASA), hx renal cell carcinoma (s/p R partial nephrectomy in 2013 @ [facility]), GERD, BPH, arthritis, RUE PICC DVT (on Eliquis w/ close monitoring d/t counts), afib RVR, and recent MRSA bacteremia (s/p 4 week Vancomycin end date 3/3) who is admitted for C4 EPOCH. 3/30 started chemotherapy and received 1u pRBC for hgb 7.0. DC pending chemo completion likely 4/3     Update 4/1:  - Continue EPOCH   - plan for IT methotrexate 4/2 and IT cytarabine 4/3  - 1L NS bolus for dehydration      ONC:   # Burkitt's lymphoma     - Primary oncologist: Dr. Torey Mckeon - aware of admission  - Originally signed out as high-grade lymphoma but after burkitt's rearrangement (t8;14) came back positive. High risk based on marrow involvement, though no CNS involvement   - S/p C1 DA-R-EPOCH when signed out as high-grade lymphoma; given the significant toxicity he had with DA R EPOCH, will not escalate to CODOX-IVAC or hyper-CVAD given no CNS involvement   - Given G3/4 mucositis, etoposide reduced by 25%, keep EPOCH at Dose level 0   - 2/11 oncology along with ID felt they could resume chemotherapy after total 2 weeks of IV Vanco, but will still complete full duration of 4 weeks of the antibiotic (ended on 3/3)    - PET (2/24/25) s/p C2: There is near complete interval resolution of previously seen hypermetabolic hermann and extranodal disease involving bilateral submandibular, parotid gland, hepatic lesions, spleen, mesenteric deposits, bilateral kidney stomach bilateral, bilateral arms, anterior chest wall, and gluteal muscle supra and infra diaphragmatic lymphadenopathy. Diffuse metabolic activity within the bone marrow. Camilla 2 - c/w CR    - 3/29 admitted for cycle 4 EPOCH and started chemo 3/30     # CHEMO: C4 EPOCH   - Premeds: Zofran, Zyprexa, Compazine, Emend   - Doxorubicin 28mg on Day 1, 2, 3, 4   - Etoposide 88mg on Day 1, 2, 3. 4   - Vincristine 0.9mg on Day 1, 2, 3. 4   - IT MTX 12mg on Day 4 (4/2)  - Cyclophosphamide 2,124mg once on  Day 5 (4/3)   - IT Cytarabine 70mg on Day 5 (4/3)  - Prednisone 140mg BID x5 days   - PRN Reaction meds: Epinephrine, Albuterol, Benadryl, Pepcid, Solumedrol     - Plan for Neulasta and Ritux on Day 6 (4/4)        HEME:   # Anemia   - Likely 2/2 disease and tx, no signs of active bleeding on admit   - Monitor counts daily, fibrinogen, coags   - No evidence of DIC or hypercoag state   - Transfuse to keep hgb > 7, plt > 50 (on AC)   - 3/30 hgb 7.0, T&S sent and received 1u pRBC      # Prophy:   - Cont home Eliquis 5mg BID - plan to hold when plt < 50        ID:   Allergies: Latex, PCN   # Recent MRSA bacteremia   - Follows with Dr. Rivera, last seen on 2/28; s/p vanc ended on 3/3/25 for MRSA bacteremia    - 1/31 Bc x2 + staphyloccous aureus and MRSA, 2/4 Bc x2 negative growth   - PICC line removed 2/3   - 2/3 TTE w/o evidence of vegetation   - Aspergillus galactomannan and fungitell results negative from 2/4   - New PICC line placed 3/7/25   # Prophylaxis   - Plan Levofloxacin when neutropenic    - VZV: Stopped acyclovir at recent outpt appointment with Dr. Mckeon on 3/4  - Candidiasis: Stopped Fluconazole at recent outpt appt with Elvira العلي 3/12   - PJP: None at this time        CARDIO:   # Afib RVR on prior admission   - Likely 2/2 infection, electrolyte derangements, and anemia   - Keep K > 4, Mg > 2, Hgb > 7   - Seen by cardio outpt on 3/13/25, plan outpt echo in 3 months  - Cont home Eliquis until plt < 50   - Continue home Metoprolol XL 100mg PO daily   - Cont home Potassium 20mEq PO daily   - Cont home Furosemide 20mg daily PRN for swelling   - Tele ordered on admit  # Hx of MI   # Hx of HTN/HLD/CAD   - 2/3/25 Seen by EP as difficulty achieving rate control   - 2/3 TTE EF 60-65%, normal RVSP, L atrial size mod dilated   - Cont home Atorvastatin 40mg PO daily   - Cont home Lisinopril 10mg PO daily       PULM:  - RLL decreased lung sounds on auscultation  - Denies SOB or cough recently  - Recent imaging  3/25 showed no signs of pneumothorax, air space disease or effusion  - CTM for signs of worsening, can consider new imaging if so      FEN/GI:   Admit weight: 95.3kg -> 92.5kg (3/31)  - Uric acid on admit: 5.9 (3/29)  - Allopurinol stopped at recent outpt appointment with Dr. Mckeon on 3/4    # Mucositis   - Pt had severe mucositis last admission   - States he was eating well at home, no mouth pain or lesions present on admission    - Cont home BMX PRN   - Pt states home Sucralfate 1g AC/HS did not work and made him sick  - Cont Oxy 5mg PO Q4h PRN for moderate pain, Q6h PRN for mild pain   - Consider supportive onc consult if continues to worsen   # Prophy:   - Cont home Pantoprazole 40mg PO daily    - Miralax, Dulcolax daily   - 4/1 gave 1L bolus      MSK:   # Hx Back Pain   - Cont Gabapentin 300mg PO nightly   - Cont home Oxy PRN  # R-Sided Rib Pain  - Rib xrays 3/25: No evidence of rib fracture     DERM:  - Blackfoot-sized nodule on L side of back of neck  - Pt states he has had it for years and it has been drained/cut out twice  - Pt denies pain or discomfort, but he often picks at it because he knows it is there  - Has since returned after last being removed, would like it assessed this admission  - Can consider derm consult if bothersome to pt, currently pt has no complaints      DISPO:   - Full code, confirmed on admit   - Primary onc: Dr. Mckeon   - PR home pending completion of chemo   - Access: PICC   - FUV Infusion 4/2 (will need to be rescheduled); Cardio 6/24, Mal heme requested        I spent 60 minutes in the professional and overall care of this patient.      Kylie Humphrey, APRN-CNP  Patient discussed with Dr. Zacarias

## 2025-04-02 ENCOUNTER — APPOINTMENT (OUTPATIENT)
Dept: RADIOLOGY | Facility: HOSPITAL | Age: 65
End: 2025-04-02
Payer: MEDICARE

## 2025-04-02 ENCOUNTER — APPOINTMENT (OUTPATIENT)
Dept: HEMATOLOGY/ONCOLOGY | Facility: HOSPITAL | Age: 65
End: 2025-04-02
Payer: MEDICARE

## 2025-04-02 LAB
ALBUMIN SERPL BCP-MCNC: 2.9 G/DL (ref 3.4–5)
ALP SERPL-CCNC: 31 U/L (ref 33–136)
ALT SERPL W P-5'-P-CCNC: 16 U/L (ref 10–52)
ANION GAP SERPL CALC-SCNC: 10 MMOL/L (ref 10–20)
APTT PPP: 26 SECONDS (ref 26–36)
AST SERPL W P-5'-P-CCNC: 8 U/L (ref 9–39)
BASOPHILS # BLD AUTO: 0.01 X10*3/UL (ref 0–0.1)
BASOPHILS NFR BLD AUTO: 0.2 %
BILIRUB SERPL-MCNC: 0.5 MG/DL (ref 0–1.2)
BUN SERPL-MCNC: 29 MG/DL (ref 6–23)
CALCIUM SERPL-MCNC: 8 MG/DL (ref 8.6–10.6)
CHLORIDE SERPL-SCNC: 107 MMOL/L (ref 98–107)
CO2 SERPL-SCNC: 25 MMOL/L (ref 21–32)
CREAT SERPL-MCNC: 0.43 MG/DL (ref 0.5–1.3)
EGFRCR SERPLBLD CKD-EPI 2021: >90 ML/MIN/1.73M*2
EOSINOPHIL # BLD AUTO: 0.01 X10*3/UL (ref 0–0.7)
EOSINOPHIL NFR BLD AUTO: 0.2 %
ERYTHROCYTE [DISTWIDTH] IN BLOOD BY AUTOMATED COUNT: 18 % (ref 11.5–14.5)
FIBRINOGEN PPP-MCNC: 170 MG/DL (ref 200–400)
GLUCOSE SERPL-MCNC: 140 MG/DL (ref 74–99)
HCT VFR BLD AUTO: 24.8 % (ref 41–52)
HGB BLD-MCNC: 7.7 G/DL (ref 13.5–17.5)
IMM GRANULOCYTES # BLD AUTO: 0.07 X10*3/UL (ref 0–0.7)
IMM GRANULOCYTES NFR BLD AUTO: 1.6 % (ref 0–0.9)
INR PPP: 1.1 (ref 0.9–1.1)
LDH SERPL L TO P-CCNC: 130 U/L (ref 84–246)
LYMPHOCYTES # BLD AUTO: 0.22 X10*3/UL (ref 1.2–4.8)
LYMPHOCYTES NFR BLD AUTO: 4.9 %
MAGNESIUM SERPL-MCNC: 2.16 MG/DL (ref 1.6–2.4)
MCH RBC QN AUTO: 28.1 PG (ref 26–34)
MCHC RBC AUTO-ENTMCNC: 31 G/DL (ref 32–36)
MCV RBC AUTO: 91 FL (ref 80–100)
MONOCYTES # BLD AUTO: 0.19 X10*3/UL (ref 0.1–1)
MONOCYTES NFR BLD AUTO: 4.2 %
NEUTROPHILS # BLD AUTO: 3.99 X10*3/UL (ref 1.2–7.7)
NEUTROPHILS NFR BLD AUTO: 88.9 %
NRBC BLD-RTO: 0.4 /100 WBCS (ref 0–0)
PHOSPHATE SERPL-MCNC: 2.8 MG/DL (ref 2.5–4.9)
PLATELET # BLD AUTO: 144 X10*3/UL (ref 150–450)
POTASSIUM SERPL-SCNC: 3.7 MMOL/L (ref 3.5–5.3)
PROT SERPL-MCNC: 4.1 G/DL (ref 6.4–8.2)
PROTHROMBIN TIME: 12.7 SECONDS (ref 9.8–12.4)
RBC # BLD AUTO: 2.74 X10*6/UL (ref 4.5–5.9)
SODIUM SERPL-SCNC: 138 MMOL/L (ref 136–145)
WBC # BLD AUTO: 4.5 X10*3/UL (ref 4.4–11.3)

## 2025-04-02 PROCEDURE — RXMED WILLOW AMBULATORY MEDICATION CHARGE

## 2025-04-02 PROCEDURE — 85384 FIBRINOGEN ACTIVITY: CPT

## 2025-04-02 PROCEDURE — 85025 COMPLETE CBC W/AUTO DIFF WBC: CPT

## 2025-04-02 PROCEDURE — 83735 ASSAY OF MAGNESIUM: CPT

## 2025-04-02 PROCEDURE — 2500000002 HC RX 250 W HCPCS SELF ADMINISTERED DRUGS (ALT 637 FOR MEDICARE OP, ALT 636 FOR OP/ED): Performed by: STUDENT IN AN ORGANIZED HEALTH CARE EDUCATION/TRAINING PROGRAM

## 2025-04-02 PROCEDURE — 83615 LACTATE (LD) (LDH) ENZYME: CPT

## 2025-04-02 PROCEDURE — 2500000001 HC RX 250 WO HCPCS SELF ADMINISTERED DRUGS (ALT 637 FOR MEDICARE OP)

## 2025-04-02 PROCEDURE — 2500000002 HC RX 250 W HCPCS SELF ADMINISTERED DRUGS (ALT 637 FOR MEDICARE OP, ALT 636 FOR OP/ED)

## 2025-04-02 PROCEDURE — 2500000004 HC RX 250 GENERAL PHARMACY W/ HCPCS (ALT 636 FOR OP/ED): Performed by: STUDENT IN AN ORGANIZED HEALTH CARE EDUCATION/TRAINING PROGRAM

## 2025-04-02 PROCEDURE — 2500000005 HC RX 250 GENERAL PHARMACY W/O HCPCS

## 2025-04-02 PROCEDURE — 1170000001 HC PRIVATE ONCOLOGY ROOM DAILY

## 2025-04-02 PROCEDURE — 85610 PROTHROMBIN TIME: CPT

## 2025-04-02 PROCEDURE — 80053 COMPREHEN METABOLIC PANEL: CPT

## 2025-04-02 PROCEDURE — 84550 ASSAY OF BLOOD/URIC ACID: CPT | Performed by: HOSPITALIST

## 2025-04-02 PROCEDURE — 2500000004 HC RX 250 GENERAL PHARMACY W/ HCPCS (ALT 636 FOR OP/ED)

## 2025-04-02 PROCEDURE — 84100 ASSAY OF PHOSPHORUS: CPT

## 2025-04-02 PROCEDURE — 99233 SBSQ HOSP IP/OBS HIGH 50: CPT

## 2025-04-02 RX ORDER — ONDANSETRON HYDROCHLORIDE 8 MG/1
16 TABLET, FILM COATED ORAL ONCE
Status: COMPLETED | OUTPATIENT
Start: 2025-04-02 | End: 2025-04-02

## 2025-04-02 RX ORDER — BACLOFEN 10 MG/1
10 TABLET ORAL 3 TIMES DAILY PRN
Qty: 21 TABLET | Refills: 0 | Status: SHIPPED | OUTPATIENT
Start: 2025-04-02 | End: 2025-04-03 | Stop reason: HOSPADM

## 2025-04-02 RX ORDER — ALUMINUM HYDROXIDE, MAGNESIUM HYDROXIDE, AND SIMETHICONE 1200; 120; 1200 MG/30ML; MG/30ML; MG/30ML
10 SUSPENSION ORAL 4 TIMES DAILY PRN
Status: DISCONTINUED | OUTPATIENT
Start: 2025-04-02 | End: 2025-04-03 | Stop reason: HOSPADM

## 2025-04-02 RX ORDER — OXYCODONE HYDROCHLORIDE 5 MG/1
5 TABLET ORAL EVERY 4 HOURS PRN
Status: DISCONTINUED | OUTPATIENT
Start: 2025-04-02 | End: 2025-04-03 | Stop reason: HOSPADM

## 2025-04-02 RX ORDER — BACLOFEN 10 MG/1
10 TABLET ORAL 3 TIMES DAILY PRN
Status: DISCONTINUED | OUTPATIENT
Start: 2025-04-02 | End: 2025-04-03 | Stop reason: HOSPADM

## 2025-04-02 RX ADMIN — OXYCODONE HYDROCHLORIDE 5 MG: 5 TABLET ORAL at 23:17

## 2025-04-02 RX ADMIN — ATORVASTATIN CALCIUM 40 MG: 40 TABLET, FILM COATED ORAL at 06:35

## 2025-04-02 RX ADMIN — LIDOCAINE HYDROCHLORIDE 10 ML: 20 SOLUTION ORAL; TOPICAL at 20:21

## 2025-04-02 RX ADMIN — PREDNISONE 140 MG: 50 TABLET ORAL at 08:56

## 2025-04-02 RX ADMIN — BACLOFEN 10 MG: 10 TABLET ORAL at 19:22

## 2025-04-02 RX ADMIN — OXYCODONE HYDROCHLORIDE 5 MG: 5 TABLET ORAL at 19:19

## 2025-04-02 RX ADMIN — DOXORUBICIN HYDROCHLORIDE 0.94 MG: 2 INJECTION, SOLUTION INTRAVENOUS at 12:56

## 2025-04-02 RX ADMIN — OXYCODONE HYDROCHLORIDE 5 MG: 5 TABLET ORAL at 12:33

## 2025-04-02 RX ADMIN — LISINOPRIL 10 MG: 10 TABLET ORAL at 08:56

## 2025-04-02 RX ADMIN — GABAPENTIN 300 MG: 300 CAPSULE ORAL at 20:21

## 2025-04-02 RX ADMIN — Medication 10 ML: at 09:46

## 2025-04-02 RX ADMIN — OXYCODONE HYDROCHLORIDE 5 MG: 5 TABLET ORAL at 08:56

## 2025-04-02 RX ADMIN — BACLOFEN 10 MG: 10 TABLET ORAL at 12:33

## 2025-04-02 RX ADMIN — POLYETHYLENE GLYCOL 3350 17 G: 17 POWDER, FOR SOLUTION ORAL at 08:56

## 2025-04-02 RX ADMIN — BISACODYL 5 MG: 5 TABLET, COATED ORAL at 08:56

## 2025-04-02 RX ADMIN — BISACODYL 5 MG: 5 TABLET, COATED ORAL at 20:21

## 2025-04-02 RX ADMIN — ONDANSETRON HYDROCHLORIDE 16 MG: 8 TABLET, FILM COATED ORAL at 12:18

## 2025-04-02 RX ADMIN — OLANZAPINE 5 MG: 5 TABLET, FILM COATED ORAL at 20:21

## 2025-04-02 RX ADMIN — PANTOPRAZOLE SODIUM 40 MG: 40 TABLET, DELAYED RELEASE ORAL at 06:35

## 2025-04-02 RX ADMIN — METOPROLOL SUCCINATE 100 MG: 50 TABLET, EXTENDED RELEASE ORAL at 08:56

## 2025-04-02 RX ADMIN — POTASSIUM CHLORIDE 20 MEQ: 750 TABLET, FILM COATED, EXTENDED RELEASE ORAL at 08:57

## 2025-04-02 RX ADMIN — PREDNISONE 140 MG: 50 TABLET ORAL at 20:21

## 2025-04-02 ASSESSMENT — COGNITIVE AND FUNCTIONAL STATUS - GENERAL
MOBILITY SCORE: 24
MOBILITY SCORE: 24
DAILY ACTIVITIY SCORE: 24
DAILY ACTIVITIY SCORE: 24

## 2025-04-02 ASSESSMENT — PAIN SCALES - GENERAL
PAINLEVEL_OUTOF10: 5 - MODERATE PAIN
PAINLEVEL_OUTOF10: 7
PAINLEVEL_OUTOF10: 7
PAINLEVEL_OUTOF10: 6
PAINLEVEL_OUTOF10: 6
PAINLEVEL_OUTOF10: 7

## 2025-04-02 ASSESSMENT — PAIN DESCRIPTION - LOCATION
LOCATION: BACK
LOCATION: RIB CAGE
LOCATION: HIP
LOCATION: BACK

## 2025-04-02 ASSESSMENT — PAIN DESCRIPTION - ORIENTATION
ORIENTATION: LEFT
ORIENTATION: LOWER
ORIENTATION: RIGHT
ORIENTATION: LOWER

## 2025-04-02 ASSESSMENT — PAIN - FUNCTIONAL ASSESSMENT: PAIN_FUNCTIONAL_ASSESSMENT: 0-10

## 2025-04-02 NOTE — CARE PLAN
Problem: Pain - Adult  Goal: Verbalizes/displays adequate comfort level or baseline comfort level  Outcome: Progressing     Problem: Safety - Adult  Goal: Free from fall injury  Outcome: Progressing     Problem: Discharge Planning  Goal: Discharge to home or other facility with appropriate resources  Outcome: Progressing     Problem: Chronic Conditions and Co-morbidities  Goal: Patient's chronic conditions and co-morbidity symptoms are monitored and maintained or improved  Outcome: Progressing     Problem: Nutrition  Goal: Nutrient intake appropriate for maintaining nutritional needs  Outcome: Progressing   The patient's goals for the shift include      The clinical goals for the shift include patient will remain free from falls

## 2025-04-02 NOTE — PROGRESS NOTES
"Bijan Ibanez is a 64 y.o. male on day 4 of admission presenting with Burkitt's lymphoma (Multi).    Subjective   Bijan is doing fine this morning. We discussed that his IT methotrexate and IT cytarabine will need to be outpt since pt/s eliquis was not held. Pt denies any new symptoms other than hiccups, we discussed trying baclofen PRN. Pt denies chest pain, SOB, N/V/D/C, fever, chills. Plan for chemo to complete tomorrow. ROS: A complete review of systems was performed and is negative except for as mentioned above in the HPI   Objective     Physical Exam  HENT:      Head: Normocephalic.      Right Ear: Tympanic membrane normal.      Nose: Nose normal.      Mouth/Throat:      Mouth: Mucous membranes are moist.   Eyes:      Pupils: Pupils are equal, round, and reactive to light.   Cardiovascular:      Rate and Rhythm: Normal rate.   Pulmonary:      Effort: Pulmonary effort is normal.   Abdominal:      Palpations: Abdomen is soft.   Musculoskeletal:         General: Normal range of motion.      Cervical back: Normal range of motion.   Skin:     General: Skin is warm.      Capillary Refill: Capillary refill takes less than 2 seconds.   Neurological:      General: No focal deficit present.      Mental Status: He is alert.   Psychiatric:         Mood and Affect: Mood normal.         Behavior: Behavior normal.         Last Recorded Vitals  Blood pressure 112/70, pulse 72, temperature 35.9 °C (96.6 °F), temperature source Oral, resp. rate 18, height 1.835 m (6' 0.24\"), weight 92.5 kg (203 lb 14.8 oz), SpO2 98%.  Intake/Output last 3 Shifts:  No intake/output data recorded.    Relevant Results  No results found.     This patient has a central line   Reason for the central line remaining today? 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 male with PMH of newly dx Burkitt's " lymphoma, HTN, HLD, CAD, MI (s/p stent 2010, on ASA), hx renal cell carcinoma (s/p R partial nephrectomy in 2013 @ [facility]), GERD, BPH, arthritis, RUE PICC DVT (on Eliquis w/ close monitoring d/t counts), afib RVR, and recent MRSA bacteremia (s/p 4 week Vancomycin end date 3/3) who is admitted for C4 EPOCH. 3/30 started chemotherapy and received 1u pRBC for hgb 7.0. DC pending chemo completion likely 4/3     Update 4/2:  - Continue EPOCH   - Last dose of eliquis 4/1 @ 2000  - plan for IT methotrexate 4/4 outpt and IT cytarabine 4/8 outpt  - s/p 1L NS bolus for dehydration (4/1)  - plan to DC tomorrow after chemo completion  - Neulasta and ritux to be done otpt 4/4     ONC:   # Burkitt's lymphoma     - Primary oncologist: Dr. Torey Mckeon - aware of admission  - Originally signed out as high-grade lymphoma but after burkitt's rearrangement (t8;14) came back positive. High risk based on marrow involvement, though no CNS involvement   - S/p C1 DA-R-EPOCH when signed out as high-grade lymphoma; given the significant toxicity he had with DA R EPOCH, will not escalate to CODOX-IVAC or hyper-CVAD given no CNS involvement   - Given G3/4 mucositis, etoposide reduced by 25%, keep EPOCH at Dose level 0   - 2/11 oncology along with ID felt they could resume chemotherapy after total 2 weeks of IV Vanco, but will still complete full duration of 4 weeks of the antibiotic (ended on 3/3)    - PET (2/24/25) s/p C2: There is near complete interval resolution of previously seen hypermetabolic hermann and extranodal disease involving bilateral submandibular, parotid gland, hepatic lesions, spleen, mesenteric deposits, bilateral kidney stomach bilateral, bilateral arms, anterior chest wall, and gluteal muscle supra and infra diaphragmatic lymphadenopathy. Diffuse metabolic activity within the bone marrow. Camilla 2 - c/w CR    - 3/29 admitted for cycle 4 EPOCH and started chemo 3/30     # CHEMO: C4 EPOCH   - Premeds: Zofran, Zyprexa,  Compazine, Emend   - Doxorubicin 28mg on Day 1, 2, 3, 4   - Etoposide 88mg on Day 1, 2, 3. 4   - Vincristine 0.9mg on Day 1, 2, 3. 4   - IT MTX 12mg now planned for outpatient (4/4) with [facility] heme outpatient team due to eliquis not being held in time for LP  - Cyclophosphamide 2,124mg planned for (4/3)  - IT Cytarabine 70mg now planned for outpt (4/8) with [facility] heme outpatient team  - Prednisone 140mg BID x5 days   - PRN Reaction meds: Epinephrine, Albuterol, Benadryl, Pepcid, Solumedrol     - Plan for Neulasta and Ritux on Day 6 (4/4)        HEME:   # Anemia   - Likely 2/2 disease and tx, no signs of active bleeding on admit   - Monitor counts daily, fibrinogen, coags   - No evidence of DIC or hypercoag state   - Transfuse to keep hgb > 7, plt > 50 (on AC)   - 3/30 hgb 7.0, T&S sent and s/p 1u pRBC      # Prophy:   - c/w home Eliquis 5mg BID - plan to hold when plt < 50. Held 4/2 (last dose 4/1 PM) for IR procedure this Friday      ID:   Allergies: Latex, PCN   # Recent MRSA bacteremia   - Follows with Dr. Rivera, last seen on 2/28; s/p vanc ended on 3/3/25 for MRSA bacteremia    - 1/31 Bc x2 + staphyloccous aureus and MRSA, 2/4 Bc x2 negative growth   - PICC line removed 2/3   - 2/3 TTE w/o evidence of vegetation   - Aspergillus galactomannan and fungitell results negative from 2/4   - New PICC line placed 3/7/25   # Prophylaxis   - Plan Levofloxacin when neutropenic    - VZV: Stopped acyclovir at recent outpt appointment with Dr. Mckeon on 3/4  - Candidiasis: Stopped Fluconazole at recent outpt appt with Elvira العلي 3/12   - PJP: None at this time        CARDIO:   # Afib RVR on prior admission   - Likely 2/2 infection, electrolyte derangements, and anemia   - Keep K > 4, Mg > 2, Hgb > 7   - Seen by cardio outpt on 3/13/25, plan outpt echo in 3 months  - Cont home Eliquis until plt < 50, held 4/2 for IR procedure  - Continue home Metoprolol XL 100mg PO daily   - Cont home Potassium 20mEq PO daily   - Cont home  Furosemide 20mg daily PRN for swelling   - Tele ordered on admit  # Hx of MI   # Hx of HTN/HLD/CAD   - 2/3/25 Seen by EP as difficulty achieving rate control   - 2/3 TTE EF 60-65%, normal RVSP, L atrial size mod dilated   - Cont home Atorvastatin 40mg PO daily   - Cont home Lisinopril 10mg PO daily       PULM:  - RLL decreased lung sounds on auscultation  - Denies SOB or cough recently  - Recent imaging 3/25 showed no signs of pneumothorax, air space disease or effusion  - CTM for signs of worsening, can consider new imaging if so      FEN/GI:   Admit weight: 95.3kg -> 92.5kg (3/31)  - Uric acid on admit: 5.9 (3/29)  - Allopurinol stopped at recent outpt appointment with Dr. Mckeon on 3/4    # Mucositis   - Pt had severe mucositis last admission   - States he was eating well at home, no mouth pain or lesions present on admission    - Cont home BMX PRN   - Pt states home Sucralfate 1g AC/HS did not work and made him sick  - Cont Oxy 5mg PO Q4h PRN for severe pain  - Consider supportive onc consult if continues to worsen   # Prophy:   - Cont home Pantoprazole 40mg PO daily    - Miralax, Dulcolax daily   - 4/1 gave 1L bolus      MSK:   # Hx Back Pain   - Cont Gabapentin 300mg PO nightly   - Cont home Oxy PRN  # R-Sided Rib Pain  - Rib xrays 3/25: No evidence of rib fracture     DERM:  - [place]-sized nodule on L side of back of neck  - Pt states he has had it for years and it has been drained/cut out twice  - Pt denies pain or discomfort, but he often picks at it because he knows it is there  - Has since returned after last being removed, would like it assessed this admission  - Can consider derm consult if bothersome to pt, currently pt has no complaints      DISPO:   - Full code, confirmed on admit   - Primary onc: Dr. Mckeon   - OR home pending completion of chemo   - Access: PICC   - FUV Neulasta/ritux 4/2, IT Methotrexate 4/2 with otpt mal heme team, Mal heme clinic visit 4/2, Lab draw 4/8, IT cytarabine 4/8 with  otpt mal heme team, cardio 6/24    I spent 60 minutes in the professional and overall care of this patient.    Assessment and plan as above discussed with attending physician Dr. Rell Pack PA-C

## 2025-04-02 NOTE — CARE PLAN
The patient's goals for the shift include      Problem: Pain - Adult  Goal: Verbalizes/displays adequate comfort level or baseline comfort level  Outcome: Progressing     Problem: Safety - Adult  Goal: Free from fall injury  Outcome: Progressing     Problem: Discharge Planning  Goal: Discharge to home or other facility with appropriate resources  Outcome: Progressing     Problem: Chronic Conditions and Co-morbidities  Goal: Patient's chronic conditions and co-morbidity symptoms are monitored and maintained or improved  Outcome: Progressing     Problem: Nutrition  Goal: Nutrient intake appropriate for maintaining nutritional needs  Outcome: Progressing     Problem: Fall/Injury  Goal: Not fall by end of shift  Outcome: Progressing  Goal: Be free from injury by end of the shift  Outcome: Progressing  Goal: Verbalize understanding of personal risk factors for fall in the hospital  Outcome: Progressing  Goal: Verbalize understanding of risk factor reduction measures to prevent injury from fall in the home  Outcome: Progressing  Goal: Use assistive devices by end of the shift  Outcome: Progressing  Goal: Pace activities to prevent fatigue by end of the shift  Outcome: Progressing     Problem: Pain  Goal: Takes deep breaths with improved pain control throughout the shift  Outcome: Progressing  Goal: Turns in bed with improved pain control throughout the shift  Outcome: Progressing  Goal: Walks with improved pain control throughout the shift  Outcome: Progressing  Goal: Performs ADL's with improved pain control throughout shift  Outcome: Progressing  Goal: Participates in PT with improved pain control throughout the shift  Outcome: Progressing  Goal: Free from opioid side effects throughout the shift  Outcome: Progressing  Goal: Free from acute confusion related to pain meds throughout the shift  Outcome: Progressing     The clinical goals for the shift include Pt will remain free from falls throughout the shift

## 2025-04-02 NOTE — SIGNIFICANT EVENT
04/02/25 0220   Prechemo Checklist   Has the patient been in the hospital, ED, or urgent care since last date of service N/A   Chemo/Immuno Consent Completed and Signed Yes   Protocol/Indications Verified Yes   Confirmed to previous date/time of medication Yes   Compared to previous dose Yes   All medications are dated accurately Yes   Pregnancy Test Negative Not applicable   Parameters Met Yes   Provider Notified Yes   Provider Name Marlon Zacarias   Is Patient Proceeding With Treatment? Yes   BSA/Weight-Height Verified Yes   Dose Calculations Verified (current, total, cumulative) Yes

## 2025-04-03 ENCOUNTER — PHARMACY VISIT (OUTPATIENT)
Dept: PHARMACY | Facility: CLINIC | Age: 65
End: 2025-04-03
Payer: MEDICARE

## 2025-04-03 ENCOUNTER — APPOINTMENT (OUTPATIENT)
Dept: RADIOLOGY | Facility: HOSPITAL | Age: 65
End: 2025-04-03
Payer: MEDICARE

## 2025-04-03 VITALS
BODY MASS INDEX: 27.62 KG/M2 | RESPIRATION RATE: 18 BRPM | OXYGEN SATURATION: 97 % | HEART RATE: 64 BPM | WEIGHT: 203.93 LBS | HEIGHT: 72 IN | SYSTOLIC BLOOD PRESSURE: 154 MMHG | TEMPERATURE: 97.5 F | DIASTOLIC BLOOD PRESSURE: 91 MMHG

## 2025-04-03 PROBLEM — R13.10 DYSPHAGIA: Status: ACTIVE | Noted: 2025-04-03

## 2025-04-03 LAB
ALBUMIN SERPL BCP-MCNC: 3.1 G/DL (ref 3.4–5)
ALP SERPL-CCNC: 32 U/L (ref 33–136)
ALT SERPL W P-5'-P-CCNC: 25 U/L (ref 10–52)
ANION GAP SERPL CALC-SCNC: 12 MMOL/L (ref 10–20)
APTT PPP: 23 SECONDS (ref 26–36)
AST SERPL W P-5'-P-CCNC: 17 U/L (ref 9–39)
BASOPHILS # BLD AUTO: 0 X10*3/UL (ref 0–0.1)
BASOPHILS NFR BLD AUTO: 0 %
BILIRUB SERPL-MCNC: 0.5 MG/DL (ref 0–1.2)
BUN SERPL-MCNC: 31 MG/DL (ref 6–23)
CALCIUM SERPL-MCNC: 8.4 MG/DL (ref 8.6–10.6)
CHLORIDE SERPL-SCNC: 106 MMOL/L (ref 98–107)
CO2 SERPL-SCNC: 26 MMOL/L (ref 21–32)
CREAT SERPL-MCNC: 0.39 MG/DL (ref 0.5–1.3)
EGFRCR SERPLBLD CKD-EPI 2021: >90 ML/MIN/1.73M*2
EOSINOPHIL # BLD AUTO: 0 X10*3/UL (ref 0–0.7)
EOSINOPHIL NFR BLD AUTO: 0 %
ERYTHROCYTE [DISTWIDTH] IN BLOOD BY AUTOMATED COUNT: 17.3 % (ref 11.5–14.5)
FIBRINOGEN PPP-MCNC: 150 MG/DL (ref 200–400)
GLUCOSE SERPL-MCNC: 159 MG/DL (ref 74–99)
HCT VFR BLD AUTO: 25.4 % (ref 41–52)
HGB BLD-MCNC: 7.7 G/DL (ref 13.5–17.5)
IMM GRANULOCYTES # BLD AUTO: 0.1 X10*3/UL (ref 0–0.7)
IMM GRANULOCYTES NFR BLD AUTO: 3.1 % (ref 0–0.9)
INR PPP: 1.1 (ref 0.9–1.1)
LDH SERPL L TO P-CCNC: 197 U/L (ref 84–246)
LYMPHOCYTES # BLD AUTO: 0.22 X10*3/UL (ref 1.2–4.8)
LYMPHOCYTES NFR BLD AUTO: 6.8 %
MAGNESIUM SERPL-MCNC: 2.34 MG/DL (ref 1.6–2.4)
MCH RBC QN AUTO: 27.9 PG (ref 26–34)
MCHC RBC AUTO-ENTMCNC: 30.3 G/DL (ref 32–36)
MCV RBC AUTO: 92 FL (ref 80–100)
MONOCYTES # BLD AUTO: 0.13 X10*3/UL (ref 0.1–1)
MONOCYTES NFR BLD AUTO: 4 %
NEUTROPHILS # BLD AUTO: 2.79 X10*3/UL (ref 1.2–7.7)
NEUTROPHILS NFR BLD AUTO: 86.1 %
NRBC BLD-RTO: 0.6 /100 WBCS (ref 0–0)
PHOSPHATE SERPL-MCNC: 2.9 MG/DL (ref 2.5–4.9)
PLATELET # BLD AUTO: 119 X10*3/UL (ref 150–450)
POTASSIUM SERPL-SCNC: 4.2 MMOL/L (ref 3.5–5.3)
PROT SERPL-MCNC: 4.2 G/DL (ref 6.4–8.2)
PROTHROMBIN TIME: 11.6 SECONDS (ref 9.8–12.4)
RBC # BLD AUTO: 2.76 X10*6/UL (ref 4.5–5.9)
SODIUM SERPL-SCNC: 140 MMOL/L (ref 136–145)
URATE SERPL-MCNC: 5.2 MG/DL (ref 4–7.5)
WBC # BLD AUTO: 3.2 X10*3/UL (ref 4.4–11.3)

## 2025-04-03 PROCEDURE — 85025 COMPLETE CBC W/AUTO DIFF WBC: CPT

## 2025-04-03 PROCEDURE — 92611 MOTION FLUOROSCOPY/SWALLOW: CPT | Mod: GN | Performed by: SPEECH-LANGUAGE PATHOLOGIST

## 2025-04-03 PROCEDURE — 83615 LACTATE (LD) (LDH) ENZYME: CPT

## 2025-04-03 PROCEDURE — 84100 ASSAY OF PHOSPHORUS: CPT

## 2025-04-03 PROCEDURE — 2500000004 HC RX 250 GENERAL PHARMACY W/ HCPCS (ALT 636 FOR OP/ED): Performed by: STUDENT IN AN ORGANIZED HEALTH CARE EDUCATION/TRAINING PROGRAM

## 2025-04-03 PROCEDURE — 83735 ASSAY OF MAGNESIUM: CPT

## 2025-04-03 PROCEDURE — 74230 X-RAY XM SWLNG FUNCJ C+: CPT

## 2025-04-03 PROCEDURE — RXMED WILLOW AMBULATORY MEDICATION CHARGE

## 2025-04-03 PROCEDURE — 85610 PROTHROMBIN TIME: CPT

## 2025-04-03 PROCEDURE — 2500000004 HC RX 250 GENERAL PHARMACY W/ HCPCS (ALT 636 FOR OP/ED)

## 2025-04-03 PROCEDURE — 99239 HOSP IP/OBS DSCHRG MGMT >30: CPT

## 2025-04-03 PROCEDURE — 74230 X-RAY XM SWLNG FUNCJ C+: CPT | Performed by: RADIOLOGY

## 2025-04-03 PROCEDURE — 2500000001 HC RX 250 WO HCPCS SELF ADMINISTERED DRUGS (ALT 637 FOR MEDICARE OP)

## 2025-04-03 PROCEDURE — 2500000002 HC RX 250 W HCPCS SELF ADMINISTERED DRUGS (ALT 637 FOR MEDICARE OP, ALT 636 FOR OP/ED)

## 2025-04-03 PROCEDURE — 85384 FIBRINOGEN ACTIVITY: CPT

## 2025-04-03 PROCEDURE — 92610 EVALUATE SWALLOWING FUNCTION: CPT | Mod: GN | Performed by: SPEECH-LANGUAGE PATHOLOGIST

## 2025-04-03 PROCEDURE — 92526 ORAL FUNCTION THERAPY: CPT | Mod: GN | Performed by: SPEECH-LANGUAGE PATHOLOGIST

## 2025-04-03 PROCEDURE — 80053 COMPREHEN METABOLIC PANEL: CPT

## 2025-04-03 PROCEDURE — 2500000005 HC RX 250 GENERAL PHARMACY W/O HCPCS: Performed by: HOSPITALIST

## 2025-04-03 RX ORDER — NYSTATIN 100000 [USP'U]/ML
5 SUSPENSION ORAL 4 TIMES DAILY
Status: DISCONTINUED | OUTPATIENT
Start: 2025-04-03 | End: 2025-04-03 | Stop reason: HOSPADM

## 2025-04-03 RX ORDER — OXYCODONE HYDROCHLORIDE 5 MG/1
5 TABLET ORAL EVERY 4 HOURS PRN
Qty: 42 TABLET | Refills: 0 | Status: ON HOLD | OUTPATIENT
Start: 2025-04-03 | End: 2025-04-10

## 2025-04-03 RX ORDER — ACYCLOVIR 400 MG/1
400 TABLET ORAL 2 TIMES DAILY
Qty: 20 TABLET | Refills: 0 | Status: ON HOLD | OUTPATIENT
Start: 2025-04-03 | End: 2025-04-13

## 2025-04-03 RX ORDER — FLUCONAZOLE 200 MG/1
200 TABLET ORAL DAILY
Qty: 20 TABLET | Refills: 0 | Status: ON HOLD | OUTPATIENT
Start: 2025-04-03 | End: 2025-04-23

## 2025-04-03 RX ORDER — OXYCODONE HYDROCHLORIDE 5 MG/1
5 TABLET ORAL ONCE
Status: COMPLETED | OUTPATIENT
Start: 2025-04-03 | End: 2025-04-03

## 2025-04-03 RX ORDER — FLUCONAZOLE 2 MG/ML
400 INJECTION, SOLUTION INTRAVENOUS ONCE
Status: COMPLETED | OUTPATIENT
Start: 2025-04-03 | End: 2025-04-03

## 2025-04-03 RX ORDER — LEVOFLOXACIN 500 MG/1
500 TABLET, FILM COATED ORAL DAILY
Qty: 14 TABLET | Refills: 0 | Status: ON HOLD | OUTPATIENT
Start: 2025-04-03 | End: 2025-04-17

## 2025-04-03 RX ADMIN — CYCLOPHOSPHAMIDE 2124 MG: 1 INJECTION, POWDER, FOR SOLUTION INTRAVENOUS; ORAL at 12:30

## 2025-04-03 RX ADMIN — POLYETHYLENE GLYCOL 3350 17 G: 17 POWDER, FOR SOLUTION ORAL at 08:38

## 2025-04-03 RX ADMIN — ONDANSETRON 16 MG: 2 INJECTION, SOLUTION INTRAMUSCULAR; INTRAVENOUS at 16:20

## 2025-04-03 RX ADMIN — NYSTATIN 500000 UNITS: 100000 SUSPENSION ORAL at 16:20

## 2025-04-03 RX ADMIN — BARIUM SULFATE 30 ML: 400 SUSPENSION ORAL at 15:43

## 2025-04-03 RX ADMIN — BARIUM SULFATE 50 ML: 0.81 POWDER, FOR SUSPENSION ORAL at 15:43

## 2025-04-03 RX ADMIN — OXYCODONE 5 MG: 5 TABLET ORAL at 09:29

## 2025-04-03 RX ADMIN — NYSTATIN 500000 UNITS: 100000 SUSPENSION ORAL at 13:50

## 2025-04-03 RX ADMIN — PREDNISONE 140 MG: 50 TABLET ORAL at 08:38

## 2025-04-03 RX ADMIN — BISACODYL 5 MG: 5 TABLET, COATED ORAL at 08:38

## 2025-04-03 RX ADMIN — FLUCONAZOLE 400 MG: 2 INJECTION, SOLUTION INTRAVENOUS at 16:19

## 2025-04-03 RX ADMIN — PANTOPRAZOLE SODIUM 40 MG: 40 TABLET, DELAYED RELEASE ORAL at 08:38

## 2025-04-03 RX ADMIN — ATORVASTATIN CALCIUM 40 MG: 40 TABLET, FILM COATED ORAL at 08:38

## 2025-04-03 RX ADMIN — METOPROLOL SUCCINATE 100 MG: 50 TABLET, EXTENDED RELEASE ORAL at 08:38

## 2025-04-03 RX ADMIN — LISINOPRIL 10 MG: 10 TABLET ORAL at 08:38

## 2025-04-03 RX ADMIN — OXYCODONE HYDROCHLORIDE 5 MG: 5 TABLET ORAL at 18:01

## 2025-04-03 RX ADMIN — BARIUM SULFATE 20 ML: 400 PASTE ORAL at 15:44

## 2025-04-03 RX ADMIN — OXYCODONE HYDROCHLORIDE 5 MG: 5 TABLET ORAL at 06:41

## 2025-04-03 ASSESSMENT — PAIN - FUNCTIONAL ASSESSMENT
PAIN_FUNCTIONAL_ASSESSMENT: 0-10

## 2025-04-03 ASSESSMENT — PAIN DESCRIPTION - ORIENTATION: ORIENTATION: INNER

## 2025-04-03 ASSESSMENT — PAIN DESCRIPTION - LOCATION: LOCATION: MOUTH

## 2025-04-03 ASSESSMENT — PAIN SCALES - GENERAL
PAINLEVEL_OUTOF10: 3
PAINLEVEL_OUTOF10: 7
PAINLEVEL_OUTOF10: 8
PAINLEVEL_OUTOF10: 7
PAINLEVEL_OUTOF10: 7
PAINLEVEL_OUTOF10: 0 - NO PAIN

## 2025-04-03 NOTE — CARE PLAN
The patient's goals for the shift include      The clinical goals for the shift include (P) Patient will remain HDS through end of shift    Over the shift, the patient did not make progress toward the following goals. Barriers to progression include mouth sores and throat pain. Recommendations to address these barriers include educate patient on mouth care.

## 2025-04-03 NOTE — DISCHARGE SUMMARY
Discharge Diagnosis  Burkitt's lymphoma (Multi)    Issues Requiring Follow-Up  Burkitt Lymphoma    Test Results Pending At Discharge  Pending Labs       Order Current Status    Flow Cytometry Test Collected (04/01/25 0888)            Hospital Course   Bijan Ibanez is a 64 y.o. male with PMH of newly dx Burkitt's lymphoma, HTN, HLD, CAD, MI (s/p stent 2010, on ASA), hx renal cell carcinoma (s/p R partial nephrectomy in 2013 @ [facility]), GERD, BPH, arthritis, RUE PICC DVT (on Eliquis w/ close monitoring d/t counts), afib RVR, and recent MRSA bacteremia (s/p 4 week Vancomycin end date 3/3) who is admitted for C4 EPOCH. 3/30 started chemotherapy and received 1u pRBC for hgb 7.0. Completed chemotherapy on 4/3 without complication.     On day of discharge, patient states has pain in mouth similar to previous mucositis pain, and difficulty swallowing pills. On exam, oral candidiasis noted on posterior pharynx and erythema in oral cavity. SLP consulted, recommend patient be NPO and have MBSS today. After MBSS, SLP states patient ok to return to normal diet with small bites, single small sips, and chin tucking when eating. Referral to speech language pathology placed for discharge. Patient also complaining of worsening pain in right back, previously had Xray of site on 3/25 with no acute process. Ordered CT CAP with contrast to evaluate, however scan was delayed until 4/4. Patient has neulasta, IT methotrexate and rituxan scheduled for 4/4. Discussed with patient, wife and attending, will plan for outpatient CT CAP, patient to call provider if develops worsening pain or new symptoms. Per chart review, no prophy antibiotics or medications needed for discharge, however per care coordination note updated 3/25 by Dr. Mckeon, pt to be sent rx of levaquin for neutropenia and acyclovir for VZV prophylaxis. Prescriptions sent to [facility] pharmacy for fluconazole 200 mg daily x 20 days (received first dose IV inpatient) for esophageal  candidiasis, and oxycodone 5 mg x 42 tablets. Levaquin 500 mg PO x 14 days (to take if neutropenic) and Acyclovir 400mg BID x 20 tabs sent to [facility] [place]. Pt to confirm with outpt team if acyclovir is needed for prophy. Patient discharged to home in stable condition, VSS.     FUV:   Count check, Neulasta, rituxan, IT methotrexate with outpatient [facility] heme team   [facility] heme clinic visit   Count check, IT cytarabine with outpatient [facility] heme team   Count check requested  Other FUV listed below    Pertinent Physical Exam At Time of Discharge  Physical Exam  Constitutional:       Appearance: Normal appearance.   HENT:      Mouth/Throat:      Mouth: Mucous membranes are moist.      Dentition: Abnormal dentition.      Palate: Lesions present.      Pharynx: Posterior oropharyngeal erythema present.      Comments: White patches visualized on posterior buccal membranes and posterior pharnyx  Eyes:      Conjunctiva/sclera: Conjunctivae normal.      Pupils: Pupils are equal, round, and reactive to light.   Cardiovascular:      Rate and Rhythm: Normal rate and regular rhythm.      Pulses: Normal pulses.      Heart sounds: Normal heart sounds.   Pulmonary:      Effort: Pulmonary effort is normal.      Breath sounds: Normal breath sounds.   Abdominal:      General: Abdomen is flat. Bowel sounds are normal.      Palpations: Abdomen is soft.   Musculoskeletal:         General: Tenderness present.      Cervical back: Normal range of motion.      Right lower le+ Pitting Edema present.      Left lower le+ Pitting Edema present.      Comments: Tenderness to right flank   Skin:     General: Skin is warm and dry.   Neurological:      General: No focal deficit present.      Mental Status: He is alert and oriented to person, place, and time.   Psychiatric:         Mood and Affect: Mood normal.         Behavior: Behavior normal.         Home Medications     Medication List      START taking these  medications     fluconazole 200 mg tablet; Commonly known as: Diflucan; Take 1 tablet   (200 mg) by mouth once daily for 20 days.     CHANGE how you take these medications     oxyCODONE 5 mg immediate release tablet; Commonly known as: Roxicodone;   Take 1 tablet (5 mg) by mouth every 4 hours if needed for severe pain (7 -   10) for up to 7 days.; What changed: when to take this, reasons to take   this     CONTINUE taking these medications     albuterol 90 mcg/actuation inhaler   apixaban 5 mg tablet; Commonly known as: Eliquis; Take 1 tablet (5 mg)   by mouth 2 times a day.   atorvastatin 40 mg tablet; Commonly known as: Lipitor; Take 1 tablet (40   mg) by mouth early in the morning..   diphenhydramine/Maalox/lidocaine (Magic Mouthwash) - Compounded -   Outpatient; Swish and spit 10 mL by mouth every 6 hours if needed.   furosemide 20 mg tablet; Commonly known as: Lasix; Take 1 tablet (20 mg)   by mouth once daily as needed (for swelling).   gabapentin 300 mg capsule; Commonly known as: Neurontin; Take 1 capsule   (300 mg) by mouth once daily at bedtime.   heparin flush 10 unit/mL injection   lisinopril 10 mg tablet; Take 1 tablet (10 mg) by mouth once daily.   metoprolol succinate  mg 24 hr tablet; Commonly known as:   Toprol-XL; Take 1 tablet (100 mg) by mouth once daily. Do not crush or   chew.   pantoprazole 40 mg EC tablet; Commonly known as: ProtoNix; Take 1 tablet   (40 mg) by mouth once daily in the morning. Take before meals. Do not   crush, chew, or split.   potassium chloride CR 20 mEq ER tablet; Commonly known as: Klor-Con M20;   Take 1 tablet (20 mEq) by mouth once daily. Do not crush or chew.   sodium chloride 0.9% flush     STOP taking these medications     sucralfate 100 mg/mL suspension; Commonly known as: Carafate       Outpatient Follow-Up  Future Appointments   Date Time Provider Department [place]   4/4/2025  8:15 AM INF 03 [facility]   4/4/2025  9:30 AM [facility]   4/4/2025  2:00 PM [facility]   4/4/2025  2:00 PM [facility] DSP 1 [facility]   4/8/2025  9:00 AM INF 06 [facility]   4/8/2025  1:00 PM [facility]   4/8/2025  1:00 PM [facility] FLUORO DSP 1 [facility]   4/8/2025  4:00 PM Torey Mckeon DO [id number] [facility]   4/23/2025 10:30 AM INF 35 [facility]   6/24/2025 10:15 AM Ata Conti DO [id number] [place]     Assessment and plan as above discussed with attending physician Dr. Zacarias.    I spent 60 minutes on the professional and overall care of this patient.    Ambreen Hamilton, APRN-CNP

## 2025-04-03 NOTE — PROCEDURES
Speech-Language Pathology    Inpatient Modified Barium Swallow Study    Patient Name: Bijan Ibanez  MRN: [id number]  : 1960  Today's Date: 25  Time Calculation  Start Time: 1448  Stop Time: 1513  Time Calculation (min): 25 min        Modified Barium Swallow Study completed. Informed verbal consent obtained prior to completion of exam. The study was completed per protocol with various liquid barium consistencies, pudding, solids.  A 1.9 cm or .75 inch (outer diameter) ring was placed on the chin in the lateral view and on the lateral, left side of the neck in the a-p view in order to complete objective measurements during swallowing. The anatomic structures and function of the oropharynx, larynx, hypopharynx and cervical esophagus were evaluated.    SLP: Idalia Robins MS, CCC-SLP   Contact info: [employer] secure chat; phone: [phone]    Reason for Referral: To ascertain a safe and efficient diet  Patient Hx: Bijan Ibanez is a 64 y.o. male with PMH of newly dx Burkitt's lymphoma, HTN, HLD, CAD, MI (s/p stent , on ASA), hx renal cell carcinoma (s/p R partial nephrectomy in  @ [facility]), GERD, BPH, arthritis, RUE PICC DVT (on Eliquis w/ close monitoring d/t counts), afib RVR, and recent MRSA bacteremia (s/p 4 week Vancomycin end date 3/3) who is admitted for C4 EPOCH. 3/30 started chemotherapy and received 1u pRBC for hgb 7.0. DC pending chemo completion likely 4/3   Respiratory Status: Room air  Current diet: NPO    Pain:  Pain Scale: 0-10  Ratin    DIET RECOMMENDATIONS:   - Regular (IDDSI Level 7)  - Thin liquids (IDDSI Level 0)    STRATEGIES:  - Small bites  - Small, single sips  - Alternate consistencies  - Chin tuck      SLP PLAN:  Skilled SLP Services: Skilled SLP intervention for dysphagia is warranted.  SLP Frequency: 2x per week  Duration:  Treatment/Interventions:   - Oropharyngeal exercises  - Compensatory strategy training  - Diet tolerance/advancement  - Patient/caregiver  education    Discussed POC: Patient  Discussed Risks/Benefits: Yes  Patient/Caregiver Agreeable: Yes    Short term goals established 04/03/25:   Pt will tolerate least restrictive diet without s/s aspiration, respiratory compromise, or overt difficulty throughout admission.  Start: 04/03/25, End: 4/18/25  Status: goal initiated    Pt will accurately utilize/recall recommended swallow strategies/guidelines independently in >90% opportunities.    Start: 04/03/25, End: 4/18/25  Status: goal initiated    Pt will complete x30 trials of effortful swallows with cues as needed for accurate completion.    Start: 04/03/25, End: 4/18/25  Status: goal initiated       Long term goals 04/03/25:   Patient will tolerate the least restrictive diet without overt difficulty or further pulmonary compromise by time of discharge.      Education Provided: Results and recommendations per MBSS, with video review; recommendations and POC at this time. Verbal understanding and agreement given on all accounts.     Treatment Provided Today: ST provided extensive education and training to pt/pt family regarding anatomy/physiology of swallow function, risk factors of aspiration/aspiration pna & how to mitigate factors, diet modifications, and the use of compensatory swallow strategies to promote pt safety upon PO intake including utilizing a chin tuck, repeat swallow and alternating bites/sips. Pt re-verbalized and demonstrated these strategies with both solids and liquids.     Additional Medical Consults Suggested:   - No new disciplines indicated    Repeat Study:   2-3 weeks      Mechanics of the Swallow Summary:  ORAL PHASE:  Lip Closure - No labial escape/anterior loss of bolus   Tongue Control During Bolus Hold - Cohesive bolus between tongue to palatal seal   Bolus prep/mastication - Timely and efficient mastication skills   Bolus transport/lingual motion - Brisk tongue motion for A-P movement of the bolus   Oral residue - Complete oral  clearance     PHARYNGEAL PHASE:  Initiation of pharyngeal swallow - Bolus head at posterior angle of ramus   Soft palate elevation - No bolus between soft palate/pharyngeal wall   Laryngeal elevation - Complete superior movement of thyroid cartilage with contact of arytenoids to epiglottic petiole   Anterior hyoid excursion - Complete anterior movement   Epiglottic movement - Complete inversion    Laryngeal vestibule closure - Incomplete - narrow column of air/contrast in laryngeal vestibule   Pharyngeal stripping wave - Complete  Pharyngeal contraction (A/P view) - Complete  Pharyngoesophageal segment opening - Complete distension and complete duration/no obstruction of flow of bolus   Tongue base retraction - Trace column of contrast or air between tongue base and pharyngeal wall   Pharyngeal residue - Residue within the valleculae following the swallow     ESOPHAGEAL PHASE:  Esophageal clearance - Complete clearance       SLP Impressions with Severity Rating:   Pt presents with mild to moderate oropharyngeal dysphagia upon completion of modified barium swallow study this date. Swallowing physiology is detailed above. Impairments most impacting swallowing safety and efficiency include tongue base  retraction and overall sluggish epiglottic complete inversion. This results in post swallow residue across consistencies evaluated increasing with heavier viscosities. A chin tuck paired with repeat swallows and liquid wash aid in clearance of the majority of valleculae residue. Intermittent penetration evident with thin liquids in large consecutive swallows. This is eliminated with single sips. No further penetration was observed for any other consistency, and no aspiration was visualized during study.     *Of note: The A-P bolus follow-through is not intended to be utilized as a diagnostic assessment of the esophagus, rather a tool to observe the biomechanical aspects of the swallow continuum and to inform the need for  further evaluation by medical specialists, as applicable.       OUTCOME MEASURES:  Functional Oral Intake Scale  Functional Oral Intake Scale: Level 7        total oral diet with no restrictions       Rosenbek's Penetration Aspiration Scale  Thin Liquids: 3. PENETRATION with LOW ASPIRATION risk - contrast remains above vocal cords, visible residue]   Nectar Thick Liquids: 1. NO ASPIRATION & NO PENETRATION - no aspiration, contrast does not enter airway  Honey Thick Liquids: N/A  Puree: 1. NO ASPIRATION & NO PENETRATION - no aspiration, contrast does not enter airway  Soft Solids: N/A  Solids: 1. NO ASPIRATION & NO PENETRATION - no aspiration, contrast does not enter airway

## 2025-04-03 NOTE — PROGRESS NOTES
Speech-Language Pathology  Adult Inpatient Clinical Bedside Swallow Evaluation    Patient Name: Bijan Ibanez  MRN: [id number]  Today's Date: 4/3/2025   Start Time: 1023  Stop Time: 1045  Time Calculation (min): 22    History of Present Illness:   Bijan Ibanez is a 64 y.o. male with PMH of newly dx Burkitt's lymphoma, HTN, HLD, CAD, MI (s/p stent 2010, on ASA), hx renal cell carcinoma (s/p R partial nephrectomy in 2013 @ [facility]), GERD, BPH, arthritis, RUE PICC DVT (on Eliquis w/ close monitoring d/t counts), afib RVR, and recent MRSA bacteremia (s/p 4 week Vancomycin end date 3/3) who is admitted for C4 EPOCH. 3/30 started chemotherapy and received 1u pRBC for hgb 7.0. DC pending chemo completion likely 4/3     Assessment:   Clinical bedside swallow evaluation completed. Pt alert and oriented x4 with functional oral musculature. Mucositis and redness on soft palate. Pt endorses a feeling of food getting stuck pointing to cervical esophagus and mid-sternum. Pt tolerated ice chips x4 and sips of water. [place] protocol of consuming 3 oz of water in continuous sequential swallows with burping and post prandial cough. Concern for both pharyngeal and questionable esophageal dysphagia. Pt would benefit from a MBSS and esophagram to full evaluate swallow for a safe and efficient diet. Discuss results and recommendations with RN and MD.     Recommendations:  NPO    Frequent, aggressive oral care is strongly recommended to improve infection control as well as reduce dental plaque and bacteria on oropharyngeal surfaces which may increase the risk nosocomial infections, including pneumonia.    OK for small amounts of ice chips (one at a time, 10x/hour) for pleasure/swallow stimulation, but only after aggressive oral care to avoid colonization of bacteria within oral cavity.    Goal:   Pt will tolerate least restrictive diet without s/s aspiration, respiratory compromise, or overt difficulty throughout admission.  Start:  04/03/25, End: 4/18/25  Status: goal initiated     Plan:  SLP Services Indicated: Yes  Frequency: Pending results of MBSS  Discussed POC with patient and spouse  SLP - OK to Discharge    Pain:   0-10  0 = No pain.     Inpatient Education:  Extensive education provided to patient and spouse regarding current swallow function, recommendations/results, and POC.      Consultations/Referrals/Coordination of Services:   N/A

## 2025-04-04 ENCOUNTER — PHARMACY VISIT (OUTPATIENT)
Dept: PHARMACY | Facility: CLINIC | Age: 65
End: 2025-04-04
Payer: COMMERCIAL

## 2025-04-04 ENCOUNTER — HOSPITAL ENCOUNTER (INPATIENT)
Dept: RADIOLOGY | Facility: HOSPITAL | Age: 65
Discharge: HOME | End: 2025-04-04
Payer: MEDICARE

## 2025-04-04 ENCOUNTER — APPOINTMENT (OUTPATIENT)
Dept: RADIOLOGY | Facility: HOSPITAL | Age: 65
End: 2025-04-04
Payer: MEDICARE

## 2025-04-04 ENCOUNTER — INFUSION (OUTPATIENT)
Dept: HEMATOLOGY/ONCOLOGY | Facility: HOSPITAL | Age: 65
End: 2025-04-04
Payer: MEDICARE

## 2025-04-04 ENCOUNTER — PROCEDURE VISIT (OUTPATIENT)
Dept: HEMATOLOGY/ONCOLOGY | Facility: HOSPITAL | Age: 65
End: 2025-04-04
Payer: MEDICARE

## 2025-04-04 ENCOUNTER — OFFICE VISIT (OUTPATIENT)
Dept: HEMATOLOGY/ONCOLOGY | Facility: HOSPITAL | Age: 65
End: 2025-04-04
Payer: MEDICARE

## 2025-04-04 VITALS
TEMPERATURE: 96.8 F | OXYGEN SATURATION: 99 % | WEIGHT: 202 LBS | BODY MASS INDEX: 27.21 KG/M2 | HEART RATE: 73 BPM | SYSTOLIC BLOOD PRESSURE: 130 MMHG | DIASTOLIC BLOOD PRESSURE: 66 MMHG | RESPIRATION RATE: 18 BRPM

## 2025-04-04 VITALS
DIASTOLIC BLOOD PRESSURE: 73 MMHG | SYSTOLIC BLOOD PRESSURE: 123 MMHG | RESPIRATION RATE: 18 BRPM | HEART RATE: 70 BPM | OXYGEN SATURATION: 100 %

## 2025-04-04 VITALS
HEART RATE: 68 BPM | RESPIRATION RATE: 18 BRPM | TEMPERATURE: 97.5 F | DIASTOLIC BLOOD PRESSURE: 64 MMHG | OXYGEN SATURATION: 100 % | SYSTOLIC BLOOD PRESSURE: 112 MMHG

## 2025-04-04 DIAGNOSIS — C83.78 BURKITT'S LYMPHOMA OF LYMPH NODES OF MULTIPLE REGIONS (MULTI): ICD-10-CM

## 2025-04-04 DIAGNOSIS — C85.10 HIGH GRADE B-CELL LYMPHOMA (MULTI): Primary | ICD-10-CM

## 2025-04-04 DIAGNOSIS — C83.30 DIFFUSE LARGE B-CELL LYMPHOMA, UNSPECIFIED BODY REGION (MULTI): ICD-10-CM

## 2025-04-04 DIAGNOSIS — R07.81 RIB PAIN: ICD-10-CM

## 2025-04-04 DIAGNOSIS — C83.78 BURKITT'S LYMPHOMA OF LYMPH NODES OF MULTIPLE REGIONS (MULTI): Primary | ICD-10-CM

## 2025-04-04 DIAGNOSIS — R10.9 FLANK PAIN: ICD-10-CM

## 2025-04-04 DIAGNOSIS — C83.74: ICD-10-CM

## 2025-04-04 DIAGNOSIS — C85.10 HIGH GRADE B-CELL LYMPHOMA (MULTI): ICD-10-CM

## 2025-04-04 LAB
ALBUMIN SERPL BCP-MCNC: 3.1 G/DL (ref 3.4–5)
ALP SERPL-CCNC: 32 U/L (ref 33–136)
ALT SERPL W P-5'-P-CCNC: 24 U/L (ref 10–52)
ANION GAP SERPL CALC-SCNC: 10 MMOL/L (ref 10–20)
APPEARANCE CSF: CLEAR
AST SERPL W P-5'-P-CCNC: 10 U/L (ref 9–39)
BASOPHILS # BLD AUTO: 0 X10*3/UL (ref 0–0.1)
BASOPHILS NFR BLD AUTO: 0 %
BASOPHILS NFR CSF MANUAL: 0 %
BILIRUB SERPL-MCNC: 0.8 MG/DL (ref 0–1.2)
BLASTS CSF MANUAL: 0 %
BUN SERPL-MCNC: 31 MG/DL (ref 6–23)
CALCIUM SERPL-MCNC: 8.4 MG/DL (ref 8.6–10.3)
CHLORIDE SERPL-SCNC: 104 MMOL/L (ref 98–107)
CO2 SERPL-SCNC: 28 MMOL/L (ref 21–32)
COLOR CSF: COLORLESS
COLOR SPUN CSF: COLORLESS
CREAT SERPL-MCNC: 0.5 MG/DL (ref 0.5–1.3)
EGFRCR SERPLBLD CKD-EPI 2021: >90 ML/MIN/1.73M*2
EOSINOPHIL # BLD AUTO: 0.03 X10*3/UL (ref 0–0.7)
EOSINOPHIL NFR BLD AUTO: 0.4 %
EOSINOPHIL NFR CSF MANUAL: 0 %
ERYTHROCYTE [DISTWIDTH] IN BLOOD BY AUTOMATED COUNT: 17.2 % (ref 11.5–14.5)
GLUCOSE CSF-MCNC: 60 MG/DL (ref 40–70)
GLUCOSE SERPL-MCNC: 83 MG/DL (ref 74–99)
HCT VFR BLD AUTO: 27 % (ref 41–52)
HGB BLD-MCNC: 8.5 G/DL (ref 13.5–17.5)
IMM GRANULOCYTES # BLD AUTO: 0.11 X10*3/UL (ref 0–0.7)
IMM GRANULOCYTES NFR BLD AUTO: 1.3 % (ref 0–0.9)
IMM GRANULOCYTES NFR CSF: 0 %
LDH SERPL L TO P-CCNC: 141 U/L (ref 84–246)
LYMPHOCYTES # BLD AUTO: 0.28 X10*3/UL (ref 1.2–4.8)
LYMPHOCYTES NFR BLD AUTO: 3.4 %
LYMPHOCYTES NFR CSF MANUAL: 60 % (ref 28–96)
MAGNESIUM SERPL-MCNC: 2.22 MG/DL (ref 1.6–2.4)
MCH RBC QN AUTO: 27.6 PG (ref 26–34)
MCHC RBC AUTO-ENTMCNC: 31.5 G/DL (ref 32–36)
MCV RBC AUTO: 88 FL (ref 80–100)
MONOCYTES # BLD AUTO: 0.13 X10*3/UL (ref 0.1–1)
MONOCYTES NFR BLD AUTO: 1.6 %
MONOS+MACROS NFR CSF MANUAL: 20 % (ref 16–56)
NEUTROPHILS # BLD AUTO: 7.64 X10*3/UL (ref 1.2–7.7)
NEUTROPHILS NFR BLD AUTO: 93.3 %
NEUTS SEG NFR CSF MANUAL: 20 % (ref 0–5)
NRBC BLD-RTO: 0 /100 WBCS (ref 0–0)
OTHER CELLS NFR CSF MANUAL: 0 %
PLASMA CELLS NFR CSF MICRO: 0 %
PLATELET # BLD AUTO: 135 X10*3/UL (ref 150–450)
POTASSIUM SERPL-SCNC: 3.4 MMOL/L (ref 3.5–5.3)
PROT CSF-MCNC: 39 MG/DL (ref 15–45)
PROT SERPL-MCNC: 4.4 G/DL (ref 6.4–8.2)
RBC # BLD AUTO: 3.08 X10*6/UL (ref 4.5–5.9)
RBC # CSF AUTO: 55 /UL (ref 0–5)
SODIUM SERPL-SCNC: 139 MMOL/L (ref 136–145)
TOTAL CELLS COUNTED CSF: 5
TUBE # CSF: ABNORMAL
WBC # BLD AUTO: 8.2 X10*3/UL (ref 4.4–11.3)
WBC # CSF AUTO: 2 /UL (ref 1–5)

## 2025-04-04 PROCEDURE — 2500000004 HC RX 250 GENERAL PHARMACY W/ HCPCS (ALT 636 FOR OP/ED): Performed by: STUDENT IN AN ORGANIZED HEALTH CARE EDUCATION/TRAINING PROGRAM

## 2025-04-04 PROCEDURE — 85025 COMPLETE CBC W/AUTO DIFF WBC: CPT | Performed by: PHYSICIAN ASSISTANT

## 2025-04-04 PROCEDURE — 88185 FLOWCYTOMETRY/TC ADD-ON: CPT | Mod: TC | Performed by: PHYSICIAN ASSISTANT

## 2025-04-04 PROCEDURE — 2500000004 HC RX 250 GENERAL PHARMACY W/ HCPCS (ALT 636 FOR OP/ED): Performed by: PHYSICIAN ASSISTANT

## 2025-04-04 PROCEDURE — 88112 CYTOPATH CELL ENHANCE TECH: CPT | Mod: TC,59,MCY | Performed by: PHYSICIAN ASSISTANT

## 2025-04-04 PROCEDURE — 2500000005 HC RX 250 GENERAL PHARMACY W/O HCPCS: Performed by: PHYSICIAN ASSISTANT

## 2025-04-04 PROCEDURE — 83615 LACTATE (LD) (LDH) ENZYME: CPT | Performed by: PHYSICIAN ASSISTANT

## 2025-04-04 PROCEDURE — 83735 ASSAY OF MAGNESIUM: CPT | Performed by: PHYSICIAN ASSISTANT

## 2025-04-04 PROCEDURE — 96372 THER/PROPH/DIAG INJ SC/IM: CPT

## 2025-04-04 PROCEDURE — 7100000009 HC PHASE TWO TIME - INITIAL BASE CHARGE

## 2025-04-04 PROCEDURE — 96413 CHEMO IV INFUSION 1 HR: CPT

## 2025-04-04 PROCEDURE — 2500000001 HC RX 250 WO HCPCS SELF ADMINISTERED DRUGS (ALT 637 FOR MEDICARE OP): Performed by: STUDENT IN AN ORGANIZED HEALTH CARE EDUCATION/TRAINING PROGRAM

## 2025-04-04 PROCEDURE — 84157 ASSAY OF PROTEIN OTHER: CPT | Performed by: PHYSICIAN ASSISTANT

## 2025-04-04 PROCEDURE — 96450 CHEMOTHERAPY INTO CNS: CPT | Performed by: STUDENT IN AN ORGANIZED HEALTH CARE EDUCATION/TRAINING PROGRAM

## 2025-04-04 PROCEDURE — 96415 CHEMO IV INFUSION ADDL HR: CPT

## 2025-04-04 PROCEDURE — 62328 DX LMBR SPI PNXR W/FLUOR/CT: CPT | Performed by: RADIOLOGY

## 2025-04-04 PROCEDURE — 80053 COMPREHEN METABOLIC PANEL: CPT | Performed by: PHYSICIAN ASSISTANT

## 2025-04-04 PROCEDURE — 7100000010 HC PHASE TWO TIME - EACH INCREMENTAL 1 MINUTE

## 2025-04-04 PROCEDURE — RXMED WILLOW AMBULATORY MEDICATION CHARGE

## 2025-04-04 PROCEDURE — 62328 DX LMBR SPI PNXR W/FLUOR/CT: CPT

## 2025-04-04 PROCEDURE — 82945 GLUCOSE OTHER FLUID: CPT | Performed by: PHYSICIAN ASSISTANT

## 2025-04-04 PROCEDURE — 89051 BODY FLUID CELL COUNT: CPT | Performed by: PHYSICIAN ASSISTANT

## 2025-04-04 PROCEDURE — 2500000004 HC RX 250 GENERAL PHARMACY W/ HCPCS (ALT 636 FOR OP/ED): Mod: JZ,TB | Performed by: STUDENT IN AN ORGANIZED HEALTH CARE EDUCATION/TRAINING PROGRAM

## 2025-04-04 PROCEDURE — 99215 OFFICE O/P EST HI 40 MIN: CPT | Mod: 25 | Performed by: STUDENT IN AN ORGANIZED HEALTH CARE EDUCATION/TRAINING PROGRAM

## 2025-04-04 RX ORDER — HEPARIN SODIUM,PORCINE/PF 10 UNIT/ML
50 SYRINGE (ML) INTRAVENOUS AS NEEDED
OUTPATIENT
Start: 2025-04-04

## 2025-04-04 RX ORDER — HEPARIN SODIUM 1000 [USP'U]/ML
2000 INJECTION, SOLUTION INTRAVENOUS; SUBCUTANEOUS AS NEEDED
OUTPATIENT
Start: 2025-04-04

## 2025-04-04 RX ORDER — LIDOCAINE HYDROCHLORIDE 10 MG/ML
5 INJECTION, SOLUTION EPIDURAL; INFILTRATION; INTRACAUDAL; PERINEURAL ONCE
Status: COMPLETED | OUTPATIENT
Start: 2025-04-04 | End: 2025-04-04

## 2025-04-04 RX ORDER — ACETAMINOPHEN 325 MG/1
650 TABLET ORAL ONCE
Status: COMPLETED | OUTPATIENT
Start: 2025-04-04 | End: 2025-04-04

## 2025-04-04 RX ORDER — DIPHENHYDRAMINE HCL 50 MG
50 CAPSULE ORAL ONCE
Status: COMPLETED | OUTPATIENT
Start: 2025-04-04 | End: 2025-04-04

## 2025-04-04 RX ORDER — POTASSIUM CHLORIDE 750 MG/1
10 TABLET, FILM COATED, EXTENDED RELEASE ORAL DAILY
Qty: 10 TABLET | Refills: 0 | Status: SHIPPED | OUTPATIENT
Start: 2025-04-04 | End: 2025-04-14

## 2025-04-04 RX ORDER — HEPARIN 100 UNIT/ML
500 SYRINGE INTRAVENOUS AS NEEDED
OUTPATIENT
Start: 2025-04-04

## 2025-04-04 RX ORDER — PROCHLORPERAZINE MALEATE 10 MG
10 TABLET ORAL EVERY 6 HOURS PRN
Status: DISCONTINUED | OUTPATIENT
Start: 2025-04-04 | End: 2025-04-04 | Stop reason: HOSPADM

## 2025-04-04 RX ORDER — POTASSIUM CHLORIDE 1.5 G/1.58G
20 POWDER, FOR SOLUTION ORAL ONCE
Status: DISCONTINUED | OUTPATIENT
Start: 2025-04-04 | End: 2025-04-04 | Stop reason: HOSPADM

## 2025-04-04 RX ORDER — POTASSIUM CHLORIDE 750 MG/1
10 TABLET, FILM COATED, EXTENDED RELEASE ORAL ONCE
Status: DISCONTINUED | OUTPATIENT
Start: 2025-04-04 | End: 2025-04-04 | Stop reason: HOSPADM

## 2025-04-04 RX ORDER — PROCHLORPERAZINE EDISYLATE 5 MG/ML
10 INJECTION INTRAMUSCULAR; INTRAVENOUS EVERY 6 HOURS PRN
Status: DISCONTINUED | OUTPATIENT
Start: 2025-04-04 | End: 2025-04-04 | Stop reason: HOSPADM

## 2025-04-04 RX ADMIN — PEGFILGRASTIM 6 MG: 6 INJECTION SUBCUTANEOUS at 13:23

## 2025-04-04 RX ADMIN — RITUXIMAB 900 MG: 10 INJECTION, SOLUTION INTRAVENOUS at 10:10

## 2025-04-04 RX ADMIN — SODIUM CHLORIDE 12 MG: 9 INJECTION INTRAMUSCULAR; INTRAVENOUS; SUBCUTANEOUS at 14:00

## 2025-04-04 RX ADMIN — ACETAMINOPHEN 650 MG: 325 TABLET ORAL at 09:11

## 2025-04-04 RX ADMIN — DIPHENHYDRAMINE HYDROCHLORIDE 50 MG: 50 CAPSULE ORAL at 09:11

## 2025-04-04 RX ADMIN — LIDOCAINE HYDROCHLORIDE 50 MG: 10 SOLUTION INTRAVENOUS at 15:18

## 2025-04-04 ASSESSMENT — PAIN - FUNCTIONAL ASSESSMENT: PAIN_FUNCTIONAL_ASSESSMENT: 0-10

## 2025-04-04 ASSESSMENT — PAIN SCALES - GENERAL: PAINLEVEL_OUTOF10: 8

## 2025-04-04 NOTE — PROGRESS NOTES
Bijan Ibanez is a 64 y.o. male who presents for Chemotherapy.    He is on the following chemotherapy regimen:     Treatment Plans       Name Type Plan Dates Plan Provider         Active    (INPT) Dose-Adjusted R-EPOCH (Etoposide / DOXOrubicin / VinCRIStine / Cyclophosphamide / PredniSONE) + RiTUXimab, 21 Day Cycles  Oncology Treatment 1/21/2025 - Present Torey Mckeon DO                  .    Since his last visit, he has been doing fair.  Overall, he states his energy level is stable.  His appetite has been poor.  He reports appetite changes, fatigue, mucositis, and pain - on right flank, dysphagia from mucositis .  He has no other concerns.    Lines of note: PICC line left arm intact.  Site Maintenance: Flushed and Positive blood return.  Line Removal: No, central line intact.    He tolerated Rituximab without incident.  He was given Neulasta and discharged in stable condition to IR where he will receive IT Chemo with ROCÍO Aleman.

## 2025-04-04 NOTE — PROGRESS NOTES
Patient ID: Bijan Ibanez is a 64 y.o. male.  Referring Physician: No referring provider defined for this encounter.  Primary Care Provider: Nehal Murphy MD     Diagnosis: Burkitt's Lymphoma  Date of Diagnosis: 1/16/25  Stage at Diagnosis: IV  Risk category: high risk (marrow involvement)    Prior Treatments:    Current Treaments:  DA-R-EPOCH C1D1 1/21/25    Assessment/Plan    Bijan Ibanez is a 64 y.o. male with PMH of HTN, HLD, CAD, MI (s/p stent 2010, on ASA), hx renal cell carcinoma (s/p R partial nephrectomy in 2013 @ [facility]), GERD, BPH, arthritis who is admitted for further evaluation and management of newly diagnosed Burkitt's lymphoma    # Burkitt's Lymphoma - Originally signed out as high grade lymphoma but after burkitt's rearrangement (t8;14) came back positive. High risk based on marrow involvement, though no CNS involvement  - previously reviewed diagnosis and prognosis with patient  - initially give DA-R-EPOCH when was signed out as high grade lymphoma, given the significant toxicity he had with DA R EPOCH, will not escalate to CODOX-IVAC or hyper-CVAD given no CNS involvement  - PET post 2C Chemo shows deauville 2 - c/w CR  - cont w/ EPOCH, increased to dose level 2 but unfortunately had G3 mucositis again, so reduced etoposide back to prior dose.   - 4/4/2025: still with mucositis related discomfort, prescribed magic mouth wash, SLP referral pending. Barium study completed inpatient-- he is able to have small solids. Will continue with close monitoring. Received rituxan and pegfilgrastim today. CNS ppx delayed (see below), will receive IT methotrexate today and IT cytarabine on 4/8.     # CNS prophylaxis  - given dose of IT methotrexate in hospital, will plan to give additional doses starting with C4. Had planned to give on D1 and D5 while in-house split between methotrexate and cytarabine. However anticoagulation not held, will be given on D6 and D10 instead.     # Right rib pain/flank  pain  - Ongoing for the past 3 weeks. Intermittent, radiating towards anterior abdomen. Has history of renal cell carcinoma s/p partial nephrectomy on right.   - Given ongoing symptoms, plan inpatient was to obtain stat imaging of chest/abdomen/pelvis, but was not completed. Request placed today for stat CT imaging, message sent to urgent scheduling to help arrange. Patient and wife aware.     # G1 CIPN  - monitor    # MRSA bacteremia  - s/p 4 weeks of IV Vanco, EOT 3/3/25       No problem-specific Assessment & Plan notes found for this encounter.    ____________________________________________________________________________________________________________________    HISTORY  Per HPI from inpatient:  Bijan Ibanez is a 64 y.o. male with PMH of HTN, HLD, CAD, MI (s/p stent 2010, on ASA), hx renal cell carcinoma (s/p R partial nephrectomy in 2013 @ [facility]), GERD, BPH, arthritis who is admitted for further evaluation and management of newly diagnosed high grade B-cell lymphoma.      Patient recently admitted 1/11-1/17 after concerning imaging outpatient leading to c/f relapsed RCC vs new other malignancy.      Patient endorses recent history of constitutional symptoms, including night sweats, weight loss, decreased appetite, fatigue, OSORIO. Patient and wife both developed URI symptoms after [place], however, the wife's symptoms resolved while the patient's did not. On admit, continues to have low back pain, that improves with oxycodone, and is worse with movement. Reports an episode of right flank pain yesterday that was very painful but resolved after pain meds and has not yet recurred. Reporting chin and lip numbness that has been present since before [place]. Also reports right parotid swelling, that has sometimes appeared to have improved but will then return to same size, but has not continued to enlarge; sometimes tender to touch, sometimes not. Had a left toothache at some point that resolved and has not  "recurred. Patient denies saddle anesthesia, loss of lower extremity function or loss of bladder/bowel function. Denies CP, SOB, abd pain, N/V/D/C.     Interval Hx  Admitted for C4 EPOCH. Presents today for IT chemo, neulasta, and rituxan. Feeling fatigued and complains of ongoing oral/throat discomfort related to mucositis. Had barium study completed in patient. Has not yet scheduled the SLP referral. Eating some things, able to tolerate small tablets. Ongoing right back/flank pain \"right over the kidney area.\" Has been ongoing for the past three weeks now, denies any injuries. Pain is described as waxing and waning, never truly resolves but does improve with oral pain medication. He denies any hematuria, cloudy urine, or pain with urination. No constipation or diarrhea. Pain does sometimes radiate towards the front of the abdomen. No change to mild neuropathy. No significant N/V/F/C/SOB/CP.     Bijan Ibanez  reports that he has quit smoking. His smoking use included cigarettes and pipe. He has never used smokeless tobacco.  He  reports no history of alcohol use.  He  reports no history of drug use.    Family History   Problem Relation Name Age of Onset    Coronary artery disease Mother      Alzheimer's disease Mother      Coronary artery disease Father      Skin cancer Father      Alzheimer's disease Father      Alzheimer's disease Mother's Brother      Alzheimer's disease Father's Brother      Stomach cancer Maternal Grandfather      Other cancer Paternal Grandfather          prostate or colon cancer    Heart disease Other       Oncology History   Burkitt's lymphoma of lymph nodes of multiple regions (Multi)   1/13/2025 Initial Diagnosis    Diagnosis: Burkitt Lymphoma, [place] Stage IV, BL-IPI High-Risk (score 4/5).    BL-IPI Calculation:  Age >60 years: Yes (64 years old).  Performance status (ECOG >=2): Presumed based on clinical presentation requiring hospitalization.  Serum LDH >ULN: 4102 U/L " (1/11/25)  [place] Stage III/IV: Yes (stage IV with extranodal involvement including kidneys, liver, spleen, and musculature).  CNS involvement: No (MRI and LP negative).  Total Score: 4/5 (High-Risk).    Presenting Symptoms: Asymmetric swelling of the right-sided muscles of mastication; imaging revealed incidental findings of perihilar mass and renal lesions.    Labs at Diagnosis: WBC 4.0, platelets 72; LDH 4102 U/L (1/11/25)    Pathology:  EBUS with lymph node biopsy (1/13/25): Predominantly CD20-positive small lymphoid cells, raising suspicion for a B-cell lymphoproliferative process.  Bone marrow biopsy (1/16/25): 70-80% involvement by high-grade B-cell lymphoma in a hypercellular marrow (90% cellular) with maturing trilineage hematopoiesis. FISH confirmed t(8;14)/IGH::MYC rearrangement, diagnostic of Burkitt lymphoma.     Imaging:  CT Neck (1/9/25): Fusiform thickening of right masticatory muscles, likely benign hypertrophy.  CT C/A/P (1/11/25): Left perihilar mass, pulmonary nodules, right renal lesions, and right adrenal gland thickening concerning for metastatic disease; T12-L1 soft tissue mass.  PET-CT (1/20): Widespread hermann and extranodal hypermetabolic involvement, including kidneys, liver, spleen, abdominal wall, and musculature, suggestive of extensive lymphomatous disease.  MRI Brain (1/21): No intracranial lymphoma; suspected osseous involvement of calvarium.    Treatment:  Dexamethasone 40 mg daily (1/20-1/21).  Prophylactic IT methotrexate via LP (1/22), flow negative for lymphoma.     1/21/2025 -  Chemotherapy    (INPT) Dose-Adjusted R-EPOCH (Etoposide / DOXOrubicin / VinCRIStine / Cyclophosphamide / PredniSONE) + RiTUXimab, 21 Day Cycles          Performance Status:  ECOG Score: 2- Ambulatory and  capable of all selfcare; unable to carry out work activities.  Up and about > 50% of waking hrs.   Karnofsky Score: 70 - Cares for self; unable to carry on normal activity or do normal work      Objective   BSA: 2.16 meters squared  /66 (BP Location: Right arm, Patient Position: Sitting)   Pulse 73   Temp 36 °C (96.8 °F) (Temporal)   Resp 18   Wt 91.6 kg (202 lb)   SpO2 99%   BMI 27.21 kg/m²     Physical Exam  GEN: Aox3, NAD  HEENT EOMI PERRLA sclera anicteric  CV RRR w/o m/r/g, R sided rib pain around 10-11th rib, TTP but no CVA tenderness.   Resp CTAB w/o w/r/r  GI Soft NTND+BS  Ext no LE edema  LN: no adenopathy palpated       Path:      Component    FINAL DIAGNOSIS   A, B & C. BONE MARROW CLOT WITH ASPIRATE AND CORE WITH TOUCH PREP, LEFT ILIAC CREST:       -- WITH INVOLVEMENT BY HIGH GRADE B-CELL LYMPHOMA, 70-80% OF MARROW CELLULAR ELEMENTS  -- HYPERCELLULAR BONE MARROW (90% CELLULAR) WITH MATURING TRILINEAGE HEMATOPOIESIS   -- SEE NOTE     NOTE: The differential diagnosis includes Burkitt lymphoma and high grade B-cell lymphoma with MYC and Bcl2 and/or Bcl-6 rearrangements (double / triple hit lymphoma). FISH studies and lymphoid NGS panel are pending for further characterization. Clinical and molecular results correlation is suggested         Component    ISCN    Results:    FISH POSITIVE for t(8;14)/ IGH::MYC rearrangement.  nuc  kieran(D8Z2x2,MYCx3,IGHx3)(MYC con IGHx2)[31/250]/(D8Z2x2,MYCx2,IGHx2)(MYC con IGHx1)[8/250]       Imaging:  PET 2/24/25  IMPRESSION:  1. There is near complete interval resolution of previously seen  hypermetabolic hermann and extranodal disease involving bilateral  submandibular, parotid gland, hepatic lesions, spleen, mesenteric  deposits, bilateral kidney stomach bilateral, bilateral arms,  anterior chest wall, and gluteal muscle supra and infra diaphragmatic  lymphadenopathy.  2. Diffuse metabolic activity within the bone marrow, limiting the  evaluation a focal metabolic osseous metastatic disease. Likely post  treatment changes. Lymphomatous involvement can not be completely  excluded. Deauville score 2.  3. Splenomegaly with metabolic activity less than  liver.  4. Mild decrease in size and metabolic activity within left lower  lobe opacity, likely inflammatory.  5. Subcentimeter nodular opacities within right lung base, likely  inflammatory/infective.        PET 1/20/25  IMPRESSION:  Widespread hermann as well as extranodal lymphomatous involvement.  1. Multiple hypermetabolic supra and infra diaphragmatic  lymphadenopathy as described, consistent with lymphomatous  involvement.  2. Intensely hypermetabolic activity within bilateral enlarged  submandibular and parotid glands as described, concerning for  lymphomatous involvement.  3. Hypermetabolic mass like infiltration within bilateral  kidneys(right> left), corresponding to lesion seen on CT dated  01/01/2025, compatible with lymphomatous involvement.  4. Hypermetabolic hepatic lesions without any underlying CT  correlate, consistent with lymphomatous involvement.  5. Splenomegaly with nonspecific hypermetabolic activity suggestive  of extranodal involvement.  6. Multiple hypermetabolic soft tissue mesenteric deposits as well as  anterior abdominal wall nodular deposits, with few of the lesions  without any underlying CT correlate, concerning for additional  lymphomatous involvement.  7. Metabolic activity within the stomach, bilateral arms, anterior  chest wall and gluteal muscles without underlying CT correlate,  concerning for lymphomatous involvement.      [facility]

## 2025-04-04 NOTE — PROGRESS NOTES
General    Date/Time: 4/4/2025 5:21 PM    Performed by: Evangelist Fan PA-C  Authorized by: Evangelist Fan PA-C    Consent:     Consent obtained:  Verbal    Consent given by:  Patient    Risks, benefits, and alternatives were discussed: yes      Risks discussed:  Bleeding, infection and pain    Alternatives discussed:  No treatment  Universal protocol:     Procedure explained and questions answered to patient or proxy's satisfaction: yes      Test results available: yes      Patient identity confirmed:  Verbally with patient, arm band and hospital-assigned identification number  Indications:     Indications:  Intrathecal chemotherapy administration  Procedure specific details:      Patient presents for fluoroscopic LP (performed separately, see IR procedure note) and intrathecal chemotherapy administration. 12 mg methotrexate in 0.9% NaCl 3mL solution was instilled intrathecally (IT) over 3 minutes following a lumbar puncture performed by the radiology MD under fluoroscopic guidance. CSF return was verified before instillation and after instillation. CSF was collected prior to the instillation of IT chemotherapy and was sent for cell count and flow cytometric analysis. The patient tolerated procedure well without difficulty. Following the procedure, the patient remained in a recumbent position.   Post-procedure details:     Procedure completion:  Tolerated well, no immediate complications    Evangelist Fan PA-C

## 2025-04-04 NOTE — POST-PROCEDURE NOTE
Radiology Brief Postprocedure Note    Attending: Katherine Horan MD    Assistant: Willam Hills MD    Diagnosis: Burkitt lymphoma    Description of procedure: L2-L3 vertebral space marked under fluoroscopy. Patient was prepped and draped in the usual sterile fashion. 5 ml 1% lidocaine injected for local anesthesia. Thecal sac at L2-L3 vertebral space accessed under fluoroscopic guidance using 20G spinal needle.     Received 10 cc clear CSF. Stylet was replaced and needle was removed. Band-Aid applied. No immediate complications. See radiology report for details.      Anesthesia:  Local    Complications: None    Estimated Blood Loss: minimal    Medications (Filter: Administrations occurring from 1611 to 1611 on 04/04/25) As of 04/04/25 1611      None          No specimens collected      See detailed result report with images in PACS.    The patient tolerated the procedure well without incident or complication and is in stable condition.

## 2025-04-04 NOTE — DISCHARGE INSTRUCTIONS
For questions related to your procedure:  Please call [phone] between the hours of 7:00am-5:00pm Monday through Friday.  Please call [phone] after 5:00pm and on weekends and holidays.     In the event of an emergency call 911 or go to your nearest emergency room.

## 2025-04-07 DIAGNOSIS — C83.78 BURKITT'S LYMPHOMA OF LYMPH NODES OF MULTIPLE REGIONS (MULTI): ICD-10-CM

## 2025-04-07 LAB
CELL POPULATIONS: NORMAL
DIAGNOSIS: NORMAL
FLOW DIFFERENTIAL: NORMAL
FLOW TEST ORDERED: NORMAL
FLUID CELL COUNT: 2 /UL
LAB TEST METHOD: NORMAL
LABORATORY COMMENT REPORT: NORMAL
LABORATORY COMMENT REPORT: NORMAL
NUMBER OF CELLS COLLECTED: NORMAL
PATH REPORT.FINAL DX SPEC: NORMAL
PATH REPORT.GROSS SPEC: NORMAL
PATH REPORT.RELEVANT HX SPEC: NORMAL
PATH REPORT.TOTAL CANCER: NORMAL
PATH REPORT.TOTAL CANCER: NORMAL
PATH REVIEW-CELL CT,CSF: NORMAL
SIGNATURE COMMENT: NORMAL
SPECIMEN VIABILITY: NORMAL

## 2025-04-07 RX ORDER — ACYCLOVIR 200 MG/5ML
400 SUSPENSION ORAL 2 TIMES DAILY
Qty: 600 ML | Refills: 0 | Status: SHIPPED | OUTPATIENT
Start: 2025-04-07 | End: 2025-04-18 | Stop reason: HOSPADM

## 2025-04-07 RX ORDER — FLUCONAZOLE 40 MG/ML
200 POWDER, FOR SUSPENSION ORAL DAILY
Qty: 150 ML | Refills: 0 | Status: SHIPPED | OUTPATIENT
Start: 2025-04-07 | End: 2025-04-18 | Stop reason: HOSPADM

## 2025-04-07 RX ORDER — LEVOFLOXACIN 25 MG/ML
500 SOLUTION ORAL DAILY
Qty: 280 ML | Refills: 0 | Status: SHIPPED | OUTPATIENT
Start: 2025-04-07 | End: 2025-04-21

## 2025-04-08 ENCOUNTER — APPOINTMENT (OUTPATIENT)
Dept: HEMATOLOGY/ONCOLOGY | Facility: HOSPITAL | Age: 65
DRG: 157 | End: 2025-04-08
Payer: MEDICARE

## 2025-04-08 ENCOUNTER — INFUSION (OUTPATIENT)
Dept: HEMATOLOGY/ONCOLOGY | Facility: HOSPITAL | Age: 65
End: 2025-04-08
Payer: MEDICARE

## 2025-04-08 ENCOUNTER — OFFICE VISIT (OUTPATIENT)
Dept: HEMATOLOGY/ONCOLOGY | Facility: HOSPITAL | Age: 65
End: 2025-04-08
Payer: MEDICARE

## 2025-04-08 ENCOUNTER — HOSPITAL ENCOUNTER (OUTPATIENT)
Dept: RADIOLOGY | Facility: HOSPITAL | Age: 65
Discharge: HOME | End: 2025-04-08
Payer: MEDICARE

## 2025-04-08 ENCOUNTER — NUTRITION (OUTPATIENT)
Dept: HEMATOLOGY/ONCOLOGY | Facility: HOSPITAL | Age: 65
End: 2025-04-08
Payer: MEDICARE

## 2025-04-08 ENCOUNTER — HOSPITAL ENCOUNTER (INPATIENT)
Facility: HOSPITAL | Age: 65
DRG: 157 | End: 2025-04-08
Attending: INTERNAL MEDICINE | Admitting: INTERNAL MEDICINE
Payer: MEDICARE

## 2025-04-08 ENCOUNTER — TELEPHONE (OUTPATIENT)
Dept: HEMATOLOGY/ONCOLOGY | Facility: HOSPITAL | Age: 65
End: 2025-04-08
Payer: MEDICARE

## 2025-04-08 VITALS
RESPIRATION RATE: 19 BRPM | BODY MASS INDEX: 26.94 KG/M2 | DIASTOLIC BLOOD PRESSURE: 78 MMHG | SYSTOLIC BLOOD PRESSURE: 126 MMHG | HEART RATE: 90 BPM | WEIGHT: 199.96 LBS | TEMPERATURE: 97.5 F | OXYGEN SATURATION: 100 %

## 2025-04-08 VITALS — WEIGHT: 199.96 LBS | BODY MASS INDEX: 27.08 KG/M2 | HEIGHT: 72 IN

## 2025-04-08 DIAGNOSIS — C83.78 BURKITT'S LYMPHOMA OF LYMPH NODES OF MULTIPLE REGIONS (MULTI): ICD-10-CM

## 2025-04-08 DIAGNOSIS — R50.81 FEBRILE NEUTROPENIA (CMS-HCC): ICD-10-CM

## 2025-04-08 DIAGNOSIS — C83.30 DIFFUSE LARGE B-CELL LYMPHOMA, UNSPECIFIED BODY REGION (MULTI): ICD-10-CM

## 2025-04-08 DIAGNOSIS — C83.74: ICD-10-CM

## 2025-04-08 DIAGNOSIS — D70.9 FEBRILE NEUTROPENIA (CMS-HCC): ICD-10-CM

## 2025-04-08 DIAGNOSIS — R11.2 CHEMOTHERAPY INDUCED NAUSEA AND VOMITING: ICD-10-CM

## 2025-04-08 DIAGNOSIS — M79.89 OTHER SPECIFIED SOFT TISSUE DISORDERS: ICD-10-CM

## 2025-04-08 DIAGNOSIS — R51.9 NONINTRACTABLE HEADACHE, UNSPECIFIED CHRONICITY PATTERN, UNSPECIFIED HEADACHE TYPE: ICD-10-CM

## 2025-04-08 DIAGNOSIS — C83.70 BURKITT LYMPHOMA, UNSPECIFIED BODY REGION (MULTI): ICD-10-CM

## 2025-04-08 DIAGNOSIS — C85.10 HIGH GRADE B-CELL LYMPHOMA (MULTI): ICD-10-CM

## 2025-04-08 DIAGNOSIS — R60.0 EDEMA OF UPPER EXTREMITY: ICD-10-CM

## 2025-04-08 DIAGNOSIS — C83.70 BURKITT LYMPHOMA, UNSPECIFIED BODY REGION (MULTI): Primary | ICD-10-CM

## 2025-04-08 DIAGNOSIS — R60.0 EDEMA OF BOTH LEGS: ICD-10-CM

## 2025-04-08 DIAGNOSIS — R60.1 GENERALIZED EDEMA: ICD-10-CM

## 2025-04-08 DIAGNOSIS — I48.0 PAROXYSMAL ATRIAL FIBRILLATION (MULTI): ICD-10-CM

## 2025-04-08 DIAGNOSIS — R13.10 DYSPHAGIA: Primary | ICD-10-CM

## 2025-04-08 DIAGNOSIS — R13.10 DYSPHAGIA, UNSPECIFIED TYPE: ICD-10-CM

## 2025-04-08 DIAGNOSIS — T45.1X5A CHEMOTHERAPY INDUCED NAUSEA AND VOMITING: ICD-10-CM

## 2025-04-08 LAB
ABO GROUP (TYPE) IN BLOOD: NORMAL
ALBUMIN SERPL BCP-MCNC: 3.3 G/DL (ref 3.4–5)
ALP SERPL-CCNC: 42 U/L (ref 33–136)
ALT SERPL W P-5'-P-CCNC: 12 U/L (ref 10–52)
ANION GAP SERPL CALC-SCNC: 11 MMOL/L (ref 10–20)
ANTIBODY SCREEN: NORMAL
AST SERPL W P-5'-P-CCNC: 6 U/L (ref 9–39)
BASOPHILS # BLD AUTO: 0 X10*3/UL (ref 0–0.1)
BASOPHILS NFR BLD AUTO: 0 %
BILIRUB SERPL-MCNC: 1.3 MG/DL (ref 0–1.2)
BUN SERPL-MCNC: 23 MG/DL (ref 6–23)
CALCIUM SERPL-MCNC: 8.4 MG/DL (ref 8.6–10.3)
CHLORIDE SERPL-SCNC: 102 MMOL/L (ref 98–107)
CO2 SERPL-SCNC: 28 MMOL/L (ref 21–32)
CREAT SERPL-MCNC: 0.41 MG/DL (ref 0.5–1.3)
EGFRCR SERPLBLD CKD-EPI 2021: >90 ML/MIN/1.73M*2
EOSINOPHIL # BLD AUTO: 0.01 X10*3/UL (ref 0–0.7)
EOSINOPHIL NFR BLD AUTO: 4.2 %
ERYTHROCYTE [DISTWIDTH] IN BLOOD BY AUTOMATED COUNT: 16.7 % (ref 11.5–14.5)
GLUCOSE SERPL-MCNC: 143 MG/DL (ref 74–99)
HCT VFR BLD AUTO: 21.6 % (ref 41–52)
HGB BLD-MCNC: 6.9 G/DL (ref 13.5–17.5)
IMM GRANULOCYTES # BLD AUTO: 0 X10*3/UL (ref 0–0.7)
IMM GRANULOCYTES NFR BLD AUTO: 0 % (ref 0–0.9)
LDH SERPL L TO P-CCNC: 120 U/L (ref 84–246)
LYMPHOCYTES # BLD AUTO: 0.1 X10*3/UL (ref 1.2–4.8)
LYMPHOCYTES NFR BLD AUTO: 41.7 %
MAGNESIUM SERPL-MCNC: 2.02 MG/DL (ref 1.6–2.4)
MCH RBC QN AUTO: 28.2 PG (ref 26–34)
MCHC RBC AUTO-ENTMCNC: 31.9 G/DL (ref 32–36)
MCV RBC AUTO: 88 FL (ref 80–100)
MONOCYTES # BLD AUTO: 0 X10*3/UL (ref 0.1–1)
MONOCYTES NFR BLD AUTO: 0 %
NEUTROPHILS # BLD AUTO: 0.13 X10*3/UL (ref 1.2–7.7)
NEUTROPHILS NFR BLD AUTO: 54.1 %
NRBC BLD-RTO: 0 /100 WBCS (ref 0–0)
PLATELET # BLD AUTO: 33 X10*3/UL (ref 150–450)
POTASSIUM SERPL-SCNC: 4.1 MMOL/L (ref 3.5–5.3)
PROT SERPL-MCNC: 4.6 G/DL (ref 6.4–8.2)
RBC # BLD AUTO: 2.45 X10*6/UL (ref 4.5–5.9)
RH FACTOR (ANTIGEN D): NORMAL
SODIUM SERPL-SCNC: 137 MMOL/L (ref 136–145)
VANCOMYCIN SERPL-MCNC: <2 UG/ML (ref 5–20)
WBC # BLD AUTO: 0.2 X10*3/UL (ref 4.4–11.3)

## 2025-04-08 PROCEDURE — 86923 COMPATIBILITY TEST ELECTRIC: CPT

## 2025-04-08 PROCEDURE — 93005 ELECTROCARDIOGRAM TRACING: CPT

## 2025-04-08 PROCEDURE — 80202 ASSAY OF VANCOMYCIN: CPT

## 2025-04-08 PROCEDURE — 99223 1ST HOSP IP/OBS HIGH 75: CPT | Performed by: INTERNAL MEDICINE

## 2025-04-08 PROCEDURE — 2500000004 HC RX 250 GENERAL PHARMACY W/ HCPCS (ALT 636 FOR OP/ED)

## 2025-04-08 PROCEDURE — 1157F ADVNC CARE PLAN IN RCRD: CPT

## 2025-04-08 PROCEDURE — 96374 THER/PROPH/DIAG INJ IV PUSH: CPT | Mod: INF

## 2025-04-08 PROCEDURE — 93010 ELECTROCARDIOGRAM REPORT: CPT | Performed by: INTERNAL MEDICINE

## 2025-04-08 PROCEDURE — 83735 ASSAY OF MAGNESIUM: CPT

## 2025-04-08 PROCEDURE — 1036F TOBACCO NON-USER: CPT

## 2025-04-08 PROCEDURE — 1123F ACP DISCUSS/DSCN MKR DOCD: CPT

## 2025-04-08 PROCEDURE — 99215 OFFICE O/P EST HI 40 MIN: CPT

## 2025-04-08 PROCEDURE — 80053 COMPREHEN METABOLIC PANEL: CPT

## 2025-04-08 PROCEDURE — 2500000001 HC RX 250 WO HCPCS SELF ADMINISTERED DRUGS (ALT 637 FOR MEDICARE OP)

## 2025-04-08 PROCEDURE — 85025 COMPLETE CBC W/AUTO DIFF WBC: CPT

## 2025-04-08 PROCEDURE — 96361 HYDRATE IV INFUSION ADD-ON: CPT | Mod: INF

## 2025-04-08 PROCEDURE — 1111F DSCHRG MED/CURRENT MED MERGE: CPT

## 2025-04-08 PROCEDURE — 99215 OFFICE O/P EST HI 40 MIN: CPT | Mod: 25

## 2025-04-08 PROCEDURE — 1170000001 HC PRIVATE ONCOLOGY ROOM DAILY

## 2025-04-08 PROCEDURE — G2211 COMPLEX E/M VISIT ADD ON: HCPCS

## 2025-04-08 PROCEDURE — 86901 BLOOD TYPING SEROLOGIC RH(D): CPT

## 2025-04-08 PROCEDURE — 83615 LACTATE (LD) (LDH) ENZYME: CPT

## 2025-04-08 RX ORDER — HYDROMORPHONE HCL/0.9% NACL/PF 15 MG/30ML
PATIENT CONTROLLED ANALGESIA SYRINGE INTRAVENOUS CONTINUOUS
Status: DISCONTINUED | OUTPATIENT
Start: 2025-04-08 | End: 2025-04-16

## 2025-04-08 RX ORDER — ACYCLOVIR 200 MG/5ML
400 SUSPENSION ORAL 2 TIMES DAILY
Status: DISCONTINUED | OUTPATIENT
Start: 2025-04-08 | End: 2025-04-08

## 2025-04-08 RX ORDER — METOPROLOL SUCCINATE 100 MG/1
100 TABLET, EXTENDED RELEASE ORAL DAILY
Status: DISCONTINUED | OUTPATIENT
Start: 2025-04-08 | End: 2025-04-18 | Stop reason: HOSPADM

## 2025-04-08 RX ORDER — ATORVASTATIN CALCIUM 40 MG/1
40 TABLET, FILM COATED ORAL
Status: DISCONTINUED | OUTPATIENT
Start: 2025-04-09 | End: 2025-04-18 | Stop reason: HOSPADM

## 2025-04-08 RX ORDER — ONDANSETRON HYDROCHLORIDE 2 MG/ML
4 INJECTION, SOLUTION INTRAVENOUS ONCE
Status: COMPLETED | OUTPATIENT
Start: 2025-04-08 | End: 2025-04-08

## 2025-04-08 RX ORDER — FLUCONAZOLE 40 MG/ML
200 POWDER, FOR SUSPENSION ORAL DAILY
Status: DISCONTINUED | OUTPATIENT
Start: 2025-04-08 | End: 2025-04-08

## 2025-04-08 RX ORDER — LEVOFLOXACIN 25 MG/ML
500 SOLUTION ORAL DAILY
Status: DISCONTINUED | OUTPATIENT
Start: 2025-04-08 | End: 2025-04-08

## 2025-04-08 RX ORDER — FUROSEMIDE 20 MG/1
20 TABLET ORAL DAILY PRN
Status: DISCONTINUED | OUTPATIENT
Start: 2025-04-08 | End: 2025-04-18 | Stop reason: HOSPADM

## 2025-04-08 RX ORDER — LEVOFLOXACIN 5 MG/ML
500 INJECTION, SOLUTION INTRAVENOUS EVERY 24 HOURS
Status: DISCONTINUED | OUTPATIENT
Start: 2025-04-08 | End: 2025-04-09

## 2025-04-08 RX ORDER — SODIUM CHLORIDE, SODIUM LACTATE, POTASSIUM CHLORIDE, CALCIUM CHLORIDE 600; 310; 30; 20 MG/100ML; MG/100ML; MG/100ML; MG/100ML
100 INJECTION, SOLUTION INTRAVENOUS CONTINUOUS
Status: ACTIVE | OUTPATIENT
Start: 2025-04-08 | End: 2025-04-09

## 2025-04-08 RX ORDER — ACETAMINOPHEN 10 MG/ML
1000 INJECTION, SOLUTION INTRAVENOUS ONCE
Status: DISCONTINUED | OUTPATIENT
Start: 2025-04-08 | End: 2025-04-08

## 2025-04-08 RX ORDER — CAFFEINE 200 MG
200 TABLET ORAL 2 TIMES DAILY
Status: DISCONTINUED | OUTPATIENT
Start: 2025-04-08 | End: 2025-04-16

## 2025-04-08 RX ORDER — PROCHLORPERAZINE EDISYLATE 5 MG/ML
10 INJECTION INTRAMUSCULAR; INTRAVENOUS EVERY 6 HOURS PRN
Status: DISCONTINUED | OUTPATIENT
Start: 2025-04-08 | End: 2025-04-18 | Stop reason: HOSPADM

## 2025-04-08 RX ORDER — FLUCONAZOLE 2 MG/ML
400 INJECTION, SOLUTION INTRAVENOUS EVERY 24 HOURS
Status: DISCONTINUED | OUTPATIENT
Start: 2025-04-08 | End: 2025-04-16

## 2025-04-08 RX ORDER — LISINOPRIL 5 MG/1
10 TABLET ORAL DAILY
Status: DISCONTINUED | OUTPATIENT
Start: 2025-04-08 | End: 2025-04-18 | Stop reason: HOSPADM

## 2025-04-08 RX ORDER — PROCHLORPERAZINE MALEATE 10 MG
10 TABLET ORAL EVERY 6 HOURS PRN
Status: DISCONTINUED | OUTPATIENT
Start: 2025-04-08 | End: 2025-04-18 | Stop reason: HOSPADM

## 2025-04-08 RX ORDER — ALBUTEROL SULFATE 90 UG/1
2 INHALANT RESPIRATORY (INHALATION) EVERY 6 HOURS PRN
Status: DISCONTINUED | OUTPATIENT
Start: 2025-04-08 | End: 2025-04-18 | Stop reason: HOSPADM

## 2025-04-08 RX ORDER — ONDANSETRON HYDROCHLORIDE 2 MG/ML
4 INJECTION, SOLUTION INTRAVENOUS EVERY 8 HOURS PRN
Status: DISCONTINUED | OUTPATIENT
Start: 2025-04-08 | End: 2025-04-18 | Stop reason: HOSPADM

## 2025-04-08 RX ORDER — PROCHLORPERAZINE 25 MG/1
25 SUPPOSITORY RECTAL EVERY 12 HOURS PRN
Status: DISCONTINUED | OUTPATIENT
Start: 2025-04-08 | End: 2025-04-08

## 2025-04-08 RX ORDER — ONDANSETRON 4 MG/1
4 TABLET, ORALLY DISINTEGRATING ORAL EVERY 8 HOURS PRN
Status: DISCONTINUED | OUTPATIENT
Start: 2025-04-08 | End: 2025-04-18 | Stop reason: HOSPADM

## 2025-04-08 RX ORDER — PANTOPRAZOLE SODIUM 40 MG/1
40 TABLET, DELAYED RELEASE ORAL
Status: DISCONTINUED | OUTPATIENT
Start: 2025-04-09 | End: 2025-04-09

## 2025-04-08 RX ORDER — NALOXONE HYDROCHLORIDE 0.4 MG/ML
0.2 INJECTION, SOLUTION INTRAMUSCULAR; INTRAVENOUS; SUBCUTANEOUS AS NEEDED
Status: DISCONTINUED | OUTPATIENT
Start: 2025-04-08 | End: 2025-04-18 | Stop reason: HOSPADM

## 2025-04-08 RX ORDER — ACETAMINOPHEN 160 MG/5ML
650 SOLUTION ORAL EVERY 4 HOURS PRN
Status: DISCONTINUED | OUTPATIENT
Start: 2025-04-08 | End: 2025-04-09 | Stop reason: HOSPADM

## 2025-04-08 RX ORDER — GABAPENTIN 300 MG/1
300 CAPSULE ORAL NIGHTLY
Status: DISCONTINUED | OUTPATIENT
Start: 2025-04-08 | End: 2025-04-18 | Stop reason: HOSPADM

## 2025-04-08 RX ADMIN — Medication: at 18:41

## 2025-04-08 RX ADMIN — SODIUM CHLORIDE, SODIUM LACTATE, POTASSIUM CHLORIDE, AND CALCIUM CHLORIDE 100 ML/HR: .6; .31; .03; .02 INJECTION, SOLUTION INTRAVENOUS at 18:03

## 2025-04-08 RX ADMIN — LEVOFLOXACIN 500 MG: 500 INJECTION, SOLUTION INTRAVENOUS at 21:36

## 2025-04-08 RX ADMIN — FLUCONAZOLE 400 MG: 2 INJECTION, SOLUTION INTRAVENOUS at 21:30

## 2025-04-08 RX ADMIN — SODIUM CHLORIDE 1000 ML: 9 INJECTION, SOLUTION INTRAVENOUS at 10:20

## 2025-04-08 RX ADMIN — ONDANSETRON 4 MG: 2 INJECTION INTRAMUSCULAR; INTRAVENOUS at 10:57

## 2025-04-08 RX ADMIN — ACYCLOVIR SODIUM 250 MG: 50 INJECTION, SOLUTION INTRAVENOUS at 21:36

## 2025-04-08 RX ADMIN — ACETAMINOPHEN 650 MG: 160 SOLUTION ORAL at 16:06

## 2025-04-08 SDOH — SOCIAL STABILITY: SOCIAL INSECURITY: WITHIN THE LAST YEAR, HAVE YOU BEEN AFRAID OF YOUR PARTNER OR EX-PARTNER?: NO

## 2025-04-08 SDOH — SOCIAL STABILITY: SOCIAL INSECURITY: WITHIN THE LAST YEAR, HAVE YOU BEEN HUMILIATED OR EMOTIONALLY ABUSED IN OTHER WAYS BY YOUR PARTNER OR EX-PARTNER?: NO

## 2025-04-08 SDOH — ECONOMIC STABILITY: INCOME INSECURITY: IN THE PAST 12 MONTHS HAS THE ELECTRIC, GAS, OIL, OR WATER COMPANY THREATENED TO SHUT OFF SERVICES IN YOUR HOME?: NO

## 2025-04-08 SDOH — ECONOMIC STABILITY: FOOD INSECURITY: WITHIN THE PAST 12 MONTHS, YOU WORRIED THAT YOUR FOOD WOULD RUN OUT BEFORE YOU GOT THE MONEY TO BUY MORE.: NEVER TRUE

## 2025-04-08 SDOH — SOCIAL STABILITY: SOCIAL INSECURITY: HAVE YOU HAD THOUGHTS OF HARMING ANYONE ELSE?: NO

## 2025-04-08 SDOH — SOCIAL STABILITY: SOCIAL INSECURITY: DOES ANYONE TRY TO KEEP YOU FROM HAVING/CONTACTING OTHER FRIENDS OR DOING THINGS OUTSIDE YOUR HOME?: NO

## 2025-04-08 SDOH — SOCIAL STABILITY: SOCIAL INSECURITY: HAVE YOU HAD ANY THOUGHTS OF HARMING ANYONE ELSE?: NO

## 2025-04-08 SDOH — ECONOMIC STABILITY: FOOD INSECURITY: WITHIN THE PAST 12 MONTHS, THE FOOD YOU BOUGHT JUST DIDN'T LAST AND YOU DIDN'T HAVE MONEY TO GET MORE.: NEVER TRUE

## 2025-04-08 SDOH — SOCIAL STABILITY: SOCIAL INSECURITY: WERE YOU ABLE TO COMPLETE ALL THE BEHAVIORAL HEALTH SCREENINGS?: YES

## 2025-04-08 SDOH — SOCIAL STABILITY: SOCIAL INSECURITY: DO YOU FEEL UNSAFE GOING BACK TO THE PLACE WHERE YOU ARE LIVING?: NO

## 2025-04-08 SDOH — SOCIAL STABILITY: SOCIAL INSECURITY: ARE THERE ANY APPARENT SIGNS OF INJURIES/BEHAVIORS THAT COULD BE RELATED TO ABUSE/NEGLECT?: NO

## 2025-04-08 SDOH — SOCIAL STABILITY: SOCIAL INSECURITY: DO YOU FEEL ANYONE HAS EXPLOITED OR TAKEN ADVANTAGE OF YOU FINANCIALLY OR OF YOUR PERSONAL PROPERTY?: NO

## 2025-04-08 SDOH — SOCIAL STABILITY: SOCIAL INSECURITY: HAS ANYONE EVER THREATENED TO HURT YOUR FAMILY OR YOUR PETS?: NO

## 2025-04-08 SDOH — SOCIAL STABILITY: SOCIAL INSECURITY: ABUSE: ADULT

## 2025-04-08 SDOH — SOCIAL STABILITY: SOCIAL INSECURITY: ARE YOU OR HAVE YOU BEEN THREATENED OR ABUSED PHYSICALLY, EMOTIONALLY, OR SEXUALLY BY ANYONE?: NO

## 2025-04-08 ASSESSMENT — ACTIVITIES OF DAILY LIVING (ADL)
HEARING - LEFT EAR: DIFFICULTY WITH NOISE
GROOMING: INDEPENDENT
PATIENT'S MEMORY ADEQUATE TO SAFELY COMPLETE DAILY ACTIVITIES?: YES
HEARING - RIGHT EAR: DIFFICULTY WITH NOISE
JUDGMENT_ADEQUATE_SAFELY_COMPLETE_DAILY_ACTIVITIES: YES
LACK_OF_TRANSPORTATION: NO
DRESSING YOURSELF: INDEPENDENT
BATHING: INDEPENDENT
WALKS IN HOME: INDEPENDENT
FEEDING YOURSELF: INDEPENDENT
ASSISTIVE_DEVICE: EYEGLASSES
ADEQUATE_TO_COMPLETE_ADL: YES
TOILETING: INDEPENDENT

## 2025-04-08 ASSESSMENT — COGNITIVE AND FUNCTIONAL STATUS - GENERAL
DAILY ACTIVITIY SCORE: 24
WALKING IN HOSPITAL ROOM: A LITTLE
PATIENT BASELINE BEDBOUND: NO
CLIMB 3 TO 5 STEPS WITH RAILING: A LITTLE
STANDING UP FROM CHAIR USING ARMS: A LITTLE
MOVING TO AND FROM BED TO CHAIR: A LITTLE
MOBILITY SCORE: 20

## 2025-04-08 ASSESSMENT — ENCOUNTER SYMPTOMS
LEG SWELLING: 0
FATIGUE: 1
COUGH: 1
ENDOCRINE NEGATIVE: 1
NERVOUS/ANXIOUS: 0
NAUSEA: 1
LIGHT-HEADEDNESS: 0
CONFUSION: 0
FREQUENCY: 0
COUGH: 0
ABDOMINAL PAIN: 0
WOUND: 0
ABDOMINAL PAIN: 0
BLOOD IN STOOL: 0
CONSTIPATION: 0
DIARRHEA: 0
VOMITING: 1
WHEEZING: 0
DIZZINESS: 1
EYES NEGATIVE: 1
ADENOPATHY: 0
SHORTNESS OF BREATH: 0
MUSCULOSKELETAL NEGATIVE: 1
SHORTNESS OF BREATH: 0
DYSURIA: 0
LIGHT-HEADEDNESS: 0
VOMITING: 1
PALPITATIONS: 0
DEPRESSION: 0
TROUBLE SWALLOWING: 1
HEADACHES: 1
DIZZINESS: 0
DIARRHEA: 0

## 2025-04-08 ASSESSMENT — PAIN DESCRIPTION - LOCATION: LOCATION: HEAD

## 2025-04-08 ASSESSMENT — LIFESTYLE VARIABLES
AUDIT-C TOTAL SCORE: 0
AUDIT-C TOTAL SCORE: 0
HOW OFTEN DO YOU HAVE A DRINK CONTAINING ALCOHOL: NEVER
SKIP TO QUESTIONS 9-10: 1
HOW MANY STANDARD DRINKS CONTAINING ALCOHOL DO YOU HAVE ON A TYPICAL DAY: PATIENT DOES NOT DRINK
HOW OFTEN DO YOU HAVE 6 OR MORE DRINKS ON ONE OCCASION: NEVER

## 2025-04-08 ASSESSMENT — PAIN SCALES - GENERAL
PAINLEVEL_OUTOF10: 7
PAINLEVEL_OUTOF10: 0-NO PAIN
PAINLEVEL_OUTOF10: 7
PAINLEVEL_OUTOF10: 7

## 2025-04-08 NOTE — HOSPITAL COURSE
Bijan Ibanez is a 64 y.o. male with PMHx of HTN, HLD, CAD, MI (PCI '10, on ASA), atrial fibrillation, RCC (s/p partial R nephrectomy '13), GERD, BPH, arthritis, MRSA bacteremia (s/p 4 weeks IV Vancomycin which completed 3/3/25), G1 CIPN,  R subclavian and R axillary DVT r/t PICC (2/3/25; on Eliquis), R rib/flank pain and Burkitt's lyphoma who presents from outpatient clinic for mucositis, dysphagia, N/V and HA.     Hospital course notable for:  Mucositis requiring PCA  Anemia  N/V    ACCESS: L DBL SOLO PICC (3/7/25)  PRIMARY ONC: Dr. Mckeon  PPX: acyclovir, fluconazole, levaquin    FOLLOW UP:   - ***

## 2025-04-08 NOTE — PROGRESS NOTES
NUTRITION FOLLOW UP NOTE    Reason for Visit:  Bijan Ibanez is a 64 y.o. male with newly dx'd Burkitt's lymphoma (Stage IV).    Currently being treated with dose-adjusted R-EPOCH    Pt seen today in infusion.  Today is C4D10 R- EPOCH; due for IT cytarabine today but held since feeling poorly.     Patient Active Problem List   Diagnosis    Abdominal pain    Acute sinusitis    Chest pain    Ataxia    Benign hypertensive heart disease    Benign paroxysmal vertigo, unspecified ear    Bronchitis    Carotid artery stenosis    Chronic ischemic heart disease, unspecified    Chronic peripheral venous hypertension    Atherosclerosis of coronary artery without angina pectoris    Coronary artery disease involving native coronary artery of native heart with angina pectoris    Coronary artery disease    Diverticular disease of colon    Dyspnea    Essential hypertension    Hypertension    History of cholecystectomy    History of myocardial infarction    HLD (hyperlipidemia)    Malignant neoplasm of kidney (Multi)    Sensorineural hearing loss, bilateral    Neoplasm of uncertain behavior of skin    Obesity, Class I, BMI 30-34.9    Other general symptoms and signs    Personal history of malignant neoplasm of renal pelvis    Tobacco dependence in remission    Tubular adenoma    Adjustment disorder with mixed anxiety and depressed mood    Elevated prostate specific antigen (PSA)    Fever, unspecified    Hyponatremia    MI (myocardial infarction) (Multi)    Chronic midline low back pain without sciatica    Pancytopenia    Burkitt's lymphoma of lymph nodes of multiple regions (Multi)    Febrile neutropenia (CMS-HCC)    Bacteremia due to methicillin resistant Staphylococcus aureus    Paroxysmal atrial fibrillation (Multi)    Diffuse large B cell lymphoma    Burkitt's lymphoma (Multi)    Encounter for antineoplastic chemotherapy    Burkitt lymphoma of lymph nodes of axilla (Multi)    Dysphagia    Headache    Nausea & vomiting  "      Nutrition Significant Labs:  Lab Results   Component Value Date/Time    GLUCOSE 143 (H) 04/08/2025 0937     04/08/2025 0937    K 4.1 04/08/2025 0937     04/08/2025 0937    CO2 28 04/08/2025 0937    ANIONGAP 11 04/08/2025 0937    BUN 23 04/08/2025 0937    CREATININE 0.41 (L) 04/08/2025 0937    EGFR >90 04/08/2025 0937    CALCIUM 8.4 (L) 04/08/2025 0937    ALBUMIN 3.3 (L) 04/08/2025 0937    ALKPHOS 42 04/08/2025 0937    PROT 4.6 (L) 04/08/2025 0937    AST 6 (L) 04/08/2025 0937    BILITOT 1.3 (H) 04/08/2025 0937    ALT 12 04/08/2025 0937    MG 2.22 04/04/2025 0824    PHOS 2.9 04/03/2025 0441     Lab Results   Component Value Date    WBC 0.2 (LL) 04/08/2025    HGB 6.9 (L) 04/08/2025    HCT 21.6 (L) 04/08/2025    MCV 88 04/08/2025    PLT 33 (LL) 04/08/2025       No results found for: \"VITD25\"      Anthropometrics:  Height: 183.5 cm (6' 0.24\")   Weight: 90.7 kg (199 lb 15.3 oz)   BMI (Calculated): 26.94    IBW/kg (Dietitian Calculated): 80.9 kg   Percent of IBW: 112 %        Weight History:    Wt down ~5 kg x <1 month; overall, pt with 24 kg (21%) wt loss x 2 months      Wt Readings from Last 20 Encounters:   04/08/25 90.7 kg (199 lb 15.3 oz)   04/08/25 90.7 kg (199 lb 15.3 oz)   04/04/25 91.6 kg (202 lb)   03/31/25 92.5 kg (203 lb 14.8 oz)   03/25/25 91.6 kg (201 lb 15.1 oz)   03/21/25 92.1 kg (203 lb 0.7 oz)   03/21/25 92.1 kg (203 lb)   03/20/25 91.2 kg (201 lb 1 oz)   03/18/25 91.7 kg (202 lb 2.6 oz)   03/12/25 97.4 kg (214 lb 11.2 oz)   03/11/25 95.7 kg (210 lb 15.7 oz)   03/04/25 91.5 kg (201 lb 11.2 oz)   03/03/25 93 kg (205 lb)   02/28/25 93.4 kg (205 lb 12.8 oz)   02/27/25 92.4 kg (203 lb 11.3 oz)   02/24/25 93.8 kg (206 lb 12.7 oz)   02/20/25 100 kg (220 lb 7.4 oz)   02/20/25 100 kg (220 lb 7.4 oz)   02/19/25 101 kg (222 lb 7.1 oz)   02/06/25 115 kg (252 lb 13.9 oz)   01/18/25         108.6 kg      Food and Nutrition History:    Today, pt presents to infusion with N/V, inability to keep " food/fluids down and difficulties swallowing (odynophagia and c/o food being stuck in throat/chest).  Has struggled with sore throat since starting chemo; had issues swallowing after C1, dose of chemo decreased for C2 and pt did not have any issues.  However, since completing C3 pt with the return of a sore throat, mucositis and onset of N/V.    Has not been able to eat any solid food since Saturday afternoon.    Trying to get fluids in but drinking only small amounts.  Was unable to keep larger pills down yesterday and didn't even try today; smaller pills stayed down.   Does have some mouth sores although pain seems to be more in throat.  Appetite and intakes have worsened over the past several days.  Did have a few bites of applesauce, 1/2 a strawberry Boost and some milk and coffee--since arriving in infusion, pt has has emesis x 3 of thick mucous; no food.     Had small, loose stool this am.  Energy levels minimal for past 3-4 days.   Currently receiving IVF; did receive Zofran as well.     Spoke with PA; plan is to admit pt for further w/up and, hopefully, an EGD.    Intakes:  Mainly oatmeal, jello, ice cream, soups, mashed potatoes and softer foods--unable to keep down x 3-4 days.        Boost Original (strawberry) provides 240 kcals and 9 gm protein      Medications:  Current Outpatient Medications   Medication Instructions    acyclovir (ZOVIRAX) 400 mg, oral, 2 times daily    acyclovir (ZOVIRAX) 400 mg, oral, 2 times daily, To take when having mucositis and cannot take pills    albuterol 90 mcg/actuation inhaler 2 puffs, inhalation, Every 6 hours PRN    apixaban (ELIQUIS) 5 mg, oral, 2 times daily    atorvastatin (LIPITOR) 40 mg, oral, Daily (0630)    diphenhydramine/Maalox/lidocaine (Magic Mouthwash) - Compounded - Outpatient Swish and spit 10 mL by mouth every 6 hours if needed.    fluconazole (DIFLUCAN) 200 mg, oral, Daily    fluconazole (DIFLUCAN) 200 mg, oral, Daily, To take when having mucositis and  cannot take pills    furosemide (LASIX) 20 mg, oral, Daily PRN    gabapentin (NEURONTIN) 300 mg, oral, Nightly    heparin flush 10 unit/mL injection 5 mL, intravenous, Daily    levoFLOXacin (LEVAQUIN) 500 mg, oral, Daily, Take when neutropenic    levoFLOXacin (LEVAQUIN) 500 mg, oral, Daily, To take when having mucositis and cannot take pills    Lidocaine/Maalox/Diphenhydramine (1:1:1) 15 mL, Swish & Swallow, Every 6 hours PRN    lisinopril 10 mg, oral, Daily    metoprolol succinate XL (TOPROL-XL) 100 mg, oral, Daily, Do not crush or chew.    oxyCODONE (ROXICODONE) 5 mg, oral, Every 4 hours PRN    pantoprazole (PROTONIX) 40 mg, oral, Daily before breakfast, Do not crush, chew, or split.    potassium chloride CR (Klor-Con M20) 20 mEq ER tablet 20 mEq, oral, Daily, Do not crush or chew.    potassium chloride CR (Klor-Con) 10 mEq ER tablet 10 mEq, oral, Daily, Do not crush or chew.    sodium chloride 0.9% flush 10 mL, intravenous, Daily, Per [facility]  Flush with 20ml after obtaining labs from line.       Nutrition Focused Physical Exam Findings:    Subcutaneous Fat Loss:   Orbital Fat Pads: Mild-Moderate (slight dark circles and slight hollowing)  Buccal Fat Pads: Mild-Moderate (flat cheeks, minimal bounce)    Muscle Wasting:  Temporalis: Mild-Moderate (slight depression)  Pectoralis (Clavicular Region): Mild-Moderate (some protrusion of clavicle)  Deltoid/Trapezius: Mild-Moderate (slight protrusion of acromion process)  Interosseous: Mild-Moderate (slightly depressed area between thumb and forefinger)  Trapezius/Infraspinatus/Supraspinatus (Scapular Region): Mild-Moderate (slight protrusion of scapula)  Quadriceps: Mild-Moderate (mild depression on inner and outer thigh)  Gastrocnemius: Mild-Moderate (not well developed muscle)    Physical Findings:   Bilat ankle edema       Estimated Needs:       Total Energy Estimated Needs in 24 hours (kCal):  (0079-7261)  Energy Estimated Needs per kg Body Weight in 24 hours (kCal/kg):   (25-28)  Total Protein Estimated Needs in 24 Hours (g):  (110-125)  Protein Estimated Needs per kg Body Weight in 24 Hours (g/kg):  (1.2-1.4)  Total Fluid Estimated Needs in 24 Hours (mL):  (2700)  Total Fluid Estimated Needs in 24 hours (mL/kg):  (30)             Nutrition Diagnosis   Malnutrition Diagnosis  Patient has Malnutrition Diagnosis: Yes  Diagnosis Status: Active  Malnutrition Diagnosis: Moderate malnutrition related to chronic disease or condition  As Evidenced by: decreased appetite/intakes, difficulties swallowing and N/V due to chemo related side effects resulting in significant recent wt loss as well as muscle wasting in upper and lower extremities.    Nutrition Diagnosis  Patient has Nutrition Diagnosis: Yes  Diagnosis Status (1): Active  Nutrition Diagnosis 1: Increased nutrient needs  Related to (1): disease state/chemo  As Evidenced by (1): increased nutrient requirements necessary to promote repletion    Pt remains moderately malnourished; appetite, intakes, wt have further declined with worsening odynophagia and difficulties swallowing.   Pt to be admitted today.      Nutrition Interventions/Recommendations   Nutrition Prescription:    High protein, High Calorie    Nutrition Interventions/Education:    PO diet pending further eval of swallow/odynophagia.  If unable to safely start PO intakes, would need to consider alternative form of nutrition support (ie Dobhoff vs PEG) as pt is moderately malnourished at baseline.   If able to take PO, consider starting Boost VHC BID (prefers Boost products to Ensure)      Coordination of Care:  Mal heme PA           Nutrition Monitoring and Evaluation   Food and Nutrient Intake  Monitoring and Evaluation Plan: Energy intake, Fluid intake, Amount of food, Protein intake  Energy Intake: Estimated energy intake  Criteria: Consume PO in amounts needed to maintain/gain weight  Fluid Intake: Estimated fluid intake  Criteria: Maintain hydration; Goal fluid  intakes: 90 oz  Criteria: smaller, more frequent meals and snacks q 2-3 hrs vs 3 meals a day  Estimated protein intake: Estimated protein intake  Criteria: include protein source with all meals and snacks       Will continue to f/up and monitor wt, labs, GI symptoms and intakes closely.

## 2025-04-08 NOTE — PROGRESS NOTES
Patient here for count check. Arrived to clinic with nausea and vomiting, fatigue, and weakness. Patient reports mouth sores that inhibit him for ingesting anything by mouth. PA notified. IVF order and zofran given. Patient still has vomiting and nausea. MD consulted and patient to be admitted.   Tip Castaneda RN
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
## 2025-04-08 NOTE — ASSESSMENT & PLAN NOTE
Originally signed out as high grade lymphoma but after burkitt's rearrangement (t8;14) came back positive. High risk based on marrow involvement, though no CNS involvement  - previously reviewed diagnosis and prognosis with patient  - initially give DA-R-EPOCH when was signed out as high grade lymphoma, given the significant toxicity he had with DA R EPOCH, will not escalate to CODOX-IVAC or hyper-CVAD given no CNS involvement  - PET post 2C Chemo shows deauville 2 - c/w CR  - cont w/ EPOCH, increased to dose level 2 but unfortunately had G3 mucositis again, so reduced etoposide back to prior dose.   - 4/4/2025: still with mucositis related discomfort, prescribed magic mouth wash, SLP referral pending. Barium study completed inpatient-- he is able to have small solids. Will continue with close monitoring. Received rituxan and pegfilgrastim today. CNS ppx delayed (see below), received IT methotrexate on 4/4.   -4/8/25: Still have mucositis pain in the mouth. Has not been able to take his levaquin or acyclovir over last two days. Unable to eat or drink hardly anything over last few days due to the dysphagia. Feels very dehydrated. Of note has lost about 50lbs in two months. Afebrile, RR 19, /78  -Plan for direct admission to [facility] for further work up of dysphagia and anorexia.

## 2025-04-08 NOTE — ASSESSMENT & PLAN NOTE
Barium swallow on 4/3: SLP Impressions with Severity Rating:  Swallowing physiology is detailed above. Impairments most impacting swallowing  safety and efficiency include tongue base  retraction and overall  sluggish epiglottic complete inversion. This results in post swallow  residue across consistencies evaluated increasing with heavier  viscosities. A chin tuck paired with repeat swallows and liquid wash  aid in clearance of the majority of valleculae residue. Intermittent  penetration evident with thin liquids in large consecutive swallows.  This is eliminated with single sips. No further penetration was  observed for any other consistency, and no aspiration was visualized  during study.      *Of note: The A-P bolus follow-through is not intended to be utilized  as a diagnostic assessment of the esophagus, rather a tool to observe  the biomechanical aspects of the swallow continuum and to inform the  need for further evaluation by medical specialists, as applicable.  -dysphagia is still persisting and not improving. Has been following SLP recommendations. Did not have outpatient follow up with SLP.   -Unable to swallow some of his medications, pain and difficulty while swallowing solids/liquids  -Due to persistence of dysphagia with no improvement, could possibly need scoped while inpatient

## 2025-04-08 NOTE — CARE PLAN
Problem: Pain - Adult  Goal: Verbalizes/displays adequate comfort level or baseline comfort level  Outcome: Progressing     Problem: Safety - Adult  Goal: Free from fall injury  Outcome: Progressing     Problem: Discharge Planning  Goal: Discharge to home or other facility with appropriate resources  Outcome: Progressing     Problem: Chronic Conditions and Co-morbidities  Goal: Patient's chronic conditions and co-morbidity symptoms are monitored and maintained or improved  Outcome: Progressing     Problem: Nutrition  Goal: Nutrient intake appropriate for maintaining nutritional needs  Outcome: Progressing   The patient's goals for the shift include      The clinical goals for the shift include patient will have releived nausea and headache throughout shift on 4/8/25 at 1900    Patient admitted to floor today. Oriented to surroundings, family in room. Plan for pain management.

## 2025-04-09 ENCOUNTER — APPOINTMENT (OUTPATIENT)
Dept: RADIOLOGY | Facility: HOSPITAL | Age: 65
DRG: 157 | End: 2025-04-09
Payer: MEDICARE

## 2025-04-09 LAB
ALBUMIN SERPL BCP-MCNC: 2.5 G/DL (ref 3.4–5)
ALBUMIN SERPL BCP-MCNC: 2.6 G/DL (ref 3.4–5)
ANION GAP SERPL CALC-SCNC: 13 MMOL/L (ref 10–20)
ANION GAP SERPL CALC-SCNC: 13 MMOL/L (ref 10–20)
BACTERIA SPEC RESP CULT: ABNORMAL
BASOPHILS # BLD MANUAL: 0 X10*3/UL (ref 0–0.1)
BASOPHILS NFR BLD MANUAL: 0 %
BLOOD EXPIRATION DATE: NORMAL
BLOOD EXPIRATION DATE: NORMAL
BUN SERPL-MCNC: 16 MG/DL (ref 6–23)
BUN SERPL-MCNC: 18 MG/DL (ref 6–23)
CALCIUM SERPL-MCNC: 7.6 MG/DL (ref 8.6–10.6)
CALCIUM SERPL-MCNC: 7.8 MG/DL (ref 8.6–10.6)
CHLORIDE SERPL-SCNC: 104 MMOL/L (ref 98–107)
CHLORIDE SERPL-SCNC: 104 MMOL/L (ref 98–107)
CO2 SERPL-SCNC: 23 MMOL/L (ref 21–32)
CO2 SERPL-SCNC: 23 MMOL/L (ref 21–32)
CREAT SERPL-MCNC: 0.4 MG/DL (ref 0.5–1.3)
CREAT SERPL-MCNC: 0.42 MG/DL (ref 0.5–1.3)
DACRYOCYTES BLD QL SMEAR: ABNORMAL
DISPENSE STATUS: NORMAL
DISPENSE STATUS: NORMAL
EGFRCR SERPLBLD CKD-EPI 2021: >90 ML/MIN/1.73M*2
EGFRCR SERPLBLD CKD-EPI 2021: >90 ML/MIN/1.73M*2
EOSINOPHIL # BLD MANUAL: 0 X10*3/UL (ref 0–0.7)
EOSINOPHIL NFR BLD MANUAL: 0 %
ERYTHROCYTE [DISTWIDTH] IN BLOOD BY AUTOMATED COUNT: 15.7 % (ref 11.5–14.5)
GLUCOSE SERPL-MCNC: 116 MG/DL (ref 74–99)
GLUCOSE SERPL-MCNC: 127 MG/DL (ref 74–99)
GRAM STN SPEC: ABNORMAL
HCT VFR BLD AUTO: 18.9 % (ref 41–52)
HGB BLD-MCNC: 6.2 G/DL (ref 13.5–17.5)
IMM GRANULOCYTES # BLD AUTO: 0 X10*3/UL (ref 0–0.7)
IMM GRANULOCYTES NFR BLD AUTO: 0 % (ref 0–0.9)
LYMPHOCYTES # BLD MANUAL: 0.1 X10*3/UL (ref 1.2–4.8)
LYMPHOCYTES NFR BLD MANUAL: 100 %
MAGNESIUM SERPL-MCNC: 1.71 MG/DL (ref 1.6–2.4)
MCH RBC QN AUTO: 28.8 PG (ref 26–34)
MCHC RBC AUTO-ENTMCNC: 32.8 G/DL (ref 32–36)
MCV RBC AUTO: 88 FL (ref 80–100)
MONOCYTES # BLD MANUAL: 0 X10*3/UL (ref 0.1–1)
MONOCYTES NFR BLD MANUAL: 0 %
NEUTS SEG # BLD MANUAL: 0 X10*3/UL (ref 1.2–7)
NEUTS SEG NFR BLD MANUAL: 0 %
NRBC BLD-RTO: 0 /100 WBCS (ref 0–0)
OVALOCYTES BLD QL SMEAR: ABNORMAL
PHOSPHATE SERPL-MCNC: 2.5 MG/DL (ref 2.5–4.9)
PHOSPHATE SERPL-MCNC: 3.1 MG/DL (ref 2.5–4.9)
PLATELET # BLD AUTO: 21 X10*3/UL (ref 150–450)
POTASSIUM SERPL-SCNC: 3.5 MMOL/L (ref 3.5–5.3)
POTASSIUM SERPL-SCNC: 4 MMOL/L (ref 3.5–5.3)
PRODUCT BLOOD TYPE: 6200
PRODUCT BLOOD TYPE: 6200
PRODUCT CODE: NORMAL
PRODUCT CODE: NORMAL
RBC # BLD AUTO: 2.15 X10*6/UL (ref 4.5–5.9)
RBC MORPH BLD: ABNORMAL
SODIUM SERPL-SCNC: 136 MMOL/L (ref 136–145)
SODIUM SERPL-SCNC: 136 MMOL/L (ref 136–145)
TOTAL CELLS COUNTED BLD: 3
UNIT ABO: NORMAL
UNIT ABO: NORMAL
UNIT NUMBER: NORMAL
UNIT NUMBER: NORMAL
UNIT RH: NORMAL
UNIT RH: NORMAL
UNIT VOLUME: 350
UNIT VOLUME: 350
WBC # BLD AUTO: 0.1 X10*3/UL (ref 4.4–11.3)
XM INTEP: NORMAL
XM INTEP: NORMAL

## 2025-04-09 PROCEDURE — 99233 SBSQ HOSP IP/OBS HIGH 50: CPT | Performed by: INTERNAL MEDICINE

## 2025-04-09 PROCEDURE — 71045 X-RAY EXAM CHEST 1 VIEW: CPT | Performed by: RADIOLOGY

## 2025-04-09 PROCEDURE — 2500000001 HC RX 250 WO HCPCS SELF ADMINISTERED DRUGS (ALT 637 FOR MEDICARE OP)

## 2025-04-09 PROCEDURE — 87205 SMEAR GRAM STAIN: CPT

## 2025-04-09 PROCEDURE — 99223 1ST HOSP IP/OBS HIGH 75: CPT | Performed by: STUDENT IN AN ORGANIZED HEALTH CARE EDUCATION/TRAINING PROGRAM

## 2025-04-09 PROCEDURE — 36430 TRANSFUSION BLD/BLD COMPNT: CPT

## 2025-04-09 PROCEDURE — 2500000004 HC RX 250 GENERAL PHARMACY W/ HCPCS (ALT 636 FOR OP/ED)

## 2025-04-09 PROCEDURE — 2500000002 HC RX 250 W HCPCS SELF ADMINISTERED DRUGS (ALT 637 FOR MEDICARE OP, ALT 636 FOR OP/ED)

## 2025-04-09 PROCEDURE — 85027 COMPLETE CBC AUTOMATED: CPT

## 2025-04-09 PROCEDURE — 36415 COLL VENOUS BLD VENIPUNCTURE: CPT

## 2025-04-09 PROCEDURE — 83735 ASSAY OF MAGNESIUM: CPT

## 2025-04-09 PROCEDURE — 1170000001 HC PRIVATE ONCOLOGY ROOM DAILY

## 2025-04-09 PROCEDURE — 84100 ASSAY OF PHOSPHORUS: CPT

## 2025-04-09 PROCEDURE — 85007 BL SMEAR W/DIFF WBC COUNT: CPT

## 2025-04-09 PROCEDURE — 80069 RENAL FUNCTION PANEL: CPT

## 2025-04-09 PROCEDURE — P9040 RBC LEUKOREDUCED IRRADIATED: HCPCS

## 2025-04-09 PROCEDURE — 92610 EVALUATE SWALLOWING FUNCTION: CPT | Mod: GN | Performed by: SPEECH-LANGUAGE PATHOLOGIST

## 2025-04-09 PROCEDURE — 87529 HSV DNA AMP PROBE: CPT

## 2025-04-09 PROCEDURE — 87040 BLOOD CULTURE FOR BACTERIA: CPT

## 2025-04-09 PROCEDURE — 2500000005 HC RX 250 GENERAL PHARMACY W/O HCPCS

## 2025-04-09 PROCEDURE — 71045 X-RAY EXAM CHEST 1 VIEW: CPT

## 2025-04-09 PROCEDURE — 87496 CYTOMEG DNA AMP PROBE: CPT

## 2025-04-09 RX ORDER — SODIUM CHLORIDE, SODIUM LACTATE, POTASSIUM CHLORIDE, CALCIUM CHLORIDE 600; 310; 30; 20 MG/100ML; MG/100ML; MG/100ML; MG/100ML
150 INJECTION, SOLUTION INTRAVENOUS CONTINUOUS
Status: ACTIVE | OUTPATIENT
Start: 2025-04-09 | End: 2025-04-10

## 2025-04-09 RX ORDER — HEPARIN SODIUM,PORCINE/PF 10 UNIT/ML
50 SYRINGE (ML) INTRAVENOUS AS NEEDED
OUTPATIENT
Start: 2025-04-09

## 2025-04-09 RX ORDER — SUCRALFATE 1 G/10ML
1 SUSPENSION ORAL EVERY 6 HOURS SCHEDULED
Status: DISCONTINUED | OUTPATIENT
Start: 2025-04-09 | End: 2025-04-18 | Stop reason: HOSPADM

## 2025-04-09 RX ORDER — HEPARIN 100 UNIT/ML
500 SYRINGE INTRAVENOUS AS NEEDED
OUTPATIENT
Start: 2025-04-09

## 2025-04-09 RX ORDER — PANTOPRAZOLE SODIUM 40 MG/10ML
40 INJECTION, POWDER, LYOPHILIZED, FOR SOLUTION INTRAVENOUS DAILY
Status: DISCONTINUED | OUTPATIENT
Start: 2025-04-09 | End: 2025-04-18 | Stop reason: HOSPADM

## 2025-04-09 RX ORDER — MAGNESIUM SULFATE HEPTAHYDRATE 40 MG/ML
2 INJECTION, SOLUTION INTRAVENOUS ONCE
Status: COMPLETED | OUTPATIENT
Start: 2025-04-09 | End: 2025-04-09

## 2025-04-09 RX ORDER — METOPROLOL TARTRATE 1 MG/ML
5 INJECTION, SOLUTION INTRAVENOUS EVERY 6 HOURS
Status: DISCONTINUED | OUTPATIENT
Start: 2025-04-09 | End: 2025-04-16

## 2025-04-09 RX ORDER — HEPARIN SODIUM 1000 [USP'U]/ML
2000 INJECTION, SOLUTION INTRAVENOUS; SUBCUTANEOUS AS NEEDED
OUTPATIENT
Start: 2025-04-09

## 2025-04-09 RX ADMIN — PANTOPRAZOLE SODIUM 40 MG: 40 INJECTION, POWDER, FOR SOLUTION INTRAVENOUS at 11:16

## 2025-04-09 RX ADMIN — LIDOCAINE HYDROCHLORIDE 10 ML: 20 SOLUTION ORAL; TOPICAL at 08:41

## 2025-04-09 RX ADMIN — SODIUM CHLORIDE, SODIUM LACTATE, POTASSIUM CHLORIDE, AND CALCIUM CHLORIDE 100 ML/HR: .6; .31; .03; .02 INJECTION, SOLUTION INTRAVENOUS at 14:14

## 2025-04-09 RX ADMIN — FLUCONAZOLE 400 MG: 2 INJECTION, SOLUTION INTRAVENOUS at 21:32

## 2025-04-09 RX ADMIN — SUCRALFATE 1 G: 1 SUSPENSION ORAL at 18:50

## 2025-04-09 RX ADMIN — PIPERACILLIN SODIUM AND TAZOBACTAM SODIUM 3.38 G: 3; .375 INJECTION, SOLUTION INTRAVENOUS at 16:35

## 2025-04-09 RX ADMIN — ACYCLOVIR SODIUM 250 MG: 50 INJECTION, SOLUTION INTRAVENOUS at 08:37

## 2025-04-09 RX ADMIN — MAGNESIUM SULFATE HEPTAHYDRATE 2 G: 40 INJECTION, SOLUTION INTRAVENOUS at 10:21

## 2025-04-09 RX ADMIN — SODIUM CHLORIDE, POTASSIUM CHLORIDE, SODIUM LACTATE AND CALCIUM CHLORIDE 100 ML/HR: 600; 310; 30; 20 INJECTION, SOLUTION INTRAVENOUS at 14:15

## 2025-04-09 RX ADMIN — PIPERACILLIN SODIUM AND TAZOBACTAM SODIUM 3.38 G: 3; .375 INJECTION, SOLUTION INTRAVENOUS at 23:37

## 2025-04-09 RX ADMIN — SODIUM CHLORIDE, SODIUM LACTATE, POTASSIUM CHLORIDE, AND CALCIUM CHLORIDE 100 ML/HR: .6; .31; .03; .02 INJECTION, SOLUTION INTRAVENOUS at 02:49

## 2025-04-09 RX ADMIN — METOPROLOL TARTRATE 5 MG: 1 INJECTION, SOLUTION INTRAVENOUS at 15:45

## 2025-04-09 RX ADMIN — ACYCLOVIR SODIUM 250 MG: 50 INJECTION, SOLUTION INTRAVENOUS at 20:15

## 2025-04-09 RX ADMIN — SUCRALFATE 1 G: 1 SUSPENSION ORAL at 23:37

## 2025-04-09 RX ADMIN — METOPROLOL TARTRATE 5 MG: 1 INJECTION, SOLUTION INTRAVENOUS at 21:32

## 2025-04-09 ASSESSMENT — COGNITIVE AND FUNCTIONAL STATUS - GENERAL
HELP NEEDED FOR BATHING: A LITTLE
DRESSING REGULAR LOWER BODY CLOTHING: A LITTLE
STANDING UP FROM CHAIR USING ARMS: A LITTLE
TURNING FROM BACK TO SIDE WHILE IN FLAT BAD: A LITTLE
TOILETING: A LITTLE
CLIMB 3 TO 5 STEPS WITH RAILING: A LITTLE
DAILY ACTIVITIY SCORE: 21
MOVING TO AND FROM BED TO CHAIR: A LITTLE
WALKING IN HOSPITAL ROOM: A LITTLE
MOBILITY SCORE: 19

## 2025-04-09 ASSESSMENT — PAIN DESCRIPTION - DESCRIPTORS: DESCRIPTORS: SHARP

## 2025-04-09 ASSESSMENT — PAIN - FUNCTIONAL ASSESSMENT
PAIN_FUNCTIONAL_ASSESSMENT: 0-10
PAIN_FUNCTIONAL_ASSESSMENT: 0-10

## 2025-04-09 ASSESSMENT — PAIN SCALES - GENERAL
PAINLEVEL_OUTOF10: 10 - WORST POSSIBLE PAIN
PAINLEVEL_OUTOF10: 5 - MODERATE PAIN

## 2025-04-09 NOTE — PROGRESS NOTES
Pharmacy Medication History Review    Bijan Ibanez is a 64 y.o. male admitted for Dysphagia. Pharmacy reviewed the patient's [id number] medications and allergies for accuracy.    Medications CHANGED:  None    The list below reflects the updated PTA list.   Prior to Admission Medications   Prescriptions Informant   Lidocaine/Maalox/Diphenhydramine (1:1:1) Spouse/Significant Other   Sig: Swish and swallow 15 mL every 6 hours if needed for mucositis for up to 7 days.   acyclovir (Zovirax) 200 mg/5 mL suspension Spouse/Significant Other   Sig: Take 10 mL (400 mg) by mouth 2 times a day. To take when having mucositis and cannot take pills   Patient not taking: Reported on 4/9/2025   Did not  yet from pharmacy     acyclovir (Zovirax) 400 mg tablet Spouse/Significant Other   Sig: Take 1 tablet (400 mg) by mouth 2 times a day for 10 days.   albuterol 90 mcg/actuation inhaler Spouse/Significant Other   Sig: Inhale 2 puffs every 6 hours if needed for shortness of breath.   apixaban (Eliquis) 5 mg tablet Spouse/Significant Other   Sig: Take 1 tablet (5 mg) by mouth 2 times a day.   atorvastatin (Lipitor) 40 mg tablet Spouse/Significant Other   Sig: Take 1 tablet (40 mg) by mouth early in the morning..   diphenhydramine/Maalox/lidocaine (Magic Mouthwash) - Compounded - Outpatient Spouse/Significant Other   Sig: Swish and spit 10 mL by mouth every 6 hours if needed.   fluconazole (Diflucan) 200 mg tablet Spouse/Significant Other   Sig: Take 1 tablet (200 mg) by mouth once daily for 20 days.   fluconazole (Diflucan) 40 mg/mL suspension Spouse/Significant Other   Sig: Take 5 mL (200 mg) by mouth once daily. To take when having mucositis and cannot take pills   Did not  yet from pharmacy   furosemide (Lasix) 20 mg tablet Spouse/Significant Other   Sig: Take 1 tablet (20 mg) by mouth once daily as needed (for swelling).   gabapentin (Neurontin) 300 mg capsule Spouse/Significant Other   Sig: Take 1 capsule  (300 mg) by mouth once daily at bedtime.   heparin flush 10 unit/mL injection Spouse/Significant Other   Sig: Infuse 5 mL (50 Units) into a venous catheter once daily.   levoFLOXacin (Levaquin) 250 mg/10 mL solution Spouse/Significant Other   Sig: Take 20 mL (500 mg) by mouth once daily for 14 days. To take when having mucositis and cannot take pills   Patient not taking: Reported on 4/9/2025   Did not  yet from pharmacy     levoFLOXacin (Levaquin) 500 mg tablet Spouse/Significant Other   Sig: Take 1 tablet (500 mg) by mouth once daily for 14 days. Take when neutropenic   lisinopril 10 mg tablet Spouse/Significant Other   Sig: Take 1 tablet (10 mg) by mouth once daily.   metoprolol succinate XL (Toprol-XL) 100 mg 24 hr tablet Spouse/Significant Other   Sig: Take 1 tablet (100 mg) by mouth once daily. Do not crush or chew.   oxyCODONE (Roxicodone) 5 mg immediate release tablet Spouse/Significant Other   Sig: Take 1 tablet (5 mg) by mouth every 4 hours if needed for severe pain (7 - 10) for up to 7 days.   pantoprazole (ProtoNix) 40 mg EC tablet Spouse/Significant Other   Sig: Take 1 tablet (40 mg) by mouth once daily in the morning. Take before meals. Do not crush, chew, or split.   potassium chloride CR (Klor-Con M20) 20 mEq ER tablet Spouse/Significant Other   Sig: Take 1 tablet (20 mEq) by mouth once daily. Do not crush or chew.   Patient not taking: Reported on 4/9/2025     potassium chloride CR (Klor-Con) 10 mEq ER tablet Spouse/Significant Other   Sig: Take 1 tablet (10 mEq) by mouth once daily for 10 days. Do not crush or chew.   sodium chloride 0.9% flush Spouse/Significant Other   Sig: Infuse 10 mL into a venous catheter once daily. Per [facility] [place]  Flush with 20ml after obtaining labs from line.      Facility-Administered Medications Last Administration Doses Remaining   oxyCODONE (Roxicodone) immediate release tablet 5 mg 3/18/2025  1:16 PM            The list below reflects the updated allergy list.  "Please review each documented allergy for additional clarification and justification.  Allergies  Reviewed by Daja Mccain RN on 4/8/2025        Severity Reactions Comments    Latex Not Specified Itching     Penicillins Not Specified Unknown Patient reports from childhood, unsure of reaction            Patient was unable to be assessed for M2B at discharge.     Sources:   [facility]  Pharmacy dispense history  Spouse Good historian  Chart Review    Additional Comments:  Notes listed on patients meds       Davina Mckay PharmD  Transitions of Care Pharmacist  04/09/25     Secure Chat preferred   If no response call [phone] or [phone] \"Med Rec\"    "

## 2025-04-09 NOTE — PROGRESS NOTES
04/09/25 1500   Discharge Planning   Living Arrangements Spouse/significant other   Support Systems Spouse/significant other   Assistance Needed TBD, likely no needs   Type of Residence Private residence   Home or Post Acute Services None   Expected Discharge Disposition Home     Care Transitions Note  04/09/25  [facility]. Pt lives home with his wife. Was independent prior to admit. Has SOLO PICC which is managed outpatient. Anticipate no needs at discharge, but SW available as needs arise.   Ely Rose, MSW, LSW

## 2025-04-09 NOTE — PROGRESS NOTES
Speech-Language Pathology  Adult Inpatient Clinical Bedside Swallow Evaluation    Patient Name: Bijan Ibanez  MRN: [id number]  Today's Date: 4/9/2025   Start Time: 1021  Stop Time: 1033  Time Calculation (min): 12    History of Present Illness:   Bijan Ibanez is a 64 y.o. male presenting with mucositis, dysphagia, N/V and HA.  He states that he started to have increased secretions on Friday 4/4 with throat pain and globus pharyngeus.  Pain is improved with Oxycodone, but he has a difficulty swallowing pills.  Nausea and vomiting started today with 2-3 episodes.  Patient received anti-emetic today in clinic, but states that it made him feel worse.  His HA started on Saturday after LP with IT Methotrexate on 4/4.  He describes the sensation as intermittent frontal numbness and it improves with Tylenol.    MBSS 4/3 reveals: Pt presents with mild to moderate oropharyngeal dysphagia upon completion of modified barium swallow study this date. Swallowing physiology is detailed above. Impairments most impacting swallowing safety and efficiency include tongue base  retraction and overall sluggish epiglottic complete inversion. This results in post swallow residue across consistencies evaluated increasing with heavier viscosities. A chin tuck paired with repeat swallows and liquid wash aid in clearance of the majority of valleculae residue. Intermittent penetration evident with thin liquids in large consecutive swallows. This is eliminated with single sips. No further penetration was observed for any other consistency, and no aspiration was visualized during study.     DIET RECOMMENDATIONS as of 4/3 after MBSS:  - Regular (IDDSI Level 7)  - Thin liquids (IDDSI Level 0)     STRATEGIES:  - Small bites  - Small, single sips  - Alternate consistencies  - Chin tuck      Assessment:   Clinical bedside swallow evaluation completed. Pt fatigue, wife present, indicating he is having emesis and is unable to eat. A&O x4 with  functional oral musculature, suspect mucositis of soft palate an throat adding to odynophagia. Pt willing to try ice chips which results in throat clearing and coughing with pt orally suctioning water from oral cavity. Suspect pharyngeal dysphagia impacted by the mucositis. SLP to follow and re-evaluate to ascertain readiness for a PO diet vs need for repeat MBSS. Discuss results and recommendations with RN and MD. Pt and wife in agreement with POC.     Recommendations:  NPO    Frequent, aggressive oral care is strongly recommended to improve infection control as well as reduce dental plaque and bacteria on oropharyngeal surfaces which may increase the risk nosocomial infections, including pneumonia.    OK for small amounts of ice chips (one at a time, 5-7x/hour) for pleasure/swallow stimulation, but only after aggressive oral care to avoid colonization of bacteria within oral cavity.    Goal:   Pt will tolerate least restrictive diet without s/s aspiration, respiratory compromise, or overt difficulty throughout admission.  Start: 04/09/25, End: 5/9/25  Status: goal initiated         Plan:  SLP Services Indicated: Yes  Frequency: 2x week  Discussed POC with patient and spouse  SLP - OK to Discharge    Pain:   0-10  0 = No pain.     Inpatient Education:  Extensive education provided to patient and spouse regarding current swallow function, recommendations/results, and POC.      Consultations/Referrals/Coordination of Services:   N/A
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
## 2025-04-09 NOTE — PROGRESS NOTES
Physical Therapy                 Therapy Communication Note    Patient Name: Bijan Ibanez  MRN: [id number]  Department: [facility]  Room: 4029/4029-A  Today's Date: 4/9/2025     Discipline: Physical Therapy    PT Missed Visit: Yes     Missed Visit Reason: Missed Visit Reason: Patient placed on medical hold (Pt with Hgb 6.2. Will re-attempt as appropriate)    Missed Time: 09:58 AM

## 2025-04-09 NOTE — PROGRESS NOTES
"Bijan Ibanez is a 64 y.o. male on day 1 of admission presenting with Dysphagia.    Subjective   Patient with persistent pain r/t to mucositis and HA.  Reports no s/s of bleeding.  Thick copious secretions.       Objective     Physical Exam  Constitutional:       Appearance: He is ill-appearing.   Eyes:      Pupils: Pupils are equal, round, and reactive to light.   Cardiovascular:      Rate and Rhythm: Regular rhythm. Tachycardia present.      Heart sounds: Normal heart sounds.   Pulmonary:      Comments: LS diminished bases  Abdominal:      General: Bowel sounds are normal.      Palpations: Abdomen is soft.      Comments: Endorses nausea  Denies diarrhea   Musculoskeletal:      Right lower leg: Edema present.      Left lower leg: Edema present.   Skin:     General: Skin is warm and dry.      Capillary Refill: Capillary refill takes less than 2 seconds.      Coloration: Skin is pale.   Neurological:      Mental Status: He is oriented to person, place, and time.         Last Recorded Vitals  Blood pressure 111/74, pulse 91, temperature 37.2 °C (99 °F), temperature source Temporal, resp. rate 16, height 1.835 m (6' 0.24\"), weight 90.7 kg (199 lb 15.3 oz), SpO2 97%.  Intake/Output last 3 Shifts:  I/O last 3 completed shifts:  In: 1636.7 (18 mL/kg) [I.V.:1253.3 (13.8 mL/kg); Blood:383.3]  Out: 150 (1.7 mL/kg) [Urine:150 (0 mL/kg/hr)]  Weight: 90.7 kg     Relevant Results  Scheduled medications  acyclovir, 250 mg, intravenous, q12h  [Held by provider] apixaban, 5 mg, oral, BID  atorvastatin, 40 mg, oral, Daily  caffeine, 200 mg, oral, BID  fluconazole, 400 mg, intravenous, q24h  gabapentin, 300 mg, oral, Nightly  levoFLOXacin, 500 mg, intravenous, q24h  lisinopril, 10 mg, oral, Daily  metoprolol succinate XL, 100 mg, oral, Daily  pantoprazole, 40 mg, oral, Daily before breakfast      Continuous medications  HYDROmorphone,   lactated Ringer's, 100 mL/hr, Last Rate: 100 mL/hr (04/09/25 0635)      PRN medications  PRN " medications: albuterol, alteplase, [Held by provider] furosemide, lidocaine-diphenhydrAMINE-Maalox 1:1:1, naloxone, ondansetron ODT **OR** ondansetron, prochlorperazine **OR** prochlorperazine **OR** [DISCONTINUED] prochlorperazine          This patient has a central line   Reason for the central line remaining today?  Chemotherapy, blood draws, blood products, electrolytes       Assessment/Plan   Assessment & Plan  Dysphagia    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 male with PMHx of HTN, HLD, CAD, MI (PCI '10, on ASA), atrial fibrillation, RCC (s/p partial R nephrectomy '13), GERD, BPH, arthritis, MRSA bacteremia (s/p 4 weeks IV Vancomycin which completed 3/3/25), G1 CIPN,  R subclavian and R axillary DVT r/t PICC (2/3/25; on Eliquis), R rib/flank pain and Burkitt's lyphoma who presents from outpatient clinic for mucositis, dysphagia, N/V and HA.     Active issues today:  - Mucositis: Dilaudid PCA.  Medications changed to IV as able.  - N/V:  PRN antiemetics.  - Anemia:  No evidence of active bleeding.  Patient received 1 unit pRBC on 4/8 and 4/9.     ONC:  H/O RCC (s/p partial R nephrectomy '13)     #  Burkitt's Lymphoma     Currently D11 C4 DA-R-EPOCH     Patient's Oncology History documentation       Oncology History   Burkitt's lymphoma of lymph nodes of multiple regions (Multi)   1/13/2025 Initial Diagnosis     Diagnosis: Burkitt Lymphoma, [place] Stage IV, BL-IPI High-Risk (score 4/5).     BL-IPI Calculation:  Age >60 years: Yes (64 years old).  Performance status (ECOG >=2): Presumed based on clinical presentation requiring hospitalization.  Serum LDH >ULN: 4102 U/L (1/11/25)  [place] Stage III/IV: Yes (stage IV with extranodal involvement including kidneys, liver, spleen, and musculature).  CNS involvement: No (MRI and LP negative).  Total Score: 4/5 (High-Risk).     Presenting Symptoms: Asymmetric swelling of the  right-sided muscles of mastication; imaging revealed incidental findings of perihilar mass and renal lesions.     Labs at Diagnosis: WBC 4.0, platelets 72; LDH 4102 U/L (1/11/25)     Pathology:  EBUS with lymph node biopsy (1/13/25): Predominantly CD20-positive small lymphoid cells, raising suspicion for a B-cell lymphoproliferative process.  Bone marrow biopsy (1/16/25): 70-80% involvement by high-grade B-cell lymphoma in a hypercellular marrow (90% cellular) with maturing trilineage hematopoiesis. FISH confirmed t(8;14)/IGH::MYC rearrangement, diagnostic of Burkitt lymphoma.      Imaging:  CT Neck (1/9/25): Fusiform thickening of right masticatory muscles, likely benign hypertrophy.  CT C/A/P (1/11/25): Left perihilar mass, pulmonary nodules, right renal lesions, and right adrenal gland thickening concerning for metastatic disease; T12-L1 soft tissue mass.  PET-CT (1/20): Widespread hermann and extranodal hypermetabolic involvement, including kidneys, liver, spleen, abdominal wall, and musculature, suggestive of extensive lymphomatous disease.  MRI Brain (1/21): No intracranial lymphoma; suspected osseous involvement of calvarium.     Treatment:  Dexamethasone 40 mg daily (1/20-1/21).  Prophylactic IT methotrexate via LP (1/22), flow negative for lymphoma.      1/21/2025 -  Chemotherapy     - C1 (1/21/25)  - C2 (2/14/25) -- etoposide dose-reduced by 25%  - C3 (3/7/25) -- etoposide dose-reduced by 10%, doxorubicin increased by 20%  - C4 (3/28/25) -- same as C3  (INPT) Dose-Adjusted R-EPOCH (Etoposide / DOXOrubicin / VinCRIStine / Cyclophosphamide / PredniSONE) + RiTUXimab, 21 Day Cycles       2/24/2025 Imaging     PET/CT (2/24/25, after C2): near-complete resolution of previously active disease in lymph nodes and various organs, Deauville score of 2            - Patient will need LP with IT TY-C this admit   - Send genetic testing for dihydropyrimidine dehydrogenase (DPD) and UGT1A1.     Heme:  H/O R subclavian and  R axillary DVT r/t PICC (2/3/25; on Eliquis)  #  Normocytic anemia 2/2 chemotherapy  #  Thrombocytopenia 2/2 chemotherapy  - Hold Eliquis i/s/o thrombocytopenia  - Keep Hgb > 7 and Plt > 10     ID:  H/O MRSA bacteremia (s/p 4 weeks IV Vanco completed 3/3)  Ppx:  Acyclovir (VZV, HSV), Fluconazole (Antifungal), Levofloxacin (PNA)  #  Neutropenia 2/2 chemotherapy  - Neutropenic precautions  - Blood cultures x 2 today  - Portable CXR  - Sputum Cx     FEN/GI/Renal:  H/O BPH, GERD  Admit wt:         F:  LR @ 100 mL/hr      E:  Replete electrolytes PRN    N:  NPO except ice chips  #  Nausea/Vomiting likely r/t chemotherapy  #  G3-4 Mucositis   #  Dysphagia  #  Oliguria- likely related to dehydration  MBS 4/3:  Mild to moderate oropharyngeal dysphagia with tongue base retraction and sluggish spiglottic complete inversion.  SLP recommends chin tuck paired with repeat swallows and liquid wash aid when eating.  Diet recommendation: Regular diet with thin liquids.  Bladder scan 4/9 with no urine  GI ppx:  Continue home Protonix  - Zofran and Compazine PRN nausea  - Continue Dilaudid PCA  - Start Biotene and Carafate  - Check CMV  - Weigh patient  - Repeat bladder scan  - Evening RFP  - GI consulted  - Speech consulted  - Dietician consulted     NEURO:  H/O R rib/flank pain, G1 CIPN, arthritis  #  Post LP HA (LP with IT Methotrexate 4/4)  R rib/flank pain spontaneously improved on 4/4  - Caffeine pills BID PRN- held d/t mucositis.  HA improved with Dilaudid PCA.  - Hold home Neurontin d/t NPO and re-start as mucositis resolves     CV:  H/O HTN, HLD, CAD, MI (PCI '10, on ASA), atrial fibrillation  Echo 2/3/25:  LVEF 60-65% with no LVH, normal RV function, and moderately dilated LA  Awaiting EKG for QTc monitoring  - Hold home Lipitor and Lisinopril d/t NPO and re-start as mucositis resolves  - IV Metoprolol while patient is NPO and convert back to PO when able  - Hold home Lasix given dehydration  - Keep Mg+ > 2 and K+ > 4.0      PULM:  Oxygenating well on RA     Endo:  No h/o endocrinopathies (Hgb A1c 5.3% 10/24, TSH 1.94 1/25)     Malignant Hematology Checklist:  ID Prophylaxis:  Acyclovir (VZV, HSV), Fluconazole (Antifungal), Levofloxacin (PNA)  Code status:  Full Code  Lines/drains:  L DBL PICC  Primary oncologist:  Dr. Mckeon  Disposition:  Admit to [facility]     Patient seen and discussed on rounds with Dr. Lazo.    I spent 60 minutes in the professional and overall care of this patient.      Sophia Membreno, APRN-CNP

## 2025-04-09 NOTE — CARE PLAN
The patient's goals for the shift include      The clinical goals for the shift include pt will remain HDS      Problem: Pain - Adult  Goal: Verbalizes/displays adequate comfort level or baseline comfort level  Outcome: Progressing     Problem: Safety - Adult  Goal: Free from fall injury  Outcome: Progressing     Problem: Discharge Planning  Goal: Discharge to home or other facility with appropriate resources  Outcome: Progressing     Problem: Chronic Conditions and Co-morbidities  Goal: Patient's chronic conditions and co-morbidity symptoms are monitored and maintained or improved  Outcome: Progressing     Problem: Nutrition  Goal: Nutrient intake appropriate for maintaining nutritional needs  Outcome: Progressing

## 2025-04-09 NOTE — CONSULTS
"Nutrition Initial Assessment:   Nutrition Assessment    The patient is a 64 y.o. male who is hospital day #1.  Pt admitted with c/o mucositis, dysphagia, N/V, and HA. SLP rec NPO.     PMHx: HTN, HLD, CAD, MI (PCI '10, on ASA), RCC (s/p partial R nephrectomy '13), GERD, BPH, arthritis, MRSA bacteremia (s/p 4 weeks IV Vancomycin which completed 3/3/25), G1 CIPN,  R subclavian and R axillary DVT r/t PICC (2/3/25; on Eliquis), R rib/flank pain and Burkitt's lyphoma     Reason for Assessment: Provider consult order    Nutrition History:  Energy Intake: Poor < 50 %  Food and Nutrient History: Met with pt and pt's wife. Pt deferred nutrition interview to his wife. Appears pt with low PO intake for about 1-2 weeks. Significantly worsened over the past week. Pt unable to do any solid foods for the past ~4 days. Initially was able to tolerate liquids better but at this time liquids are hard to. Was doing Boost, milk, at home. Now, only able to do ice chips.  SLP rec NPO. From chart review, appears pt with decreased PO intake for about 1 month now d/t different side effects of chemo/treatment. Continues with N/V. No food allergies. Per team, no plan for TF/NGT placement right now d/t mucositis and cytopenia. Hoping pt mucositis will improve in the upcoming days.  Vitamin/Herbal Supplement Use: None       Anthropometrics:  Start of admission anthropometrics:  Height: 183.5 cm (6' 0.24\")  Weight: 90.7 kg (199 lb 15.3 oz)  BMI (Calculated): 26.94  IBW/kg (Dietitian Calculated): 80.91 kg  Percent of IBW: 112 %    Wt Hx   04/08/25 90.7 kg (199 lb 15.3 oz)   03/31/25 92.5 kg (203 lb 14.8 oz)   03/11/25 95.7 kg (210 lb 15.7 oz)   03/04/25 91.5 kg (201 lb 11.2 oz)   02/19/25 101 kg (222 lb 7.1 oz)   02/06/25 115 kg (252 lb 13.9 oz)   01/26/25 115 kg (253 lb 15.5 oz)   01/06/25 107 kg (236 lb)   01/03/25 112 kg (246 lb 0.5 oz)   10/21/24 111 kg (245 lb)   09/05/24 112 kg (246 lb)     Weight History / % Weight Change: Pt's wife reports " a UBW of 236-240#. Reports wt loss ongoing w/  chemo since start of the year.  Thinks pt has lost a significnat amount of wt. Per EMR wt hx, pt with possible 2% wt loss x1 week, 1-5% x1 month, and 19% x3 months.  Significant Weight Loss: Yes    Nutrition Focused Physical Exam Findings:  NFPE done 04/08 >> mild- mod fat and muscle wasting     Edema  Edema: none  Physical Findings   Skin: Negative  Mouth Findings: Odynophagia, Mucositis, Dysphagia      Last BM Date: 04/08/25    Nutrition Significant Labs:    Results from last 7 days   Lab Units 04/09/25  0632 04/08/25  1749 04/08/25  0937 04/04/25  0824   HEMOGLOBIN g/dL 6.2*  --  6.9* 8.5*   MCV fL 88  --  88 88   GLUCOSE mg/dL 127*  --  143* 83   POTASSIUM mmol/L 4.0  --  4.1 3.4*   SODIUM mmol/L 136  --  137 139   PHOSPHORUS mg/dL 3.1  --   --   --    MAGNESIUM mg/dL 1.71   < >  --  2.22   CREATININE mg/dL 0.42*  --  0.41* 0.50   BUN mg/dL 18  --  23 31*   ALT U/L  --   --  12 24   AST U/L  --   --  6* 10   ALK PHOS U/L  --   --  42 32*    < > = values in this interval not displayed.       Nutrition Specific Medications:  acyclovir, 250 mg, intravenous, q12h  atorvastatin, 40 mg, oral, Daily  caffeine, 200 mg, oral, BID  fluconazole, 400 mg, intravenous, q24h  pantoprazole, 40 mg, intravenous, Daily  sucralfate, 1 g, oral, q6h SONAL  HYDROmorphone,   lactated Ringer's, 100 mL/hr, Last Rate: 100 mL/hr (04/09/25 0635)        I/O:   I/O last 2 completed shifts:  In: 1636.7 (18 mL/kg) [I.V.:1253.3 (13.8 mL/kg); Blood:383.3]  Out: 150 (1.7 mL/kg) [Urine:150 (0.1 mL/kg/hr)]  Weight: 90.7 kg     Dietary Orders (From admission, onward)       Start     Ordered    04/09/25 1046  NPO Diet Except: Ice chips; Effective now  Diet effective now        Question:  Except:  Answer:  Ice chips    04/09/25 1045    04/08/25 1657  May Participate in Room Service  ( ROOM SERVICE MAY PARTICIPATE)  Once        Question:  .  Answer:  Yes    04/08/25 1656                     Estimated Needs:    Total Energy Estimated Needs in 24 hours (kCal): 2450 kCal  Method for Estimating Needs: 27 kcal/kg ABW (90.7 kg)    Total Protein Estimated Needs in 24 Hours (g): 115 g  Method for Estimating 24 Hour Protein Needs: 1.3 g/kg A BW (90.7 kg)    Method for Estimating 24 Hour Fluid Needs: 1 ml/kcal or per team          Nutrition Diagnosis   Malnutrition Diagnosis  Patient has Malnutrition Diagnosis: Yes  Diagnosis Status: New  Malnutrition Diagnosis: Severe malnutrition related to acute disease or injury  Related to: side effects from chemo  As Evidenced by: significant wt loss of 2% x1 week; PO intake meeting <50% of nutr needs for >/= 5 days  Additional Assessment Information: Component of chronic disease/condition present as pt does have hx of cancer and inadequate PO intake and wt loss appears to be an ongoing issue for the past 1-3 months. Nutrition status appears to have significantly declined within the past 1-2 weeks d/t mucositis, dysphagia, etc. from chemo.            Nutrition Interventions/Recommendations        Nutrition Recommendations:  Given pt with malnutrition recommend against keeping pt NPO without nutrition support for a prolonged period of time.   If unable to advance diet within the next 48 hrs, consider initiation of TF vs. parenteral nutrition to prevent further nutritional decline.   Will place order for Boost VHC (530 kcal, 22g pro) and Ensure Plus (350 kcal, 13g pro) when diet is advanced.           Nutrition Monitoring and Evaluation         Diet advancement.                         Time Spent (min): 45 minutes

## 2025-04-10 ENCOUNTER — APPOINTMENT (OUTPATIENT)
Dept: VASCULAR MEDICINE | Facility: HOSPITAL | Age: 65
DRG: 157 | End: 2025-04-10
Payer: MEDICARE

## 2025-04-10 ENCOUNTER — APPOINTMENT (OUTPATIENT)
Dept: HEMATOLOGY/ONCOLOGY | Facility: HOSPITAL | Age: 65
DRG: 157 | End: 2025-04-10
Payer: MEDICARE

## 2025-04-10 LAB
ALBUMIN SERPL BCP-MCNC: 2.4 G/DL (ref 3.4–5)
ANION GAP SERPL CALC-SCNC: 13 MMOL/L (ref 10–20)
BASOPHILS # BLD AUTO: 0 X10*3/UL (ref 0–0.1)
BASOPHILS NFR BLD AUTO: 0 %
BLOOD EXPIRATION DATE: NORMAL
BUN SERPL-MCNC: 14 MG/DL (ref 6–23)
CALCIUM SERPL-MCNC: 7.6 MG/DL (ref 8.6–10.6)
CHLORIDE SERPL-SCNC: 104 MMOL/L (ref 98–107)
CMV DNA SERPL NAA+PROBE-LOG IU: ABNORMAL {LOG_IU}/ML
CO2 SERPL-SCNC: 23 MMOL/L (ref 21–32)
CREAT SERPL-MCNC: 0.42 MG/DL (ref 0.5–1.3)
DISPENSE STATUS: NORMAL
EGFRCR SERPLBLD CKD-EPI 2021: >90 ML/MIN/1.73M*2
EOSINOPHIL # BLD AUTO: 0 X10*3/UL (ref 0–0.7)
EOSINOPHIL NFR BLD AUTO: 0 %
ERYTHROCYTE [DISTWIDTH] IN BLOOD BY AUTOMATED COUNT: 15.1 % (ref 11.5–14.5)
ERYTHROCYTE [DISTWIDTH] IN BLOOD BY AUTOMATED COUNT: 15.5 % (ref 11.5–14.5)
FLUAV RNA RESP QL NAA+PROBE: NOT DETECTED
FLUBV RNA RESP QL NAA+PROBE: NOT DETECTED
GLUCOSE SERPL-MCNC: 109 MG/DL (ref 74–99)
HCT VFR BLD AUTO: 18.4 % (ref 41–52)
HCT VFR BLD AUTO: 19.4 % (ref 41–52)
HGB BLD-MCNC: 5.9 G/DL (ref 13.5–17.5)
HGB BLD-MCNC: 6.5 G/DL (ref 13.5–17.5)
HMPV RNA SPEC QL NAA+PROBE: NOT DETECTED
HPIV1 RNA SPEC QL NAA+PROBE: NOT DETECTED
HPIV2 RNA SPEC QL NAA+PROBE: NOT DETECTED
HPIV3 RNA SPEC QL NAA+PROBE: NOT DETECTED
HPIV4 RNA SPEC QL NAA+PROBE: NOT DETECTED
HSV1 DNA SKIN QL NAA+PROBE: NOT DETECTED
HSV2 DNA SKIN QL NAA+PROBE: NOT DETECTED
IMM GRANULOCYTES # BLD AUTO: 0 X10*3/UL (ref 0–0.7)
IMM GRANULOCYTES NFR BLD AUTO: 0 % (ref 0–0.9)
LABORATORY COMMENT REPORT: ABNORMAL
LYMPHOCYTES # BLD AUTO: 0.05 X10*3/UL (ref 1.2–4.8)
LYMPHOCYTES NFR BLD AUTO: 71.4 %
MAGNESIUM SERPL-MCNC: 1.84 MG/DL (ref 1.6–2.4)
MCH RBC QN AUTO: 27.8 PG (ref 26–34)
MCH RBC QN AUTO: 29.1 PG (ref 26–34)
MCHC RBC AUTO-ENTMCNC: 32.1 G/DL (ref 32–36)
MCHC RBC AUTO-ENTMCNC: 33.5 G/DL (ref 32–36)
MCV RBC AUTO: 87 FL (ref 80–100)
MCV RBC AUTO: 87 FL (ref 80–100)
MONOCYTES # BLD AUTO: 0.01 X10*3/UL (ref 0.1–1)
MONOCYTES NFR BLD AUTO: 14.3 %
NEUTROPHILS # BLD AUTO: 0.01 X10*3/UL (ref 1.2–7.7)
NEUTROPHILS NFR BLD AUTO: 14.3 %
NRBC BLD-RTO: 0 /100 WBCS (ref 0–0)
NRBC BLD-RTO: 0 /100 WBCS (ref 0–0)
PHOSPHATE SERPL-MCNC: 2.5 MG/DL (ref 2.5–4.9)
PLATELET # BLD AUTO: 12 X10*3/UL (ref 150–450)
PLATELET # BLD AUTO: 19 X10*3/UL (ref 150–450)
POTASSIUM SERPL-SCNC: 3.4 MMOL/L (ref 3.5–5.3)
PRODUCT BLOOD TYPE: 6200
PRODUCT BLOOD TYPE: 6200
PRODUCT BLOOD TYPE: 7300
PRODUCT CODE: NORMAL
RBC # BLD AUTO: 2.12 X10*6/UL (ref 4.5–5.9)
RBC # BLD AUTO: 2.23 X10*6/UL (ref 4.5–5.9)
RBC MORPH BLD: NORMAL
RHINOVIRUS RNA UPPER RESP QL NAA+PROBE: NOT DETECTED
RSV RNA RESP QL NAA+PROBE: NOT DETECTED
SARS-COV-2 RNA RESP QL NAA+PROBE: NOT DETECTED
SODIUM SERPL-SCNC: 137 MMOL/L (ref 136–145)
UNIT ABO: NORMAL
UNIT NUMBER: NORMAL
UNIT RH: NORMAL
UNIT VOLUME: 225
UNIT VOLUME: 276
UNIT VOLUME: 350
WBC # BLD AUTO: 0.1 X10*3/UL (ref 4.4–11.3)
WBC # BLD AUTO: 0.1 X10*3/UL (ref 4.4–11.3)
XM INTEP: NORMAL
XM INTEP: NORMAL

## 2025-04-10 PROCEDURE — 93970 EXTREMITY STUDY: CPT | Performed by: SURGERY

## 2025-04-10 PROCEDURE — 2500000004 HC RX 250 GENERAL PHARMACY W/ HCPCS (ALT 636 FOR OP/ED)

## 2025-04-10 PROCEDURE — 87631 RESP VIRUS 3-5 TARGETS: CPT

## 2025-04-10 PROCEDURE — 87637 SARSCOV2&INF A&B&RSV AMP PRB: CPT

## 2025-04-10 PROCEDURE — 87798 DETECT AGENT NOS DNA AMP: CPT

## 2025-04-10 PROCEDURE — 36430 TRANSFUSION BLD/BLD COMPNT: CPT

## 2025-04-10 PROCEDURE — 93010 ELECTROCARDIOGRAM REPORT: CPT | Performed by: INTERNAL MEDICINE

## 2025-04-10 PROCEDURE — 80069 RENAL FUNCTION PANEL: CPT

## 2025-04-10 PROCEDURE — P9037 PLATE PHERES LEUKOREDU IRRAD: HCPCS

## 2025-04-10 PROCEDURE — 2500000002 HC RX 250 W HCPCS SELF ADMINISTERED DRUGS (ALT 637 FOR MEDICARE OP, ALT 636 FOR OP/ED)

## 2025-04-10 PROCEDURE — 99233 SBSQ HOSP IP/OBS HIGH 50: CPT | Performed by: INTERNAL MEDICINE

## 2025-04-10 PROCEDURE — 1200000003 HC ONCOLOGY  ROOM WITH TELEMETRY DAILY

## 2025-04-10 PROCEDURE — 87799 DETECT AGENT NOS DNA QUANT: CPT

## 2025-04-10 PROCEDURE — P9040 RBC LEUKOREDUCED IRRADIATED: HCPCS

## 2025-04-10 PROCEDURE — 83735 ASSAY OF MAGNESIUM: CPT

## 2025-04-10 PROCEDURE — 85027 COMPLETE CBC AUTOMATED: CPT

## 2025-04-10 PROCEDURE — 92526 ORAL FUNCTION THERAPY: CPT | Mod: GN | Performed by: SPEECH-LANGUAGE PATHOLOGIST

## 2025-04-10 PROCEDURE — 93970 EXTREMITY STUDY: CPT

## 2025-04-10 PROCEDURE — 93005 ELECTROCARDIOGRAM TRACING: CPT

## 2025-04-10 PROCEDURE — 85025 COMPLETE CBC W/AUTO DIFF WBC: CPT

## 2025-04-10 RX ORDER — METOPROLOL TARTRATE 1 MG/ML
5 INJECTION, SOLUTION INTRAVENOUS ONCE
Status: COMPLETED | OUTPATIENT
Start: 2025-04-10 | End: 2025-04-10

## 2025-04-10 RX ORDER — VANCOMYCIN HYDROCHLORIDE 1 G/20ML
INJECTION, POWDER, LYOPHILIZED, FOR SOLUTION INTRAVENOUS DAILY PRN
Status: DISCONTINUED | OUTPATIENT
Start: 2025-04-10 | End: 2025-04-13

## 2025-04-10 RX ORDER — POTASSIUM CHLORIDE 29.8 MG/ML
40 INJECTION INTRAVENOUS ONCE
Status: COMPLETED | OUTPATIENT
Start: 2025-04-10 | End: 2025-04-11

## 2025-04-10 RX ORDER — PANTOPRAZOLE SODIUM 40 MG/10ML
40 INJECTION, POWDER, LYOPHILIZED, FOR SOLUTION INTRAVENOUS 2 TIMES DAILY
Status: CANCELLED | OUTPATIENT
Start: 2025-04-10

## 2025-04-10 RX ORDER — SODIUM CHLORIDE, SODIUM LACTATE, POTASSIUM CHLORIDE, CALCIUM CHLORIDE 600; 310; 30; 20 MG/100ML; MG/100ML; MG/100ML; MG/100ML
150 INJECTION, SOLUTION INTRAVENOUS CONTINUOUS
Status: ACTIVE | OUTPATIENT
Start: 2025-04-10 | End: 2025-04-11

## 2025-04-10 RX ADMIN — METOPROLOL TARTRATE 5 MG: 1 INJECTION, SOLUTION INTRAVENOUS at 21:36

## 2025-04-10 RX ADMIN — METOPROLOL TARTRATE 5 MG: 1 INJECTION, SOLUTION INTRAVENOUS at 10:29

## 2025-04-10 RX ADMIN — PANTOPRAZOLE SODIUM 40 MG: 40 INJECTION, POWDER, FOR SOLUTION INTRAVENOUS at 08:51

## 2025-04-10 RX ADMIN — PIPERACILLIN SODIUM AND TAZOBACTAM SODIUM 3.38 G: 3; .375 INJECTION, SOLUTION INTRAVENOUS at 10:29

## 2025-04-10 RX ADMIN — SUCRALFATE 1 G: 1 SUSPENSION ORAL at 06:18

## 2025-04-10 RX ADMIN — METOPROLOL TARTRATE 5 MG: 1 INJECTION, SOLUTION INTRAVENOUS at 23:21

## 2025-04-10 RX ADMIN — POTASSIUM CHLORIDE 40 MEQ: 29.8 INJECTION, SOLUTION INTRAVENOUS at 17:04

## 2025-04-10 RX ADMIN — PIPERACILLIN SODIUM AND TAZOBACTAM SODIUM 3.38 G: 3; .375 INJECTION, SOLUTION INTRAVENOUS at 21:45

## 2025-04-10 RX ADMIN — METOPROLOL TARTRATE 5 MG: 1 INJECTION, SOLUTION INTRAVENOUS at 04:19

## 2025-04-10 RX ADMIN — SODIUM CHLORIDE, SODIUM LACTATE, POTASSIUM CHLORIDE, AND CALCIUM CHLORIDE 500 ML: 600; 310; 30; 20 INJECTION, SOLUTION INTRAVENOUS at 22:34

## 2025-04-10 RX ADMIN — PIPERACILLIN SODIUM AND TAZOBACTAM SODIUM 3.38 G: 3; .375 INJECTION, SOLUTION INTRAVENOUS at 04:19

## 2025-04-10 RX ADMIN — ACYCLOVIR SODIUM 250 MG: 50 INJECTION, SOLUTION INTRAVENOUS at 13:08

## 2025-04-10 RX ADMIN — PIPERACILLIN SODIUM AND TAZOBACTAM SODIUM 3.38 G: 3; .375 INJECTION, SOLUTION INTRAVENOUS at 16:17

## 2025-04-10 RX ADMIN — SUCRALFATE 1 G: 1 SUSPENSION ORAL at 19:06

## 2025-04-10 RX ADMIN — METOPROLOL TARTRATE 5 MG: 1 INJECTION, SOLUTION INTRAVENOUS at 16:15

## 2025-04-10 RX ADMIN — SODIUM CHLORIDE, SODIUM LACTATE, POTASSIUM CHLORIDE, AND CALCIUM CHLORIDE 150 ML/HR: .6; .31; .03; .02 INJECTION, SOLUTION INTRAVENOUS at 21:46

## 2025-04-10 RX ADMIN — VANCOMYCIN HYDROCHLORIDE 1500 MG: 1.5 INJECTION, POWDER, LYOPHILIZED, FOR SOLUTION INTRAVENOUS at 23:21

## 2025-04-10 RX ADMIN — VANCOMYCIN HYDROCHLORIDE 1500 MG: 1.5 INJECTION, POWDER, LYOPHILIZED, FOR SOLUTION INTRAVENOUS at 11:28

## 2025-04-10 RX ADMIN — METOPROLOL TARTRATE 5 MG: 1 INJECTION, SOLUTION INTRAVENOUS at 22:20

## 2025-04-10 RX ADMIN — SUCRALFATE 1 G: 1 SUSPENSION ORAL at 13:00

## 2025-04-10 RX ADMIN — FLUCONAZOLE 400 MG: 2 INJECTION, SOLUTION INTRAVENOUS at 21:45

## 2025-04-10 ASSESSMENT — COGNITIVE AND FUNCTIONAL STATUS - GENERAL
WALKING IN HOSPITAL ROOM: A LITTLE
DRESSING REGULAR LOWER BODY CLOTHING: A LITTLE
HELP NEEDED FOR BATHING: A LITTLE
STANDING UP FROM CHAIR USING ARMS: A LITTLE
DAILY ACTIVITIY SCORE: 21
MOVING TO AND FROM BED TO CHAIR: A LITTLE
WALKING IN HOSPITAL ROOM: A LITTLE
MOVING TO AND FROM BED TO CHAIR: A LITTLE
MOBILITY SCORE: 19
TURNING FROM BACK TO SIDE WHILE IN FLAT BAD: A LITTLE
TURNING FROM BACK TO SIDE WHILE IN FLAT BAD: A LITTLE
TOILETING: A LITTLE
DAILY ACTIVITIY SCORE: 21
CLIMB 3 TO 5 STEPS WITH RAILING: A LITTLE
STANDING UP FROM CHAIR USING ARMS: A LITTLE
MOBILITY SCORE: 19
TOILETING: A LITTLE
HELP NEEDED FOR BATHING: A LITTLE
CLIMB 3 TO 5 STEPS WITH RAILING: A LITTLE
DRESSING REGULAR LOWER BODY CLOTHING: A LITTLE

## 2025-04-10 ASSESSMENT — PAIN - FUNCTIONAL ASSESSMENT
PAIN_FUNCTIONAL_ASSESSMENT: 0-10
PAIN_FUNCTIONAL_ASSESSMENT: 0-10

## 2025-04-10 ASSESSMENT — PAIN SCALES - GENERAL
PAINLEVEL_OUTOF10: 7
PAINLEVEL_OUTOF10: 5 - MODERATE PAIN

## 2025-04-10 ASSESSMENT — ACTIVITIES OF DAILY LIVING (ADL): LACK_OF_TRANSPORTATION: NO

## 2025-04-10 NOTE — PROGRESS NOTES
Vancomycin Dosing by Pharmacy- INITIAL    Bijan Ibanez is a 64 y.o. year old male who Pharmacy has been consulted for vancomycin dosing for empiric coverage for septic patient with previous MRSA bacteremia. Based on the patient's indication and renal status this patient will be dosed based on a goal AUC of 400-600.     Renal function is currently stable.    Visit Vitals  /79   Pulse 100   Temp 37 °C (98.6 °F) (Temporal)   Resp 16        Lab Results   Component Value Date    CREATININE 0.42 (L) 04/10/2025    CREATININE 0.40 (L) 2025    CREATININE 0.42 (L) 2025    CREATININE 0.41 (L) 2025        Patient weight is as follows:   Vitals:    25 1642   Weight: 90.7 kg (199 lb 15.3 oz)       Cultures:  No results found for the encounter in last 14 days.        I/O last 3 completed shifts:  In: 2535.3 (28 mL/kg) [I.V.:1177.4 (13 mL/kg); Blood:702.9; IV Piggyback:655]  Out: 475 (5.2 mL/kg) [Urine:475 (0.1 mL/kg/hr)]  Weight: 90.7 kg   I/O during current shift:  I/O this shift:  In: 573.8 [Blood:523.8; IV Piggyback:50]  Out: -     Temp (24hrs), Av.9 °C (98.4 °F), Min:36.6 °C (97.8 °F), Max:37.3 °C (99.1 °F)         Assessment/Plan     Patient will not be given a loading dose.  Will initiate vancomycin maintenance,  1500 mg every 12 hours.    This dosing regimen is predicted by [employer] to result in the following pharmacokinetic parameters:  Regimen: 1500 mg IV every 12 hours.  Start time: 11:03 on 04/10/2025  Exposure target: AUC24 (range)400-600 mg/L.hr   PIW21-76: 469 mg/L.hr  AUC24,ss: 549 mg/L.hr  Probability of AUC24 > 400: 81 %  Ctrough,ss: 16.3 mg/L  Probability of Ctrough,ss > 20: 34 %    Follow-up level will be ordered on  with AM labs, unless clinically indicated sooner.  Will continue to monitor renal function daily while on vancomycin and order serum creatinine at least every 48 hours if not already ordered.  Follow for continued vancomycin needs, clinical response, and  signs/symptoms of toxicity.       Zeb Flynn, PharmD, BCPS

## 2025-04-10 NOTE — PROGRESS NOTES
04/10/25 1200   Discharge Planning   Living Arrangements Spouse/significant other   Support Systems Spouse/significant other   Assistance Needed PT/OT Pending, likel [facility]   Type of Residence Private residence   Home or Post Acute Services None;In home services   Type of Home Care Services Home PT;Home OT   Expected Discharge Disposition Home   Does the patient need discharge transport arranged? No   Financial Resource Strain   How hard is it for you to pay for the very basics like food, housing, medical care, and heating? Not very   Housing Stability   In the last 12 months, was there a time when you were not able to pay the mortgage or rent on time? N   At any time in the past 12 months, were you homeless or living in a shelter (including now)? N   Transportation Needs   In the past 12 months, has lack of transportation kept you from medical appointments or from getting medications? no   In the past 12 months, has lack of transportation kept you from meetings, work, or from getting things needed for daily living? No     Care Transitions Note  04/10/25  Met with patient and spouse at bedside for assessment, introduced self and role. Pt lives at home with his wife in a house. Pt is well supported by his 3 kids and 9 grandchildren. Prior to admit patient was independent, able to complete all ADLs. They have a shower chair, cane, walker, and wheelchair at home. The only DME pt uses is the wheelchair, and usually just when they go out in public. Spouse states they do not go out much, but have these things in case they need them. Pt has SOLO PICC which does not need much management according to pt and wife. Pt is not active with home care at this time but had HC in the past. PT/OT pending for pt. If pt needs home care, pt and spouse would like [facility]. ADOD a few days pending improvement in symptoms. Will follow for final dispo recs.  Ely Rose, MSW, LSW

## 2025-04-10 NOTE — PROGRESS NOTES
Bijan Ibanez is a 64 y.o. male on day 2 of admission presenting with Dysphagia.    Subjective   Afebrile, VSS. No acute issues overnight. Pt reports ongoing mucositis pain, fairly controlled with PCA. Is able to tolerate PO sucralfate. Has been getting out of bed to bathroom. Suctioning his secretions. Endorses intermittent mid abdominal discomfort, mild, no diarrhea associated. Denies nausea at this time. No emesis. ROS otherwise unremarkable.       Objective     Physical Exam  Constitutional:       Appearance: He is ill-appearing. He is not toxic-appearing.   HENT:      Head: Normocephalic.      Nose: Nose normal.      Mouth/Throat:      Mouth: Mucous membranes are moist.      Pharynx: Posterior oropharyngeal erythema present.      Comments: G3 mucositis  Eyes:      Pupils: Pupils are equal, round, and reactive to light.   Cardiovascular:      Rate and Rhythm: Regular rhythm. Tachycardia present.      Heart sounds: Normal heart sounds.   Pulmonary:      Breath sounds: Examination of the left-lower field reveals rales. Decreased breath sounds and rales present.   Abdominal:      General: Bowel sounds are normal.      Palpations: Abdomen is soft.      Tenderness: There is abdominal tenderness (intermittent).   Musculoskeletal:      Right upper arm: Swelling present.      Left upper arm: Swelling present.      Cervical back: Normal range of motion and neck supple.      Right lower leg: Edema present.      Left lower leg: Edema present.   Skin:     General: Skin is warm and dry.      Capillary Refill: Capillary refill takes less than 2 seconds.      Coloration: Skin is pale.   Neurological:      General: No focal deficit present.      Mental Status: He is alert and oriented to person, place, and time. Mental status is at baseline.   Psychiatric:         Mood and Affect: Mood normal.         Thought Content: Thought content normal.         Judgment: Judgment normal.         Last Recorded Vitals  Blood pressure  "118/78, pulse (!) 119, temperature 36.6 °C (97.8 °F), temperature source Temporal, resp. rate 16, height 1.835 m (6' 0.24\"), weight 90.7 kg (199 lb 15.3 oz), SpO2 96%.  Intake/Output last 3 Shifts:  I/O last 3 completed shifts:  In: 2535.3 (28 mL/kg) [I.V.:1177.4 (13 mL/kg); Blood:702.9; IV Piggyback:655]  Out: 475 (5.2 mL/kg) [Urine:475 (0.1 mL/kg/hr)]  Weight: 90.7 kg     Relevant Results  Scheduled medications  acyclovir, 250 mg, intravenous, q12h  [Held by provider] apixaban, 5 mg, oral, BID  [Held by provider] atorvastatin, 40 mg, oral, Daily  [Held by provider] caffeine, 200 mg, oral, BID  fluconazole, 400 mg, intravenous, q24h  [Held by provider] gabapentin, 300 mg, oral, Nightly  [Held by provider] lisinopril, 10 mg, oral, Daily  [Held by provider] metoprolol succinate XL, 100 mg, oral, Daily  metoprolol, 5 mg, intravenous, q6h  pantoprazole, 40 mg, intravenous, Daily  piperacillin-tazobactam, 3.375 g, intravenous, q6h  potassium chloride, 40 mEq, intravenous, Once  sucralfate, 1 g, oral, q6h SONAL  vancomycin, 1,500 mg, intravenous, Once      Continuous medications  HYDROmorphone,   lactated Ringer's, 100 mL/hr, Last Rate: 100 mL/hr (04/09/25 1415)      PRN medications  PRN medications: albuterol, alteplase, [Held by provider] furosemide, lidocaine-diphenhydrAMINE-Maalox 1:1:1, moisturizing mouth, naloxone, ondansetron ODT **OR** ondansetron, prochlorperazine **OR** prochlorperazine **OR** [DISCONTINUED] prochlorperazine, vancomycin          This patient has a central line   Reason for the central line remaining today?  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 male with PMHx of HTN, HLD, CAD, MI (PCI '10, on ASA), atrial fibrillation, RCC (s/p partial R nephrectomy '13), GERD, BPH, arthritis, MRSA bacteremia (s/p 4 " weeks IV Vancomycin which completed 3/3/25), G1 CIPN,  R subclavian and R axillary DVT r/t PICC (2/3/25; on Eliquis), R rib/flank pain and Burkitt's lyphoma who presents from outpatient clinic for mucositis, dysphagia, N/V and HA.    D14 C4 DA-R-EPOCH    ONC:  H/O RCC (s/p partial R nephrectomy '13)  # Burkitt's Lymphoma  :: Advanced stage, renal, liver, spleen, extensive marrow involvement with positive t[8; 14] translocation  :: S/p 3 cycles DA-R-EPOCH (C2 etop reduced by 25% and again in C3 with escalated doxorubicin by 20% in C3)  :: Cycle 4 started 3/28/25   - Patient will need LP with IT TY-C this admit   - Sent genetic testing for dihydropyrimidine dehydrogenase (DPD), UGT1A1, CYP3A, CYP3B: Pending    Heme:  H/O R subclavian and R axillary DVT r/t PICC (2/3/25; on Eliquis)  # Pancytopenia, 2/2 chemotherapy  - Hold Eliquis i/s/o thrombocytopenia  - Transfuse for Hgb <7g/dL & Plt <20k/uL (severe mucositis, increased risk for bleed)    ID:  H/O MRSA bacteremia (s/p 4 weeks IV Vanco completed 3/3)  Ppx:  Acyclovir (VZV, HSV), Fluconazole (Antifungal), Levofloxacin (PNA)  # Profound Neutropenia, 2/2 chemotherapy  :: Blood cultures (4/9): NGTD  :: HSV Swab (4/9): Pending  :: Resp Viral Panel (4/10): Pending  - Neutropenic precautions  - Continue Vancomycin (4/10-) for hx MRSA, stop after 48hrs of negative cultures  - Continue pip-tazo (4/9-) given profound neutropenia and G4 mucositis    FEN/GI/Renal:  H/O BPH, GERD  Admit wt: 90.7 kg  F: LR @ 150 mL/hr  E: PRN  N:  NPO except ice chips  # Nausea/Vomiting, likely r/t chemotherapy  - Continue ondansetron & compazine PRN  # G3-4 Mucositis  - Continue Dilaudid PCA (4/8-)  - Continue Biotene, sucralfate  - Send CMV, HSV to r/o infectious mucositis  # Dysphagia  :: MBSS 4/3 with mild to moderate oropharyngeal dysphagia, SLP cleared for regular diet & thin liquids  :: GI consulted 4/9, supportive care, unable to perform EGD with pancytopenia and profound  neutropenia  - Consult SLP, continue with supportive care   # Oliguria, likely related to dehydration  :: Bladder scan 4/9 with no urine  - mIVF as above for hydration support  - Repeat Bladder scans q6h with no void & x3 with PVR  # GI ppx: Continue home Protonix    NEURO:  # Post LP HA (LP with IT Methotrexate 4/4)  - Caffeine pills BID PRN- held d/t mucositis.  HA improved with Dilaudid PCA.  - Hold home Neurontin d/t NPO and re-start as mucositis resolves    CV:  H/O HTN, HLD, CAD, MI (PCI '10, on ASA), atrial fibrillation  Echo 2/3/25:  LVEF 60-65% with no LVH, normal RV function, and mod dilated LA  Awaiting EKG for QTc monitoring  # Tachycardia, likely 2/2 hypovolemia  - Increase mIVF as above  - Hold home Lipitor and Lisinopril d/t NPO and re-start as mucositis resolves  - IV Metoprolol while patient is NPO and convert back to PO when able  - Hold home Lasix given dehydration  - Keep Mg+ > 2 and K+ > 4.0    MISC:  # Bilateral upper extremity edema  - Duplex BUE to r/o DVT  - DVT ppx on hold d/t thrombocytopenia    Malignant Hematology Checklist:  ID Prophylaxis:  Acyclovir (VZV, HSV), Fluconazole (Antifungal), Levofloxacin (PNA)  Code status:  Full Code  Lines/drains:  L DBL PICC  Primary oncologist:  Dr. Mckeon  Disposition:  Admit to [facility]     Patient seen and discussed on rounds with Dr. Lazo.    Terrence Veras, APRN-CNP

## 2025-04-10 NOTE — PROGRESS NOTES
Speech-Language Pathology  Adult Inpatient Swallow Treatment    Patient Name: Bijan Ibanez  MRN: [id number]  Today's Date: 4/10/2025   Start Time: 1053  Stop Time: 1106  Time Calculation (min): 13    Impression:   Dysphagia therapy to ascertain readiness for PO diet vs need for an instrumental exam. Pt using Yankauer frequently. Trials of ice chips and sip of water results in post prandial cough and self oral suctioning. Pt continues to demonstrate concerns for pharyngeal dysphagia. Pt not ready for an instrumental exam at this time. SLP to continue to follow.      Recommendations:  NPO     Frequent, aggressive oral care is strongly recommended to improve infection control as well as reduce dental plaque and bacteria on oropharyngeal surfaces which may increase the risk nosocomial infections, including pneumonia.     OK for small amounts of ice chips (one at a time, 5-7x/hour) for pleasure/swallow stimulation, but only after aggressive oral care to avoid colonization of bacteria within oral cavity.     Goal:   Pt will tolerate least restrictive diet without s/s aspiration, respiratory compromise, or overt difficulty throughout admission.  Start: 04/09/25, End: 5/9/25  Status: goal initiated       Plan:  SLP Services Indicated: Yes  Frequency: 2x week  Discussed POC with patient and spouse  SLP - OK to Discharge    Pain:   0-10  0 = No pain.     Inpatient Education:  Extensive education provided to patient and spouse regarding current swallow function, recommendations/results, and POC.      Consultations/Referrals/Coordination of Services:   N/A

## 2025-04-10 NOTE — PROGRESS NOTES
Physical Therapy                 Therapy Communication Note    Patient Name: Bijan Ibanez  MRN: [id number]  Department: [facility]  Room: 4029/4029-A  Today's Date: 4/10/2025     Discipline: Physical Therapy    PT Missed Visit: Yes     Missed Visit Reason: Missed Visit Reason: Patient placed on medical hold (Pt with Hgb 5.9. Will re-attempt as appropriate)    Missed Time: 09:58 AM

## 2025-04-11 ENCOUNTER — APPOINTMENT (OUTPATIENT)
Dept: RADIOLOGY | Facility: HOSPITAL | Age: 65
DRG: 157 | End: 2025-04-11
Payer: MEDICARE

## 2025-04-11 LAB
ALBUMIN SERPL BCP-MCNC: 2.3 G/DL (ref 3.4–5)
ANION GAP SERPL CALC-SCNC: 14 MMOL/L (ref 10–20)
BASOPHILS # BLD AUTO: 0 X10*3/UL (ref 0–0.1)
BASOPHILS NFR BLD AUTO: 0 %
BUN SERPL-MCNC: 13 MG/DL (ref 6–23)
CALCIUM SERPL-MCNC: 7.5 MG/DL (ref 8.6–10.6)
CHLORIDE SERPL-SCNC: 105 MMOL/L (ref 98–107)
CO2 SERPL-SCNC: 22 MMOL/L (ref 21–32)
CREAT SERPL-MCNC: 0.41 MG/DL (ref 0.5–1.3)
CYTOMEGALOVIRUS DNA PCR, (NON-BLOOD SPECI: NOT DETECTED
EGFRCR SERPLBLD CKD-EPI 2021: >90 ML/MIN/1.73M*2
EOSINOPHIL # BLD AUTO: 0.01 X10*3/UL (ref 0–0.7)
EOSINOPHIL NFR BLD AUTO: 7.7 %
ERYTHROCYTE [DISTWIDTH] IN BLOOD BY AUTOMATED COUNT: 15.7 % (ref 11.5–14.5)
GLUCOSE SERPL-MCNC: 88 MG/DL (ref 74–99)
HCT VFR BLD AUTO: 22.7 % (ref 41–52)
HGB BLD-MCNC: 7.2 G/DL (ref 13.5–17.5)
IMM GRANULOCYTES # BLD AUTO: 0 X10*3/UL (ref 0–0.7)
IMM GRANULOCYTES NFR BLD AUTO: 0 % (ref 0–0.9)
LYMPHOCYTES # BLD AUTO: 0.07 X10*3/UL (ref 1.2–4.8)
LYMPHOCYTES NFR BLD AUTO: 53.8 %
MAGNESIUM SERPL-MCNC: 1.64 MG/DL (ref 1.6–2.4)
MCH RBC QN AUTO: 28.2 PG (ref 26–34)
MCHC RBC AUTO-ENTMCNC: 31.7 G/DL (ref 32–36)
MCV RBC AUTO: 89 FL (ref 80–100)
MONOCYTES # BLD AUTO: 0.03 X10*3/UL (ref 0.1–1)
MONOCYTES NFR BLD AUTO: 23.1 %
NEUTROPHILS # BLD AUTO: 0.02 X10*3/UL (ref 1.2–7.7)
NEUTROPHILS NFR BLD AUTO: 15.4 %
NRBC BLD-RTO: 0 /100 WBCS (ref 0–0)
PHOSPHATE SERPL-MCNC: 1.9 MG/DL (ref 2.5–4.9)
PLATELET # BLD AUTO: 17 X10*3/UL (ref 150–450)
POTASSIUM SERPL-SCNC: 3.4 MMOL/L (ref 3.5–5.3)
RBC # BLD AUTO: 2.55 X10*6/UL (ref 4.5–5.9)
SODIUM SERPL-SCNC: 138 MMOL/L (ref 136–145)
VANCOMYCIN SERPL-MCNC: 17.9 UG/ML (ref 5–20)
VANCOMYCIN SERPL-MCNC: 46.1 UG/ML (ref 5–20)
WBC # BLD AUTO: 0.1 X10*3/UL (ref 4.4–11.3)

## 2025-04-11 PROCEDURE — 83735 ASSAY OF MAGNESIUM: CPT

## 2025-04-11 PROCEDURE — 2500000002 HC RX 250 W HCPCS SELF ADMINISTERED DRUGS (ALT 637 FOR MEDICARE OP, ALT 636 FOR OP/ED)

## 2025-04-11 PROCEDURE — 80069 RENAL FUNCTION PANEL: CPT

## 2025-04-11 PROCEDURE — 99221 1ST HOSP IP/OBS SF/LOW 40: CPT | Performed by: STUDENT IN AN ORGANIZED HEALTH CARE EDUCATION/TRAINING PROGRAM

## 2025-04-11 PROCEDURE — 2500000005 HC RX 250 GENERAL PHARMACY W/O HCPCS

## 2025-04-11 PROCEDURE — 2500000004 HC RX 250 GENERAL PHARMACY W/ HCPCS (ALT 636 FOR OP/ED)

## 2025-04-11 PROCEDURE — 2500000004 HC RX 250 GENERAL PHARMACY W/ HCPCS (ALT 636 FOR OP/ED): Performed by: PHYSICIAN ASSISTANT

## 2025-04-11 PROCEDURE — 1200000003 HC ONCOLOGY  ROOM WITH TELEMETRY DAILY

## 2025-04-11 PROCEDURE — 80202 ASSAY OF VANCOMYCIN: CPT

## 2025-04-11 PROCEDURE — 36415 COLL VENOUS BLD VENIPUNCTURE: CPT | Performed by: PHYSICIAN ASSISTANT

## 2025-04-11 PROCEDURE — 85025 COMPLETE CBC W/AUTO DIFF WBC: CPT

## 2025-04-11 PROCEDURE — 2500000001 HC RX 250 WO HCPCS SELF ADMINISTERED DRUGS (ALT 637 FOR MEDICARE OP): Performed by: PHYSICIAN ASSISTANT

## 2025-04-11 PROCEDURE — 71275 CT ANGIOGRAPHY CHEST: CPT

## 2025-04-11 PROCEDURE — 87040 BLOOD CULTURE FOR BACTERIA: CPT | Performed by: PHYSICIAN ASSISTANT

## 2025-04-11 PROCEDURE — 2550000001 HC RX 255 CONTRASTS: Performed by: PHYSICIAN ASSISTANT

## 2025-04-11 PROCEDURE — 71275 CT ANGIOGRAPHY CHEST: CPT | Performed by: RADIOLOGY

## 2025-04-11 PROCEDURE — 99233 SBSQ HOSP IP/OBS HIGH 50: CPT | Performed by: INTERNAL MEDICINE

## 2025-04-11 RX ORDER — MAGNESIUM SULFATE HEPTAHYDRATE 40 MG/ML
4 INJECTION, SOLUTION INTRAVENOUS ONCE
Status: COMPLETED | OUTPATIENT
Start: 2025-04-11 | End: 2025-04-11

## 2025-04-11 RX ORDER — DILTIAZEM HCL/D5W 125 MG/125
5 PLASTIC BAG, INJECTION (ML) INTRAVENOUS CONTINUOUS
Status: DISCONTINUED | OUTPATIENT
Start: 2025-04-11 | End: 2025-04-16

## 2025-04-11 RX ORDER — DIGOXIN 0.25 MG/ML
500 INJECTION INTRAMUSCULAR; INTRAVENOUS ONCE
Status: COMPLETED | OUTPATIENT
Start: 2025-04-11 | End: 2025-04-11

## 2025-04-11 RX ORDER — NYSTATIN 100000 [USP'U]/ML
5 SUSPENSION ORAL 3 TIMES DAILY
Status: DISCONTINUED | OUTPATIENT
Start: 2025-04-11 | End: 2025-04-18 | Stop reason: HOSPADM

## 2025-04-11 RX ORDER — DIGOXIN 0.25 MG/ML
250 INJECTION INTRAMUSCULAR; INTRAVENOUS ONCE
Status: DISCONTINUED | OUTPATIENT
Start: 2025-04-11 | End: 2025-04-11

## 2025-04-11 RX ORDER — MEROPENEM AND SODIUM CHLORIDE 1 G/50ML
1 INJECTION, SOLUTION INTRAVENOUS EVERY 8 HOURS
Status: COMPLETED | OUTPATIENT
Start: 2025-04-11 | End: 2025-04-14

## 2025-04-11 RX ADMIN — Medication: at 01:36

## 2025-04-11 RX ADMIN — SUCRALFATE 1 G: 1 SUSPENSION ORAL at 00:59

## 2025-04-11 RX ADMIN — NYSTATIN 500000 UNITS: 100000 SUSPENSION ORAL at 21:53

## 2025-04-11 RX ADMIN — SUCRALFATE 1 G: 1 SUSPENSION ORAL at 06:23

## 2025-04-11 RX ADMIN — MEROPENEM AND SODIUM CHLORIDE 1 G: 1 INJECTION, SOLUTION INTRAVENOUS at 14:39

## 2025-04-11 RX ADMIN — POTASSIUM PHOSPHATE, MONOBASIC AND POTASSIUM PHOSPHATE, DIBASIC 21 MMOL: 224; 236 INJECTION, SOLUTION, CONCENTRATE INTRAVENOUS at 14:39

## 2025-04-11 RX ADMIN — SUCRALFATE 1 G: 1 SUSPENSION ORAL at 13:09

## 2025-04-11 RX ADMIN — SODIUM CHLORIDE, SODIUM LACTATE, POTASSIUM CHLORIDE, AND CALCIUM CHLORIDE 150 ML/HR: .6; .31; .03; .02 INJECTION, SOLUTION INTRAVENOUS at 06:23

## 2025-04-11 RX ADMIN — FLUCONAZOLE 400 MG: 2 INJECTION, SOLUTION INTRAVENOUS at 21:53

## 2025-04-11 RX ADMIN — PANTOPRAZOLE SODIUM 40 MG: 40 INJECTION, POWDER, FOR SOLUTION INTRAVENOUS at 09:51

## 2025-04-11 RX ADMIN — MEROPENEM AND SODIUM CHLORIDE 1 G: 1 INJECTION, SOLUTION INTRAVENOUS at 21:53

## 2025-04-11 RX ADMIN — ACYCLOVIR SODIUM 250 MG: 50 INJECTION, SOLUTION INTRAVENOUS at 13:09

## 2025-04-11 RX ADMIN — SODIUM CHLORIDE, SODIUM LACTATE, POTASSIUM CHLORIDE, AND CALCIUM CHLORIDE 150 ML/HR: .6; .31; .03; .02 INJECTION, SOLUTION INTRAVENOUS at 13:09

## 2025-04-11 RX ADMIN — ACYCLOVIR SODIUM 250 MG: 50 INJECTION, SOLUTION INTRAVENOUS at 00:59

## 2025-04-11 RX ADMIN — Medication 10 MG/HR: at 17:25

## 2025-04-11 RX ADMIN — PIPERACILLIN SODIUM AND TAZOBACTAM SODIUM 3.38 G: 3; .375 INJECTION, SOLUTION INTRAVENOUS at 09:51

## 2025-04-11 RX ADMIN — IOHEXOL 40 ML: 350 INJECTION, SOLUTION INTRAVENOUS at 18:00

## 2025-04-11 RX ADMIN — MAGNESIUM SULFATE HEPTAHYDRATE 4 G: 40 INJECTION, SOLUTION INTRAVENOUS at 09:51

## 2025-04-11 RX ADMIN — DIGOXIN 500 MCG: 0.25 INJECTION INTRAMUSCULAR; INTRAVENOUS at 03:12

## 2025-04-11 RX ADMIN — Medication 5 MG/HR: at 02:08

## 2025-04-11 RX ADMIN — VANCOMYCIN HYDROCHLORIDE 1500 MG: 1.5 INJECTION, POWDER, LYOPHILIZED, FOR SOLUTION INTRAVENOUS at 11:39

## 2025-04-11 RX ADMIN — PIPERACILLIN SODIUM AND TAZOBACTAM SODIUM 3.38 G: 3; .375 INJECTION, SOLUTION INTRAVENOUS at 04:09

## 2025-04-11 ASSESSMENT — PAIN SCALES - GENERAL
PAINLEVEL_OUTOF10: 0 - NO PAIN
PAINLEVEL_OUTOF10: 6
PAINLEVEL_OUTOF10: 6
PAINLEVEL_OUTOF10: 8

## 2025-04-11 ASSESSMENT — COGNITIVE AND FUNCTIONAL STATUS - GENERAL
STANDING UP FROM CHAIR USING ARMS: A LITTLE
HELP NEEDED FOR BATHING: A LITTLE
DRESSING REGULAR UPPER BODY CLOTHING: A LITTLE
PERSONAL GROOMING: A LITTLE
WALKING IN HOSPITAL ROOM: A LITTLE
TURNING FROM BACK TO SIDE WHILE IN FLAT BAD: A LITTLE
MOBILITY SCORE: 19
MOVING TO AND FROM BED TO CHAIR: A LITTLE
EATING MEALS: A LITTLE
DAILY ACTIVITIY SCORE: 18
TOILETING: A LITTLE
CLIMB 3 TO 5 STEPS WITH RAILING: A LITTLE
DRESSING REGULAR LOWER BODY CLOTHING: A LITTLE

## 2025-04-11 ASSESSMENT — PAIN - FUNCTIONAL ASSESSMENT
PAIN_FUNCTIONAL_ASSESSMENT: 0-10

## 2025-04-11 NOTE — PROGRESS NOTES
Vancomycin Dosing by Pharmacy- FOLLOW UP    Bijan Ibanez is a 64 y.o. year old male who Pharmacy has been consulted for vancomycin dosing for empiric coverage for septic patient with previous MRSA bacteremia . Based on the patient's indication and renal status this patient is being dosed based on a goal AUC of 500-600.     Renal function is currently stable.    Current vancomycin dose: 1500 mg given every 12 hours    Estimated vancomycin AUC on current dose: 502 mg/L.hr     Visit Vitals  /68 (BP Location: Right arm, Patient Position: Lying)   Pulse (!) 115   Temp 37.2 °C (99 °F) (Temporal)   Resp 16        Lab Results   Component Value Date    CREATININE 0.41 (L) 2025    CREATININE 0.42 (L) 04/10/2025    CREATININE 0.40 (L) 2025    CREATININE 0.42 (L) 2025        Patient weight is as follows:   Vitals:    25 1642   Weight: 90.7 kg (199 lb 15.3 oz)       Cultures:  No results found for the encounter in last 14 days.       I/O last 3 completed shifts:  In: 4756.3 (52.4 mL/kg) [I.V.:1253.8 (13.8 mL/kg); Blood:842.5; IV Piggyback:2660]  Out: 200 (2.2 mL/kg) [Urine:200 (0.1 mL/kg/hr)]  Weight: 90.7 kg   I/O during current shift:  I/O this shift:  In: 1773.1 [I.V.:1118.1; IV Piggyback:655]  Out: 0     Temp (24hrs), Av.9 °C (98.4 °F), Min:36.1 °C (97 °F), Max:37.5 °C (99.5 °F)      Assessment/Plan    Within goal AUC range. Continue current vancomycin regimen.    This dosing regimen is predicted by [employer] to result in the following pharmacokinetic parameters:  Loading dose: N/A  Regimen: 1500 mg IV every 12 hours.  Start time: 23:39 on 2025  Exposure target: AUC24 (range)400-600 mg/L.hr   DCA59-15: 474 mg/L.hr  AUC24,ss: 502 mg/L.hr  Probability of AUC24 > 400: 81 %  Ctrough,ss: 16.4 mg/L  Probability of Ctrough,ss > 20: 29 %      The next level will be obtained on  at mid-AM labs. May be obtained sooner if clinically indicated.   Will continue to monitor renal function  daily while on vancomycin and order serum creatinine at least every 48 hours if not already ordered.  Follow for continued vancomycin needs, clinical response, and signs/symptoms of toxicity.       Willow Salazar, PharmD

## 2025-04-11 NOTE — SIGNIFICANT EVENT
"Rapid Response Note - NACR     Called to RR at 2206 for tachycardia. Pt found to be tachycardic to 160s-170s which on review of chart had been sustained for about the past 30 minutes. EKG showing Afib RVR; pt has a known history of eloy. Patient denying any concerning sx- complaining of a \"pinch-like\" central chest pain that comes and goes. Otherwise denies SOB beyond his baseline or lightheadedness/dizziness.     Pt was given 5mg metoprolol x2 with some improvement in HR to 120-130s. No changes in other vitals.     Reassessment of telemetry at 2345 with rates sustaining in 120-130s, still in Afib. No additional interventions for now.     Recommendations include increasing dose and/or frequency of standing IV metop dose- currently getting 5mg q6 and he was getting 100mg metoprolol succinate outpatient. Ideally will have HR's sustained <140.     "

## 2025-04-11 NOTE — CONSULTS
"Reason for Consult: afib w/ RVR     Subjective   64 y.o. male w/  PMH of HTN, HLD, CAD, MI (PCI '10, on ASA), atrial fibrillation, RCC (s/p partial R nephrectomy '13), GERD, BPH, arthritis, MRSA bacteremia (s/p 4 weeks IV Vancomycin which completed 3/3/25), G1 CIPN, R subclavian and R axillary DVT r/t PICC (2/3/25; on Eliquis), R rib/flank pain and Burkitt's lyphoma who is admitted to heme/onc service for mucositis, dysphagia, n/v.      Cardiology fellow was contacted overnight, per sig event note:    \"his evening, the patient went into atrial fibrillation with RVR - unclear when he was last in sinus; there was an EKG from 4/8 that demonstrated sinus rhythm, however telemetry prior to 2206 on 4/10 is not available and has been in a fib with RVR since that time. Due to inability to take PO meds including metoprolol succinate (home med), he has been given scheduled IV metoprolol 5 mg q6h with modest improvement in rates from 170s to 150s then 110s, but now back up to 130s-150s.     Given the severe anemia and thrombocytopenia he is not on anticoagulation. Would avoid amiodarone at this time due to risk of stroke with pharmacologic cardioversion (unclear when he went into a fib). On exam, he does not appear to be significantly volume overloaded, and recent echo with preserved EF. BP 100s-120s/60s-70s. Recommend rate control and starting diltiazem 5 mg/hr for now. \"    Talking to patient he is asymptomatic w/ the afib, no CP/SOB etc.     Today patient is Afib, , afib, /66.   Tele Afib HR 120s   Labs: crt 0.41. CBC 0.1>7.2/22<17, TSH 1.94 (1/2025)     Inpatient cardiac meds: s/p dig load, dilt gtt at 5.   Review of Systems  Pertinent items are noted in HPI.     Objective   Visit Vitals  /66 (BP Location: Right arm, Patient Position: Lying)   Pulse 103   Temp 36.5 °C (97.7 °F) (Temporal)   Resp 16   Ht 1.835 m (6' 0.24\")   Wt 90.7 kg (199 lb 15.3 oz)   SpO2 95%   BMI 26.94 kg/m²   Smoking Status Former "   BSA 2.15 m²        Physical Exam  General: awake, alert, no acute distress  CV: tachycardic no MRG  Resp: CTAB  LE: no edema, no cyanosis  Neuo: grossly normal  Psych: pleasant      Onco-Echo Limited (Strain and 3D) 2/2025    PHYSICIAN INTERPRETATION:  Left Ventricle: The left ventricular systolic function is normal, with a visually estimated ejection fraction of 60-65%. There are no regional left ventricular wall motion abnormalities. The left ventricular cavity size is normal. There is normal septal and normal posterior left ventricular wall thickness. There is no evidence of left ventricular hypertrophy. Left ventricular diastolic filling is indeterminate due to E/A wave fusion.  Left Atrium: The left atrial size is moderately dilated.  Right Ventricle: The right ventricle is normal in size. There is normal right ventricular global systolic function.  Right Atrium: The right atrium is normal in size.  Aortic Valve: The aortic valve is probably trileaflet. There is no evidence of aortic valve regurgitation.  Mitral Valve: The mitral valve is normal in structure. There is trace mitral valve regurgitation.  Tricuspid Valve: The tricuspid valve is structurally normal. There is trace tricuspid regurgitation. The right ventricular systolic pressure is unable to be estimated.  Pulmonic Valve: The pulmonic valve is not well visualized. There is physiologic pulmonic valve regurgitation.  Pericardium: Trivial pericardial effusion.  Aorta: The aortic root is normal.  Pulmonary Artery: The pulmonary artery is not well visualized.  Systemic Veins: The inferior vena cava appears normal in size.  In comparison to the previous echocardiogram(s): Compared with study dated 1/18/2025, no significant change.    ONCO-CARDIOLOGY:  Vital Signs: The patients heart rate during the study was 119 beats per minute.  The patients blood pressure was 93/63 during the study.  Machine: This study was performed on the [employer].  Previous  Study: The patient had a previous Onco-Cardiology echocardiogram dated  1/18/2025.  Comments: Septal wall not well visualized with reduced strain values measured.  Other wall segments with preserved GLS similar to prior 1/18/2025 study.      Onco-Cardiology Measurements:  Historical Measurements from Previous Study  2D EF (Biplane)                     64%%  3D EF                               51%%  Global Longitudinal Strain (GLS) -19.9%%    Current Measurements  2D EF (Biplane)                     64%%  3D EF                               58%%  Global Longitudinal Strain (GLS) -17.6%%    GLS Tracking Quality:      CONCLUSIONS:  1. The left ventricular systolic function is normal, with a visually estimated ejection fraction of 60-65%.  2. Left ventricular diastolic filling is indeterminate due to E/A wave fusion.  3. There is no evidence of left ventricular hypertrophy.  4. There is normal right ventricular global systolic function.  5. The left atrial size is moderately dilated.  6. The pulmonary artery is not well visualized.      Lab Review   Lab Results   Component Value Date     04/11/2025    K 3.4 (L) 04/11/2025     04/11/2025    CO2 22 04/11/2025    BUN 13 04/11/2025    CREATININE 0.41 (L) 04/11/2025    GLUCOSE 88 04/11/2025    CALCIUM 7.5 (L) 04/11/2025     Lab Results   Component Value Date    WBC 0.1 (LL) 04/11/2025    HGB 7.2 (L) 04/11/2025    HCT 22.7 (L) 04/11/2025    MCV 89 04/11/2025    PLT 17 (LL) 04/11/2025       Troponin I, High Sensitivity (CMC)   Date/Time Value Ref Range Status   01/28/2025 09:16 AM 5 0 - 53 ng/L Final   01/10/2025 09:06 PM 6 0 - 53 ng/L Final     BNP   Date/Time Value Ref Range Status   01/10/2025 09:06 PM 5 0 - 99 pg/mL Final     Triglycerides   Date/Time Value Ref Range Status   01/13/2025 10:24  (H) 0 - 149 mg/dL Final     Comment:     Age              Desirable        Borderline         High        Very High  SEX:B           mg/dL             mg/dL                mg/dL      mg/dL  <=14D                       ----               ----        ----  15D-365D                    ----               ----        ----  1Y-9Y           0-74               75-99             >=100       ----  10Y-19Y        0-89                            >=130       ----  20Y-24Y        0-114             115-149             >=150      ----  >= 25Y         0-149             150-199             200-499    >=500      Venipuncture immediately after or during the administration of Metamizole may lead to falsely low results. Testing should be performed immediately prior to Metamizole dosing.   01/11/2025 12:35  (H) 0 - 149 mg/dL Final     Comment:     Age              Desirable        Borderline         High        Very High  SEX:B           mg/dL             mg/dL               mg/dL      mg/dL  <=14D                       ----               ----        ----  15D-365D                    ----               ----        ----  1Y-9Y           0-74               75-99             >=100       ----  10Y-19Y        0-89                            >=130       ----  20Y-24Y        0-114             115-149             >=150      ----  >= 25Y         0-149             150-199             200-499    >=500      Venipuncture immediately after or during the administration of Metamizole may lead to falsely low results. Testing should be performed immediately prior to Metamizole dosing.         Assessment/Plan     Impression:  64 y.o. male w/  PMH of HTN, HLD, CAD, MI (PCI '10, on ASA), atrial fibrillation, RCC (s/p partial R nephrectomy '13), GERD, BPH, arthritis, MRSA bacteremia (s/p 4 weeks IV Vancomycin which completed 3/3/25), G1 CIPN, R subclavian and R axillary DVT r/t PICC (2/3/25; on Eliquis), R rib/flank pain and Burkitt's lyphoma who is admitted to heme/onc service for mucositis, dysphagia, n/v.      Patient w/ atrial fibrillation. CHADS-VASc 5 points, stroke risk 7.2%  per year. EF 60-65%.     Recommendations:  -Patient unable to do AC at this time given his thrombocytopenia, when safe to do so from an onch perspective patient should be restarted on AC  -At this time focus on rate control w/ HR <130s, he is asymptomatic. Ok to cont dilt gtt at 10.   -cont to monitor on tele    Cardiology will follow along, but will not see patient daily. When patient is able to take PO please contact the cardiology team to determine oral regimen.     Recommendations are preliminary until note is co-signed by an attending.     Manuel Luna MD  PGY-5 Cardiovascular Medicine Fellow    Thank you for the opportunity to contribute to the care of this patient. Above recommendations discussed with Dr. Kebede. If further questions arise, please page the general cardiology consult pager at [phone] on weekdays 7AM - 6PM and weekends 7AM - 2PM, or at [phone] at all other times.

## 2025-04-11 NOTE — SIGNIFICANT EVENT
Contacted regarding a fib with RVR. In brief, this is a 65 yo man with PMH of HTN, HLD, CAD, MI (PCI '10, on ASA), atrial fibrillation, RCC (s/p partial R nephrectomy '13), GERD, BPH, arthritis, MRSA bacteremia (s/p 4 weeks IV Vancomycin which completed 3/3/25), G1 CIPN, R subclavian and R axillary DVT r/t PICC (2/3/25; on Eliquis), R rib/flank pain and Burkitt's lyphoma who is admitted to heme/onc service for mucositis, dysphagia, n/v. This evening, the patient went into atrial fibrillation with RVR - unclear when he was last in sinus; there was an EKG from 4/8 that demonstrated sinus rhythm, however telemetry prior to 2206 on 4/10 is not available and has been in a fib with RVR since that time. Due to inability to take PO meds including metoprolol succinate (home med), he has been given scheduled IV metoprolol 5 mg q6h with modest improvement in rates from 170s to 150s then 110s, but now back up to 130s-150s.    Given the severe anemia and thrombocytopenia he is not on anticoagulation. Would avoid amiodarone at this time due to risk of stroke with pharmacologic cardioversion (unclear when he went into a fib). On exam, he does not appear to be significantly volume overloaded, and recent echo with preserved EF. BP 100s-120s/60s-70s. Recommend rate control and starting diltiazem 5 mg/hr for now.

## 2025-04-11 NOTE — SIGNIFICANT EVENT
Post blood transfusion HR 170s. Pt asymptomatic. RN obtained EKG, administered scheduled 5 mg IV metoprolol. HR down to 130s-140s. Rapid response called for further guidance. Second dose of IV metoprolol given. Vitals as charted. Bolus given. Around 2320, HR still unchanged. A third dose of 5 mg IV metoprolol given. Aroound 0000, team notified that HR is still unchanged and sustaining above 140s most of the time. Pt still asymptomatic. No new orders from team. Pt remains on telemetry at this time.     0145 CICU fellow recommends cardizem drip  0230 team notified of HR sustaining 170s-180s after drip running for about 15 minutes. No other new interventions from team at this time.   0300 dig load ordered  0350 notified team that HR still spiking to 160s-170s. Pt asymptomatic at this time

## 2025-04-11 NOTE — SIGNIFICANT EVENT
Rapid Response Nurse Note: [x] Rapid Response   Pager time:   Arrival time:   Event end time:   Location: [place] [id number]      Rapid response initiated by:  [] Rapid Response RN [] Family [] Nursing Supervisor [] Physician   [] RADAR auto-page [] Sepsis auto-page [x] RN [] RT   [] NP/PA [] Other:     Primary reason for call:   [] BAT [] New CPAP/BiPAP [] Bleeding [] Change in mental status   [] Chest pain [] Code blue [] FiO2 >/= 50% [] HR </= 40 bpm   [x] HR >/= 130 bpm [] Hyperglycemia [] Hypoglycemia [] RADAR    [] RR </= 8 bpm [] RR >/= 30 bpm [] SBP </= 90 mmHg [] SpO2 < 90%   [] Seizure [] Sepsis [] Staff concern:     Initial VS  Vitals:    04/10/25 2203 04/10/25 2207 04/10/25 2216 04/10/25 2224   BP: 100/66 104/69 128/81 113/74   BP Location:       Patient Position:       Pulse: (!) 152 (!) 160 (!) 167 (!) 127   Resp:       Temp:       TempSrc:       SpO2:   95% 96%   Weight:       Height:            Providers present at bedside (if applicable): Sheree HAYNES (Heme/mal) & Elías NAGY (NACR)  Objective/Subjective data relevant to event (if applicable): Called to bedside for afib with RVR  Interventions:  [] None [] ABG [] Assist w/ICU transfer [] BAT paged    [] Bag mask [] Blood [] Cardioversion [] Code Blue   [] Code blue for intubation [] Code status changed [] Chest x-ray [x] EKG   [] IV fluid/bolus [] KUB x-ray [] Labs/cultures [x] Medication   [] Nebulizer treatment [] NIPPV (CPAP/BiPAP) [] Oxygen [] Oral airway   [] Peripheral IV [] Palliative care consult [] CT/MRI [] Sepsis protocol    [] Suctioned [] Other:       Outcome:  [] Coded and  [] Code blue for intubation [] Coded and transferred to ICU []  on division   [x] Remained on division (no change) [] Remained on division + additional monitoring [] Remained in ED [] Transferred to ED   [] Transferred to ICU [] Transferred to inpatient status [] Transferred for interventions (procedure) [] Transferred to ICU stepdown    [] Transferred  to surgery [] Transferred to telemetry [] Sepsis protocol [] STEMI protocol   [] Stroke protocol []      Additional Comments: Pt asymptomatic. On tele.  Heart rate 160 -170's afib with RVR.  Confirmed with 12 lead EKG.  5 mg Metoprolol iv x 2 doses given with rate control.  Per providers, pt may stay on floor.  Pt unable to take po, Metoprolol 5 mg iv every 6 hours scheduled.  MD johnson with heart rate less than or equal to 140 at this time.  Bedside nurse instructed to page rapid response for any concerns or acute change in condition/VS      Follow up @0100 :  heart rate in the 150's to 160's.  Pt resting comfortably, No distress.  Sheree NP awaiting recommendations from CICU fellow for further interventions.      Follow up @0125 plan to start Cardizem gtt for rate control at 5 mg per hour.  Repeat labs obtained to reassess electrolytes and cbc.      Follow up @ 0300.  Pt on Cardizem gtt at 5 mg per hour.  Heart rate 140 -160's.  Dr Mckinley at bedside to reassess.  After reassessment and review of tele, Dr Mckinley spoke to CICU fellow.  Plan to Dig load.  Pt remains asymptomatic.     0312 Digoxin load started.  See MAR.

## 2025-04-11 NOTE — PROGRESS NOTES
Physical Therapy                 Therapy Communication Note    Patient Name: Bijan Ibanez  MRN: [id number]  Department: [facility]  Room: 4029/4029-A  Today's Date: 4/11/2025     Discipline: Physical Therapy    PT Missed Visit: Yes     Missed Visit Reason: Missed Visit Reason: Patient placed on medical hold (RN hold. Pt with Rapid due to tachycardic and is not appropriate to participate in PT this date.)    Missed Time: 12;21 PM

## 2025-04-11 NOTE — PROGRESS NOTES
Bijan Ibanez is a 64 y.o. male on day  4 of admission presenting with Dysphagia.    Subjective   Afebrile, VSS. No acute issues overnight. Pt reports ongoing mucositis pain, fairly controlled with PCA. Is able to tolerate PO sucralfate. Minimal oral intake, single ice chips only. Suctioning his secretions. A. Fib w RVR in late evening yesterday (4/10), no response w IV Metoprolol.  CICU fellow contacted & evaluated patient in early am (0200) 4/11 for persistent Tachycardia.  Patient started on Cardizem drip.  NACR also notified at 0300 for HR of 150-170 & Digoxin load initiated.  Denies nausea at this time. No emesis. ROS otherwise unremarkable.       Objective     Vitals:    04/11/25 1320 04/11/25 1335 04/11/25 1436 04/11/25 1536   BP: 136/76 144/61 128/70 128/68   BP Location: Right arm Right arm Right arm Right arm   Patient Position: Lying Lying Lying Lying   Pulse: (!) 116 (!) 118 (!) 115 (!) 115   Resp: 18 16 16 16   Temp: 37.4 °C (99.3 °F) 36.9 °C (98.4 °F) 37.4 °C (99.4 °F) 37.2 °C (99 °F)   TempSrc: Temporal Temporal Temporal Temporal   SpO2: 95% 93% 94% 94%   Weight:       Height:            Physical Exam  Constitutional:       Appearance: He is ill-appearing. He is not toxic-appearing.   HENT:      Head: Normocephalic.      Nose: Nose normal.      Mouth/Throat:      Mouth: Mucous membranes are moist.      Pharynx: Posterior oropharyngeal erythema present.      Comments: G3 mucositis  Eyes:      Pupils: Pupils are equal, round, and reactive to light.   Cardiovascular:      Rate and Rhythm: Tachycardia present. Rhythm irregular.      Heart sounds: Normal heart sounds.   Pulmonary:      Breath sounds: Examination of the right-middle field reveals rales. Examination of the right-lower field reveals rales. Decreased breath sounds and rales present.   Abdominal:      General: Bowel sounds are normal.      Palpations: Abdomen is soft.      Tenderness: There is abdominal tenderness (intermittent).    Musculoskeletal:      Right upper arm: Swelling present.      Left upper arm: Swelling present.      Cervical back: Normal range of motion and neck supple.      Right lower leg: Edema present.      Left lower leg: Edema present.   Skin:     General: Skin is warm and dry.      Capillary Refill: Capillary refill takes less than 2 seconds.      Coloration: Skin is pale.   Neurological:      General: No focal deficit present.      Mental Status: He is alert and oriented to person, place, and time. Mental status is at baseline.   Psychiatric:         Mood and Affect: Mood normal.         Thought Content: Thought content normal.         Judgment: Judgment normal.     Scheduled medications  acyclovir, 250 mg, intravenous, q12h  [Held by provider] apixaban, 5 mg, oral, BID  [Held by provider] atorvastatin, 40 mg, oral, Daily  [Held by provider] caffeine, 200 mg, oral, BID  fluconazole, 400 mg, intravenous, q24h  [Held by provider] gabapentin, 300 mg, oral, Nightly  [Held by provider] lisinopril, 10 mg, oral, Daily  meropenem, 1 g, intravenous, q8h  [Held by provider] metoprolol succinate XL, 100 mg, oral, Daily  [Held by provider] metoprolol, 5 mg, intravenous, q6h  nystatin, 5 mL, Swish & Spit, TID  pantoprazole, 40 mg, intravenous, Daily  potassium phosphate, 21 mmol, intravenous, Once  sucralfate, 1 g, oral, q6h SONAL  vancomycin, 1,500 mg, intravenous, q12h      Continuous medications  dilTIAZem, 10 mg/hr, Last Rate: 10 mg/hr (04/11/25 1232)  HYDROmorphone,   lactated Ringer's, 150 mL/hr, Last Rate: 150 mL/hr (04/11/25 1309)      PRN medications  PRN medications: albuterol, alteplase, [Held by provider] furosemide, lidocaine-diphenhydrAMINE-Maalox 1:1:1, moisturizing mouth, naloxone, ondansetron ODT **OR** ondansetron, prochlorperazine **OR** prochlorperazine **OR** [DISCONTINUED] prochlorperazine, vancomycin    Results from last 7 days   Lab Units 04/11/25  0105 04/10/25  1312 04/10/25  0426 04/09/25  0632  04/08/25  0937   WBC AUTO x10*3/uL 0.1* 0.1* 0.1* 0.1* 0.2*   HEMOGLOBIN g/dL 7.2* 6.5* 5.9* 6.2* 6.9*   HEMATOCRIT % 22.7* 19.4* 18.4* 18.9* 21.6*   PLATELETS AUTO x10*3/uL 17* 19* 12* 21* 33*   NEUTROS PCT AUTO % 15.4  --  14.3  --  54.1   LYMPHO PCT MAN %  --   --   --  100.0  --    LYMPHS PCT AUTO % 53.8  --  71.4  --  41.7   MONO PCT MAN %  --   --   --  0.0  --    MONOS PCT AUTO % 23.1  --  14.3  --  0.0   EOSINO PCT MAN %  --   --   --  0.0  --    EOS PCT AUTO % 7.7  --  0.0  --  4.2     Results from last 7 days   Lab Units 04/11/25  0105 04/10/25  0426 04/09/25  1721 04/09/25  0632 04/08/25  0937   SODIUM mmol/L 138 137 136   < > 137   POTASSIUM mmol/L 3.4* 3.4* 3.5   < > 4.1   CHLORIDE mmol/L 105 104 104   < > 102   CO2 mmol/L 22 23 23   < > 28   BUN mg/dL 13 14 16   < > 23   CREATININE mg/dL 0.41* 0.42* 0.40*   < > 0.41*   CALCIUM mg/dL 7.5* 7.6* 7.6*   < > 8.4*   PROTEIN TOTAL g/dL  --   --   --   --  4.6*   BILIRUBIN TOTAL mg/dL  --   --   --   --  1.3*   ALK PHOS U/L  --   --   --   --  42   ALT U/L  --   --   --   --  12   AST U/L  --   --   --   --  6*   GLUCOSE mg/dL 88 109* 116*   < > 143*    < > = values in this interval not displayed.     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 male with PMHx of HTN, HLD, CAD, MI (PCI '10, on ASA), atrial fibrillation, RCC (s/p partial R nephrectomy '13), GERD, BPH, arthritis, MRSA bacteremia (s/p 4 weeks IV Vancomycin which completed 3/3/25), G1 CIPN,  R subclavian and R axillary DVT r/t PICC (2/3/25; on Eliquis), R rib/flank pain and Burkitt's lyphoma who presents from outpatient clinic for mucositis, dysphagia, N/V and HA.    D15 C4 DA-R-EPOCH    ONC:  H/O RCC (s/p partial R nephrectomy '13)  # Burkitt's  Lymphoma  :: Advanced stage, renal, liver, spleen, extensive marrow involvement with positive t[8; 14] translocation  :: S/p 3 cycles DA-R-EPOCH (C2 etop reduced by 25% and again in C3 with escalated doxorubicin by 20% in C3)  :: Cycle 4 started 3/28/25   - Patient will need LP with IT TY-C this admit   - Sent genetic testing for dihydropyrimidine dehydrogenase (DPD), UGT1A1, CYP3A, CYP3B: Pending    Heme:  H/O R subclavian and R axillary DVT r/t PICC (2/3/25; on Eliquis)  # Pancytopenia, neutropenic 2/2 chemotherapy  - Neulasta (4/4/25)   - Hold Eliquis i/s/o thrombocytopenia  - Transfuse for Hgb <7g/dL & Plt <20k/uL (severe mucositis, increased risk for bleed)  # Bilateral upper extremity edema  - Duplex BUE to r/o DVT (4/10) - Compared with study from 2/3/2025, Acute DVT in left subclavian vein     and left axillary vein.   - DVT ppx on hold d/t thrombocytopenia  - Plan CT chest PE protocol (4/11) -     ID:  H/O MRSA bacteremia (s/p 4 weeks IV Vanco completed 3/3)  Ppx:  Acyclovir (VZV, HSV), Fluconazole (Antifungal), Levofloxacin (PNA)  # Profound Neutropenia, 2/2 chemotherapy  :: Blood cultures (4/9): NGTD  :: HSV Swab (4/9):  ND  :: Resp Viral Panel (4/10): ND  - Neutropenic precautions  - Continue Vancomycin (4/10-) for hx MRSA, stop after 48hrs of negative cultures. Re-eval on 4/12.  - Continue pip-tazo (4/9-4/11) given profound neutropenia and G4 mucositis.  Initiated (4/11) Meropenem.  -    FEN/GI/Renal:  H/O BPH, GERD  Admit wt: 90.7 kg  F: LR @ 150 mL/hr  E: PRN  N:  NPO except ice chips  # Nausea/Vomiting, likely r/t chemotherapy  - Continue ondansetron & compazine PRN  # G3-4 Mucositis  - Continue Dilaudid PCA (4/8-)  - Continue Biotene, sucralfate  - Send CMV, HSV to r/o infectious mucositis  - Nystatin swish & spit   # Dysphagia  :: MBSS 4/3 with mild to moderate oropharyngeal dysphagia, SLP cleared for regular diet & thin liquids  :: GI consulted 4/9, supportive care, unable to perform EGD with  pancytopenia and profound neutropenia  - Consult SLP, continue with supportive care (4/10) - NPO, OK for small amounts of ice chips (one at a time, 5-7x/hour) for pleasure/swallow stimulation, but only after aggressive oral care to avoid colonization of bacteria within oral cavity.   # Oliguria, likely related to dehydration  :: Bladder scan 4/9 with no urine  - mIVF as above for hydration support  - Repeat Bladder scans q6h with no void & x3 with PVR  # GI ppx: Continue home Protonix    NEURO:  # Post LP HA (LP with IT Methotrexate 4/4)  - Caffeine pills BID PRN- held d/t mucositis.  HA improved with Dilaudid PCA.  - Hold home Neurontin d/t NPO and re-start as mucositis resolves    CV:  H/O HTN, HLD, CAD, MI (PCI '10, on ASA), atrial fibrillation  Echo 2/3/25:  LVEF 60-65% with no LVH, normal RV function, and mod dilated LA  Awaiting EKG for QTc monitoring  # Tachycardia, likely 2/2 hypovolemia  - Increase mIVF as above  - Hold home Lipitor and Lisinopril d/t NPO and re-start as mucositis resolves  - IV Metoprolol while patient is NPO and convert back to PO when able  - Hold home Lasix given dehydration  - Keep Mg+ > 2 and K+ > 4.0  - A. fib w RVR (4/10) - initiated (4/11) Cardizem drip 5 mg/hr, increase to 10 mg/hr per Cardiology  consult recommendations.  Can increase to a max of 20 mg/hr.  -Digoxin load 500 mcg at 0200 (4/11).  Holding additional Digoxin dosing for now      Malignant Hematology Checklist:  ID Prophylaxis:  Acyclovir (VZV, HSV), Fluconazole (Antifungal), Levofloxacin (PNA)  Code status:  Full Code  Lines/drains:  L DBL PICC  Primary oncologist:  Dr. Mckeon  Disposition:  Admit to [facility]     Patient seen and discussed on rounds with Dr. Lazo.    Antoni Bello PA-C

## 2025-04-11 NOTE — CARE PLAN
Problem: Pain - Adult  Goal: Verbalizes/displays adequate comfort level or baseline comfort level  4/11/2025 0554 by Meenakshi Diaz RN  Outcome: Progressing  4/11/2025 0554 by Meenakshi Diaz RN  Outcome: Progressing     Problem: Safety - Adult  Goal: Free from fall injury  4/11/2025 0554 by Meenakshi Diaz RN  Outcome: Progressing  4/11/2025 0554 by Meenakshi Diaz RN  Outcome: Progressing     Problem: Discharge Planning  Goal: Discharge to home or other facility with appropriate resources  4/11/2025 0554 by Meenakshi Diaz RN  Outcome: Progressing  4/11/2025 0554 by Meenakshi Diaz RN  Outcome: Progressing     Problem: Chronic Conditions and Co-morbidities  Goal: Patient's chronic conditions and co-morbidity symptoms are monitored and maintained or improved  4/11/2025 0554 by Meenakshi Diaz RN  Outcome: Progressing  4/11/2025 0554 by Meenakshi Diaz RN  Outcome: Progressing     Problem: Nutrition  Goal: Nutrient intake appropriate for maintaining nutritional needs  4/11/2025 0554 by Meenakshi Diaz RN  Outcome: Progressing  4/11/2025 0554 by Meenakshi Diaz RN  Outcome: Progressing     Problem: Skin  Goal: Participates in plan/prevention/treatment measures  4/11/2025 0554 by Meenakshi Diaz RN  Outcome: Progressing  4/11/2025 0554 by Meenakshi Diaz RN  Flowsheets (Taken 4/11/2025 0554)  Participates in plan/prevention/treatment measures: Discuss with provider PT/OT consult  Goal: Prevent/manage excess moisture  4/11/2025 0554 by Meenakshi Diaz RN  Outcome: Progressing  4/11/2025 0554 by Meenakshi Diaz RN  Flowsheets (Taken 4/11/2025 0554)  Prevent/manage excess moisture: Moisturize dry skin  Goal: Prevent/minimize sheer/friction injuries  4/11/2025 0554 by Meenakshi Diaz RN  Outcome: Progressing  4/11/2025 0554 by Meenakshi Diaz RN  Flowsheets (Taken 4/11/2025 0554)  Prevent/minimize sheer/friction injuries: Increase activity/out of bed for meals  Goal: Promote/optimize nutrition  4/11/2025 0554 by Meenakshi  THONY Diaz  Outcome: Progressing  4/11/2025 0554 by Meenakshi Diaz RN  Flowsheets (Taken 4/11/2025 0554)  Promote/optimize nutrition: Monitor/record intake including meals  Goal: Promote skin healing  4/11/2025 0554 by Meenakshi Diaz RN  Outcome: Progressing  4/11/2025 0554 by Meenakshi Diaz RN  Flowsheets (Taken 4/11/2025 0554)  Promote skin healing: Assess skin/pad under line(s)/device(s)       The clinical goals for the shift include pt will remain VSS throughout shift

## 2025-04-12 LAB
ABO GROUP (TYPE) IN BLOOD: NORMAL
ADENOVIRUS QPCR, VIRC: NOT DETECTED
ALBUMIN SERPL BCP-MCNC: 2.4 G/DL (ref 3.4–5)
ANION GAP SERPL CALC-SCNC: 16 MMOL/L (ref 10–20)
ANTIBODY SCREEN: NORMAL
BASOPHILS # BLD MANUAL: 0 X10*3/UL (ref 0–0.1)
BASOPHILS NFR BLD MANUAL: 0 %
BLOOD EXPIRATION DATE: NORMAL
BUN SERPL-MCNC: 14 MG/DL (ref 6–23)
CALCIUM SERPL-MCNC: 7.5 MG/DL (ref 8.6–10.6)
CHLORIDE SERPL-SCNC: 103 MMOL/L (ref 98–107)
CO2 SERPL-SCNC: 23 MMOL/L (ref 21–32)
CREAT SERPL-MCNC: 0.39 MG/DL (ref 0.5–1.3)
DISPENSE STATUS: NORMAL
EGFRCR SERPLBLD CKD-EPI 2021: >90 ML/MIN/1.73M*2
EOSINOPHIL # BLD MANUAL: 0 X10*3/UL (ref 0–0.7)
EOSINOPHIL NFR BLD MANUAL: 0 %
ERYTHROCYTE [DISTWIDTH] IN BLOOD BY AUTOMATED COUNT: 16 % (ref 11.5–14.5)
GLUCOSE SERPL-MCNC: 146 MG/DL (ref 74–99)
HCT VFR BLD AUTO: 24.7 % (ref 41–52)
HGB BLD-MCNC: 7.9 G/DL (ref 13.5–17.5)
IMM GRANULOCYTES # BLD AUTO: 0.01 X10*3/UL (ref 0–0.7)
IMM GRANULOCYTES NFR BLD AUTO: 1.6 % (ref 0–0.9)
LYMPHOCYTES # BLD MANUAL: 0.34 X10*3/UL (ref 1.2–4.8)
LYMPHOCYTES NFR BLD MANUAL: 57.1 %
MAGNESIUM SERPL-MCNC: 2.24 MG/DL (ref 1.6–2.4)
MCH RBC QN AUTO: 28.4 PG (ref 26–34)
MCHC RBC AUTO-ENTMCNC: 32 G/DL (ref 32–36)
MCV RBC AUTO: 89 FL (ref 80–100)
MONOCYTES # BLD MANUAL: 0.09 X10*3/UL (ref 0.1–1)
MONOCYTES NFR BLD MANUAL: 14.3 %
NEUTS SEG # BLD MANUAL: 0.17 X10*3/UL (ref 1.2–7)
NEUTS SEG NFR BLD MANUAL: 28.6 %
NRBC BLD-RTO: 6.3 /100 WBCS (ref 0–0)
PHOSPHATE SERPL-MCNC: 2.4 MG/DL (ref 2.5–4.9)
PLATELET # BLD AUTO: 18 X10*3/UL (ref 150–450)
POTASSIUM SERPL-SCNC: 2.3 MMOL/L (ref 3.5–5.3)
POTASSIUM SERPL-SCNC: 2.8 MMOL/L (ref 3.5–5.3)
PRODUCT BLOOD TYPE: 6200
PRODUCT CODE: NORMAL
RBC # BLD AUTO: 2.78 X10*6/UL (ref 4.5–5.9)
RBC MORPH BLD: ABNORMAL
RH FACTOR (ANTIGEN D): NORMAL
SODIUM SERPL-SCNC: 139 MMOL/L (ref 136–145)
TOTAL CELLS COUNTED BLD: 7
UNIT ABO: NORMAL
UNIT NUMBER: NORMAL
UNIT RH: NORMAL
UNIT VOLUME: 172
WBC # BLD AUTO: 0.6 X10*3/UL (ref 4.4–11.3)

## 2025-04-12 PROCEDURE — 2500000004 HC RX 250 GENERAL PHARMACY W/ HCPCS (ALT 636 FOR OP/ED)

## 2025-04-12 PROCEDURE — 1200000003 HC ONCOLOGY  ROOM WITH TELEMETRY DAILY

## 2025-04-12 PROCEDURE — P9037 PLATE PHERES LEUKOREDU IRRAD: HCPCS

## 2025-04-12 PROCEDURE — 2500000001 HC RX 250 WO HCPCS SELF ADMINISTERED DRUGS (ALT 637 FOR MEDICARE OP): Performed by: PHYSICIAN ASSISTANT

## 2025-04-12 PROCEDURE — 83735 ASSAY OF MAGNESIUM: CPT

## 2025-04-12 PROCEDURE — 36430 TRANSFUSION BLD/BLD COMPNT: CPT

## 2025-04-12 PROCEDURE — 80069 RENAL FUNCTION PANEL: CPT

## 2025-04-12 PROCEDURE — 85007 BL SMEAR W/DIFF WBC COUNT: CPT

## 2025-04-12 PROCEDURE — 2500000004 HC RX 250 GENERAL PHARMACY W/ HCPCS (ALT 636 FOR OP/ED): Performed by: PHYSICIAN ASSISTANT

## 2025-04-12 PROCEDURE — 86901 BLOOD TYPING SEROLOGIC RH(D): CPT

## 2025-04-12 PROCEDURE — 84132 ASSAY OF SERUM POTASSIUM: CPT | Performed by: PHYSICIAN ASSISTANT

## 2025-04-12 PROCEDURE — 2500000002 HC RX 250 W HCPCS SELF ADMINISTERED DRUGS (ALT 637 FOR MEDICARE OP, ALT 636 FOR OP/ED)

## 2025-04-12 PROCEDURE — 85027 COMPLETE CBC AUTOMATED: CPT

## 2025-04-12 RX ORDER — POTASSIUM CHLORIDE 29.8 MG/ML
40 INJECTION INTRAVENOUS ONCE
Status: DISCONTINUED | OUTPATIENT
Start: 2025-04-12 | End: 2025-04-12

## 2025-04-12 RX ORDER — POTASSIUM CHLORIDE 29.8 MG/ML
40 INJECTION INTRAVENOUS EVERY 4 HOURS
Status: COMPLETED | OUTPATIENT
Start: 2025-04-12 | End: 2025-04-13

## 2025-04-12 RX ORDER — SODIUM CHLORIDE AND POTASSIUM CHLORIDE 150; 900 MG/100ML; MG/100ML
50 INJECTION, SOLUTION INTRAVENOUS CONTINUOUS
Status: DISCONTINUED | OUTPATIENT
Start: 2025-04-12 | End: 2025-04-14

## 2025-04-12 RX ORDER — POTASSIUM CHLORIDE 29.8 MG/ML
40 INJECTION INTRAVENOUS EVERY 4 HOURS
Status: COMPLETED | OUTPATIENT
Start: 2025-04-12 | End: 2025-04-12

## 2025-04-12 RX ADMIN — Medication: at 04:37

## 2025-04-12 RX ADMIN — SUCRALFATE 1 G: 1 SUSPENSION ORAL at 17:36

## 2025-04-12 RX ADMIN — VANCOMYCIN HYDROCHLORIDE 1500 MG: 1.5 INJECTION, POWDER, LYOPHILIZED, FOR SOLUTION INTRAVENOUS at 16:08

## 2025-04-12 RX ADMIN — NYSTATIN 500000 UNITS: 100000 SUSPENSION ORAL at 20:28

## 2025-04-12 RX ADMIN — SODIUM CHLORIDE AND POTASSIUM CHLORIDE 50 ML/HR: .9; .15 SOLUTION INTRAVENOUS at 17:36

## 2025-04-12 RX ADMIN — ACYCLOVIR SODIUM 250 MG: 50 INJECTION, SOLUTION INTRAVENOUS at 13:08

## 2025-04-12 RX ADMIN — MEROPENEM AND SODIUM CHLORIDE 1 G: 1 INJECTION, SOLUTION INTRAVENOUS at 06:33

## 2025-04-12 RX ADMIN — PANTOPRAZOLE SODIUM 40 MG: 40 INJECTION, POWDER, FOR SOLUTION INTRAVENOUS at 08:46

## 2025-04-12 RX ADMIN — Medication 10 MG/HR: at 05:06

## 2025-04-12 RX ADMIN — POTASSIUM CHLORIDE 40 MEQ: 29.8 INJECTION, SOLUTION INTRAVENOUS at 20:29

## 2025-04-12 RX ADMIN — NYSTATIN 500000 UNITS: 100000 SUSPENSION ORAL at 16:08

## 2025-04-12 RX ADMIN — MEROPENEM AND SODIUM CHLORIDE 1 G: 1 INJECTION, SOLUTION INTRAVENOUS at 14:37

## 2025-04-12 RX ADMIN — FLUCONAZOLE 400 MG: 2 INJECTION, SOLUTION INTRAVENOUS at 20:30

## 2025-04-12 RX ADMIN — SUCRALFATE 1 G: 1 SUSPENSION ORAL at 13:37

## 2025-04-12 RX ADMIN — Medication 10 MG/HR: at 18:02

## 2025-04-12 RX ADMIN — VANCOMYCIN HYDROCHLORIDE 1500 MG: 1.5 INJECTION, POWDER, LYOPHILIZED, FOR SOLUTION INTRAVENOUS at 01:10

## 2025-04-12 RX ADMIN — MEROPENEM AND SODIUM CHLORIDE 1 G: 1 INJECTION, SOLUTION INTRAVENOUS at 22:35

## 2025-04-12 RX ADMIN — POTASSIUM CHLORIDE 40 MEQ: 29.8 INJECTION, SOLUTION INTRAVENOUS at 13:34

## 2025-04-12 RX ADMIN — POTASSIUM CHLORIDE 40 MEQ: 29.8 INJECTION, SOLUTION INTRAVENOUS at 08:46

## 2025-04-12 RX ADMIN — NYSTATIN 500000 UNITS: 100000 SUSPENSION ORAL at 08:46

## 2025-04-12 ASSESSMENT — PAIN - FUNCTIONAL ASSESSMENT
PAIN_FUNCTIONAL_ASSESSMENT: 0-10

## 2025-04-12 ASSESSMENT — PAIN SCALES - GENERAL
PAINLEVEL_OUTOF10: 3
PAINLEVEL_OUTOF10: 6
PAINLEVEL_OUTOF10: 5 - MODERATE PAIN
PAINLEVEL_OUTOF10: 6

## 2025-04-12 NOTE — CARE PLAN
The patient's goals for the shift include      The clinical goals for the shift include Patient will maintian heart rate < 140 throughout the night 4/12/25 @ 0700      Problem: Skin  Goal: Participates in plan/prevention/treatment measures  Flowsheets (Taken 4/12/2025 0537)  Participates in plan/prevention/treatment measures: Discuss with provider PT/OT consult  Goal: Prevent/manage excess moisture  Flowsheets (Taken 4/12/2025 0537)  Prevent/manage excess moisture: Cleanse incontinence/protect with barrier cream  Goal: Prevent/minimize sheer/friction injuries  Flowsheets (Taken 4/12/2025 0537)  Prevent/minimize sheer/friction injuries: Increase activity/out of bed for meals  Goal: Promote/optimize nutrition  Flowsheets (Taken 4/12/2025 0537)  Promote/optimize nutrition: Discuss with provider if NPO > 2 days  Goal: Promote skin healing  Flowsheets (Taken 4/12/2025 0537)  Promote skin healing: Assess skin/pad under line(s)/device(s)

## 2025-04-12 NOTE — CARE PLAN
Problem: Pain - Adult  Goal: Verbalizes/displays adequate comfort level or baseline comfort level  Outcome: Progressing     Problem: Safety - Adult  Goal: Free from fall injury  Outcome: Progressing     Problem: Discharge Planning  Goal: Discharge to home or other facility with appropriate resources  Outcome: Progressing     Problem: Chronic Conditions and Co-morbidities  Goal: Patient's chronic conditions and co-morbidity symptoms are monitored and maintained or improved  Outcome: Progressing     Problem: Nutrition  Goal: Nutrient intake appropriate for maintaining nutritional needs  Outcome: Progressing     Problem: Skin  Goal: Participates in plan/prevention/treatment measures  Outcome: Progressing  Flowsheets (Taken 4/12/2025 1517)  Participates in plan/prevention/treatment measures: Discuss with provider PT/OT consult  Goal: Prevent/manage excess moisture  Outcome: Progressing  Flowsheets (Taken 4/12/2025 1517)  Prevent/manage excess moisture: Moisturize dry skin  Goal: Prevent/minimize sheer/friction injuries  Outcome: Progressing  Flowsheets (Taken 4/12/2025 1517)  Prevent/minimize sheer/friction injuries:   Complete micro-shifts as needed if patient unable. Adjust patient position to relieve pressure points, not a full turn   Use pull sheet   HOB 30 degrees or less  Goal: Promote/optimize nutrition  Outcome: Progressing  Flowsheets (Taken 4/12/2025 1517)  Promote/optimize nutrition: Discuss with provider if NPO > 2 days  Goal: Promote skin healing  Outcome: Progressing  Flowsheets (Taken 4/12/2025 1517)  Promote skin healing: Assess skin/pad under line(s)/device(s)       The clinical goals for the shift include Patient's pain will be controlled with PCA and rate pain 5 out of 10 or less throughout shift

## 2025-04-12 NOTE — PROGRESS NOTES
Bijan Ibanez is a 64 y.o. male on day  4 of admission presenting with Dysphagia.    Subjective   Afebrile, VSS. No acute issues overnight. Pt reports mucositis pain is better & able to swallow better, good control with dilaudid PCA. Wants to attempt clear liquids. Suctioning his secretions less often. A. Fib w RVR with good rate control on Diltiazem infusion.   Denies nausea at this time. No emesis. ROS otherwise unremarkable.         Objective     Vitals:    04/12/25 1312 04/12/25 1327 04/12/25 1334 04/12/25 1626   BP: 119/77 125/74 125/74 111/72   BP Location: Right arm   Right arm   Patient Position: Lying   Lying   Pulse: 96 93 93 106   Resp: 18 18 18 18   Temp: 36.7 °C (98.1 °F) 36.7 °C (98.1 °F) 36.7 °C (98.1 °F) 36.6 °C (97.9 °F)   TempSrc: Temporal Temporal Temporal Temporal   SpO2: 95% 97% 97% 95%   Weight:       Height:             Physical Exam  Constitutional:       Appearance: He is ill-appearing. He is not toxic-appearing.   HENT:      Head: Normocephalic.      Nose: Nose normal.      Mouth/Throat:      Mouth: Mucous membranes are moist.      Pharynx: Posterior oropharyngeal erythema present.      Comments: G3 mucositis  Eyes:      Pupils: Pupils are equal, round, and reactive to light.   Cardiovascular:      Rate and Rhythm: Tachycardia present. Rhythm irregular.      Heart sounds: Normal heart sounds.   Pulmonary:      Breath sounds: Examination of the right-middle field reveals rales. Examination of the right-lower field reveals rales. Decreased breath sounds and rales present.   Abdominal:      General: Bowel sounds are normal.      Palpations: Abdomen is soft.      Tenderness: There is abdominal tenderness (intermittent).   Musculoskeletal:      Right upper arm: Swelling present.      Left upper arm: Swelling present.      Cervical back: Normal range of motion and neck supple.      Right lower leg: Edema present.      Left lower leg: Edema present.   Skin:     General: Skin is warm and dry.       Capillary Refill: Capillary refill takes less than 2 seconds.      Coloration: Skin is pale.   Neurological:      General: No focal deficit present.      Mental Status: He is alert and oriented to person, place, and time. Mental status is at baseline.   Psychiatric:         Mood and Affect: Mood normal.         Thought Content: Thought content normal.         Judgment: Judgment normal.     Scheduled medications  acyclovir, 250 mg, intravenous, q12h  [Held by provider] apixaban, 5 mg, oral, BID  [Held by provider] atorvastatin, 40 mg, oral, Daily  [Held by provider] caffeine, 200 mg, oral, BID  fluconazole, 400 mg, intravenous, q24h  [Held by provider] gabapentin, 300 mg, oral, Nightly  [Held by provider] lisinopril, 10 mg, oral, Daily  meropenem, 1 g, intravenous, q8h  [Held by provider] metoprolol succinate XL, 100 mg, oral, Daily  [Held by provider] metoprolol, 5 mg, intravenous, q6h  nystatin, 5 mL, Swish & Spit, TID  pantoprazole, 40 mg, intravenous, Daily  sucralfate, 1 g, oral, q6h SONAL  vancomycin, 1,500 mg, intravenous, q12h      Continuous medications  dilTIAZem, 10 mg/hr, Last Rate: 10 mg/hr (04/12/25 1802)  HYDROmorphone,   potassium chloride in 0.9%NaCl, 50 mL/hr, Last Rate: 50 mL/hr (04/12/25 1736)      PRN medications  PRN medications: albuterol, alteplase, [Held by provider] furosemide, lidocaine-diphenhydrAMINE-Maalox 1:1:1, moisturizing mouth, naloxone, ondansetron ODT **OR** ondansetron, prochlorperazine **OR** prochlorperazine **OR** [DISCONTINUED] prochlorperazine, vancomycin    Results from last 7 days   Lab Units 04/12/25  0443 04/11/25  0105 04/10/25  1312 04/10/25  0426 04/09/25  0632 04/08/25  0937   WBC AUTO x10*3/uL 0.6* 0.1* 0.1* 0.1*   < > 0.2*   HEMOGLOBIN g/dL 7.9* 7.2* 6.5* 5.9*   < > 6.9*   HEMATOCRIT % 24.7* 22.7* 19.4* 18.4*   < > 21.6*   PLATELETS AUTO x10*3/uL 18* 17* 19* 12*   < > 33*   NEUTROS PCT AUTO %  --  15.4  --  14.3  --  54.1   LYMPHO PCT MAN % 57.1  --   --   --    <  >  --    LYMPHS PCT AUTO %  --  53.8  --  71.4  --  41.7   MONO PCT MAN % 14.3  --   --   --    < >  --    MONOS PCT AUTO %  --  23.1  --  14.3  --  0.0   EOSINO PCT MAN % 0.0  --   --   --    < >  --    EOS PCT AUTO %  --  7.7  --  0.0  --  4.2    < > = values in this interval not displayed.     Results from last 7 days   Lab Units 04/12/25  0443 04/11/25  0105 04/10/25  0426 04/09/25  0632 04/08/25  0937   SODIUM mmol/L 139 138 137   < > 137   POTASSIUM mmol/L 2.8* 3.4* 3.4*   < > 4.1   CHLORIDE mmol/L 103 105 104   < > 102   CO2 mmol/L 23 22 23   < > 28   BUN mg/dL 14 13 14   < > 23   CREATININE mg/dL 0.39* 0.41* 0.42*   < > 0.41*   CALCIUM mg/dL 7.5* 7.5* 7.6*   < > 8.4*   PROTEIN TOTAL g/dL  --   --   --   --  4.6*   BILIRUBIN TOTAL mg/dL  --   --   --   --  1.3*   ALK PHOS U/L  --   --   --   --  42   ALT U/L  --   --   --   --  12   AST U/L  --   --   --   --  6*   GLUCOSE mg/dL 146* 88 109*   < > 143*    < > = values in this interval not displayed.     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 male with PMHx of HTN, HLD, CAD, MI (PCI '10, on ASA), atrial fibrillation, RCC (s/p partial R nephrectomy '13), GERD, BPH, arthritis, MRSA bacteremia (s/p 4 weeks IV Vancomycin which completed 3/3/25), G1 CIPN,  R subclavian and R axillary DVT r/t PICC (2/3/25; on Eliquis), R rib/flank pain and Burkitt's lyphoma who presents from outpatient clinic for mucositis, dysphagia, N/V and HA.    D16 C4 DA-R-EPOCH    ONC:  H/O RCC (s/p partial R nephrectomy '13)  # Burkitt's Lymphoma  :: Advanced stage, renal, liver, spleen, extensive marrow involvement with positive t[8; 14] translocation  :: S/p 3 cycles DA-R-EPOCH (C2 etop reduced by 25% and again in C3 with escalated  doxorubicin by 20% in C3)  :: Cycle 4 started 3/28/25   - Patient will need LP with IT TY-C this admit   - Sent genetic testing for dihydropyrimidine dehydrogenase (DPD), UGT1A1, CYP3A, CYP3B: Pending    Heme:  H/O R subclavian and R axillary DVT r/t PICC (2/3/25; on Eliquis)  # Pancytopenia, neutropenic 2/2 chemotherapy.  Evidence of wbc recovery (0.6) on 4/12  - Neulasta (4/4/25)   - Hold Eliquis i/s/o thrombocytopenia  - Transfuse for Hgb <7g/dL & Plt <12 k/uL (severe mucositis, increased risk for bleed)  # Bilateral upper extremity edema  - Duplex BUE to r/o DVT (4/10) - Compared with study from 2/3/2025, Acute DVT in left subclavian vein     and left axillary vein.   - DVT ppx on hold d/t thrombocytopenia  - Plan CT chest PE protocol (4/11) - No evidence of acute pulmonary embolism.     ID:  H/O MRSA bacteremia (s/p 4 weeks IV Vanco completed 3/3)  Ppx:  Acyclovir (VZV, HSV), Fluconazole (Antifungal), Levofloxacin (PNA)  # Profound Neutropenia, 2/2 chemotherapy  :: Blood cultures (4/9, 4/11): NGTD  :: HSV Swab (4/9):  ND  :: Resp Viral Panel (4/10): ND  - Neutropenic precautions  - Continue Vancomycin (4/10-) for hx MRSA, stop after 48hrs of negative cultures. Re-eval on 4/12.  - Continue pip-tazo (4/9-4/11) given profound neutropenia and G4 mucositis.  Initiated (4/11) Meropenem.  -    FEN/GI/Renal:  H/O BPH, GERD  Admit wt: 90.7 kg  F: LR @ 150 mL/hr  E: PRN  N:  Adv to clear liquids (4/12)  # Nausea/Vomiting, likely r/t chemotherapy  - Continue ondansetron & compazine PRN  # G3-4 Mucositis  - Continue Dilaudid PCA (4/8-)  - Continue Biotene, sucralfate  - Send CMV, HSV to r/o infectious mucositis  - Nystatin swish & spit   # Dysphagia  :: MBSS 4/3 with mild to moderate oropharyngeal dysphagia, SLP cleared for regular diet & thin liquids  :: GI consulted 4/9, supportive care, unable to perform EGD with pancytopenia and profound neutropenia  - Consult SLP, continue with supportive care (4/10) - NPO, OK for  small amounts of ice chips (one at a time, 5-7x/hour) for pleasure/swallow stimulation, but only after aggressive oral care to avoid colonization of bacteria within oral cavity.   -Mucositis improving (4/12) - adv diet to clear liquids  # Oliguria, likely related to dehydration  :: Bladder scan 4/9 with no urine  - mIVF as above for hydration support  - Repeat Bladder scans q6h with no void & x3 with PVR  # GI ppx: Continue home Protonix  #Hypokalemia  -Replete as needed    NEURO:  # Post LP HA (LP with IT Methotrexate 4/4)  - Caffeine pills BID PRN- held d/t mucositis.  HA improved with Dilaudid PCA.  - Hold home Neurontin d/t NPO and re-start as mucositis resolves    CV:  H/O HTN, HLD, CAD, MI (PCI '10, on ASA), atrial fibrillation  Echo 2/3/25:  LVEF 60-65% with no LVH, normal RV function, and mod dilated LA  Awaiting EKG for QTc monitoring  # Tachycardia, likely 2/2 hypovolemia  - Increase mIVF as above  - Hold home Lipitor and Lisinopril d/t NPO and re-start as mucositis resolves  - IV Metoprolol while patient is NPO and convert back to PO when able  - Hold home Lasix given dehydration  - Keep Mg+ > 2 and K+ > 4.0  - A. fib w RVR (4/10) - initiated (4/11) Diltiazem drip 5 mg/hr, increased to 10 mg/hr per Cardiology  consult recommendations.  Can increase to a max of 20 mg/hr.  -Digoxin load 500 mcg at 0200 (4/11).  Holding additional Digoxin dosing for now  -A. fib w good rate control on current Diltiazem rate (4/12), will plan to transition to oral regimen when    patient able to tolerate oral meds.      Malignant Hematology Checklist:  ID Prophylaxis:  Acyclovir (VZV, HSV), Fluconazole (Antifungal), Levofloxacin (PNA)  Code status:  Full Code  Lines/drains:  L DBL PICC  Primary oncologist:  Dr. Mckeon  Disposition:  Admit to [facility]     Patient seen and discussed on rounds with Dr. Lazo.    Antoni Bello PA-C

## 2025-04-13 LAB
ALBUMIN SERPL BCP-MCNC: 2.5 G/DL (ref 3.4–5)
ANION GAP SERPL CALC-SCNC: 14 MMOL/L (ref 10–20)
BACTERIA BLD CULT: NORMAL
BASOPHILS # BLD AUTO: 0.01 X10*3/UL (ref 0–0.1)
BASOPHILS NFR BLD AUTO: 0.6 %
BUN SERPL-MCNC: 15 MG/DL (ref 6–23)
CALCIUM SERPL-MCNC: 7.6 MG/DL (ref 8.6–10.6)
CHLORIDE SERPL-SCNC: 105 MMOL/L (ref 98–107)
CO2 SERPL-SCNC: 24 MMOL/L (ref 21–32)
CREAT SERPL-MCNC: 0.32 MG/DL (ref 0.5–1.3)
EGFRCR SERPLBLD CKD-EPI 2021: >90 ML/MIN/1.73M*2
EOSINOPHIL # BLD AUTO: 0.02 X10*3/UL (ref 0–0.7)
EOSINOPHIL NFR BLD AUTO: 1.2 %
ERYTHROCYTE [DISTWIDTH] IN BLOOD BY AUTOMATED COUNT: 16 % (ref 11.5–14.5)
FIBRINOGEN PPP-MCNC: 452 MG/DL (ref 200–400)
GLUCOSE SERPL-MCNC: 107 MG/DL (ref 74–99)
HCT VFR BLD AUTO: 25.8 % (ref 41–52)
HGB BLD-MCNC: 8.2 G/DL (ref 13.5–17.5)
IMM GRANULOCYTES # BLD AUTO: 0.15 X10*3/UL (ref 0–0.7)
IMM GRANULOCYTES NFR BLD AUTO: 8.9 % (ref 0–0.9)
LYMPHOCYTES # BLD AUTO: 0.2 X10*3/UL (ref 1.2–4.8)
LYMPHOCYTES NFR BLD AUTO: 11.8 %
MAGNESIUM SERPL-MCNC: 2.13 MG/DL (ref 1.6–2.4)
MCH RBC QN AUTO: 27.8 PG (ref 26–34)
MCHC RBC AUTO-ENTMCNC: 31.8 G/DL (ref 32–36)
MCV RBC AUTO: 88 FL (ref 80–100)
MONOCYTES # BLD AUTO: 0.29 X10*3/UL (ref 0.1–1)
MONOCYTES NFR BLD AUTO: 17.2 %
NEUTROPHILS # BLD AUTO: 1.02 X10*3/UL (ref 1.2–7.7)
NEUTROPHILS NFR BLD AUTO: 60.3 %
NRBC BLD-RTO: 2.4 /100 WBCS (ref 0–0)
PHOSPHATE SERPL-MCNC: 1.9 MG/DL (ref 2.5–4.9)
PLATELET # BLD AUTO: 37 X10*3/UL (ref 150–450)
POTASSIUM SERPL-SCNC: 3.6 MMOL/L (ref 3.5–5.3)
POTASSIUM SERPL-SCNC: 3.8 MMOL/L (ref 3.5–5.3)
RBC # BLD AUTO: 2.95 X10*6/UL (ref 4.5–5.9)
SODIUM SERPL-SCNC: 139 MMOL/L (ref 136–145)
VANCOMYCIN SERPL-MCNC: 14.9 UG/ML (ref 5–20)
WBC # BLD AUTO: 1.7 X10*3/UL (ref 4.4–11.3)

## 2025-04-13 PROCEDURE — 81241 F5 GENE: CPT

## 2025-04-13 PROCEDURE — 83735 ASSAY OF MAGNESIUM: CPT

## 2025-04-13 PROCEDURE — 2500000004 HC RX 250 GENERAL PHARMACY W/ HCPCS (ALT 636 FOR OP/ED): Performed by: PHYSICIAN ASSISTANT

## 2025-04-13 PROCEDURE — 2500000002 HC RX 250 W HCPCS SELF ADMINISTERED DRUGS (ALT 637 FOR MEDICARE OP, ALT 636 FOR OP/ED)

## 2025-04-13 PROCEDURE — 84100 ASSAY OF PHOSPHORUS: CPT

## 2025-04-13 PROCEDURE — 2500000004 HC RX 250 GENERAL PHARMACY W/ HCPCS (ALT 636 FOR OP/ED)

## 2025-04-13 PROCEDURE — 84132 ASSAY OF SERUM POTASSIUM: CPT | Performed by: PHYSICIAN ASSISTANT

## 2025-04-13 PROCEDURE — 85025 COMPLETE CBC W/AUTO DIFF WBC: CPT

## 2025-04-13 PROCEDURE — 80202 ASSAY OF VANCOMYCIN: CPT

## 2025-04-13 PROCEDURE — 1200000003 HC ONCOLOGY  ROOM WITH TELEMETRY DAILY

## 2025-04-13 PROCEDURE — 85384 FIBRINOGEN ACTIVITY: CPT | Performed by: PHYSICIAN ASSISTANT

## 2025-04-13 PROCEDURE — 2500000001 HC RX 250 WO HCPCS SELF ADMINISTERED DRUGS (ALT 637 FOR MEDICARE OP): Performed by: PHYSICIAN ASSISTANT

## 2025-04-13 RX ORDER — VANCOMYCIN 2 GRAM/500 ML IN 0.9 % SODIUM CHLORIDE INTRAVENOUS
2000 EVERY 12 HOURS
Status: DISCONTINUED | OUTPATIENT
Start: 2025-04-13 | End: 2025-04-13

## 2025-04-13 RX ORDER — POTASSIUM CHLORIDE 29.8 MG/ML
40 INJECTION INTRAVENOUS ONCE
Status: COMPLETED | OUTPATIENT
Start: 2025-04-13 | End: 2025-04-13

## 2025-04-13 RX ADMIN — FLUCONAZOLE 400 MG: 2 INJECTION, SOLUTION INTRAVENOUS at 20:30

## 2025-04-13 RX ADMIN — VANCOMYCIN HYDROCHLORIDE 1500 MG: 1.5 INJECTION, POWDER, LYOPHILIZED, FOR SOLUTION INTRAVENOUS at 04:07

## 2025-04-13 RX ADMIN — Medication 10 MG/HR: at 04:54

## 2025-04-13 RX ADMIN — SUCRALFATE 1 G: 1 SUSPENSION ORAL at 13:33

## 2025-04-13 RX ADMIN — PANTOPRAZOLE SODIUM 40 MG: 40 INJECTION, POWDER, FOR SOLUTION INTRAVENOUS at 09:11

## 2025-04-13 RX ADMIN — MEROPENEM AND SODIUM CHLORIDE 1 G: 1 INJECTION, SOLUTION INTRAVENOUS at 22:35

## 2025-04-13 RX ADMIN — Medication: at 18:24

## 2025-04-13 RX ADMIN — SODIUM CHLORIDE AND POTASSIUM CHLORIDE 50 ML/HR: .9; .15 SOLUTION INTRAVENOUS at 17:24

## 2025-04-13 RX ADMIN — POTASSIUM CHLORIDE 40 MEQ: 29.8 INJECTION, SOLUTION INTRAVENOUS at 18:24

## 2025-04-13 RX ADMIN — ACYCLOVIR SODIUM 250 MG: 50 INJECTION, SOLUTION INTRAVENOUS at 13:33

## 2025-04-13 RX ADMIN — MEROPENEM AND SODIUM CHLORIDE 1 G: 1 INJECTION, SOLUTION INTRAVENOUS at 06:26

## 2025-04-13 RX ADMIN — NYSTATIN 500000 UNITS: 100000 SUSPENSION ORAL at 09:11

## 2025-04-13 RX ADMIN — POTASSIUM CHLORIDE 40 MEQ: 29.8 INJECTION, SOLUTION INTRAVENOUS at 00:21

## 2025-04-13 RX ADMIN — POTASSIUM CHLORIDE 40 MEQ: 29.8 INJECTION, SOLUTION INTRAVENOUS at 04:13

## 2025-04-13 RX ADMIN — SUCRALFATE 1 G: 1 SUSPENSION ORAL at 18:23

## 2025-04-13 RX ADMIN — NYSTATIN 500000 UNITS: 100000 SUSPENSION ORAL at 15:58

## 2025-04-13 RX ADMIN — ACYCLOVIR SODIUM 250 MG: 50 INJECTION, SOLUTION INTRAVENOUS at 00:22

## 2025-04-13 RX ADMIN — Medication 10 MG/HR: at 17:25

## 2025-04-13 RX ADMIN — SUCRALFATE 1 G: 1 SUSPENSION ORAL at 06:26

## 2025-04-13 RX ADMIN — NYSTATIN 500000 UNITS: 100000 SUSPENSION ORAL at 20:30

## 2025-04-13 RX ADMIN — MEROPENEM AND SODIUM CHLORIDE 1 G: 1 INJECTION, SOLUTION INTRAVENOUS at 13:36

## 2025-04-13 RX ADMIN — SUCRALFATE 1 G: 1 SUSPENSION ORAL at 00:21

## 2025-04-13 ASSESSMENT — COGNITIVE AND FUNCTIONAL STATUS - GENERAL
MOVING FROM LYING ON BACK TO SITTING ON SIDE OF FLAT BED WITH BEDRAILS: A LITTLE
PERSONAL GROOMING: A LITTLE
DRESSING REGULAR LOWER BODY CLOTHING: A LITTLE
TOILETING: A LITTLE
WALKING IN HOSPITAL ROOM: A LITTLE
CLIMB 3 TO 5 STEPS WITH RAILING: A LITTLE
STANDING UP FROM CHAIR USING ARMS: A LITTLE
MOBILITY SCORE: 18
MOVING TO AND FROM BED TO CHAIR: A LITTLE
DRESSING REGULAR UPPER BODY CLOTHING: A LITTLE
TURNING FROM BACK TO SIDE WHILE IN FLAT BAD: A LITTLE
EATING MEALS: A LITTLE
DAILY ACTIVITIY SCORE: 18
HELP NEEDED FOR BATHING: A LITTLE

## 2025-04-13 ASSESSMENT — PAIN SCALES - GENERAL
PAINLEVEL_OUTOF10: 0 - NO PAIN
PAINLEVEL_OUTOF10: 0 - NO PAIN
PAINLEVEL_OUTOF10: 3

## 2025-04-13 ASSESSMENT — PAIN - FUNCTIONAL ASSESSMENT
PAIN_FUNCTIONAL_ASSESSMENT: 0-10

## 2025-04-13 NOTE — PROGRESS NOTES
Bijan Ibanez is a 64 y.o. male on day  4 of admission presenting with Dysphagia.    Subjective   Afebrile, VSS. No acute issues overnight. Pt reports mucositis pain is better, having a little more difficulty w swallowing Clear liquids) today than yesterday.   Good painncontrol with dilaudid PCA.  Suctioning his secretions less often. A. Fib w RVR with good rate control on Diltiazem infusion.   Denies nausea & diarrhea at this time. No emesis. ROS otherwise unremarkable.         Objective     Vitals:    04/13/25 0429 04/13/25 0909 04/13/25 1305 04/13/25 1551   BP: 130/78 126/88 123/84 128/87   BP Location: Right arm Right arm Right arm Right arm   Patient Position: Lying Sitting Lying Sitting   Pulse: 110 103 107 103   Resp: 18 18 18 18   Temp: 36.7 °C (98 °F) 36.5 °C (97.7 °F) 36.6 °C (97.9 °F) 36.4 °C (97.5 °F)   TempSrc: Temporal Temporal Temporal Temporal   SpO2: 97% 97% 97% 98%   Weight:       Height:          Physical Exam  Constitutional:       Appearance: He is ill-appearing. He is not toxic-appearing.   HENT:      Head: Normocephalic.      Nose: Nose normal.      Mouth/Throat:      Mouth: Mucous membranes are moist.      Pharynx: Posterior oropharyngeal erythema present.      Comments: G3 mucositis  Eyes:      Pupils: Pupils are equal, round, and reactive to light.   Cardiovascular:      Rate and Rhythm: Tachycardia present. Rhythm irregular.      Heart sounds: Normal heart sounds.   Pulmonary:      Breath sounds: Examination of the right-middle field reveals rales. Examination of the right-lower field reveals rales. Decreased breath sounds and rales present.   Abdominal:      General: Bowel sounds are normal.      Palpations: Abdomen is soft.      Tenderness: There is abdominal tenderness (intermittent).   Musculoskeletal:      Right upper arm: Swelling present.      Left upper arm: Swelling present.      Cervical back: Normal range of motion and neck supple.      Right lower leg: Edema present.       Left lower leg: Edema present.   Skin:     General: Skin is warm and dry.      Capillary Refill: Capillary refill takes less than 2 seconds.      Coloration: Skin is pale.   Neurological:      General: No focal deficit present.      Mental Status: He is alert and oriented to person, place, and time. Mental status is at baseline.   Psychiatric:         Mood and Affect: Mood normal.         Thought Content: Thought content normal.         Judgment: Judgment normal.     Scheduled medications  acyclovir, 250 mg, intravenous, q12h  [Held by provider] apixaban, 5 mg, oral, BID  [Held by provider] atorvastatin, 40 mg, oral, Daily  [Held by provider] caffeine, 200 mg, oral, BID  fluconazole, 400 mg, intravenous, q24h  [Held by provider] gabapentin, 300 mg, oral, Nightly  [Held by provider] lisinopril, 10 mg, oral, Daily  meropenem, 1 g, intravenous, q8h  [Held by provider] metoprolol succinate XL, 100 mg, oral, Daily  [Held by provider] metoprolol, 5 mg, intravenous, q6h  nystatin, 5 mL, Swish & Spit, TID  pantoprazole, 40 mg, intravenous, Daily  potassium chloride, 40 mEq, intravenous, Once  sucralfate, 1 g, oral, q6h SONAL      Continuous medications  dilTIAZem, 10 mg/hr, Last Rate: 10 mg/hr (04/13/25 1725)  HYDROmorphone,   potassium chloride in 0.9%NaCl, 50 mL/hr, Last Rate: 50 mL/hr (04/13/25 1724)      PRN medications  PRN medications: albuterol, alteplase, [Held by provider] furosemide, lidocaine-diphenhydrAMINE-Maalox 1:1:1, moisturizing mouth, naloxone, ondansetron ODT **OR** ondansetron, prochlorperazine **OR** prochlorperazine **OR** [DISCONTINUED] prochlorperazine    Results from last 7 days   Lab Units 04/13/25  0425 04/12/25  0443 04/11/25  0105 04/10/25  1312 04/10/25  0426   WBC AUTO x10*3/uL 1.7* 0.6* 0.1*   < > 0.1*   HEMOGLOBIN g/dL 8.2* 7.9* 7.2*   < > 5.9*   HEMATOCRIT % 25.8* 24.7* 22.7*   < > 18.4*   PLATELETS AUTO x10*3/uL 37* 18* 17*   < > 12*   NEUTROS PCT AUTO % 60.3  --  15.4  --  14.3   LYMPHO PCT  MAN %  --  57.1  --   --   --    LYMPHS PCT AUTO % 11.8  --  53.8  --  71.4   MONO PCT MAN %  --  14.3  --   --   --    MONOS PCT AUTO % 17.2  --  23.1  --  14.3   EOSINO PCT MAN %  --  0.0  --   --   --    EOS PCT AUTO % 1.2  --  7.7  --  0.0    < > = values in this interval not displayed.     Results from last 7 days   Lab Units 04/13/25  1113 04/13/25 0425 04/12/25  1744 04/12/25 0443 04/11/25  0105 04/09/25  0632 04/08/25  0937   SODIUM mmol/L  --  139  --  139 138   < > 137   POTASSIUM mmol/L 3.6 3.8 2.3* 2.8* 3.4*   < > 4.1   CHLORIDE mmol/L  --  105  --  103 105   < > 102   CO2 mmol/L  --  24  --  23 22   < > 28   BUN mg/dL  --  15  --  14 13   < > 23   CREATININE mg/dL  --  0.32*  --  0.39* 0.41*   < > 0.41*   CALCIUM mg/dL  --  7.6*  --  7.5* 7.5*   < > 8.4*   PROTEIN TOTAL g/dL  --   --   --   --   --   --  4.6*   BILIRUBIN TOTAL mg/dL  --   --   --   --   --   --  1.3*   ALK PHOS U/L  --   --   --   --   --   --  42   ALT U/L  --   --   --   --   --   --  12   AST U/L  --   --   --   --   --   --  6*   GLUCOSE mg/dL  --  107*  --  146* 88   < > 143*    < > = values in this interval not displayed.     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 male with PMHx of HTN, HLD, CAD, MI (PCI '10, on ASA), atrial fibrillation, RCC (s/p partial R nephrectomy '13), GERD, BPH, arthritis, MRSA bacteremia (s/p 4 weeks IV Vancomycin which completed 3/3/25), G1 CIPN,  R subclavian and R axillary DVT r/t PICC (2/3/25; on Eliquis), R rib/flank pain and Burkitt's lyphoma who presents from outpatient clinic for mucositis, dysphagia, N/V and HA.    D17 C4 DA-R-EPOCH    ONC:  H/O RCC (s/p partial R nephrectomy '13)  # Burkitt's Lymphoma  :: Advanced stage, renal, liver, spleen,  extensive marrow involvement with positive t[8; 14] translocation  :: S/p 3 cycles DA-R-EPOCH (C2 etop reduced by 25% and again in C3 with escalated doxorubicin by 20% in C3)  :: Cycle 4 started 3/28/25   - Patient will need LP with IT TY-C this admit   - Sent genetic testing for dihydropyrimidine dehydrogenase (DPD), UGT1A1, CYP3A, CYP3B: Pending    Heme:  H/O R subclavian and R axillary DVT r/t PICC (2/3/25; on Eliquis)  # Pancytopenia, neutropenic 2/2 chemotherapy.  Evidence of wbc recovery (0.6) on 4/12, (1.7) on 4/13  - Neulasta (4/4/25)   - Hold Eliquis i/s/o thrombocytopenia  - Transfuse for Hgb <7g/dL & Plt <12 k/uL (severe mucositis, increased risk for bleed)  # Bilateral upper extremity edema  - Duplex BUE to r/o DVT (4/10) - Compared with study from 2/3/2025, Acute DVT in left subclavian vein     and left axillary vein.   - DVT ppx on hold d/t thrombocytopenia  - CT chest PE protocol (4/11) - No evidence of acute pulmonary embolism.     ID:  H/O MRSA bacteremia (s/p 4 weeks IV Vanco completed 3/3)  Ppx:  Acyclovir (VZV, HSV), Fluconazole (Antifungal), Levofloxacin (PNA)  # Profound Neutropenia, 2/2 chemotherapy  :: Blood cultures (4/9, 4/11): NGTD  :: HSV Swab (4/9):  ND  :: Resp Viral Panel (4/10): ND  - Neutropenic precautions  - Continue Vancomycin (4/10-4/13) for hx MRSA, stopped after 48hrs of negative cultures.   - Continue pip-tazo (4/9-4/11) given profound neutropenia and G4 mucositis.  Initiated (4/11) Meropenem.  -    FEN/GI/Renal:  H/O BPH, GERD  Admit wt: 90.7 kg  F: LR @ 150 mL/hr  E: PRN  N:  Adv to clear liquids (4/12)  # Nausea/Vomiting, likely r/t chemotherapy  - Continue ondansetron & compazine PRN  # G3-4 Mucositis  - Continue Dilaudid PCA (4/8-)  - Continue Biotene, sucralfate  - Send CMV, HSV to r/o infectious mucositis.  HSV PCR oral swab (4/9) - ND  - Nystatin swish & spit   # Dysphagia  :: MBSS 4/3 with mild to moderate oropharyngeal dysphagia, SLP cleared for regular diet &  thin liquids  :: GI consulted 4/9, supportive care, unable to perform EGD with pancytopenia and profound neutropenia  - Consult SLP, continue with supportive care (4/10) - NPO, OK for small amounts of ice chips (one at a time, 5-7x/hour) for pleasure/swallow stimulation, but only after aggressive oral care to avoid colonization of bacteria within oral cavity.   -Mucositis improving (4/12) - adv diet to clear liquids.  Adv diet to full liquids (4/13).  # Oliguria, likely related to dehydration  :: Bladder scan 4/9 with no urine  - mIVF as above for hydration support  - Repeat Bladder scans q6h with no void & x3 with PVR  # GI ppx: Continue home Protonix  #Hypokalemia  -Replete as needed    NEURO:  # Post LP HA (LP with IT Methotrexate 4/4)  - Caffeine pills BID PRN- held d/t mucositis.  HA improved with Dilaudid PCA.  - Hold home Neurontin d/t NPO and re-start as mucositis resolves  -- Patient will need LP with IT TY-C this admit ??    CV:  H/O HTN, HLD, CAD, MI (PCI '10, on ASA), atrial fibrillation  Echo 2/3/25:  LVEF 60-65% with no LVH, normal RV function, and mod dilated LA  Awaiting EKG for QTc monitoring  # Tachycardia, likely 2/2 hypovolemia  - Increase mIVF as above  - Hold home Lipitor and Lisinopril d/t NPO and re-start as mucositis resolves  - IV Metoprolol while patient is NPO and convert back to PO when able  - Hold home Lasix given dehydration  - Keep Mg+ > 2 and K+ > 4.0  - A. fib w RVR (4/10) - initiated (4/11) Diltiazem drip 5 mg/hr, increased to 10 mg/hr per Cardiology  consult recommendations.  Can increase to a max of 20 mg/hr.  -Digoxin load 500 mcg at 0200 (4/11).  Holding additional Digoxin dosing for now  -A. fib w good rate control on current Diltiazem rate (4/12), will plan to transition to oral regimen when    patient able to tolerate oral meds.      Malignant Hematology Checklist:  ID Prophylaxis:  Acyclovir (VZV, HSV), Fluconazole (Antifungal), Levofloxacin (PNA)  Code status:  Full  Code  Lines/drains:  L DBL PICC  Primary oncologist:  Dr. Mckeon     Patient seen and discussed on rounds with Dr. Lazo.    Antoni Bello PA-C

## 2025-04-13 NOTE — CARE PLAN
The patient's goals for the shift include      The clinical goals for the shift include Patient will tolerate clear liquid diet well without cough and pain throughout the shift 4/13/25 @ 0700      Problem: Skin  Goal: Participates in plan/prevention/treatment measures  Flowsheets (Taken 4/12/2025 2326)  Participates in plan/prevention/treatment measures: Discuss with provider PT/OT consult  Goal: Prevent/manage excess moisture  Flowsheets (Taken 4/12/2025 2326)  Prevent/manage excess moisture: Monitor for/manage infection if present  Goal: Prevent/minimize sheer/friction injuries  Flowsheets (Taken 4/12/2025 2326)  Prevent/minimize sheer/friction injuries: Complete micro-shifts as needed if patient unable. Adjust patient position to relieve pressure points, not a full turn  Goal: Promote/optimize nutrition  Flowsheets (Taken 4/12/2025 2326)  Promote/optimize nutrition: Discuss with provider if NPO > 2 days  Goal: Promote skin healing  Flowsheets (Taken 4/12/2025 2326)  Promote skin healing: Assess skin/pad under line(s)/device(s)

## 2025-04-13 NOTE — CONSULTS
Vancomycin Dosing by Pharmacy- Cessation of Therapy    Consult to pharmacy for vancomycin dosing has been discontinued by the prescriber, pharmacy will sign off at this time.    Please call pharmacy if there are further questions or re-enter a consult if vancomycin is resumed.     KIMBER RODRIGUEZ, PharmD

## 2025-04-13 NOTE — PROGRESS NOTES
Vancomycin Dosing by Pharmacy- FOLLOW UP    Bijan Ibanez is a 64 y.o. year old male who Pharmacy has been consulted for vancomycin dosing for other septic patient with previous MRSA bacteremia  . Based on the patient's indication and renal status this patient is being dosed based on a goal AUC of 500-600.     Renal function is currently stable.    Current vancomycin dose: 1500 mg given every 12 hours    Estimated vancomycin AUC on current dose: 423 mg/L.hr     Visit Vitals  /84 (BP Location: Right arm, Patient Position: Lying)   Pulse 107   Temp 36.6 °C (97.9 °F) (Temporal)   Resp 18        Lab Results   Component Value Date    CREATININE 0.32 (L) 2025    CREATININE 0.39 (L) 2025    CREATININE 0.41 (L) 2025    CREATININE 0.42 (L) 04/10/2025        Patient weight is as follows:   Vitals:    25 1642   Weight: 90.7 kg (199 lb 15.3 oz)       Cultures:  No results found for the encounter in last 14 days.       I/O last 3 completed shifts:  In: 2168.8 (23.9 mL/kg) [I.V.:591.8 (6.5 mL/kg); Blood:172; IV Piggyback:1405]  Out: 775 (8.5 mL/kg) [Urine:775 (0.2 mL/kg/hr)]  Weight: 90.7 kg   I/O during current shift:  No intake/output data recorded.    Temp (24hrs), Av.6 °C (97.8 °F), Min:36.3 °C (97.3 °F), Max:36.7 °C (98.1 °F)      Assessment/Plan    Below goal AUC. Orders placed for new vancomcyin regimen of 2000 mg every 12 hours to begin at .     This dosing regimen is predicted by [employer] to result in the following pharmacokinetic parameters:  Loading dose: N/A  Regimen: 2000 mg IV every 12 hours.  Start time: 16:07 on 2025  Exposure target: AUC24 (range)400-600 mg/L.hr   [id number]: 540 mg/L.hr  AUC24,ss: 562 mg/L.hr  Probability of AUC24 > 400: 99 %  Ctrough,ss: 16.4 mg/L  Probability of Ctrough,ss > 20: 20 %      The next level will be obtained on  at 1400. May be obtained sooner if clinically indicated.   Will continue to monitor renal function daily while on  vancomycin and order serum creatinine at least every 48 hours if not already ordered.  Follow for continued vancomycin needs, clinical response, and signs/symptoms of toxicity.       Buzz Johnson, PharmD

## 2025-04-14 VITALS
OXYGEN SATURATION: 97 % | RESPIRATION RATE: 18 BRPM | HEIGHT: 72 IN | DIASTOLIC BLOOD PRESSURE: 83 MMHG | BODY MASS INDEX: 27.08 KG/M2 | SYSTOLIC BLOOD PRESSURE: 132 MMHG | WEIGHT: 199.96 LBS | TEMPERATURE: 97.2 F | HEART RATE: 89 BPM

## 2025-04-14 LAB
ALBUMIN SERPL BCP-MCNC: 2.5 G/DL (ref 3.4–5)
ANION GAP SERPL CALC-SCNC: 12 MMOL/L (ref 10–20)
BASOPHILS # BLD MANUAL: 0 X10*3/UL (ref 0–0.1)
BASOPHILS NFR BLD MANUAL: 0 %
BLASTS # BLD MANUAL: 0 X10*3/UL
BLASTS NFR BLD MANUAL: 0 %
BUN SERPL-MCNC: 10 MG/DL (ref 6–23)
CALCIUM SERPL-MCNC: 7.2 MG/DL (ref 8.6–10.6)
CHLORIDE SERPL-SCNC: 109 MMOL/L (ref 98–107)
CO2 SERPL-SCNC: 25 MMOL/L (ref 21–32)
CREAT SERPL-MCNC: 0.31 MG/DL (ref 0.5–1.3)
DACRYOCYTES BLD QL SMEAR: ABNORMAL
EGFRCR SERPLBLD CKD-EPI 2021: >90 ML/MIN/1.73M*2
EOSINOPHIL # BLD MANUAL: 0 X10*3/UL (ref 0–0.7)
EOSINOPHIL NFR BLD MANUAL: 0 %
ERYTHROCYTE [DISTWIDTH] IN BLOOD BY AUTOMATED COUNT: 16.2 % (ref 11.5–14.5)
GLUCOSE SERPL-MCNC: 93 MG/DL (ref 74–99)
HCT VFR BLD AUTO: 27 % (ref 41–52)
HGB BLD-MCNC: 8.5 G/DL (ref 13.5–17.5)
IMM GRANULOCYTES # BLD AUTO: 0.43 X10*3/UL (ref 0–0.7)
IMM GRANULOCYTES NFR BLD AUTO: 13.8 % (ref 0–0.9)
LYMPHOCYTES # BLD MANUAL: 0.16 X10*3/UL (ref 1.2–4.8)
LYMPHOCYTES NFR BLD MANUAL: 5.2 %
MAGNESIUM SERPL-MCNC: 1.82 MG/DL (ref 1.6–2.4)
MCH RBC QN AUTO: 28.3 PG (ref 26–34)
MCHC RBC AUTO-ENTMCNC: 31.5 G/DL (ref 32–36)
MCV RBC AUTO: 90 FL (ref 80–100)
METAMYELOCYTES # BLD MANUAL: 0.05 X10*3/UL
METAMYELOCYTES NFR BLD MANUAL: 1.7 %
MONOCYTES # BLD MANUAL: 0.32 X10*3/UL (ref 0.1–1)
MONOCYTES NFR BLD MANUAL: 10.4 %
MYELOCYTES # BLD MANUAL: 0.11 X10*3/UL
MYELOCYTES NFR BLD MANUAL: 3.5 %
NEUTROPHILS # BLD MANUAL: 2.46 X10*3/UL (ref 1.2–7.7)
NEUTS BAND # BLD MANUAL: 0.19 X10*3/UL (ref 0–0.7)
NEUTS BAND NFR BLD MANUAL: 6.1 %
NEUTS SEG # BLD MANUAL: 2.27 X10*3/UL (ref 1.2–7)
NEUTS SEG NFR BLD MANUAL: 73.1 %
NRBC BLD MANUAL-RTO: 0 % (ref 0–0)
NRBC BLD-RTO: 2.2 /100 WBCS (ref 0–0)
OVALOCYTES BLD QL SMEAR: ABNORMAL
PHOSPHATE SERPL-MCNC: 2.1 MG/DL (ref 2.5–4.9)
PLASMA CELLS # BLD MANUAL: 0 X10*3/UL
PLASMA CELLS NFR BLD MANUAL: 0 %
PLATELET # BLD AUTO: 61 X10*3/UL (ref 150–450)
POTASSIUM SERPL-SCNC: 4.8 MMOL/L (ref 3.5–5.3)
POTASSIUM SERPL-SCNC: 5.2 MMOL/L (ref 3.5–5.3)
PROMYELOCYTES # BLD MANUAL: 0 X10*3/UL
PROMYELOCYTES NFR BLD MANUAL: 0 %
RBC # BLD AUTO: 3 X10*6/UL (ref 4.5–5.9)
RBC MORPH BLD: ABNORMAL
SODIUM SERPL-SCNC: 141 MMOL/L (ref 136–145)
TOTAL CELLS COUNTED BLD: 115
URATE SERPL-MCNC: 3.2 MG/DL (ref 4–7.5)
VARIANT LYMPHS # BLD MANUAL: 0 X10*3/UL (ref 0–0.5)
VARIANT LYMPHS NFR BLD: 0 %
WBC # BLD AUTO: 3.1 X10*3/UL (ref 4.4–11.3)

## 2025-04-14 PROCEDURE — 83735 ASSAY OF MAGNESIUM: CPT

## 2025-04-14 PROCEDURE — 92526 ORAL FUNCTION THERAPY: CPT | Mod: GN | Performed by: SPEECH-LANGUAGE PATHOLOGIST

## 2025-04-14 PROCEDURE — 2500000004 HC RX 250 GENERAL PHARMACY W/ HCPCS (ALT 636 FOR OP/ED)

## 2025-04-14 PROCEDURE — 89240 UNLISTED MISC PATH TEST: CPT

## 2025-04-14 PROCEDURE — 97530 THERAPEUTIC ACTIVITIES: CPT | Mod: GP | Performed by: PHYSICAL THERAPIST

## 2025-04-14 PROCEDURE — 2500000004 HC RX 250 GENERAL PHARMACY W/ HCPCS (ALT 636 FOR OP/ED): Performed by: PHYSICIAN ASSISTANT

## 2025-04-14 PROCEDURE — 85027 COMPLETE CBC AUTOMATED: CPT

## 2025-04-14 PROCEDURE — 97161 PT EVAL LOW COMPLEX 20 MIN: CPT | Mod: GP | Performed by: PHYSICAL THERAPIST

## 2025-04-14 PROCEDURE — 2500000001 HC RX 250 WO HCPCS SELF ADMINISTERED DRUGS (ALT 637 FOR MEDICARE OP)

## 2025-04-14 PROCEDURE — 2500000002 HC RX 250 W HCPCS SELF ADMINISTERED DRUGS (ALT 637 FOR MEDICARE OP, ALT 636 FOR OP/ED)

## 2025-04-14 PROCEDURE — 85007 BL SMEAR W/DIFF WBC COUNT: CPT

## 2025-04-14 PROCEDURE — 84550 ASSAY OF BLOOD/URIC ACID: CPT

## 2025-04-14 PROCEDURE — 84100 ASSAY OF PHOSPHORUS: CPT

## 2025-04-14 PROCEDURE — 99233 SBSQ HOSP IP/OBS HIGH 50: CPT | Performed by: NURSE PRACTITIONER

## 2025-04-14 PROCEDURE — 81291 MTHFR GENE: CPT

## 2025-04-14 PROCEDURE — 2500000001 HC RX 250 WO HCPCS SELF ADMINISTERED DRUGS (ALT 637 FOR MEDICARE OP): Performed by: PHYSICIAN ASSISTANT

## 2025-04-14 PROCEDURE — 1170000001 HC PRIVATE ONCOLOGY ROOM DAILY

## 2025-04-14 PROCEDURE — 2500000005 HC RX 250 GENERAL PHARMACY W/O HCPCS

## 2025-04-14 PROCEDURE — 80069 RENAL FUNCTION PANEL: CPT

## 2025-04-14 RX ORDER — SODIUM CHLORIDE 9 MG/ML
50 INJECTION, SOLUTION INTRAVENOUS CONTINUOUS
Status: DISCONTINUED | OUTPATIENT
Start: 2025-04-14 | End: 2025-04-15

## 2025-04-14 RX ORDER — FUROSEMIDE 10 MG/ML
40 INJECTION INTRAMUSCULAR; INTRAVENOUS ONCE
Status: COMPLETED | OUTPATIENT
Start: 2025-04-14 | End: 2025-04-14

## 2025-04-14 RX ORDER — MAGNESIUM SULFATE HEPTAHYDRATE 40 MG/ML
2 INJECTION, SOLUTION INTRAVENOUS ONCE
Status: COMPLETED | OUTPATIENT
Start: 2025-04-14 | End: 2025-04-14

## 2025-04-14 RX ADMIN — SUCRALFATE 1 G: 1 SUSPENSION ORAL at 23:05

## 2025-04-14 RX ADMIN — Medication 15 ML: at 17:24

## 2025-04-14 RX ADMIN — ACYCLOVIR SODIUM 250 MG: 50 INJECTION, SOLUTION INTRAVENOUS at 13:32

## 2025-04-14 RX ADMIN — MEROPENEM AND SODIUM CHLORIDE 1 G: 1 INJECTION, SOLUTION INTRAVENOUS at 23:05

## 2025-04-14 RX ADMIN — MEROPENEM AND SODIUM CHLORIDE 1 G: 1 INJECTION, SOLUTION INTRAVENOUS at 15:03

## 2025-04-14 RX ADMIN — ACYCLOVIR SODIUM 250 MG: 50 INJECTION, SOLUTION INTRAVENOUS at 01:33

## 2025-04-14 RX ADMIN — FLUCONAZOLE 400 MG: 2 INJECTION, SOLUTION INTRAVENOUS at 20:50

## 2025-04-14 RX ADMIN — NYSTATIN 500000 UNITS: 100000 SUSPENSION ORAL at 15:07

## 2025-04-14 RX ADMIN — NYSTATIN 500000 UNITS: 100000 SUSPENSION ORAL at 20:50

## 2025-04-14 RX ADMIN — MEROPENEM AND SODIUM CHLORIDE 1 G: 1 INJECTION, SOLUTION INTRAVENOUS at 06:07

## 2025-04-14 RX ADMIN — SUCRALFATE 1 G: 1 SUSPENSION ORAL at 17:24

## 2025-04-14 RX ADMIN — SUCRALFATE 1 G: 1 SUSPENSION ORAL at 13:27

## 2025-04-14 RX ADMIN — NYSTATIN 500000 UNITS: 100000 SUSPENSION ORAL at 08:46

## 2025-04-14 RX ADMIN — SODIUM PHOSPHATE, MONOBASIC, MONOHYDRATE AND SODIUM PHOSPHATE, DIBASIC, ANHYDROUS 15 MMOL: 142; 276 INJECTION, SOLUTION INTRAVENOUS at 17:14

## 2025-04-14 RX ADMIN — MAGNESIUM SULFATE HEPTAHYDRATE 2 G: 40 INJECTION, SOLUTION INTRAVENOUS at 08:46

## 2025-04-14 RX ADMIN — Medication 10 MG/HR: at 06:08

## 2025-04-14 RX ADMIN — Medication 10 MG/HR: at 18:53

## 2025-04-14 RX ADMIN — SUCRALFATE 1 G: 1 SUSPENSION ORAL at 06:07

## 2025-04-14 RX ADMIN — PANTOPRAZOLE SODIUM 40 MG: 40 INJECTION, POWDER, FOR SOLUTION INTRAVENOUS at 08:46

## 2025-04-14 RX ADMIN — FUROSEMIDE 40 MG: 10 INJECTION, SOLUTION INTRAMUSCULAR; INTRAVENOUS at 18:31

## 2025-04-14 RX ADMIN — FUROSEMIDE 40 MG: 10 INJECTION, SOLUTION INTRAMUSCULAR; INTRAVENOUS at 15:07

## 2025-04-14 RX ADMIN — APIXABAN 5 MG: 5 TABLET, FILM COATED ORAL at 20:50

## 2025-04-14 RX ADMIN — SODIUM CHLORIDE 50 ML/HR: 0.9 INJECTION, SOLUTION INTRAVENOUS at 13:27

## 2025-04-14 ASSESSMENT — PAIN SCALES - GENERAL
PAINLEVEL_OUTOF10: 6
PAINLEVEL_OUTOF10: 4
PAINLEVEL_OUTOF10: 5 - MODERATE PAIN
PAINLEVEL_OUTOF10: 3
PAINLEVEL_OUTOF10: 6

## 2025-04-14 ASSESSMENT — COGNITIVE AND FUNCTIONAL STATUS - GENERAL
WALKING IN HOSPITAL ROOM: TOTAL
STANDING UP FROM CHAIR USING ARMS: A LITTLE
EATING MEALS: A LITTLE
DAILY ACTIVITIY SCORE: 18
STANDING UP FROM CHAIR USING ARMS: A LOT
DRESSING REGULAR LOWER BODY CLOTHING: A LITTLE
HELP NEEDED FOR BATHING: A LITTLE
TURNING FROM BACK TO SIDE WHILE IN FLAT BAD: A LITTLE
TOILETING: A LITTLE
DRESSING REGULAR UPPER BODY CLOTHING: A LITTLE
TURNING FROM BACK TO SIDE WHILE IN FLAT BAD: A LITTLE
WALKING IN HOSPITAL ROOM: A LITTLE
MOBILITY SCORE: 17
MOBILITY SCORE: 12
MOVING TO AND FROM BED TO CHAIR: A LOT
CLIMB 3 TO 5 STEPS WITH RAILING: TOTAL
MOVING TO AND FROM BED TO CHAIR: A LITTLE
MOVING FROM LYING ON BACK TO SITTING ON SIDE OF FLAT BED WITH BEDRAILS: A LITTLE
CLIMB 3 TO 5 STEPS WITH RAILING: A LOT
PERSONAL GROOMING: A LITTLE
MOVING FROM LYING ON BACK TO SITTING ON SIDE OF FLAT BED WITH BEDRAILS: A LITTLE

## 2025-04-14 ASSESSMENT — ACTIVITIES OF DAILY LIVING (ADL): ADL_ASSISTANCE: NEEDS ASSISTANCE

## 2025-04-14 ASSESSMENT — PAIN - FUNCTIONAL ASSESSMENT
PAIN_FUNCTIONAL_ASSESSMENT: 0-10

## 2025-04-14 NOTE — PROGRESS NOTES
Speech-Language Pathology  Adult Inpatient Swallow Treatment    Patient Name: Bijan Ibanez  MRN: [id number]  Today's Date: 4/14/2025   Start Time: 941  Stop Time: 1007  Time Calculation (min): 26    Impression:   Dysphagia therapy: Pt much more alert, improvement in secretion management with reduction in use of [place]. Trials of thin liquids via straw are tolerated in single sips. Isis protocol results in strong post prandial cough which may indicate the presence of pharyngeal dysphagia. Per MBSS on 4/3/25 pt needs to use a chin tuck with single sips, alternate bites and sips and repeat swallow.  Discuss with pt and spouse the option of repeating MBSS to ensure continuation of diet tolerance using safe swallow strategies vs advancing diet using safe swallow strategies as the same difficulty occurred in previous hospitalization with mucositis. Pt declines repeat MBSS and is willing to utilize previous known strategies and advance diet. Discuss with medical staff options and pt wishes. Pt and spouse comprehends risks for returning to his previous diet including increase in respiratory issues. Medical team to advance diet, however if there are increase respiratory issues pt maybe open to repeating MBSS.      [facility] Swallow Protocol:    PASS: Complete and uninterrupted drinking of all 3 ounces (90cc) of water without overt signs of aspiration (I.e. coughing or choking, either during or immediate after completion)  *The Isis Swallow Protocol (YSP) is a standardized measure with high sensitivity(96.5%)  and negative prediction value (97.9%)    Recommendations:  NPO per difficulty with Isis protocol  Regular diet as previous recommend using safe swallow strategies per MD.   Upright for all PO intake  Remain upright for 20-30 min after eating  Small bites/sips  Alternate food and liquids  Chin tuck while swallowing    Goal:   Pt will tolerate least restrictive diet without s/s aspiration, respiratory compromise, or overt  difficulty throughout admission.  Start: 04/09/25, End: 5/9/25  Status: goal iprogressing       Plan:  SLP Services Indicated: Yes  Frequency: 2x week  Discussed POC with patient and spouse  SLP - OK to Discharge    Pain:   0-10  0 = No pain.     Inpatient Education:  Extensive education provided to patient and spouse regarding current swallow function, recommendations/results, and POC.      Consultations/Referrals/Coordination of Services:   N/A

## 2025-04-14 NOTE — PROGRESS NOTES
Physical Therapy    Physical Therapy Evaluation & Treatment    Patient Name: Bijan Ibanez  MRN: [id number]  Department: [facility]  Room: [id number]  Today's Date: 4/14/2025   Time Calculation  Start Time: 1412  Stop Time: 1440  Time Calculation (min): 28 min    Assessment/Plan   PT Assessment  PT Assessment Results: Decreased strength, Decreased endurance, Impaired balance, Decreased mobility, Pain  Rehab Prognosis: Good  Barriers to Discharge Home: Caregiver assistance, Physical needs  Caregiver Assistance: Caregiver assistance needed per identified barriers - however, level of patient's required assistance exceeds assistance available at home  Physical Needs: Stair navigation into home limited by function/safety, 24hr mobility assistance needed, 24hr ADL assistance needed, High falls risk due to function or environment, Ambulating household distances limited by function/safety  Evaluation/Treatment Tolerance: Patient limited by fatigue  Medical Staff Made Aware: Yes  Strengths: Attitude of self, Support of Caregivers, Ability to acquire knowledge  Barriers to Participation: Comorbidities  End of Session Communication: Bedside nurse  End of Session Patient Position: Bed, 3 rail up, Alarm on   IP OR SWING BED PT PLAN  Inpatient or Swing Bed: Inpatient  PT Plan  Treatment/Interventions: Bed mobility, Transfer training, Gait training, Stair training, Balance training, Endurance training, Strengthening, Therapeutic exercise, Therapeutic activity, Positioning  PT Plan: Ongoing PT  PT Frequency: 5 times per week  PT Discharge Recommendations: High intensity level of continued care (may progress to Low pending progress with PT)  Equipment Recommended upon Discharge: Wheeled walker  PT Recommended Transfer Status: Assist x2, Assistive device  PT - OK to Discharge: Yes      Subjective     General Visit Information:  General  Reason for Referral: Presents from outpatient clinic for mucositis, dysphagia, N/V and HA.  Past  Medical History Relevant to Rehab: PMHx of HTN, HLD, CAD, MI (PCI '10, on ASA), atrial fibrillation, RCC (s/p partial R nephrectomy '13), GERD, BPH, arthritis, MRSA bacteremia (s/p 4 weeks IV Vancomycin which completed 3/3/25), G1 CIPN,  R subclavian and R axillary DVT r/t PICC (2/3/25; on Eliquis), R rib/flank pain and Burkitt's lyphoma  Family/Caregiver Present: Yes (spouse and grandson present, sposue very supportive)  Prior to Session Communication: Bedside nurse  Patient Position Received: Bed, 3 rail up, Alarm off, not on at start of session  Preferred Learning Style: verbal, visual  General Comment: Pt supine in bed upon arrival and willing to pasrtiicpate in PT session. RN notified of elevated HR and BLE edema L>R and LUE edema  Home Living:  Home Living  Type of Home: House  Lives With: Spouse, Grandchildren (spouse and Grand dtr works, someone will be home 24/7 upon D/C per spouse)  Home Adaptive Equipment: Cane (standardd walker)  Home Layout: Two level, Able to live on main level with bedroom/bathroom, Laundry second level  Home Access: Stairs to enter without rails  Entrance Stairs-Rails: None  Entrance Stairs-Number of Steps: 1  Bathroom Shower/Tub: Tub/shower unit  Prior Level of Function:  Prior Function Per Pt/Caregiver Report  Level of [place]: Needs assistance with ADLs  Receives Help From: Family  ADL Assistance: Needs assistance  Ambulatory Assistance: Independent  Vocational: Retired  Hand Dominance: Right  Prior Function Comments: +drives, 1 fall in the past 6 months  Precautions:  Precautions  Hearing/Visual Limitations: +glasses  Medical Precautions: Fall precautions  Precautions Comment: Protective precautions  Vital Signs:    04/14/25  During PT  Sitting  101  --  96 %  118/84  --      Vital Signs Comment: post-session supine HR:107 bpm    Objective   Pain:  Pain Assessment  Pain Assessment: 0-10  0-10 (Numeric) Pain Score: 4  Pain Type: Acute pain  Pain Location:  Throat  Cognition:  Cognition  Overall Cognitive Status: Within Functional Limits  Arousal/Alertness: Appropriate responses to stimuli  Orientation Level: Oriented X4  Following Commands: Follows one step commands without difficulty    General Assessments:     Sensation  Light Touch:  (reports baseline n/t fingertips)    Strength  Strength Comments: BLE's 3+/5 based on mobility      Static Sitting Balance  Static Sitting-Level of Assistance: Close supervision  Dynamic Sitting Balance  Dynamic Sitting-Level of Assistance: Close supervision    Static Standing Balance  Static Standing-Level of Assistance: Minimum assistance  Dynamic Standing Balance  Dynamic Standing-Level of Assistance: Moderate assistance (RW)  Functional Assessments:  Bed Mobility  Bed Mobility: Yes  Bed Mobility 1  Bed Mobility 1: Supine to sitting, Sitting to supine  Level of Assistance 1: Contact guard, Minimal verbal cues  Bed Mobility Comments 1: HOB elevated, x2    Transfers  Transfer: Yes  Transfer 1  Transfer From 1: Sit to, Stand to  Transfer to 1: Sit, Stand  Technique 1: Sit to stand, Stand to sit  Transfer Device 1: Walker  Transfer Level of Assistance 1: Moderate assistance, Minimal verbal cues, Minimal tactile cues  Trials/Comments 1: x2 from EOB  Transfers 2  Transfer From 2: Bed to  Transfer to 2:  (1 small step up to wiegh scale, ascend fwd and descend backwards)  Transfer Level of Assistance 2: Moderate assistance, Moderate verbal cues, Minimal tactile cues    Ambulation/Gait Training  Ambulation/Gait Training Performed: Yes  Ambulation/Gait Training 1  Surface 1: Level tile  Device 1: Rolling walker  Assistance 1: Minimal verbal cues, Minimal tactile cues, Moderate assistance  Quality of Gait 1: Narrow base of support, Inconsistent stride length, Decreased step length, Diminished heel strike  Comments/Distance (ft) 1: 4 sidesteps, 3 ft (cues for sequencing and walker management)    Stairs  Stairs: No       Treatments:       Bed  Mobility  Bed Mobility: Yes  Bed Mobility 1  Bed Mobility 1: Supine to sitting, Sitting to supine  Level of Assistance 1: Contact guard, Minimal verbal cues  Bed Mobility Comments 1: HOB elevated, x2    Ambulation/Gait Training  Ambulation/Gait Training Performed: Yes  Ambulation/Gait Training 1  Surface 1: Level tile  Device 1: Rolling walker  Assistance 1: Minimal verbal cues, Minimal tactile cues, Moderate assistance  Quality of Gait 1: Narrow base of support, Inconsistent stride length, Decreased step length, Diminished heel strike  Comments/Distance (ft) 1: 4 sidesteps, 3 ft (cues for sequencing and walker management)  Transfers  Transfer: Yes  Transfer 1  Transfer From 1: Sit to, Stand to  Transfer to 1: Sit, Stand  Technique 1: Sit to stand, Stand to sit  Transfer Device 1: Walker  Transfer Level of Assistance 1: Moderate assistance, Minimal verbal cues, Minimal tactile cues  Trials/Comments 1: x2 from EOB  Transfers 2  Transfer From 2: Bed to  Transfer to 2:  (1 small step up to wiegh scale, ascend fwd and descend backwards)  Transfer Level of Assistance 2: Moderate assistance, Moderate verbal cues, Minimal tactile cues    Outcome Measures:  [facility] Basic Mobility  Turning from your back to your side while in a flat bed without using bedrails: A little  Moving from lying on your back to sitting on the side of a flat bed without using bedrails: A little  Moving to and from bed to chair (including a wheelchair): A lot  Standing up from a chair using your arms (e.g. wheelchair or bedside chair): A lot  To walk in hospital room: Total  Climbing 3-5 steps with railing: Total  Basic Mobility - Total Score: 12    Encounter Problems       Encounter Problems (Active)       Balance       Pt will score >/=24/28 on Tinetti to demonstrate decreased falls risk (Progressing)       Start:  04/14/25    Expected End:  04/28/25               Mobility       Pt will be SBA for ambulation 100 ft with LRAD (Progressing)       Start:   04/14/25    Expected End:  04/28/25            Pt will be CGA to ascend/descend 1 step with LRAD (Progressing)       Start:  04/14/25    Expected End:  04/28/25               PT Transfers       Pt will be SBA for sit to stand and bed to chair transfers (Progressing)       Start:  04/14/25    Expected End:  04/28/25            Pt will be Emma with bed mobility (Progressing)       Start:  04/14/25    Expected End:  04/28/25               Pain - Adult              Education Documentation  Precautions, taught by Adenike Rea PT at 4/14/2025  3:55 PM.  Learner: Patient  Readiness: Acceptance  Method: Explanation  Response: Verbalizes Understanding, Needs Reinforcement  Comment: PT POC, D/C planning, safe transfer techinques, importance of OOB, use of walker    Body Mechanics, taught by Adenike Rea PT at 4/14/2025  3:55 PM.  Learner: Patient  Readiness: Acceptance  Method: Explanation  Response: Verbalizes Understanding, Needs Reinforcement  Comment: PT POC, D/C planning, safe transfer techinques, importance of OOB, use of walker    Mobility Training, taught by Adenike Rea PT at 4/14/2025  3:55 PM.  Learner: Patient  Readiness: Acceptance  Method: Explanation  Response: Verbalizes Understanding, Needs Reinforcement  Comment: PT POC, D/C planning, safe transfer techinques, importance of OOB, use of walker    Education Comments  No comments found.

## 2025-04-14 NOTE — PROGRESS NOTES
Bijan Ibanez is a 64 y.o. male on day  4 of admission presenting with Dysphagia.    Subjective   Afebrile, VSS. No acute issues overnight. Pt reports mucositis pain is better, continues having a difficulty w swallowing Clear liquids. Reports having new soreness in throat, nothing appreciative on exam other than erythema. Suctioning his secretions less often. A. Fib w RVR with good rate control on Diltiazem infusion. Denies nausea & diarrhea at this time. No emesis. ROS otherwise unremarkable.         Objective     Vitals:    04/13/25 2353 04/14/25 0338 04/14/25 0607 04/14/25 1030   BP: 132/83 136/83  121/71   BP Location: Right arm Right arm  Right arm   Patient Position: Lying Lying  Lying   Pulse: 89 75 (!) 115 102   Resp: 18 18  18   Temp: 36.2 °C (97.2 °F) 36.7 °C (98.1 °F)  36.5 °C (97.7 °F)   TempSrc: Temporal Temporal  Temporal   SpO2: 97% 98%  96%   Weight:       Height:          Physical Exam  Constitutional:       Appearance: He is ill-appearing. He is not toxic-appearing.   HENT:      Head: Normocephalic.      Nose: Nose normal.      Mouth/Throat:      Mouth: Mucous membranes are moist.      Pharynx: Posterior oropharyngeal erythema present.      Comments: G3 mucositis  Eyes:      Pupils: Pupils are equal, round, and reactive to light.   Cardiovascular:      Rate and Rhythm: Tachycardia present. Rhythm irregular.      Heart sounds: Normal heart sounds.   Pulmonary:      Breath sounds: Examination of the right-middle field reveals rales. Examination of the right-lower field reveals rales. Decreased breath sounds and rales present.   Abdominal:      General: Bowel sounds are normal.      Palpations: Abdomen is soft.      Tenderness: There is abdominal tenderness (intermittent).   Musculoskeletal:      Right upper arm: Swelling present.      Left upper arm: Swelling present.      Cervical back: Normal range of motion and neck supple.      Right lower leg: Edema present.      Left lower leg: Edema  present.   Skin:     General: Skin is warm and dry.      Capillary Refill: Capillary refill takes less than 2 seconds.      Coloration: Skin is pale.   Neurological:      General: No focal deficit present.      Mental Status: He is alert and oriented to person, place, and time. Mental status is at baseline.   Psychiatric:         Mood and Affect: Mood normal.         Thought Content: Thought content normal.         Judgment: Judgment normal.   Scheduled medications  acyclovir, 250 mg, intravenous, q12h  [Held by provider] apixaban, 5 mg, oral, BID  [Held by provider] atorvastatin, 40 mg, oral, Daily  [Held by provider] caffeine, 200 mg, oral, BID  fluconazole, 400 mg, intravenous, q24h  [Held by provider] gabapentin, 300 mg, oral, Nightly  [Held by provider] lisinopril, 10 mg, oral, Daily  meropenem, 1 g, intravenous, q8h  [Held by provider] metoprolol succinate XL, 100 mg, oral, Daily  [Held by provider] metoprolol, 5 mg, intravenous, q6h  nystatin, 5 mL, Swish & Spit, TID  pantoprazole, 40 mg, intravenous, Daily  sodium phosphate, 15 mmol, intravenous, Once  sucralfate, 1 g, oral, q6h SONAL      Continuous medications  dilTIAZem, 10 mg/hr, Last Rate: 10 mg/hr (04/14/25 0608)  HYDROmorphone,   sodium chloride 0.9%, 50 mL/hr      PRN medications  PRN medications: albuterol, alteplase, [Held by provider] furosemide, lidocaine-diphenhydrAMINE-Maalox 1:1:1, moisturizing mouth, naloxone, ondansetron ODT **OR** ondansetron, prochlorperazine **OR** prochlorperazine **OR** [DISCONTINUED] prochlorperazine    Results from last 7 days   Lab Units 04/14/25  0504 04/13/25  0425 04/12/25  0443 04/11/25  0105 04/10/25  1312 04/10/25  0426   WBC AUTO x10*3/uL 3.1* 1.7* 0.6* 0.1*   < > 0.1*   HEMOGLOBIN g/dL 8.5* 8.2* 7.9* 7.2*   < > 5.9*   HEMATOCRIT % 27.0* 25.8* 24.7* 22.7*   < > 18.4*   PLATELETS AUTO x10*3/uL 61* 37* 18* 17*   < > 12*   NEUTROS PCT AUTO %  --  60.3  --  15.4  --  14.3   LYMPHO PCT MAN % 5.2  --  57.1  --   --    --    LYMPHS PCT AUTO %  --  11.8  --  53.8  --  71.4   MONO PCT MAN % 10.4  --  14.3  --   --   --    MONOS PCT AUTO %  --  17.2  --  23.1  --  14.3   EOSINO PCT MAN % 0.0  --  0.0  --   --   --    EOS PCT AUTO %  --  1.2  --  7.7  --  0.0    < > = values in this interval not displayed.     Results from last 7 days   Lab Units 04/14/25  0504 04/13/25  2322 04/13/25  1113 04/13/25 0425 04/12/25  1744 04/12/25  0443 04/09/25  0632 04/08/25  0937   SODIUM mmol/L 141  --   --  139  --  139   < > 137   POTASSIUM mmol/L 5.2 4.8 3.6 3.8   < > 2.8*   < > 4.1   CHLORIDE mmol/L 109*  --   --  105  --  103   < > 102   CO2 mmol/L 25  --   --  24  --  23   < > 28   BUN mg/dL 10  --   --  15  --  14   < > 23   CREATININE mg/dL 0.31*  --   --  0.32*  --  0.39*   < > 0.41*   CALCIUM mg/dL 7.2*  --   --  7.6*  --  7.5*   < > 8.4*   PROTEIN TOTAL g/dL  --   --   --   --   --   --   --  4.6*   BILIRUBIN TOTAL mg/dL  --   --   --   --   --   --   --  1.3*   ALK PHOS U/L  --   --   --   --   --   --   --  42   ALT U/L  --   --   --   --   --   --   --  12   AST U/L  --   --   --   --   --   --   --  6*   GLUCOSE mg/dL 93  --   --  107*  --  146*   < > 143*    < > = values in this interval not displayed.     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 male with PMHx of HTN, HLD, CAD, MI (PCI '10, on ASA), atrial fibrillation, RCC (s/p partial R nephrectomy '13), GERD, BPH, arthritis, MRSA bacteremia (s/p 4 weeks IV Vancomycin which completed 3/3/25), G1 CIPN,  R subclavian and R axillary DVT r/t PICC (2/3/25; on Eliquis), R rib/flank pain and Burkitt's lyphoma who presents from outpatient clinic for mucositis, dysphagia, N/V and HA.    D18 C4 DA-R-EPOCH    ONC:  H/O RCC (s/p partial R  nephrectomy '13)  # Burkitt's Lymphoma  :: Advanced stage, renal, liver, spleen, extensive marrow involvement with positive t[8; 14] translocation  :: S/p 3 cycles DA-R-EPOCH (C2 etop reduced by 25% and again in C3 with escalated doxorubicin by 20% in C3)  :: Cycle 4 started 3/28/25   - Patient will need LP with IT Cytarabine this admit   - Sent genetic testing for dihydropyrimidine dehydrogenase (DPD), UGT1A1, CYP3A, CYP3B: Pending    Heme:  H/O R subclavian and R axillary DVT r/t PICC (2/3/25; on Eliquis)  # Pancytopenia, neutropenic 2/2 chemotherapy.  Evidence of wbc recovery (0.6) on 4/12, (1.7) on 4/13  - Neulasta (4/4/25)   - Hold Eliquis i/s/o thrombocytopenia  - Transfuse for Hgb <7g/dL & Plt <12 k/uL (severe mucositis, increased risk for bleed)  # Bilateral upper extremity edema  # Bilateral lower extremity edema  - Duplex BUE to r/o DVT (4/10) - Compared with study from 2/3/2025, Acute DVT in left subclavian vein and left axillary vein.   - Duplex BLE (4/14)- pending  - Restart eliquis 5mg BID (4/14) with plt recovery  - CTPE  (4/11) - Negative    ID:  H/O MRSA bacteremia (s/p 4 weeks IV Vanco completed 3/3)  Ppx:  Acyclovir (VZV, HSV), Fluconazole (Antifungal), Levofloxacin (PNA)  # Profound Neutropenia, 2/2 chemotherapy  :: Blood cultures (4/9, 4/11): NGTD  :: HSV Swab (4/9):  ND  :: Resp Viral Panel (4/10): ND  - Neutropenic precautions  - S/p Vancomycin (4/10-4/13) for hx MRSA, stopped after 48hrs of negative cultures.   - S/p pip-tazo (4/9-4/11) given profound neutropenia and G4 mucositis.  Initiated (4/11) Meropenem. Plan to stop Meropenem tonight (4/14)    FEN/GI/Renal:  H/O BPH, GERD  Admit wt: 90.7 kg  F: NS @ 50 mL/hr  E: PRN  N: Full liquids  # Nausea/Vomiting, likely r/t chemotherapy  - Continue ondansetron & compazine PRN  # G3-4 Mucositis  - Continue Dilaudid PCA (4/8-)  - Continue Biotene, sucralfate  - Send CMV, HSV to r/o infectious mucositis.  HSV PCR oral swab (4/9) - ND  - Nystatin  swish & spit   # Dysphagia  :: MBSS 4/3 with mild to moderate oropharyngeal dysphagia, SLP cleared for regular diet & thin liquids  :: GI consulted 4/9, supportive care, unable to perform EGD with pancytopenia and profound neutropenia  - Consult SLP, continue with supportive care (4/10) - SLP recommended MBSS 4/14 and patient refused, understands risks and potential aspiration, still refuses. Per SLP, to continue with last recommended diet as tolerated (reg/thin) as he accepts the risk.  - Mucositis improving (4/12) - adv diet to clear liquids.  Adv diet to full liquids (4/13).  # Oliguria, likely related to dehydration  :: Bladder scan 4/9 with no urine  - mIVF as above for hydration support  - Repeat Bladder scans q6h with no void & x3 with PVR  # GI ppx: Continue home Protonix    NEURO:  # Post LP HA (LP with IT Methotrexate 4/4)  - Caffeine pills BID PRN- held d/t mucositis.  HA improved with Dilaudid PCA.  - Hold home Neurontin d/t NPO and re-start as mucositis resolves  - Patient will need LP with IT Cytarabine this admit ??    CV:  H/O HTN, HLD, CAD, MI (PCI '10, on ASA), atrial fibrillation  Echo 2/3/25:  LVEF 60-65% with no LVH, normal RV function, and mod dilated LA  Awaiting EKG for QTc monitoring  # Tachycardia, likely 2/2 hypovolemia  - Increase mIVF as above  - Hold home Lipitor and Lisinopril d/t NPO and re-start as mucositis resolves  - IV Metoprolol while patient is NPO and convert back to PO when able  - Hold home Lasix given dehydration  - Keep Mg+ > 2 and K+ > 4.0  - A. fib w RVR (4/10) - initiated (4/11) Diltiazem drip 5 mg/hr, increased to 10 mg/hr per Cardiology  consult recommendations.  Can increase to a max of 20 mg/hr.  - Digoxin load 500 mcg at 0200 (4/11).  Holding additional Digoxin dosing for now  - A. fib w good rate control on current Diltiazem rate (4/12), will plan to transition to oral regimen when    patient able to tolerate oral meds.      Malignant Hematology Checklist:  ID  Prophylaxis:  Acyclovir (VZV, HSV), Fluconazole (Antifungal), Levofloxacin (PNA)  Code status:  Full Code  Lines/drains:  L DBL PICC  Primary oncologist:  Dr. Mckeon     Patient seen and discussed on rounds with Dr. Lazo.    Terrence Veras, APRN-CNP

## 2025-04-14 NOTE — CARE PLAN
The patient's goals for the shift include      The clinical goals for the shift include Patient will remain HDS throughout the shift 4/14/25 @ 0700      Problem: Pain - Adult  Goal: Verbalizes/displays adequate comfort level or baseline comfort level  Outcome: Progressing     Problem: Safety - Adult  Goal: Free from fall injury  Outcome: Progressing     Problem: Discharge Planning  Goal: Discharge to home or other facility with appropriate resources  Outcome: Progressing     Problem: Chronic Conditions and Co-morbidities  Goal: Patient's chronic conditions and co-morbidity symptoms are monitored and maintained or improved  Outcome: Progressing     Problem: Nutrition  Goal: Nutrient intake appropriate for maintaining nutritional needs  Outcome: Progressing     Problem: Skin  Goal: Participates in plan/prevention/treatment measures  Outcome: Progressing  Goal: Prevent/manage excess moisture  Outcome: Progressing  Goal: Prevent/minimize sheer/friction injuries  Outcome: Progressing  Goal: Promote/optimize nutrition  Outcome: Progressing  Goal: Promote skin healing  Outcome: Progressing

## 2025-04-14 NOTE — PROGRESS NOTES
Subjective Data:  AF 80-100s  On Diltiazem gtt at 10 ml/hr  Denies CP/ SOB/dizziness, palpitations   Hypervolemic      Objective Data:  Last Recorded Vitals:  Vitals:    04/14/25 1030 04/14/25 1324 04/14/25 1425 04/14/25 1513   BP: 121/71 132/77  124/85   BP Location: Right arm Right arm     Patient Position: Lying Lying     Pulse: 102 99  98   Resp: 18 18     Temp: 36.5 °C (97.7 °F) 36.6 °C (97.9 °F)     TempSrc: Temporal Temporal     SpO2: 96% 97%     Weight:   95.3 kg (210 lb 1.6 oz)    Height:           Last Labs:  CBC - 4/14/2025:  5:04 AM  3.1 8.5 61    27.0      CMP - 4/14/2025:  5:04 AM  7.2 4.6 6 --- 1.3   2.1 2.5 12 42      PTT - 4/3/2025:  4:41 AM  1.1   11.6 23     TROPHS   Date/Time Value Ref Range Status   01/28/2025 09:16 AM 5 0 - 53 ng/L Final   01/10/2025 09:06 PM 6 0 - 53 ng/L Final     BNP   Date/Time Value Ref Range Status   01/10/2025 09:06 PM 5 0 - 99 pg/mL Final     HGBA1C   Date/Time Value Ref Range Status   10/16/2024 06:59 AM 5.3 See comment % Final     LDLCALC   Date/Time Value Ref Range Status   01/08/2025 02:54 PM 74 <=99 mg/dL Final     Comment:                                 Near   Borderline      AGE      Desirable  Optimal    High     High     Very High     0-19 Y     0 - 109     ---    110-129   >/= 130     ----    20-24 Y     0 - 119     ---    120-159   >/= 160     ----      >24 Y     0 -  99   100-129  130-159   160-189     >/=190     10/16/2024 06:59 AM 56 <=99 mg/dL Final     Comment:                                 Near   Borderline      AGE      Desirable  Optimal    High     High     Very High     0-19 Y     0 - 109     ---    110-129   >/= 130     ----    20-24 Y     0 - 119     ---    120-159   >/= 160     ----      >24 Y     0 -  99   100-129  130-159   160-189     >/=190       VLDL   Date/Time Value Ref Range Status   01/08/2025 02:54 PM 50 0 - 40 mg/dL Final   10/16/2024 06:59 AM 28 0 - 40 mg/dL Final      Last I/O:  I/O last 3 completed shifts:  In: 2000 (22.1 mL/kg)  [I.V.:2000 (22.1 mL/kg)]  Out: 1050 (11.6 mL/kg) [Urine:1050 (0.3 mL/kg/hr)]  Weight: 90.7 kg       2/3/25 Onco-Echo Limited (Strain And 3D)   CONCLUSIONS:   1. The left ventricular systolic function is normal, with a visually estimated ejection fraction of 60-65%.   2. Left ventricular diastolic filling is indeterminate due to E/A wave fusion.   3. There is no evidence of left ventricular hypertrophy.   4. There is normal right ventricular global systolic function.   5. The left atrial size is moderately dilated.   6. The pulmonary artery is not well visualized.    Onco-Cardiology Measurements:  Historical Measurements from Previous Study  2D EF (Biplane)                     64%%  3D EF                               51%%  Global Longitudinal Strain (GLS) -19.9%%     Current Measurements  2D EF (Biplane)                     64%%  3D EF                               58%%  Global Longitudinal Strain (GLS) -17.6%%       Inpatient Medications:  Scheduled medications   Medication Dose Route Frequency    acyclovir  250 mg intravenous q12h    apixaban  5 mg oral BID    [Held by provider] atorvastatin  40 mg oral Daily    [Held by provider] caffeine  200 mg oral BID    fluconazole  400 mg intravenous q24h    furosemide  40 mg intravenous Once    [Held by provider] gabapentin  300 mg oral Nightly    [Held by provider] lisinopril  10 mg oral Daily    meropenem  1 g intravenous q8h    [Held by provider] metoprolol succinate XL  100 mg oral Daily    [Held by provider] metoprolol  5 mg intravenous q6h    nystatin  5 mL Swish & Spit TID    pantoprazole  40 mg intravenous Daily    sodium phosphate  15 mmol intravenous Once    sucralfate  1 g oral q6h SONAL     PRN medications   Medication    albuterol    alteplase    [Held by provider] furosemide    lidocaine-diphenhydrAMINE-Maalox 1:1:1    moisturizing mouth    naloxone    ondansetron ODT    Or    ondansetron    prochlorperazine    Or    prochlorperazine     Continuous Medications    Medication Dose Last Rate    dilTIAZem  10 mg/hr 10 mg/hr (04/14/25 0608)    HYDROmorphone        sodium chloride 0.9%  50 mL/hr 50 mL/hr (04/14/25 1327)       Physical Exam:  General: Sitting up in bed, room air, NAD  Skin:  warm and dry  HEENT: EOMI. MMM  Cardiovascular: irreg irreg . Difficult to assess JVD   Respiratory: Reduced A/E, rales  Gastrointestinal: soft, non-distended  Genitourinary: pale urine per urinal   Extremities: 2-3+ pitting edema, L>R   Neurological: no gross focal deficits  Psychiatric: appropriate mood and affect      Assessment/Plan   Mr. Ibanez is a 63 yo gentleman with a PMH of HTN, HLD, CAD, MI (PCI '10, on ASA), atrial fibrillation, RCC (s/p partial R nephrectomy '13), GERD, BPH, arthritis, MRSA bacteremia (s/p 4 weeks IV Vancomycin which completed 3/3/25), G1 CIPN, R subclavian and R axillary DVT r/t PICC (2/3/25; on Eliquis), R rib/flank pain and Burkitt's lyphoma who is admitted to heme/onc service for mucositis, dysphagia, n/v.  Cardiology is consulted for Afib.      Limited oral intake due to mucositis.  Primary team planning to restart anticoagulation      Plan  -Continue Dilt gtt. At 10 mg/hr. Would not be overly aggressive. HR of <120 bpm is reasonable in the context of patient's lack of symptoms and preserved LVEF  - Lasix 40 mg IV now and again this evening  - strict I/Os  -Please continue to monitor on tele while inpatient     Cardiology will follow  Case discussed with Dr. Wilner Joe, APRN-CNP  Cardiology Consults    Please call with any questions  General Cardiology Consult Pager: [phone] (weekday 7AM-6PM and weekend 7AM-2PM) and other: [phone]  EP Consult Pager: [phone] (weekday 7AM-6PM and weekend 7AM-2PM) and other: [phone]  CICU Fellow Pager: [phone] anytime  EP Device Nurse Pager: [phone] (weekday 7AM-4PM)  Advanced Heart Failure Consult Pager: [phone] anytime        Code Status:  Full Code

## 2025-04-14 NOTE — DOCUMENTATION CLARIFICATION NOTE
"    PATIENT:               DENNIS CAGLE  ACCT #:                  [id number]  MRN:                       [id number]  :                       1960  ADMIT DATE:       2025 4:40 PM  DISCH DATE:  RESPONDING PROVIDER #:        [id number]          PROVIDER RESPONSE TEXT:    I agree with dietician diagnosis of Severe Malnutrition on 25    CDI QUERY TEXT:    Clarification        Instruction:    Based on your assessment of the patient and the clinical information, please provide the requested documentation by clicking on the appropriate radio button and enter any additional information if prompted.    Question: Please further clarify this patient nutritional status as    When answering this query, please exercise your independent professional judgment. The fact that a question is being asked, does not imply that any particular answer is desired or expected.    The patient's clinical indicators include:  Clinical Information: 63 yo M with decreased PO intake and Nutrition consult.    Clinical Indicators:   Consult- RDN:  \" -BMI (Calculated): 26.94  -Malnutrition Diagnosis: Severe malnutrition related to acute disease or injury  Related to: side effects from chemo  As Evidenced by: significant wt loss of 2% x1 week; PO intake meeting <50% of nutr needs for >/= 5 days  Additional Assessment Information: Component of chronic disease/condition present as pt does have hx of cancer and inadequate PO intake and wt loss appears to be an ongoing issue for the past 1-3 months. Nutrition status appears to have significantly declined within the past 1-2 weeks d/t mucositis, dysphagia, etc. from chemo.\"      Treatment:  -Nutrition consult  -Boost and Ensure Plus when diet advanced  -SLP dysphagia therapy    Risk Factors:  -cancer  -mucositis  -dysphagia  -chemo  Options provided:  -- I agree with dietician diagnosis of Severe Malnutrition on 25  -- Other - I will add my own diagnosis  -- Refer to Clinical Documentation " Reviewer    Query created by: Bárbara Dodson on 4/14/2025 4:30 PM      Electronically signed by:  AYDIN CYR 4/14/2025 4:40 PM

## 2025-04-15 ENCOUNTER — APPOINTMENT (OUTPATIENT)
Dept: VASCULAR MEDICINE | Facility: HOSPITAL | Age: 65
DRG: 157 | End: 2025-04-15
Payer: MEDICARE

## 2025-04-15 LAB
ALBUMIN SERPL BCP-MCNC: 2.6 G/DL (ref 3.4–5)
ALBUMIN SERPL BCP-MCNC: 2.8 G/DL (ref 3.4–5)
ALBUMIN SERPL BCP-MCNC: 2.8 G/DL (ref 3.4–5)
ANION GAP SERPL CALC-SCNC: 11 MMOL/L (ref 10–20)
ANION GAP SERPL CALC-SCNC: 12 MMOL/L (ref 10–20)
ANION GAP SERPL CALC-SCNC: 13 MMOL/L (ref 10–20)
ATRIAL RATE: 106 BPM
ATRIAL RATE: 182 BPM
BACTERIA BLD CULT: NORMAL
BACTERIA BLD CULT: NORMAL
BASOPHILS # BLD MANUAL: 0 X10*3/UL (ref 0–0.1)
BASOPHILS NFR BLD MANUAL: 0 %
BUN SERPL-MCNC: 10 MG/DL (ref 6–23)
BUN SERPL-MCNC: 8 MG/DL (ref 6–23)
BUN SERPL-MCNC: 8 MG/DL (ref 6–23)
CALCIUM SERPL-MCNC: 7.5 MG/DL (ref 8.6–10.6)
CALCIUM SERPL-MCNC: 7.7 MG/DL (ref 8.6–10.6)
CALCIUM SERPL-MCNC: 7.8 MG/DL (ref 8.6–10.6)
CHLORIDE SERPL-SCNC: 98 MMOL/L (ref 98–107)
CHLORIDE SERPL-SCNC: 99 MMOL/L (ref 98–107)
CHLORIDE SERPL-SCNC: 99 MMOL/L (ref 98–107)
CO2 SERPL-SCNC: 29 MMOL/L (ref 21–32)
CO2 SERPL-SCNC: 29 MMOL/L (ref 21–32)
CO2 SERPL-SCNC: 30 MMOL/L (ref 21–32)
CREAT SERPL-MCNC: 0.4 MG/DL (ref 0.5–1.3)
CREAT SERPL-MCNC: 0.4 MG/DL (ref 0.5–1.3)
CREAT SERPL-MCNC: 0.45 MG/DL (ref 0.5–1.3)
DACRYOCYTES BLD QL SMEAR: ABNORMAL
EGFRCR SERPLBLD CKD-EPI 2021: >90 ML/MIN/1.73M*2
EOSINOPHIL # BLD MANUAL: 0.03 X10*3/UL (ref 0–0.7)
EOSINOPHIL NFR BLD MANUAL: 0.9 %
ERYTHROCYTE [DISTWIDTH] IN BLOOD BY AUTOMATED COUNT: 16.2 % (ref 11.5–14.5)
GLUCOSE SERPL-MCNC: 142 MG/DL (ref 74–99)
GLUCOSE SERPL-MCNC: 91 MG/DL (ref 74–99)
GLUCOSE SERPL-MCNC: 98 MG/DL (ref 74–99)
HCT VFR BLD AUTO: 27.5 % (ref 41–52)
HGB BLD-MCNC: 8.7 G/DL (ref 13.5–17.5)
IMM GRANULOCYTES # BLD AUTO: 0.6 X10*3/UL (ref 0–0.7)
IMM GRANULOCYTES NFR BLD AUTO: 15.9 % (ref 0–0.9)
LYMPHOCYTES # BLD MANUAL: 0.13 X10*3/UL (ref 1.2–4.8)
LYMPHOCYTES NFR BLD MANUAL: 3.4 %
MAGNESIUM SERPL-MCNC: 1.8 MG/DL (ref 1.6–2.4)
MAGNESIUM SERPL-MCNC: 1.97 MG/DL (ref 1.6–2.4)
MCH RBC QN AUTO: 28.2 PG (ref 26–34)
MCHC RBC AUTO-ENTMCNC: 31.6 G/DL (ref 32–36)
MCV RBC AUTO: 89 FL (ref 80–100)
METAMYELOCYTES # BLD MANUAL: 0.19 X10*3/UL
METAMYELOCYTES NFR BLD MANUAL: 5.1 %
MONOCYTES # BLD MANUAL: 0.48 X10*3/UL (ref 0.1–1)
MONOCYTES NFR BLD MANUAL: 12.7 %
MYELOCYTES # BLD MANUAL: 0.03 X10*3/UL
MYELOCYTES NFR BLD MANUAL: 0.8 %
NEUTROPHILS # BLD MANUAL: 2.83 X10*3/UL (ref 1.2–7.7)
NEUTS BAND # BLD MANUAL: 0.06 X10*3/UL (ref 0–0.7)
NEUTS BAND NFR BLD MANUAL: 1.7 %
NEUTS SEG # BLD MANUAL: 2.77 X10*3/UL (ref 1.2–7)
NEUTS SEG NFR BLD MANUAL: 72.9 %
NRBC BLD-RTO: 1.3 /100 WBCS (ref 0–0)
OVALOCYTES BLD QL SMEAR: ABNORMAL
P AXIS: 55 DEGREES
P OFFSET: 194 MS
P ONSET: 131 MS
PHOSPHATE SERPL-MCNC: 2.7 MG/DL (ref 2.5–4.9)
PHOSPHATE SERPL-MCNC: 3.7 MG/DL (ref 2.5–4.9)
PHOSPHATE SERPL-MCNC: 3.8 MG/DL (ref 2.5–4.9)
PLATELET # BLD AUTO: 87 X10*3/UL (ref 150–450)
POLYCHROMASIA BLD QL SMEAR: ABNORMAL
POTASSIUM SERPL-SCNC: 3.1 MMOL/L (ref 3.5–5.3)
POTASSIUM SERPL-SCNC: 3.4 MMOL/L (ref 3.5–5.3)
POTASSIUM SERPL-SCNC: 3.9 MMOL/L (ref 3.5–5.3)
PR INTERVAL: 164 MS
Q ONSET: 213 MS
Q ONSET: 218 MS
QRS COUNT: 18 BEATS
QRS COUNT: 26 BEATS
QRS DURATION: 76 MS
QRS DURATION: 90 MS
QT INTERVAL: 300 MS
QT INTERVAL: 350 MS
QTC CALCULATION(BAZETT): 464 MS
QTC CALCULATION(BAZETT): 479 MS
QTC FREDERICIA: 410 MS
QTC FREDERICIA: 422 MS
R AXIS: 4 DEGREES
R AXIS: 8 DEGREES
RBC # BLD AUTO: 3.08 X10*6/UL (ref 4.5–5.9)
RBC MORPH BLD: ABNORMAL
SODIUM SERPL-SCNC: 135 MMOL/L (ref 136–145)
SODIUM SERPL-SCNC: 137 MMOL/L (ref 136–145)
SODIUM SERPL-SCNC: 138 MMOL/L (ref 136–145)
T AXIS: -15 DEGREES
T AXIS: 24 DEGREES
T OFFSET: 368 MS
T OFFSET: 388 MS
TOTAL CELLS COUNTED BLD: 118
VARIANT LYMPHS # BLD MANUAL: 0.1 X10*3/UL (ref 0–0.5)
VARIANT LYMPHS NFR BLD: 2.5 %
VENTRICULAR RATE: 106 BPM
VENTRICULAR RATE: 153 BPM
WBC # BLD AUTO: 3.8 X10*3/UL (ref 4.4–11.3)

## 2025-04-15 PROCEDURE — 93970 EXTREMITY STUDY: CPT

## 2025-04-15 PROCEDURE — 2500000001 HC RX 250 WO HCPCS SELF ADMINISTERED DRUGS (ALT 637 FOR MEDICARE OP)

## 2025-04-15 PROCEDURE — 83735 ASSAY OF MAGNESIUM: CPT

## 2025-04-15 PROCEDURE — 80069 RENAL FUNCTION PANEL: CPT

## 2025-04-15 PROCEDURE — 1200000003 HC ONCOLOGY  ROOM WITH TELEMETRY DAILY

## 2025-04-15 PROCEDURE — 99233 SBSQ HOSP IP/OBS HIGH 50: CPT | Performed by: NURSE PRACTITIONER

## 2025-04-15 PROCEDURE — 81240 F2 GENE: CPT

## 2025-04-15 PROCEDURE — 2500000001 HC RX 250 WO HCPCS SELF ADMINISTERED DRUGS (ALT 637 FOR MEDICARE OP): Performed by: PHYSICIAN ASSISTANT

## 2025-04-15 PROCEDURE — 85007 BL SMEAR W/DIFF WBC COUNT: CPT

## 2025-04-15 PROCEDURE — 2500000004 HC RX 250 GENERAL PHARMACY W/ HCPCS (ALT 636 FOR OP/ED): Performed by: PHYSICIAN ASSISTANT

## 2025-04-15 PROCEDURE — 85027 COMPLETE CBC AUTOMATED: CPT

## 2025-04-15 PROCEDURE — 93970 EXTREMITY STUDY: CPT | Performed by: INTERNAL MEDICINE

## 2025-04-15 PROCEDURE — 2500000004 HC RX 250 GENERAL PHARMACY W/ HCPCS (ALT 636 FOR OP/ED)

## 2025-04-15 PROCEDURE — 2500000002 HC RX 250 W HCPCS SELF ADMINISTERED DRUGS (ALT 637 FOR MEDICARE OP, ALT 636 FOR OP/ED)

## 2025-04-15 RX ORDER — POLYETHYLENE GLYCOL 3350 17 G/17G
17 POWDER, FOR SOLUTION ORAL 2 TIMES DAILY
Status: DISCONTINUED | OUTPATIENT
Start: 2025-04-15 | End: 2025-04-17

## 2025-04-15 RX ORDER — POTASSIUM CHLORIDE 29.8 MG/ML
40 INJECTION INTRAVENOUS EVERY 4 HOURS
Status: COMPLETED | OUTPATIENT
Start: 2025-04-15 | End: 2025-04-15

## 2025-04-15 RX ADMIN — SUCRALFATE 1 G: 1 SUSPENSION ORAL at 11:20

## 2025-04-15 RX ADMIN — APIXABAN 5 MG: 5 TABLET, FILM COATED ORAL at 08:04

## 2025-04-15 RX ADMIN — NYSTATIN 500000 UNITS: 100000 SUSPENSION ORAL at 08:04

## 2025-04-15 RX ADMIN — POTASSIUM CHLORIDE 40 MEQ: 29.8 INJECTION, SOLUTION INTRAVENOUS at 11:22

## 2025-04-15 RX ADMIN — POTASSIUM CHLORIDE 40 MEQ: 29.8 INJECTION, SOLUTION INTRAVENOUS at 08:15

## 2025-04-15 RX ADMIN — ACYCLOVIR SODIUM 250 MG: 50 INJECTION, SOLUTION INTRAVENOUS at 13:28

## 2025-04-15 RX ADMIN — SUCRALFATE 1 G: 1 SUSPENSION ORAL at 05:30

## 2025-04-15 RX ADMIN — NYSTATIN 500000 UNITS: 100000 SUSPENSION ORAL at 20:44

## 2025-04-15 RX ADMIN — ACYCLOVIR SODIUM 250 MG: 50 INJECTION, SOLUTION INTRAVENOUS at 02:27

## 2025-04-15 RX ADMIN — Medication 10 MG/HR: at 17:50

## 2025-04-15 RX ADMIN — Medication: at 18:07

## 2025-04-15 RX ADMIN — POLYETHYLENE GLYCOL 3350 17 G: 17 POWDER, FOR SOLUTION ORAL at 15:17

## 2025-04-15 RX ADMIN — Medication 10 MG/HR: at 05:30

## 2025-04-15 RX ADMIN — FLUCONAZOLE 400 MG: 2 INJECTION, SOLUTION INTRAVENOUS at 20:49

## 2025-04-15 RX ADMIN — PANTOPRAZOLE SODIUM 40 MG: 40 INJECTION, POWDER, FOR SOLUTION INTRAVENOUS at 08:04

## 2025-04-15 RX ADMIN — APIXABAN 5 MG: 5 TABLET, FILM COATED ORAL at 20:44

## 2025-04-15 ASSESSMENT — PAIN SCALES - GENERAL
PAINLEVEL_OUTOF10: 5 - MODERATE PAIN
PAINLEVEL_OUTOF10: 3
PAINLEVEL_OUTOF10: 4

## 2025-04-15 ASSESSMENT — COGNITIVE AND FUNCTIONAL STATUS - GENERAL
CLIMB 3 TO 5 STEPS WITH RAILING: A LOT
WALKING IN HOSPITAL ROOM: A LITTLE
MOBILITY SCORE: 17
HELP NEEDED FOR BATHING: A LITTLE
STANDING UP FROM CHAIR USING ARMS: A LITTLE
EATING MEALS: A LITTLE
TURNING FROM BACK TO SIDE WHILE IN FLAT BAD: A LITTLE
PERSONAL GROOMING: A LITTLE
DAILY ACTIVITIY SCORE: 18
MOVING FROM LYING ON BACK TO SITTING ON SIDE OF FLAT BED WITH BEDRAILS: A LITTLE
MOVING TO AND FROM BED TO CHAIR: A LITTLE
DRESSING REGULAR LOWER BODY CLOTHING: A LITTLE
DRESSING REGULAR UPPER BODY CLOTHING: A LITTLE
TOILETING: A LITTLE

## 2025-04-15 ASSESSMENT — PAIN - FUNCTIONAL ASSESSMENT
PAIN_FUNCTIONAL_ASSESSMENT: 0-10
PAIN_FUNCTIONAL_ASSESSMENT: 0-10

## 2025-04-15 NOTE — PROGRESS NOTES
Bijan Ibanez is a 64 y.o. male on day  4 of admission presenting with Dysphagia.    Subjective   Afebrile, VSS. No acute issues overnight. Pt reports mucositis pain is improving, however still has a throat soreness that started yesterday. He was able to tolerate pills PO and has been having good PO intake with broth. Will advance diet today. He was aggressively diuresed yesterday and reports poor sleep overnight due to this, voiding around ~20 minutes. Otherwise he offers no new complaints this morning. Denies nausea & diarrhea at this time. No emesis. ROS otherwise unremarkable.         Objective     Vitals:    04/15/25 0004 04/15/25 0356 04/15/25 0820 04/15/25 0829   BP: 113/79 118/57  136/87   BP Location: Right arm Right arm  Right arm   Patient Position: Lying Lying  Lying   Pulse: 82 93  104   Resp: 16 16  16   Temp: 36.9 °C (98.4 °F) 36.3 °C (97.3 °F)  36.4 °C (97.5 °F)   TempSrc: Temporal Temporal  Temporal   SpO2: 97% 95%  94%   Weight:   92.8 kg (204 lb 9.4 oz)    Height:          Physical Exam  Constitutional:       Appearance: He is ill-appearing. He is not toxic-appearing.   HENT:      Head: Normocephalic.      Nose: Nose normal.      Mouth/Throat:      Mouth: Mucous membranes are moist.      Pharynx: Posterior oropharyngeal erythema present.      Comments: G3 mucositis  Eyes:      Pupils: Pupils are equal, round, and reactive to light.   Cardiovascular:      Rate and Rhythm: Tachycardia present. Rhythm irregular.      Heart sounds: Normal heart sounds.   Pulmonary:      Breath sounds: Examination of the right-middle field reveals rales. Examination of the right-lower field reveals rales. Decreased breath sounds and rales present.   Abdominal:      General: Bowel sounds are normal.      Palpations: Abdomen is soft.      Tenderness: There is abdominal tenderness (intermittent).   Musculoskeletal:      Right upper arm: Swelling present.      Left upper arm: Swelling present.      Cervical back: Normal  History of Present Illness





- [facility]


Consult date: 20


Consult reason: other (Left knee pain and swelling)


History of present illness: 





The patient is a 30 y/o male with a history of gout, who presented to emergency 

department today for left knee swelling and pain.  He states the knee pain 

started last night.  He states the knee was very swollen and red this morning.  

The swelling has improved throughout the day.  He recently started a new job.  

The patient does have a history of gout that usually only effects the right 

foot.  He had some  colchicine at home and he was taking that for the 

right foot a few weeks ago.  He has not taken colchicine for at least 5 days.  

The right foot is feeling fine and he thinks he was favoring the right foot and 

was putting more pressure on the left leg.  No specific injury preceded the knee

pain.  He does have a tattoo to the superior anterior knee but that is not new. 

He is able to walk but states he walks with his knee straight.  The patient 

denies fever, chills, rigors, and ill-feeling.  No worsening of redness or 

warmth to the knee since last night. 











Review of Systems


Constitutional: Denies chills, Denies fever, Denies lethargy


Cardiovascular: Denies chest pain, Denies shortness of breath


Respiratory: Denies cough


Musculoskeletal: left: knee pain, knee stiffness, knee swelling





Past Medical History


Past Medical History: No Reported History


Additional Past Medical History / Comment(s): gout


History of Any Multi-Drug Resistant Organisms: None Reported


Additional Past Surgical History / Comment(s): lasix surgery


Past Psychological History: No Psychological Hx Reported


Smoking Status: Current every day smoker


Past Alcohol Use History: None Reported


Past Drug Use History: Marijuana





Medications and Allergies


                                Home Medications











 Medication  Instructions  Recorded  Confirmed  Type


 


Ondansetron Odt [Zofran Odt] 4 mg PO Q12HR 5 Days  tab 16  Rx


 


traMADol HCl [Ultram] 50 mg PO Q6H PRN 16 History


 


Cephalexin [Keflex] 500 mg PO Q6HR #28 cap 20  Rx


 


Sulfamethox-Tmp 800-160Mg [Bactrim 2 each PO Q12HR #28 tab 20  Rx





Ds]    








                                    Allergies











Allergy/AdvReac Type Severity Reaction Status Date / Time


 


diphenhydramine Allergy  Nausea Verified 20 12:07





[From Benadryl]     


 


Penicillins AdvReac  Itching Verified 16 15:19














Physical Examination





There is mild swelling and moderate tenderness over the prepatellar bursa and 

surrounding the entire patella, with very slight erythema.  No drainage and 

slight warmth.   No significant tenderness over medial or lateral joint lines.  

No significant irritability with patellar compression.  Good passive ROM of the 

knee.  Walks well with heel to gait.  Sensation intact to light touch over 

anterior, posterior, medial, and lateral thigh, also intact over anterior, 

posterior, medial, and lateral leg.  Dorsalis pedis and posterior tibial pulses 

are 2+.  Capillary refill is less than 2 seconds.  No trophic skin changes.  No 

ulceration.  Skin is healthy, pink, and warm.





Results





- Labs


Labs: 


                  Abnormal Lab Results - Last 24 Hours (Table)











  20 Range/Units





  12:35 12:35 


 


WBC  16.0 H   (3.8-10.6)  k/uL


 


RBC  4.17 L   (4.30-5.90)  m/uL


 


Hgb  12.3 L   (13.0-17.5)  gm/dL


 


Hct  37.7 L   (39.0-53.0)  %


 


Neutrophils #  12.6 H   (1.3-7.7)  k/uL


 


Uric Acid   9.2 H  (3.5-8.5)  mg/dL


 


C-Reactive Protein   12.5 H  (<10.0)  mg/L








                                      H & H











  20 Range/Units





  12:35 


 


Hgb  12.3 L  (13.0-17.5)  gm/dL


 


Hct  37.7 L  (39.0-53.0)  %











Result Diagrams: 


                                 20 12:35





                                 20 12:35





- Diagnostic results


Knee x-ray: image reviewed (Three views of the left knee reveal a possible old 

injury to the inferior pole of the patella.  No acute fractures or bony lesion 

present. )





Assessment and Plan


(1) Prepatellar bursitis


Status: Acute   Code(s): M70.40 - PREPATELLAR BURSITIS, UNSPECIFIED KNEE   

SNOMED Code(s): 26416625


   





(2) Knee pain


Status: Acute   Code(s): M25.569 - PAIN IN UNSPECIFIED KNEE   SNOMED Code(s): 

89663193


   


Plan: 





The clinical and x-ray findings were discussed with the patient.  The case was 

discussed with Dr. Borjas.  We recommending conservative measures to the 

left knee.  He will be started on oral antibiotics for coverage of possible 

septic bursitis, which is unlikely due to his presentation of only slight 

swelling, erythema, and warmth.  We will avoid aspiration of the bursa at this 

time.  He may alternative heat and cold to the knee.  The patient will follow up

in our office if he continues to have problems with the knee or if there is 

worsening redness, warm, swelling, and pain. range of motion and neck supple.      Right lower leg: Edema present.      Left lower leg: Edema present.   Skin:     General: Skin is warm and dry.      Capillary Refill: Capillary refill takes less than 2 seconds.      Coloration: Skin is pale.   Neurological:      General: No focal deficit present.      Mental Status: He is alert and oriented to person, place, and time. Mental status is at baseline.   Psychiatric:         Mood and Affect: Mood normal.         Thought Content: Thought content normal.         Judgment: Judgment normal.   Scheduled medications  acyclovir, 250 mg, intravenous, q12h  apixaban, 5 mg, oral, BID  [Held by provider] atorvastatin, 40 mg, oral, Daily  [Held by provider] caffeine, 200 mg, oral, BID  fluconazole, 400 mg, intravenous, q24h  [Held by provider] gabapentin, 300 mg, oral, Nightly  [Held by provider] lisinopril, 10 mg, oral, Daily  [Held by provider] metoprolol succinate XL, 100 mg, oral, Daily  [Held by provider] metoprolol, 5 mg, intravenous, q6h  nystatin, 5 mL, Swish & Spit, TID  pantoprazole, 40 mg, intravenous, Daily  potassium chloride, 40 mEq, intravenous, q4h  sucralfate, 1 g, oral, q6h SONAL      Continuous medications  dilTIAZem, 10 mg/hr, Last Rate: 10 mg/hr (04/15/25 0530)  HYDROmorphone,   sodium chloride 0.9%, 50 mL/hr, Last Rate: 50 mL/hr (04/15/25 0119)      PRN medications  PRN medications: albuterol, alteplase, [Held by provider] furosemide, lidocaine-diphenhydrAMINE-Maalox 1:1:1, moisturizing mouth, naloxone, ondansetron ODT **OR** ondansetron, prochlorperazine **OR** prochlorperazine **OR** [DISCONTINUED] prochlorperazine    Results from last 7 days   Lab Units 04/15/25  0539 04/14/25  0504 04/13/25  0425 04/12/25  0443 04/11/25  0105 04/10/25  1312 04/10/25  0426   WBC AUTO x10*3/uL 3.8* 3.1* 1.7*   < > 0.1*   < > 0.1*   HEMOGLOBIN g/dL 8.7* 8.5* 8.2*   < > 7.2*   < > 5.9*   HEMATOCRIT % 27.5* 27.0* 25.8*   < > 22.7*   < > 18.4*   PLATELETS AUTO x10*3/uL 87* 61*  37*   < > 17*   < > 12*   NEUTROS PCT AUTO %  --   --  60.3  --  15.4  --  14.3   LYMPHO PCT MAN % 3.4 5.2  --    < >  --   --   --    LYMPHS PCT AUTO %  --   --  11.8  --  53.8  --  71.4   MONO PCT MAN % 12.7 10.4  --    < >  --   --   --    MONOS PCT AUTO %  --   --  17.2  --  23.1  --  14.3   EOSINO PCT MAN % 0.9 0.0  --    < >  --   --   --    EOS PCT AUTO %  --   --  1.2  --  7.7  --  0.0    < > = values in this interval not displayed.     Results from last 7 days   Lab Units 04/15/25  0539 04/14/25 2222 04/14/25 0504 04/09/25  0632 04/08/25  0937   SODIUM mmol/L 138 137 141   < > 137   POTASSIUM mmol/L 3.4* 3.1* 5.2   < > 4.1   CHLORIDE mmol/L 99 98 109*   < > 102   CO2 mmol/L 30 29 25   < > 28   BUN mg/dL 8 8 10   < > 23   CREATININE mg/dL 0.40* 0.40* 0.31*   < > 0.41*   CALCIUM mg/dL 7.5* 7.8* 7.2*   < > 8.4*   PROTEIN TOTAL g/dL  --   --   --   --  4.6*   BILIRUBIN TOTAL mg/dL  --   --   --   --  1.3*   ALK PHOS U/L  --   --   --   --  42   ALT U/L  --   --   --   --  12   AST U/L  --   --   --   --  6*   GLUCOSE mg/dL 98 91 93   < > 143*    < > = values in this interval not displayed.     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 male with PMHx of HTN, HLD, CAD, MI (PCI '10, on ASA), atrial fibrillation, RCC (s/p partial R nephrectomy '13), GERD, BPH, arthritis, MRSA bacteremia (s/p 4 weeks IV Vancomycin which completed 3/3/25), G1 CIPN,  R subclavian and R axillary DVT r/t PICC (2/3/25; on Eliquis), R rib/flank pain and Burkitt's lyphoma who presents from outpatient clinic for mucositis, dysphagia, N/V and HA.    D19 C4 DA-R-EPOCH    ONC:  H/O RCC (s/p partial R nephrectomy '13)  # Burkitt's Lymphoma  :: Advanced stage, renal, liver, spleen, extensive  marrow involvement with positive t[8; 14] translocation  :: S/p 3 cycles DA-R-EPOCH (C2 etop reduced by 25% and again in C3 with escalated doxorubicin by 20% in C3)  :: Cycle 4 started 3/28/25   - Patient will need LP with IT Cytarabine this admit, plan to give at discharge  - Sent genetic testing for dihydropyrimidine dehydrogenase (DPD), UGT1A1, CYP3A, CYP3B: Pending    Heme:  H/O R subclavian and R axillary DVT r/t PICC (2/3/25; on Eliquis)  # Pancytopenia, neutropenic 2/2 chemotherapy.  Evidence of wbc recovery (0.6) on 4/12, (1.7) on 4/13  - Neulasta (4/4/25)   - Continue Eliquis   - Transfuse for Hgb <7g/dL & Plt <12 k/uL (severe mucositis, increased risk for bleed)  # Bilateral upper extremity edema  # Bilateral lower extremity edema  - Duplex BUE to r/o DVT (4/10) - Compared with study from 2/3/2025, Acute DVT in left subclavian vein and left axillary vein.   - Duplex BLE (4/14)- pending  - Restart eliquis 5mg BID (4/14) with plt recovery  - CTPE  (4/11) - Negative    ID:  H/O MRSA bacteremia (s/p 4 weeks IV Vanco completed 3/3)  Ppx:  Acyclovir (VZV, HSV), Fluconazole (Antifungal), Levofloxacin (PNA)  # Profound Neutropenia, 2/2 chemotherapy  :: Blood cultures (4/9, 4/11): NGTD  :: HSV Swab (4/9):  ND  :: Resp Viral Panel (4/10): ND  - Neutropenic precautions  - S/p Vancomycin (4/10-4/13) for hx MRSA, stopped after 48hrs of negative cultures.   - S/p pip-tazo (4/9-4/11) given profound neutropenia and G4 mucositis.  S/p Meropenem (4/11-4/14)    FEN/GI/Renal:  H/O BPH, GERD  Admit wt: 90.7 kg, current wt: 92.8 kg  F: PRN  E: PRN  N: Regular  # Nausea/Vomiting, likely r/t chemotherapy  - Continue ondansetron & compazine PRN  # G3-4 Mucositis  - Continue Dilaudid PCA (4/8-)  - S/p mIVF (4/8-4/15)  - Continue Biotene, sucralfate  - Send CMV, HSV to r/o infectious mucositis.  HSV PCR oral swab (4/9) - ND  - Nystatin swish & spit   # Dysphagia  :: MBSS 4/3 with mild to moderate oropharyngeal dysphagia, SLP  cleared for regular diet & thin liquids  :: GI consulted 4/9, supportive care, unable to perform EGD with pancytopenia and profound neutropenia  - Consult SLP, continue with supportive care (4/10) - SLP recommended MBSS 4/14 and patient refused, understands risks and potential aspiration, still refuses. Per SLP, to continue with last recommended diet as tolerated (reg/thin) as he accepts the risk.  - Mucositis improving (4/12) - adv diet to clear liquids.  Adv diet to full liquids (4/13).  # Oliguria, likely related to dehydration  :: Bladder scan 4/9 with no urine  - mIVF as above for hydration support  - Repeat Bladder scans q6h with no void & x3 with PVR  # GI ppx: Continue home Protonix    NEURO:  # Post LP HA (LP with IT Methotrexate 4/4)  - Caffeine pills BID PRN- held d/t mucositis.  HA improved with Dilaudid PCA.  - Hold home Neurontin d/t NPO and re-start as mucositis resolves    CV:  H/O HTN, HLD, CAD, MI (PCI '10, on ASA), atrial fibrillation  Echo 2/3/25:  LVEF 60-65% with no LVH, normal RV function, and mod dilated LA  Awaiting EKG for QTc monitoring  # Tachycardia, likely 2/2 hypovolemia  - Increase mIVF as above  - Hold home Lipitor and Lisinopril d/t NPO and re-start as mucositis resolves  - IV Metoprolol while patient is NPO and convert back to PO when able  - Hold home Lasix given dehydration  - Keep Mg+ > 2 and K+ > 4.0  # A. fib w RVR (4/10) - initiated (4/11) Diltiazem drip 5 mg/hr, increased to 10 mg/hr per Cardiology  consult recommendations.  Can increase to a max of 20 mg/hr.  - Digoxin load 500 mcg at 0200 (4/11).  Holding additional Digoxin dosing for now  - A. fib w good rate control on current Diltiazem rate (4/12), will plan to transition to oral regimen when patient able to tolerate oral meds.  # Hypervolemia, 2/2 fluid resuscitation  - S/p 80 IV lasix 4/14 with 6L off  - Cards following       Malignant Hematology Checklist:  ID Prophylaxis:  Acyclovir (VZV, HSV), Fluconazole  (Antifungal), Levofloxacin (PNA)  Code status:  Full Code  Lines/drains:  L DBL PICC  Primary oncologist:  Dr. Mckeon     Patient seen and discussed on rounds with Dr. Lazo.    JUDY Abebe-CNP

## 2025-04-15 NOTE — CARE PLAN
Problem: Pain - Adult  Goal: Verbalizes/displays adequate comfort level or baseline comfort level  Outcome: Progressing     Problem: Safety - Adult  Goal: Free from fall injury  Outcome: Progressing     Problem: Discharge Planning  Goal: Discharge to home or other facility with appropriate resources  Outcome: Progressing     Problem: Chronic Conditions and Co-morbidities  Goal: Patient's chronic conditions and co-morbidity symptoms are monitored and maintained or improved  Outcome: Progressing     Problem: Nutrition  Goal: Nutrient intake appropriate for maintaining nutritional needs  Outcome: Progressing     Problem: Skin  Goal: Participates in plan/prevention/treatment measures  Outcome: Progressing  Goal: Prevent/manage excess moisture  Outcome: Progressing  Goal: Prevent/minimize sheer/friction injuries  Outcome: Progressing  Goal: Promote/optimize nutrition  Outcome: Progressing  Goal: Promote skin healing  Outcome: Progressing

## 2025-04-15 NOTE — CARE PLAN
The patient's goals for the shift include      The clinical goals for the shift include Patient will remain safe and free from injury throughout the shift 4/15/25 @ 0700      Problem: Pain - Adult  Goal: Verbalizes/displays adequate comfort level or baseline comfort level  Outcome: Progressing     Problem: Safety - Adult  Goal: Free from fall injury  Outcome: Progressing     Problem: Discharge Planning  Goal: Discharge to home or other facility with appropriate resources  Outcome: Progressing     Problem: Chronic Conditions and Co-morbidities  Goal: Patient's chronic conditions and co-morbidity symptoms are monitored and maintained or improved  Outcome: Progressing     Problem: Nutrition  Goal: Nutrient intake appropriate for maintaining nutritional needs  Outcome: Progressing     Problem: Skin  Goal: Participates in plan/prevention/treatment measures  Outcome: Progressing  Goal: Prevent/manage excess moisture  Outcome: Progressing  Goal: Prevent/minimize sheer/friction injuries  Outcome: Progressing  Goal: Promote/optimize nutrition  Outcome: Progressing  Goal: Promote skin healing  Outcome: Progressing

## 2025-04-15 NOTE — PROGRESS NOTES
04/15/25 1600   Discharge Planning   Living Arrangements Spouse/significant other   Support Systems Spouse/significant other   Assistance Needed AR vs HC   Type of Residence Private residence   Home or Post Acute Services In home services;Post acute facilities (Rehab/SNF/etc)   Type of Post Acute Facility Services Rehab   Type of Home Care Services Home PT;Home OT   Expected Discharge Disposition Home H   Does the patient need discharge transport arranged? Yes   RoundTrip coordination needed? Yes     Care Transitions Note  04/15/25  Met with pt and spouse at bedside to discuss dispo. Pt has been recommended acute rehab, discussed what this entails. Initially pt and spouse not interested. Later provided AR list for review. Also discussed possibility of [facility], but spouse and pt expressed concerns about house being too small for home care. Pt and spouse requesting additional time to consider choice. Will follow, ADOD 1-2 days per team.   Ely Rose, MSW, LSW

## 2025-04-15 NOTE — PROGRESS NOTES
"Nutrition Follow Up Assessment:   Nutrition Assessment    The patient is a 64 y.o. male who is hospital day #7.  Pt admitted with c/o mucositis, dysphagia, N/V, and HA. SLP rec NPO.   During admission:  -  cardiology following for tachycardia  - found to have DVT @L upper extremity  - GI consulted for mucositis. Supportive management. EGD if no improvement.  - on abx.   - SLP rec MBSS on 04/14. Pt declined. Diet advanced to regular.     PMHx: HTN, HLD, CAD, MI (PCI '10, on ASA), RCC (s/p partial R nephrectomy '13), GERD, BPH, arthritis, MRSA bacteremia (s/p 4 weeks IV Vancomycin which completed 3/3/25), G1 CIPN,  R subclavian and R axillary DVT r/t PICC (2/3/25; on Eliquis), R rib/flank pain and Burkitt's lyphoma     Nutrition History:  Energy Intake: Poor < 50 %  Food and Nutrient History: Pt reports this is the first day he has had solid foods. Ate maybe 25% of his meal. Reports he is able to taste the spices/seasoning in the food and it was a strong taste. Also continues to have some throat pain + mouth sores affecting PO intake. Liquids go down better than solids so far. Likes the hot chocolate and that goes down ok. Chocolate milk at bedside 100% consumed. Pt has not tried the Boost VHC - unsure he will like the flavor. Pt used to like smoothies and has  at home. Pt reports he does not want to try things that are artificial >> ex. ECC milkshakes, very vanilla Boost VHC. Diet advancement:  NPO 04/09 - 04/12  CLD 04/12.   FLD 04/13-04/14.   Regular diet: 04/15     Diet advancement:  NPO 04/09 - 04/12  CLD 04/12.   FLD 04/13-04/14.   Regular diet: 04/15    Anthropometrics:  Start of admission anthropometrics:  Height: 183.5 cm (6' 0.24\")  Weight: 90.7 kg (199 lb 15.3 oz)  BMI (Calculated): 26.94  IBW/kg (Dietitian Calculated): 80.91 kg  Percent of IBW: 112 %    Weight         4/8/2025  1642 4/14/2025  1425 4/15/2025  0820       Weight: 90.7 kg (199 lb 15.3 oz) 95.3 kg (210 lb 1.6 oz) 92.8 kg (204 lb 9.4 oz) " "          Significant Weight Gain: Fluid related    Nutrition Focused Physical Exam Findings:    Edema  Edema: +2 mild  Edema Location: LUE + BLE  Physical Findings   Skin: Negative  Mouth Findings: Odynophagia, Mucositis, Dysphagia      Last BM Date: 04/08/25    Nutrition Significant Labs:    Results from last 7 days   Lab Units 04/15/25  0539 04/14/25  2222 04/14/25  0504 04/13/25  1113 04/13/25  0425   HEMOGLOBIN g/dL 8.7*  --  8.5*  --  8.2*   MCV fL 89  --  90  --  88   GLUCOSE mg/dL 98 91 93  --  107*   POTASSIUM mmol/L 3.4* 3.1* 5.2   < > 3.8   SODIUM mmol/L 138 137 141  --  139   PHOSPHORUS mg/dL 3.8 3.7 2.1*  --  1.9*   MAGNESIUM mg/dL  --  1.97 1.82  --  2.13   CREATININE mg/dL 0.40* 0.40* 0.31*  --  0.32*   BUN mg/dL 8 8 10  --  15    < > = values in this interval not displayed.   No results found for: \"VITD25\", \"VITEALPHA\", \"VITAMINA\"      Nutrition Specific Medications:  acyclovir, 250 mg, intravenous, q12h  fluconazole, 400 mg, intravenous, q24h  nystatin, 5 mL, Swish & Spit, TID  pantoprazole, 40 mg, intravenous, Daily  potassium chloride, 40 mEq, intravenous, q4h  sucralfate, 1 g, oral, q6h SONAL    I/O:   I/O last 2 completed shifts:  In: 1276 (13.4 mL/kg) [I.V.:1121 (11.8 mL/kg); IV Piggyback:155]  Out: 6000 (63 mL/kg) [Urine:6000 (2.6 mL/kg/hr)]  Weight: 95.3 kg     Dietary Orders (From admission, onward)       Start     Ordered    04/15/25 0853  Adult diet Regular  Diet effective now        Question:  Diet type  Answer:  Regular    04/15/25 0852    04/09/25 1324  Oral nutritional supplements  Until discontinued        Question Answer Comment   Deliver with Breakfast Allow pt to order more or get Ensure Plus if requested   Select supplement: Boost [facility]        04/09/25 1323    04/08/25 1657  May Participate in Room Service  ( ROOM SERVICE MAY PARTICIPATE)  Once        Question:  .  Answer:  Yes    04/08/25 1656                     Estimated Needs:   Total Energy Estimated Needs in 24 hours (kCal): " 2450 kCal  Method for Estimating Needs: 27 kcal/kg ABW (90.7 kg)    Total Protein Estimated Needs in 24 Hours (g): 115 g  Method for Estimating 24 Hour Protein Needs: 1.3 g/kg A BW (90.7 kg)    Method for Estimating 24 Hour Fluid Needs: 1 ml/kcal or per team          Nutrition Diagnosis   Malnutrition Diagnosis  Patient has Malnutrition Diagnosis: Yes  Diagnosis Status: Active  Malnutrition Diagnosis: Severe malnutrition related to acute disease or injury  Related to: side effects from chemo  As Evidenced by: significant wt loss of 2% x1 week; PO intake meeting <50% of nutr needs for >/= 5 days  Additional Assessment Information: Component of chronic disease/condition present as pt does have hx of cancer and inadequate PO intake and wt loss appears to be an ongoing issue for the past 1-3 months. Nutrition status appears to have significantly declined within the past 1-2 weeks d/t mucositis, dysphagia, etc. from chemo.            Nutrition Interventions/Recommendations   Nutrition prescription for oral nutrition    Nutrition Recommendations:  Samples of Boost VHC, KF supplements, and Ensure Plus sent to pt's room.   Family also planning on buying Strawberry Boost VHC and bringing into the hospital.   Changed ONS order to Ensure Plus (350 kcal, 13g pro) PRN.   Consider initiation of daily MVI.    Nutrition Goals:   Medical Food Supplement: Commercial beverage medical food supplement therapy  Goal: Drink at least 1 ONS /d    Education:MNT re: mouth sores/mucositis, high protein kcal/pro shakes, ways to incorporate ONS, and strategies to increse kcal/pro intake.        Nutrition Monitoring and Evaluation   Monitoring and Evaluation Plan: Estimated Energy Intake  Estimated Energy Intake: Energy intake 50 -75% of estimated energy needs    Monitoring and Evaluation Plan: Body weight  Body Weight: Body weight - Maintain stable weight              New goal(s) identified         Time Spent (min): 45 minutes

## 2025-04-15 NOTE — PROGRESS NOTES
Subjective Data:  AF 90-110s  On Diltiazem gtt at 10 ml/hr  Denies CP/ SOB/dizziness, palpitations   Diuresed well with IV Lasix  Hypokalemic  Remains hypervolemic      Objective Data:  Last Recorded Vitals:  Vitals:    04/15/25 0356 04/15/25 0820 04/15/25 0829 04/15/25 1156   BP: 118/57  136/87 127/84   BP Location: Right arm  Right arm Right arm   Patient Position: Lying  Lying Lying   Pulse: 93  104 83   Resp: 16  16 16   Temp: 36.3 °C (97.3 °F)  36.4 °C (97.5 °F) 36.3 °C (97.3 °F)   TempSrc: Temporal  Temporal Temporal   SpO2: 95%  94% 95%   Weight:  92.8 kg (204 lb 9.4 oz)     Height:           Last Labs:  CBC - 4/15/2025:  5:39 AM  3.8 8.7 87    27.5      CMP - 4/15/2025:  5:39 AM  7.5 4.6 6 --- 1.3   3.8 2.6 12 42      PTT - 4/3/2025:  4:41 AM  1.1   11.6 23     TROPHS   Date/Time Value Ref Range Status   01/28/2025 09:16 AM 5 0 - 53 ng/L Final   01/10/2025 09:06 PM 6 0 - 53 ng/L Final     BNP   Date/Time Value Ref Range Status   01/10/2025 09:06 PM 5 0 - 99 pg/mL Final     HGBA1C   Date/Time Value Ref Range Status   10/16/2024 06:59 AM 5.3 See comment % Final     LDLCALC   Date/Time Value Ref Range Status   01/08/2025 02:54 PM 74 <=99 mg/dL Final     Comment:                                 Near   Borderline      AGE      Desirable  Optimal    High     High     Very High     0-19 Y     0 - 109     ---    110-129   >/= 130     ----    20-24 Y     0 - 119     ---    120-159   >/= 160     ----      >24 Y     0 -  99   100-129  130-159   160-189     >/=190     10/16/2024 06:59 AM 56 <=99 mg/dL Final     Comment:                                 Near   Borderline      AGE      Desirable  Optimal    High     High     Very High     0-19 Y     0 - 109     ---    110-129   >/= 130     ----    20-24 Y     0 - 119     ---    120-159   >/= 160     ----      >24 Y     0 -  99   100-129  130-159   160-189     >/=190       VLDL   Date/Time Value Ref Range Status   01/08/2025 02:54 PM 50 0 - 40 mg/dL Final   10/16/2024 06:59  AM 28 0 - 40 mg/dL Final      Last I/O:  I/O last 3 completed shifts:  In: 2903 (30.5 mL/kg) [I.V.:2748 (28.8 mL/kg); IV Piggyback:155]  Out: 6850 (71.9 mL/kg) [Urine:6850 (2 mL/kg/hr)]  Weight: 95.3 kg       2/3/25 Onco-Echo Limited (Strain And 3D)   CONCLUSIONS:   1. The left ventricular systolic function is normal, with a visually estimated ejection fraction of 60-65%.   2. Left ventricular diastolic filling is indeterminate due to E/A wave fusion.   3. There is no evidence of left ventricular hypertrophy.   4. There is normal right ventricular global systolic function.   5. The left atrial size is moderately dilated.   6. The pulmonary artery is not well visualized.    Onco-Cardiology Measurements:  Historical Measurements from Previous Study  2D EF (Biplane)                     64%%  3D EF                               51%%  Global Longitudinal Strain (GLS) -19.9%%     Current Measurements  2D EF (Biplane)                     64%%  3D EF                               58%%  Global Longitudinal Strain (GLS) -17.6%%       Inpatient Medications:  Scheduled medications   Medication Dose Route Frequency    acyclovir  250 mg intravenous q12h    apixaban  5 mg oral BID    [Held by provider] atorvastatin  40 mg oral Daily    [Held by provider] caffeine  200 mg oral BID    fluconazole  400 mg intravenous q24h    [Held by provider] gabapentin  300 mg oral Nightly    [Held by provider] lisinopril  10 mg oral Daily    [Held by provider] metoprolol succinate XL  100 mg oral Daily    [Held by provider] metoprolol  5 mg intravenous q6h    nystatin  5 mL Swish & Spit TID    pantoprazole  40 mg intravenous Daily    polyethylene glycol  17 g oral BID    potassium chloride  40 mEq intravenous q4h    sucralfate  1 g oral q6h SONAL     PRN medications   Medication    albuterol    alteplase    [Held by provider] furosemide    lidocaine-diphenhydrAMINE-Maalox 1:1:1    moisturizing mouth    naloxone    ondansetron ODT    Or     ondansetron    prochlorperazine    Or    prochlorperazine     Continuous Medications   Medication Dose Last Rate    dilTIAZem  10 mg/hr 10 mg/hr (04/15/25 0530)    HYDROmorphone        sodium chloride 0.9%  50 mL/hr 50 mL/hr (04/15/25 0119)       Physical Exam:  General: Sitting up in bed, room air, NAD  Skin:  warm and dry  HEENT: EOMI. MMM  Cardiovascular: irreg irreg . Difficult to assess JVD   Respiratory: Reduced A/E, rales  Gastrointestinal: soft, non-distended  Genitourinary: pale urine per urinal   Extremities: LUE edema at elbow below PICC site; 2-3+ pitting edema, L>R   Neurological: no gross focal deficits  Psychiatric: appropriate mood and affect      Assessment/Plan   Mr. Ibanez is a 65 yo gentleman with a PMH of HTN, HLD, CAD, MI (PCI '10, on ASA), atrial fibrillation, RCC (s/p partial R nephrectomy '13), GERD, BPH, arthritis, MRSA bacteremia (s/p 4 weeks IV Vancomycin which completed 3/3/25), G1 CIPN, R subclavian and R axillary DVT r/t PICC (2/3/25; on Eliquis), R rib/flank pain and Burkitt's lyphoma who is admitted to heme/onc service for mucositis, dysphagia, n/v.  Cardiology is consulted for Afib.      Limited oral intake due to mucositis.  Primary team planning to restart anticoagulation      Plan  -Continue Dilt gtt. At 10 mg/hr. Would not be overly aggressive. HR of <120 bpm is reasonable in the context of patient's lack of symptoms and preserved LVEF  - Will plan to transition to oral Metoprolol tomorrow if continues to tolerate oral meds today  - KH TEDs on AM off PM.  - Elevated legs when OOB  - Please aggressively replete lytes and Keep K+>4 and Mg+ >2   - Resume Lasix 40 mg IV BID 0800 and 1700 once K+ > 3.5   - strict I/Os  - please obtain daily standing weights  -Please continue to monitor on tele while inpatient     Cardiology will follow    JUDY Martinez-CNP  Cardiology Consults    Please call with any questions  General Cardiology Consult Pager: [phone] (weekday 7AM-6PM and  weekend 7AM-2PM) and other: [phone]  EP Consult Pager: [phone] (weekday 7AM-6PM and weekend 7AM-2PM) and other: [phone]  CICU Fellow Pager: [phone] anytime  EP Device Nurse Pager: [phone] (weekday 7AM-4PM)  Advanced Heart Failure Consult Pager: [phone] anytime        Code Status:  Full Code

## 2025-04-16 ENCOUNTER — HOME HEALTH ADMISSION (OUTPATIENT)
Dept: HOME HEALTH SERVICES | Facility: HOME HEALTH | Age: 65
End: 2025-04-16
Payer: MEDICARE

## 2025-04-16 ENCOUNTER — APPOINTMENT (OUTPATIENT)
Dept: CARDIOLOGY | Facility: HOSPITAL | Age: 65
DRG: 157 | End: 2025-04-16
Payer: MEDICARE

## 2025-04-16 LAB
ALBUMIN SERPL BCP-MCNC: 2.6 G/DL (ref 3.4–5)
ALBUMIN SERPL BCP-MCNC: 2.8 G/DL (ref 3.4–5)
ANION GAP SERPL CALC-SCNC: 11 MMOL/L (ref 10–20)
ANION GAP SERPL CALC-SCNC: 12 MMOL/L (ref 10–20)
BASOPHILS # BLD MANUAL: 0 X10*3/UL (ref 0–0.1)
BASOPHILS NFR BLD MANUAL: 0 %
BUN SERPL-MCNC: 8 MG/DL (ref 6–23)
BUN SERPL-MCNC: 8 MG/DL (ref 6–23)
CALCIUM SERPL-MCNC: 8.3 MG/DL (ref 8.6–10.6)
CALCIUM SERPL-MCNC: 8.5 MG/DL (ref 8.6–10.6)
CHLORIDE SERPL-SCNC: 99 MMOL/L (ref 98–107)
CHLORIDE SERPL-SCNC: 99 MMOL/L (ref 98–107)
CO2 SERPL-SCNC: 30 MMOL/L (ref 21–32)
CO2 SERPL-SCNC: 30 MMOL/L (ref 21–32)
COMMENTS - MP RESULT TYPE: NORMAL
CREAT SERPL-MCNC: 0.43 MG/DL (ref 0.5–1.3)
CREAT SERPL-MCNC: 0.44 MG/DL (ref 0.5–1.3)
EGFRCR SERPLBLD CKD-EPI 2021: >90 ML/MIN/1.73M*2
EGFRCR SERPLBLD CKD-EPI 2021: >90 ML/MIN/1.73M*2
EOSINOPHIL # BLD MANUAL: 0 X10*3/UL (ref 0–0.7)
EOSINOPHIL NFR BLD MANUAL: 0 %
ERYTHROCYTE [DISTWIDTH] IN BLOOD BY AUTOMATED COUNT: 16.3 % (ref 11.5–14.5)
GLUCOSE SERPL-MCNC: 80 MG/DL (ref 74–99)
GLUCOSE SERPL-MCNC: 88 MG/DL (ref 74–99)
HCT VFR BLD AUTO: 25.5 % (ref 41–52)
HGB BLD-MCNC: 8.3 G/DL (ref 13.5–17.5)
IMM GRANULOCYTES # BLD AUTO: 0.5 X10*3/UL (ref 0–0.7)
IMM GRANULOCYTES NFR BLD AUTO: 13.8 % (ref 0–0.9)
LYMPHOCYTES # BLD MANUAL: 0.12 X10*3/UL (ref 1.2–4.8)
LYMPHOCYTES NFR BLD MANUAL: 3.4 %
MAGNESIUM SERPL-MCNC: 1.88 MG/DL (ref 1.6–2.4)
MCH RBC QN AUTO: 28.4 PG (ref 26–34)
MCHC RBC AUTO-ENTMCNC: 32.5 G/DL (ref 32–36)
MCV RBC AUTO: 87 FL (ref 80–100)
METAMYELOCYTES # BLD MANUAL: 0.03 X10*3/UL
METAMYELOCYTES NFR BLD MANUAL: 0.9 %
MONOCYTES # BLD MANUAL: 0.25 X10*3/UL (ref 0.1–1)
MONOCYTES NFR BLD MANUAL: 6.9 %
MYELOCYTES # BLD MANUAL: 0.06 X10*3/UL
MYELOCYTES NFR BLD MANUAL: 1.7 %
NEUTS SEG # BLD MANUAL: 3.1 X10*3/UL (ref 1.2–7)
NEUTS SEG NFR BLD MANUAL: 86.2 %
NRBC BLD-RTO: 1.4 /100 WBCS (ref 0–0)
PHOSPHATE SERPL-MCNC: 3.6 MG/DL (ref 2.5–4.9)
PHOSPHATE SERPL-MCNC: 3.7 MG/DL (ref 2.5–4.9)
PLATELET # BLD AUTO: 111 X10*3/UL (ref 150–450)
POTASSIUM SERPL-SCNC: 3.5 MMOL/L (ref 3.5–5.3)
POTASSIUM SERPL-SCNC: 4.1 MMOL/L (ref 3.5–5.3)
RBC # BLD AUTO: 2.92 X10*6/UL (ref 4.5–5.9)
RBC MORPH BLD: ABNORMAL
SCAN RESULT: NORMAL
SODIUM SERPL-SCNC: 136 MMOL/L (ref 136–145)
SODIUM SERPL-SCNC: 137 MMOL/L (ref 136–145)
TOTAL CELLS COUNTED BLD: 116
VARIANT LYMPHS # BLD MANUAL: 0.03 X10*3/UL (ref 0–0.5)
VARIANT LYMPHS NFR BLD: 0.9 %
WBC # BLD AUTO: 3.6 X10*3/UL (ref 4.4–11.3)

## 2025-04-16 PROCEDURE — 1200000003 HC ONCOLOGY  ROOM WITH TELEMETRY DAILY

## 2025-04-16 PROCEDURE — 2500000004 HC RX 250 GENERAL PHARMACY W/ HCPCS (ALT 636 FOR OP/ED): Mod: JZ | Performed by: PHYSICIAN ASSISTANT

## 2025-04-16 PROCEDURE — 85007 BL SMEAR W/DIFF WBC COUNT: CPT

## 2025-04-16 PROCEDURE — RXMED WILLOW AMBULATORY MEDICATION CHARGE

## 2025-04-16 PROCEDURE — 80069 RENAL FUNCTION PANEL: CPT

## 2025-04-16 PROCEDURE — 99233 SBSQ HOSP IP/OBS HIGH 50: CPT | Performed by: NURSE PRACTITIONER

## 2025-04-16 PROCEDURE — 2500000001 HC RX 250 WO HCPCS SELF ADMINISTERED DRUGS (ALT 637 FOR MEDICARE OP)

## 2025-04-16 PROCEDURE — 83735 ASSAY OF MAGNESIUM: CPT

## 2025-04-16 PROCEDURE — 97116 GAIT TRAINING THERAPY: CPT | Mod: GP,CQ

## 2025-04-16 PROCEDURE — 2500000004 HC RX 250 GENERAL PHARMACY W/ HCPCS (ALT 636 FOR OP/ED): Mod: TB

## 2025-04-16 PROCEDURE — 2500000004 HC RX 250 GENERAL PHARMACY W/ HCPCS (ALT 636 FOR OP/ED)

## 2025-04-16 PROCEDURE — 92526 ORAL FUNCTION THERAPY: CPT | Mod: GN | Performed by: SPEECH-LANGUAGE PATHOLOGIST

## 2025-04-16 PROCEDURE — 2500000001 HC RX 250 WO HCPCS SELF ADMINISTERED DRUGS (ALT 637 FOR MEDICARE OP): Performed by: PHYSICIAN ASSISTANT

## 2025-04-16 PROCEDURE — 97530 THERAPEUTIC ACTIVITIES: CPT | Mod: GP,CQ

## 2025-04-16 PROCEDURE — 2500000002 HC RX 250 W HCPCS SELF ADMINISTERED DRUGS (ALT 637 FOR MEDICARE OP, ALT 636 FOR OP/ED)

## 2025-04-16 PROCEDURE — 85027 COMPLETE CBC AUTOMATED: CPT

## 2025-04-16 RX ORDER — ENOXAPARIN SODIUM 100 MG/ML
40 INJECTION SUBCUTANEOUS 2 TIMES DAILY
Status: DISCONTINUED | OUTPATIENT
Start: 2025-04-16 | End: 2025-04-18 | Stop reason: HOSPADM

## 2025-04-16 RX ORDER — CAFFEINE 200 MG
200 TABLET ORAL 2 TIMES DAILY
Status: DISCONTINUED | OUTPATIENT
Start: 2025-04-16 | End: 2025-04-18 | Stop reason: HOSPADM

## 2025-04-16 RX ORDER — METOPROLOL TARTRATE 50 MG/1
50 TABLET ORAL 2 TIMES DAILY
Status: DISCONTINUED | OUTPATIENT
Start: 2025-04-16 | End: 2025-04-18

## 2025-04-16 RX ORDER — OXYCODONE HCL 5 MG/5 ML
5 SOLUTION, ORAL ORAL EVERY 6 HOURS PRN
Refills: 0 | Status: DISCONTINUED | OUTPATIENT
Start: 2025-04-16 | End: 2025-04-18 | Stop reason: HOSPADM

## 2025-04-16 RX ORDER — POLYETHYLENE GLYCOL 3350 17 G/17G
17 POWDER, FOR SOLUTION ORAL 2 TIMES DAILY
Qty: 6 PACKET | Refills: 0 | Status: SHIPPED | OUTPATIENT
Start: 2025-04-16 | End: 2025-04-21

## 2025-04-16 RX ORDER — OXYCODONE HCL 5 MG/5 ML
10 SOLUTION, ORAL ORAL EVERY 6 HOURS PRN
Refills: 0 | Status: DISCONTINUED | OUTPATIENT
Start: 2025-04-16 | End: 2025-04-18 | Stop reason: HOSPADM

## 2025-04-16 RX ORDER — ACYCLOVIR 200 MG/1
400 CAPSULE ORAL 2 TIMES DAILY
Status: DISCONTINUED | OUTPATIENT
Start: 2025-04-16 | End: 2025-04-18 | Stop reason: HOSPADM

## 2025-04-16 RX ORDER — FUROSEMIDE 10 MG/ML
40 INJECTION INTRAMUSCULAR; INTRAVENOUS 2 TIMES DAILY
Status: DISCONTINUED | OUTPATIENT
Start: 2025-04-16 | End: 2025-04-17

## 2025-04-16 RX ORDER — HYDROMORPHONE HYDROCHLORIDE 1 MG/ML
0.2 INJECTION, SOLUTION INTRAMUSCULAR; INTRAVENOUS; SUBCUTANEOUS
Status: DISCONTINUED | OUTPATIENT
Start: 2025-04-16 | End: 2025-04-18 | Stop reason: HOSPADM

## 2025-04-16 RX ORDER — POTASSIUM CHLORIDE 29.8 MG/ML
40 INJECTION INTRAVENOUS EVERY 4 HOURS
Status: COMPLETED | OUTPATIENT
Start: 2025-04-16 | End: 2025-04-16

## 2025-04-16 RX ORDER — ACYCLOVIR 200 MG/1
400 CAPSULE ORAL 2 TIMES DAILY
Qty: 40 CAPSULE | Refills: 0 | Status: SHIPPED | OUTPATIENT
Start: 2025-04-16 | End: 2025-04-28

## 2025-04-16 RX ORDER — FLUCONAZOLE 200 MG/1
400 TABLET ORAL DAILY
Qty: 28 TABLET | Refills: 0 | Status: SHIPPED | OUTPATIENT
Start: 2025-04-17 | End: 2025-05-02

## 2025-04-16 RX ORDER — FLUCONAZOLE 200 MG/1
400 TABLET ORAL DAILY
Status: DISCONTINUED | OUTPATIENT
Start: 2025-04-16 | End: 2025-04-18 | Stop reason: HOSPADM

## 2025-04-16 RX ADMIN — FUROSEMIDE 40 MG: 10 INJECTION, SOLUTION INTRAVENOUS at 21:16

## 2025-04-16 RX ADMIN — NYSTATIN 500000 UNITS: 100000 SUSPENSION ORAL at 21:15

## 2025-04-16 RX ADMIN — METOPROLOL TARTRATE 50 MG: 50 TABLET, FILM COATED ORAL at 21:16

## 2025-04-16 RX ADMIN — METOPROLOL TARTRATE 50 MG: 50 TABLET, FILM COATED ORAL at 10:51

## 2025-04-16 RX ADMIN — ENOXAPARIN SODIUM 40 MG: 100 INJECTION SUBCUTANEOUS at 17:31

## 2025-04-16 RX ADMIN — POTASSIUM CHLORIDE 40 MEQ: 29.8 INJECTION, SOLUTION INTRAVENOUS at 16:52

## 2025-04-16 RX ADMIN — HYDROMORPHONE HYDROCHLORIDE 0.2 MG: 1 INJECTION, SOLUTION INTRAMUSCULAR; INTRAVENOUS; SUBCUTANEOUS at 18:58

## 2025-04-16 RX ADMIN — POLYETHYLENE GLYCOL 3350 17 G: 17 POWDER, FOR SOLUTION ORAL at 21:16

## 2025-04-16 RX ADMIN — POTASSIUM CHLORIDE 40 MEQ: 29.8 INJECTION, SOLUTION INTRAVENOUS at 12:42

## 2025-04-16 RX ADMIN — Medication 10 MG/HR: at 04:59

## 2025-04-16 RX ADMIN — FUROSEMIDE 40 MG: 10 INJECTION, SOLUTION INTRAVENOUS at 12:42

## 2025-04-16 RX ADMIN — NYSTATIN 500000 UNITS: 100000 SUSPENSION ORAL at 09:52

## 2025-04-16 RX ADMIN — FLUCONAZOLE 400 MG: 200 TABLET ORAL at 09:52

## 2025-04-16 RX ADMIN — PANTOPRAZOLE SODIUM 40 MG: 40 INJECTION, POWDER, FOR SOLUTION INTRAVENOUS at 09:52

## 2025-04-16 RX ADMIN — ACYCLOVIR 400 MG: 200 CAPSULE ORAL at 21:15

## 2025-04-16 RX ADMIN — OXYCODONE HYDROCHLORIDE 5 MG: 5 SOLUTION ORAL at 16:52

## 2025-04-16 RX ADMIN — SUCRALFATE 1 G: 1 SUSPENSION ORAL at 00:15

## 2025-04-16 RX ADMIN — POLYETHYLENE GLYCOL 3350 17 G: 17 POWDER, FOR SOLUTION ORAL at 09:56

## 2025-04-16 RX ADMIN — SUCRALFATE 1 G: 1 SUSPENSION ORAL at 17:02

## 2025-04-16 RX ADMIN — ACYCLOVIR 400 MG: 200 CAPSULE ORAL at 09:52

## 2025-04-16 RX ADMIN — GABAPENTIN 300 MG: 300 CAPSULE ORAL at 21:16

## 2025-04-16 RX ADMIN — ACYCLOVIR SODIUM 250 MG: 50 INJECTION, SOLUTION INTRAVENOUS at 00:16

## 2025-04-16 ASSESSMENT — COGNITIVE AND FUNCTIONAL STATUS - GENERAL
MOVING TO AND FROM BED TO CHAIR: A LITTLE
MOBILITY SCORE: 17
STANDING UP FROM CHAIR USING ARMS: A LITTLE
HELP NEEDED FOR BATHING: A LITTLE
WALKING IN HOSPITAL ROOM: A LITTLE
CLIMB 3 TO 5 STEPS WITH RAILING: A LOT
DRESSING REGULAR UPPER BODY CLOTHING: A LITTLE
DRESSING REGULAR LOWER BODY CLOTHING: A LITTLE
CLIMB 3 TO 5 STEPS WITH RAILING: A LOT
MOVING FROM LYING ON BACK TO SITTING ON SIDE OF FLAT BED WITH BEDRAILS: A LITTLE
STANDING UP FROM CHAIR USING ARMS: A LITTLE
WALKING IN HOSPITAL ROOM: A LOT
EATING MEALS: A LITTLE
DAILY ACTIVITIY SCORE: 18
PERSONAL GROOMING: A LITTLE
TOILETING: A LITTLE
MOVING TO AND FROM BED TO CHAIR: A LITTLE
MOBILITY SCORE: 18
TURNING FROM BACK TO SIDE WHILE IN FLAT BAD: A LITTLE

## 2025-04-16 ASSESSMENT — PAIN SCALES - GENERAL
PAINLEVEL_OUTOF10: 4
PAINLEVEL_OUTOF10: 4
PAINLEVEL_OUTOF10: 2
PAINLEVEL_OUTOF10: 4
PAINLEVEL_OUTOF10: 3
PAINLEVEL_OUTOF10: 7

## 2025-04-16 ASSESSMENT — PAIN - FUNCTIONAL ASSESSMENT
PAIN_FUNCTIONAL_ASSESSMENT: 0-10

## 2025-04-16 NOTE — CARE PLAN
Problem: Pain - Adult  Goal: Verbalizes/displays adequate comfort level or baseline comfort level  Outcome: Progressing     Problem: Safety - Adult  Goal: Free from fall injury  Outcome: Progressing     Problem: Discharge Planning  Goal: Discharge to home or other facility with appropriate resources  Outcome: Progressing     Problem: Chronic Conditions and Co-morbidities  Goal: Patient's chronic conditions and co-morbidity symptoms are monitored and maintained or improved  Outcome: Progressing     Problem: Nutrition  Goal: Nutrient intake appropriate for maintaining nutritional needs  Outcome: Progressing   The patient's goals for the shift include      The clinical goals for the shift include Patient will remain free from injury

## 2025-04-16 NOTE — PROGRESS NOTES
Subjective Data:  AF 80-100s  On Diltiazem gtt at 10 ml/hr  Denies CP/ SOB/dizziness, palpitations   Diuresed well with IV Lasix  Hypokalemic  Remains hypervolemic  Tolerating some liquids and oral pills    Objective Data:  Last Recorded Vitals:  Vitals:    04/16/25 0330 04/16/25 0832 04/16/25 1050 04/16/25 1103   BP: 121/75 127/83 130/79    BP Location: Right arm Right arm     Patient Position: Lying Lying     Pulse: 82 86 105    Resp: 18 16     Temp: 36.5 °C (97.7 °F) 36.2 °C (97.2 °F)     TempSrc: Temporal Temporal     SpO2: 96% 96%     Weight:    90.5 kg (199 lb 8.3 oz)   Height:           Last Labs:  CBC - 4/16/2025:  5:11 AM  3.6 8.3 111    25.5      CMP - 4/16/2025:  5:11 AM  8.3 4.6 6 --- 1.3   3.6 2.6 12 42      PTT - 4/3/2025:  4:41 AM  1.1   11.6 23     TROPHS   Date/Time Value Ref Range Status   01/28/2025 09:16 AM 5 0 - 53 ng/L Final   01/10/2025 09:06 PM 6 0 - 53 ng/L Final     BNP   Date/Time Value Ref Range Status   01/10/2025 09:06 PM 5 0 - 99 pg/mL Final     HGBA1C   Date/Time Value Ref Range Status   10/16/2024 06:59 AM 5.3 See comment % Final     LDLCALC   Date/Time Value Ref Range Status   01/08/2025 02:54 PM 74 <=99 mg/dL Final     Comment:                                 Near   Borderline      AGE      Desirable  Optimal    High     High     Very High     0-19 Y     0 - 109     ---    110-129   >/= 130     ----    20-24 Y     0 - 119     ---    120-159   >/= 160     ----      >24 Y     0 -  99   100-129  130-159   160-189     >/=190     10/16/2024 06:59 AM 56 <=99 mg/dL Final     Comment:                                 Near   Borderline      AGE      Desirable  Optimal    High     High     Very High     0-19 Y     0 - 109     ---    110-129   >/= 130     ----    20-24 Y     0 - 119     ---    120-159   >/= 160     ----      >24 Y     0 -  99   100-129  130-159   160-189     >/=190       VLDL   Date/Time Value Ref Range Status   01/08/2025 02:54 PM 50 0 - 40 mg/dL Final   10/16/2024 06:59 AM  28 0 - 40 mg/dL Final      Last I/O:  I/O last 3 completed shifts:  In: 1466 (15.8 mL/kg) [P.O.:1070; I.V.:396 (4.3 mL/kg)]  Out: 5900 (63.6 mL/kg) [Urine:5900 (1.8 mL/kg/hr)]  Weight: 92.8 kg       2/3/25 Onco-Echo Limited (Strain And 3D)   CONCLUSIONS:   1. The left ventricular systolic function is normal, with a visually estimated ejection fraction of 60-65%.   2. Left ventricular diastolic filling is indeterminate due to E/A wave fusion.   3. There is no evidence of left ventricular hypertrophy.   4. There is normal right ventricular global systolic function.   5. The left atrial size is moderately dilated.   6. The pulmonary artery is not well visualized.    Onco-Cardiology Measurements:  Historical Measurements from Previous Study  2D EF (Biplane)                     64%%  3D EF                               51%%  Global Longitudinal Strain (GLS) -19.9%%     Current Measurements  2D EF (Biplane)                     64%%  3D EF                               58%%  Global Longitudinal Strain (GLS) -17.6%%       Inpatient Medications:  Scheduled medications   Medication Dose Route Frequency    acyclovir  400 mg oral BID    apixaban  5 mg oral BID    [Held by provider] atorvastatin  40 mg oral Daily    caffeine  200 mg oral BID    fluconazole  400 mg oral Daily    gabapentin  300 mg oral Nightly    [Held by provider] lisinopril  10 mg oral Daily    [Held by provider] metoprolol succinate XL  100 mg oral Daily    metoprolol tartrate  50 mg oral BID    nystatin  5 mL Swish & Spit TID    pantoprazole  40 mg intravenous Daily    polyethylene glycol  17 g oral BID    sucralfate  1 g oral q6h SONAL     PRN medications   Medication    albuterol    alteplase    [Held by provider] furosemide    lidocaine-diphenhydrAMINE-Maalox 1:1:1    moisturizing mouth    naloxone    ondansetron ODT    Or    ondansetron    prochlorperazine    Or    prochlorperazine     Continuous Medications   Medication Dose Last Rate    dilTIAZem  10  mg/hr 10 mg/hr (04/16/25 6476)    HYDROmorphone           Physical Exam:  General: Sitting up in bed, room air, NAD  Skin:  warm and dry  HEENT: EOMI. MMM  Cardiovascular: irreg irreg . Difficult to assess JVD   Respiratory: Reduced A/E, rales bibasilar  Gastrointestinal: soft, non-distended  Genitourinary: light urine per urinal   Extremities: LUE edema at elbow below PICC site; 2-3+ pitting edema, L>R   Neurological: no gross focal deficits  Psychiatric: appropriate mood and affect      Assessment/Plan   Mr. Ibanez is a 65 yo gentleman with a PMH of HTN, HLD, CAD, MI (PCI '10, on ASA), atrial fibrillation, RCC (s/p partial R nephrectomy '13), GERD, BPH, arthritis, MRSA bacteremia (s/p 4 weeks IV Vancomycin which completed 3/3/25), G1 CIPN, R subclavian and R axillary DVT r/t PICC (2/3/25; on Eliquis), R rib/flank pain and Burkitt's lyphoma who is admitted to heme/onc service for mucositis, dysphagia, n/v.  Cardiology is consulted for Afib.        Plan  - Start metoprolol tartrate 50 mg BID   -Reduce Dilt gtt to 5 mg/hr. With plan to wean off later today pending HR  - KH TEDs on AM off PM.  - Elevated legs when OOB  - Please aggressively replete lytes and Keep K+>4 and Mg+ >2   - Resume Lasix 40 mg IV BID 0800 and 1700   - Check BMP and Magnesium BID  - strict I/Os  - please obtain daily standing weights  -Please continue to monitor on tele while inpatient     Cardiology will follow    JUDY Martinez-CNP  Cardiology Consults    Please call with any questions  General Cardiology Consult Pager: [phone] (weekday 7AM-6PM and weekend 7AM-2PM) and other: [phone]  EP Consult Pager: [phone] (weekday 7AM-6PM and weekend 7AM-2PM) and other: [phone]  CICU Fellow Pager: [phone] anytime  EP Device Nurse Pager: [phone] (weekday 7AM-4PM)  Advanced Heart Failure Consult Pager: [phone] anytime        Code Status:  Full Code

## 2025-04-16 NOTE — PROGRESS NOTES
Speech-Language Pathology  Adult Inpatient Swallow Treatment    Patient Name: Bijan Ibanez  MRN: [id number]  Today's Date: 4/16/2025   Start Time: 1126  Stop Time: 1145  Time Calculation (min): 19    Impression:   Dysphagia therapy: Pt sitting on edge of bed, wife present during therapy session. Pt verbally states safe swallow strategies with intermittent verbal cue to repeat swallow x2. Pt then proceeds to demonstrate use of safe swallow strategies without further SLP cueing. No overt s/s of aspiration during PO trials. Pt endorses coughing and bringing up mucus mostly not associated with swallowing PO. No further SLP needs at this time. If there are any changes to pt's medical condition or increase difficulty swallowing please make NPO and order a MBSS. Pt in agreement with POC     Recommendations:  NPO per difficulty with [facility] protocol  Regular diet as previous recommend using safe swallow strategies per MD.   Upright for all PO intake  Remain upright for 20-30 min after eating  Small bites/sips  Alternate food and liquids  Chin tuck while swallowing     Goal:   Pt will tolerate least restrictive diet without s/s aspiration, respiratory compromise, or overt difficulty throughout admission.  Start: 04/09/25, End: 5/9/25  Status: goal met          Plan:  SLP Services Indicated: No  Frequency: N/A  Discussed POC with patient and spouse  SLP - OK to Discharge    Pain:   0-10  0 = No pain.     Inpatient Education:  Extensive education provided to patient and spouse regarding current swallow function, recommendations/results, and POC.      Consultations/Referrals/Coordination of Services:   N/A

## 2025-04-16 NOTE — PROGRESS NOTES
04/16/25 1300   Discharge Planning   Living Arrangements Spouse/significant other   Support Systems Spouse/significant other   Type of Residence Private residence   Home or Post Acute Services In home services;Post acute facilities (Rehab/SNF/etc)   Type of Home Care Services Home PT;Home OT   Expected Discharge Disposition Home H     Care Transitions Note  04/16/25  Patient worked with PT today, PT recommending LOW instead of prior HIGH intensity rec. Pt agreeable to [facility] referral. Pt would also like a wheeled walker. Team placed orders for both. Walker script sent to [facility].     3pm -- Walker delivered bedside to patient.  Ely Rose, MSW, LSW

## 2025-04-16 NOTE — PROGRESS NOTES
Physical Therapy Treatment    Patient Name: Bijan Ibanez  MRN: [id number]  Today's Date: 4/16/2025  Room: [address] [place] [address]  Time Calculation  Start Time: 1050  Stop Time: 1124  Time Calculation (min): 34 min       Assessment/Plan   PT Assessment  PT Assessment Results: Decreased strength, Decreased endurance, Impaired balance, Decreased mobility, Pain  Rehab Prognosis: Good  Barriers to Discharge Home: No anticipated barriers  Evaluation/Treatment Tolerance: Patient tolerated treatment well  Medical Staff Made Aware: Yes  Strengths: Attitude of self  Barriers to Participation: Comorbidities  End of Session Communication: Bedside nurse  End of Session Patient Position: Bed, 3 rail up, Held/seated with caregiver     PT Plan  Treatment/Interventions: Bed mobility, Transfer training, Gait training, Stair training, Balance training, Endurance training, Strengthening, Therapeutic exercise, Therapeutic activity, Positioning  PT Plan: Ongoing PT  PT Frequency: 5 times per week  PT Discharge Recommendations: Low intensity level of continued care  Equipment Recommended upon Discharge: Wheeled walker  PT Recommended Transfer Status: Assist x2, Assistive device  PT - OK to Discharge: Yes  Assessment: Patient is progressing Well with therapy this date. Pt progressed well since eval, appropriate for LOW intensity therapy when medically ready for discharge from acute stay.  Will continue to follow. PT/pt/TCC in agreement.    General Visit Information:   PT  Visit  PT Received On: 04/16/25  Prior to Session Communication: Bedside nurse  Patient Position Received: Bed, 3 rail up, Alarm off, not on at start of session     Subjective   Subjective: Pt pleasant and agreeable to therapy upon approach    Precautions:  Precautions  Medical Precautions: Fall precautions  Vital Signs:   Date/Time Vitals Session Patient Position Pulse Resp SpO2 BP MAP (mmHg)    04/16/25 1150 During PT  --  143  --  --  --  --     04/16/25 1159 --  --  89  18   94 %  124/77  93     04/16/25 1252 --  --  79  18  95 %  118/80  93     04/16/25 1319 --  --  101  18  95 %  130/86  101             Objective   Pain:  Pain Assessment  Pain Assessment: 0-10  Cognition:  Cognition  Overall Cognitive Status: Within Functional Limits  Arousal/Alertness: Appropriate responses to stimuli  Orientation Level: Oriented X4    Lines/Tubes/Drains:  PICC - Adult 03/07/25 Double lumen Left Basilic vein (Active)   Number of days: 39     Continuous Medications/Drips:  Continuous Medications[1]    PT Treatments:     Therapeutic Activity  Therapeutic Activity Performed: Yes  Therapeutic Activity 1: discussed POC with pt. Pt reportts he is most likely going to do chemo next week in patient but go home over the weekend. Pt demos improved mobility and would be safe to DC with spouse to assist as needed.     Bed Mobility 1  Bed Mobility 1: Supine to sitting, Sitting to supine  Level of Assistance 1: Close supervision  Ambulation/Gait Training 1  Surface 1: Level tile  Device 1: Rolling walker  Assistance 1: Close supervision, Minimal verbal cues  Quality of Gait 1: Narrow base of support, Inconsistent stride length, Decreased step length, Diminished heel strike  Comments/Distance (ft) 1: 65'x2 needs 3 standing rest breaks, slightly unsteady at times  Transfer 1  Transfer From 1: Sit to  Transfer to 1: Stand  Technique 1: Sit to stand, Stand to sit  Transfer Device 1: Walker  Transfer Level of Assistance 1: Close supervision  Trials/Comments 1: x3 does well from lower toilet surface with grab bars  Transfers 2  Transfer From 2: Stand to  Transfer to 2: Bed, Toilet  Technique 2: Stand pivot  Transfer Device 2: Walker  Transfer Level of Assistance 2: Close supervision             Activity tolerance:  Activity Tolerance  Endurance: Tolerates 10 - 20 min exercise with multiple rests    Outcome Measures:  [facility] Basic Mobility  Turning from your back to your side while in a flat bed without using bedrails: A  little  Moving from lying on your back to sitting on the side of a flat bed without using bedrails: A little  Moving to and from bed to chair (including a wheelchair): A little  Standing up from a chair using your arms (e.g. wheelchair or bedside chair): A little  To walk in hospital room: A little  Climbing 3-5 steps with railing: A lot  Basic Mobility - Total Score: 17       Education Documentation  Precautions, taught by Cherry Richye PTA at 4/16/2025  1:19 PM.  Learner: Family, Patient  Readiness: Acceptance  Method: Explanation  Response: Verbalizes Understanding  Comment: poc    Body Mechanics, taught by Cherry Richey PTA at 4/16/2025  1:19 PM.  Learner: Family, Patient  Readiness: Acceptance  Method: Explanation  Response: Verbalizes Understanding  Comment: poc    Mobility Training, taught by Cherry Richey PTA at 4/16/2025  1:19 PM.  Learner: Family, Patient  Readiness: Acceptance  Method: Explanation  Response: Verbalizes Understanding  Comment: poc    Education Comments  No comments found.          OP EDUCATION:       Encounter Problems       Encounter Problems (Active)       Balance       Pt will score >/=24/28 on Tinetti to demonstrate decreased falls risk (Progressing)       Start:  04/14/25    Expected End:  04/28/25               Mobility       Pt will be SBA for ambulation 100 ft with LRAD (Progressing)       Start:  04/14/25    Expected End:  04/28/25            Pt will be CGA to ascend/descend 1 step with LRAD (Progressing)       Start:  04/14/25    Expected End:  04/28/25               PT Transfers       Pt will be SBA for sit to stand and bed to chair transfers (Progressing)       Start:  04/14/25    Expected End:  04/28/25            Pt will be Emma with bed mobility (Progressing)       Start:  04/14/25    Expected End:  04/28/25               Pain - Adult                                 [1] dilTIAZem, 5 mg/hr, Last Rate: 5 mg/hr (04/16/25 8086)  HYDROmorphone,

## 2025-04-16 NOTE — PROGRESS NOTES
Bijan Ibanez is a 64 y.o. male on day  4 of admission presenting with Dysphagia.    Subjective   Afebrile, VSS. No acute issues overnight. Pt reports mucositis pain is improving, as well as soreness in oropharynx. Has been able to tolerate PO intake as well as pills. Discussed administering IT chemotherapy this admit. Will plan to [facility], hold Thursday PM/Friday AM for Friday LP then DC home with [facility]. Still endorses some constipation. Otherwise, offers no complaints. Diuresing well per cardiology recs.  Denies nausea & diarrhea at this time. No emesis. ROS otherwise unremarkable.         Objective     Vitals:    04/16/25 1252 04/16/25 1319 04/16/25 1337 04/16/25 1358   BP: 118/80 130/86 131/85 137/80   BP Location:  Right arm Right arm Right arm   Patient Position:  Lying Lying Lying   Pulse: 79 101 83 82   Resp: 18 18 18 18   Temp: 36.4 °C (97.5 °F) 36.3 °C (97.3 °F) 36.4 °C (97.5 °F) 36.5 °C (97.7 °F)   TempSrc: Temporal Temporal Temporal Temporal   SpO2: 95% 95% 97% 98%   Weight:       Height:          Physical Exam  Constitutional:       Appearance: He is ill-appearing. He is not toxic-appearing.   HENT:      Head: Normocephalic.      Nose: Nose normal.      Mouth/Throat:      Mouth: Mucous membranes are moist.      Pharynx: Posterior oropharyngeal erythema present.      Comments: G2 mucositis  Eyes:      Pupils: Pupils are equal, round, and reactive to light.   Cardiovascular:      Rate and Rhythm: Tachycardia present. Rhythm irregular.      Heart sounds: Normal heart sounds.   Pulmonary:      Breath sounds: Decreased breath sounds present.   Abdominal:      General: Bowel sounds are normal.      Palpations: Abdomen is soft.      Tenderness: There is abdominal tenderness (intermittent).   Musculoskeletal:      Right upper arm: Swelling present.      Left upper arm: Swelling present.      Cervical back: Normal range of motion and neck supple.      Right lower leg: Edema present.      Left lower leg:  Edema present.   Skin:     General: Skin is warm and dry.      Capillary Refill: Capillary refill takes less than 2 seconds.      Coloration: Skin is pale.   Neurological:      General: No focal deficit present.      Mental Status: He is alert and oriented to person, place, and time. Mental status is at baseline.   Psychiatric:         Mood and Affect: Mood normal.         Thought Content: Thought content normal.         Judgment: Judgment normal.     Scheduled medications  acyclovir, 400 mg, oral, BID  [Held by provider] apixaban, 5 mg, oral, BID  [Held by provider] atorvastatin, 40 mg, oral, Daily  caffeine, 200 mg, oral, BID  enoxaparin, 40 mg, subcutaneous, BID  fluconazole, 400 mg, oral, Daily  furosemide, 40 mg, intravenous, BID  gabapentin, 300 mg, oral, Nightly  [Held by provider] lisinopril, 10 mg, oral, Daily  [Held by provider] metoprolol succinate XL, 100 mg, oral, Daily  metoprolol tartrate, 50 mg, oral, BID  nystatin, 5 mL, Swish & Spit, TID  pantoprazole, 40 mg, intravenous, Daily  polyethylene glycol, 17 g, oral, BID  potassium chloride, 40 mEq, intravenous, q4h  sucralfate, 1 g, oral, q6h SONAL      Continuous medications  dilTIAZem, 5 mg/hr, Last Rate: 5 mg/hr (04/16/25 1236)  HYDROmorphone,       PRN medications  PRN medications: albuterol, alteplase, [Held by provider] furosemide, lidocaine-diphenhydrAMINE-Maalox 1:1:1, moisturizing mouth, naloxone, ondansetron ODT **OR** ondansetron, prochlorperazine **OR** prochlorperazine **OR** [DISCONTINUED] prochlorperazine    Results from last 7 days   Lab Units 04/16/25  0511 04/15/25  0539 04/14/25  0504 04/13/25  0425 04/12/25  0443 04/11/25  0105 04/10/25  1312 04/10/25  0426   WBC AUTO x10*3/uL 3.6* 3.8* 3.1* 1.7*   < > 0.1*   < > 0.1*   HEMOGLOBIN g/dL 8.3* 8.7* 8.5* 8.2*   < > 7.2*   < > 5.9*   HEMATOCRIT % 25.5* 27.5* 27.0* 25.8*   < > 22.7*   < > 18.4*   PLATELETS AUTO x10*3/uL 111* 87* 61* 37*   < > 17*   < > 12*   NEUTROS PCT AUTO %  --   --   --   60.3  --  15.4  --  14.3   LYMPHO PCT MAN % 3.4 3.4 5.2  --    < >  --   --   --    LYMPHS PCT AUTO %  --   --   --  11.8  --  53.8  --  71.4   MONO PCT MAN % 6.9 12.7 10.4  --    < >  --   --   --    MONOS PCT AUTO %  --   --   --  17.2  --  23.1  --  14.3   EOSINO PCT MAN % 0.0 0.9 0.0  --    < >  --   --   --    EOS PCT AUTO %  --   --   --  1.2  --  7.7  --  0.0    < > = values in this interval not displayed.     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 male with PMHx of HTN, HLD, CAD, MI (PCI '10, on ASA), atrial fibrillation, RCC (s/p partial R nephrectomy '13), GERD, BPH, arthritis, MRSA bacteremia (s/p 4 weeks IV Vancomycin which completed 3/3/25), G1 CIPN,  R subclavian and R axillary DVT r/t PICC (2/3/25; on Eliquis), R rib/flank pain and Burkitt's lyphoma who presents from outpatient clinic for mucositis, dysphagia, N/V and HA.    D20 C4 DA-R-EPOCH    ONC:  H/O RCC (s/p partial R nephrectomy '13)  # Burkitt's Lymphoma  :: Advanced stage, renal, liver, spleen, extensive marrow involvement with positive t[8; 14] translocation  :: S/p 3 cycles DA-R-EPOCH (C2 etop reduced by 25% and again in C3 with escalated doxorubicin by 20% in C3)  :: Cycle 4 started 3/28/25   - Patient will need LP with IT Cytarabine Friday 4/18  - Sent genetic testing for dihydropyrimidine dehydrogenase (DPD), UGT1A1, CYP3A, CYP3B: Pending    Heme:  H/O R subclavian and R axillary  DVT r/t PICC (2/3/25; on Eliquis)  # Pancytopenia, neutropenic 2/2 chemotherapy.  Evidence of wbc recovery (0.6) on 4/12, (1.7) on 4/13  - Neulasta (4/4/25)   - Continue Eliquis   - Transfuse for Hgb <7g/dL & Plt <12 k/uL (severe mucositis, increased risk for bleed)  # Bilateral upper extremity edema  # Bilateral lower extremity edema  - Duplex BUE to r/o DVT (4/10) - Compared with study from 2/3/2025, Acute DVT in left subclavian vein and left axillary vein.   - Duplex BLE (4/14)- negative  - Restart eliquis 5mg BID (4/14) with plt recovery  - Bridge with lovenox BID with plans for LP. Hold Thursday PM dose & Friday AM dose, then resume eliquis 5mg BID in PM of Friday with discharge.  - CTPE  (4/11) - Negative    ID:  H/O MRSA bacteremia (s/p 4 weeks IV Vanco completed 3/3)  Ppx:  Acyclovir (VZV, HSV), Fluconazole (Antifungal), Levofloxacin (PNA)  # Profound Neutropenia, 2/2 chemotherapy  :: Blood cultures (4/9, 4/11): NGTD  :: HSV Swab (4/9):  ND  :: Resp Viral Panel (4/10): ND  - Neutropenic precautions  - S/p Vancomycin (4/10-4/13) for hx MRSA, stopped after 48hrs of negative cultures.   - S/p pip-tazo (4/9-4/11) given profound neutropenia and G4 mucositis.  S/p Meropenem (4/11-4/14)    FEN/GI/Renal:  H/O BPH, GERD  Admit wt: 90.7 kg, current wt: 92.8 kg  F: PRN  E: PRN  N: Regular  # Nausea/Vomiting, likely r/t chemotherapy  - Continue ondansetron & compazine PRN  # G3-4 Mucositis  - S/p Dilaudid PCA (4/8-4/16)  - S/p mIVF (4/8-4/15)  - Continue Biotene, sucralfate  - Send CMV, HSV to r/o infectious mucositis.  HSV PCR oral swab (4/9) - ND  - Nystatin swish & spit   # Dysphagia  :: MBSS 4/3 with mild to moderate oropharyngeal dysphagia, SLP cleared for regular diet & thin liquids  :: GI consulted 4/9, supportive care, unable to perform EGD with pancytopenia and profound neutropenia  - Consult SLP, continue with supportive care (4/10) - SLP recommended MBSS 4/14 and patient refused, understands risks and  potential aspiration, still refuses. Per SLP, to continue with last recommended diet as tolerated (reg/thin) as he accepts the risk.  # Oliguria, likely related to dehydration  :: Bladder scan 4/9 with no urine  - mIVF as above for hydration support  - Repeat Bladder scans q6h with no void & x3 with PVR  # GI ppx: Continue home Protonix    NEURO:  # Post LP HA (LP with IT Methotrexate 4/4)  - Caffeine pills BID PRN- held d/t mucositis.  HA improved with Dilaudid PCA.  - Hold home Neurontin d/t NPO and re-start as mucositis resolves    CV:  H/O HTN, HLD, CAD, MI (PCI '10, on ASA), atrial fibrillation  Echo 2/3/25:  LVEF 60-65% with no LVH, normal RV function, and mod dilated LA  Awaiting EKG for QTc monitoring  # Tachycardia, likely 2/2 hypovolemia  - Increase mIVF as above  - Hold home Lipitor and Lisinopril d/t NPO and re-start as mucositis resolves  - IV Metoprolol while patient is NPO and convert back to PO when able  - Hold home Lasix given dehydration  - Keep Mg+ > 2 and K+ > 4.0  # A. fib w RVR (4/10) - initiated (4/11) Diltiazem drip 5 mg/hr, increased to 10 mg/hr per Cardiology  consult recommendations.  Can increase to a max of 20 mg/hr.  - Digoxin load 500 mcg at 0200 (4/11).  Holding additional Digoxin dosing for now  - A. fib w good rate control on current Diltiazem rate (4/12), will plan to transition to oral regimen when patient able to tolerate oral meds.  # Hypervolemia, 2/2 fluid resuscitation  - S/p 80 IV lasix 4/14 with 6L off  - Cards following       Malignant Hematology Checklist:  ID Prophylaxis:  Acyclovir (VZV, HSV), Fluconazole (Antifungal), Levofloxacin (PNA)  Code status:  Full Code  Lines/drains:  L DBL PICC  Primary oncologist:  Dr. Mckeon     Patient seen and discussed on rounds with Dr. Lazo.    Terrence Veras, APRN-CNP

## 2025-04-17 LAB
ALBUMIN SERPL BCP-MCNC: 2.9 G/DL (ref 3.4–5)
ANION GAP SERPL CALC-SCNC: 13 MMOL/L (ref 10–20)
BASOPHILS # BLD MANUAL: 0 X10*3/UL (ref 0–0.1)
BASOPHILS NFR BLD MANUAL: 0 %
BUN SERPL-MCNC: 7 MG/DL (ref 6–23)
CALCIUM SERPL-MCNC: 8.3 MG/DL (ref 8.6–10.6)
CHLORIDE SERPL-SCNC: 98 MMOL/L (ref 98–107)
CO2 SERPL-SCNC: 30 MMOL/L (ref 21–32)
COMMENTS - MP RESULT TYPE: NORMAL
CREAT SERPL-MCNC: 0.54 MG/DL (ref 0.5–1.3)
DACRYOCYTES BLD QL SMEAR: ABNORMAL
EGFRCR SERPLBLD CKD-EPI 2021: >90 ML/MIN/1.73M*2
EOSINOPHIL # BLD MANUAL: 0 X10*3/UL (ref 0–0.7)
EOSINOPHIL NFR BLD MANUAL: 0 %
ERYTHROCYTE [DISTWIDTH] IN BLOOD BY AUTOMATED COUNT: 16.2 % (ref 11.5–14.5)
GLUCOSE SERPL-MCNC: 84 MG/DL (ref 74–99)
HCT VFR BLD AUTO: 28 % (ref 41–52)
HGB BLD-MCNC: 8.8 G/DL (ref 13.5–17.5)
IMM GRANULOCYTES # BLD AUTO: 0.49 X10*3/UL (ref 0–0.7)
IMM GRANULOCYTES NFR BLD AUTO: 12 % (ref 0–0.9)
LYMPHOCYTES # BLD MANUAL: 0.33 X10*3/UL (ref 1.2–4.8)
LYMPHOCYTES NFR BLD MANUAL: 8 %
MAGNESIUM SERPL-MCNC: 1.92 MG/DL (ref 1.6–2.4)
MCH RBC QN AUTO: 27.9 PG (ref 26–34)
MCHC RBC AUTO-ENTMCNC: 31.4 G/DL (ref 32–36)
MCV RBC AUTO: 89 FL (ref 80–100)
MONOCYTES # BLD MANUAL: 0.4 X10*3/UL (ref 0.1–1)
MONOCYTES NFR BLD MANUAL: 9.7 %
MYELOCYTES # BLD MANUAL: 0.07 X10*3/UL
MYELOCYTES NFR BLD MANUAL: 1.8 %
NEUTS SEG # BLD MANUAL: 3.08 X10*3/UL (ref 1.2–7)
NEUTS SEG NFR BLD MANUAL: 75.2 %
NRBC BLD MANUAL-RTO: 1.8 % (ref 0–0)
NRBC BLD-RTO: 1 /100 WBCS (ref 0–0)
OVALOCYTES BLD QL SMEAR: ABNORMAL
PHOSPHATE SERPL-MCNC: 4.2 MG/DL (ref 2.5–4.9)
PLATELET # BLD AUTO: 181 X10*3/UL (ref 150–450)
POTASSIUM SERPL-SCNC: 3.9 MMOL/L (ref 3.5–5.3)
RBC # BLD AUTO: 3.15 X10*6/UL (ref 4.5–5.9)
RBC MORPH BLD: ABNORMAL
SCAN RESULT: NORMAL
SODIUM SERPL-SCNC: 137 MMOL/L (ref 136–145)
TOTAL CELLS COUNTED BLD: 113
VARIANT LYMPHS # BLD MANUAL: 0.22 X10*3/UL (ref 0–0.5)
VARIANT LYMPHS NFR BLD: 5.3 %
WBC # BLD AUTO: 4.1 X10*3/UL (ref 4.4–11.3)

## 2025-04-17 PROCEDURE — 97116 GAIT TRAINING THERAPY: CPT | Mod: GP,CQ

## 2025-04-17 PROCEDURE — 2500000004 HC RX 250 GENERAL PHARMACY W/ HCPCS (ALT 636 FOR OP/ED): Mod: JZ

## 2025-04-17 PROCEDURE — 85007 BL SMEAR W/DIFF WBC COUNT: CPT

## 2025-04-17 PROCEDURE — 97110 THERAPEUTIC EXERCISES: CPT | Mod: GP,CQ

## 2025-04-17 PROCEDURE — 2500000001 HC RX 250 WO HCPCS SELF ADMINISTERED DRUGS (ALT 637 FOR MEDICARE OP): Performed by: PHYSICIAN ASSISTANT

## 2025-04-17 PROCEDURE — 2500000002 HC RX 250 W HCPCS SELF ADMINISTERED DRUGS (ALT 637 FOR MEDICARE OP, ALT 636 FOR OP/ED)

## 2025-04-17 PROCEDURE — 99232 SBSQ HOSP IP/OBS MODERATE 35: CPT | Performed by: PHYSICIAN ASSISTANT

## 2025-04-17 PROCEDURE — 2500000001 HC RX 250 WO HCPCS SELF ADMINISTERED DRUGS (ALT 637 FOR MEDICARE OP)

## 2025-04-17 PROCEDURE — 83735 ASSAY OF MAGNESIUM: CPT | Performed by: PHYSICIAN ASSISTANT

## 2025-04-17 PROCEDURE — 1200000003 HC ONCOLOGY  ROOM WITH TELEMETRY DAILY

## 2025-04-17 PROCEDURE — 85027 COMPLETE CBC AUTOMATED: CPT

## 2025-04-17 PROCEDURE — 80069 RENAL FUNCTION PANEL: CPT

## 2025-04-17 RX ORDER — FUROSEMIDE 10 MG/ML
40 INJECTION INTRAMUSCULAR; INTRAVENOUS DAILY
Status: DISCONTINUED | OUTPATIENT
Start: 2025-04-18 | End: 2025-04-18 | Stop reason: HOSPADM

## 2025-04-17 RX ORDER — POLYETHYLENE GLYCOL 3350 17 G/17G
17 POWDER, FOR SOLUTION ORAL DAILY
Status: DISCONTINUED | OUTPATIENT
Start: 2025-04-18 | End: 2025-04-18

## 2025-04-17 RX ADMIN — ENOXAPARIN SODIUM 40 MG: 100 INJECTION SUBCUTANEOUS at 05:55

## 2025-04-17 RX ADMIN — SUCRALFATE 1 G: 1 SUSPENSION ORAL at 18:50

## 2025-04-17 RX ADMIN — ACYCLOVIR 400 MG: 200 CAPSULE ORAL at 09:18

## 2025-04-17 RX ADMIN — METOPROLOL TARTRATE 50 MG: 50 TABLET, FILM COATED ORAL at 20:50

## 2025-04-17 RX ADMIN — NYSTATIN 500000 UNITS: 100000 SUSPENSION ORAL at 20:50

## 2025-04-17 RX ADMIN — NYSTATIN 500000 UNITS: 100000 SUSPENSION ORAL at 09:31

## 2025-04-17 RX ADMIN — OXYCODONE HYDROCHLORIDE 10 MG: 5 SOLUTION ORAL at 09:20

## 2025-04-17 RX ADMIN — HYDROMORPHONE HYDROCHLORIDE 0.2 MG: 1 INJECTION, SOLUTION INTRAMUSCULAR; INTRAVENOUS; SUBCUTANEOUS at 18:50

## 2025-04-17 RX ADMIN — OXYCODONE HYDROCHLORIDE 5 MG: 5 SOLUTION ORAL at 15:46

## 2025-04-17 RX ADMIN — METOPROLOL TARTRATE 50 MG: 50 TABLET, FILM COATED ORAL at 09:18

## 2025-04-17 RX ADMIN — PANTOPRAZOLE SODIUM 40 MG: 40 INJECTION, POWDER, FOR SOLUTION INTRAVENOUS at 09:18

## 2025-04-17 RX ADMIN — FLUCONAZOLE 400 MG: 200 TABLET ORAL at 09:18

## 2025-04-17 RX ADMIN — GABAPENTIN 300 MG: 300 CAPSULE ORAL at 20:50

## 2025-04-17 RX ADMIN — NYSTATIN 500000 UNITS: 100000 SUSPENSION ORAL at 15:46

## 2025-04-17 RX ADMIN — FUROSEMIDE 40 MG: 10 INJECTION, SOLUTION INTRAVENOUS at 09:18

## 2025-04-17 RX ADMIN — ACYCLOVIR 400 MG: 200 CAPSULE ORAL at 20:50

## 2025-04-17 ASSESSMENT — COGNITIVE AND FUNCTIONAL STATUS - GENERAL
EATING MEALS: A LITTLE
CLIMB 3 TO 5 STEPS WITH RAILING: A LOT
MOBILITY SCORE: 18
HELP NEEDED FOR BATHING: A LITTLE
WALKING IN HOSPITAL ROOM: A LITTLE
MOVING TO AND FROM BED TO CHAIR: A LITTLE
PERSONAL GROOMING: A LITTLE
DAILY ACTIVITIY SCORE: 18
CLIMB 3 TO 5 STEPS WITH RAILING: A LOT
DRESSING REGULAR UPPER BODY CLOTHING: A LITTLE
DAILY ACTIVITIY SCORE: 18
DRESSING REGULAR LOWER BODY CLOTHING: A LITTLE
HELP NEEDED FOR BATHING: A LITTLE
MOVING TO AND FROM BED TO CHAIR: A LITTLE
CLIMB 3 TO 5 STEPS WITH RAILING: A LOT
TOILETING: A LITTLE
DRESSING REGULAR UPPER BODY CLOTHING: A LITTLE
TURNING FROM BACK TO SIDE WHILE IN FLAT BAD: A LITTLE
TOILETING: A LITTLE
PERSONAL GROOMING: A LITTLE
DRESSING REGULAR LOWER BODY CLOTHING: A LITTLE
MOBILITY SCORE: 18
WALKING IN HOSPITAL ROOM: A LOT
STANDING UP FROM CHAIR USING ARMS: A LITTLE
STANDING UP FROM CHAIR USING ARMS: A LITTLE
EATING MEALS: A LITTLE
WALKING IN HOSPITAL ROOM: A LOT
MOVING TO AND FROM BED TO CHAIR: A LITTLE
STANDING UP FROM CHAIR USING ARMS: A LITTLE
MOBILITY SCORE: 18

## 2025-04-17 ASSESSMENT — PAIN SCALES - GENERAL
PAINLEVEL_OUTOF10: 3
PAINLEVEL_OUTOF10: 3
PAINLEVEL_OUTOF10: 4
PAINLEVEL_OUTOF10: 7
PAINLEVEL_OUTOF10: 5 - MODERATE PAIN
PAINLEVEL_OUTOF10: 0 - NO PAIN
PAINLEVEL_OUTOF10: 4

## 2025-04-17 ASSESSMENT — PAIN - FUNCTIONAL ASSESSMENT
PAIN_FUNCTIONAL_ASSESSMENT: 0-10

## 2025-04-17 NOTE — PROGRESS NOTES
Physical Therapy Treatment    Patient Name: Bijan Ibanez  MRN: [id number]  Today's Date: 4/17/2025  Room: [address] [place] [address]  Time Calculation  Start Time: 1105  Stop Time: 1136  Time Calculation (min): 31 min       Assessment/Plan   PT Assessment  PT Assessment Results: Decreased strength, Decreased endurance, Impaired balance, Decreased mobility, Pain  Rehab Prognosis: Good  Barriers to Discharge Home: No anticipated barriers  Evaluation/Treatment Tolerance: Patient tolerated treatment well  Strengths: Attitude of self  Barriers to Participation: Comorbidities  End of Session Communication: Bedside nurse  End of Session Patient Position: Up in chair, Held/seated with caregiver     PT Plan  Treatment/Interventions: Bed mobility, Transfer training, Gait training, Stair training, Balance training, Endurance training, Strengthening, Therapeutic exercise, Therapeutic activity, Positioning  PT Plan: Ongoing PT  PT Frequency: 5 times per week  PT Discharge Recommendations: Low intensity level of continued care  Equipment Recommended upon Discharge: Wheeled walker  PT Recommended Transfer Status: Assist x1, Assistive device  PT - OK to Discharge: Yes  Assessment: Patient is progressing Well with therapy this date. Reports poor sleep from being diuresed since yesterday. Improved endurance today w/lower HR. Would continue to benefit from continued skilled PT to address all mobility deficits; Patient remains appropriate for LOW intensity therapy when medically ready for discharge from acute stay.  Will continue to follow.     General Visit Information:   PT  Visit  PT Received On: 04/17/25  Prior to Session Communication: Bedside nurse  Patient Position Received: Bed, 3 rail up  Family/Caregiver Present: Yes  Caregiver Feedback: spouse present     Subjective   Subjective: Pt pleasant and agreeable to therapy upon approach    Precautions:  Precautions  Medical Precautions: Fall precautions, Cardiac precautions  Vital Signs:    Date/Time Vitals Session Patient Position Pulse Resp SpO2 BP MAP (mmHg)    04/17/25 1105 During PT  --  115  --  --  --  --             Objective   Pain:  Pain Assessment  Pain Assessment: 0-10  0-10 (Numeric) Pain Score: 3  Pain Type: Acute pain  Pain Location: Generalized  Cognition:  Cognition  Overall Cognitive Status: Within Functional Limits  Arousal/Alertness: Appropriate responses to stimuli  Orientation Level: Oriented X4    Lines/Tubes/Drains:  PICC - Adult 03/07/25 Double lumen Left Basilic vein (Active)   Number of days: 40     Continuous Medications/Drips:  Continuous Medications[1]    PT Treatments:  Therapeutic Exercise  Therapeutic Exercise Performed: Yes  Therapeutic Exercise Activity 1: 5x2 STS from different bed heights for improved functional strength        Bed Mobility 1  Bed Mobility 1: Supine to sitting  Level of Assistance 1: Distant supervision  Ambulation/Gait Training 1  Surface 1: Level tile  Device 1: Rolling walker  Assistance 1: Close supervision, Minimal verbal cues  Quality of Gait 1: Narrow base of support, Decreased step length, Diminished heel strike, Forward flexed posture  Comments/Distance (ft) 1: 65'x2, needs 3-5 minutes rest in between bouts  Transfer 1  Transfer From 1: Sit to  Transfer to 1: Stand  Technique 1: Sit to stand, Stand to sit  Transfer Device 1: Walker  Transfer Level of Assistance 1: Distant supervision  Transfers 2  Transfer From 2: Stand to  Transfer to 2: Bed, Toilet  Technique 2: Stand pivot  Transfer Device 2: Walker  Transfer Level of Assistance 2: Close supervision  Trials/Comments 2: x2             Activity tolerance:  Activity Tolerance  Endurance: Tolerates 10 - 20 min exercise with multiple rests    Outcome Measures:  [facility] Basic Mobility  Turning from your back to your side while in a flat bed without using bedrails: None  Moving from lying on your back to sitting on the side of a flat bed without using bedrails: A little  Moving to and from bed to  chair (including a wheelchair): A little  Standing up from a chair using your arms (e.g. wheelchair or bedside chair): A little  To walk in hospital room: A little  Climbing 3-5 steps with railing: A lot  Basic Mobility - Total Score: 18       Education Documentation  Precautions, taught by Cherry Richey PTA at 4/17/2025 11:54 AM.  Learner: Patient  Readiness: Acceptance  Method: Explanation  Response: Verbalizes Understanding    Body Mechanics, taught by Cherry Richey PTA at 4/17/2025 11:54 AM.  Learner: Patient  Readiness: Acceptance  Method: Explanation  Response: Verbalizes Understanding    Mobility Training, taught by Cherry Richey PTA at 4/17/2025 11:54 AM.  Learner: Patient  Readiness: Acceptance  Method: Explanation  Response: Verbalizes Understanding    Education Comments  No comments found.          OP EDUCATION:       Encounter Problems       Encounter Problems (Active)       Balance       Pt will score >/=24/28 on Tinetti to demonstrate decreased falls risk (Progressing)       Start:  04/14/25    Expected End:  04/28/25               Mobility       Pt will be SBA for ambulation 100 ft with LRAD (Progressing)       Start:  04/14/25    Expected End:  04/28/25            Pt will be CGA to ascend/descend 1 step with LRAD (Progressing)       Start:  04/14/25    Expected End:  04/28/25               PT Transfers       Pt will be SBA for sit to stand and bed to chair transfers (Met)       Start:  04/14/25    Expected End:  04/28/25    Resolved:  04/17/25         Pt will be Emma with bed mobility (Progressing)       Start:  04/14/25    Expected End:  04/28/25               Pain - Adult                                 [1]

## 2025-04-17 NOTE — PROGRESS NOTES
Subjective Data:  Tired. Denies palpitations, chest pain, or SOB. Wife at bedside.    Objective Data:  Last Recorded Vitals:  Vitals:    04/16/25 1958 04/17/25 0127 04/17/25 0553 04/17/25 0826   BP: 136/77 128/80 (!) 150/91 130/88   BP Location: Right arm Right arm Right arm Right arm   Patient Position: Lying Sitting Sitting Lying   Pulse: 87 69 94 94   Resp: 18 16 18 18   Temp: 36.5 °C (97.7 °F) 36 °C (96.8 °F) 36.2 °C (97.2 °F) 36.7 °C (98.1 °F)   TempSrc: Temporal Temporal Temporal Temporal   SpO2: 98% 98% 99% 98%   Weight:       Height:           Last Labs:  CBC - 4/17/2025:  5:56 AM  4.1 8.8 181    28.0      CMP - 4/17/2025:  5:56 AM  8.3 4.6 6 --- 1.3   4.2 2.9 12 42      PTT - 4/3/2025:  4:41 AM  1.1   11.6 23     TROPHS   Date/Time Value Ref Range Status   01/28/2025 09:16 AM 5 0 - 53 ng/L Final   01/10/2025 09:06 PM 6 0 - 53 ng/L Final     BNP   Date/Time Value Ref Range Status   01/10/2025 09:06 PM 5 0 - 99 pg/mL Final     HGBA1C   Date/Time Value Ref Range Status   10/16/2024 06:59 AM 5.3 See comment % Final     LDLCALC   Date/Time Value Ref Range Status   01/08/2025 02:54 PM 74 <=99 mg/dL Final     Comment:                                 Near   Borderline      AGE      Desirable  Optimal    High     High     Very High     0-19 Y     0 - 109     ---    110-129   >/= 130     ----    20-24 Y     0 - 119     ---    120-159   >/= 160     ----      >24 Y     0 -  99   100-129  130-159   160-189     >/=190     10/16/2024 06:59 AM 56 <=99 mg/dL Final     Comment:                                 Near   Borderline      AGE      Desirable  Optimal    High     High     Very High     0-19 Y     0 - 109     ---    110-129   >/= 130     ----    20-24 Y     0 - 119     ---    120-159   >/= 160     ----      >24 Y     0 -  99   100-129  130-159   160-189     >/=190       VLDL   Date/Time Value Ref Range Status   01/08/2025 02:54 PM 50 0 - 40 mg/dL Final   10/16/2024 06:59 AM 28 0 - 40 mg/dL Final      Last I/O:  I/O  last 3 completed shifts:  In: 367.5 (4.1 mL/kg) [P.O.:200; I.V.:67.5 (0.7 mL/kg); IV Piggyback:100]  Out: 7375 (81.5 mL/kg) [Urine:7375 (2.3 mL/kg/hr)]  Weight: 90.5 kg       2/3/25 Onco-Echo Limited (Strain And 3D)   CONCLUSIONS:   1. The left ventricular systolic function is normal, with a visually estimated ejection fraction of 60-65%.   2. Left ventricular diastolic filling is indeterminate due to E/A wave fusion.   3. There is no evidence of left ventricular hypertrophy.   4. There is normal right ventricular global systolic function.   5. The left atrial size is moderately dilated.   6. The pulmonary artery is not well visualized.    Onco-Cardiology Measurements:  Historical Measurements from Previous Study  2D EF (Biplane)                     64%%  3D EF                               51%%  Global Longitudinal Strain (GLS) -19.9%%     Current Measurements  2D EF (Biplane)                     64%%  3D EF                               58%%  Global Longitudinal Strain (GLS) -17.6%%       Inpatient Medications:  Scheduled medications   Medication Dose Route Frequency    acyclovir  400 mg oral BID    [Held by provider] apixaban  5 mg oral BID    [Held by provider] atorvastatin  40 mg oral Daily    caffeine  200 mg oral BID    [Held by provider] enoxaparin  40 mg subcutaneous BID    fluconazole  400 mg oral Daily    furosemide  40 mg intravenous BID    gabapentin  300 mg oral Nightly    [Held by provider] lisinopril  10 mg oral Daily    [Held by provider] metoprolol succinate XL  100 mg oral Daily    metoprolol tartrate  50 mg oral BID    nystatin  5 mL Swish & Spit TID    pantoprazole  40 mg intravenous Daily    polyethylene glycol  17 g oral BID    sucralfate  1 g oral q6h SONAL     PRN medications   Medication    albuterol    alteplase    [Held by provider] furosemide    HYDROmorphone    lidocaine-diphenhydrAMINE-Maalox 1:1:1    moisturizing mouth    naloxone    ondansetron ODT    Or    ondansetron    oxyCODONE     oxyCODONE    prochlorperazine    Or    prochlorperazine     Continuous Medications   Medication Dose Last Rate       Physical Exam:  General: Sitting up in bed, room air, NAD  Skin:  warm and dry  HEENT: EOMI. MMM  Cardiovascular: irreg irreg . Difficult to assess JVD   Respiratory: Reduced A/E, rales bibasilar  Gastrointestinal: soft, non-distended  Genitourinary: light urine per urinal   Extremities: LUE edema at elbow below PICC site; 2-3+ pitting edema, L>R   Neurological: no gross focal deficits  Psychiatric: appropriate mood and affect      TTE 2/3/24: EF 60-65%, Moderately dilated LA    Assessment/Plan   Mr. Ibanez is a 65 yo gentleman with a PMH of HTN, HLD, CAD, MI (PCI '10, on ASA), atrial fibrillation, RCC (s/p partial R nephrectomy '13), GERD, BPH, arthritis, MRSA bacteremia (s/p 4 weeks IV Vancomycin which completed 3/3/25), G1 CIPN, R subclavian and R axillary DVT r/t PICC (2/3/25; on Eliquis), R rib/flank pain and Burkitt's lymphoma who was admitted to heme/onc service for mucositis, dysphagia, & n/v.  Cardiology is consulted for Afib.     Plan  - Off diltiazem drip. Now on metoprolol tartrate since yesterday  - Rhythm overall sinus rhythm with controlled rates~ 70-80s but with occasional paroxysmal RVR to 120s to max of 150 (AF/AFL vs atach)  - Continue metoprolol tartrate 50 mg BID. If Hrs sustained >120 for more than 30mins or he becomes easily tachycardic with ambulating with PT, can escalate dose to q6hrs (BP has room)  - Continue Lasix 40 mg IV BID 0800 and 1700   - Check BMP and Magnesium BID  - Please aggressively replete lytes and Keep K+>4 and Mg+ >2   - strict I/Os and daily weights  - continue to monitor on tele while inpatient   - needs therapeutic anticoagulation (if platelets remain >50k) per primary team. Please use 1mg/kg q12hrs unless bleeding is a concern    Cardiology will follow    Angela Sood PA-C  Cardiology Consults    Please call with any questions  General Cardiology  Consult Pager: [phone] (weekday 7AM-6PM and weekend 7AM-2PM) and other: [phone]  EP Consult Pager: [phone] (weekday 7AM-6PM and weekend 7AM-2PM) and other: [phone]  CICU Fellow Pager: [phone] anytime  EP Device Nurse Pager: [phone] (weekday 7AM-4PM)  Advanced Heart Failure Consult Pager: [phone] anytime        Code Status:  Full Code

## 2025-04-17 NOTE — PROGRESS NOTES
Bijan Ibanez is a 64 y.o. male on day  4 of admission presenting with Dysphagia.    Subjective   Afebrile, VSS. No acute issues overnight. Pt reports mucositis pain is improving, as well as soreness in oropharynx. Has been able to tolerate PO intake as well as pills.  Constipation resolved. Taking short walks using walker. Otherwise, offers no complaints. Diuresing well per cardiology recs.  Denies nausea & diarrhea at this time. No emesis. ROS otherwise unremarkable.         Objective     Vitals:    04/17/25 1105 04/17/25 1124 04/17/25 1219 04/17/25 1536   BP:   138/84 127/87   BP Location:   Right arm Right arm   Patient Position:   Sitting Sitting   Pulse: (!) 115  86 89   Resp:   18 18   Temp:   36.5 °C (97.7 °F) 36.6 °C (97.8 °F)   TempSrc:   Temporal Temporal   SpO2:   97% 98%   Weight:  86.8 kg (191 lb 5.8 oz)     Height:          Physical Exam  Constitutional:       Appearance: He is ill-appearing. He is not toxic-appearing.   HENT:      Head: Normocephalic.      Nose: Nose normal.      Mouth/Throat:      Mouth: Mucous membranes are moist.      Pharynx: Posterior oropharyngeal erythema present.      Comments: G2 mucositis  Eyes:      Pupils: Pupils are equal, round, and reactive to light.   Cardiovascular:      Rate and Rhythm: Tachycardia present. Rhythm irregular.      Heart sounds: Normal heart sounds.   Pulmonary:      Breath sounds: Decreased breath sounds present.   Abdominal:      General: Bowel sounds are normal.      Palpations: Abdomen is soft.      Tenderness: There is abdominal tenderness (intermittent).   Musculoskeletal:      Right upper arm: Swelling present.      Left upper arm: Swelling present.      Cervical back: Normal range of motion and neck supple.      Right lower leg: Edema present.      Left lower leg: Edema present.   Skin:     General: Skin is warm and dry.      Capillary Refill: Capillary refill takes less than 2 seconds.      Coloration: Skin is pale.   Neurological:       General: No focal deficit present.      Mental Status: He is alert and oriented to person, place, and time. Mental status is at baseline.   Psychiatric:         Mood and Affect: Mood normal.         Thought Content: Thought content normal.         Judgment: Judgment normal.     Scheduled medications  Scheduled Medications[1]  Continuous medications  Continuous Medications[2]  PRN medications  PRN Medications[3]    Results from last 7 days   Lab Units 04/17/25  0556 04/16/25  0511 04/15/25  0539 04/14/25  0504 04/13/25  0425 04/12/25  0443 04/11/25  0105   WBC AUTO x10*3/uL 4.1* 3.6* 3.8*   < > 1.7*   < > 0.1*   HEMOGLOBIN g/dL 8.8* 8.3* 8.7*   < > 8.2*   < > 7.2*   HEMATOCRIT % 28.0* 25.5* 27.5*   < > 25.8*   < > 22.7*   PLATELETS AUTO x10*3/uL 181 111* 87*   < > 37*   < > 17*   NEUTROS PCT AUTO %  --   --   --   --  60.3  --  15.4   LYMPHO PCT MAN % 8.0 3.4 3.4   < >  --    < >  --    LYMPHS PCT AUTO %  --   --   --   --  11.8  --  53.8   MONO PCT MAN % 9.7 6.9 12.7   < >  --    < >  --    MONOS PCT AUTO %  --   --   --   --  17.2  --  23.1   EOSINO PCT MAN % 0.0 0.0 0.9   < >  --    < >  --    EOS PCT AUTO %  --   --   --   --  1.2  --  7.7    < > = values in this interval not displayed.     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 male with PMHx of HTN, HLD, CAD, MI (PCI '10, on ASA), atrial fibrillation, RCC (s/p partial R nephrectomy '13), GERD, BPH, arthritis,  MRSA bacteremia (s/p 4 weeks IV Vancomycin which completed 3/3/25), G1 CIPN,  R subclavian and R axillary DVT r/t PICC (2/3/25; on Eliquis), R rib/flank pain and Burkitt's lyphoma who presents from outpatient clinic for mucositis, dysphagia, N/V and HA.    D21 C4 DA-R-EPOCH    ONC:  H/O RCC (s/p partial R nephrectomy '13)  # Burkitt's Lymphoma  :: Advanced stage, renal, liver, spleen, extensive marrow involvement with positive t[8; 14] translocation  :: S/p 3 cycles DA-R-EPOCH (C2 etop reduced by 25% and again in C3 with escalated doxorubicin by 20% in C3)  :: Cycle 4 started 3/28/25   - Plan LP with IT Cytarabine Friday 4/18  - Sent genetic testing for dihydropyrimidine dehydrogenase (DPD), UGT1A1, CYP3A, CYP3B: Pending    Heme:  H/O R subclavian and R axillary DVT r/t PICC (2/3/25; on Eliquis)  # Pancytopenia, neutropenic 2/2 chemotherapy.  Wbc recovery   - Neulasta (4/4/25)   - Currently holding Eliquis   - Transfuse for Hgb <7g/dL & Plt <12 k/uL (severe mucositis, increased risk for bleed)  # Bilateral upper extremity edema  # Bilateral lower extremity edema  - Duplex BUE to r/o DVT (4/10) - Compared with study from 2/3/2025, Acute DVT in left subclavian vein and left axillary vein.   - Duplex BLE (4/14)- negative  - Currently holding Eliquis   - Bridge with lovenox BID with plans for LP. Hold Thursday PM dose & Friday AM dose, then resume eliquis 5mg BID in PM of Friday with discharge.  - CTPE  (4/11) - Negative    ID:  H/O MRSA bacteremia (s/p 4 weeks IV Vanco completed 3/3)  Ppx:  Acyclovir (VZV, HSV), Fluconazole (Antifungal), Levofloxacin (PNA)  # Profound Neutropenia, 2/2 chemotherapy  :: Blood cultures (4/9, 4/11): NGTD  :: HSV Swab (4/9):  ND  :: Resp Viral Panel (4/10): ND  - Neutropenic precautions  - S/p Vancomycin (4/10-4/13) for hx MRSA, stopped after 48hrs of negative cultures.   - S/p pip-tazo (4/9-4/11) given profound neutropenia and G4 mucositis.  S/p Meropenem (4/11-4/14)    FEN/GI/Renal:  H/O  BPH, GERD  Admit wt: 90.7 kg, current wt: 86.8 kg (4/17)  F: PRN  E: PRN  N: Regular  # Nausea/Vomiting, likely r/t chemotherapy  - Continue ondansetron & compazine PRN  # G3-4 Mucositis  - S/p Dilaudid PCA (4/8-4/16)  - S/p mIVF (4/8-4/15)  - Continue Biotene, sucralfate  - Send CMV, HSV to r/o infectious mucositis.  HSV PCR oral swab (4/9) - ND  - Nystatin swish & spit   # Dysphagia  :: MBSS 4/3 with mild to moderate oropharyngeal dysphagia, SLP cleared for regular diet & thin liquids  :: GI consulted 4/9, supportive care, unable to perform EGD with pancytopenia and profound neutropenia  - Consult SLP, continue with supportive care (4/10) - SLP recommended MBSS 4/14 and patient refused, understands risks and potential aspiration, still refuses. Per SLP, to continue with last recommended diet as tolerated (reg/thin) as he accepts the risk.  # Oliguria, likely related to dehydration  :: Bladder scan 4/9 with no urine  - mIVF as above for hydration support  - Repeat Bladder scans q6h with no void & x3 with PVR  # GI ppx: Continue home Protonix    NEURO:  # Post LP HA (LP with IT Methotrexate 4/4)  - Caffeine pills BID PRN- held d/t mucositis.  HA improved with Dilaudid PCA.  - Hold home Neurontin d/t NPO and re-start as mucositis resolves    CV:  H/O HTN, HLD, CAD, MI (PCI '10, on ASA), atrial fibrillation  Echo 2/3/25:  LVEF 60-65% with no LVH, normal RV function, and mod dilated LA  Awaiting EKG for QTc monitoring  # Tachycardia, likely 2/2 hypovolemia  - Increase mIVF as above  - Hold home Lipitor and Lisinopril d/t NPO and re-start as mucositis resolves  - IV Metoprolol while patient is NPO and convert back to PO when able  - Hold home Lasix given dehydration  - Keep Mg+ > 2 and K+ > 4.0  # A. fib w RVR (4/10) - initiated (4/11) Diltiazem drip 5 mg/hr, increased to 10 mg/hr per Cardiology  consult recommendations.  Can increase to a max of 20 mg/hr.  - Digoxin load 500 mcg at 0200 (4/11).  Holding additional  Digoxin dosing for now  - A. fib w good rate control on current Diltiazem rate (4/12), will plan to transition to oral regimen when patient able to tolerate oral meds.  # Hypervolemia, 2/2 fluid resuscitation  - S/p 80 IV lasix 4/14 with 6L off  - Cards following       Malignant Hematology Checklist:  ID Prophylaxis:  Acyclovir (VZV, HSV), Fluconazole (Antifungal), Levofloxacin (PNA)  Code status:  Full Code  Lines/drains:  L DBL PICC  Primary oncologist:  Dr. Mckeon  F/up appts:  -4/23 [facility] Hem/Inf  -4/29 Dr. Mckeon     Patient seen and discussed on rounds with Dr. Lazo.    Antoni Bello PA-C         [1] acyclovir, 400 mg, oral, BID  [Held by provider] apixaban, 5 mg, oral, BID  [Held by provider] atorvastatin, 40 mg, oral, Daily  caffeine, 200 mg, oral, BID  [Held by provider] enoxaparin, 40 mg, subcutaneous, BID  fluconazole, 400 mg, oral, Daily  [START ON 4/18/2025] furosemide, 40 mg, intravenous, Daily  gabapentin, 300 mg, oral, Nightly  [Held by provider] lisinopril, 10 mg, oral, Daily  [Held by provider] metoprolol succinate XL, 100 mg, oral, Daily  metoprolol tartrate, 50 mg, oral, BID  nystatin, 5 mL, Swish & Spit, TID  pantoprazole, 40 mg, intravenous, Daily  [START ON 4/18/2025] polyethylene glycol, 17 g, oral, Daily  sucralfate, 1 g, oral, q6h SONAL  [2]    [3] PRN medications: albuterol, alteplase, [Held by provider] furosemide, HYDROmorphone, lidocaine-diphenhydrAMINE-Maalox 1:1:1, moisturizing mouth, naloxone, ondansetron ODT **OR** ondansetron, oxyCODONE, oxyCODONE, prochlorperazine **OR** prochlorperazine **OR** [DISCONTINUED] prochlorperazine

## 2025-04-18 ENCOUNTER — PHARMACY VISIT (OUTPATIENT)
Dept: PHARMACY | Facility: CLINIC | Age: 65
End: 2025-04-18
Payer: COMMERCIAL

## 2025-04-18 ENCOUNTER — DOCUMENTATION (OUTPATIENT)
Dept: HOME HEALTH SERVICES | Facility: HOME HEALTH | Age: 65
End: 2025-04-18
Payer: MEDICARE

## 2025-04-18 VITALS
HEIGHT: 71 IN | WEIGHT: 187.39 LBS | SYSTOLIC BLOOD PRESSURE: 138 MMHG | OXYGEN SATURATION: 97 % | RESPIRATION RATE: 18 BRPM | BODY MASS INDEX: 26.23 KG/M2 | HEART RATE: 103 BPM | DIASTOLIC BLOOD PRESSURE: 89 MMHG | TEMPERATURE: 98.2 F

## 2025-04-18 LAB
ALBUMIN SERPL BCP-MCNC: 2.7 G/DL (ref 3.4–5)
ANION GAP SERPL CALC-SCNC: 12 MMOL/L (ref 10–20)
APPEARANCE CSF: CLEAR
APPEARANCE CSF: CLEAR
BASOPHILS # BLD MANUAL: 0.07 X10*3/UL (ref 0–0.1)
BASOPHILS NFR BLD MANUAL: 1.7 %
BASOPHILS NFR CSF MANUAL: 0 %
BASOPHILS NFR CSF MANUAL: 0 %
BLASTS CSF MANUAL: 0 % (ref ?–0)
BLASTS CSF MANUAL: 0 % (ref ?–0)
BUN SERPL-MCNC: 10 MG/DL (ref 6–23)
CALCIUM SERPL-MCNC: 8.4 MG/DL (ref 8.6–10.6)
CHLORIDE SERPL-SCNC: 97 MMOL/L (ref 98–107)
CO2 SERPL-SCNC: 31 MMOL/L (ref 21–32)
COLOR CSF: ABNORMAL
COLOR CSF: COLORLESS
COLOR SPUN CSF: ABNORMAL
COLOR SPUN CSF: COLORLESS
CREAT SERPL-MCNC: 0.51 MG/DL (ref 0.5–1.3)
DACRYOCYTES BLD QL SMEAR: ABNORMAL
EGFRCR SERPLBLD CKD-EPI 2021: >90 ML/MIN/1.73M*2
EOSINOPHIL # BLD MANUAL: 0 X10*3/UL (ref 0–0.7)
EOSINOPHIL NFR BLD MANUAL: 0 %
EOSINOPHIL NFR CSF MANUAL: 0 % (ref 0–?)
EOSINOPHIL NFR CSF MANUAL: 0 % (ref 0–?)
ERYTHROCYTE [DISTWIDTH] IN BLOOD BY AUTOMATED COUNT: 16.4 % (ref 11.5–14.5)
GLUCOSE CSF-MCNC: 45 MG/DL (ref 40–70)
GLUCOSE SERPL-MCNC: 92 MG/DL (ref 74–99)
HCT VFR BLD AUTO: 27.6 % (ref 41–52)
HGB BLD-MCNC: 8.5 G/DL (ref 13.5–17.5)
IMM GRANULOCYTES # BLD AUTO: 0.31 X10*3/UL (ref 0–0.7)
IMM GRANULOCYTES NFR BLD AUTO: 7.8 % (ref 0–0.9)
IMM GRANULOCYTES NFR CSF: 0 %
IMM GRANULOCYTES NFR CSF: 0 %
LDH CSF L TO P-CCNC: 35 U/L
LYMPHOCYTES # BLD MANUAL: 0.41 X10*3/UL (ref 1.2–4.8)
LYMPHOCYTES NFR BLD MANUAL: 10.2 %
LYMPHOCYTES NFR CSF MANUAL: 4 % (ref 28–96)
LYMPHOCYTES NFR CSF MANUAL: 43 % (ref 28–96)
MCH RBC QN AUTO: 27.7 PG (ref 26–34)
MCHC RBC AUTO-ENTMCNC: 30.8 G/DL (ref 32–36)
MCV RBC AUTO: 90 FL (ref 80–100)
METAMYELOCYTES # BLD MANUAL: 0.07 X10*3/UL
METAMYELOCYTES NFR BLD MANUAL: 1.7 %
MONOCYTES # BLD MANUAL: 0.27 X10*3/UL (ref 0.1–1)
MONOCYTES NFR BLD MANUAL: 6.8 %
MONOS+MACROS NFR CSF MANUAL: 4 % (ref 16–56)
MONOS+MACROS NFR CSF MANUAL: 50 % (ref 16–56)
NEUTROPHILS # BLD MANUAL: 3.08 X10*3/UL (ref 1.2–7.7)
NEUTS BAND # BLD MANUAL: 0.1 X10*3/UL (ref 0–0.7)
NEUTS BAND NFR BLD MANUAL: 2.6 %
NEUTS SEG # BLD MANUAL: 2.98 X10*3/UL (ref 1.2–7)
NEUTS SEG NFR BLD MANUAL: 74.4 %
NEUTS SEG NFR CSF MANUAL: 7 % (ref 0–5)
NEUTS SEG NFR CSF MANUAL: 91 % (ref 0–5)
NRBC BLD-RTO: 0.8 /100 WBCS (ref 0–0)
OTHER CELLS NFR CSF MANUAL: 0 %
OTHER CELLS NFR CSF MANUAL: 0 %
PHOSPHATE SERPL-MCNC: 4.6 MG/DL (ref 2.5–4.9)
PLASMA CELLS NFR CSF MICRO: 0 % (ref ?–0)
PLASMA CELLS NFR CSF MICRO: 0 % (ref ?–0)
PLATELET # BLD AUTO: 223 X10*3/UL (ref 150–450)
POTASSIUM SERPL-SCNC: 3.4 MMOL/L (ref 3.5–5.3)
PROT CSF-MCNC: 134 MG/DL (ref 15–45)
RBC # BLD AUTO: 3.07 X10*6/UL (ref 4.5–5.9)
RBC # CSF AUTO: 1000 /UL (ref 0–5)
RBC # CSF AUTO: 17 /UL (ref 0–5)
RBC MORPH BLD: ABNORMAL
SODIUM SERPL-SCNC: 137 MMOL/L (ref 136–145)
TOTAL CELLS COUNTED BLD: 117
TOTAL CELLS COUNTED CSF: 42
TOTAL CELLS COUNTED CSF: 47
TUBE # CSF: ABNORMAL
TUBE # CSF: ABNORMAL
VARIANT LYMPHS # BLD MANUAL: 0.1 X10*3/UL (ref 0–0.5)
VARIANT LYMPHS NFR BLD: 2.6 %
WBC # BLD AUTO: 4 X10*3/UL (ref 4.4–11.3)
WBC # CSF AUTO: 2 /UL (ref 1–5)
WBC # CSF AUTO: 4 /UL (ref 1–5)

## 2025-04-18 PROCEDURE — 82945 GLUCOSE OTHER FLUID: CPT | Performed by: PHYSICIAN ASSISTANT

## 2025-04-18 PROCEDURE — 2500000001 HC RX 250 WO HCPCS SELF ADMINISTERED DRUGS (ALT 637 FOR MEDICARE OP)

## 2025-04-18 PROCEDURE — 2500000005 HC RX 250 GENERAL PHARMACY W/O HCPCS: Performed by: PHYSICIAN ASSISTANT

## 2025-04-18 PROCEDURE — 88185 FLOWCYTOMETRY/TC ADD-ON: CPT | Mod: TC | Performed by: PHYSICIAN ASSISTANT

## 2025-04-18 PROCEDURE — 85027 COMPLETE CBC AUTOMATED: CPT

## 2025-04-18 PROCEDURE — 80069 RENAL FUNCTION PANEL: CPT

## 2025-04-18 PROCEDURE — 2500000004 HC RX 250 GENERAL PHARMACY W/ HCPCS (ALT 636 FOR OP/ED): Mod: JZ | Performed by: PHYSICIAN ASSISTANT

## 2025-04-18 PROCEDURE — 89051 BODY FLUID CELL COUNT: CPT | Performed by: PHYSICIAN ASSISTANT

## 2025-04-18 PROCEDURE — 2500000001 HC RX 250 WO HCPCS SELF ADMINISTERED DRUGS (ALT 637 FOR MEDICARE OP): Performed by: PHYSICIAN ASSISTANT

## 2025-04-18 PROCEDURE — 62270 DX LMBR SPI PNXR: CPT | Performed by: PHYSICIAN ASSISTANT

## 2025-04-18 PROCEDURE — 3E0R305 INTRODUCTION OF OTHER ANTINEOPLASTIC INTO SPINAL CANAL, PERCUTANEOUS APPROACH: ICD-10-PCS | Performed by: PHYSICIAN ASSISTANT

## 2025-04-18 PROCEDURE — 84157 ASSAY OF PROTEIN OTHER: CPT | Performed by: PHYSICIAN ASSISTANT

## 2025-04-18 PROCEDURE — 85007 BL SMEAR W/DIFF WBC COUNT: CPT

## 2025-04-18 PROCEDURE — 99232 SBSQ HOSP IP/OBS MODERATE 35: CPT | Performed by: PHYSICIAN ASSISTANT

## 2025-04-18 PROCEDURE — RXMED WILLOW AMBULATORY MEDICATION CHARGE

## 2025-04-18 PROCEDURE — 2500000004 HC RX 250 GENERAL PHARMACY W/ HCPCS (ALT 636 FOR OP/ED): Mod: JW | Performed by: PHYSICIAN ASSISTANT

## 2025-04-18 PROCEDURE — 2500000004 HC RX 250 GENERAL PHARMACY W/ HCPCS (ALT 636 FOR OP/ED): Mod: JZ

## 2025-04-18 PROCEDURE — 83615 LACTATE (LD) (LDH) ENZYME: CPT | Performed by: PHYSICIAN ASSISTANT

## 2025-04-18 RX ORDER — POTASSIUM CHLORIDE 750 MG/1
20 TABLET, FILM COATED, EXTENDED RELEASE ORAL DAILY
Qty: 60 TABLET | Refills: 11 | Status: SHIPPED | OUTPATIENT
Start: 2025-04-18 | End: 2025-04-23 | Stop reason: DRUGHIGH

## 2025-04-18 RX ORDER — ALLOPURINOL 300 MG/1
300 TABLET ORAL DAILY
Status: DISCONTINUED | OUTPATIENT
Start: 2025-04-18 | End: 2025-04-18

## 2025-04-18 RX ORDER — OXYCODONE HYDROCHLORIDE 5 MG/1
5 TABLET ORAL EVERY 6 HOURS PRN
Qty: 28 TABLET | Refills: 0 | Status: SHIPPED | OUTPATIENT
Start: 2025-04-18 | End: 2025-04-25

## 2025-04-18 RX ORDER — POTASSIUM CHLORIDE 29.8 MG/ML
40 INJECTION INTRAVENOUS ONCE
Status: COMPLETED | OUTPATIENT
Start: 2025-04-18 | End: 2025-04-18

## 2025-04-18 RX ORDER — METOPROLOL TARTRATE 75 MG/1
75 TABLET ORAL 2 TIMES DAILY
Qty: 60 TABLET | Refills: 11 | Status: SHIPPED | OUTPATIENT
Start: 2025-04-18 | End: 2026-04-18

## 2025-04-18 RX ORDER — POLYETHYLENE GLYCOL 3350 17 G/17G
17 POWDER, FOR SOLUTION ORAL DAILY PRN
Status: DISCONTINUED | OUTPATIENT
Start: 2025-04-18 | End: 2025-04-18 | Stop reason: HOSPADM

## 2025-04-18 RX ORDER — SODIUM CHLORIDE 9 MG/ML
75 INJECTION, SOLUTION INTRAVENOUS CONTINUOUS
Status: DISCONTINUED | OUTPATIENT
Start: 2025-04-18 | End: 2025-04-18

## 2025-04-18 RX ORDER — FUROSEMIDE 20 MG/1
20 TABLET ORAL DAILY
Qty: 30 TABLET | Refills: 0 | Status: SHIPPED | OUTPATIENT
Start: 2025-04-18 | End: 2025-04-23 | Stop reason: DRUGHIGH

## 2025-04-18 RX ORDER — OXYCODONE HYDROCHLORIDE 5 MG/1
5 TABLET ORAL EVERY 6 HOURS PRN
Refills: 0 | Status: DISCONTINUED | OUTPATIENT
Start: 2025-04-18 | End: 2025-04-18 | Stop reason: HOSPADM

## 2025-04-18 RX ORDER — POLYETHYLENE GLYCOL 3350 17 G/17G
17 POWDER, FOR SOLUTION ORAL DAILY PRN
Qty: 3 PACKET | Refills: 0 | Status: CANCELLED | OUTPATIENT
Start: 2025-04-18 | End: 2025-04-21

## 2025-04-18 RX ORDER — POLYETHYLENE GLYCOL 3350 17 G/17G
17 POWDER, FOR SOLUTION ORAL DAILY PRN
Qty: 10 PACKET | Refills: 0 | Status: CANCELLED | OUTPATIENT
Start: 2025-04-18

## 2025-04-18 RX ADMIN — NYSTATIN 500000 UNITS: 100000 SUSPENSION ORAL at 09:26

## 2025-04-18 RX ADMIN — FUROSEMIDE 40 MG: 10 INJECTION INTRAMUSCULAR; INTRAVENOUS at 09:26

## 2025-04-18 RX ADMIN — OXYCODONE HYDROCHLORIDE 5 MG: 5 SOLUTION ORAL at 00:29

## 2025-04-18 RX ADMIN — OXYCODONE HYDROCHLORIDE 5 MG: 5 SOLUTION ORAL at 09:25

## 2025-04-18 RX ADMIN — POTASSIUM CHLORIDE 40 MEQ: 29.8 INJECTION, SOLUTION INTRAVENOUS at 09:31

## 2025-04-18 RX ADMIN — CYTARABINE 70 MG: 100 INJECTION, SOLUTION INTRATHECAL; INTRAVENOUS; SUBCUTANEOUS at 16:30

## 2025-04-18 RX ADMIN — OXYCODONE HYDROCHLORIDE 10 MG: 5 SOLUTION ORAL at 17:31

## 2025-04-18 RX ADMIN — FLUCONAZOLE 400 MG: 200 TABLET ORAL at 09:27

## 2025-04-18 RX ADMIN — ACYCLOVIR 400 MG: 200 CAPSULE ORAL at 09:26

## 2025-04-18 RX ADMIN — PANTOPRAZOLE SODIUM 40 MG: 40 INJECTION, POWDER, FOR SOLUTION INTRAVENOUS at 09:26

## 2025-04-18 ASSESSMENT — PAIN - FUNCTIONAL ASSESSMENT
PAIN_FUNCTIONAL_ASSESSMENT: 0-10
PAIN_FUNCTIONAL_ASSESSMENT: UNABLE TO SELF-REPORT

## 2025-04-18 ASSESSMENT — COGNITIVE AND FUNCTIONAL STATUS - GENERAL
PERSONAL GROOMING: A LITTLE
DAILY ACTIVITIY SCORE: 19
CLIMB 3 TO 5 STEPS WITH RAILING: A LITTLE
MOBILITY SCORE: 20
HELP NEEDED FOR BATHING: A LITTLE
MOVING TO AND FROM BED TO CHAIR: A LITTLE
TOILETING: A LITTLE
WALKING IN HOSPITAL ROOM: A LITTLE
STANDING UP FROM CHAIR USING ARMS: A LITTLE
EATING MEALS: A LITTLE
DRESSING REGULAR UPPER BODY CLOTHING: A LITTLE

## 2025-04-18 ASSESSMENT — PAIN SCALES - GENERAL
PAINLEVEL_OUTOF10: 7
PAINLEVEL_OUTOF10: 5 - MODERATE PAIN
PAINLEVEL_OUTOF10: 5 - MODERATE PAIN

## 2025-04-18 NOTE — SIGNIFICANT EVENT
04/18/25 1327   Prechemo Checklist   Has the patient been in the hospital, ED, or urgent care since last date of service Yes   Chemo/Immuno Consent Completed and Signed Yes   Protocol/Indications Verified Yes   Confirmed to previous date/time of medication Yes   Compared to previous dose Yes   All medications are dated accurately Yes   Pregnancy Test Negative Not applicable   Parameters Met Yes   Provider Notified Yes   Is Patient Proceeding With Treatment? Yes   BSA/Weight-Height Verified Yes   Dose Calculations Verified (current, total, cumulative) Yes

## 2025-04-18 NOTE — HH CARE COORDINATION
Home Care received a Referral for Physical Therapy and Occupational Therapy. We have processed the referral for a Start of Care on 04/20-04/21.     If you have any questions or concerns, please feel free to contact us at [phone]. Follow the prompts, enter your five digit zip code, and you will be directed to your care team on [facility].

## 2025-04-18 NOTE — DISCHARGE INSTRUCTIONS
Please monitor your fluid status closely by weighing yourself daily. If you develop increasing shortness of breath, cough, worsening leg swelling,  or gain 3-5lbs over 2-3 days without change in diet, call your cardiologist. If you start to feel dry mouth, dehydration, or lost 3-5lbs over 2-3 days without diet changes, call your cardiologist. These are signs your lasix dosage may need to be adjusted.

## 2025-04-18 NOTE — PROGRESS NOTES
"Music Therapy Note    Bijan Ibanez     Therapy Session  Referral Type: New referral this admission  Visit Type: Follow-up visit  Session Start Time: 1137  Session End Time: 1204  Intervention Delivery: In-person  Conflict of Service: None  Number of family members present: 1  Family Present for Session: Spouse/Significant Other  Family Participation: Interactive  Number of staff members present: 0     Pre-assessment  Unable to Assess Reason: Outcomes not assessed  Mood/Affect: Calm, Goal focused, Participative, Appropriate  Verbalized Emotional State: Acceptance, Hopefulness         Treatment/Interventions  Areas of Focus: Normalization, Coping, Locus of control, Self-expression  Music Therapy Interventions: Active music engagement, Empathic listening/validating emotions  Interruption: No  Patient Fell Asleep at End of Session: No    Post-assessment  Unable to Assess Reason: Outcomes not evaluated  Mood/Affect: Calm, Appropriate, Participative, Goal focused  Verbalized Emotional State: Relief, Gratitude, Acceptance, Hopefulness, Enjoyment  Continue Visiting: Yes  Total Session Time (min): 27 minutes    Narrative  Assessment Detail: Upon arrival of MT, pt was seen awake, alert, displaying appropriate affect. Pt expressed that he was having a \"not so bad day\" and was looking forward to having music therapy today. Pt expressed that the past two sessions have made him feel \"better\" about himself and his perspective of his day.  Plan: Music therapist followed up with pt with a focus on providing support to pt and pt's wife using active music making to enhance and support self expression, coping, locus of control, and  Intervention: During intervention, pt participated by actively playing the hand pan drum, spontaniously playing via improvisation as music therapist supported pt by playing the violin. Pt's wife requested \"Amazing [place]\" on the violin, pt engaged in playing the guitar, to which he has not played in a " "while due to not feeling well, engaged in playing a steady rhythm chord structure as music therapist played the tonya on the violin.  Evaluation: Pt responded to session by displaying bright affect via smiling and self reporting that playing music made him feel \"really good.\" Pt expressed that he was feeling nervous about his treatment this afternoon. Music therapist encouraged pt to listen to his favorite music as a coping tool.  Follow-up: Will follow up with pt next week if possible.    Education Documentation  No documentation found.          "

## 2025-04-18 NOTE — PROGRESS NOTES
Subjective Data:  Easily fatigued with activity but is overall feeling better. Unaware of any palpitations    Objective Data:  Last Recorded Vitals:  Vitals:    04/17/25 1536 04/17/25 2049 04/17/25 2300 04/18/25 0300   BP: 127/87 124/78 131/89 121/75   BP Location: Right arm Right arm Right arm Right arm   Patient Position: Sitting Lying Sitting Lying   Pulse: 89 81 79 81   Resp: 18 16 16 16   Temp: 36.6 °C (97.8 °F) 35.9 °C (96.7 °F) 36.2 °C (97.2 °F) 36 °C (96.8 °F)   TempSrc: Temporal Temporal Temporal Temporal   SpO2: 98% 98% 96% 95%   Weight:       Height:           Last Labs:  CBC - 4/18/2025:  5:16 AM  4.0 8.5 223    27.6      CMP - 4/18/2025:  5:16 AM  8.4 4.6 6 --- 1.3   4.6 2.7 12 42      PTT - 4/3/2025:  4:41 AM  1.1   11.6 23     TROPHS   Date/Time Value Ref Range Status   01/28/2025 09:16 AM 5 0 - 53 ng/L Final   01/10/2025 09:06 PM 6 0 - 53 ng/L Final     BNP   Date/Time Value Ref Range Status   01/10/2025 09:06 PM 5 0 - 99 pg/mL Final     HGBA1C   Date/Time Value Ref Range Status   10/16/2024 06:59 AM 5.3 See comment % Final     LDLCALC   Date/Time Value Ref Range Status   01/08/2025 02:54 PM 74 <=99 mg/dL Final     Comment:                                 Near   Borderline      AGE      Desirable  Optimal    High     High     Very High     0-19 Y     0 - 109     ---    110-129   >/= 130     ----    20-24 Y     0 - 119     ---    120-159   >/= 160     ----      >24 Y     0 -  99   100-129  130-159   160-189     >/=190     10/16/2024 06:59 AM 56 <=99 mg/dL Final     Comment:                                 Near   Borderline      AGE      Desirable  Optimal    High     High     Very High     0-19 Y     0 - 109     ---    110-129   >/= 130     ----    20-24 Y     0 - 119     ---    120-159   >/= 160     ----      >24 Y     0 -  99   100-129  130-159   160-189     >/=190       VLDL   Date/Time Value Ref Range Status   01/08/2025 02:54 PM 50 0 - 40 mg/dL Final   10/16/2024 06:59 AM 28 0 - 40 mg/dL Final       Last I/O:  I/O last 3 completed shifts:  In: 200 (2.3 mL/kg) [P.O.:200]  Out: 3075 (35.4 mL/kg) [Urine:3075 (1 mL/kg/hr)]  Weight: 86.8 kg       2/3/25 Onco-Echo Limited (Strain And 3D)   CONCLUSIONS:   1. The left ventricular systolic function is normal, with a visually estimated ejection fraction of 60-65%.   2. Left ventricular diastolic filling is indeterminate due to E/A wave fusion.   3. There is no evidence of left ventricular hypertrophy.   4. There is normal right ventricular global systolic function.   5. The left atrial size is moderately dilated.   6. The pulmonary artery is not well visualized.    Onco-Cardiology Measurements:  Historical Measurements from Previous Study  2D EF (Biplane)                     64%%  3D EF                               51%%  Global Longitudinal Strain (GLS) -19.9%%     Current Measurements  2D EF (Biplane)                     64%%  3D EF                               58%%  Global Longitudinal Strain (GLS) -17.6%%       Inpatient Medications:  Scheduled medications   Medication Dose Route Frequency    acyclovir  400 mg oral BID    [Held by provider] apixaban  5 mg oral BID    [Held by provider] atorvastatin  40 mg oral Daily    caffeine  200 mg oral BID    [Held by provider] enoxaparin  40 mg subcutaneous BID    fluconazole  400 mg oral Daily    furosemide  40 mg intravenous Daily    gabapentin  300 mg oral Nightly    [Held by provider] lisinopril  10 mg oral Daily    [Held by provider] metoprolol succinate XL  100 mg oral Daily    metoprolol tartrate  50 mg oral BID    nystatin  5 mL Swish & Spit TID    pantoprazole  40 mg intravenous Daily    polyethylene glycol  17 g oral Daily    potassium chloride  40 mEq intravenous Once    sucralfate  1 g oral q6h SONAL     PRN medications   Medication    albuterol    alteplase    [Held by provider] furosemide    HYDROmorphone    lidocaine-diphenhydrAMINE-Maalox 1:1:1    moisturizing mouth    naloxone    ondansetron ODT    Or     ondansetron    oxyCODONE    oxyCODONE    prochlorperazine    Or    prochlorperazine     Continuous Medications   Medication Dose Last Rate       Physical Exam:  General: Sitting up in bed, room air, NAD  Skin:  warm and dry  HEENT: atraumatic  Cardiovascular: irreg irreg . Regular S1S2. No murmur rub or gallops  Respiratory: CTA bilaterally with minimally decreased bases  Gastrointestinal: soft, non-distended  Extremities:  2+ pitting edema of lower extremities  Neurological: no gross focal deficits  Psychiatric: appropriate mood and affect      TTE 2/3/24: EF 60-65%, Moderately dilated LA    Assessment/Plan   Mr. Ibanez is a 63 yo gentleman with a PMH of HTN, HLD, CAD, MI (PCI '10, on ASA), atrial fibrillation, RCC (s/p partial R nephrectomy '13), GERD, BPH, arthritis, MRSA bacteremia (s/p 4 weeks IV Vancomycin which completed 3/3/25), G1 CIPN, R subclavian and R axillary DVT r/t PICC (2/3/25; on Eliquis), R rib/flank pain and Burkitt's lymphoma who was admitted to heme/onc service for mucositis, dysphagia, & n/v.  Cardiology consulted for Afib management.     - Overall maintaining sinus rhythm with PACS and controlled baseline rates~ 80-100s but still having occasional paroxysmal RVR to 150 particularly with ambulation (AF/AFL vs atach)  - Increase metoprolol tartrate from 50 mg to 75mg BID (was on 100mg Toprol XL at home prior to admit).  - Remains peripherally edematous but breathing stable on room air. Transition to PO lasix 40mg BID with potassium chloride 20mg BID. Recheck BMP in 1wk post discharge.  - Resume eliquis 5mg BID post procedure.    - Outpatient follow up with primary cardiologist Dr. Ata Conti 5/20 (earliest available) for heart rate and volume management     - Stable for discharge from cardiac standpoint.    Angela Sood PA-C  Cardiology Consults    Please call with any questions  General Cardiology Consult Pager: [phone] (weekday 7AM-6PM and weekend 7AM-2PM) and other: [phone]  EP Consult  Pager: [phone] (weekday 7AM-6PM and weekend 7AM-2PM) and other: [phone]  CICU Fellow Pager: [phone] anytime  EP Device Nurse Pager: [phone] (weekday 7AM-4PM)  Advanced Heart Failure Consult Pager: [phone] anytime        Code Status:  Full Code

## 2025-04-18 NOTE — CARE PLAN
Problem: Pain - Adult  Goal: Verbalizes/displays adequate comfort level or baseline comfort level  Outcome: Progressing     Problem: Safety - Adult  Goal: Free from fall injury  Outcome: Progressing     Problem: Discharge Planning  Goal: Discharge to home or other facility with appropriate resources  Outcome: Progressing     Problem: Chronic Conditions and Co-morbidities  Goal: Patient's chronic conditions and co-morbidity symptoms are monitored and maintained or improved  Outcome: Progressing     Problem: Nutrition  Goal: Nutrient intake appropriate for maintaining nutritional needs  Outcome: Progressing     Problem: Skin  Goal: Participates in plan/prevention/treatment measures  Outcome: Progressing  Flowsheets (Taken 4/18/2025 0551)  Participates in plan/prevention/treatment measures: Discuss with provider PT/OT consult  Goal: Prevent/manage excess moisture  Outcome: Progressing  Flowsheets (Taken 4/18/2025 0551)  Prevent/manage excess moisture: Monitor for/manage infection if present  Goal: Prevent/minimize sheer/friction injuries  Outcome: Progressing  Flowsheets (Taken 4/18/2025 0551)  Prevent/minimize sheer/friction injuries: Complete micro-shifts as needed if patient unable. Adjust patient position to relieve pressure points, not a full turn  Goal: Promote/optimize nutrition  Outcome: Progressing  Flowsheets (Taken 4/18/2025 0551)  Promote/optimize nutrition: Discuss with provider if NPO > 2 days  Goal: Promote skin healing  Outcome: Progressing  Flowsheets (Taken 4/18/2025 0551)  Promote skin healing: Assess skin/pad under line(s)/device(s)   The patient's goals for the shift include      The clinical goals for the shift include pt will remain safe and free from injury throughout shift on 4/17 to 4/18/25 at 0700    Patient remained safe and free from injury throughout shift. Patient given pain meds to help pain and was able to get rest during the night. Plan is to get LP with chemo and discharge.

## 2025-04-18 NOTE — DISCHARGE SUMMARY
Discharge Diagnosis  Dysphagia    Issues Requiring Follow-Up  Diagnostic LP w intrathecal Cytarabine (4/18) prior to discharge - f/up CSF results    Test Results Pending At Discharge  Pending Labs       Order Current Status    CYP3A, CYP3B; [facility]; [id number] - Miscellaneous Test In process    Factor V Leiden In process    MTHFR Analysis by Real-Time PCR In process    Prothrombin Gene Mutation In process            Hospital Course  Bijan Ibanez is a 64 y.o. male with PMHx of HTN, HLD, CAD, MI (PCI '10, on ASA), atrial fibrillation, RCC (s/p partial R nephrectomy '13), GERD, BPH, arthritis, MRSA bacteremia (s/p 4 weeks IV Vancomycin which completed 3/3/25), G1 CIPN,  R subclavian and R axillary DVT r/t PICC (2/3/25; on Eliquis), R rib/flank pain and Burkitt's lyphoma who presents from outpatient clinic for severe mucositis, dysphagia, N/V and HA.    D20 C4 DA-R-EPOCH (started 3/30/25)     ONC:  H/O RCC (s/p partial R nephrectomy '13)  # Burkitt's Lymphoma  :: Advanced stage, renal, liver, spleen, extensive marrow involvement with positive t[8; 14] translocation  :: S/p 3 cycles DA-R-EPOCH (C2 etop reduced by 25% and again in C3 with escalated doxorubicin by 20% in C3)  :: Cycle 4 started 3/30/25   - Dx LP with IT Cytarabine on  4/18, CSF studies - p  - Sent genetic testing for dihydropyrimidine dehydrogenase (DPD), UGT1A1, CYP3A, CYP3B: Pending    Heme:   H/O R subclavian and R axillary DVT r/t PICC (2/3/25; on Eliquis)   - Duplex BUE to r/o DVT (4/10) - Compared with study from 2/3/2025, Acute DVT in left subclavian vein and     left axillary vein.   - Duplex BLE (4/14)- negative  - CTPE  (4/11) - Negative      CV:   A. fib w RVR (4/10) - initiated (4/11) Diltiazem drip per Cardiology with good rate control, then transitioned back to oral regimen (Metoprolol) when able to tolerate oral meds  Fluid overload requiring intensive diuresis     Hospital course notable for:  G3-4 mucositis from chemo requiring PCA,  NPO, & IV  meds  N/V    ACCESS: L DBL SOLO PICC (3/7/25)  PRIMARY ONC: Dr. Mckeon  PPX: acyclovir, fluconazole    FOLLOW UP:   -4/23 [facility]  -4/29 Dr. Mckeon    Pertinent Physical Exam At Time of Discharge  Physical Exam    Home Medications     Medication List      START taking these medications     acyclovir 200 mg capsule; Commonly known as: Zovirax; Take 2 capsules   (400 mg) by mouth 2 times a day for 10 days.; Replaces: acyclovir 400 mg   tablet   metoprolol tartrate 75 mg tablet; Commonly known as: Lopressor; Take 1   tablet (75 mg) by mouth 2 times a day.   polyethylene glycol 17 gram packet; Commonly known as: Glycolax,   Miralax; Take 17 g by mouth 2 times a day for 3 days.   * potassium chloride CR 10 mEq ER tablet; Commonly known as: Klor-Con;   Take 2 tablets (20 mEq) by mouth once daily. Do not crush, chew, or split.  * This list has 1 medication(s) that are the same as other medications   prescribed for you. Read the directions carefully, and ask your doctor or   other care provider to review them with you.     CHANGE how you take these medications     fluconazole 200 mg tablet; Commonly known as: Diflucan; Take 2 tablets   (400 mg) by mouth once daily for 14 days.; What changed: how much to take,   Another medication with the same name was removed. Continue taking this   medication, and follow the directions you see here.   furosemide 20 mg tablet; Commonly known as: Lasix; Take 1 tablet (20 mg)   by mouth once daily.; What changed: when to take this, reasons to take   this     CONTINUE taking these medications     albuterol 90 mcg/actuation inhaler   apixaban 5 mg tablet; Commonly known as: Eliquis; Take 1 tablet (5 mg)   by mouth 2 times a day.   atorvastatin 40 mg tablet; Commonly known as: Lipitor; Take 1 tablet (40   mg) by mouth early in the morning..   * diphenhydramine/Maalox/lidocaine (Magic Mouthwash) - Compounded -   Outpatient; Swish and spit 10 mL by mouth every 6 hours if needed.   *  Lidocaine/Maalox/Diphenhydramine (1:1:1); Swish and swallow 15 mL   every 6 hours if needed for mucositis for up to 7 days.   gabapentin 300 mg capsule; Commonly known as: Neurontin; Take 1 capsule   (300 mg) by mouth once daily at bedtime.   heparin flush 10 unit/mL injection   pantoprazole 40 mg EC tablet; Commonly known as: ProtoNix; Take 1 tablet   (40 mg) by mouth once daily in the morning. Take before meals. Do not   crush, chew, or split.   sodium chloride 0.9% flush  * This list has 2 medication(s) that are the same as other medications   prescribed for you. Read the directions carefully, and ask your doctor or   other care provider to review them with you.     STOP taking these medications     acyclovir 200 mg/5 mL suspension; Commonly known as: Zovirax   acyclovir 400 mg tablet; Commonly known as: Zovirax; Replaced by:   acyclovir 200 mg capsule   lisinopril 10 mg tablet   metoprolol succinate  mg 24 hr tablet; Commonly known as:   Toprol-XL   oxyCODONE 5 mg immediate release tablet; Commonly known as: Roxicodone     ASK your doctor about these medications     * levoFLOXacin 500 mg tablet; Commonly known as: Levaquin; Take 1 tablet   (500 mg) by mouth once daily for 14 days. Take when neutropenic; Ask   about: Should I take this medication?   * levoFLOXacin 250 mg/10 mL solution; Commonly known as: Levaquin; Take   20 mL (500 mg) by mouth once daily for 14 days. To take when having   mucositis and cannot take pills   * potassium chloride CR 20 mEq ER tablet; Commonly known as: Klor-Con   M20; Take 1 tablet (20 mEq) by mouth once daily. Do not crush or chew.  * This list has 3 medication(s) that are the same as other medications   prescribed for you. Read the directions carefully, and ask your doctor or   other care provider to review them with you.     Results from last 7 days   Lab Units 04/18/25  0516 04/17/25  0556 04/16/25  0511 04/14/25  0504 04/13/25  0425   WBC AUTO x10*3/uL 4.0* 4.1* 3.6*   <  > 1.7*   HEMOGLOBIN g/dL 8.5* 8.8* 8.3*   < > 8.2*   HEMATOCRIT % 27.6* 28.0* 25.5*   < > 25.8*   PLATELETS AUTO x10*3/uL 223 181 111*   < > 37*   NEUTROS PCT AUTO %  --   --   --   --  60.3   LYMPHO PCT MAN % 10.2 8.0 3.4   < >  --    LYMPHS PCT AUTO %  --   --   --   --  11.8   MONO PCT MAN % 6.8 9.7 6.9   < >  --    MONOS PCT AUTO %  --   --   --   --  17.2   EOSINO PCT MAN % 0.0 0.0 0.0   < >  --    EOS PCT AUTO %  --   --   --   --  1.2    < > = values in this interval not displayed.     Results from last 7 days   Lab Units 04/18/25  0516 04/17/25 0556 04/16/25  2112   SODIUM mmol/L 137 137 137   POTASSIUM mmol/L 3.4* 3.9 4.1   CHLORIDE mmol/L 97* 98 99   CO2 mmol/L 31 30 30   BUN mg/dL 10 7 8   CREATININE mg/dL 0.51 0.54 0.44*   CALCIUM mg/dL 8.4* 8.3* 8.5*   GLUCOSE mg/dL 92 84 80     Results from last 7 days   Lab Units 04/17/25  0556 04/16/25 2112 04/15/25  1811   MAGNESIUM mg/dL 1.92 1.88 1.80     Outpatient Follow-Up  Future Appointments   Date Time Provider Department Center   [facility] 10[facility]30 [facility]   4/23/2025 11:00 AM [facility]   4/29/2025  4:40 PM Torey Mckeon, DO [id number] [facility]   5/20/2025  1:30 PM Ata Conti DO [id number] [facility]   6/24/2025 10:15 AM Ata Conti DO [id number] [facility]       Antoni Bello PA-C

## 2025-04-18 NOTE — PROCEDURES
Lumbar Puncture    Date/Time: 4/18/2025 3:50 PM    Performed by: Antoni Bello PA-C  Authorized by: Antoni Bello PA-C    Consent:     Consent obtained:  Verbal and written    Consent given by:  Patient    Risks discussed:  Bleeding, infection, pain, headache, nerve damage and repeat procedure  Universal protocol:     Procedure explained and questions answered to patient or proxy's satisfaction: yes      Relevant documents present and verified: yes      Test results available: yes      Imaging studies available: yes      Required blood products, implants, devices, and special equipment available: yes      Immediately prior to procedure a time out was called: yes      Site/side marked: yes      Patient identity confirmed:  Verbally with patient and hospital-assigned identification number  Pre-procedure details:     Procedure purpose:  Therapeutic    Preparation: Patient was prepped and draped in usual sterile fashion    Anesthesia:     Anesthesia method:  Local infiltration    Local anesthetic:  Lidocaine 1% w/o epi  Procedure details:     Lumbar space:  L4-L5 interspace    Patient position:  Sitting    Needle gauge:  20    Needle type:  Spinal needle - Quincke tip    Needle length (in):  3.5    Ultrasound guidance: no      Number of attempts:  2    Fluid appearance:  Blood-tinged then clearing    Tubes of fluid:  4    Total volume (ml):  6  Post-procedure details:     Puncture site:  Adhesive bandage applied    Procedure completion:  Tolerated well, no immediate complications  Comments:      Diagnostic lumbar puncture with intrathecal Cytarabine

## 2025-04-21 LAB
CELL POPULATIONS: NORMAL
DIAGNOSIS: NORMAL
ELECTRONICALLY SIGNED BY: NORMAL
FACTOR II-PROTHROMBIN INTERPRETATION: NORMAL
FACTOR II-PROTHROMBIN RESULT: NORMAL
FACTOR V LEIDEN INTERPRETATION: NORMAL
FACTOR V LEIDEN RESULT: NORMAL
FLOW DIFFERENTIAL: NORMAL
FLOW TEST ORDERED: NORMAL
LAB TEST METHOD: NORMAL
MTHFR C.1298A>C GENO BLD/T: NORMAL
MTHFR C.677C>T GENO BLD/T: NORMAL
MTHFR GENE MUT ANL BLD/T: NORMAL
NUMBER OF CELLS COLLECTED: NORMAL
PATH REPORT.TOTAL CANCER: NORMAL
PATH REVIEW-CELL CT,CSF: NORMAL
PATH REVIEW-CELL CT,CSF: NORMAL
SIGNATURE COMMENT: NORMAL
SPECIMEN VIABILITY: NORMAL

## 2025-04-22 ENCOUNTER — HOME CARE VISIT (OUTPATIENT)
Dept: HOME HEALTH SERVICES | Facility: HOME HEALTH | Age: 65
End: 2025-04-22
Payer: MEDICARE

## 2025-04-22 VITALS
SYSTOLIC BLOOD PRESSURE: 122 MMHG | TEMPERATURE: 98.3 F | RESPIRATION RATE: 18 BRPM | HEART RATE: 83 BPM | DIASTOLIC BLOOD PRESSURE: 74 MMHG | OXYGEN SATURATION: 98 %

## 2025-04-22 PROCEDURE — 169592 NO-PAY CLAIM PROCEDURE

## 2025-04-22 PROCEDURE — G0151 HHCP-SERV OF PT,EA 15 MIN: HCPCS | Mod: HHH

## 2025-04-22 ASSESSMENT — ENCOUNTER SYMPTOMS
DENIES PAIN: 1
OCCASIONAL FEELINGS OF UNSTEADINESS: 1

## 2025-04-22 ASSESSMENT — ACTIVITIES OF DAILY LIVING (ADL)
ENTERING_EXITING_HOME: MODERATE ASSIST
OASIS_M1830: 03

## 2025-04-23 ENCOUNTER — APPOINTMENT (OUTPATIENT)
Dept: CARDIOLOGY | Facility: HOSPITAL | Age: 65
End: 2025-04-23
Payer: MEDICARE

## 2025-04-23 ENCOUNTER — OFFICE VISIT (OUTPATIENT)
Dept: HEMATOLOGY/ONCOLOGY | Facility: HOSPITAL | Age: 65
End: 2025-04-23
Payer: MEDICARE

## 2025-04-23 ENCOUNTER — INFUSION (OUTPATIENT)
Dept: HEMATOLOGY/ONCOLOGY | Facility: HOSPITAL | Age: 65
End: 2025-04-23
Payer: MEDICARE

## 2025-04-23 VITALS
RESPIRATION RATE: 18 BRPM | HEART RATE: 77 BPM | WEIGHT: 184 LBS | TEMPERATURE: 98.6 F | SYSTOLIC BLOOD PRESSURE: 116 MMHG | BODY MASS INDEX: 25.62 KG/M2 | DIASTOLIC BLOOD PRESSURE: 70 MMHG

## 2025-04-23 DIAGNOSIS — C83.74: ICD-10-CM

## 2025-04-23 DIAGNOSIS — C83.30 DIFFUSE LARGE B-CELL LYMPHOMA, UNSPECIFIED BODY REGION (MULTI): ICD-10-CM

## 2025-04-23 DIAGNOSIS — C83.78 BURKITT'S LYMPHOMA OF LYMPH NODES OF MULTIPLE REGIONS (MULTI): ICD-10-CM

## 2025-04-23 DIAGNOSIS — G89.29 CHRONIC MIDLINE LOW BACK PAIN WITHOUT SCIATICA: Chronic | ICD-10-CM

## 2025-04-23 DIAGNOSIS — M54.50 CHRONIC MIDLINE LOW BACK PAIN WITHOUT SCIATICA: Chronic | ICD-10-CM

## 2025-04-23 LAB
ALBUMIN SERPL BCP-MCNC: 3.5 G/DL (ref 3.4–5)
ALP SERPL-CCNC: 49 U/L (ref 33–136)
ALT SERPL W P-5'-P-CCNC: 17 U/L (ref 10–52)
ANION GAP SERPL CALC-SCNC: 12 MMOL/L (ref 10–20)
AST SERPL W P-5'-P-CCNC: 13 U/L (ref 9–39)
BASOPHILS # BLD AUTO: 0.03 X10*3/UL (ref 0–0.1)
BASOPHILS NFR BLD AUTO: 0.5 %
BILIRUB SERPL-MCNC: 0.7 MG/DL (ref 0–1.2)
BUN SERPL-MCNC: 23 MG/DL (ref 6–23)
CALCIUM SERPL-MCNC: 9.2 MG/DL (ref 8.6–10.3)
CHLORIDE SERPL-SCNC: 102 MMOL/L (ref 98–107)
CO2 SERPL-SCNC: 28 MMOL/L (ref 21–32)
CREAT SERPL-MCNC: 0.62 MG/DL (ref 0.5–1.3)
EGFRCR SERPLBLD CKD-EPI 2021: >90 ML/MIN/1.73M*2
EOSINOPHIL # BLD AUTO: 0 X10*3/UL (ref 0–0.7)
EOSINOPHIL NFR BLD AUTO: 0 %
ERYTHROCYTE [DISTWIDTH] IN BLOOD BY AUTOMATED COUNT: 16.3 % (ref 11.5–14.5)
GLUCOSE SERPL-MCNC: 106 MG/DL (ref 74–99)
HCT VFR BLD AUTO: 28.7 % (ref 41–52)
HGB BLD-MCNC: 9 G/DL (ref 13.5–17.5)
IMM GRANULOCYTES # BLD AUTO: 0.08 X10*3/UL (ref 0–0.7)
IMM GRANULOCYTES NFR BLD AUTO: 1.5 % (ref 0–0.9)
LDH SERPL L TO P-CCNC: 151 U/L (ref 84–246)
LYMPHOCYTES # BLD AUTO: 0.73 X10*3/UL (ref 1.2–4.8)
LYMPHOCYTES NFR BLD AUTO: 13.3 %
MAGNESIUM SERPL-MCNC: 1.74 MG/DL (ref 1.6–2.4)
MCH RBC QN AUTO: 28.1 PG (ref 26–34)
MCHC RBC AUTO-ENTMCNC: 31.4 G/DL (ref 32–36)
MCV RBC AUTO: 90 FL (ref 80–100)
MONOCYTES # BLD AUTO: 0.69 X10*3/UL (ref 0.1–1)
MONOCYTES NFR BLD AUTO: 12.5 %
NEUTROPHILS # BLD AUTO: 3.97 X10*3/UL (ref 1.2–7.7)
NEUTROPHILS NFR BLD AUTO: 72.2 %
NRBC BLD-RTO: 0.5 /100 WBCS (ref 0–0)
PLATELET # BLD AUTO: 232 X10*3/UL (ref 150–450)
POTASSIUM SERPL-SCNC: 4.5 MMOL/L (ref 3.5–5.3)
PROT SERPL-MCNC: 5.3 G/DL (ref 6.4–8.2)
RBC # BLD AUTO: 3.2 X10*6/UL (ref 4.5–5.9)
SODIUM SERPL-SCNC: 137 MMOL/L (ref 136–145)
WBC # BLD AUTO: 5.5 X10*3/UL (ref 4.4–11.3)

## 2025-04-23 PROCEDURE — 1123F ACP DISCUSS/DSCN MKR DOCD: CPT | Performed by: STUDENT IN AN ORGANIZED HEALTH CARE EDUCATION/TRAINING PROGRAM

## 2025-04-23 PROCEDURE — 80053 COMPREHEN METABOLIC PANEL: CPT

## 2025-04-23 PROCEDURE — 85025 COMPLETE CBC W/AUTO DIFF WBC: CPT

## 2025-04-23 PROCEDURE — 83615 LACTATE (LD) (LDH) ENZYME: CPT

## 2025-04-23 PROCEDURE — 1157F ADVNC CARE PLAN IN RCRD: CPT | Performed by: STUDENT IN AN ORGANIZED HEALTH CARE EDUCATION/TRAINING PROGRAM

## 2025-04-23 PROCEDURE — 96523 IRRIG DRUG DELIVERY DEVICE: CPT

## 2025-04-23 PROCEDURE — 99215 OFFICE O/P EST HI 40 MIN: CPT | Performed by: STUDENT IN AN ORGANIZED HEALTH CARE EDUCATION/TRAINING PROGRAM

## 2025-04-23 PROCEDURE — 83735 ASSAY OF MAGNESIUM: CPT

## 2025-04-23 PROCEDURE — 1111F DSCHRG MED/CURRENT MED MERGE: CPT | Performed by: STUDENT IN AN ORGANIZED HEALTH CARE EDUCATION/TRAINING PROGRAM

## 2025-04-23 RX ORDER — PANTOPRAZOLE SODIUM 40 MG/1
40 TABLET, DELAYED RELEASE ORAL
Qty: 30 TABLET | Refills: 11 | Status: SHIPPED | OUTPATIENT
Start: 2025-04-23 | End: 2026-04-23

## 2025-04-23 RX ORDER — POTASSIUM CHLORIDE 750 MG/1
10 TABLET, FILM COATED, EXTENDED RELEASE ORAL AS NEEDED
Qty: 30 TABLET | Refills: 11 | Status: SHIPPED | OUTPATIENT
Start: 2025-04-23 | End: 2026-04-23

## 2025-04-23 RX ORDER — FUROSEMIDE 20 MG/1
20 TABLET ORAL DAILY PRN
Qty: 30 TABLET | Refills: 0 | Status: SHIPPED | OUTPATIENT
Start: 2025-04-23

## 2025-04-23 RX ORDER — GABAPENTIN 300 MG/1
300 CAPSULE ORAL NIGHTLY
Qty: 30 CAPSULE | Refills: 1 | Status: SHIPPED | OUTPATIENT
Start: 2025-04-23 | End: 2025-06-22

## 2025-04-23 SDOH — HEALTH STABILITY: PHYSICAL HEALTH
EXERCISE COMMENTS: INSTRUCTED PATIENT IN HEP X 10 REPS EACH:  - ANKLE PUMPS  - QUAD SETS  - GLUTE SETS  - LAQ'S  - SEATED MARCHES

## 2025-04-23 ASSESSMENT — ACTIVITIES OF DAILY LIVING (ADL): AMBULATION_DISTANCE/DURATION_TOLERATED: 50

## 2025-04-23 NOTE — PROGRESS NOTES
Patient ID: Bijan Ibanez is a 64 y.o. male.  Referring Physician: Terrence Veras, APRN-CNP  [address]  [place] [phone]  Primary Care Provider:      Diagnosis: Burkitt's Lymphoma  Date of Diagnosis: 1/16/25  Stage at Diagnosis: IV  Risk category: high risk (marrow involvement)    Prior Treatments:    Current Treaments:  DA-R-EPOCH C1D1 1/21/25    Assessment/Plan    Bijan Ibanez is a 64 y.o. male with PMH of HTN, HLD, CAD, MI (s/p stent 2010, on ASA), hx renal cell carcinoma (s/p R partial nephrectomy in 2013 @ [facility]), GERD, BPH, arthritis who is here for management of Burkitt's lymphoma, currently undergoing chemotherapy with DA-R-EPOCH.     # Burkitt's Lymphoma - Originally signed out as high grade lymphoma but after burkitt's rearrangement (t8;14) came back positive. High risk based on marrow involvement, though no CNS involvement  - previously reviewed diagnosis and prognosis with patient  - initially give DA-R-EPOCH when was signed out as high grade lymphoma, given the significant toxicity he had with DA R EPOCH, will not escalate to CODOX-IVAC or hyper-CVAD given no CNS involvement  - PET post 2C Chemo shows deauville 2 - c/w CR  - cont w/ EPOCH, increased to dose level 2 but unfortunately had G3 mucositis again, so reduced etoposide back to prior dose.   - C4 complicated by G3-4 mucositis requiring lengthy supportive care.   - Counts are recovering, due for C5 next week. Seeing Dr. Mckeon 4/30 for evaluation and next steps.     # CNS prophylaxis  - given dose of IT methotrexate in hospital, will plan to give additional doses starting with C4.   - With C4: Received IT methotrexate 4/4 and IT cytarabine 4/18. CSF negative for disease.     # G3/4 Mucositis  - Requiring inpatient management with PCA, IV fluids/NPO status, and IV medications.   - Now improved, able to tolerate PO without discomfort. Appetite and eating has gradually improved.     # G1 CIPN  - monitor    # MRSA bacteremia  - s/p 4 weeks  of IV Vanco, EOT 3/3/25       No problem-specific Assessment & Plan notes found for this encounter.    ____________________________________________________________________________________________________________________    HISTORY  Per HPI from inpatient:  Bijan Ibanez is a 64 y.o. male with PMH of HTN, HLD, CAD, MI (s/p stent 2010, on ASA), hx renal cell carcinoma (s/p R partial nephrectomy in 2013 @ [facility]), GERD, BPH, arthritis who is admitted for further evaluation and management of newly diagnosed high grade B-cell lymphoma.      Patient recently admitted 1/11-1/17 after concerning imaging outpatient leading to c/f relapsed RCC vs new other malignancy.      Patient endorses recent history of constitutional symptoms, including night sweats, weight loss, decreased appetite, fatigue, OSORIO. Patient and wife both developed URI symptoms after [place], however, the wife's symptoms resolved while the patient's did not. On admit, continues to have low back pain, that improves with oxycodone, and is worse with movement. Reports an episode of right flank pain yesterday that was very painful but resolved after pain meds and has not yet recurred. Reporting chin and lip numbness that has been present since before [place]. Also reports right parotid swelling, that has sometimes appeared to have improved but will then return to same size, but has not continued to enlarge; sometimes tender to touch, sometimes not. Had a left toothache at some point that resolved and has not recurred. Patient denies saddle anesthesia, loss of lower extremity function or loss of bladder/bowel function. Denies CP, SOB, abd pain, N/V/D/C.     Interval Hx  Admitted following C4 DA-R-EPOCH with severe mucositis, dysphagia, nausea, vomiting, and headache. Diagnostic LP with IT cytarabine done 4/18 (negative flow cytometry). Required PCA, NPO status, and IV medications for his G3-4 mucositis. Was also found to have an acute DVT in L subclavian and  L axillary. Went into A fib with RVR on 4/10, requiring diltiazem drip and was transitioned to oral metoprolol when ready.     Today (04/23/25) presents with his wife for post discharge visit and count check. Feeling much better! Wife notes that he has been gradually getting his appetite back and is eating more consistently. No discomfort with swallowing though his potassium tablets still bother him sometimes. Swelling in his legs is better, now wife is giving him lasix only as needed (every other to every 3rd day). No nausea, vomiting, or diarrhea.     Bijan Ibanez  reports that he has quit smoking. His smoking use included cigarettes and pipe. He has never used smokeless tobacco.  He  reports no history of alcohol use.  He  reports no history of drug use.    Family History   Problem Relation Name Age of Onset    Coronary artery disease Mother      Alzheimer's disease Mother      Coronary artery disease Father      Skin cancer Father      Alzheimer's disease Father      Alzheimer's disease Mother's Brother      Alzheimer's disease Father's Brother      Stomach cancer Maternal Grandfather      Other cancer Paternal Grandfather          prostate or colon cancer    Heart disease Other       Oncology History   Burkitt's lymphoma of lymph nodes of multiple regions (Multi)   1/13/2025 Initial Diagnosis    Diagnosis: Burkitt Lymphoma, [place] Stage IV, BL-IPI High-Risk (score 4/5).    BL-IPI Calculation:  Age >60 years: Yes (64 years old).  Performance status (ECOG >=2): Presumed based on clinical presentation requiring hospitalization.  Serum LDH >ULN: 4102 U/L (1/11/25)  [place] Stage III/IV: Yes (stage IV with extranodal involvement including kidneys, liver, spleen, and musculature).  CNS involvement: No (MRI and LP negative).  Total Score: 4/5 (High-Risk).    Presenting Symptoms: Asymmetric swelling of the right-sided muscles of mastication; imaging revealed incidental findings of perihilar mass and renal  lesions.    Labs at Diagnosis: WBC 4.0, platelets 72; LDH 4102 U/L (1/11/25)    Pathology:  EBUS with lymph node biopsy (1/13/25): Predominantly CD20-positive small lymphoid cells, raising suspicion for a B-cell lymphoproliferative process.  Bone marrow biopsy (1/16/25): 70-80% involvement by high-grade B-cell lymphoma in a hypercellular marrow (90% cellular) with maturing trilineage hematopoiesis. FISH confirmed t(8;14)/IGH::MYC rearrangement, diagnostic of Burkitt lymphoma.     Imaging:  CT Neck (1/9/25): Fusiform thickening of right masticatory muscles, likely benign hypertrophy.  CT C/A/P (1/11/25): Left perihilar mass, pulmonary nodules, right renal lesions, and right adrenal gland thickening concerning for metastatic disease; T12-L1 soft tissue mass.  PET-CT (1/20): Widespread hermann and extranodal hypermetabolic involvement, including kidneys, liver, spleen, abdominal wall, and musculature, suggestive of extensive lymphomatous disease.  MRI Brain (1/21): No intracranial lymphoma; suspected osseous involvement of calvarium.    Treatment:  Dexamethasone 40 mg daily (1/20-1/21).  Prophylactic IT methotrexate via LP (1/22), flow negative for lymphoma.     1/21/2025 -  Chemotherapy    - C1 (1/21/25)  - C2 (2/14/25) -- etoposide dose-reduced by 25%  - C3 (3/7/25) -- etoposide dose-reduced by 10%, doxorubicin increased by 20%  - C4 (3/28/25) -- same as C3  (INPT) Dose-Adjusted R-EPOCH (Etoposide / DOXOrubicin / VinCRIStine / Cyclophosphamide / PredniSONE) + RiTUXimab, 21 Day Cycles      2/24/2025 Imaging    PET/CT (2/24/25, after C2): near-complete resolution of previously active disease in lymph nodes and various organs, Deauville score of 2         Performance Status:  ECOG Score: 2- Ambulatory and  capable of all selfcare; unable to carry out work activities.  Up and about > 50% of waking hrs.   Karnofsky Score: 70 - Cares for self; unable to carry on normal activity or do normal work     Objective   BSA: There is  no height or weight on file to calculate BSA.  There were no vitals taken for this visit.    Physical Exam    GEN: Aox3, NAD  HEENT EOMI PERRLA sclera anicteric  CV RRR w/o m/r/g, R sided rib pain around 10-11th rib, TTP but no CVA tenderness.   Resp CTAB w/o w/r/r  GI Soft NTND+BS  Ext no LE edema  LN: no adenopathy palpated       Path:      Component    FINAL DIAGNOSIS   A, B & C. BONE MARROW CLOT WITH ASPIRATE AND CORE WITH TOUCH PREP, LEFT ILIAC CREST:       -- WITH INVOLVEMENT BY HIGH GRADE B-CELL LYMPHOMA, 70-80% OF MARROW CELLULAR ELEMENTS  -- HYPERCELLULAR BONE MARROW (90% CELLULAR) WITH MATURING TRILINEAGE HEMATOPOIESIS   -- SEE NOTE     NOTE: The differential diagnosis includes Burkitt lymphoma and high grade B-cell lymphoma with MYC and Bcl2 and/or Bcl-6 rearrangements (double / triple hit lymphoma). FISH studies and lymphoid NGS panel are pending for further characterization. Clinical and molecular results correlation is suggested         Component    ISCN    Results:    FISH POSITIVE for t(8;14)/ IGH::MYC rearrangement.  nuc  kieran(D8Z2x2,MYCx3,IGHx3)(MYC con IGHx2)[31/250]/(D8Z2x2,MYCx2,IGHx2)(MYC con IGHx1)[8/250]       Imaging:  PET 2/24/25  IMPRESSION:  1. There is near complete interval resolution of previously seen  hypermetabolic hermann and extranodal disease involving bilateral  submandibular, parotid gland, hepatic lesions, spleen, mesenteric  deposits, bilateral kidney stomach bilateral, bilateral arms,  anterior chest wall, and gluteal muscle supra and infra diaphragmatic  lymphadenopathy.  2. Diffuse metabolic activity within the bone marrow, limiting the  evaluation a focal metabolic osseous metastatic disease. Likely post  treatment changes. Lymphomatous involvement can not be completely  excluded. Deauville score 2.  3. Splenomegaly with metabolic activity less than liver.  4. Mild decrease in size and metabolic activity within left lower  lobe opacity, likely inflammatory.  5. Subcentimeter  nodular opacities within right lung base, likely  inflammatory/infective.        PET 1/20/25  IMPRESSION:  Widespread hermann as well as extranodal lymphomatous involvement.  1. Multiple hypermetabolic supra and infra diaphragmatic  lymphadenopathy as described, consistent with lymphomatous  involvement.  2. Intensely hypermetabolic activity within bilateral enlarged  submandibular and parotid glands as described, concerning for  lymphomatous involvement.  3. Hypermetabolic mass like infiltration within bilateral  kidneys(right> left), corresponding to lesion seen on CT dated  01/01/2025, compatible with lymphomatous involvement.  4. Hypermetabolic hepatic lesions without any underlying CT  correlate, consistent with lymphomatous involvement.  5. Splenomegaly with nonspecific hypermetabolic activity suggestive  of extranodal involvement.  6. Multiple hypermetabolic soft tissue mesenteric deposits as well as  anterior abdominal wall nodular deposits, with few of the lesions  without any underlying CT correlate, concerning for additional  lymphomatous involvement.  7. Metabolic activity within the stomach, bilateral arms, anterior  chest wall and gluteal muscles without underlying CT correlate,  concerning for lymphomatous involvement.      [facility]

## 2025-04-23 NOTE — PROGRESS NOTES
Pt here for count check. No replacements needed. EMERALD ALFORD saw pt. PICC dressing was changed per protocol. Pt tolerated well. His AVS was printed and handed to him. He left in wheelchair with his wife in NAD.

## 2025-04-24 LAB — SCAN RESULT: ABNORMAL

## 2025-04-29 ENCOUNTER — LAB (OUTPATIENT)
Dept: HEMATOLOGY/ONCOLOGY | Facility: HOSPITAL | Age: 65
End: 2025-04-29
Payer: MEDICARE

## 2025-04-29 ENCOUNTER — TELEPHONE (OUTPATIENT)
Dept: HEMATOLOGY/ONCOLOGY | Facility: HOSPITAL | Age: 65
End: 2025-04-29
Payer: MEDICARE

## 2025-04-29 ENCOUNTER — OFFICE VISIT (OUTPATIENT)
Dept: HEMATOLOGY/ONCOLOGY | Facility: HOSPITAL | Age: 65
End: 2025-04-29
Payer: MEDICARE

## 2025-04-29 VITALS
OXYGEN SATURATION: 100 % | BODY MASS INDEX: 25.41 KG/M2 | RESPIRATION RATE: 18 BRPM | WEIGHT: 182.54 LBS | HEART RATE: 83 BPM | TEMPERATURE: 97.3 F

## 2025-04-29 DIAGNOSIS — Z51.11 ENCOUNTER FOR ANTINEOPLASTIC CHEMOTHERAPY: ICD-10-CM

## 2025-04-29 DIAGNOSIS — C83.70 BURKITT LYMPHOMA, UNSPECIFIED BODY REGION (MULTI): ICD-10-CM

## 2025-04-29 DIAGNOSIS — C83.78 BURKITT'S LYMPHOMA OF LYMPH NODES OF MULTIPLE REGIONS (MULTI): Primary | ICD-10-CM

## 2025-04-29 DIAGNOSIS — C83.78 BURKITT'S LYMPHOMA OF LYMPH NODES OF MULTIPLE REGIONS (MULTI): ICD-10-CM

## 2025-04-29 LAB
ALBUMIN SERPL BCP-MCNC: 3.6 G/DL (ref 3.4–5)
ALP SERPL-CCNC: 56 U/L (ref 33–136)
ALT SERPL W P-5'-P-CCNC: 23 U/L (ref 10–52)
ANION GAP SERPL CALC-SCNC: 13 MMOL/L (ref 10–20)
AST SERPL W P-5'-P-CCNC: 11 U/L (ref 9–39)
BASOPHILS # BLD AUTO: 0.02 X10*3/UL (ref 0–0.1)
BASOPHILS NFR BLD AUTO: 0.4 %
BILIRUB SERPL-MCNC: 0.9 MG/DL (ref 0–1.2)
BUN SERPL-MCNC: 20 MG/DL (ref 6–23)
CALCIUM SERPL-MCNC: 9.4 MG/DL (ref 8.6–10.6)
CHLORIDE SERPL-SCNC: 102 MMOL/L (ref 98–107)
CO2 SERPL-SCNC: 26 MMOL/L (ref 21–32)
CREAT SERPL-MCNC: 0.61 MG/DL (ref 0.5–1.3)
DACRYOCYTES BLD QL SMEAR: NORMAL
EGFRCR SERPLBLD CKD-EPI 2021: >90 ML/MIN/1.73M*2
EOSINOPHIL # BLD AUTO: 0.01 X10*3/UL (ref 0–0.7)
EOSINOPHIL NFR BLD AUTO: 0.2 %
ERYTHROCYTE [DISTWIDTH] IN BLOOD BY AUTOMATED COUNT: 20.8 % (ref 11.5–14.5)
GLUCOSE SERPL-MCNC: 96 MG/DL (ref 74–99)
HCT VFR BLD AUTO: 27.2 % (ref 41–52)
HGB BLD-MCNC: 8.7 G/DL (ref 13.5–17.5)
IMM GRANULOCYTES # BLD AUTO: 0.04 X10*3/UL (ref 0–0.7)
IMM GRANULOCYTES NFR BLD AUTO: 0.8 % (ref 0–0.9)
LDH SERPL L TO P-CCNC: 147 U/L (ref 84–246)
LYMPHOCYTES # BLD AUTO: 1.19 X10*3/UL (ref 1.2–4.8)
LYMPHOCYTES NFR BLD AUTO: 24.9 %
MAGNESIUM SERPL-MCNC: 1.48 MG/DL (ref 1.6–2.4)
MCH RBC QN AUTO: 29.3 PG (ref 26–34)
MCHC RBC AUTO-ENTMCNC: 32 G/DL (ref 32–36)
MCV RBC AUTO: 92 FL (ref 80–100)
MONOCYTES # BLD AUTO: 0.53 X10*3/UL (ref 0.1–1)
MONOCYTES NFR BLD AUTO: 11.1 %
NEUTROPHILS # BLD AUTO: 2.99 X10*3/UL (ref 1.2–7.7)
NEUTROPHILS NFR BLD AUTO: 62.6 %
NRBC BLD-RTO: 0.4 /100 WBCS (ref 0–0)
OVALOCYTES BLD QL SMEAR: NORMAL
PLATELET # BLD AUTO: 149 X10*3/UL (ref 150–450)
POLYCHROMASIA BLD QL SMEAR: NORMAL
POTASSIUM SERPL-SCNC: 4 MMOL/L (ref 3.5–5.3)
PROT SERPL-MCNC: 5.3 G/DL (ref 6.4–8.2)
RBC # BLD AUTO: 2.97 X10*6/UL (ref 4.5–5.9)
RBC MORPH BLD: NORMAL
SODIUM SERPL-SCNC: 137 MMOL/L (ref 136–145)
WBC # BLD AUTO: 4.8 X10*3/UL (ref 4.4–11.3)

## 2025-04-29 PROCEDURE — 1126F AMNT PAIN NOTED NONE PRSNT: CPT | Performed by: STUDENT IN AN ORGANIZED HEALTH CARE EDUCATION/TRAINING PROGRAM

## 2025-04-29 PROCEDURE — 85025 COMPLETE CBC W/AUTO DIFF WBC: CPT

## 2025-04-29 PROCEDURE — 1157F ADVNC CARE PLAN IN RCRD: CPT | Performed by: STUDENT IN AN ORGANIZED HEALTH CARE EDUCATION/TRAINING PROGRAM

## 2025-04-29 PROCEDURE — G2211 COMPLEX E/M VISIT ADD ON: HCPCS | Performed by: STUDENT IN AN ORGANIZED HEALTH CARE EDUCATION/TRAINING PROGRAM

## 2025-04-29 PROCEDURE — 83735 ASSAY OF MAGNESIUM: CPT

## 2025-04-29 PROCEDURE — 1111F DSCHRG MED/CURRENT MED MERGE: CPT | Performed by: STUDENT IN AN ORGANIZED HEALTH CARE EDUCATION/TRAINING PROGRAM

## 2025-04-29 PROCEDURE — 1159F MED LIST DOCD IN RCRD: CPT | Performed by: STUDENT IN AN ORGANIZED HEALTH CARE EDUCATION/TRAINING PROGRAM

## 2025-04-29 PROCEDURE — 1123F ACP DISCUSS/DSCN MKR DOCD: CPT | Performed by: STUDENT IN AN ORGANIZED HEALTH CARE EDUCATION/TRAINING PROGRAM

## 2025-04-29 PROCEDURE — 99215 OFFICE O/P EST HI 40 MIN: CPT | Performed by: STUDENT IN AN ORGANIZED HEALTH CARE EDUCATION/TRAINING PROGRAM

## 2025-04-29 PROCEDURE — 36591 DRAW BLOOD OFF VENOUS DEVICE: CPT

## 2025-04-29 PROCEDURE — 80053 COMPREHEN METABOLIC PANEL: CPT

## 2025-04-29 PROCEDURE — 83615 LACTATE (LD) (LDH) ENZYME: CPT

## 2025-04-29 RX ORDER — DIPHENHYDRAMINE HYDROCHLORIDE 50 MG/ML
50 INJECTION, SOLUTION INTRAMUSCULAR; INTRAVENOUS AS NEEDED
Status: CANCELLED | OUTPATIENT
Start: 2025-05-02

## 2025-04-29 RX ORDER — FAMOTIDINE 10 MG/ML
20 INJECTION, SOLUTION INTRAVENOUS AS NEEDED
Status: CANCELLED | OUTPATIENT
Start: 2025-05-02

## 2025-04-29 RX ORDER — DIPHENHYDRAMINE HCL 50 MG
50 CAPSULE ORAL ONCE
OUTPATIENT
Start: 2025-05-08

## 2025-04-29 RX ORDER — PROCHLORPERAZINE MALEATE 10 MG
10 TABLET ORAL EVERY 6 HOURS PRN
Status: CANCELLED | OUTPATIENT
Start: 2025-05-02

## 2025-04-29 RX ORDER — PROCHLORPERAZINE MALEATE 10 MG
10 TABLET ORAL EVERY 6 HOURS PRN
OUTPATIENT
Start: 2025-05-08

## 2025-04-29 RX ORDER — DIPHENHYDRAMINE HYDROCHLORIDE 50 MG/ML
50 INJECTION, SOLUTION INTRAMUSCULAR; INTRAVENOUS AS NEEDED
OUTPATIENT
Start: 2025-05-08

## 2025-04-29 RX ORDER — FAMOTIDINE 10 MG/ML
20 INJECTION, SOLUTION INTRAVENOUS ONCE AS NEEDED
OUTPATIENT
Start: 2025-05-08

## 2025-04-29 RX ORDER — ALBUTEROL SULFATE 0.83 MG/ML
3 SOLUTION RESPIRATORY (INHALATION) AS NEEDED
OUTPATIENT
Start: 2025-05-08

## 2025-04-29 RX ORDER — EPINEPHRINE 0.3 MG/.3ML
0.3 INJECTION SUBCUTANEOUS EVERY 5 MIN PRN
OUTPATIENT
Start: 2025-05-08

## 2025-04-29 RX ORDER — OLANZAPINE 5 MG/1
5 TABLET, FILM COATED ORAL NIGHTLY
Status: CANCELLED | OUTPATIENT
Start: 2025-05-02

## 2025-04-29 RX ORDER — PROCHLORPERAZINE EDISYLATE 5 MG/ML
10 INJECTION INTRAMUSCULAR; INTRAVENOUS EVERY 6 HOURS PRN
OUTPATIENT
Start: 2025-05-08

## 2025-04-29 RX ORDER — PREDNISONE 20 MG/1
140 TABLET ORAL 2 TIMES DAILY
Status: CANCELLED | OUTPATIENT
Start: 2025-05-02

## 2025-04-29 RX ORDER — ALBUTEROL SULFATE 0.83 MG/ML
3 SOLUTION RESPIRATORY (INHALATION) AS NEEDED
Status: CANCELLED | OUTPATIENT
Start: 2025-05-02

## 2025-04-29 RX ORDER — ACETAMINOPHEN 325 MG/1
650 TABLET ORAL ONCE
OUTPATIENT
Start: 2025-05-08

## 2025-04-29 RX ORDER — EPINEPHRINE 1 MG/ML
0.3 INJECTION INTRAMUSCULAR; INTRAVENOUS; SUBCUTANEOUS EVERY 5 MIN PRN
Status: CANCELLED | OUTPATIENT
Start: 2025-05-02

## 2025-04-29 RX ORDER — PROCHLORPERAZINE EDISYLATE 5 MG/ML
10 INJECTION INTRAMUSCULAR; INTRAVENOUS EVERY 6 HOURS PRN
Status: CANCELLED | OUTPATIENT
Start: 2025-05-02

## 2025-04-29 ASSESSMENT — PAIN SCALES - GENERAL: PAINLEVEL_OUTOF10: 0-NO PAIN

## 2025-04-29 NOTE — TELEPHONE ENCOUNTER
Patient called in to verify if he should get labs prior to [address] apt and to notify that his PICC dressing needs changed. Discussed with RN partner. Patient added on for central line apt at 4pm. Called patient to notify, no answer, left voicemail with callback number.

## 2025-05-01 NOTE — PROGRESS NOTES
Patient ID: Bijan Ibanez is a 64 y.o. male.  Referring Physician: Terrence Veras, APRN-CNP  [id number] [place] [address]  Primary Care Provider: Nehal Murphy MD     Diagnosis: Burkitt's Lymphoma  Date of Diagnosis: 1/16/25  Stage at Diagnosis: IV  Risk category: high risk (marrow involvement)    Prior Treatments:    Current Treaments:  DA-R-EPOCH C1D1 1/21/25    Assessment/Plan    Bijan Ibanez is a 64 y.o. male with PMH of HTN, HLD, CAD, MI (s/p stent 2010, on ASA), hx renal cell carcinoma (s/p R partial nephrectomy in 2013 @ [facility]), GERD, BPH, arthritis who is admitted for further evaluation and management of newly diagnosed Burkitt's lymphoma    # Burkitt's Lymphoma - Originally signed out as high grade lymphoma but after burkitt's rearrangement (t8;14) came back positive. High risk based on marrow involvement, though no CNS involvement  - previously reviewed diagnosis and prognosis with patient  - initially give DA-R-EPOCH when was signed out as high grade lymphoma, given the significant toxicity he had with DA R EPOCH, will not escalate to CODOX-IVAC or hyper-CVAD given no CNS involvement  - PET post 2C Chemo shows deauville 2 - c/w CR  - cont w/ EPOCH, trialed dose level 2 but was intolerant with G3 mucositis, again with mucositis with dose level 1, will go down to dose level -1    # CNS prophylaxis  - given dose of IT methotrexate in hospital, continue 2x LP w/ IT until EOT, hold eliquis today in anticipation of LP    # G1 CIPN  - monitor    # MRSA bacteremia  - s/p 4 weeks of IV Vanco, EOT 3/3/25    # A fib = on metoprolol       No problem-specific Assessment & Plan notes found for this encounter.    ____________________________________________________________________________________________________________________    HISTORY  Per HPI from inpatient:  Bijan Ibanez is a 64 y.o. male with PMH of HTN, HLD, CAD, MI (s/p stent 2010, on ASA), hx renal cell carcinoma (s/p R partial nephrectomy in  2013 @ [facility]), GERD, BPH, arthritis who is admitted for further evaluation and management of newly diagnosed high grade B-cell lymphoma.      Patient recently admitted 1/11-1/17 after concerning imaging outpatient leading to c/f relapsed RCC vs new other malignancy.      Patient endorses recent history of constitutional symptoms, including night sweats, weight loss, decreased appetite, fatigue, OSORIO. Patient and wife both developed URI symptoms after Val, however, the wife's symptoms resolved while the patient's did not. On admit, continues to have low back pain, that improves with oxycodone, and is worse with movement. Reports an episode of right flank pain yesterday that was very painful but resolved after pain meds and has not yet recurred. Reporting chin and lip numbness that has been present since before Val. Also reports right parotid swelling, that has sometimes appeared to have improved but will then return to same size, but has not continued to enlarge; sometimes tender to touch, sometimes not. Had a left toothache at some point that resolved and has not recurred. Patient denies saddle anesthesia, loss of lower extremity function or loss of bladder/bowel function. Denies CP, SOB, abd pain, N/V/D/C.     Interval Hx  Had mucositis requiring another hospital admission due to intractable N/V. C/b afib. Denied N/V/F/C/SOB/CP/abd pain. Feeling stronger    Bijan Ibanez  reports that he has quit smoking. His smoking use included cigarettes and pipe. He has never used smokeless tobacco.  He  reports no history of alcohol use.  He  reports no history of drug use.  Family History   Problem Relation Name Age of Onset    Coronary artery disease Mother      Alzheimer's disease Mother      Coronary artery disease Father      Skin cancer Father      Alzheimer's disease Father      Alzheimer's disease Mother's Brother      Alzheimer's disease Father's Brother      Stomach cancer Maternal Grandfather      Other  cancer Paternal Grandfather          prostate or colon cancer    Heart disease Other       Oncology History   Burkitt's lymphoma of lymph nodes of multiple regions (Multi)   1/13/2025 Initial Diagnosis    Diagnosis: Burkitt Lymphoma, [place] Stage IV, BL-IPI High-Risk (score 4/5).    BL-IPI Calculation:  Age >60 years: Yes (64 years old).  Performance status (ECOG >=2): Presumed based on clinical presentation requiring hospitalization.  Serum LDH >ULN: 4102 U/L (1/11/25)  [place] Stage III/IV: Yes (stage IV with extranodal involvement including kidneys, liver, spleen, and musculature).  CNS involvement: No (MRI and LP negative).  Total Score: 4/5 (High-Risk).    Presenting Symptoms: Asymmetric swelling of the right-sided muscles of mastication; imaging revealed incidental findings of perihilar mass and renal lesions.    Labs at Diagnosis: WBC 4.0, platelets 72; LDH 4102 U/L (1/11/25)    Pathology:  EBUS with lymph node biopsy (1/13/25): Predominantly CD20-positive small lymphoid cells, raising suspicion for a B-cell lymphoproliferative process.  Bone marrow biopsy (1/16/25): 70-80% involvement by high-grade B-cell lymphoma in a hypercellular marrow (90% cellular) with maturing trilineage hematopoiesis. FISH confirmed t(8;14)/IGH::MYC rearrangement, diagnostic of Burkitt lymphoma.     Imaging:  CT Neck (1/9/25): Fusiform thickening of right masticatory muscles, likely benign hypertrophy.  CT C/A/P (1/11/25): Left perihilar mass, pulmonary nodules, right renal lesions, and right adrenal gland thickening concerning for metastatic disease; T12-L1 soft tissue mass.  PET-CT (1/20): Widespread hermann and extranodal hypermetabolic involvement, including kidneys, liver, spleen, abdominal wall, and musculature, suggestive of extensive lymphomatous disease.  MRI Brain (1/21): No intracranial lymphoma; suspected osseous involvement of calvarium.    Treatment:  Dexamethasone 40 mg daily (1/20-1/21).  Prophylactic IT  methotrexate via LP (1/22), flow negative for lymphoma.     1/21/2025 -  Chemotherapy    - C1 (1/21/25)  - C2 (2/14/25) -- etoposide dose-reduced by 25%  - C3 (3/7/25) -- etoposide dose-reduced by 10%, doxorubicin increased by 20%  - C4 (3/28/25) -- same as C3  (INPT) Dose-Adjusted R-EPOCH (Etoposide / DOXOrubicin / VinCRIStine / Cyclophosphamide / PredniSONE) + RiTUXimab, 21 Day Cycles      2/24/2025 Imaging    PET/CT (2/24/25, after C2): near-complete resolution of previously active disease in lymph nodes and various organs, Deauville score of 2         Performance Status:  ECOG Score: 2- Ambulatory and  capable of all selfcare; unable to carry out work activities.  Up and about > 50% of waking hrs.   Karnofsky Score: 70 - Cares for self; unable to carry on normal activity or do normal work     Objective   BSA: 2.04 meters squared  Pulse 83   Temp 36.3 °C (97.3 °F) (Skin)   Resp 18   Wt 82.8 kg (182 lb 8.7 oz)   SpO2 100%   BMI 25.41 kg/m²   Physical Exam  GEN: Aox3, NAD  HEENT EOMI PERRLA sclera anicteric, no mucositis seen  CV RRR w/o m/r/g  Resp CTAB w/o w/r/r  GI Soft NTND+BS  Ext no LE edema  LN: no adenopathy palpated       Labs:  Lab on 04/29/2025   Component Date Value Ref Range Status    Magnesium 04/29/2025 1.48 (L)  1.60 - 2.40 mg/dL Final    WBC 04/29/2025 4.8  4.4 - 11.3 x10*3/uL Final    nRBC 04/29/2025 0.4 (H)  0.0 - 0.0 /100 WBCs Final    RBC 04/29/2025 2.97 (L)  4.50 - 5.90 x10*6/uL Final    Hemoglobin 04/29/2025 8.7 (L)  13.5 - 17.5 g/dL Final    Hematocrit 04/29/2025 27.2 (L)  41.0 - 52.0 % Final    MCV 04/29/2025 92  80 - 100 fL Final    MCH 04/29/2025 29.3  26.0 - 34.0 pg Final    MCHC 04/29/2025 32.0  32.0 - 36.0 g/dL Final    RDW 04/29/2025 20.8 (H)  11.5 - 14.5 % Final    Platelets 04/29/2025 149 (L)  150 - 450 x10*3/uL Final    Neutrophils % 04/29/2025 62.6  40.0 - 80.0 % Final    Immature Granulocytes %, Automated 04/29/2025 0.8  0.0 - 0.9 % Final    Lymphocytes % 04/29/2025 24.9   13.0 - 44.0 % Final    Monocytes % 04/29/2025 11.1  2.0 - 10.0 % Final    Eosinophils % 04/29/2025 0.2  0.0 - 6.0 % Final    Basophils % 04/29/2025 0.4  0.0 - 2.0 % Final    Neutrophils Absolute 04/29/2025 2.99  1.20 - 7.70 x10*3/uL Final    Immature Granulocytes Absolute, Au* 04/29/2025 0.04  0.00 - 0.70 x10*3/uL Final    Lymphocytes Absolute 04/29/2025 1.19 (L)  1.20 - 4.80 x10*3/uL Final    Monocytes Absolute 04/29/2025 0.53  0.10 - 1.00 x10*3/uL Final    Eosinophils Absolute 04/29/2025 0.01  0.00 - 0.70 x10*3/uL Final    Basophils Absolute 04/29/2025 0.02  0.00 - 0.10 x10*3/uL Final    Glucose 04/29/2025 96  74 - 99 mg/dL Final    Sodium 04/29/2025 137  136 - 145 mmol/L Final    Potassium 04/29/2025 4.0  3.5 - 5.3 mmol/L Final    Chloride 04/29/2025 102  98 - 107 mmol/L Final    Bicarbonate 04/29/2025 26  21 - 32 mmol/L Final    Anion Gap 04/29/2025 13  10 - 20 mmol/L Final    Urea Nitrogen 04/29/2025 20  6 - 23 mg/dL Final    Creatinine 04/29/2025 0.61  0.50 - 1.30 mg/dL Final    eGFR 04/29/2025 >90  >60 mL/min/1.73m*2 Final    Calcium 04/29/2025 9.4  8.6 - 10.6 mg/dL Final    Albumin 04/29/2025 3.6  3.4 - 5.0 g/dL Final    Alkaline Phosphatase 04/29/2025 56  33 - 136 U/L Final    Total Protein 04/29/2025 5.3 (L)  6.4 - 8.2 g/dL Final    AST 04/29/2025 11  9 - 39 U/L Final    Bilirubin, Total 04/29/2025 0.9  0.0 - 1.2 mg/dL Final    ALT 04/29/2025 23  10 - 52 U/L Final    LDH 04/29/2025 147  84 - 246 U/L Final    RBC Morphology 04/29/2025 See Below   Final    Polychromasia 04/29/2025 Mild   Final    Ovalocytes 04/29/2025 Few   Final    Teardrop Cells 04/29/2025 Few   Final   Infusion on 04/23/2025   Component Date Value Ref Range Status    Magnesium 04/23/2025 1.74  1.60 - 2.40 mg/dL Final    WBC 04/23/2025 5.5  4.4 - 11.3 x10*3/uL Final    nRBC 04/23/2025 0.5 (H)  0.0 - 0.0 /100 WBCs Final    RBC 04/23/2025 3.20 (L)  4.50 - 5.90 x10*6/uL Final    Hemoglobin 04/23/2025 9.0 (L)  13.5 - 17.5 g/dL Final     Hematocrit 04/23/2025 28.7 (L)  41.0 - 52.0 % Final    MCV 04/23/2025 90  80 - 100 fL Final    MCH 04/23/2025 28.1  26.0 - 34.0 pg Final    MCHC 04/23/2025 31.4 (L)  32.0 - 36.0 g/dL Final    RDW 04/23/2025 16.3 (H)  11.5 - 14.5 % Final    Platelets 04/23/2025 232  150 - 450 x10*3/uL Final    Neutrophils % 04/23/2025 72.2  40.0 - 80.0 % Final    Immature Granulocytes %, Automated 04/23/2025 1.5 (H)  0.0 - 0.9 % Final    Lymphocytes % 04/23/2025 13.3  13.0 - 44.0 % Final    Monocytes % 04/23/2025 12.5  2.0 - 10.0 % Final    Eosinophils % 04/23/2025 0.0  0.0 - 6.0 % Final    Basophils % 04/23/2025 0.5  0.0 - 2.0 % Final    Neutrophils Absolute 04/23/2025 3.97  1.20 - 7.70 x10*3/uL Final    Immature Granulocytes Absolute, Au* 04/23/2025 0.08  0.00 - 0.70 x10*3/uL Final    Lymphocytes Absolute 04/23/2025 0.73 (L)  1.20 - 4.80 x10*3/uL Final    Monocytes Absolute 04/23/2025 0.69  0.10 - 1.00 x10*3/uL Final    Eosinophils Absolute 04/23/2025 0.00  0.00 - 0.70 x10*3/uL Final    Basophils Absolute 04/23/2025 0.03  0.00 - 0.10 x10*3/uL Final    Glucose 04/23/2025 106 (H)  74 - 99 mg/dL Final    Sodium 04/23/2025 137  136 - 145 mmol/L Final    Potassium 04/23/2025 4.5  3.5 - 5.3 mmol/L Final    Chloride 04/23/2025 102  98 - 107 mmol/L Final    Bicarbonate 04/23/2025 28  21 - 32 mmol/L Final    Anion Gap 04/23/2025 12  10 - 20 mmol/L Final    Urea Nitrogen 04/23/2025 23  6 - 23 mg/dL Final    Creatinine 04/23/2025 0.62  0.50 - 1.30 mg/dL Final    eGFR 04/23/2025 >90  >60 mL/min/1.73m*2 Final    Calcium 04/23/2025 9.2  8.6 - 10.3 mg/dL Final    Albumin 04/23/2025 3.5  3.4 - 5.0 g/dL Final    Alkaline Phosphatase 04/23/2025 49  33 - 136 U/L Final    Total Protein 04/23/2025 5.3 (L)  6.4 - 8.2 g/dL Final    AST 04/23/2025 13  9 - 39 U/L Final    Bilirubin, Total 04/23/2025 0.7  0.0 - 1.2 mg/dL Final    ALT 04/23/2025 17  10 - 52 U/L Final    LDH 04/23/2025 151  84 - 246 U/L Final   No results displayed because visit has over 200  results.      Infusion on 04/08/2025   Component Date Value Ref Range Status    Glucose 04/08/2025 143 (H)  74 - 99 mg/dL Final    Sodium 04/08/2025 137  136 - 145 mmol/L Final    Potassium 04/08/2025 4.1  3.5 - 5.3 mmol/L Final    Chloride 04/08/2025 102  98 - 107 mmol/L Final    Bicarbonate 04/08/2025 28  21 - 32 mmol/L Final    Anion Gap 04/08/2025 11  10 - 20 mmol/L Final    Urea Nitrogen 04/08/2025 23  6 - 23 mg/dL Final    Creatinine 04/08/2025 0.41 (L)  0.50 - 1.30 mg/dL Final    eGFR 04/08/2025 >90  >60 mL/min/1.73m*2 Final    Calcium 04/08/2025 8.4 (L)  8.6 - 10.3 mg/dL Final    Albumin 04/08/2025 3.3 (L)  3.4 - 5.0 g/dL Final    Alkaline Phosphatase 04/08/2025 42  33 - 136 U/L Final    Total Protein 04/08/2025 4.6 (L)  6.4 - 8.2 g/dL Final    AST 04/08/2025 6 (L)  9 - 39 U/L Final    Bilirubin, Total 04/08/2025 1.3 (H)  0.0 - 1.2 mg/dL Final    ALT 04/08/2025 12  10 - 52 U/L Final    WBC 04/08/2025 0.2 (LL)  4.4 - 11.3 x10*3/uL Final    nRBC 04/08/2025 0.0  0.0 - 0.0 /100 WBCs Final    RBC 04/08/2025 2.45 (L)  4.50 - 5.90 x10*6/uL Final    Hemoglobin 04/08/2025 6.9 (L)  13.5 - 17.5 g/dL Final    Hematocrit 04/08/2025 21.6 (L)  41.0 - 52.0 % Final    MCV 04/08/2025 88  80 - 100 fL Final    MCH 04/08/2025 28.2  26.0 - 34.0 pg Final    MCHC 04/08/2025 31.9 (L)  32.0 - 36.0 g/dL Final    RDW 04/08/2025 16.7 (H)  11.5 - 14.5 % Final    Platelets 04/08/2025 33 (LL)  150 - 450 x10*3/uL Final    Neutrophils % 04/08/2025 54.1  40.0 - 80.0 % Final    Immature Granulocytes %, Automated 04/08/2025 0.0  0.0 - 0.9 % Final    Lymphocytes % 04/08/2025 41.7  13.0 - 44.0 % Final    Monocytes % 04/08/2025 0.0  2.0 - 10.0 % Final    Eosinophils % 04/08/2025 4.2  0.0 - 6.0 % Final    Basophils % 04/08/2025 0.0  0.0 - 2.0 % Final    Neutrophils Absolute 04/08/2025 0.13 (L)  1.20 - 7.70 x10*3/uL Final    Immature Granulocytes Absolute, Au* 04/08/2025 0.00  0.00 - 0.70 x10*3/uL Final    Lymphocytes Absolute 04/08/2025 0.10 (L)   1.20 - 4.80 x10*3/uL Final    Monocytes Absolute 04/08/2025 0.00 (L)  0.10 - 1.00 x10*3/uL Final    Eosinophils Absolute 04/08/2025 0.01  0.00 - 0.70 x10*3/uL Final    Basophils Absolute 04/08/2025 0.00  0.00 - 0.10 x10*3/uL Final    LDH 04/08/2025 120  84 - 246 U/L Final    Vancomycin 04/08/2025 <2.0 (L)  5.0 - 20.0 ug/mL Final   Infusion on 04/04/2025   Component Date Value Ref Range Status    WBC 04/04/2025 8.2  4.4 - 11.3 x10*3/uL Final    nRBC 04/04/2025 0.0  0.0 - 0.0 /100 WBCs Final    RBC 04/04/2025 3.08 (L)  4.50 - 5.90 x10*6/uL Final    Hemoglobin 04/04/2025 8.5 (L)  13.5 - 17.5 g/dL Final    Hematocrit 04/04/2025 27.0 (L)  41.0 - 52.0 % Final    MCV 04/04/2025 88  80 - 100 fL Final    MCH 04/04/2025 27.6  26.0 - 34.0 pg Final    MCHC 04/04/2025 31.5 (L)  32.0 - 36.0 g/dL Final    RDW 04/04/2025 17.2 (H)  11.5 - 14.5 % Final    Platelets 04/04/2025 135 (L)  150 - 450 x10*3/uL Final    Neutrophils % 04/04/2025 93.3  40.0 - 80.0 % Final    Immature Granulocytes %, Automated 04/04/2025 1.3 (H)  0.0 - 0.9 % Final    Lymphocytes % 04/04/2025 3.4  13.0 - 44.0 % Final    Monocytes % 04/04/2025 1.6  2.0 - 10.0 % Final    Eosinophils % 04/04/2025 0.4  0.0 - 6.0 % Final    Basophils % 04/04/2025 0.0  0.0 - 2.0 % Final    Neutrophils Absolute 04/04/2025 7.64  1.20 - 7.70 x10*3/uL Final    Immature Granulocytes Absolute, Au* 04/04/2025 0.11  0.00 - 0.70 x10*3/uL Final    Lymphocytes Absolute 04/04/2025 0.28 (L)  1.20 - 4.80 x10*3/uL Final    Monocytes Absolute 04/04/2025 0.13  0.10 - 1.00 x10*3/uL Final    Eosinophils Absolute 04/04/2025 0.03  0.00 - 0.70 x10*3/uL Final    Basophils Absolute 04/04/2025 0.00  0.00 - 0.10 x10*3/uL Final    Glucose 04/04/2025 83  74 - 99 mg/dL Final    Sodium 04/04/2025 139  136 - 145 mmol/L Final    Potassium 04/04/2025 3.4 (L)  3.5 - 5.3 mmol/L Final    Chloride 04/04/2025 104  98 - 107 mmol/L Final    Bicarbonate 04/04/2025 28  21 - 32 mmol/L Final    Anion Gap 04/04/2025 10  10 - 20  mmol/L Final    Urea Nitrogen 04/04/2025 31 (H)  6 - 23 mg/dL Final    Creatinine 04/04/2025 0.50  0.50 - 1.30 mg/dL Final    eGFR 04/04/2025 >90  >60 mL/min/1.73m*2 Final    Calcium 04/04/2025 8.4 (L)  8.6 - 10.3 mg/dL Final    Albumin 04/04/2025 3.1 (L)  3.4 - 5.0 g/dL Final    Alkaline Phosphatase 04/04/2025 32 (L)  33 - 136 U/L Final    Total Protein 04/04/2025 4.4 (L)  6.4 - 8.2 g/dL Final    AST 04/04/2025 10  9 - 39 U/L Final    Bilirubin, Total 04/04/2025 0.8  0.0 - 1.2 mg/dL Final    ALT 04/04/2025 24  10 - 52 U/L Final    LDH 04/04/2025 141  84 - 246 U/L Final    Magnesium 04/04/2025 2.22  1.60 - 2.40 mg/dL Final    Glucose, CSF 04/04/2025 60  40 - 70 mg/dL Final    Total Protein, CSF 04/04/2025 39  15 - 45 mg/dL Final    Case Report 04/04/2025    Final                    Value:Non-gynecologic Cytology                          Case: [id number]                                   Authorizing Provider:  Antoni Lockett,   Collected:           04/04/2025 1050                                     PA-C                                                                         Ordering Location:     [facility]   Received:            04/06/2025 1830              Pathologist:           Jasmyne Rojas MD                                                          Specimen:    CEREBROSPINAL FLUID                                                                        Final Cytological Interpretation 04/04/2025    Final                    Value:A. CEREBROSPINAL FLUID, CYTOLOGY:  --  No malignant epithelial cells identified.  --  Rare lymphocytes present.  --  Correlate with concurrent flow cytometry studies report ([id number]).         04/04/2025    Final                    Value:Slide(s) initially screened by ISAK Jackson at [facility] [address] [facility] [id number]  By the signature on this report, the individual or group listed as making the Final Interpretation/Diagnosis certifies  that they have reviewed this case.       Clinical History 04/04/2025    Final                    Value:high grade lymphoma      Specimen Description 04/04/2025    Final                    Value:A. CEREBROSPINAL FLUID.  Received 2 ml colorless clear fluid without particles in sterile tube #2 .         Specimen Processing Detail 04/04/2025    Final                    Value:A1 Slides Only (No Block)   A1-1 Pap Stain NGYN ThinPrep       Tube Number, CSF 04/04/2025 Tube 1     Final    Color, CSF 04/04/2025 Colorless  Colorless Final    Clarity, CSF 04/04/2025 Clear  Clear Final    Color, Supernatant CSF 04/04/2025 Colorless    Final    WBC, CSF 04/04/2025 2  1 - 5 /uL Final    RBC, CSF 04/04/2025 55 (H)  0 - 5 /uL Final    Neutrophils %, Manual, CSF 04/04/2025 20 (H)  0 - 5 % Final    Lymphocytes %, Manual, CSF 04/04/2025 60  28 - 96 % Final    Mono/Macrophages %, Manual, CSF 04/04/2025 20  16 - 56 % Final    Eosinophils %, Manual, CSF 04/04/2025 0  Rare % Final    Basophils %, Manual, CSF 04/04/2025 0  Not Established % Final    Immature Granulocytes %, Manual, C* 04/04/2025 0  Not Established % Final    Blasts %, Manual, CSF 04/04/2025 0  Not Established % Final    Unclassified Cells %, Manual, CSF 04/04/2025 0  Not Established % Final    Plasma Cells %, Manual, CSF 04/04/2025 0  Not Established % Final    Total Cells Counted, CSF 04/04/2025 5   Final    Case Report 04/04/2025    Final                    Value:Flow Cytometry                                    Case: [id number]                                   Authorizing Provider:  Antoni Lockett,   Collected:           04/04/2025 1050                                     PA-C                                                                         Ordering Location:     [facility]   Received:            04/04/2025 0652              Pathologist:           Maurilio Hoskins MD                                                        Specimen:     Lumbar Puncture                                                                            Diagnosis 04/04/2025    Final                    Value:--No clonal B cell population detected, see note    Note: T lymphocytes were not evaluated in this analysis. Clinical and morphologic correlation is suggested.    Pathologic evaluation of this specimen was performed in conjunction with the hematopathology fellow, DO Galileo.         04/04/2025    Final                    Value:By the signature on this report, the individual or group listed as making the Final Interpretation/Diagnosis certifies that they have reviewed this case and the staining reactivity of the antibodies and reagents in the analysis were determined to be acceptable. Diagnostic interpretation performed at [facility]      Flow Differential 04/04/2025    Final                    Value:Lymphocyte: <1 %    No discrete lymphoid population detected.          Flow Test Ordered 04/04/2025 B Cell Lymphoma CSF Panel  not established Final    Specimen Viability 04/04/2025 Acceptable  not established Final    Cell Count 04/04/2025 2.00  /uL Final    Number of Cells Collected 04/04/2025    Final    Methodology 04/04/2025    Final                    Value:Reference ranges not established.    This test is a multicolor, whole blood lysis assay. It was developed and its performance characteristics determined by the Department of Pathology, [facility], and has not been cleared or approved by the U.S. Food and Drug Administration. The laboratory is regulated under CLIA as qualified to perform high complexity testing. This test is used for clinical purposes. It should not be regarded as investigational or for research.    Immunophenotypic analysis was performed using the following antibodies: 1A: Kappa Surface, Lambda Surface, CD20, CD38, CD19, CD45.          Pathologist Review-Cell Count, CSF 04/04/2025 No morphologic evidence of aggressive  B-cell lymphoma present.   Final   No results displayed because visit has over 200 results.        All labs reviewed    Path:      Component    FINAL DIAGNOSIS   A, B & C. BONE MARROW CLOT WITH ASPIRATE AND CORE WITH TOUCH PREP, LEFT ILIAC CREST:       -- WITH INVOLVEMENT BY HIGH GRADE B-CELL LYMPHOMA, 70-80% OF MARROW CELLULAR ELEMENTS  -- HYPERCELLULAR BONE MARROW (90% CELLULAR) WITH MATURING TRILINEAGE HEMATOPOIESIS   -- SEE NOTE     NOTE: The differential diagnosis includes Burkitt lymphoma and high grade B-cell lymphoma with MYC and Bcl2 and/or Bcl-6 rearrangements (double / triple hit lymphoma). FISH studies and lymphoid NGS panel are pending for further characterization. Clinical and molecular results correlation is suggested         Component    ISCN    Results:    FISH POSITIVE for t(8;14)/ IGH::MYC rearrangement.  nuc  kieran(D8Z2x2,MYCx3,IGHx3)(MYC con IGHx2)[31/250]/(D8Z2x2,MYCx2,IGHx2)(MYC con IGHx1)[8/250]       Imaging:  PET 2/24/25  IMPRESSION:  1. There is near complete interval resolution of previously seen  hypermetabolic hermann and extranodal disease involving bilateral  submandibular, parotid gland, hepatic lesions, spleen, mesenteric  deposits, bilateral kidney stomach bilateral, bilateral arms,  anterior chest wall, and gluteal muscle supra and infra diaphragmatic  lymphadenopathy.  2. Diffuse metabolic activity within the bone marrow, limiting the  evaluation a focal metabolic osseous metastatic disease. Likely post  treatment changes. Lymphomatous involvement can not be completely  excluded. Deauville score 2.  3. Splenomegaly with metabolic activity less than liver.  4. Mild decrease in size and metabolic activity within left lower  lobe opacity, likely inflammatory.  5. Subcentimeter nodular opacities within right lung base, likely  inflammatory/infective.        PET 1/20/25  IMPRESSION:  Widespread hermann as well as extranodal lymphomatous involvement.  1. Multiple hypermetabolic supra and  infra diaphragmatic  lymphadenopathy as described, consistent with lymphomatous  involvement.  2. Intensely hypermetabolic activity within bilateral enlarged  submandibular and parotid glands as described, concerning for  lymphomatous involvement.  3. Hypermetabolic mass like infiltration within bilateral  kidneys(right> left), corresponding to lesion seen on CT dated  01/01/2025, compatible with lymphomatous involvement.  4. Hypermetabolic hepatic lesions without any underlying CT  correlate, consistent with lymphomatous involvement.  5. Splenomegaly with nonspecific hypermetabolic activity suggestive  of extranodal involvement.  6. Multiple hypermetabolic soft tissue mesenteric deposits as well as  anterior abdominal wall nodular deposits, with few of the lesions  without any underlying CT correlate, concerning for additional  lymphomatous involvement.  7. Metabolic activity within the stomach, bilateral arms, anterior  chest wall and gluteal muscles without underlying CT correlate,  concerning for lymphomatous involvement.      Torey Mckeon, DO

## 2025-05-02 ENCOUNTER — HOSPITAL ENCOUNTER (INPATIENT)
Facility: HOSPITAL | Age: 65
End: 2025-05-02
Attending: INTERNAL MEDICINE
Payer: MEDICARE

## 2025-05-02 ENCOUNTER — HOME CARE VISIT (OUTPATIENT)
Dept: HOME HEALTH SERVICES | Facility: HOME HEALTH | Age: 65
End: 2025-05-02
Payer: MEDICARE

## 2025-05-02 DIAGNOSIS — C83.70 BURKITT LYMPHOMA, UNSPECIFIED BODY REGION (MULTI): ICD-10-CM

## 2025-05-02 DIAGNOSIS — C85.10 HIGH GRADE B-CELL LYMPHOMA (MULTI): ICD-10-CM

## 2025-05-02 DIAGNOSIS — C83.70 BURKITT LYMPHOMA: Primary | ICD-10-CM

## 2025-05-02 DIAGNOSIS — R10.84 GENERALIZED ABDOMINAL PAIN: ICD-10-CM

## 2025-05-02 DIAGNOSIS — C83.78 BURKITT'S LYMPHOMA OF LYMPH NODES OF MULTIPLE REGIONS (MULTI): ICD-10-CM

## 2025-05-02 PROCEDURE — 85007 BL SMEAR W/DIFF WBC COUNT: CPT

## 2025-05-02 PROCEDURE — 84550 ASSAY OF BLOOD/URIC ACID: CPT

## 2025-05-02 PROCEDURE — 99223 1ST HOSP IP/OBS HIGH 75: CPT

## 2025-05-02 PROCEDURE — 85027 COMPLETE CBC AUTOMATED: CPT

## 2025-05-02 PROCEDURE — 83615 LACTATE (LD) (LDH) ENZYME: CPT

## 2025-05-02 PROCEDURE — 2500000001 HC RX 250 WO HCPCS SELF ADMINISTERED DRUGS (ALT 637 FOR MEDICARE OP)

## 2025-05-02 PROCEDURE — 1170000001 HC PRIVATE ONCOLOGY ROOM DAILY

## 2025-05-02 PROCEDURE — 85610 PROTHROMBIN TIME: CPT

## 2025-05-02 PROCEDURE — 85730 THROMBOPLASTIN TIME PARTIAL: CPT

## 2025-05-02 PROCEDURE — 80053 COMPREHEN METABOLIC PANEL: CPT

## 2025-05-02 PROCEDURE — 83735 ASSAY OF MAGNESIUM: CPT

## 2025-05-02 PROCEDURE — 86901 BLOOD TYPING SEROLOGIC RH(D): CPT

## 2025-05-02 RX ORDER — GABAPENTIN 300 MG/1
300 CAPSULE ORAL NIGHTLY
Status: DISPENSED | OUTPATIENT
Start: 2025-05-02

## 2025-05-02 RX ORDER — OXYCODONE HYDROCHLORIDE 5 MG/1
5 TABLET ORAL EVERY 6 HOURS PRN
Refills: 0 | Status: DISPENSED | OUTPATIENT
Start: 2025-05-02

## 2025-05-02 RX ORDER — ONDANSETRON HYDROCHLORIDE 2 MG/ML
4 INJECTION, SOLUTION INTRAVENOUS EVERY 6 HOURS PRN
Status: ACTIVE | OUTPATIENT
Start: 2025-05-02

## 2025-05-02 RX ORDER — ATORVASTATIN CALCIUM 40 MG/1
40 TABLET, FILM COATED ORAL
Status: DISPENSED | OUTPATIENT
Start: 2025-05-03

## 2025-05-02 RX ORDER — PANTOPRAZOLE SODIUM 40 MG/1
40 TABLET, DELAYED RELEASE ORAL
Status: DISPENSED | OUTPATIENT
Start: 2025-05-03

## 2025-05-02 RX ORDER — ACYCLOVIR 200 MG/1
400 CAPSULE ORAL 2 TIMES DAILY
Status: DISPENSED | OUTPATIENT
Start: 2025-05-03

## 2025-05-02 RX ORDER — ALBUTEROL SULFATE 90 UG/1
2 INHALANT RESPIRATORY (INHALATION) EVERY 6 HOURS PRN
Status: DISPENSED | OUTPATIENT
Start: 2025-05-02

## 2025-05-02 RX ORDER — POLYETHYLENE GLYCOL 3350 17 G/17G
17 POWDER, FOR SOLUTION ORAL DAILY
Status: DISCONTINUED | OUTPATIENT
Start: 2025-05-03 | End: 2025-05-03

## 2025-05-02 RX ADMIN — OXYCODONE 5 MG: 5 TABLET ORAL at 23:09

## 2025-05-02 SDOH — ECONOMIC STABILITY: INCOME INSECURITY: IN THE PAST 12 MONTHS HAS THE ELECTRIC, GAS, OIL, OR WATER COMPANY THREATENED TO SHUT OFF SERVICES IN YOUR HOME?: NO

## 2025-05-02 SDOH — SOCIAL STABILITY: SOCIAL INSECURITY: WITHIN THE LAST YEAR, HAVE YOU BEEN AFRAID OF YOUR PARTNER OR EX-PARTNER?: NO

## 2025-05-02 SDOH — ECONOMIC STABILITY: HOUSING INSECURITY: IN THE LAST 12 MONTHS, WAS THERE A TIME WHEN YOU WERE NOT ABLE TO PAY THE MORTGAGE OR RENT ON TIME?: NO

## 2025-05-02 SDOH — ECONOMIC STABILITY: HOUSING INSECURITY: AT ANY TIME IN THE PAST 12 MONTHS, WERE YOU HOMELESS OR LIVING IN A SHELTER (INCLUDING NOW)?: NO

## 2025-05-02 SDOH — ECONOMIC STABILITY: FOOD INSECURITY: WITHIN THE PAST 12 MONTHS, YOU WORRIED THAT YOUR FOOD WOULD RUN OUT BEFORE YOU GOT THE MONEY TO BUY MORE.: NEVER TRUE

## 2025-05-02 SDOH — ECONOMIC STABILITY: TRANSPORTATION INSECURITY: IN THE PAST 12 MONTHS, HAS LACK OF TRANSPORTATION KEPT YOU FROM MEDICAL APPOINTMENTS OR FROM GETTING MEDICATIONS?: NO

## 2025-05-02 SDOH — SOCIAL STABILITY: SOCIAL INSECURITY: HAS ANYONE EVER THREATENED TO HURT YOUR FAMILY OR YOUR PETS?: NO

## 2025-05-02 SDOH — SOCIAL STABILITY: SOCIAL INSECURITY: HAVE YOU HAD THOUGHTS OF HARMING ANYONE ELSE?: NO

## 2025-05-02 SDOH — ECONOMIC STABILITY: FOOD INSECURITY: HOW HARD IS IT FOR YOU TO PAY FOR THE VERY BASICS LIKE FOOD, HOUSING, MEDICAL CARE, AND HEATING?: NOT HARD AT ALL

## 2025-05-02 SDOH — SOCIAL STABILITY: SOCIAL INSECURITY: ARE THERE ANY APPARENT SIGNS OF INJURIES/BEHAVIORS THAT COULD BE RELATED TO ABUSE/NEGLECT?: NO

## 2025-05-02 SDOH — SOCIAL STABILITY: SOCIAL INSECURITY: ABUSE: ADULT

## 2025-05-02 SDOH — SOCIAL STABILITY: SOCIAL INSECURITY: DOES ANYONE TRY TO KEEP YOU FROM HAVING/CONTACTING OTHER FRIENDS OR DOING THINGS OUTSIDE YOUR HOME?: NO

## 2025-05-02 SDOH — SOCIAL STABILITY: SOCIAL INSECURITY: WITHIN THE LAST YEAR, HAVE YOU BEEN HUMILIATED OR EMOTIONALLY ABUSED IN OTHER WAYS BY YOUR PARTNER OR EX-PARTNER?: NO

## 2025-05-02 SDOH — ECONOMIC STABILITY: HOUSING INSECURITY: IN THE PAST 12 MONTHS, HOW MANY TIMES HAVE YOU MOVED WHERE YOU WERE LIVING?: 0

## 2025-05-02 SDOH — ECONOMIC STABILITY: FOOD INSECURITY: WITHIN THE PAST 12 MONTHS, THE FOOD YOU BOUGHT JUST DIDN'T LAST AND YOU DIDN'T HAVE MONEY TO GET MORE.: NEVER TRUE

## 2025-05-02 SDOH — SOCIAL STABILITY: SOCIAL INSECURITY: DO YOU FEEL UNSAFE GOING BACK TO THE PLACE WHERE YOU ARE LIVING?: NO

## 2025-05-02 SDOH — SOCIAL STABILITY: SOCIAL INSECURITY: DO YOU FEEL ANYONE HAS EXPLOITED OR TAKEN ADVANTAGE OF YOU FINANCIALLY OR OF YOUR PERSONAL PROPERTY?: NO

## 2025-05-02 SDOH — SOCIAL STABILITY: SOCIAL INSECURITY: ARE YOU OR HAVE YOU BEEN THREATENED OR ABUSED PHYSICALLY, EMOTIONALLY, OR SEXUALLY BY ANYONE?: NO

## 2025-05-02 SDOH — SOCIAL STABILITY: SOCIAL INSECURITY: WERE YOU ABLE TO COMPLETE ALL THE BEHAVIORAL HEALTH SCREENINGS?: YES

## 2025-05-02 ASSESSMENT — COGNITIVE AND FUNCTIONAL STATUS - GENERAL
HELP NEEDED FOR BATHING: A LITTLE
CLIMB 3 TO 5 STEPS WITH RAILING: TOTAL
MOBILITY SCORE: 21
PATIENT BASELINE BEDBOUND: NO
DAILY ACTIVITIY SCORE: 23

## 2025-05-02 ASSESSMENT — ENCOUNTER SYMPTOMS
BACK PAIN: 0
DIZZINESS: 0
APPETITE CHANGE: 0
CONFUSION: 0
COUGH: 0
NUMBNESS: 0
DIARRHEA: 0
LIGHT-HEADEDNESS: 0
ENDOCRINE NEGATIVE: 1
SHORTNESS OF BREATH: 0
BRUISES/BLEEDS EASILY: 0
DIFFICULTY URINATING: 0
MYALGIAS: 0
NAUSEA: 0
ADENOPATHY: 0
ACTIVITY CHANGE: 0
ARTHRALGIAS: 0
HEMATURIA: 0
AGITATION: 0
WHEEZING: 0
WEAKNESS: 0
ABDOMINAL PAIN: 0

## 2025-05-02 ASSESSMENT — ACTIVITIES OF DAILY LIVING (ADL)
TOILETING: INDEPENDENT
LACK_OF_TRANSPORTATION: NO
DRESSING YOURSELF: INDEPENDENT
FEEDING YOURSELF: INDEPENDENT
ADEQUATE_TO_COMPLETE_ADL: YES
LACK_OF_TRANSPORTATION: NO
PATIENT'S MEMORY ADEQUATE TO SAFELY COMPLETE DAILY ACTIVITIES?: YES
BATHING: NEEDS ASSISTANCE
JUDGMENT_ADEQUATE_SAFELY_COMPLETE_DAILY_ACTIVITIES: YES
GROOMING: INDEPENDENT
HEARING - RIGHT EAR: DIFFICULTY WITH NOISE
ASSISTIVE_DEVICE: WALKER
WALKS IN HOME: INDEPENDENT
HEARING - LEFT EAR: DIFFICULTY WITH NOISE

## 2025-05-02 ASSESSMENT — PAIN - FUNCTIONAL ASSESSMENT
PAIN_FUNCTIONAL_ASSESSMENT: 0-10
PAIN_FUNCTIONAL_ASSESSMENT: 0-10

## 2025-05-02 ASSESSMENT — LIFESTYLE VARIABLES
SKIP TO QUESTIONS 9-10: 1
AUDIT-C TOTAL SCORE: 0
HOW OFTEN DO YOU HAVE 6 OR MORE DRINKS ON ONE OCCASION: NEVER
HOW OFTEN DO YOU HAVE A DRINK CONTAINING ALCOHOL: NEVER
AUDIT-C TOTAL SCORE: 0
HOW MANY STANDARD DRINKS CONTAINING ALCOHOL DO YOU HAVE ON A TYPICAL DAY: PATIENT DOES NOT DRINK

## 2025-05-02 ASSESSMENT — PAIN SCALES - GENERAL
PAINLEVEL_OUTOF10: 5 - MODERATE PAIN
PAINLEVEL_OUTOF10: 4

## 2025-05-02 ASSESSMENT — PAIN DESCRIPTION - ORIENTATION: ORIENTATION: LEFT

## 2025-05-02 ASSESSMENT — PAIN DESCRIPTION - DESCRIPTORS: DESCRIPTORS: ACHING

## 2025-05-02 ASSESSMENT — PAIN DESCRIPTION - LOCATION: LOCATION: ARM

## 2025-05-03 LAB
ABO GROUP (TYPE) IN BLOOD: NORMAL
ALBUMIN SERPL BCP-MCNC: 3.3 G/DL (ref 3.4–5)
ALBUMIN SERPL BCP-MCNC: 3.4 G/DL (ref 3.4–5)
ALP SERPL-CCNC: 51 U/L (ref 33–136)
ALT SERPL W P-5'-P-CCNC: 23 U/L (ref 10–52)
ANION GAP SERPL CALC-SCNC: 12 MMOL/L (ref 10–20)
ANION GAP SERPL CALC-SCNC: 15 MMOL/L (ref 10–20)
ANTIBODY SCREEN: NORMAL
APPEARANCE CSF: CLEAR
APTT PPP: 30 SECONDS (ref 26–36)
APTT PPP: 31 SECONDS (ref 26–36)
AST SERPL W P-5'-P-CCNC: 15 U/L (ref 9–39)
BASOPHILS # BLD AUTO: 0.02 X10*3/UL (ref 0–0.1)
BASOPHILS # BLD MANUAL: 0.11 X10*3/UL (ref 0–0.1)
BASOPHILS NFR BLD AUTO: 0.5 %
BASOPHILS NFR BLD MANUAL: 2.6 %
BASOPHILS NFR CSF MANUAL: 0 %
BILIRUB SERPL-MCNC: 0.8 MG/DL (ref 0–1.2)
BLASTS CSF MANUAL: 0 % (ref ?–0)
BUN SERPL-MCNC: 18 MG/DL (ref 6–23)
BUN SERPL-MCNC: 21 MG/DL (ref 6–23)
CALCIUM SERPL-MCNC: 8.7 MG/DL (ref 8.6–10.6)
CALCIUM SERPL-MCNC: 8.8 MG/DL (ref 8.6–10.6)
CHLORIDE SERPL-SCNC: 103 MMOL/L (ref 98–107)
CHLORIDE SERPL-SCNC: 103 MMOL/L (ref 98–107)
CO2 SERPL-SCNC: 25 MMOL/L (ref 21–32)
CO2 SERPL-SCNC: 27 MMOL/L (ref 21–32)
COLOR CSF: COLORLESS
COLOR SPUN CSF: COLORLESS
CREAT SERPL-MCNC: 0.59 MG/DL (ref 0.5–1.3)
CREAT SERPL-MCNC: 0.63 MG/DL (ref 0.5–1.3)
DACRYOCYTES BLD QL SMEAR: ABNORMAL
DACRYOCYTES BLD QL SMEAR: NORMAL
EGFRCR SERPLBLD CKD-EPI 2021: >90 ML/MIN/1.73M*2
EGFRCR SERPLBLD CKD-EPI 2021: >90 ML/MIN/1.73M*2
EOSINOPHIL # BLD AUTO: 0.03 X10*3/UL (ref 0–0.7)
EOSINOPHIL # BLD MANUAL: 0.11 X10*3/UL (ref 0–0.7)
EOSINOPHIL NFR BLD AUTO: 0.8 %
EOSINOPHIL NFR BLD MANUAL: 2.6 %
EOSINOPHIL NFR CSF MANUAL: 0 % (ref 0–?)
ERYTHROCYTE [DISTWIDTH] IN BLOOD BY AUTOMATED COUNT: 21.3 % (ref 11.5–14.5)
ERYTHROCYTE [DISTWIDTH] IN BLOOD BY AUTOMATED COUNT: 21.4 % (ref 11.5–14.5)
FIBRINOGEN PPP-MCNC: 336 MG/DL (ref 200–400)
GLUCOSE CSF-MCNC: 49 MG/DL (ref 40–70)
GLUCOSE SERPL-MCNC: 90 MG/DL (ref 74–99)
GLUCOSE SERPL-MCNC: 98 MG/DL (ref 74–99)
HCT VFR BLD AUTO: 25.5 % (ref 41–52)
HCT VFR BLD AUTO: 26.7 % (ref 41–52)
HGB BLD-MCNC: 8 G/DL (ref 13.5–17.5)
HGB BLD-MCNC: 8 G/DL (ref 13.5–17.5)
IMM GRANULOCYTES # BLD AUTO: 0.03 X10*3/UL (ref 0–0.7)
IMM GRANULOCYTES # BLD AUTO: 0.03 X10*3/UL (ref 0–0.7)
IMM GRANULOCYTES NFR BLD AUTO: 0.7 % (ref 0–0.9)
IMM GRANULOCYTES NFR BLD AUTO: 0.8 % (ref 0–0.9)
IMM GRANULOCYTES NFR CSF: 0 %
INR PPP: 1.1 (ref 0.9–1.1)
INR PPP: 1.1 (ref 0.9–1.1)
LDH SERPL L TO P-CCNC: 174 U/L (ref 84–246)
LYMPHOCYTES # BLD AUTO: 1.14 X10*3/UL (ref 1.2–4.8)
LYMPHOCYTES # BLD MANUAL: 0.8 X10*3/UL (ref 1.2–4.8)
LYMPHOCYTES NFR BLD AUTO: 28.9 %
LYMPHOCYTES NFR BLD MANUAL: 18.1 %
LYMPHOCYTES NFR CSF MANUAL: 15 % (ref 28–96)
MAGNESIUM SERPL-MCNC: 1.55 MG/DL (ref 1.6–2.4)
MCH RBC QN AUTO: 29 PG (ref 26–34)
MCH RBC QN AUTO: 29.6 PG (ref 26–34)
MCHC RBC AUTO-ENTMCNC: 30 G/DL (ref 32–36)
MCHC RBC AUTO-ENTMCNC: 31.4 G/DL (ref 32–36)
MCV RBC AUTO: 94 FL (ref 80–100)
MCV RBC AUTO: 97 FL (ref 80–100)
MONOCYTES # BLD AUTO: 0.44 X10*3/UL (ref 0.1–1)
MONOCYTES # BLD MANUAL: 0.26 X10*3/UL (ref 0.1–1)
MONOCYTES NFR BLD AUTO: 11.2 %
MONOCYTES NFR BLD MANUAL: 6 %
MONOS+MACROS NFR CSF MANUAL: 85 % (ref 16–56)
MYELOCYTES # BLD MANUAL: 0.04 X10*3/UL
MYELOCYTES NFR BLD MANUAL: 0.9 %
NEUTROPHILS # BLD AUTO: 2.28 X10*3/UL (ref 1.2–7.7)
NEUTROPHILS NFR BLD AUTO: 57.8 %
NEUTS SEG # BLD MANUAL: 3.07 X10*3/UL (ref 1.2–7)
NEUTS SEG NFR BLD MANUAL: 69.8 %
NEUTS SEG NFR CSF MANUAL: 0 % (ref 0–5)
NRBC BLD-RTO: 0 /100 WBCS (ref 0–0)
NRBC BLD-RTO: 0.5 /100 WBCS (ref 0–0)
OTHER CELLS NFR CSF MANUAL: 0 %
PHOSPHATE SERPL-MCNC: 4.4 MG/DL (ref 2.5–4.9)
PLASMA CELLS NFR CSF MICRO: 0 % (ref ?–0)
PLATELET # BLD AUTO: 118 X10*3/UL (ref 150–450)
PLATELET # BLD AUTO: 125 X10*3/UL (ref 150–450)
POTASSIUM SERPL-SCNC: 3.9 MMOL/L (ref 3.5–5.3)
POTASSIUM SERPL-SCNC: 4 MMOL/L (ref 3.5–5.3)
PROT CSF-MCNC: 157 MG/DL (ref 15–45)
PROT SERPL-MCNC: 4.8 G/DL (ref 6.4–8.2)
PROTHROMBIN TIME: 11.9 SECONDS (ref 9.8–12.4)
PROTHROMBIN TIME: 12.5 SECONDS (ref 9.8–12.4)
RBC # BLD AUTO: 2.7 X10*6/UL (ref 4.5–5.9)
RBC # BLD AUTO: 2.76 X10*6/UL (ref 4.5–5.9)
RBC # CSF AUTO: 10 /UL (ref 0–5)
RBC MORPH BLD: ABNORMAL
RBC MORPH BLD: NORMAL
RH FACTOR (ANTIGEN D): NORMAL
SCHISTOCYTES BLD QL SMEAR: NORMAL
SODIUM SERPL-SCNC: 138 MMOL/L (ref 136–145)
SODIUM SERPL-SCNC: 139 MMOL/L (ref 136–145)
TOTAL CELLS COUNTED BLD: 116
TOTAL CELLS COUNTED CSF: 26
TUBE # CSF: ABNORMAL
URATE SERPL-MCNC: 6.5 MG/DL (ref 4–7.5)
WBC # BLD AUTO: 3.9 X10*3/UL (ref 4.4–11.3)
WBC # BLD AUTO: 4.4 X10*3/UL (ref 4.4–11.3)
WBC # CSF AUTO: 3 /UL (ref 1–5)

## 2025-05-03 PROCEDURE — 89051 BODY FLUID CELL COUNT: CPT | Performed by: STUDENT IN AN ORGANIZED HEALTH CARE EDUCATION/TRAINING PROGRAM

## 2025-05-03 PROCEDURE — 85384 FIBRINOGEN ACTIVITY: CPT | Performed by: HOME HEALTH AIDE

## 2025-05-03 PROCEDURE — 2500000005 HC RX 250 GENERAL PHARMACY W/O HCPCS: Mod: JZ | Performed by: STUDENT IN AN ORGANIZED HEALTH CARE EDUCATION/TRAINING PROGRAM

## 2025-05-03 PROCEDURE — 2500000001 HC RX 250 WO HCPCS SELF ADMINISTERED DRUGS (ALT 637 FOR MEDICARE OP)

## 2025-05-03 PROCEDURE — 88185 FLOWCYTOMETRY/TC ADD-ON: CPT | Mod: TC | Performed by: STUDENT IN AN ORGANIZED HEALTH CARE EDUCATION/TRAINING PROGRAM

## 2025-05-03 PROCEDURE — 2500000004 HC RX 250 GENERAL PHARMACY W/ HCPCS (ALT 636 FOR OP/ED): Performed by: STUDENT IN AN ORGANIZED HEALTH CARE EDUCATION/TRAINING PROGRAM

## 2025-05-03 PROCEDURE — 80069 RENAL FUNCTION PANEL: CPT | Performed by: HOME HEALTH AIDE

## 2025-05-03 PROCEDURE — 85610 PROTHROMBIN TIME: CPT | Performed by: HOME HEALTH AIDE

## 2025-05-03 PROCEDURE — 3E04305 INTRODUCTION OF OTHER ANTINEOPLASTIC INTO CENTRAL VEIN, PERCUTANEOUS APPROACH: ICD-10-PCS | Performed by: INTERNAL MEDICINE

## 2025-05-03 PROCEDURE — 82945 GLUCOSE OTHER FLUID: CPT | Performed by: STUDENT IN AN ORGANIZED HEALTH CARE EDUCATION/TRAINING PROGRAM

## 2025-05-03 PROCEDURE — 1170000001 HC PRIVATE ONCOLOGY ROOM DAILY

## 2025-05-03 PROCEDURE — 88112 CYTOPATH CELL ENHANCE TECH: CPT | Performed by: PATHOLOGY

## 2025-05-03 PROCEDURE — 2500000002 HC RX 250 W HCPCS SELF ADMINISTERED DRUGS (ALT 637 FOR MEDICARE OP, ALT 636 FOR OP/ED): Performed by: STUDENT IN AN ORGANIZED HEALTH CARE EDUCATION/TRAINING PROGRAM

## 2025-05-03 PROCEDURE — 62270 DX LMBR SPI PNXR: CPT

## 2025-05-03 PROCEDURE — 99233 SBSQ HOSP IP/OBS HIGH 50: CPT | Performed by: INTERNAL MEDICINE

## 2025-05-03 PROCEDURE — 84157 ASSAY OF PROTEIN OTHER: CPT | Performed by: STUDENT IN AN ORGANIZED HEALTH CARE EDUCATION/TRAINING PROGRAM

## 2025-05-03 PROCEDURE — 2500000004 HC RX 250 GENERAL PHARMACY W/ HCPCS (ALT 636 FOR OP/ED): Mod: JZ | Performed by: HOME HEALTH AIDE

## 2025-05-03 PROCEDURE — 3E0R305 INTRODUCTION OF OTHER ANTINEOPLASTIC INTO SPINAL CANAL, PERCUTANEOUS APPROACH: ICD-10-PCS | Performed by: INTERNAL MEDICINE

## 2025-05-03 PROCEDURE — 88112 CYTOPATH CELL ENHANCE TECH: CPT | Mod: TC,MCY | Performed by: STUDENT IN AN ORGANIZED HEALTH CARE EDUCATION/TRAINING PROGRAM

## 2025-05-03 PROCEDURE — 85025 COMPLETE CBC W/AUTO DIFF WBC: CPT | Performed by: HOME HEALTH AIDE

## 2025-05-03 PROCEDURE — 2500000004 HC RX 250 GENERAL PHARMACY W/ HCPCS (ALT 636 FOR OP/ED): Mod: JZ

## 2025-05-03 RX ORDER — EPINEPHRINE 1 MG/ML
0.3 INJECTION, SOLUTION, CONCENTRATE INTRAVENOUS EVERY 5 MIN PRN
Status: ACTIVE | OUTPATIENT
Start: 2025-05-03

## 2025-05-03 RX ORDER — DIPHENHYDRAMINE HYDROCHLORIDE 50 MG/ML
50 INJECTION, SOLUTION INTRAMUSCULAR; INTRAVENOUS AS NEEDED
Status: ACTIVE | OUTPATIENT
Start: 2025-05-03

## 2025-05-03 RX ORDER — OLANZAPINE 5 MG/1
5 TABLET, FILM COATED ORAL NIGHTLY
Status: DISPENSED | OUTPATIENT
Start: 2025-05-03

## 2025-05-03 RX ORDER — ONDANSETRON HYDROCHLORIDE 8 MG/1
16 TABLET, FILM COATED ORAL ONCE
Status: COMPLETED | OUTPATIENT
Start: 2025-05-03 | End: 2025-05-03

## 2025-05-03 RX ORDER — FAMOTIDINE 10 MG/ML
20 INJECTION, SOLUTION INTRAVENOUS AS NEEDED
Status: ACTIVE | OUTPATIENT
Start: 2025-05-03

## 2025-05-03 RX ORDER — ENOXAPARIN SODIUM 100 MG/ML
1 INJECTION SUBCUTANEOUS EVERY 12 HOURS
Status: DISPENSED | OUTPATIENT
Start: 2025-05-03

## 2025-05-03 RX ORDER — POLYETHYLENE GLYCOL 3350 17 G/17G
17 POWDER, FOR SOLUTION ORAL DAILY PRN
Status: ACTIVE | OUTPATIENT
Start: 2025-05-03

## 2025-05-03 RX ORDER — PROCHLORPERAZINE MALEATE 10 MG
10 TABLET ORAL EVERY 6 HOURS PRN
Status: ACTIVE | OUTPATIENT
Start: 2025-05-03

## 2025-05-03 RX ORDER — PROCHLORPERAZINE EDISYLATE 5 MG/ML
10 INJECTION INTRAMUSCULAR; INTRAVENOUS EVERY 6 HOURS PRN
Status: ACTIVE | OUTPATIENT
Start: 2025-05-03

## 2025-05-03 RX ORDER — MAGNESIUM SULFATE HEPTAHYDRATE 40 MG/ML
4 INJECTION, SOLUTION INTRAVENOUS ONCE
Status: COMPLETED | OUTPATIENT
Start: 2025-05-03 | End: 2025-05-03

## 2025-05-03 RX ORDER — ALBUTEROL SULFATE 0.83 MG/ML
3 SOLUTION RESPIRATORY (INHALATION) AS NEEDED
Status: ACTIVE | OUTPATIENT
Start: 2025-05-03

## 2025-05-03 RX ADMIN — VINCRISTINE SULFATE 0.8 MG: 1 INJECTION, SOLUTION INTRAVENOUS at 11:07

## 2025-05-03 RX ADMIN — ONDANSETRON HYDROCHLORIDE 16 MG: 8 TABLET, FILM COATED ORAL at 09:37

## 2025-05-03 RX ADMIN — METOPROLOL TARTRATE 75 MG: 50 TABLET, FILM COATED ORAL at 20:26

## 2025-05-03 RX ADMIN — PREDNISONE 140 MG: 50 TABLET ORAL at 09:37

## 2025-05-03 RX ADMIN — ENOXAPARIN SODIUM 80 MG: 100 INJECTION SUBCUTANEOUS at 20:26

## 2025-05-03 RX ADMIN — MAGNESIUM SULFATE IN WATER 4 G: 4 INJECTION, SOLUTION INTRAVENOUS at 05:41

## 2025-05-03 RX ADMIN — ATORVASTATIN CALCIUM 40 MG: 40 TABLET, FILM COATED ORAL at 05:41

## 2025-05-03 RX ADMIN — OLANZAPINE 5 MG: 5 TABLET, FILM COATED ORAL at 20:26

## 2025-05-03 RX ADMIN — GABAPENTIN 300 MG: 300 CAPSULE ORAL at 20:26

## 2025-05-03 RX ADMIN — ACYCLOVIR 400 MG: 200 CAPSULE ORAL at 08:11

## 2025-05-03 RX ADMIN — OXYCODONE 5 MG: 5 TABLET ORAL at 16:26

## 2025-05-03 RX ADMIN — PANTOPRAZOLE SODIUM 40 MG: 40 TABLET, DELAYED RELEASE ORAL at 06:34

## 2025-05-03 RX ADMIN — SODIUM CHLORIDE 12 MG: 9 INJECTION INTRAMUSCULAR; INTRAVENOUS; SUBCUTANEOUS at 14:57

## 2025-05-03 RX ADMIN — PREDNISONE 140 MG: 50 TABLET ORAL at 20:26

## 2025-05-03 RX ADMIN — ACYCLOVIR 400 MG: 200 CAPSULE ORAL at 20:26

## 2025-05-03 RX ADMIN — FOSAPREPITANT 150 MG: 150 INJECTION, POWDER, LYOPHILIZED, FOR SOLUTION INTRAVENOUS at 09:37

## 2025-05-03 RX ADMIN — METOPROLOL TARTRATE 75 MG: 50 TABLET, FILM COATED ORAL at 08:10

## 2025-05-03 ASSESSMENT — PAIN - FUNCTIONAL ASSESSMENT
PAIN_FUNCTIONAL_ASSESSMENT: 0-10
PAIN_FUNCTIONAL_ASSESSMENT: 0-10

## 2025-05-03 ASSESSMENT — PAIN SCALES - GENERAL
PAINLEVEL_OUTOF10: 0 - NO PAIN
PAINLEVEL_OUTOF10: 0 - NO PAIN
PAINLEVEL_OUTOF10: 5 - MODERATE PAIN
PAINLEVEL_OUTOF10: 0 - NO PAIN

## 2025-05-03 ASSESSMENT — PAIN DESCRIPTION - LOCATION: LOCATION: BACK

## 2025-05-03 NOTE — ASSESSMENT & PLAN NOTE
Bijan Ibanez is a 64 y.o. male with PMH of HTN, HLD, CAD, MI (s/p stent 2010, on ASA), hx renal cell carcinoma (s/p R partial nephrectomy in 2013 @ [facility]), GERD, BPH, arthritis who is directly admitted from home for C5 EPOCH chemo therapy for newly diagnosed Burkitt's lymphoma.     ONC  # Burkitt's Lymphoma   -Originally signed out as high grade lymphoma but after burkitt's rearrangement (t8;14) came back positive. High risk based on marrow involvement, though no CNS involvement   - previously reviewed diagnosis and prognosis with patient   - initially give DA-R-EPOCH when was signed out as high grade lymphoma, given the significant toxicity he had with DA R EPOCH, will not escalate to CODOX-IVAC or hyper-CVAD given no CNS involvement   - PET post 2C Chemo shows deauville 2 - c/w CR   - cont w/ EPOCH, trialed dose level 2 but was intolerant with G3 mucositis, again with mucositis with dose level 1, will go down to dose level -1   -Admitted for C5 EPOCH chemo therapy on 5/2 but is delayed for 5/3 as pt arrived to the floor late in the evening.  # CNS prophylaxis   -Plan dose of IT methotrexate in hospital, continue 2x LP w/ IT until EOT, holding eliquis from 4/29 with anticipation of LP during C5 EPOCH therapy   # G1 CIPN   -c/w home gabapentin    Heme   -H/O R subclavian and R axillary DVT r/t PICC (2/3/25; on Eliquis)    -Duplex BUE to r/o DVT (4/10) - Compared with study from 2/3/2025, Acute DVT in left subclavian vein and left axillary vein.    - Duplex BLE (4/14)- negative   - CTPE  (4/11) - Negative   -Eliquis on hold since 4/29 for IT Methotrexate in hospital, continue 2x LP w/ IT until EOT     CARDS  -Echo  2/3/25 shows EF60-65%  #HTN  #Afib  -c/w home Metop 75 mg BID  -Last admission fluid overload requiring intensive diuresis  -Hold lasix and potassium on admit  -Resume eliquis BID post procedure.    MISC  #Recent Fall  -Pt had a fall at home before getting admitted to the floor for chemo therapy.  -No  injury noted, complains of slight pain on his hips as he landed on his hip. Monitor for the worsening sxs.  -On fall precautions.

## 2025-05-03 NOTE — PROGRESS NOTES
Bijan Ibanez is a 64 y.o. male on day 1 of admission presenting with Burkitt's lymphoma (Multi).    Subjective   Patient overall feels well, only noting soreness on arms/trunk 2/2 recent fall (lost balance while tying shoe; denies head trauma, neuro exam wnl). LBM yesterday, semi-formed. Confirmed last dose apixaban was on 4/29, agreeable to LP with IT methotrexate today. Denies HA, dizziness, CP, SOB, N/V/D/C. All other ROS otherwise negative.     Objective     Physical Exam  Constitutional:       Appearance: Normal appearance.   HENT:      Head: Normocephalic and atraumatic.      Nose: Nose normal.      Mouth/Throat:      Mouth: Mucous membranes are moist.   Eyes:      General: No scleral icterus.     Extraocular Movements: Extraocular movements intact.      Pupils: Pupils are equal, round, and reactive to light.   Cardiovascular:      Rate and Rhythm: Normal rate and regular rhythm.      Pulses: Normal pulses.      Heart sounds: Normal heart sounds.   Pulmonary:      Effort: Pulmonary effort is normal.      Breath sounds: Normal breath sounds.   Abdominal:      General: Abdomen is flat. Bowel sounds are normal. There is no distension.      Palpations: Abdomen is soft. There is no mass.      Tenderness: There is abdominal tenderness (minimal RUQ). There is no guarding.   Musculoskeletal:         General: No swelling. Normal range of motion.      Cervical back: Normal range of motion and neck supple.   Skin:     General: Skin is warm and dry.      Coloration: Skin is not jaundiced.      Findings: Bruising (distal LUE 2/2 fall, skin intact) present. No erythema or rash.   Neurological:      General: No focal deficit present.      Mental Status: He is alert and oriented to person, place, and time.      Cranial Nerves: No cranial nerve deficit.      Sensory: No sensory deficit.      Motor: No weakness.   Psychiatric:         Mood and Affect: Mood normal.         Behavior: Behavior normal.      Comments: Fluent  "speech, cooperative         Last Recorded Vitals  Blood pressure 103/72, pulse 77, temperature 36.4 °C (97.5 °F), temperature source Temporal, resp. rate 16, height (S) 1.81 m (5' 11.26\"), weight 82.3 kg (181 lb 7 oz), SpO2 97%.  Intake/Output last 3 Shifts:  No intake/output data recorded.    Relevant Results  Results for orders placed or performed during the hospital encounter of 05/02/25 (from the past 24 hours)   CBC and Auto Differential   Result Value Ref Range    WBC 4.4 4.4 - 11.3 x10*3/uL    nRBC 0.0 0.0 - 0.0 /100 WBCs    RBC 2.70 (L) 4.50 - 5.90 x10*6/uL    Hemoglobin 8.0 (L) 13.5 - 17.5 g/dL    Hematocrit 25.5 (L) 41.0 - 52.0 %    MCV 94 80 - 100 fL    MCH 29.6 26.0 - 34.0 pg    MCHC 31.4 (L) 32.0 - 36.0 g/dL    RDW 21.3 (H) 11.5 - 14.5 %    Platelets 125 (L) 150 - 450 x10*3/uL    Immature Granulocytes %, Automated 0.7 0.0 - 0.9 %    Immature Granulocytes Absolute, Automated 0.03 0.00 - 0.70 x10*3/uL   Comprehensive Metabolic Panel   Result Value Ref Range    Glucose 98 74 - 99 mg/dL    Sodium 138 136 - 145 mmol/L    Potassium 3.9 3.5 - 5.3 mmol/L    Chloride 103 98 - 107 mmol/L    Bicarbonate 27 21 - 32 mmol/L    Anion Gap 12 10 - 20 mmol/L    Urea Nitrogen 21 6 - 23 mg/dL    Creatinine 0.59 0.50 - 1.30 mg/dL    eGFR >90 >60 mL/min/1.73m*2    Calcium 8.8 8.6 - 10.6 mg/dL    Albumin 3.4 3.4 - 5.0 g/dL    Alkaline Phosphatase 51 33 - 136 U/L    Total Protein 4.8 (L) 6.4 - 8.2 g/dL    AST 15 9 - 39 U/L    Bilirubin, Total 0.8 0.0 - 1.2 mg/dL    ALT 23 10 - 52 U/L   Magnesium   Result Value Ref Range    Magnesium 1.55 (L) 1.60 - 2.40 mg/dL   Uric Acid   Result Value Ref Range    Uric Acid 6.5 4.0 - 7.5 mg/dL   Lactate Dehydrogenase   Result Value Ref Range     84 - 246 U/L   Type And Screen   Result Value Ref Range    ABO TYPE A     Rh TYPE POS     ANTIBODY SCREEN NEG    Coagulation Screen   Result Value Ref Range    Protime 12.5 (H) 9.8 - 12.4 seconds    INR 1.1 0.9 - 1.1    aPTT 31 26 - 36 seconds "   Manual Differential   Result Value Ref Range    Neutrophils %, Manual 69.8 40.0 - 80.0 %    Lymphocytes %, Manual 18.1 13.0 - 44.0 %    Monocytes %, Manual 6.0 2.0 - 10.0 %    Eosinophils %, Manual 2.6 0.0 - 6.0 %    Basophils %, Manual 2.6 0.0 - 2.0 %    Myelocytes %, Manual 0.9 0.0 - 0.0 %    Seg Neutrophils Absolute, Manual 3.07 1.20 - 7.00 x10*3/uL    Lymphocytes Absolute, Manual 0.80 (L) 1.20 - 4.80 x10*3/uL    Monocytes Absolute, Manual 0.26 0.10 - 1.00 x10*3/uL    Eosinophils Absolute, Manual 0.11 0.00 - 0.70 x10*3/uL    Basophils Absolute, Manual 0.11 (H) 0.00 - 0.10 x10*3/uL    Myelocytes Absolute, Manual 0.04 0.00 - 0.00 x10*3/uL    Total Cells Counted 116     RBC Morphology See Below     Teardrop Cells Few      Scheduled medications  Scheduled Medications[1]  Continuous medications  Continuous Medications[2]  PRN medications  PRN Medications[3]      Assessment & Plan  Burkitt's lymphoma (Multi)    Burkitt lymphoma    Bijan Ibanez is a 64 y.o. male with PMH of HTN, HLD, CAD, MI (s/p stent 2010, on ASA), hx renal cell carcinoma (s/p R partial nephrectomy in 2013 @ [facility]), GERD, BPH, arthritis who is directly admitted from home for C5 EPOCH chemo therapy for newly diagnosed Burkitt's lymphoma.     D1C5 EPOCH     ONC  # Burkitt's Lymphoma   -Originally signed out as high grade lymphoma but after burkitt's rearrangement (t8;14) came back positive. High risk based on marrow involvement, though no CNS involvement   - previously reviewed diagnosis and prognosis with patient   - initially give DA-R-EPOCH when was signed out as high grade lymphoma, given the significant toxicity he had with DA R EPOCH, will not escalate to CODOX-IVAC or hyper-CVAD given no CNS involvement   - PET post 2C Chemo shows deauville 2 - c/w CR   - cont w/ EPOCH, trialed dose level 2 but was intolerant with G3 mucositis, again with mucositis with dose level 1, will go down to dose level -1   -Admitted for C5 EPOCH chemo therapy on 5/2  but is delayed for 5/3 as pt arrived to the floor late in the evening.  -Plan LP with IT methotrexate 5/3, IT cytarabine 5/7  # G1 CIPN   -c/w home gabapentin    Chemo  Etoposide 50 mg/m2 IV continuous infusion over 24 hours daily on Days 1-4 of Cycle 1 concurrent with:   VinCRIStine 0.4 mg/m2 IV continuous infusion over 24 hours daily on Days 1-4 of Cycle 1 concurrent with:   DOXOrubicin 10 mg/m2 IV continuous infusion over 24 hours daily on Days 1-4 of Cycle 1 followed by:   Cyclophosphamide 750 mg/m2 IV over 30 minutes on Day 5 of Cycle 1   PredniSONE 60 mg/m2 PO twice daily on Days 1-5 of Cycles 1-6   RiTUXimab 375 mg/m2 IV on Day 6   or   RiTUXimab 1,400 mg and hyaluronidase human 23,400 units per 11.7 mL SUBQ on Day 6  -IT methotrexate D1  -IT cytarabine D5     Heme   #H/O R subclavian and R axillary DVT r/t PICC (2/3/25; on Eliquis)    -Duplex BUE to r/o DVT (4/10) - Compared with study from 2/3/2025, Acute DVT in left subclavian vein and left axillary vein.    - Duplex BLE (4/14)- negative   - CTPE  (4/11) - Negative   -Eliquis on hold since 4/29 for procedures (planning LP on 5/3 and 5/7)   -lovenox while inpt given multiple procedures during admission      CARDS  -Echo  2/3/25 shows EF60-65%  #HTN  #Afib  -c/w home Metop 75 mg BID  -Last admission fluid overload requiring intensive diuresis  -Hold lasix and potassium on admit  -Resume eliquis BID at discharge     MISC  #Recent Fall  -Pt had a fall at home before getting admitted to the floor for chemo therapy.  -No injury noted, complains of slight pain on his hips as he landed on his hip. Monitor for the worsening sxs.  -On fall precautions.  -PT consulted      DISPO  -Full Code  -ACCESS: L DBL SOLO PICC (3/7/25)   -PRIMARY ONC: Dr. Mckeon     Patient seen, examined, and discussed with ROCÍO Delgado-C         [1] acyclovir, 400 mg, oral, BID  [Held by provider] apixaban, 5 mg, oral, BID  atorvastatin, 40 mg, oral, Daily  DOXOrubicin  (Adriamycin) 20.4 mg, etoposide (Toposar) 76 mg, vinCRIStine (Oncovin) 0.8 mg in sodium chloride 0.9% 561.8 mL IV, , intravenous, Once  fosaprepitant, 150 mg, intravenous, Once  gabapentin, 300 mg, oral, Nightly  magnesium sulfate, 4 g, intravenous, Once  methotrexate (PF), 12 mg, intrathecal, Once  metoprolol tartrate, 75 mg, oral, BID  OLANZapine, 5 mg, oral, Nightly  ondansetron, 16 mg, oral, Once  pantoprazole, 40 mg, oral, Daily before breakfast  predniSONE, 140 mg, oral, BID  [2]    [3] PRN medications: albuterol, albuterol, alteplase, dextrose, diphenhydrAMINE, EPINEPHrine HCl, famotidine, lidocaine-diphenhydrAMINE-Maalox 1:1:1, methylPREDNISolone sodium succinate (PF), ondansetron, oxyCODONE, polyethylene glycol, prochlorperazine, prochlorperazine, sodium chloride

## 2025-05-03 NOTE — CARE PLAN
The clinical goals for the shift include pt will remain HDS      Problem: Pain - Adult  Goal: Verbalizes/displays adequate comfort level or baseline comfort level  Outcome: Progressing     Problem: Safety - Adult  Goal: Free from fall injury  Outcome: Progressing     Problem: Discharge Planning  Goal: Discharge to home or other facility with appropriate resources  Outcome: Progressing     Problem: Chronic Conditions and Co-morbidities  Goal: Patient's chronic conditions and co-morbidity symptoms are monitored and maintained or improved  Outcome: Progressing     Problem: Nutrition  Goal: Nutrient intake appropriate for maintaining nutritional needs  Outcome: Progressing     Problem: Fall/Injury  Goal: Not fall by end of shift  Outcome: Progressing  Goal: Be free from injury by end of the shift  Outcome: Progressing  Goal: Verbalize understanding of personal risk factors for fall in the hospital  Outcome: Progressing  Goal: Verbalize understanding of risk factor reduction measures to prevent injury from fall in the home  Outcome: Progressing  Goal: Use assistive devices by end of the shift  Outcome: Progressing  Goal: Pace activities to prevent fatigue by end of the shift  Outcome: Progressing     Problem: Pain  Goal: Takes deep breaths with improved pain control throughout the shift  Outcome: Progressing  Goal: Turns in bed with improved pain control throughout the shift  Outcome: Progressing  Goal: Walks with improved pain control throughout the shift  Outcome: Progressing  Goal: Performs ADL's with improved pain control throughout shift  Outcome: Progressing  Goal: Participates in PT with improved pain control throughout the shift  Outcome: Progressing  Goal: Free from opioid side effects throughout the shift  Outcome: Progressing  Goal: Free from acute confusion related to pain meds throughout the shift  Outcome: Progressing

## 2025-05-03 NOTE — PROCEDURES
Lumbar Puncture    Date/Time: 5/3/2025 3:28 PM    Performed by: GER Abebe  Authorized by: GER Abebe    Consent:     Consent obtained:  Verbal and written    Consent given by:  Patient    Risks, benefits, and alternatives were discussed: yes      Risks discussed:  Bleeding, infection, pain, headache, nerve damage and repeat procedure    Alternatives discussed:  No treatment and delayed treatment  Universal protocol:     Procedure explained and questions answered to patient or proxy's satisfaction: yes      Relevant documents present and verified: yes      Test results available: yes      Imaging studies available: yes      Required blood products, implants, devices, and special equipment available: yes      Immediately prior to procedure a time out was called: yes      Site/side marked: yes      Patient identity confirmed:  Verbally with patient, arm band and hospital-assigned identification number  Pre-procedure details:     Procedure purpose:  Therapeutic    Preparation: Patient was prepped and draped in usual sterile fashion    Sedation:     Sedation type:  None  Anesthesia:     Anesthesia method:  Local infiltration    Local anesthetic:  Lidocaine 1% w/o epi  Procedure details:     Lumbar space:  L3-L4 interspace    Patient position:  Sitting    Needle gauge:  20    Needle type:  Spinal needle - Quincke tip    Needle length (in):  3.5    Ultrasound guidance: no      Number of attempts:  1    Fluid appearance:  Clear    Tubes of fluid:  3    Total volume (ml):  6  Post-procedure details:     Puncture site:  Adhesive bandage applied and direct pressure applied    Procedure completion:  Tolerated well, no immediate complications  Comments:      The patient was verbally consented for lumbar puncture.  The risks, benefits, and alternatives of procedures were discussed.  The patient wishes to proceed.  Time-out was taken to verify correct patient, procedure, and location of procedure.  Sterile  technique was used for the entire procedure. The patient was placed in the sitting position/semi-fetal position. The area was cleansed and draped in usual sterile fashion. Anesthesia was achieved with 1% Lidocaine. A 20-gauge 3.5-inch spinal needle was placed in the L3-L4 interspaces. On the 1st attempt, clear cerebral spinal fluid was obtained. Three tubes were filled each with 2 mL of CSF. These were sent for Cell count w/diff, total protein and glucose, path review and flow cytometry. 12 mg methotrexate was instilled intrathecally (IT) over 5 minutes following the CSF collection. The patient had no complications and tolerated the procedure well. A sterile Band-Aid and the site was monitored for any bleeding or drainage. The patient tolerated procedure well, had minimal bleeding. Furthermore, the patient was instructed if any signs of infection, bleeding, or severe pain to immediately notify MD.  Medication allergies reviewed on EMR.

## 2025-05-03 NOTE — PROGRESS NOTES
Pharmacy Medication History Review    Bijan Ibanez is a 64 y.o. male admitted for Burkitt's lymphoma (Multi). Pharmacy reviewed the patient's [id number] medications and allergies for accuracy.    Medications ADDED:  None  Medications CHANGED:  None  Medications REMOVED:   Acyclovir, fluconazole, heparin flush injection, miralax powder, NS flush     The list below reflects the updated PTA list.   Prior to Admission Medications   Prescriptions Last Dose Informant   Lidocaine/Maalox/Diphenhydramine (1:1:1)  Self, Spouse/Significant Other   Sig: Swish and swallow 15 mL every 6 hours if needed for mucositis for up to 7 days.   albuterol 90 mcg/actuation inhaler  Spouse/Significant Other, Self   Sig: Inhale 2 puffs every 6 hours if needed for shortness of breath.   apixaban (Eliquis) 5 mg tablet  Spouse/Significant Other, Self   Sig: Take 1 tablet (5 mg) by mouth 2 times a day.   atorvastatin (Lipitor) 40 mg tablet  Spouse/Significant Other, Self   Sig: Take 1 tablet (40 mg) by mouth early in the morning..   furosemide (Lasix) 20 mg tablet  Spouse/Significant Other, Self   Sig: Take 1 tablet (20 mg) by mouth once daily as needed (For Lower extremity edema).   gabapentin (Neurontin) 300 mg capsule  Spouse/Significant Other, Self   Sig: Take 1 capsule (300 mg) by mouth once daily at bedtime.   metoprolol tartrate (Lopressor) 75 mg tablet  Spouse/Significant Other, Self   Sig: Take 1 tablet (75 mg) by mouth 2 times a day.   oxyCODONE (Roxicodone) 5 mg immediate release tablet  Self, Spouse/Significant Other   Sig: Take 1 tablet (5 mg) by mouth every 6 hours if needed for moderate pain (4 - 6) for up to 7 days.   pantoprazole (ProtoNix) 40 mg EC tablet  Spouse/Significant Other, Self   Sig: Take 1 tablet (40 mg) by mouth once daily in the morning. Take before meals. Do not crush, chew, or split.   potassium chloride CR (Klor-Con M20) 20 mEq ER tablet Not Taking Spouse/Significant Other, Self   Sig: Take 1 tablet  "(20 mEq) by mouth once daily. Do not crush or chew.   Patient not taking: Reported on 5/3/2025   potassium chloride CR (Klor-Con) 10 mEq ER tablet Not Taking Spouse/Significant Other, Self   Sig: Take 1 tablet (10 mEq) by mouth if needed (leg swelling). Do not crush, chew, or split.   Patient not taking: Reported on 5/3/2025      Facility-Administered Medications: None        The list below reflects the updated allergy list. Please review each documented allergy for additional clarification and justification.  Allergies  Reviewed by Aldair Hawthorne PharmD on 5/3/2025        Severity Reactions Comments    Latex Not Specified Itching     Penicillins Not Specified Other Patient reports from childhood, unsure of reaction            Patient accepts M2B at discharge.     Sources:   [facility]  Pharmacy dispense history  Patient Interview Good historian  Spouse patient's wife at bedside Good historian  Chart Review  Care Everywhere  [facility] summarization of episode note  4/18/2025 [facility] discharge summary    Additional Comments:  Patient's wife provided a printed AVS with Dr. Mckeon dated 4/29/2025 @2231, which had a medication list that I reviewed with the patient and his wife      Aldair Hawthorne PharmD  Transitions of Care Pharmacist  05/03/25     Secure Chat preferred   If no response call [phone] or Vocera \"Med Rec\"    "

## 2025-05-03 NOTE — HOSPITAL COURSE
Bijan Ibanez is a 64 y.o. male with PMH of HTN, HLD, CAD, MI (s/p stent 2010, on ASA), hx renal cell carcinoma (s/p R partial nephrectomy in 2013 @ [facility]), GERD, BPH, arthritis who is directly admitted from home for C5 EPOCH chemo therapy for newly diagnosed Burkitt's lymphoma.     Patient received IT methotrexate on 5/3, flow pending.  Patient received IT cytarabine ***. Flow pending.     Hospital course otherwise uncomplicated.     Anti-infectives upon discharge:  Access: SOLO PICC   Dispo: home   Apts:

## 2025-05-03 NOTE — CARE PLAN
The clinical goals for the shift include Patient will remain free of falls throughout this shift.    Over the shift, the patient did make progress toward the following goals. Barriers to progression include recent fall/weakness. Recommendations to address these barriers include high fall risk interventions.    Problem: Pain - Adult  Goal: Verbalizes/displays adequate comfort level or baseline comfort level  Outcome: Progressing     Problem: Safety - Adult  Goal: Free from fall injury  Outcome: Progressing     Problem: Discharge Planning  Goal: Discharge to home or other facility with appropriate resources  Outcome: Progressing     Problem: Chronic Conditions and Co-morbidities  Goal: Patient's chronic conditions and co-morbidity symptoms are monitored and maintained or improved  Outcome: Progressing     Problem: Nutrition  Goal: Nutrient intake appropriate for maintaining nutritional needs  Outcome: Progressing     Problem: Fall/Injury  Goal: Not fall by end of shift  Outcome: Progressing  Goal: Be free from injury by end of the shift  Outcome: Progressing  Goal: Verbalize understanding of personal risk factors for fall in the hospital  Outcome: Progressing  Goal: Verbalize understanding of risk factor reduction measures to prevent injury from fall in the home  Outcome: Progressing  Goal: Use assistive devices by end of the shift  Outcome: Progressing  Goal: Pace activities to prevent fatigue by end of the shift  Outcome: Progressing     Problem: Pain  Goal: Takes deep breaths with improved pain control throughout the shift  Outcome: Progressing  Goal: Turns in bed with improved pain control throughout the shift  Outcome: Progressing  Goal: Walks with improved pain control throughout the shift  Outcome: Progressing     ANJALI RODRÍGUEZ RN

## 2025-05-03 NOTE — NURSING NOTE
Pt receiving first dose of EPOCH per orders via L PICC line. +BBR verified prior to administration. Pre-medicated per orders. Chemo verification done at the bedside with Joseph Rodriguez RN. Pt tolerating infusion well.

## 2025-05-03 NOTE — H&P
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 gauge spinal needle was then introduced at the L2-L3 level under direct fluoroscopic guidance until return of CSF was demonstrated. 10 ml of clear CSF was collected in 4 tubes. The stylet was then replaced and the spinal needle was removed.  Hemostasis was achieved with direct pressure and the area was dressed with a Band-Aid. The patient tolerated procedure well without any immediate or clinically apparent complications. Total fluoroscopy time was 0.3 minutes.   The collected CSF was then sent to the lab for appropriate lab tests as ordered by the referring physician.   FINDINGS: A single periprocedural spot fluoroscopic image was provided for interpretation. Image quality is such that fine bony detail is obscured. A needle is seen to overlie the posterior elements of L3.       Successful fluoroscopic guided lumbar puncture with administration of intrathecal chemotherapy.   I, Dr. Katherine Horan, supervised and was available throughout this procedure as performed by fellow physician Willam  MD Reinier. This study was performed and interpreted at [facility]. I agree with the procedural description and the findings as stated.   MACRO: None   Signed by: Katherine Horan 4/4/2025 6:08 PM Dictation workstation:   [id number]         Assessment/Plan   Assessment & Plan  Burkitt's lymphoma (Multi)    Burkitt lymphoma  Bijan Ibanez is a 64 y.o. male with PMH of HTN, HLD, CAD, MI (s/p stent 2010, on ASA), hx renal cell carcinoma (s/p R partial nephrectomy in 2013 @ [facility]), GERD, BPH, arthritis who is directly admitted from home for C5 EPOCH chemo therapy for newly diagnosed Burkitt's lymphoma.     ONC  # Burkitt's Lymphoma   -Originally signed out as high grade lymphoma but after burkitt's rearrangement (t8;14) came back positive. High risk based on marrow involvement, though no CNS involvement   - previously reviewed diagnosis and prognosis with patient   - initially give DA-R-EPOCH when was signed out as high grade lymphoma, given the significant toxicity he had with DA R EPOCH, will not escalate to CODOX-IVAC or hyper-CVAD given no CNS involvement   - PET post 2C Chemo shows deauville 2 - c/w CR   - cont w/ EPOCH, trialed dose level 2 but was intolerant with G3 mucositis, again with mucositis with dose level 1, will go down to dose level -1   -Admitted for C5 EPOCH chemo therapy on 5/2 but is delayed for 5/3 as pt arrived to the floor late in the evening.  # CNS prophylaxis   -Plan dose of IT methotrexate in hospital, continue 2x LP w/ IT until EOT, holding eliquis from 4/29 with anticipation of LP during C5 EPOCH therapy   # G1 CIPN   -c/w home gabapentin    Heme   -H/O R subclavian and R axillary DVT r/t PICC (2/3/25; on Eliquis)    -Duplex BUE to r/o DVT (4/10) - Compared with study from 2/3/2025, Acute DVT in left subclavian vein and left axillary vein.    - Duplex BLE (4/14)- negative   - CTPE  (4/11) - Negative   -Eliquis on hold since 4/29 for IT Methotrexate in  hospital, continue 2x LP w/ IT until EOT     CARDS  -Echo  2/3/25 shows EF60-65%  #HTN  #Afib  -c/w home Metop 75 mg BID  -Last admission fluid overload requiring intensive diuresis  -Hold lasix and potassium on admit  -Resume eliquis BID post procedure.    MISC  #Recent Fall  -Pt had a fall at home before getting admitted to the floor for chemo therapy.  -No injury noted, complains of slight pain on his hips as he landed on his hip. Monitor for the worsening sxs.  -On fall precautions.      DISPO  -Full Code  -ACCESS: L [facility] SOLO PICC (3/7/25)   -PRIMARY ONC: Dr. Mike BATEMAN spent 55 minutes in the professional and overall care of this patient.      Lashon Nugent, JUDY-CNP         [1] [START ON 5/3/2025] acyclovir, 400 mg, oral, BID  [Held by provider] apixaban, 5 mg, oral, BID  [START ON 5/3/2025] atorvastatin, 40 mg, oral, Daily  gabapentin, 300 mg, oral, Nightly  metoprolol tartrate, 75 mg, oral, BID  [START ON 5/3/2025] pantoprazole, 40 mg, oral, Daily before breakfast  [START ON 5/3/2025] polyethylene glycol, 17 g, oral, Daily  [2]    [3] PRN medications: albuterol, alteplase, lidocaine-diphenhydrAMINE-Maalox 1:1:1, ondansetron, oxyCODONE

## 2025-05-04 VITALS
BODY MASS INDEX: 25.77 KG/M2 | OXYGEN SATURATION: 100 % | DIASTOLIC BLOOD PRESSURE: 72 MMHG | SYSTOLIC BLOOD PRESSURE: 112 MMHG | RESPIRATION RATE: 20 BRPM | HEIGHT: 71 IN | TEMPERATURE: 96.3 F | HEART RATE: 74 BPM | WEIGHT: 184.08 LBS

## 2025-05-04 LAB
ALBUMIN SERPL BCP-MCNC: 3.5 G/DL (ref 3.4–5)
ANION GAP SERPL CALC-SCNC: 14 MMOL/L (ref 10–20)
BASOPHILS # BLD MANUAL: 0 X10*3/UL (ref 0–0.1)
BASOPHILS NFR BLD MANUAL: 0 %
BUN SERPL-MCNC: 23 MG/DL (ref 6–23)
CALCIUM SERPL-MCNC: 8.9 MG/DL (ref 8.6–10.6)
CHLORIDE SERPL-SCNC: 104 MMOL/L (ref 98–107)
CO2 SERPL-SCNC: 24 MMOL/L (ref 21–32)
CREAT SERPL-MCNC: 0.61 MG/DL (ref 0.5–1.3)
DACRYOCYTES BLD QL SMEAR: ABNORMAL
EGFRCR SERPLBLD CKD-EPI 2021: >90 ML/MIN/1.73M*2
EOSINOPHIL # BLD MANUAL: 0 X10*3/UL (ref 0–0.7)
EOSINOPHIL NFR BLD MANUAL: 0 %
ERYTHROCYTE [DISTWIDTH] IN BLOOD BY AUTOMATED COUNT: 20.7 % (ref 11.5–14.5)
GLUCOSE SERPL-MCNC: 136 MG/DL (ref 74–99)
HCT VFR BLD AUTO: 26.4 % (ref 41–52)
HGB BLD-MCNC: 8.3 G/DL (ref 13.5–17.5)
IMM GRANULOCYTES # BLD AUTO: 0.02 X10*3/UL (ref 0–0.7)
IMM GRANULOCYTES NFR BLD AUTO: 0.7 % (ref 0–0.9)
LYMPHOCYTES # BLD MANUAL: 0.31 X10*3/UL (ref 1.2–4.8)
LYMPHOCYTES NFR BLD MANUAL: 10.4 %
MCH RBC QN AUTO: 28.7 PG (ref 26–34)
MCHC RBC AUTO-ENTMCNC: 31.4 G/DL (ref 32–36)
MCV RBC AUTO: 91 FL (ref 80–100)
MONOCYTES # BLD MANUAL: 0.08 X10*3/UL (ref 0.1–1)
MONOCYTES NFR BLD MANUAL: 2.6 %
NEUTS SEG # BLD MANUAL: 2.61 X10*3/UL (ref 1.2–7)
NEUTS SEG NFR BLD MANUAL: 87 %
NRBC BLD-RTO: 0 /100 WBCS (ref 0–0)
OVALOCYTES BLD QL SMEAR: ABNORMAL
PHOSPHATE SERPL-MCNC: 4.6 MG/DL (ref 2.5–4.9)
PLATELET # BLD AUTO: 145 X10*3/UL (ref 150–450)
POLYCHROMASIA BLD QL SMEAR: ABNORMAL
POTASSIUM SERPL-SCNC: 4.1 MMOL/L (ref 3.5–5.3)
RBC # BLD AUTO: 2.89 X10*6/UL (ref 4.5–5.9)
RBC MORPH BLD: ABNORMAL
SODIUM SERPL-SCNC: 138 MMOL/L (ref 136–145)
TOTAL CELLS COUNTED BLD: 115
WBC # BLD AUTO: 3 X10*3/UL (ref 4.4–11.3)

## 2025-05-04 PROCEDURE — 2500000002 HC RX 250 W HCPCS SELF ADMINISTERED DRUGS (ALT 637 FOR MEDICARE OP, ALT 636 FOR OP/ED): Performed by: STUDENT IN AN ORGANIZED HEALTH CARE EDUCATION/TRAINING PROGRAM

## 2025-05-04 PROCEDURE — 84100 ASSAY OF PHOSPHORUS: CPT

## 2025-05-04 PROCEDURE — 2500000001 HC RX 250 WO HCPCS SELF ADMINISTERED DRUGS (ALT 637 FOR MEDICARE OP)

## 2025-05-04 PROCEDURE — 1170000001 HC PRIVATE ONCOLOGY ROOM DAILY

## 2025-05-04 PROCEDURE — 99233 SBSQ HOSP IP/OBS HIGH 50: CPT | Performed by: INTERNAL MEDICINE

## 2025-05-04 PROCEDURE — 85007 BL SMEAR W/DIFF WBC COUNT: CPT

## 2025-05-04 PROCEDURE — 2500000004 HC RX 250 GENERAL PHARMACY W/ HCPCS (ALT 636 FOR OP/ED): Mod: JZ | Performed by: HOME HEALTH AIDE

## 2025-05-04 PROCEDURE — 85027 COMPLETE CBC AUTOMATED: CPT

## 2025-05-04 PROCEDURE — 2500000004 HC RX 250 GENERAL PHARMACY W/ HCPCS (ALT 636 FOR OP/ED): Performed by: STUDENT IN AN ORGANIZED HEALTH CARE EDUCATION/TRAINING PROGRAM

## 2025-05-04 RX ORDER — ONDANSETRON HYDROCHLORIDE 8 MG/1
16 TABLET, FILM COATED ORAL ONCE
Status: COMPLETED | OUTPATIENT
Start: 2025-05-04 | End: 2025-05-04

## 2025-05-04 RX ADMIN — METOPROLOL TARTRATE 75 MG: 50 TABLET, FILM COATED ORAL at 08:17

## 2025-05-04 RX ADMIN — GABAPENTIN 300 MG: 300 CAPSULE ORAL at 20:07

## 2025-05-04 RX ADMIN — METOPROLOL TARTRATE 75 MG: 50 TABLET, FILM COATED ORAL at 20:07

## 2025-05-04 RX ADMIN — OLANZAPINE 5 MG: 5 TABLET, FILM COATED ORAL at 20:07

## 2025-05-04 RX ADMIN — ACYCLOVIR 400 MG: 200 CAPSULE ORAL at 08:17

## 2025-05-04 RX ADMIN — ENOXAPARIN SODIUM 80 MG: 100 INJECTION SUBCUTANEOUS at 08:17

## 2025-05-04 RX ADMIN — OXYCODONE 5 MG: 5 TABLET ORAL at 08:17

## 2025-05-04 RX ADMIN — ENOXAPARIN SODIUM 80 MG: 100 INJECTION SUBCUTANEOUS at 20:06

## 2025-05-04 RX ADMIN — ATORVASTATIN CALCIUM 40 MG: 40 TABLET, FILM COATED ORAL at 05:35

## 2025-05-04 RX ADMIN — PREDNISONE 140 MG: 50 TABLET ORAL at 08:16

## 2025-05-04 RX ADMIN — PREDNISONE 140 MG: 50 TABLET ORAL at 20:06

## 2025-05-04 RX ADMIN — OXYCODONE 5 MG: 5 TABLET ORAL at 21:13

## 2025-05-04 RX ADMIN — ACYCLOVIR 400 MG: 200 CAPSULE ORAL at 20:07

## 2025-05-04 RX ADMIN — ETOPOSIDE 0.8 MG: 20 INJECTION, SOLUTION INTRAVENOUS at 11:02

## 2025-05-04 RX ADMIN — PANTOPRAZOLE SODIUM 40 MG: 40 TABLET, DELAYED RELEASE ORAL at 05:37

## 2025-05-04 RX ADMIN — ONDANSETRON HYDROCHLORIDE 16 MG: 8 TABLET, FILM COATED ORAL at 10:13

## 2025-05-04 ASSESSMENT — PAIN DESCRIPTION - LOCATION
LOCATION: HIP
LOCATION: BACK

## 2025-05-04 ASSESSMENT — PAIN DESCRIPTION - ORIENTATION: ORIENTATION: LEFT

## 2025-05-04 ASSESSMENT — COGNITIVE AND FUNCTIONAL STATUS - GENERAL
DAILY ACTIVITIY SCORE: 23
HELP NEEDED FOR BATHING: A LITTLE
MOBILITY SCORE: 21
CLIMB 3 TO 5 STEPS WITH RAILING: TOTAL

## 2025-05-04 ASSESSMENT — PAIN - FUNCTIONAL ASSESSMENT
PAIN_FUNCTIONAL_ASSESSMENT: 0-10
PAIN_FUNCTIONAL_ASSESSMENT: 0-10

## 2025-05-04 ASSESSMENT — PAIN SCALES - GENERAL
PAINLEVEL_OUTOF10: 6
PAINLEVEL_OUTOF10: 4
PAINLEVEL_OUTOF10: 0 - NO PAIN
PAINLEVEL_OUTOF10: 0 - NO PAIN
PAINLEVEL_OUTOF10: 4

## 2025-05-04 ASSESSMENT — PAIN DESCRIPTION - DESCRIPTORS: DESCRIPTORS: ACHING

## 2025-05-04 NOTE — NURSING NOTE
Pt receiving second dose of EPOCH per orders via L PICC line. +BBR verified prior to administration. Pre-medicated per orders. Chemo verification done at the bedside with Rei Mesa RN. Pt tolerating chemo well, no complaints at this time.

## 2025-05-04 NOTE — PROGRESS NOTES
"Bijan Ibanez is a 64 y.o. male on day 2 of admission presenting with Burkitt's lymphoma (Multi).    Subjective   Sore from recent fall (arms and trunk). Area from LP is a little sore but tolerable. Reports normal bowel movements.     Denies HA, dizziness, CP, SOB, N/V/D/C. All other ROS otherwise negative.     Objective   Physical Exam  Constitutional:       Appearance: Normal appearance.   HENT:      Head: Normocephalic and atraumatic.      Nose: Nose normal.      Mouth/Throat:      Mouth: Mucous membranes are moist.   Eyes:      General: No scleral icterus.     Extraocular Movements: Extraocular movements intact.      Pupils: Pupils are equal, round, and reactive to light.   Cardiovascular:      Rate and Rhythm: Normal rate and regular rhythm.      Pulses: Normal pulses.      Heart sounds: Normal heart sounds.   Pulmonary:      Effort: Pulmonary effort is normal.      Breath sounds: Normal breath sounds.   Abdominal:      General: Abdomen is flat. Bowel sounds are normal. There is no distension.      Palpations: Abdomen is soft. There is no mass.      Tenderness: There is abdominal tenderness (minimal RUQ). There is no guarding.   Musculoskeletal:         General: No swelling. Normal range of motion.      Cervical back: Normal range of motion and neck supple.   Skin:     General: Skin is warm and dry.      Coloration: Skin is not jaundiced.      Findings: Bruising (distal LUE 2/2 fall, skin intact) present. No erythema or rash.   Neurological:      General: No focal deficit present.      Mental Status: He is alert and oriented to person, place, and time.      Cranial Nerves: No cranial nerve deficit.      Sensory: No sensory deficit.      Motor: No weakness.   Psychiatric:         Mood and Affect: Mood normal.         Behavior: Behavior normal.     Last Recorded Vitals  Blood pressure 106/72, pulse 75, temperature 36.5 °C (97.7 °F), temperature source Temporal, resp. rate 18, height (S) 1.81 m (5' 11.26\"), " weight 83.5 kg (184 lb 1.4 oz), SpO2 95%.  Intake/Output last 3 Shifts:  I/O last 3 completed shifts:  In: 877.2 (10.7 mL/kg) [P.O.:360; I.V.:97.9 (1.2 mL/kg); IV Piggyback:419.3]  Out: - (0 mL/kg)   Weight: 82.3 kg     Assessment & Plan  Burkitt's lymphoma (Multi)    Burkitt lymphoma    Bijan Ibanez is a 64 y.o. male with PMH of HTN, HLD, CAD, MI (s/p stent 2010, on ASA), hx renal cell carcinoma (s/p R partial nephrectomy in 2013 @ [facility]), GERD, BPH, arthritis who is directly admitted from home for C5 EPOCH chemo therapy for newly diagnosed Burkitt's lymphoma.     D2C5 R-EPOCH     ONC  # Burkitt's Lymphoma   -Originally signed out as high grade lymphoma but after burkitt's rearrangement (t8;14) came back positive. High risk based on marrow involvement, though no CNS involvement   - initially give DA-R-EPOCH when was signed out as high grade lymphoma, given the significant toxicity he had with DA R EPOCH, will not escalate to CODOX-IVAC or hyper-CVAD given no CNS involvement   - PET post 2C Chemo shows deauville 2 - c/w CR   - cont w/ EPOCH, trialed dose level 2 but was intolerant with G3 mucositis, again with mucositis with dose level 1, will go down to dose level -1   -Cycle 5 R-EPOCH 5/3/2025   -LP with IT methotrexate 5/3 (day 1), flow cytometry pending  -Plan LP with IT cytarabine 5/7 (day 5)  -Pegfilgrastim and Rituximab on day 6  # G1 CIPN   -c/w home gabapentin    Heme   #H/O R subclavian and R axillary DVT r/t PICC (2/3/25; on Eliquis)    -Duplex BUE to r/o DVT (4/10) - Compared with study from 2/3/2025, Acute DVT in left subclavian vein and left axillary vein.    - Duplex BLE (4/14)- negative   - CTPE  (4/11) - Negative   -Eliquis on hold since 4/29 for procedures (planning LP on 5/3 and 5/7)   -lovenox while inpt given multiple procedures during admission. Ordered to hold on 5/5 at 2300 for LP on 5/7      CARDS  -Echo  2/3/25 shows EF60-65%  #HTN  #Afib  -c/w home Metop 75 mg BID  -Last admission fluid  overload requiring intensive diuresis  -Hold lasix and potassium on admit  -Resume eliquis BID at discharge     MISC  #Recent Fall 5/2 prior to admit  -No injury noted, complains of slight pain on his hips as he landed on his hip. Monitor for the worsening sxs.  -On fall precautions.  -PT consulted      DISPO  -Full Code  -ACCESS: L [facility] SOLO PICC (3/7/25)   -PRIMARY ONC: Dr. Mckeon     Patient seen, examined, and discussed with Dr. Mota

## 2025-05-04 NOTE — CARE PLAN
The patient's goals for the shift include pain relief    The clinical goals for the shift include pt will remain HDS      Problem: Pain - Adult  Goal: Verbalizes/displays adequate comfort level or baseline comfort level  Outcome: Progressing     Problem: Safety - Adult  Goal: Free from fall injury  Outcome: Progressing     Problem: Discharge Planning  Goal: Discharge to home or other facility with appropriate resources  Outcome: Progressing     Problem: Chronic Conditions and Co-morbidities  Goal: Patient's chronic conditions and co-morbidity symptoms are monitored and maintained or improved  Outcome: Progressing     Problem: Nutrition  Goal: Nutrient intake appropriate for maintaining nutritional needs  Outcome: Progressing     Problem: Fall/Injury  Goal: Not fall by end of shift  Outcome: Progressing  Goal: Be free from injury by end of the shift  Outcome: Progressing  Goal: Verbalize understanding of personal risk factors for fall in the hospital  Outcome: Progressing  Goal: Verbalize understanding of risk factor reduction measures to prevent injury from fall in the home  Outcome: Progressing  Goal: Use assistive devices by end of the shift  Outcome: Progressing  Goal: Pace activities to prevent fatigue by end of the shift  Outcome: Progressing     Problem: Pain  Goal: Takes deep breaths with improved pain control throughout the shift  Outcome: Progressing  Goal: Turns in bed with improved pain control throughout the shift  Outcome: Progressing  Goal: Walks with improved pain control throughout the shift  Outcome: Progressing  Goal: Performs ADL's with improved pain control throughout shift  Outcome: Progressing  Goal: Participates in PT with improved pain control throughout the shift  Outcome: Progressing  Goal: Free from opioid side effects throughout the shift  Outcome: Progressing  Goal: Free from acute confusion related to pain meds throughout the shift  Outcome: Progressing

## 2025-05-05 LAB
ALBUMIN SERPL BCP-MCNC: 3.2 G/DL (ref 3.4–5)
ANION GAP SERPL CALC-SCNC: 13 MMOL/L (ref 10–20)
BASOPHILS # BLD AUTO: 0 X10*3/UL (ref 0–0.1)
BASOPHILS NFR BLD AUTO: 0 %
BUN SERPL-MCNC: 29 MG/DL (ref 6–23)
CALCIUM SERPL-MCNC: 8.7 MG/DL (ref 8.6–10.6)
CELL POPULATIONS: NORMAL
CHLORIDE SERPL-SCNC: 107 MMOL/L (ref 98–107)
CO2 SERPL-SCNC: 24 MMOL/L (ref 21–32)
CREAT SERPL-MCNC: 0.52 MG/DL (ref 0.5–1.3)
DIAGNOSIS: NORMAL
EGFRCR SERPLBLD CKD-EPI 2021: >90 ML/MIN/1.73M*2
EOSINOPHIL # BLD AUTO: 0 X10*3/UL (ref 0–0.7)
EOSINOPHIL NFR BLD AUTO: 0 %
ERYTHROCYTE [DISTWIDTH] IN BLOOD BY AUTOMATED COUNT: 20 % (ref 11.5–14.5)
FLOW DIFFERENTIAL: NORMAL
FLOW TEST ORDERED: NORMAL
FLUID CELL COUNT: 3 /UL
GLUCOSE SERPL-MCNC: 148 MG/DL (ref 74–99)
HCT VFR BLD AUTO: 24 % (ref 41–52)
HGB BLD-MCNC: 7.4 G/DL (ref 13.5–17.5)
IMM GRANULOCYTES # BLD AUTO: 0.02 X10*3/UL (ref 0–0.7)
IMM GRANULOCYTES NFR BLD AUTO: 0.5 % (ref 0–0.9)
LAB TEST METHOD: NORMAL
LYMPHOCYTES # BLD AUTO: 0.38 X10*3/UL (ref 1.2–4.8)
LYMPHOCYTES NFR BLD AUTO: 10.1 %
MCH RBC QN AUTO: 29.2 PG (ref 26–34)
MCHC RBC AUTO-ENTMCNC: 30.8 G/DL (ref 32–36)
MCV RBC AUTO: 95 FL (ref 80–100)
MONOCYTES # BLD AUTO: 0.13 X10*3/UL (ref 0.1–1)
MONOCYTES NFR BLD AUTO: 3.5 %
NEUTROPHILS # BLD AUTO: 3.22 X10*3/UL (ref 1.2–7.7)
NEUTROPHILS NFR BLD AUTO: 85.9 %
NRBC BLD-RTO: 0 /100 WBCS (ref 0–0)
NUMBER OF CELLS COLLECTED: NORMAL
PATH REPORT.TOTAL CANCER: NORMAL
PATH REVIEW-CELL CT,CSF: NORMAL
PHOSPHATE SERPL-MCNC: 3.1 MG/DL (ref 2.5–4.9)
PLATELET # BLD AUTO: 122 X10*3/UL (ref 150–450)
POTASSIUM SERPL-SCNC: 4 MMOL/L (ref 3.5–5.3)
RBC # BLD AUTO: 2.53 X10*6/UL (ref 4.5–5.9)
SIGNATURE COMMENT: NORMAL
SODIUM SERPL-SCNC: 140 MMOL/L (ref 136–145)
SPECIMEN VIABILITY: NORMAL
WBC # BLD AUTO: 3.8 X10*3/UL (ref 4.4–11.3)

## 2025-05-05 PROCEDURE — 2500000004 HC RX 250 GENERAL PHARMACY W/ HCPCS (ALT 636 FOR OP/ED): Performed by: STUDENT IN AN ORGANIZED HEALTH CARE EDUCATION/TRAINING PROGRAM

## 2025-05-05 PROCEDURE — 2500000002 HC RX 250 W HCPCS SELF ADMINISTERED DRUGS (ALT 637 FOR MEDICARE OP, ALT 636 FOR OP/ED): Performed by: STUDENT IN AN ORGANIZED HEALTH CARE EDUCATION/TRAINING PROGRAM

## 2025-05-05 PROCEDURE — 80069 RENAL FUNCTION PANEL: CPT

## 2025-05-05 PROCEDURE — 2500000004 HC RX 250 GENERAL PHARMACY W/ HCPCS (ALT 636 FOR OP/ED): Mod: JZ | Performed by: HOME HEALTH AIDE

## 2025-05-05 PROCEDURE — 85025 COMPLETE CBC W/AUTO DIFF WBC: CPT

## 2025-05-05 PROCEDURE — RXMED WILLOW AMBULATORY MEDICATION CHARGE

## 2025-05-05 PROCEDURE — 1170000001 HC PRIVATE ONCOLOGY ROOM DAILY

## 2025-05-05 PROCEDURE — 97161 PT EVAL LOW COMPLEX 20 MIN: CPT | Mod: GP

## 2025-05-05 PROCEDURE — 2500000001 HC RX 250 WO HCPCS SELF ADMINISTERED DRUGS (ALT 637 FOR MEDICARE OP)

## 2025-05-05 PROCEDURE — 99232 SBSQ HOSP IP/OBS MODERATE 35: CPT | Performed by: INTERNAL MEDICINE

## 2025-05-05 RX ORDER — ACYCLOVIR 200 MG/1
400 CAPSULE ORAL 2 TIMES DAILY
Qty: 120 CAPSULE | Refills: 0 | Status: SHIPPED | OUTPATIENT
Start: 2025-05-05 | End: 2025-05-06

## 2025-05-05 RX ORDER — LEVOFLOXACIN 500 MG/1
500 TABLET, FILM COATED ORAL DAILY
Qty: 30 TABLET | Refills: 0 | Status: SHIPPED | OUTPATIENT
Start: 2025-05-05 | End: 2025-06-06

## 2025-05-05 RX ORDER — ONDANSETRON HYDROCHLORIDE 8 MG/1
16 TABLET, FILM COATED ORAL ONCE
Status: COMPLETED | OUTPATIENT
Start: 2025-05-05 | End: 2025-05-05

## 2025-05-05 RX ADMIN — ACYCLOVIR 400 MG: 200 CAPSULE ORAL at 21:13

## 2025-05-05 RX ADMIN — PANTOPRAZOLE SODIUM 40 MG: 40 TABLET, DELAYED RELEASE ORAL at 06:07

## 2025-05-05 RX ADMIN — ENOXAPARIN SODIUM 80 MG: 100 INJECTION SUBCUTANEOUS at 21:14

## 2025-05-05 RX ADMIN — ACYCLOVIR 400 MG: 200 CAPSULE ORAL at 10:22

## 2025-05-05 RX ADMIN — OXYCODONE 5 MG: 5 TABLET ORAL at 16:51

## 2025-05-05 RX ADMIN — GABAPENTIN 300 MG: 300 CAPSULE ORAL at 21:13

## 2025-05-05 RX ADMIN — PREDNISONE 140 MG: 50 TABLET ORAL at 10:22

## 2025-05-05 RX ADMIN — METOPROLOL TARTRATE 75 MG: 50 TABLET, FILM COATED ORAL at 10:22

## 2025-05-05 RX ADMIN — DOXORUBICIN HYDROCHLORIDE 0.82 MG: 2 INJECTION, SOLUTION INTRAVENOUS at 11:12

## 2025-05-05 RX ADMIN — METOPROLOL TARTRATE 75 MG: 50 TABLET, FILM COATED ORAL at 21:13

## 2025-05-05 RX ADMIN — OLANZAPINE 5 MG: 5 TABLET, FILM COATED ORAL at 21:14

## 2025-05-05 RX ADMIN — PREDNISONE 140 MG: 50 TABLET ORAL at 21:13

## 2025-05-05 RX ADMIN — ENOXAPARIN SODIUM 80 MG: 100 INJECTION SUBCUTANEOUS at 10:22

## 2025-05-05 RX ADMIN — ONDANSETRON HYDROCHLORIDE 16 MG: 8 TABLET, FILM COATED ORAL at 11:19

## 2025-05-05 RX ADMIN — ATORVASTATIN CALCIUM 40 MG: 40 TABLET, FILM COATED ORAL at 05:30

## 2025-05-05 ASSESSMENT — COGNITIVE AND FUNCTIONAL STATUS - GENERAL
STANDING UP FROM CHAIR USING ARMS: A LITTLE
MOBILITY SCORE: 21
CLIMB 3 TO 5 STEPS WITH RAILING: TOTAL
DAILY ACTIVITIY SCORE: 24
WALKING IN HOSPITAL ROOM: A LITTLE
CLIMB 3 TO 5 STEPS WITH RAILING: TOTAL
MOBILITY SCORE: 19
MOVING TO AND FROM BED TO CHAIR: A LITTLE
TURNING FROM BACK TO SIDE WHILE IN FLAT BAD: A LITTLE
MOBILITY SCORE: 21
CLIMB 3 TO 5 STEPS WITH RAILING: A LITTLE
DAILY ACTIVITIY SCORE: 24

## 2025-05-05 ASSESSMENT — PAIN SCALES - GENERAL
PAINLEVEL_OUTOF10: 5 - MODERATE PAIN
PAINLEVEL_OUTOF10: 4
PAINLEVEL_OUTOF10: 2
PAINLEVEL_OUTOF10: 0 - NO PAIN

## 2025-05-05 ASSESSMENT — PAIN - FUNCTIONAL ASSESSMENT
PAIN_FUNCTIONAL_ASSESSMENT: 0-10

## 2025-05-05 ASSESSMENT — ACTIVITIES OF DAILY LIVING (ADL)
ADL_ASSISTANCE: NEEDS ASSISTANCE
LACK_OF_TRANSPORTATION: NO

## 2025-05-05 ASSESSMENT — PAIN DESCRIPTION - LOCATION: LOCATION: SHOULDER

## 2025-05-05 ASSESSMENT — PAIN DESCRIPTION - ORIENTATION: ORIENTATION: LEFT

## 2025-05-05 NOTE — PROGRESS NOTES
05/05/25 1526   Discharge Planning   Living Arrangements Spouse/significant other   Support Systems Spouse/significant other   Assistance Needed none   Type of Residence Private residence   Number of Stairs to Enter Residence 1   Number of Stairs Within Residence 12   Home or Post Acute Services In home services   Type of Home Care Services Home PT   Expected Discharge Disposition Home Health   Does the patient need discharge transport arranged? No   RoundTrip coordination needed? No   Financial Resource Strain   How hard is it for you to pay for the very basics like food, housing, medical care, and heating? Somewhat   Housing Stability   In the last 12 months, was there a time when you were not able to pay the mortgage or rent on time? N   In the past 12 months, how many times have you moved where you were living? 0   At any time in the past 12 months, were you homeless or living in a shelter (including now)? N   Transportation Needs   In the past 12 months, has lack of transportation kept you from medical appointments or from getting medications? no   In the past 12 months, has lack of transportation kept you from meetings, work, or from getting things needed for daily living? No   Patient Choice   Patient / Family choosing to utilize agency / facility established prior to hospitalization Yes     Care Transitions Note    Plan per Medical/Surgical Team: Pt with Burkitt's Lymphoma was admitted for C5 of R-EPOCH  Status: IP  Payor Source: [employer] Medicare  Discharge disposition: Home with [facility] for PT  Expected date of discharge: 5/7/25  Barriers:none  PCP / Primary Oncologist: Dr. Torey Mckeon  Preferred Pharmacy:  [facility] in [place] on the [facility] home care agency: [facility]    This [facility] met with the pt and his wife to discuss his discharge plan.  He will be returning to home with his wife and would like to have [facility] for PT.  Orders have been placed by the team.  He has all needed DME: cane, wheeled walker  and shower seat.  He has a DL SOLO PICC which is cared for in the infusion center.  The pt expressed concerns over his hospital bill.  He was provided the financial counselor's contact information.  Will continue to follow to assist with the discharge plan.  Guerda Carranza RN, TCC

## 2025-05-05 NOTE — CONSULTS
"Nutrition Initial Assessment:   Nutrition Assessment    Reason for Assessment: Admission nursing screening    Patient is a 64 y.o. male on day 3 of admission presenting with  PMH of HTN, HLD, CAD, MI (s/p stent 2010, on ASA), hx renal cell carcinoma (s/p R partial nephrectomy in 2013 @ [facility]), GERD, BPH, arthritis who is directly admitted for C5 EPOCH chemo therapy of newly diagnosed Burkitt's lymphoma.       Nutrition History:  Food and Nutrient History: During previous admission, initially met with RDN on 4/9. At that time was having a decreased appetite for the previous 1-2 weeks and was unable to do any solid foods the previous 4 days d/t mucositits. RDN recommened boost [facility] d/t presence of severe malnutrition, but family planned on buying strawberry boost [facility] to bring into hospital. Met with pt. and wife at bedside. Pt. stated prior to admission, his appetite has been pretty good. He has been having 3 meals/day plus some snacks and is also drinking 1-2 ONS/day and will drink boost and ensure. pt. stated that he enjoys drinking milk, slushees, and was having grilled cheese and soup but noticed that it would give him constipation.  This AM pt. had 1/2 of a breakfast sandwich with turkey sausage and egg, was eating oatmeal during interview, and had some milk and ginger ale on bedside table. Pt. stated that his previous swallowing difficulty during prior admission improved a couple days prior to last discharge and has not had any issues since. pt. stated that since treatment he is unable to have spicy foods and some foods taste spicier than usual and also does not like foods that have a lot of preservatives because he can taste it. pt. denied any nausea, vomiting, constipation, or diarrhea and would like to have strawberry ensure plus during admission.  Vitamin/Herbal Supplement Use: none  Food Allergy:  (none)       Anthropometrics:  Height: (S) 181 cm (5' 11.26\")   Weight: 84.5 kg (186 lb 4.6 oz)   BMI " (Calculated): 25.79  IBW/kg (Dietitian Calculated): 78 kg  Percent of IBW: 107 %                      Weight History:   Wt Readings Per Review of EMR    05/04/25 83.5 kg (184 lb 1.4 oz)   04/29/25 82.8 kg (182 lb 8.7 oz)   04/23/25 83.5 kg (184 lb)   04/18/25 (S) 85 kg (187 lb 6.3 oz)   04/08/25 90.7 kg (199 lb 15.3 oz)   04/08/25 90.7 kg (199 lb 15.3 oz)   04/04/25 91.6 kg (202 lb)   03/31/25 92.5 kg (203 lb 14.8 oz)   03/25/25 91.6 kg (201 lb 15.1 oz)   03/21/25 92.1 kg (203 lb 0.7 oz)   03/21/25 92.1 kg (203 lb)   03/20/25 91.2 kg (201 lb 1 oz)   03/18/25 91.7 kg (202 lb 2.6 oz)   03/12/25 97.4 kg (214 lb 11.2 oz)   03/11/25 95.7 kg (210 lb 15.7 oz)   03/04/25 91.5 kg (201 lb 11.2 oz)   03/03/25 93 kg (205 lb)   02/28/25 93.4 kg (205 lb 12.8 oz)   02/27/25 92.4 kg (203 lb 11.3 oz)   02/24/25 93.8 kg (206 lb 12.7 oz)   02/20/25 100 kg (220 lb 7.4 oz)   02/20/25 100 kg (220 lb 7.4 oz)   02/19/25 101 kg (222 lb 7.1 oz)   02/06/25 115 kg (252 lb 13.9 oz)   01/28/25 115 kg (252 lb 6.8 oz)   01/27/25 115 kg (253 lb 15.5 oz)   01/26/25 115 kg (253 lb 15.5 oz)   01/13/25 107 kg (236 lb)   01/10/25 107 kg (236 lb)   01/08/25 107 kg (236 lb)   01/06/25 107 kg (236 lb)   01/03/25 112 kg (246 lb 0.5 oz)   10/21/24 111 kg (245 lb)       Weight Change %:  Weight History / % Weight Change: 7.5% wt. loss in 1 month, wt. loss has stabilized since then d/t increased appetite. 27% wt. loss in 3 months, 26% wt. loss in ~6 months  Significant Weight Loss: Yes  Interpretation of Weight Loss: >10% in 6 months    Nutrition Focused Physical Exam Findings:    Subcutaneous Fat Loss:   Orbital Fat Pads: Mild-Moderate (slight dark circles and slight hollowing)  Buccal Fat Pads: Mild-Moderate (flat cheeks, minimal bounce)  Triceps: Mild-Moderate (less than ample fat tissue)  Muscle Wasting:  Temporalis: Mild-Moderate (slight depression)  Pectoralis (Clavicular Region): Mild-Moderate (some protrusion of clavicle)  Deltoid/Trapezius:  Mild-Moderate (slight protrusion of acromion process)  Interosseous: Mild-Moderate (slightly depressed area between thumb and forefinger)  Trapezius/Infraspinatus/Supraspinatus (Scapular Region): Mild-Moderate (slight protrusion of scapula)  Gastrocnemius: Mild-Moderate (not well developed muscle)  Edema:  Edema: +1 trace  Edema Location: generalized  Physical Findings:  Hair: Negative  Eyes: Negative  Nails: Negative  Skin: Negative    Nutrition Significant Labs:  CBC Trend:   Results from last 7 days   Lab Units 05/05/25 0537 05/04/25 0536 05/03/25 0823 05/02/25  2327   WBC AUTO x10*3/uL 3.8* 3.0* 3.9* 4.4   RBC AUTO x10*6/uL 2.53* 2.89* 2.76* 2.70*   HEMOGLOBIN g/dL 7.4* 8.3* 8.0* 8.0*   HEMATOCRIT % 24.0* 26.4* 26.7* 25.5*   MCV fL 95 91 97 94   PLATELETS AUTO x10*3/uL 122* 145* 118* 125*   BMP Trend:   Results from last 7 days   Lab Units 05/05/25 0537 05/04/25 0536 05/03/25 0823 05/02/25  2327   GLUCOSE mg/dL 148* 136* 90 98   CALCIUM mg/dL 8.7 8.9 8.7 8.8   SODIUM mmol/L 140 138 139 138   POTASSIUM mmol/L 4.0 4.1 4.0 3.9   CO2 mmol/L 24 24 25 27   CHLORIDE mmol/L 107 104 103 103   BUN mg/dL 29* 23 18 21   CREATININE mg/dL 0.52 0.61 0.63 0.59   Liver Function Trend:   Results from last 7 days   Lab Units 05/02/25 2327 04/29/25  1620   ALK PHOS U/L 51 56   AST U/L 15 11   ALT U/L 23 23   BILIRUBIN TOTAL mg/dL 0.8 0.9    CRP:   Lab Results   Component Value Date    CRP 14.67 (H) 01/10/2025       Nutrition Specific Medications:  Scheduled medications  acyclovir, 400 mg, oral, BID  atorvastatin, 40 mg, oral, Daily  DOXOrubicin (Adriamycin) 20.4 mg, etoposide (Toposar) 76 mg, vinCRIStine (Oncovin) 0.8 mg in sodium chloride 0.9% 561.8 mL IV, , intravenous, Once  DOXOrubicin (Adriamycin) 20.4 mg, etoposide (Toposar) 76 mg, vinCRIStine (VINCASAR) 0.82 mg in sodium chloride 0.9% 561.82 mL IV, , intravenous, Once  metoprolol tartrate, 75 mg, oral, BID  OLANZapine, 5 mg, oral, Nightly  ondansetron, 16 mg, oral,  Once  pantoprazole, 40 mg, oral, Daily before breakfast     PRN medications  PRN medications: albuterol, albuterol, alteplase, dextrose, EPINEPHrine HCl, famotidine, ondansetron, polyethylene glycol      I/O:   Last BM Date: 05/03/25;      Dietary Orders (From admission, onward)       Start     Ordered    05/05/25 1456  Oral nutritional supplements  Until discontinued        Question Answer Comment   Deliver with Breakfast Strawberry   Select supplement: Ensure Plus        05/05/25 1455 05/02/25 2253  May Participate in Room Service  ( ROOM SERVICE MAY PARTICIPATE)  Once        Question:  .  Answer:  Yes    05/02/25 2252 05/02/25 2057  Adult diet Regular  Diet effective now        Comments: Heat deli ham and deli turkey to steaming.   Question:  Diet type  Answer:  Regular    05/02/25 2058                     Estimated Needs:   Total Energy Estimated Needs in 24 hours (kCal): 2500 kCal  Method for Estimating Needs: 30kcal/kg ABW  Total Protein Estimated Needs in 24 Hours (g): 105 g (100-110)  Method for Estimating 24 Hour Protein Needs: 1.2-1.3g/kg ABW  Total Fluid Estimated Needs in 24 Hours (mL): 2600 mL  Method for Estimating 24 Hour Fluid Needs: 1ml/kcal or per MD team        Nutrition Diagnosis   Malnutrition Diagnosis  Diagnosis Status: New  Malnutrition Diagnosis: Severe malnutrition related to chronic disease or condition  Related to: decreased appetite  As Evidenced by: 26% wt. loss in 6 months and mild-moderate fat and muscle losses            Nutrition Interventions/Recommendations   Nutrition prescription for oral nutrition    Nutrition Recommendations:  Individualized Nutrition Prescription Provided for :   1. Continue regular diet as tolerated.   2. One Strawberry ensure plus daily (each containing 350kcals, and 13g PRO)     Nutrition Interventions/Goals:   Meals and Snacks: General healthful diet  Goal: consume >75% of 3 meals/day  Medical Food Supplement: Commercial beverage medical food  supplement therapy  Goal: consume 1 ensure plus daily      Education Documentation  No documentation found.            Nutrition Monitoring and Evaluation   Food/Nutrient Related History Monitoring  Monitoring and Evaluation Plan: Estimated Energy Intake  Estimated Energy Intake: Energy intake greater or equal to 75% of estimated energy needs         Biochemical Data, Medical Tests and Procedures  Monitoring and Evaluation Plan: Electrolyte/renal panel  Electrolyte and Renal Panel: Electrolytes within normal limits         Goal Status: New goal(s) identified    Time Spent (min): 60 minutes

## 2025-05-05 NOTE — CARE PLAN
The patient's goals for the shift include pain relief    The clinical goals for the shift include pt will remain HDS

## 2025-05-05 NOTE — PROGRESS NOTES
Physical Therapy    Physical Therapy Evaluation    Patient Name: Bijan Ibanez  MRN: [id number]  Department: [facility]  Room: [address] [place], [place] [address]-A  Today's Date: 5/5/2025   Time Calculation  Start Time: 1237  Stop Time: 1248  Time Calculation (min): 11 min    Assessment/Plan   PT Assessment  PT Assessment Results: Decreased strength, Decreased endurance, Impaired balance, Decreased mobility, Pain  Rehab Prognosis: Good  Barriers to Discharge Home: No anticipated barriers  Evaluation/Treatment Tolerance: Patient tolerated treatment well  Medical Staff Made Aware: Yes  Strengths: Attitude of self  Barriers to Participation: Comorbidities  End of Session Communication: Bedside nurse  Assessment Comment: pt grossly SBA for all mobility. benefits from low intensity PT to address deficits in strength, balance and mobility  End of Session Patient Position: Bed, 3 rail up, Alarm off, not on at start of session  IP OR SWING BED PT PLAN  Inpatient or Swing Bed: Inpatient  PT Plan  Treatment/Interventions: Bed mobility, Transfer training, Gait training, Stair training, Balance training, Strengthening, Endurance training, Therapeutic activity, Range of motion, Therapeutic exercise, Home exercise program, Positioning  PT Plan: Ongoing PT  PT Frequency: 2 times per week  PT Discharge Recommendations: Low intensity level of continued care  PT Recommended Transfer Status: Stand by assist, Assistive device  PT - OK to Discharge: Yes    Subjective   General Visit Information:  General  Reason for Referral: C5 EPOCH chemo therapy for newly diagnosed Burkitt's lymphoma  Past Medical History Relevant to Rehab: PMHx of HTN, HLD, CAD, MI (PCI '10, on ASA), atrial fibrillation, RCC (s/p partial R nephrectomy '13), GERD, BPH, arthritis, MRSA bacteremia (s/p 4 weeks IV Vancomycin which completed 3/3/25), G1 CIPN,  R subclavian and R axillary DVT r/t PICC (2/3/25; on Eliquis), R rib/flank pain and Burkitt's lyphoma  Family/Caregiver Present:  Yes  Caregiver Feedback: wife present  Prior to Session Communication: Bedside nurse  Patient Position Received: Bed, 3 rail up, Alarm off, not on at start of session  General Comment: pt supine alert and agreeable for PT.  Home Living:  Home Living  Type of Home: House  Lives With: Spouse, Grandchildren  Home Adaptive Equipment: Cane, Walker rolling or standard  Home Layout: Two level, Able to live on main level with bedroom/bathroom, Laundry second level  Alternate Level Stairs-Rails: Right  Alternate Level Stairs-Number of Steps: 12  Home Access: Stairs to enter without rails  Entrance Stairs-Rails: None  Entrance Stairs-Number of Steps: 1  Bathroom Shower/Tub: Tub/shower unit  Bathroom Toilet: Standard  Bathroom Equipment: Built-in shower seat  Prior Level of Function:  Prior Function Per Pt/Caregiver Report  Level of [place]: Needs assistance with ADLs  Receives Help From: Family  ADL Assistance: Needs assistance  Homemaking Assistance: Needs assistance  Ambulatory Assistance:  (mod I with FWW)  Prior Function Comments: x1 recent fall in last week  Precautions:  Precautions  Medical Precautions: Fall precautions  Precautions Comment: droplet, contact plus, protective      Date/Time Vitals Session Patient Position Pulse Resp SpO2 BP MAP (mmHg)    05/05/25 1250 --  --  60  18  100 %  112/83  --                 Objective   Pain:  Pain Assessment  Pain Assessment: 0-10  0-10 (Numeric) Pain Score: 4  Pain Type: Acute pain  Pain Location: Shoulder  Cognition:  Cognition  Overall Cognitive Status: Within Functional Limits  Orientation Level: Oriented X4    General Assessments:                  Activity Tolerance  Endurance: Tolerates 10 - 20 min exercise with multiple rests    Sensation  Light Touch: No apparent deficits            Perception  Inattention/Neglect: Appears intact  Initiation: Appears intact  Motor Planning: Appears intact  Perseveration: Not present      Coordination  Movements are Fluid and  Coordinated: Yes    Postural Control  Postural Control: Within Functional Limits    Static Sitting Balance  Static Sitting-Balance Support: No upper extremity supported, Feet supported  Static Sitting-Level of Assistance: Close supervision  Dynamic Sitting Balance  Dynamic Sitting-Balance Support: Feet supported  Dynamic Sitting-Level of Assistance: Close supervision  Dynamic Sitting-Balance: Trunk control activities    Static Standing Balance  Static Standing-Balance Support: Bilateral upper extremity supported (fww)  Static Standing-Level of Assistance: Distant supervision  Dynamic Standing Balance  Dynamic Standing-Balance Support: Bilateral upper extremity supported (fww)  Dynamic Standing-Level of Assistance: Close supervision  Dynamic Standing-Balance: Turning  Functional Assessments:  Bed Mobility  Bed Mobility: Yes  Bed Mobility 1  Bed Mobility 1: Supine to sitting, Sitting to supine  Level of Assistance 1: Distant supervision  Bed Mobility Comments 1: HOB elevated    Transfers  Transfer: Yes  Transfer 1  Transfer From 1: Sit to, Stand to  Transfer to 1: Sit, Stand  Technique 1: Sit to stand, Stand to sit  Transfer Device 1: Walker  Transfer Level of Assistance 1: Distant supervision  Trials/Comments 1: x1    Ambulation/Gait Training  Ambulation/Gait Training Performed: Yes  Ambulation/Gait Training 1  Surface 1: Level tile  Device 1: Rolling walker  Assistance 1: Close supervision, Minimal verbal cues  Quality of Gait 1: Narrow base of support, Decreased step length, Diminished heel strike, Forward flexed posture  Comments/Distance (ft) 1: ~15ft    Stairs  Stairs: No  Extremity/Trunk Assessments:  RUE   RUE : Within Functional Limits  LUE   LUE: Within Functional Limits  RLE   RLE : Within Functional Limits  LLE   LLE : Within Functional Limits  Outcome Measures:  [facility] Basic Mobility  Turning from your back to your side while in a flat bed without using bedrails: None  Moving from lying on your back to  sitting on the side of a flat bed without using bedrails: A little  Moving to and from bed to chair (including a wheelchair): A little  Standing up from a chair using your arms (e.g. wheelchair or bedside chair): A little  To walk in hospital room: A little  Climbing 3-5 steps with railing: A little  Basic Mobility - Total Score: 19    Encounter Problems       Encounter Problems (Active)       Mobility       STG - Patient will ambulate >/= 250 ft mod I and LRAD       Start:  05/05/25    Expected End:  05/19/25               PT Transfers       STG - Transfer from bed to chair mod I and LRAD       Start:  05/05/25    Expected End:  05/19/25            STG - Patient will perform bed mobility mod I        Start:  05/05/25    Expected End:  05/19/25            STG - Patient will transfer sit to and from stand mod I and LRAD       Start:  05/05/25    Expected End:  05/19/25               Pain - Adult              Education Documentation  Precautions, taught by Anna Mooney PT at 5/5/2025  2:48 PM.  Learner: Patient  Readiness: Acceptance  Method: Explanation  Response: Verbalizes Understanding    Body Mechanics, taught by Anna Mooney PT at 5/5/2025  2:48 PM.  Learner: Patient  Readiness: Acceptance  Method: Explanation  Response: Verbalizes Understanding    Mobility Training, taught by Anna Mooney PT at 5/5/2025  2:48 PM.  Learner: Patient  Readiness: Acceptance  Method: Explanation  Response: Verbalizes Understanding    Education Comments  No comments found.        05/05/25 at 2:49 PM - Anna Mooney PT

## 2025-05-05 NOTE — PROGRESS NOTES
"Bijan Ibanez is a 64 y.o. male on day 3 of admission presenting with Burkitt's lymphoma (Multi).    Subjective   Patient feels well. Notes persistent shoulder soreness from previous fall. LBM this morning, soft and formed. Denies HA, dizziness, CP, SOB, N/V/D/C. All other ROS otherwise negative.     Objective   Physical Exam  Constitutional:       Appearance: Normal appearance.   HENT:      Head: Normocephalic and atraumatic.      Nose: Nose normal.      Mouth/Throat:      Mouth: Mucous membranes are moist.   Eyes:      General: No scleral icterus.     Extraocular Movements: Extraocular movements intact.      Pupils: Pupils are equal, round, and reactive to light.   Cardiovascular:      Rate and Rhythm: Normal rate and regular rhythm.      Pulses: Normal pulses.      Heart sounds: Normal heart sounds.   Pulmonary:      Effort: Pulmonary effort is normal.      Breath sounds: Normal breath sounds.   Abdominal:      General: Abdomen is flat. Bowel sounds are normal. There is no distension.      Palpations: Abdomen is soft. There is no mass.      Tenderness: There is no abdominal tenderness. There is no guarding.   Musculoskeletal:         General: No swelling. Normal range of motion.      Cervical back: Normal range of motion and neck supple.   Skin:     General: Skin is warm and dry.      Coloration: Skin is not jaundiced.      Findings: Bruising (distal LUE 2/2 fall, skin intact) present. No erythema or rash.      Comments: LP site c/d/i   Neurological:      General: No focal deficit present.      Mental Status: He is alert and oriented to person, place, and time.      Cranial Nerves: No cranial nerve deficit.      Sensory: No sensory deficit.      Motor: No weakness.   Psychiatric:         Mood and Affect: Mood normal.         Behavior: Behavior normal.     Last Recorded Vitals  Blood pressure 114/76, pulse 68, temperature 36.2 °C (97.2 °F), resp. rate 18, height (S) 1.81 m (5' 11.26\"), weight 83.5 kg (184 lb " 1.4 oz), SpO2 100%.  Intake/Output last 3 Shifts:  I/O last 3 completed shifts:  In: 388.8 (4.7 mL/kg) [IV Piggyback:388.8]  Out: - (0 mL/kg)   Weight: 83.5 kg     Assessment & Plan  Burkitt's lymphoma (Multi)    Burkitt lymphoma    Bijan Ibanez is a 64 y.o. male with PMH of HTN, HLD, CAD, MI (s/p stent 2010, on ASA), hx renal cell carcinoma (s/p R partial nephrectomy in 2013 @ [facility]), GERD, BPH, arthritis who is directly admitted from home for C5 EPOCH chemo therapy for newly diagnosed Burkitt's lymphoma.     D3C5 R-EPOCH     ONC  # Burkitt's Lymphoma   -Originally signed out as high grade lymphoma but after burkitt's rearrangement (t8;14) came back positive. High risk based on marrow involvement, though no CNS involvement   - initially give DA-R-EPOCH when was signed out as high grade lymphoma, given the significant toxicity he had with DA R EPOCH, will not escalate to CODOX-IVAC or hyper-CVAD given no CNS involvement   - PET post 2C Chemo shows deauville 2 - c/w CR   - cont w/ EPOCH, trialed dose level 2 but was intolerant with G3 mucositis, again with mucositis with dose level 1, will go down to dose level -1   -Cycle 5 R-EPOCH 5/3/2025   -LP with IT methotrexate 5/3 (day 1), flow cytometry pending  -Plan LP with IT cytarabine 5/7 (day 5)  -Pegfilgrastim and Rituximab on day 6  # G1 CIPN   -c/w home gabapentin    Heme   #H/O R subclavian and R axillary DVT r/t PICC (2/3/25; on Eliquis)    -Duplex BUE to r/o DVT (4/10) - Compared with study from 2/3/2025, Acute DVT in left subclavian vein and left axillary vein.    - Duplex BLE (4/14)- negative   - CTPE  (4/11) - Negative   -Eliquis on hold since 4/29 for procedures (planning LP on 5/3 and 5/7)   -lovenox while inpt given multiple procedures during admission. Ordered to hold on 5/5 at 2300 for LP on 5/7      CARDS  -Echo  2/3/25 shows EF60-65%  #HTN  #Afib  -c/w home Metop 75 mg BID  -Last admission fluid overload requiring intensive diuresis  -Hold lasix and  potassium on admit  -Resume eliquis BID at discharge     MISC  #Recent Fall 5/2 prior to admit  -No injury noted, complains of slight pain on his hips as he landed on his hip. Monitor for the worsening sxs.  -On fall precautions.  -PT consulted      DISPO  -Full Code  -ACCESS: L [facility] SOLO PICC (3/7/25)   -PRIMARY ONC: Dr. Mckeon     Patient seen, examined, and discussed with Dr. Melton      05/05/25 at 10:39 AM - Aixa Robert PA-C

## 2025-05-05 NOTE — CARE PLAN
Problem: Pain - Adult  Goal: Verbalizes/displays adequate comfort level or baseline comfort level  Outcome: Progressing     Problem: Safety - Adult  Goal: Free from fall injury  Outcome: Progressing     Problem: Discharge Planning  Goal: Discharge to home or other facility with appropriate resources  Outcome: Progressing     Problem: Chronic Conditions and Co-morbidities  Goal: Patient's chronic conditions and co-morbidity symptoms are monitored and maintained or improved  Outcome: Progressing     Problem: Nutrition  Goal: Nutrient intake appropriate for maintaining nutritional needs  Outcome: Progressing   The patient's goals for the shift include pain relief    The clinical goals for the shift include patient will be free from falls

## 2025-05-06 LAB
ABO GROUP (TYPE) IN BLOOD: NORMAL
ALBUMIN SERPL BCP-MCNC: 3.2 G/DL (ref 3.4–5)
ANION GAP SERPL CALC-SCNC: 12 MMOL/L (ref 10–20)
ANTIBODY SCREEN: NORMAL
BASOPHILS # BLD AUTO: 0 X10*3/UL (ref 0–0.1)
BASOPHILS NFR BLD AUTO: 0 %
BUN SERPL-MCNC: 31 MG/DL (ref 6–23)
CALCIUM SERPL-MCNC: 8.6 MG/DL (ref 8.6–10.6)
CHLORIDE SERPL-SCNC: 107 MMOL/L (ref 98–107)
CO2 SERPL-SCNC: 24 MMOL/L (ref 21–32)
CREAT SERPL-MCNC: 0.5 MG/DL (ref 0.5–1.3)
EGFRCR SERPLBLD CKD-EPI 2021: >90 ML/MIN/1.73M*2
EOSINOPHIL # BLD AUTO: 0.01 X10*3/UL (ref 0–0.7)
EOSINOPHIL NFR BLD AUTO: 0.3 %
ERYTHROCYTE [DISTWIDTH] IN BLOOD BY AUTOMATED COUNT: 19.5 % (ref 11.5–14.5)
GLUCOSE SERPL-MCNC: 134 MG/DL (ref 74–99)
HCT VFR BLD AUTO: 25 % (ref 41–52)
HGB BLD-MCNC: 7.7 G/DL (ref 13.5–17.5)
IMM GRANULOCYTES # BLD AUTO: 0.03 X10*3/UL (ref 0–0.7)
IMM GRANULOCYTES NFR BLD AUTO: 1 % (ref 0–0.9)
LYMPHOCYTES # BLD AUTO: 0.35 X10*3/UL (ref 1.2–4.8)
LYMPHOCYTES NFR BLD AUTO: 11.1 %
MCH RBC QN AUTO: 29.3 PG (ref 26–34)
MCHC RBC AUTO-ENTMCNC: 30.8 G/DL (ref 32–36)
MCV RBC AUTO: 95 FL (ref 80–100)
MONOCYTES # BLD AUTO: 0.05 X10*3/UL (ref 0.1–1)
MONOCYTES NFR BLD AUTO: 1.6 %
NEUTROPHILS # BLD AUTO: 2.71 X10*3/UL (ref 1.2–7.7)
NEUTROPHILS NFR BLD AUTO: 86 %
NRBC BLD-RTO: 0 /100 WBCS (ref 0–0)
PHOSPHATE SERPL-MCNC: 3.3 MG/DL (ref 2.5–4.9)
PLATELET # BLD AUTO: 139 X10*3/UL (ref 150–450)
POTASSIUM SERPL-SCNC: 4 MMOL/L (ref 3.5–5.3)
RBC # BLD AUTO: 2.63 X10*6/UL (ref 4.5–5.9)
RH FACTOR (ANTIGEN D): NORMAL
SODIUM SERPL-SCNC: 139 MMOL/L (ref 136–145)
WBC # BLD AUTO: 3.2 X10*3/UL (ref 4.4–11.3)

## 2025-05-06 PROCEDURE — 80069 RENAL FUNCTION PANEL: CPT

## 2025-05-06 PROCEDURE — 2500000004 HC RX 250 GENERAL PHARMACY W/ HCPCS (ALT 636 FOR OP/ED): Mod: JZ | Performed by: STUDENT IN AN ORGANIZED HEALTH CARE EDUCATION/TRAINING PROGRAM

## 2025-05-06 PROCEDURE — 2500000002 HC RX 250 W HCPCS SELF ADMINISTERED DRUGS (ALT 637 FOR MEDICARE OP, ALT 636 FOR OP/ED): Performed by: STUDENT IN AN ORGANIZED HEALTH CARE EDUCATION/TRAINING PROGRAM

## 2025-05-06 PROCEDURE — 2500000001 HC RX 250 WO HCPCS SELF ADMINISTERED DRUGS (ALT 637 FOR MEDICARE OP)

## 2025-05-06 PROCEDURE — 99232 SBSQ HOSP IP/OBS MODERATE 35: CPT | Performed by: INTERNAL MEDICINE

## 2025-05-06 PROCEDURE — 86901 BLOOD TYPING SEROLOGIC RH(D): CPT

## 2025-05-06 PROCEDURE — 1170000001 HC PRIVATE ONCOLOGY ROOM DAILY

## 2025-05-06 PROCEDURE — 85025 COMPLETE CBC W/AUTO DIFF WBC: CPT

## 2025-05-06 RX ORDER — OXYCODONE HYDROCHLORIDE 5 MG/1
5 TABLET ORAL EVERY 6 HOURS PRN
Qty: 12 TABLET | Refills: 0 | Status: SHIPPED | OUTPATIENT
Start: 2025-05-06 | End: 2025-05-07

## 2025-05-06 RX ORDER — ONDANSETRON HYDROCHLORIDE 8 MG/1
16 TABLET, FILM COATED ORAL ONCE
Status: COMPLETED | OUTPATIENT
Start: 2025-05-06 | End: 2025-05-06

## 2025-05-06 RX ORDER — ACYCLOVIR 200 MG/1
400 CAPSULE ORAL 2 TIMES DAILY
Qty: 120 CAPSULE | Refills: 0 | Status: SHIPPED | OUTPATIENT
Start: 2025-05-06 | End: 2025-06-05

## 2025-05-06 RX ADMIN — PREDNISONE 140 MG: 50 TABLET ORAL at 20:54

## 2025-05-06 RX ADMIN — ACYCLOVIR 400 MG: 200 CAPSULE ORAL at 20:54

## 2025-05-06 RX ADMIN — OLANZAPINE 5 MG: 5 TABLET, FILM COATED ORAL at 20:54

## 2025-05-06 RX ADMIN — METOPROLOL TARTRATE 75 MG: 50 TABLET, FILM COATED ORAL at 20:54

## 2025-05-06 RX ADMIN — OXYCODONE 5 MG: 5 TABLET ORAL at 21:00

## 2025-05-06 RX ADMIN — OXYCODONE 5 MG: 5 TABLET ORAL at 14:36

## 2025-05-06 RX ADMIN — DOXORUBICIN HYDROCHLORIDE 0.82 MG: 2 INJECTION, SOLUTION INTRAVENOUS at 11:12

## 2025-05-06 RX ADMIN — ATORVASTATIN CALCIUM 40 MG: 40 TABLET, FILM COATED ORAL at 06:11

## 2025-05-06 RX ADMIN — PANTOPRAZOLE SODIUM 40 MG: 40 TABLET, DELAYED RELEASE ORAL at 06:11

## 2025-05-06 RX ADMIN — GABAPENTIN 300 MG: 300 CAPSULE ORAL at 20:54

## 2025-05-06 RX ADMIN — ACYCLOVIR 400 MG: 200 CAPSULE ORAL at 09:59

## 2025-05-06 RX ADMIN — ONDANSETRON HYDROCHLORIDE 16 MG: 8 TABLET, FILM COATED ORAL at 10:44

## 2025-05-06 RX ADMIN — PREDNISONE 140 MG: 50 TABLET ORAL at 09:59

## 2025-05-06 RX ADMIN — METOPROLOL TARTRATE 75 MG: 50 TABLET, FILM COATED ORAL at 09:59

## 2025-05-06 ASSESSMENT — PAIN SCALES - GENERAL
PAINLEVEL_OUTOF10: 6
PAINLEVEL_OUTOF10: 0 - NO PAIN
PAINLEVEL_OUTOF10: 0 - NO PAIN
PAINLEVEL_OUTOF10: 4

## 2025-05-06 ASSESSMENT — COGNITIVE AND FUNCTIONAL STATUS - GENERAL
DAILY ACTIVITIY SCORE: 24
CLIMB 3 TO 5 STEPS WITH RAILING: A LOT
MOBILITY SCORE: 22

## 2025-05-06 ASSESSMENT — PAIN SCALES - WONG BAKER: WONGBAKER_NUMERICALRESPONSE: NO HURT

## 2025-05-06 ASSESSMENT — PAIN DESCRIPTION - LOCATION
LOCATION: SHOULDER
LOCATION: SHOULDER

## 2025-05-06 ASSESSMENT — PAIN - FUNCTIONAL ASSESSMENT
PAIN_FUNCTIONAL_ASSESSMENT: 0-10

## 2025-05-06 ASSESSMENT — PAIN DESCRIPTION - ORIENTATION
ORIENTATION: LEFT
ORIENTATION: LEFT

## 2025-05-06 NOTE — CARE PLAN
The patient's goals for the shift include pain relief    The clinical goals for the shift include patient will be free from falls    Problem: Pain - Adult  Goal: Verbalizes/displays adequate comfort level or baseline comfort level  Outcome: Progressing     Problem: Safety - Adult  Goal: Free from fall injury  Outcome: Progressing     Problem: Discharge Planning  Goal: Discharge to home or other facility with appropriate resources  Outcome: Progressing     Problem: Chronic Conditions and Co-morbidities  Goal: Patient's chronic conditions and co-morbidity symptoms are monitored and maintained or improved  Outcome: Progressing     Problem: Nutrition  Goal: Nutrient intake appropriate for maintaining nutritional needs  Outcome: Progressing     Problem: Fall/Injury  Goal: Not fall by end of shift  Outcome: Progressing  Goal: Be free from injury by end of the shift  Outcome: Progressing  Goal: Verbalize understanding of personal risk factors for fall in the hospital  Outcome: Progressing  Goal: Verbalize understanding of risk factor reduction measures to prevent injury from fall in the home  Outcome: Progressing  Goal: Use assistive devices by end of the shift  Outcome: Progressing  Goal: Pace activities to prevent fatigue by end of the shift  Outcome: Progressing     Problem: Pain  Goal: Takes deep breaths with improved pain control throughout the shift  Outcome: Progressing  Goal: Turns in bed with improved pain control throughout the shift  Outcome: Progressing  Goal: Walks with improved pain control throughout the shift  Outcome: Progressing  Goal: Performs ADL's with improved pain control throughout shift  Outcome: Progressing  Goal: Participates in PT with improved pain control throughout the shift  Outcome: Progressing  Goal: Free from opioid side effects throughout the shift  Outcome: Progressing  Goal: Free from acute confusion related to pain meds throughout the shift  Outcome: Progressing

## 2025-05-06 NOTE — PROGRESS NOTES
Bijan Ibanez is a 64 y.o. male on day 4 of admission presenting with Burkitt's lymphoma (Multi).    Subjective   Patient overall feels well, notes his shoulder soreness from previous fall is improving. 2 bowel movements yesterday, soft and formed. Eating and drinking well. Denies HA, dizziness, CP, SOB, N/V/D/C. All other ROS otherwise negative.     Objective   Physical Exam  Constitutional:       Appearance: Normal appearance.   HENT:      Head: Normocephalic and atraumatic.      Nose: Nose normal.      Mouth/Throat:      Mouth: Mucous membranes are moist.   Eyes:      General: No scleral icterus.     Extraocular Movements: Extraocular movements intact.      Pupils: Pupils are equal, round, and reactive to light.   Cardiovascular:      Rate and Rhythm: Normal rate and regular rhythm.      Pulses: Normal pulses.      Heart sounds: Normal heart sounds.   Pulmonary:      Effort: Pulmonary effort is normal.      Breath sounds: Normal breath sounds.   Abdominal:      General: Abdomen is flat. Bowel sounds are normal. There is no distension.      Palpations: Abdomen is soft. There is no mass.      Tenderness: There is no abdominal tenderness. There is no guarding.   Musculoskeletal:         General: No swelling. Normal range of motion.      Cervical back: Normal range of motion and neck supple.   Skin:     General: Skin is warm and dry.      Coloration: Skin is not jaundiced.      Findings: Bruising (distal LUE 2/2 fall, skin intact) present. No erythema or rash.   Neurological:      General: No focal deficit present.      Mental Status: He is alert and oriented to person, place, and time.      Cranial Nerves: No cranial nerve deficit.      Sensory: No sensory deficit.      Motor: No weakness.   Psychiatric:         Mood and Affect: Mood normal.         Behavior: Behavior normal.     Last Recorded Vitals  Blood pressure 139/83, pulse 80, temperature 36.8 °C (98.2 °F), temperature source Temporal, resp. rate 18,  "height (S) 1.81 m (5' 11.26\"), weight 85.3 kg (188 lb 0.8 oz), SpO2 97%.  Intake/Output last 3 Shifts:  No intake/output data recorded.    Assessment & Plan  Burkitt's lymphoma (Multi)    Burkitt lymphoma    Bijan Ibanez is a 64 y.o. male with PMH of HTN, HLD, CAD, MI (s/p stent 2010, on ASA), hx renal cell carcinoma (s/p R partial nephrectomy in 2013 @ [facility]), GERD, BPH, arthritis who is directly admitted from home for C5 EPOCH chemo therapy for newly diagnosed Burkitt's lymphoma.     D4C5 R-EPOCH     ONC  # Burkitt's Lymphoma   -Originally signed out as high grade lymphoma but after burkitt's rearrangement (t8;14) came back positive. High risk based on marrow involvement, though no CNS involvement   - initially give DA-R-EPOCH when was signed out as high grade lymphoma, given the significant toxicity he had with DA R EPOCH, will not escalate to CODOX-IVAC or hyper-CVAD given no CNS involvement   - PET post 2C Chemo shows deauville 2 - c/w CR   - cont w/ EPOCH, trialed dose level 2 but was intolerant with G3 mucositis, again with mucositis with dose level 1, will go down to dose level -1   -Cycle 5 R-EPOCH 5/3/2025   -LP with IT methotrexate 5/3 (day 1), flow neg lymphoma   -Plan LP with IT cytarabine 5/7 (day 5)  -Pegfilgrastim and Rituximab on day 6  # G1 CIPN   -c/w home gabapentin    Heme   #H/O R subclavian and R axillary DVT r/t PICC (2/3/25; on Eliquis)    -Duplex BUE to r/o DVT (4/10) - Compared with study from 2/3/2025, Acute DVT in left subclavian vein and left axillary vein.    - Duplex BLE (4/14)- negative   - CTPE  (4/11) - Negative   -Eliquis on hold since 4/29 for procedures (planning LP on 5/3 and 5/7)   -lovenox while inpt given multiple procedures during admission. Ordered to hold on 5/5 at 2300 for LP on 5/7      CARDS  -Echo  2/3/25 shows EF60-65%  #HTN  #Afib  -c/w home Metop 75 mg BID  -Last admission fluid overload requiring intensive diuresis  -Hold lasix and potassium on admit  -Resume " eliquis BID at discharge     MISC  #Recent Fall 5/2 prior to admit  -No injury noted, complains of slight pain on his hips as he landed on his hip. Monitor for the worsening sxs.  -On fall precautions.  -PT consulted      DISPO  -Full Code  -ACCESS: L [facility] PICC (3/7/25)   -PRIMARY ONC: Dr. Mckeon     Patient seen, examined, and discussed with Dr. Melton      05/06/25 at 10:26 AM - Aixa Robert PA-C

## 2025-05-06 NOTE — CARE PLAN
Problem: Pain - Adult  Goal: Verbalizes/displays adequate comfort level or baseline comfort level  Outcome: Progressing     Problem: Safety - Adult  Goal: Free from fall injury  Outcome: Progressing     Problem: Discharge Planning  Goal: Discharge to home or other facility with appropriate resources  Outcome: Progressing     Problem: Chronic Conditions and Co-morbidities  Goal: Patient's chronic conditions and co-morbidity symptoms are monitored and maintained or improved  Outcome: Progressing     Problem: Nutrition  Goal: Nutrient intake appropriate for maintaining nutritional needs  Outcome: Progressing     Problem: Fall/Injury  Goal: Not fall by end of shift  Outcome: Progressing  Goal: Be free from injury by end of the shift  Outcome: Progressing  Goal: Verbalize understanding of personal risk factors for fall in the hospital  Outcome: Progressing  Goal: Verbalize understanding of risk factor reduction measures to prevent injury from fall in the home  Outcome: Progressing  Goal: Use assistive devices by end of the shift  Outcome: Progressing  Goal: Pace activities to prevent fatigue by end of the shift  Outcome: Progressing     Problem: Pain  Goal: Takes deep breaths with improved pain control throughout the shift  Outcome: Progressing  Goal: Turns in bed with improved pain control throughout the shift  Outcome: Progressing  Goal: Walks with improved pain control throughout the shift  Outcome: Progressing  Goal: Performs ADL's with improved pain control throughout shift  Outcome: Progressing  Goal: Participates in PT with improved pain control throughout the shift  Outcome: Progressing  Goal: Free from opioid side effects throughout the shift  Outcome: Progressing  Goal: Free from acute confusion related to pain meds throughout the shift  Outcome: Progressing   The patient's goals for the shift include pain relief    The clinical goals for the shift include Patient will remain HDS throughout the shift.

## 2025-05-07 ENCOUNTER — PHARMACY VISIT (OUTPATIENT)
Dept: PHARMACY | Facility: CLINIC | Age: 65
End: 2025-05-07
Payer: COMMERCIAL

## 2025-05-07 ENCOUNTER — APPOINTMENT (OUTPATIENT)
Dept: HOME HEALTH SERVICES | Facility: HOME HEALTH | Age: 65
End: 2025-05-07
Payer: MEDICARE

## 2025-05-07 ENCOUNTER — DOCUMENTATION (OUTPATIENT)
Dept: HOME HEALTH SERVICES | Facility: HOME HEALTH | Age: 65
End: 2025-05-07
Payer: MEDICARE

## 2025-05-07 VITALS
HEIGHT: 71 IN | RESPIRATION RATE: 18 BRPM | DIASTOLIC BLOOD PRESSURE: 73 MMHG | TEMPERATURE: 97.9 F | BODY MASS INDEX: 26.73 KG/M2 | OXYGEN SATURATION: 94 % | HEART RATE: 79 BPM | WEIGHT: 190.92 LBS | SYSTOLIC BLOOD PRESSURE: 122 MMHG

## 2025-05-07 LAB
ALBUMIN SERPL BCP-MCNC: 3.1 G/DL (ref 3.4–5)
ANION GAP SERPL CALC-SCNC: 11 MMOL/L (ref 10–20)
APPEARANCE CSF: CLEAR
APPEARANCE CSF: CLEAR
BASOPHILS # BLD AUTO: 0 X10*3/UL (ref 0–0.1)
BASOPHILS NFR BLD AUTO: 0 %
BASOPHILS NFR CSF MANUAL: 0 %
BASOPHILS NFR CSF MANUAL: 0 %
BLASTS CSF MANUAL: 0 % (ref ?–0)
BLASTS CSF MANUAL: 0 % (ref ?–0)
BUN SERPL-MCNC: 29 MG/DL (ref 6–23)
CALCIUM SERPL-MCNC: 8.5 MG/DL (ref 8.6–10.6)
CHLORIDE SERPL-SCNC: 104 MMOL/L (ref 98–107)
CO2 SERPL-SCNC: 26 MMOL/L (ref 21–32)
COLOR CSF: COLORLESS
COLOR CSF: COLORLESS
COLOR SPUN CSF: COLORLESS
COLOR SPUN CSF: COLORLESS
CREAT SERPL-MCNC: 0.46 MG/DL (ref 0.5–1.3)
EGFRCR SERPLBLD CKD-EPI 2021: >90 ML/MIN/1.73M*2
EOSINOPHIL # BLD AUTO: 0.02 X10*3/UL (ref 0–0.7)
EOSINOPHIL NFR BLD AUTO: 0.8 %
EOSINOPHIL NFR CSF MANUAL: 0 % (ref 0–?)
EOSINOPHIL NFR CSF MANUAL: 0 % (ref 0–?)
ERYTHROCYTE [DISTWIDTH] IN BLOOD BY AUTOMATED COUNT: 18.7 % (ref 11.5–14.5)
GLUCOSE CSF-MCNC: 87 MG/DL (ref 40–70)
GLUCOSE SERPL-MCNC: 147 MG/DL (ref 74–99)
HCT VFR BLD AUTO: 24.2 % (ref 41–52)
HGB BLD-MCNC: 7.5 G/DL (ref 13.5–17.5)
IMM GRANULOCYTES # BLD AUTO: 0.01 X10*3/UL (ref 0–0.7)
IMM GRANULOCYTES NFR BLD AUTO: 0.4 % (ref 0–0.9)
IMM GRANULOCYTES NFR CSF: 0 %
IMM GRANULOCYTES NFR CSF: 0 %
LABORATORY COMMENT REPORT: NORMAL
LABORATORY COMMENT REPORT: NORMAL
LDH CSF L TO P-CCNC: 38 U/L
LYMPHOCYTES # BLD AUTO: 0.26 X10*3/UL (ref 1.2–4.8)
LYMPHOCYTES NFR BLD AUTO: 10.7 %
LYMPHOCYTES NFR CSF MANUAL: 67 % (ref 28–96)
LYMPHOCYTES NFR CSF MANUAL: 70 % (ref 28–96)
MCH RBC QN AUTO: 28.8 PG (ref 26–34)
MCHC RBC AUTO-ENTMCNC: 31 G/DL (ref 32–36)
MCV RBC AUTO: 93 FL (ref 80–100)
MONOCYTES # BLD AUTO: 0.02 X10*3/UL (ref 0.1–1)
MONOCYTES NFR BLD AUTO: 0.8 %
MONOS+MACROS NFR CSF MANUAL: 30 % (ref 16–56)
MONOS+MACROS NFR CSF MANUAL: 33 % (ref 16–56)
NEUTROPHILS # BLD AUTO: 2.13 X10*3/UL (ref 1.2–7.7)
NEUTROPHILS NFR BLD AUTO: 87.3 %
NEUTS SEG NFR CSF MANUAL: 0 % (ref 0–5)
NEUTS SEG NFR CSF MANUAL: 0 % (ref 0–5)
NRBC BLD-RTO: 0 /100 WBCS (ref 0–0)
OTHER CELLS NFR CSF MANUAL: 0 %
OTHER CELLS NFR CSF MANUAL: 0 %
PATH REPORT.FINAL DX SPEC: NORMAL
PATH REPORT.GROSS SPEC: NORMAL
PATH REPORT.RELEVANT HX SPEC: NORMAL
PATH REPORT.TOTAL CANCER: NORMAL
PHOSPHATE SERPL-MCNC: 3.3 MG/DL (ref 2.5–4.9)
PLASMA CELLS NFR CSF MICRO: 0 % (ref ?–0)
PLASMA CELLS NFR CSF MICRO: 0 % (ref ?–0)
PLATELET # BLD AUTO: 129 X10*3/UL (ref 150–450)
POTASSIUM SERPL-SCNC: 4 MMOL/L (ref 3.5–5.3)
PROT CSF-MCNC: 93 MG/DL (ref 15–45)
RBC # BLD AUTO: 2.6 X10*6/UL (ref 4.5–5.9)
RBC # CSF AUTO: 0 /UL (ref 0–5)
RBC # CSF AUTO: 15 /UL (ref 0–5)
SODIUM SERPL-SCNC: 137 MMOL/L (ref 136–145)
TOTAL CELLS COUNTED CSF: 10
TOTAL CELLS COUNTED CSF: 6
TUBE # CSF: ABNORMAL
TUBE # CSF: NORMAL
WBC # BLD AUTO: 2.4 X10*3/UL (ref 4.4–11.3)
WBC # CSF AUTO: 2 /UL (ref 1–5)
WBC # CSF AUTO: 3 /UL (ref 1–5)

## 2025-05-07 PROCEDURE — 84157 ASSAY OF PROTEIN OTHER: CPT | Performed by: STUDENT IN AN ORGANIZED HEALTH CARE EDUCATION/TRAINING PROGRAM

## 2025-05-07 PROCEDURE — 84100 ASSAY OF PHOSPHORUS: CPT

## 2025-05-07 PROCEDURE — 2500000004 HC RX 250 GENERAL PHARMACY W/ HCPCS (ALT 636 FOR OP/ED): Mod: JZ | Performed by: STUDENT IN AN ORGANIZED HEALTH CARE EDUCATION/TRAINING PROGRAM

## 2025-05-07 PROCEDURE — 2500000001 HC RX 250 WO HCPCS SELF ADMINISTERED DRUGS (ALT 637 FOR MEDICARE OP)

## 2025-05-07 PROCEDURE — 85025 COMPLETE CBC W/AUTO DIFF WBC: CPT

## 2025-05-07 PROCEDURE — 2500000005 HC RX 250 GENERAL PHARMACY W/O HCPCS: Mod: JZ | Performed by: STUDENT IN AN ORGANIZED HEALTH CARE EDUCATION/TRAINING PROGRAM

## 2025-05-07 PROCEDURE — 88184 FLOWCYTOMETRY/ TC 1 MARKER: CPT | Mod: TC | Performed by: STUDENT IN AN ORGANIZED HEALTH CARE EDUCATION/TRAINING PROGRAM

## 2025-05-07 PROCEDURE — 89051 BODY FLUID CELL COUNT: CPT | Performed by: STUDENT IN AN ORGANIZED HEALTH CARE EDUCATION/TRAINING PROGRAM

## 2025-05-07 PROCEDURE — 62270 DX LMBR SPI PNXR: CPT | Performed by: PHYSICIAN ASSISTANT

## 2025-05-07 PROCEDURE — 83615 LACTATE (LD) (LDH) ENZYME: CPT | Performed by: PHYSICIAN ASSISTANT

## 2025-05-07 PROCEDURE — 82945 GLUCOSE OTHER FLUID: CPT | Performed by: STUDENT IN AN ORGANIZED HEALTH CARE EDUCATION/TRAINING PROGRAM

## 2025-05-07 PROCEDURE — 3E0R305 INTRODUCTION OF OTHER ANTINEOPLASTIC INTO SPINAL CANAL, PERCUTANEOUS APPROACH: ICD-10-PCS | Performed by: INTERNAL MEDICINE

## 2025-05-07 PROCEDURE — 99239 HOSP IP/OBS DSCHRG MGMT >30: CPT | Performed by: INTERNAL MEDICINE

## 2025-05-07 PROCEDURE — 89051 BODY FLUID CELL COUNT: CPT | Performed by: PHYSICIAN ASSISTANT

## 2025-05-07 PROCEDURE — 88112 CYTOPATH CELL ENHANCE TECH: CPT | Mod: TC,MCY | Performed by: STUDENT IN AN ORGANIZED HEALTH CARE EDUCATION/TRAINING PROGRAM

## 2025-05-07 RX ORDER — ONDANSETRON HYDROCHLORIDE 8 MG/1
16 TABLET, FILM COATED ORAL ONCE
Status: COMPLETED | OUTPATIENT
Start: 2025-05-07 | End: 2025-05-07

## 2025-05-07 RX ORDER — OXYCODONE HYDROCHLORIDE 5 MG/1
5 TABLET ORAL EVERY 6 HOURS PRN
Qty: 12 TABLET | Refills: 0 | Status: SHIPPED | OUTPATIENT
Start: 2025-05-07 | End: 2025-05-16 | Stop reason: SDUPTHER

## 2025-05-07 RX ADMIN — PANTOPRAZOLE SODIUM 40 MG: 40 TABLET, DELAYED RELEASE ORAL at 05:44

## 2025-05-07 RX ADMIN — METOPROLOL TARTRATE 75 MG: 50 TABLET, FILM COATED ORAL at 08:34

## 2025-05-07 RX ADMIN — CYCLOPHOSPHAMIDE 1224 MG: 1 INJECTION, POWDER, FOR SOLUTION INTRAVENOUS; ORAL at 10:50

## 2025-05-07 RX ADMIN — SODIUM CHLORIDE 70 MG: 9 INJECTION INTRAMUSCULAR; INTRAVENOUS; SUBCUTANEOUS at 13:39

## 2025-05-07 RX ADMIN — ATORVASTATIN CALCIUM 40 MG: 40 TABLET, FILM COATED ORAL at 05:44

## 2025-05-07 RX ADMIN — OXYCODONE 5 MG: 5 TABLET ORAL at 05:43

## 2025-05-07 RX ADMIN — ONDANSETRON HYDROCHLORIDE 16 MG: 8 TABLET, FILM COATED ORAL at 12:13

## 2025-05-07 RX ADMIN — ACYCLOVIR 400 MG: 200 CAPSULE ORAL at 08:34

## 2025-05-07 RX ADMIN — PREDNISONE 140 MG: 50 TABLET ORAL at 08:42

## 2025-05-07 ASSESSMENT — COGNITIVE AND FUNCTIONAL STATUS - GENERAL
DAILY ACTIVITIY SCORE: 24
MOBILITY SCORE: 23
CLIMB 3 TO 5 STEPS WITH RAILING: A LITTLE

## 2025-05-07 ASSESSMENT — PAIN DESCRIPTION - LOCATION: LOCATION: BACK

## 2025-05-07 ASSESSMENT — PAIN SCALES - GENERAL
PAINLEVEL_OUTOF10: 0 - NO PAIN
PAINLEVEL_OUTOF10: 5 - MODERATE PAIN
PAINLEVEL_OUTOF10: 0 - NO PAIN

## 2025-05-07 ASSESSMENT — PAIN - FUNCTIONAL ASSESSMENT
PAIN_FUNCTIONAL_ASSESSMENT: 0-10
PAIN_FUNCTIONAL_ASSESSMENT: 0-10

## 2025-05-07 NOTE — CARE PLAN
The patient's goals for the shift include pain relief    The clinical goals for the shift include Patient will remain HDS through end of shift    Over the shift, the patient did make progress toward the following goals. Barriers to progression include chemotherapy. Recommendations to address these barriers include continue monitoring.

## 2025-05-07 NOTE — DOCUMENTATION CLARIFICATION NOTE
PATIENT:               DENNIS CAGLE  ACCT #:                  [id number]  MRN:                       [id number]  :                       1960  ADMIT DATE:       2025 8:28 PM  DISCH DATE:  RESPONDING PROVIDER #:        [id number]          PROVIDER RESPONSE TEXT:    Pancytopenia due to chemotherapy and lymphoma    CDI QUERY TEXT:    Clarification    Instruction:    Based on your assessment of the patient and the clinical information, please provide the requested documentation by clicking on the appropriate radio button and enter any additional information if prompted.    Question: Is there a diagnosis indicative of the above lab values    When answering this query, please exercise your independent professional judgment. The fact that a question is being asked, does not imply that any particular answer is desired or expected.    The patient's clinical indicators include:  Clinical Information: 64 y.o. male presenting for chemotherapy of newly diagnosed Burkitt's lymphoma.    Clinical Indicators:  -WBC: 5/3: 3.5         Hgb: 8.0         Plt: 118  5/4: 3.0                 8.3                145  5/5: 3.8                 7.4                122    Treatment: monitoring of labs    Risk Factors: Lymphoma, chemotherapy, hx of RCC--nephrectomy  Options provided:  -- Pancytopenia due to chemotherapy and lymphoma  -- Other - I will add my own diagnosis  -- Refer to Clinical Documentation Reviewer    Query created by: Viji Thakkar on 2025 9:19 AM      Electronically signed by:  ERLIN KIM PA-C 2025 8:31 AM

## 2025-05-07 NOTE — PROCEDURES
Lumbar Puncture    Date/Time: 5/7/2025 2:58 PM    Performed by: Antoni Bello PA-C  Authorized by: Antoni Bello PA-C    Consent:     Consent obtained:  Verbal and written    Consent given by:  Patient    Risks, benefits, and alternatives were discussed: yes      Risks discussed:  Bleeding, infection, pain, headache, nerve damage and repeat procedure  Universal protocol:     Procedure explained and questions answered to patient or proxy's satisfaction: yes      Relevant documents present and verified: yes      Test results available: yes      Imaging studies available: yes      Required blood products, implants, devices, and special equipment available: yes      Immediately prior to procedure a time out was called: yes      Site/side marked: yes      Patient identity confirmed:  Verbally with patient and hospital-assigned identification number  Pre-procedure details:     Procedure purpose:  Therapeutic    Preparation: Patient was prepped and draped in usual sterile fashion    Anesthesia:     Anesthesia method:  Local infiltration    Local anesthetic:  Lidocaine 1% w/o epi  Procedure details:     Lumbar space:  L4-L5 interspace    Patient position:  Sitting    Needle gauge:  20    Needle type:  Spinal needle - Quincke tip    Needle length (in):  3.5    Ultrasound guidance: no      Number of attempts:  2    Fluid appearance:  Clear    Tubes of fluid:  4    Total volume (ml):  9  Post-procedure details:     Puncture site:  Adhesive bandage applied    Procedure completion:  Tolerated well, no immediate complications

## 2025-05-07 NOTE — HH CARE COORDINATION
Home Care received a Referral to Resume Care for Physical Therapy. We have processed the referral for a Resumption of Care on 5/8-5/9.     If you have any questions or concerns, please feel free to contact us at [phone]. Follow the prompts, enter your five digit zip code, and you will be directed to your care team on [facility] 1.

## 2025-05-07 NOTE — DISCHARGE SUMMARY
Discharge Diagnosis  Burkitt's lymphoma (Multi)    Issues Requiring Follow-Up  CSF studies from dx lumbar puncture w IT Cytarabine on 5/7/25      Test Results Pending At Discharge  Pending Labs       Order Current Status    Non-gynecologic cytology In process            Hospital Course  Bijan Ibanez is a 64 y.o. male with PMH of HTN, HLD, CAD, MI (s/p stent 2010, on ASA), hx renal cell carcinoma (s/p R partial nephrectomy in 2013 @ [facility]), GERD, BPH, arthritis who is directly admitted from home for C5 EPOCH chemo therapy for newly diagnosed Burkitt's lymphoma. Admission (5/2 - 5/7/25)    C5 D5 R-EPOCH (started 5/3/25)    Patient received IT methotrexate on 5/3, flow neg lymphoma.  Patient received IT cytarabine 5/7. Flow pending.     Hospital course otherwise uncomplicated.     Anti-infectives upon discharge: acyclovir, levofloxacin (when ANC < 500)  Patient instructed to restart his Apixaban bid this evening (5/7/25)  Access: L dbl SOLO PICC (3/7/25)  Dispo: home w/ PT  Apts:  [ ] neulasta and rituxan on 5/8 with mal heme FUV  [ ] Dr. Mckeon [facility] 5/27        Pertinent Physical Exam At Time of Discharge    Vitals:    05/07/25 0300 05/07/25 0801 05/07/25 0813 05/07/25 1138   BP: 123/79 142/87  122/73   BP Location: Right arm Right arm  Right arm   Patient Position: Lying Lying  Lying   Pulse: 64 80  79   Resp: 18 18  18   Temp: 36.4 °C (97.5 °F) 36.5 °C (97.7 °F)  36.6 °C (97.9 °F)   TempSrc: Temporal Temporal  Temporal   SpO2: 98% 93%  94%   Weight:   86.6 kg (190 lb 14.7 oz)    Height:            Physical Exam    Constitutional:       Appearance: Normal appearance.   HENT:      Head: Normocephalic and atraumatic.      Nose: Nose normal.      Mouth/Throat:      Mouth: Mucous membranes are moist.   Eyes:      General: No scleral icterus.     Extraocular Movements: Extraocular movements intact.      Pupils: Pupils are equal, round, and reactive to light.   Cardiovascular:      Rate and Rhythm: Normal rate and regular  rhythm.      Pulses: Normal pulses.      Heart sounds: Normal heart sounds.   Pulmonary:      Effort: Pulmonary effort is normal.      Breath sounds: Normal breath sounds.   Abdominal:      General: Abdomen is flat. Bowel sounds are normal. There is no distension.      Palpations: Abdomen is soft. There is no mass.      Tenderness: There is no abdominal tenderness. There is no guarding.   Musculoskeletal:         General: No swelling. Normal range of motion.      Cervical back: Normal range of motion and neck supple.   Skin:     General: Skin is warm and dry.      Coloration: Skin is not jaundiced.      Findings: Bruising (distal LUE 2/2 fall, skin intact) present. No erythema or rash.   Neurological:      General: No focal deficit present.      Mental Status: He is alert and oriented to person, place, and time.      Cranial Nerves: No cranial nerve deficit.      Sensory: No sensory deficit.      Motor: No weakness.   Psychiatric:         Mood and Affect: Mood normal.         Behavior: Behavior normal.   Home Medications     Medication List      START taking these medications     levoFLOXacin 500 mg tablet; Commonly known as: Levaquin; Take 1 tablet   (500 mg) by mouth once daily.     CONTINUE taking these medications     acyclovir 200 mg capsule; Commonly known as: Zovirax; Take 2 capsules   (400 mg) by mouth 2 times a day.   albuterol 90 mcg/actuation inhaler   apixaban 5 mg tablet; Commonly known as: Eliquis; Take 1 tablet (5 mg)   by mouth 2 times a day.   atorvastatin 40 mg tablet; Commonly known as: Lipitor; Take 1 tablet (40   mg) by mouth early in the morning..   furosemide 20 mg tablet; Commonly known as: Lasix; Take 1 tablet (20 mg)   by mouth once daily as needed (For Lower extremity edema).   gabapentin 300 mg capsule; Commonly known as: Neurontin; Take 1 capsule   (300 mg) by mouth once daily at bedtime.   Lidocaine/Maalox/Diphenhydramine (1:1:1); Swish and swallow 15 mL every   6 hours if needed for  mucositis for up to 7 days.   metoprolol tartrate 75 mg tablet; Commonly known as: Lopressor; Take 1   tablet (75 mg) by mouth 2 times a day.   oxyCODONE 5 mg immediate release tablet; Commonly known as: Roxicodone;   Take 1 tablet (5 mg) by mouth every 6 hours if needed for moderate pain (4   - 6) for up to 3 days.   pantoprazole 40 mg EC tablet; Commonly known as: ProtoNix; Take 1 tablet   (40 mg) by mouth once daily in the morning. Take before meals. Do not   crush, chew, or split.     STOP taking these medications     potassium chloride CR 10 mEq ER tablet; Commonly known as: Klor-Con   potassium chloride CR 20 mEq ER tablet; Commonly known as: Klor-Con M20     Results from last 7 days   Lab Units 05/07/25  0545 05/06/25  0618 05/05/25  0537   WBC AUTO x10*3/uL 2.4* 3.2* 3.8*   HEMOGLOBIN g/dL 7.5* 7.7* 7.4*   HEMATOCRIT % 24.2* 25.0* 24.0*   PLATELETS AUTO x10*3/uL 129* 139* 122*   NEUTROS PCT AUTO % 87.3 86.0 85.9   LYMPHS PCT AUTO % 10.7 11.1 10.1   MONOS PCT AUTO % 0.8 1.6 3.5   EOS PCT AUTO % 0.8 0.3 0.0     Results from last 7 days   Lab Units 05/07/25  0545 05/06/25  0618 05/05/25  0537 05/03/25  0823 05/02/25  2327   SODIUM mmol/L 137 139 140   < > 138   POTASSIUM mmol/L 4.0 4.0 4.0   < > 3.9   CHLORIDE mmol/L 104 107 107   < > 103   CO2 mmol/L 26 24 24   < > 27   BUN mg/dL 29* 31* 29*   < > 21   CREATININE mg/dL 0.46* 0.50 0.52   < > 0.59   CALCIUM mg/dL 8.5* 8.6 8.7   < > 8.8   PROTEIN TOTAL g/dL  --   --   --   --  4.8*   BILIRUBIN TOTAL mg/dL  --   --   --   --  0.8   ALK PHOS U/L  --   --   --   --  51   ALT U/L  --   --   --   --  23   AST U/L  --   --   --   --  15   GLUCOSE mg/dL 147* 134* 148*   < > 98    < > = values in this interval not displayed.     Results from last 7 days   Lab Units 05/02/25  2327   MAGNESIUM mg/dL 1.55*     Outpatient Follow-Up  Future Appointments   Date Time Provider Department Center   5/8/2025  9:30 AM Leopoldo Gonzalez RN [facility]   5/8/2025 10:00 AM [facility]  [facility]   5/12/2025 To Be Determined Laly Avalos, PT [facility]   5/20/2025  1:30 PM Ata Conti, DO [id number] [facility]   5/27/2025  1:20 PM Torey Mckeon, DO [id number] [facility]   6/24/2025 10:15 AM Ata Conti, DO [id number] [facility]       Antoni Bello PA-C

## 2025-05-07 NOTE — DOCUMENTATION CLARIFICATION NOTE
"    PATIENT:               DENNIS CAGLE  ACCT #:                  [id number]  MRN:                       [id number]  :                       1960  ADMIT DATE:       2025 8:28 PM  DISCH DATE:  RESPONDING PROVIDER #:        [id number]          PROVIDER RESPONSE TEXT:    I agree with dietician diagnosis of severe malnutrition on 25.    CDI QUERY TEXT:    Clarification    Instruction:    Based on your assessment of the patient and the clinical information, please provide the requested documentation by clicking on the appropriate radio button and enter any additional information if prompted.    Question: Please further clarify this patient nutritional status as    When answering this query, please exercise your independent professional judgment. The fact that a question is being asked, does not imply that any particular answer is desired or expected.    The patient's clinical indicators include:  Clinical Information:  64 y.o. male presenting for chemotherapy of newly diagnosed Burkitt's lymphoma.    Clinical Indicators: BMI 25  -RD consult noted \"Severe malnutrition related to chronic disease or condition related to: decreased appetite as evidenced by: 26% wt. loss in 6 months and mild-moderate fat and muscle losses.\"    Treatment: regular diet, One Strawberry Ensure plus daily    Risk Factors: lymphoma w/ chemotherapy, prior mucositis  Options provided:  -- I agree with dietician diagnosis of severe malnutrition on 25.  -- Other - I will add my own diagnosis  -- Refer to Clinical Documentation Reviewer    Query created by: Viji Thakkar on 2025 7:06 AM      Electronically signed by:  ERLIN KIM PA-C 2025 8:31 AM          "

## 2025-05-08 ENCOUNTER — INFUSION (OUTPATIENT)
Dept: HEMATOLOGY/ONCOLOGY | Facility: HOSPITAL | Age: 65
End: 2025-05-08
Payer: MEDICARE

## 2025-05-08 ENCOUNTER — NUTRITION (OUTPATIENT)
Dept: HEMATOLOGY/ONCOLOGY | Facility: HOSPITAL | Age: 65
End: 2025-05-08

## 2025-05-08 ENCOUNTER — OFFICE VISIT (OUTPATIENT)
Dept: HEMATOLOGY/ONCOLOGY | Facility: HOSPITAL | Age: 65
End: 2025-05-08
Payer: MEDICARE

## 2025-05-08 VITALS — BODY MASS INDEX: 27.01 KG/M2 | HEIGHT: 71 IN | WEIGHT: 192.9 LBS

## 2025-05-08 VITALS
WEIGHT: 192.9 LBS | OXYGEN SATURATION: 100 % | BODY MASS INDEX: 27.01 KG/M2 | HEART RATE: 77 BPM | SYSTOLIC BLOOD PRESSURE: 117 MMHG | RESPIRATION RATE: 18 BRPM | HEIGHT: 71 IN | TEMPERATURE: 98.1 F | DIASTOLIC BLOOD PRESSURE: 72 MMHG

## 2025-05-08 DIAGNOSIS — C83.38 DIFFUSE LARGE B-CELL LYMPHOMA OF LYMPH NODES OF MULTIPLE REGIONS (MULTI): Primary | ICD-10-CM

## 2025-05-08 DIAGNOSIS — C85.10 HIGH GRADE B-CELL LYMPHOMA (MULTI): Primary | ICD-10-CM

## 2025-05-08 DIAGNOSIS — C83.78 BURKITT'S LYMPHOMA OF LYMPH NODES OF MULTIPLE REGIONS (MULTI): Primary | ICD-10-CM

## 2025-05-08 DIAGNOSIS — C83.78 BURKITT'S LYMPHOMA OF LYMPH NODES OF MULTIPLE REGIONS (MULTI): ICD-10-CM

## 2025-05-08 DIAGNOSIS — E43 UNSPECIFIED SEVERE PROTEIN-CALORIE MALNUTRITION (MULTI): ICD-10-CM

## 2025-05-08 LAB
PATH REVIEW-CELL CT,CSF: NORMAL
PATH REVIEW-CELL CT,CSF: NORMAL

## 2025-05-08 PROCEDURE — 99215 OFFICE O/P EST HI 40 MIN: CPT | Performed by: NURSE PRACTITIONER

## 2025-05-08 PROCEDURE — 2500000004 HC RX 250 GENERAL PHARMACY W/ HCPCS (ALT 636 FOR OP/ED): Mod: JZ,TB | Performed by: STUDENT IN AN ORGANIZED HEALTH CARE EDUCATION/TRAINING PROGRAM

## 2025-05-08 PROCEDURE — 1111F DSCHRG MED/CURRENT MED MERGE: CPT | Performed by: NURSE PRACTITIONER

## 2025-05-08 PROCEDURE — 2500000001 HC RX 250 WO HCPCS SELF ADMINISTERED DRUGS (ALT 637 FOR MEDICARE OP): Performed by: STUDENT IN AN ORGANIZED HEALTH CARE EDUCATION/TRAINING PROGRAM

## 2025-05-08 PROCEDURE — 2500000004 HC RX 250 GENERAL PHARMACY W/ HCPCS (ALT 636 FOR OP/ED): Mod: JZ | Performed by: STUDENT IN AN ORGANIZED HEALTH CARE EDUCATION/TRAINING PROGRAM

## 2025-05-08 PROCEDURE — 96415 CHEMO IV INFUSION ADDL HR: CPT

## 2025-05-08 PROCEDURE — 96372 THER/PROPH/DIAG INJ SC/IM: CPT

## 2025-05-08 PROCEDURE — 96413 CHEMO IV INFUSION 1 HR: CPT

## 2025-05-08 PROCEDURE — 99215 OFFICE O/P EST HI 40 MIN: CPT | Mod: 25 | Performed by: NURSE PRACTITIONER

## 2025-05-08 RX ORDER — HEPARIN SODIUM 1000 [USP'U]/ML
2000 INJECTION, SOLUTION INTRAVENOUS; SUBCUTANEOUS AS NEEDED
OUTPATIENT
Start: 2025-05-08

## 2025-05-08 RX ORDER — DIPHENHYDRAMINE HYDROCHLORIDE 50 MG/ML
50 INJECTION, SOLUTION INTRAMUSCULAR; INTRAVENOUS AS NEEDED
Status: DISCONTINUED | OUTPATIENT
Start: 2025-05-08 | End: 2025-05-08 | Stop reason: HOSPADM

## 2025-05-08 RX ORDER — FAMOTIDINE 10 MG/ML
20 INJECTION, SOLUTION INTRAVENOUS ONCE AS NEEDED
Status: DISCONTINUED | OUTPATIENT
Start: 2025-05-08 | End: 2025-05-08 | Stop reason: HOSPADM

## 2025-05-08 RX ORDER — EPINEPHRINE 0.3 MG/.3ML
0.3 INJECTION SUBCUTANEOUS EVERY 5 MIN PRN
Status: DISCONTINUED | OUTPATIENT
Start: 2025-05-08 | End: 2025-05-08 | Stop reason: HOSPADM

## 2025-05-08 RX ORDER — PROCHLORPERAZINE EDISYLATE 5 MG/ML
10 INJECTION INTRAMUSCULAR; INTRAVENOUS EVERY 6 HOURS PRN
Status: DISCONTINUED | OUTPATIENT
Start: 2025-05-08 | End: 2025-05-08 | Stop reason: HOSPADM

## 2025-05-08 RX ORDER — PROCHLORPERAZINE MALEATE 10 MG
10 TABLET ORAL EVERY 6 HOURS PRN
Status: DISCONTINUED | OUTPATIENT
Start: 2025-05-08 | End: 2025-05-08 | Stop reason: HOSPADM

## 2025-05-08 RX ORDER — HEPARIN 100 UNIT/ML
500 SYRINGE INTRAVENOUS AS NEEDED
OUTPATIENT
Start: 2025-05-08

## 2025-05-08 RX ORDER — ALBUTEROL SULFATE 0.83 MG/ML
3 SOLUTION RESPIRATORY (INHALATION) AS NEEDED
Status: DISCONTINUED | OUTPATIENT
Start: 2025-05-08 | End: 2025-05-08 | Stop reason: HOSPADM

## 2025-05-08 RX ORDER — DIPHENHYDRAMINE HCL 50 MG
50 CAPSULE ORAL ONCE
Status: COMPLETED | OUTPATIENT
Start: 2025-05-08 | End: 2025-05-08

## 2025-05-08 RX ORDER — PROCHLORPERAZINE MALEATE 10 MG
5 TABLET ORAL EVERY 6 HOURS PRN
Qty: 30 TABLET | Refills: 1 | Status: SHIPPED | OUTPATIENT
Start: 2025-05-08 | End: 2025-06-07

## 2025-05-08 RX ORDER — HEPARIN SODIUM,PORCINE/PF 10 UNIT/ML
50 SYRINGE (ML) INTRAVENOUS AS NEEDED
OUTPATIENT
Start: 2025-05-08

## 2025-05-08 RX ORDER — ACETAMINOPHEN 325 MG/1
650 TABLET ORAL ONCE
Status: COMPLETED | OUTPATIENT
Start: 2025-05-08 | End: 2025-05-08

## 2025-05-08 RX ADMIN — PEGFILGRASTIM 6 MG: 6 INJECTION SUBCUTANEOUS at 13:25

## 2025-05-08 RX ADMIN — RITUXIMAB 800 MG: 10 INJECTION, SOLUTION INTRAVENOUS at 10:35

## 2025-05-08 RX ADMIN — ACETAMINOPHEN 650 MG: 325 TABLET ORAL at 10:13

## 2025-05-08 RX ADMIN — DIPHENHYDRAMINE HYDROCHLORIDE 50 MG: 50 CAPSULE ORAL at 10:13

## 2025-05-08 NOTE — PROGRESS NOTES
Pt present today for C5D6.  Pt saw provider during visit.  See provider's note for full assessment.  Rituximab regimen infused with no issues.  Neulasta shot tolerated to right upper arm.  Pt discharged to home in stable condition with copy of future appointments.

## 2025-05-08 NOTE — PROGRESS NOTES
NUTRITION FOLLOW UP NOTE    Reason for Visit:  Bijan Ibanez is a 64 y.o. male with newly dx'd Burkitt's lymphoma (Stage IV).    Currently being treated with dose-adjusted R-EPOCH    *Admitted 5/2-5/7 for C5 R-EPOCH with IT methotrexate and IT cytarabine.      Pt seen today in infusion.  Here for Rituxan.     Patient Active Problem List   Diagnosis    Abdominal pain    Acute sinusitis    Chest pain    Ataxia    Benign hypertensive heart disease    Benign paroxysmal vertigo, unspecified ear    Bronchitis    Carotid artery stenosis    Chronic ischemic heart disease, unspecified    Chronic peripheral venous hypertension    Atherosclerosis of coronary artery without angina pectoris    Coronary artery disease involving native coronary artery of native heart with angina pectoris    Coronary artery disease    Diverticular disease of colon    Dyspnea    Essential hypertension    Hypertension    History of cholecystectomy    History of myocardial infarction    HLD (hyperlipidemia)    Malignant neoplasm of kidney (Multi)    Sensorineural hearing loss, bilateral    Neoplasm of uncertain behavior of skin    Obesity, Class I, BMI 30-34.9    Other general symptoms and signs    Personal history of malignant neoplasm of renal pelvis    Tobacco dependence in remission    Tubular adenoma    Adjustment disorder with mixed anxiety and depressed mood    Elevated prostate specific antigen (PSA)    Fever, unspecified    Hyponatremia    MI (myocardial infarction) (Multi)    Chronic midline low back pain without sciatica    Pancytopenia    Burkitt's lymphoma of lymph nodes of multiple regions (Multi)    Febrile neutropenia (CMS-HCC)    Bacteremia due to methicillin resistant Staphylococcus aureus    Paroxysmal atrial fibrillation (Multi)    Diffuse large B cell lymphoma    Burkitt's lymphoma (Multi)    Encounter for antineoplastic chemotherapy    Burkitt lymphoma of lymph nodes of axilla (Multi)    Dysphagia    Headache    Nausea &  "vomiting    Burkitt lymphoma       Nutrition Significant Labs:  Lab Results   Component Value Date/Time    GLUCOSE 113 (H) 05/12/2025 0804     05/12/2025 0804    K 3.7 05/12/2025 0804     05/12/2025 0804    CO2 28 05/12/2025 0804    ANIONGAP 11 05/12/2025 0804    BUN 20 05/12/2025 0804    CREATININE 0.50 05/12/2025 0804    EGFR >90 05/12/2025 0804    CALCIUM 8.3 (L) 05/12/2025 0804    ALBUMIN 3.1 (L) 05/12/2025 0804    ALKPHOS 46 05/12/2025 0804    PROT 4.5 (L) 05/12/2025 0804    AST 6 (L) 05/12/2025 0804    BILITOT 0.9 05/12/2025 0804    ALT 19 05/12/2025 0804    MG 1.87 05/12/2025 0804    PHOS 3.3 05/07/2025 0545     Lab Results   Component Value Date    WBC 0.3 (LL) 05/12/2025    HGB 6.6 (L) 05/12/2025    HCT 20.8 (L) 05/12/2025    MCV 92 05/12/2025    PLT 27 (LL) 05/12/2025       No results found for: \"VITD25\"      Anthropometrics:  Height: 181 cm (5' 11.26\")   Weight: 87.5 kg (192 lb 14.4 oz)   BMI (Calculated): 26.71    IBW/kg (Dietitian Calculated): 78 kg   Percent of IBW: 112 %        Weight History:    Wt up 4 kg x 1 week; some of wt gain may be fluid gain from recent hospital stay but pt feels he may have gained actual wt as well.     Wt Readings from Last 20 Encounters:   05/12/25 84 kg (185 lb 3 oz)   05/08/25 87.5 kg (192 lb 14.4 oz)   05/08/25 87.5 kg (192 lb 14.4 oz)   05/07/25 86.6 kg (190 lb 14.7 oz)   04/29/25 82.8 kg (182 lb 8.7 oz)   04/23/25 83.5 kg (184 lb)   04/18/25 (S) 85 kg (187 lb 6.3 oz)   04/08/25 90.7 kg (199 lb 15.3 oz)   04/08/25 90.7 kg (199 lb 15.3 oz)   04/04/25 91.6 kg (202 lb)   03/31/25 92.5 kg (203 lb 14.8 oz)   03/25/25 91.6 kg (201 lb 15.1 oz)   03/21/25 92.1 kg (203 lb 0.7 oz)   03/21/25 92.1 kg (203 lb)   03/20/25 91.2 kg (201 lb 1 oz)   03/18/25 91.7 kg (202 lb 2.6 oz)   03/12/25 97.4 kg (214 lb 11.2 oz)   03/11/25 95.7 kg (210 lb 15.7 oz)   03/04/25 91.5 kg (201 lb 11.2 oz)   03/03/25 93 kg (205 lb)   01/18/25         108.6 kg      Food and Nutrition " "History:    Just discharged from hospital following C5 chemo; tolerated most recent treatment well--so far only side effect has been fatigue.   Denies any issues with odynophagia or mouth pain at this time (as has occurred with previous chemo treatments).  Feels appetite is better and most foods are tasting okay.   Portions smaller due to early satiety.   Feels he is drinking well--mainly milk (alternates b/t Fairlife whole and Fairlife 2% milk), water and occasionally pop.   Was drinking smoothies regularly, however, feels he may have \"overdone it\" and developed diarrhea.   Drinking Boost Original (strawberry) at least 2x/day; takes meds with Ensure as well.   Likes the iced caramel macchiato at [facility] as well (250 kcals and 10 gm protein--contains caffeine).  Denies D/C; took stool softener and had good BM this am.   Did feel car sick this am on way to infusion, otherwise, denies nausea.  LE swelling has improved; has Lasix if needed.   Energy levels had picked up prior to last chemo.     This am:   Drank Ensure, had a waffle, crackers and milk    Supplements:  Boost Original (strawberry) provides 240 kcals and 9 gm protein      Medications:  Current Outpatient Medications   Medication Instructions    acyclovir (ZOVIRAX) 400 mg, oral, 2 times daily    albuterol 90 mcg/actuation inhaler 2 puffs, inhalation, Every 6 hours PRN    apixaban (ELIQUIS) 5 mg, oral, 2 times daily    atorvastatin (LIPITOR) 40 mg, oral, Daily (0630)    furosemide (Lasix) 20 mg tablet Take 1 tablet (20 mg) by mouth once daily as needed (For Lower extremity edema).    gabapentin (NEURONTIN) 300 mg, oral, Nightly    levoFLOXacin (LEVAQUIN) 500 mg, oral, Daily    metoprolol tartrate (LOPRESSOR) 75 mg, oral, 2 times daily    pantoprazole (PROTONIX) 40 mg, oral, Daily before breakfast, Do not crush, chew, or split.    prochlorperazine (COMPAZINE) 5 mg, oral, Every 6 hours PRN       Nutrition Focused Physical Exam Findings:    Subcutaneous Fat " Loss:   Orbital Fat Pads: Mild-Moderate (slight dark circles and slight hollowing)  Buccal Fat Pads: Mild-Moderate (flat cheeks, minimal bounce)  Triceps: Mild-Moderate (less than ample fat tissue)    Muscle Wasting:  Temporalis: Mild-Moderate (slight depression)  Pectoralis (Clavicular Region): Mild-Moderate (some protrusion of clavicle)  Deltoid/Trapezius: Mild-Moderate (slight protrusion of acromion process)  Interosseous: Mild-Moderate (slightly depressed area between thumb and forefinger)  Trapezius/Infraspinatus/Supraspinatus (Scapular Region): Mild-Moderate (slight protrusion of scapula)  Quadriceps: Mild-Moderate (mild depression on inner and outer thigh)  Gastrocnemius: Mild-Moderate (not well developed muscle)    Physical Findings:   Bilat ankle edema       Estimated Needs:       Total Energy Estimated Needs in 24 hours (kCal):  (4623-3059)  Energy Estimated Needs per kg Body Weight in 24 hours (kCal/kg):  (28-30)  Total Protein Estimated Needs in 24 Hours (g):  (105-120)  Protein Estimated Needs per kg Body Weight in 24 Hours (g/kg):  (1.2-1.4)  Total Fluid Estimated Needs in 24 Hours (mL):  (2450)  Total Fluid Estimated Needs in 24 hours (mL/kg):  (30)             Nutrition Diagnosis   Malnutrition Diagnosis  Patient has Malnutrition Diagnosis: Yes  Diagnosis Status: Active  Malnutrition Diagnosis: Moderate malnutrition related to chronic disease or condition  As Evidenced by: treatment related side effects that impacted pt's ability to take adequate oral intakes which resulted in significant wt loss and noted muscle wasting in both upper and lower extremities.    Appetite and intakes have been good for the past several weeks.  No dysphagia noted with most recent chemo.  Wt has increased slightly (?fluid gains vs gain in LBM vs both).  Remains moderately malnourished and continues to benefit from regular use of nutrition supplements.  Protein intakes have increased as pt drinking Fairlife milk t/o the  day (each 8 oz = 13 gm protein)           Nutrition Interventions/Recommendations   Nutrition Prescription:    High protein, High Calorie    Nutrition Interventions/Education:   Suggested pt take Zofran before coming to infusion to minimize risk of car sickness.   Continue Boost Original BID; can add ice cream for additional kcals/protein  Encourage PO fluid intakes  Check 25-OH Vit D level as pt at risk for deficiency--will add order      Coordination of Care:  Mal heme PA           Nutrition Monitoring and Evaluation   Food and Nutrient Intake  Monitoring and Evaluation Plan: Energy intake, Fluid intake, Amount of food, Protein intake  Energy Intake: Estimated energy intake  Criteria: Consume PO in amounts needed to maintain/gain weight  Fluid Intake: Estimated fluid intake  Criteria: Maintain hydration; Goal fluid intakes: 90 oz  Criteria: smaller, more frequent meals and snacks q 2-3 hrs vs 3 meals a day  Estimated protein intake: Estimated protein intake  Criteria: include protein source with all meals and snacks       Will continue to f/up and monitor wt, labs, GI symptoms and intakes closely.

## 2025-05-08 NOTE — SIGNIFICANT EVENT

## 2025-05-08 NOTE — PROGRESS NOTES
Patient ID: Bijan Ibanez is a 64 y.o. male.  Referring Physician: No referring provider defined for this encounter.  Primary Care Provider: Nehal Murphy MD     Diagnosis: Burkitt's Lymphoma  Date of Diagnosis: 1/16/25  Stage at Diagnosis: IV  Risk category: high risk (marrow involvement)    Prior Treatments:    Current Treaments:  DA-R-EPOCH C1D1 1/21/25    Assessment/Plan    Bijan Ibanez is a 64 y.o. male with PMH of HTN, HLD, CAD, MI (s/p stent 2010, on ASA), hx renal cell carcinoma (s/p R partial nephrectomy in 2013 @ [facility]), GERD, BPH, arthritis who is admitted for further evaluation and management of newly diagnosed Burkitt's lymphoma.    # Burkitt's Lymphoma - Originally signed out as high grade lymphoma but after burkitt's rearrangement (t8;14) came back positive. High risk based on marrow involvement, though no CNS involvement  - previously reviewed diagnosis and prognosis with patient  - initially give DA-R-EPOCH when was signed out as high grade lymphoma, given the significant toxicity he had with DA R EPOCH, will not escalate to CODOX-IVAC or hyper-CVAD given no CNS involvement  - PET post 2C Chemo shows deauville 2 - c/w CR  - cont w/ EPOCH, trialed dose level 2 but was intolerant with G3 mucositis, again with mucositis with dose level 1, will go down to dose level -1  - 5/8: Discharged yesterday. Presents for C5D6 Rituximab/Neulasta and post discharge follow up in [facility]. Restarted Eliquis since LP; knows we will hold if Platelets<50,000. Will start Levofloxacin tomorrow. Feeling well today with no acute issues. Started Home PT.    # CNS prophylaxis  - 2 LP's with C5. 5/3 CSF negative flow cytometry. 5/7 CSF is pending.     # G1 CIPN  - monitor    # MRSA bacteremia  - s/p 4 weeks of IV Vanco, EOT 3/3/25    # A fib = on metoprolol     We reviewed medication list, plan of care, and follow up appointment schedule from hospital discharge. I reviewed phone number to call  for any acute issues or concerns prior to next visit.    RTC  5/12 Count Check  5/15 Count Check  5/27 Dr. Mckeon    Requested & Pending-  5/19, 5/22, 5/27 Count Checks  ____________________________________________________________________________________________________________________    HISTORY  Per HPI from inpatient:  Bijan Ibanez is a 64 y.o. male with PMH of HTN, HLD, CAD, MI (s/p stent 2010, on ASA), hx renal cell carcinoma (s/p R partial nephrectomy in 2013 @ [facility]), GERD, BPH, arthritis who is admitted for further evaluation and management of newly diagnosed high grade B-cell lymphoma.      Patient recently admitted 1/11-1/17 after concerning imaging outpatient leading to c/f relapsed RCC vs new other malignancy.      Patient endorses recent history of constitutional symptoms, including night sweats, weight loss, decreased appetite, fatigue, OSORIO. Patient and wife both developed URI symptoms after [place], however, the wife's symptoms resolved while the patient's did not. On admit, continues to have low back pain, that improves with oxycodone, and is worse with movement. Reports an episode of right flank pain yesterday that was very painful but resolved after pain meds and has not yet recurred. Reporting chin and lip numbness that has been present since before [place]. Also reports right parotid swelling, that has sometimes appeared to have improved but will then return to same size, but has not continued to enlarge; sometimes tender to touch, sometimes not. Had a left toothache at some point that resolved and has not recurred. Patient denies saddle anesthesia, loss of lower extremity function or loss of bladder/bowel function. Denies CP, SOB, abd pain, N/V/D/C.     Interval Hx  Presents for post hospital discharge following C5 & D6 Ritux/Neulasta. Feeling weak, but otherwise doing ok. No acute issues.    Bijan Ibanez  reports that he has quit smoking. His smoking use included cigarettes and pipe.  He has never used smokeless tobacco.  He  reports no history of alcohol use.  He  reports no history of drug use.  Family History   Problem Relation Name Age of Onset    Coronary artery disease Mother      Alzheimer's disease Mother      Coronary artery disease Father      Skin cancer Father      Alzheimer's disease Father      Alzheimer's disease Mother's Brother      Alzheimer's disease Father's Brother      Stomach cancer Maternal Grandfather      Other cancer Paternal Grandfather          prostate or colon cancer    Heart disease Other       Oncology History   Burkitt's lymphoma of lymph nodes of multiple regions (Multi)   1/13/2025 Initial Diagnosis    Diagnosis: Burkitt Lymphoma, [place]ver[place] Stage IV, BL-IPI High-Risk (score 4/5).    BL-IPI Calculation:  Age >60 years: Yes (64 years old).  Performance status (ECOG >=2): Presumed based on clinical presentation requiring hospitalization.  Serum LDH >ULN: 4102 U/L (1/11/25)  Elverta Stage III/IV: Yes (stage IV with extranodal involvement including kidneys, liver, spleen, and musculature).  CNS involvement: No (MRI and LP negative).  Total Score: 4/5 (High-Risk).    Presenting Symptoms: Asymmetric swelling of the right-sided muscles of mastication; imaging revealed incidental findings of perihilar mass and renal lesions.    Labs at Diagnosis: WBC 4.0, platelets 72; LDH 4102 U/L (1/11/25)    Pathology:  EBUS with lymph node biopsy (1/13/25): Predominantly CD20-positive small lymphoid cells, raising suspicion for a B-cell lymphoproliferative process.  Bone marrow biopsy (1/16/25): 70-80% involvement by high-grade B-cell lymphoma in a hypercellular marrow (90% cellular) with maturing trilineage hematopoiesis. FISH confirmed t(8;14)/IGH::MYC rearrangement, diagnostic of Burkitt lymphoma.     Imaging:  CT Neck (1/9/25): Fusiform thickening of right masticatory muscles, likely benign hypertrophy.  CT C/A/P (1/11/25): Left perihilar mass, pulmonary nodules, right renal  lesions, and right adrenal gland thickening concerning for metastatic disease; T12-L1 soft tissue mass.  PET-CT (1/20): Widespread hermann and extranodal hypermetabolic involvement, including kidneys, liver, spleen, abdominal wall, and musculature, suggestive of extensive lymphomatous disease.  MRI Brain (1/21): No intracranial lymphoma; suspected osseous involvement of calvarium.    Treatment:  Dexamethasone 40 mg daily (1/20-1/21).  Prophylactic IT methotrexate via LP (1/22), flow negative for lymphoma.     1/21/2025 -  Chemotherapy    - C1 (1/21/25)  - C2 (2/14/25) -- etoposide dose-reduced by 25%  - C3 (3/7/25) -- etoposide dose-reduced by 10%, doxorubicin increased by 20%  - C4 (3/28/25) -- same as C3  (INPT) Dose-Adjusted R-EPOCH (Etoposide / DOXOrubicin / VinCRIStine / Cyclophosphamide / PredniSONE) + RiTUXimab, 21 Day Cycles      2/24/2025 Imaging    PET/CT (2/24/25, after C2): near-complete resolution of previously active disease in lymph nodes and various organs, Deauville score of 2         Performance Status:  ECOG Score: 2- Ambulatory and  capable of all selfcare; unable to carry out work activities.  Up and about > 50% of waking hrs.   Karnofsky Score: 70 - Cares for self; unable to carry on normal activity or do normal work     Objective   BSA: There is no height or weight on file to calculate BSA.  There were no vitals taken for this visit.  Physical Exam  GEN: Aox3, NAD  HEENT EOMI PERRLA sclera anicteric, no mucositis seen  CV RRR w/o m/r/g  Resp CTAB w/o w/r/r  GI Soft NTND+BS  Ext no LE edema  LN: no adenopathy palpated       Labs:  No results displayed because visit has over 200 results.      Lab on 04/29/2025   Component Date Value Ref Range Status    Magnesium 04/29/2025 1.48 (L)  1.60 - 2.40 mg/dL Final    WBC 04/29/2025 4.8  4.4 - 11.3 x10*3/uL Final    nRBC 04/29/2025 0.4 (H)  0.0 - 0.0 /100 WBCs Final    RBC 04/29/2025 2.97 (L)  4.50 - 5.90 x10*6/uL Final    Hemoglobin 04/29/2025 8.7 (L)   13.5 - 17.5 g/dL Final    Hematocrit 04/29/2025 27.2 (L)  41.0 - 52.0 % Final    MCV 04/29/2025 92  80 - 100 fL Final    MCH 04/29/2025 29.3  26.0 - 34.0 pg Final    MCHC 04/29/2025 32.0  32.0 - 36.0 g/dL Final    RDW 04/29/2025 20.8 (H)  11.5 - 14.5 % Final    Platelets 04/29/2025 149 (L)  150 - 450 x10*3/uL Final    Neutrophils % 04/29/2025 62.6  40.0 - 80.0 % Final    Immature Granulocytes %, Automated 04/29/2025 0.8  0.0 - 0.9 % Final    Lymphocytes % 04/29/2025 24.9  13.0 - 44.0 % Final    Monocytes % 04/29/2025 11.1  2.0 - 10.0 % Final    Eosinophils % 04/29/2025 0.2  0.0 - 6.0 % Final    Basophils % 04/29/2025 0.4  0.0 - 2.0 % Final    Neutrophils Absolute 04/29/2025 2.99  1.20 - 7.70 x10*3/uL Final    Immature Granulocytes Absolute, Au* 04/29/2025 0.04  0.00 - 0.70 x10*3/uL Final    Lymphocytes Absolute 04/29/2025 1.19 (L)  1.20 - 4.80 x10*3/uL Final    Monocytes Absolute 04/29/2025 0.53  0.10 - 1.00 x10*3/uL Final    Eosinophils Absolute 04/29/2025 0.01  0.00 - 0.70 x10*3/uL Final    Basophils Absolute 04/29/2025 0.02  0.00 - 0.10 x10*3/uL Final    Glucose 04/29/2025 96  74 - 99 mg/dL Final    Sodium 04/29/2025 137  136 - 145 mmol/L Final    Potassium 04/29/2025 4.0  3.5 - 5.3 mmol/L Final    Chloride 04/29/2025 102  98 - 107 mmol/L Final    Bicarbonate 04/29/2025 26  21 - 32 mmol/L Final    Anion Gap 04/29/2025 13  10 - 20 mmol/L Final    Urea Nitrogen 04/29/2025 20  6 - 23 mg/dL Final    Creatinine 04/29/2025 0.61  0.50 - 1.30 mg/dL Final    eGFR 04/29/2025 >90  >60 mL/min/1.73m*2 Final    Calcium 04/29/2025 9.4  8.6 - 10.6 mg/dL Final    Albumin 04/29/2025 3.6  3.4 - 5.0 g/dL Final    Alkaline Phosphatase 04/29/2025 56  33 - 136 U/L Final    Total Protein 04/29/2025 5.3 (L)  6.4 - 8.2 g/dL Final    AST 04/29/2025 11  9 - 39 U/L Final    Bilirubin, Total 04/29/2025 0.9  0.0 - 1.2 mg/dL Final    ALT 04/29/2025 23  10 - 52 U/L Final    LDH 04/29/2025 147  84 - 246 U/L Final    RBC Morphology 04/29/2025 See  Below   Final    Polychromasia 04/29/2025 Mild   Final    Ovalocytes 04/29/2025 Few   Final    Teardrop Cells 04/29/2025 Few   Final   Infusion on 04/23/2025   Component Date Value Ref Range Status    Magnesium 04/23/2025 1.74  1.60 - 2.40 mg/dL Final    WBC 04/23/2025 5.5  4.4 - 11.3 x10*3/uL Final    nRBC 04/23/2025 0.5 (H)  0.0 - 0.0 /100 WBCs Final    RBC 04/23/2025 3.20 (L)  4.50 - 5.90 x10*6/uL Final    Hemoglobin 04/23/2025 9.0 (L)  13.5 - 17.5 g/dL Final    Hematocrit 04/23/2025 28.7 (L)  41.0 - 52.0 % Final    MCV 04/23/2025 90  80 - 100 fL Final    MCH 04/23/2025 28.1  26.0 - 34.0 pg Final    MCHC 04/23/2025 31.4 (L)  32.0 - 36.0 g/dL Final    RDW 04/23/2025 16.3 (H)  11.5 - 14.5 % Final    Platelets 04/23/2025 232  150 - 450 x10*3/uL Final    Neutrophils % 04/23/2025 72.2  40.0 - 80.0 % Final    Immature Granulocytes %, Automated 04/23/2025 1.5 (H)  0.0 - 0.9 % Final    Lymphocytes % 04/23/2025 13.3  13.0 - 44.0 % Final    Monocytes % 04/23/2025 12.5  2.0 - 10.0 % Final    Eosinophils % 04/23/2025 0.0  0.0 - 6.0 % Final    Basophils % 04/23/2025 0.5  0.0 - 2.0 % Final    Neutrophils Absolute 04/23/2025 3.97  1.20 - 7.70 x10*3/uL Final    Immature Granulocytes Absolute, Au* 04/23/2025 0.08  0.00 - 0.70 x10*3/uL Final    Lymphocytes Absolute 04/23/2025 0.73 (L)  1.20 - 4.80 x10*3/uL Final    Monocytes Absolute 04/23/2025 0.69  0.10 - 1.00 x10*3/uL Final    Eosinophils Absolute 04/23/2025 0.00  0.00 - 0.70 x10*3/uL Final    Basophils Absolute 04/23/2025 0.03  0.00 - 0.10 x10*3/uL Final    Glucose 04/23/2025 106 (H)  74 - 99 mg/dL Final    Sodium 04/23/2025 137  136 - 145 mmol/L Final    Potassium 04/23/2025 4.5  3.5 - 5.3 mmol/L Final    Chloride 04/23/2025 102  98 - 107 mmol/L Final    Bicarbonate 04/23/2025 28  21 - 32 mmol/L Final    Anion Gap 04/23/2025 12  10 - 20 mmol/L Final    Urea Nitrogen 04/23/2025 23  6 - 23 mg/dL Final    Creatinine 04/23/2025 0.62  0.50 - 1.30 mg/dL Final    eGFR 04/23/2025 >90   >60 mL/min/1.73m*2 Final    Calcium 04/23/2025 9.2  8.6 - 10.3 mg/dL Final    Albumin 04/23/2025 3.5  3.4 - 5.0 g/dL Final    Alkaline Phosphatase 04/23/2025 49  33 - 136 U/L Final    Total Protein 04/23/2025 5.3 (L)  6.4 - 8.2 g/dL Final    AST 04/23/2025 13  9 - 39 U/L Final    Bilirubin, Total 04/23/2025 0.7  0.0 - 1.2 mg/dL Final    ALT 04/23/2025 17  10 - 52 U/L Final    LDH 04/23/2025 151  84 - 246 U/L Final   No results displayed because visit has over 200 results.        All labs reviewed    Path:      Component    FINAL DIAGNOSIS   A, B & C. BONE MARROW CLOT WITH ASPIRATE AND CORE WITH TOUCH PREP, LEFT ILIAC CREST:       -- WITH INVOLVEMENT BY HIGH GRADE B-CELL LYMPHOMA, 70-80% OF MARROW CELLULAR ELEMENTS  -- HYPERCELLULAR BONE MARROW (90% CELLULAR) WITH MATURING TRILINEAGE HEMATOPOIESIS   -- SEE NOTE     NOTE: The differential diagnosis includes Burkitt lymphoma and high grade B-cell lymphoma with MYC and Bcl2 and/or Bcl-6 rearrangements (double / triple hit lymphoma). FISH studies and lymphoid NGS panel are pending for further characterization. Clinical and molecular results correlation is suggested         Component    ISCN    Results:    FISH POSITIVE for t(8;14)/ IGH::MYC rearrangement.  nuc  kieran(D8Z2x2,MYCx3,IGHx3)(MYC con IGHx2)[31/250]/(D8Z2x2,MYCx2,IGHx2)(MYC con IGHx1)[8/250]       Imaging:  PET 2/24/25  IMPRESSION:  1. There is near complete interval resolution of previously seen  hypermetabolic hermann and extranodal disease involving bilateral  submandibular, parotid gland, hepatic lesions, spleen, mesenteric  deposits, bilateral kidney stomach bilateral, bilateral arms,  anterior chest wall, and gluteal muscle supra and infra diaphragmatic  lymphadenopathy.  2. Diffuse metabolic activity within the bone marrow, limiting the  evaluation a focal metabolic osseous metastatic disease. Likely post  treatment changes. Lymphomatous involvement can not be completely  excluded. Deauville score 2.  3.  Splenomegaly with metabolic activity less than liver.  4. Mild decrease in size and metabolic activity within left lower  lobe opacity, likely inflammatory.  5. Subcentimeter nodular opacities within right lung base, likely  inflammatory/infective.        PET 1/20/25  IMPRESSION:  Widespread hermann as well as extranodal lymphomatous involvement.  1. Multiple hypermetabolic supra and infra diaphragmatic  lymphadenopathy as described, consistent with lymphomatous  involvement.  2. Intensely hypermetabolic activity within bilateral enlarged  submandibular and parotid glands as described, concerning for  lymphomatous involvement.  3. Hypermetabolic mass like infiltration within bilateral  kidneys(right> left), corresponding to lesion seen on CT dated  01/01/2025, compatible with lymphomatous involvement.  4. Hypermetabolic hepatic lesions without any underlying CT  correlate, consistent with lymphomatous involvement.  5. Splenomegaly with nonspecific hypermetabolic activity suggestive  of extranodal involvement.  6. Multiple hypermetabolic soft tissue mesenteric deposits as well as  anterior abdominal wall nodular deposits, with few of the lesions  without any underlying CT correlate, concerning for additional  lymphomatous involvement.  7. Metabolic activity within the stomach, bilateral arms, anterior  chest wall and gluteal muscles without underlying CT correlate,  concerning for lymphomatous involvement.      Elvira العلي, APRN-CNP

## 2025-05-10 ENCOUNTER — HOME CARE VISIT (OUTPATIENT)
Dept: HOME HEALTH SERVICES | Facility: HOME HEALTH | Age: 65
End: 2025-05-10
Payer: MEDICARE

## 2025-05-11 ENCOUNTER — HOME CARE VISIT (OUTPATIENT)
Dept: HOME HEALTH SERVICES | Facility: HOME HEALTH | Age: 65
End: 2025-05-11
Payer: MEDICARE

## 2025-05-12 ENCOUNTER — LAB (OUTPATIENT)
Dept: HEMATOLOGY/ONCOLOGY | Facility: HOSPITAL | Age: 65
End: 2025-05-12
Payer: MEDICARE

## 2025-05-12 ENCOUNTER — HOME CARE VISIT (OUTPATIENT)
Dept: HOME HEALTH SERVICES | Facility: HOME HEALTH | Age: 65
End: 2025-05-12
Payer: MEDICARE

## 2025-05-12 VITALS
WEIGHT: 185.19 LBS | TEMPERATURE: 98.1 F | SYSTOLIC BLOOD PRESSURE: 98 MMHG | BODY MASS INDEX: 25.93 KG/M2 | DIASTOLIC BLOOD PRESSURE: 65 MMHG | RESPIRATION RATE: 18 BRPM | HEART RATE: 88 BPM | HEIGHT: 71 IN | OXYGEN SATURATION: 98 %

## 2025-05-12 DIAGNOSIS — C83.30 DIFFUSE LARGE B-CELL LYMPHOMA, UNSPECIFIED BODY REGION (MULTI): ICD-10-CM

## 2025-05-12 DIAGNOSIS — R78.81 BACTEREMIA DUE TO METHICILLIN RESISTANT STAPHYLOCOCCUS AUREUS: ICD-10-CM

## 2025-05-12 DIAGNOSIS — R10.84 GENERALIZED ABDOMINAL PAIN: ICD-10-CM

## 2025-05-12 DIAGNOSIS — I48.91 NEW ONSET A-FIB (MULTI): ICD-10-CM

## 2025-05-12 DIAGNOSIS — C83.74: ICD-10-CM

## 2025-05-12 DIAGNOSIS — B95.62 BACTEREMIA DUE TO METHICILLIN RESISTANT STAPHYLOCOCCUS AUREUS: ICD-10-CM

## 2025-05-12 DIAGNOSIS — C83.78 BURKITT'S LYMPHOMA OF LYMPH NODES OF MULTIPLE REGIONS (MULTI): ICD-10-CM

## 2025-05-12 LAB
ABO GROUP (TYPE) IN BLOOD: NORMAL
ALBUMIN SERPL BCP-MCNC: 3.1 G/DL (ref 3.4–5)
ALP SERPL-CCNC: 46 U/L (ref 33–136)
ALT SERPL W P-5'-P-CCNC: 19 U/L (ref 10–52)
ANION GAP SERPL CALC-SCNC: 11 MMOL/L (ref 10–20)
ANTIBODY SCREEN: NORMAL
AST SERPL W P-5'-P-CCNC: 6 U/L (ref 9–39)
BASOPHILS # BLD AUTO: 0 X10*3/UL (ref 0–0.1)
BASOPHILS NFR BLD AUTO: 0 %
BILIRUB SERPL-MCNC: 0.9 MG/DL (ref 0–1.2)
BLOOD EXPIRATION DATE: NORMAL
BUN SERPL-MCNC: 20 MG/DL (ref 6–23)
CALCIUM SERPL-MCNC: 8.3 MG/DL (ref 8.6–10.6)
CHLORIDE SERPL-SCNC: 102 MMOL/L (ref 98–107)
CO2 SERPL-SCNC: 28 MMOL/L (ref 21–32)
CREAT SERPL-MCNC: 0.5 MG/DL (ref 0.5–1.3)
DISPENSE STATUS: NORMAL
EGFRCR SERPLBLD CKD-EPI 2021: >90 ML/MIN/1.73M*2
EOSINOPHIL # BLD AUTO: 0.05 X10*3/UL (ref 0–0.7)
EOSINOPHIL NFR BLD AUTO: 16.1 %
ERYTHROCYTE [DISTWIDTH] IN BLOOD BY AUTOMATED COUNT: 17.6 % (ref 11.5–14.5)
GLUCOSE SERPL-MCNC: 113 MG/DL (ref 74–99)
HCT VFR BLD AUTO: 20.8 % (ref 41–52)
HGB BLD-MCNC: 6.6 G/DL (ref 13.5–17.5)
IMM GRANULOCYTES # BLD AUTO: 0 X10*3/UL (ref 0–0.7)
IMM GRANULOCYTES NFR BLD AUTO: 0 % (ref 0–0.9)
LABORATORY COMMENT REPORT: NORMAL
LABORATORY COMMENT REPORT: NORMAL
LDH SERPL L TO P-CCNC: 97 U/L (ref 84–246)
LYMPHOCYTES # BLD AUTO: 0.17 X10*3/UL (ref 1.2–4.8)
LYMPHOCYTES NFR BLD AUTO: 54.8 %
MAGNESIUM SERPL-MCNC: 1.87 MG/DL (ref 1.6–2.4)
MCH RBC QN AUTO: 29.1 PG (ref 26–34)
MCHC RBC AUTO-ENTMCNC: 31.7 G/DL (ref 32–36)
MCV RBC AUTO: 92 FL (ref 80–100)
MONOCYTES # BLD AUTO: 0.01 X10*3/UL (ref 0.1–1)
MONOCYTES NFR BLD AUTO: 3.2 %
NEUTROPHILS # BLD AUTO: 0.08 X10*3/UL (ref 1.2–7.7)
NEUTROPHILS NFR BLD AUTO: 25.9 %
NRBC BLD-RTO: 0 /100 WBCS (ref 0–0)
PATH REPORT.FINAL DX SPEC: NORMAL
PATH REPORT.GROSS SPEC: NORMAL
PATH REPORT.RELEVANT HX SPEC: NORMAL
PATH REPORT.TOTAL CANCER: NORMAL
PLATELET # BLD AUTO: 27 X10*3/UL (ref 150–450)
POTASSIUM SERPL-SCNC: 3.7 MMOL/L (ref 3.5–5.3)
PRODUCT BLOOD TYPE: 6200
PRODUCT CODE: NORMAL
PROT SERPL-MCNC: 4.5 G/DL (ref 6.4–8.2)
RBC # BLD AUTO: 2.27 X10*6/UL (ref 4.5–5.9)
RH FACTOR (ANTIGEN D): NORMAL
SODIUM SERPL-SCNC: 137 MMOL/L (ref 136–145)
UNIT ABO: NORMAL
UNIT NUMBER: NORMAL
UNIT RH: NORMAL
UNIT VOLUME: 350
WBC # BLD AUTO: 0.3 X10*3/UL (ref 4.4–11.3)
XM INTEP: NORMAL

## 2025-05-12 PROCEDURE — 36430 TRANSFUSION BLD/BLD COMPNT: CPT

## 2025-05-12 PROCEDURE — 86900 BLOOD TYPING SEROLOGIC ABO: CPT

## 2025-05-12 PROCEDURE — 83735 ASSAY OF MAGNESIUM: CPT

## 2025-05-12 PROCEDURE — P9040 RBC LEUKOREDUCED IRRADIATED: HCPCS

## 2025-05-12 PROCEDURE — 86923 COMPATIBILITY TEST ELECTRIC: CPT

## 2025-05-12 PROCEDURE — 85025 COMPLETE CBC W/AUTO DIFF WBC: CPT

## 2025-05-12 PROCEDURE — 84075 ASSAY ALKALINE PHOSPHATASE: CPT

## 2025-05-12 PROCEDURE — 83615 LACTATE (LD) (LDH) ENZYME: CPT

## 2025-05-12 PROCEDURE — 2500000004 HC RX 250 GENERAL PHARMACY W/ HCPCS (ALT 636 FOR OP/ED): Mod: JZ

## 2025-05-12 RX ORDER — HEPARIN SODIUM,PORCINE/PF 10 UNIT/ML
50 SYRINGE (ML) INTRAVENOUS AS NEEDED
Status: DISCONTINUED | OUTPATIENT
Start: 2025-05-12 | End: 2025-05-12 | Stop reason: HOSPADM

## 2025-05-12 RX ORDER — HEPARIN SODIUM,PORCINE/PF 10 UNIT/ML
50 SYRINGE (ML) INTRAVENOUS AS NEEDED
OUTPATIENT
Start: 2025-05-12

## 2025-05-12 RX ORDER — FAMOTIDINE 10 MG/ML
20 INJECTION, SOLUTION INTRAVENOUS ONCE AS NEEDED
OUTPATIENT
Start: 2025-05-12

## 2025-05-12 RX ORDER — EPINEPHRINE 0.3 MG/.3ML
0.3 INJECTION SUBCUTANEOUS EVERY 5 MIN PRN
OUTPATIENT
Start: 2025-05-12

## 2025-05-12 RX ORDER — DIPHENHYDRAMINE HYDROCHLORIDE 50 MG/ML
50 INJECTION, SOLUTION INTRAMUSCULAR; INTRAVENOUS AS NEEDED
OUTPATIENT
Start: 2025-05-12

## 2025-05-12 RX ORDER — HEPARIN 100 UNIT/ML
500 SYRINGE INTRAVENOUS AS NEEDED
OUTPATIENT
Start: 2025-05-12

## 2025-05-12 RX ORDER — HEPARIN SODIUM 1000 [USP'U]/ML
2000 INJECTION, SOLUTION INTRAVENOUS; SUBCUTANEOUS AS NEEDED
OUTPATIENT
Start: 2025-05-12

## 2025-05-12 RX ORDER — ALBUTEROL SULFATE 0.83 MG/ML
3 SOLUTION RESPIRATORY (INHALATION) AS NEEDED
OUTPATIENT
Start: 2025-05-12

## 2025-05-12 RX ADMIN — HEPARIN, PORCINE (PF) 10 UNIT/ML INTRAVENOUS SYRINGE 50 UNITS: at 11:37

## 2025-05-12 NOTE — PROGRESS NOTES
Patient presented with no complaints.  He rec'd 1 PRBC for a HGB of 6.6 per order and protocol with adverse reactions.  He was discharged in stable condition.

## 2025-05-13 RX ORDER — METOPROLOL TARTRATE 75 MG/1
75 TABLET ORAL 2 TIMES DAILY
Qty: 60 TABLET | Refills: 11 | Status: SHIPPED | OUTPATIENT
Start: 2025-05-13 | End: 2026-05-13

## 2025-05-14 ENCOUNTER — HOME CARE VISIT (OUTPATIENT)
Dept: HOME HEALTH SERVICES | Facility: HOME HEALTH | Age: 65
End: 2025-05-14
Payer: MEDICARE

## 2025-05-14 VITALS
HEART RATE: 126 BPM | RESPIRATION RATE: 20 BRPM | TEMPERATURE: 99 F | SYSTOLIC BLOOD PRESSURE: 112 MMHG | DIASTOLIC BLOOD PRESSURE: 64 MMHG | OXYGEN SATURATION: 99 %

## 2025-05-14 DIAGNOSIS — R78.81 BACTEREMIA DUE TO METHICILLIN RESISTANT STAPHYLOCOCCUS AUREUS: ICD-10-CM

## 2025-05-14 DIAGNOSIS — B95.62 BACTEREMIA DUE TO METHICILLIN RESISTANT STAPHYLOCOCCUS AUREUS: ICD-10-CM

## 2025-05-14 DIAGNOSIS — R10.84 GENERALIZED ABDOMINAL PAIN: ICD-10-CM

## 2025-05-14 DIAGNOSIS — C83.38 DIFFUSE LARGE B-CELL LYMPHOMA OF LYMPH NODES OF MULTIPLE REGIONS (MULTI): Primary | ICD-10-CM

## 2025-05-14 DIAGNOSIS — I48.91 NEW ONSET A-FIB (MULTI): ICD-10-CM

## 2025-05-14 PROCEDURE — G0151 HHCP-SERV OF PT,EA 15 MIN: HCPCS | Mod: HHH

## 2025-05-14 SDOH — HEALTH STABILITY: PHYSICAL HEALTH

## 2025-05-14 SDOH — HEALTH STABILITY: PHYSICAL HEALTH: EXERCISE COMMENTS: GTH, BALANCE AND STANDING TOLERANCE.

## 2025-05-14 ASSESSMENT — ACTIVITIES OF DAILY LIVING (ADL)
OASIS_M1830: 02
OASIS_M1830: 02
HOME_HEALTH_OASIS: 01
ENTERING_EXITING_HOME: NEEDS ASSISTANCE

## 2025-05-14 ASSESSMENT — ENCOUNTER SYMPTOMS
PERSON REPORTING PAIN: PATIENT
DENIES PAIN: 1

## 2025-05-15 ENCOUNTER — TELEPHONE (OUTPATIENT)
Dept: PALLIATIVE MEDICINE | Facility: HOSPITAL | Age: 65
End: 2025-05-15

## 2025-05-15 ENCOUNTER — LAB (OUTPATIENT)
Dept: HEMATOLOGY/ONCOLOGY | Facility: HOSPITAL | Age: 65
End: 2025-05-15
Payer: MEDICARE

## 2025-05-15 ENCOUNTER — OFFICE VISIT (OUTPATIENT)
Dept: HEMATOLOGY/ONCOLOGY | Facility: HOSPITAL | Age: 65
End: 2025-05-15
Payer: MEDICARE

## 2025-05-15 VITALS
TEMPERATURE: 97.9 F | DIASTOLIC BLOOD PRESSURE: 72 MMHG | HEART RATE: 89 BPM | BODY MASS INDEX: 25.4 KG/M2 | WEIGHT: 183.42 LBS | OXYGEN SATURATION: 100 % | SYSTOLIC BLOOD PRESSURE: 100 MMHG | RESPIRATION RATE: 16 BRPM

## 2025-05-15 DIAGNOSIS — R13.10 DYSPHAGIA, UNSPECIFIED TYPE: ICD-10-CM

## 2025-05-15 DIAGNOSIS — C83.78 BURKITT'S LYMPHOMA OF LYMPH NODES OF MULTIPLE REGIONS (MULTI): ICD-10-CM

## 2025-05-15 DIAGNOSIS — C83.38 DIFFUSE LARGE B-CELL LYMPHOMA OF LYMPH NODES OF MULTIPLE REGIONS (MULTI): ICD-10-CM

## 2025-05-15 DIAGNOSIS — E83.42 HYPOMAGNESEMIA: ICD-10-CM

## 2025-05-15 DIAGNOSIS — C83.70 BURKITT LYMPHOMA, UNSPECIFIED BODY REGION (MULTI): ICD-10-CM

## 2025-05-15 DIAGNOSIS — D69.6 THROMBOCYTOPENIA: ICD-10-CM

## 2025-05-15 DIAGNOSIS — C83.30 DIFFUSE LARGE B-CELL LYMPHOMA, UNSPECIFIED BODY REGION (MULTI): ICD-10-CM

## 2025-05-15 DIAGNOSIS — E87.6 HYPOKALEMIA: ICD-10-CM

## 2025-05-15 DIAGNOSIS — R10.84 GENERALIZED ABDOMINAL PAIN: ICD-10-CM

## 2025-05-15 DIAGNOSIS — C83.78 BURKITT'S LYMPHOMA OF LYMPH NODES OF MULTIPLE REGIONS (MULTI): Primary | ICD-10-CM

## 2025-05-15 DIAGNOSIS — E43 UNSPECIFIED SEVERE PROTEIN-CALORIE MALNUTRITION (MULTI): ICD-10-CM

## 2025-05-15 LAB
25(OH)D3 SERPL-MCNC: 36 NG/ML (ref 30–100)
ALBUMIN SERPL BCP-MCNC: 3.3 G/DL (ref 3.4–5)
ALP SERPL-CCNC: 44 U/L (ref 33–136)
ALT SERPL W P-5'-P-CCNC: 13 U/L (ref 10–52)
ANION GAP SERPL CALC-SCNC: 14 MMOL/L (ref 10–20)
AST SERPL W P-5'-P-CCNC: 6 U/L (ref 9–39)
BASOPHILS # BLD MANUAL: 0 X10*3/UL (ref 0–0.1)
BASOPHILS NFR BLD MANUAL: 0 %
BILIRUB SERPL-MCNC: 1.4 MG/DL (ref 0–1.2)
BUN SERPL-MCNC: 18 MG/DL (ref 6–23)
CALCIUM SERPL-MCNC: 8.4 MG/DL (ref 8.6–10.3)
CELL POPULATIONS: NORMAL
CHLORIDE SERPL-SCNC: 105 MMOL/L (ref 98–107)
CO2 SERPL-SCNC: 24 MMOL/L (ref 21–32)
CREAT SERPL-MCNC: 0.51 MG/DL (ref 0.5–1.3)
DACRYOCYTES BLD QL SMEAR: ABNORMAL
DIAGNOSIS: NORMAL
DOHLE BOD BLD QL SMEAR: PRESENT
EGFRCR SERPLBLD CKD-EPI 2021: >90 ML/MIN/1.73M*2
EOSINOPHIL # BLD MANUAL: 0.02 X10*3/UL (ref 0–0.7)
EOSINOPHIL NFR BLD MANUAL: 3 %
ERYTHROCYTE [DISTWIDTH] IN BLOOD BY AUTOMATED COUNT: 16.6 % (ref 11.5–14.5)
FLOW DIFFERENTIAL: NORMAL
FLOW TEST ORDERED: NORMAL
FLUID CELL COUNT: 3 /UL
GLUCOSE SERPL-MCNC: 99 MG/DL (ref 74–99)
HCT VFR BLD AUTO: 22.9 % (ref 41–52)
HGB BLD-MCNC: 7.6 G/DL (ref 13.5–17.5)
IMM GRANULOCYTES # BLD AUTO: 0.02 X10*3/UL (ref 0–0.7)
IMM GRANULOCYTES NFR BLD AUTO: 2.6 % (ref 0–0.9)
LAB TEST METHOD: NORMAL
LDH SERPL L TO P-CCNC: 106 U/L (ref 84–246)
LYMPHOCYTES # BLD MANUAL: 0.27 X10*3/UL (ref 1.2–4.8)
LYMPHOCYTES NFR BLD MANUAL: 34 %
MAGNESIUM SERPL-MCNC: 1.56 MG/DL (ref 1.6–2.4)
MCH RBC QN AUTO: 29.3 PG (ref 26–34)
MCHC RBC AUTO-ENTMCNC: 33.2 G/DL (ref 32–36)
MCV RBC AUTO: 88 FL (ref 80–100)
MONOCYTES # BLD MANUAL: 0.16 X10*3/UL (ref 0.1–1)
MONOCYTES NFR BLD MANUAL: 20 %
MYELOCYTES # BLD MANUAL: 0.01 X10*3/UL
MYELOCYTES NFR BLD MANUAL: 1 %
NEUTROPHILS # BLD MANUAL: 0.29 X10*3/UL (ref 1.2–7.7)
NEUTS BAND # BLD MANUAL: 0.07 X10*3/UL (ref 0–0.7)
NEUTS BAND NFR BLD MANUAL: 9 %
NEUTS SEG # BLD MANUAL: 0.22 X10*3/UL (ref 1.2–7)
NEUTS SEG NFR BLD MANUAL: 27 %
NRBC BLD-RTO: 13 /100 WBCS (ref 0–0)
NUMBER OF CELLS COLLECTED: NORMAL
OVALOCYTES BLD QL SMEAR: ABNORMAL
PATH REPORT.TOTAL CANCER: NORMAL
PLATELET # BLD AUTO: 22 X10*3/UL (ref 150–450)
POLYCHROMASIA BLD QL SMEAR: ABNORMAL
POTASSIUM SERPL-SCNC: 3.3 MMOL/L (ref 3.5–5.3)
PROT SERPL-MCNC: 4.9 G/DL (ref 6.4–8.2)
RBC # BLD AUTO: 2.59 X10*6/UL (ref 4.5–5.9)
RBC MORPH BLD: ABNORMAL
SCHISTOCYTES BLD QL SMEAR: ABNORMAL
SIGNATURE COMMENT: NORMAL
SODIUM SERPL-SCNC: 140 MMOL/L (ref 136–145)
SPECIMEN VIABILITY: NORMAL
TOTAL CELLS COUNTED BLD: 100
VARIANT LYMPHS # BLD MANUAL: 0.05 X10*3/UL (ref 0–0.5)
VARIANT LYMPHS NFR BLD: 6 %
WBC # BLD AUTO: 0.8 X10*3/UL (ref 4.4–11.3)

## 2025-05-15 PROCEDURE — 1111F DSCHRG MED/CURRENT MED MERGE: CPT

## 2025-05-15 PROCEDURE — 2500000004 HC RX 250 GENERAL PHARMACY W/ HCPCS (ALT 636 FOR OP/ED): Mod: JZ

## 2025-05-15 PROCEDURE — 1160F RVW MEDS BY RX/DR IN RCRD: CPT

## 2025-05-15 PROCEDURE — 84075 ASSAY ALKALINE PHOSPHATASE: CPT

## 2025-05-15 PROCEDURE — 83735 ASSAY OF MAGNESIUM: CPT

## 2025-05-15 PROCEDURE — 99215 OFFICE O/P EST HI 40 MIN: CPT

## 2025-05-15 PROCEDURE — 82306 VITAMIN D 25 HYDROXY: CPT

## 2025-05-15 PROCEDURE — 96366 THER/PROPH/DIAG IV INF ADDON: CPT | Mod: INF

## 2025-05-15 PROCEDURE — 85027 COMPLETE CBC AUTOMATED: CPT

## 2025-05-15 PROCEDURE — 96368 THER/DIAG CONCURRENT INF: CPT

## 2025-05-15 PROCEDURE — 83615 LACTATE (LD) (LDH) ENZYME: CPT

## 2025-05-15 PROCEDURE — 1159F MED LIST DOCD IN RCRD: CPT

## 2025-05-15 PROCEDURE — 85007 BL SMEAR W/DIFF WBC COUNT: CPT

## 2025-05-15 PROCEDURE — 96365 THER/PROPH/DIAG IV INF INIT: CPT | Mod: INF

## 2025-05-15 RX ORDER — POTASSIUM CHLORIDE 29.8 MG/ML
40 INJECTION INTRAVENOUS ONCE
Status: COMPLETED | OUTPATIENT
Start: 2025-05-15 | End: 2025-05-15

## 2025-05-15 RX ORDER — POTASSIUM CHLORIDE 29.8 MG/ML
40 INJECTION INTRAVENOUS ONCE
Status: CANCELLED | OUTPATIENT
Start: 2025-05-15 | End: 2025-05-15

## 2025-05-15 RX ORDER — MAGNESIUM SULFATE 1 G/100ML
1 INJECTION INTRAVENOUS ONCE
Status: CANCELLED | OUTPATIENT
Start: 2025-05-15 | End: 2025-05-15

## 2025-05-15 RX ORDER — MAGNESIUM SULFATE 1 G/100ML
1 INJECTION INTRAVENOUS ONCE
Status: COMPLETED | OUTPATIENT
Start: 2025-05-15 | End: 2025-05-15

## 2025-05-15 RX ADMIN — MAGNESIUM SULFATE HEPTAHYDRATE 1 G: 10 INJECTION, SOLUTION INTRAVENOUS at 10:48

## 2025-05-15 RX ADMIN — SODIUM CHLORIDE 1000 ML: 0.9 INJECTION, SOLUTION INTRAVENOUS at 10:26

## 2025-05-15 RX ADMIN — POTASSIUM CHLORIDE 40 MEQ: 29.8 INJECTION, SOLUTION INTRAVENOUS at 10:48

## 2025-05-15 ASSESSMENT — ENCOUNTER SYMPTOMS
VOMITING: 0
APPETITE CHANGE: 1
FEVER: 0
LIGHT-HEADEDNESS: 0
CARDIOVASCULAR NEGATIVE: 1
BACK PAIN: 1
BRUISES/BLEEDS EASILY: 1
DIZZINESS: 0
LEG SWELLING: 0
PSYCHIATRIC NEGATIVE: 1
EYES NEGATIVE: 1
NUMBNESS: 0
NEUROLOGICAL NEGATIVE: 1
CONSTIPATION: 0
NAUSEA: 0
SHORTNESS OF BREATH: 1
BLOOD IN STOOL: 0
PALPITATIONS: 0
CHILLS: 0
COUGH: 0
HEMATURIA: 0
HEADACHES: 0
DIAPHORESIS: 0
CHEST TIGHTNESS: 0
DIARRHEA: 1

## 2025-05-15 ASSESSMENT — PAIN SCALES - GENERAL: PAINLEVEL_OUTOF10: 10-WORST PAIN EVER

## 2025-05-15 NOTE — PROGRESS NOTES
Patient ID: Bijan Ibanez is a 64 y.o. male.  Referring Physician: Ольга Edwards PA-C  [id number] [id number]  Primary Care Provider: Nehal Murphy MD    Date of Service:  5/15/2025    Oncologic History    Diagnosis: Burkitt's Lymphoma  Date of Diagnosis: 1/16/25  Stage at Diagnosis: IV  Risk category: high risk (marrow involvement)     Prior Treatments:     Current Treaments:  DA-R-EPOCH s/p 5 cycles  Cycle 6 planned for 5/24/2025    SUBJECTIVE:  Bijan Ibanez presents today for follow up accompanied by his wife. Feeling not so good today as was experiencing diarrhea Tuesday into Wednesday after drinking a smoothie from [employer]. Almost 24 hours since last episode, no bowel movement yet today. Appetite down some over the last few days when having the diarrhea. Starting to have issues swallowing again, hurts at times to get food down. Has occasional trouble with fluids as well. Still on Eliquis as was not aware to hold when plt get below 50K, does have some bruising but not new or worsening. Slight bleeding when brushing teeth but stops on its own. Few drops of blood a few days ago after blowing nose. Denies bleeding during bowel movements and urination. Not actively bleeding. SOB on exertion with increased HR that resolves with rest. Has some back and abd pain controlled with Oxy.    Denies HA, lightheadedness or dizziness. No trouble breathing, chest pain or tightness. No Gi symptoms as they seem to have resolved. No B symptoms or current infection symptoms.       Review of Systems   Constitutional:  Positive for appetite change. Negative for chills, diaphoresis and fever.   HENT:   Positive for nosebleeds (Few drops a day or so ago).         Slight blood when bruising teeth, resolves when done brushing   Eyes: Negative.    Respiratory:  Positive for shortness of breath (on exertion). Negative for chest tightness and cough.    Cardiovascular: Negative.  Negative for chest pain, leg  swelling and palpitations.   Gastrointestinal:  Positive for abdominal pain (discomfort) and diarrhea (resolved). Negative for blood in stool, constipation, nausea and vomiting.   Genitourinary:  Negative for hematuria.    Musculoskeletal:  Positive for back pain.   Skin: Negative.    Neurological: Negative.  Negative for dizziness, headaches, light-headedness and numbness.   Hematological:  Bruises/bleeds easily (bruising).   Psychiatric/Behavioral: Negative.           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    BL-IPI Calculation:  Age >60 years: Yes (64 years old).  Performance status (ECOG >=2): Presumed based on clinical presentation requiring hospitalization.  Serum LDH >ULN: 4102 U/L (1/11/25)  [place] Stage III/IV: Yes (stage IV with extranodal involvement including kidneys, liver, spleen, and musculature).  CNS involvement: No (MRI and LP negative).  Total Score: 4/5 (High-Risk).    Presenting Symptoms: Asymmetric swelling of the right-sided muscles of mastication; imaging revealed incidental findings of perihilar mass and renal lesions.    Labs at Diagnosis: WBC 4.0, platelets 72; LDH 4102 U/L (1/11/25)    Pathology:  EBUS with lymph node biopsy (1/13/25): Predominantly CD20-positive small lymphoid cells, raising suspicion for a B-cell lymphoproliferative process.  Bone marrow biopsy (1/16/25): 70-80% involvement by high-grade B-cell lymphoma in a hypercellular marrow (90% cellular) with maturing trilineage hematopoiesis. FISH confirmed t(8;14)/IGH::MYC rearrangement, diagnostic of Burkitt lymphoma.     Imaging:  CT Neck (1/9/25): Fusiform thickening of right masticatory muscles, likely benign hypertrophy.  CT C/A/P (1/11/25): Left perihilar mass, pulmonary nodules, right renal lesions, and right adrenal gland thickening concerning for metastatic disease;  T12-L1 soft tissue mass.  PET-CT (1/20): Widespread hermann and extranodal hypermetabolic involvement, including kidneys, liver, spleen, abdominal wall, and musculature, suggestive of extensive lymphomatous disease.  MRI Brain (1/21): No intracranial lymphoma; suspected osseous involvement of calvarium.    Treatment:  Dexamethasone 40 mg daily (1/20-1/21).  Prophylactic IT methotrexate via LP (1/22), flow negative for lymphoma.     1/21/2025 -  Chemotherapy    - C1 (1/21/25)  - C2 (2/14/25) -- etoposide dose-reduced by 25%  - C3 (3/7/25) -- etoposide dose-reduced by 10%, doxorubicin increased by 20%  - C4 (3/28/25) -- same as C3  (INPT) Dose-Adjusted R-EPOCH (Etoposide / DOXOrubicin / VinCRIStine / Cyclophosphamide / PredniSONE) + RiTUXimab, 21 Day Cycles      2/24/2025 Imaging    PET/CT (2/24/25, after C2): near-complete resolution of previously active disease in lymph nodes and various organs, Deauville score of 2          OBJECTIVE:  KPS: Karnofsky Score: 70 - Cares for self; unable to carry on normal activity or do normal work    VS:  There were no vitals taken for this visit.  BSA: There is no height or weight on file to calculate BSA.    Physical Exam  Constitutional:       Appearance: Normal appearance.   HENT:      Head: Normocephalic and atraumatic.      Nose: Nose normal. No congestion or rhinorrhea.      Mouth/Throat:      Mouth: Mucous membranes are moist.      Pharynx: Oropharynx is clear.   Eyes:      Conjunctiva/sclera: Conjunctivae normal.      Pupils: Pupils are equal, round, and reactive to light.   Cardiovascular:      Rate and Rhythm: Regular rhythm. Tachycardia present.      Pulses: Normal pulses.      Heart sounds: Normal heart sounds.   Pulmonary:      Effort: Pulmonary effort is normal.      Breath sounds: Normal breath sounds.   Abdominal:      General: Bowel sounds are normal.      Palpations: Abdomen is soft.   Musculoskeletal:         General: Normal range of motion.      Cervical back:  Normal range of motion and neck supple.      Right lower leg: No edema.      Left lower leg: No edema.   Skin:     General: Skin is warm and dry.      Capillary Refill: Capillary refill takes less than 2 seconds.      Coloration: Skin is not pale.      Findings: Bruising present.   Neurological:      General: No focal deficit present.      Mental Status: He is alert and oriented to person, place, and time. Mental status is at baseline.   Psychiatric:         Mood and Affect: Mood normal.         Behavior: Behavior normal.         Thought Content: Thought content normal.         Judgment: Judgment normal.       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 High risk based on marrow involvement, though no CNS involvement  - previously reviewed diagnosis and prognosis with patient  - initially give DA-R-EPOCH when was signed out as high grade lymphoma, given the significant toxicity he had with DA R EPOCH, will not escalate to CODOX-IVAC or hyper-CVAD given no CNS involvement  - PET post 2C Chemo shows deauville 2 - c/w CR  - cont w/ EPOCH, trialed dose level 2 but was intolerant with G3 mucositis, again with mucositis with dose level 1, will go down to dose level -1  - 5/8: Discharged 5/7/2025. Presents for C5D6 Rituximab/Neulasta and post discharge follow up in [facility]. Restarted Eliquis since LP; knows we will hold if Platelets<50,000. Will start Levofloxacin tomorrow. Feeling well today with no acute issues. Started Home PT.    5/15/2025  -C/o back and intermittent abdominal pain and almost out of Oxy. Will put in referral to Pall Med/Supportive Onc and give a few doses  -Admission planned for 5/23 for Cycle 6 of dose adjusted R-EPOCH  -Pre admission visit scheduled for 5/22 along with count check   Hypokalemia  -K+ 3.3 today  -Replaced with 40 mEq  IV K  -Did not send for home script as having troubling swallowing  Hypomagnesemia  -Mag 1.56 today  -Replaced with 1g IV since PO intake down and recent diarrhea  Dysphagia, unspecified type  -C/o having trouble swallowing which includes solids and liquids.  -Was able to get pills down this morning  -Last barium swallow was done 4/3/2025: Strategies given per Barium note:  * Small bites  * Small, single sips  * Alternate consistencies  * Chin tuck  -Pt did not want another swallow eval at the moment as taking in some fluids and solids  -Scheduled phone visit for Saturday to assess dysphagia and possibly order another evaluation  -Received 1L NS bolus due to decreased PO intake, diarrhea and tachycardia   Thrombocytopenia  -Plt 22 today  -Instructed to hold Eliquis as was still taking though on Monday plt came back at 27  -Educated to hold when Plt are less than 50K    # CNS prophylaxis  - 2 LP's with C5. 5/3 CSF negative flow cytometry. 5/7 CSF is pending.      # G1 CIPN  - monitor     # MRSA bacteremia  - s/p 4 weeks of IV Vanco, EOT 3/3/25     # A fib = on metoprolol  We reviewed medication list, plan of care, and follow up appointment schedule from hospital discharge. I reviewed phone number to call for any acute issues or concerns prior to next visit.     Results from last 7 days   Lab Units 05/15/25  0940 05/12/25  0804   WBC AUTO x10*3/uL 0.8* 0.3*   HEMOGLOBIN g/dL 7.6* 6.6*   HEMATOCRIT % 22.9* 20.8*   PLATELETS AUTO x10*3/uL 22* 27*   NEUTROS PCT AUTO %  --  25.9   LYMPHO PCT MAN % 34.0  --    LYMPHS PCT AUTO %  --  54.8   MONO PCT MAN % 20.0  --    MONOS PCT AUTO %  --  3.2   EOSINO PCT MAN % 3.0  --    EOS PCT AUTO %  --  16.1       Results from last 7 days   Lab Units 05/15/25  0940   SODIUM mmol/L 140   POTASSIUM mmol/L 3.3*   CHLORIDE mmol/L 105   CO2 mmol/L 24   BUN mg/dL 18   CREATININE mg/dL 0.51   CALCIUM mg/dL 8.4*   PROTEIN TOTAL g/dL 4.9*   BILIRUBIN TOTAL mg/dL 1.4*   ALK PHOS U/L 44   ALT U/L  13   AST U/L 6*   GLUCOSE mg/dL 99       Results from last 7 days   Lab Units 05/15/25  0940   MAGNESIUM mg/dL 1.56*         RTC:  5/19 Count Check           5/22 Mal heme and count check (pre-admit)           5/27  Dr. Mckeon and count Checks    Requested Phone visit on 5/17 to discuss dysphagia and 5/20 with Dr. Mike Workman, APRN-CNP

## 2025-05-15 NOTE — TELEPHONE ENCOUNTER
Received Supportive Oncology referral from Catalina Workman. Called patient  to introduce the program and he stated he was not aware we were going to call him. I proceeded to give a description and he stated maybe his wife knows about it. Patient wanted to talk to his wife before proceeding. He will talk to her tonight and was ok with a follow up call 5/15/25.     Called patient and he declined an appointment at this time. Stated he wasn't sure why the referral was placed. Referral canceled and referral source has been informed.    Catalina Workman contacted spouse and informed she was not going to continue righting scripts for Oxycodone, spouse stated they will schedule an appointment. Called Bijan back and he wasn't happy to hear from me again, apparently the wife didn't update him. When I explained that Supportive Oncology will be providing his Oxycodone, he told me he doesn't need it, he is going to take Tylenol. Asked me to not call again. Catalina Workman notified and referral cancelled

## 2025-05-15 NOTE — ASSESSMENT & PLAN NOTE
-C/o having trouble swallowing which includes solids and liquids.  -Was able to get pills down this morning  -Last barium swallow was done 4/3/2025: Strategies given per Barium note:  * Small bites  * Small, single sips  * Alternate consistencies  * Chin tuck  -Pt did not want another swallow eval at the moment as taking in some fluids and solids  -Scheduled phone visit for Saturday to assess dysphagia and possibly order another evaluation  -Received 1L NS bolus due to decreased PO intake, diarrhea and tachycardia

## 2025-05-15 NOTE — ASSESSMENT & PLAN NOTE
-Originally signed out as high grade lymphoma but after burkitt's rearrangement (t8;14) came back positive. High risk based on marrow involvement, though no CNS involvement  - previously reviewed diagnosis and prognosis with patient  - initially give DA-R-EPOCH when was signed out as high grade lymphoma, given the significant toxicity he had with DA R EPOCH, will not escalate to CODOX-IVAC or hyper-CVAD given no CNS involvement  - PET post 2C Chemo shows deauville 2 - c/w CR  - cont w/ EPOCH, trialed dose level 2 but was intolerant with G3 mucositis, again with mucositis with dose level 1, will go down to dose level -1  - 5/8: Discharged 5/7/2025. Presents for C5D6 Rituximab/Neulasta and post discharge follow up in [facility]. Restarted Eliquis since LP; knows we will hold if Platelets<50,000. Will start Levofloxacin tomorrow. Feeling well today with no acute issues. Started Home PT.    5/15/2025  -C/o back and intermittent abdominal pain and almost out of Oxy. Will put in referral to Pall Med/Supportive Onc and give a few doses  -Admission planned for 5/23 for Cycle 6 of dose adjusted R-EPOCH  -Pre admission visit scheduled for 5/22 along with count check

## 2025-05-15 NOTE — PROGRESS NOTES
Patient arrives for count check. He was in a wheelchair with his wife. Patient complaint of chest pain when he swallows. Feels tired, not eating,not drinking,no appetite,not sleeping. Catalina SILVERMAN was informed and saw the patient. Patient got 1l of fluids. His mag was 1.57, 1 gm of mag was ordered. Potassium was 3.3, 40 meqs was ordered. Patient tolerated d his treatment without any issue. PICC line was flushed. He was discharged stable.

## 2025-05-16 RX ORDER — OXYCODONE HYDROCHLORIDE 5 MG/1
5 TABLET ORAL EVERY 6 HOURS PRN
Qty: 12 TABLET | Refills: 0 | Status: SHIPPED | OUTPATIENT
Start: 2025-05-16 | End: 2025-05-19

## 2025-05-16 ASSESSMENT — ENCOUNTER SYMPTOMS
CONSTIPATION: 0
LEG SWELLING: 0
COUGH: 0
DIZZINESS: 0
LIGHT-HEADEDNESS: 0
CHEST TIGHTNESS: 0
DIAPHORESIS: 0
PSYCHIATRIC NEGATIVE: 1
VOMITING: 0
HEMATURIA: 0
BRUISES/BLEEDS EASILY: 1
ABDOMINAL PAIN: 1
HEADACHES: 0
EYES NEGATIVE: 1
FEVER: 0
CHILLS: 0
NAUSEA: 0
BLOOD IN STOOL: 0
PALPITATIONS: 0

## 2025-05-16 NOTE — PROGRESS NOTES
Patient ID: Bijan Ibanez is a 64 y.o. male.  Referring Physician: No referring provider defined for this encounter.  Primary Care Provider: Nehal Murphy MD    Date of Service:  5/17/2025    Oncologic History    Diagnosis: Burkitt's Lymphoma  Date of Diagnosis: 1/16/25  Stage at Diagnosis: IV  Risk category: high risk (marrow involvement)     Prior Treatments:     Current Treaments:  DA-R-EPOCH s/p 5 cycles  Cycle 6 planned for 5/24/2025    Virtual or Telephone Consent    While technically available, the patient was unable or unwilling to consent to connect via audio/video telehealth technology; therefore, I performed this visit using a real-time audio only connection between Bijan Ibanez & GER Domínguez.  Verbal consent was requested and obtained from Bijan Ibanez on this date, 05/17/25 for a telehealth visit and the patient's location was confirmed at the time of the visit.    SUBJECTIVE:  Virtual telephone visit performed today (5/17/25) with Bijan Ibanez and his wife.  Bijan declined wanting to perform video visit today and requested to have telephone visit.  Reports he has been doing well the last few days.  Energy level is okay.  He has been able to eat okay.  Appetite is improved.  Had BBQ beef, 1/2 bagel, hard boiled egg, and salad.  He is no longer have pain with eating/swallowing to his ribcage like he was having recently.  Fluid intake is good, has been mostly drinking water and Gatorade.  Having normal bowel movements at this time, diarrhea has resolved.  Shortness of breath has also resolved.  Continues to have numbness to fingertips and bottom jaw-had had this since first diagnosed in January.  Bruises easily.  Continues to hold eliquis due to decreased plt count.  Reports that he is unable to make it to his appointment with Dr. Mckeon on 5/10/25 and is asking to reschedule.  He is able to make it to appointment on 5/22/25.      Review of Systems   Constitutional:   Negative for appetite change, chills, diaphoresis, fatigue, fever and unexpected weight change.   Eyes: Negative.    Respiratory:  Negative for chest tightness, cough and shortness of breath.    Cardiovascular:  Negative for chest pain, leg swelling and palpitations.   Gastrointestinal:  Negative for abdominal pain, blood in stool, constipation, diarrhea, nausea and vomiting.   Genitourinary:  Negative for hematuria.    Musculoskeletal: Negative.    Skin: Negative.    Neurological:  Positive for numbness. Negative for dizziness, headaches and light-headedness.   Hematological:  Negative for adenopathy. Bruises/bleeds easily (bruising).   Psychiatric/Behavioral: Negative.         Oncology History   Burkitt's lymphoma of lymph nodes of multiple regions (Multi)   1/13/2025 Initial Diagnosis    Diagnosis: Burkitt Lymphoma, [place] Stage IV, BL-IPI High-Risk (score 4/5).    BL-IPI Calculation:  Age >60 years: Yes (64 years old).  Performance status (ECOG >=2): Presumed based on clinical presentation requiring hospitalization.  Serum LDH >ULN: 4102 U/L (1/11/25)  [place] Stage III/IV: Yes (stage IV with extranodal involvement including kidneys, liver, spleen, and musculature).  CNS involvement: No (MRI and LP negative).  Total Score: 4/5 (High-Risk).    Presenting Symptoms: Asymmetric swelling of the right-sided muscles of mastication; imaging revealed incidental findings of perihilar mass and renal lesions.    Labs at Diagnosis: WBC 4.0, platelets 72; LDH 4102 U/L (1/11/25)    Pathology:  EBUS with lymph node biopsy (1/13/25): Predominantly CD20-positive small lymphoid cells, raising suspicion for a B-cell lymphoproliferative process.  Bone marrow biopsy (1/16/25): 70-80% involvement by high-grade B-cell lymphoma in a hypercellular marrow (90% cellular) with maturing trilineage hematopoiesis. FISH confirmed t(8;14)/IGH::MYC rearrangement, diagnostic of Burkitt lymphoma.     Imaging:  CT Neck (1/9/25): Fusiform  thickening of right masticatory muscles, likely benign hypertrophy.  CT C/A/P (1/11/25): Left perihilar mass, pulmonary nodules, right renal lesions, and right adrenal gland thickening concerning for metastatic disease; T12-L1 soft tissue mass.  PET-CT (1/20): Widespread hermann and extranodal hypermetabolic involvement, including kidneys, liver, spleen, abdominal wall, and musculature, suggestive of extensive lymphomatous disease.  MRI Brain (1/21): No intracranial lymphoma; suspected osseous involvement of calvarium.    Treatment:  Dexamethasone 40 mg daily (1/20-1/21).  Prophylactic IT methotrexate via LP (1/22), flow negative for lymphoma.     1/21/2025 -  Chemotherapy    - C1 (1/21/25)  - C2 (2/14/25) -- etoposide dose-reduced by 25%  - C3 (3/7/25) -- etoposide dose-reduced by 10%, doxorubicin increased by 20%  - C4 (3/28/25) -- same as C3  (INPT) Dose-Adjusted R-EPOCH (Etoposide / DOXOrubicin / VinCRIStine / Cyclophosphamide / PredniSONE) + RiTUXimab, 21 Day Cycles      2/24/2025 Imaging    PET/CT (2/24/25, after C2): near-complete resolution of previously active disease in lymph nodes and various organs, Deauville score of 2          OBJECTIVE:  KPS: Karnofsky Score: 70 - Cares for self; unable to carry on normal activity or do normal work    VS:  There were no vitals taken for this visit.  BSA: There is no height or weight on file to calculate BSA.    5/17/25:  Telephone visit performed today.  No vital signs and limited physical examination.    Physical Exam  Neurological:      General: No focal deficit present.      Mental Status: He is oriented to person, place, and time.   Psychiatric:         Mood and Affect: Mood normal.     Assessment & Plan  Burkitt lymphoma, unspecified body region (Multi)  Bijan Ibanez is a 64 y.o. male with PMH of HTN, HLD, CAD, MI (s/p stent 2010, on ASA), hx renal cell carcinoma (s/p R partial nephrectomy in 2013 @ [facility]), GERD, BPH, arthritis who is directly admitted from home  for C5 EPOCH chemo therapy for newly diagnosed Burkitt's lymphoma.     ONC  # Burkitt's Lymphoma   -Originally signed out as high grade lymphoma but after burkitt's rearrangement (t8;14) came back positive. High risk based on marrow involvement, though no CNS involvement   - previously reviewed diagnosis and prognosis with patient   - initially give DA-R-EPOCH when was signed out as high grade lymphoma, given the significant toxicity he had with DA R EPOCH, will not escalate to CODOX-IVAC or hyper-CVAD given no CNS involvement   - PET post 2C Chemo shows deauville 2 - c/w CR   - cont w/ EPOCH, trialed dose level 2 but was intolerant with G3 mucositis, again with mucositis with dose level 1, will go down to dose level -1   -Admitted for C5 EPOCH chemo therapy on 5/2 but is delayed for 5/3 as pt arrived to the floor late in the evening.  # CNS prophylaxis   -Plan dose of IT methotrexate in hospital, continue 2x LP w/ IT until EOT, holding eliquis from 4/29 with anticipation of LP during C5 EPOCH therapy   Discharged home 5/7/25.      5/17/25:  Telephone visit performed today, pt declined video visit.  No labs collected for visit.  Overall, Bijan reports he is doing better.  Admission planned for 5/23 for Cycle 6 of dose adjusted R-EPOCH.  Follow up visit with Dr. Mckeon is scheduled for 5/20/25 but pt reports he needs to rescheduled this appointment.  Will reach out to Dr. Mckeon to see if he is able to reschedule follow up visit.  Pre admission visit scheduled for 5/22/25 along with count check.      ID PPX:  Continue acyclovir and levofloxacin.      Heme   -H/O R subclavian and R axillary DVT r/t PICC (2/3/25; on Eliquis)    -Duplex BUE to r/o DVT (4/10) - Compared with study from 2/3/2025, Acute DVT in left subclavian vein and left axillary vein.    - Duplex BLE (4/14)- negative   - CTPE  (4/11) - Negative   -Eliquis on hold since 4/29 for IT Methotrexate in hospital, continue 2x LP w/ IT until EOT  -Holding  eliquis due to thrombocytopenia.     MISC  #Recent Fall 5/2/25  -Was following with PT, was cleared to discontinue PT at last visit on 5/14/25.  Feels like gait is more stable.       CARDS  -Echo  2/3/25 shows EF60-65%  #HTN  #Afib  -c/w home Metop 75 mg BID  -holding eliquis due to thrombocytopenia  Dysphagia, unspecified type  -C/o having trouble swallowing which includes solids and liquids.  -Was able to get pills down this morning  -Last barium swallow was done 4/3/2025: Strategies given per Barium note:  * Small bites  * Small, single sips  * Alternate consistencies  * Chin tuck  -Pt did not want another swallow eval at the moment as taking in some fluids and solids  -Scheduled phone visit for Saturday to assess dysphagia and possibly order another evaluation  -Received 1L NS bolus 5/15/25 due to decreased PO intake, diarrhea and tachycardia   5/17/25:  Bijan reports that he is tolerating PO food/fluid intake and pills well at this time.  He reports he is no longer having dysphagia or odynophagia.  Okay to hold repeat swallow evaluation at this time.  Monitoring.      # CNS prophylaxis  - 2 LP's with C5. 5/3 CSF negative flow cytometry. 5/7 CSF negative flow cytometry.     # G1 CIPN  - monitor     # MRSA bacteremia  - s/p 4 weeks of IV Vanco, EOT 3/3/25        Results from last 7 days   Lab Units 05/15/25  0940 05/12/25  0804   WBC AUTO x10*3/uL 0.8* 0.3*   HEMOGLOBIN g/dL 7.6* 6.6*   HEMATOCRIT % 22.9* 20.8*   PLATELETS AUTO x10*3/uL 22* 27*   NEUTROS PCT AUTO %  --  25.9   LYMPHO PCT MAN % 34.0  --    LYMPHS PCT AUTO %  --  54.8   MONO PCT MAN % 20.0  --    MONOS PCT AUTO %  --  3.2   EOSINO PCT MAN % 3.0  --    EOS PCT AUTO %  --  16.1       Results from last 7 days   Lab Units 05/15/25  0940   SODIUM mmol/L 140   POTASSIUM mmol/L 3.3*   CHLORIDE mmol/L 105   CO2 mmol/L 24   BUN mg/dL 18   CREATININE mg/dL 0.51   CALCIUM mg/dL 8.4*   PROTEIN TOTAL g/dL 4.9*   BILIRUBIN TOTAL mg/dL 1.4*   ALK PHOS U/L 44    ALT U/L 13   AST U/L 6*   GLUCOSE mg/dL 99       Results from last 7 days   Lab Units 05/15/25  0940   MAGNESIUM mg/dL 1.56*     Estimated time for visit: 30 minutes.  Spent 15 minutes speaking with patient and 15 minutes preparing chart, reviewing history, laboratory results, and documentation.      RTC:    5/19/25: Count Check  5/20/25:  Dr. Mckeon- messaged Dr. Mckeon to see if can reschedule this appt  5/22/25 Mal heme and count check (pre-admit)  5/27/25:  Dr. Mckeon and count Checks    JUDY Domínguez-CNP  [facility]

## 2025-05-17 ENCOUNTER — TELEMEDICINE (OUTPATIENT)
Dept: HEMATOLOGY/ONCOLOGY | Facility: HOSPITAL | Age: 65
End: 2025-05-17
Payer: MEDICARE

## 2025-05-17 ENCOUNTER — HOSPITAL ENCOUNTER (EMERGENCY)
Facility: HOSPITAL | Age: 65
Discharge: HOME | End: 2025-05-18
Attending: STUDENT IN AN ORGANIZED HEALTH CARE EDUCATION/TRAINING PROGRAM
Payer: MEDICARE

## 2025-05-17 ENCOUNTER — APPOINTMENT (OUTPATIENT)
Dept: RADIOLOGY | Facility: HOSPITAL | Age: 65
End: 2025-05-17
Payer: MEDICARE
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
## 2025-05-17 DIAGNOSIS — S09.90XA CLOSED HEAD INJURY, INITIAL ENCOUNTER: Primary | ICD-10-CM

## 2025-05-17 DIAGNOSIS — R13.10 DYSPHAGIA, UNSPECIFIED TYPE: ICD-10-CM

## 2025-05-17 DIAGNOSIS — C83.70 BURKITT LYMPHOMA, UNSPECIFIED BODY REGION (MULTI): Primary | ICD-10-CM

## 2025-05-17 PROCEDURE — 99284 EMERGENCY DEPT VISIT MOD MDM: CPT | Mod: 25

## 2025-05-17 PROCEDURE — 99285 EMERGENCY DEPT VISIT HI MDM: CPT | Mod: 25 | Performed by: STUDENT IN AN ORGANIZED HEALTH CARE EDUCATION/TRAINING PROGRAM

## 2025-05-17 PROCEDURE — 72125 CT NECK SPINE W/O DYE: CPT | Performed by: STUDENT IN AN ORGANIZED HEALTH CARE EDUCATION/TRAINING PROGRAM

## 2025-05-17 PROCEDURE — 1159F MED LIST DOCD IN RCRD: CPT

## 2025-05-17 PROCEDURE — 70450 CT HEAD/BRAIN W/O DYE: CPT

## 2025-05-17 PROCEDURE — 70450 CT HEAD/BRAIN W/O DYE: CPT | Performed by: STUDENT IN AN ORGANIZED HEALTH CARE EDUCATION/TRAINING PROGRAM

## 2025-05-17 PROCEDURE — 1160F RVW MEDS BY RX/DR IN RCRD: CPT

## 2025-05-17 PROCEDURE — 99214 OFFICE O/P EST MOD 30 MIN: CPT

## 2025-05-17 PROCEDURE — 1036F TOBACCO NON-USER: CPT

## 2025-05-17 PROCEDURE — 2500000001 HC RX 250 WO HCPCS SELF ADMINISTERED DRUGS (ALT 637 FOR MEDICARE OP): Performed by: STUDENT IN AN ORGANIZED HEALTH CARE EDUCATION/TRAINING PROGRAM

## 2025-05-17 PROCEDURE — 1111F DSCHRG MED/CURRENT MED MERGE: CPT

## 2025-05-17 PROCEDURE — 72125 CT NECK SPINE W/O DYE: CPT

## 2025-05-17 RX ORDER — OXYCODONE HYDROCHLORIDE 5 MG/1
5 TABLET ORAL ONCE
Refills: 0 | Status: COMPLETED | OUTPATIENT
Start: 2025-05-17 | End: 2025-05-17

## 2025-05-17 RX ADMIN — OXYCODONE HYDROCHLORIDE 5 MG: 5 TABLET ORAL at 22:24

## 2025-05-17 ASSESSMENT — ENCOUNTER SYMPTOMS
APPETITE CHANGE: 0
UNEXPECTED WEIGHT CHANGE: 0
NUMBNESS: 1
ADENOPATHY: 0
DIARRHEA: 0
FATIGUE: 0
SHORTNESS OF BREATH: 0
MUSCULOSKELETAL NEGATIVE: 1
ABDOMINAL PAIN: 0

## 2025-05-17 ASSESSMENT — PAIN DESCRIPTION - LOCATION
LOCATION_2: SHOULDER
LOCATION: HEAD
LOCATION: HEAD

## 2025-05-17 ASSESSMENT — PAIN DESCRIPTION - PROGRESSION: CLINICAL_PROGRESSION: NOT CHANGED

## 2025-05-17 ASSESSMENT — PAIN DESCRIPTION - DESCRIPTORS
DESCRIPTORS: NUMBNESS;ACHING;THROBBING
DESCRIPTORS_2: SHARP

## 2025-05-17 ASSESSMENT — PAIN DESCRIPTION - ORIENTATION: ORIENTATION: RIGHT;LEFT

## 2025-05-17 ASSESSMENT — PAIN DESCRIPTION - PAIN TYPE
TYPE: ACUTE PAIN
TYPE: ACUTE PAIN

## 2025-05-17 ASSESSMENT — PAIN SCALES - GENERAL
PAINLEVEL_OUTOF10: 5 - MODERATE PAIN
PAINLEVEL_OUTOF10: 3
PAINLEVEL_OUTOF10: 6

## 2025-05-17 ASSESSMENT — PAIN - FUNCTIONAL ASSESSMENT
PAIN_FUNCTIONAL_ASSESSMENT: 0-10
PAIN_FUNCTIONAL_ASSESSMENT: 0-10

## 2025-05-17 ASSESSMENT — PAIN DESCRIPTION - DIRECTION: RADIATING_TOWARDS: NECK

## 2025-05-17 ASSESSMENT — PAIN DESCRIPTION - ONSET: ONSET: SUDDEN

## 2025-05-17 ASSESSMENT — PAIN DESCRIPTION - FREQUENCY: FREQUENCY: CONSTANT/CONTINUOUS

## 2025-05-18 VITALS
RESPIRATION RATE: 20 BRPM | TEMPERATURE: 98.1 F | DIASTOLIC BLOOD PRESSURE: 78 MMHG | BODY MASS INDEX: 25.68 KG/M2 | HEIGHT: 71 IN | SYSTOLIC BLOOD PRESSURE: 121 MMHG | OXYGEN SATURATION: 98 % | WEIGHT: 183.42 LBS | HEART RATE: 94 BPM

## 2025-05-18 NOTE — ASSESSMENT & PLAN NOTE
Bijan Ibanez is a 64 y.o. male with PMH of HTN, HLD, CAD, MI (s/p stent 2010, on ASA), hx renal cell carcinoma (s/p R partial nephrectomy in 2013 @ [facility]), GERD, BPH, arthritis who is directly admitted from home for C5 EPOCH chemo therapy for newly diagnosed Burkitt's lymphoma.     ONC  # Burkitt's Lymphoma   -Originally signed out as high grade lymphoma but after burkitt's rearrangement (t8;14) came back positive. High risk based on marrow involvement, though no CNS involvement   - previously reviewed diagnosis and prognosis with patient   - initially give DA-R-EPOCH when was signed out as high grade lymphoma, given the significant toxicity he had with DA R EPOCH, will not escalate to CODOX-IVAC or hyper-CVAD given no CNS involvement   - PET post 2C Chemo shows deauville 2 - c/w CR   - cont w/ EPOCH, trialed dose level 2 but was intolerant with G3 mucositis, again with mucositis with dose level 1, will go down to dose level -1   -Admitted for C5 EPOCH chemo therapy on 5/2 but is delayed for 5/3 as pt arrived to the floor late in the evening.  # CNS prophylaxis   -Plan dose of IT methotrexate in hospital, continue 2x LP w/ IT until EOT, holding eliquis from 4/29 with anticipation of LP during C5 EPOCH therapy   Discharged home 5/7/25.      5/17/25:  Telephone visit performed today, pt declined video visit.  No labs collected for visit.  Overall, Bijan reports he is doing better.  Admission planned for 5/23 for Cycle 6 of dose adjusted R-EPOCH.  Follow up visit with Dr. Mckeon is scheduled for 5/20/25 but pt reports he needs to rescheduled this appointment.  Will reach out to Dr. Mckeon to see if he is able to reschedule follow up visit.  Pre admission visit scheduled for 5/22/25 along with count check.      ID PPX:  Continue acyclovir and levofloxacin.      Heme   -H/O R subclavian and R axillary DVT r/t PICC (2/3/25; on Eliquis)    -Duplex BUE to r/o DVT (4/10) - Compared with study from 2/3/2025, Acute DVT in  left subclavian vein and left axillary vein.    - Duplex BLE (4/14)- negative   - CTPE  (4/11) - Negative   -Eliquis on hold since 4/29 for IT Methotrexate in hospital, continue 2x LP w/ IT until EOT  -Holding eliquis due to thrombocytopenia.     MISC  #Recent Fall 5/2/25  -Was following with PT, was cleared to discontinue PT at last visit on 5/14/25.  Feels like gait is more stable.       CARDS  -Echo  2/3/25 shows EF60-65%  #HTN  #Afib  -c/w home Metop 75 mg BID  -holding eliquis due to thrombocytopenia

## 2025-05-18 NOTE — ASSESSMENT & PLAN NOTE
-C/o having trouble swallowing which includes solids and liquids.  -Was able to get pills down this morning  -Last barium swallow was done 4/3/2025: Strategies given per Barium note:  * Small bites  * Small, single sips  * Alternate consistencies  * Chin tuck  -Pt did not want another swallow eval at the moment as taking in some fluids and solids  -Scheduled phone visit for Saturday to assess dysphagia and possibly order another evaluation  -Received 1L NS bolus 5/15/25 due to decreased PO intake, diarrhea and tachycardia   5/17/25:  Bijan reports that he is tolerating PO food/fluid intake and pills well at this time.  He reports he is no longer having dysphagia or odynophagia.  Okay to hold repeat swallow evaluation at this time.  Monitoring.
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
## 2025-05-19 ENCOUNTER — LAB (OUTPATIENT)
Dept: HEMATOLOGY/ONCOLOGY | Facility: HOSPITAL | Age: 65
End: 2025-05-19
Payer: MEDICARE

## 2025-05-19 VITALS
WEIGHT: 186.6 LBS | HEART RATE: 81 BPM | DIASTOLIC BLOOD PRESSURE: 77 MMHG | TEMPERATURE: 98.2 F | SYSTOLIC BLOOD PRESSURE: 116 MMHG | OXYGEN SATURATION: 100 % | RESPIRATION RATE: 18 BRPM | BODY MASS INDEX: 26.03 KG/M2

## 2025-05-19 DIAGNOSIS — C83.78 BURKITT'S LYMPHOMA OF LYMPH NODES OF MULTIPLE REGIONS (MULTI): ICD-10-CM

## 2025-05-19 DIAGNOSIS — C83.30 DIFFUSE LARGE B-CELL LYMPHOMA, UNSPECIFIED BODY REGION (MULTI): ICD-10-CM

## 2025-05-19 LAB
ABO GROUP (TYPE) IN BLOOD: NORMAL
ALBUMIN SERPL BCP-MCNC: 3.3 G/DL (ref 3.4–5)
ALP SERPL-CCNC: 42 U/L (ref 33–136)
ALT SERPL W P-5'-P-CCNC: 9 U/L (ref 10–52)
ANION GAP SERPL CALC-SCNC: 13 MMOL/L (ref 10–20)
ANTIBODY SCREEN: NORMAL
AST SERPL W P-5'-P-CCNC: 6 U/L (ref 9–39)
BASOPHILS # BLD MANUAL: 0.04 X10*3/UL (ref 0–0.1)
BASOPHILS NFR BLD MANUAL: 1 %
BILIRUB SERPL-MCNC: 0.6 MG/DL (ref 0–1.2)
BUN SERPL-MCNC: 19 MG/DL (ref 6–23)
BURR CELLS BLD QL SMEAR: ABNORMAL
CALCIUM SERPL-MCNC: 8.3 MG/DL (ref 8.6–10.3)
CHLORIDE SERPL-SCNC: 106 MMOL/L (ref 98–107)
CO2 SERPL-SCNC: 26 MMOL/L (ref 21–32)
CREAT SERPL-MCNC: 0.51 MG/DL (ref 0.5–1.3)
DACRYOCYTES BLD QL SMEAR: ABNORMAL
EGFRCR SERPLBLD CKD-EPI 2021: >90 ML/MIN/1.73M*2
EOSINOPHIL # BLD MANUAL: 0 X10*3/UL (ref 0–0.7)
EOSINOPHIL NFR BLD MANUAL: 0 %
ERYTHROCYTE [DISTWIDTH] IN BLOOD BY AUTOMATED COUNT: 18.5 % (ref 11.5–14.5)
GLUCOSE SERPL-MCNC: 96 MG/DL (ref 74–99)
HCT VFR BLD AUTO: 23.8 % (ref 41–52)
HGB BLD-MCNC: 7.4 G/DL (ref 13.5–17.5)
IMM GRANULOCYTES # BLD AUTO: 0.4 X10*3/UL (ref 0–0.7)
IMM GRANULOCYTES NFR BLD AUTO: 9 % (ref 0–0.9)
LDH SERPL L TO P-CCNC: 134 U/L (ref 84–246)
LYMPHOCYTES # BLD MANUAL: 0.13 X10*3/UL (ref 1.2–4.8)
LYMPHOCYTES NFR BLD MANUAL: 3 %
MCH RBC QN AUTO: 29 PG (ref 26–34)
MCHC RBC AUTO-ENTMCNC: 31.1 G/DL (ref 32–36)
MCV RBC AUTO: 93 FL (ref 80–100)
MONOCYTES # BLD MANUAL: 0.35 X10*3/UL (ref 0.1–1)
MONOCYTES NFR BLD MANUAL: 8 %
MYELOCYTES # BLD MANUAL: 0.13 X10*3/UL
MYELOCYTES NFR BLD MANUAL: 3 %
NEUTROPHILS # BLD MANUAL: 3.74 X10*3/UL (ref 1.2–7.7)
NEUTS BAND # BLD MANUAL: 0.97 X10*3/UL (ref 0–0.7)
NEUTS BAND NFR BLD MANUAL: 22 %
NEUTS SEG # BLD MANUAL: 2.77 X10*3/UL (ref 1.2–7)
NEUTS SEG NFR BLD MANUAL: 63 %
NRBC BLD-RTO: 0.9 /100 WBCS (ref 0–0)
OVALOCYTES BLD QL SMEAR: ABNORMAL
PLATELET # BLD AUTO: 100 X10*3/UL (ref 150–450)
POLYCHROMASIA BLD QL SMEAR: ABNORMAL
POTASSIUM SERPL-SCNC: 4 MMOL/L (ref 3.5–5.3)
PROT SERPL-MCNC: 4.7 G/DL (ref 6.4–8.2)
RBC # BLD AUTO: 2.55 X10*6/UL (ref 4.5–5.9)
RBC MORPH BLD: ABNORMAL
RH FACTOR (ANTIGEN D): NORMAL
SCHISTOCYTES BLD QL SMEAR: ABNORMAL
SODIUM SERPL-SCNC: 141 MMOL/L (ref 136–145)
TOTAL CELLS COUNTED BLD: 100
WBC # BLD AUTO: 4.4 X10*3/UL (ref 4.4–11.3)

## 2025-05-19 PROCEDURE — 83615 LACTATE (LD) (LDH) ENZYME: CPT

## 2025-05-19 PROCEDURE — 85007 BL SMEAR W/DIFF WBC COUNT: CPT

## 2025-05-19 PROCEDURE — 36591 DRAW BLOOD OFF VENOUS DEVICE: CPT

## 2025-05-19 PROCEDURE — 80053 COMPREHEN METABOLIC PANEL: CPT

## 2025-05-19 PROCEDURE — 86901 BLOOD TYPING SEROLOGIC RH(D): CPT

## 2025-05-19 PROCEDURE — 85027 COMPLETE CBC AUTOMATED: CPT

## 2025-05-19 RX ORDER — EPINEPHRINE 0.3 MG/.3ML
0.3 INJECTION SUBCUTANEOUS EVERY 5 MIN PRN
Status: CANCELLED | OUTPATIENT
Start: 2025-05-19

## 2025-05-19 RX ORDER — DIPHENHYDRAMINE HYDROCHLORIDE 50 MG/ML
50 INJECTION, SOLUTION INTRAMUSCULAR; INTRAVENOUS AS NEEDED
Status: CANCELLED | OUTPATIENT
Start: 2025-05-19

## 2025-05-19 RX ORDER — FAMOTIDINE 10 MG/ML
20 INJECTION, SOLUTION INTRAVENOUS ONCE AS NEEDED
Status: CANCELLED | OUTPATIENT
Start: 2025-05-19

## 2025-05-19 RX ORDER — ALBUTEROL SULFATE 0.83 MG/ML
3 SOLUTION RESPIRATORY (INHALATION) AS NEEDED
Status: CANCELLED | OUTPATIENT
Start: 2025-05-19

## 2025-05-19 ASSESSMENT — PAIN SCALES - GENERAL: PAINLEVEL_OUTOF10: 0-NO PAIN

## 2025-05-19 NOTE — PROGRESS NOTES
Bijan Ibanez arrived to infusion center for scheduled count check. Pt labs within parameters, no replacements needed today. PICC dressing changed. Pt made RN aware of recent visit to ED d/t fall. Pt denies having any new or worsening symptoms otherwise. Provider Mike made aware of fall.   JHON Agarwal saw pt at chairside. Updated AVS and schedule reviewed with pt. Pt discharged home with wife in stable condition.

## 2025-05-20 ENCOUNTER — APPOINTMENT (OUTPATIENT)
Age: 65
End: 2025-05-20
Payer: MEDICARE

## 2025-05-20 ENCOUNTER — APPOINTMENT (OUTPATIENT)
Dept: HEMATOLOGY/ONCOLOGY | Facility: HOSPITAL | Age: 65
End: 2025-05-20
Payer: MEDICARE

## 2025-05-22 ENCOUNTER — LAB (OUTPATIENT)
Dept: HEMATOLOGY/ONCOLOGY | Facility: HOSPITAL | Age: 65
End: 2025-05-22
Payer: MEDICARE

## 2025-05-22 ENCOUNTER — OFFICE VISIT (OUTPATIENT)
Dept: HEMATOLOGY/ONCOLOGY | Facility: HOSPITAL | Age: 65
End: 2025-05-22
Payer: MEDICARE

## 2025-05-22 ENCOUNTER — NUTRITION (OUTPATIENT)
Dept: HEMATOLOGY/ONCOLOGY | Facility: HOSPITAL | Age: 65
End: 2025-05-22

## 2025-05-22 VITALS
TEMPERATURE: 98.8 F | OXYGEN SATURATION: 100 % | SYSTOLIC BLOOD PRESSURE: 116 MMHG | BODY MASS INDEX: 25.71 KG/M2 | RESPIRATION RATE: 18 BRPM | HEIGHT: 71 IN | DIASTOLIC BLOOD PRESSURE: 68 MMHG | WEIGHT: 183.64 LBS | HEART RATE: 86 BPM

## 2025-05-22 VITALS — WEIGHT: 183.64 LBS | BODY MASS INDEX: 25.71 KG/M2 | HEIGHT: 71 IN

## 2025-05-22 DIAGNOSIS — C83.30 DIFFUSE LARGE B-CELL LYMPHOMA, UNSPECIFIED BODY REGION (MULTI): ICD-10-CM

## 2025-05-22 DIAGNOSIS — C83.78 BURKITT'S LYMPHOMA OF LYMPH NODES OF MULTIPLE REGIONS (MULTI): ICD-10-CM

## 2025-05-22 LAB
ABO GROUP (TYPE) IN BLOOD: NORMAL
ALBUMIN SERPL BCP-MCNC: 3.3 G/DL (ref 3.4–5)
ALP SERPL-CCNC: 41 U/L (ref 33–136)
ALT SERPL W P-5'-P-CCNC: 10 U/L (ref 10–52)
ANION GAP SERPL CALC-SCNC: 11 MMOL/L (ref 10–20)
ANTIBODY SCREEN: NORMAL
AST SERPL W P-5'-P-CCNC: 9 U/L (ref 9–39)
BASOPHILS # BLD AUTO: 0.02 X10*3/UL (ref 0–0.1)
BASOPHILS NFR BLD AUTO: 0.4 %
BILIRUB SERPL-MCNC: 0.5 MG/DL (ref 0–1.2)
BUN SERPL-MCNC: 22 MG/DL (ref 6–23)
CALCIUM SERPL-MCNC: 8.4 MG/DL (ref 8.6–10.3)
CHLORIDE SERPL-SCNC: 106 MMOL/L (ref 98–107)
CO2 SERPL-SCNC: 27 MMOL/L (ref 21–32)
CREAT SERPL-MCNC: 0.48 MG/DL (ref 0.5–1.3)
EGFRCR SERPLBLD CKD-EPI 2021: >90 ML/MIN/1.73M*2
EOSINOPHIL # BLD AUTO: 0 X10*3/UL (ref 0–0.7)
EOSINOPHIL NFR BLD AUTO: 0 %
ERYTHROCYTE [DISTWIDTH] IN BLOOD BY AUTOMATED COUNT: 19.8 % (ref 11.5–14.5)
GLUCOSE SERPL-MCNC: 100 MG/DL (ref 74–99)
HCT VFR BLD AUTO: 26.7 % (ref 41–52)
HGB BLD-MCNC: 8.3 G/DL (ref 13.5–17.5)
IMM GRANULOCYTES # BLD AUTO: 0.24 X10*3/UL (ref 0–0.7)
IMM GRANULOCYTES NFR BLD AUTO: 4.7 % (ref 0–0.9)
LDH SERPL L TO P-CCNC: 157 U/L (ref 84–246)
LYMPHOCYTES # BLD AUTO: 0.5 X10*3/UL (ref 1.2–4.8)
LYMPHOCYTES NFR BLD AUTO: 9.8 %
MCH RBC QN AUTO: 29.4 PG (ref 26–34)
MCHC RBC AUTO-ENTMCNC: 31.1 G/DL (ref 32–36)
MCV RBC AUTO: 95 FL (ref 80–100)
MONOCYTES # BLD AUTO: 0.55 X10*3/UL (ref 0.1–1)
MONOCYTES NFR BLD AUTO: 10.8 %
NEUTROPHILS # BLD AUTO: 3.79 X10*3/UL (ref 1.2–7.7)
NEUTROPHILS NFR BLD AUTO: 74.3 %
NRBC BLD-RTO: 0.8 /100 WBCS (ref 0–0)
PLATELET # BLD AUTO: 179 X10*3/UL (ref 150–450)
POTASSIUM SERPL-SCNC: 3.7 MMOL/L (ref 3.5–5.3)
PROT SERPL-MCNC: 4.7 G/DL (ref 6.4–8.2)
RBC # BLD AUTO: 2.82 X10*6/UL (ref 4.5–5.9)
RH FACTOR (ANTIGEN D): NORMAL
SODIUM SERPL-SCNC: 140 MMOL/L (ref 136–145)
WBC # BLD AUTO: 5.1 X10*3/UL (ref 4.4–11.3)

## 2025-05-22 PROCEDURE — 86901 BLOOD TYPING SEROLOGIC RH(D): CPT

## 2025-05-22 PROCEDURE — 85025 COMPLETE CBC W/AUTO DIFF WBC: CPT

## 2025-05-22 PROCEDURE — 99215 OFFICE O/P EST HI 40 MIN: CPT | Performed by: NURSE PRACTITIONER

## 2025-05-22 PROCEDURE — 36591 DRAW BLOOD OFF VENOUS DEVICE: CPT

## 2025-05-22 PROCEDURE — 1111F DSCHRG MED/CURRENT MED MERGE: CPT | Performed by: NURSE PRACTITIONER

## 2025-05-22 PROCEDURE — 80053 COMPREHEN METABOLIC PANEL: CPT

## 2025-05-22 PROCEDURE — 83615 LACTATE (LD) (LDH) ENZYME: CPT

## 2025-05-22 RX ORDER — FAMOTIDINE 10 MG/ML
20 INJECTION, SOLUTION INTRAVENOUS ONCE AS NEEDED
OUTPATIENT
Start: 2025-05-22

## 2025-05-22 RX ORDER — ALBUTEROL SULFATE 0.83 MG/ML
3 SOLUTION RESPIRATORY (INHALATION) AS NEEDED
OUTPATIENT
Start: 2025-05-22

## 2025-05-22 RX ORDER — EPINEPHRINE 0.3 MG/.3ML
0.3 INJECTION SUBCUTANEOUS EVERY 5 MIN PRN
OUTPATIENT
Start: 2025-05-22

## 2025-05-22 RX ORDER — DIPHENHYDRAMINE HYDROCHLORIDE 50 MG/ML
50 INJECTION, SOLUTION INTRAMUSCULAR; INTRAVENOUS AS NEEDED
OUTPATIENT
Start: 2025-05-22

## 2025-05-22 ASSESSMENT — ENCOUNTER SYMPTOMS
EXTREMITY WEAKNESS: 1
PSYCHIATRIC NEGATIVE: 1
VOMITING: 0
LEG SWELLING: 0
ABDOMINAL PAIN: 0
LIGHT-HEADEDNESS: 0
NUMBNESS: 1
BRUISES/BLEEDS EASILY: 1
EYES NEGATIVE: 1
DIZZINESS: 0
CONSTIPATION: 0
PALPITATIONS: 0
CHILLS: 0
NAUSEA: 0
DIAPHORESIS: 0
APPETITE CHANGE: 0
DIARRHEA: 0
CHEST TIGHTNESS: 0
FATIGUE: 0
COUGH: 0
HEMATURIA: 0
UNEXPECTED WEIGHT CHANGE: 0
FEVER: 0
BLOOD IN STOOL: 0
HEADACHES: 0
ADENOPATHY: 0
SHORTNESS OF BREATH: 0

## 2025-05-22 NOTE — PROGRESS NOTES
Patient ID: Bijan Ibanez is a 64 y.o. male.  Referring Physician: Catalina Workman, APRN-CNP  [id number] [place] [address]  Primary Care Provider: Nehal Murphy MD    Date of Service:  5/22/2025    Oncologic History    Diagnosis: Burkitt's Lymphoma  Date of Diagnosis: 1/16/25  Stage at Diagnosis: IV  Risk category: high risk (marrow involvement)     Prior Treatments:     Current Treaments:  DA-R-EPOCH s/p 5 cycles  Cycle 6 planned for 5/24/2025    SUBJECTIVE:  [facility]. [facility]banez [facility] for pre-admission visit. He will admit to [facility] for C6 EPOCH tomorrow 5/23/25. He is feeling well overall today. Still somewhat weak. He went to the ER on 5/18 following a fall at home where he hit his head. CT Head was negative & labs were stable. Eliquis was held d/t thrombocytopenia. He has not fallen since then.         Review of Systems   Constitutional:  Negative for appetite change, chills, diaphoresis, fatigue, fever and unexpected weight change.   Eyes: Negative.    Respiratory:  Negative for chest tightness, cough and shortness of breath.    Cardiovascular:  Negative for chest pain, leg swelling and palpitations.   Gastrointestinal:  Negative for abdominal pain, blood in stool, constipation, diarrhea, nausea and vomiting.   Genitourinary:  Negative for hematuria.    Skin: Negative.    Neurological:  Positive for extremity weakness and numbness. Negative for dizziness, headaches and light-headedness.   Hematological:  Negative for adenopathy. Bruises/bleeds easily (bruising).   Psychiatric/Behavioral: Negative.         Oncology History   Burkitt's lymphoma of lymph nodes of multiple regions (Multi)   1/13/2025 Initial Diagnosis    Diagnosis: Burkitt Lymphoma, [place] Stage IV, BL-IPI High-Risk (score 4/5).    BL-IPI Calculation:  Age >60 years: Yes (64 years old).  Performance status (ECOG >=2): Presumed based on clinical presentation requiring  hospitalization.  Serum LDH >ULN: 4102 U/L (1/11/25)  Bern Stage III/IV: Yes (stage IV with extranodal involvement including kidneys, liver, spleen, and musculature).  CNS involvement: No (MRI and LP negative).  Total Score: 4/5 (High-Risk).    Presenting Symptoms: Asymmetric swelling of the right-sided muscles of mastication; imaging revealed incidental findings of perihilar mass and renal lesions.    Labs at Diagnosis: WBC 4.0, platelets 72; LDH 4102 U/L (1/11/25)    Pathology:  EBUS with lymph node biopsy (1/13/25): Predominantly CD20-positive small lymphoid cells, raising suspicion for a B-cell lymphoproliferative process.  Bone marrow biopsy (1/16/25): 70-80% involvement by high-grade B-cell lymphoma in a hypercellular marrow (90% cellular) with maturing trilineage hematopoiesis. FISH confirmed t(8;14)/IGH::MYC rearrangement, diagnostic of Burkitt lymphoma.     Imaging:  CT Neck (1/9/25): Fusiform thickening of right masticatory muscles, likely benign hypertrophy.  CT C/A/P (1/11/25): Left perihilar mass, pulmonary nodules, right renal lesions, and right adrenal gland thickening concerning for metastatic disease; T12-L1 soft tissue mass.  PET-CT (1/20): Widespread hermann and extranodal hypermetabolic involvement, including kidneys, liver, spleen, abdominal wall, and musculature, suggestive of extensive lymphomatous disease.  MRI Brain (1/21): No intracranial lymphoma; suspected osseous involvement of calvarium.    Treatment:  Dexamethasone 40 mg daily (1/20-1/21).  Prophylactic IT methotrexate via LP (1/22), flow negative for lymphoma.     1/21/2025 -  Chemotherapy    - C1 (1/21/25)  - C2 (2/14/25) -- etoposide dose-reduced by 25%  - C3 (3/7/25) -- etoposide dose-reduced by 10%, doxorubicin increased by 20%  - C4 (3/28/25) -- same as C3  (INPT) Dose-Adjusted R-EPOCH (Etoposide / DOXOrubicin / VinCRIStine / Cyclophosphamide / PredniSONE) + RiTUXimab, 21 Day Cycles      2/24/2025 Imaging    PET/CT (2/24/25,  after C2): near-complete resolution of previously active disease in lymph nodes and various organs, Deauville score of 2          OBJECTIVE:  KPS: Karnofsky Score: 70 - Cares for self; unable to carry on normal activity or do normal work    VS:  There were no vitals taken for this visit.  BSA: There is no height or weight on file to calculate BSA.    5/17/25:  Telephone visit performed today.  No vital signs and limited physical examination.    Physical Exam  Constitutional:       Appearance: Normal appearance.   HENT:      Head: Normocephalic.      Nose: Nose normal.      Mouth/Throat:      Mouth: Mucous membranes are moist.      Pharynx: Oropharynx is clear.   Eyes:      Extraocular Movements: Extraocular movements intact.      Conjunctiva/sclera: Conjunctivae normal.      Pupils: Pupils are equal, round, and reactive to light.   Cardiovascular:      Rate and Rhythm: Normal rate and regular rhythm.      Pulses: Normal pulses.      Heart sounds: Normal heart sounds.   Pulmonary:      Effort: Pulmonary effort is normal.      Breath sounds: Normal breath sounds.   Abdominal:      General: Abdomen is flat. Bowel sounds are normal.      Palpations: Abdomen is soft.   Musculoskeletal:         General: Normal range of motion.      Cervical back: Normal range of motion.   Skin:     General: Skin is warm and dry.      Capillary Refill: Capillary refill takes less than 2 seconds.   Neurological:      General: No focal deficit present.      Mental Status: He is alert and oriented to person, place, and time. Mental status is at baseline.   Psychiatric:         Mood and Affect: Mood normal.       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 male with PMH of HTN, HLD, CAD, MI (s/p stent 2010, on ASA), hx renal cell carcinoma (s/p R partial nephrectomy in 2013 @ [facility]), GERD, BPH, arthritis who is directly admitted from home for C5 EPOCH chemo therapy for newly  diagnosed Burkitt's lymphoma.      ONC  # Burkitt's Lymphoma   -Originally signed out as high grade lymphoma but after burkitt's rearrangement (t8;14) came back positive. High risk based on marrow involvement, though no CNS involvement   - previously reviewed diagnosis and prognosis with patient   - initially give DA-R-EPOCH when was signed out as high grade lymphoma, given the significant toxicity he had with DA R EPOCH, will not escalate to CODOX-IVAC or hyper-CVAD given no CNS involvement   - PET post 2C Chemo shows deauville 2 - c/w CR   - cont w/ EPOCH, trialed dose level 2 but was intolerant with G3 mucositis, again with mucositis with dose level 1, will go down to dose level -1   -Admitted for C5 EPOCH chemo therapy on 5/2 but is delayed for 5/3 as pt arrived to the floor late in the evening.  # CNS prophylaxis   -Plan dose of IT methotrexate in hospital, continue 2x LP w/ IT until EOT, holding eliquis from 4/29 with anticipation of LP during C5 EPOCH therapy   Discharged home 5/7/25.    5/22/25: Pre Admission Visit for C6 EPOCH. Labs meet treatment parameters. Will proceed with Dose Level -1 same as C5 due to Grade 3 Mucositis in earlier cycles. Currently holding eliquis, will continue in anticipation of LP on Day 1 and Day 5.      ID PPX:  Continue acyclovir and levofloxacin.       Heme   -H/O R subclavian and R axillary DVT r/t PICC (2/3/25; on Eliquis)    -Duplex BUE to r/o DVT (4/10) - Compared with study from 2/3/2025, Acute DVT in left subclavian vein and left axillary vein.    - Duplex BLE (4/14)- negative   - CTPE  (4/11) - Negative   -Eliquis on hold since 4/29 for IT Methotrexate in hospital, continue 2x LP w/ IT until EOT  -Holding eliquis due to thrombocytopenia.     MISC  #Recent ER Visit for Fall 5/18 - CT Head negative      CARDS  -Echo  2/3/25 shows EF60-65%  #HTN  #Afib  -c/w home Metop 75 mg BID  -holding eliquis due to thrombocytopenia    Dysphagia, unspecified type  -C/o having trouble  swallowing which includes solids and liquids.  -Was able to get pills down this morning  -Last barium swallow was done 4/3/2025: Strategies given per Barium note:  * Small bites  * Small, single sips  * Alternate consistencies  * Chin tuck  -Pt did not want another swallow eval at the moment as taking in some fluids and solids  -Scheduled phone visit for Saturday to assess dysphagia and possibly order another evaluation  -Received 1L NS bolus 5/15/25 due to decreased PO intake, diarrhea and tachycardia   5/17/25:  Bijan reports that he is tolerating PO food/fluid intake and pills well at this time.  He reports he is no longer having dysphagia or odynophagia.  Okay to hold repeat swallow evaluation at this time.  Monitoring.    # CNS prophylaxis  - 2 LP's with C5. 5/3 CSF negative flow cytometry. 5/7 CSF negative flow cytometry.     # G1 CIPN  - monitor     # MRSA bacteremia  - s/p 4 weeks of IV Vanco, EOT 3/3/25        Results from last 7 days   Lab Units 05/22/25  0944   WBC AUTO x10*3/uL 5.1   HEMOGLOBIN g/dL 8.3*   HEMATOCRIT % 26.7*   PLATELETS AUTO x10*3/uL 179   NEUTROS PCT AUTO % 74.3   LYMPHS PCT AUTO % 9.8   MONOS PCT AUTO % 10.8   EOS PCT AUTO % 0.0       Results from last 7 days   Lab Units 05/22/25  0944   SODIUM mmol/L 140   POTASSIUM mmol/L 3.7   CHLORIDE mmol/L 106   CO2 mmol/L 27   BUN mg/dL 22   CREATININE mg/dL 0.48*   CALCIUM mg/dL 8.4*   PROTEIN TOTAL g/dL 4.7*   BILIRUBIN TOTAL mg/dL 0.5   ALK PHOS U/L 41   ALT U/L 10   AST U/L 9   GLUCOSE mg/dL 100*           RTC:    Admit to [facility]patient [facility] Friday 5/23 for C6 DA-R-EPOCH    Requested & Pending-  5/29 C6D6 Rituximab/Neulasta + [facility]  Count Checks twice weekly Tues/Fri  Dr. Mckeon 6/3    Elvira العلي, APRN-CNP  [facility]

## 2025-05-22 NOTE — PROGRESS NOTES
Pt seen and examined by provider today prior to tx appt.  No further subjective changes offered by pt to this RN    Pt here for ct check, all within range and not requiring bld product support.  Pt aware of next steps.    Education Documentation  Treatment Plan and Schedule, taught by Whitney Deng RN at 5/22/2025 10:33 AM.  Learner: Patient  Readiness: Acceptance  Method: Explanation  Response: Verbalizes Understanding    Complete Blood Count with Differential (CBC w/ Diff), taught by Whitney Deng RN at 5/22/2025 10:33 AM.  Learner: Patient  Readiness: Acceptance  Method: Explanation  Response: Verbalizes Understanding    Education Comments  No comments found.

## 2025-05-22 NOTE — PROGRESS NOTES
NUTRITION FOLLOW UP NOTE    Reason for Visit:  Bijan Ibanez is a 64 y.o. male with newly dx'd Burkitt's lymphoma (Stage IV).    Currently being treated with dose-adjusted R-EPOCH      Pt and wife seen today in infusion.  To admit for C6 chemo tomorrow.     Patient Active Problem List   Diagnosis    Abdominal pain    Acute sinusitis    Chest pain    Ataxia    Benign hypertensive heart disease    Benign paroxysmal vertigo, unspecified ear    Bronchitis    Carotid artery stenosis    Chronic ischemic heart disease, unspecified    Chronic peripheral venous hypertension    Atherosclerosis of coronary artery without angina pectoris    Coronary artery disease involving native coronary artery of native heart with angina pectoris    Coronary artery disease    Diverticular disease of colon    Dyspnea    Essential hypertension    Hypertension    History of cholecystectomy    History of myocardial infarction    HLD (hyperlipidemia)    Malignant neoplasm of kidney (Multi)    Sensorineural hearing loss, bilateral    Neoplasm of uncertain behavior of skin    Obesity, Class I, BMI 30-34.9    Other general symptoms and signs    Personal history of malignant neoplasm of renal pelvis    Tobacco dependence in remission    Tubular adenoma    Adjustment disorder with mixed anxiety and depressed mood    Elevated prostate specific antigen (PSA)    Fever, unspecified    Hyponatremia    MI (myocardial infarction) (Multi)    Chronic midline low back pain without sciatica    Pancytopenia    Burkitt's lymphoma of lymph nodes of multiple regions (Multi)    Febrile neutropenia    Bacteremia due to methicillin resistant Staphylococcus aureus    Paroxysmal atrial fibrillation (Multi)    Diffuse large B cell lymphoma    Burkitt's lymphoma (Multi)    Encounter for antineoplastic chemotherapy    Burkitt lymphoma of lymph nodes of axilla (Multi)    Dysphagia    Headache    Nausea & vomiting    Burkitt lymphoma       Nutrition Significant Labs:  Lab  "Results   Component Value Date/Time    GLUCOSE 100 (H) 05/22/2025 0944     05/22/2025 0944    K 3.7 05/22/2025 0944     05/22/2025 0944    CO2 27 05/22/2025 0944    ANIONGAP 11 05/22/2025 0944    BUN 22 05/22/2025 0944    CREATININE 0.48 (L) 05/22/2025 0944    EGFR >90 05/22/2025 0944    CALCIUM 8.4 (L) 05/22/2025 0944    ALBUMIN 3.3 (L) 05/22/2025 0944    ALKPHOS 41 05/22/2025 0944    PROT 4.7 (L) 05/22/2025 0944    AST 9 05/22/2025 0944    BILITOT 0.5 05/22/2025 0944    ALT 10 05/22/2025 0944    MG 1.56 (L) 05/15/2025 0940    PHOS 3.3 05/07/2025 0545     Lab Results   Component Value Date    WBC 5.1 05/22/2025    HGB 8.3 (L) 05/22/2025    HCT 26.7 (L) 05/22/2025    MCV 95 05/22/2025     05/22/2025       Lab Results   Component Value Date/Time    VITD25 36 05/15/2025 0940   *Vitamin D WNL      Anthropometrics:  Height: 181 cm (5' 11.26\")   Weight: 83.3 kg (183 lb 10.3 oz)   BMI (Calculated): 25.43    IBW/kg (Dietitian Calculated): 78 kg   Percent of IBW: 107 %        Weight History:    *Wt has been ~83-84 kg past 3 weeks  *Overall, wt down 25 kg (23%) x 4 mos    Wt Readings from Last 20 Encounters:   05/22/25 83.3 kg (183 lb 10.3 oz)   05/22/25 83.3 kg (183 lb 10.3 oz)   05/19/25 84.6 kg (186 lb 9.6 oz)   05/17/25 83.2 kg (183 lb 6.8 oz)   05/15/25 83.2 kg (183 lb 6.8 oz)   05/12/25 84 kg (185 lb 3 oz)   05/08/25 87.5 kg (192 lb 14.4 oz)   05/08/25 87.5 kg (192 lb 14.4 oz)   05/07/25 86.6 kg (190 lb 14.7 oz)   04/29/25 82.8 kg (182 lb 8.7 oz)   04/23/25 83.5 kg (184 lb)   04/18/25 (S) 85 kg (187 lb 6.3 oz)   04/08/25 90.7 kg (199 lb 15.3 oz)   04/08/25 90.7 kg (199 lb 15.3 oz)   04/04/25 91.6 kg (202 lb)   03/31/25 92.5 kg (203 lb 14.8 oz)   03/25/25 91.6 kg (201 lb 15.1 oz)   03/21/25 92.1 kg (203 lb 0.7 oz)   03/21/25 92.1 kg (203 lb)   03/20/25 91.2 kg (201 lb 1 oz)   01/18/25         108.6 kg      Food and Nutrition History:    Pt tolerated last 2 chemo cycles well; pt was glad that he didn't " need to be admitted between cycles (which has occurred with previous cycles).  Since chemo dose reductions side effects have been more tolerable than with earlier cycles.   No issues with N/V or difficulty/pain with swallowing.  Did have diarrhea that lasted only 24 hrs and resolved on own.   Eating well; usually has 2 Ensure/Boost Original a day.  At least once a week with have a Smoothie from [employer].   Fluid intakes good; drinking mainly water, [employer] milk and pop.   Current wt c/w weight prior to cycle 5.   Foods tasting much better now than when first started treatment  Energy levels okay but hopes to be able to build strength following this last cycle of chemo.   Still requires wheelchair for distances.     Supplements:  Boost Original (strawberry) provides 240 kcals and 9 gm protein  [employer]life milk (whole and 2%) provides additional 13 gm protein per 8 oz cup      Medications:  Current Outpatient Medications   Medication Instructions    acyclovir (ZOVIRAX) 400 mg, oral, 2 times daily    albuterol 90 mcg/actuation inhaler 2 puffs, Every 6 hours PRN    apixaban (ELIQUIS) 5 mg, oral, 2 times daily    atorvastatin (LIPITOR) 40 mg, oral, Daily (0630)    furosemide (Lasix) 20 mg tablet Take 1 tablet (20 mg) by mouth once daily as needed (For Lower extremity edema).    gabapentin (NEURONTIN) 300 mg, oral, Nightly    levoFLOXacin (LEVAQUIN) 500 mg, oral, Daily    metoprolol tartrate (LOPRESSOR) 75 mg, oral, 2 times daily    pantoprazole (PROTONIX) 40 mg, oral, Daily before breakfast, Do not crush, chew, or split.    prochlorperazine (COMPAZINE) 5 mg, oral, Every 6 hours PRN       Nutrition Focused Physical Exam Findings:    Subcutaneous Fat Loss:   Orbital Fat Pads: Mild-Moderate (slight dark circles and slight hollowing)  Buccal Fat Pads: Mild-Moderate (flat cheeks, minimal bounce)  Triceps: Mild-Moderate (less than ample fat tissue)    Muscle Wasting:  Temporalis: Mild-Moderate (slight  depression)  Pectoralis (Clavicular Region): Mild-Moderate (some protrusion of clavicle)  Deltoid/Trapezius: Mild-Moderate (slight protrusion of acromion process)  Interosseous: Mild-Moderate (slightly depressed area between thumb and forefinger)  Trapezius/Infraspinatus/Supraspinatus (Scapular Region): Mild-Moderate (slight protrusion of scapula)  Quadriceps: Mild-Moderate (mild depression on inner and outer thigh)  Gastrocnemius: Mild-Moderate (not well developed muscle)    Physical Findings:          Estimated Needs:       Total Energy Estimated Needs in 24 hours (kCal):  (8069-0986)  Energy Estimated Needs per kg Body Weight in 24 hours (kCal/kg):  (28-30)  Total Protein Estimated Needs in 24 Hours (g):  (105-120)  Protein Estimated Needs per kg Body Weight in 24 Hours (g/kg):  (1.2-1.4)  Total Fluid Estimated Needs in 24 Hours (mL):  (2450)  Total Fluid Estimated Needs in 24 hours (mL/kg):  (30)             Nutrition Diagnosis   Malnutrition Diagnosis  Patient has Malnutrition Diagnosis: Yes  Diagnosis Status: Active  Malnutrition Diagnosis: Moderate malnutrition related to chronic disease or condition  As Evidenced by: treatment related side effects that impacted pt's ability to take adequate oral intakes which resulted in significant wt loss and noted muscle wasting in both upper and lower extremities over the past 5-6 mos.    Pt remains moderately malnourished, however, appetite and intakes have been gradually improving over past 1-2 months.  Tolerating treatment better and weight has stabilized.  Continue to benefit from daily use of supplements.          Nutrition Interventions/Recommendations   Nutrition Prescription:    High protein, High Calorie    Nutrition Interventions/Education:   Suggested pt take Zofran before coming to infusion to minimize risk of car sickness.   Continue Boost Original BID; can add ice cream for additional kcals/protein  Encourage PO fluid intakes--prefer calorie containing  beverages vs water  Gradually begin using light weights following next cycle of chemo in  hopes of slowly beginning to rebuild muscle mass.       Coordination of Care:  Mal heme PA           Nutrition Monitoring and Evaluation   Food and Nutrient Intake  Monitoring and Evaluation Plan: Energy intake, Fluid intake, Amount of food, Protein intake  Energy Intake: Estimated energy intake  Criteria: Consume PO in amounts needed to maintain/gain weight  Fluid Intake: Estimated fluid intake  Criteria: Maintain hydration; Goal fluid intakes: 90 oz  Criteria: smaller, more frequent meals and snacks q 2-3 hrs vs 3 meals a day  Estimated protein intake: Estimated protein intake  Criteria: include protein source with all meals and snacks  Additional Plan: Ensure/Boost original daily vs ONS + [employer] smoothie       Will continue to f/up and monitor wt, labs, GI symptoms and intakes closely.

## 2025-05-22 NOTE — ASSESSMENT & PLAN NOTE
Bijan Ibanez is a 64 y.o. male with PMH of HTN, HLD, CAD, MI (s/p stent 2010, on ASA), hx renal cell carcinoma (s/p R partial nephrectomy in 2013 @ [facility]), GERD, BPH, arthritis who is directly admitted from home for C5 EPOCH chemo therapy for newly diagnosed Burkitt's lymphoma.      ONC  # Burkitt's Lymphoma   -Originally signed out as high grade lymphoma but after burkitt's rearrangement (t8;14) came back positive. High risk based on marrow involvement, though no CNS involvement   - previously reviewed diagnosis and prognosis with patient   - initially give DA-R-EPOCH when was signed out as high grade lymphoma, given the significant toxicity he had with DA R EPOCH, will not escalate to CODOX-IVAC or hyper-CVAD given no CNS involvement   - PET post 2C Chemo shows deauville 2 - c/w CR   - cont w/ EPOCH, trialed dose level 2 but was intolerant with G3 mucositis, again with mucositis with dose level 1, will go down to dose level -1   -Admitted for C5 EPOCH chemo therapy on 5/2 but is delayed for 5/3 as pt arrived to the floor late in the evening.  # CNS prophylaxis   -Plan dose of IT methotrexate in hospital, continue 2x LP w/ IT until EOT, holding eliquis from 4/29 with anticipation of LP during C5 EPOCH therapy   Discharged home 5/7/25.

## 2025-05-23 ENCOUNTER — HOSPITAL ENCOUNTER (INPATIENT)
Facility: HOSPITAL | Age: 65
DRG: 847 | End: 2025-05-23
Attending: INTERNAL MEDICINE | Admitting: NURSE PRACTITIONER
Payer: MEDICARE

## 2025-05-23 DIAGNOSIS — C83.30 DLBCL (DIFFUSE LARGE B CELL LYMPHOMA): Primary | ICD-10-CM

## 2025-05-23 DIAGNOSIS — C85.10 HIGH GRADE B-CELL LYMPHOMA (MULTI): ICD-10-CM

## 2025-05-23 DIAGNOSIS — C83.74: ICD-10-CM

## 2025-05-23 DIAGNOSIS — C83.70 BURKITT LYMPHOMA, UNSPECIFIED BODY REGION (MULTI): ICD-10-CM

## 2025-05-23 DIAGNOSIS — C83.78 BURKITT'S LYMPHOMA OF LYMPH NODES OF MULTIPLE REGIONS (MULTI): ICD-10-CM

## 2025-05-23 DIAGNOSIS — E78.2 MIXED HYPERLIPIDEMIA: ICD-10-CM

## 2025-05-23 DIAGNOSIS — C83.78 BURKITT'S LYMPHOMA OF LYMPH NODES OF MULTIPLE REGIONS (MULTI): Primary | ICD-10-CM

## 2025-05-23 DIAGNOSIS — R10.84 GENERALIZED ABDOMINAL PAIN: ICD-10-CM

## 2025-05-23 LAB
APPEARANCE CSF: CLEAR
COLOR CSF: COLORLESS
COLOR SPUN CSF: COLORLESS
GLUCOSE CSF-MCNC: 47 MG/DL (ref 40–70)
LYMPHOCYTES NFR CSF MANUAL: 59 % (ref 28–96)
MONOS+MACROS NFR CSF MANUAL: 27 % (ref 16–56)
NEUTS SEG NFR CSF MANUAL: 14 % (ref 0–5)
PROT CSF-MCNC: 65 MG/DL (ref 15–45)
RBC # CSF AUTO: 1 /UL (ref 0–5)
TOTAL CELLS COUNTED CSF: 37
TUBE # CSF: NORMAL
WBC # CSF AUTO: 1 /UL (ref 1–5)

## 2025-05-23 PROCEDURE — 2500000002 HC RX 250 W HCPCS SELF ADMINISTERED DRUGS (ALT 637 FOR MEDICARE OP, ALT 636 FOR OP/ED): Performed by: STUDENT IN AN ORGANIZED HEALTH CARE EDUCATION/TRAINING PROGRAM

## 2025-05-23 PROCEDURE — 89051 BODY FLUID CELL COUNT: CPT | Performed by: STUDENT IN AN ORGANIZED HEALTH CARE EDUCATION/TRAINING PROGRAM

## 2025-05-23 PROCEDURE — 99223 1ST HOSP IP/OBS HIGH 75: CPT | Performed by: INTERNAL MEDICINE

## 2025-05-23 PROCEDURE — 88112 CYTOPATH CELL ENHANCE TECH: CPT | Mod: TC,MCY | Performed by: STUDENT IN AN ORGANIZED HEALTH CARE EDUCATION/TRAINING PROGRAM

## 2025-05-23 PROCEDURE — 3E0R305 INTRODUCTION OF OTHER ANTINEOPLASTIC INTO SPINAL CANAL, PERCUTANEOUS APPROACH: ICD-10-PCS

## 2025-05-23 PROCEDURE — 2500000005 HC RX 250 GENERAL PHARMACY W/O HCPCS: Mod: JZ | Performed by: STUDENT IN AN ORGANIZED HEALTH CARE EDUCATION/TRAINING PROGRAM

## 2025-05-23 PROCEDURE — 88185 FLOWCYTOMETRY/TC ADD-ON: CPT | Mod: TC | Performed by: STUDENT IN AN ORGANIZED HEALTH CARE EDUCATION/TRAINING PROGRAM

## 2025-05-23 PROCEDURE — 1170000001 HC PRIVATE ONCOLOGY ROOM DAILY

## 2025-05-23 PROCEDURE — 82945 GLUCOSE OTHER FLUID: CPT | Performed by: STUDENT IN AN ORGANIZED HEALTH CARE EDUCATION/TRAINING PROGRAM

## 2025-05-23 PROCEDURE — 88112 CYTOPATH CELL ENHANCE TECH: CPT | Performed by: PATHOLOGY

## 2025-05-23 PROCEDURE — 2500000001 HC RX 250 WO HCPCS SELF ADMINISTERED DRUGS (ALT 637 FOR MEDICARE OP): Performed by: NURSE PRACTITIONER

## 2025-05-23 PROCEDURE — 62270 DX LMBR SPI PNXR: CPT

## 2025-05-23 PROCEDURE — 2500000004 HC RX 250 GENERAL PHARMACY W/ HCPCS (ALT 636 FOR OP/ED): Performed by: STUDENT IN AN ORGANIZED HEALTH CARE EDUCATION/TRAINING PROGRAM

## 2025-05-23 PROCEDURE — 3E04305 INTRODUCTION OF OTHER ANTINEOPLASTIC INTO CENTRAL VEIN, PERCUTANEOUS APPROACH: ICD-10-PCS | Performed by: STUDENT IN AN ORGANIZED HEALTH CARE EDUCATION/TRAINING PROGRAM

## 2025-05-23 PROCEDURE — 84157 ASSAY OF PROTEIN OTHER: CPT | Performed by: STUDENT IN AN ORGANIZED HEALTH CARE EDUCATION/TRAINING PROGRAM

## 2025-05-23 RX ORDER — FAMOTIDINE 10 MG/ML
20 INJECTION, SOLUTION INTRAVENOUS ONCE AS NEEDED
Status: CANCELLED | OUTPATIENT
Start: 2025-05-30

## 2025-05-23 RX ORDER — ALBUTEROL SULFATE 0.83 MG/ML
3 SOLUTION RESPIRATORY (INHALATION) AS NEEDED
Status: DISCONTINUED | OUTPATIENT
Start: 2025-05-23 | End: 2025-05-27 | Stop reason: HOSPADM

## 2025-05-23 RX ORDER — EPINEPHRINE 0.3 MG/.3ML
0.3 INJECTION SUBCUTANEOUS EVERY 5 MIN PRN
Status: CANCELLED | OUTPATIENT
Start: 2025-05-30

## 2025-05-23 RX ORDER — ACETAMINOPHEN 325 MG/1
650 TABLET ORAL ONCE
Status: CANCELLED | OUTPATIENT
Start: 2025-05-30

## 2025-05-23 RX ORDER — PANTOPRAZOLE SODIUM 40 MG/1
40 TABLET, DELAYED RELEASE ORAL
Status: DISCONTINUED | OUTPATIENT
Start: 2025-05-24 | End: 2025-05-27 | Stop reason: HOSPADM

## 2025-05-23 RX ORDER — FAMOTIDINE 10 MG/ML
20 INJECTION, SOLUTION INTRAVENOUS AS NEEDED
Status: DISCONTINUED | OUTPATIENT
Start: 2025-05-23 | End: 2025-05-27 | Stop reason: HOSPADM

## 2025-05-23 RX ORDER — GABAPENTIN 300 MG/1
300 CAPSULE ORAL NIGHTLY
Status: DISCONTINUED | OUTPATIENT
Start: 2025-05-23 | End: 2025-05-27 | Stop reason: HOSPADM

## 2025-05-23 RX ORDER — EPINEPHRINE 1 MG/ML
0.3 INJECTION, SOLUTION, CONCENTRATE INTRAVENOUS EVERY 5 MIN PRN
Status: DISCONTINUED | OUTPATIENT
Start: 2025-05-23 | End: 2025-05-27 | Stop reason: HOSPADM

## 2025-05-23 RX ORDER — PROCHLORPERAZINE MALEATE 10 MG
10 TABLET ORAL EVERY 6 HOURS PRN
Status: CANCELLED | OUTPATIENT
Start: 2025-05-23

## 2025-05-23 RX ORDER — PROCHLORPERAZINE EDISYLATE 5 MG/ML
10 INJECTION INTRAMUSCULAR; INTRAVENOUS EVERY 6 HOURS PRN
Status: DISCONTINUED | OUTPATIENT
Start: 2025-05-23 | End: 2025-05-27 | Stop reason: HOSPADM

## 2025-05-23 RX ORDER — OXYCODONE HYDROCHLORIDE 5 MG/1
5 TABLET ORAL EVERY 6 HOURS PRN
Status: DISCONTINUED | OUTPATIENT
Start: 2025-05-23 | End: 2025-05-27 | Stop reason: HOSPADM

## 2025-05-23 RX ORDER — ACYCLOVIR 200 MG/1
400 CAPSULE ORAL 2 TIMES DAILY
Status: DISCONTINUED | OUTPATIENT
Start: 2025-05-23 | End: 2025-05-27 | Stop reason: HOSPADM

## 2025-05-23 RX ORDER — PROCHLORPERAZINE MALEATE 10 MG
10 TABLET ORAL EVERY 6 HOURS PRN
Status: CANCELLED | OUTPATIENT
Start: 2025-05-30

## 2025-05-23 RX ORDER — DIPHENHYDRAMINE HYDROCHLORIDE 50 MG/ML
50 INJECTION, SOLUTION INTRAMUSCULAR; INTRAVENOUS AS NEEDED
Status: CANCELLED | OUTPATIENT
Start: 2025-05-30

## 2025-05-23 RX ORDER — FAMOTIDINE 10 MG/ML
20 INJECTION, SOLUTION INTRAVENOUS AS NEEDED
Status: CANCELLED | OUTPATIENT
Start: 2025-05-23

## 2025-05-23 RX ORDER — ATORVASTATIN CALCIUM 40 MG/1
40 TABLET, FILM COATED ORAL
Status: DISCONTINUED | OUTPATIENT
Start: 2025-05-23 | End: 2025-05-27 | Stop reason: HOSPADM

## 2025-05-23 RX ORDER — PROCHLORPERAZINE EDISYLATE 5 MG/ML
10 INJECTION INTRAMUSCULAR; INTRAVENOUS EVERY 6 HOURS PRN
Status: CANCELLED | OUTPATIENT
Start: 2025-05-23

## 2025-05-23 RX ORDER — DIPHENHYDRAMINE HYDROCHLORIDE 50 MG/ML
50 INJECTION, SOLUTION INTRAMUSCULAR; INTRAVENOUS AS NEEDED
Status: CANCELLED | OUTPATIENT
Start: 2025-05-23

## 2025-05-23 RX ORDER — PREDNISONE 20 MG/1
140 TABLET ORAL 2 TIMES DAILY
Status: CANCELLED | OUTPATIENT
Start: 2025-05-23

## 2025-05-23 RX ORDER — EPINEPHRINE 1 MG/ML
0.3 INJECTION INTRAMUSCULAR; INTRAVENOUS; SUBCUTANEOUS EVERY 5 MIN PRN
Status: CANCELLED | OUTPATIENT
Start: 2025-05-23

## 2025-05-23 RX ORDER — ALBUTEROL SULFATE 90 UG/1
2 INHALANT RESPIRATORY (INHALATION) EVERY 6 HOURS PRN
Status: DISCONTINUED | OUTPATIENT
Start: 2025-05-23 | End: 2025-05-27 | Stop reason: HOSPADM

## 2025-05-23 RX ORDER — PROCHLORPERAZINE EDISYLATE 5 MG/ML
10 INJECTION INTRAMUSCULAR; INTRAVENOUS EVERY 6 HOURS PRN
Status: CANCELLED | OUTPATIENT
Start: 2025-05-30

## 2025-05-23 RX ORDER — AMOXICILLIN 250 MG
2 CAPSULE ORAL NIGHTLY PRN
Status: DISCONTINUED | OUTPATIENT
Start: 2025-05-23 | End: 2025-05-27 | Stop reason: HOSPADM

## 2025-05-23 RX ORDER — OLANZAPINE 5 MG/1
5 TABLET, FILM COATED ORAL NIGHTLY
Status: CANCELLED | OUTPATIENT
Start: 2025-05-23

## 2025-05-23 RX ORDER — OLANZAPINE 5 MG/1
5 TABLET, FILM COATED ORAL NIGHTLY
Status: DISCONTINUED | OUTPATIENT
Start: 2025-05-23 | End: 2025-05-27 | Stop reason: HOSPADM

## 2025-05-23 RX ORDER — PROCHLORPERAZINE MALEATE 10 MG
10 TABLET ORAL EVERY 6 HOURS PRN
Status: DISCONTINUED | OUTPATIENT
Start: 2025-05-23 | End: 2025-05-27 | Stop reason: HOSPADM

## 2025-05-23 RX ORDER — ALBUTEROL SULFATE 0.83 MG/ML
3 SOLUTION RESPIRATORY (INHALATION) AS NEEDED
Status: CANCELLED | OUTPATIENT
Start: 2025-05-30

## 2025-05-23 RX ORDER — ALBUTEROL SULFATE 0.83 MG/ML
3 SOLUTION RESPIRATORY (INHALATION) AS NEEDED
Status: CANCELLED | OUTPATIENT
Start: 2025-05-23

## 2025-05-23 RX ORDER — DIPHENHYDRAMINE HCL 50 MG
50 CAPSULE ORAL ONCE
Status: CANCELLED | OUTPATIENT
Start: 2025-05-30

## 2025-05-23 RX ORDER — DIPHENHYDRAMINE HYDROCHLORIDE 50 MG/ML
50 INJECTION, SOLUTION INTRAMUSCULAR; INTRAVENOUS AS NEEDED
Status: DISCONTINUED | OUTPATIENT
Start: 2025-05-23 | End: 2025-05-27 | Stop reason: HOSPADM

## 2025-05-23 RX ADMIN — ACYCLOVIR 400 MG: 200 CAPSULE ORAL at 20:32

## 2025-05-23 RX ADMIN — DOXORUBICIN HYDROCHLORIDE 23.4 MG: 2 INJECTION, SOLUTION INTRAVENOUS at 17:01

## 2025-05-23 RX ADMIN — METOPROLOL TARTRATE 75 MG: 50 TABLET, FILM COATED ORAL at 20:32

## 2025-05-23 RX ADMIN — FOSAPREPITANT 150 MG: 150 INJECTION, POWDER, LYOPHILIZED, FOR SOLUTION INTRAVENOUS at 16:08

## 2025-05-23 RX ADMIN — OXYCODONE 5 MG: 5 TABLET ORAL at 17:49

## 2025-05-23 RX ADMIN — GABAPENTIN 300 MG: 300 CAPSULE ORAL at 20:32

## 2025-05-23 RX ADMIN — SODIUM CHLORIDE 12 MG: 9 INJECTION, SOLUTION INTRAMUSCULAR; INTRAVENOUS; SUBCUTANEOUS at 16:30

## 2025-05-23 RX ADMIN — PREDNISONE 140 MG: 20 TABLET ORAL at 16:08

## 2025-05-23 RX ADMIN — OLANZAPINE 5 MG: 5 TABLET, FILM COATED ORAL at 20:32

## 2025-05-23 RX ADMIN — ONDANSETRON 16 MG: 2 INJECTION, SOLUTION INTRAMUSCULAR; INTRAVENOUS at 16:08

## 2025-05-23 SDOH — ECONOMIC STABILITY: FOOD INSECURITY: WITHIN THE PAST 12 MONTHS, THE FOOD YOU BOUGHT JUST DIDN'T LAST AND YOU DIDN'T HAVE MONEY TO GET MORE.: NEVER TRUE

## 2025-05-23 SDOH — ECONOMIC STABILITY: TRANSPORTATION INSECURITY: IN THE PAST 12 MONTHS, HAS LACK OF TRANSPORTATION KEPT YOU FROM MEDICAL APPOINTMENTS OR FROM GETTING MEDICATIONS?: NO

## 2025-05-23 SDOH — ECONOMIC STABILITY: HOUSING INSECURITY: IN THE LAST 12 MONTHS, WAS THERE A TIME WHEN YOU WERE NOT ABLE TO PAY THE MORTGAGE OR RENT ON TIME?: NO

## 2025-05-23 SDOH — SOCIAL STABILITY: SOCIAL INSECURITY: WITHIN THE LAST YEAR, HAVE YOU BEEN HUMILIATED OR EMOTIONALLY ABUSED IN OTHER WAYS BY YOUR PARTNER OR EX-PARTNER?: NO

## 2025-05-23 SDOH — ECONOMIC STABILITY: INCOME INSECURITY: IN THE PAST 12 MONTHS HAS THE ELECTRIC, GAS, OIL, OR WATER COMPANY THREATENED TO SHUT OFF SERVICES IN YOUR HOME?: NO

## 2025-05-23 SDOH — ECONOMIC STABILITY: HOUSING INSECURITY: AT ANY TIME IN THE PAST 12 MONTHS, WERE YOU HOMELESS OR LIVING IN A SHELTER (INCLUDING NOW)?: NO

## 2025-05-23 SDOH — SOCIAL STABILITY: SOCIAL INSECURITY: ABUSE: ADULT

## 2025-05-23 SDOH — SOCIAL STABILITY: SOCIAL INSECURITY: WITHIN THE LAST YEAR, HAVE YOU BEEN AFRAID OF YOUR PARTNER OR EX-PARTNER?: NO

## 2025-05-23 SDOH — ECONOMIC STABILITY: FOOD INSECURITY: WITHIN THE PAST 12 MONTHS, YOU WORRIED THAT YOUR FOOD WOULD RUN OUT BEFORE YOU GOT THE MONEY TO BUY MORE.: NEVER TRUE

## 2025-05-23 SDOH — SOCIAL STABILITY: SOCIAL INSECURITY: DO YOU FEEL ANYONE HAS EXPLOITED OR TAKEN ADVANTAGE OF YOU FINANCIALLY OR OF YOUR PERSONAL PROPERTY?: NO

## 2025-05-23 SDOH — SOCIAL STABILITY: SOCIAL INSECURITY: DOES ANYONE TRY TO KEEP YOU FROM HAVING/CONTACTING OTHER FRIENDS OR DOING THINGS OUTSIDE YOUR HOME?: NO

## 2025-05-23 SDOH — SOCIAL STABILITY: SOCIAL INSECURITY: WERE YOU ABLE TO COMPLETE ALL THE BEHAVIORAL HEALTH SCREENINGS?: YES

## 2025-05-23 SDOH — ECONOMIC STABILITY: FOOD INSECURITY: HOW HARD IS IT FOR YOU TO PAY FOR THE VERY BASICS LIKE FOOD, HOUSING, MEDICAL CARE, AND HEATING?: NOT HARD AT ALL

## 2025-05-23 SDOH — ECONOMIC STABILITY: HOUSING INSECURITY: IN THE PAST 12 MONTHS, HOW MANY TIMES HAVE YOU MOVED WHERE YOU WERE LIVING?: 0

## 2025-05-23 SDOH — SOCIAL STABILITY: SOCIAL INSECURITY: ARE YOU OR HAVE YOU BEEN THREATENED OR ABUSED PHYSICALLY, EMOTIONALLY, OR SEXUALLY BY ANYONE?: NO

## 2025-05-23 SDOH — SOCIAL STABILITY: SOCIAL INSECURITY: DO YOU FEEL UNSAFE GOING BACK TO THE PLACE WHERE YOU ARE LIVING?: NO

## 2025-05-23 SDOH — SOCIAL STABILITY: SOCIAL INSECURITY: HAVE YOU HAD THOUGHTS OF HARMING ANYONE ELSE?: NO

## 2025-05-23 SDOH — SOCIAL STABILITY: SOCIAL INSECURITY: ARE THERE ANY APPARENT SIGNS OF INJURIES/BEHAVIORS THAT COULD BE RELATED TO ABUSE/NEGLECT?: NO

## 2025-05-23 SDOH — SOCIAL STABILITY: SOCIAL INSECURITY: HAS ANYONE EVER THREATENED TO HURT YOUR FAMILY OR YOUR PETS?: NO

## 2025-05-23 ASSESSMENT — ACTIVITIES OF DAILY LIVING (ADL)
LACK_OF_TRANSPORTATION: NO
HEARING - LEFT EAR: HEARING AID
FEEDING YOURSELF: INDEPENDENT
BATHING: INDEPENDENT
TOILETING: INDEPENDENT
PATIENT'S MEMORY ADEQUATE TO SAFELY COMPLETE DAILY ACTIVITIES?: YES
LACK_OF_TRANSPORTATION: NO
WALKS IN HOME: INDEPENDENT
GROOMING: INDEPENDENT
ADEQUATE_TO_COMPLETE_ADL: YES
HEARING - RIGHT EAR: HEARING AID
ASSISTIVE_DEVICE: WALKER
JUDGMENT_ADEQUATE_SAFELY_COMPLETE_DAILY_ACTIVITIES: YES
DRESSING YOURSELF: INDEPENDENT

## 2025-05-23 ASSESSMENT — ENCOUNTER SYMPTOMS: ACTIVITY CHANGE: 1

## 2025-05-23 ASSESSMENT — LIFESTYLE VARIABLES
HOW MANY STANDARD DRINKS CONTAINING ALCOHOL DO YOU HAVE ON A TYPICAL DAY: PATIENT DOES NOT DRINK
AUDIT-C TOTAL SCORE: 0
HOW OFTEN DO YOU HAVE 6 OR MORE DRINKS ON ONE OCCASION: NEVER
SKIP TO QUESTIONS 9-10: 1
HOW OFTEN DO YOU HAVE A DRINK CONTAINING ALCOHOL: NEVER
AUDIT-C TOTAL SCORE: 0

## 2025-05-23 ASSESSMENT — COGNITIVE AND FUNCTIONAL STATUS - GENERAL
MOBILITY SCORE: 22
CLIMB 3 TO 5 STEPS WITH RAILING: A LITTLE
PATIENT BASELINE BEDBOUND: NO
DAILY ACTIVITIY SCORE: 24
WALKING IN HOSPITAL ROOM: A LITTLE

## 2025-05-23 ASSESSMENT — PAIN - FUNCTIONAL ASSESSMENT
PAIN_FUNCTIONAL_ASSESSMENT: 0-10
PAIN_FUNCTIONAL_ASSESSMENT: 0-10

## 2025-05-23 ASSESSMENT — PATIENT HEALTH QUESTIONNAIRE - PHQ9
SUM OF ALL RESPONSES TO PHQ9 QUESTIONS 1 & 2: 0
2. FEELING DOWN, DEPRESSED OR HOPELESS: NOT AT ALL
1. LITTLE INTEREST OR PLEASURE IN DOING THINGS: NOT AT ALL

## 2025-05-23 ASSESSMENT — PAIN SCALES - GENERAL
PAINLEVEL_OUTOF10: 4
PAINLEVEL_OUTOF10: 4

## 2025-05-23 ASSESSMENT — PAIN DESCRIPTION - ORIENTATION: ORIENTATION: LEFT

## 2025-05-23 ASSESSMENT — PAIN DESCRIPTION - DESCRIPTORS: DESCRIPTORS: ACHING

## 2025-05-23 ASSESSMENT — PAIN DESCRIPTION - LOCATION: LOCATION: SHOULDER

## 2025-05-23 NOTE — PROCEDURES
Lumbar Puncture    Date/Time: 5/23/2025 4:57 PM    Performed by: GER Abebe  Authorized by: GER Abebe    Consent:     Consent obtained:  Verbal and written    Consent given by:  Patient    Risks, benefits, and alternatives were discussed: yes      Risks discussed:  Bleeding, infection, pain, headache, nerve damage and repeat procedure    Alternatives discussed:  No treatment and delayed treatment  Universal protocol:     Procedure explained and questions answered to patient or proxy's satisfaction: yes      Relevant documents present and verified: yes      Test results available: yes      Imaging studies available: yes      Required blood products, implants, devices, and special equipment available: yes      Immediately prior to procedure a time out was called: yes      Site/side marked: yes      Patient identity confirmed:  Verbally with patient, arm band and hospital-assigned identification number  Pre-procedure details:     Procedure purpose:  Therapeutic    Preparation: Patient was prepped and draped in usual sterile fashion    Anesthesia:     Anesthesia method:  Local infiltration    Local anesthetic:  Lidocaine 1% w/o epi  Procedure details:     Lumbar space:  L3-L4 interspace    Patient position:  Sitting    Needle gauge:  20    Needle type:  Spinal needle - Quincke tip    Needle length (in):  3.5    Ultrasound guidance: no      Number of attempts:  1    Fluid appearance:  Clear    Tubes of fluid:  3    Total volume (ml):  7  Post-procedure details:     Puncture site:  Adhesive bandage applied    Procedure completion:  Tolerated well, no immediate complications  Comments:      The patient was verbally consented for lumbar puncture.  The risks, benefits, and alternatives of procedures were discussed.  The patient wishes to proceed.  Time-out was taken to verify correct patient, procedure, and location of procedure.  Sterile technique was used for the entire procedure. The patient was  placed in the sitting position. The area was cleansed and draped in usual sterile fashion. Anesthesia was achieved with 1% Lidocaine. A 20-gauge 3.5-inch spinal needle was placed in the L3-L4 interspaces. On the 1st attempt, clear cerebral spinal fluid was obtained. Three tubes were filled , one with 3 mL and two with 2 mL of CSF. These were sent for Cell count w/diff, total protein and glucose, path review and flow cytometry. 12 mg methotrexate was instilled intrathecally (IT) over 5 minutes following the CSF collection. The patient had no complications and tolerated the procedure well. A sterile Band-Aid and the site was monitored for any bleeding or drainage. The patient tolerated procedure well, had minimal bleeding. Furthermore, the patient was instructed if any signs of infection, bleeding, or severe pain to immediately notify MD.  Medication allergies reviewed on EMR.
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
## 2025-05-23 NOTE — PROGRESS NOTES
Pharmacy Medication History Review    Bijan Ibanez is a 64 y.o. male admitted for Burkitt lymphoma. Pharmacy reviewed the patient's [id number] medications and allergies for accuracy.    The list below reflects the updated PTA list.   Prior to Admission Medications   Prescriptions Last Dose Informant   acyclovir (Zovirax) 200 mg capsule  Self, Spouse/Significant Other   Sig: Take 2 capsules (400 mg) by mouth 2 times a day.   albuterol 90 mcg/actuation inhaler  Spouse/Significant Other, Self   Sig: Inhale 2 puffs every 6 hours if needed for shortness of breath.   apixaban (Eliquis) 5 mg tablet  Self, Spouse/Significant Other   Sig: Take 1 tablet (5 mg) by mouth 2 times a day.   Patient not taking: No sig reported     Currently on hold due to plt count    atorvastatin (Lipitor) 40 mg tablet  Spouse/Significant Other, Self   Sig: Take 1 tablet (40 mg) by mouth early in the morning..   furosemide (Lasix) 20 mg tablet  Spouse/Significant Other, Self   Sig: Take 1 tablet (20 mg) by mouth once daily as needed (For Lower extremity edema).   gabapentin (Neurontin) 300 mg capsule  Spouse/Significant Other, Self   Sig: Take 1 capsule (300 mg) by mouth once daily at bedtime.   levoFLOXacin (Levaquin) 500 mg tablet  Self, Spouse/Significant Other   Sig: Take 1 tablet (500 mg) by mouth once daily.   metoprolol tartrate (Lopressor) 75 mg tablet  Self, Spouse/Significant Other   Sig: Take 1 tablet (75 mg) by mouth 2 times a day.   oxyCODONE (Roxicodone) 5 mg immediate release tablet     Sig: Take 1 tablet (5 mg) by mouth every 6 hours if needed for moderate pain (4 - 6) for up to 3 days.   pantoprazole (ProtoNix) 40 mg EC tablet  Spouse/Significant Other, Self   Sig: Take 1 tablet (40 mg) by mouth once daily in the morning. Take before meals. Do not crush, chew, or split.   prochlorperazine (Compazine) 10 mg tablet  Self, Spouse/Significant Other   Sig: Take 0.5 tablets (5 mg) by mouth every 6 hours if needed for nausea  or vomiting.      Facility-Administered Medications: None        The list below reflects the updated allergy list. Please review each documented allergy for additional clarification and justification.  Allergies  Reviewed by Ronny Ariza RP on 5/23/2025        Severity Reactions Comments    Latex Not Specified Itching     Penicillins Not Specified Other Patient reports from childhood, unsure of reaction            Patient accepts M2B at discharge. Pharmacy has been updated to [facility].    Sources used to complete the med history include:    [facility]  Pharmacy dispense history  Patient Interview Good historian  Spouse Indira Shamar Keller historian  Chart Review  Care Everywhere   Office visit Hematology and Oncology 5/22/25   Elvira العلي, APRN-CNP     Below are additional concerns with the patient's PTA list.  Wife Indira was an excellent historian who knew dosages and medications well.     Medications ADDED:  None   Medications CHANGED:  None   Medications REMOVED:   None     Shimon Blackwell Rph.   Transitions of Care Pharmacist    Please reach out via Secure Chat for questions, or if no response call [employer] or [facility]

## 2025-05-24 LAB
ALBUMIN SERPL BCP-MCNC: 3.3 G/DL (ref 3.4–5)
ALP SERPL-CCNC: 40 U/L (ref 33–136)
ALT SERPL W P-5'-P-CCNC: 10 U/L (ref 10–52)
ANION GAP SERPL CALC-SCNC: 14 MMOL/L (ref 10–20)
APTT PPP: 30 SECONDS (ref 26–36)
AST SERPL W P-5'-P-CCNC: 8 U/L (ref 9–39)
BASOPHILS # BLD AUTO: 0 X10*3/UL (ref 0–0.1)
BASOPHILS NFR BLD AUTO: 0 %
BILIRUB SERPL-MCNC: 0.6 MG/DL (ref 0–1.2)
BUN SERPL-MCNC: 25 MG/DL (ref 6–23)
CALCIUM SERPL-MCNC: 9 MG/DL (ref 8.6–10.6)
CHLORIDE SERPL-SCNC: 106 MMOL/L (ref 98–107)
CO2 SERPL-SCNC: 23 MMOL/L (ref 21–32)
CREAT SERPL-MCNC: 0.46 MG/DL (ref 0.5–1.3)
EGFRCR SERPLBLD CKD-EPI 2021: >90 ML/MIN/1.73M*2
EOSINOPHIL # BLD AUTO: 0 X10*3/UL (ref 0–0.7)
EOSINOPHIL NFR BLD AUTO: 0 %
ERYTHROCYTE [DISTWIDTH] IN BLOOD BY AUTOMATED COUNT: 19.2 % (ref 11.5–14.5)
FIBRINOGEN PPP-MCNC: 271 MG/DL (ref 200–400)
GLUCOSE SERPL-MCNC: 146 MG/DL (ref 74–99)
HCT VFR BLD AUTO: 25.5 % (ref 41–52)
HGB BLD-MCNC: 7.7 G/DL (ref 13.5–17.5)
IMM GRANULOCYTES # BLD AUTO: 0.06 X10*3/UL (ref 0–0.7)
IMM GRANULOCYTES NFR BLD AUTO: 2.1 % (ref 0–0.9)
INR PPP: 1.1 (ref 0.9–1.1)
LYMPHOCYTES # BLD AUTO: 0.36 X10*3/UL (ref 1.2–4.8)
LYMPHOCYTES NFR BLD AUTO: 12.8 %
MAGNESIUM SERPL-MCNC: 1.92 MG/DL (ref 1.6–2.4)
MCH RBC QN AUTO: 29.2 PG (ref 26–34)
MCHC RBC AUTO-ENTMCNC: 30.2 G/DL (ref 32–36)
MCV RBC AUTO: 97 FL (ref 80–100)
MONOCYTES # BLD AUTO: 0.05 X10*3/UL (ref 0.1–1)
MONOCYTES NFR BLD AUTO: 1.8 %
NEUTROPHILS # BLD AUTO: 2.35 X10*3/UL (ref 1.2–7.7)
NEUTROPHILS NFR BLD AUTO: 83.3 %
NRBC BLD-RTO: 0 /100 WBCS (ref 0–0)
PHOSPHATE SERPL-MCNC: 5.1 MG/DL (ref 2.5–4.9)
PLATELET # BLD AUTO: 177 X10*3/UL (ref 150–450)
POTASSIUM SERPL-SCNC: 4.3 MMOL/L (ref 3.5–5.3)
PROT SERPL-MCNC: 4.6 G/DL (ref 6.4–8.2)
PROTHROMBIN TIME: 12.2 SECONDS (ref 9.8–12.4)
RBC # BLD AUTO: 2.64 X10*6/UL (ref 4.5–5.9)
SODIUM SERPL-SCNC: 139 MMOL/L (ref 136–145)
WBC # BLD AUTO: 2.8 X10*3/UL (ref 4.4–11.3)

## 2025-05-24 PROCEDURE — 85025 COMPLETE CBC W/AUTO DIFF WBC: CPT | Performed by: NURSE PRACTITIONER

## 2025-05-24 PROCEDURE — 85384 FIBRINOGEN ACTIVITY: CPT | Performed by: NURSE PRACTITIONER

## 2025-05-24 PROCEDURE — 83735 ASSAY OF MAGNESIUM: CPT | Performed by: NURSE PRACTITIONER

## 2025-05-24 PROCEDURE — 2500000001 HC RX 250 WO HCPCS SELF ADMINISTERED DRUGS (ALT 637 FOR MEDICARE OP): Performed by: NURSE PRACTITIONER

## 2025-05-24 PROCEDURE — 85610 PROTHROMBIN TIME: CPT | Performed by: NURSE PRACTITIONER

## 2025-05-24 PROCEDURE — 99233 SBSQ HOSP IP/OBS HIGH 50: CPT | Performed by: INTERNAL MEDICINE

## 2025-05-24 PROCEDURE — 80053 COMPREHEN METABOLIC PANEL: CPT | Performed by: NURSE PRACTITIONER

## 2025-05-24 PROCEDURE — 2500000002 HC RX 250 W HCPCS SELF ADMINISTERED DRUGS (ALT 637 FOR MEDICARE OP, ALT 636 FOR OP/ED): Performed by: STUDENT IN AN ORGANIZED HEALTH CARE EDUCATION/TRAINING PROGRAM

## 2025-05-24 PROCEDURE — 84100 ASSAY OF PHOSPHORUS: CPT | Performed by: NURSE PRACTITIONER

## 2025-05-24 PROCEDURE — 2500000004 HC RX 250 GENERAL PHARMACY W/ HCPCS (ALT 636 FOR OP/ED): Performed by: STUDENT IN AN ORGANIZED HEALTH CARE EDUCATION/TRAINING PROGRAM

## 2025-05-24 PROCEDURE — 2500000001 HC RX 250 WO HCPCS SELF ADMINISTERED DRUGS (ALT 637 FOR MEDICARE OP)

## 2025-05-24 PROCEDURE — 1170000001 HC PRIVATE ONCOLOGY ROOM DAILY

## 2025-05-24 RX ORDER — BACLOFEN 10 MG/1
10 TABLET ORAL ONCE
Status: COMPLETED | OUTPATIENT
Start: 2025-05-24 | End: 2025-05-24

## 2025-05-24 RX ADMIN — ACYCLOVIR 400 MG: 200 CAPSULE ORAL at 20:16

## 2025-05-24 RX ADMIN — ACYCLOVIR 400 MG: 200 CAPSULE ORAL at 09:10

## 2025-05-24 RX ADMIN — METOPROLOL TARTRATE 75 MG: 50 TABLET, FILM COATED ORAL at 09:10

## 2025-05-24 RX ADMIN — METOPROLOL TARTRATE 75 MG: 50 TABLET, FILM COATED ORAL at 20:16

## 2025-05-24 RX ADMIN — GABAPENTIN 300 MG: 300 CAPSULE ORAL at 20:16

## 2025-05-24 RX ADMIN — ATORVASTATIN CALCIUM 40 MG: 40 TABLET, FILM COATED ORAL at 06:14

## 2025-05-24 RX ADMIN — PREDNISONE 140 MG: 20 TABLET ORAL at 20:16

## 2025-05-24 RX ADMIN — BACLOFEN 10 MG: 10 TABLET ORAL at 23:26

## 2025-05-24 RX ADMIN — PREDNISONE 140 MG: 20 TABLET ORAL at 09:10

## 2025-05-24 RX ADMIN — PANTOPRAZOLE SODIUM 40 MG: 40 TABLET, DELAYED RELEASE ORAL at 06:14

## 2025-05-24 RX ADMIN — DOXORUBICIN HYDROCHLORIDE 20.4 MG: 2 INJECTION, SOLUTION INTRAVENOUS at 16:59

## 2025-05-24 RX ADMIN — OXYCODONE 5 MG: 5 TABLET ORAL at 16:28

## 2025-05-24 RX ADMIN — OLANZAPINE 5 MG: 5 TABLET, FILM COATED ORAL at 20:16

## 2025-05-24 RX ADMIN — ONDANSETRON 16 MG: 2 INJECTION, SOLUTION INTRAMUSCULAR; INTRAVENOUS at 16:49

## 2025-05-24 ASSESSMENT — PAIN DESCRIPTION - ORIENTATION: ORIENTATION: LEFT

## 2025-05-24 ASSESSMENT — PAIN - FUNCTIONAL ASSESSMENT
PAIN_FUNCTIONAL_ASSESSMENT: 0-10

## 2025-05-24 ASSESSMENT — PAIN SCALES - GENERAL
PAINLEVEL_OUTOF10: 0 - NO PAIN
PAINLEVEL_OUTOF10: 2
PAINLEVEL_OUTOF10: 2
PAINLEVEL_OUTOF10: 3
PAINLEVEL_OUTOF10: 5 - MODERATE PAIN

## 2025-05-24 ASSESSMENT — PAIN DESCRIPTION - LOCATION: LOCATION: SHOULDER

## 2025-05-24 NOTE — CARE PLAN
The patient's goals for the shift include    Problem: Pain - Adult  Goal: Verbalizes/displays adequate comfort level or baseline comfort level  5/24/2025 0046 by Veronica Cheema RN  Outcome: Progressing  5/24/2025 0045 by Veronica Cheema RN  Outcome: Progressing     Problem: Safety - Adult  Goal: Free from fall injury  5/24/2025 0046 by Veronica Cheema RN  Outcome: Progressing  5/24/2025 0045 by Veronica Cheema RN  Outcome: Progressing     Problem: Discharge Planning  Goal: Discharge to home or other facility with appropriate resources  5/24/2025 0046 by Veronica Cheema RN  Outcome: Progressing  5/24/2025 0045 by Veronica Cheema RN  Outcome: Progressing     Problem: Chronic Conditions and Co-morbidities  Goal: Patient's chronic conditions and co-morbidity symptoms are monitored and maintained or improved  5/24/2025 0046 by Veronica Cheema RN  Outcome: Progressing  5/24/2025 0045 by Veronica Cheema RN  Outcome: Progressing     Problem: Nutrition  Goal: Nutrient intake appropriate for maintaining nutritional needs  5/24/2025 0046 by Veronica Cheema RN  Outcome: Progressing  5/24/2025 0045 by Veronica Cheema RN  Outcome: Progressing       The clinical goals for the shift include patient will remain HDS throughout shift

## 2025-05-24 NOTE — PROGRESS NOTES
Bijan Ibanez is a 64 y.o. male on day 1 of admission presenting with Burkitt lymphoma.    Subjective   Afebrile, no acute issues overnight. Tolerating chemotherapy well. Offers no complaints this morning. Family at bedside visiting.         Objective     Physical Exam  Constitutional:       General: He is not in acute distress.     Appearance: He is normal weight. He is not toxic-appearing.   HENT:      Head: Normocephalic and atraumatic.      Nose: Nose normal.      Mouth/Throat:      Mouth: Mucous membranes are moist.      Pharynx: Oropharynx is clear. No oropharyngeal exudate.   Eyes:      Extraocular Movements: Extraocular movements intact.      Conjunctiva/sclera: Conjunctivae normal.      Pupils: Pupils are equal, round, and reactive to light.   Cardiovascular:      Rate and Rhythm: Normal rate and regular rhythm.      Pulses: Normal pulses.      Heart sounds: Normal heart sounds.   Pulmonary:      Effort: Pulmonary effort is normal.      Breath sounds: Normal breath sounds.   Abdominal:      General: Bowel sounds are normal.      Palpations: Abdomen is soft.      Tenderness: There is no abdominal tenderness.   Musculoskeletal:         General: Swelling (BLE, R>L, minimal) present.      Cervical back: Normal range of motion and neck supple. No rigidity.   Lymphadenopathy:      Cervical: No cervical adenopathy.   Skin:     General: Skin is warm and dry.      Capillary Refill: Capillary refill takes less than 2 seconds.      Comments: LUE PICC C/D/I   Neurological:      General: No focal deficit present.      Mental Status: He is alert and oriented to person, place, and time. Mental status is at baseline.   Psychiatric:         Mood and Affect: Mood normal.         Behavior: Behavior normal.         Thought Content: Thought content normal.         Judgment: Judgment normal.         Last Recorded Vitals  Blood pressure 132/79, pulse 79, temperature 36.4 °C (97.5 °F), temperature source Temporal, resp. rate  "18, height 1.824 m (5' 11.81\"), weight 83.3 kg (183 lb 10.3 oz), SpO2 100%.  Intake/Output last 3 Shifts:  I/O last 3 completed shifts:  In: 400 (4.8 mL/kg) [P.O.:400]  Out: - (0 mL/kg)   Weight: 84.1 kg     Relevant Results               Results for orders placed or performed during the hospital encounter of 05/23/25 (from the past 24 hours)   Glucose, CSF   Result Value Ref Range    Glucose, CSF 47 40 - 70 mg/dL   Protein, CSF   Result Value Ref Range    Total Protein, CSF 65 (H) 15 - 45 mg/dL   CSF Cell Count   Result Value Ref Range    Tube Number, CSF Tube 1       Color, CSF Colorless Colorless    Clarity, CSF Clear Clear    Color, Supernatant CSF Colorless      WBC, CSF 1 1 - 5 /uL    RBC, CSF 1 0 - 5 /uL   CSF Differential   Result Value Ref Range    Neutrophils %, Manual, CSF 14 (H) 0 - 5 %    Lymphocytes %, Manual, CSF 59 28 - 96 %    Mono/Macrophages %, Manual, CSF 27 16 - 56 %    Total Cells Counted, CSF 37    Coagulation Screen   Result Value Ref Range    Protime 12.2 9.8 - 12.4 seconds    INR 1.1 0.9 - 1.1    aPTT 30 26 - 36 seconds   Fibrinogen   Result Value Ref Range    Fibrinogen 271 200 - 400 mg/dL   CBC and Auto Differential   Result Value Ref Range    WBC 2.8 (L) 4.4 - 11.3 x10*3/uL    nRBC 0.0 0.0 - 0.0 /100 WBCs    RBC 2.64 (L) 4.50 - 5.90 x10*6/uL    Hemoglobin 7.7 (L) 13.5 - 17.5 g/dL    Hematocrit 25.5 (L) 41.0 - 52.0 %    MCV 97 80 - 100 fL    MCH 29.2 26.0 - 34.0 pg    MCHC 30.2 (L) 32.0 - 36.0 g/dL    RDW 19.2 (H) 11.5 - 14.5 %    Platelets 177 150 - 450 x10*3/uL    Neutrophils % 83.3 40.0 - 80.0 %    Immature Granulocytes %, Automated 2.1 (H) 0.0 - 0.9 %    Lymphocytes % 12.8 13.0 - 44.0 %    Monocytes % 1.8 2.0 - 10.0 %    Eosinophils % 0.0 0.0 - 6.0 %    Basophils % 0.0 0.0 - 2.0 %    Neutrophils Absolute 2.35 1.20 - 7.70 x10*3/uL    Immature Granulocytes Absolute, Automated 0.06 0.00 - 0.70 x10*3/uL    Lymphocytes Absolute 0.36 (L) 1.20 - 4.80 x10*3/uL    Monocytes Absolute 0.05 (L) " 0.10 - 1.00 x10*3/uL    Eosinophils Absolute 0.00 0.00 - 0.70 x10*3/uL    Basophils Absolute 0.00 0.00 - 0.10 x10*3/uL   Comprehensive Metabolic Panel   Result Value Ref Range    Glucose 146 (H) 74 - 99 mg/dL    Sodium 139 136 - 145 mmol/L    Potassium 4.3 3.5 - 5.3 mmol/L    Chloride 106 98 - 107 mmol/L    Bicarbonate 23 21 - 32 mmol/L    Anion Gap 14 10 - 20 mmol/L    Urea Nitrogen 25 (H) 6 - 23 mg/dL    Creatinine 0.46 (L) 0.50 - 1.30 mg/dL    eGFR >90 >60 mL/min/1.73m*2    Calcium 9.0 8.6 - 10.6 mg/dL    Albumin 3.3 (L) 3.4 - 5.0 g/dL    Alkaline Phosphatase 40 33 - 136 U/L    Total Protein 4.6 (L) 6.4 - 8.2 g/dL    AST 8 (L) 9 - 39 U/L    Bilirubin, Total 0.6 0.0 - 1.2 mg/dL    ALT 10 10 - 52 U/L   Magnesium   Result Value Ref Range    Magnesium 1.92 1.60 - 2.40 mg/dL   Phosphorus   Result Value Ref Range    Phosphorus 5.1 (H) 2.5 - 4.9 mg/dL     This patient has a central line   Reason for the central line remaining today? Parenteral medication     Assessment & Plan  Burkitt lymphoma    DLBCL (diffuse large B cell lymphoma)    Mr. Bijan Ibanez is a 64 yr old male with Burkitt's Lymphoma, being admitted for C6 DA EPOCH (starting 5/24) with IT on D1&5. PMHx htn, HLD, CAD, Afib, MI (stent 2010), renal cell carcinoma (R partial nephrectomy 2013), DVT (Apixaban), BPH, GERD, arthritis.     C6D2 DA-EPOCH    ONC  # Burkitt's Lymphoma  - Received 5C R EPOCH w/ IT  - PET (after C2): near CR  - Admitted for C6 EPOCH (starts 5/24), IT on D1 (5/23) & D5 (5/28)  - Neulasta/Ritux on 5/30 (outpt)     HEME  # DVT (R subclav, R axillary from PICC)  - Eliquis 5 BID on hold for LP x 2 while admitted  - Pt wanted to discuss if Eliquis still needed w/ Dr. Mckeon outpatient  # DVT prophy: ambulation     ID  - Levo for neutropenia  - Zosyn for fever  # Prophy: ACV     CARDIO  # Afib  - Cont Metop  - Hold Lasix on admit, diurese as needed  # HTN  # HLD: cont home Lipitor     FEN/GI  - Admit wt: 84.1 kg  # PUD prophy: Protonix      MISC  # Falls at home (5/2, 5/18)  - PT ordered, pt now uses walker more frequently  # Neuropathy  - Gabapentin TID     DISPO  - Full Code  - SOLO PICC  - Primary Onc: gissell Mckeon on 6/3  - Neulasta/Ritux 5/30  - Discharge likely 5/28     Pt seen & examined. Discussed w/ Dr. Nakul Veras, APRN-CNP

## 2025-05-25 VITALS
SYSTOLIC BLOOD PRESSURE: 104 MMHG | OXYGEN SATURATION: 96 % | RESPIRATION RATE: 16 BRPM | TEMPERATURE: 97.2 F | BODY MASS INDEX: 24.87 KG/M2 | HEART RATE: 67 BPM | HEIGHT: 72 IN | DIASTOLIC BLOOD PRESSURE: 67 MMHG | WEIGHT: 183.64 LBS

## 2025-05-25 LAB
ALBUMIN SERPL BCP-MCNC: 3.4 G/DL (ref 3.4–5)
ALP SERPL-CCNC: 40 U/L (ref 33–136)
ALT SERPL W P-5'-P-CCNC: 12 U/L (ref 10–52)
ANION GAP SERPL CALC-SCNC: 13 MMOL/L (ref 10–20)
AST SERPL W P-5'-P-CCNC: 9 U/L (ref 9–39)
BASOPHILS # BLD AUTO: 0 X10*3/UL (ref 0–0.1)
BASOPHILS NFR BLD AUTO: 0 %
BILIRUB SERPL-MCNC: 0.6 MG/DL (ref 0–1.2)
BUN SERPL-MCNC: 30 MG/DL (ref 6–23)
CALCIUM SERPL-MCNC: 8.8 MG/DL (ref 8.6–10.6)
CHLORIDE SERPL-SCNC: 105 MMOL/L (ref 98–107)
CO2 SERPL-SCNC: 23 MMOL/L (ref 21–32)
CREAT SERPL-MCNC: 0.51 MG/DL (ref 0.5–1.3)
EGFRCR SERPLBLD CKD-EPI 2021: >90 ML/MIN/1.73M*2
EOSINOPHIL # BLD AUTO: 0 X10*3/UL (ref 0–0.7)
EOSINOPHIL NFR BLD AUTO: 0 %
ERYTHROCYTE [DISTWIDTH] IN BLOOD BY AUTOMATED COUNT: 19.4 % (ref 11.5–14.5)
GLUCOSE SERPL-MCNC: 150 MG/DL (ref 74–99)
HCT VFR BLD AUTO: 25.7 % (ref 41–52)
HGB BLD-MCNC: 7.9 G/DL (ref 13.5–17.5)
IMM GRANULOCYTES # BLD AUTO: 0.11 X10*3/UL (ref 0–0.7)
IMM GRANULOCYTES NFR BLD AUTO: 3.2 % (ref 0–0.9)
LYMPHOCYTES # BLD AUTO: 0.26 X10*3/UL (ref 1.2–4.8)
LYMPHOCYTES NFR BLD AUTO: 7.6 %
MAGNESIUM SERPL-MCNC: 2.03 MG/DL (ref 1.6–2.4)
MCH RBC QN AUTO: 29.2 PG (ref 26–34)
MCHC RBC AUTO-ENTMCNC: 30.7 G/DL (ref 32–36)
MCV RBC AUTO: 95 FL (ref 80–100)
MONOCYTES # BLD AUTO: 0.08 X10*3/UL (ref 0.1–1)
MONOCYTES NFR BLD AUTO: 2.3 %
NEUTROPHILS # BLD AUTO: 2.98 X10*3/UL (ref 1.2–7.7)
NEUTROPHILS NFR BLD AUTO: 86.9 %
NRBC BLD-RTO: 0 /100 WBCS (ref 0–0)
PHOSPHATE SERPL-MCNC: 4.2 MG/DL (ref 2.5–4.9)
PLATELET # BLD AUTO: 192 X10*3/UL (ref 150–450)
POTASSIUM SERPL-SCNC: 3.9 MMOL/L (ref 3.5–5.3)
PROT SERPL-MCNC: 4.7 G/DL (ref 6.4–8.2)
RBC # BLD AUTO: 2.71 X10*6/UL (ref 4.5–5.9)
SODIUM SERPL-SCNC: 137 MMOL/L (ref 136–145)
WBC # BLD AUTO: 3.4 X10*3/UL (ref 4.4–11.3)

## 2025-05-25 PROCEDURE — 84100 ASSAY OF PHOSPHORUS: CPT | Performed by: NURSE PRACTITIONER

## 2025-05-25 PROCEDURE — 2500000001 HC RX 250 WO HCPCS SELF ADMINISTERED DRUGS (ALT 637 FOR MEDICARE OP)

## 2025-05-25 PROCEDURE — 2500000004 HC RX 250 GENERAL PHARMACY W/ HCPCS (ALT 636 FOR OP/ED): Performed by: STUDENT IN AN ORGANIZED HEALTH CARE EDUCATION/TRAINING PROGRAM

## 2025-05-25 PROCEDURE — 2500000001 HC RX 250 WO HCPCS SELF ADMINISTERED DRUGS (ALT 637 FOR MEDICARE OP): Performed by: NURSE PRACTITIONER

## 2025-05-25 PROCEDURE — 80053 COMPREHEN METABOLIC PANEL: CPT | Performed by: NURSE PRACTITIONER

## 2025-05-25 PROCEDURE — 1170000001 HC PRIVATE ONCOLOGY ROOM DAILY

## 2025-05-25 PROCEDURE — 2500000004 HC RX 250 GENERAL PHARMACY W/ HCPCS (ALT 636 FOR OP/ED)

## 2025-05-25 PROCEDURE — 85025 COMPLETE CBC W/AUTO DIFF WBC: CPT | Performed by: NURSE PRACTITIONER

## 2025-05-25 PROCEDURE — 2500000002 HC RX 250 W HCPCS SELF ADMINISTERED DRUGS (ALT 637 FOR MEDICARE OP, ALT 636 FOR OP/ED): Performed by: STUDENT IN AN ORGANIZED HEALTH CARE EDUCATION/TRAINING PROGRAM

## 2025-05-25 PROCEDURE — 99233 SBSQ HOSP IP/OBS HIGH 50: CPT | Performed by: INTERNAL MEDICINE

## 2025-05-25 PROCEDURE — 83735 ASSAY OF MAGNESIUM: CPT | Performed by: NURSE PRACTITIONER

## 2025-05-25 RX ORDER — BISACODYL 10 MG/1
10 SUPPOSITORY RECTAL DAILY
Status: DISCONTINUED | OUTPATIENT
Start: 2025-05-25 | End: 2025-05-27 | Stop reason: HOSPADM

## 2025-05-25 RX ORDER — POLYETHYLENE GLYCOL 3350 17 G/17G
17 POWDER, FOR SOLUTION ORAL DAILY
Status: DISCONTINUED | OUTPATIENT
Start: 2025-05-25 | End: 2025-05-27 | Stop reason: HOSPADM

## 2025-05-25 RX ADMIN — ATORVASTATIN CALCIUM 40 MG: 40 TABLET, FILM COATED ORAL at 05:21

## 2025-05-25 RX ADMIN — PREDNISONE 140 MG: 20 TABLET ORAL at 08:59

## 2025-05-25 RX ADMIN — OXYCODONE 5 MG: 5 TABLET ORAL at 18:57

## 2025-05-25 RX ADMIN — ACYCLOVIR 400 MG: 200 CAPSULE ORAL at 20:07

## 2025-05-25 RX ADMIN — DOXORUBICIN HYDROCHLORIDE 20.4 MG: 2 INJECTION, SOLUTION INTRAVENOUS at 16:56

## 2025-05-25 RX ADMIN — ACYCLOVIR 400 MG: 200 CAPSULE ORAL at 08:59

## 2025-05-25 RX ADMIN — METOPROLOL TARTRATE 75 MG: 50 TABLET, FILM COATED ORAL at 08:59

## 2025-05-25 RX ADMIN — PREDNISONE 140 MG: 20 TABLET ORAL at 20:07

## 2025-05-25 RX ADMIN — METOPROLOL TARTRATE 75 MG: 50 TABLET, FILM COATED ORAL at 20:07

## 2025-05-25 RX ADMIN — ONDANSETRON 16 MG: 2 INJECTION, SOLUTION INTRAMUSCULAR; INTRAVENOUS at 16:53

## 2025-05-25 RX ADMIN — POLYETHYLENE GLYCOL 3350 17 G: 17 POWDER, FOR SOLUTION ORAL at 08:59

## 2025-05-25 RX ADMIN — OLANZAPINE 5 MG: 5 TABLET, FILM COATED ORAL at 20:07

## 2025-05-25 RX ADMIN — PANTOPRAZOLE SODIUM 40 MG: 40 TABLET, DELAYED RELEASE ORAL at 05:21

## 2025-05-25 RX ADMIN — BISACODYL 10 MG: 10 SUPPOSITORY RECTAL at 10:47

## 2025-05-25 RX ADMIN — GABAPENTIN 300 MG: 300 CAPSULE ORAL at 20:07

## 2025-05-25 ASSESSMENT — COGNITIVE AND FUNCTIONAL STATUS - GENERAL
MOBILITY SCORE: 22
WALKING IN HOSPITAL ROOM: A LITTLE
CLIMB 3 TO 5 STEPS WITH RAILING: A LITTLE
DAILY ACTIVITIY SCORE: 24

## 2025-05-25 ASSESSMENT — PAIN DESCRIPTION - ORIENTATION: ORIENTATION: LEFT

## 2025-05-25 ASSESSMENT — PAIN SCALES - GENERAL
PAINLEVEL_OUTOF10: 5 - MODERATE PAIN
PAINLEVEL_OUTOF10: 3

## 2025-05-25 ASSESSMENT — PAIN - FUNCTIONAL ASSESSMENT
PAIN_FUNCTIONAL_ASSESSMENT: 0-10
PAIN_FUNCTIONAL_ASSESSMENT: 0-10

## 2025-05-25 ASSESSMENT — PAIN DESCRIPTION - LOCATION: LOCATION: SHOULDER

## 2025-05-25 NOTE — CARE PLAN
Problem: Pain - Adult  Goal: Verbalizes/displays adequate comfort level or baseline comfort level  Outcome: Met     Problem: Safety - Adult  Goal: Free from fall injury  Outcome: Met     Problem: Discharge Planning  Goal: Discharge to home or other facility with appropriate resources  Outcome: Met     Problem: Chronic Conditions and Co-morbidities  Goal: Patient's chronic conditions and co-morbidity symptoms are monitored and maintained or improved  Outcome: Met     Problem: Nutrition  Goal: Nutrient intake appropriate for maintaining nutritional needs  Outcome: Met     Problem: Pain  Goal: Takes deep breaths with improved pain control throughout the shift  Outcome: Met  Goal: Turns in bed with improved pain control throughout the shift  Outcome: Met  Goal: Walks with improved pain control throughout the shift  Outcome: Met  Goal: Performs ADL's with improved pain control throughout shift  Outcome: Met  Goal: Participates in PT with improved pain control throughout the shift  Outcome: Met  Goal: Free from opioid side effects throughout the shift  Outcome: Met  Goal: Free from acute confusion related to pain meds throughout the shift  Outcome: Met

## 2025-05-25 NOTE — PROGRESS NOTES
Bijan Ibanez is a 64 y.o. male on day 2 of admission presenting with Burkitt lymphoma.    Subjective   Afebrile, no acute issues overnight. Tolerating chemotherapy well. Thinks he might be getting constipated, otherwise offers no complaints. Eating breakfast on side of bed. Is ambulating to bathroom without issues. Wife at bedside.         Objective     Physical Exam  Constitutional:       General: He is not in acute distress.     Appearance: He is normal weight. He is not toxic-appearing.   HENT:      Head: Normocephalic and atraumatic.      Nose: Nose normal.      Mouth/Throat:      Mouth: Mucous membranes are moist.      Pharynx: Oropharynx is clear. No oropharyngeal exudate.   Eyes:      Extraocular Movements: Extraocular movements intact.      Conjunctiva/sclera: Conjunctivae normal.      Pupils: Pupils are equal, round, and reactive to light.   Cardiovascular:      Rate and Rhythm: Normal rate and regular rhythm.      Pulses: Normal pulses.      Heart sounds: Normal heart sounds.   Pulmonary:      Effort: Pulmonary effort is normal.      Breath sounds: Normal breath sounds.   Abdominal:      General: Bowel sounds are normal.      Palpations: Abdomen is soft.      Tenderness: There is no abdominal tenderness.   Musculoskeletal:         General: Swelling (BLE, R>L, minimal) present.      Cervical back: Normal range of motion and neck supple. No rigidity.   Lymphadenopathy:      Cervical: No cervical adenopathy.   Skin:     General: Skin is warm and dry.      Capillary Refill: Capillary refill takes less than 2 seconds.      Comments: LUE PICC C/D/I   Neurological:      General: No focal deficit present.      Mental Status: He is alert and oriented to person, place, and time. Mental status is at baseline.   Psychiatric:         Mood and Affect: Mood normal.         Behavior: Behavior normal.         Thought Content: Thought content normal.         Judgment: Judgment normal.         Last Recorded Vitals  Blood  "pressure 126/82, pulse 76, temperature 36.3 °C (97.3 °F), temperature source Temporal, resp. rate 16, height 1.824 m (5' 11.81\"), weight 83.3 kg (183 lb 10.3 oz), SpO2 100%.  Intake/Output last 3 Shifts:  I/O last 3 completed shifts:  In: - (0 mL/kg)   Out: 425 (5.1 mL/kg) [Urine:425 (0.1 mL/kg/hr)]  Weight: 83.3 kg     Relevant Results               Results for orders placed or performed during the hospital encounter of 05/23/25 (from the past 24 hours)   CBC and Auto Differential   Result Value Ref Range    WBC 3.4 (L) 4.4 - 11.3 x10*3/uL    nRBC 0.0 0.0 - 0.0 /100 WBCs    RBC 2.71 (L) 4.50 - 5.90 x10*6/uL    Hemoglobin 7.9 (L) 13.5 - 17.5 g/dL    Hematocrit 25.7 (L) 41.0 - 52.0 %    MCV 95 80 - 100 fL    MCH 29.2 26.0 - 34.0 pg    MCHC 30.7 (L) 32.0 - 36.0 g/dL    RDW 19.4 (H) 11.5 - 14.5 %    Platelets 192 150 - 450 x10*3/uL    Neutrophils % 86.9 40.0 - 80.0 %    Immature Granulocytes %, Automated 3.2 (H) 0.0 - 0.9 %    Lymphocytes % 7.6 13.0 - 44.0 %    Monocytes % 2.3 2.0 - 10.0 %    Eosinophils % 0.0 0.0 - 6.0 %    Basophils % 0.0 0.0 - 2.0 %    Neutrophils Absolute 2.98 1.20 - 7.70 x10*3/uL    Immature Granulocytes Absolute, Automated 0.11 0.00 - 0.70 x10*3/uL    Lymphocytes Absolute 0.26 (L) 1.20 - 4.80 x10*3/uL    Monocytes Absolute 0.08 (L) 0.10 - 1.00 x10*3/uL    Eosinophils Absolute 0.00 0.00 - 0.70 x10*3/uL    Basophils Absolute 0.00 0.00 - 0.10 x10*3/uL   Comprehensive Metabolic Panel   Result Value Ref Range    Glucose 150 (H) 74 - 99 mg/dL    Sodium 137 136 - 145 mmol/L    Potassium 3.9 3.5 - 5.3 mmol/L    Chloride 105 98 - 107 mmol/L    Bicarbonate 23 21 - 32 mmol/L    Anion Gap 13 10 - 20 mmol/L    Urea Nitrogen 30 (H) 6 - 23 mg/dL    Creatinine 0.51 0.50 - 1.30 mg/dL    eGFR >90 >60 mL/min/1.73m*2    Calcium 8.8 8.6 - 10.6 mg/dL    Albumin 3.4 3.4 - 5.0 g/dL    Alkaline Phosphatase 40 33 - 136 U/L    Total Protein 4.7 (L) 6.4 - 8.2 g/dL    AST 9 9 - 39 U/L    Bilirubin, Total 0.6 0.0 - 1.2 mg/dL "    ALT 12 10 - 52 U/L   Magnesium   Result Value Ref Range    Magnesium 2.03 1.60 - 2.40 mg/dL   Phosphorus   Result Value Ref Range    Phosphorus 4.2 2.5 - 4.9 mg/dL     This patient has a central line   Reason for the central line remaining today? Parenteral medication     Assessment & Plan  Burkitt lymphoma    DLBCL (diffuse large B cell lymphoma)    Mr. Bijan Ibanez is a 64 yr old male with Burkitt's Lymphoma, being admitted for C6 DA EPOCH (starting 5/24) with IT on D1&5. PMHx htn, HLD, CAD, Afib, MI (stent 2010), renal cell carcinoma (R partial nephrectomy 2013), DVT (Apixaban), BPH, GERD, arthritis.     C6D3 DA-EPOCH    ONC  # Burkitt's Lymphoma  - Received 5C R EPOCH w/ IT  - PET (after C2): near CR  - Admitted for C6 EPOCH (starts 5/24), IT on D1 (5/23) & D5 (5/28)  - Neulasta/Ritux on 5/30 (outpt)     HEME  # DVT (R subclav, R axillary from PICC)  - Eliquis 5 BID on hold for LP x 2 while admitted  - Pt wanted to discuss if Eliquis still needed w/ Dr. Mckeon outpatient  # DVT prophy: ambulation     ID  - Levo for neutropenia  - Zosyn for fever  # Prophy: ACV     CARDIO  # Afib  - Cont Metop  - Hold Lasix on admit, diurese as needed  # HTN  # HLD: cont home Lipitor     FEN/GI  - Admit wt: 84.1 kg  # PUD prophy: Protonix     MISC  # Falls at home (5/2, 5/18)  - PT ordered, pt now uses walker more frequently  # Neuropathy  - Gabapentin TID     DISPO  - Full Code  - SOLO PICC  - Primary Onc: Mike, appt on 6/3  - Neulasta/Ritux 5/30  - Discharge likely 5/28     Pt seen & examined. Discussed w/ Dr. Nakul Veras, APRN-CNP

## 2025-05-25 NOTE — PROGRESS NOTES
05/25/25 0849   Discharge Planning   Living Arrangements Spouse/significant other   Support Systems Spouse/significant other   Assistance Needed Active with [facility] for Home PT prior to admit   Type of Residence Private residence   Number of Stairs to Enter Residence 1   Number of Stairs Within Residence 12   Do you have animals or pets at home? No   Who is requesting discharge planning? Provider   Home or Post Acute Services In home services   Type of Post Acute Facility Services Other (Comment)  ([facility] for PT)   Type of Home Care Services Home PT   Expected Discharge Disposition Home Health   Does the patient need discharge transport arranged? No   RoundTrip coordination needed? No   Patient Choice   Provider Choice list and CMS website (https://medicare.gov/care-compare#search) for post-acute Quality and Resource Measure Data were provided and reviewed with: Patient   Patient / Family choosing to utilize agency / facility established prior to hospitalization Yes       Patient with recent DC on 5/7, home with [facility] for PT.   PCP / Primary Oncologist: Dr. Torey Mckeon  Preferred Pharmacy:  [facility] [place]    DME: cane, wheeled walker and shower seat.  He has a DL SOLO PICC which is cared for in the infusion center.      Admit for fall/felt lightheaded. Per recent notes up to bathroom with no concerns, getting Chemo. ADOD 5/28. Per notes [facility] DC home care 5/14. Anticipate home self care at DC.

## 2025-05-26 LAB
ALBUMIN SERPL BCP-MCNC: 3.1 G/DL (ref 3.4–5)
ALP SERPL-CCNC: 36 U/L (ref 33–136)
ALT SERPL W P-5'-P-CCNC: 17 U/L (ref 10–52)
ANION GAP SERPL CALC-SCNC: 11 MMOL/L (ref 10–20)
AST SERPL W P-5'-P-CCNC: 10 U/L (ref 9–39)
BASOPHILS # BLD AUTO: 0.01 X10*3/UL (ref 0–0.1)
BASOPHILS NFR BLD AUTO: 0.3 %
BILIRUB SERPL-MCNC: 0.6 MG/DL (ref 0–1.2)
BUN SERPL-MCNC: 31 MG/DL (ref 6–23)
CALCIUM SERPL-MCNC: 8.5 MG/DL (ref 8.6–10.6)
CHLORIDE SERPL-SCNC: 107 MMOL/L (ref 98–107)
CO2 SERPL-SCNC: 26 MMOL/L (ref 21–32)
CREAT SERPL-MCNC: 0.47 MG/DL (ref 0.5–1.3)
EGFRCR SERPLBLD CKD-EPI 2021: >90 ML/MIN/1.73M*2
EOSINOPHIL # BLD AUTO: 0 X10*3/UL (ref 0–0.7)
EOSINOPHIL NFR BLD AUTO: 0 %
ERYTHROCYTE [DISTWIDTH] IN BLOOD BY AUTOMATED COUNT: 19.4 % (ref 11.5–14.5)
GLUCOSE SERPL-MCNC: 152 MG/DL (ref 74–99)
HCT VFR BLD AUTO: 24.1 % (ref 41–52)
HGB BLD-MCNC: 7.4 G/DL (ref 13.5–17.5)
IMM GRANULOCYTES # BLD AUTO: 0.05 X10*3/UL (ref 0–0.7)
IMM GRANULOCYTES NFR BLD AUTO: 1.4 % (ref 0–0.9)
LYMPHOCYTES # BLD AUTO: 0.16 X10*3/UL (ref 1.2–4.8)
LYMPHOCYTES NFR BLD AUTO: 4.5 %
MAGNESIUM SERPL-MCNC: 2.12 MG/DL (ref 1.6–2.4)
MCH RBC QN AUTO: 29.2 PG (ref 26–34)
MCHC RBC AUTO-ENTMCNC: 30.7 G/DL (ref 32–36)
MCV RBC AUTO: 95 FL (ref 80–100)
MONOCYTES # BLD AUTO: 0.07 X10*3/UL (ref 0.1–1)
MONOCYTES NFR BLD AUTO: 2 %
NEUTROPHILS # BLD AUTO: 3.29 X10*3/UL (ref 1.2–7.7)
NEUTROPHILS NFR BLD AUTO: 91.8 %
NRBC BLD-RTO: 0 /100 WBCS (ref 0–0)
PHOSPHATE SERPL-MCNC: 3.8 MG/DL (ref 2.5–4.9)
PLATELET # BLD AUTO: 163 X10*3/UL (ref 150–450)
POTASSIUM SERPL-SCNC: 4 MMOL/L (ref 3.5–5.3)
PROT SERPL-MCNC: 4.2 G/DL (ref 6.4–8.2)
RBC # BLD AUTO: 2.53 X10*6/UL (ref 4.5–5.9)
SODIUM SERPL-SCNC: 140 MMOL/L (ref 136–145)
WBC # BLD AUTO: 3.6 X10*3/UL (ref 4.4–11.3)

## 2025-05-26 PROCEDURE — 2500000001 HC RX 250 WO HCPCS SELF ADMINISTERED DRUGS (ALT 637 FOR MEDICARE OP): Performed by: NURSE PRACTITIONER

## 2025-05-26 PROCEDURE — 2500000004 HC RX 250 GENERAL PHARMACY W/ HCPCS (ALT 636 FOR OP/ED)

## 2025-05-26 PROCEDURE — 84100 ASSAY OF PHOSPHORUS: CPT | Performed by: NURSE PRACTITIONER

## 2025-05-26 PROCEDURE — 83735 ASSAY OF MAGNESIUM: CPT | Performed by: NURSE PRACTITIONER

## 2025-05-26 PROCEDURE — 2500000002 HC RX 250 W HCPCS SELF ADMINISTERED DRUGS (ALT 637 FOR MEDICARE OP, ALT 636 FOR OP/ED): Performed by: STUDENT IN AN ORGANIZED HEALTH CARE EDUCATION/TRAINING PROGRAM

## 2025-05-26 PROCEDURE — 2500000004 HC RX 250 GENERAL PHARMACY W/ HCPCS (ALT 636 FOR OP/ED): Performed by: STUDENT IN AN ORGANIZED HEALTH CARE EDUCATION/TRAINING PROGRAM

## 2025-05-26 PROCEDURE — 85025 COMPLETE CBC W/AUTO DIFF WBC: CPT | Performed by: NURSE PRACTITIONER

## 2025-05-26 PROCEDURE — 84075 ASSAY ALKALINE PHOSPHATASE: CPT | Performed by: NURSE PRACTITIONER

## 2025-05-26 PROCEDURE — 99233 SBSQ HOSP IP/OBS HIGH 50: CPT | Performed by: INTERNAL MEDICINE

## 2025-05-26 PROCEDURE — 1170000001 HC PRIVATE ONCOLOGY ROOM DAILY

## 2025-05-26 RX ORDER — FUROSEMIDE 40 MG/1
40 TABLET ORAL ONCE
Status: COMPLETED | OUTPATIENT
Start: 2025-05-26 | End: 2025-05-26

## 2025-05-26 RX ORDER — ONDANSETRON 8 MG/1
16 TABLET, FILM COATED ORAL ONCE
Status: COMPLETED | OUTPATIENT
Start: 2025-05-26 | End: 2025-05-26

## 2025-05-26 RX ADMIN — DOXORUBICIN HYDROCHLORIDE 20.4 MG: 2 INJECTION, SOLUTION INTRAVENOUS at 16:51

## 2025-05-26 RX ADMIN — ACYCLOVIR 400 MG: 200 CAPSULE ORAL at 08:36

## 2025-05-26 RX ADMIN — ACYCLOVIR 400 MG: 200 CAPSULE ORAL at 20:30

## 2025-05-26 RX ADMIN — ATORVASTATIN CALCIUM 40 MG: 40 TABLET, FILM COATED ORAL at 05:54

## 2025-05-26 RX ADMIN — ONDANSETRON HYDROCHLORIDE 16 MG: 8 TABLET, FILM COATED ORAL at 17:17

## 2025-05-26 RX ADMIN — METOPROLOL TARTRATE 75 MG: 50 TABLET, FILM COATED ORAL at 20:30

## 2025-05-26 RX ADMIN — PANTOPRAZOLE SODIUM 40 MG: 40 TABLET, DELAYED RELEASE ORAL at 05:54

## 2025-05-26 RX ADMIN — OLANZAPINE 5 MG: 5 TABLET, FILM COATED ORAL at 20:30

## 2025-05-26 RX ADMIN — SENNOSIDES AND DOCUSATE SODIUM 2 TABLET: 50; 8.6 TABLET ORAL at 20:35

## 2025-05-26 RX ADMIN — PREDNISONE 140 MG: 20 TABLET ORAL at 08:36

## 2025-05-26 RX ADMIN — METOPROLOL TARTRATE 75 MG: 50 TABLET, FILM COATED ORAL at 08:36

## 2025-05-26 RX ADMIN — PREDNISONE 140 MG: 20 TABLET ORAL at 20:30

## 2025-05-26 RX ADMIN — GABAPENTIN 300 MG: 300 CAPSULE ORAL at 20:30

## 2025-05-26 RX ADMIN — OXYCODONE 5 MG: 5 TABLET ORAL at 18:13

## 2025-05-26 RX ADMIN — POLYETHYLENE GLYCOL 3350 17 G: 17 POWDER, FOR SOLUTION ORAL at 08:36

## 2025-05-26 RX ADMIN — FUROSEMIDE 40 MG: 40 TABLET ORAL at 11:03

## 2025-05-26 ASSESSMENT — PAIN SCALES - GENERAL
PAINLEVEL_OUTOF10: 2
PAINLEVEL_OUTOF10: 2
PAINLEVEL_OUTOF10: 5 - MODERATE PAIN

## 2025-05-26 ASSESSMENT — PAIN - FUNCTIONAL ASSESSMENT
PAIN_FUNCTIONAL_ASSESSMENT: 0-10

## 2025-05-26 ASSESSMENT — PAIN DESCRIPTION - LOCATION: LOCATION: SHOULDER

## 2025-05-26 ASSESSMENT — PAIN DESCRIPTION - ORIENTATION: ORIENTATION: LEFT

## 2025-05-26 NOTE — PROGRESS NOTES
Bijan Ibanez is a 64 y.o. male on day 3 of admission presenting with Burkitt lymphoma.    Subjective   Afebrile overall feeling well, no longer constipated had large BM last night. Denies CP SOB palpitations HA vision changes no abdominal pain NVDC appetite stable no bleeding noted    ROS otherwise unremarkable     Objective     Physical Exam  Constitutional:       General: He is not in acute distress.     Appearance: He is normal weight. He is not toxic-appearing.   HENT:      Head: Normocephalic and atraumatic.      Nose: Nose normal.      Mouth/Throat:      Mouth: Mucous membranes are moist.      Pharynx: Oropharynx is clear. No oropharyngeal exudate.   Eyes:      Extraocular Movements: Extraocular movements intact.      Conjunctiva/sclera: Conjunctivae normal.      Pupils: Pupils are equal, round, and reactive to light.   Cardiovascular:      Rate and Rhythm: Normal rate and regular rhythm.      Pulses: Normal pulses.      Heart sounds: Normal heart sounds.   Pulmonary:      Effort: Pulmonary effort is normal.      Breath sounds: Normal breath sounds.   Abdominal:      General: Bowel sounds are normal.      Palpations: Abdomen is soft.      Tenderness: There is no abdominal tenderness.   Musculoskeletal:         General: Swelling (BLE, R>L, minimal) present.      Cervical back: Normal range of motion and neck supple. No rigidity.   Lymphadenopathy:      Cervical: No cervical adenopathy.   Skin:     General: Skin is warm and dry.      Capillary Refill: Capillary refill takes less than 2 seconds.      Comments: LUE PICC C/D/I   Neurological:      General: No focal deficit present.      Mental Status: He is alert and oriented to person, place, and time. Mental status is at baseline.   Psychiatric:         Mood and Affect: Mood normal.         Behavior: Behavior normal.         Thought Content: Thought content normal.         Judgment: Judgment normal.         Last Recorded Vitals  Blood pressure 121/78, pulse  "73, temperature 36.2 °C (97.2 °F), temperature source Temporal, resp. rate 18, height 1.824 m (5' 11.81\"), weight 85.6 kg (188 lb 11.4 oz), SpO2 99%.  Intake/Output last 3 Shifts:  I/O last 3 completed shifts:  In: - (0 mL/kg)   Out: 525 (6.3 mL/kg) [Urine:525 (0.2 mL/kg/hr)]  Weight: 83.3 kg     Relevant Results   Scheduled medications  Scheduled Medications[1]  Continuous medications  Continuous Medications[2]  PRN medications  PRN Medications[3]    This patient has a central line   Reason for the central line remaining today? Parenteral medication     Assessment & Plan  Burkitt lymphoma    DLBCL (diffuse large B cell lymphoma)    Mr. Bjian Ibanez is a 64 yr old male with Burkitt's Lymphoma, being admitted for C6 DA EPOCH (starting 5/24) with IT on D1&5. PMHx htn, HLD, CAD, Afib, MI (stent 2010), renal cell carcinoma (R partial nephrectomy 2013), DVT (Apixaban), BPH, GERD, arthritis.     C6 D4 DA-EPOCH    ONC  # Burkitt's Lymphoma  - Received 5C R EPOCH w/ IT  - PET (after C2): near CR  - Admitted for C6 EPOCH (starts 5/24), IT on D1 (5/23) & D5 (5/28)  - Neulasta/Ritux on 5/30 (outpt)     HEME  # DVT (R subclav, R axillary from PICC)  - HOLD HOME Eliquis 5 BID on hold for LP x 2 while admitted (patient mentioned it has been on hold for weeks)   - Pt wanted to discuss if Eliquis still needed w/ Dr. Mckeon outpatient  # DVT prophy: ambulation     ID  - Levo for neutropenia  - Zosyn for fever  # Prophy: ACV     CARDIO  # Afib  - Cont Metop  - Hold Lasix on admit, diurese as needed      --PRN Lasix 40mg/oral (5/26)  # HTN  # HLD: cont home Lipitor     FEN/GI  - Admit wt: 84.1 kg Daily WT 85.6 (5/26)  # PUD prophy: Protonix     MISC  # Falls at home (5/2, 5/18)  - PT ordered, pt now uses walker more frequently  # Neuropathy  - Gabapentin TID     DISPO  - Full Code  - SOLO PICC  - Primary Onc: gissell Mckeon on 6/3  - Neulasta/Ritux 5/30  - Discharge likely 5/28     Pt seen & examined. Discussed w/ Dr. Francisco Mccullough "         Whitney Vilchis, APRN-CNP  This is a shared visit. I have reviewed the Advanced Practice Provider's encounter note, approve the Advanced Practice Provider's documentation, and provide the following additional information from my personal encounter.   Exam on AM rounds sig for no oral lesions; lungs clear    Assessment/plans:   64 year old man w/hx of Afib, DVT and Burkitt Lymphoma who was admitted on 5/24 for C6 DA-EPOCH and IT on D1({methotrexate) and D5(cytarabine)  - tolerating chemo well. Pt is slated for IT chemo on day 5 but would like to 1st discuss this w/Dr. Mckeon. If he agrees then will need apix held. Neulasta/ritux on 5/30   PT eval ordered (falls at home earlier this month)  Cont acyclovir ppx  Cont metoprolol  Cont gabapentin for sens NP                 [1] acyclovir, 400 mg, oral, BID  [Held by provider] apixaban, 5 mg, oral, BID  atorvastatin, 40 mg, oral, Daily  bisacodyl, 10 mg, rectal, Daily  DOXOrubicin (Adriamycin) 20.4 mg, etoposide (Toposar) 76 mg, vinCRIStine (Oncovin) 0.8 mg in sodium chloride 0.9% 561.8 mL IV, , intravenous, Once  DOXOrubicin (Adriamycin) 20.4 mg, etoposide (Toposar) 76 mg, vinCRIStine (VINCASAR) 0.82 mg in sodium chloride 0.9% 561.82 mL IV, , intravenous, Once  gabapentin, 300 mg, oral, Nightly  metoprolol tartrate, 75 mg, oral, BID  OLANZapine, 5 mg, oral, Nightly  ondansetron, 16 mg, oral, Once  pantoprazole, 40 mg, oral, Daily before breakfast  polyethylene glycol, 17 g, oral, Daily  predniSONE, 140 mg, oral, BID  [2]    [3] PRN medications: albuterol, albuterol, alteplase, dextrose, diphenhydrAMINE, EPINEPHrine HCl, famotidine, methylPREDNISolone sodium succinate (PF), oxyCODONE, prochlorperazine, prochlorperazine, sennosides-docusate sodium, sodium chloride

## 2025-05-27 ENCOUNTER — APPOINTMENT (OUTPATIENT)
Dept: HEMATOLOGY/ONCOLOGY | Facility: HOSPITAL | Age: 65
End: 2025-05-27
Payer: MEDICARE

## 2025-05-27 ENCOUNTER — PHARMACY VISIT (OUTPATIENT)
Dept: PHARMACY | Facility: CLINIC | Age: 65
End: 2025-05-27
Payer: COMMERCIAL

## 2025-05-27 ENCOUNTER — HOME HEALTH ADMISSION (OUTPATIENT)
Dept: HOME HEALTH SERVICES | Facility: HOME HEALTH | Age: 65
End: 2025-05-27
Payer: MEDICARE

## 2025-05-27 ENCOUNTER — DOCUMENTATION (OUTPATIENT)
Dept: HOME HEALTH SERVICES | Facility: HOME HEALTH | Age: 65
End: 2025-05-27
Payer: MEDICARE

## 2025-05-27 VITALS
DIASTOLIC BLOOD PRESSURE: 88 MMHG | HEART RATE: 74 BPM | TEMPERATURE: 97.9 F | SYSTOLIC BLOOD PRESSURE: 135 MMHG | RESPIRATION RATE: 18 BRPM | HEIGHT: 72 IN | BODY MASS INDEX: 25.47 KG/M2 | WEIGHT: 188.05 LBS | OXYGEN SATURATION: 99 %

## 2025-05-27 LAB
ALBUMIN SERPL BCP-MCNC: 3.2 G/DL (ref 3.4–5)
ALP SERPL-CCNC: 37 U/L (ref 33–136)
ALT SERPL W P-5'-P-CCNC: 24 U/L (ref 10–52)
ANION GAP SERPL CALC-SCNC: 12 MMOL/L (ref 10–20)
AST SERPL W P-5'-P-CCNC: 12 U/L (ref 9–39)
BASOPHILS # BLD AUTO: 0 X10*3/UL (ref 0–0.1)
BASOPHILS NFR BLD AUTO: 0 %
BILIRUB SERPL-MCNC: 0.6 MG/DL (ref 0–1.2)
BUN SERPL-MCNC: 32 MG/DL (ref 6–23)
CALCIUM SERPL-MCNC: 8.3 MG/DL (ref 8.6–10.6)
CELL POPULATIONS: NORMAL
CHLORIDE SERPL-SCNC: 103 MMOL/L (ref 98–107)
CO2 SERPL-SCNC: 27 MMOL/L (ref 21–32)
CREAT SERPL-MCNC: 0.53 MG/DL (ref 0.5–1.3)
DIAGNOSIS: NORMAL
EGFRCR SERPLBLD CKD-EPI 2021: >90 ML/MIN/1.73M*2
EOSINOPHIL # BLD AUTO: 0 X10*3/UL (ref 0–0.7)
EOSINOPHIL NFR BLD AUTO: 0 %
ERYTHROCYTE [DISTWIDTH] IN BLOOD BY AUTOMATED COUNT: 18.7 % (ref 11.5–14.5)
FLOW DIFFERENTIAL: NORMAL
FLOW TEST ORDERED: NORMAL
FLUID CELL COUNT: 1 /UL
GLUCOSE SERPL-MCNC: 150 MG/DL (ref 74–99)
HCT VFR BLD AUTO: 25.5 % (ref 41–52)
HGB BLD-MCNC: 7.8 G/DL (ref 13.5–17.5)
IMM GRANULOCYTES # BLD AUTO: 0.03 X10*3/UL (ref 0–0.7)
IMM GRANULOCYTES NFR BLD AUTO: 1.3 % (ref 0–0.9)
LAB TEST METHOD: NORMAL
LYMPHOCYTES # BLD AUTO: 0.17 X10*3/UL (ref 1.2–4.8)
LYMPHOCYTES NFR BLD AUTO: 7.3 %
MAGNESIUM SERPL-MCNC: 2.19 MG/DL (ref 1.6–2.4)
MCH RBC QN AUTO: 29.2 PG (ref 26–34)
MCHC RBC AUTO-ENTMCNC: 30.6 G/DL (ref 32–36)
MCV RBC AUTO: 96 FL (ref 80–100)
MONOCYTES # BLD AUTO: 0.07 X10*3/UL (ref 0.1–1)
MONOCYTES NFR BLD AUTO: 3 %
NEUTROPHILS # BLD AUTO: 2.05 X10*3/UL (ref 1.2–7.7)
NEUTROPHILS NFR BLD AUTO: 88.4 %
NRBC BLD-RTO: 0 /100 WBCS (ref 0–0)
NUMBER OF CELLS COLLECTED: NORMAL
PATH REPORT.TOTAL CANCER: NORMAL
PATH REVIEW-CELL CT,CSF: NORMAL
PHOSPHATE SERPL-MCNC: 3.7 MG/DL (ref 2.5–4.9)
PLATELET # BLD AUTO: 160 X10*3/UL (ref 150–450)
POTASSIUM SERPL-SCNC: 3.8 MMOL/L (ref 3.5–5.3)
PROT SERPL-MCNC: 4.2 G/DL (ref 6.4–8.2)
RBC # BLD AUTO: 2.67 X10*6/UL (ref 4.5–5.9)
SIGNATURE COMMENT: NORMAL
SODIUM SERPL-SCNC: 138 MMOL/L (ref 136–145)
SPECIMEN VIABILITY: NORMAL
WBC # BLD AUTO: 2.3 X10*3/UL (ref 4.4–11.3)

## 2025-05-27 PROCEDURE — 2500000001 HC RX 250 WO HCPCS SELF ADMINISTERED DRUGS (ALT 637 FOR MEDICARE OP): Performed by: NURSE PRACTITIONER

## 2025-05-27 PROCEDURE — 84100 ASSAY OF PHOSPHORUS: CPT | Performed by: NURSE PRACTITIONER

## 2025-05-27 PROCEDURE — 80053 COMPREHEN METABOLIC PANEL: CPT | Performed by: NURSE PRACTITIONER

## 2025-05-27 PROCEDURE — 85025 COMPLETE CBC W/AUTO DIFF WBC: CPT | Performed by: NURSE PRACTITIONER

## 2025-05-27 PROCEDURE — 83735 ASSAY OF MAGNESIUM: CPT | Performed by: NURSE PRACTITIONER

## 2025-05-27 PROCEDURE — RXMED WILLOW AMBULATORY MEDICATION CHARGE

## 2025-05-27 PROCEDURE — 2500000004 HC RX 250 GENERAL PHARMACY W/ HCPCS (ALT 636 FOR OP/ED): Performed by: STUDENT IN AN ORGANIZED HEALTH CARE EDUCATION/TRAINING PROGRAM

## 2025-05-27 RX ORDER — METOPROLOL TARTRATE 75 MG/1
75 TABLET ORAL 2 TIMES DAILY
Qty: 60 TABLET | Refills: 11 | Status: SHIPPED | OUTPATIENT
Start: 2025-05-27 | End: 2026-05-27

## 2025-05-27 RX ORDER — OXYCODONE HYDROCHLORIDE 5 MG/1
5 TABLET ORAL EVERY 6 HOURS PRN
Qty: 20 TABLET | Refills: 0 | Status: SHIPPED | OUTPATIENT
Start: 2025-05-27 | End: 2025-06-01

## 2025-05-27 RX ORDER — POLYETHYLENE GLYCOL 3350 17 G/17G
17 POWDER, FOR SOLUTION ORAL DAILY
Qty: 238 G | Refills: 0 | Status: SHIPPED | OUTPATIENT
Start: 2025-05-28

## 2025-05-27 RX ORDER — ATORVASTATIN CALCIUM 40 MG/1
40 TABLET, FILM COATED ORAL
Qty: 90 TABLET | Refills: 0 | Status: SHIPPED | OUTPATIENT
Start: 2025-05-27

## 2025-05-27 RX ORDER — PROCHLORPERAZINE MALEATE 10 MG
10 TABLET ORAL EVERY 6 HOURS PRN
Qty: 7 TABLET | Refills: 1 | Status: SHIPPED | OUTPATIENT
Start: 2025-05-27 | End: 2025-06-03

## 2025-05-27 RX ORDER — ONDANSETRON 8 MG/1
16 TABLET, FILM COATED ORAL ONCE
Status: COMPLETED | OUTPATIENT
Start: 2025-05-27 | End: 2025-05-27

## 2025-05-27 RX ORDER — ACYCLOVIR 200 MG/1
400 CAPSULE ORAL 2 TIMES DAILY
Qty: 120 CAPSULE | Refills: 0 | Status: SHIPPED | OUTPATIENT
Start: 2025-05-27 | End: 2025-06-26

## 2025-05-27 RX ADMIN — CYCLOPHOSPHAMIDE 1224 MG: 1 INJECTION, POWDER, FOR SOLUTION INTRAVENOUS; ORAL at 17:00

## 2025-05-27 RX ADMIN — ATORVASTATIN CALCIUM 40 MG: 40 TABLET, FILM COATED ORAL at 06:05

## 2025-05-27 RX ADMIN — METOPROLOL TARTRATE 75 MG: 50 TABLET, FILM COATED ORAL at 08:30

## 2025-05-27 RX ADMIN — ACYCLOVIR 400 MG: 200 CAPSULE ORAL at 08:30

## 2025-05-27 RX ADMIN — PREDNISONE 140 MG: 20 TABLET ORAL at 08:29

## 2025-05-27 RX ADMIN — OXYCODONE 5 MG: 5 TABLET ORAL at 16:21

## 2025-05-27 RX ADMIN — PANTOPRAZOLE SODIUM 40 MG: 40 TABLET, DELAYED RELEASE ORAL at 06:05

## 2025-05-27 RX ADMIN — ONDANSETRON HYDROCHLORIDE 16 MG: 8 TABLET, FILM COATED ORAL at 16:21

## 2025-05-27 ASSESSMENT — PAIN SCALES - GENERAL
PAINLEVEL_OUTOF10: 4
PAINLEVEL_OUTOF10: 5 - MODERATE PAIN
PAINLEVEL_OUTOF10: 0 - NO PAIN
PAINLEVEL_OUTOF10: 2
PAINLEVEL_OUTOF10: 0 - NO PAIN
PAINLEVEL_OUTOF10: 0-NO PAIN

## 2025-05-27 ASSESSMENT — PAIN DESCRIPTION - LOCATION: LOCATION: SHOULDER

## 2025-05-27 ASSESSMENT — PAIN - FUNCTIONAL ASSESSMENT: PAIN_FUNCTIONAL_ASSESSMENT: 0-10

## 2025-05-27 NOTE — DISCHARGE SUMMARY
Discharge Diagnosis  Burkitt lymphoma    Issues Requiring Follow-Up      Test Results Pending At Discharge  Pending Labs       Order Current Status    Flow Cytometry Test In process    Flow Cytometry Test In process    Non-gynecologic cytology In process    Pathologist Review-Cell Count,CSF In process            Hospital Course  Mr. Bijan Ibanez is a 64 yr old male with Burkitt's Lymphoma, being admitted for C6 DA EPOCH (starting 5/24) with IT on D1&5. PMHx htn, HLD, CAD, Afib, MI (stent 2010), renal cell carcinoma (R partial nephrectomy 2013), DVT (off Apixaban), BPH, GERD, arthritis.     Would like to discuss stopping Apixabn with Mike Cornelius before restarting now that treatment is complete    LP with IT chemotherapy on 5/23 FlOW pending at discharge     Hospital course unremarkable    Will go home on Acyclovir and Levofloxacin   PICC SoLO in place, anticipate removal in near future  Plan Neulasta/Rituxan on 5/30 with provider visits  Will need 2-3x/week count checks   Mike follow up 6/3   home care for recent falls    Patient seen examined and discussed with Dr. Francisco Mccullough    Pertinent Physical Exam At Time of Discharge  Physical Exam  Constitutional:       Appearance: He is normal weight. He is not toxic-appearing.   HENT:      Pharynx: Oropharynx is clear. No oropharyngeal exudate.   Eyes:      Conjunctiva/sclera: Conjunctivae normal.   Cardiovascular:      Rate and Rhythm: Normal rate and regular rhythm.      Pulses: Normal pulses.      Heart sounds: Normal heart sounds.   Pulmonary:      Effort: Pulmonary effort is normal.      Breath sounds: Normal breath sounds.   Abdominal:      General: Bowel sounds are normal.      Palpations: Abdomen is soft.      Tenderness: There is no abdominal tenderness.   Musculoskeletal:         General: Swelling (BLE, R>L, minimal) present.      Cervical back: Normal range of motion and neck supple. No rigidity.   Lymphadenopathy:      Cervical: No cervical adenopathy.    Skin:     General: Skin is warm and dry.      Comments: [facility] C/D/I   Neurological:      General: No focal deficit present.     Home Medications     Medication List      START taking these medications     polyethylene glycol 17 gram/dose powder; Commonly known as: Glycolax,   Miralax; Mix 17 g of powder as directed and drink once daily for 3 days.   Do not start before May 28, 2025.; Start taking on: May 28, 2025     CHANGE how you take these medications     prochlorperazine 10 mg tablet; Commonly known as: Compazine; Take 1   tablet (10 mg) by mouth every 6 hours if needed for nausea or vomiting for   up to 7 days.; What changed: how much to take     CONTINUE taking these medications     acyclovir 200 mg capsule; Commonly known as: Zovirax; Take 2 capsules   (400 mg) by mouth 2 times a day.   albuterol 90 mcg/actuation inhaler   atorvastatin 40 mg tablet; Commonly known as: Lipitor; Take 1 tablet (40   mg) by mouth early in the morning..   furosemide 20 mg tablet; Commonly known as: Lasix; Take 1 tablet (20 mg)   by mouth once daily as needed (For Lower extremity edema).   gabapentin 300 mg capsule; Commonly known as: Neurontin; Take 1 capsule   (300 mg) by mouth once daily at bedtime.   levoFLOXacin 500 mg tablet; Commonly known as: Levaquin; Take 1 tablet   (500 mg) by mouth once daily.   metoprolol tartrate 75 mg tablet; Commonly known as: Lopressor; Take 1   tablet (75 mg) by mouth 2 times a day.   oxyCODONE 5 mg immediate release tablet; Commonly known as: Roxicodone;   Take 1 tablet (5 mg) by mouth every 6 hours if needed for moderate pain (4   - 6) for up to 5 days.   pantoprazole 40 mg EC tablet; Commonly known as: ProtoNix; Take 1 tablet   (40 mg) by mouth once daily in the morning. Take before meals. Do not   crush, chew, or split.     ASK your doctor about these medications     apixaban 5 mg tablet; Commonly known as: Eliquis; Take 1 tablet (5 mg)   by mouth 2 times a day.       Outpatient  Follow-Up  Future Appointments   Date Time Provider Department Center   5/30/2025  8:00 AM [facility]F 06 [id number]   5/30/2025  9:30 AM [id number]   6/3/2025  2:00 PM Torey Mckeon DO [id number] [facility]   6/24/2025 10:15 AM Ata Conti DO [id number] [facility]       Whitney Vilchis, APRN-CNP

## 2025-05-27 NOTE — HH CARE COORDINATION
Home Care received a Referral for Physical Therapy and Occupational Therapy. We have processed the referral for a Start of Care on 5/29/25.     If you have any questions or concerns, please feel free to contact us at [phone]. Follow the prompts, enter your five digit zip code, and you will be directed to your care team on E[facility] 1.

## 2025-05-27 NOTE — HOSPITAL COURSE
Mr. Bijan Ibanez is a 64 yr old male with Burkitt's Lymphoma, being admitted for C6 DA EPOCH (starting 5/24) with IT on D1&5. PMHx htn, HLD, CAD, Afib, MI (stent 2010), renal cell carcinoma (R partial nephrectomy 2013), DVT (off Apixaban), BPH, GERD, arthritis.     Would like to discuss stopping Apixabn with Mike Cornelius before restarting now that treatment is complete    LP with IT chemotherapy on 5/23 FlOW pending at discharge     Hospital course unremarkable    Will go home on Acyclovir   PICC SoLO in place, anticipate removal in near future  Plan Neulasta/Rituxan on 5/30 with provider visits  Will need 2-3x/week count checks   Mike follow up 6/3   home care for recent falls    Patient seen examined and discussed with Dr. Francisco Mccullough

## 2025-05-27 NOTE — CARE PLAN
Problem: Pain - Adult  Goal: Verbalizes/displays adequate comfort level or baseline comfort level  Outcome: Progressing     Problem: Safety - Adult  Goal: Free from fall injury  Outcome: Progressing     Problem: Discharge Planning  Goal: Discharge to home or other facility with appropriate resources  Outcome: Progressing     Problem: Chronic Conditions and Co-morbidities  Goal: Patient's chronic conditions and co-morbidity symptoms are monitored and maintained or improved  Outcome: Progressing     Problem: Nutrition  Goal: Nutrient intake appropriate for maintaining nutritional needs  Outcome: Progressing   The patient's goals for the shift include      The clinical goals for the shift include Patient  will be hemodynamically stable

## 2025-05-27 NOTE — NURSING NOTE
Nursing Note (Chemotherapy Note)  Patient continue to receive dose #4/4  ( Doxorubicin 20.4 MG , Etoposide 76 MG and Vincristine  0.82 MG in 561. 82 ML  0.9 % NaCl via purple lumen lumen of left upper arm PICC line over 24 hours. Doses , medications and diluent checked and verified with MD order at 0815. Positive blood return obtained via syringe aspiration from purple lumen  of left upper arm PICC line at 0815. Patient denies any nausea and vomiting.

## 2025-05-28 LAB
LABORATORY COMMENT REPORT: NORMAL
LABORATORY COMMENT REPORT: NORMAL
PATH REPORT.FINAL DX SPEC: NORMAL
PATH REPORT.GROSS SPEC: NORMAL
PATH REPORT.RELEVANT HX SPEC: NORMAL
PATH REPORT.TOTAL CANCER: NORMAL
RESIDENT REVIEW: NORMAL

## 2025-05-29 ENCOUNTER — APPOINTMENT (OUTPATIENT)
Dept: HEMATOLOGY/ONCOLOGY | Facility: HOSPITAL | Age: 65
End: 2025-05-29
Payer: MEDICARE

## 2025-05-29 ENCOUNTER — TELEPHONE (OUTPATIENT)
Dept: HOME HEALTH SERVICES | Facility: HOME HEALTH | Age: 65
End: 2025-05-29
Payer: MEDICARE

## 2025-05-30 ENCOUNTER — OFFICE VISIT (OUTPATIENT)
Dept: HEMATOLOGY/ONCOLOGY | Facility: HOSPITAL | Age: 65
End: 2025-05-30
Payer: MEDICARE

## 2025-05-30 ENCOUNTER — LAB (OUTPATIENT)
Dept: HEMATOLOGY/ONCOLOGY | Facility: HOSPITAL | Age: 65
End: 2025-05-30
Payer: MEDICARE

## 2025-05-30 VITALS
BODY MASS INDEX: 25.71 KG/M2 | SYSTOLIC BLOOD PRESSURE: 98 MMHG | OXYGEN SATURATION: 100 % | HEIGHT: 72 IN | DIASTOLIC BLOOD PRESSURE: 57 MMHG | RESPIRATION RATE: 18 BRPM | HEART RATE: 77 BPM | WEIGHT: 189.82 LBS | TEMPERATURE: 97 F

## 2025-05-30 DIAGNOSIS — C83.70 BURKITT LYMPHOMA: ICD-10-CM

## 2025-05-30 DIAGNOSIS — C83.30 DIFFUSE LARGE B-CELL LYMPHOMA, UNSPECIFIED BODY REGION (MULTI): ICD-10-CM

## 2025-05-30 DIAGNOSIS — C83.30 DIFFUSE LARGE B-CELL LYMPHOMA, UNSPECIFIED BODY REGION (MULTI): Primary | ICD-10-CM

## 2025-05-30 DIAGNOSIS — C83.78 BURKITT'S LYMPHOMA OF LYMPH NODES OF MULTIPLE REGIONS (MULTI): ICD-10-CM

## 2025-05-30 LAB
ABO GROUP (TYPE) IN BLOOD: NORMAL
ALBUMIN SERPL BCP-MCNC: 2.9 G/DL (ref 3.4–5)
ALP SERPL-CCNC: 36 U/L (ref 33–136)
ALT SERPL W P-5'-P-CCNC: 18 U/L (ref 10–52)
ANION GAP SERPL CALC-SCNC: 9 MMOL/L (ref 10–20)
ANTIBODY SCREEN: NORMAL
AST SERPL W P-5'-P-CCNC: 8 U/L (ref 9–39)
BASOPHILS # BLD AUTO: 0.01 X10*3/UL (ref 0–0.1)
BASOPHILS NFR BLD AUTO: 0.2 %
BILIRUB SERPL-MCNC: 0.7 MG/DL (ref 0–1.2)
BUN SERPL-MCNC: 24 MG/DL (ref 6–23)
BURR CELLS BLD QL SMEAR: NORMAL
CALCIUM SERPL-MCNC: 8 MG/DL (ref 8.6–10.3)
CHLORIDE SERPL-SCNC: 104 MMOL/L (ref 98–107)
CO2 SERPL-SCNC: 30 MMOL/L (ref 21–32)
CREAT SERPL-MCNC: 0.34 MG/DL (ref 0.5–1.3)
DACRYOCYTES BLD QL SMEAR: NORMAL
EGFRCR SERPLBLD CKD-EPI 2021: >90 ML/MIN/1.73M*2
EOSINOPHIL # BLD AUTO: 0.03 X10*3/UL (ref 0–0.7)
EOSINOPHIL NFR BLD AUTO: 0.7 %
ERYTHROCYTE [DISTWIDTH] IN BLOOD BY AUTOMATED COUNT: 17.7 % (ref 11.5–14.5)
FLUAV RNA RESP QL NAA+PROBE: NOT DETECTED
FLUBV RNA RESP QL NAA+PROBE: NOT DETECTED
GLUCOSE SERPL-MCNC: 101 MG/DL (ref 74–99)
HADV DNA SPEC QL NAA+PROBE: NOT DETECTED
HCT VFR BLD AUTO: 23.3 % (ref 41–52)
HGB BLD-MCNC: 7.3 G/DL (ref 13.5–17.5)
HMPV RNA SPEC QL NAA+PROBE: NOT DETECTED
IMM GRANULOCYTES # BLD AUTO: 0.07 X10*3/UL (ref 0–0.7)
IMM GRANULOCYTES NFR BLD AUTO: 1.6 % (ref 0–0.9)
LDH SERPL L TO P-CCNC: 115 U/L (ref 84–246)
LYMPHOCYTES # BLD AUTO: 0.24 X10*3/UL (ref 1.2–4.8)
LYMPHOCYTES NFR BLD AUTO: 5.4 %
MCH RBC QN AUTO: 29.1 PG (ref 26–34)
MCHC RBC AUTO-ENTMCNC: 31.3 G/DL (ref 32–36)
MCV RBC AUTO: 93 FL (ref 80–100)
MONOCYTES # BLD AUTO: 0.02 X10*3/UL (ref 0.1–1)
MONOCYTES NFR BLD AUTO: 0.4 %
NEUTROPHILS # BLD AUTO: 4.09 X10*3/UL (ref 1.2–7.7)
NEUTROPHILS NFR BLD AUTO: 91.7 %
NEUTS HYPERSEG BLD QL SMEAR: PRESENT
NRBC BLD-RTO: 0 /100 WBCS (ref 0–0)
OVALOCYTES BLD QL SMEAR: NORMAL
PLATELET # BLD AUTO: 100 X10*3/UL (ref 150–450)
POTASSIUM SERPL-SCNC: 3.8 MMOL/L (ref 3.5–5.3)
PROT SERPL-MCNC: 4.1 G/DL (ref 6.4–8.2)
RBC # BLD AUTO: 2.51 X10*6/UL (ref 4.5–5.9)
RBC MORPH BLD: NORMAL
RH FACTOR (ANTIGEN D): NORMAL
RHINOVIRUS RNA UPPER RESP QL NAA+PROBE: NOT DETECTED
RSV RNA RESP QL NAA+PROBE: NOT DETECTED
SARS-COV-2 RNA RESP QL NAA+PROBE: NOT DETECTED
SCHISTOCYTES BLD QL SMEAR: NORMAL
SODIUM SERPL-SCNC: 139 MMOL/L (ref 136–145)
WBC # BLD AUTO: 4.5 X10*3/UL (ref 4.4–11.3)

## 2025-05-30 PROCEDURE — 85025 COMPLETE CBC W/AUTO DIFF WBC: CPT

## 2025-05-30 PROCEDURE — 1111F DSCHRG MED/CURRENT MED MERGE: CPT | Performed by: NURSE PRACTITIONER

## 2025-05-30 PROCEDURE — 2500000004 HC RX 250 GENERAL PHARMACY W/ HCPCS (ALT 636 FOR OP/ED): Mod: JZ,TB | Performed by: STUDENT IN AN ORGANIZED HEALTH CARE EDUCATION/TRAINING PROGRAM

## 2025-05-30 PROCEDURE — 36415 COLL VENOUS BLD VENIPUNCTURE: CPT

## 2025-05-30 PROCEDURE — 87631 RESP VIRUS 3-5 TARGETS: CPT

## 2025-05-30 PROCEDURE — 80053 COMPREHEN METABOLIC PANEL: CPT

## 2025-05-30 PROCEDURE — 99215 OFFICE O/P EST HI 40 MIN: CPT | Performed by: NURSE PRACTITIONER

## 2025-05-30 PROCEDURE — 96360 HYDRATION IV INFUSION INIT: CPT | Mod: INF

## 2025-05-30 PROCEDURE — 87637 SARSCOV2&INF A&B&RSV AMP PRB: CPT

## 2025-05-30 PROCEDURE — 99215 OFFICE O/P EST HI 40 MIN: CPT | Mod: 25 | Performed by: NURSE PRACTITIONER

## 2025-05-30 PROCEDURE — 86901 BLOOD TYPING SEROLOGIC RH(D): CPT

## 2025-05-30 PROCEDURE — 96372 THER/PROPH/DIAG INJ SC/IM: CPT

## 2025-05-30 PROCEDURE — 87486 CHLMYD PNEUM DNA AMP PROBE: CPT

## 2025-05-30 PROCEDURE — 83615 LACTATE (LD) (LDH) ENZYME: CPT

## 2025-05-30 PROCEDURE — 2500000004 HC RX 250 GENERAL PHARMACY W/ HCPCS (ALT 636 FOR OP/ED): Performed by: NURSE PRACTITIONER

## 2025-05-30 RX ORDER — EPINEPHRINE 0.3 MG/.3ML
0.3 INJECTION SUBCUTANEOUS EVERY 5 MIN PRN
OUTPATIENT
Start: 2025-05-30

## 2025-05-30 RX ORDER — ACETAMINOPHEN 325 MG/1
650 TABLET ORAL ONCE
OUTPATIENT
Start: 2025-06-03

## 2025-05-30 RX ORDER — FAMOTIDINE 10 MG/ML
20 INJECTION, SOLUTION INTRAVENOUS ONCE AS NEEDED
OUTPATIENT
Start: 2025-05-30

## 2025-05-30 RX ORDER — EPINEPHRINE 0.3 MG/.3ML
0.3 INJECTION SUBCUTANEOUS EVERY 5 MIN PRN
OUTPATIENT
Start: 2025-06-03

## 2025-05-30 RX ORDER — ACETAMINOPHEN 325 MG/1
650 TABLET ORAL ONCE
Status: DISCONTINUED | OUTPATIENT
Start: 2025-05-30 | End: 2025-05-30 | Stop reason: HOSPADM

## 2025-05-30 RX ORDER — PROCHLORPERAZINE EDISYLATE 5 MG/ML
10 INJECTION INTRAMUSCULAR; INTRAVENOUS EVERY 6 HOURS PRN
OUTPATIENT
Start: 2025-06-03

## 2025-05-30 RX ORDER — DIPHENHYDRAMINE HYDROCHLORIDE 50 MG/ML
50 INJECTION, SOLUTION INTRAMUSCULAR; INTRAVENOUS AS NEEDED
OUTPATIENT
Start: 2025-06-03

## 2025-05-30 RX ORDER — DIPHENHYDRAMINE HYDROCHLORIDE 50 MG/ML
50 INJECTION, SOLUTION INTRAMUSCULAR; INTRAVENOUS AS NEEDED
OUTPATIENT
Start: 2025-05-30

## 2025-05-30 RX ORDER — DIPHENHYDRAMINE HCL 50 MG
50 CAPSULE ORAL ONCE
OUTPATIENT
Start: 2025-06-03

## 2025-05-30 RX ORDER — ALBUTEROL SULFATE 0.83 MG/ML
3 SOLUTION RESPIRATORY (INHALATION) AS NEEDED
OUTPATIENT
Start: 2025-05-30

## 2025-05-30 RX ORDER — HEPARIN 100 UNIT/ML
500 SYRINGE INTRAVENOUS AS NEEDED
OUTPATIENT
Start: 2025-05-30

## 2025-05-30 RX ORDER — DIPHENHYDRAMINE HCL 50 MG
50 CAPSULE ORAL ONCE
Status: DISCONTINUED | OUTPATIENT
Start: 2025-05-30 | End: 2025-05-30 | Stop reason: HOSPADM

## 2025-05-30 RX ORDER — PROCHLORPERAZINE MALEATE 10 MG
10 TABLET ORAL EVERY 6 HOURS PRN
Status: DISCONTINUED | OUTPATIENT
Start: 2025-05-30 | End: 2025-05-30 | Stop reason: HOSPADM

## 2025-05-30 RX ORDER — HEPARIN SODIUM,PORCINE/PF 10 UNIT/ML
50 SYRINGE (ML) INTRAVENOUS AS NEEDED
OUTPATIENT
Start: 2025-05-30

## 2025-05-30 RX ORDER — HEPARIN SODIUM 1000 [USP'U]/ML
2000 INJECTION, SOLUTION INTRAVENOUS; SUBCUTANEOUS AS NEEDED
OUTPATIENT
Start: 2025-05-30

## 2025-05-30 RX ORDER — LIDOCAINE HYDROCHLORIDE 20 MG/ML
1.25 SOLUTION OROPHARYNGEAL AS NEEDED
Qty: 250 ML | Refills: 0 | Status: SHIPPED | OUTPATIENT
Start: 2025-05-30 | End: 2025-06-09

## 2025-05-30 RX ORDER — LEVOFLOXACIN 500 MG/1
500 TABLET, FILM COATED ORAL DAILY
Qty: 30 TABLET | Refills: 0 | Status: SHIPPED | OUTPATIENT
Start: 2025-05-30 | End: 2025-06-29

## 2025-05-30 RX ORDER — PROCHLORPERAZINE EDISYLATE 5 MG/ML
10 INJECTION INTRAMUSCULAR; INTRAVENOUS EVERY 6 HOURS PRN
Status: DISCONTINUED | OUTPATIENT
Start: 2025-05-30 | End: 2025-05-30 | Stop reason: HOSPADM

## 2025-05-30 RX ORDER — FAMOTIDINE 10 MG/ML
20 INJECTION, SOLUTION INTRAVENOUS ONCE AS NEEDED
OUTPATIENT
Start: 2025-06-03

## 2025-05-30 RX ORDER — ALBUTEROL SULFATE 0.83 MG/ML
3 SOLUTION RESPIRATORY (INHALATION) AS NEEDED
OUTPATIENT
Start: 2025-06-03

## 2025-05-30 RX ORDER — PROCHLORPERAZINE MALEATE 10 MG
10 TABLET ORAL EVERY 6 HOURS PRN
OUTPATIENT
Start: 2025-06-03

## 2025-05-30 RX ADMIN — PEGFILGRASTIM 6 MG: 6 INJECTION SUBCUTANEOUS at 10:15

## 2025-05-30 RX ADMIN — SODIUM CHLORIDE 1000 ML: 0.9 INJECTION, SOLUTION INTRAVENOUS at 10:36

## 2025-05-30 ASSESSMENT — ENCOUNTER SYMPTOMS
DIZZINESS: 0
NAUSEA: 0
EYES NEGATIVE: 1
BRUISES/BLEEDS EASILY: 1
LEG SWELLING: 0
DIAPHORESIS: 0
CHILLS: 0
HEADACHES: 0
COUGH: 0
EXTREMITY WEAKNESS: 1
PALPITATIONS: 0
CHEST TIGHTNESS: 0
UNEXPECTED WEIGHT CHANGE: 0
PSYCHIATRIC NEGATIVE: 1
NUMBNESS: 1
HEMATURIA: 0
FEVER: 0
SHORTNESS OF BREATH: 0
DIARRHEA: 0
ADENOPATHY: 0
FATIGUE: 0
APPETITE CHANGE: 0
SORE THROAT: 1
BLOOD IN STOOL: 0
LIGHT-HEADEDNESS: 0
VOMITING: 0
CONSTIPATION: 0
ABDOMINAL PAIN: 0

## 2025-05-30 NOTE — PROGRESS NOTES
Patient ID: Bijan Ibanez is a 64 y.o. male.  Referring Physician: No referring provider defined for this encounter.  Primary Care Provider: Nehal Murphy MD    Date of Service:  5/30/2025    Oncologic History    Diagnosis: Burkitt's Lymphoma  Date of Diagnosis: 1/16/25  Stage at Diagnosis: IV  Risk category: high risk (marrow involvement)     Prior Treatments:     Current Treaments:  DA-R-EPOCH s/p 5 cycles  Cycle 6 planned for 5/24/2025    SUBJECTIVE:  [facility]. [facility]banez [facility] for C6D6 Rituximab + Neulasta + discharge follow up in the [facility]. He was admitted for C6 DA-R-EPOCH 5/23-5/27 with unremarkable hospital course.     Today he reports sore throat. No fevers, chills, or other respiratory symptoms. No visible mouth sores or s/sx thrush. He is concerned because last time he had a sore throat and received Rituximab he was admitted with fever. He would prefer to receive Rituximab next week if possible.    He recently went to the ER on 5/18 following a fall at home where he hit his head. CT Head was negative & labs were stable. Eliquis was held d/t thrombocytopenia. He has not fallen since then.         Review of Systems   Constitutional:  Negative for appetite change, chills, diaphoresis, fatigue, fever and unexpected weight change.   HENT:   Positive for sore throat.    Eyes: Negative.    Respiratory:  Negative for chest tightness, cough and shortness of breath.    Cardiovascular:  Negative for chest pain, leg swelling and palpitations.   Gastrointestinal:  Negative for abdominal pain, blood in stool, constipation, diarrhea, nausea and vomiting.   Genitourinary:  Negative for hematuria.    Skin: Negative.    Neurological:  Positive for extremity weakness and numbness. Negative for dizziness, headaches and light-headedness.   Hematological:  Negative for adenopathy. Bruises/bleeds easily (bruising).   Psychiatric/Behavioral: Negative.         Oncology  History   Burkitt's lymphoma of lymph nodes of multiple regions (Multi)   1/13/2025 Initial Diagnosis    Diagnosis: Burkitt Lymphoma, [place] Stage IV, BL-IPI High-Risk (score 4/5).    BL-IPI Calculation:  Age >60 years: Yes (64 years old).  Performance status (ECOG >=2): Presumed based on clinical presentation requiring hospitalization.  Serum LDH >ULN: 4102 U/L (1/11/25)  [place] Stage III/IV: Yes (stage IV with extranodal involvement including kidneys, liver, spleen, and musculature).  CNS involvement: No (MRI and LP negative).  Total Score: 4/5 (High-Risk).    Presenting Symptoms: Asymmetric swelling of the right-sided muscles of mastication; imaging revealed incidental findings of perihilar mass and renal lesions.    Labs at Diagnosis: WBC 4.0, platelets 72; LDH 4102 U/L (1/11/25)    Pathology:  EBUS with lymph node biopsy (1/13/25): Predominantly CD20-positive small lymphoid cells, raising suspicion for a B-cell lymphoproliferative process.  Bone marrow biopsy (1/16/25): 70-80% involvement by high-grade B-cell lymphoma in a hypercellular marrow (90% cellular) with maturing trilineage hematopoiesis. FISH confirmed t(8;14)/IGH::MYC rearrangement, diagnostic of Burkitt lymphoma.     Imaging:  CT Neck (1/9/25): Fusiform thickening of right masticatory muscles, likely benign hypertrophy.  CT C/A/P (1/11/25): Left perihilar mass, pulmonary nodules, right renal lesions, and right adrenal gland thickening concerning for metastatic disease; T12-L1 soft tissue mass.  PET-CT (1/20): Widespread hermann and extranodal hypermetabolic involvement, including kidneys, liver, spleen, abdominal wall, and musculature, suggestive of extensive lymphomatous disease.  MRI Brain (1/21): No intracranial lymphoma; suspected osseous involvement of calvarium.    Treatment:  Dexamethasone 40 mg daily (1/20-1/21).  Prophylactic IT methotrexate via LP (1/22), flow negative for lymphoma.     1/21/2025 -  Chemotherapy    - C1 (1/21/25)  - C2  (2/14/25) -- etoposide dose-reduced by 25%  - C3 (3/7/25) -- etoposide dose-reduced by 10%, doxorubicin increased by 20%  - C4 (3/28/25) -- same as C3  (INPT) Dose-Adjusted R-EPOCH (Etoposide / DOXOrubicin / VinCRIStine / Cyclophosphamide / PredniSONE) + RiTUXimab, 21 Day Cycles      2/24/2025 Imaging    PET/CT (2/24/25, after C2): near-complete resolution of previously active disease in lymph nodes and various organs, Deauville score of 2          OBJECTIVE:  KPS: Karnofsky Score: 70 - Cares for self; unable to carry on normal activity or do normal work    VS:  There were no vitals taken for this visit.  BSA: There is no height or weight on file to calculate BSA.    Physical Exam  Constitutional:       Appearance: Normal appearance.   HENT:      Head: Normocephalic.      Nose: Nose normal.      Mouth/Throat:      Mouth: Mucous membranes are moist.      Pharynx: Oropharynx is clear.   Eyes:      Extraocular Movements: Extraocular movements intact.      Conjunctiva/sclera: Conjunctivae normal.      Pupils: Pupils are equal, round, and reactive to light.   Cardiovascular:      Rate and Rhythm: Normal rate and regular rhythm.      Pulses: Normal pulses.      Heart sounds: Normal heart sounds.   Pulmonary:      Effort: Pulmonary effort is normal.      Breath sounds: Normal breath sounds.   Abdominal:      General: Abdomen is flat. Bowel sounds are normal.      Palpations: Abdomen is soft.   Musculoskeletal:         General: Normal range of motion.      Cervical back: Normal range of motion.   Skin:     General: Skin is warm and dry.      Capillary Refill: Capillary refill takes less than 2 seconds.   Neurological:      General: No focal deficit present.      Mental Status: He is alert and oriented to person, place, and time. Mental status is at baseline.   Psychiatric:         Mood and Affect: Mood normal.       Assessment & Plan    5/30/25: Post Hospital Discharge Visit following C6 DA-R-EPOCH. Previously cycles  complicated by Grade 3 Mucositis. Sore throat today but no visible mucositis. Received Neulasta. Will defer Rituximab to Tuesday 6/3. Respiratory Viral Panel pending. Prescribed viscous lidocaine for pain. Continue to hold eliquis in anticipation of thrombocytopenia, platelets 100 today.      ID PPX:  Continue acyclovir and levofloxacin.       Heme   -H/O R subclavian and R axillary DVT r/t PICC (2/3/25; on Eliquis)    -Duplex BUE to r/o DVT (4/10) - Compared with study from 2/3/2025, Acute DVT in left subclavian vein and left axillary vein.    - Duplex BLE (4/14)- negative   - CTPE  (4/11) - Negative   -Eliquis on hold since 4/29 for IT Methotrexate in hospital, continue 2x LP w/ IT until EOT  -Holding eliquis due to thrombocytopenia.     MISC  #Recent ER Visit for Fall 5/18 - CT Head negative      CARDS  -Echo  2/3/25 shows EF60-65%  #HTN  #Afib  -c/w home Metop 75 mg BID  -holding eliquis due to thrombocytopenia    Sore throat (5/30):  New sore throat without any visible mouth sores or thrush. Prescribed viscous lidocaine given history of mucositis.  1L NS given. RVP pending.    # CNS prophylaxis  - 2 LP's with C5. 5/3 CSF negative flow cytometry. 5/7 CSF negative flow cytometry.     # G1 CIPN  - monitor     # MRSA bacteremia  - s/p 4 weeks of IV Vanco, EOT 3/3/25        Results from last 7 days   Lab Units 05/30/25  0848   WBC AUTO x10*3/uL 4.5   HEMOGLOBIN g/dL 7.3*   HEMATOCRIT % 23.3*   PLATELETS AUTO x10*3/uL 100*   NEUTROS PCT AUTO % 91.7   LYMPHS PCT AUTO % 5.4   MONOS PCT AUTO % 0.4   EOS PCT AUTO % 0.7       Results from last 7 days   Lab Units 05/30/25  0848   SODIUM mmol/L 139   POTASSIUM mmol/L 3.8   CHLORIDE mmol/L 104   CO2 mmol/L 30   BUN mg/dL 24*   CREATININE mg/dL 0.34*   CALCIUM mg/dL 8.0*   PROTEIN TOTAL g/dL 4.1*   BILIRUBIN TOTAL mg/dL 0.7   ALK PHOS U/L 36   ALT U/L 18   AST U/L 8*   GLUCOSE mg/dL 101*     Results from last 7 days   Lab Units 05/27/25  0246   MAGNESIUM mg/dL 2.19       RTC:     Count Checks twice weekly Tues/Fri  Dr. Mckoen + Ritux 6/3    Elvira العلي APRN-CNP  [facility]

## 2025-05-30 NOTE — PROGRESS NOTES
Patient arrived to infusion with complaint of sore throat. He refused his rituxin stating that he thought the rituxin would escalate his sore throat into pneumonia and an admission.  Immunotherapy rescheduled to Tuesday per Elvira and patient was swabbed for cultures.  Elvira reordered the rituxin per pharmacy.  Patient rec'd 1L NS per order with no issues.  He was discharged in stable condition.

## 2025-05-31 LAB
CHLAMYDIA.PNEUMONIAE PCR, VIRC: NOT DETECTED
MYCOPLASMA.PNEUMONIAE PCR, VIRC: NOT DETECTED

## 2025-06-03 ENCOUNTER — OFFICE VISIT (OUTPATIENT)
Dept: HEMATOLOGY/ONCOLOGY | Facility: HOSPITAL | Age: 65
End: 2025-06-03
Payer: MEDICARE

## 2025-06-03 ENCOUNTER — LAB (OUTPATIENT)
Dept: HEMATOLOGY/ONCOLOGY | Facility: HOSPITAL | Age: 65
End: 2025-06-03
Payer: MEDICARE

## 2025-06-03 VITALS
SYSTOLIC BLOOD PRESSURE: 106 MMHG | OXYGEN SATURATION: 100 % | HEART RATE: 99 BPM | TEMPERATURE: 98.4 F | DIASTOLIC BLOOD PRESSURE: 73 MMHG | RESPIRATION RATE: 18 BRPM

## 2025-06-03 VITALS
TEMPERATURE: 97.3 F | OXYGEN SATURATION: 100 % | BODY MASS INDEX: 25.14 KG/M2 | HEART RATE: 95 BPM | SYSTOLIC BLOOD PRESSURE: 100 MMHG | RESPIRATION RATE: 18 BRPM | WEIGHT: 184.4 LBS | DIASTOLIC BLOOD PRESSURE: 57 MMHG

## 2025-06-03 DIAGNOSIS — C83.78 BURKITT'S LYMPHOMA OF LYMPH NODES OF MULTIPLE REGIONS (MULTI): Primary | ICD-10-CM

## 2025-06-03 DIAGNOSIS — I48.91 NEW ONSET A-FIB (MULTI): ICD-10-CM

## 2025-06-03 DIAGNOSIS — C83.78 BURKITT'S LYMPHOMA OF LYMPH NODES OF MULTIPLE REGIONS (MULTI): ICD-10-CM

## 2025-06-03 DIAGNOSIS — C83.30 DIFFUSE LARGE B-CELL LYMPHOMA, UNSPECIFIED BODY REGION (MULTI): ICD-10-CM

## 2025-06-03 DIAGNOSIS — R78.81 BACTEREMIA DUE TO METHICILLIN RESISTANT STAPHYLOCOCCUS AUREUS: ICD-10-CM

## 2025-06-03 DIAGNOSIS — B95.62 BACTEREMIA DUE TO METHICILLIN RESISTANT STAPHYLOCOCCUS AUREUS: ICD-10-CM

## 2025-06-03 LAB
ABO GROUP (TYPE) IN BLOOD: NORMAL
ALBUMIN SERPL BCP-MCNC: 3.2 G/DL (ref 3.4–5)
ALP SERPL-CCNC: 41 U/L (ref 33–136)
ALT SERPL W P-5'-P-CCNC: 14 U/L (ref 10–52)
ANION GAP SERPL CALC-SCNC: 12 MMOL/L (ref 10–20)
ANTIBODY SCREEN: NORMAL
AST SERPL W P-5'-P-CCNC: 6 U/L (ref 9–39)
BASOPHILS # BLD AUTO: 0 X10*3/UL (ref 0–0.1)
BASOPHILS NFR BLD AUTO: 0 %
BILIRUB SERPL-MCNC: 1.1 MG/DL (ref 0–1.2)
BUN SERPL-MCNC: 23 MG/DL (ref 6–23)
CALCIUM SERPL-MCNC: 8.2 MG/DL (ref 8.6–10.3)
CHLORIDE SERPL-SCNC: 103 MMOL/L (ref 98–107)
CO2 SERPL-SCNC: 26 MMOL/L (ref 21–32)
CREAT SERPL-MCNC: 0.5 MG/DL (ref 0.5–1.3)
DACRYOCYTES BLD QL SMEAR: NORMAL
EGFRCR SERPLBLD CKD-EPI 2021: >90 ML/MIN/1.73M*2
EOSINOPHIL # BLD AUTO: 0.02 X10*3/UL (ref 0–0.7)
EOSINOPHIL NFR BLD AUTO: 4.3 %
ERYTHROCYTE [DISTWIDTH] IN BLOOD BY AUTOMATED COUNT: 16.7 % (ref 11.5–14.5)
GLUCOSE SERPL-MCNC: 86 MG/DL (ref 74–99)
HCT VFR BLD AUTO: 20.8 % (ref 41–52)
HGB BLD-MCNC: 6.5 G/DL (ref 13.5–17.5)
IMM GRANULOCYTES # BLD AUTO: 0 X10*3/UL (ref 0–0.7)
IMM GRANULOCYTES NFR BLD AUTO: 0 % (ref 0–0.9)
LDH SERPL L TO P-CCNC: 98 U/L (ref 84–246)
LYMPHOCYTES # BLD AUTO: 0.25 X10*3/UL (ref 1.2–4.8)
LYMPHOCYTES NFR BLD AUTO: 53.2 %
MCH RBC QN AUTO: 28.8 PG (ref 26–34)
MCHC RBC AUTO-ENTMCNC: 31.3 G/DL (ref 32–36)
MCV RBC AUTO: 92 FL (ref 80–100)
MONOCYTES # BLD AUTO: 0.09 X10*3/UL (ref 0.1–1)
MONOCYTES NFR BLD AUTO: 19.1 %
NEUTROPHILS # BLD AUTO: 0.11 X10*3/UL (ref 1.2–7.7)
NEUTROPHILS NFR BLD AUTO: 23.4 %
NRBC BLD-RTO: 10.6 /100 WBCS (ref 0–0)
OVALOCYTES BLD QL SMEAR: NORMAL
PLATELET # BLD AUTO: 29 X10*3/UL (ref 150–450)
POLYCHROMASIA BLD QL SMEAR: NORMAL
POTASSIUM SERPL-SCNC: 3.6 MMOL/L (ref 3.5–5.3)
PROT SERPL-MCNC: 4.8 G/DL (ref 6.4–8.2)
RBC # BLD AUTO: 2.26 X10*6/UL (ref 4.5–5.9)
RBC MORPH BLD: NORMAL
RH FACTOR (ANTIGEN D): NORMAL
SCHISTOCYTES BLD QL SMEAR: NORMAL
SODIUM SERPL-SCNC: 137 MMOL/L (ref 136–145)
WBC # BLD AUTO: 0.5 X10*3/UL (ref 4.4–11.3)

## 2025-06-03 PROCEDURE — 96415 CHEMO IV INFUSION ADDL HR: CPT

## 2025-06-03 PROCEDURE — 2500000004 HC RX 250 GENERAL PHARMACY W/ HCPCS (ALT 636 FOR OP/ED): Performed by: STUDENT IN AN ORGANIZED HEALTH CARE EDUCATION/TRAINING PROGRAM

## 2025-06-03 PROCEDURE — 85025 COMPLETE CBC W/AUTO DIFF WBC: CPT

## 2025-06-03 PROCEDURE — 36415 COLL VENOUS BLD VENIPUNCTURE: CPT

## 2025-06-03 PROCEDURE — 1126F AMNT PAIN NOTED NONE PRSNT: CPT | Performed by: STUDENT IN AN ORGANIZED HEALTH CARE EDUCATION/TRAINING PROGRAM

## 2025-06-03 PROCEDURE — 96413 CHEMO IV INFUSION 1 HR: CPT

## 2025-06-03 PROCEDURE — 1111F DSCHRG MED/CURRENT MED MERGE: CPT | Performed by: STUDENT IN AN ORGANIZED HEALTH CARE EDUCATION/TRAINING PROGRAM

## 2025-06-03 PROCEDURE — G2211 COMPLEX E/M VISIT ADD ON: HCPCS | Performed by: STUDENT IN AN ORGANIZED HEALTH CARE EDUCATION/TRAINING PROGRAM

## 2025-06-03 PROCEDURE — 80053 COMPREHEN METABOLIC PANEL: CPT

## 2025-06-03 PROCEDURE — 83615 LACTATE (LD) (LDH) ENZYME: CPT

## 2025-06-03 PROCEDURE — 2500000001 HC RX 250 WO HCPCS SELF ADMINISTERED DRUGS (ALT 637 FOR MEDICARE OP): Performed by: STUDENT IN AN ORGANIZED HEALTH CARE EDUCATION/TRAINING PROGRAM

## 2025-06-03 PROCEDURE — 86901 BLOOD TYPING SEROLOGIC RH(D): CPT

## 2025-06-03 PROCEDURE — 99215 OFFICE O/P EST HI 40 MIN: CPT | Performed by: STUDENT IN AN ORGANIZED HEALTH CARE EDUCATION/TRAINING PROGRAM

## 2025-06-03 PROCEDURE — 3074F SYST BP LT 130 MM HG: CPT | Performed by: STUDENT IN AN ORGANIZED HEALTH CARE EDUCATION/TRAINING PROGRAM

## 2025-06-03 PROCEDURE — 86923 COMPATIBILITY TEST ELECTRIC: CPT

## 2025-06-03 PROCEDURE — 3078F DIAST BP <80 MM HG: CPT | Performed by: STUDENT IN AN ORGANIZED HEALTH CARE EDUCATION/TRAINING PROGRAM

## 2025-06-03 RX ORDER — EPINEPHRINE 0.3 MG/.3ML
0.3 INJECTION SUBCUTANEOUS EVERY 5 MIN PRN
Status: DISCONTINUED | OUTPATIENT
Start: 2025-06-03 | End: 2025-06-03 | Stop reason: HOSPADM

## 2025-06-03 RX ORDER — ALBUTEROL SULFATE 0.83 MG/ML
3 SOLUTION RESPIRATORY (INHALATION) AS NEEDED
Status: CANCELLED | OUTPATIENT
Start: 2025-06-03

## 2025-06-03 RX ORDER — ACETAMINOPHEN 325 MG/1
650 TABLET ORAL ONCE
Status: COMPLETED | OUTPATIENT
Start: 2025-06-03 | End: 2025-06-03

## 2025-06-03 RX ORDER — FAMOTIDINE 10 MG/ML
20 INJECTION, SOLUTION INTRAVENOUS ONCE AS NEEDED
Status: DISCONTINUED | OUTPATIENT
Start: 2025-06-03 | End: 2025-06-03 | Stop reason: HOSPADM

## 2025-06-03 RX ORDER — ALBUTEROL SULFATE 0.83 MG/ML
3 SOLUTION RESPIRATORY (INHALATION) AS NEEDED
Status: DISCONTINUED | OUTPATIENT
Start: 2025-06-03 | End: 2025-06-03 | Stop reason: HOSPADM

## 2025-06-03 RX ORDER — DIPHENHYDRAMINE HYDROCHLORIDE 50 MG/ML
50 INJECTION, SOLUTION INTRAMUSCULAR; INTRAVENOUS AS NEEDED
Status: CANCELLED | OUTPATIENT
Start: 2025-06-03

## 2025-06-03 RX ORDER — EPINEPHRINE 0.3 MG/.3ML
0.3 INJECTION SUBCUTANEOUS EVERY 5 MIN PRN
Status: CANCELLED | OUTPATIENT
Start: 2025-06-03

## 2025-06-03 RX ORDER — FAMOTIDINE 10 MG/ML
20 INJECTION, SOLUTION INTRAVENOUS ONCE AS NEEDED
Status: CANCELLED | OUTPATIENT
Start: 2025-06-03

## 2025-06-03 RX ORDER — DIPHENHYDRAMINE HCL 50 MG
50 CAPSULE ORAL ONCE
Status: COMPLETED | OUTPATIENT
Start: 2025-06-03 | End: 2025-06-03

## 2025-06-03 RX ORDER — PROCHLORPERAZINE EDISYLATE 5 MG/ML
10 INJECTION INTRAMUSCULAR; INTRAVENOUS EVERY 6 HOURS PRN
Status: DISCONTINUED | OUTPATIENT
Start: 2025-06-03 | End: 2025-06-03 | Stop reason: HOSPADM

## 2025-06-03 RX ORDER — PROCHLORPERAZINE MALEATE 10 MG
10 TABLET ORAL EVERY 6 HOURS PRN
Status: DISCONTINUED | OUTPATIENT
Start: 2025-06-03 | End: 2025-06-03 | Stop reason: HOSPADM

## 2025-06-03 RX ORDER — DIPHENHYDRAMINE HYDROCHLORIDE 50 MG/ML
50 INJECTION, SOLUTION INTRAMUSCULAR; INTRAVENOUS AS NEEDED
Status: DISCONTINUED | OUTPATIENT
Start: 2025-06-03 | End: 2025-06-03 | Stop reason: HOSPADM

## 2025-06-03 RX ADMIN — RITUXIMAB 800 MG: 10 INJECTION, SOLUTION INTRAVENOUS at 15:14

## 2025-06-03 RX ADMIN — ACETAMINOPHEN 650 MG: 325 TABLET ORAL at 15:13

## 2025-06-03 RX ADMIN — DIPHENHYDRAMINE HYDROCHLORIDE 50 MG: 50 CAPSULE ORAL at 15:13

## 2025-06-03 ASSESSMENT — PAIN SCALES - GENERAL: PAINLEVEL_OUTOF10: 0-NO PAIN

## 2025-06-03 NOTE — PROGRESS NOTES
Patient was seen by provider at this visit.  Patient labs resulted with a need for tranfusion of blood.  Provider and messaged and wants to continue with infusion therapy.  Patient is tolerated standard rates and is scheduled for transfusion on floor 2 for 1 unit of blood for next day     Spoke to [facility] , verified they rec'd  order for release of blood

## 2025-06-04 ENCOUNTER — INFUSION (OUTPATIENT)
Dept: OTHER | Facility: HOSPITAL | Age: 65
End: 2025-06-04
Payer: MEDICARE

## 2025-06-04 VITALS
DIASTOLIC BLOOD PRESSURE: 61 MMHG | HEART RATE: 78 BPM | SYSTOLIC BLOOD PRESSURE: 98 MMHG | TEMPERATURE: 97.7 F | RESPIRATION RATE: 16 BRPM | OXYGEN SATURATION: 100 % | BODY MASS INDEX: 25.4 KG/M2 | WEIGHT: 186.29 LBS

## 2025-06-04 DIAGNOSIS — C83.78 BURKITT'S LYMPHOMA OF LYMPH NODES OF MULTIPLE REGIONS (MULTI): ICD-10-CM

## 2025-06-04 LAB
BLOOD EXPIRATION DATE: NORMAL
DISPENSE STATUS: NORMAL
PRODUCT BLOOD TYPE: 6200
PRODUCT CODE: NORMAL
UNIT ABO: NORMAL
UNIT NUMBER: NORMAL
UNIT RH: NORMAL
UNIT VOLUME: 350
XM INTEP: NORMAL

## 2025-06-04 PROCEDURE — 36430 TRANSFUSION BLD/BLD COMPNT: CPT

## 2025-06-04 PROCEDURE — P9040 RBC LEUKOREDUCED IRRADIATED: HCPCS

## 2025-06-04 RX ORDER — DIPHENHYDRAMINE HYDROCHLORIDE 50 MG/ML
50 INJECTION, SOLUTION INTRAMUSCULAR; INTRAVENOUS AS NEEDED
OUTPATIENT
Start: 2025-06-04

## 2025-06-04 RX ORDER — ALBUTEROL SULFATE 0.83 MG/ML
3 SOLUTION RESPIRATORY (INHALATION) AS NEEDED
OUTPATIENT
Start: 2025-06-04

## 2025-06-04 RX ORDER — FAMOTIDINE 10 MG/ML
20 INJECTION, SOLUTION INTRAVENOUS ONCE AS NEEDED
OUTPATIENT
Start: 2025-06-04

## 2025-06-04 RX ORDER — EPINEPHRINE 0.3 MG/.3ML
0.3 INJECTION SUBCUTANEOUS EVERY 5 MIN PRN
OUTPATIENT
Start: 2025-06-04

## 2025-06-04 NOTE — PROGRESS NOTES
Pt assisted to unit via wheelchair accompanied by wife. Pt reports felling fatigued today. Vitals obtained. Patient received 1 unit of PRBCs per orders. Tolerated well with no adverse effects noted., PICC flushed with NS per orders. Pt assisted off unit via wheelchair by RN and accompanied by wife.

## 2025-06-05 NOTE — ASSESSMENT & PLAN NOTE
Bijan Ibanez is a 64 y.o. male with PMH of HTN, HLD, CAD, MI (s/p stent 2010, on ASA), hx renal cell carcinoma (s/p R partial nephrectomy in 2013 @ [facility]), GERD, BPH, arthritis who is directly admitted from home for C5 EPOCH chemo therapy for newly diagnosed Burkitt's lymphoma.      ONC  # Burkitt's Lymphoma   -Originally signed out as high grade lymphoma but after burkitt's rearrangement (t8;14) came back positive. High risk based on marrow involvement, though no CNS involvement   - previously reviewed diagnosis and prognosis with patient   - initially give DA-R-EPOCH when was signed out as high grade lymphoma, given the significant toxicity he had with DA R EPOCH, will not escalate to CODOX-IVAC or hyper-CVAD given no CNS involvement   - PET post 2C Chemo shows deauville 2 - c/w CR   - cont w/ EPOCH, trialed dose level 2 but was intolerant with G3 mucositis, again with mucositis with dose level 1, will go down to dose level -1   -s/p 6C DA R EPOCH, plan EOT PET ordered  # CNS prophylaxis   -s/p 6 total IT chemotherapy

## 2025-06-05 NOTE — PROGRESS NOTES
Patient ID: Bijan Ibanez is a 64 y.o. male.  Referring Physician: Aixa Robert PA-C  [address] [place] [address]  Primary Care Provider: Nehal Murphy MD    Date of Service:  6/3/2025    Oncologic History    Diagnosis: Burkitt's Lymphoma  Date of Diagnosis: 1/16/25  Stage at Diagnosis: IV  Risk category: high risk (marrow involvement)     Prior Treatments:   DA-R-EPOCH s/p 6C EOT 5/24/25    Current Treaments:  Pending EOT PET    SUBJECTIVE:  Mr. Ibanez presents for follow up of Burkitt's Lymphoma. He did not get his rituximab yet due to concern for mouth sores. Has lidocaine at home which has helped wonders. Otherwise doing well. Denied N/V/F/C/SOB/CP/ad pain. 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    BL-IPI Calculation:  Age >60 years: Yes (64 years old).  Performance status (ECOG >=2): Presumed based on clinical presentation requiring hospitalization.  Serum LDH >ULN: 4102 U/L (1/11/25)  [place] Stage III/IV: Yes (stage IV with extranodal involvement including kidneys, liver, spleen, and musculature).  CNS involvement: No (MRI and LP negative).  Total Score: 4/5 (High-Risk).    Presenting Symptoms: Asymmetric swelling of the right-sided muscles of mastication; imaging revealed incidental findings of perihilar mass and renal lesions.    Labs at Diagnosis: WBC 4.0, platelets 72; LDH 4102 U/L (1/11/25)    Pathology:  EBUS with lymph node biopsy (1/13/25): Predominantly CD20-positive small lymphoid cells, raising suspicion for a B-cell lymphoproliferative process.  Bone marrow biopsy (1/16/25): 70-80% involvement by high-grade B-cell lymphoma in a hypercellular marrow (90% cellular) with maturing trilineage  hematopoiesis. FISH confirmed t(8;14)/IGH::MYC rearrangement, diagnostic of Burkitt lymphoma.     Imaging:  CT Neck (1/9/25): Fusiform thickening of right masticatory muscles, likely benign hypertrophy.  CT C/A/P (1/11/25): Left perihilar mass, pulmonary nodules, right renal lesions, and right adrenal gland thickening concerning for metastatic disease; T12-L1 soft tissue mass.  PET-CT (1/20): Widespread hermann and extranodal hypermetabolic involvement, including kidneys, liver, spleen, abdominal wall, and musculature, suggestive of extensive lymphomatous disease.  MRI Brain (1/21): No intracranial lymphoma; suspected osseous involvement of calvarium.    Treatment:  Dexamethasone 40 mg daily (1/20-1/21).  Prophylactic IT methotrexate via LP (1/22), flow negative for lymphoma.     1/21/2025 -  Chemotherapy    - C1 (1/21/25)  - C2 (2/14/25) -- etoposide dose-reduced by 25%  - C3 (3/7/25) -- etoposide dose-reduced by 10%, doxorubicin increased by 20%  - C4 (3/28/25) -- same as C3  (INPT) Dose-Adjusted R-EPOCH (Etoposide / DOXOrubicin / VinCRIStine / Cyclophosphamide / PredniSONE) + RiTUXimab, 21 Day Cycles      2/24/2025 Imaging    PET/CT (2/24/25, after C2): near-complete resolution of previously active disease in lymph nodes and various organs, Deauville score of 2          OBJECTIVE:  KPS: Karnofsky Score: 70 - Cares for self; unable to carry on normal activity or do normal work    VS:  /57 (BP Location: Right arm, Patient Position: Sitting, BP Cuff Size: Adult)   Pulse 95   Temp 36.3 °C (97.3 °F) (Temporal)   Resp 18   Wt 83.6 kg (184 lb 6.4 oz)   SpO2 100%   BMI 25.14 kg/m²   BSA: 2.06 meters squared    Physical Exam  Constitutional:       Appearance: Normal appearance.   HENT:      Head: Normocephalic.      Nose: Nose normal.      Mouth/Throat:      Mouth: Mucous membranes are moist.      Pharynx: Oropharynx is clear.   Eyes:      Extraocular Movements: Extraocular movements intact.       Conjunctiva/sclera: Conjunctivae normal.      Pupils: Pupils are equal, round, and reactive to light.   Cardiovascular:      Rate and Rhythm: Normal rate and regular rhythm.      Pulses: Normal pulses.      Heart sounds: Normal heart sounds.   Pulmonary:      Effort: Pulmonary effort is normal.      Breath sounds: Normal breath sounds.   Abdominal:      General: Abdomen is flat. Bowel sounds are normal.      Palpations: Abdomen is soft.   Musculoskeletal:         General: Normal range of motion.      Cervical back: Normal range of motion.   Skin:     General: Skin is warm and dry.      Capillary Refill: Capillary refill takes less than 2 seconds.   Neurological:      General: No focal deficit present.      Mental Status: He is alert and oriented to person, place, and time. Mental status is at baseline.   Psychiatric:         Mood and Affect: Mood normal.       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 male with PMH of HTN, HLD, CAD, MI (s/p stent 2010, on ASA), hx renal cell carcinoma (s/p R partial nephrectomy in 2013 @ [facility]), GERD, BPH, arthritis who is directly admitted from home for C5 EPOCH chemo therapy for newly diagnosed Burkitt's lymphoma.      ONC  # Burkitt's Lymphoma   -Originally signed out as high grade lymphoma but after burkitt's rearrangement (t8;14) came back positive. High risk based on marrow involvement, though no CNS involvement   - previously reviewed diagnosis and prognosis with patient   - initially give DA-R-EPOCH when was signed out as high grade lymphoma, given the significant toxicity he had with DA R EPOCH, will not escalate to CODOX-IVAC or hyper-CVAD given no CNS involvement   - PET post 2C Chemo shows deauville 2 - c/w CR   - cont w/ EPOCH, trialed dose level 2 but was intolerant with G3 mucositis, again with mucositis with dose level 1, will go down to dose level -1   -s/p 6C DA R EPOCH, plan EOT PET ordered  # CNS  prophylaxis   -s/p 6 total IT chemotherapy  Bacteremia due to methicillin resistant Staphylococcus aureus    New onset a-fib (Multi)         ID PPX:  Continue acyclovir and levofloxacin.  Until count recovery     Heme   -H/O R subclavian and R axillary DVT r/t PICC (2/3/25; on Eliquis)    -Duplex BUE to r/o DVT (4/10) - Compared with study from 2/3/2025, Acute DVT in left subclavian vein and left axillary vein.    - Duplex BLE (4/14)- negative   - CTPE  (4/11) - Negative   -Holding eliquis due to thrombocytopenia, plan to resume in 2 weeks     MISC  #Recent ER Visit for Fall 5/18 - CT Head negative      CARDS  -Echo  2/3/25 shows EF60-65%  #HTN  #Afib  -c/w home Metop 75 mg BID  -holding eliquis due to thrombocytopenia    # CNS prophylaxis  -. 5/3 CSF negative flow cytometry. 5/7 CSF negative flow cytometry.     # G1 CIPN  - monitor     # MRSA bacteremia  - s/p 4 weeks of IV Vanco, EOT 3/3/25        Results from last 7 days   Lab Units 06/03/25  1439   WBC AUTO x10*3/uL 0.5*   HEMOGLOBIN g/dL 6.5*   HEMATOCRIT % 20.8*   PLATELETS AUTO x10*3/uL 29*   NEUTROS PCT AUTO % 23.4   LYMPHS PCT AUTO % 53.2   MONOS PCT AUTO % 19.1   EOS PCT AUTO % 4.3       Results from last 7 days   Lab Units 06/03/25  1439   SODIUM mmol/L 137   POTASSIUM mmol/L 3.6   CHLORIDE mmol/L 103   CO2 mmol/L 26   BUN mg/dL 23   CREATININE mg/dL 0.50   CALCIUM mg/dL 8.2*   PROTEIN TOTAL g/dL 4.8*   BILIRUBIN TOTAL mg/dL 1.1   ALK PHOS U/L 41   ALT U/L 14   AST U/L 6*   GLUCOSE mg/dL 86             RTC:    Count Checks twice weekly Tues/Fri  RTC after PET    Torey Mckeon DO

## 2025-06-06 ENCOUNTER — LAB (OUTPATIENT)
Dept: HEMATOLOGY/ONCOLOGY | Facility: HOSPITAL | Age: 65
End: 2025-06-06
Payer: MEDICARE

## 2025-06-06 VITALS
TEMPERATURE: 98.2 F | HEART RATE: 80 BPM | OXYGEN SATURATION: 100 % | BODY MASS INDEX: 25.34 KG/M2 | SYSTOLIC BLOOD PRESSURE: 98 MMHG | WEIGHT: 185.85 LBS | RESPIRATION RATE: 18 BRPM | DIASTOLIC BLOOD PRESSURE: 67 MMHG

## 2025-06-06 DIAGNOSIS — C83.30 DIFFUSE LARGE B-CELL LYMPHOMA, UNSPECIFIED BODY REGION (MULTI): ICD-10-CM

## 2025-06-06 DIAGNOSIS — C83.78 BURKITT'S LYMPHOMA OF LYMPH NODES OF MULTIPLE REGIONS (MULTI): ICD-10-CM

## 2025-06-06 LAB
ALBUMIN SERPL BCP-MCNC: 3.2 G/DL (ref 3.4–5)
ALP SERPL-CCNC: 41 U/L (ref 33–136)
ALT SERPL W P-5'-P-CCNC: 11 U/L (ref 10–52)
ANION GAP SERPL CALC-SCNC: 12 MMOL/L (ref 10–20)
AST SERPL W P-5'-P-CCNC: 7 U/L (ref 9–39)
BASOPHILS # BLD MANUAL: 0 X10*3/UL (ref 0–0.1)
BASOPHILS NFR BLD MANUAL: 0 %
BILIRUB SERPL-MCNC: 0.8 MG/DL (ref 0–1.2)
BUN SERPL-MCNC: 18 MG/DL (ref 6–23)
CALCIUM SERPL-MCNC: 8.4 MG/DL (ref 8.6–10.3)
CHLORIDE SERPL-SCNC: 104 MMOL/L (ref 98–107)
CO2 SERPL-SCNC: 27 MMOL/L (ref 21–32)
CREAT SERPL-MCNC: 0.5 MG/DL (ref 0.5–1.3)
DACRYOCYTES BLD QL SMEAR: ABNORMAL
EGFRCR SERPLBLD CKD-EPI 2021: >90 ML/MIN/1.73M*2
EOSINOPHIL # BLD MANUAL: 0.2 X10*3/UL (ref 0–0.7)
EOSINOPHIL NFR BLD MANUAL: 4 %
ERYTHROCYTE [DISTWIDTH] IN BLOOD BY AUTOMATED COUNT: 19.6 % (ref 11.5–14.5)
GLUCOSE SERPL-MCNC: 94 MG/DL (ref 74–99)
HCT VFR BLD AUTO: 25.8 % (ref 41–52)
HGB BLD-MCNC: 7.9 G/DL (ref 13.5–17.5)
IMM GRANULOCYTES # BLD AUTO: 0.32 X10*3/UL (ref 0–0.7)
IMM GRANULOCYTES NFR BLD AUTO: 6.4 % (ref 0–0.9)
LDH SERPL L TO P-CCNC: 144 U/L (ref 84–246)
LYMPHOCYTES # BLD MANUAL: 0.5 X10*3/UL (ref 1.2–4.8)
LYMPHOCYTES NFR BLD MANUAL: 10 %
MCH RBC QN AUTO: 28.9 PG (ref 26–34)
MCHC RBC AUTO-ENTMCNC: 30.6 G/DL (ref 32–36)
MCV RBC AUTO: 95 FL (ref 80–100)
METAMYELOCYTES # BLD MANUAL: 0.2 X10*3/UL
METAMYELOCYTES NFR BLD MANUAL: 4 %
MONOCYTES # BLD MANUAL: 0.65 X10*3/UL (ref 0.1–1)
MONOCYTES NFR BLD MANUAL: 13 %
MYELOCYTES # BLD MANUAL: 0.1 X10*3/UL
MYELOCYTES NFR BLD MANUAL: 2 %
NEUTROPHILS # BLD MANUAL: 3.35 X10*3/UL (ref 1.2–7.7)
NEUTS BAND # BLD MANUAL: 0.75 X10*3/UL (ref 0–0.7)
NEUTS BAND NFR BLD MANUAL: 15 %
NEUTS SEG # BLD MANUAL: 2.6 X10*3/UL (ref 1.2–7)
NEUTS SEG NFR BLD MANUAL: 52 %
NRBC BLD-RTO: 2.2 /100 WBCS (ref 0–0)
OVALOCYTES BLD QL SMEAR: ABNORMAL
PLATELET # BLD AUTO: 84 X10*3/UL (ref 150–450)
POLYCHROMASIA BLD QL SMEAR: ABNORMAL
POTASSIUM SERPL-SCNC: 3.7 MMOL/L (ref 3.5–5.3)
PROT SERPL-MCNC: 4.7 G/DL (ref 6.4–8.2)
RBC # BLD AUTO: 2.73 X10*6/UL (ref 4.5–5.9)
RBC MORPH BLD: ABNORMAL
SCHISTOCYTES BLD QL SMEAR: ABNORMAL
SODIUM SERPL-SCNC: 139 MMOL/L (ref 136–145)
TOTAL CELLS COUNTED BLD: 100
WBC # BLD AUTO: 5 X10*3/UL (ref 4.4–11.3)

## 2025-06-06 PROCEDURE — 83615 LACTATE (LD) (LDH) ENZYME: CPT

## 2025-06-06 PROCEDURE — 85027 COMPLETE CBC AUTOMATED: CPT

## 2025-06-06 PROCEDURE — 36591 DRAW BLOOD OFF VENOUS DEVICE: CPT

## 2025-06-06 PROCEDURE — 36415 COLL VENOUS BLD VENIPUNCTURE: CPT

## 2025-06-06 PROCEDURE — 85007 BL SMEAR W/DIFF WBC COUNT: CPT

## 2025-06-06 PROCEDURE — 84075 ASSAY ALKALINE PHOSPHATASE: CPT

## 2025-06-06 ASSESSMENT — PAIN SCALES - GENERAL: PAINLEVEL_OUTOF10: 5

## 2025-06-06 NOTE — PROGRESS NOTES
Patient arrived to infusion with no complaints.  He rec'd his lab results and had all questions answered.  He was cleared by Laura to take tylenol. He stated he fell last evening without hitting his head and with only minor bruises on his buttocks.  Laura was notified.  He was discharged in stable condition.

## 2025-06-09 ENCOUNTER — TELEPHONE (OUTPATIENT)
Dept: HEMATOLOGY/ONCOLOGY | Facility: CLINIC | Age: 65
End: 2025-06-09
Payer: MEDICARE

## 2025-06-09 NOTE — TELEPHONE ENCOUNTER
Patient called overnight answering service, was nervous because he noticed a new swelling on the outside right arm. He noted that he had some times where I’d fallen and bumped this recently no other swelling, no worsening numbness in his fingers. (has numbness in the tip of his fingers, but close to equal on both sides.)    No fevers, chills.    Overall suspect hematoma, but differential diagnosis might include early infection, less likely lymph node.    He has follow up schedule for this week on Tuesday. I’ll try to forward to provider seeing him on Tuesday to make them aware.

## 2025-06-10 ENCOUNTER — LAB (OUTPATIENT)
Dept: HEMATOLOGY/ONCOLOGY | Facility: HOSPITAL | Age: 65
End: 2025-06-10
Payer: MEDICARE

## 2025-06-10 VITALS
OXYGEN SATURATION: 100 % | HEART RATE: 75 BPM | SYSTOLIC BLOOD PRESSURE: 110 MMHG | DIASTOLIC BLOOD PRESSURE: 70 MMHG | WEIGHT: 183.9 LBS | BODY MASS INDEX: 25.07 KG/M2 | RESPIRATION RATE: 18 BRPM | TEMPERATURE: 97.7 F

## 2025-06-10 DIAGNOSIS — C83.78 BURKITT'S LYMPHOMA OF LYMPH NODES OF MULTIPLE REGIONS (MULTI): ICD-10-CM

## 2025-06-10 DIAGNOSIS — C83.30 DIFFUSE LARGE B-CELL LYMPHOMA, UNSPECIFIED BODY REGION (MULTI): ICD-10-CM

## 2025-06-10 LAB
ALBUMIN SERPL BCP-MCNC: 3.4 G/DL (ref 3.4–5)
ALP SERPL-CCNC: 40 U/L (ref 33–136)
ALT SERPL W P-5'-P-CCNC: 11 U/L (ref 10–52)
ANION GAP SERPL CALC-SCNC: 11 MMOL/L (ref 10–20)
AST SERPL W P-5'-P-CCNC: 9 U/L (ref 9–39)
BASOPHILS # BLD AUTO: 0.02 X10*3/UL (ref 0–0.1)
BASOPHILS NFR BLD AUTO: 0.3 %
BILIRUB SERPL-MCNC: 0.5 MG/DL (ref 0–1.2)
BUN SERPL-MCNC: 18 MG/DL (ref 6–23)
CALCIUM SERPL-MCNC: 8.6 MG/DL (ref 8.6–10.3)
CHLORIDE SERPL-SCNC: 107 MMOL/L (ref 98–107)
CO2 SERPL-SCNC: 27 MMOL/L (ref 21–32)
CREAT SERPL-MCNC: 0.51 MG/DL (ref 0.5–1.3)
EGFRCR SERPLBLD CKD-EPI 2021: >90 ML/MIN/1.73M*2
EOSINOPHIL # BLD AUTO: 0.01 X10*3/UL (ref 0–0.7)
EOSINOPHIL NFR BLD AUTO: 0.1 %
ERYTHROCYTE [DISTWIDTH] IN BLOOD BY AUTOMATED COUNT: 19.3 % (ref 11.5–14.5)
GLUCOSE SERPL-MCNC: 99 MG/DL (ref 74–99)
HCT VFR BLD AUTO: 30.5 % (ref 41–52)
HGB BLD-MCNC: 9.3 G/DL (ref 13.5–17.5)
IMM GRANULOCYTES # BLD AUTO: 0.35 X10*3/UL (ref 0–0.7)
IMM GRANULOCYTES NFR BLD AUTO: 4.6 % (ref 0–0.9)
LDH SERPL L TO P-CCNC: 162 U/L (ref 84–246)
LYMPHOCYTES # BLD AUTO: 0.52 X10*3/UL (ref 1.2–4.8)
LYMPHOCYTES NFR BLD AUTO: 6.9 %
MCH RBC QN AUTO: 29.2 PG (ref 26–34)
MCHC RBC AUTO-ENTMCNC: 30.5 G/DL (ref 32–36)
MCV RBC AUTO: 96 FL (ref 80–100)
MONOCYTES # BLD AUTO: 0.84 X10*3/UL (ref 0.1–1)
MONOCYTES NFR BLD AUTO: 11.1 %
NEUTROPHILS # BLD AUTO: 5.81 X10*3/UL (ref 1.2–7.7)
NEUTROPHILS NFR BLD AUTO: 77 %
NRBC BLD-RTO: 0.3 /100 WBCS (ref 0–0)
PLATELET # BLD AUTO: 164 X10*3/UL (ref 150–450)
POTASSIUM SERPL-SCNC: 3.8 MMOL/L (ref 3.5–5.3)
PROT SERPL-MCNC: 4.8 G/DL (ref 6.4–8.2)
RBC # BLD AUTO: 3.18 X10*6/UL (ref 4.5–5.9)
SODIUM SERPL-SCNC: 141 MMOL/L (ref 136–145)
WBC # BLD AUTO: 7.6 X10*3/UL (ref 4.4–11.3)

## 2025-06-10 PROCEDURE — 80053 COMPREHEN METABOLIC PANEL: CPT

## 2025-06-10 PROCEDURE — 85025 COMPLETE CBC W/AUTO DIFF WBC: CPT

## 2025-06-10 PROCEDURE — 36591 DRAW BLOOD OFF VENOUS DEVICE: CPT

## 2025-06-10 PROCEDURE — 36415 COLL VENOUS BLD VENIPUNCTURE: CPT

## 2025-06-10 PROCEDURE — 83615 LACTATE (LD) (LDH) ENZYME: CPT

## 2025-06-10 NOTE — PROGRESS NOTES
Pt arrived to outpatient infusion for count check. No replacement needs today. Pt reports hard tender bump on L shoulder noticed on Sunday. Pt called and spoke to on call doctor yesterday. Pt seen by BRUCE Sanchez and ultrasound was ordered for pt to schedule. Pt discharged in stable condition and ambulated off unit with wheelchair accompanied by wife.   normal...

## 2025-06-11 ENCOUNTER — HOSPITAL ENCOUNTER (OUTPATIENT)
Dept: RADIOLOGY | Facility: CLINIC | Age: 65
Discharge: HOME | End: 2025-06-11
Payer: MEDICARE

## 2025-06-11 DIAGNOSIS — C83.78 BURKITT'S LYMPHOMA OF LYMPH NODES OF MULTIPLE REGIONS (MULTI): ICD-10-CM

## 2025-06-11 PROCEDURE — 76882 US LMTD JT/FCL EVL NVASC XTR: CPT | Mod: LEFT SIDE | Performed by: STUDENT IN AN ORGANIZED HEALTH CARE EDUCATION/TRAINING PROGRAM

## 2025-06-11 PROCEDURE — 76882 US LMTD JT/FCL EVL NVASC XTR: CPT | Mod: LT

## 2025-06-13 ENCOUNTER — TELEPHONE (OUTPATIENT)
Dept: HEMATOLOGY/ONCOLOGY | Facility: HOSPITAL | Age: 65
End: 2025-06-13

## 2025-06-13 ENCOUNTER — LAB (OUTPATIENT)
Dept: HEMATOLOGY/ONCOLOGY | Facility: HOSPITAL | Age: 65
End: 2025-06-13
Payer: MEDICARE

## 2025-06-13 VITALS
DIASTOLIC BLOOD PRESSURE: 70 MMHG | SYSTOLIC BLOOD PRESSURE: 114 MMHG | HEIGHT: 72 IN | HEART RATE: 66 BPM | OXYGEN SATURATION: 100 % | RESPIRATION RATE: 16 BRPM | WEIGHT: 181.66 LBS | BODY MASS INDEX: 24.61 KG/M2 | TEMPERATURE: 98.2 F

## 2025-06-13 DIAGNOSIS — C83.78 BURKITT'S LYMPHOMA OF LYMPH NODES OF MULTIPLE REGIONS (MULTI): ICD-10-CM

## 2025-06-13 DIAGNOSIS — C83.30 DIFFUSE LARGE B-CELL LYMPHOMA, UNSPECIFIED BODY REGION (MULTI): ICD-10-CM

## 2025-06-13 LAB
ALBUMIN SERPL BCP-MCNC: 3.5 G/DL (ref 3.4–5)
ALP SERPL-CCNC: 37 U/L (ref 33–136)
ALT SERPL W P-5'-P-CCNC: 9 U/L (ref 10–52)
ANION GAP SERPL CALC-SCNC: 10 MMOL/L (ref 10–20)
AST SERPL W P-5'-P-CCNC: 10 U/L (ref 9–39)
BASOPHILS # BLD AUTO: 0.02 X10*3/UL (ref 0–0.1)
BASOPHILS NFR BLD AUTO: 0.4 %
BILIRUB SERPL-MCNC: 0.5 MG/DL (ref 0–1.2)
BUN SERPL-MCNC: 19 MG/DL (ref 6–23)
CALCIUM SERPL-MCNC: 8.9 MG/DL (ref 8.6–10.3)
CHLORIDE SERPL-SCNC: 107 MMOL/L (ref 98–107)
CO2 SERPL-SCNC: 28 MMOL/L (ref 21–32)
CREAT SERPL-MCNC: 0.51 MG/DL (ref 0.5–1.3)
EGFRCR SERPLBLD CKD-EPI 2021: >90 ML/MIN/1.73M*2
EOSINOPHIL # BLD AUTO: 0.01 X10*3/UL (ref 0–0.7)
EOSINOPHIL NFR BLD AUTO: 0.2 %
ERYTHROCYTE [DISTWIDTH] IN BLOOD BY AUTOMATED COUNT: 19.4 % (ref 11.5–14.5)
GLUCOSE SERPL-MCNC: 94 MG/DL (ref 74–99)
HCT VFR BLD AUTO: 30.6 % (ref 41–52)
HGB BLD-MCNC: 9.5 G/DL (ref 13.5–17.5)
IMM GRANULOCYTES # BLD AUTO: 0.06 X10*3/UL (ref 0–0.7)
IMM GRANULOCYTES NFR BLD AUTO: 1.1 % (ref 0–0.9)
LDH SERPL L TO P-CCNC: 157 U/L (ref 84–246)
LYMPHOCYTES # BLD AUTO: 0.56 X10*3/UL (ref 1.2–4.8)
LYMPHOCYTES NFR BLD AUTO: 10.4 %
MCH RBC QN AUTO: 29.9 PG (ref 26–34)
MCHC RBC AUTO-ENTMCNC: 31 G/DL (ref 32–36)
MCV RBC AUTO: 96 FL (ref 80–100)
MONOCYTES # BLD AUTO: 0.48 X10*3/UL (ref 0.1–1)
MONOCYTES NFR BLD AUTO: 8.9 %
NEUTROPHILS # BLD AUTO: 4.26 X10*3/UL (ref 1.2–7.7)
NEUTROPHILS NFR BLD AUTO: 79 %
NRBC BLD-RTO: 0.4 /100 WBCS (ref 0–0)
PLATELET # BLD AUTO: 185 X10*3/UL (ref 150–450)
POTASSIUM SERPL-SCNC: 3.6 MMOL/L (ref 3.5–5.3)
PROT SERPL-MCNC: 5 G/DL (ref 6.4–8.2)
RBC # BLD AUTO: 3.18 X10*6/UL (ref 4.5–5.9)
SODIUM SERPL-SCNC: 141 MMOL/L (ref 136–145)
WBC # BLD AUTO: 5.4 X10*3/UL (ref 4.4–11.3)

## 2025-06-13 PROCEDURE — 36415 COLL VENOUS BLD VENIPUNCTURE: CPT

## 2025-06-13 PROCEDURE — 96523 IRRIG DRUG DELIVERY DEVICE: CPT

## 2025-06-13 PROCEDURE — 80053 COMPREHEN METABOLIC PANEL: CPT

## 2025-06-13 PROCEDURE — 85025 COMPLETE CBC W/AUTO DIFF WBC: CPT

## 2025-06-13 PROCEDURE — 83615 LACTATE (LD) (LDH) ENZYME: CPT

## 2025-06-13 RX ORDER — ACYCLOVIR 200 MG/1
400 CAPSULE ORAL 2 TIMES DAILY
Qty: 120 CAPSULE | Refills: 0 | Status: SHIPPED | OUTPATIENT
Start: 2025-06-13 | End: 2025-06-13 | Stop reason: SDUPTHER

## 2025-06-13 RX ORDER — ACYCLOVIR 200 MG/1
400 CAPSULE ORAL 2 TIMES DAILY
Qty: 120 CAPSULE | Refills: 0 | Status: SHIPPED | OUTPATIENT
Start: 2025-06-13 | End: 2025-06-20 | Stop reason: HOSPADM

## 2025-06-13 ASSESSMENT — PAIN SCALES - GENERAL: PAINLEVEL_OUTOF10: 3

## 2025-06-13 NOTE — PATIENT INSTRUCTIONS
Stop Levofloxacin since White Blood Cell count is recovered.  Continue Acyclovir for anti-viral (you continue that past chemotherapy).  Restart Eliquis since platelets recovered.

## 2025-06-13 NOTE — TELEPHONE ENCOUNTER
Please see patient MyChart message as below:    The [place] (Acyclovir)  [place] [facility]  [place] hospital. Please send it [place] [facility] [place]   Thank you    Message sent to team.

## 2025-06-13 NOTE — PROGRESS NOTES
Pt here for count check. Orders received to pull PICC line. After Dr Mckeon reviewed ultrasound of left arm. Pt has a hematoma on left biscep.  Pt  knows to restart his Eliquis.   PICC dressing line was removed per protocol. Pt tolerated well. Pressure dressing applied. Pt stayed for 30 mins. Post removal. VS stable. He left infusion ambulatory in [facility].

## 2025-06-16 ENCOUNTER — NURSE TRIAGE (OUTPATIENT)
Dept: ADMISSION | Facility: HOSPITAL | Age: 65
End: 2025-06-16
Payer: MEDICARE

## 2025-06-16 ENCOUNTER — APPOINTMENT (OUTPATIENT)
Dept: HEMATOLOGY/ONCOLOGY | Facility: HOSPITAL | Age: 65
End: 2025-06-16
Payer: MEDICARE

## 2025-06-16 NOTE — TELEPHONE ENCOUNTER
Mal heme visit scheduled for 1pm today.  Spouse agreeable to appt.  Encouraged pt to continue trying to sip on small amounts of water in the interim.

## 2025-06-16 NOTE — TELEPHONE ENCOUNTER
Spouse called back stating that pt does not think he can tolerate coming in to [facility] heme appt today.  She states he has vomited once more since call earlier.  Reviewed indications to seek care such as decreased urine output, dark/concentrated urine, dizziness or syncope, severe weakness or fever.  She states she will call paramedics if needed.

## 2025-06-16 NOTE — TELEPHONE ENCOUNTER
Spouse called reporting that pt has been vomiting since about 4pm yesterday.  He has not been able to tolerate medications or fluids without vomiting.  He's had about 3 loose stools today as well.  Abdomen is soft and sore, rated 3/10, which patient attributes to muscle pain from vomiting.  He tried taking compazine last night but vomited a few minutes later.    Pt is still voiding at usual intervals and urine is light yellow.  He endorses feeling a little dizzy, dry mouth and feeling thirsty.  No sick contacts that pt is aware of.  His neck pain remains severe and unchanged from baseline. No fevers, chest pain or difficulty breathing.

## 2025-06-17 ENCOUNTER — APPOINTMENT (OUTPATIENT)
Dept: CARDIOLOGY | Facility: HOSPITAL | Age: 65
End: 2025-06-17
Payer: OTHER GOVERNMENT

## 2025-06-17 ENCOUNTER — HOSPITAL ENCOUNTER (EMERGENCY)
Facility: HOSPITAL | Age: 65
Discharge: ANOTHER HOSPITAL | End: 2025-06-17
Attending: STUDENT IN AN ORGANIZED HEALTH CARE EDUCATION/TRAINING PROGRAM
Payer: OTHER GOVERNMENT

## 2025-06-17 ENCOUNTER — HOSPITAL ENCOUNTER (INPATIENT)
Facility: HOSPITAL | Age: 65
LOS: 3 days | Discharge: HOSPICE | DRG: 842 | End: 2025-06-20
Attending: INTERNAL MEDICINE | Admitting: NURSE PRACTITIONER
Payer: MEDICARE

## 2025-06-17 ENCOUNTER — APPOINTMENT (OUTPATIENT)
Dept: RADIOLOGY | Facility: HOSPITAL | Age: 65
End: 2025-06-17
Payer: OTHER GOVERNMENT

## 2025-06-17 ENCOUNTER — TELEPHONE (OUTPATIENT)
Dept: ADMISSION | Facility: HOSPITAL | Age: 65
End: 2025-06-17
Payer: MEDICARE

## 2025-06-17 VITALS
HEIGHT: 72 IN | BODY MASS INDEX: 24.38 KG/M2 | DIASTOLIC BLOOD PRESSURE: 93 MMHG | SYSTOLIC BLOOD PRESSURE: 156 MMHG | OXYGEN SATURATION: 99 % | RESPIRATION RATE: 16 BRPM | HEART RATE: 68 BPM | WEIGHT: 180 LBS | TEMPERATURE: 97.7 F

## 2025-06-17 DIAGNOSIS — C83.33 DIFFUSE LARGE B-CELL LYMPHOMA OF INTRA-ABDOMINAL LYMPH NODES (MULTI): ICD-10-CM

## 2025-06-17 DIAGNOSIS — C83.78 BURKITT'S LYMPHOMA OF LYMPH NODES OF MULTIPLE REGIONS (MULTI): ICD-10-CM

## 2025-06-17 DIAGNOSIS — Z51.5 END OF LIFE CARE: ICD-10-CM

## 2025-06-17 DIAGNOSIS — Z51.11 ADMISSION FOR ANTINEOPLASTIC CHEMOTHERAPY: ICD-10-CM

## 2025-06-17 DIAGNOSIS — Z76.82 STEM CELL TRANSPLANT CANDIDATE: ICD-10-CM

## 2025-06-17 DIAGNOSIS — C83.74: ICD-10-CM

## 2025-06-17 DIAGNOSIS — C83.70 BURKITT'S LYMPHOMA (MULTI): Primary | ICD-10-CM

## 2025-06-17 DIAGNOSIS — C79.9 METASTATIC MALIGNANT NEOPLASM, UNSPECIFIED SITE (MULTI): Primary | ICD-10-CM

## 2025-06-17 DIAGNOSIS — C83.78 BURKITT'S LYMPHOMA OF LYMPH NODES OF MULTIPLE REGIONS (MULTI): Primary | ICD-10-CM

## 2025-06-17 DIAGNOSIS — C83.70 BURKITT LYMPHOMA, UNSPECIFIED BODY REGION (MULTI): ICD-10-CM

## 2025-06-17 DIAGNOSIS — C83.30 DIFFUSE LARGE B-CELL LYMPHOMA, UNSPECIFIED BODY REGION (MULTI): ICD-10-CM

## 2025-06-17 LAB
ALBUMIN SERPL BCP-MCNC: 3.9 G/DL (ref 3.4–5)
ALP SERPL-CCNC: 32 U/L (ref 33–136)
ALT SERPL W P-5'-P-CCNC: 10 U/L (ref 10–52)
ANION GAP SERPL CALCULATED.3IONS-SCNC: 14 MMOL/L (ref 10–20)
APPEARANCE UR: CLEAR
AST SERPL W P-5'-P-CCNC: 15 U/L (ref 9–39)
BASOPHILS # BLD AUTO: 0.01 X10*3/UL (ref 0–0.1)
BASOPHILS NFR BLD AUTO: 0.1 %
BILIRUB SERPL-MCNC: 1.1 MG/DL (ref 0–1.2)
BILIRUB UR STRIP.AUTO-MCNC: NEGATIVE MG/DL
BUN SERPL-MCNC: 16 MG/DL (ref 6–23)
CALCIUM SERPL-MCNC: 9.1 MG/DL (ref 8.6–10.3)
CARDIAC TROPONIN I PNL SERPL HS: 11 NG/L (ref 0–20)
CARDIAC TROPONIN I PNL SERPL HS: 12 NG/L (ref 0–20)
CHLORIDE SERPL-SCNC: 100 MMOL/L (ref 98–107)
CO2 SERPL-SCNC: 26 MMOL/L (ref 21–32)
COLOR UR: ABNORMAL
CREAT SERPL-MCNC: 0.33 MG/DL (ref 0.5–1.3)
EGFRCR SERPLBLD CKD-EPI 2021: >90 ML/MIN/1.73M*2
EOSINOPHIL # BLD AUTO: 0 X10*3/UL (ref 0–0.7)
EOSINOPHIL NFR BLD AUTO: 0 %
ERYTHROCYTE [DISTWIDTH] IN BLOOD BY AUTOMATED COUNT: 18.6 % (ref 11.5–14.5)
FLUAV RNA RESP QL NAA+PROBE: NOT DETECTED
FLUBV RNA RESP QL NAA+PROBE: NOT DETECTED
GLUCOSE SERPL-MCNC: 119 MG/DL (ref 74–99)
GLUCOSE UR STRIP.AUTO-MCNC: NORMAL MG/DL
HCT VFR BLD AUTO: 32 % (ref 41–52)
HGB BLD-MCNC: 10.4 G/DL (ref 13.5–17.5)
IMM GRANULOCYTES # BLD AUTO: 0.08 X10*3/UL (ref 0–0.7)
IMM GRANULOCYTES NFR BLD AUTO: 0.8 % (ref 0–0.9)
KETONES UR STRIP.AUTO-MCNC: ABNORMAL MG/DL
LDH SERPL L TO P-CCNC: 170 U/L (ref 84–246)
LEUKOCYTE ESTERASE UR QL STRIP.AUTO: NEGATIVE
LYMPHOCYTES # BLD AUTO: 0.86 X10*3/UL (ref 1.2–4.8)
LYMPHOCYTES NFR BLD AUTO: 8.5 %
MCH RBC QN AUTO: 29.6 PG (ref 26–34)
MCHC RBC AUTO-ENTMCNC: 32.5 G/DL (ref 32–36)
MCV RBC AUTO: 91 FL (ref 80–100)
MONOCYTES # BLD AUTO: 0.71 X10*3/UL (ref 0.1–1)
MONOCYTES NFR BLD AUTO: 7 %
NEUTROPHILS # BLD AUTO: 8.48 X10*3/UL (ref 1.2–7.7)
NEUTROPHILS NFR BLD AUTO: 83.6 %
NITRITE UR QL STRIP.AUTO: NEGATIVE
NRBC BLD-RTO: 0 /100 WBCS (ref 0–0)
PH UR STRIP.AUTO: 7 [PH]
PLATELET # BLD AUTO: 172 X10*3/UL (ref 150–450)
POTASSIUM SERPL-SCNC: 3.3 MMOL/L (ref 3.5–5.3)
PROT SERPL-MCNC: 5.3 G/DL (ref 6.4–8.2)
PROT UR STRIP.AUTO-MCNC: NEGATIVE MG/DL
RBC # BLD AUTO: 3.51 X10*6/UL (ref 4.5–5.9)
RBC # UR STRIP.AUTO: NEGATIVE MG/DL
SARS-COV-2 RNA RESP QL NAA+PROBE: NOT DETECTED
SODIUM SERPL-SCNC: 137 MMOL/L (ref 136–145)
SP GR UR STRIP.AUTO: 1.03
URATE SERPL-MCNC: 6.2 MG/DL (ref 4–7.5)
UROBILINOGEN UR STRIP.AUTO-MCNC: NORMAL MG/DL
WBC # BLD AUTO: 10.1 X10*3/UL (ref 4.4–11.3)

## 2025-06-17 PROCEDURE — 2500000004 HC RX 250 GENERAL PHARMACY W/ HCPCS (ALT 636 FOR OP/ED): Performed by: STUDENT IN AN ORGANIZED HEALTH CARE EDUCATION/TRAINING PROGRAM

## 2025-06-17 PROCEDURE — 2500000001 HC RX 250 WO HCPCS SELF ADMINISTERED DRUGS (ALT 637 FOR MEDICARE OP)

## 2025-06-17 PROCEDURE — 96374 THER/PROPH/DIAG INJ IV PUSH: CPT | Mod: 59

## 2025-06-17 PROCEDURE — 72125 CT NECK SPINE W/O DYE: CPT

## 2025-06-17 PROCEDURE — 2500000004 HC RX 250 GENERAL PHARMACY W/ HCPCS (ALT 636 FOR OP/ED): Performed by: NURSE PRACTITIONER

## 2025-06-17 PROCEDURE — 81003 URINALYSIS AUTO W/O SCOPE: CPT

## 2025-06-17 PROCEDURE — 87636 SARSCOV2 & INF A&B AMP PRB: CPT

## 2025-06-17 PROCEDURE — 72125 CT NECK SPINE W/O DYE: CPT | Performed by: STUDENT IN AN ORGANIZED HEALTH CARE EDUCATION/TRAINING PROGRAM

## 2025-06-17 PROCEDURE — 80053 COMPREHEN METABOLIC PANEL: CPT

## 2025-06-17 PROCEDURE — 87631 RESP VIRUS 3-5 TARGETS: CPT | Mod: TRILAB | Performed by: NURSE PRACTITIONER

## 2025-06-17 PROCEDURE — 2500000001 HC RX 250 WO HCPCS SELF ADMINISTERED DRUGS (ALT 637 FOR MEDICARE OP): Performed by: NURSE PRACTITIONER

## 2025-06-17 PROCEDURE — 84484 ASSAY OF TROPONIN QUANT: CPT

## 2025-06-17 PROCEDURE — 85025 COMPLETE CBC W/AUTO DIFF WBC: CPT

## 2025-06-17 PROCEDURE — 96375 TX/PRO/DX INJ NEW DRUG ADDON: CPT

## 2025-06-17 PROCEDURE — 2500000004 HC RX 250 GENERAL PHARMACY W/ HCPCS (ALT 636 FOR OP/ED)

## 2025-06-17 PROCEDURE — 71046 X-RAY EXAM CHEST 2 VIEWS: CPT | Performed by: RADIOLOGY

## 2025-06-17 PROCEDURE — 87637 SARSCOV2&INF A&B&RSV AMP PRB: CPT | Performed by: NURSE PRACTITIONER

## 2025-06-17 PROCEDURE — 99222 1ST HOSP IP/OBS MODERATE 55: CPT

## 2025-06-17 PROCEDURE — 96361 HYDRATE IV INFUSION ADD-ON: CPT

## 2025-06-17 PROCEDURE — 2550000001 HC RX 255 CONTRASTS

## 2025-06-17 PROCEDURE — 83615 LACTATE (LD) (LDH) ENZYME: CPT | Performed by: NURSE PRACTITIONER

## 2025-06-17 PROCEDURE — 36415 COLL VENOUS BLD VENIPUNCTURE: CPT

## 2025-06-17 PROCEDURE — 71046 X-RAY EXAM CHEST 2 VIEWS: CPT

## 2025-06-17 PROCEDURE — 1170000001 HC PRIVATE ONCOLOGY ROOM DAILY

## 2025-06-17 PROCEDURE — 93005 ELECTROCARDIOGRAM TRACING: CPT

## 2025-06-17 PROCEDURE — 84550 ASSAY OF BLOOD/URIC ACID: CPT | Performed by: NURSE PRACTITIONER

## 2025-06-17 PROCEDURE — 74177 CT ABD & PELVIS W/CONTRAST: CPT

## 2025-06-17 PROCEDURE — 74177 CT ABD & PELVIS W/CONTRAST: CPT | Performed by: RADIOLOGY

## 2025-06-17 PROCEDURE — 99285 EMERGENCY DEPT VISIT HI MDM: CPT | Mod: 25 | Performed by: STUDENT IN AN ORGANIZED HEALTH CARE EDUCATION/TRAINING PROGRAM

## 2025-06-17 RX ORDER — ACYCLOVIR 400 MG/1
400 TABLET ORAL 2 TIMES DAILY
Status: DISCONTINUED | OUTPATIENT
Start: 2025-06-17 | End: 2025-06-18

## 2025-06-17 RX ORDER — FENTANYL CITRATE 50 UG/ML
100 INJECTION, SOLUTION INTRAMUSCULAR; INTRAVENOUS ONCE
Status: COMPLETED | OUTPATIENT
Start: 2025-06-17 | End: 2025-06-17

## 2025-06-17 RX ORDER — OXYCODONE HYDROCHLORIDE 5 MG/1
5 TABLET ORAL EVERY 4 HOURS PRN
Status: DISCONTINUED | OUTPATIENT
Start: 2025-06-17 | End: 2025-06-19

## 2025-06-17 RX ORDER — PROCHLORPERAZINE EDISYLATE 5 MG/ML
10 INJECTION INTRAMUSCULAR; INTRAVENOUS EVERY 6 HOURS PRN
Status: DISCONTINUED | OUTPATIENT
Start: 2025-06-17 | End: 2025-06-20 | Stop reason: HOSPADM

## 2025-06-17 RX ORDER — ONDANSETRON HYDROCHLORIDE 2 MG/ML
8 INJECTION, SOLUTION INTRAVENOUS EVERY 8 HOURS PRN
Status: DISCONTINUED | OUTPATIENT
Start: 2025-06-17 | End: 2025-06-19

## 2025-06-17 RX ORDER — POLYETHYLENE GLYCOL 3350 17 G/17G
17 POWDER, FOR SOLUTION ORAL DAILY PRN
Status: DISCONTINUED | OUTPATIENT
Start: 2025-06-17 | End: 2025-06-20 | Stop reason: HOSPADM

## 2025-06-17 RX ORDER — ATORVASTATIN CALCIUM 40 MG/1
40 TABLET, FILM COATED ORAL
Status: DISCONTINUED | OUTPATIENT
Start: 2025-06-18 | End: 2025-06-18

## 2025-06-17 RX ORDER — ACETAMINOPHEN 325 MG/1
650 TABLET ORAL EVERY 4 HOURS PRN
Status: DISCONTINUED | OUTPATIENT
Start: 2025-06-17 | End: 2025-06-18

## 2025-06-17 RX ORDER — ONDANSETRON HYDROCHLORIDE 2 MG/ML
4 INJECTION, SOLUTION INTRAVENOUS ONCE
Status: COMPLETED | OUTPATIENT
Start: 2025-06-17 | End: 2025-06-17

## 2025-06-17 RX ORDER — ALBUTEROL SULFATE 90 UG/1
2 INHALANT RESPIRATORY (INHALATION) EVERY 6 HOURS PRN
Status: DISCONTINUED | OUTPATIENT
Start: 2025-06-17 | End: 2025-06-20 | Stop reason: HOSPADM

## 2025-06-17 RX ORDER — PANTOPRAZOLE SODIUM 40 MG/1
40 TABLET, DELAYED RELEASE ORAL
Status: DISCONTINUED | OUTPATIENT
Start: 2025-06-18 | End: 2025-06-18

## 2025-06-17 RX ORDER — OXYCODONE HYDROCHLORIDE 5 MG/1
5 TABLET ORAL ONCE
Refills: 0 | Status: COMPLETED | OUTPATIENT
Start: 2025-06-17 | End: 2025-06-17

## 2025-06-17 RX ADMIN — SODIUM CHLORIDE 1000 ML: 9 INJECTION, SOLUTION INTRAVENOUS at 10:59

## 2025-06-17 RX ADMIN — SODIUM CHLORIDE 500 ML: 9 INJECTION, SOLUTION INTRAVENOUS at 14:25

## 2025-06-17 RX ADMIN — ONDANSETRON 4 MG: 2 INJECTION, SOLUTION INTRAMUSCULAR; INTRAVENOUS at 11:07

## 2025-06-17 RX ADMIN — FENTANYL CITRATE 100 MCG: 50 INJECTION INTRAMUSCULAR; INTRAVENOUS at 15:24

## 2025-06-17 RX ADMIN — OXYCODONE 5 MG: 5 TABLET ORAL at 20:47

## 2025-06-17 RX ADMIN — IOHEXOL 75 ML: 350 INJECTION, SOLUTION INTRAVENOUS at 11:17

## 2025-06-17 RX ADMIN — OXYCODONE 5 MG: 5 TABLET ORAL at 11:23

## 2025-06-17 RX ADMIN — ACYCLOVIR 400 MG: 400 TABLET ORAL at 20:47

## 2025-06-17 RX ADMIN — METOPROLOL TARTRATE 75 MG: 50 TABLET, FILM COATED ORAL at 20:47

## 2025-06-17 RX ADMIN — ONDANSETRON 8 MG: 2 INJECTION INTRAMUSCULAR; INTRAVENOUS at 20:47

## 2025-06-17 SDOH — SOCIAL STABILITY: SOCIAL INSECURITY: ARE YOU OR HAVE YOU BEEN THREATENED OR ABUSED PHYSICALLY, EMOTIONALLY, OR SEXUALLY BY ANYONE?: NO

## 2025-06-17 SDOH — ECONOMIC STABILITY: INCOME INSECURITY: IN THE PAST 12 MONTHS HAS THE ELECTRIC, GAS, OIL, OR WATER COMPANY THREATENED TO SHUT OFF SERVICES IN YOUR HOME?: NO

## 2025-06-17 SDOH — ECONOMIC STABILITY: FOOD INSECURITY: WITHIN THE PAST 12 MONTHS, THE FOOD YOU BOUGHT JUST DIDN'T LAST AND YOU DIDN'T HAVE MONEY TO GET MORE.: NEVER TRUE

## 2025-06-17 SDOH — SOCIAL STABILITY: SOCIAL INSECURITY: ARE THERE ANY APPARENT SIGNS OF INJURIES/BEHAVIORS THAT COULD BE RELATED TO ABUSE/NEGLECT?: NO

## 2025-06-17 SDOH — SOCIAL STABILITY: SOCIAL INSECURITY: WITHIN THE LAST YEAR, HAVE YOU BEEN HUMILIATED OR EMOTIONALLY ABUSED IN OTHER WAYS BY YOUR PARTNER OR EX-PARTNER?: NO

## 2025-06-17 SDOH — ECONOMIC STABILITY: FOOD INSECURITY: WITHIN THE PAST 12 MONTHS, YOU WORRIED THAT YOUR FOOD WOULD RUN OUT BEFORE YOU GOT THE MONEY TO BUY MORE.: NEVER TRUE

## 2025-06-17 SDOH — SOCIAL STABILITY: SOCIAL INSECURITY: HAVE YOU HAD THOUGHTS OF HARMING ANYONE ELSE?: NO

## 2025-06-17 SDOH — SOCIAL STABILITY: SOCIAL INSECURITY: HAVE YOU HAD ANY THOUGHTS OF HARMING ANYONE ELSE?: NO

## 2025-06-17 SDOH — SOCIAL STABILITY: SOCIAL INSECURITY: HAS ANYONE EVER THREATENED TO HURT YOUR FAMILY OR YOUR PETS?: NO

## 2025-06-17 SDOH — SOCIAL STABILITY: SOCIAL INSECURITY: DOES ANYONE TRY TO KEEP YOU FROM HAVING/CONTACTING OTHER FRIENDS OR DOING THINGS OUTSIDE YOUR HOME?: NO

## 2025-06-17 SDOH — SOCIAL STABILITY: SOCIAL INSECURITY: DO YOU FEEL ANYONE HAS EXPLOITED OR TAKEN ADVANTAGE OF YOU FINANCIALLY OR OF YOUR PERSONAL PROPERTY?: NO

## 2025-06-17 SDOH — SOCIAL STABILITY: SOCIAL INSECURITY: WITHIN THE LAST YEAR, HAVE YOU BEEN AFRAID OF YOUR PARTNER OR EX-PARTNER?: NO

## 2025-06-17 SDOH — SOCIAL STABILITY: SOCIAL INSECURITY: ABUSE: ADULT

## 2025-06-17 SDOH — SOCIAL STABILITY: SOCIAL INSECURITY: DO YOU FEEL UNSAFE GOING BACK TO THE PLACE WHERE YOU ARE LIVING?: NO

## 2025-06-17 ASSESSMENT — COGNITIVE AND FUNCTIONAL STATUS - GENERAL
WALKING IN HOSPITAL ROOM: A LOT
PERSONAL GROOMING: A LITTLE
DRESSING REGULAR LOWER BODY CLOTHING: A LOT
DRESSING REGULAR LOWER BODY CLOTHING: A LOT
TURNING FROM BACK TO SIDE WHILE IN FLAT BAD: A LITTLE
MOBILITY SCORE: 14
MOBILITY SCORE: 14
WALKING IN HOSPITAL ROOM: A LOT
HELP NEEDED FOR BATHING: A LOT
MOVING TO AND FROM BED TO CHAIR: A LOT
HELP NEEDED FOR BATHING: A LOT
MOVING FROM LYING ON BACK TO SITTING ON SIDE OF FLAT BED WITH BEDRAILS: A LITTLE
STANDING UP FROM CHAIR USING ARMS: A LOT
PATIENT BASELINE BEDBOUND: NO
MOVING FROM LYING ON BACK TO SITTING ON SIDE OF FLAT BED WITH BEDRAILS: A LITTLE
TURNING FROM BACK TO SIDE WHILE IN FLAT BAD: A LITTLE
STANDING UP FROM CHAIR USING ARMS: A LOT
PERSONAL GROOMING: A LITTLE
DRESSING REGULAR UPPER BODY CLOTHING: A LOT
CLIMB 3 TO 5 STEPS WITH RAILING: A LOT
DAILY ACTIVITIY SCORE: 15
DRESSING REGULAR UPPER BODY CLOTHING: A LOT
TOILETING: A LOT
CLIMB 3 TO 5 STEPS WITH RAILING: A LOT
DAILY ACTIVITIY SCORE: 15
MOVING TO AND FROM BED TO CHAIR: A LOT
TOILETING: A LOT

## 2025-06-17 ASSESSMENT — ENCOUNTER SYMPTOMS
DIZZINESS: 0
CONSTIPATION: 0
ENDOCRINE NEGATIVE: 1
HEMATOLOGIC/LYMPHATIC NEGATIVE: 1
DIARRHEA: 0
ALLERGIC/IMMUNOLOGIC NEGATIVE: 1
COUGH: 0
SHORTNESS OF BREATH: 0
ABDOMINAL PAIN: 1
VOMITING: 1
ABDOMINAL DISTENTION: 0
HEADACHES: 1
ARTHRALGIAS: 1
WEAKNESS: 1
PSYCHIATRIC NEGATIVE: 1
PALPITATIONS: 0
CHILLS: 0
WHEEZING: 0
FEVER: 0
NAUSEA: 1
BLOOD IN STOOL: 0

## 2025-06-17 ASSESSMENT — ACTIVITIES OF DAILY LIVING (ADL)
WALKS IN HOME: NEEDS ASSISTANCE
FEEDING YOURSELF: INDEPENDENT
DRESSING YOURSELF: INDEPENDENT
GROOMING: INDEPENDENT
HEARING - RIGHT EAR: HEARING AID
TOILETING: INDEPENDENT
ADEQUATE_TO_COMPLETE_ADL: YES
LACK_OF_TRANSPORTATION: NO
ASSISTIVE_DEVICE: WALKER
BATHING: INDEPENDENT
PATIENT'S MEMORY ADEQUATE TO SAFELY COMPLETE DAILY ACTIVITIES?: YES
HEARING - LEFT EAR: HEARING AID
JUDGMENT_ADEQUATE_SAFELY_COMPLETE_DAILY_ACTIVITIES: YES

## 2025-06-17 ASSESSMENT — LIFESTYLE VARIABLES
HOW OFTEN DO YOU HAVE 6 OR MORE DRINKS ON ONE OCCASION: NEVER
AUDIT-C TOTAL SCORE: 0
HOW MANY STANDARD DRINKS CONTAINING ALCOHOL DO YOU HAVE ON A TYPICAL DAY: PATIENT DOES NOT DRINK
SKIP TO QUESTIONS 9-10: 1
HOW OFTEN DO YOU HAVE A DRINK CONTAINING ALCOHOL: NEVER
AUDIT-C TOTAL SCORE: 0

## 2025-06-17 ASSESSMENT — PAIN - FUNCTIONAL ASSESSMENT
PAIN_FUNCTIONAL_ASSESSMENT: 0-10

## 2025-06-17 ASSESSMENT — PAIN SCALES - GENERAL
PAINLEVEL_OUTOF10: 5 - MODERATE PAIN
PAINLEVEL_OUTOF10: 0 - NO PAIN
PAINLEVEL_OUTOF10: 4
PAINLEVEL_OUTOF10: 4
PAINLEVEL_OUTOF10: 6
PAINLEVEL_OUTOF10: 7

## 2025-06-17 ASSESSMENT — PAIN DESCRIPTION - LOCATION: LOCATION: ABDOMEN

## 2025-06-17 ASSESSMENT — PATIENT HEALTH QUESTIONNAIRE - PHQ9
2. FEELING DOWN, DEPRESSED OR HOPELESS: SEVERAL DAYS
1. LITTLE INTEREST OR PLEASURE IN DOING THINGS: SEVERAL DAYS
SUM OF ALL RESPONSES TO PHQ9 QUESTIONS 1 & 2: 2
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
## 2025-06-17 NOTE — CARE PLAN
The patient's goals for the shift include    Problem: Pain - Adult  Goal: Verbalizes/displays adequate comfort level or baseline comfort level  Outcome: Progressing     Problem: Safety - Adult  Goal: Free from fall injury  Outcome: Progressing     Problem: Discharge Planning  Goal: Discharge to home or other facility with appropriate resources  Outcome: Progressing     Problem: Chronic Conditions and Co-morbidities  Goal: Patient's chronic conditions and co-morbidity symptoms are monitored and maintained or improved  Outcome: Progressing     Problem: Nutrition  Goal: Nutrient intake appropriate for maintaining nutritional needs  Outcome: Progressing     Problem: Skin  Goal: Decreased wound size/increased tissue granulation at next dressing change  Outcome: Progressing  Goal: Participates in plan/prevention/treatment measures  Outcome: Progressing  Goal: Prevent/manage excess moisture  Outcome: Progressing  Goal: Prevent/minimize sheer/friction injuries  Outcome: Progressing  Goal: Promote/optimize nutrition  Outcome: Progressing  Goal: Promote skin healing  Outcome: Progressing     Problem: Fall/Injury  Goal: Not fall by end of shift  Outcome: Progressing  Goal: Be free from injury by end of the shift  Outcome: Progressing  Goal: Verbalize understanding of personal risk factors for fall in the hospital  Outcome: Progressing  Goal: Verbalize understanding of risk factor reduction measures to prevent injury from fall in the home  Outcome: Progressing  Goal: Use assistive devices by end of the shift  Outcome: Progressing  Goal: Pace activities to prevent fatigue by end of the shift  Outcome: Progressing       The clinical goals for the shift include pt will remain safe and free from falls and injuries

## 2025-06-17 NOTE — ED TRIAGE NOTES
Patient brought in by ems for general weakness and not feeling well. Patient being treated for lymphoma with his last treatment being on fri. Patient has had weakness, N/V/D and general illness. Patient denies any other complaints

## 2025-06-17 NOTE — TELEPHONE ENCOUNTER
Spouse called to follow up that pt went to [facility] ED for vomiting.  A CT scan was completed and new masses in his abdomen were found.
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
## 2025-06-17 NOTE — ED PROVIDER NOTES
Attestation to documentation Level 1-3    The documented history and physical examination was done by the PA/NP. The documented history and physical exam was reviewed and I performed an independent history and physical exam in conjunction. I have personally seen and examined the patient. I have fully participated in the care of this patient. I have reviewed all pertinent clinical information including history, physical exam and plan.    Physical Examination     General:  Well appearing, speaking in full sentences  Skin: No skin rash  Head: Normocephalic/ atraumatic  Neck: Supple  Eye: EOMI, normal conjunctiva  ENT:  Moist oral mucosa, nares patent  Cardiovascular:  RRR, 2+ pulses radial  Respiratory: Non labored  Back: Normal ROM  Musculoskeletal:  Moves arms and legs and crosses midline  Gastrointestinal: non distended  Neurological: A&O x 4,  no focal neuro deficits    Plan: neck pain, hx of lymphoma, CT shows new mets and lymph hermann enlargement. Heme/onc wants transferred for treatment.     Blaise Parada MD Attending EM physician       Blaise Parada MD  06/17/25 0585

## 2025-06-18 ENCOUNTER — APPOINTMENT (OUTPATIENT)
Dept: RADIOLOGY | Facility: HOSPITAL | Age: 65
DRG: 842 | End: 2025-06-18
Payer: MEDICARE

## 2025-06-18 ENCOUNTER — APPOINTMENT (OUTPATIENT)
Dept: CARDIOLOGY | Facility: HOSPITAL | Age: 65
DRG: 842 | End: 2025-06-18
Payer: MEDICARE

## 2025-06-18 LAB
ABO GROUP (TYPE) IN BLOOD: NORMAL
ALBUMIN SERPL BCP-MCNC: 3.6 G/DL (ref 3.4–5)
ALP SERPL-CCNC: 39 U/L (ref 33–136)
ALT SERPL W P-5'-P-CCNC: 9 U/L (ref 10–52)
AMYLASE SERPL-CCNC: 20 U/L (ref 29–103)
ANION GAP SERPL CALC-SCNC: 17 MMOL/L (ref 10–20)
ANTIBODY SCREEN: NORMAL
APTT PPP: 28 SECONDS (ref 26–36)
AST SERPL W P-5'-P-CCNC: 11 U/L (ref 9–39)
ATRIAL RATE: 69 BPM
BASOPHILS # BLD AUTO: 0.02 X10*3/UL (ref 0–0.1)
BASOPHILS NFR BLD AUTO: 0.3 %
BILIRUB SERPL-MCNC: 1 MG/DL (ref 0–1.2)
BUN SERPL-MCNC: 14 MG/DL (ref 6–23)
CALCIUM SERPL-MCNC: 9.1 MG/DL (ref 8.6–10.6)
CHLORIDE SERPL-SCNC: 102 MMOL/L (ref 98–107)
CMV IGG AVIDITY SERPL IA-RTO: REACTIVE %
CO2 SERPL-SCNC: 24 MMOL/L (ref 21–32)
CREAT SERPL-MCNC: 0.37 MG/DL (ref 0.5–1.3)
EBV EA IGG SER QL: NEGATIVE
EBV NA AB SER QL: POSITIVE
EBV VCA IGG SER IA-ACNC: POSITIVE
EBV VCA IGM SER IA-ACNC: NEGATIVE
EGFRCR SERPLBLD CKD-EPI 2021: >90 ML/MIN/1.73M*2
EJECTION FRACTION APICAL 4 CHAMBER: 57.5
EJECTION FRACTION: 63 %
EOSINOPHIL # BLD AUTO: 0 X10*3/UL (ref 0–0.7)
EOSINOPHIL NFR BLD AUTO: 0 %
ERYTHROCYTE [DISTWIDTH] IN BLOOD BY AUTOMATED COUNT: 18.5 % (ref 11.5–14.5)
FIBRINOGEN PPP-MCNC: 288 MG/DL (ref 200–400)
FLUAV RNA RESP QL NAA+PROBE: NOT DETECTED
FLUBV RNA RESP QL NAA+PROBE: NOT DETECTED
GLUCOSE BLD MANUAL STRIP-MCNC: 108 MG/DL (ref 74–99)
GLUCOSE SERPL-MCNC: 100 MG/DL (ref 74–99)
HADV DNA SPEC QL NAA+PROBE: NOT DETECTED
HBV CORE AB SER QL: NONREACTIVE
HBV CORE IGM SER QL: NONREACTIVE
HBV SURFACE AB SER-ACNC: 4 MIU/ML
HBV SURFACE AG SERPL QL IA: NONREACTIVE
HCT VFR BLD AUTO: 32.4 % (ref 41–52)
HCV AB SER QL: NONREACTIVE
HERPES SIMPLEX VIRUS 1 IGG: 2.4 INDEX
HERPES SIMPLEX VIRUS 2 IGG: <0.2 INDEX
HGB BLD-MCNC: 10.2 G/DL (ref 13.5–17.5)
HIV 1+2 AB+HIV1 P24 AG SERPL QL IA: NONREACTIVE
HMPV RNA SPEC QL NAA+PROBE: NOT DETECTED
HPIV1 RNA SPEC QL NAA+PROBE: NOT DETECTED
HPIV2 RNA SPEC QL NAA+PROBE: NOT DETECTED
HPIV3 RNA SPEC QL NAA+PROBE: NOT DETECTED
HPIV4 RNA SPEC QL NAA+PROBE: NOT DETECTED
IMM GRANULOCYTES # BLD AUTO: 0.04 X10*3/UL (ref 0–0.7)
IMM GRANULOCYTES NFR BLD AUTO: 0.6 % (ref 0–0.9)
INR PPP: 1.1 (ref 0.9–1.1)
LEFT ATRIUM VOLUME AREA LENGTH INDEX BSA: 22.1 ML/M2
LIPASE SERPL-CCNC: 3 U/L (ref 9–82)
LYMPHOCYTES # BLD AUTO: 0.98 X10*3/UL (ref 1.2–4.8)
LYMPHOCYTES NFR BLD AUTO: 13.6 %
MAGNESIUM SERPL-MCNC: 2.13 MG/DL (ref 1.6–2.4)
MCH RBC QN AUTO: 30.1 PG (ref 26–34)
MCHC RBC AUTO-ENTMCNC: 31.5 G/DL (ref 32–36)
MCV RBC AUTO: 96 FL (ref 80–100)
MITRAL VALVE E/A RATIO: 0.79
MONOCYTES # BLD AUTO: 0.65 X10*3/UL (ref 0.1–1)
MONOCYTES NFR BLD AUTO: 9 %
NEUTROPHILS # BLD AUTO: 5.54 X10*3/UL (ref 1.2–7.7)
NEUTROPHILS NFR BLD AUTO: 76.5 %
NRBC BLD-RTO: 0 /100 WBCS (ref 0–0)
P AXIS: 84 DEGREES
P OFFSET: 187 MS
P ONSET: 133 MS
PHOSPHATE SERPL-MCNC: 4.3 MG/DL (ref 2.5–4.9)
PLATELET # BLD AUTO: 146 X10*3/UL (ref 150–450)
POTASSIUM SERPL-SCNC: 3.1 MMOL/L (ref 3.5–5.3)
PR INTERVAL: 180 MS
PROT SERPL-MCNC: 4.8 G/DL (ref 6.4–8.2)
PROTHROMBIN TIME: 11.6 SECONDS (ref 9.8–12.4)
Q ONSET: 223 MS
QRS COUNT: 11 BEATS
QRS DURATION: 94 MS
QT INTERVAL: 428 MS
QTC CALCULATION(BAZETT): 458 MS
QTC FREDERICIA: 448 MS
R AXIS: 58 DEGREES
RBC # BLD AUTO: 3.39 X10*6/UL (ref 4.5–5.9)
RH FACTOR (ANTIGEN D): NORMAL
RHINOVIRUS RNA UPPER RESP QL NAA+PROBE: NOT DETECTED
RSV RNA RESP QL NAA+PROBE: NOT DETECTED
SARS-COV-2 RNA RESP QL NAA+PROBE: NOT DETECTED
SODIUM SERPL-SCNC: 140 MMOL/L (ref 136–145)
T AXIS: 45 DEGREES
T GONDII IGG SER-ACNC: NONREACTIVE
T OFFSET: 437 MS
TREPONEMA PALLIDUM IGG+IGM AB [PRESENCE] IN SERUM OR PLASMA BY IMMUNOASSAY: NONREACTIVE
VARICELLA ZOSTER IGG INDEX: 0.5 AI
VENTRICULAR RATE: 69 BPM
VZV IGG SER QL IA: NEGATIVE
WBC # BLD AUTO: 7.2 X10*3/UL (ref 4.4–11.3)

## 2025-06-18 PROCEDURE — 72146 MRI CHEST SPINE W/O DYE: CPT | Mod: RSC

## 2025-06-18 PROCEDURE — A9552 F18 FDG: HCPCS | Performed by: INTERNAL MEDICINE

## 2025-06-18 PROCEDURE — 72148 MRI LUMBAR SPINE W/O DYE: CPT | Mod: RSC

## 2025-06-18 PROCEDURE — 2720000007 HC OR 272 NO HCPCS

## 2025-06-18 PROCEDURE — 86787 VARICELLA-ZOSTER ANTIBODY: CPT | Performed by: NURSE PRACTITIONER

## 2025-06-18 PROCEDURE — 99223 1ST HOSP IP/OBS HIGH 75: CPT

## 2025-06-18 PROCEDURE — 7100000010 HC PHASE TWO TIME - EACH INCREMENTAL 1 MINUTE

## 2025-06-18 PROCEDURE — 85025 COMPLETE CBC W/AUTO DIFF WBC: CPT | Performed by: NURSE PRACTITIONER

## 2025-06-18 PROCEDURE — 85610 PROTHROMBIN TIME: CPT | Performed by: NURSE PRACTITIONER

## 2025-06-18 PROCEDURE — 86803 HEPATITIS C AB TEST: CPT | Performed by: NURSE PRACTITIONER

## 2025-06-18 PROCEDURE — 78815 PET IMAGE W/CT SKULL-THIGH: CPT | Mod: PS

## 2025-06-18 PROCEDURE — 86704 HEP B CORE ANTIBODY TOTAL: CPT | Performed by: NURSE PRACTITIONER

## 2025-06-18 PROCEDURE — 7100000009 HC PHASE TWO TIME - INITIAL BASE CHARGE

## 2025-06-18 PROCEDURE — 86900 BLOOD TYPING SEROLOGIC ABO: CPT | Performed by: NURSE PRACTITIONER

## 2025-06-18 PROCEDURE — 88185 FLOWCYTOMETRY/TC ADD-ON: CPT | Mod: TC | Performed by: PHYSICIAN ASSISTANT

## 2025-06-18 PROCEDURE — 70551 MRI BRAIN STEM W/O DYE: CPT

## 2025-06-18 PROCEDURE — 93321 DOPPLER ECHO F-UP/LMTD STD: CPT | Performed by: INTERNAL MEDICINE

## 2025-06-18 PROCEDURE — 86696 HERPES SIMPLEX TYPE 2 TEST: CPT | Performed by: NURSE PRACTITIONER

## 2025-06-18 PROCEDURE — 2500000004 HC RX 250 GENERAL PHARMACY W/ HCPCS (ALT 636 FOR OP/ED): Mod: JW | Performed by: RADIOLOGY

## 2025-06-18 PROCEDURE — 93308 TTE F-UP OR LMTD: CPT | Performed by: INTERNAL MEDICINE

## 2025-06-18 PROCEDURE — 36415 COLL VENOUS BLD VENIPUNCTURE: CPT | Performed by: NURSE PRACTITIONER

## 2025-06-18 PROCEDURE — 72141 MRI NECK SPINE W/O DYE: CPT | Mod: RSC

## 2025-06-18 PROCEDURE — 76942 ECHO GUIDE FOR BIOPSY: CPT

## 2025-06-18 PROCEDURE — 83690 ASSAY OF LIPASE: CPT | Performed by: NURSE PRACTITIONER

## 2025-06-18 PROCEDURE — 87340 HEPATITIS B SURFACE AG IA: CPT | Performed by: NURSE PRACTITIONER

## 2025-06-18 PROCEDURE — 0KBN3ZX EXCISION OF RIGHT HIP MUSCLE, PERCUTANEOUS APPROACH, DIAGNOSTIC: ICD-10-PCS | Performed by: RADIOLOGY

## 2025-06-18 PROCEDURE — 2500000001 HC RX 250 WO HCPCS SELF ADMINISTERED DRUGS (ALT 637 FOR MEDICARE OP): Performed by: NURSE PRACTITIONER

## 2025-06-18 PROCEDURE — 87389 HIV-1 AG W/HIV-1&-2 AB AG IA: CPT | Performed by: NURSE PRACTITIONER

## 2025-06-18 PROCEDURE — 3430000001 HC RX 343 DIAGNOSTIC RADIOPHARMACEUTICALS: Performed by: INTERNAL MEDICINE

## 2025-06-18 PROCEDURE — 86645 CMV ANTIBODY IGM: CPT | Performed by: NURSE PRACTITIONER

## 2025-06-18 PROCEDURE — 86705 HEP B CORE ANTIBODY IGM: CPT | Performed by: NURSE PRACTITIONER

## 2025-06-18 PROCEDURE — 86780 TREPONEMA PALLIDUM: CPT | Performed by: NURSE PRACTITIONER

## 2025-06-18 PROCEDURE — 86790 VIRUS ANTIBODY NOS: CPT | Performed by: NURSE PRACTITIONER

## 2025-06-18 PROCEDURE — 85384 FIBRINOGEN ACTIVITY: CPT | Performed by: NURSE PRACTITIONER

## 2025-06-18 PROCEDURE — 86665 EPSTEIN-BARR CAPSID VCA: CPT | Performed by: NURSE PRACTITIONER

## 2025-06-18 PROCEDURE — 93325 DOPPLER ECHO COLOR FLOW MAPG: CPT | Performed by: INTERNAL MEDICINE

## 2025-06-18 PROCEDURE — 2500000004 HC RX 250 GENERAL PHARMACY W/ HCPCS (ALT 636 FOR OP/ED): Performed by: NURSE PRACTITIONER

## 2025-06-18 PROCEDURE — 2500000001 HC RX 250 WO HCPCS SELF ADMINISTERED DRUGS (ALT 637 FOR MEDICARE OP)

## 2025-06-18 PROCEDURE — 88341 IMHCHEM/IMCYTCHM EA ADD ANTB: CPT | Mod: TC,SUR | Performed by: PHYSICIAN ASSISTANT

## 2025-06-18 PROCEDURE — 82150 ASSAY OF AMYLASE: CPT | Performed by: NURSE PRACTITIONER

## 2025-06-18 PROCEDURE — 99233 SBSQ HOSP IP/OBS HIGH 50: CPT | Performed by: INTERNAL MEDICINE

## 2025-06-18 PROCEDURE — 86777 TOXOPLASMA ANTIBODY: CPT | Performed by: NURSE PRACTITIONER

## 2025-06-18 PROCEDURE — 93321 DOPPLER ECHO F-UP/LMTD STD: CPT

## 2025-06-18 PROCEDURE — 78815 PET IMAGE W/CT SKULL-THIGH: CPT | Mod: PET TUMOR SUBSQ TX STRATEGY | Performed by: RADIOLOGY

## 2025-06-18 PROCEDURE — 80053 COMPREHEN METABOLIC PANEL: CPT | Performed by: NURSE PRACTITIONER

## 2025-06-18 PROCEDURE — 82947 ASSAY GLUCOSE BLOOD QUANT: CPT

## 2025-06-18 PROCEDURE — 36415 COLL VENOUS BLD VENIPUNCTURE: CPT

## 2025-06-18 PROCEDURE — 84100 ASSAY OF PHOSPHORUS: CPT | Performed by: NURSE PRACTITIONER

## 2025-06-18 PROCEDURE — 86706 HEP B SURFACE ANTIBODY: CPT | Performed by: NURSE PRACTITIONER

## 2025-06-18 PROCEDURE — 83735 ASSAY OF MAGNESIUM: CPT

## 2025-06-18 PROCEDURE — 87799 DETECT AGENT NOS DNA QUANT: CPT | Performed by: NURSE PRACTITIONER

## 2025-06-18 PROCEDURE — 86644 CMV ANTIBODY: CPT | Performed by: NURSE PRACTITIONER

## 2025-06-18 PROCEDURE — 1170000001 HC PRIVATE ONCOLOGY ROOM DAILY

## 2025-06-18 RX ORDER — DEXAMETHASONE 4 MG/1
16 TABLET ORAL ONCE
Status: CANCELLED | OUTPATIENT
Start: 2025-06-18

## 2025-06-18 RX ORDER — PROCHLORPERAZINE MALEATE 10 MG
10 TABLET ORAL EVERY 6 HOURS PRN
Status: CANCELLED | OUTPATIENT
Start: 2025-06-25

## 2025-06-18 RX ORDER — ACETAMINOPHEN 325 MG/1
650 TABLET ORAL ONCE
Status: CANCELLED | OUTPATIENT
Start: 2025-07-09

## 2025-06-18 RX ORDER — PROCHLORPERAZINE EDISYLATE 5 MG/ML
10 INJECTION INTRAMUSCULAR; INTRAVENOUS EVERY 6 HOURS PRN
Status: CANCELLED | OUTPATIENT
Start: 2025-06-18

## 2025-06-18 RX ORDER — DEXAMETHASONE 4 MG/1
16 TABLET ORAL DAILY
Status: CANCELLED | OUTPATIENT
Start: 2025-06-18

## 2025-06-18 RX ORDER — DEXAMETHASONE 4 MG/1
16 TABLET ORAL ONCE
Status: CANCELLED | OUTPATIENT
Start: 2025-07-09

## 2025-06-18 RX ORDER — PALONOSETRON 0.05 MG/ML
0.25 INJECTION, SOLUTION INTRAVENOUS ONCE
Status: CANCELLED | OUTPATIENT
Start: 2025-07-02

## 2025-06-18 RX ORDER — PROCHLORPERAZINE MALEATE 10 MG
10 TABLET ORAL EVERY 6 HOURS PRN
Status: CANCELLED | OUTPATIENT
Start: 2025-07-02

## 2025-06-18 RX ORDER — DEXAMETHASONE 4 MG/1
16 TABLET ORAL ONCE
Status: CANCELLED | OUTPATIENT
Start: 2025-06-25

## 2025-06-18 RX ORDER — DEXAMETHASONE 4 MG/1
16 TABLET ORAL DAILY
Status: CANCELLED | OUTPATIENT
Start: 2025-07-02

## 2025-06-18 RX ORDER — ACETAMINOPHEN 325 MG/1
650 TABLET ORAL ONCE
Status: CANCELLED | OUTPATIENT
Start: 2025-07-02

## 2025-06-18 RX ORDER — DIPHENHYDRAMINE HYDROCHLORIDE 50 MG/ML
50 INJECTION, SOLUTION INTRAMUSCULAR; INTRAVENOUS AS NEEDED
Status: CANCELLED | OUTPATIENT
Start: 2025-06-18

## 2025-06-18 RX ORDER — POTASSIUM CHLORIDE 14.9 MG/ML
20 INJECTION INTRAVENOUS
Status: COMPLETED | OUTPATIENT
Start: 2025-06-18 | End: 2025-06-18

## 2025-06-18 RX ORDER — SODIUM CHLORIDE 9 MG/ML
100 INJECTION, SOLUTION INTRAVENOUS CONTINUOUS
Status: DISCONTINUED | OUTPATIENT
Start: 2025-06-18 | End: 2025-06-20

## 2025-06-18 RX ORDER — DIPHENHYDRAMINE HYDROCHLORIDE 50 MG/ML
50 INJECTION, SOLUTION INTRAMUSCULAR; INTRAVENOUS AS NEEDED
Status: CANCELLED | OUTPATIENT
Start: 2025-06-25

## 2025-06-18 RX ORDER — LORAZEPAM 2 MG/ML
0.5 INJECTION INTRAMUSCULAR ONCE
Status: COMPLETED | OUTPATIENT
Start: 2025-06-18 | End: 2025-06-18

## 2025-06-18 RX ORDER — FAMOTIDINE 10 MG/ML
20 INJECTION, SOLUTION INTRAVENOUS ONCE AS NEEDED
Status: CANCELLED | OUTPATIENT
Start: 2025-06-25

## 2025-06-18 RX ORDER — ALBUTEROL SULFATE 0.83 MG/ML
3 SOLUTION RESPIRATORY (INHALATION) AS NEEDED
Status: CANCELLED | OUTPATIENT
Start: 2025-07-09

## 2025-06-18 RX ORDER — DIPHENHYDRAMINE HCL 50 MG
50 CAPSULE ORAL ONCE
Status: CANCELLED | OUTPATIENT
Start: 2025-07-02

## 2025-06-18 RX ORDER — FAMOTIDINE 10 MG/ML
20 INJECTION, SOLUTION INTRAVENOUS ONCE AS NEEDED
Status: CANCELLED | OUTPATIENT
Start: 2025-07-02

## 2025-06-18 RX ORDER — DIPHENHYDRAMINE HCL 50 MG
50 CAPSULE ORAL ONCE
Status: CANCELLED | OUTPATIENT
Start: 2025-06-21

## 2025-06-18 RX ORDER — FENTANYL CITRATE 50 UG/ML
INJECTION, SOLUTION INTRAMUSCULAR; INTRAVENOUS
Status: COMPLETED | OUTPATIENT
Start: 2025-06-18 | End: 2025-06-18

## 2025-06-18 RX ORDER — ACETAMINOPHEN 325 MG/1
650 TABLET ORAL ONCE
Status: CANCELLED | OUTPATIENT
Start: 2025-06-18

## 2025-06-18 RX ORDER — DIPHENHYDRAMINE HCL 50 MG
50 CAPSULE ORAL ONCE
Status: CANCELLED | OUTPATIENT
Start: 2025-06-18

## 2025-06-18 RX ORDER — PROCHLORPERAZINE EDISYLATE 5 MG/ML
10 INJECTION INTRAMUSCULAR; INTRAVENOUS EVERY 6 HOURS PRN
Status: CANCELLED | OUTPATIENT
Start: 2025-07-09

## 2025-06-18 RX ORDER — ALBUTEROL SULFATE 0.83 MG/ML
3 SOLUTION RESPIRATORY (INHALATION) AS NEEDED
Status: CANCELLED | OUTPATIENT
Start: 2025-06-18

## 2025-06-18 RX ORDER — PROCHLORPERAZINE MALEATE 10 MG
10 TABLET ORAL EVERY 6 HOURS PRN
Status: CANCELLED | OUTPATIENT
Start: 2025-06-18

## 2025-06-18 RX ORDER — DIPHENHYDRAMINE HCL 50 MG
50 CAPSULE ORAL ONCE
Status: CANCELLED | OUTPATIENT
Start: 2025-07-09

## 2025-06-18 RX ORDER — ALBUTEROL SULFATE 0.83 MG/ML
3 SOLUTION RESPIRATORY (INHALATION) AS NEEDED
Status: CANCELLED | OUTPATIENT
Start: 2025-07-02

## 2025-06-18 RX ORDER — DIPHENHYDRAMINE HYDROCHLORIDE 50 MG/ML
50 INJECTION, SOLUTION INTRAMUSCULAR; INTRAVENOUS AS NEEDED
Status: CANCELLED | OUTPATIENT
Start: 2025-07-09

## 2025-06-18 RX ORDER — DEXAMETHASONE 4 MG/1
16 TABLET ORAL ONCE
Status: CANCELLED | OUTPATIENT
Start: 2025-07-02

## 2025-06-18 RX ORDER — EPINEPHRINE 0.3 MG/.3ML
0.3 INJECTION SUBCUTANEOUS EVERY 5 MIN PRN
Status: CANCELLED | OUTPATIENT
Start: 2025-07-02

## 2025-06-18 RX ORDER — EPINEPHRINE 0.3 MG/.3ML
0.3 INJECTION SUBCUTANEOUS EVERY 5 MIN PRN
Status: CANCELLED | OUTPATIENT
Start: 2025-06-25

## 2025-06-18 RX ORDER — MIDAZOLAM HYDROCHLORIDE 1 MG/ML
INJECTION INTRAMUSCULAR; INTRAVENOUS
Status: COMPLETED | OUTPATIENT
Start: 2025-06-18 | End: 2025-06-18

## 2025-06-18 RX ORDER — ACETAMINOPHEN 325 MG/1
975 TABLET ORAL EVERY 8 HOURS PRN
Status: DISCONTINUED | OUTPATIENT
Start: 2025-06-18 | End: 2025-06-20 | Stop reason: HOSPADM

## 2025-06-18 RX ORDER — EPINEPHRINE 0.3 MG/.3ML
0.3 INJECTION SUBCUTANEOUS EVERY 5 MIN PRN
Status: CANCELLED | OUTPATIENT
Start: 2025-07-09

## 2025-06-18 RX ORDER — ALBUTEROL SULFATE 0.83 MG/ML
3 SOLUTION RESPIRATORY (INHALATION) AS NEEDED
Status: CANCELLED | OUTPATIENT
Start: 2025-06-25

## 2025-06-18 RX ORDER — ATORVASTATIN CALCIUM 40 MG/1
40 TABLET, FILM COATED ORAL NIGHTLY
Status: DISCONTINUED | OUTPATIENT
Start: 2025-06-18 | End: 2025-06-19

## 2025-06-18 RX ORDER — DIPHENHYDRAMINE HYDROCHLORIDE 50 MG/ML
50 INJECTION, SOLUTION INTRAMUSCULAR; INTRAVENOUS AS NEEDED
Status: CANCELLED | OUTPATIENT
Start: 2025-07-02

## 2025-06-18 RX ORDER — PANTOPRAZOLE SODIUM 40 MG/10ML
40 INJECTION, POWDER, LYOPHILIZED, FOR SOLUTION INTRAVENOUS DAILY
Status: DISCONTINUED | OUTPATIENT
Start: 2025-06-19 | End: 2025-06-20 | Stop reason: HOSPADM

## 2025-06-18 RX ORDER — FAMOTIDINE 10 MG/ML
20 INJECTION, SOLUTION INTRAVENOUS ONCE AS NEEDED
Status: CANCELLED | OUTPATIENT
Start: 2025-07-09

## 2025-06-18 RX ORDER — ACETAMINOPHEN 325 MG/1
650 TABLET ORAL ONCE
Status: CANCELLED | OUTPATIENT
Start: 2025-06-21

## 2025-06-18 RX ORDER — PALONOSETRON 0.05 MG/ML
0.25 INJECTION, SOLUTION INTRAVENOUS ONCE
Status: CANCELLED | OUTPATIENT
Start: 2025-06-18

## 2025-06-18 RX ORDER — ACETAMINOPHEN 325 MG/1
650 TABLET ORAL ONCE
Status: CANCELLED | OUTPATIENT
Start: 2025-06-25

## 2025-06-18 RX ORDER — PROCHLORPERAZINE MALEATE 10 MG
10 TABLET ORAL EVERY 6 HOURS PRN
Status: CANCELLED | OUTPATIENT
Start: 2025-07-09

## 2025-06-18 RX ORDER — PROCHLORPERAZINE EDISYLATE 5 MG/ML
10 INJECTION INTRAMUSCULAR; INTRAVENOUS EVERY 6 HOURS PRN
Status: CANCELLED | OUTPATIENT
Start: 2025-06-25

## 2025-06-18 RX ORDER — DIPHENHYDRAMINE HCL 50 MG
50 CAPSULE ORAL ONCE
Status: CANCELLED | OUTPATIENT
Start: 2025-06-25

## 2025-06-18 RX ORDER — PROCHLORPERAZINE EDISYLATE 5 MG/ML
10 INJECTION INTRAMUSCULAR; INTRAVENOUS EVERY 6 HOURS PRN
Status: CANCELLED | OUTPATIENT
Start: 2025-07-02

## 2025-06-18 RX ORDER — FAMOTIDINE 10 MG/ML
20 INJECTION, SOLUTION INTRAVENOUS ONCE AS NEEDED
Status: CANCELLED | OUTPATIENT
Start: 2025-06-18

## 2025-06-18 RX ORDER — FLUDEOXYGLUCOSE F 18 200 MCI/ML
13.64 INJECTION, SOLUTION INTRAVENOUS
Status: COMPLETED | OUTPATIENT
Start: 2025-06-18 | End: 2025-06-18

## 2025-06-18 RX ORDER — LIDOCAINE HYDROCHLORIDE 10 MG/ML
5 INJECTION, SOLUTION INFILTRATION; PERINEURAL ONCE
Status: DISCONTINUED | OUTPATIENT
Start: 2025-06-18 | End: 2025-06-19

## 2025-06-18 RX ORDER — EPINEPHRINE 0.3 MG/.3ML
0.3 INJECTION SUBCUTANEOUS EVERY 5 MIN PRN
Status: CANCELLED | OUTPATIENT
Start: 2025-06-18

## 2025-06-18 RX ADMIN — ACYCLOVIR SODIUM 250 MG: 50 INJECTION, SOLUTION INTRAVENOUS at 22:13

## 2025-06-18 RX ADMIN — HYDROMORPHONE HYDROCHLORIDE 0.4 MG: 1 INJECTION, SOLUTION INTRAMUSCULAR; INTRAVENOUS; SUBCUTANEOUS at 12:34

## 2025-06-18 RX ADMIN — SODIUM CHLORIDE 100 ML/HR: 0.9 INJECTION, SOLUTION INTRAVENOUS at 12:09

## 2025-06-18 RX ADMIN — POTASSIUM CHLORIDE 20 MEQ: 14.9 INJECTION, SOLUTION INTRAVENOUS at 12:36

## 2025-06-18 RX ADMIN — PANTOPRAZOLE SODIUM 40 MG: 40 TABLET, DELAYED RELEASE ORAL at 06:25

## 2025-06-18 RX ADMIN — MIDAZOLAM HYDROCHLORIDE 1 MG: 1 INJECTION, SOLUTION INTRAMUSCULAR; INTRAVENOUS at 15:24

## 2025-06-18 RX ADMIN — ACYCLOVIR SODIUM 250 MG: 50 INJECTION, SOLUTION INTRAVENOUS at 10:41

## 2025-06-18 RX ADMIN — FLUDEOXYGLUCOSE F 18 13.64 MILLICURIE: 200 INJECTION, SOLUTION INTRAVENOUS at 09:13

## 2025-06-18 RX ADMIN — LORAZEPAM 0.5 MG: 2 INJECTION, SOLUTION INTRAMUSCULAR; INTRAVENOUS at 20:56

## 2025-06-18 RX ADMIN — DEXAMETHASONE SODIUM PHOSPHATE 40 MG: 10 INJECTION, SOLUTION INTRAMUSCULAR; INTRAVENOUS at 17:23

## 2025-06-18 RX ADMIN — METOPROLOL TARTRATE 75 MG: 50 TABLET, FILM COATED ORAL at 20:30

## 2025-06-18 RX ADMIN — ONDANSETRON 8 MG: 2 INJECTION INTRAMUSCULAR; INTRAVENOUS at 09:10

## 2025-06-18 RX ADMIN — METOPROLOL TARTRATE 75 MG: 50 TABLET, FILM COATED ORAL at 10:41

## 2025-06-18 RX ADMIN — FENTANYL CITRATE 50 MCG: 50 INJECTION, SOLUTION INTRAMUSCULAR; INTRAVENOUS at 15:24

## 2025-06-18 RX ADMIN — SODIUM CHLORIDE 100 ML/HR: 0.9 INJECTION, SOLUTION INTRAVENOUS at 20:39

## 2025-06-18 RX ADMIN — ATORVASTATIN CALCIUM 40 MG: 40 TABLET, FILM COATED ORAL at 20:30

## 2025-06-18 RX ADMIN — HYDROMORPHONE HYDROCHLORIDE 0.4 MG: 1 INJECTION, SOLUTION INTRAMUSCULAR; INTRAVENOUS; SUBCUTANEOUS at 20:56

## 2025-06-18 RX ADMIN — POTASSIUM CHLORIDE 20 MEQ: 14.9 INJECTION, SOLUTION INTRAVENOUS at 13:32

## 2025-06-18 RX ADMIN — OXYCODONE 5 MG: 5 TABLET ORAL at 03:15

## 2025-06-18 RX ADMIN — ONDANSETRON 8 MG: 2 INJECTION INTRAMUSCULAR; INTRAVENOUS at 20:32

## 2025-06-18 ASSESSMENT — COGNITIVE AND FUNCTIONAL STATUS - GENERAL
CLIMB 3 TO 5 STEPS WITH RAILING: A LOT
DRESSING REGULAR LOWER BODY CLOTHING: A LOT
PERSONAL GROOMING: A LITTLE
TURNING FROM BACK TO SIDE WHILE IN FLAT BAD: A LITTLE
DAILY ACTIVITIY SCORE: 15
MOVING TO AND FROM BED TO CHAIR: A LOT
MOBILITY SCORE: 14
WALKING IN HOSPITAL ROOM: A LOT
DRESSING REGULAR UPPER BODY CLOTHING: A LOT
MOVING FROM LYING ON BACK TO SITTING ON SIDE OF FLAT BED WITH BEDRAILS: A LITTLE
TOILETING: A LOT
HELP NEEDED FOR BATHING: A LOT
STANDING UP FROM CHAIR USING ARMS: A LOT

## 2025-06-18 ASSESSMENT — PAIN - FUNCTIONAL ASSESSMENT
PAIN_FUNCTIONAL_ASSESSMENT: 0-10

## 2025-06-18 ASSESSMENT — PAIN SCALES - GENERAL
PAINLEVEL_OUTOF10: 8
PAINLEVEL_OUTOF10: 7
PAINLEVEL_OUTOF10: 0 - NO PAIN
PAINLEVEL_OUTOF10: 4
PAINLEVEL_OUTOF10: 0 - NO PAIN
PAINLEVEL_OUTOF10: 8
PAINLEVEL_OUTOF10: 0 - NO PAIN

## 2025-06-18 ASSESSMENT — PAIN DESCRIPTION - ORIENTATION: ORIENTATION: UPPER

## 2025-06-18 ASSESSMENT — PAIN DESCRIPTION - LOCATION: LOCATION: BACK

## 2025-06-18 NOTE — PROGRESS NOTES
Bijan Ibanez is a 64 y.o. male on day 1 of admission presenting with relapsed Burkitt's lymphoma (Multi).    Subjective   Pt c/o shoulder/back pain this morning, will try IV Dilaudid PRN. Plan Dex after PET & biopsy. Had one episode of diarrhea yesterday, none today, will get sample if available. Due to paraspinal mass, will get MRI spine.    Denies SOB, HA, CP, chills,        Objective     Physical Exam    Last Recorded Vitals  Blood pressure (!) 155/91, pulse 82, temperature 36.9 °C (98.4 °F), temperature source Temporal, resp. rate 20, height 1.829 m (6'), weight 82.5 kg (181 lb 14.1 oz), SpO2 97%.  Intake/Output last 3 Shifts:  No intake/output data recorded.    Relevant Results  Scheduled medications  Scheduled Medications[1]  Continuous medications  Continuous Medications[2]  PRN medications  PRN Medications[3]    Assessment & Plan  Burkitt's lymphoma (Multi)    Mr. Bijan Ibanez is a 64 yr old male with a PMH of Burkitt's Lymphoma s/p 6 cycles DA R EPOCH, htn, HLD, CAD, Afib, MI (stent 2010), renal cell carcinoma (R partial nephrectomy 2013), DVT (Apixaban), BPH, GERD, and arthritis who is now admitted with concern of relapse to [facility].     ONC  # Burkitt's Lymphoma (dx Jan 2025)  - Presented with R sided jaw swelling  - BMBx: 70-80% high grade B cell involvement  - PET: showed lymphoma involvement in kidney, liver, spleen, abdom wall, and musculature  - Received 6C R-EPOCH, LP w/ IT chemo x5 (CSF flow all neg)  - PET (after C2): near CR  - Cycle 6 DA R EPOCH (5/23), Neulasta (5/30), Rituxan (6/3)  # Concern for relapse (6/17)  - Presented to [facility] ED with abdom pain & vomiting  - CT AP (6/17): showing R paraspinal mass on T8, lytic lesion on L5, new mesenteric LN/masses  - PET showing systemic & CNS relapse, new paraspinal mass  - IR LN biopsy done (6/18)  - Pt added to 6/19 Tumor Board  - Family considering treatment & CAR-T (CASE 2422) vs possible hospice  - Dex 40 mg given for n/v symptoms  (6/18)  - MRI spine pending     HEME  # Hx DVT (2/3/25, R subclav, R axillary from PICC)  - Eliquis 5 BID on hold for LP & biopsy  # DVT prophy: ambulation     ID  # Infectious workup  - Negative: Covid/Flu, UA  - Pending: RVP  # Hx MRSA bacteremia, completed Vanc on 3/3/25  # Prophy: ACV     CARDIO  - Echo (2/3/25): EF 60-65%  - QTC (6/17): 428  # Afib  - Cont Metop 75 BID  - Hold Lasix on admit, diurese as needed  # HTN  # HLD: cont home Lipitor     FEN/GI  Admit wt: 81.4kg  Admit sCr-0.33  # PUD prophy: Protonix  # N/V, Abdom pain  - Presented with n/v, abdominal pain since Sunday  -   - PRN Zofran & Compazine  - PRN Oxy & Tylenol     MISC  # Falls at home (5/2, 5/17)  - CTH neg (5/17)  - PT ordered, pt now uses walker more frequently  # Neuropathy  - Gabapentin TID     DISPO  - Full Code  - SOLO PICC ordered 6/18  - Primary Onc: Mike    Pt seen, examined, and discussed w/ Dr. Hollis Faulkner.    JUDY Valencia-CNP         [1] acyclovir, 250 mg, intravenous, q12h  [Held by provider] apixaban, 5 mg, oral, BID  atorvastatin, 40 mg, oral, Nightly  dexAMETHasone, 40 mg, intravenous, Once  lidocaine, 5 mL, infiltration, Once  LORazepam, 0.5 mg, intravenous, Once  metoprolol tartrate, 75 mg, oral, BID  [START ON 6/19/2025] pantoprazole, 40 mg, intravenous, Daily  perflutren lipid microspheres, 0.5-10 mL of dilution, intravenous, Once in imaging  perflutren protein A microsphere, 0.5 mL, intravenous, Once in imaging  sulfur hexafluoride microsphr, 2 mL, intravenous, Once in imaging  [2] sodium chloride 0.9%, 100 mL/hr, Last Rate: 100 mL/hr (06/18/25 1209)  [3] PRN medications: acetaminophen, albuterol, alteplase, alteplase, HYDROmorphone, ondansetron, oxyCODONE, polyethylene glycol, prochlorperazine
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
## 2025-06-18 NOTE — PROGRESS NOTES
Pharmacy Medication History Review    Bijan Ibanez is a 64 y.o. male admitted for Burkitt's lymphoma (Multi). Pharmacy reviewed the patient's [id number] medications and allergies for accuracy.    Medications ADDED:  None  Medications CHANGED:  Miralax to PRN  Medications REMOVED:   None     The list below reflects the updated PTA list.   Prior to Admission Medications   Prescriptions Informant   acyclovir (Zovirax) 200 mg capsule Spouse/Significant Other   Sig: Take 2 capsules (400 mg) by mouth 2 times a day.   albuterol 90 mcg/actuation inhaler Spouse/Significant Other   Sig: Inhale 2 puffs every 6 hours if needed for shortness of breath.   apixaban (Eliquis) 5 mg tablet Spouse/Significant Other   Sig: Take 1 tablet (5 mg) by mouth 2 times a day.  Held prior to admission   atorvastatin (Lipitor) 40 mg tablet Spouse/Significant Other   Sig: Take 1 tablet (40 mg) by mouth early in the morning..   furosemide (Lasix) 20 mg tablet Spouse/Significant Other   Sig: Take 1 tablet (20 mg) by mouth once daily as needed (For Lower extremity edema).   metoprolol tartrate (Lopressor) 75 mg tablet Spouse/Significant Other   Sig: Take 1 tablet (75 mg) by mouth 2 times a day.   pantoprazole (ProtoNix) 40 mg EC tablet Spouse/Significant Other   Sig: Take 1 tablet (40 mg) by mouth once daily in the morning. Take before meals. Do not crush, chew, or split.   polyethylene glycol (Glycolax, Miralax) 17 gram/dose powder Spouse/Significant Other   Sig: Mix 17 g of powder as directed and drink once daily for 3 days. Do not start before May 28, 2025.   Patient taking differently: Mix 17 g of powder and drink once daily as needed.      Facility-Administered Medications: None        The list below reflects the updated allergy list. Please review each documented allergy for additional clarification and justification.  Allergies  Reviewed by Cherry Rahman RN on 6/17/2025        Severity Reactions Comments    Latex Not Specified  "Itching     Penicillins Not Specified Other Patient reports from childhood, unsure of reaction            Sources:   OARRS  Pharmacy dispense history  Spouse Good historian  Chart Review 6/3/25 Heme Onc note referenced       Davina Mckay PharmD  Transitions of Care Pharmacist  06/18/25     Secure Chat preferred   If no response call [phone] or Vocera \"Med Rec\"    "

## 2025-06-18 NOTE — PROGRESS NOTES
Physical Therapy    Therapy Communication Note    Patient Name: Bijan Ibanez  MRN: [id number]  Department:  Room: [address] [place] [address]  Today's Date: 6/18/2025       Discipline: Physical Therapy      PT Missed Visit: Yes  Missed Visit Reason: Other (Comment) (pt off floor)            Grecia Fermin, PT

## 2025-06-18 NOTE — PRE-PROCEDURE NOTE
Interventional Radiology Preprocedure Note    Procedure: US-guided soft tissue nodule in the right gluteal muscle.    Indication for procedure: The primary encounter diagnosis was Burkitt's lymphoma (Multi). Diagnoses of Burkitt lymphoma, unspecified body region (Multi) and Admission for antineoplastic chemotherapy were also pertinent to this visit.    Relevant review of systems: NA    Relevant Labs:   Lab Results   Component Value Date    CREATININE 0.37 (L) 06/18/2025    EGFR >90 06/18/2025    INR 1.1 06/18/2025    PROTIME 11.6 06/18/2025       Planned Sedation/Anesthesia: Moderate    Airway assessment: normal    Directed physical examination:    A&Ox3     Mallampati: N/A    ASA Score: ASA 3 - Patient with moderate systemic disease with functional limitations    Benefits, risks and alternatives of procedure and planned sedation have been discussed with the patient and/or their representative. All questions answered and they agree to proceed.     Susana Goodman DO, PGY-3   Diagnostic Radiology   [facility]

## 2025-06-18 NOTE — PROGRESS NOTES
Spiritual Care Visit  Spiritual Care Request    Reason for Visit:  Routine Visit: Introduction  Continue Visiting: Yes  Crisis Visit: Other (Comment) (Informed poor prognosis)     Request Received From:  Referral From: Family    Focus of Care:  Visited With: Family   Care Provided for Crisis Visit: Supported family     Refer to :  Referral To:        Spiritual Care Assessment    Spiritual Assessment:       Family Spiritual Care Encounters  Family Coping: Fearful, Anxiety, Sadness  Family Participation in Care: Consistently demonstrated  Family Support During Treatment: Consistently demonstrated  Caregiver-Patient Relationship: Not compromised              Care Provided:       Sense of Community and or Hoahaoism Affiliation:  Yazdanism         Addressed Needs/Concerns and/or Stephanie Through:  Hoahaoism Encounters  Hoahaoism Needs: Spiritual care brochure, Prayer       Outcome:  Outcome of Spiritual Care Visit: Spirituality connected, Stress management, Identifying spiritual/emotional issues, Comfort/healing presence     Advance Directives:         Spiritual Care Annotation    Annotation:  Pt recently taken for a procedure, after pt to procedure family informed of poor prognosis.  Team anticipated pr would be present and told when pt returns team will speak with pt.  Provided support as family overwhelmed.  Did not anticipate this overwhelming challenge.  Provided supportive care, holding with the family their pain and grief.  Spoke with RN of my availability when pt returns.  Continue to provide support.

## 2025-06-18 NOTE — POST-PROCEDURE NOTE
Interventional Radiology Brief Postprocedure Note    Attending: Dr. Mcghee     Assistant: Dr. Rex DO, PGY-3     Diagnosis: Concern for Burkitt's Lymphoma Relapse.     Description of procedure: US-guided biopsy of soft tissue nodule in the right gluteal musculature,     2 cores for surg path, 1 core for RPMI.     Anesthesia:  MAC Moderate    Complications: None    Estimated Blood Loss: minimal    Medications  As of 06/18/25 1534      acyclovir (Zovirax) tablet 400 mg (mg) Total dose:  400 mg* Dosing weight:  82.4   *Administration not included in total     Date/Time Rate/Dose/Volume Action       06/17/25 2047 400 mg Given     06/18/25  0905 *400 mg Missed               apixaban (Eliquis) tablet 5 mg (mg) Total dose:  Cannot be calculated* Dosing weight:  82.4   *Administration dose not documented     Date/Time Rate/Dose/Volume Action       06/17/25  1855 *Not included in total Held by provider      2100 *5 mg Missed     06/18/25  0900 *5 mg Missed               metoprolol tartrate (Lopressor) tablet 75 mg (mg) Total volume:  Not documented* Dosing weight:  82.4   *Total volume has not been documented. View each administration to see the amount administered.     Date/Time Rate/Dose/Volume Action       06/17/25 2047 75 mg Given     06/18/25  1041 75 mg Given               pantoprazole (ProtoNix) EC tablet 40 mg (mg) Total dose:  40 mg Dosing weight:  82.4      Date/Time Rate/Dose/Volume Action       06/18/25 0625 40 mg Given               oxyCODONE (Roxicodone) immediate release tablet 5 mg (mg) Total dose:  10 mg Dosing weight:  82.4      Date/Time Rate/Dose/Volume Action       06/17/25 2047 5 mg Given     06/18/25  0315 5 mg Given               ondansetron (Zofran) injection 8 mg (mg) Total dose:  16 mg Dosing weight:  82.4      Date/Time Rate/Dose/Volume Action       06/17/25 2047 8 mg Given     06/18/25  0910 8 mg Given               acyclovir (Zovirax) 250 mg in sodium chloride 0.9% 50 mL IV (mL/hr) Total  volume:  Not documented* Dosing weight:  82.5   *Total volume has not been documented. View each administration to see the amount administered.     Date/Time Rate/Dose/Volume Action       06/18/25  1041 250 mg - 55 mL/hr (over 60 min) New Bag      1205  (over 60 min) Stopped               fludeoxyglucose F-18 injection 13.64 millicurie (millicurie) Total dose:  13.64 millicurie Dosing weight:  82.5      Date/Time Rate/Dose/Volume Action       06/18/25  0913 13.64 millicurie Given               HYDROmorphone (Dilaudid) injection 0.4 mg (mg) Total dose:  0.4 mg Dosing weight:  82.5      Date/Time Rate/Dose/Volume Action       06/18/25  1234 0.4 mg Given               potassium chloride 20 mEq in sterile water for injection 100 mL (mL/hr) Total volume:  Not documented* Dosing weight:  82.5   *Total volume has not been documented. View each administration to see the amount administered.     Date/Time Rate/Dose/Volume Action       06/18/25  1236 20 mEq - 50 mL/hr (over 120 min) New Bag      1326  (over 120 min) Stopped      1332 20 mEq - 50 mL/hr (over 120 min) New Bag               sodium chloride 0.9% infusion (mL/hr) Total volume:  Not documented* Dosing weight:  82.5   *Total volume has not been documented. View each administration to see the amount administered.     Date/Time Rate/Dose/Volume Action       06/18/25  1209 100 mL/hr New Bag               fentaNYL PF (Sublimaze) injection (mcg) Total dose:  50 mcg      Date/Time Rate/Dose/Volume Action       06/18/25  1524 50 mcg Given               midazolam (Versed) injection (mg) Total dose:  1 mg      Date/Time Rate/Dose/Volume Action       06/18/25  1524 1 mg Given                   See detailed result report with images in PACS.    The patient tolerated the procedure well without incident or complication and is in stable condition.     Susana Goodman DO, PGY-3   Diagnostic Radiology   [facility]

## 2025-06-19 ENCOUNTER — TUMOR BOARD CONFERENCE (OUTPATIENT)
Dept: HEMATOLOGY/ONCOLOGY | Facility: HOSPITAL | Age: 65
End: 2025-06-19
Payer: MEDICARE

## 2025-06-19 LAB
ALBUMIN SERPL BCP-MCNC: 3.7 G/DL (ref 3.4–5)
ALP SERPL-CCNC: 38 U/L (ref 33–136)
ALT SERPL W P-5'-P-CCNC: 8 U/L (ref 10–52)
ANION GAP SERPL CALC-SCNC: 16 MMOL/L (ref 10–20)
AST SERPL W P-5'-P-CCNC: 8 U/L (ref 9–39)
BASOPHILS # BLD AUTO: 0 X10*3/UL (ref 0–0.1)
BASOPHILS NFR BLD AUTO: 0 %
BILIRUB SERPL-MCNC: 0.9 MG/DL (ref 0–1.2)
BUN SERPL-MCNC: 19 MG/DL (ref 6–23)
CALCIUM SERPL-MCNC: 9.2 MG/DL (ref 8.6–10.6)
CHLORIDE SERPL-SCNC: 101 MMOL/L (ref 98–107)
CMV DNA SERPL NAA+PROBE-LOG IU: NORMAL {LOG_IU}/ML
CO2 SERPL-SCNC: 25 MMOL/L (ref 21–32)
CREAT SERPL-MCNC: 0.43 MG/DL (ref 0.5–1.3)
EGFRCR SERPLBLD CKD-EPI 2021: >90 ML/MIN/1.73M*2
EOSINOPHIL # BLD AUTO: 0 X10*3/UL (ref 0–0.7)
EOSINOPHIL NFR BLD AUTO: 0 %
ERYTHROCYTE [DISTWIDTH] IN BLOOD BY AUTOMATED COUNT: 17.7 % (ref 11.5–14.5)
GLUCOSE SERPL-MCNC: 130 MG/DL (ref 74–99)
HCT VFR BLD AUTO: 32.8 % (ref 41–52)
HGB BLD-MCNC: 10.3 G/DL (ref 13.5–17.5)
IMM GRANULOCYTES # BLD AUTO: 0.02 X10*3/UL (ref 0–0.7)
IMM GRANULOCYTES NFR BLD AUTO: 0.6 % (ref 0–0.9)
LABORATORY COMMENT REPORT: NOT DETECTED
LDH SERPL L TO P-CCNC: 171 U/L (ref 84–246)
LYMPHOCYTES # BLD AUTO: 0.55 X10*3/UL (ref 1.2–4.8)
LYMPHOCYTES NFR BLD AUTO: 16.4 %
MAGNESIUM SERPL-MCNC: 2.19 MG/DL (ref 1.6–2.4)
MCH RBC QN AUTO: 28.9 PG (ref 26–34)
MCHC RBC AUTO-ENTMCNC: 31.4 G/DL (ref 32–36)
MCV RBC AUTO: 92 FL (ref 80–100)
MONOCYTES # BLD AUTO: 0.14 X10*3/UL (ref 0.1–1)
MONOCYTES NFR BLD AUTO: 4.2 %
NEUTROPHILS # BLD AUTO: 2.64 X10*3/UL (ref 1.2–7.7)
NEUTROPHILS NFR BLD AUTO: 78.8 %
NRBC BLD-RTO: 0 /100 WBCS (ref 0–0)
PHOSPHATE SERPL-MCNC: 4.3 MG/DL (ref 2.5–4.9)
PLATELET # BLD AUTO: 173 X10*3/UL (ref 150–450)
POTASSIUM SERPL-SCNC: 3.6 MMOL/L (ref 3.5–5.3)
PROT SERPL-MCNC: 5.4 G/DL (ref 6.4–8.2)
RBC # BLD AUTO: 3.57 X10*6/UL (ref 4.5–5.9)
SODIUM SERPL-SCNC: 138 MMOL/L (ref 136–145)
URATE SERPL-MCNC: 6.5 MG/DL (ref 4–7.5)
WBC # BLD AUTO: 3.4 X10*3/UL (ref 4.4–11.3)

## 2025-06-19 PROCEDURE — 36415 COLL VENOUS BLD VENIPUNCTURE: CPT

## 2025-06-19 PROCEDURE — 83615 LACTATE (LD) (LDH) ENZYME: CPT | Performed by: NURSE PRACTITIONER

## 2025-06-19 PROCEDURE — 85025 COMPLETE CBC W/AUTO DIFF WBC: CPT | Performed by: NURSE PRACTITIONER

## 2025-06-19 PROCEDURE — RXMED WILLOW AMBULATORY MEDICATION CHARGE

## 2025-06-19 PROCEDURE — 2500000004 HC RX 250 GENERAL PHARMACY W/ HCPCS (ALT 636 FOR OP/ED): Performed by: NURSE PRACTITIONER

## 2025-06-19 PROCEDURE — 84100 ASSAY OF PHOSPHORUS: CPT | Performed by: NURSE PRACTITIONER

## 2025-06-19 PROCEDURE — 83735 ASSAY OF MAGNESIUM: CPT

## 2025-06-19 PROCEDURE — 99232 SBSQ HOSP IP/OBS MODERATE 35: CPT | Performed by: INTERNAL MEDICINE

## 2025-06-19 PROCEDURE — 1170000001 HC PRIVATE ONCOLOGY ROOM DAILY

## 2025-06-19 PROCEDURE — 84550 ASSAY OF BLOOD/URIC ACID: CPT | Performed by: NURSE PRACTITIONER

## 2025-06-19 PROCEDURE — 2500000001 HC RX 250 WO HCPCS SELF ADMINISTERED DRUGS (ALT 637 FOR MEDICARE OP): Performed by: NURSE PRACTITIONER

## 2025-06-19 PROCEDURE — 80053 COMPREHEN METABOLIC PANEL: CPT | Performed by: NURSE PRACTITIONER

## 2025-06-19 RX ORDER — HYDROXYZINE HYDROCHLORIDE 10 MG/1
10 TABLET, FILM COATED ORAL EVERY 8 HOURS PRN
Qty: 30 TABLET | Refills: 0 | Status: SHIPPED | OUTPATIENT
Start: 2025-06-19 | End: 2025-06-30

## 2025-06-19 RX ORDER — OXYCODONE HYDROCHLORIDE 5 MG/1
5 TABLET ORAL EVERY 4 HOURS PRN
Start: 2025-06-19 | End: 2025-06-19 | Stop reason: HOSPADM

## 2025-06-19 RX ORDER — GABAPENTIN 300 MG/1
300 CAPSULE ORAL NIGHTLY
Qty: 10 CAPSULE | Refills: 0 | Status: SHIPPED | OUTPATIENT
Start: 2025-06-19 | End: 2025-06-30

## 2025-06-19 RX ORDER — HYDROMORPHONE HCL/0.9% NACL/PF 15 MG/30ML
PATIENT CONTROLLED ANALGESIA SYRINGE INTRAVENOUS CONTINUOUS
Refills: 0 | Status: DISCONTINUED | OUTPATIENT
Start: 2025-06-19 | End: 2025-06-20

## 2025-06-19 RX ORDER — ONDANSETRON 4 MG/1
4 TABLET, FILM COATED ORAL EVERY 6 HOURS
Qty: 20 TABLET | Refills: 0 | Status: SHIPPED | OUTPATIENT
Start: 2025-06-19 | End: 2025-06-27

## 2025-06-19 RX ORDER — HYDROMORPHONE HYDROCHLORIDE 2 MG/1
2 TABLET ORAL EVERY 4 HOURS PRN
Refills: 0 | Status: DISCONTINUED | OUTPATIENT
Start: 2025-06-19 | End: 2025-06-19

## 2025-06-19 RX ORDER — ACETAMINOPHEN 325 MG/1
975 TABLET ORAL EVERY 8 HOURS PRN
Start: 2025-06-19 | End: 2025-06-29

## 2025-06-19 RX ORDER — HYDROXYZINE HYDROCHLORIDE 10 MG/1
10 TABLET, FILM COATED ORAL EVERY 8 HOURS PRN
Status: DISCONTINUED | OUTPATIENT
Start: 2025-06-19 | End: 2025-06-20 | Stop reason: HOSPADM

## 2025-06-19 RX ORDER — ONDANSETRON 4 MG/1
4 TABLET, FILM COATED ORAL EVERY 6 HOURS
Status: DISCONTINUED | OUTPATIENT
Start: 2025-06-19 | End: 2025-06-20 | Stop reason: HOSPADM

## 2025-06-19 RX ORDER — METOPROLOL TARTRATE 75 MG/1
75 TABLET ORAL 2 TIMES DAILY
Qty: 60 TABLET | Refills: 11 | Status: SHIPPED | OUTPATIENT
Start: 2025-06-19 | End: 2026-06-19

## 2025-06-19 RX ORDER — POLYETHYLENE GLYCOL 3350 17 G/17G
17 POWDER, FOR SOLUTION ORAL DAILY PRN
Start: 2025-06-19 | End: 2025-06-22

## 2025-06-19 RX ORDER — NALOXONE HYDROCHLORIDE 0.4 MG/ML
0.2 INJECTION, SOLUTION INTRAMUSCULAR; INTRAVENOUS; SUBCUTANEOUS AS NEEDED
Status: DISCONTINUED | OUTPATIENT
Start: 2025-06-19 | End: 2025-06-20 | Stop reason: HOSPADM

## 2025-06-19 RX ORDER — HYDROMORPHONE HYDROCHLORIDE 2 MG/1
1 TABLET ORAL
Refills: 0 | Status: DISCONTINUED | OUTPATIENT
Start: 2025-06-19 | End: 2025-06-19

## 2025-06-19 RX ORDER — GABAPENTIN 300 MG/1
300 CAPSULE ORAL NIGHTLY
Status: DISCONTINUED | OUTPATIENT
Start: 2025-06-19 | End: 2025-06-20 | Stop reason: HOSPADM

## 2025-06-19 RX ORDER — HYDROMORPHONE HYDROCHLORIDE 2 MG/1
1 TABLET ORAL
Qty: 10 TABLET | Refills: 0 | Status: SHIPPED | OUTPATIENT
Start: 2025-06-19 | End: 2025-06-20 | Stop reason: HOSPADM

## 2025-06-19 RX ORDER — SENNOSIDES 8.6 MG/1
1 TABLET ORAL 2 TIMES DAILY
Status: DISCONTINUED | OUTPATIENT
Start: 2025-06-19 | End: 2025-06-20 | Stop reason: HOSPADM

## 2025-06-19 RX ADMIN — ONDANSETRON HYDROCHLORIDE 4 MG: 4 TABLET, FILM COATED ORAL at 11:58

## 2025-06-19 RX ADMIN — ACYCLOVIR SODIUM 250 MG: 50 INJECTION, SOLUTION INTRAVENOUS at 10:17

## 2025-06-19 RX ADMIN — PANTOPRAZOLE SODIUM 40 MG: 40 INJECTION, POWDER, FOR SOLUTION INTRAVENOUS at 10:17

## 2025-06-19 RX ADMIN — ONDANSETRON 8 MG: 2 INJECTION INTRAMUSCULAR; INTRAVENOUS at 08:54

## 2025-06-19 RX ADMIN — HYDROMORPHONE HYDROCHLORIDE 0.4 MG: 1 INJECTION, SOLUTION INTRAMUSCULAR; INTRAVENOUS; SUBCUTANEOUS at 08:47

## 2025-06-19 RX ADMIN — SENNOSIDES 8.6 MG: 8.6 TABLET, FILM COATED ORAL at 21:55

## 2025-06-19 RX ADMIN — GABAPENTIN 300 MG: 300 CAPSULE ORAL at 21:55

## 2025-06-19 RX ADMIN — HYDROMORPHONE HYDROCHLORIDE 2 MG: 2 TABLET ORAL at 14:35

## 2025-06-19 RX ADMIN — Medication: at 18:02

## 2025-06-19 RX ADMIN — SENNOSIDES 8.6 MG: 8.6 TABLET, FILM COATED ORAL at 11:58

## 2025-06-19 RX ADMIN — HYDROMORPHONE HYDROCHLORIDE 0.4 MG: 1 INJECTION, SOLUTION INTRAMUSCULAR; INTRAVENOUS; SUBCUTANEOUS at 11:56

## 2025-06-19 RX ADMIN — ONDANSETRON HYDROCHLORIDE 4 MG: 4 TABLET, FILM COATED ORAL at 18:02

## 2025-06-19 RX ADMIN — METOPROLOL TARTRATE 75 MG: 50 TABLET, FILM COATED ORAL at 10:17

## 2025-06-19 RX ADMIN — METOPROLOL TARTRATE 75 MG: 50 TABLET, FILM COATED ORAL at 21:55

## 2025-06-19 RX ADMIN — HYDROMORPHONE HYDROCHLORIDE 0.4 MG: 1 INJECTION, SOLUTION INTRAMUSCULAR; INTRAVENOUS; SUBCUTANEOUS at 15:47

## 2025-06-19 ASSESSMENT — PAIN - FUNCTIONAL ASSESSMENT
PAIN_FUNCTIONAL_ASSESSMENT: 0-10

## 2025-06-19 ASSESSMENT — COGNITIVE AND FUNCTIONAL STATUS - GENERAL
STANDING UP FROM CHAIR USING ARMS: A LOT
MOBILITY SCORE: 14
DRESSING REGULAR LOWER BODY CLOTHING: A LOT
MOVING TO AND FROM BED TO CHAIR: A LOT
DAILY ACTIVITIY SCORE: 15
MOVING FROM LYING ON BACK TO SITTING ON SIDE OF FLAT BED WITH BEDRAILS: A LITTLE
STANDING UP FROM CHAIR USING ARMS: A LOT
HELP NEEDED FOR BATHING: A LOT
MOVING FROM LYING ON BACK TO SITTING ON SIDE OF FLAT BED WITH BEDRAILS: A LITTLE
CLIMB 3 TO 5 STEPS WITH RAILING: A LOT
MOBILITY SCORE: 14
PERSONAL GROOMING: A LITTLE
WALKING IN HOSPITAL ROOM: A LOT
DRESSING REGULAR LOWER BODY CLOTHING: A LOT
PERSONAL GROOMING: A LITTLE
TOILETING: A LOT
WALKING IN HOSPITAL ROOM: A LOT
DRESSING REGULAR UPPER BODY CLOTHING: A LOT
DRESSING REGULAR UPPER BODY CLOTHING: A LOT
CLIMB 3 TO 5 STEPS WITH RAILING: A LOT
MOVING TO AND FROM BED TO CHAIR: A LOT
TOILETING: A LOT
TURNING FROM BACK TO SIDE WHILE IN FLAT BAD: A LITTLE
DAILY ACTIVITIY SCORE: 15
TURNING FROM BACK TO SIDE WHILE IN FLAT BAD: A LITTLE
HELP NEEDED FOR BATHING: A LOT

## 2025-06-19 ASSESSMENT — PAIN SCALES - GENERAL
PAINLEVEL_OUTOF10: 0 - NO PAIN
PAINLEVEL_OUTOF10: 0 - NO PAIN
PAINLEVEL_OUTOF10: 8
PAINLEVEL_OUTOF10: 6
PAINLEVEL_OUTOF10: 7
PAINLEVEL_OUTOF10: 8
PAINLEVEL_OUTOF10: 9

## 2025-06-19 ASSESSMENT — PAIN DESCRIPTION - LOCATION
LOCATION: NECK

## 2025-06-19 ASSESSMENT — ACTIVITIES OF DAILY LIVING (ADL): LACK_OF_TRANSPORTATION: NO

## 2025-06-19 ASSESSMENT — PAIN DESCRIPTION - ORIENTATION: ORIENTATION: UPPER

## 2025-06-19 NOTE — HOSPITAL COURSE
Mr. Bijan Ibanez is a 64 yr old male with a PMH of Burkitt's Lymphoma s/p 6 cycles DA R EPOCH, htn, HLD, CAD, Afib, MI (stent 2010), renal cell carcinoma (R partial nephrectomy 2013), DVT (Apixaban), BPH, GERD, and arthritis who is now admitted with concern of relapse to [facility].     Patient and family discussed treatment vs. Hospice services and family [facility].     Will be discharge home under Hospice

## 2025-06-19 NOTE — PROGRESS NOTES
Physical Therapy    Therapy Communication Note    Patient Name: Bijan Ibanez  MRN: [id number]  Department: [facility] 3 Room: 3013018-A  Today's Date: 6/19/2025       Discipline: Physical Therapy      PT Missed Visit: Yes  Missed Visit Reason: Other (Comment) (pt is now comfort care and pursuing hospice; will d/c PT)          Grecia Fermin, PT

## 2025-06-19 NOTE — PROGRESS NOTES
06/19/25 1406   Discharge Planning   Living Arrangements Spouse/significant other   Support Systems Spouse/significant other;Children;Family members   Type of Residence Private residence   Who is requesting discharge planning? Provider   Home or Post Acute Services In home services   Expected Discharge Disposition [facility]   Does the patient need discharge transport arranged? Yes   RoundTrip coordination needed? Yes   Financial Resource Strain   How hard is it for you to pay for the very basics like food, housing, medical care, and heating? Not very   Housing Stability   In the last 12 months, was there a time when you were not able to pay the mortgage or rent on time? N   In the past 12 months, how many times have you moved where you were living? 0   At any time in the past 12 months, were you homeless or living in a shelter (including now)? N   Transportation Needs   In the past 12 months, has lack of transportation kept you from medical appointments or from getting medications? no   In the past 12 months, has lack of transportation kept you from meetings, work, or from getting things needed for daily living? No   Patient Choice   Provider Choice list and CMS website (https://medicare.gov/care-compare#search) for post-acute Quality and Resource Measure Data were provided and reviewed with: Patient;Family     Pt was admitted with relapsed Burkitt's Lymphoma.  He is ready for hospice care, per the medical team.  This was discussed with the pt, his wife and family members.  Their first preference for hospice is [facility] [place].  They were called twice with no return call.  The pt and and his wife would like to pursue their second choice- [facility] to prevent delay in discharge home.  A referral was sent via [employer].  [facility] will have a bedside meeting with the pt and his wife tomorrow at 9:30.  Their preference is to go home tomorrow.  May need DME delivered first.  Guerda Carranza RN, TCC

## 2025-06-19 NOTE — CONSULTS
Nutrition Note:   --->Reason for Note: Admission nursing screening    Patient is a 64 y.o. male with Burkitt's Lymphoma, admitted d/t c/f relapsed disease.    Since admit, pt no longer wants to pursue aggressive interventions. Hospice has been consulted. Code status changed to DNR Comfort Care Measures Only.    Aggressive nutrition interventions not indicated at this time. Full assessment being deferred. This service to sign-off. RDN does remain available during pt's stay; please re-consult PRN, if status changes.        Time Spent (min): 15 minutes

## 2025-06-19 NOTE — PROGRESS NOTES
Bijan Ibanez is a 64 y.o. male on day 2 of admission presenting with relapsed Burkitt's lymphoma (Multi).    Subjective   Appears fatigued and chronically ill, frail in bed with wife and daughter at bedside   Having intermittent pain and nausea using PRN meds.   [facility], sent referral    Discussed changing code status patient and wife agreeable to DNR- C C       Objective     Physical Exam  Constitutional:       Appearance: He is ill-appearing.   HENT:      Nose: Nose normal.      Mouth/Throat:      Mouth: Mucous membranes are dry.      Pharynx: Oropharynx is clear.   Eyes:      General: No scleral icterus.  Cardiovascular:      Rate and Rhythm: Normal rate and regular rhythm.   Pulmonary:      Effort: Pulmonary effort is normal.   Abdominal:      Palpations: Abdomen is soft.   Musculoskeletal:         General: Normal range of motion.      Cervical back: Normal range of motion.   Skin:     General: Skin is warm.      Coloration: Skin is pale.   Neurological:      Mental Status: Mental status is at baseline.         Last Recorded Vitals  Blood pressure (!) 149/93, pulse 75, temperature 37 °C (98.6 °F), temperature source Temporal, resp. rate 18, height 1.829 m (6'), weight 83.1 kg (183 lb 3.2 oz), SpO2 95%.  Intake/Output last 3 Shifts:  I/O last 3 completed shifts:  In: 1175.7 (14.3 mL/kg) [I.V.:1066.7 (12.9 mL/kg); IV Piggyback:109]  Out: - (0 mL/kg)   Weight: 82.5 kg     Relevant Results  Scheduled medications  Scheduled Medications[1]  Continuous medications  Continuous Medications[2]  PRN medications  PRN Medications[3]    Assessment & Plan  Burkitt's lymphoma (Multi)    Mr. Bijan Ibanez is a 64 yr old male with a PMH of Burkitt's Lymphoma s/p 6 cycles DA R EPOCH, htn, HLD, CAD, Afib, MI (stent 2010), renal cell carcinoma (R partial nephrectomy 2013), DVT (Apixaban), BPH, GERD, and arthritis who is now admitted with concern of relapse to [facility].      6/19  Referral sent  [facility], anticipate discharge home  under hospice services   Code status changed to DNR Comfort Care        ONC  # Burkitt's Lymphoma (dx Jan 2025)  - Presented with R sided jaw swelling  - BMBx: 70-80% high grade B cell involvement  - PET: showed lymphoma involvement in kidney, liver, spleen, abdom wall, and musculature  - Received 6C R-EPOCH, LP w/ IT chemo x5 (CSF flow all neg)  - PET (after C2): near CR  - Cycle 6 DA R EPOCH (5/23), Neulasta (5/30), Rituxan (6/3)  # Concern for relapse (6/17)  - Presented to [facility] ED with abdom pain & vomiting  - CT AP (6/17): showing R paraspinal mass on T8, lytic lesion on L5, new mesenteric LN/masses  - PET showing systemic & CNS relapse, new paraspinal mass  - IR LN biopsy done (6/18)  - Pt added to 6/19 Tumor Board  - Dex 40 mg given for n/v symptoms (6/18)  - MRI spine could not toelrate      HEME  # Hx DVT (2/3/25, R subclav, R axillary from PICC)  - Eliquis 5 BID on hold for LP & biopsy  # DVT prophy: ambulation     ID  # Infectious workup  - Negative: Covid/Flu, UA  - Pending: RVP  # Hx MRSA bacteremia, completed Vanc on 3/3/25  # Prophy: ACV     CARDIO  - Echo (2/3/25): EF 60-65%  - QTC (6/17): 428  # Afib  - Cont Metop 75 BID  - Hold Lasix on admit, diurese as needed  # HTN  # HLD: cont home Lipitor     FEN/GI  Admit wt: 81.4kg  Admit sCr-0.33  # PUD prophy: Protonix  # N/V, Abdom pain  - Presented with n/v, abdominal pain since Sunday  -   - PRN Zofran & Compazine  - PRN Oxy & Tylenol     MISC  # Falls at home (5/2, 5/17)  - CTH neg (5/17)  - PT ordered, pt now uses walker more frequently  # Neuropathy  - Gabapentin TID     DISPO  - DNR Comfort Care   - SOLO PICC ordered 6/18  - Primary Onc: Mike    Pt seen, examined, and discussed w/ Dr. Hollis Faulkner.    Whitney Vilchis, APRN-CNP         [1] acyclovir, 250 mg, intravenous, q12h  [Held by provider] apixaban, 5 mg, oral, BID  atorvastatin, 40 mg, oral, Nightly  lidocaine, 5 mL,  infiltration, Once  metoprolol tartrate, 75 mg, oral, BID  pantoprazole, 40 mg, intravenous, Daily  perflutren lipid microspheres, 0.5-10 mL of dilution, intravenous, Once in imaging  perflutren protein A microsphere, 0.5 mL, intravenous, Once in imaging  sulfur hexafluoride microsphr, 2 mL, intravenous, Once in imaging     [2] sodium chloride 0.9%, 100 mL/hr, Last Rate: 100 mL/hr (06/18/25 8818)     [3] PRN medications: acetaminophen, albuterol, alteplase, alteplase, HYDROmorphone, ondansetron, oxyCODONE, polyethylene glycol, prochlorperazine

## 2025-06-20 ENCOUNTER — PHARMACY VISIT (OUTPATIENT)
Dept: PHARMACY | Facility: CLINIC | Age: 65
End: 2025-06-20
Payer: MEDICARE

## 2025-06-20 VITALS
OXYGEN SATURATION: 96 % | WEIGHT: 183.2 LBS | HEIGHT: 72 IN | HEART RATE: 74 BPM | DIASTOLIC BLOOD PRESSURE: 82 MMHG | SYSTOLIC BLOOD PRESSURE: 124 MMHG | TEMPERATURE: 98.1 F | BODY MASS INDEX: 24.81 KG/M2 | RESPIRATION RATE: 16 BRPM

## 2025-06-20 LAB
CELL COUNT (BLOOD): 0.18 X10*3/UL
CELL POPULATIONS: NORMAL
CMV IGM SERPL-ACNC: <8 AU/ML
DIAGNOSIS: NORMAL
EBV DNA BLD NAA+PROBE-LOG IU: NORMAL {LOG_IU}/ML
FLOW DIFFERENTIAL: NORMAL
FLOW TEST ORDERED: NORMAL
HTLV I+II AB SER QL IA: NEGATIVE
LAB TEST METHOD: NORMAL
LABORATORY COMMENT REPORT: NOT DETECTED
NUMBER OF CELLS COLLECTED: NORMAL
PATH REPORT.TOTAL CANCER: NORMAL
SIGNATURE COMMENT: NORMAL
SPECIMEN VIABILITY: NORMAL

## 2025-06-20 PROCEDURE — 2500000004 HC RX 250 GENERAL PHARMACY W/ HCPCS (ALT 636 FOR OP/ED)

## 2025-06-20 PROCEDURE — RXMED WILLOW AMBULATORY MEDICATION CHARGE

## 2025-06-20 PROCEDURE — 2500000004 HC RX 250 GENERAL PHARMACY W/ HCPCS (ALT 636 FOR OP/ED): Performed by: NURSE PRACTITIONER

## 2025-06-20 PROCEDURE — 99233 SBSQ HOSP IP/OBS HIGH 50: CPT

## 2025-06-20 PROCEDURE — 2500000001 HC RX 250 WO HCPCS SELF ADMINISTERED DRUGS (ALT 637 FOR MEDICARE OP): Performed by: NURSE PRACTITIONER

## 2025-06-20 PROCEDURE — 2500000004 HC RX 250 GENERAL PHARMACY W/ HCPCS (ALT 636 FOR OP/ED): Mod: TB | Performed by: NURSE PRACTITIONER

## 2025-06-20 PROCEDURE — 99238 HOSP IP/OBS DSCHRG MGMT 30/<: CPT | Performed by: INTERNAL MEDICINE

## 2025-06-20 RX ORDER — MORPHINE SULFATE 20 MG/ML
10 SOLUTION ORAL
Qty: 10 ML | Refills: 0 | Status: SHIPPED | OUTPATIENT
Start: 2025-06-20 | End: 2025-06-20

## 2025-06-20 RX ORDER — MORPHINE SULFATE 100 MG/5ML
10 SOLUTION ORAL
Status: DISCONTINUED | OUTPATIENT
Start: 2025-06-20 | End: 2025-06-20 | Stop reason: SDUPTHER

## 2025-06-20 RX ORDER — LORAZEPAM 2 MG/ML
0.5 INJECTION INTRAMUSCULAR EVERY 6 HOURS PRN
Status: DISCONTINUED | OUTPATIENT
Start: 2025-06-20 | End: 2025-06-20 | Stop reason: HOSPADM

## 2025-06-20 RX ORDER — MORPHINE SULFATE 100 MG/5ML
10 SOLUTION ORAL
Status: DISCONTINUED | OUTPATIENT
Start: 2025-06-20 | End: 2025-06-20 | Stop reason: HOSPADM

## 2025-06-20 RX ORDER — SENNOSIDES 8.6 MG/1
1 TABLET ORAL 2 TIMES DAILY
Qty: 60 TABLET | Refills: 11 | Status: SHIPPED | OUTPATIENT
Start: 2025-06-20 | End: 2026-06-20

## 2025-06-20 RX ORDER — MORPHINE SULFATE 100 MG/5ML
5 SOLUTION ORAL
Status: DISCONTINUED | OUTPATIENT
Start: 2025-06-20 | End: 2025-06-20 | Stop reason: HOSPADM

## 2025-06-20 RX ORDER — MORPHINE SULFATE 20 MG/ML
10 SOLUTION ORAL
Qty: 15 ML | Refills: 0 | Status: SHIPPED | OUTPATIENT
Start: 2025-06-20 | End: 2025-06-24

## 2025-06-20 RX ORDER — SIMETHICONE 80 MG
80 TABLET,CHEWABLE ORAL 4 TIMES DAILY PRN
Status: DISCONTINUED | OUTPATIENT
Start: 2025-06-20 | End: 2025-06-20 | Stop reason: HOSPADM

## 2025-06-20 RX ADMIN — MORPHINE SULFATE 10 MG: 100 SOLUTION ORAL at 11:11

## 2025-06-20 RX ADMIN — ONDANSETRON HYDROCHLORIDE 4 MG: 4 TABLET, FILM COATED ORAL at 11:11

## 2025-06-20 RX ADMIN — HYDROMORPHONE HYDROCHLORIDE 0.2 MG: 1 INJECTION, SOLUTION INTRAMUSCULAR; INTRAVENOUS; SUBCUTANEOUS at 16:08

## 2025-06-20 RX ADMIN — HYDROMORPHONE HYDROCHLORIDE 0.2 MG: 1 INJECTION, SOLUTION INTRAMUSCULAR; INTRAVENOUS; SUBCUTANEOUS at 12:16

## 2025-06-20 RX ADMIN — ONDANSETRON HYDROCHLORIDE 4 MG: 4 TABLET, FILM COATED ORAL at 00:59

## 2025-06-20 RX ADMIN — SIMETHICONE 80 MG: 80 TABLET, CHEWABLE ORAL at 10:37

## 2025-06-20 RX ADMIN — ONDANSETRON HYDROCHLORIDE 4 MG: 4 TABLET, FILM COATED ORAL at 05:23

## 2025-06-20 RX ADMIN — METOPROLOL TARTRATE 75 MG: 50 TABLET, FILM COATED ORAL at 08:43

## 2025-06-20 RX ADMIN — HYDROMORPHONE HYDROCHLORIDE 0.2 MG: 1 INJECTION, SOLUTION INTRAMUSCULAR; INTRAVENOUS; SUBCUTANEOUS at 13:29

## 2025-06-20 RX ADMIN — ACETAMINOPHEN 975 MG: 325 TABLET ORAL at 14:35

## 2025-06-20 RX ADMIN — MORPHINE SULFATE 5 MG: 100 SOLUTION ORAL at 14:35

## 2025-06-20 RX ADMIN — PANTOPRAZOLE SODIUM 40 MG: 40 INJECTION, POWDER, FOR SOLUTION INTRAVENOUS at 08:45

## 2025-06-20 RX ADMIN — SENNOSIDES 8.6 MG: 8.6 TABLET, FILM COATED ORAL at 08:42

## 2025-06-20 RX ADMIN — LORAZEPAM 0.5 MG: 2 INJECTION, SOLUTION INTRAMUSCULAR; INTRAVENOUS at 05:18

## 2025-06-20 ASSESSMENT — COGNITIVE AND FUNCTIONAL STATUS - GENERAL
PERSONAL GROOMING: A LITTLE
TOILETING: A LOT
MOVING TO AND FROM BED TO CHAIR: A LOT
DRESSING REGULAR UPPER BODY CLOTHING: A LOT
WALKING IN HOSPITAL ROOM: A LOT
HELP NEEDED FOR BATHING: A LOT
STANDING UP FROM CHAIR USING ARMS: A LOT
MOBILITY SCORE: 14
TURNING FROM BACK TO SIDE WHILE IN FLAT BAD: A LITTLE
CLIMB 3 TO 5 STEPS WITH RAILING: A LOT
DAILY ACTIVITIY SCORE: 15
DRESSING REGULAR LOWER BODY CLOTHING: A LOT
MOVING FROM LYING ON BACK TO SITTING ON SIDE OF FLAT BED WITH BEDRAILS: A LITTLE

## 2025-06-20 ASSESSMENT — PAIN SCALES - GENERAL
PAINLEVEL_OUTOF10: 0 - NO PAIN
PAINLEVEL_OUTOF10: 10 - WORST POSSIBLE PAIN
PAINLEVEL_OUTOF10: 7
PAINLEVEL_OUTOF10: 8
PAINLEVEL_OUTOF10: 7
PAINLEVEL_OUTOF10: 10 - WORST POSSIBLE PAIN
PAINLEVEL_OUTOF10: 4

## 2025-06-20 ASSESSMENT — PAIN SCALES - PAIN ASSESSMENT IN ADVANCED DEMENTIA (PAINAD)
BODYLANGUAGE: RELAXED
FACIALEXPRESSION: SMILING OR INEXPRESSIVE
FACIALEXPRESSION: SMILING OR INEXPRESSIVE
CONSOLABILITY: NO NEED TO CONSOLE
BREATHING: NORMAL
BODYLANGUAGE: RELAXED
TOTALSCORE: 0
BREATHING: NORMAL
CONSOLABILITY: NO NEED TO CONSOLE
TOTALSCORE: 0

## 2025-06-20 ASSESSMENT — PAIN - FUNCTIONAL ASSESSMENT
PAIN_FUNCTIONAL_ASSESSMENT: PAINAD (PAIN ASSESSMENT IN ADVANCED DEMENTIA SCALE)
PAIN_FUNCTIONAL_ASSESSMENT: 0-10
PAIN_FUNCTIONAL_ASSESSMENT: PAINAD (PAIN ASSESSMENT IN ADVANCED DEMENTIA SCALE)
PAIN_FUNCTIONAL_ASSESSMENT: 0-10

## 2025-06-20 ASSESSMENT — PAIN DESCRIPTION - ORIENTATION: ORIENTATION: LOWER

## 2025-06-20 ASSESSMENT — PAIN DESCRIPTION - LOCATION: LOCATION: NECK

## 2025-06-20 NOTE — CARE PLAN
Problem: Skin  Goal: Decreased wound size/increased tissue granulation at next dressing change  Outcome: Progressing  Flowsheets (Taken 6/20/2025 1031)  Decreased wound size/increased tissue granulation at next dressing change: Promote sleep for wound healing  Goal: Participates in plan/prevention/treatment measures  Outcome: Progressing  Flowsheets (Taken 6/20/2025 1031)  Participates in plan/prevention/treatment measures: Elevate heels  Goal: Prevent/manage excess moisture  Outcome: Progressing  Flowsheets (Taken 6/20/2025 1031)  Prevent/manage excess moisture: Monitor for/manage infection if present  Goal: Prevent/minimize sheer/friction injuries  Outcome: Progressing  Flowsheets (Taken 6/20/2025 1031)  Prevent/minimize sheer/friction injuries: Use pull sheet  Goal: Promote/optimize nutrition  Outcome: Progressing  Flowsheets (Taken 6/20/2025 1031)  Promote/optimize nutrition: Offer water/supplements/favorite foods  Goal: Promote skin healing  Outcome: Progressing  Flowsheets (Taken 6/20/2025 1031)  Promote skin healing: Turn/reposition every 2 hours/use positioning/transfer devices

## 2025-06-20 NOTE — DISCHARGE SUMMARY
Discharge Diagnosis  Burkitt's lymphoma (Multi)    Issues Requiring Follow-Up  Patient discharged under [facility]  Mr. Bijan Ibanez is a 64 yr old male with a PMH of Burkitt's Lymphoma s/p 6 cycles DA R EPOCH, htn, HLD, CAD, Afib, MI (stent 2010), renal cell carcinoma (R partial nephrectomy 2013), DVT (Apixaban), BPH, GERD, and arthritis who is now admitted with concern of relapse to [facility].     Patient and family discussed treatment vs. Hospice services and family elected Hospice under [facility] home     Pertinent Physical Exam At Time of Discharge  Physical Exam  Appears comfortable   Somewhat confused with family at bedside  Sitting up at side of bed requesting bathroom assistance, however too weak, agreeable to using bedpan     Home Medications     Medication List      START taking these medications     acetaminophen 325 mg tablet; Commonly known as: Tylenol; Take 3 tablets   (975 mg) by mouth every 8 hours if needed for mild pain (1 - 3) for up to   10 days.   gabapentin 300 mg capsule; Commonly known as: Neurontin; Take 1 capsule   (300 mg) by mouth once daily at bedtime for 10 days.   hydrOXYzine HCL 10 mg tablet; Commonly known as: Atarax; Take 1 tablet   (10 mg) by mouth every 8 hours if needed for anxiety for up to 10 days.   morphine 20 mg/mL concentrated oral solution; Take 0.5 mL (10 mg) by   mouth every 3 hours if needed (severe pain) for up to 3 days.   ondansetron 4 mg tablet; Commonly known as: Zofran; Take 1 tablet (4 mg)   by mouth every 6 hours for 7 days.   sennosides 8.6 mg tablet; Commonly known as: Senokot; Take 1 tablet (8.6   mg) by mouth 2 times a day.     CHANGE how you take these medications     polyethylene glycol 17 gram/dose powder; Commonly known as: Glycolax,   Miralax; Mix 17 g of powder and drink once daily as needed for   constipation for up to 3 days.; What changed: when to take this, reasons   to take this      CONTINUE taking these medications     metoprolol tartrate 75 mg tablet; Commonly known as: Lopressor; Take 1   tablet (75 mg) by mouth 2 times a day.   pantoprazole 40 mg EC tablet; Commonly known as: ProtoNix; Take 1 tablet   (40 mg) by mouth once daily in the morning. Take before meals. Do not   crush, chew, or split.     STOP taking these medications     acyclovir 200 mg capsule; Commonly known as: Zovirax   albuterol 90 mcg/actuation inhaler   apixaban 5 mg tablet; Commonly known as: Eliquis   atorvastatin 40 mg tablet; Commonly known as: Lipitor   furosemide 20 mg tablet; Commonly known as: Lasix       Outpatient Follow-Up  Future Appointments   Date Time Provider Department [place]   7/14/2025  9:00 AM YENNIFER MENTOR ADMIN ROOM PET [facility]   7/14/2025 10:00 AM YENNIFER MENTOR PET [facility]   7/15/2025  1:20 PM Torey Mckeon DO [id number] [employer]       JUDY Blanco-CNP

## 2025-06-20 NOTE — NURSING NOTE
RN Hospice Note    Comments/recommendations: this rn present, met with spouse/dontrell live, her 2 dtrs serena and manjinder at bedside, reviewed hospice philosophy of care, services all options with family.  Pt lethargic, did not fully participate.  Spouse confirmed goc/poc/code status, desires to take pt home with hospice today.  Dme ordered for delivery by 330 pm.   medical staff completed dc med rec and arranged for meds to bedside.  [facility] scheduled for 330-4 pm , spouse to ride home with pt in ambulance.  Spouse aware to call [facility] once pt arrives home to request nurse visit.  Thanks for the consult.     Discharge Planning:  Patient to be discharged to HOME    Plan of care reviewed with patient/family members pt, spouse/dontrell live, dtrs serena and manjinder   Plan of care reviewed with hospital staff members: dr. Faulkner/abdiel, alcides/judy, pall care cnp/gaurav chester/bay ramos/gaurav bright cm     Please notify [facility] of any changes in condition. Thank you.  Office: [phone] (8 am-6:30 pm M-F and 8 am-4:30 pm weekends and holidays)   [phone] (6:30 pm-8 am M-F and 4:30 pm-8 am weekends and holidays)    Sharon Baker RN

## 2025-06-20 NOTE — PROGRESS NOTES
Spiritual Care Visit  Spiritual Care Request    Reason for Visit:  Routine Visit: Follow-up     Focus of Care:  Visited With: Patient and family together       Spiritual Care Annotation    Annotation:   reintroduced self and role to patient Bijan Ibanez and his spouse.  provided care through compassionate presence and prayer. They were appreciative and did not have any further needs.    Rev. Bianka Brooks MDiv, [facility]

## 2025-06-20 NOTE — PROGRESS NOTES
SUPPORTIVE AND PALLIATIVE ONCOLOGY INPATIENT FOLLOW-UP    SERVICE DATE: 06/20/25     Updates 06/20/25, recommended changes are bolded below:  Pain sub-optimally controlled; pt and [facility] 0900; hoping to discharge under their care later today  Recommend stopping hydromorphone PCA  Recommend starting concentrated morphine (20 mg/mL) 5 mg po q 3 hrs prn for moderate pain  Recommend starting concentrated morphine (20 mg/mL) 10 mg po q 3 hrs prn for severe pain  Low threshold to escalate to 10 mg for moderate pain q 3 hrs prn and 15 mg for severe pain q 3 hrs prn for severe pain  Recommend adding IV hydromorphone 0.2 mg IV q 2 hrs prn for breakthrough pain while he remains inpatient     ASSESSMENT/PLAN:  Bijan Ibanez is a 64 y.o. male diagnosed with relapsed Burkitt's lymphoma (s/p 6 cycles HALIE EPOCH) PMHx significant for HTN, HLD, CAD, A-fib, CIPN, MI (stent 2010), RCC (s/p R partial nephrectomy 2013), DVT (on Eliquis), BPH, GERD, and arthritis. CT AP on 6/17/25 showing R paraspinal mass on T8, lytic lesion on L5, and new mesenteric LN/masses. PET showing systemic & CNS relapse and re-demonstrating new paraspinal mass. Admitted 6/17/2025 for Oncologic work-up and treatment. After primary team discussion with pt and family, pt has decided to pursue hospice, preferably at home. [facility]. Supportive and Palliative Oncology is consulted for pain and symptom management at EOL, as well as hospice coordination.      End of life, Comfort Measures Plan:  Relapsed Burkitt's lymphoma c/b new paraspinal mass  Cancer related pain:  Bilateral shoulder pain related to known malignancy c/b new paraspinal mass  Shortness of breath related to dying process  At risk for nausea/vomiting  At risk for opioid induced constipation  At risk for anxiety related to dying process  Assess for respiratory distress using RDOS. Signs of discomfort/respiratory  distress include tachypnea, restlessness, accessory muscle use, grunting at end expiration, nasal flaring, fearful facial expressions  If patient unable to self-report, assess pain using NPAT scale, and assess dyspnea using RDOS scale every 4 hours and as needed to assess response to medication  Comfort care measures with frequent skilled nursing monitoring and medication adjustment as necessary to manage symptoms of pain, respiratory distress, and inability to manage secretions r/t dying process.  Use Comfort care order set  Discontinue any medications or orders that do not provide comfort or manage symptoms  Recommend discontinuing hydromorphone PCA as pt and wife are [facility] this [facility] at 0900 and are hoping to discharge home later today under their care  Recommend starting concentrated morphine (20 mg/mL) 5 mg po q 3 hrs prn for moderate pain  Recommend starting concentrated morphine (20 mg/mL) 10 mg po q 3 hrs prn for severe pain  Low threshold to escalate to 10 mg for moderate pain q 3 hrs prn and 15 mg for severe pain q 3 hrs prn for severe pain  Recommend adding IV hydromorphone 0.2 mg IV q 2 hrs prn for breakthrough pain while he remains inpatient   Continue Lorazepam 0.5 mg IV q6 h prn anxiety/agitation  Continue gabapentin 300 mg po q hs  Start Robinul 0.2 mg IV q4h prn secretions  Continue Acetaminophen 975 mg [facility] q8 hrs prn for mild pain  Continue Zofran 4 mg IV/PO/ODT scheduled q 6 hrs  Start Bisacodyl 10 mg [facility] daily prn constipation  Continue Senna 1 tab po bid  If patient stabilizes for transfer to [facility]/hospice facility will plan to transition to oral meds for symptom control using concentrated morphine    Disposition:  Please start the process of having prior authorization with meds to beds deliver medications to patient prior to discharge via [facility] pharmacy. Prescriptions will need to be sent 48-72 hours prior to discharge so that a prior authorization can be completed.       Discharge date: [facility] this am at 0900; plan is to discharge home under their care today or tomorrow    SIGNATURE: GER Treadwell   PAGER/CONTACT:  Contact information:  Supportive and Palliative Oncology  Monday-Friday 8 AM-5 PM  Epic Secure chat or pager [phone].  After hours and weekends:  pager [phone]  =================================================================  SUBJECTIVE:  Interval Events:  Started on hydromorphone PCA 0.2 mg demand dose q 10 min prn as po hydromorphone did not provide sufficient relief; feels pain is improved but remains sub-optima (states lowest pain will go is 5/10)  Pt and wife meeting this [facility] at [facility]; hoping to discharge home with them later today  Pt endorsed significant anxiety overnight; received prn dose of lorazepam and felt it provided sufficient relief    Pain Assessment:  Location: Bilateral shoulders  Duration: Constant  Characteristics:   Ratin   Descriptors: aching, throbbing, shock-like, and shooting   Aggravating: movement    Relieving: Analgesics hydromorphone IC, Positioning, and Modifying activity   Interference with Function: Somewhat    Opioid Requirements  Past 24 h opioid requirements (25 at 0800 to 25 at 0800):   Hydromorphone IR 2 mg PO x 1 doses = 2 mg = 10 OME  Hydromorphone 0.4 mg IV x 3 doses = 1.2 mg = 15 OME  hydromorphone PCA:  0 basal rate, 43 attempts, 36 demand doses of 0.2 mg received = 7.2 mg = 90 OME  Total 24h OME use:  115 OME    Symptom Assessment:  Anxiety somewhat  Difficulty Sleeping somewhat  Nausea none  Constipation a little    Information obtained from: chart review, interview of patient, interview of family, and discussion with primary team  _________________________________________________________________   OBJECTIVE:  Lab Results   Component Value Date    WBC 3.4 (L) 2025    HGB 10.3 (L) 2025    HCT 32.8 (L) 2025    MCV 92 2025      06/19/2025     Lab Results   Component Value Date    GLUCOSE 130 (H) 06/19/2025    CALCIUM 9.2 06/19/2025     06/19/2025    K 3.6 06/19/2025    CO2 25 06/19/2025     06/19/2025    BUN 19 06/19/2025    CREATININE 0.43 (L) 06/19/2025     Lab Results   Component Value Date    ALT 8 (L) 06/19/2025    AST 8 (L) 06/19/2025    ALKPHOS 38 06/19/2025    BILITOT 0.9 06/19/2025     Estimated Creatinine Clearance: 125 mL/min (A) (by C-G formula based on SCr of 0.43 mg/dL (L)).    Scheduled medications   Scheduled Medications[1]  Continuous medications  Continuous Medications[2]  PRN medications  acetaminophen, 975 mg, q8h PRN  albuterol, 2 puff, q6h PRN  alteplase, 2 mg, PRN  alteplase, 2 mg, PRN  hydrOXYzine HCL, 10 mg, q8h PRN  LORazepam, 0.5 mg, q6h PRN  naloxone, 0.2 mg, PRN  polyethylene glycol, 17 g, Daily PRN  prochlorperazine, 10 mg, q6h PRN    PHYSICAL EXAMINATION:  Vital Signs:   Vital signs reviewed  Visit Vitals  BP (!) 149/93 (BP Location: Right arm, Patient Position: Lying)   Pulse 75   Temp 37 °C (98.6 °F) (Temporal)   Resp 18      Numeric pain score: 7    Physical Exam  Vitals reviewed.   Constitutional:       General: He is not in acute distress.     Comments: A&O x 3; pleasant and cooperative; lethargic, sitting up in bed eating breakfast with wife at bedside   HENT:      Head: Normocephalic and atraumatic.      Mouth/Throat:      Mouth: Mucous membranes are moist.   Cardiovascular:      Rate and Rhythm: Normal rate.   Pulmonary:      Effort: Pulmonary effort is normal. No respiratory distress.      Comments: Respirations even and unlabored; on room air  Abdominal:      General: There is no distension.      Palpations: Abdomen is soft.   Musculoskeletal:      Comments: Generalized weakness   Skin:     General: Skin is warm and dry.      Capillary Refill: Capillary refill takes less than 2 seconds.   Neurological:      General: No focal deficit present.      Mental Status: He is alert and oriented  to person, place, and time.   Psychiatric:         Thought Content: Thought content normal.      Comments: Lethargic; somewhat flat      PALLIATIVE CARE ENCOUNTER:  Supportive and Palliative Oncology encounter:  Spoke with patient and wife at bedside  Emotional support provided  Coordination of care:  medication changes    Medical Decision Making/Goals of Care/Advance Care Planning: (from LEONEL Cat CNP's note)  Patient's current clinical condition, including diagnosis, prognosis, and management plan, and goals of care were discussed.   Life limiting disease: Andrzej's lymphoma relapse, now pursuing hospice  Family: Supportive, wife and family   Performance status: Moderate limitations due to pain, weakness  Joys/meaning/strength: [place], family   Understanding of health: Demonstrates good prognostic understanding of disease process, understands plan for [facility] and eventual home hospice. Spoke with pt's wife today and she is agreeable to using [facility] services [second line] if unable to coordinate with [facility].   Information: Pt and family appreciate full disclosure and timely updates  Goals: Pain and symptom control, getting home with hospice to be with family   Worries and fears now and future: None stated, wife expressing financial concerns hence wishing to [facility] with [facility] [supplemental education provided regarding hospice coverage]   Code status discussion: Confirmed DNR-CC code status with pt.      Advance Directives  Existence of Advance Directives:Yes, documentation or copy in medical record  Decision maker: HCPOA is wifeIndira ([phone])  =================================================================  Signature and billing:  Medical complexity was high level due to due to complexity of problems, extensive data review, and high risk of management/treatment.    I spent 50 minutes in the care of this patient which included chart review, interviewing patient/family, discussion with  primary team, coordination of care, and documentation.    Data:   Diagnostic tests and information reviewed for today's visit:  Conversation with primary team, Most recent labs and imaging results, Most recent EKG, Medications    Some elements copied from AYE Cat CNP note on 6/19/25, the elements have been updated and all reflect current decision making from today, 06/20/25     Plan of Care discussed with: Provider, RN, Patient    Thank you for asking Supportive and Palliative Oncology to assist with care of this patient.  Recommendations will be communicated back to the consulting service by way of shared electronic medical record/secure chat/email or face-to-face.   We will continue to follow  Please contact us for additional questions or concerns.    SIGNATURE: GER Treadwell   PAGER/CONTACT:  Contact information:  Supportive and Palliative Oncology  Monday-Friday 8 AM-5 PM  Epic Secure chat or pager [phone].  After hours and weekends:  pager [phone]           [1] gabapentin, 300 mg, oral, Nightly  metoprolol tartrate, 75 mg, oral, BID  ondansetron, 4 mg, oral, q6h  pantoprazole, 40 mg, intravenous, Daily  sennosides, 1 tablet, oral, BID  [2] HYDROmorphone,   sodium chloride 0.9%, 100 mL/hr, Last Rate: 100 mL/hr (06/18/25 9882)

## 2025-06-20 NOTE — TUMOR BOARD NOTE
BMT Information  HCT Type:    Primary Disease for HCT:    Planned Proposed Protocols:    Prescribed Preparative Regimen Intent:    Planned Preparative Regimen Drug:    Prescribed Radiation Field:    Planned TBI Dose (cGy):     Planned Radiation Boost Field:    Planned Stem cell Source:    Planned Prophylaxis Drug (if applicable):    Transplant Date:      BMT Donor Details  No cell therapy recipient episode found for this patient.

## 2025-06-20 NOTE — CARE PLAN
The clinical goals for the shift include Patient will have managed pain throughout this shift.    Over the shift, the patient did make progress toward the following goals.     Problem: Pain - Adult  Goal: Verbalizes/displays adequate comfort level or baseline comfort level  Outcome: Progressing     Problem: Safety - Adult  Goal: Free from fall injury  Outcome: Progressing    Problem: Discharge Planning  Goal: Discharge to home or other facility with appropriate resources  Outcome: Progressing     Problem: Chronic Conditions and Co-morbidities  Goal: Patient's chronic conditions and co-morbidity symptoms are monitored and maintained or improved  Outcome: Progressing     Problem: Nutrition  Goal: Nutrient intake appropriate for maintaining nutritional needs  Outcome: Progressing     Problem: Skin  Goal: Decreased wound size/increased tissue granulation at next dressing change  6/19/2025 2006 by Wilmar Cerrato RN  Outcome: Progressing  Flowsheets (Taken 6/19/2025 2006)  Decreased wound size/increased tissue granulation at next dressing change: Promote sleep for wound healing    Goal: Participates in plan/prevention/treatment measures  6/19/2025 2006 by Wilmar Cerrato RN  Outcome: Progressing  Flowsheets (Taken 6/19/2025 2006)  Participates in plan/prevention/treatment measures: Elevate heels  Goal: Prevent/manage excess moisture  6/19/2025 2006 by Wilmar Cerrato RN  Outcome: Progressing  Flowsheets (Taken 6/19/2025 2006)  Prevent/manage excess moisture: Cleanse incontinence/protect with barrier cream      Goal: Prevent/minimize sheer/friction injuries  6/19/2025 2006 by Wilmar Cerrato RN  Outcome: Progressing  Flowsheets (Taken 6/19/2025 2006)  Prevent/minimize sheer/friction injuries: Increase activity/out of bed for meals  Goal: Promote/optimize nutrition  6/19/2025 2006 by Wilmar Cerrato RN  Outcome: Progressing  Flowsheets (Taken 6/19/2025 2006)  Promote/optimize nutrition: Offer water/supplements/favorite  foods  Goal: Promote skin healing  6/19/2025 2006 by Anjali Rodríguez RN  Outcome: Progressing  Flowsheets (Taken 6/19/2025 2006)  Promote skin healing: Turn/reposition every 2 hours/use positioning/transfer devices     Problem: Fall/Injury  Goal: Not fall by end of shift  Outcome: Progressing  Goal: Be free from injury by end of the shift  Outcome: Progressing  Goal: Verbalize understanding of personal risk factors for fall in the hospital  Outcome: Progressing  Goal: Verbalize understanding of risk factor reduction measures to prevent injury from fall in the home  Outcome: Progressing  Goal: Use assistive devices by end of the shift  Outcome: Progressing  Goal: Pace activities to prevent fatigue by end of the shift  Outcome: Progressing    ANJALI RODRÍGUEZ RN

## 2025-06-20 NOTE — NURSING NOTE
[facility]ed [facility] at [phone] at 1400 to schedule patient's  time at 4 pm. Ride confirmed, information conveyed to family.

## 2025-06-20 NOTE — TUMOR BOARD NOTE
63 yo man who recently completed 6 cycles of REPOCH, with abdominal and CNS progression within 1 month of completing therapy.    Multiple salvage regimens discussed including high dose methotrexate, temodar, novel BTK agents, however given patients frailty, rapid recurrence and guarded prognosis comfort care is also an acceptable plan

## 2025-06-20 NOTE — DISCHARGE INSTRUCTIONS
It has been an honor to be part of your care. We wish you comfort, peace, and support as you continue this next chapter with hospice.

## 2025-06-23 LAB
LAB AP ASR DISCLAIMER: NORMAL
LABORATORY COMMENT REPORT: NORMAL
PATH REPORT.FINAL DX SPEC: NORMAL
PATH REPORT.GROSS SPEC: NORMAL
PATH REPORT.RELEVANT HX SPEC: NORMAL
PATH REPORT.TOTAL CANCER: NORMAL
RESIDENT REVIEW: NORMAL

## 2025-06-24 ENCOUNTER — APPOINTMENT (OUTPATIENT)
Dept: CARDIOLOGY | Facility: CLINIC | Age: 65
End: 2025-06-24
Payer: MEDICARE

## 2025-06-26 ENCOUNTER — TUMOR BOARD CONFERENCE (OUTPATIENT)
Dept: HEMATOLOGY/ONCOLOGY | Facility: HOSPITAL | Age: 65
End: 2025-06-26
Payer: MEDICARE

## 2025-07-07 LAB
CHROM ANALY OVERALL INTERP-IMP: NORMAL
CHROM ANALY OVERALL INTERP-IMP: NORMAL
ELECTRONICALLY COSIGNED BY CYTOGENETICS: NORMAL
ELECTRONICALLY COSIGNED BY CYTOGENETICS: NORMAL
ELECTRONICALLY SIGNED BY CYTOGENETICS: NORMAL
ELECTRONICALLY SIGNED BY CYTOGENETICS: NORMAL
FISH ISCN RESULTS: NORMAL
FISH ISCN RESULTS: NORMAL

## 2025-07-07 PROCEDURE — 88271 CYTOGENETICS DNA PROBE: CPT | Performed by: PHYSICIAN ASSISTANT

## 2025-07-09 LAB
CHROM ANALY OVERALL INTERP-IMP: NORMAL
ELECTRONICALLY COSIGNED BY CYTOGENETICS: NORMAL
ELECTRONICALLY SIGNED BY CYTOGENETICS: NORMAL
FISH ISCN RESULTS: NORMAL

## 2025-07-09 PROCEDURE — 88271 CYTOGENETICS DNA PROBE: CPT | Performed by: PHYSICIAN ASSISTANT

## 2025-07-14 ENCOUNTER — APPOINTMENT (OUTPATIENT)
Dept: RADIOLOGY | Facility: CLINIC | Age: 65
End: 2025-07-14
Payer: OTHER GOVERNMENT

## 2025-07-15 ENCOUNTER — APPOINTMENT (OUTPATIENT)
Dept: HEMATOLOGY/ONCOLOGY | Facility: HOSPITAL | Age: 65
End: 2025-07-15
Payer: OTHER GOVERNMENT